# Patient Record
Sex: MALE | Race: WHITE | NOT HISPANIC OR LATINO | Employment: OTHER | URBAN - METROPOLITAN AREA
[De-identification: names, ages, dates, MRNs, and addresses within clinical notes are randomized per-mention and may not be internally consistent; named-entity substitution may affect disease eponyms.]

---

## 2017-01-20 RX ORDER — METOPROLOL TARTRATE 25 MG/1
TABLET, FILM COATED ORAL
Qty: 180 TABLET | Refills: 1 | Status: SHIPPED | OUTPATIENT
Start: 2017-01-20 | End: 2017-07-31 | Stop reason: SDUPTHER

## 2017-01-20 NOTE — LETTER
January 23, 2017    Paul Browning  [address]  [place] [address]             [place] [facility]  [address] [facility]  [place] [address]  Phone: [phone]  Fax: [phone] Dear Mr. Browning:    We recently gave you a refill and noticed your appt is past due, Please call [phone] to schedule an appointment for March per Dr. Forbes    If you have any questions or concerns, please don't hesitate to call.    Sincerely,    Grey Forbes DO/ cch

## 2017-02-02 ENCOUNTER — LAB VISIT (OUTPATIENT)
Dept: LAB | Facility: HOSPITAL | Age: 77
End: 2017-02-02
Attending: INTERNAL MEDICINE
Payer: MEDICARE

## 2017-02-02 ENCOUNTER — OFFICE VISIT (OUTPATIENT)
Dept: INTERNAL MEDICINE | Facility: CLINIC | Age: 77
End: 2017-02-02
Payer: MEDICARE

## 2017-02-02 VITALS
DIASTOLIC BLOOD PRESSURE: 64 MMHG | SYSTOLIC BLOOD PRESSURE: 111 MMHG | HEART RATE: 59 BPM | WEIGHT: 142 LBS | HEIGHT: 67 IN | BODY MASS INDEX: 22.29 KG/M2 | RESPIRATION RATE: 16 BRPM | TEMPERATURE: 98 F

## 2017-02-02 DIAGNOSIS — D69.2 SENILE PURPURA: ICD-10-CM

## 2017-02-02 DIAGNOSIS — I10 ESSENTIAL HYPERTENSION: Chronic | ICD-10-CM

## 2017-02-02 DIAGNOSIS — I25.83 CORONARY ARTERY DISEASE DUE TO LIPID RICH PLAQUE: Primary | Chronic | ICD-10-CM

## 2017-02-02 DIAGNOSIS — K22.710 BARRETT'S ESOPHAGUS WITH LOW GRADE DYSPLASIA: Chronic | ICD-10-CM

## 2017-02-02 DIAGNOSIS — I50.22 CHRONIC SYSTOLIC CHF (CONGESTIVE HEART FAILURE): Chronic | ICD-10-CM

## 2017-02-02 DIAGNOSIS — D86.0 PULMONARY SARCOIDOSIS: ICD-10-CM

## 2017-02-02 DIAGNOSIS — F33.0 MDD (MAJOR DEPRESSIVE DISORDER), RECURRENT EPISODE, MILD: Chronic | ICD-10-CM

## 2017-02-02 DIAGNOSIS — I77.9 CAROTID ARTERY DISEASE WITHOUT CEREBRAL INFARCTION: Chronic | ICD-10-CM

## 2017-02-02 DIAGNOSIS — I25.10 CORONARY ARTERY DISEASE DUE TO LIPID RICH PLAQUE: Primary | Chronic | ICD-10-CM

## 2017-02-02 DIAGNOSIS — J44.9 CHRONIC OBSTRUCTIVE PULMONARY DISEASE, UNSPECIFIED COPD TYPE: ICD-10-CM

## 2017-02-02 DIAGNOSIS — E78.5 HYPERLIPIDEMIA, UNSPECIFIED HYPERLIPIDEMIA TYPE: Chronic | ICD-10-CM

## 2017-02-02 DIAGNOSIS — G47.00 INSOMNIA, UNSPECIFIED TYPE: Chronic | ICD-10-CM

## 2017-02-02 DIAGNOSIS — I70.0 ATHEROSCLEROSIS OF AORTA: ICD-10-CM

## 2017-02-02 LAB
ALBUMIN SERPL BCP-MCNC: 3.4 G/DL
ALP SERPL-CCNC: 56 U/L
ALT SERPL W/O P-5'-P-CCNC: 15 U/L
ANION GAP SERPL CALC-SCNC: 9 MMOL/L
AST SERPL-CCNC: 21 U/L
BASOPHILS # BLD AUTO: 0.04 K/UL
BASOPHILS NFR BLD: 0.7 %
BILIRUB SERPL-MCNC: 1.3 MG/DL
BUN SERPL-MCNC: 14 MG/DL
CALCIUM SERPL-MCNC: 9 MG/DL
CHLORIDE SERPL-SCNC: 103 MMOL/L
CO2 SERPL-SCNC: 27 MMOL/L
CREAT SERPL-MCNC: 1.2 MG/DL
DIFFERENTIAL METHOD: ABNORMAL
EOSINOPHIL # BLD AUTO: 0.1 K/UL
EOSINOPHIL NFR BLD: 1.8 %
ERYTHROCYTE [DISTWIDTH] IN BLOOD BY AUTOMATED COUNT: 13.8 %
EST. GFR  (AFRICAN AMERICAN): >60 ML/MIN/1.73 M^2
EST. GFR  (NON AFRICAN AMERICAN): 57.6 ML/MIN/1.73 M^2
GLUCOSE SERPL-MCNC: 81 MG/DL
HCT VFR BLD AUTO: 40.1 %
HGB BLD-MCNC: 13.5 G/DL
LYMPHOCYTES # BLD AUTO: 1.7 K/UL
LYMPHOCYTES NFR BLD: 30.6 %
MCH RBC QN AUTO: 30.4 PG
MCHC RBC AUTO-ENTMCNC: 33.7 %
MCV RBC AUTO: 90 FL
MONOCYTES # BLD AUTO: 0.6 K/UL
MONOCYTES NFR BLD: 11 %
NEUTROPHILS # BLD AUTO: 3.1 K/UL
NEUTROPHILS NFR BLD: 55.7 %
PLATELET # BLD AUTO: 247 K/UL
PMV BLD AUTO: 10.4 FL
POTASSIUM SERPL-SCNC: 4.6 MMOL/L
PROT SERPL-MCNC: 6.9 G/DL
RBC # BLD AUTO: 4.44 M/UL
SODIUM SERPL-SCNC: 139 MMOL/L
WBC # BLD AUTO: 5.62 K/UL

## 2017-02-02 PROCEDURE — 3078F DIAST BP <80 MM HG: CPT | Mod: S$GLB,,, | Performed by: INTERNAL MEDICINE

## 2017-02-02 PROCEDURE — 80053 COMPREHEN METABOLIC PANEL: CPT

## 2017-02-02 PROCEDURE — 99999 PR PBB SHADOW E&M-EST. PATIENT-LVL III: CPT | Mod: PBBFAC,,, | Performed by: INTERNAL MEDICINE

## 2017-02-02 PROCEDURE — 3074F SYST BP LT 130 MM HG: CPT | Mod: S$GLB,,, | Performed by: INTERNAL MEDICINE

## 2017-02-02 PROCEDURE — 1159F MED LIST DOCD IN RCRD: CPT | Mod: S$GLB,,, | Performed by: INTERNAL MEDICINE

## 2017-02-02 PROCEDURE — 1157F ADVNC CARE PLAN IN RCRD: CPT | Mod: S$GLB,,, | Performed by: INTERNAL MEDICINE

## 2017-02-02 PROCEDURE — 1126F AMNT PAIN NOTED NONE PRSNT: CPT | Mod: S$GLB,,, | Performed by: INTERNAL MEDICINE

## 2017-02-02 PROCEDURE — 99499 UNLISTED E&M SERVICE: CPT | Mod: S$GLB,,, | Performed by: INTERNAL MEDICINE

## 2017-02-02 PROCEDURE — 99214 OFFICE O/P EST MOD 30 MIN: CPT | Mod: S$GLB,,, | Performed by: INTERNAL MEDICINE

## 2017-02-02 PROCEDURE — 1160F RVW MEDS BY RX/DR IN RCRD: CPT | Mod: S$GLB,,, | Performed by: INTERNAL MEDICINE

## 2017-02-02 PROCEDURE — 36415 COLL VENOUS BLD VENIPUNCTURE: CPT | Mod: PO

## 2017-02-02 PROCEDURE — 85025 COMPLETE CBC W/AUTO DIFF WBC: CPT

## 2017-02-02 RX ORDER — SUMATRIPTAN SUCCINATE 100 MG/1
100 TABLET ORAL ONCE
COMMUNITY
Start: 2017-01-19 | End: 2018-05-30

## 2017-02-02 NOTE — PROGRESS NOTES
Subjective:       Patient ID: Paul Browning is a 76 y.o. male.    Chief Complaint: Follow-up    HPI   Pt with CAD, systolic heart failure, CAD, Carotid disease/PAD, HLD, HTN, COPD/sarcoidosis, Insomnia, Aortic atherosclerosis, MDD, senile purpura is here for f/u. Pt has been doing well and denies any cardiac or pulmonary symptoms.   Review of Systems   Constitutional: Negative for activity change, appetite change, chills, diaphoresis, fatigue, fever and unexpected weight change.   HENT: Negative for postnasal drip, rhinorrhea, sinus pressure, sneezing, sore throat, trouble swallowing and voice change.    Respiratory: Negative for cough, shortness of breath and wheezing.    Cardiovascular: Negative for chest pain, palpitations and leg swelling.   Gastrointestinal: Negative for abdominal pain, blood in stool, constipation, diarrhea, nausea and vomiting.   Genitourinary: Negative for dysuria.   Musculoskeletal: Negative for arthralgias and myalgias.   Skin: Negative for rash and wound.   Allergic/Immunologic: Negative for environmental allergies and food allergies.   Hematological: Negative for adenopathy. Does not bruise/bleed easily.       Objective:      Physical Exam   Constitutional: He is oriented to person, place, and time. He appears well-developed and well-nourished. No distress.   HENT:   Head: Normocephalic and atraumatic.   Eyes: Conjunctivae and EOM are normal. Pupils are equal, round, and reactive to light. Right eye exhibits no discharge. Left eye exhibits no discharge. No scleral icterus.   Neck: Normal range of motion. Neck supple. No JVD present.   Cardiovascular: Normal rate, regular rhythm and normal heart sounds.    No murmur heard.  Pulmonary/Chest: Effort normal and breath sounds normal. No respiratory distress. He has no wheezes. He has no rales.   Abdominal: Soft. Bowel sounds are normal. There is no tenderness.   Musculoskeletal: He exhibits no edema.   Lymphadenopathy:     He has no  cervical adenopathy.   Neurological: He is alert and oriented to person, place, and time.   Skin: Skin is warm and dry. No rash noted. He is not diaphoretic. No pallor.       Assessment:       1. Coronary artery disease due to lipid rich plaque    2. Chronic systolic CHF (congestive heart failure)    3. Carotid artery disease without cerebral infarction    4. Essential hypertension    5. Stevens's esophagus with low grade dysplasia    6. Hyperlipidemia, unspecified hyperlipidemia type    7. MDD (major depressive disorder), recurrent episode, mild    8. Chronic obstructive pulmonary disease, unspecified COPD type    9. Pulmonary sarcoidosis    10. Atherosclerosis of aorta    11. Insomnia, unspecified type    12. Senile purpura        Plan:    1. CAD s/p PCI and CABG- stable no CP currently, followed by Cardiology       Continue ASA/Statin/BB   2. Chronic Systolic heart failure- stable without S/S of volume overload    3. Carotid artery disease/PAD- stable, CPT   4. Stevens's esophagus- Last EGD(8/14)- low grade dysplasia and intestinal metaplasia       Continue Protonix 40 mg BID   5. HTN- stable, CPT       CBC/CMP   6. COPD with sarcoidosis- stable, pt not currently on meds   7. Insomnia- stable on Trazodone 50 mg qHS PRN   8. Aortic atherosclerosis- stable, continue to monitor   9. MDD- stable on Zoloft 50 mg daily  10. HLD- controlled on Lipitor 40 mg daily  11. Osteoporosis- continue Fosamax   12. Senile purpura- stable, continue to monitor  13. F/u in 3 months for annual exam

## 2017-03-13 DIAGNOSIS — R25.2 CRAMPS OF LOWER EXTREMITY, UNSPECIFIED LATERALITY: ICD-10-CM

## 2017-03-13 DIAGNOSIS — R25.2 MUSCLE CRAMPS: ICD-10-CM

## 2017-03-14 DIAGNOSIS — R25.2 CRAMPS OF LOWER EXTREMITY, UNSPECIFIED LATERALITY: ICD-10-CM

## 2017-03-14 DIAGNOSIS — R25.2 MUSCLE CRAMPS: ICD-10-CM

## 2017-03-14 RX ORDER — POTASSIUM CHLORIDE 750 MG/1
20 TABLET, EXTENDED RELEASE ORAL DAILY
Qty: 90 TABLET | Refills: 3 | Status: SHIPPED | OUTPATIENT
Start: 2017-03-14 | End: 2017-03-14 | Stop reason: SDUPTHER

## 2017-03-15 RX ORDER — POTASSIUM CHLORIDE 750 MG/1
TABLET, EXTENDED RELEASE ORAL
Qty: 180 TABLET | Refills: 3 | Status: SHIPPED | OUTPATIENT
Start: 2017-03-15 | End: 2018-12-18 | Stop reason: ALTCHOICE

## 2017-04-13 ENCOUNTER — OFFICE VISIT (OUTPATIENT)
Dept: DERMATOLOGY | Facility: CLINIC | Age: 77
End: 2017-04-13
Payer: MEDICARE

## 2017-04-13 DIAGNOSIS — Z85.828 PERSONAL HISTORY OF SKIN CANCER: ICD-10-CM

## 2017-04-13 DIAGNOSIS — D48.9 NEOPLASM OF UNCERTAIN BEHAVIOR: ICD-10-CM

## 2017-04-13 DIAGNOSIS — L90.5 SCAR: Primary | ICD-10-CM

## 2017-04-13 DIAGNOSIS — L30.0 NUMMULAR ECZEMA: ICD-10-CM

## 2017-04-13 DIAGNOSIS — L57.0 AK (ACTINIC KERATOSIS): ICD-10-CM

## 2017-04-13 DIAGNOSIS — L82.1 SK (SEBORRHEIC KERATOSIS): ICD-10-CM

## 2017-04-13 DIAGNOSIS — D22.9 NEVUS: ICD-10-CM

## 2017-04-13 PROCEDURE — 99999 PR PBB SHADOW E&M-EST. PATIENT-LVL III: CPT | Mod: PBBFAC,,, | Performed by: DERMATOLOGY

## 2017-04-13 PROCEDURE — 1157F ADVNC CARE PLAN IN RCRD: CPT | Mod: S$GLB,,, | Performed by: DERMATOLOGY

## 2017-04-13 PROCEDURE — 11100 PR BIOPSY OF SKIN LESION: CPT | Mod: 59,S$GLB,, | Performed by: DERMATOLOGY

## 2017-04-13 PROCEDURE — 1159F MED LIST DOCD IN RCRD: CPT | Mod: S$GLB,,, | Performed by: DERMATOLOGY

## 2017-04-13 PROCEDURE — 17000 DESTRUCT PREMALG LESION: CPT | Mod: S$GLB,,, | Performed by: DERMATOLOGY

## 2017-04-13 PROCEDURE — 1160F RVW MEDS BY RX/DR IN RCRD: CPT | Mod: S$GLB,,, | Performed by: DERMATOLOGY

## 2017-04-13 PROCEDURE — 99214 OFFICE O/P EST MOD 30 MIN: CPT | Mod: 25,S$GLB,, | Performed by: DERMATOLOGY

## 2017-04-13 PROCEDURE — 88305 TISSUE EXAM BY PATHOLOGIST: CPT | Performed by: PATHOLOGY

## 2017-04-13 PROCEDURE — 17003 DESTRUCT PREMALG LES 2-14: CPT | Mod: S$GLB,,, | Performed by: DERMATOLOGY

## 2017-04-13 RX ORDER — TRIAMCINOLONE ACETONIDE 1 MG/G
CREAM TOPICAL
Qty: 60 G | Refills: 1 | Status: SHIPPED | OUTPATIENT
Start: 2017-04-13 | End: 2018-06-27

## 2017-04-13 NOTE — PROGRESS NOTES
Subjective:       Patient ID:  Paul Browning is a 76 y.o. male who presents for   Chief Complaint   Patient presents with    Lesion     HPI Comments: Pt c/o on scalp x a few months. Itchy. Scar from Mohs surgery on crown of scalp.   Pt c/o itchy dry skin on both legs x a few months. Tx with otc lotion. No improvement.  C/o lesion on left wrist for 1 month. Used wart  Medication and did not heal. Getting larger.     Lesion         Review of Systems   Skin: Positive for itching and rash. Negative for tendency to form keloidal scars.   Hematologic/Lymphatic: Does not bruise/bleed easily.        Objective:    Physical Exam   Constitutional: He appears well-developed and well-nourished. No distress.   Neurological: He is alert and oriented to person, place, and time. He is not disoriented.   Psychiatric: He has a normal mood and affect.   Skin:   Areas Examined (abnormalities noted in diagram):   Scalp / Hair Palpated and Inspected  Head / Face Inspection Performed  Neck Inspection Performed  Chest / Axilla Inspection Performed  Abdomen Inspection Performed  Back Inspection Performed  RUE Inspected  LUE Inspection Performed  Nails and Digits Inspection Performed                       Diagram Legend     Erythematous scaling macule/papule c/w actinic keratosis       Vascular papule c/w angioma      Pigmented verrucoid papule/plaque c/w seborrheic keratosis      Yellow umbilicated papule c/w sebaceous hyperplasia      Irregularly shaped tan macule c/w lentigo     1-2 mm smooth white papules consistent with Milia      Movable subcutaneous cyst with punctum c/w epidermal inclusion cyst      Subcutaneous movable cyst c/w pilar cyst      Firm pink to brown papule c/w dermatofibroma      Pedunculated fleshy papule(s) c/w skin tag(s)      Evenly pigmented macule c/w junctional nevus     Mildly variegated pigmented, slightly irregular-bordered macule c/w mildly atypical nevus      Flesh colored to evenly pigmented  papule c/w intradermal nevus       Pink pearly papule/plaque c/w basal cell carcinoma      Erythematous hyperkeratotic cursted plaque c/w SCC      Surgical scar with no sign of skin cancer recurrence      Open and closed comedones      Inflammatory papules and pustules      Verrucoid papule consistent consistent with wart     Erythematous eczematous patches and plaques     Dystrophic onycholytic nail with subungual debris c/w onychomycosis     Umbilicated papule    Erythematous-base heme-crusted tan verrucoid plaque consistent with inflamed seborrheic keratosis     Erythematous Silvery Scaling Plaque c/w Psoriasis     See annotation      Assessment / Plan:      Pathology Orders:      Normal Orders This Visit    Tissue Specimen To Pathology, Dermatology     Questions:    Directional Terms:  Other(comment)    Clinical information:  r/o SCC    Specific Site:  left wrist        Scar  Personal history of skin cancer  Area(s) of previous NMSC evaluated with no signs of recurrence.    Upper body skin examination performed today including at least 6 points as noted in physical examination. Suspicious lesions noted.    Neoplasm of uncertain behavior  Shave biopsy procedure note:    Shave biopsy performed after verbal consent including risk of infection, scar, recurrence, need for additional treatment of site. Area prepped with alcohol, anesthetized with approximately 1.0cc of 1% lidocaine with epinephrine. Lesional tissue shaved with razor blade. Hemostasis achieved with application of aluminum chloride followed by hyfrecation. No complications. Dressing applied. Wound care explained.    If biopsy positive, schedule procedure for definitive excision.   -     Tissue Specimen To Pathology, Dermatology    AK (actinic keratosis)  Cryosurgery Procedure Note    Verbal consent from the patient is obtained and the patient is aware of the precancerous quality and need for treatment of these lesions. Liquid nitrogen cryosurgery is  applied to the 11 actinic keratoses, as detailed in the physical exam, to produce a freeze injury. The patient is aware that blisters may form and is instructed on wound care with gentle cleansing and use of vaseline ointment to keep moist until healed. The patient is supplied a handout on cryosurgery and is instructed to call if lesions do not completely resolve.    -     Photodynamic Therapy; Future  Will schedule PDT for numerous (>15) actinic keratoses on face, temples, upper forehead with an incubation time of 75 minutes. Counseled patient about photodynamic therapy and that (s)he should avoid sunlight and bright artificial light for 48 hours after the procedure. After that, vigilant sun protection and sunscreen should be used on a daily basis. Skin will be red and peeling for about 5-7 days after the procedure, and rarely for 2 weeks. Discussed that some patients require repeat treatment 8 weeks after initial treatment.    Nevus  Discussed ABCDE's of nevi.  Monitor for new mole or moles that are becoming bigger, darker, irritated, or developing irregular borders. Brochure provided.    SK (seborrheic keratosis)  These are benign inherited growths without a malignant potential. Reassurance given to patient. No treatment is necessary.     Nummular eczema  -     triamcinolone acetonide 0.1% (KENALOG) 0.1 % cream; AAA bid to legs prn itching. Not more than 2 weeks straight in the same location  Dispense: 60 g; Refill: 1             Return for prn bx report.

## 2017-05-01 RX ORDER — PANTOPRAZOLE SODIUM 40 MG/1
TABLET, DELAYED RELEASE ORAL
Qty: 90 TABLET | Refills: 3 | Status: ON HOLD | OUTPATIENT
Start: 2017-05-01 | End: 2017-10-04 | Stop reason: SDUPTHER

## 2017-05-02 ENCOUNTER — OFFICE VISIT (OUTPATIENT)
Dept: INTERNAL MEDICINE | Facility: CLINIC | Age: 77
End: 2017-05-02
Payer: MEDICARE

## 2017-05-02 VITALS
RESPIRATION RATE: 16 BRPM | SYSTOLIC BLOOD PRESSURE: 90 MMHG | HEART RATE: 60 BPM | HEIGHT: 67 IN | BODY MASS INDEX: 23.36 KG/M2 | DIASTOLIC BLOOD PRESSURE: 44 MMHG | TEMPERATURE: 98 F | WEIGHT: 148.81 LBS

## 2017-05-02 DIAGNOSIS — K22.710 BARRETT'S ESOPHAGUS WITH LOW GRADE DYSPLASIA: Chronic | ICD-10-CM

## 2017-05-02 DIAGNOSIS — I70.0 ATHEROSCLEROSIS OF AORTA: ICD-10-CM

## 2017-05-02 DIAGNOSIS — F33.0 MDD (MAJOR DEPRESSIVE DISORDER), RECURRENT EPISODE, MILD: Chronic | ICD-10-CM

## 2017-05-02 DIAGNOSIS — J44.9 CHRONIC OBSTRUCTIVE PULMONARY DISEASE, UNSPECIFIED COPD TYPE: ICD-10-CM

## 2017-05-02 DIAGNOSIS — I73.9 PERIPHERAL ARTERIAL DISEASE: ICD-10-CM

## 2017-05-02 DIAGNOSIS — Z85.89 HISTORY OF SQUAMOUS CELL CARCINOMA: ICD-10-CM

## 2017-05-02 DIAGNOSIS — G47.00 INSOMNIA, UNSPECIFIED TYPE: Chronic | ICD-10-CM

## 2017-05-02 DIAGNOSIS — I10 ESSENTIAL HYPERTENSION: Chronic | ICD-10-CM

## 2017-05-02 DIAGNOSIS — I25.5 ISCHEMIC CARDIOMYOPATHY: ICD-10-CM

## 2017-05-02 DIAGNOSIS — G43.909 MIGRAINE WITHOUT STATUS MIGRAINOSUS, NOT INTRACTABLE, UNSPECIFIED MIGRAINE TYPE: ICD-10-CM

## 2017-05-02 DIAGNOSIS — E78.5 HYPERLIPIDEMIA, UNSPECIFIED HYPERLIPIDEMIA TYPE: Chronic | ICD-10-CM

## 2017-05-02 DIAGNOSIS — I50.22 CHRONIC SYSTOLIC CHF (CONGESTIVE HEART FAILURE): Chronic | ICD-10-CM

## 2017-05-02 DIAGNOSIS — D86.0 PULMONARY SARCOIDOSIS: ICD-10-CM

## 2017-05-02 DIAGNOSIS — I77.9 CAROTID ARTERY DISEASE WITHOUT CEREBRAL INFARCTION: Chronic | ICD-10-CM

## 2017-05-02 DIAGNOSIS — D69.2 SENILE PURPURA: ICD-10-CM

## 2017-05-02 DIAGNOSIS — I25.83 CORONARY ARTERY DISEASE DUE TO LIPID RICH PLAQUE: Chronic | ICD-10-CM

## 2017-05-02 DIAGNOSIS — Z00.00 ANNUAL PHYSICAL EXAM: Primary | ICD-10-CM

## 2017-05-02 DIAGNOSIS — I25.10 CORONARY ARTERY DISEASE DUE TO LIPID RICH PLAQUE: Chronic | ICD-10-CM

## 2017-05-02 DIAGNOSIS — M81.0 OSTEOPOROSIS, UNSPECIFIED OSTEOPOROSIS TYPE, UNSPECIFIED PATHOLOGICAL FRACTURE PRESENCE: ICD-10-CM

## 2017-05-02 PROCEDURE — 3078F DIAST BP <80 MM HG: CPT | Mod: S$GLB,,, | Performed by: INTERNAL MEDICINE

## 2017-05-02 PROCEDURE — 99499 UNLISTED E&M SERVICE: CPT | Mod: S$GLB,,, | Performed by: INTERNAL MEDICINE

## 2017-05-02 PROCEDURE — 99999 PR PBB SHADOW E&M-EST. PATIENT-LVL III: CPT | Mod: PBBFAC,,, | Performed by: INTERNAL MEDICINE

## 2017-05-02 PROCEDURE — 3074F SYST BP LT 130 MM HG: CPT | Mod: S$GLB,,, | Performed by: INTERNAL MEDICINE

## 2017-05-02 PROCEDURE — 99397 PER PM REEVAL EST PAT 65+ YR: CPT | Mod: S$GLB,,, | Performed by: INTERNAL MEDICINE

## 2017-05-02 NOTE — PROGRESS NOTES
Subjective:       Patient ID: Paul Browning is a 76 y.o. male.    Chief Complaint: Annual Exam    HPI   76 y.o. Male with CAD, systolic heart failure, CAD, Carotid disease/PAD, HLD, HTN, COPD/sarcoidosis, Insomnia, Aortic atherosclerosis, MDD, senile purpura, Hx of SCC is here for annual exam.      Cholesterol: (normal)  Vaccines: Influenza (2015); Tetanus (2016) ; Pneumovax (2014); Zoster (2016)  Sexual Screening: declined  Eye exam: 2016  Colonoscopy: declined  DEXA: 4/16     Mobility: normal  Falls: no     Exercise: no  Diet: low cholesterol     Past Medical History:  CAD (coronary artery disease)   Sarcoid   GERD (gastroesophageal reflux disease)   Diaphragmatic hernia   Hyperlipidemia   Hypertension   Stevens's esophagus with low grade dysplasia   BPH (benign prostatic hyperplasia)   Heart attack   MDD (major depressive disorder), recurrent epi* 2/17/2016   Past Surgical History:  UMBILICAL HERNIA REPAIR   CORONARY ARTERY BYPASS GRAFT   Comment:x2 10/2014  Social History  Marital Status:  Spouse Name:   Years of Education: Number of children: 4      Occupational History  Occupation Employer Comment   Retired       Social History Main Topics  Smoking Status: Former Smoker Packs/Day: 2.00 Years: 20   Types: Cigarettes  Quit date: 02/26/1988  Smokeless Status: Never Used   Alcohol Use: No   Comment: quit drinking beer 2012  Drug Use: No   Sexual Activity: Not Currently Partners with: Female     No Known Allergies  Mr. Browning does not currently have medications on file  Review of Systems   Constitutional: Negative for activity change, appetite change, chills, diaphoresis, fatigue, fever and unexpected weight change.   HENT: Negative for congestion, mouth sores, postnasal drip, rhinorrhea, sinus pressure, sneezing, sore throat, trouble swallowing and voice change.    Eyes: Negative for discharge, itching and visual disturbance.   Respiratory: Negative for cough, chest tightness,  shortness of breath and wheezing.    Cardiovascular: Negative for chest pain, palpitations and leg swelling.   Gastrointestinal: Negative for abdominal pain, blood in stool, constipation, diarrhea, nausea and vomiting.   Endocrine: Negative for cold intolerance and heat intolerance.   Genitourinary: Negative for difficulty urinating, dysuria, flank pain, hematuria and urgency.   Musculoskeletal: Positive for arthralgias. Negative for back pain, myalgias and neck pain.   Skin: Negative for rash and wound.   Allergic/Immunologic: Negative for environmental allergies and food allergies.   Neurological: Positive for headaches. Negative for dizziness, tremors, seizures, syncope and weakness.   Hematological: Negative for adenopathy. Does not bruise/bleed easily.   Psychiatric/Behavioral: Positive for dysphoric mood. Negative for confusion, sleep disturbance and suicidal ideas. The patient is not nervous/anxious.        Objective:      Physical Exam   Constitutional: He is oriented to person, place, and time. He appears well-developed and well-nourished. No distress.   HENT:   Head: Normocephalic and atraumatic.   Right Ear: External ear normal.   Left Ear: External ear normal.   Nose: Nose normal.   Mouth/Throat: Oropharynx is clear and moist. No oropharyngeal exudate.   Eyes: Conjunctivae and EOM are normal. Pupils are equal, round, and reactive to light. Right eye exhibits no discharge. Left eye exhibits no discharge. No scleral icterus.   Neck: Normal range of motion. Neck supple. No JVD present. No thyromegaly present.   Cardiovascular: Normal rate, regular rhythm, normal heart sounds and intact distal pulses.    No murmur heard.  Pulmonary/Chest: Effort normal and breath sounds normal. No respiratory distress. He has no wheezes. He has no rales.   Abdominal: Soft. Bowel sounds are normal. He exhibits no distension. There is no tenderness. There is no guarding.   Musculoskeletal: He exhibits no edema.    Lymphadenopathy:     He has no cervical adenopathy.   Neurological: He is alert and oriented to person, place, and time.   Skin: Skin is warm and dry. No rash noted. He is not diaphoretic. No pallor.   Psychiatric: He has a normal mood and affect. Judgment normal.       Assessment:       1. Annual physical exam    2. Coronary artery disease due to lipid rich plaque    3. Chronic systolic CHF (congestive heart failure)    4. Ischemic cardiomyopathy    5. Carotid artery disease without cerebral infarction    6. Peripheral arterial disease    7. Stevens's esophagus with low grade dysplasia    8. Essential hypertension    9. Chronic obstructive pulmonary disease, unspecified COPD type    10. Pulmonary sarcoidosis    11. Insomnia, unspecified type    12. Atherosclerosis of aorta    13. MDD (major depressive disorder), recurrent episode, mild    14. Hyperlipidemia, unspecified hyperlipidemia type    15. Osteoporosis, unspecified osteoporosis type, unspecified pathological fracture presence    16. Senile purpura    17. Migraine without status migrainosus, not intractable, unspecified migraine type        Plan:    Complete blood work, UA   Vaccines: Influenza (2015); Tetanus (2016) ; Pneumovax (2014); Zoster (2016)   Sexual Screening: declined   Eye exam: 2016   Colonoscopy: declined   DEXA: 4/16       1. CAD s/p PCI and CABG- stable no CP currently, followed by Cardiology       Continue ASA/Statin/BB   2. Chronic Systolic heart failure- stable without S/S of volume overload    3. Carotid artery disease/PAD- stable, CPT   4. Stevens's esophagus, Hx of gastric ulcer- Last EGD(8/14)- low grade dysplasia and intestinal metaplasia       Continue Protonix 40 mg BID       Referral back to GI   5. HTN- stable, CPT   6. COPD with sarcoidosis- stable off meds   7. Insomnia- stable on Trazodone 50 mg qHS PRN   8. Aortic atherosclerosis- stable, continue to monitor   9. MDD- stable on Zoloft 50 mg daily  10. HLD- controlled on  Lipitor 40 mg daily  11. Osteoporosis- continue Fosamax   12. Senile purpura- stable, continue to monitor  13. Migraines- stable on Imitrex PRN  14. Hx of SCC- followed by Derm   15. F/u in 3 months or PRN

## 2017-05-02 NOTE — MR AVS SNAPSHOT
[place] [facility]   [facility]  [place] [address]  Phone: [phone]  Fax: [phone]                  Paul Browning   2017 11:00 AM   Office Visit    Description:  Male : 1940   Provider:  Grey Forbes DO   Department:  [facility]           Reason for Visit     Annual Exam           Diagnoses this Visit        Comments    Annual physical exam    -  Primary     Coronary artery disease due to lipid rich plaque         Chronic systolic CHF (congestive heart failure)         Ischemic cardiomyopathy         Carotid artery disease without cerebral infarction         Peripheral arterial disease         Stevens's esophagus with low grade dysplasia         Essential hypertension         Chronic obstructive pulmonary disease, unspecified COPD type         Pulmonary sarcoidosis         Insomnia, unspecified type         Atherosclerosis of aorta         MDD (major depressive disorder), recurrent episode, mild         Hyperlipidemia, unspecified hyperlipidemia type         Osteoporosis, unspecified osteoporosis type, unspecified pathological fracture presence         Senile purpura         Migraine without status migrainosus, not intractable, unspecified migraine type         History of squamous cell carcinoma                To Do List           Future Appointments        Provider Department Dept Phone    2017 1:00 PM Magi Berman MD [place] [phone]    2017 10:00 AM PDT, APPT [address] - Dermatology [phone]    2017 9:30 AM LAB, [facility] [place] [facility] [phone]    2017 9:45 AM SPECIMEN, [facility] [place] [facility] Specimen Lab [phone]    2017 10:40 AM Grey Forbes DO [facility] [phone]      Goals (5 Years of Data)     None      Follow-Up and Disposition     Return in about 3 months (around 2017).      [facility]     [facility] Care  [facility] - 24/7 Assistance  Unless otherwise directed by your provider, please contact [facility], our nurse care line that is available for 24/7 assistance.     Registered nurses in the [facility] provide: appointment scheduling, clinical advisement, health education, and other advisory services.  Call: [phone] (toll free)               Medications           Message regarding Medications     Verify the changes and/or additions to your medication regime listed below are the same as discussed with your clinician today.  If any of these changes or additions are incorrect, please notify your healthcare provider.             Verify that the below list of medications is an accurate representation of the medications you are currently taking.  If none reported, the list may be blank. If incorrect, please contact your healthcare provider. Carry this list with you in case of emergency.           Current Medications     alendronate (FOSAMAX) 70 MG tablet 1 tab q weekly in the am with a full glass of water on an empty stomach. Do not consume food or lie down for at least 30 minutes afterwards.    aspirin (ECOTRIN) 81 MG EC tablet TAKE 1 TABLET BY MOUTH ONCE DAILY    atorvastatin (LIPITOR) 40 MG tablet Take 1 tablet (40 mg total) by mouth once daily.    metoprolol tartrate (LOPRESSOR) 25 MG tablet TAKE 1 TABLET BY MOUTH TWICE DAILY    omega-3 fatty acids-vitamin E (FISH OIL) 1,000 mg Cap Take 1 tablet by mouth 2 (two) times daily.    oxycodone-acetaminophen (PERCOCET) 5-325 mg per tablet Take 1 tablet by mouth every 4 to 6 hours as needed for Pain.    pantoprazole (PROTONIX) 40 MG tablet TAKE ONE TABLET BY MOUTH ONCE DAILY    potassium chloride (KLOR-CON) 10 MEQ TbSR TAKE 2 TABLETS BY MOUTH EVERY DAY    sumatriptan (IMITREX) 100 MG tablet     triamcinolone acetonide 0.1% (KENALOG) 0.1 % cream AAA bid to legs prn itching. Not more than 2 weeks straight in the same location    sertraline (ZOLOFT) 50 MG tablet  "1 TABLET BY MOUTH DAILY    trazodone (DESYREL) 50 MG tablet TAKE 1 TABLET BY MOUTH EVERY NIGHT AS NEEDED FOR INSOMNIA           Clinical Reference Information           Your Vitals Were     BP Pulse Temp Resp Height Weight    90/44 (BP Location: Left arm, Patient Position: Sitting, BP Method: Manual) 60 97.8 °F (36.6 °C) (Oral) 16 5' 7" (1.702 m) 67.5 kg (148 lb 12.8 oz)    BMI                23.31 kg/m2          Blood Pressure          Most Recent Value    BP  (!)  90/44      Allergies as of 5/2/2017     No Known Allergies      Immunizations Administered on Date of Encounter - 5/2/2017     None      Orders Placed During Today's Visit      Normal Orders This Visit    Ambulatory Referral to Gastroenterology     Future Labs/Procedures Expected by Expires    CBC auto differential  5/2/2017 5/2/2018    Lipid panel  5/2/2017 5/2/2018    TSH  5/2/2017 7/1/2018    Comprehensive metabolic panel  8/1/2017 8/1/2018    Urinalysis  As directed 5/2/2018      Language Assistance Services     ATTENTION: Language assistance services are available, free of charge. Please call [phone].      ATENCIÓN: Si habla español, tiene a degroot disposición servicios gratuitos de asistencia lingüística. Llame al [phone].     CHÚ Ý: N?u b?n nói Ti?ng Vi?t, có các d?ch v? h? tr? ngôn ng? mi?n phí dành cho b?n. G?i s? [phone].         [facility] complies with applicable Federal civil rights laws and does not discriminate on the basis of race, color, national origin, age, disability, or sex.        "

## 2017-05-10 ENCOUNTER — TELEPHONE (OUTPATIENT)
Dept: DERMATOLOGY | Facility: CLINIC | Age: 77
End: 2017-05-10

## 2017-05-10 NOTE — TELEPHONE ENCOUNTER
----- Message from Razia Mejia sent at 5/10/2017 10:33 AM CDT -----  Contact: pts suzette sena at [phone]  Neil Reddy-Luke needs to change his PDTY apppt that is scheduled on May 18.  Is also changing his procedure appt with Antonella on May 17.  Please call suzette jaida at [phone].

## 2017-05-25 ENCOUNTER — LAB VISIT (OUTPATIENT)
Dept: LAB | Facility: HOSPITAL | Age: 77
End: 2017-05-25
Attending: INTERNAL MEDICINE
Payer: MEDICARE

## 2017-05-25 DIAGNOSIS — M81.0 OSTEOPOROSIS, UNSPECIFIED OSTEOPOROSIS TYPE, UNSPECIFIED PATHOLOGICAL FRACTURE PRESENCE: ICD-10-CM

## 2017-05-25 DIAGNOSIS — D86.0 PULMONARY SARCOIDOSIS: ICD-10-CM

## 2017-05-25 DIAGNOSIS — I77.9 CAROTID ARTERY DISEASE WITHOUT CEREBRAL INFARCTION: Chronic | ICD-10-CM

## 2017-05-25 DIAGNOSIS — I70.0 ATHEROSCLEROSIS OF AORTA: ICD-10-CM

## 2017-05-25 DIAGNOSIS — D69.2 SENILE PURPURA: ICD-10-CM

## 2017-05-25 DIAGNOSIS — J44.9 CHRONIC OBSTRUCTIVE PULMONARY DISEASE, UNSPECIFIED COPD TYPE: ICD-10-CM

## 2017-05-25 DIAGNOSIS — I25.5 ISCHEMIC CARDIOMYOPATHY: ICD-10-CM

## 2017-05-25 DIAGNOSIS — I50.22 CHRONIC SYSTOLIC CHF (CONGESTIVE HEART FAILURE): Chronic | ICD-10-CM

## 2017-05-25 DIAGNOSIS — G43.909 MIGRAINE WITHOUT STATUS MIGRAINOSUS, NOT INTRACTABLE, UNSPECIFIED MIGRAINE TYPE: ICD-10-CM

## 2017-05-25 DIAGNOSIS — K22.710 BARRETT'S ESOPHAGUS WITH LOW GRADE DYSPLASIA: Chronic | ICD-10-CM

## 2017-05-25 DIAGNOSIS — F33.0 MDD (MAJOR DEPRESSIVE DISORDER), RECURRENT EPISODE, MILD: Chronic | ICD-10-CM

## 2017-05-25 DIAGNOSIS — E78.5 HYPERLIPIDEMIA, UNSPECIFIED HYPERLIPIDEMIA TYPE: Chronic | ICD-10-CM

## 2017-05-25 DIAGNOSIS — G47.00 INSOMNIA, UNSPECIFIED TYPE: Chronic | ICD-10-CM

## 2017-05-25 DIAGNOSIS — Z00.00 ANNUAL PHYSICAL EXAM: ICD-10-CM

## 2017-05-25 DIAGNOSIS — I25.83 CORONARY ARTERY DISEASE DUE TO LIPID RICH PLAQUE: Chronic | ICD-10-CM

## 2017-05-25 DIAGNOSIS — I25.10 CORONARY ARTERY DISEASE DUE TO LIPID RICH PLAQUE: Chronic | ICD-10-CM

## 2017-05-25 DIAGNOSIS — I73.9 PERIPHERAL ARTERIAL DISEASE: ICD-10-CM

## 2017-05-25 DIAGNOSIS — I10 ESSENTIAL HYPERTENSION: Chronic | ICD-10-CM

## 2017-05-25 LAB
ALBUMIN SERPL BCP-MCNC: 3.5 G/DL
ALP SERPL-CCNC: 64 U/L
ALT SERPL W/O P-5'-P-CCNC: 19 U/L
ANION GAP SERPL CALC-SCNC: 7 MMOL/L
AST SERPL-CCNC: 22 U/L
BASOPHILS # BLD AUTO: 0.03 K/UL
BASOPHILS NFR BLD: 0.5 %
BILIRUB SERPL-MCNC: 1.5 MG/DL
BUN SERPL-MCNC: 18 MG/DL
CALCIUM SERPL-MCNC: 9.5 MG/DL
CHLORIDE SERPL-SCNC: 103 MMOL/L
CHOLEST/HDLC SERPL: 3.3 {RATIO}
CO2 SERPL-SCNC: 30 MMOL/L
CREAT SERPL-MCNC: 1.2 MG/DL
DIFFERENTIAL METHOD: NORMAL
EOSINOPHIL # BLD AUTO: 0.1 K/UL
EOSINOPHIL NFR BLD: 1.4 %
ERYTHROCYTE [DISTWIDTH] IN BLOOD BY AUTOMATED COUNT: 13.8 %
EST. GFR  (AFRICAN AMERICAN): >60 ML/MIN/1.73 M^2
EST. GFR  (NON AFRICAN AMERICAN): 57.6 ML/MIN/1.73 M^2
GLUCOSE SERPL-MCNC: 85 MG/DL
HCT VFR BLD AUTO: 42.8 %
HDL/CHOLESTEROL RATIO: 30.4 %
HDLC SERPL-MCNC: 115 MG/DL
HDLC SERPL-MCNC: 35 MG/DL
HGB BLD-MCNC: 14.3 G/DL
LDLC SERPL CALC-MCNC: 65.2 MG/DL
LYMPHOCYTES # BLD AUTO: 1.8 K/UL
LYMPHOCYTES NFR BLD: 31.4 %
MCH RBC QN AUTO: 30 PG
MCHC RBC AUTO-ENTMCNC: 33.4 %
MCV RBC AUTO: 90 FL
MONOCYTES # BLD AUTO: 0.5 K/UL
MONOCYTES NFR BLD: 8.3 %
NEUTROPHILS # BLD AUTO: 3.3 K/UL
NEUTROPHILS NFR BLD: 58 %
NONHDLC SERPL-MCNC: 80 MG/DL
PLATELET # BLD AUTO: 262 K/UL
PMV BLD AUTO: 10.2 FL
POTASSIUM SERPL-SCNC: 4.7 MMOL/L
PROT SERPL-MCNC: 6.9 G/DL
RBC # BLD AUTO: 4.76 M/UL
SODIUM SERPL-SCNC: 140 MMOL/L
TRIGL SERPL-MCNC: 74 MG/DL
TSH SERPL DL<=0.005 MIU/L-ACNC: 2.53 UIU/ML
WBC # BLD AUTO: 5.63 K/UL

## 2017-05-25 PROCEDURE — 36415 COLL VENOUS BLD VENIPUNCTURE: CPT | Mod: PO

## 2017-05-25 PROCEDURE — 80053 COMPREHEN METABOLIC PANEL: CPT

## 2017-05-25 PROCEDURE — 84443 ASSAY THYROID STIM HORMONE: CPT

## 2017-05-25 PROCEDURE — 85025 COMPLETE CBC W/AUTO DIFF WBC: CPT

## 2017-05-25 PROCEDURE — 80061 LIPID PANEL: CPT

## 2017-05-29 ENCOUNTER — OFFICE VISIT (OUTPATIENT)
Dept: GASTROENTEROLOGY | Facility: CLINIC | Age: 77
End: 2017-05-29
Payer: MEDICARE

## 2017-05-29 VITALS
BODY MASS INDEX: 23.6 KG/M2 | DIASTOLIC BLOOD PRESSURE: 72 MMHG | WEIGHT: 150.38 LBS | HEART RATE: 64 BPM | SYSTOLIC BLOOD PRESSURE: 137 MMHG | HEIGHT: 67 IN

## 2017-05-29 DIAGNOSIS — K22.710 BARRETT'S ESOPHAGUS WITH LOW GRADE DYSPLASIA: Primary | ICD-10-CM

## 2017-05-29 PROCEDURE — 1126F AMNT PAIN NOTED NONE PRSNT: CPT | Mod: S$GLB,,, | Performed by: PHYSICIAN ASSISTANT

## 2017-05-29 PROCEDURE — 99214 OFFICE O/P EST MOD 30 MIN: CPT | Mod: S$GLB,,, | Performed by: PHYSICIAN ASSISTANT

## 2017-05-29 PROCEDURE — 99999 PR PBB SHADOW E&M-EST. PATIENT-LVL IV: CPT | Mod: PBBFAC,,, | Performed by: PHYSICIAN ASSISTANT

## 2017-05-29 PROCEDURE — 1159F MED LIST DOCD IN RCRD: CPT | Mod: S$GLB,,, | Performed by: PHYSICIAN ASSISTANT

## 2017-05-29 PROCEDURE — 99499 UNLISTED E&M SERVICE: CPT | Mod: S$GLB,,, | Performed by: PHYSICIAN ASSISTANT

## 2017-05-29 NOTE — PROGRESS NOTES
[facility] Consultation Note    Reason for Consult:  The encounter diagnosis was Stevens's esophagus with low grade dysplasia.    PCP:   Grey Forbes   [address] [facility] / [place] [id number]    Referring MD:  Grey Forbes Do  [address] [facility]  [place] [address]    HPI:  This is a 76 y.o. male here for evaluation of Stevens's esophagus.  He has a hx of Stevens's esophagus with low grade dysplasia  His 8/2014 EGD revealed Stevens's esophagus confirmed via Bx with low grade dysplasia. A 6 week f/u EGD was advised, however it was deferred for his to have heart surgery.  Today he is doing well.  Taking protonix 40mg daily  He she has no complaints. He denies abdominal pain, nausea, vomiting, GERD, dysphagia, constipation, diarrhea, BRBPR, or melena.    On fosamax    ROS:  Constitutional: No fevers, chills, No weight loss  ENT: No allergies  CV: No chest pain  Pulm: No cough, No shortness of breath  Ophtho: No vision changes  GI: see HPI  Derm: No rash  Heme: No lymphadenopathy, No bruising  MSK: No arthritis  : No dysuria, No hematuria  Endo: No hot or cold intolerance  Neuro: No syncope, No seizure  Psych: No anxiety, No depression    Medical History:  has a past medical history of Stevens's esophagus with low grade dysplasia; BPH (benign prostatic hyperplasia); CAD (coronary artery disease); Diaphragmatic hernia; GERD (gastroesophageal reflux disease); Heart attack; Hyperlipidemia; Hypertension; MDD (major depressive disorder), recurrent episode, mild (2/17/2016); Osteoporosis (7/20/2016); Peripheral arterial disease (3/18/2016); Sarcoid; and Squamous cell carcinoma (09/2016).    Surgical History:  has a past surgical history that includes Umbilical hernia repair and Coronary artery bypass graft.    Family History: family history includes Arrhythmia in his mother; Heart failure in his brother..     Social History:  reports that he quit smoking about 29  "years ago. His smoking use included Cigarettes. He has a 40.00 pack-year smoking history. He has never used smokeless tobacco. He reports that he does not drink alcohol or use drugs.    Review of patient's allergies indicates:  No Known Allergies    Current Outpatient Prescriptions on File Prior to Visit   Medication Sig Dispense Refill    alendronate (FOSAMAX) 70 MG tablet 1 tab q weekly in the am with a full glass of water on an empty stomach. Do not consume food or lie down for at least 30 minutes afterwards. 12 tablet 3    atorvastatin (LIPITOR) 40 MG tablet Take 1 tablet (40 mg total) by mouth once daily. 90 tablet 3    metoprolol tartrate (LOPRESSOR) 25 MG tablet TAKE 1 TABLET BY MOUTH TWICE DAILY 180 tablet 1    omega-3 fatty acids-vitamin E (FISH OIL) 1,000 mg Cap Take 1 tablet by mouth 2 (two) times daily. 60 each 11    oxycodone-acetaminophen (PERCOCET) 5-325 mg per tablet Take 1 tablet by mouth every 4 to 6 hours as needed for Pain. 20 tablet 0    pantoprazole (PROTONIX) 40 MG tablet TAKE ONE TABLET BY MOUTH ONCE DAILY 90 tablet 3    potassium chloride (KLOR-CON) 10 MEQ TbSR TAKE 2 TABLETS BY MOUTH EVERY  tablet 3    sertraline (ZOLOFT) 50 MG tablet 1 TABLET BY MOUTH DAILY 90 tablet 3    triamcinolone acetonide 0.1% (KENALOG) 0.1 % cream AAA bid to legs prn itching. Not more than 2 weeks straight in the same location 60 g 1    aspirin (ECOTRIN) 81 MG EC tablet TAKE 1 TABLET BY MOUTH ONCE DAILY 90 tablet 3    sumatriptan (IMITREX) 100 MG tablet       trazodone (DESYREL) 50 MG tablet TAKE 1 TABLET BY MOUTH EVERY NIGHT AS NEEDED FOR INSOMNIA 90 tablet 6     No current facility-administered medications on file prior to visit.          Objective Findings:    Vital Signs:  /72   Pulse 64   Ht 5' 7" (1.702 m)   Wt 68.2 kg (150 lb 5.7 oz)   BMI 23.55 kg/m²   Body mass index is 23.55 kg/m².    Physical Exam:  General Appearance: Well appearing in no acute distress  Head:   " Normocephalic, without obvious abnormality  Eyes:    No scleral icterus  ENT: Neck supple, Lips, mucosa, and tongue normal  Lungs: CTA bilaterally in anterior and posterior fields, no wheezes, no crackles.  Heart:  Regular rate and rhythm, S1, S2 normal, no murmurs heard  Abdomen: Soft, non tender, non distended with positive bowel sounds in all four quadrants. Extremities: no edema  Skin: No rash  Neurologic: AAO x 3      Labs:  Lab Results   Component Value Date    WBC 5.63 05/25/2017    HGB 14.3 05/25/2017    HCT 42.8 05/25/2017     05/25/2017    CHOL 115 (L) 05/25/2017    TRIG 74 05/25/2017    HDL 35 (L) 05/25/2017    ALT 19 05/25/2017    AST 22 05/25/2017     05/25/2017    K 4.7 05/25/2017     05/25/2017    CREATININE 1.2 05/25/2017    BUN 18 05/25/2017    CO2 30 (H) 05/25/2017    TSH 2.532 05/25/2017    PSA 3.6 02/17/2016    INR 1.0 11/17/2014    GLUF 102 04/22/2015    HGBA1C 5.2 10/15/2014       Imaging:    Endoscopy:    8/2014 EGD revealed Stevens's esophagus confirmed via Bx with low grade dysplasia. A 6 week f/u EGD was advised  Assessment:  1. Stevens's esophagus with low grade dysplasia      75yo male with long Hx of Stevens's with low grade dysplasia, confirmed via last EGD in 2014    Recommendations:  1. Schedule EGD with surveillance biopsies for dysplasia     2. Continue protonix daily.    Return in about 1 year (around 5/29/2018).      Order summary:  Orders Placed This Encounter    Case request GI: ESOPHAGOGASTRODUODENOSCOPY (EGD)         Thank you so much for allowing me to participate in the care of Paul Guadalupe PA-C

## 2017-05-29 NOTE — LETTER
May 29, 2017      Grey Forbes, DO  [address] [facility] [place] [address]           [facility] - Gastroenterology  [address]  [place] [address]  Phone: [phone]  Fax: [phone]          Patient: Paul Browning   MR Number: [id number]   YOB: 1940   Date of Visit: 5/29/2017       Dear Dr. Grey Forbes:    Thank you for referring Paul Browning to me for evaluation. Attached you will find relevant portions of my assessment and plan of care.    If you have questions, please do not hesitate to call me. I look forward to following Paul Browning along with you.    Sincerely,    JAZMIN Abernathy  CC:  No Recipients    If you would like to receive this communication electronically, please contact [email or web address] or [phone] to request more information on [employer] access.    For providers and/or their staff who would like to refer a patient to [facility], please contact us through our one-stop-shop provider referral line, [facility], at [phone].    If you feel you have received this communication in error or would no longer like to receive these types of communications, please e-mail [email or web address]

## 2017-06-05 ENCOUNTER — PROCEDURE VISIT (OUTPATIENT)
Dept: DERMATOLOGY | Facility: CLINIC | Age: 77
End: 2017-06-05
Payer: MEDICARE

## 2017-06-05 DIAGNOSIS — C44.629 SQUAMOUS CELL CARCINOMA OF LEFT WRIST: Primary | ICD-10-CM

## 2017-06-05 PROCEDURE — 88305 TISSUE EXAM BY PATHOLOGIST: CPT | Mod: 26,,, | Performed by: PATHOLOGY

## 2017-06-05 PROCEDURE — 99499 UNLISTED E&M SERVICE: CPT | Mod: S$GLB,,, | Performed by: DERMATOLOGY

## 2017-06-05 PROCEDURE — 11602 EXC TR-EXT MAL+MARG 1.1-2 CM: CPT | Mod: 51,S$GLB,, | Performed by: DERMATOLOGY

## 2017-06-05 PROCEDURE — 88305 TISSUE EXAM BY PATHOLOGIST: CPT | Performed by: PATHOLOGY

## 2017-06-05 PROCEDURE — 12032 INTMD RPR S/A/T/EXT 2.6-7.5: CPT | Mod: S$GLB,,, | Performed by: DERMATOLOGY

## 2017-06-05 NOTE — PROGRESS NOTES
PROCEDURE: Elliptical excision with intermediate layered repair in order to decrease tension.    ANESTHETIC: 6.0 cc 1% Xylocaine with Epinephrine 1:100,000, buffered    SURGEON: Magi Berman M.D.    ASSISTANTS: Marie Pollock LPN    PREOPERATIVE DIAGNOSIS:  Biopsy-proven Squamous Cell Carcinoma    POSTOPERATIVE DIAGNOSIS:  Same as preoperative diagnosis    PATHOLOGIC DIAGNOSIS: Pending    LOCATION: left wrist    INITIAL LESION SIZE: 0.8 cm    EXCISED DIAMETER: 1.6 cm    PREPARATION: The diagnosis, procedure, alternatives, benefits and risks, including but not limited to: infection, bleeding/bruising, drug reactions, pain, scar or cosmetic defect, local sensation disturbances, wound dehiscence (separation of wound edges after sutures removed) and/or recurrence of present condition were explained to the patient. The patient elected to proceed.  Patient's identity was verified using 2 patient identifiers and the side and site was verified.  Time out period with surgeon, assistant and patient in surgical suite was taken.    PROCEDURE: The location noted above was prepped, draped, and anesthetized in the usual sterile fashion per Marie Pollock LPN. Lesional tissue was carefully marked with at least 4 mm margins of clinically normal skin in all directions. A fusiform elliptical excision was done with #15 blade carried down completely through the dermis into the deep subcutaneous tissues to the level of the non-muscle fascia, and dissection was carried out in that plane.  Electrocoagulation was used to obtain hemostasis. Blood loss was minimal. The wound was then approximated in a layered fashion with subcutaneous and intradermal sutures of 4.0 Monocryl, approximately 2 in number, and the wound was then superficially closed with simple interrupted sutures of 4.0 Prolene.    The patient tolerated the procedure well.    The area was cleaned and dressed appropriately and the patient was given wound care instructions,  as well as an appointment for follow-up evaluation.    LENGTH OF REPAIR: 4.2 cm

## 2017-06-06 ENCOUNTER — CLINICAL SUPPORT (OUTPATIENT)
Dept: DERMATOLOGY | Facility: CLINIC | Age: 77
End: 2017-06-06
Payer: MEDICARE

## 2017-06-06 DIAGNOSIS — L57.0 AK (ACTINIC KERATOSIS): ICD-10-CM

## 2017-06-06 PROCEDURE — 99499 UNLISTED E&M SERVICE: CPT | Mod: S$GLB,,, | Performed by: DERMATOLOGY

## 2017-06-06 PROCEDURE — 96567 PDT DSTR PRMLG LES SKN: CPT | Mod: S$GLB,,, | Performed by: DERMATOLOGY

## 2017-06-06 PROCEDURE — 99999 PR PBB SHADOW E&M-EST. PATIENT-LVL II: CPT | Mod: PBBFAC,,,

## 2017-06-06 NOTE — PROGRESS NOTES
Photodynamic Therapy Note.    PDT ordered per Dr. Berman    Patient here today for treatment of actinic keratoses using photodynamic therapy.  Risks including but not limited to burning, stinging, redness, swelling, crusting or blistering of the skin of the area treated were discussed with patient.  Patient elects to proceed with photodynamic therapy.    Treatment area:  Face & Frontal Scalp  Treatment area cleaned with rubbing alcohol, Levulan Kerastick (2) applied evenly to entire surface and allowed to absorb for 75 minutes.  Patient then placed under Cameron-U light for 16 minutes 40 seconds.    Patient tolerated treatment well with only mild but tolerable symptoms of discomfort.  Area washed gently with mild soap and water; Zinc oxide sunscreen applied.  Patient advised to avoid any significant light exposure (sun and artificial) for next 48 hours.    RTC:  In 1 month or sooner if concern arises.

## 2017-06-07 RX ORDER — MUPIROCIN 20 MG/G
OINTMENT TOPICAL
Qty: 30 G | Refills: 2 | Status: SHIPPED | OUTPATIENT
Start: 2017-06-07 | End: 2018-05-30

## 2017-06-19 ENCOUNTER — CLINICAL SUPPORT (OUTPATIENT)
Dept: DERMATOLOGY | Facility: CLINIC | Age: 77
End: 2017-06-19
Payer: MEDICARE

## 2017-06-19 PROCEDURE — 99024 POSTOP FOLLOW-UP VISIT: CPT | Mod: S$GLB,,, | Performed by: DERMATOLOGY

## 2017-06-19 PROCEDURE — 99999 PR PBB SHADOW E&M-EST. PATIENT-LVL III: CPT | Mod: PBBFAC,,,

## 2017-06-19 NOTE — PROGRESS NOTES
Suture Removal note:  CC: 76 y.o. male patient is here for suture removal.         HPI: Patient is s/p excision of SCC  from the left wrist on 06/05/2017 .  Patient reports no problems.    WOUND PE:  Sutures intact.  Wound healing well.  Good approximation of skin edges.  No signs or symptoms of infection.    IMPRESSION: FINAL PATHOLOGIC DIAGNOSIS  Skin, left wrist, excision:  NEGATIVE FOR RESIDUAL SQUAMOUS CELL CARCINOMA.  BIOPSY SITE CHANGE.  Diagnosed by: Malathi Martin M.D.  (Electronically Signed: 2017-06-09 12:06:36)  - margins clear.    PLAN:  Sutures removed today.  Continue wound care.    RTC: PRN

## 2017-06-20 ENCOUNTER — HOSPITAL ENCOUNTER (OUTPATIENT)
Facility: HOSPITAL | Age: 77
Discharge: HOME | End: 2017-06-20
Attending: INTERNAL MEDICINE | Admitting: INTERNAL MEDICINE
Payer: MEDICARE

## 2017-06-20 ENCOUNTER — ANESTHESIA (OUTPATIENT)
Dept: ENDOSCOPY | Facility: HOSPITAL | Age: 77
End: 2017-06-20
Payer: MEDICARE

## 2017-06-20 ENCOUNTER — ANESTHESIA EVENT (OUTPATIENT)
Dept: ENDOSCOPY | Facility: HOSPITAL | Age: 77
End: 2017-06-20
Payer: MEDICARE

## 2017-06-20 VITALS
RESPIRATION RATE: 10 BRPM | RESPIRATION RATE: 16 BRPM | BODY MASS INDEX: 22.36 KG/M2 | SYSTOLIC BLOOD PRESSURE: 131 MMHG | DIASTOLIC BLOOD PRESSURE: 60 MMHG | OXYGEN SATURATION: 96 % | WEIGHT: 151 LBS | TEMPERATURE: 98 F | HEART RATE: 53 BPM | HEIGHT: 69 IN

## 2017-06-20 DIAGNOSIS — K22.70 BARRETT'S ESOPHAGUS: ICD-10-CM

## 2017-06-20 DIAGNOSIS — K22.710 BARRETT'S ESOPHAGUS WITH LOW GRADE DYSPLASIA: Primary | Chronic | ICD-10-CM

## 2017-06-20 PROCEDURE — 63600175 PHARM REV CODE 636 W HCPCS: Performed by: NURSE ANESTHETIST, CERTIFIED REGISTERED

## 2017-06-20 PROCEDURE — 25000003 PHARM REV CODE 250: Performed by: NURSE ANESTHETIST, CERTIFIED REGISTERED

## 2017-06-20 PROCEDURE — 37000009 HC ANESTHESIA EA ADD 15 MINS: Performed by: INTERNAL MEDICINE

## 2017-06-20 PROCEDURE — 88305 TISSUE EXAM BY PATHOLOGIST: CPT | Mod: 26,,,

## 2017-06-20 PROCEDURE — 43239 EGD BIOPSY SINGLE/MULTIPLE: CPT | Mod: ,,, | Performed by: INTERNAL MEDICINE

## 2017-06-20 PROCEDURE — 25000003 PHARM REV CODE 250: Performed by: INTERNAL MEDICINE

## 2017-06-20 PROCEDURE — D9220A PRA ANESTHESIA: Mod: CRNA,,, | Performed by: NURSE ANESTHETIST, CERTIFIED REGISTERED

## 2017-06-20 PROCEDURE — 37000008 HC ANESTHESIA 1ST 15 MINUTES: Performed by: INTERNAL MEDICINE

## 2017-06-20 PROCEDURE — 27201012 HC FORCEPS, HOT/COLD, DISP: Performed by: INTERNAL MEDICINE

## 2017-06-20 PROCEDURE — 88305 TISSUE EXAM BY PATHOLOGIST: CPT

## 2017-06-20 PROCEDURE — 43239 EGD BIOPSY SINGLE/MULTIPLE: CPT | Performed by: INTERNAL MEDICINE

## 2017-06-20 PROCEDURE — D9220A PRA ANESTHESIA: Mod: ANES,,, | Performed by: ANESTHESIOLOGY

## 2017-06-20 RX ORDER — LIDOCAINE HCL/PF 100 MG/5ML
SYRINGE (ML) INTRAVENOUS
Status: DISCONTINUED | OUTPATIENT
Start: 2017-06-20 | End: 2017-06-20

## 2017-06-20 RX ORDER — GLYCOPYRROLATE 0.2 MG/ML
INJECTION INTRAMUSCULAR; INTRAVENOUS
Status: DISCONTINUED | OUTPATIENT
Start: 2017-06-20 | End: 2017-06-20

## 2017-06-20 RX ORDER — PHENYLEPHRINE HYDROCHLORIDE 10 MG/ML
INJECTION INTRAVENOUS
Status: DISCONTINUED | OUTPATIENT
Start: 2017-06-20 | End: 2017-06-20

## 2017-06-20 RX ORDER — SODIUM CHLORIDE 9 MG/ML
INJECTION, SOLUTION INTRAVENOUS CONTINUOUS
Status: DISCONTINUED | OUTPATIENT
Start: 2017-06-20 | End: 2017-06-20 | Stop reason: HOSPADM

## 2017-06-20 RX ORDER — FENTANYL CITRATE 50 UG/ML
INJECTION, SOLUTION INTRAMUSCULAR; INTRAVENOUS
Status: DISCONTINUED | OUTPATIENT
Start: 2017-06-20 | End: 2017-06-20

## 2017-06-20 RX ORDER — PROPOFOL 10 MG/ML
VIAL (ML) INTRAVENOUS CONTINUOUS PRN
Status: DISCONTINUED | OUTPATIENT
Start: 2017-06-20 | End: 2017-06-20

## 2017-06-20 RX ORDER — PROPOFOL 10 MG/ML
VIAL (ML) INTRAVENOUS
Status: DISCONTINUED | OUTPATIENT
Start: 2017-06-20 | End: 2017-06-20

## 2017-06-20 RX ADMIN — FENTANYL CITRATE 50 MCG: 50 INJECTION, SOLUTION INTRAMUSCULAR; INTRAVENOUS at 08:06

## 2017-06-20 RX ADMIN — PHENYLEPHRINE HYDROCHLORIDE 100 MCG: 10 INJECTION INTRAVENOUS at 08:06

## 2017-06-20 RX ADMIN — PROPOFOL 100 MCG/KG/MIN: 10 INJECTION, EMULSION INTRAVENOUS at 08:06

## 2017-06-20 RX ADMIN — SODIUM CHLORIDE: 0.9 INJECTION, SOLUTION INTRAVENOUS at 07:06

## 2017-06-20 RX ADMIN — LIDOCAINE HYDROCHLORIDE 100 MG: 20 INJECTION, SOLUTION INTRAVENOUS at 08:06

## 2017-06-20 RX ADMIN — PROPOFOL 50 MG: 10 INJECTION, EMULSION INTRAVENOUS at 08:06

## 2017-06-20 RX ADMIN — FENTANYL CITRATE 25 MCG: 50 INJECTION, SOLUTION INTRAMUSCULAR; INTRAVENOUS at 08:06

## 2017-06-20 RX ADMIN — GLYCOPYRROLATE 0.2 MG: 0.2 INJECTION, SOLUTION INTRAMUSCULAR; INTRAVENOUS at 08:06

## 2017-06-20 NOTE — INTERVAL H&P NOTE
The patient has been examined and the H&P has been reviewed:    There is no interval changes since last encounter.  EGD: Stevens's esophagus  Sedation: GA  ASA: Per anesthesia  Mallampati: Per anesthesia    Endoscopy risks, benefits and alternative options discussed and understood by patient/family.          Active Hospital Problems    Diagnosis  POA    Stevens's esophagus [K22.70]  Yes      Resolved Hospital Problems    Diagnosis Date Resolved POA   No resolved problems to display.

## 2017-06-20 NOTE — H&P (VIEW-ONLY)
[facility] Consultation Note    Reason for Consult:  The encounter diagnosis was Stevens's esophagus with low grade dysplasia.    PCP:   Grey Forbes   [address] [facility] / [place] [address]    Referring MD:  Grey Forbes Do  [address] [facility]  [place] [address]    HPI:  This is a 76 y.o. male here for evaluation of Stevens's esophagus.  He has a hx of Stevens's esophagus with low grade dysplasia  His 8/2014 EGD revealed Stevens's esophagus confirmed via Bx with low grade dysplasia. A 6 week f/u EGD was advised, however it was deferred for his to have heart surgery.  Today he is doing well.  Taking protonix 40mg daily  He she has no complaints. He denies abdominal pain, nausea, vomiting, GERD, dysphagia, constipation, diarrhea, BRBPR, or melena.    On fosamax    ROS:  Constitutional: No fevers, chills, No weight loss  ENT: No allergies  CV: No chest pain  Pulm: No cough, No shortness of breath  Ophtho: No vision changes  GI: see HPI  Derm: No rash  Heme: No lymphadenopathy, No bruising  MSK: No arthritis  : No dysuria, No hematuria  Endo: No hot or cold intolerance  Neuro: No syncope, No seizure  Psych: No anxiety, No depression    Medical History:  has a past medical history of Stevens's esophagus with low grade dysplasia; BPH (benign prostatic hyperplasia); CAD (coronary artery disease); Diaphragmatic hernia; GERD (gastroesophageal reflux disease); Heart attack; Hyperlipidemia; Hypertension; MDD (major depressive disorder), recurrent episode, mild (2/17/2016); Osteoporosis (7/20/2016); Peripheral arterial disease (3/18/2016); Sarcoid; and Squamous cell carcinoma (09/2016).    Surgical History:  has a past surgical history that includes Umbilical hernia repair and Coronary artery bypass graft.    Family History: family history includes Arrhythmia in his mother; Heart failure in his brother..     Social History:  reports that he quit smoking about 29  "years ago. His smoking use included Cigarettes. He has a 40.00 pack-year smoking history. He has never used smokeless tobacco. He reports that he does not drink alcohol or use drugs.    Review of patient's allergies indicates:  No Known Allergies    Current Outpatient Prescriptions on File Prior to Visit   Medication Sig Dispense Refill    alendronate (FOSAMAX) 70 MG tablet 1 tab q weekly in the am with a full glass of water on an empty stomach. Do not consume food or lie down for at least 30 minutes afterwards. 12 tablet 3    atorvastatin (LIPITOR) 40 MG tablet Take 1 tablet (40 mg total) by mouth once daily. 90 tablet 3    metoprolol tartrate (LOPRESSOR) 25 MG tablet TAKE 1 TABLET BY MOUTH TWICE DAILY 180 tablet 1    omega-3 fatty acids-vitamin E (FISH OIL) 1,000 mg Cap Take 1 tablet by mouth 2 (two) times daily. 60 each 11    oxycodone-acetaminophen (PERCOCET) 5-325 mg per tablet Take 1 tablet by mouth every 4 to 6 hours as needed for Pain. 20 tablet 0    pantoprazole (PROTONIX) 40 MG tablet TAKE ONE TABLET BY MOUTH ONCE DAILY 90 tablet 3    potassium chloride (KLOR-CON) 10 MEQ TbSR TAKE 2 TABLETS BY MOUTH EVERY  tablet 3    sertraline (ZOLOFT) 50 MG tablet 1 TABLET BY MOUTH DAILY 90 tablet 3    triamcinolone acetonide 0.1% (KENALOG) 0.1 % cream AAA bid to legs prn itching. Not more than 2 weeks straight in the same location 60 g 1    aspirin (ECOTRIN) 81 MG EC tablet TAKE 1 TABLET BY MOUTH ONCE DAILY 90 tablet 3    sumatriptan (IMITREX) 100 MG tablet       trazodone (DESYREL) 50 MG tablet TAKE 1 TABLET BY MOUTH EVERY NIGHT AS NEEDED FOR INSOMNIA 90 tablet 6     No current facility-administered medications on file prior to visit.          Objective Findings:    Vital Signs:  /72   Pulse 64   Ht 5' 7" (1.702 m)   Wt 68.2 kg (150 lb 5.7 oz)   BMI 23.55 kg/m²   Body mass index is 23.55 kg/m².    Physical Exam:  General Appearance: Well appearing in no acute distress  Head:   " Normocephalic, without obvious abnormality  Eyes:    No scleral icterus  ENT: Neck supple, Lips, mucosa, and tongue normal  Lungs: CTA bilaterally in anterior and posterior fields, no wheezes, no crackles.  Heart:  Regular rate and rhythm, S1, S2 normal, no murmurs heard  Abdomen: Soft, non tender, non distended with positive bowel sounds in all four quadrants. Extremities: no edema  Skin: No rash  Neurologic: AAO x 3      Labs:  Lab Results   Component Value Date    WBC 5.63 05/25/2017    HGB 14.3 05/25/2017    HCT 42.8 05/25/2017     05/25/2017    CHOL 115 (L) 05/25/2017    TRIG 74 05/25/2017    HDL 35 (L) 05/25/2017    ALT 19 05/25/2017    AST 22 05/25/2017     05/25/2017    K 4.7 05/25/2017     05/25/2017    CREATININE 1.2 05/25/2017    BUN 18 05/25/2017    CO2 30 (H) 05/25/2017    TSH 2.532 05/25/2017    PSA 3.6 02/17/2016    INR 1.0 11/17/2014    GLUF 102 04/22/2015    HGBA1C 5.2 10/15/2014       Imaging:    Endoscopy:    8/2014 EGD revealed Stevens's esophagus confirmed via Bx with low grade dysplasia. A 6 week f/u EGD was advised  Assessment:  1. Stevens's esophagus with low grade dysplasia      77yo male with long Hx of Stevens's with low grade dysplasia, confirmed via last EGD in 2014    Recommendations:  1. Schedule EGD with surveillance biopsies for dysplasia     2. Continue protonix daily.    Return in about 1 year (around 5/29/2018).      Order summary:  Orders Placed This Encounter    Case request GI: ESOPHAGOGASTRODUODENOSCOPY (EGD)         Thank you so much for allowing me to participate in the care of Paul Guadalupe PA-C

## 2017-06-20 NOTE — PATIENT INSTRUCTIONS
Discharge Summary/Instructions after an Endoscopic Procedure  Patient Name: Paul Browning  Patient MRN: [id number]  Patient YOB: 1940  Tuesday, June 20, 2017  Jose A Pham MD  RESTRICTIONS ON ACTIVITY:  - DO NOT drive a car, operate machinery, make legal/financial decisions, or   drink alcohol until the day after the procedure.    - The following day: return to full activity including work, except no heavy   lifting, straining or running for 3 days if polyps were removed.  - Diet: Eat and drink normally unless instructed otherwise.  TREATMENT FOR COMMON SIDE EFFECTS:  - Mild abdominal pain, bloating or excessive gas: rest, eat lightly and use   a heating pad.  - Sore Throat - treat with throat lozenges. Gargle with warm salt water.  SYMPTOMS TO WATCH FOR AND REPORT TO YOUR PHYSICIAN:  1. Severe abdominal pain or bloating.  2. Pain in chest.  3. Chills or fever occurring within 24 hours after a procedure.  4. A large amount of rectal bleeding, which would show as bright red,   maroon, or black stools. (A small amount of blood from the rectum is not   serious, especially if hemorrhoids are present.)  5. Because air was used during the procedure, expelling large amounts of air   from your rectum or belching is normal.  6. If a bowel prep was taken, you may not have a bowel movement for 1-3   days.  This is normal.  7. Go directly to the emergency room if you notice any of the following:   Chills and/or fever over 101 F   Persistent vomiting   Severe abdominal pain, other than gas cramps   Severe chest pain   Black, tarry stools   Any bleeding - exceeding one tablespoon  Your doctor recommends these additional instructions:  If any biopsies were performed, my office will call you in 5 to 6 business   days with any results.  You have a contact number available for emergencies.  The signs and symptoms   of potential delayed complications were discussed with you.  You may return   to normal  activities tomorrow.  Written discharge instructions were   provided to you.   You are being discharged to home.   Resume your previous diet.   Continue your present medications.   We are waiting for your pathology results.   Your physician has recommended a repeat upper endoscopy in one year for   surveillance.  For questions, problems or results please call your physician - Jose A Pham MD at Work:  [phone].  [facility], EMERGENCY ROOM PHONE NUMBER: [phone]  IF A COMPLICATION OR EMERGENCY SITUATION ARISES AND YOU ARE UNABLE TO REACH   YOUR PHYSICIAN - GO TO THE EMERGENCY ROOM.  Jose A Pham MD  6/20/2017 8:27:05 AM  This report has been verified and signed electronically.

## 2017-06-20 NOTE — DISCHARGE INSTRUCTIONS

## 2017-06-20 NOTE — ANESTHESIA POSTPROCEDURE EVALUATION
"Anesthesia Post Evaluation    Patient: Paul Browning    Procedure(s) Performed: Procedure(s) (LRB):  ESOPHAGOGASTRODUODENOSCOPY (EGD) (N/A)    Final Anesthesia Type: general  Patient location during evaluation: GI PACU  Patient participation: Yes- Able to Participate  Level of consciousness: awake and alert, oriented and awake  Post-procedure vital signs: reviewed and stable  Pain management: adequate  Airway patency: patent  PONV status at discharge: No PONV  Anesthetic complications: no      Cardiovascular status: blood pressure returned to baseline and hemodynamically stable  Respiratory status: unassisted, spontaneous ventilation and room air  Hydration status: euvolemic  Follow-up not needed.        Visit Vitals  /60 (BP Location: Left arm, Patient Position: Sitting, BP Method: Automatic)   Pulse (!) 53   Temp 36.6 °C (97.8 °F) (Axillary)   Resp 16   Ht 5' 9" (1.753 m)   Wt 68.5 kg (151 lb)   SpO2 96%   BMI 22.30 kg/m²       Pain/Roxi Score: Pain Assessment Performed: Yes (6/20/2017  8:55 AM)  Presence of Pain: denies (6/20/2017  8:55 AM)  Roxi Score: 10 (6/20/2017  8:55 AM)      "

## 2017-06-20 NOTE — ANESTHESIA PREPROCEDURE EVALUATION
06/20/2017  Paul Browning is a 76 y.o., male.    Anesthesia Evaluation    I have reviewed the Patient Summary Reports.    I have reviewed the Nursing Notes.      Review of Systems  Anesthesia Hx:  No problems with previous Anesthesia    Cardiovascular:   Hypertension Past MI CAD  CABG/stent  CHF    Pulmonary:   COPD Shortness of breath    Hepatic/GI:   PUD, GERD    Neurological:   Neuromuscular Disease, Headaches    Psych:   Psychiatric History          Physical Exam  General:  Well nourished    Airway/Jaw/Neck:  Airway Findings: Mouth Opening: Normal Tongue: Normal  General Airway Assessment: Adult  Mallampati: I  TM Distance: Normal, at least 6 cm  Jaw/Neck Findings:  Neck ROM: Normal ROM     Eyes/Ears/Nose:  Eyes/Ears/Nose Findings:    Dental:  Dental Findings: Upper Dentures   Chest/Lungs:  Chest/Lungs Findings: Normal Respiratory Rate     Heart/Vascular:  Heart Findings: Rate: Normal  Rhythm: Regular Rhythm        Mental Status:  Mental Status Findings:  Cooperative, Alert and Oriented         Anesthesia Plan  Type of Anesthesia, risks & benefits discussed:  Anesthesia Type:  general  Patient's Preference: general  Intra-op Monitoring Plan: standard ASA monitors  Intra-op Monitoring Plan Comments:   Post Op Pain Control Plan:   Post Op Pain Control Plan Comments:   Induction:   IV  Beta Blocker:         Informed Consent: Patient understands risks and agrees with Anesthesia plan.  Questions answered. Anesthesia consent signed with patient.  ASA Score: 3     Day of Surgery Review of History & Physical:    H&P update referred to the surgeon.         Ready For Surgery From Anesthesia Perspective.

## 2017-06-22 ENCOUNTER — TELEPHONE (OUTPATIENT)
Dept: GASTROENTEROLOGY | Facility: CLINIC | Age: 77
End: 2017-06-22

## 2017-06-22 NOTE — TELEPHONE ENCOUNTER
JAZMIN Abernathy Staff 37 minutes ago (4:34 PM)      Pt needs appt with Dr De La Torre for Halo per Dr hPam. Has Stevens's with low grade dysplasia (Routing comment)      Left message

## 2017-06-22 NOTE — TELEPHONE ENCOUNTER
----- Message from Jose A Pham MD sent at 6/22/2017  3:01 PM CDT -----  Please notify patient, the biopsies reveal Stevens's with Low grade dysplasia. Will refer to  (specialist in Stevens's therapy).

## 2017-06-22 NOTE — PROGRESS NOTES
Please notify patient, the biopsies reveal Stevens's with Low grade dysplasia. Will refer to  (specialist in Stevens's therapy).

## 2017-06-23 ENCOUNTER — TELEPHONE (OUTPATIENT)
Dept: GASTROENTEROLOGY | Facility: CLINIC | Age: 77
End: 2017-06-23

## 2017-06-23 NOTE — TELEPHONE ENCOUNTER
Patient is having some burning in his stomach. Discussed with dr mulligan. Advised that he try Maalox, Mylanta or Gaviscon.  Mesha informed and voiced understanding

## 2017-06-23 NOTE — TELEPHONE ENCOUNTER
----- Message from Haylee Dodge sent at 6/23/2017 12:54 PM CDT -----  Contact: willow lejeune - daughter - [phone]  Ankit - returning your call - please call willow lejeune - daughter - [phone]

## 2017-06-23 NOTE — TELEPHONE ENCOUNTER
----- Message from Neelam Ramírez MA sent at 6/23/2017  1:12 PM CDT -----  Contact: willow lejeune - daughter - [phone]   Noa,  Please call patient to schedule appt with dr rose , test results given on 6/23  Thanks   ----- Message -----  From: Haylee Dodge  Sent: 6/23/2017  12:54 PM  To: Ankit ANDERSON Staff    Ankit - returning your call - please call willowra lejeune - daughter - [phone]

## 2017-06-23 NOTE — TELEPHONE ENCOUNTER
----- Message from Neelam Ramírez MA sent at 6/23/2017  1:12 PM CDT -----  Contact: willow lejeune - daughter - [phone]   Noa,  Please call patient to schedule appt with dr rose , test results given on 6/23  Thanks   ----- Message -----  From: Haylee Dodge  Sent: 6/23/2017  12:54 PM  To: Ankit ANDERSON Staff    Ankit - returning your call - please call willowra lejeune - daughter - [phone]

## 2017-06-23 NOTE — TELEPHONE ENCOUNTER
Ma spoke with pt regarding test result and message sent to [facility] to schedule appt with dr rose

## 2017-06-27 ENCOUNTER — TELEPHONE (OUTPATIENT)
Dept: ENDOSCOPY | Facility: HOSPITAL | Age: 77
End: 2017-06-27

## 2017-07-13 DIAGNOSIS — M81.0 OSTEOPOROSIS: ICD-10-CM

## 2017-07-13 RX ORDER — ALENDRONATE SODIUM 70 MG/1
TABLET ORAL
Qty: 12 TABLET | Refills: 3 | Status: ON HOLD | OUTPATIENT
Start: 2017-07-13 | End: 2018-03-14 | Stop reason: HOSPADM

## 2017-07-13 RX ORDER — ALENDRONATE SODIUM 70 MG/1
TABLET ORAL
Qty: 12 TABLET | Refills: 0 | OUTPATIENT
Start: 2017-07-13

## 2017-07-17 RX ORDER — ATORVASTATIN CALCIUM 40 MG/1
TABLET, FILM COATED ORAL
Qty: 90 TABLET | Refills: 3 | Status: SHIPPED | OUTPATIENT
Start: 2017-07-17 | End: 2018-10-07 | Stop reason: SDUPTHER

## 2017-07-31 RX ORDER — METOPROLOL TARTRATE 25 MG/1
TABLET, FILM COATED ORAL
Qty: 180 TABLET | Refills: 3 | Status: SHIPPED | OUTPATIENT
Start: 2017-07-31 | End: 2018-10-05 | Stop reason: SDUPTHER

## 2017-07-31 RX ORDER — SERTRALINE HYDROCHLORIDE 50 MG/1
TABLET, FILM COATED ORAL
Qty: 90 TABLET | Refills: 3 | Status: SHIPPED | OUTPATIENT
Start: 2017-07-31 | End: 2018-10-05 | Stop reason: SDUPTHER

## 2017-08-01 ENCOUNTER — OFFICE VISIT (OUTPATIENT)
Dept: GASTROENTEROLOGY | Facility: CLINIC | Age: 77
End: 2017-08-01
Payer: MEDICARE

## 2017-08-01 VITALS
SYSTOLIC BLOOD PRESSURE: 133 MMHG | HEIGHT: 67 IN | DIASTOLIC BLOOD PRESSURE: 74 MMHG | BODY MASS INDEX: 22.94 KG/M2 | WEIGHT: 146.19 LBS | HEART RATE: 69 BPM

## 2017-08-01 DIAGNOSIS — K22.710 BARRETT'S ESOPHAGUS WITH LOW GRADE DYSPLASIA: Primary | ICD-10-CM

## 2017-08-01 PROCEDURE — 1159F MED LIST DOCD IN RCRD: CPT | Mod: S$GLB,,, | Performed by: INTERNAL MEDICINE

## 2017-08-01 PROCEDURE — 99999 PR PBB SHADOW E&M-EST. PATIENT-LVL II: CPT | Mod: PBBFAC,,, | Performed by: INTERNAL MEDICINE

## 2017-08-01 PROCEDURE — 99213 OFFICE O/P EST LOW 20 MIN: CPT | Mod: S$GLB,,, | Performed by: INTERNAL MEDICINE

## 2017-08-01 PROCEDURE — 99499 UNLISTED E&M SERVICE: CPT | Mod: S$GLB,,, | Performed by: INTERNAL MEDICINE

## 2017-08-01 NOTE — PROGRESS NOTES
[facility] Note    Reason for Visit:  The encounter diagnosis was Stevens's esophagus with low grade dysplasia.    PCP: Grey Forbes       HPI:  This is a 76 y.o. male here for evaluation of Stevens's esophagus. The patient have history of long segment Stevens's esophagus found to have multifocal LGD in recent surveillance EGD. The patient have history of GERD for what he take Protonix. Denied dysphagia, abdominal pain.    EGD 6/20/2017    Impression:           - Esophageal mucosal changes consistent with                         long-segment Stevens's esophagus, biopsied.                        - Medium sized sliding hiatal hernia.  Recommendation:       - Patient has a contact number available for                         emergencies. The signs and symptoms of potential                         delayed complications were discussed with the                         patient. Return to normal activities tomorrow.                         Written discharge instructions were provided to the                         patient.                        - Discharge patient to home.                        - Resume previous diet.                        - Continue present medications.                        - Await pathology results.                        - Repeat upper endoscopy in 1 year or sooner (based                         on pathology) for surveillance.    FINAL PATHOLOGIC DIAGNOSIS  1 ESOPHAGUS 36 CM:  GASTRIC TYPE MUCOSA WITH NO INTESTINAL METAPLASIA OR DYSPLASIA IDENTIFIED.  2 ESOPHAGUS 34 CM:  GASTRIC TYPE MUCOSA WITH FOCAL INTESTINAL METAPLASIA AND LOW-GRADE DYSPLASIA.  3 ESOPHAGUS 32 CM:  GASTRIC TYPE MUCOSA WITH FOCAL LOW-GRADE DYSPLASIA.  4 ESOPHAGUS 30 CM:  GASTRIC TYPE MUCOSA WITH FOCAL INTESTINAL METAPLASIA AND LOW-GRADE DYSPLASIA.  5 ESOPHAGUS 28 CM:  GASTRIC TYPE MUCOSA WITH FOCAL LOW-GRADE DYSPLASIA.  6 ESOPHAGUS 26 CM:  GASTRIC TYPE MUCOSA WITH FOCAL INTESTINAL METAPLASIA AND  "LOW-GRADE DYSPLASIA.  7 ESOPHAGUS 24 CM:  GASTRIC TYPE MUCOSA WITH FOCAL INTESTINAL METAPLASIA AND LOW-GRADE DYSPLASIA.  8 ESOPHAGUS 22 CM:  GASTRIC TYPE MUCOSA WITH FOCAL INTESTINAL METAPLASIA AND LOW-GRADE DYSPLASIA.  9 ESOPHAGUS 20 CM:  GASTRIC TYPE MUCOSA WITH FOCAL INTESTINAL METAPLASIA AND LOW-GRADE DYSPLASIA.  10 ESOPHAGUS 18 CM:  GASTROESOPHAGEAL JUNCTIONAL MUCOSA WITH INTESTINAL METAPLASIA.  NO DYSPLASIA IDENTIFIED  Comment: This case has been reviewed by intradepartmental GI consultation.  Diagnosed by: Efra Goyal M.D.      ROS:  Constitutional: No fevers, chills, weight stable  ENT: No dysphagia  CV: No chest pain or palpitations  Pulm: No cough, No shortness of breath  GI: see HPI    MSK: No arthritis  : No dysuria, No hematuria  Neuro: No seizure or previous CVA      Medical History:  has a past medical history of Stevens's esophagus with low grade dysplasia; BPH (benign prostatic hyperplasia); CAD (coronary artery disease); Diaphragmatic hernia; GERD (gastroesophageal reflux disease); Heart attack; Hyperlipidemia; Hypertension; MDD (major depressive disorder), recurrent episode, mild (2/17/2016); Osteoporosis (7/20/2016); Peripheral arterial disease (3/18/2016); Sarcoid; and Squamous cell carcinoma (09/2016, 5/2016).    Surgical History:  has a past surgical history that includes Umbilical hernia repair and Coronary artery bypass graft.    Family History: family history includes Arrhythmia in his mother; Heart failure in his brother..     Social History:  reports that he quit smoking about 29 years ago. His smoking use included Cigarettes. He has a 40.00 pack-year smoking history. He has never used smokeless tobacco. He reports that he does not drink alcohol or use drugs.    Review of patient's allergies indicates:  No Known Allergies    Objective Findings:    Vital Signs:  /74   Pulse 69   Ht 5' 7" (1.702 m)   Wt 66.3 kg (146 lb 2.6 oz)   BMI 22.89 kg/m²   Body mass index is 22.89 " kg/m².    Physical Exam:  General Appearance: Well appearing in no acute distress  HEENT:   Normocephalic, without obvious abnormality.  Sclera anicteric, no conjunctival pallor, Lips, mucosa, and tongue pink and dry; teeth and gums normal  NECK:  Supple, no lymphadenopathy noted  Lungs: CTA bilaterally in anterior and posterior fields, no wheezes, no crackles.  Heart:  Regular rate and rhythm, S1, S2 normal, no murmurs heard  Abdomen: Soft, non tender, non distended with positive bowel sounds in all four quadrants. No hepatosplenomegaly, ascites, or mass  Extremities: no edema  Skin: No rash  Neurologic: Grossly normal      Labs:  Lab Results   Component Value Date    WBC 5.63 05/25/2017    HGB 14.3 05/25/2017    HCT 42.8 05/25/2017     05/25/2017    CHOL 115 (L) 05/25/2017    TRIG 74 05/25/2017    HDL 35 (L) 05/25/2017    ALT 19 05/25/2017    AST 22 05/25/2017     05/25/2017    K 4.7 05/25/2017     05/25/2017    CREATININE 1.2 05/25/2017    BUN 18 05/25/2017    CO2 30 (H) 05/25/2017    TSH 2.532 05/25/2017    PSA 3.6 02/17/2016    INR 1.0 11/17/2014    GLUF 102 04/22/2015    HGBA1C 5.2 10/15/2014              Assessment:  1. Stevens's esophagus with low grade dysplasia           Recommendations:  1. Schedule EGD with RFA  2.     The impression and plan was discussed in detail with the patient and family. All questions have been answered and the patient voices understanding of our plan at this point. The risk of the procedure was discussed in detail which includes but not limited to bleeding, infection, perforation in some cases requiring surgery with its spectrum of complications.     The patient understand that the therapy is effective in >90% of the cases. Cancer can occur despite therapy and will required multiple treatment for Stevens's eradication.      Thank you so much for allowing me to participate in the care of Paul De La Torre MD

## 2017-08-02 ENCOUNTER — LAB VISIT (OUTPATIENT)
Dept: LAB | Facility: HOSPITAL | Age: 77
End: 2017-08-02
Attending: INTERNAL MEDICINE
Payer: MEDICARE

## 2017-08-02 ENCOUNTER — OFFICE VISIT (OUTPATIENT)
Dept: INTERNAL MEDICINE | Facility: CLINIC | Age: 77
End: 2017-08-02
Payer: MEDICARE

## 2017-08-02 VITALS
HEIGHT: 67 IN | TEMPERATURE: 98 F | BODY MASS INDEX: 23.23 KG/M2 | SYSTOLIC BLOOD PRESSURE: 122 MMHG | WEIGHT: 148 LBS | RESPIRATION RATE: 16 BRPM | HEART RATE: 63 BPM | DIASTOLIC BLOOD PRESSURE: 70 MMHG

## 2017-08-02 DIAGNOSIS — D69.2 SENILE PURPURA: ICD-10-CM

## 2017-08-02 DIAGNOSIS — D86.0 PULMONARY SARCOIDOSIS: ICD-10-CM

## 2017-08-02 DIAGNOSIS — I25.5 ISCHEMIC CARDIOMYOPATHY: ICD-10-CM

## 2017-08-02 DIAGNOSIS — I50.22 CHRONIC SYSTOLIC CHF (CONGESTIVE HEART FAILURE): Chronic | ICD-10-CM

## 2017-08-02 DIAGNOSIS — Z85.89 HISTORY OF SQUAMOUS CELL CARCINOMA: ICD-10-CM

## 2017-08-02 DIAGNOSIS — M81.0 OSTEOPOROSIS, UNSPECIFIED OSTEOPOROSIS TYPE, UNSPECIFIED PATHOLOGICAL FRACTURE PRESENCE: ICD-10-CM

## 2017-08-02 DIAGNOSIS — J44.9 CHRONIC OBSTRUCTIVE PULMONARY DISEASE, UNSPECIFIED COPD TYPE: ICD-10-CM

## 2017-08-02 DIAGNOSIS — I73.9 PERIPHERAL ARTERIAL DISEASE: ICD-10-CM

## 2017-08-02 DIAGNOSIS — I25.10 CORONARY ARTERY DISEASE DUE TO LIPID RICH PLAQUE: Chronic | ICD-10-CM

## 2017-08-02 DIAGNOSIS — I25.83 CORONARY ARTERY DISEASE DUE TO LIPID RICH PLAQUE: Primary | Chronic | ICD-10-CM

## 2017-08-02 DIAGNOSIS — G47.00 INSOMNIA, UNSPECIFIED TYPE: Chronic | ICD-10-CM

## 2017-08-02 DIAGNOSIS — G44.229 CHRONIC TENSION-TYPE HEADACHE, NOT INTRACTABLE: ICD-10-CM

## 2017-08-02 DIAGNOSIS — I10 ESSENTIAL HYPERTENSION: Chronic | ICD-10-CM

## 2017-08-02 DIAGNOSIS — I77.9 CAROTID ARTERY DISEASE WITHOUT CEREBRAL INFARCTION: Chronic | ICD-10-CM

## 2017-08-02 DIAGNOSIS — I25.83 CORONARY ARTERY DISEASE DUE TO LIPID RICH PLAQUE: Chronic | ICD-10-CM

## 2017-08-02 DIAGNOSIS — F33.0 MDD (MAJOR DEPRESSIVE DISORDER), RECURRENT EPISODE, MILD: Chronic | ICD-10-CM

## 2017-08-02 DIAGNOSIS — I25.10 CORONARY ARTERY DISEASE DUE TO LIPID RICH PLAQUE: Primary | Chronic | ICD-10-CM

## 2017-08-02 DIAGNOSIS — I70.0 ATHEROSCLEROSIS OF AORTA: ICD-10-CM

## 2017-08-02 DIAGNOSIS — K22.710 BARRETT'S ESOPHAGUS WITH LOW GRADE DYSPLASIA: Chronic | ICD-10-CM

## 2017-08-02 DIAGNOSIS — E78.5 HYPERLIPIDEMIA, UNSPECIFIED HYPERLIPIDEMIA TYPE: Chronic | ICD-10-CM

## 2017-08-02 PROBLEM — K22.70 BARRETT'S ESOPHAGUS: Status: RESOLVED | Noted: 2017-06-20 | Resolved: 2017-08-02

## 2017-08-02 LAB
ALBUMIN SERPL BCP-MCNC: 3.7 G/DL
ALP SERPL-CCNC: 67 U/L
ALT SERPL W/O P-5'-P-CCNC: 17 U/L
ANION GAP SERPL CALC-SCNC: 7 MMOL/L
AST SERPL-CCNC: 20 U/L
BASOPHILS # BLD AUTO: 0.04 K/UL
BASOPHILS NFR BLD: 0.7 %
BILIRUB SERPL-MCNC: 1.9 MG/DL
BUN SERPL-MCNC: 17 MG/DL
CALCIUM SERPL-MCNC: 9.9 MG/DL
CHLORIDE SERPL-SCNC: 103 MMOL/L
CO2 SERPL-SCNC: 29 MMOL/L
CREAT SERPL-MCNC: 1.2 MG/DL
DIFFERENTIAL METHOD: NORMAL
EOSINOPHIL # BLD AUTO: 0.1 K/UL
EOSINOPHIL NFR BLD: 0.9 %
ERYTHROCYTE [DISTWIDTH] IN BLOOD BY AUTOMATED COUNT: 13.5 %
EST. GFR  (AFRICAN AMERICAN): >60 ML/MIN/1.73 M^2
EST. GFR  (NON AFRICAN AMERICAN): 57.6 ML/MIN/1.73 M^2
GLUCOSE SERPL-MCNC: 92 MG/DL
HCT VFR BLD AUTO: 42 %
HGB BLD-MCNC: 14.4 G/DL
LYMPHOCYTES # BLD AUTO: 1.6 K/UL
LYMPHOCYTES NFR BLD: 28.1 %
MCH RBC QN AUTO: 29.8 PG
MCHC RBC AUTO-ENTMCNC: 34.3 G/DL
MCV RBC AUTO: 87 FL
MONOCYTES # BLD AUTO: 0.6 K/UL
MONOCYTES NFR BLD: 10.1 %
NEUTROPHILS # BLD AUTO: 3.5 K/UL
NEUTROPHILS NFR BLD: 59.9 %
PLATELET # BLD AUTO: 288 K/UL
PMV BLD AUTO: 10.2 FL
POTASSIUM SERPL-SCNC: 5 MMOL/L
PROT SERPL-MCNC: 7.3 G/DL
RBC # BLD AUTO: 4.83 M/UL
SODIUM SERPL-SCNC: 139 MMOL/L
WBC # BLD AUTO: 5.84 K/UL

## 2017-08-02 PROCEDURE — 99499 UNLISTED E&M SERVICE: CPT | Mod: S$GLB,,, | Performed by: INTERNAL MEDICINE

## 2017-08-02 PROCEDURE — 99214 OFFICE O/P EST MOD 30 MIN: CPT | Mod: S$GLB,,, | Performed by: INTERNAL MEDICINE

## 2017-08-02 PROCEDURE — 99999 PR PBB SHADOW E&M-EST. PATIENT-LVL III: CPT | Mod: PBBFAC,,, | Performed by: INTERNAL MEDICINE

## 2017-08-02 PROCEDURE — 85025 COMPLETE CBC W/AUTO DIFF WBC: CPT

## 2017-08-02 PROCEDURE — 1159F MED LIST DOCD IN RCRD: CPT | Mod: S$GLB,,, | Performed by: INTERNAL MEDICINE

## 2017-08-02 PROCEDURE — 80053 COMPREHEN METABOLIC PANEL: CPT

## 2017-08-02 PROCEDURE — 1126F AMNT PAIN NOTED NONE PRSNT: CPT | Mod: S$GLB,,, | Performed by: INTERNAL MEDICINE

## 2017-08-02 PROCEDURE — 36415 COLL VENOUS BLD VENIPUNCTURE: CPT | Mod: PO

## 2017-08-02 NOTE — PROGRESS NOTES
Subjective:       Patient ID: Paul Browning is a 76 y.o. male.    Chief Complaint: Follow-up    HPI   76 y.o. Male with CAD, systolic heart failure, CAD, Carotid disease/PAD, HLD, HTN, COPD/sarcoidosis, Insomnia, Aortic atherosclerosis, MDD, senile purpura, Hx of SCC is here for 3 month f/u. Pt has been doing well overall.                                                          Review of Systems   Constitutional: Negative for activity change, appetite change, chills, diaphoresis, fatigue, fever and unexpected weight change.   HENT: Negative for postnasal drip, rhinorrhea, sinus pressure, sneezing, sore throat, trouble swallowing and voice change.    Respiratory: Negative for cough, shortness of breath and wheezing.    Cardiovascular: Negative for chest pain, palpitations and leg swelling.   Gastrointestinal: Negative for abdominal pain, blood in stool, constipation, diarrhea, nausea and vomiting.   Genitourinary: Negative for dysuria.   Musculoskeletal: Positive for arthralgias. Negative for myalgias.   Skin: Negative for rash and wound.   Allergic/Immunologic: Negative for environmental allergies and food allergies.   Hematological: Negative for adenopathy. Does not bruise/bleed easily.       Objective:      Physical Exam   Constitutional: He is oriented to person, place, and time. He appears well-developed and well-nourished. No distress.   HENT:   Head: Normocephalic and atraumatic.   Eyes: Conjunctivae and EOM are normal. Pupils are equal, round, and reactive to light. Right eye exhibits no discharge. Left eye exhibits no discharge. No scleral icterus.   Neck: Normal range of motion. Neck supple. No JVD present.   Cardiovascular: Normal rate, regular rhythm and normal heart sounds.    No murmur heard.  Pulmonary/Chest: Effort normal and breath sounds normal. No respiratory distress. He has no wheezes. He has no rales.   Abdominal: Soft. Bowel sounds are normal. There is no tenderness.   Musculoskeletal:  He exhibits no edema.   Lymphadenopathy:     He has no cervical adenopathy.   Neurological: He is alert and oriented to person, place, and time.   Skin: Skin is warm and dry. No rash noted. He is not diaphoretic. No pallor.       Assessment:       1. Coronary artery disease due to lipid rich plaque    2. Chronic systolic CHF (congestive heart failure)    3. Carotid artery disease without cerebral infarction    4. Peripheral arterial disease    5. Stevens's esophagus with low grade dysplasia    6. Essential hypertension    7. Chronic obstructive pulmonary disease, unspecified COPD type    8. Pulmonary sarcoidosis    9. Insomnia, unspecified type    10. MDD (major depressive disorder), recurrent episode, mild    11. Atherosclerosis of aorta    12. Hyperlipidemia, unspecified hyperlipidemia type    13. Osteoporosis, unspecified osteoporosis type, unspecified pathological fracture presence    14. Senile purpura    15. Chronic tension-type headache, not intractable    16. History of squamous cell carcinoma    17. Ischemic cardiomyopathy        Plan:    1. CAD s/p PCI and CABG- stable no CP currently, followed by Cardiology       Continue ASA/Statin/BB   2. Chronic Systolic heart failure- stable without S/S of volume overload    3. Carotid artery disease/PAD- stable, CPT   4. Stevens's esophagus, Hx of gastric ulcer- Last EGD(6/17)- low grade dysplasia and intestinal metaplasia       Continue Protonix 40 mg daily       Followed by GI   5. HTN- stable, CPT   6. COPD with sarcoidosis- stable off meds   7. Insomnia- stable on Trazodone 50 mg qHS PRN   8. Aortic atherosclerosis- stable, continue to monitor   9. MDD- stable on Zoloft 50 mg daily  10. HLD- controlled on Lipitor 40 mg daily  11. Osteoporosis- continue Fosamax   12. Senile purpura- stable, continue to monitor  13. Migraines- stable on Imitrex PRN  14. Hx of SCC- followed by Derm   15. F/u in 3 months or PRN

## 2017-08-21 ENCOUNTER — OFFICE VISIT (OUTPATIENT)
Dept: DERMATOLOGY | Facility: CLINIC | Age: 77
End: 2017-08-21
Payer: MEDICARE

## 2017-08-21 DIAGNOSIS — L57.0 AK (ACTINIC KERATOSIS): Primary | ICD-10-CM

## 2017-08-21 DIAGNOSIS — L81.4 LENTIGO: ICD-10-CM

## 2017-08-21 PROCEDURE — 17000 DESTRUCT PREMALG LESION: CPT | Mod: S$GLB,,, | Performed by: DERMATOLOGY

## 2017-08-21 PROCEDURE — 99213 OFFICE O/P EST LOW 20 MIN: CPT | Mod: 25,S$GLB,, | Performed by: DERMATOLOGY

## 2017-08-21 PROCEDURE — 99999 PR PBB SHADOW E&M-EST. PATIENT-LVL II: CPT | Mod: PBBFAC,,, | Performed by: DERMATOLOGY

## 2017-08-21 PROCEDURE — 1159F MED LIST DOCD IN RCRD: CPT | Mod: S$GLB,,, | Performed by: DERMATOLOGY

## 2017-08-21 PROCEDURE — 3008F BODY MASS INDEX DOCD: CPT | Mod: S$GLB,,, | Performed by: DERMATOLOGY

## 2017-08-21 PROCEDURE — 17003 DESTRUCT PREMALG LES 2-14: CPT | Mod: S$GLB,,, | Performed by: DERMATOLOGY

## 2017-08-21 NOTE — PROGRESS NOTES
Subjective:       Patient ID:  Paul Browning is a 76 y.o. male who presents for   Chief Complaint   Patient presents with    Follow-up     Pt here today for a 2 month pdt follow up. Tolerated tx well.  Pt feels face feels better but still has areas on nose he is concerned about.   Also has lesion on left forearm . Tender. Present for 2 days.  No tx.   Pt c/o scaly lesions on face and scalp x a few months. No bleeding or pain.        Review of Systems   Skin: Positive for activity-related sunscreen use and wears hat. Negative for daily sunscreen use.   Hematologic/Lymphatic: Does not bruise/bleed easily.        Objective:    Physical Exam   Constitutional: He appears well-developed and well-nourished. No distress.   Neurological: He is alert and oriented to person, place, and time. He is not disoriented.   Psychiatric: He has a normal mood and affect.   Skin:   Areas Examined (abnormalities noted in diagram):   Scalp / Hair Palpated and Inspected  Head / Face Inspection Performed  RUE Inspected  LUE Inspection Performed  Nails and Digits Inspection Performed                       Diagram Legend     Erythematous scaling macule/papule c/w actinic keratosis       Vascular papule c/w angioma      Pigmented verrucoid papule/plaque c/w seborrheic keratosis      Yellow umbilicated papule c/w sebaceous hyperplasia      Irregularly shaped tan macule c/w lentigo     1-2 mm smooth white papules consistent with Milia      Movable subcutaneous cyst with punctum c/w epidermal inclusion cyst      Subcutaneous movable cyst c/w pilar cyst      Firm pink to brown papule c/w dermatofibroma      Pedunculated fleshy papule(s) c/w skin tag(s)      Evenly pigmented macule c/w junctional nevus     Mildly variegated pigmented, slightly irregular-bordered macule c/w mildly atypical nevus      Flesh colored to evenly pigmented papule c/w intradermal nevus       Pink pearly papule/plaque c/w basal cell carcinoma      Erythematous  hyperkeratotic cursted plaque c/w SCC      Surgical scar with no sign of skin cancer recurrence      Open and closed comedones      Inflammatory papules and pustules      Verrucoid papule consistent consistent with wart     Erythematous eczematous patches and plaques     Dystrophic onycholytic nail with subungual debris c/w onychomycosis     Umbilicated papule    Erythematous-base heme-crusted tan verrucoid plaque consistent with inflamed seborrheic keratosis     Erythematous Silvery Scaling Plaque c/w Psoriasis     See annotation      Assessment / Plan:        AK (actinic keratosis)  -     fluorouracil (TOLAK) 4 % Crea; Apply thin film to upper forehead and temples qhs x 3 weeks; avoid contact with eyes. D/c if ulcerated or bleeding  Dispense: 40 g; Refill: 1  Cryosurgery Procedure Note    Verbal consent from the patient is obtained and the patient is aware of the precancerous quality and need for treatment of these lesions. Liquid nitrogen cryosurgery is applied to the 14 actinic keratoses, as detailed in the physical exam, to produce a freeze injury. The patient is aware that blisters may form and is instructed on wound care with gentle cleansing and use of vaseline ointment to keep moist until healed. The patient is supplied a handout on cryosurgery and is instructed to call if lesions do not completely resolve.      S/p PDT to face in June 2017 with good response    Lentigo  This is a benign hyperpigmented sun induced lesion. Daily sun protection will reduce the number of new lesions. Treatment of these benign lesions are considered cosmetic.             Return in about 4 months (around 12/21/2017).

## 2017-09-22 ENCOUNTER — TELEPHONE (OUTPATIENT)
Dept: ENDOSCOPY | Facility: HOSPITAL | Age: 77
End: 2017-09-22

## 2017-09-22 NOTE — TELEPHONE ENCOUNTER
Contacted patient to schedule EGD. Patient stated he needs to ask his son. Patient requesting a call back.

## 2017-09-25 ENCOUNTER — TELEPHONE (OUTPATIENT)
Dept: ENDOSCOPY | Facility: HOSPITAL | Age: 77
End: 2017-09-25

## 2017-09-27 ENCOUNTER — TELEPHONE (OUTPATIENT)
Dept: ENDOSCOPY | Facility: HOSPITAL | Age: 77
End: 2017-09-27

## 2017-09-27 NOTE — TELEPHONE ENCOUNTER
Spoke with patient and son, Paul Olivo.  Scheduled EGD 10/4/17 at 0900.  Instructions discussed and emailed.

## 2017-10-04 ENCOUNTER — NURSE TRIAGE (OUTPATIENT)
Dept: ADMINISTRATIVE | Facility: CLINIC | Age: 77
End: 2017-10-04

## 2017-10-04 ENCOUNTER — SURGERY (OUTPATIENT)
Age: 77
End: 2017-10-04

## 2017-10-04 ENCOUNTER — ANESTHESIA EVENT (OUTPATIENT)
Dept: ENDOSCOPY | Facility: HOSPITAL | Age: 77
End: 2017-10-04
Payer: MEDICARE

## 2017-10-04 ENCOUNTER — TELEPHONE (OUTPATIENT)
Dept: GASTROENTEROLOGY | Facility: CLINIC | Age: 77
End: 2017-10-04

## 2017-10-04 ENCOUNTER — ANESTHESIA (OUTPATIENT)
Dept: ENDOSCOPY | Facility: HOSPITAL | Age: 77
End: 2017-10-04
Payer: MEDICARE

## 2017-10-04 ENCOUNTER — HOSPITAL ENCOUNTER (OUTPATIENT)
Facility: HOSPITAL | Age: 77
Discharge: HOME | End: 2017-10-04
Attending: INTERNAL MEDICINE | Admitting: INTERNAL MEDICINE
Payer: MEDICARE

## 2017-10-04 VITALS
HEART RATE: 55 BPM | WEIGHT: 147 LBS | BODY MASS INDEX: 23.07 KG/M2 | SYSTOLIC BLOOD PRESSURE: 108 MMHG | RESPIRATION RATE: 16 BRPM | OXYGEN SATURATION: 100 % | DIASTOLIC BLOOD PRESSURE: 56 MMHG | TEMPERATURE: 98 F | HEIGHT: 67 IN

## 2017-10-04 DIAGNOSIS — K22.710 BARRETT'S ESOPHAGUS WITH LOW GRADE DYSPLASIA: Primary | Chronic | ICD-10-CM

## 2017-10-04 DIAGNOSIS — K22.719 BARRETT'S ESOPHAGUS WITH DYSPLASIA: ICD-10-CM

## 2017-10-04 PROCEDURE — 25000003 PHARM REV CODE 250: Performed by: NURSE ANESTHETIST, CERTIFIED REGISTERED

## 2017-10-04 PROCEDURE — 63600175 PHARM REV CODE 636 W HCPCS: Performed by: ANESTHESIOLOGY

## 2017-10-04 PROCEDURE — 37000008 HC ANESTHESIA 1ST 15 MINUTES: Performed by: INTERNAL MEDICINE

## 2017-10-04 PROCEDURE — D9220A PRA ANESTHESIA: Mod: ANES,,, | Performed by: ANESTHESIOLOGY

## 2017-10-04 PROCEDURE — 25000003 PHARM REV CODE 250: Performed by: INTERNAL MEDICINE

## 2017-10-04 PROCEDURE — 43270 EGD LESION ABLATION: CPT | Mod: ,,, | Performed by: INTERNAL MEDICINE

## 2017-10-04 PROCEDURE — C1886 CATHETER, ABLATION: HCPCS | Performed by: INTERNAL MEDICINE

## 2017-10-04 PROCEDURE — 43270 EGD LESION ABLATION: CPT | Performed by: INTERNAL MEDICINE

## 2017-10-04 PROCEDURE — D9220A PRA ANESTHESIA: Mod: CRNA,,, | Performed by: NURSE ANESTHETIST, CERTIFIED REGISTERED

## 2017-10-04 PROCEDURE — 63600175 PHARM REV CODE 636 W HCPCS: Performed by: NURSE ANESTHETIST, CERTIFIED REGISTERED

## 2017-10-04 PROCEDURE — 37000009 HC ANESTHESIA EA ADD 15 MINS: Performed by: INTERNAL MEDICINE

## 2017-10-04 RX ORDER — SUCRALFATE 1 G/10ML
1 SUSPENSION ORAL
Qty: 560 ML | Refills: 0 | Status: SHIPPED | OUTPATIENT
Start: 2017-10-04 | End: 2017-10-04

## 2017-10-04 RX ORDER — ONDANSETRON 2 MG/ML
4 INJECTION INTRAMUSCULAR; INTRAVENOUS ONCE
Status: COMPLETED | OUTPATIENT
Start: 2017-10-04 | End: 2017-10-04

## 2017-10-04 RX ORDER — SUCRALFATE 1 G/1
TABLET ORAL
Qty: 120 TABLET | Refills: 0 | Status: ON HOLD | OUTPATIENT
Start: 2017-10-04 | End: 2018-03-14 | Stop reason: HOSPADM

## 2017-10-04 RX ORDER — PROMETHAZINE HYDROCHLORIDE 12.5 MG/1
12.5 SUPPOSITORY RECTAL EVERY 6 HOURS PRN
Qty: 4 SUPPOSITORY | Refills: 0 | Status: SHIPPED | OUTPATIENT
Start: 2017-10-04 | End: 2017-10-05

## 2017-10-04 RX ORDER — HYDROMORPHONE HYDROCHLORIDE 1 MG/ML
0.2 INJECTION, SOLUTION INTRAMUSCULAR; INTRAVENOUS; SUBCUTANEOUS EVERY 5 MIN PRN
Status: DISCONTINUED | OUTPATIENT
Start: 2017-10-04 | End: 2017-10-04 | Stop reason: HOSPADM

## 2017-10-04 RX ORDER — ONDANSETRON 4 MG/1
4 TABLET, FILM COATED ORAL EVERY 8 HOURS PRN
Qty: 15 TABLET | Refills: 0 | Status: SHIPPED | OUTPATIENT
Start: 2017-10-04 | End: 2017-10-09

## 2017-10-04 RX ORDER — LIDOCAINE HCL/PF 100 MG/5ML
SYRINGE (ML) INTRAVENOUS
Status: DISCONTINUED | OUTPATIENT
Start: 2017-10-04 | End: 2017-10-04

## 2017-10-04 RX ORDER — GLYCOPYRROLATE 0.2 MG/ML
INJECTION INTRAMUSCULAR; INTRAVENOUS
Status: DISCONTINUED | OUTPATIENT
Start: 2017-10-04 | End: 2017-10-04

## 2017-10-04 RX ORDER — PANTOPRAZOLE SODIUM 40 MG/1
40 TABLET, DELAYED RELEASE ORAL 2 TIMES DAILY
Qty: 60 TABLET | Refills: 3 | Status: SHIPPED | OUTPATIENT
Start: 2017-10-04 | End: 2017-11-03

## 2017-10-04 RX ORDER — PROPOFOL 10 MG/ML
VIAL (ML) INTRAVENOUS CONTINUOUS PRN
Status: DISCONTINUED | OUTPATIENT
Start: 2017-10-04 | End: 2017-10-04

## 2017-10-04 RX ORDER — PROPOFOL 10 MG/ML
VIAL (ML) INTRAVENOUS
Status: DISCONTINUED | OUTPATIENT
Start: 2017-10-04 | End: 2017-10-04

## 2017-10-04 RX ORDER — SODIUM CHLORIDE 0.9 % (FLUSH) 0.9 %
3 SYRINGE (ML) INJECTION
Status: DISCONTINUED | OUTPATIENT
Start: 2017-10-04 | End: 2017-10-04 | Stop reason: HOSPADM

## 2017-10-04 RX ORDER — SODIUM CHLORIDE 9 MG/ML
INJECTION, SOLUTION INTRAVENOUS CONTINUOUS
Status: DISCONTINUED | OUTPATIENT
Start: 2017-10-04 | End: 2017-10-04 | Stop reason: HOSPADM

## 2017-10-04 RX ORDER — ONDANSETRON HYDROCHLORIDE 4 MG/5ML
4 SOLUTION ORAL ONCE
Status: DISCONTINUED | OUTPATIENT
Start: 2017-10-04 | End: 2017-10-04

## 2017-10-04 RX ORDER — PHENYLEPHRINE HYDROCHLORIDE 10 MG/ML
INJECTION INTRAVENOUS
Status: DISCONTINUED | OUTPATIENT
Start: 2017-10-04 | End: 2017-10-04

## 2017-10-04 RX ADMIN — PHENYLEPHRINE HYDROCHLORIDE 100 MCG: 10 INJECTION INTRAVENOUS at 09:10

## 2017-10-04 RX ADMIN — HYDROMORPHONE HYDROCHLORIDE 0.2 MG: 1 INJECTION, SOLUTION INTRAMUSCULAR; INTRAVENOUS; SUBCUTANEOUS at 10:10

## 2017-10-04 RX ADMIN — ONDANSETRON 4 MG: 2 INJECTION INTRAMUSCULAR; INTRAVENOUS at 10:10

## 2017-10-04 RX ADMIN — SODIUM CHLORIDE: 0.9 INJECTION, SOLUTION INTRAVENOUS at 08:10

## 2017-10-04 RX ADMIN — ALUMINUM HYDROXIDE, MAGNESIUM HYDROXIDE, AND SIMETHICONE 50 ML: 200; 200; 20 SUSPENSION ORAL at 10:10

## 2017-10-04 RX ADMIN — GLYCOPYRROLATE 0.2 MG: 0.2 INJECTION, SOLUTION INTRAMUSCULAR; INTRAVENOUS at 09:10

## 2017-10-04 RX ADMIN — PHENYLEPHRINE HYDROCHLORIDE 150 MCG: 10 INJECTION INTRAVENOUS at 09:10

## 2017-10-04 RX ADMIN — PROPOFOL 60 MG: 10 INJECTION, EMULSION INTRAVENOUS at 09:10

## 2017-10-04 RX ADMIN — LIDOCAINE HYDROCHLORIDE 100 MG: 20 INJECTION, SOLUTION INTRAVENOUS at 09:10

## 2017-10-04 RX ADMIN — PROPOFOL 175 MCG/KG/MIN: 10 INJECTION, EMULSION INTRAVENOUS at 09:10

## 2017-10-04 NOTE — PATIENT INSTRUCTIONS
Discharge Summary/Instructions for after Ablation of Stevens's Esophagus   using the HALO System  Patient Name: Paul Browning  Patient MRN: [id number]  Patient YOB: 1940  Wednesday, October 04, 2017  Clifford De La Torre MD  You have undergone an upper endoscopy with ablation of Stevens's esophagus.    You may experience one of the following symptoms after treatment:  chest   discomfort, sore throat, difficult or pain with swallowing, and/or   nausea/vomiting.  These symptoms should improve with each day.  You will be   provided with several medications and specific instructions (below) to make   you as comfortable as possible during this time.  Should any of your   symptoms be more severe in nature or longer in duration than we have   described, please contact us.  It is important to continue a strict and   long-term regimen of anti-secretory medication after this treatment, such   as Nexium, Prevacid or other similar drugs.  Representative discharge instructions specific to the use of this device for   the sub-indication of Stevens's Esophagus.  - Maximize anti-secretory regimen (for example, esomeprazole 40 mg twice per   day for 1-3 months, followed by at      least 40 mg per day thereafter).  - Antacid/lidocaine mixture per oral prn.  - Liquid acetaminophen with or without codeine per oral prn.  - Anti-emetic medication per rectum prn.  - Full liquid diet for 24 hours, then advancing to soft diet for 1 week.  - Avoid asprin or non-steroidal anti-inflammatory medications for 7 days   (per physician's instructions).   - Patient instructed to contact treating physician immediately for   significant chest pain, difficulty swallowing, fever,      bleeding, abdominal pain, difficulty breathing, vomiting or other warning   signs provided by your physician.  - If the patient seeks care for a digestive issue from any healthcare   personnel in the 6 months following the ablation      procedure, other than  the treating physician, the treating physician   should be consulted before any treatment is      initiated.  Your doctor recommends these additional instructions:  You are being discharged to home.   Your physician has recommended a repeat upper endoscopy in 12 weeks for   follow-up of Stevens's ablation.  If you have any questions or problems, please call your physician.  EMERGENCY PHONE NUMBER: [phone]  LAB RESULTS: [phone]  Clifford De La Torre MD  10/4/2017 9:49:38 AM  This report has been verified and signed electronically.

## 2017-10-04 NOTE — DISCHARGE INSTRUCTIONS

## 2017-10-04 NOTE — TELEPHONE ENCOUNTER
----- Message from Abby Reyes sent at 10/4/2017 12:06 PM CDT -----  Contact: Pt's son Paul Salazar   Pt's son is calling in regards to issues with getting the prescription sucralfate (CARAFATE) 100 mg/mL suspension. Per son the liquid is $240 and the pill is not available locally. Also the prescription for hydrocodone-acetaminophen (LORTAB) 2.5-167 mg/5 mL  Can not be stocked.    Pt's son would like a call back in regards to these matters at [phone].    Thank you

## 2017-10-04 NOTE — TRANSFER OF CARE
"Anesthesia Transfer of Care Note    Patient: Paul Browning    Procedure(s) Performed: Procedure(s) (LRB):  ESOPHAGOGASTRODUODENOSCOPY (EGD) (N/A)    Patient location: [facility]    Anesthesia Type: general    Transport from OR: Transported from OR on 2-3 L/min O2 by NC with adequate spontaneous ventilation    Post pain: adequate analgesia    Post assessment: no apparent anesthetic complications    Post vital signs: stable    Level of consciousness: sedated    Nausea/Vomiting: no vomiting    Complications: none    Transfer of care protocol was followed      Last vitals:   Visit Vitals  BP (!) 105/54 (BP Location: Left arm, Patient Position: Lying)   Pulse (!) 49   Temp 36.6 °C (97.9 °F) (Temporal)   Resp 16   Ht 5' 7" (1.702 m)   Wt 66.7 kg (147 lb)   SpO2 99%   BMI 23.02 kg/m²     "

## 2017-10-04 NOTE — ANESTHESIA POSTPROCEDURE EVALUATION
"Anesthesia Post Evaluation    Patient: Paul Browning    Procedure(s) Performed: Procedure(s) (LRB):  ESOPHAGOGASTRODUODENOSCOPY (EGD) (N/A)    Final Anesthesia Type: general  Patient location during evaluation: PACU  Patient participation: Yes- Able to Participate  Level of consciousness: awake and alert, awake and oriented  Post-procedure vital signs: reviewed and stable  Pain management: adequate  Airway patency: patent  PONV status at discharge: No PONV  Anesthetic complications: no      Cardiovascular status: blood pressure returned to baseline, stable and hemodynamically stable  Respiratory status: unassisted, spontaneous ventilation and room air  Hydration status: euvolemic  Follow-up not needed.        Visit Vitals  BP (!) 108/56   Pulse (!) 55   Temp 36.6 °C (97.8 °F) (Temporal)   Resp 16   Ht 5' 7" (1.702 m)   Wt 66.7 kg (147 lb)   SpO2 100%   BMI 23.02 kg/m²       Pain/Roxi Score: Pain Assessment Performed: Yes (10/4/2017 11:12 AM)  Presence of Pain: denies (10/4/2017 11:12 AM)  Pain Rating Prior to Med Admin: 3 (10/4/2017 10:58 AM)  Roxi Score: 10 (10/4/2017 11:12 AM)      "

## 2017-10-04 NOTE — PLAN OF CARE
Discharge instructions given and explained to patient and family with verbalization of understanding all instructions. Prescription given and explained next time and doses of each medication. Patients v/s stable, denies n/v and tolerating po, rates pain level tolerable, IV removed, and family at bedside for patient discharge home.

## 2017-10-04 NOTE — H&P
History & Physical - Short Stay  Gastroenterology      SUBJECTIVE:     Procedure: EGD    Chief Complaint/Indication for Procedure: Stevens's Esophagus    History of Present Illness:  Patient is a 76 y.o. male presents with history of Stevens's esophagus with LGD here for ablation therapy.    EGD on 6/20/2017    Impression:           - Esophageal mucosal changes consistent with                         long-segment Stevens's esophagus, biopsied.                        - Medium sized sliding hiatal hernia.  Recommendation:       - Patient has a contact number available for                         emergencies. The signs and symptoms of potential                         delayed complications were discussed with the                         patient. Return to normal activities tomorrow.                         Written discharge instructions were provided to the                         patient.                        - Discharge patient to home.                        - Resume previous diet.                        - Continue present medications.                        - Await pathology results.                        - Repeat upper endoscopy in 1 year or sooner (based                         on pathology) for surveillance.    PTA Medications   Medication Sig    alendronate (FOSAMAX) 70 MG tablet SEE NOTES    aspirin (ECOTRIN) 81 MG EC tablet TAKE 1 TABLET BY MOUTH ONCE DAILY    atorvastatin (LIPITOR) 40 MG tablet TAKE 1 TABLET BY MOUTH ONCE DAILY    fluorouracil (TOLAK) 4 % Crea Apply thin film to upper forehead and temples qhs x 3 weeks; avoid contact with eyes. D/c if ulcerated or bleeding    metoprolol tartrate (LOPRESSOR) 25 MG tablet TAKE 1 TABLET BY MOUTH TWICE DAILY    mupirocin (BACTROBAN) 2 % ointment AAA bid    omega-3 fatty acids-vitamin E (FISH OIL) 1,000 mg Cap Take 1 tablet by mouth 2 (two) times daily.    oxycodone-acetaminophen (PERCOCET) 5-325 mg per tablet Take 1 tablet by mouth every 4 to 6 hours  as needed for Pain.    pantoprazole (PROTONIX) 40 MG tablet TAKE ONE TABLET BY MOUTH ONCE DAILY    potassium chloride (KLOR-CON) 10 MEQ TbSR TAKE 2 TABLETS BY MOUTH EVERY DAY    sertraline (ZOLOFT) 50 MG tablet TAKE 1 TABLET BY MOUTH EVERY DAY    sumatriptan (IMITREX) 100 MG tablet     trazodone (DESYREL) 50 MG tablet TAKE 1 TABLET BY MOUTH EVERY NIGHT AS NEEDED FOR INSOMNIA    triamcinolone acetonide 0.1% (KENALOG) 0.1 % cream AAA bid to legs prn itching. Not more than 2 weeks straight in the same location       Review of patient's allergies indicates:  No Known Allergies     Past Medical History:   Diagnosis Date    Stevens's esophagus with low grade dysplasia     BPH (benign prostatic hyperplasia)     CAD (coronary artery disease)     Diaphragmatic hernia     GERD (gastroesophageal reflux disease)     Heart attack     Hyperlipidemia     Hypertension     MDD (major depressive disorder), recurrent episode, mild 2/17/2016    Osteoporosis 7/20/2016    Peripheral arterial disease 3/18/2016    Sarcoid     Squamous cell carcinoma 09/2016, 5/2016    crown of scalp, left wrist     Past Surgical History:   Procedure Laterality Date    CARDIAC SURGERY      CORONARY ARTERY BYPASS GRAFT      x2  10/2014    UMBILICAL HERNIA REPAIR       Family History   Problem Relation Age of Onset    Arrhythmia Mother     Heart failure Brother     Colon cancer Neg Hx     Inflammatory bowel disease Neg Hx     Celiac disease Neg Hx     Melanoma Neg Hx      Social History   Substance Use Topics    Smoking status: Former Smoker     Packs/day: 2.00     Years: 20.00     Types: Cigarettes     Quit date: 2/26/1988    Smokeless tobacco: Never Used    Alcohol use No      Comment: quit drinking beer 2012       Review of Systems:  Constitutional: no fever or chills  Respiratory: no cough or shortness of breath  Cardiovascular: no chest pain or palpitations  Gastrointestinal: no nausea or vomiting, no abdominal pain or  change in bowel habits    OBJECTIVE:     Vital Signs (Most Recent)       Physical Exam:  General: well developed, well nourished  Lungs:  clear to auscultation bilaterally and normal respiratory effort  Heart: regular rate, S1, S2 normal  Abdomen: soft, non-tender non-distented; bowel sounds normal; no masses,  no organomegaly    Laboratory  CBC: No results for input(s): WBC, RBC, HGB, HCT, PLT, MCV, MCH, MCHC in the last 168 hours.  CMP: No results for input(s): GLU, CALCIUM, ALBUMIN, PROT, NA, K, CO2, CL, BUN, CREATININE, ALKPHOS, ALT, AST, BILITOT in the last 168 hours.  Coagulation: No results for input(s): LABPROT, INR, APTT in the last 168 hours.      Diagnostic Results:      ASSESSMENT/PLAN:     Stevens's esophagus with LGD    Plan: EGD    Anesthesia Plan: MAC    ASA Grade: ASA 2 - Patient with mild systemic disease with no functional limitations

## 2017-10-04 NOTE — ANESTHESIA PREPROCEDURE EVALUATION
10/04/2017  Paul Browning is a 76 y.o., male with history of Stevens's esophagus with LGD here for ablation therapy.    EKG Conclusions:    1. The EKG portion of this study is negative for ischemia, and peak heart rate of 102 bpm (71% of predicted).   2. Sensitivity is impaired due to failure to reach target heart rate.   3. Blood pressure response to exercise was normal (Presenting BP: 131/71 Peak BP: 173/66).   4. The following arrhythmias were present: occasional PVCs.   5. There were no symptoms of chest discomfort or significant dyspnea throughout the protocol    Anesthesia Evaluation    I have reviewed the Patient Summary Reports.    I have reviewed the Nursing Notes.   I have reviewed the Medications.     Review of Systems  Anesthesia Hx:  No problems with previous Anesthesia  Denies Family Hx of Anesthesia complications.   Denies Personal Hx of Anesthesia complications.   Social:  No Alcohol Use, Non-Smoker    Cardiovascular:   Hypertension Past MI CAD  CABG/stent  CHF    Pulmonary:   COPD Shortness of breath    Hepatic/GI:   PUD, GERD    Neurological:   Neuromuscular Disease, Headaches    Psych:   Psychiatric History          Physical Exam  General:  Well nourished    Airway/Jaw/Neck:  Airway Findings: Mouth Opening: Normal Tongue: Normal  General Airway Assessment: Adult  Mallampati: I  TM Distance: Normal, at least 6 cm  Jaw/Neck Findings:  Neck ROM: Normal ROM     Eyes/Ears/Nose:  Eyes/Ears/Nose Findings:    Dental:  Dental Findings: Upper Dentures   Chest/Lungs:  Chest/Lungs Findings: Normal Respiratory Rate     Heart/Vascular:  Heart Findings: Rate: Normal  Rhythm: Regular Rhythm        Mental Status:  Mental Status Findings:  Cooperative, Alert and Oriented         Anesthesia Plan  Type of Anesthesia, risks & benefits discussed:  Anesthesia Type:  general  Patient's Preference:  general  Intra-op Monitoring Plan: standard ASA monitors  Intra-op Monitoring Plan Comments:   Post Op Pain Control Plan:   Post Op Pain Control Plan Comments:   Induction:   IV  Beta Blocker:  Patient is not currently on a Beta-Blocker (No further documentation required).       Informed Consent: Patient understands risks and agrees with Anesthesia plan.  Questions answered. Anesthesia consent signed with patient.  ASA Score: 3     Day of Surgery Review of History & Physical:    H&P update referred to the surgeon.         Ready For Surgery From Anesthesia Perspective.

## 2017-10-04 NOTE — DISCHARGE SUMMARY
Discharge Summary/Instructions for after Ablation of Stevens's Esophagus using the HALO System    Patient Name: Paul Browning  Patient MRN: [id number]  Patient YOB: 1940    Wednesday, October 04, 2017  Clifford De La Torre MD    You have undergone an upper endoscopy with ablation of Stevens's esophagus.  You may experience one of the following symptoms after treatment:  chest discomfort, sore throat, difficult or pain with swallowing, and/or nausea/vomiting.  These symptoms should improve with each day.  You will be provided with several medications and specific instructions (below) to make you as comfortable as possible during this time.  Should any of your symptoms be more severe in nature or longer in duration than we have described, please contact us.  It is important to continue a strict and long-term regimen of anti-secretory medication after this treatment, such as Nexium, Prevacid or other similar drugs.    Representative discharge instructions specific to the use of this device for the sub-indication of Stevens's Esophagus.    - Maximize anti-secretory regimen (for example, esomeprazole 40 mg twice per day for 1-3 months, followed by at      least 40 mg per day thereafter).  - Antacid/lidocaine mixture per oral prn.  - Liquid acetaminophen with or without codeine per oral prn.  - Anti-emetic medication per rectum prn.  - Full liquid diet for 24 hours, then advancing to soft diet for 1 week.  - Avoid asprin or non-steroidal anti-inflammatory medications for 7 days (per physician's instructions).   - Patient instructed to contact treating physician immediately for significant chest pain, difficulty swallowing, fever,      bleeding, abdominal pain, difficulty breathing, vomiting or other warning signs provided by your physician.  - If the patient seeks care for a digestive issue from any healthcare personnel in the 6 months following the ablation      procedure, other than the treating physician,  the treating physician should be consulted before any treatment is      initiated.    Your doctor recommends these additional instructions:    You are being discharged to home.   Your physician has recommended a repeat upper endoscopy in 12 weeks for follow-up of Stevens's ablation.    If you have any questions or problems, please call your physician.  EMERGENCY PHONE NUMBER: [phone]  LAB RESULTS: [phone]

## 2017-10-05 ENCOUNTER — TELEPHONE (OUTPATIENT)
Dept: GASTROENTEROLOGY | Facility: CLINIC | Age: 77
End: 2017-10-05

## 2017-10-05 NOTE — TELEPHONE ENCOUNTER
Message   Received: Today   Message Contents   MD Mary Clarke MA   Caller: Unspecified (Today,  8:08 AM)             Ok to hold the carafate until the nausea improve.   Clifford De La Torre MD      Patient's son informed.

## 2017-10-05 NOTE — TELEPHONE ENCOUNTER
Daughter called- pt had surgery for his Stevens's esophagus today and has been throwing up since. Advised to go to ER  Reason for Disposition   [1] MODERATE vomiting (e.g., 3 - 5 times/day) AND [2] age > 60    Protocols used: ST VOMITING-A-AH

## 2017-10-09 ENCOUNTER — TELEPHONE (OUTPATIENT)
Dept: GASTROENTEROLOGY | Facility: CLINIC | Age: 77
End: 2017-10-09

## 2017-10-09 NOTE — TELEPHONE ENCOUNTER
----- Message from [place] Rendon sent at 10/9/2017  9:37 AM CDT -----  Contact: Son- Paul- [phone]  De La Torre- pts son called and stated following his procedure last week the pt has been experiencing abdominal burning sensation- would like to discuss- please call Paul back at [phone]

## 2017-10-09 NOTE — TELEPHONE ENCOUNTER
----- Message from [place] Rendon sent at 10/9/2017  9:37 AM CDT -----  Contact: Son- Paul- [phone]  De La Torre- pts son called and stated following his procedure last week the pt has been experiencing abdominal burning sensation- would like to discuss- please call Paul back at [phone]

## 2017-10-09 NOTE — TELEPHONE ENCOUNTER
He is experiencing burning with eating, especially with really cold or really hot items. He is taking Maalox twice. Can he take it more than this. Is there anything else he can take?    He is not vomiting or having nausea.

## 2017-10-09 NOTE — TELEPHONE ENCOUNTER
Message   Received: Today   Message Contents   MD Mary Clarke MA   Caller: Unspecified (Today,  2:31 PM)             He need to avoid very cold or very hot drinks. He can take the Maalox 3-4 times a day as needed.   Clifford De La Torre MD

## 2017-10-11 ENCOUNTER — TELEPHONE (OUTPATIENT)
Dept: GASTROENTEROLOGY | Facility: CLINIC | Age: 77
End: 2017-10-11

## 2017-10-11 DIAGNOSIS — K22.719 BARRETT'S ESOPHAGUS WITH DYSPLASIA: Primary | ICD-10-CM

## 2017-11-01 ENCOUNTER — TELEPHONE (OUTPATIENT)
Dept: GASTROENTEROLOGY | Facility: CLINIC | Age: 77
End: 2017-11-01

## 2017-11-03 ENCOUNTER — LAB VISIT (OUTPATIENT)
Dept: LAB | Facility: HOSPITAL | Age: 77
End: 2017-11-03
Attending: INTERNAL MEDICINE
Payer: MEDICARE

## 2017-11-03 ENCOUNTER — OFFICE VISIT (OUTPATIENT)
Dept: INTERNAL MEDICINE | Facility: CLINIC | Age: 77
End: 2017-11-03
Payer: MEDICARE

## 2017-11-03 VITALS
SYSTOLIC BLOOD PRESSURE: 111 MMHG | HEIGHT: 67 IN | DIASTOLIC BLOOD PRESSURE: 74 MMHG | BODY MASS INDEX: 22.44 KG/M2 | RESPIRATION RATE: 16 BRPM | WEIGHT: 143 LBS | HEART RATE: 67 BPM

## 2017-11-03 DIAGNOSIS — E78.5 HYPERLIPIDEMIA, UNSPECIFIED HYPERLIPIDEMIA TYPE: Chronic | ICD-10-CM

## 2017-11-03 DIAGNOSIS — I25.83 CORONARY ARTERY DISEASE DUE TO LIPID RICH PLAQUE: Chronic | ICD-10-CM

## 2017-11-03 DIAGNOSIS — Z85.89 HISTORY OF SQUAMOUS CELL CARCINOMA: ICD-10-CM

## 2017-11-03 DIAGNOSIS — I10 ESSENTIAL HYPERTENSION: Chronic | ICD-10-CM

## 2017-11-03 DIAGNOSIS — D69.2 SENILE PURPURA: ICD-10-CM

## 2017-11-03 DIAGNOSIS — I77.9 CAROTID ARTERY DISEASE WITHOUT CEREBRAL INFARCTION: Chronic | ICD-10-CM

## 2017-11-03 DIAGNOSIS — I73.9 PERIPHERAL ARTERIAL DISEASE: ICD-10-CM

## 2017-11-03 DIAGNOSIS — I50.22 CHRONIC SYSTOLIC CHF (CONGESTIVE HEART FAILURE): Chronic | ICD-10-CM

## 2017-11-03 DIAGNOSIS — M81.0 OSTEOPOROSIS, UNSPECIFIED OSTEOPOROSIS TYPE, UNSPECIFIED PATHOLOGICAL FRACTURE PRESENCE: ICD-10-CM

## 2017-11-03 DIAGNOSIS — I25.10 CORONARY ARTERY DISEASE DUE TO LIPID RICH PLAQUE: Primary | Chronic | ICD-10-CM

## 2017-11-03 DIAGNOSIS — J44.9 CHRONIC OBSTRUCTIVE PULMONARY DISEASE, UNSPECIFIED COPD TYPE: ICD-10-CM

## 2017-11-03 DIAGNOSIS — D86.0 PULMONARY SARCOIDOSIS: ICD-10-CM

## 2017-11-03 DIAGNOSIS — K22.719 BARRETT'S ESOPHAGUS WITH DYSPLASIA: ICD-10-CM

## 2017-11-03 DIAGNOSIS — F33.0 MDD (MAJOR DEPRESSIVE DISORDER), RECURRENT EPISODE, MILD: Chronic | ICD-10-CM

## 2017-11-03 DIAGNOSIS — G47.00 INSOMNIA, UNSPECIFIED TYPE: Chronic | ICD-10-CM

## 2017-11-03 DIAGNOSIS — I70.0 ATHEROSCLEROSIS OF AORTA: ICD-10-CM

## 2017-11-03 DIAGNOSIS — I25.10 CORONARY ARTERY DISEASE DUE TO LIPID RICH PLAQUE: Chronic | ICD-10-CM

## 2017-11-03 DIAGNOSIS — I25.83 CORONARY ARTERY DISEASE DUE TO LIPID RICH PLAQUE: Primary | Chronic | ICD-10-CM

## 2017-11-03 LAB
ALBUMIN SERPL BCP-MCNC: 3.4 G/DL
ALP SERPL-CCNC: 61 U/L
ALT SERPL W/O P-5'-P-CCNC: 15 U/L
ANION GAP SERPL CALC-SCNC: 9 MMOL/L
AST SERPL-CCNC: 22 U/L
BASOPHILS # BLD AUTO: 0.05 K/UL
BASOPHILS NFR BLD: 1.1 %
BILIRUB SERPL-MCNC: 1.5 MG/DL
BUN SERPL-MCNC: 16 MG/DL
CALCIUM SERPL-MCNC: 9.3 MG/DL
CHLORIDE SERPL-SCNC: 102 MMOL/L
CO2 SERPL-SCNC: 26 MMOL/L
CREAT SERPL-MCNC: 1 MG/DL
DIFFERENTIAL METHOD: ABNORMAL
EOSINOPHIL # BLD AUTO: 0 K/UL
EOSINOPHIL NFR BLD: 0.9 %
ERYTHROCYTE [DISTWIDTH] IN BLOOD BY AUTOMATED COUNT: 13.7 %
EST. GFR  (AFRICAN AMERICAN): >60 ML/MIN/1.73 M^2
EST. GFR  (NON AFRICAN AMERICAN): >60 ML/MIN/1.73 M^2
GLUCOSE SERPL-MCNC: 85 MG/DL
HCT VFR BLD AUTO: 37.3 %
HGB BLD-MCNC: 12.4 G/DL
IMM GRANULOCYTES # BLD AUTO: 0.02 K/UL
IMM GRANULOCYTES NFR BLD AUTO: 0.4 %
LYMPHOCYTES # BLD AUTO: 1.7 K/UL
LYMPHOCYTES NFR BLD: 37.3 %
MCH RBC QN AUTO: 30.1 PG
MCHC RBC AUTO-ENTMCNC: 33.2 G/DL
MCV RBC AUTO: 91 FL
MONOCYTES # BLD AUTO: 0.5 K/UL
MONOCYTES NFR BLD: 10.9 %
NEUTROPHILS # BLD AUTO: 2.3 K/UL
NEUTROPHILS NFR BLD: 49.4 %
NRBC BLD-RTO: 0 /100 WBC
PLATELET # BLD AUTO: 250 K/UL
PMV BLD AUTO: 9.7 FL
POTASSIUM SERPL-SCNC: 4.3 MMOL/L
PROT SERPL-MCNC: 6.8 G/DL
RBC # BLD AUTO: 4.12 M/UL
SODIUM SERPL-SCNC: 137 MMOL/L
WBC # BLD AUTO: 4.66 K/UL

## 2017-11-03 PROCEDURE — 85025 COMPLETE CBC W/AUTO DIFF WBC: CPT

## 2017-11-03 PROCEDURE — 99499 UNLISTED E&M SERVICE: CPT | Mod: S$GLB,,, | Performed by: INTERNAL MEDICINE

## 2017-11-03 PROCEDURE — 90662 IIV NO PRSV INCREASED AG IM: CPT | Mod: S$GLB,,, | Performed by: INTERNAL MEDICINE

## 2017-11-03 PROCEDURE — 80053 COMPREHEN METABOLIC PANEL: CPT

## 2017-11-03 PROCEDURE — 36415 COLL VENOUS BLD VENIPUNCTURE: CPT | Mod: PO

## 2017-11-03 PROCEDURE — 99214 OFFICE O/P EST MOD 30 MIN: CPT | Mod: S$GLB,,, | Performed by: INTERNAL MEDICINE

## 2017-11-03 PROCEDURE — 99999 PR PBB SHADOW E&M-EST. PATIENT-LVL III: CPT | Mod: PBBFAC,,, | Performed by: INTERNAL MEDICINE

## 2017-11-03 PROCEDURE — G0008 ADMIN INFLUENZA VIRUS VAC: HCPCS | Mod: S$GLB,,, | Performed by: INTERNAL MEDICINE

## 2017-11-03 NOTE — PROGRESS NOTES
Subjective:       Patient ID: Paul Browning is a 77 y.o. male.    Chief Complaint: CAD F/U (3 mos folup/ wants flu shot)    HPI   77 y.o. Male with CAD, systolic heart failure, CAD, Carotid disease/PAD, HLD, HTN, COPD/sarcoidosis, Insomnia, Aortic atherosclerosis, MDD, senile purpura, Hx of SCC is here for 3 month f/u. Pt has been doing well overall.            Review of Systems   Constitutional: Negative for activity change, appetite change, chills, diaphoresis, fatigue, fever and unexpected weight change.   HENT: Negative for postnasal drip, rhinorrhea, sinus pressure, sneezing, sore throat, trouble swallowing and voice change.    Respiratory: Negative for cough, shortness of breath and wheezing.    Cardiovascular: Negative for chest pain, palpitations and leg swelling.   Gastrointestinal: Negative for abdominal pain, blood in stool, constipation, diarrhea, nausea and vomiting.   Genitourinary: Negative for dysuria.   Musculoskeletal: Negative for arthralgias and myalgias.   Skin: Negative for rash and wound.   Allergic/Immunologic: Negative for environmental allergies and food allergies.   Hematological: Negative for adenopathy. Does not bruise/bleed easily.   Psychiatric/Behavioral: Positive for dysphoric mood and sleep disturbance. Negative for self-injury and suicidal ideas.       Objective:      Physical Exam   Constitutional: He is oriented to person, place, and time. He appears well-developed and well-nourished. No distress.   HENT:   Head: Normocephalic and atraumatic.   Right Ear: External ear normal.   Left Ear: External ear normal.   Nose: Nose normal.   Mouth/Throat: Oropharynx is clear and moist. No oropharyngeal exudate.   Eyes: Conjunctivae and EOM are normal. Pupils are equal, round, and reactive to light. Right eye exhibits no discharge. Left eye exhibits no discharge. No scleral icterus.   Neck: Normal range of motion. Neck supple. No JVD present.   Cardiovascular: Normal rate, regular  rhythm and normal heart sounds.    No murmur heard.  Pulmonary/Chest: Effort normal and breath sounds normal. No respiratory distress. He has no wheezes. He has no rales.   Abdominal: Soft. Bowel sounds are normal. There is no tenderness.   Musculoskeletal: He exhibits no edema.   Lymphadenopathy:     He has no cervical adenopathy.   Neurological: He is alert and oriented to person, place, and time.   Skin: Skin is warm and dry. No rash noted. He is not diaphoretic. No pallor.       Assessment:       1. Coronary artery disease due to lipid rich plaque    2. Chronic systolic CHF (congestive heart failure)    3. Carotid artery disease without cerebral infarction    4. Peripheral arterial disease    5. Stevens's esophagus with dysplasia    6. Essential hypertension    7. Hyperlipidemia, unspecified hyperlipidemia type    8. Insomnia, unspecified type    9. Atherosclerosis of aorta    10. Osteoporosis, unspecified osteoporosis type, unspecified pathological fracture presence    11. Pulmonary sarcoidosis    12. Chronic obstructive pulmonary disease, unspecified COPD type    13. MDD (major depressive disorder), recurrent episode, mild    14. Senile purpura    15. History of squamous cell carcinoma        Plan:    1. CAD s/p PCI and CABG- stable no CP currently, followed by Cardiology       Continue ASA/Statin/BB   2. Chronic Systolic heart failure- stable without S/S of volume overload    3. Carotid artery disease/PAD- stable, CPT   4. Stevens's esophagus, Hx of gastric ulcer- Last EGD(10/17)- low grade dysplasia and intestinal metaplasia       Continue Protonix 40 mg daily       Followed by GI   5. HTN- stable, CPT   6. COPD with sarcoidosis- stable off meds   7. Insomnia- stable on Trazodone 50 mg qHS PRN   8. Aortic atherosclerosis- stable, continue to monitor   9. MDD- stable on Zoloft 50 mg daily  10. HLD- controlled on Lipitor 40 mg daily  11. Osteoporosis- continue Fosamax   12. Senile purpura- stable, continue to  monitor  13. Migraines- stable on Imitrex PRN  14. Hx of SCC- followed by Derm

## 2017-11-15 ENCOUNTER — TELEPHONE (OUTPATIENT)
Dept: GASTROENTEROLOGY | Facility: CLINIC | Age: 77
End: 2017-11-15

## 2017-11-15 NOTE — TELEPHONE ENCOUNTER
Message   Received: Today   Message Contents   MD Mary Clarke MA   Caller: Unspecified (Today,  9:29 AM)             EGD can wait until 1/10/2018

## 2017-11-16 ENCOUNTER — TELEPHONE (OUTPATIENT)
Dept: GASTROENTEROLOGY | Facility: CLINIC | Age: 77
End: 2017-11-16

## 2017-11-16 NOTE — TELEPHONE ENCOUNTER
Spoke with patient and son. EGD scheduled for 1/10/18 at 8:30a. Reviewed prep instruction with patient and son. They verbalized understanding.

## 2017-12-20 ENCOUNTER — TELEPHONE (OUTPATIENT)
Dept: ENDOSCOPY | Facility: HOSPITAL | Age: 77
End: 2017-12-20

## 2017-12-20 NOTE — TELEPHONE ENCOUNTER
Spoke with patient's son, Paul Olivo, about instructions for EGD scheduled 1/10/18 at 0830.  Instructions emailed.

## 2017-12-28 ENCOUNTER — TELEPHONE (OUTPATIENT)
Dept: INTERNAL MEDICINE | Facility: CLINIC | Age: 77
End: 2017-12-28

## 2017-12-28 NOTE — TELEPHONE ENCOUNTER
I spoke to daughter willow,   Patients Wife in hospital with viral pneumonia for over a week..  he is staying at hospital 24 hours a day and refuses to go home    Hoarse and bad bronchial cough, no fever..   Denies wheez.  Hasn't tried anything for this.  He is very tired.    Can you rx anything ?   Pharmacy verified.    Please advise.  Thanks tameka

## 2017-12-28 NOTE — TELEPHONE ENCOUNTER
----- Message from Flor Gibbs sent at 12/27/2017  1:39 PM CST -----  Contact: Mesha/Daughter/[phone]  Patient would like to get medical advice.  Symptoms (please be specific):  cough  How long has patient had these symptoms:  yesterday  Pharmacy name and phone #:  [facility] - [phone] (Phone)  [phone] (Fax)  Any drug allergies:  No  Comments: pt has with wife while she is in the hospital with pneumonia. Pt is refusing to leave hospital daughter would like something called in.

## 2017-12-29 RX ORDER — AZITHROMYCIN 250 MG/1
TABLET, FILM COATED ORAL
Qty: 6 TABLET | Refills: 0 | Status: SHIPPED | OUTPATIENT
Start: 2017-12-29 | End: 2018-01-03

## 2017-12-29 RX ORDER — CODEINE PHOSPHATE AND GUAIFENESIN 10; 100 MG/5ML; MG/5ML
5 SOLUTION ORAL 3 TIMES DAILY PRN
Qty: 236 ML | Refills: 0 | Status: SHIPPED | OUTPATIENT
Start: 2017-12-29 | End: 2018-01-03 | Stop reason: SDUPTHER

## 2017-12-29 NOTE — TELEPHONE ENCOUNTER
cheratussin ac called to [facility].  Daughter reached and advised zpac and cough syrup called in but if not improving he must see a doctor.

## 2018-01-03 RX ORDER — CODEINE PHOSPHATE AND GUAIFENESIN 10; 100 MG/5ML; MG/5ML
5 SOLUTION ORAL 3 TIMES DAILY PRN
Qty: 236 ML | Refills: 0 | Status: SHIPPED | OUTPATIENT
Start: 2018-01-03 | End: 2018-01-13

## 2018-01-03 NOTE — TELEPHONE ENCOUNTER
Per physician approval; Called [facility] [phone]; spoke with [place]; verbal auth to refill cheritussin; 5 mL TID; ZERO refills;

## 2018-01-10 ENCOUNTER — ANESTHESIA (OUTPATIENT)
Dept: ENDOSCOPY | Facility: HOSPITAL | Age: 78
End: 2018-01-10
Payer: MEDICARE

## 2018-01-10 ENCOUNTER — ANESTHESIA EVENT (OUTPATIENT)
Dept: ENDOSCOPY | Facility: HOSPITAL | Age: 78
End: 2018-01-10
Payer: MEDICARE

## 2018-01-18 ENCOUNTER — TELEPHONE (OUTPATIENT)
Dept: GASTROENTEROLOGY | Facility: CLINIC | Age: 78
End: 2018-01-18

## 2018-01-18 NOTE — TELEPHONE ENCOUNTER
Spoke with patient in regards to reschedule EGD. He stated that he needed to discuss with son and asked that I call back late next week.

## 2018-01-24 ENCOUNTER — TELEPHONE (OUTPATIENT)
Dept: GASTROENTEROLOGY | Facility: CLINIC | Age: 78
End: 2018-01-24

## 2018-01-29 ENCOUNTER — TELEPHONE (OUTPATIENT)
Dept: GASTROENTEROLOGY | Facility: CLINIC | Age: 78
End: 2018-01-29

## 2018-02-05 ENCOUNTER — LAB VISIT (OUTPATIENT)
Dept: LAB | Facility: HOSPITAL | Age: 78
End: 2018-02-05
Attending: INTERNAL MEDICINE
Payer: MEDICARE

## 2018-02-05 ENCOUNTER — OFFICE VISIT (OUTPATIENT)
Dept: INTERNAL MEDICINE | Facility: CLINIC | Age: 78
End: 2018-02-05
Payer: MEDICARE

## 2018-02-05 VITALS
RESPIRATION RATE: 16 BRPM | WEIGHT: 151 LBS | HEART RATE: 66 BPM | HEIGHT: 67 IN | SYSTOLIC BLOOD PRESSURE: 122 MMHG | DIASTOLIC BLOOD PRESSURE: 65 MMHG | BODY MASS INDEX: 23.7 KG/M2 | TEMPERATURE: 98 F

## 2018-02-05 DIAGNOSIS — I25.10 CORONARY ARTERY DISEASE DUE TO LIPID RICH PLAQUE: Primary | Chronic | ICD-10-CM

## 2018-02-05 DIAGNOSIS — J44.9 CHRONIC OBSTRUCTIVE PULMONARY DISEASE, UNSPECIFIED COPD TYPE: ICD-10-CM

## 2018-02-05 DIAGNOSIS — I70.0 ATHEROSCLEROSIS OF AORTA: ICD-10-CM

## 2018-02-05 DIAGNOSIS — I10 ESSENTIAL HYPERTENSION: Chronic | ICD-10-CM

## 2018-02-05 DIAGNOSIS — M81.0 OSTEOPOROSIS, UNSPECIFIED OSTEOPOROSIS TYPE, UNSPECIFIED PATHOLOGICAL FRACTURE PRESENCE: ICD-10-CM

## 2018-02-05 DIAGNOSIS — K22.710 BARRETT'S ESOPHAGUS WITH LOW GRADE DYSPLASIA: Chronic | ICD-10-CM

## 2018-02-05 DIAGNOSIS — E78.5 HYPERLIPIDEMIA, UNSPECIFIED HYPERLIPIDEMIA TYPE: Chronic | ICD-10-CM

## 2018-02-05 DIAGNOSIS — I50.22 CHRONIC SYSTOLIC CHF (CONGESTIVE HEART FAILURE): Chronic | ICD-10-CM

## 2018-02-05 DIAGNOSIS — G43.909 MIGRAINE WITHOUT STATUS MIGRAINOSUS, NOT INTRACTABLE, UNSPECIFIED MIGRAINE TYPE: ICD-10-CM

## 2018-02-05 DIAGNOSIS — I25.83 CORONARY ARTERY DISEASE DUE TO LIPID RICH PLAQUE: Primary | Chronic | ICD-10-CM

## 2018-02-05 DIAGNOSIS — F33.0 MDD (MAJOR DEPRESSIVE DISORDER), RECURRENT EPISODE, MILD: Chronic | ICD-10-CM

## 2018-02-05 DIAGNOSIS — D86.0 PULMONARY SARCOIDOSIS: ICD-10-CM

## 2018-02-05 DIAGNOSIS — G47.00 INSOMNIA, UNSPECIFIED TYPE: Chronic | ICD-10-CM

## 2018-02-05 DIAGNOSIS — D69.2 SENILE PURPURA: ICD-10-CM

## 2018-02-05 DIAGNOSIS — I77.9 CAROTID ARTERY DISEASE WITHOUT CEREBRAL INFARCTION: Chronic | ICD-10-CM

## 2018-02-05 DIAGNOSIS — I73.9 PERIPHERAL ARTERIAL DISEASE: ICD-10-CM

## 2018-02-05 PROBLEM — K22.719 BARRETT'S ESOPHAGUS WITH DYSPLASIA: Status: RESOLVED | Noted: 2017-10-04 | Resolved: 2018-02-05

## 2018-02-05 LAB
ANION GAP SERPL CALC-SCNC: 6 MMOL/L
BASOPHILS # BLD AUTO: 0.04 K/UL
BASOPHILS NFR BLD: 0.8 %
BUN SERPL-MCNC: 20 MG/DL
CALCIUM SERPL-MCNC: 9.3 MG/DL
CHLORIDE SERPL-SCNC: 104 MMOL/L
CO2 SERPL-SCNC: 29 MMOL/L
CREAT SERPL-MCNC: 1 MG/DL
DIFFERENTIAL METHOD: ABNORMAL
EOSINOPHIL # BLD AUTO: 0.1 K/UL
EOSINOPHIL NFR BLD: 1.2 %
ERYTHROCYTE [DISTWIDTH] IN BLOOD BY AUTOMATED COUNT: 13.3 %
EST. GFR  (AFRICAN AMERICAN): >60 ML/MIN/1.73 M^2
EST. GFR  (NON AFRICAN AMERICAN): >60 ML/MIN/1.73 M^2
GLUCOSE SERPL-MCNC: 76 MG/DL
HCT VFR BLD AUTO: 39.3 %
HGB BLD-MCNC: 13.2 G/DL
IMM GRANULOCYTES # BLD AUTO: 0.02 K/UL
IMM GRANULOCYTES NFR BLD AUTO: 0.4 %
LYMPHOCYTES # BLD AUTO: 1.6 K/UL
LYMPHOCYTES NFR BLD: 30.9 %
MCH RBC QN AUTO: 30.4 PG
MCHC RBC AUTO-ENTMCNC: 33.6 G/DL
MCV RBC AUTO: 91 FL
MONOCYTES # BLD AUTO: 0.6 K/UL
MONOCYTES NFR BLD: 11.2 %
NEUTROPHILS # BLD AUTO: 2.8 K/UL
NEUTROPHILS NFR BLD: 55.5 %
NRBC BLD-RTO: 0 /100 WBC
PLATELET # BLD AUTO: 237 K/UL
PMV BLD AUTO: 10.4 FL
POTASSIUM SERPL-SCNC: 3.9 MMOL/L
RBC # BLD AUTO: 4.34 M/UL
SODIUM SERPL-SCNC: 139 MMOL/L
WBC # BLD AUTO: 5.02 K/UL

## 2018-02-05 PROCEDURE — 99499 UNLISTED E&M SERVICE: CPT | Mod: S$GLB,,, | Performed by: INTERNAL MEDICINE

## 2018-02-05 PROCEDURE — 85025 COMPLETE CBC W/AUTO DIFF WBC: CPT

## 2018-02-05 PROCEDURE — 80048 BASIC METABOLIC PNL TOTAL CA: CPT

## 2018-02-05 PROCEDURE — 1159F MED LIST DOCD IN RCRD: CPT | Mod: S$GLB,,, | Performed by: INTERNAL MEDICINE

## 2018-02-05 PROCEDURE — 99214 OFFICE O/P EST MOD 30 MIN: CPT | Mod: S$GLB,,, | Performed by: INTERNAL MEDICINE

## 2018-02-05 PROCEDURE — 1126F AMNT PAIN NOTED NONE PRSNT: CPT | Mod: S$GLB,,, | Performed by: INTERNAL MEDICINE

## 2018-02-05 PROCEDURE — 3008F BODY MASS INDEX DOCD: CPT | Mod: S$GLB,,, | Performed by: INTERNAL MEDICINE

## 2018-02-05 PROCEDURE — 36415 COLL VENOUS BLD VENIPUNCTURE: CPT | Mod: PO

## 2018-02-05 PROCEDURE — 99999 PR PBB SHADOW E&M-EST. PATIENT-LVL III: CPT | Mod: PBBFAC,,, | Performed by: INTERNAL MEDICINE

## 2018-02-05 RX ORDER — BUTALBITAL, ACETAMINOPHEN AND CAFFEINE 50; 325; 40 MG/1; MG/1; MG/1
1 TABLET ORAL EVERY 4 HOURS PRN
Qty: 45 TABLET | Refills: 3 | Status: SHIPPED | OUTPATIENT
Start: 2018-02-05 | End: 2018-02-16

## 2018-02-05 RX ORDER — PANTOPRAZOLE SODIUM 40 MG/1
TABLET, DELAYED RELEASE ORAL
Refills: 3 | Status: ON HOLD | COMMUNITY
Start: 2017-12-04 | End: 2018-03-14 | Stop reason: SDUPTHER

## 2018-02-05 NOTE — PROGRESS NOTES
Subjective:       Patient ID: Paul Browning is a 77 y.o. male.    Chief Complaint: Follow-up    HPI   77 y.o. Male with CAD, systolic heart failure, CAD, Carotid disease/PAD, HLD, HTN, COPD/sarcoidosis, Insomnia, Aortic atherosclerosis, MDD, senile purpura, Hx of SCC is here for 3 month f/u. Pt has been c/o recurrent posterior right sided headaches described as a dull ache. Associated symptoms of dizziness.   Review of Systems   Constitutional: Negative for activity change, appetite change, chills, diaphoresis, fatigue, fever and unexpected weight change.   HENT: Negative for postnasal drip, rhinorrhea, sinus pressure, sneezing, sore throat, trouble swallowing and voice change.    Respiratory: Negative for cough, shortness of breath and wheezing.    Cardiovascular: Negative for chest pain, palpitations and leg swelling.   Gastrointestinal: Negative for abdominal pain, blood in stool, constipation, diarrhea, nausea and vomiting.   Genitourinary: Negative for dysuria.   Musculoskeletal: Negative for arthralgias and myalgias.   Skin: Negative for rash and wound.   Allergic/Immunologic: Negative for environmental allergies and food allergies.   Neurological: Positive for headaches.   Hematological: Negative for adenopathy. Does not bruise/bleed easily.   Psychiatric/Behavioral: Positive for sleep disturbance. Negative for self-injury and suicidal ideas.       Objective:      Physical Exam   Constitutional: He is oriented to person, place, and time. He appears well-developed and well-nourished. No distress.   HENT:   Head: Normocephalic and atraumatic.   Eyes: Conjunctivae and EOM are normal. Pupils are equal, round, and reactive to light. Right eye exhibits no discharge. Left eye exhibits no discharge. No scleral icterus.   Neck: Normal range of motion. Neck supple. No JVD present.   Cardiovascular: Normal rate, regular rhythm and normal heart sounds.    No murmur heard.  Pulmonary/Chest: Effort normal and  breath sounds normal. No respiratory distress. He has no wheezes. He has no rales.   Musculoskeletal: He exhibits no edema.   Lymphadenopathy:     He has no cervical adenopathy.   Neurological: He is alert and oriented to person, place, and time.   Skin: Skin is warm and dry. No rash noted. He is not diaphoretic. No pallor.       Assessment:       1. Coronary artery disease due to lipid rich plaque    2. Chronic systolic CHF (congestive heart failure)    3. Carotid artery disease without cerebral infarction    4. Stevens's esophagus with low grade dysplasia    5. Peripheral arterial disease    6. Essential hypertension    7. Chronic obstructive pulmonary disease, unspecified COPD type    8. Insomnia, unspecified type    9. Atherosclerosis of aorta    10. MDD (major depressive disorder), recurrent episode, mild    11. Hyperlipidemia, unspecified hyperlipidemia type    12. Osteoporosis, unspecified osteoporosis type, unspecified pathological fracture presence    13. Senile purpura    14. Migraine without status migrainosus, not intractable, unspecified migraine type    15. Pulmonary sarcoidosis        Plan:    1. CAD s/p PCI and CABG- stable no CP currently, followed by Cardiology       Continue ASA/Statin/BB   2. Chronic Systolic heart failure- stable without S/S of volume overload    3. Carotid artery disease/PAD- stable, CPT   4. Stevens's esophagus, Hx of gastric ulcer- Last EGD(10/17)- low grade dysplasia and intestinal metaplasia       Continue Protonix 40 mg daily       Followed by GI   5. HTN- stable, CPT   6. COPD with sarcoidosis- stable off meds   7. Insomnia- stable on Trazodone 50 mg qHS PRN   8. Aortic atherosclerosis- stable, continue to monitor   9. MDD- stable on Zoloft 50 mg daily  10. HLD- controlled on Lipitor 40 mg daily  11. Osteoporosis- continue Fosamax   12. Senile purpura- stable, continue to monitor  13. Migraines- not well controlled on Imitrex PRN        Rx Fioricet q4 PRN, If no relief  will refer to neuro  14. Hx of SCC- followed by Derm   15. F/u in 3 months for annual exam

## 2018-02-06 ENCOUNTER — TELEPHONE (OUTPATIENT)
Dept: GASTROENTEROLOGY | Facility: CLINIC | Age: 78
End: 2018-02-06

## 2018-02-07 ENCOUNTER — TELEPHONE (OUTPATIENT)
Dept: INTERNAL MEDICINE | Facility: CLINIC | Age: 78
End: 2018-02-07

## 2018-02-07 NOTE — TELEPHONE ENCOUNTER
----- Message from Gayathri Lozano sent at 2/6/2018  1:39 PM CST -----  Contact: Paul, pts son  Paul is calling to inform Dr Forbes that the headache medication that was prescribed is $50 for 7 days.  Please send another medication.    Paul can be reached at [phone]

## 2018-02-07 NOTE — TELEPHONE ENCOUNTER
Voicemail is full .  I sent [employer] msg to advise name of medication that is expensive.  Fiorcet?  Imitrex? We can't guess on this.

## 2018-02-14 ENCOUNTER — TELEPHONE (OUTPATIENT)
Dept: ENDOSCOPY | Facility: HOSPITAL | Age: 78
End: 2018-02-14

## 2018-02-14 NOTE — TELEPHONE ENCOUNTER
Spoke with patient's son, Paul, about instructions for EGD rescheduled 3/2/18 at 0830.  Instructions emailed.

## 2018-02-16 ENCOUNTER — TELEPHONE (OUTPATIENT)
Dept: INTERNAL MEDICINE | Facility: CLINIC | Age: 78
End: 2018-02-16

## 2018-02-16 RX ORDER — OSELTAMIVIR PHOSPHATE 75 MG/1
75 CAPSULE ORAL DAILY
Qty: 5 CAPSULE | Refills: 0 | Status: SHIPPED | OUTPATIENT
Start: 2018-02-16 | End: 2018-02-21

## 2018-02-16 NOTE — TELEPHONE ENCOUNTER
----- Message from Urmilasera Yip sent at 2/16/2018  9:38 AM CST -----  Contact: patient- [phone]  Patient has family members that has flu and been in contact with and daughter would like to know if parent's should take something to keep them from getting flu. Please call to advise.

## 2018-02-16 NOTE — TELEPHONE ENCOUNTER
He has been exposed with the flu and also his wife. She was given tamiflu in the hospital can she have another one, no symptoms yet for both.     Also Fioricet is not covered by insurance. Can something else be ordered.

## 2018-03-01 ENCOUNTER — TELEPHONE (OUTPATIENT)
Dept: GASTROENTEROLOGY | Facility: CLINIC | Age: 78
End: 2018-03-01

## 2018-03-01 NOTE — TELEPHONE ENCOUNTER
----- Message from [place] Rendon sent at 3/1/2018  8:46 AM CST -----  Contact: Son- Paul- [phone]  Wil- pts son called to speak with Noa about rescheduling the pts procedure that was scheduled for tomorrow- please call Paul back at [phone]

## 2018-03-14 ENCOUNTER — SURGERY (OUTPATIENT)
Age: 78
End: 2018-03-14

## 2018-03-14 ENCOUNTER — HOSPITAL ENCOUNTER (OUTPATIENT)
Facility: HOSPITAL | Age: 78
Discharge: HOME | End: 2018-03-14
Attending: INTERNAL MEDICINE | Admitting: INTERNAL MEDICINE
Payer: MEDICARE

## 2018-03-14 ENCOUNTER — TELEPHONE (OUTPATIENT)
Dept: GASTROENTEROLOGY | Facility: CLINIC | Age: 78
End: 2018-03-14

## 2018-03-14 VITALS
RESPIRATION RATE: 18 BRPM | DIASTOLIC BLOOD PRESSURE: 64 MMHG | WEIGHT: 154 LBS | HEART RATE: 63 BPM | OXYGEN SATURATION: 100 % | SYSTOLIC BLOOD PRESSURE: 129 MMHG | BODY MASS INDEX: 24.17 KG/M2 | TEMPERATURE: 98 F | HEIGHT: 67 IN

## 2018-03-14 DIAGNOSIS — K22.719 BARRETT'S ESOPHAGUS WITH DYSPLASIA: Primary | ICD-10-CM

## 2018-03-14 PROCEDURE — 37000008 HC ANESTHESIA 1ST 15 MINUTES: Performed by: INTERNAL MEDICINE

## 2018-03-14 PROCEDURE — 37000009 HC ANESTHESIA EA ADD 15 MINS: Performed by: INTERNAL MEDICINE

## 2018-03-14 PROCEDURE — 25000003 PHARM REV CODE 250: Performed by: ANESTHESIOLOGY

## 2018-03-14 PROCEDURE — C1886 CATHETER, ABLATION: HCPCS | Performed by: INTERNAL MEDICINE

## 2018-03-14 PROCEDURE — 63600175 PHARM REV CODE 636 W HCPCS: Performed by: NURSE ANESTHETIST, CERTIFIED REGISTERED

## 2018-03-14 PROCEDURE — 88342 IMHCHEM/IMCYTCHM 1ST ANTB: CPT | Mod: 26,,, | Performed by: PATHOLOGY

## 2018-03-14 PROCEDURE — 43239 EGD BIOPSY SINGLE/MULTIPLE: CPT | Mod: 59,,, | Performed by: INTERNAL MEDICINE

## 2018-03-14 PROCEDURE — D9220A PRA ANESTHESIA: Mod: ANES,,, | Performed by: ANESTHESIOLOGY

## 2018-03-14 PROCEDURE — 43239 EGD BIOPSY SINGLE/MULTIPLE: CPT | Performed by: INTERNAL MEDICINE

## 2018-03-14 PROCEDURE — 88341 IMHCHEM/IMCYTCHM EA ADD ANTB: CPT | Mod: 26,,, | Performed by: PATHOLOGY

## 2018-03-14 PROCEDURE — 88305 TISSUE EXAM BY PATHOLOGIST: CPT | Mod: 26,,, | Performed by: PATHOLOGY

## 2018-03-14 PROCEDURE — 25000003 PHARM REV CODE 250: Performed by: NURSE ANESTHETIST, CERTIFIED REGISTERED

## 2018-03-14 PROCEDURE — 25000003 PHARM REV CODE 250: Performed by: INTERNAL MEDICINE

## 2018-03-14 PROCEDURE — 88305 TISSUE EXAM BY PATHOLOGIST: CPT | Performed by: PATHOLOGY

## 2018-03-14 PROCEDURE — 43270 EGD LESION ABLATION: CPT | Performed by: INTERNAL MEDICINE

## 2018-03-14 PROCEDURE — 88312 SPECIAL STAINS GROUP 1: CPT | Mod: 26,,, | Performed by: PATHOLOGY

## 2018-03-14 PROCEDURE — 27201012 HC FORCEPS, HOT/COLD, DISP: Performed by: INTERNAL MEDICINE

## 2018-03-14 PROCEDURE — D9220A PRA ANESTHESIA: Mod: CRNA,,, | Performed by: NURSE ANESTHETIST, CERTIFIED REGISTERED

## 2018-03-14 PROCEDURE — 43270 EGD LESION ABLATION: CPT | Mod: ,,, | Performed by: INTERNAL MEDICINE

## 2018-03-14 RX ORDER — SODIUM CHLORIDE 9 MG/ML
INJECTION, SOLUTION INTRAVENOUS CONTINUOUS
Status: DISCONTINUED | OUTPATIENT
Start: 2018-03-14 | End: 2018-03-14

## 2018-03-14 RX ORDER — SODIUM CHLORIDE 9 MG/ML
INJECTION, SOLUTION INTRAVENOUS CONTINUOUS
Status: DISCONTINUED | OUTPATIENT
Start: 2018-03-14 | End: 2018-03-14 | Stop reason: HOSPADM

## 2018-03-14 RX ORDER — ACETAMINOPHEN 500 MG
1000 TABLET ORAL ONCE
Status: COMPLETED | OUTPATIENT
Start: 2018-03-14 | End: 2018-03-14

## 2018-03-14 RX ORDER — BENZONATATE 100 MG/1
100 CAPSULE ORAL 3 TIMES DAILY PRN
Status: DISCONTINUED | OUTPATIENT
Start: 2018-03-14 | End: 2018-03-14 | Stop reason: HOSPADM

## 2018-03-14 RX ORDER — GLYCOPYRROLATE 0.2 MG/ML
INJECTION INTRAMUSCULAR; INTRAVENOUS
Status: DISCONTINUED | OUTPATIENT
Start: 2018-03-14 | End: 2018-03-14

## 2018-03-14 RX ORDER — LIDOCAINE HCL/PF 100 MG/5ML
SYRINGE (ML) INTRAVENOUS
Status: DISCONTINUED | OUTPATIENT
Start: 2018-03-14 | End: 2018-03-14

## 2018-03-14 RX ORDER — SUCRALFATE 1 G/10ML
1 SUSPENSION ORAL 2 TIMES DAILY
Qty: 600 ML | Refills: 0 | Status: SHIPPED | OUTPATIENT
Start: 2018-03-14 | End: 2018-04-13

## 2018-03-14 RX ORDER — SODIUM CHLORIDE 0.9 % (FLUSH) 0.9 %
3 SYRINGE (ML) INJECTION
Status: DISCONTINUED | OUTPATIENT
Start: 2018-03-14 | End: 2018-03-14 | Stop reason: HOSPADM

## 2018-03-14 RX ORDER — PROPOFOL 10 MG/ML
VIAL (ML) INTRAVENOUS
Status: DISCONTINUED | OUTPATIENT
Start: 2018-03-14 | End: 2018-03-14

## 2018-03-14 RX ORDER — SUCRALFATE 1 G/1
TABLET ORAL
Qty: 60 TABLET | Refills: 0 | Status: SHIPPED | OUTPATIENT
Start: 2018-03-14 | End: 2018-05-30

## 2018-03-14 RX ORDER — PANTOPRAZOLE SODIUM 40 MG/1
40 TABLET, DELAYED RELEASE ORAL 2 TIMES DAILY
Qty: 180 TABLET | Refills: 3 | Status: SHIPPED | OUTPATIENT
Start: 2018-03-14 | End: 2019-03-20 | Stop reason: SDUPTHER

## 2018-03-14 RX ADMIN — GLYCOPYRROLATE 0.2 MG: 0.2 INJECTION, SOLUTION INTRAMUSCULAR; INTRAVENOUS at 08:03

## 2018-03-14 RX ADMIN — SODIUM CHLORIDE: 0.9 INJECTION, SOLUTION INTRAVENOUS at 08:03

## 2018-03-14 RX ADMIN — BENZONATATE 100 MG: 100 CAPSULE, LIQUID FILLED ORAL at 10:03

## 2018-03-14 RX ADMIN — PROPOFOL 25 MG: 10 INJECTION, EMULSION INTRAVENOUS at 08:03

## 2018-03-14 RX ADMIN — PROPOFOL 75 MG: 10 INJECTION, EMULSION INTRAVENOUS at 08:03

## 2018-03-14 RX ADMIN — LIDOCAINE HYDROCHLORIDE 50 MG: 20 INJECTION, SOLUTION INTRAVENOUS at 08:03

## 2018-03-14 RX ADMIN — ACETAMINOPHEN 1000 MG: 500 TABLET ORAL at 09:03

## 2018-03-14 RX ADMIN — PROPOFOL 50 MG: 10 INJECTION, EMULSION INTRAVENOUS at 09:03

## 2018-03-14 NOTE — DISCHARGE SUMMARY
Discharge Summary/Instructions for after Ablation of Stevens's Esophagus using the HALO System    Patient Name: Paul Browning  Patient MRN: [id number]  Patient YOB: 1940    Wednesday, March 14, 2018  Clifford De La Torre MD    You have undergone an upper endoscopy with ablation of Stevens's esophagus.  You may experience one of the following symptoms after treatment:  chest discomfort, sore throat, difficult or pain with swallowing, and/or nausea/vomiting.  These symptoms should improve with each day.  You will be provided with several medications and specific instructions (below) to make you as comfortable as possible during this time.  Should any of your symptoms be more severe in nature or longer in duration than we have described, please contact us.  It is important to continue a strict and long-term regimen of anti-secretory medication after this treatment, such as Nexium, Prevacid or other similar drugs.    Representative discharge instructions specific to the use of this device for the sub-indication of Stevens's Esophagus.    - Maximize anti-secretory regimen (for example, esomeprazole 40 mg twice per day for 1-3 months, followed by at      least 40 mg per day thereafter).  - Antacid/lidocaine mixture per oral prn.  - Liquid acetaminophen with or without codeine per oral prn.  - Anti-emetic medication per rectum prn.  - Resume previosu diet.  - Avoid asprin or non-steroidal anti-inflammatory medications for 7 days (per physician's instructions).   - Patient instructed to contact treating physician immediately for significant chest pain, difficulty swallowing, fever,      bleeding, abdominal pain, difficulty breathing, vomiting or other warning signs provided by your physician.  - If the patient seeks care for a digestive issue from any healthcare personnel in the 6 months following the ablation      procedure, other than the treating physician, the treating physician should be consulted before any  treatment is      initiated.    Your doctor recommends these additional instructions:    You are being discharged to home.   Your physician has recommended a repeat upper endoscopy in 12 weeks for follow-up of Stevens's ablation.   We are waiting for your pathology results.    If you have any questions or problems, please call your physician.  EMERGENCY PHONE NUMBER: [phone]  LAB RESULTS: [phone]

## 2018-03-14 NOTE — DISCHARGE INSTRUCTIONS
Full Liquid Diet  A full liquid diet is a middle step between a clear liquid diet and eating solid foods. A clear liquid diet allows only liquids you can see through. A full liquid diet allows thicker, liquid foods as listed below. It can be anything that is liquid at room temperature.  The full liquid diet may be used before or after surgery. It is also used before some medical tests. It is easy to digest and leaves little food in the stomach and intestines.  A full liquid diet meets calorie and protein needs for your body with liquids only. It should not be used for more than 5 days. Your healthcare provider or dietitian may also order high-protein, high-calorie liquid supplements. These will give you extra vitamins and minerals. You may include the items below on a full liquid diet.    Adults  Adults should drink a total of 2 to 3 quarts of liquid per day. It may be easier to drink small, frequent servings rather than a few large ones. People with severe kidney or heart disease can drink only limited amounts of fluids. Check with your provider.  · Cereals and soups. Creamy, hot breakfast cereals (wheat or rice), pureed soups (including pureed meats, bland vegetables, and white potatoes), tomato puree.  · Desserts. Gelatin, whipped topping, custard-style yogurt, pudding, custard, plain ice cream, sherbet, sorbet, popsicles, fruit juice bars.  · Drinks. Coffee, tea, cream, milk, milkshakes, fruit and vegetable juices, sodas, mineral water (plain or flavored), liquid gelatin, electrolyte replacement sport drinks.  · Other items. Salt, mild-flavored seasonings, chocolate flavoring, gravy, margarine, sugar, syrup, jelly, honey, hard candy (to suck on).  Children  Follow the healthcare providers instructions. Young children who are eating solid foods may be able to have the items listed above without problems. Be sure to follow these safety measures:  · Don't give hard candies to young children. The candies may  cause choking.  · Children under 1 year old. Do not give honey. It may cause illness.  · Children under 2 years old. Ask your childs provider if you should supplement your childs diet with oral rehydration solutions, which have electrolytes. You can buy these drinks at pharmacies and grocery stores. You dont need a prescription.  · Children over 1 year old. Limit milk to 3 or 4 cups per day. Too much milk can make your child less hungry for other foods.  Date Last Reviewed: 8/1/2016 © 2000-2017 [employer]. [address] [place] [address]. All rights reserved. This information is not intended as a substitute for professional medical care. Always follow your healthcare professional's instructions.            Upper GI Endoscopy     During endoscopy, a long, flexible tube is used to view the inside of your upper GI tract.      Upper GI endoscopy allows your healthcare provider to look directly into the beginning of your gastrointestinal (GI) tract. The esophagus, stomach, and duodenum (the first part of the small intestine) make up the upper GI tract.   Before the exam  Follow these and any other instructions you are given before your endoscopy. If you dont follow the healthcare providers instructions carefully, the test may need to be canceled or done over:  · Don't eat or drink anything after midnight the night before your exam. If your exam is in the afternoon, drink only clear liquids in the morning. Don't eat or drink anything for 8 hours before the exam. In some cases, you may be able to take medicines with sips of water until 2 hours before the procedure. Speak with your healthcare provider about this.   · Bring your X-rays and any other test results you have.  · Because you will be sedated, arrange for an adult to drive you home after the exam.  · Tell your healthcare provider before the exam if you are taking any medicines or have any medical problems.  The procedure  Here is what  to expect:  · You will lie on the endoscopy table. Usually patients lie on the left side.  · You will be monitored and given oxygen.  · Your throat may be numbed with a spray or gargle. You are given medicine through an intravenous (IV) line that will help you relax and remain comfortable. You may be awake or asleep during the procedure.  · The healthcare provider will put the endoscope in your mouth and down your esophagus. It is thinner than most pieces of food that you swallow. It will not affect your breathing. The medicine helps keep you from gagging.  · Air is put into your GI tract to expand it. It can make you burp.  · During the procedure, the healthcare provider can take biopsies (tissue samples), remove abnormalities, such as polyps, or treat abnormalities through a variety of devices placed through the endoscope. You will not feel this.   · The endoscope carries images of your upper GI tract to a video screen. If you are awake, you may be able to look at the images.  · After the procedure is done, you will rest for a time. An adult must drive you home.  When to call your healthcare provider  Contact your healthcare provider if you have:  · Black or tarry stools, or blood in your stool  · Fever  · Pain in your belly that does not go away  · Nausea and vomiting, or vomiting blood   Date Last Reviewed: 7/1/2016  © 8129-1273 [employer]. [address] [place] [address], [place] [address]. All rights reserved. This information is not intended as a substitute for professional medical care. Always follow your healthcare professional's instructions.

## 2018-03-14 NOTE — TELEPHONE ENCOUNTER
Message   Received: Today   Message Contents   MD Mary Clarke MA   Caller: Unspecified (Today, 10:08 AM)             I change the Carafate to suspension   Thanks   Clifford De La Torre MD

## 2018-03-14 NOTE — ANESTHESIA PREPROCEDURE EVALUATION
03/14/2018  Paul Browning is a 77 y.o., male.    Anesthesia Evaluation    I have reviewed the Patient Summary Reports.    I have reviewed the Nursing Notes.   I have reviewed the Medications.     Review of Systems  Anesthesia Hx:  No problems with previous Anesthesia  Denies Family Hx of Anesthesia complications.   Denies Personal Hx of Anesthesia complications.   Social:  No Alcohol Use, Non-Smoker    Cardiovascular:   Hypertension Past MI CAD  CABG/stent  CHF    Pulmonary:   COPD Shortness of breath    Hepatic/GI:   PUD, GERD    Neurological:   Neuromuscular Disease, Headaches    Psych:   Psychiatric History          Physical Exam  General:  Well nourished    Airway/Jaw/Neck:  Airway Findings: Mouth Opening: Normal Tongue: Normal  General Airway Assessment: Adult  Mallampati: I  TM Distance: Normal, at least 6 cm  Jaw/Neck Findings:  Neck ROM: Normal ROM     Eyes/Ears/Nose:  Eyes/Ears/Nose Findings:    Dental:  Dental Findings: Upper Dentures   Chest/Lungs:  Chest/Lungs Findings: Normal Respiratory Rate     Heart/Vascular:  Heart Findings: Rate: Normal  Rhythm: Regular Rhythm        Mental Status:  Mental Status Findings:  Cooperative, Alert and Oriented         Anesthesia Plan  Type of Anesthesia, risks & benefits discussed:  Anesthesia Type:  general  Patient's Preference: general  Intra-op Monitoring Plan: standard ASA monitors  Intra-op Monitoring Plan Comments:   Post Op Pain Control Plan: multimodal analgesia  Post Op Pain Control Plan Comments:   Induction:   IV  Beta Blocker:  Patient is not currently on a Beta-Blocker (No further documentation required).       Informed Consent: Patient understands risks and agrees with Anesthesia plan.  Questions answered. Anesthesia consent signed with patient.  ASA Score: 3     Day of Surgery Review of History & Physical:    H&P update referred to  the surgeon.         Ready For Surgery From Anesthesia Perspective.

## 2018-03-14 NOTE — ANESTHESIA POSTPROCEDURE EVALUATION
"Anesthesia Post Evaluation    Patient: Paul Browning    Procedure(s) Performed: Procedure(s) (LRB):  ESOPHAGOGASTRODUODENOSCOPY (EGD)/RFA (N/A)    Final Anesthesia Type: general  Patient location during evaluation: PACU  Patient participation: Yes- Able to Participate  Level of consciousness: awake and alert, awake and oriented  Post-procedure vital signs: reviewed and stable  Pain management: adequate  Airway patency: patent  PONV status at discharge: No PONV  Anesthetic complications: no      Cardiovascular status: blood pressure returned to baseline, stable and hemodynamically stable  Respiratory status: unassisted, spontaneous ventilation and room air  Hydration status: euvolemic  Follow-up not needed.        Visit Vitals  BP (!) 118/58   Pulse 60   Temp 36.5 °C (97.7 °F) (Temporal)   Resp 19   Ht 5' 7" (1.702 m)   Wt 69.9 kg (154 lb)   SpO2 100%   BMI 24.12 kg/m²       Pain/Roxi Score: Pain Assessment Performed: Yes (3/14/2018  9:30 AM)  Presence of Pain: denies (3/14/2018  9:30 AM)  Pain Rating Prior to Med Admin: 10 (3/14/2018  9:51 AM)  Roxi Score: 9 (3/14/2018  9:45 AM)      "

## 2018-03-14 NOTE — H&P
History & Physical - Short Stay  Gastroenterology      SUBJECTIVE:     Procedure: EGD    Chief Complaint/Indication for Procedure: Stevens's Esophagus    History of Present Illness:  Patient is a 77 y.o. male presents with Stevens's esophagus with dysplasia here for follow up RFA.     PTA Medications   Medication Sig    alendronate (FOSAMAX) 70 MG tablet SEE NOTES    aspirin (ECOTRIN) 81 MG EC tablet TAKE 1 TABLET BY MOUTH ONCE DAILY    atorvastatin (LIPITOR) 40 MG tablet TAKE 1 TABLET BY MOUTH ONCE DAILY    fluorouracil (TOLAK) 4 % Crea Apply thin film to upper forehead and temples qhs x 3 weeks; avoid contact with eyes. D/c if ulcerated or bleeding    metoprolol tartrate (LOPRESSOR) 25 MG tablet TAKE 1 TABLET BY MOUTH TWICE DAILY    mupirocin (BACTROBAN) 2 % ointment AAA bid    omega-3 fatty acids-vitamin E (FISH OIL) 1,000 mg Cap Take 1 tablet by mouth 2 (two) times daily.    oxycodone-acetaminophen (PERCOCET) 5-325 mg per tablet Take 1 tablet by mouth every 4 to 6 hours as needed for Pain.    pantoprazole (PROTONIX) 40 MG tablet TK 1 T PO QD    potassium chloride (KLOR-CON) 10 MEQ TbSR TAKE 2 TABLETS BY MOUTH EVERY DAY    sertraline (ZOLOFT) 50 MG tablet TAKE 1 TABLET BY MOUTH EVERY DAY    sucralfate (CARAFATE) 1 gram tablet 1 gram dissolved in 10 ml of water orally four times a  Day.    sumatriptan (IMITREX) 100 MG tablet     trazodone (DESYREL) 50 MG tablet TAKE 1 TABLET BY MOUTH EVERY NIGHT AS NEEDED FOR INSOMNIA    triamcinolone acetonide 0.1% (KENALOG) 0.1 % cream AAA bid to legs prn itching. Not more than 2 weeks straight in the same location       Review of patient's allergies indicates:  No Known Allergies     Past Medical History:   Diagnosis Date    Stevens's esophagus with low grade dysplasia     BPH (benign prostatic hyperplasia)     CAD (coronary artery disease)     Diaphragmatic hernia     GERD (gastroesophageal reflux disease)     Heart attack     Hyperlipidemia      Hypertension     MDD (major depressive disorder), recurrent episode, mild 2/17/2016    Osteoporosis 7/20/2016    Peripheral arterial disease 3/18/2016    Sarcoid     Squamous cell carcinoma 09/2016, 5/2016    crown of scalp, left wrist     Past Surgical History:   Procedure Laterality Date    CARDIAC SURGERY      CORONARY ARTERY BYPASS GRAFT      x2  10/2014    UMBILICAL HERNIA REPAIR       Family History   Problem Relation Age of Onset    Arrhythmia Mother     Heart failure Brother     Colon cancer Neg Hx     Inflammatory bowel disease Neg Hx     Celiac disease Neg Hx     Melanoma Neg Hx      Social History   Substance Use Topics    Smoking status: Former Smoker     Packs/day: 2.00     Years: 20.00     Types: Cigarettes     Quit date: 2/26/1988    Smokeless tobacco: Never Used    Alcohol use No      Comment: quit drinking beer 2012       Review of Systems:  Constitutional: no fever or chills  Respiratory: no cough or shortness of breath  Cardiovascular: no chest pain or palpitations    OBJECTIVE:     Vital Signs (Most Recent)       Physical Exam:  General: well developed, well nourished  Lungs:  clear to auscultation bilaterally and normal respiratory effort  Heart: regular rate, S1, S2 normal  Abdomen: soft, non-tender non-distented; bowel sounds normal; no masses,  no organomegaly    Laboratory  CBC: No results for input(s): WBC, RBC, HGB, HCT, PLT, MCV, MCH, MCHC in the last 168 hours.  CMP: No results for input(s): GLU, CALCIUM, ALBUMIN, PROT, NA, K, CO2, CL, BUN, CREATININE, ALKPHOS, ALT, AST, BILITOT in the last 168 hours.  Coagulation: No results for input(s): LABPROT, INR, APTT in the last 168 hours.      Diagnostic Results:      ASSESSMENT/PLAN:     Stevens's with LGD    Plan: EGD    Anesthesia Plan: MAC    ASA stGstrstastdstest:st st1st The impression and plan was discussed in detail with the patient and family. All questions have been answered and the patient voices understanding of our plan at this  point. The risk of the procedure was discussed in detail which includes but not limited to bleeding, infection, perforation in some cases requiring surgery with its spectrum of complications.

## 2018-03-14 NOTE — TELEPHONE ENCOUNTER
----- Message from Emmie Mirza sent at 3/14/2018  9:52 AM CDT -----  Contact: Shayy [phone] Barrera [phone] [facility] called the medication below needs to be in a liquid. Please advise      He needs the liquid of sucralsate I gram.

## 2018-03-14 NOTE — PLAN OF CARE
Discharge instructions given and explained to patient and family with verbalization of understanding all instructions. Prescription sent to pharmacy and explained next time and doses of each medication. Patients v/s stable, denies n/v and tolerating po, rates pain level tolerable, IV removed, and family at bedside for patient discharge home.     Called Dr. De La Torre to verify diet order - said to continue pre-op diet.

## 2018-03-14 NOTE — PROVATION PATIENT INSTRUCTIONS
Discharge Summary/Instructions for after Ablation of Stevens's Esophagus   using the HALO System  Patient Name: Paul Browning  Patient MRN: [id number]  Patient YOB: 1940  Wednesday, March 14, 2018  Clifford De La Torre MD  You have undergone an upper endoscopy with ablation of Stevens's esophagus.    You may experience one of the following symptoms after treatment:  chest   discomfort, sore throat, difficult or pain with swallowing, and/or   nausea/vomiting.  These symptoms should improve with each day.  You will be   provided with several medications and specific instructions (below) to make   you as comfortable as possible during this time.  Should any of your   symptoms be more severe in nature or longer in duration than we have   described, please contact us.  It is important to continue a strict and   long-term regimen of anti-secretory medication after this treatment, such   as Nexium, Prevacid or other similar drugs.  Representative discharge instructions specific to the use of this device for   the sub-indication of Stevens's Esophagus.  - Maximize anti-secretory regimen (for example, esomeprazole 40 mg twice per   day for 1-3 months, followed by at      least 40 mg per day thereafter).  - Antacid/lidocaine mixture per oral prn.  - Liquid acetaminophen with or without codeine per oral prn.  - Anti-emetic medication per rectum prn.  - Full liquid diet for 24 hours, then advancing to soft diet for 1 week.  - Avoid asprin or non-steroidal anti-inflammatory medications for 7 days   (per physician's instructions).   - Patient instructed to contact treating physician immediately for   significant chest pain, difficulty swallowing, fever,      bleeding, abdominal pain, difficulty breathing, vomiting or other warning   signs provided by your physician.  - If the patient seeks care for a digestive issue from any healthcare   personnel in the 6 months following the ablation      procedure, other than the  treating physician, the treating physician   should be consulted before any treatment is      initiated.  Your doctor recommends these additional instructions:  You are being discharged to home.   Your physician has recommended a repeat upper endoscopy in 12 weeks for   follow-up of Stevens's ablation.   We are waiting for your pathology results.  If you have any questions or problems, please call your physician.  EMERGENCY PHONE NUMBER: [phone]  LAB RESULTS: [phone]  Clifford De La Torre MD  3/14/2018 9:19:39 AM  This report has been verified and signed electronically.

## 2018-03-14 NOTE — TRANSFER OF CARE
"Anesthesia Transfer of Care Note    Patient: Paul Browning    Procedure(s) Performed: Procedure(s) (LRB):  ESOPHAGOGASTRODUODENOSCOPY (EGD)/RFA (N/A)    Patient location: PACU    Anesthesia Type: general    Transport from OR: Transported from OR on room air with adequate spontaneous ventilation    Post pain: adequate analgesia    Post assessment: no apparent anesthetic complications    Post vital signs: stable    Level of consciousness: responds to stimulation and sedated    Nausea/Vomiting: no nausea/vomiting    Complications: none    Transfer of care protocol was followed      Last vitals:   Visit Vitals  BP (!) 154/71 (BP Location: Left arm)   Pulse (!) 54   Temp 36.6 °C (97.9 °F) (Tympanic)   Resp 16   Ht 5' 7" (1.702 m)   Wt 69.9 kg (154 lb)   SpO2 97%   BMI 24.12 kg/m²     "

## 2018-03-20 ENCOUNTER — TELEPHONE (OUTPATIENT)
Dept: GASTROENTEROLOGY | Facility: CLINIC | Age: 78
End: 2018-03-20

## 2018-03-20 NOTE — TELEPHONE ENCOUNTER
----- Message from Clifford De La Torre MD sent at 3/20/2018  4:11 PM CDT -----  Please let the patient know the esophageal ulcer biopsy showed inflammation. EGD in 12 weeks

## 2018-03-21 ENCOUNTER — TELEPHONE (OUTPATIENT)
Dept: GASTROENTEROLOGY | Facility: CLINIC | Age: 78
End: 2018-03-21

## 2018-03-21 DIAGNOSIS — K22.719 BARRETT'S ESOPHAGUS WITH DYSPLASIA: Primary | ICD-10-CM

## 2018-05-09 ENCOUNTER — TELEPHONE (OUTPATIENT)
Dept: GASTROENTEROLOGY | Facility: CLINIC | Age: 78
End: 2018-05-09

## 2018-05-09 NOTE — TELEPHONE ENCOUNTER
Spoke with patient's son. EGD scheduled for 6/15 at 8:30a. Reviewed prep instructions. Mr Browning verbalized understanding.

## 2018-05-16 ENCOUNTER — TELEPHONE (OUTPATIENT)
Dept: INTERNAL MEDICINE | Facility: CLINIC | Age: 78
End: 2018-05-16

## 2018-05-16 NOTE — TELEPHONE ENCOUNTER
Has appt scheduled 5-30-18    Can you order something that will not make him drowsy? Or what can he use OTC?

## 2018-05-16 NOTE — TELEPHONE ENCOUNTER
----- Message from Laura Marquis sent at 5/16/2018  3:08 PM CDT -----  Contact: Son Paul [phone]  He is calling because his father have a cough and he would like for you to recommend a over the counter script for him.

## 2018-05-18 ENCOUNTER — TELEPHONE (OUTPATIENT)
Dept: ENDOSCOPY | Facility: HOSPITAL | Age: 78
End: 2018-05-18

## 2018-05-25 DIAGNOSIS — R25.2 CRAMPS OF LOWER EXTREMITY: ICD-10-CM

## 2018-05-25 DIAGNOSIS — R25.2 MUSCLE CRAMPS: ICD-10-CM

## 2018-05-28 RX ORDER — POTASSIUM CHLORIDE 750 MG/1
TABLET, EXTENDED RELEASE ORAL
Qty: 90 TABLET | Refills: 0 | Status: SHIPPED | OUTPATIENT
Start: 2018-05-28 | End: 2018-05-30 | Stop reason: SDUPTHER

## 2018-05-30 ENCOUNTER — OFFICE VISIT (OUTPATIENT)
Dept: INTERNAL MEDICINE | Facility: CLINIC | Age: 78
End: 2018-05-30
Payer: MEDICARE

## 2018-05-30 VITALS
DIASTOLIC BLOOD PRESSURE: 64 MMHG | HEART RATE: 68 BPM | RESPIRATION RATE: 16 BRPM | HEIGHT: 67 IN | BODY MASS INDEX: 23.18 KG/M2 | WEIGHT: 147.69 LBS | TEMPERATURE: 98 F | SYSTOLIC BLOOD PRESSURE: 130 MMHG

## 2018-05-30 DIAGNOSIS — Z00.00 ANNUAL PHYSICAL EXAM: Primary | ICD-10-CM

## 2018-05-30 DIAGNOSIS — F33.0 MDD (MAJOR DEPRESSIVE DISORDER), RECURRENT EPISODE, MILD: Chronic | ICD-10-CM

## 2018-05-30 DIAGNOSIS — I10 ESSENTIAL HYPERTENSION: Chronic | ICD-10-CM

## 2018-05-30 DIAGNOSIS — M81.0 OSTEOPOROSIS, UNSPECIFIED OSTEOPOROSIS TYPE, UNSPECIFIED PATHOLOGICAL FRACTURE PRESENCE: ICD-10-CM

## 2018-05-30 DIAGNOSIS — G43.909 MIGRAINE WITHOUT STATUS MIGRAINOSUS, NOT INTRACTABLE, UNSPECIFIED MIGRAINE TYPE: ICD-10-CM

## 2018-05-30 DIAGNOSIS — I25.83 CORONARY ARTERY DISEASE DUE TO LIPID RICH PLAQUE: Chronic | ICD-10-CM

## 2018-05-30 DIAGNOSIS — J44.9 CHRONIC OBSTRUCTIVE PULMONARY DISEASE, UNSPECIFIED COPD TYPE: ICD-10-CM

## 2018-05-30 DIAGNOSIS — H53.8 BLURRY VISION: ICD-10-CM

## 2018-05-30 DIAGNOSIS — I77.9 CAROTID ARTERY DISEASE WITHOUT CEREBRAL INFARCTION: Chronic | ICD-10-CM

## 2018-05-30 DIAGNOSIS — G47.00 INSOMNIA, UNSPECIFIED TYPE: Chronic | ICD-10-CM

## 2018-05-30 DIAGNOSIS — I25.10 CORONARY ARTERY DISEASE DUE TO LIPID RICH PLAQUE: Chronic | ICD-10-CM

## 2018-05-30 DIAGNOSIS — I70.0 ATHEROSCLEROSIS OF AORTA: ICD-10-CM

## 2018-05-30 DIAGNOSIS — I50.22 CHRONIC SYSTOLIC CHF (CONGESTIVE HEART FAILURE): Chronic | ICD-10-CM

## 2018-05-30 DIAGNOSIS — E78.5 HYPERLIPIDEMIA, UNSPECIFIED HYPERLIPIDEMIA TYPE: Chronic | ICD-10-CM

## 2018-05-30 DIAGNOSIS — K22.710 BARRETT'S ESOPHAGUS WITH LOW GRADE DYSPLASIA: Chronic | ICD-10-CM

## 2018-05-30 DIAGNOSIS — D86.0 PULMONARY SARCOIDOSIS: ICD-10-CM

## 2018-05-30 DIAGNOSIS — D69.2 SENILE PURPURA: ICD-10-CM

## 2018-05-30 PROBLEM — K22.719 BARRETT'S ESOPHAGUS WITH DYSPLASIA: Status: RESOLVED | Noted: 2018-03-14 | Resolved: 2018-05-30

## 2018-05-30 PROCEDURE — 3078F DIAST BP <80 MM HG: CPT | Mod: CPTII,S$GLB,, | Performed by: INTERNAL MEDICINE

## 2018-05-30 PROCEDURE — 99397 PER PM REEVAL EST PAT 65+ YR: CPT | Mod: S$GLB,,, | Performed by: INTERNAL MEDICINE

## 2018-05-30 PROCEDURE — 99499 UNLISTED E&M SERVICE: CPT | Mod: S$PBB,HCNC,, | Performed by: INTERNAL MEDICINE

## 2018-05-30 PROCEDURE — 3075F SYST BP GE 130 - 139MM HG: CPT | Mod: CPTII,S$GLB,, | Performed by: INTERNAL MEDICINE

## 2018-05-30 PROCEDURE — 99999 PR PBB SHADOW E&M-EST. PATIENT-LVL III: CPT | Mod: PBBFAC,,, | Performed by: INTERNAL MEDICINE

## 2018-05-30 NOTE — PROGRESS NOTES
Subjective:       Patient ID: Paul Browning is a 77 y.o. male.    Chief Complaint: Annual Exam (no labs done yet.  complaining of double vision in left eye)    HPI   77 y.o. Male with CAD, systolic heart failure, CAD, Carotid disease/PAD, HLD, HTN, COPD/sarcoidosis, Insomnia, Aortic atherosclerosis, MDD, senile purpura, Hx of SCC is here for annual exam.     Cholesterol: (needs)  Vaccines: Influenza (2017); Tetanus (2016) ; Pneumovax (2014); Zoster (2018)  Sexual Screening: declined  Eye exam: 2016  Colonoscopy: declined  DEXA: 4/16     Mobility: normal  Falls: no     Exercise: no  Diet: low cholesterol     Past Medical History:  CAD (coronary artery disease)   Sarcoid   GERD (gastroesophageal reflux disease)   Diaphragmatic hernia   Hyperlipidemia   Hypertension   Stevens's esophagus with low grade dysplasia   BPH (benign prostatic hyperplasia)   Heart attack   MDD (major depressive disorder), recurrent epi* 2/17/2016   Past Surgical History:  UMBILICAL HERNIA REPAIR   CORONARY ARTERY BYPASS GRAFT   Comment:x2 10/2014  Social History  Marital Status:  Spouse Name:   Years of Education: Number of children: 4      Occupational History  Occupation Employer Comment   Retired       Social History Main Topics  Smoking Status: Former Smoker Packs/Day: 2.00 Years: 20   Types: Cigarettes  Quit date: 02/26/1988  Smokeless Status: Never Used   Alcohol Use: No   Comment: quit drinking beer 2012  Drug Use: No   Sexual Activity: Not Currently Partners with: Female      No Known Allergies  Mr. Browning does not currently have medications on file  Review of Systems   Constitutional: Negative for activity change, appetite change, chills, diaphoresis, fatigue, fever and unexpected weight change.   HENT: Negative for congestion, mouth sores, postnasal drip, rhinorrhea, sinus pressure, sneezing, sore throat, trouble swallowing and voice change.    Eyes: Positive for visual disturbance (blurry).  Negative for discharge and itching.   Respiratory: Negative for cough, chest tightness, shortness of breath and wheezing.    Cardiovascular: Negative for chest pain, palpitations and leg swelling.   Gastrointestinal: Negative for abdominal pain, blood in stool, constipation, diarrhea, nausea and vomiting.   Endocrine: Negative for cold intolerance and heat intolerance.   Genitourinary: Negative for difficulty urinating, dysuria, flank pain, hematuria and urgency.   Musculoskeletal: Positive for arthralgias. Negative for back pain, myalgias and neck pain.   Skin: Negative for rash and wound.   Allergic/Immunologic: Negative for environmental allergies and food allergies.   Neurological: Negative for dizziness, tremors, seizures, syncope, weakness and headaches.   Hematological: Negative for adenopathy. Does not bruise/bleed easily.   Psychiatric/Behavioral: Positive for sleep disturbance. Negative for confusion, self-injury and suicidal ideas. The patient is not nervous/anxious.        Objective:      Physical Exam   Constitutional: He is oriented to person, place, and time. He appears well-developed and well-nourished. No distress.   HENT:   Head: Normocephalic and atraumatic.   Right Ear: External ear normal.   Left Ear: External ear normal.   Nose: Nose normal.   Mouth/Throat: Oropharynx is clear and moist. No oropharyngeal exudate.   Eyes: Conjunctivae and EOM are normal. Pupils are equal, round, and reactive to light. Right eye exhibits no discharge. Left eye exhibits no discharge. No scleral icterus.   Neck: Normal range of motion. Neck supple. No JVD present. No thyromegaly present.   Cardiovascular: Normal rate, regular rhythm, normal heart sounds and intact distal pulses.    No murmur heard.  Pulmonary/Chest: Effort normal and breath sounds normal. No respiratory distress. He has no wheezes. He has no rales.   Abdominal: Soft. Bowel sounds are normal. He exhibits no distension. There is no tenderness. There  is no guarding.   Musculoskeletal: He exhibits no edema.   Lymphadenopathy:     He has no cervical adenopathy.   Neurological: He is alert and oriented to person, place, and time. No cranial nerve deficit. Coordination normal.   Skin: Skin is warm and dry. No rash noted. He is not diaphoretic. No pallor.   Psychiatric: He has a normal mood and affect. Judgment normal.       Assessment:       1. Annual physical exam    2. Coronary artery disease due to lipid rich plaque    3. Chronic systolic CHF (congestive heart failure)    4. Carotid artery disease without cerebral infarction    5. Stevens's esophagus with low grade dysplasia    6. Essential hypertension    7. Chronic obstructive pulmonary disease, unspecified COPD type    8. Pulmonary sarcoidosis    9. Insomnia, unspecified type    10. Atherosclerosis of aorta    11. MDD (major depressive disorder), recurrent episode, mild    12. Migraine without status migrainosus, not intractable, unspecified migraine type    13. Hyperlipidemia, unspecified hyperlipidemia type    14. Senile purpura    15. Osteoporosis, unspecified osteoporosis type, unspecified pathological fracture presence    16. Blurry vision        Plan:    Complete blood work ordered   Vaccines: Influenza (2017); Tetanus (2016) ; Pneumovax (2014); Zoster (2018)   Sexual Screening: declined   Eye exam: 2016   Colonoscopy: declined   DEXA: 4/16      1. CAD s/p PCI and CABG- stable no CP currently, followed by Cardiology       Continue ASA/Statin/BB   2. Chronic Systolic heart failure- stable without S/S of volume overload    3. Carotid artery disease/PAD- stable, CPT   4. Stevens's esophagus, Hx of gastric ulcer- Last EGD(3/18)- low grade dysplasia and intestinal metaplasia       Continue Protonix 40 mg daily       Followed by GI   5. HTN- stable, CPT   6. COPD with sarcoidosis- stable off meds   7. Insomnia- stable on Trazodone 50 mg qHS PRN   8. Aortic atherosclerosis- stable, continue to monitor   9. MDD-  stable on Zoloft 50 mg daily  10. HLD- controlled on Lipitor 40 mg daily  11. Osteoporosis- continue Fosamax, Calcium/Vitamin D  12. Senile purpura- stable, continue to monitor  13. Migraines- not well controlled on Imitrex or Fioricet q4 PRN  14. Hx of SCC- followed by Derm   15. F/u in 3 months

## 2018-06-10 ENCOUNTER — PATIENT MESSAGE (OUTPATIENT)
Dept: ENDOSCOPY | Facility: HOSPITAL | Age: 78
End: 2018-06-10

## 2018-06-12 ENCOUNTER — TELEPHONE (OUTPATIENT)
Dept: GASTROENTEROLOGY | Facility: CLINIC | Age: 78
End: 2018-06-12

## 2018-06-12 NOTE — TELEPHONE ENCOUNTER
Attempted to contact patient to reschedule procedure. Unable to leave message on home phone. Voicemail not set up on cell.

## 2018-06-13 ENCOUNTER — OFFICE VISIT (OUTPATIENT)
Dept: OPTOMETRY | Facility: CLINIC | Age: 78
End: 2018-06-13
Payer: MEDICARE

## 2018-06-13 ENCOUNTER — TELEPHONE (OUTPATIENT)
Dept: GASTROENTEROLOGY | Facility: CLINIC | Age: 78
End: 2018-06-13

## 2018-06-13 DIAGNOSIS — I10 ESSENTIAL HYPERTENSION: ICD-10-CM

## 2018-06-13 DIAGNOSIS — H04.123 DRY EYE SYNDROME OF BILATERAL LACRIMAL GLANDS: ICD-10-CM

## 2018-06-13 DIAGNOSIS — H25.13 NUCLEAR AGE-RELATED CATARACT, BOTH EYES: Primary | ICD-10-CM

## 2018-06-13 PROCEDURE — 92014 COMPRE OPH EXAM EST PT 1/>: CPT | Mod: S$GLB,,, | Performed by: OPTOMETRIST

## 2018-06-13 PROCEDURE — 99999 PR PBB SHADOW E&M-EST. PATIENT-LVL II: CPT | Mod: PBBFAC,,, | Performed by: OPTOMETRIST

## 2018-06-13 NOTE — PROGRESS NOTES
HPI     Pt says that for about 1 year his left eye has seen double. He says he   only notices it when he covers his OD. He no longer drives but he would   like to see better. Pt would also like a hypertensive eye exam.  (-)pain, irritation  (-)flahes, floaters  No gtts    Last edited by Tari Thao, OD on 6/13/2018  1:50 PM. (History)            Assessment /Plan     For exam results, see Encounter Report.    Nuclear age-related cataract, both eyes    Dry eye syndrome of bilateral lacrimal glands    Essential hypertension      1. Educated pt of today's findings. Cataracts appear visually significant OU. Discussed cat sx options with patient and she would like to have them removed. Refer to ophthal for cat eval.   2. Educated pt on today's findings and recommended the use of AT's OU tid/prn to help with dry eyes.  3. (-)CWS, hemes, papilledema. Continue compliance with PCP and meds. Recheck in 1 yr.

## 2018-06-13 NOTE — LETTER
June 13, 2018      Grey Forbes,   [address]  [place] [address]           [place] [facility]  [address] [address]  Phone: [phone]  Fax: [phone]          Patient: Paul Browning   MR Number: [id number]   YOB: 1940   Date of Visit: 6/13/2018       Dear Dr. Grey Forbes:    Thank you for referring Paul Browning to me for evaluation. Attached you will find relevant portions of my assessment and plan of care.    If you have questions, please do not hesitate to call me. I look forward to following Paul Browning along with you.    Sincerely,    Tari Thao, OD    Enclosure  CC:  No Recipients    If you would like to receive this communication electronically, please contact [email or web address] or [phone] to request more information on [facility] access.    For providers and/or their staff who would like to refer a patient to [facility], please contact us through our one-stop-shop provider referral line, [facility], at [phone].    If you feel you have received this communication in error or would no longer like to receive these types of communications, please e-mail [email or web address]

## 2018-06-22 ENCOUNTER — TELEPHONE (OUTPATIENT)
Dept: OPHTHALMOLOGY | Facility: CLINIC | Age: 78
End: 2018-06-22

## 2018-06-22 ENCOUNTER — TELEPHONE (OUTPATIENT)
Dept: GASTROENTEROLOGY | Facility: CLINIC | Age: 78
End: 2018-06-22

## 2018-06-22 NOTE — TELEPHONE ENCOUNTER
Spoke with patients jaciel Latham, 's assistant is out of the office today and will call him back on Monday.

## 2018-06-22 NOTE — TELEPHONE ENCOUNTER
----- Message from Lacy Boone sent at 6/22/2018 11:08 AM CDT -----  Needs Advice    Reason for call: reschedule procedure       Communication Preference: pt's son Paul Olivo [phone]  Additional Information:

## 2018-06-25 ENCOUNTER — TELEPHONE (OUTPATIENT)
Dept: GASTROENTEROLOGY | Facility: CLINIC | Age: 78
End: 2018-06-25

## 2018-06-25 NOTE — TELEPHONE ENCOUNTER
Patient's son called. He is problems with his vision and is needs to reschedule his EGD. He is likely going to have eye surgery, so Paul stated that he will call back to reschedule once he has a clear idea of when the surgery is going to be scheduled.

## 2018-06-27 ENCOUNTER — OFFICE VISIT (OUTPATIENT)
Dept: OPHTHALMOLOGY | Facility: CLINIC | Age: 78
End: 2018-06-27
Payer: MEDICARE

## 2018-06-27 DIAGNOSIS — H04.123 DRY EYE SYNDROME OF BOTH EYES: ICD-10-CM

## 2018-06-27 DIAGNOSIS — H52.7 REFRACTIVE ERROR: ICD-10-CM

## 2018-06-27 DIAGNOSIS — H25.13 NUCLEAR SCLEROSIS OF BOTH EYES: Primary | ICD-10-CM

## 2018-06-27 DIAGNOSIS — I10 ESSENTIAL HYPERTENSION: ICD-10-CM

## 2018-06-27 PROCEDURE — 99999 PR PBB SHADOW E&M-EST. PATIENT-LVL II: CPT | Mod: PBBFAC,,, | Performed by: OPHTHALMOLOGY

## 2018-06-27 PROCEDURE — 92014 COMPRE OPH EXAM EST PT 1/>: CPT | Mod: S$GLB,,, | Performed by: OPHTHALMOLOGY

## 2018-06-27 PROCEDURE — 99499 UNLISTED E&M SERVICE: CPT | Mod: S$PBB,HCNC,, | Performed by: OPHTHALMOLOGY

## 2018-06-27 PROCEDURE — 92136 OPHTHALMIC BIOMETRY: CPT | Mod: RT,S$GLB,, | Performed by: OPHTHALMOLOGY

## 2018-06-27 RX ORDER — PANTOPRAZOLE SODIUM 40 MG/1
40 TABLET, DELAYED RELEASE ORAL 2 TIMES DAILY
COMMUNITY
Start: 2018-06-22 | End: 2021-04-28

## 2018-06-27 RX ORDER — TRIAMCINOLONE ACETONIDE 1 MG/G
CREAM TOPICAL DAILY PRN
COMMUNITY
End: 2019-08-14

## 2018-06-27 RX ORDER — NEPAFENAC 3 MG/ML
1 SUSPENSION/ DROPS OPHTHALMIC DAILY
Qty: 3 ML | Refills: 1 | Status: SHIPPED | OUTPATIENT
Start: 2018-07-14 | End: 2018-08-13

## 2018-06-27 RX ORDER — DIFLUPREDNATE OPHTHALMIC 0.5 MG/ML
1 EMULSION OPHTHALMIC 4 TIMES DAILY
Qty: 5 ML | Refills: 1 | Status: SHIPPED | OUTPATIENT
Start: 2018-07-17 | End: 2018-08-16

## 2018-06-27 RX ORDER — OFLOXACIN 3 MG/ML
1 SOLUTION/ DROPS OPHTHALMIC 4 TIMES DAILY
Qty: 5 ML | Refills: 1 | Status: SHIPPED | OUTPATIENT
Start: 2018-07-14 | End: 2018-07-24

## 2018-06-27 NOTE — PROGRESS NOTES
Subjective:       Patient ID: Paul Browning is a 77 y.o. male.    Chief Complaint: Cataract (Cataract Eval per Dr. Thao. )    HPI     Cataract    Additional comments: Cataract Eval per Dr. Thao.            Comments   77 y.o. Male is here for Cataract Eval per Dr. Thao. Occasional eye   pain left eye. Blurred vision is worst left eye more than right eye.   Diplopia vision at distance side by side w/o correction. When Paul close   one eye diplopia goes away. Sometimes left eye sparks. No itching or   burning. Denies f/f.     Meds: No gtt        Last edited by EMMANUEL Cook on 6/27/2018  3:29 PM. (History)             Assessment:       1. Nuclear sclerosis of both eyes    2. Dry eye syndrome of both eyes    3. Essential hypertension    4. Refractive error        Plan:       Visually significant cataract OU -Pt. Wants Sx.     JONNA-Stable OU.  HTN-No retinopathy OU.  RE        Cataract Surgery Consent: Patient with a visually significant cataract with difficulties of ADLs, reading, driving, night vision, glare (any and all).  Discussed with Patient/Family/Caregiver: options, risks and benefits, expectations of cataract surgery, utilized an eye model with questions and answers to facilitate discussion.  Discussed lens options and patient understands that glasses may be required for optimal vision for distance and/or near vision after cataract surgery.  The Patient/Family/Caregiver  voice good understanding and patient wishes to proceed with surgery.  The patient will likely benefit from surgery and patient signed consent for Right Eye.  CE OD 7/17/18 SN60WF 19.5,        OS 7/31/18 SN60WF 19.5.  AT's.  Control HTN.

## 2018-07-05 ENCOUNTER — TELEPHONE (OUTPATIENT)
Dept: OPHTHALMOLOGY | Facility: CLINIC | Age: 78
End: 2018-07-05

## 2018-07-05 DIAGNOSIS — H25.11 NS (NUCLEAR SCLEROSIS), RIGHT: Primary | ICD-10-CM

## 2018-07-10 ENCOUNTER — TELEPHONE (OUTPATIENT)
Dept: OPHTHALMOLOGY | Facility: CLINIC | Age: 78
End: 2018-07-10

## 2018-07-10 DIAGNOSIS — H25.12 NUCLEAR SCLEROSIS, LEFT: Primary | ICD-10-CM

## 2018-07-11 ENCOUNTER — TELEPHONE (OUTPATIENT)
Dept: OPHTHALMOLOGY | Facility: CLINIC | Age: 78
End: 2018-07-11

## 2018-07-11 DIAGNOSIS — R25.2 MUSCLE CRAMPS: ICD-10-CM

## 2018-07-11 DIAGNOSIS — R25.2 CRAMPS OF LOWER EXTREMITY: ICD-10-CM

## 2018-07-11 RX ORDER — POTASSIUM CHLORIDE 750 MG/1
TABLET, EXTENDED RELEASE ORAL
Qty: 90 TABLET | Refills: 0 | Status: SHIPPED | OUTPATIENT
Start: 2018-07-11 | End: 2018-07-16

## 2018-07-11 NOTE — TELEPHONE ENCOUNTER
I attempted to call pt to give cataract sx arrival time on 7/17/18 for 8:30 a.m but mailbox has not not been set up for cell and home phone.

## 2018-07-12 ENCOUNTER — PES CALL (OUTPATIENT)
Dept: ADMINISTRATIVE | Facility: CLINIC | Age: 78
End: 2018-07-12

## 2018-07-13 ENCOUNTER — TELEPHONE (OUTPATIENT)
Dept: OPHTHALMOLOGY | Facility: CLINIC | Age: 78
End: 2018-07-13

## 2018-07-13 NOTE — TELEPHONE ENCOUNTER
----- Message from Ruthie Morrow sent at 7/13/2018  1:01 PM CDT -----  Contact: Paul Salazar  Needs Advice    Reason for call:Mr. Browning would like to know the next date that is available for his dad to have surgery because his mom is current in the hospital. He would like to reschedule the surgery that is scheduled for July 17.      Communication Preference:[phone]  Additional Information:

## 2018-07-16 NOTE — PRE-PROCEDURE INSTRUCTIONS
Preop instructions: NPO after midnight, shower instructions, directions, leave all valuables at home, medication instructions for PM prior & am of procedure explained. Patient stated an understanding.     Patient denies any side effects or issues with anesthesia or sedation.     Informed patient that instructions apply to second eye surgery to be  scheduled. Stated an understanding.

## 2018-07-17 ENCOUNTER — ANESTHESIA EVENT (OUTPATIENT)
Dept: SURGERY | Facility: HOSPITAL | Age: 78
End: 2018-07-17
Payer: MEDICARE

## 2018-07-17 ENCOUNTER — SURGERY (OUTPATIENT)
Age: 78
End: 2018-07-17

## 2018-07-17 ENCOUNTER — HOSPITAL ENCOUNTER (OUTPATIENT)
Facility: HOSPITAL | Age: 78
Discharge: HOME | End: 2018-07-17
Attending: OPHTHALMOLOGY | Admitting: OPHTHALMOLOGY
Payer: MEDICARE

## 2018-07-17 ENCOUNTER — ANESTHESIA (OUTPATIENT)
Dept: SURGERY | Facility: HOSPITAL | Age: 78
End: 2018-07-17
Payer: MEDICARE

## 2018-07-17 ENCOUNTER — TELEPHONE (OUTPATIENT)
Dept: GASTROENTEROLOGY | Facility: CLINIC | Age: 78
End: 2018-07-17

## 2018-07-17 VITALS
TEMPERATURE: 98 F | WEIGHT: 153 LBS | OXYGEN SATURATION: 98 % | HEIGHT: 67 IN | DIASTOLIC BLOOD PRESSURE: 72 MMHG | BODY MASS INDEX: 24.01 KG/M2 | HEART RATE: 63 BPM | RESPIRATION RATE: 16 BRPM | SYSTOLIC BLOOD PRESSURE: 145 MMHG

## 2018-07-17 DIAGNOSIS — H25.10 SENILE NUCLEAR SCLEROSIS: ICD-10-CM

## 2018-07-17 PROCEDURE — 63600175 PHARM REV CODE 636 W HCPCS: Performed by: OPHTHALMOLOGY

## 2018-07-17 PROCEDURE — 25000003 PHARM REV CODE 250

## 2018-07-17 PROCEDURE — 37000008 HC ANESTHESIA 1ST 15 MINUTES: Performed by: OPHTHALMOLOGY

## 2018-07-17 PROCEDURE — D9220A PRA ANESTHESIA: Mod: ANES,,, | Performed by: ANESTHESIOLOGY

## 2018-07-17 PROCEDURE — 36000706: Performed by: OPHTHALMOLOGY

## 2018-07-17 PROCEDURE — 37000009 HC ANESTHESIA EA ADD 15 MINS: Performed by: OPHTHALMOLOGY

## 2018-07-17 PROCEDURE — 71000015 HC POSTOP RECOV 1ST HR: Performed by: OPHTHALMOLOGY

## 2018-07-17 PROCEDURE — D9220A PRA ANESTHESIA: Mod: CRNA,,, | Performed by: NURSE ANESTHETIST, CERTIFIED REGISTERED

## 2018-07-17 PROCEDURE — 63600175 PHARM REV CODE 636 W HCPCS: Performed by: NURSE ANESTHETIST, CERTIFIED REGISTERED

## 2018-07-17 PROCEDURE — 36000707: Performed by: OPHTHALMOLOGY

## 2018-07-17 PROCEDURE — V2632 POST CHMBR INTRAOCULAR LENS: HCPCS | Performed by: OPHTHALMOLOGY

## 2018-07-17 PROCEDURE — 66984 XCAPSL CTRC RMVL W/O ECP: CPT | Mod: RT,,, | Performed by: OPHTHALMOLOGY

## 2018-07-17 PROCEDURE — 25000003 PHARM REV CODE 250: Performed by: OPHTHALMOLOGY

## 2018-07-17 PROCEDURE — 71000044 HC DOSC ROUTINE RECOVERY FIRST HOUR: Performed by: OPHTHALMOLOGY

## 2018-07-17 DEVICE — LENS 19.5: Type: IMPLANTABLE DEVICE | Site: EYE | Status: FUNCTIONAL

## 2018-07-17 RX ORDER — TROPICAMIDE 10 MG/ML
SOLUTION/ DROPS OPHTHALMIC
Status: COMPLETED
Start: 2018-07-17 | End: 2018-07-17

## 2018-07-17 RX ORDER — MIDAZOLAM HYDROCHLORIDE 1 MG/ML
INJECTION, SOLUTION INTRAMUSCULAR; INTRAVENOUS
Status: DISCONTINUED | OUTPATIENT
Start: 2018-07-17 | End: 2018-07-17

## 2018-07-17 RX ORDER — PHENYLEPHRINE HYDROCHLORIDE 25 MG/ML
SOLUTION/ DROPS OPHTHALMIC
Status: COMPLETED
Start: 2018-07-17 | End: 2018-07-17

## 2018-07-17 RX ORDER — TROPICAMIDE 10 MG/ML
1 SOLUTION/ DROPS OPHTHALMIC
Status: DISCONTINUED | OUTPATIENT
Start: 2018-07-17 | End: 2018-07-17 | Stop reason: HOSPADM

## 2018-07-17 RX ORDER — SODIUM CHLORIDE 0.9 % (FLUSH) 0.9 %
3 SYRINGE (ML) INJECTION
Status: DISCONTINUED | OUTPATIENT
Start: 2018-07-17 | End: 2018-07-17 | Stop reason: HOSPADM

## 2018-07-17 RX ORDER — LIDOCAINE HYDROCHLORIDE 40 MG/ML
INJECTION, SOLUTION RETROBULBAR
Status: DISCONTINUED | OUTPATIENT
Start: 2018-07-17 | End: 2018-07-17 | Stop reason: HOSPADM

## 2018-07-17 RX ORDER — LIDOCAINE HYDROCHLORIDE 40 MG/ML
INJECTION, SOLUTION RETROBULBAR
Status: DISCONTINUED
Start: 2018-07-17 | End: 2018-07-17 | Stop reason: HOSPADM

## 2018-07-17 RX ORDER — SODIUM CHLORIDE 9 MG/ML
INJECTION, SOLUTION INTRAVENOUS CONTINUOUS
Status: DISCONTINUED | OUTPATIENT
Start: 2018-07-17 | End: 2018-07-17 | Stop reason: HOSPADM

## 2018-07-17 RX ORDER — EPINEPHRINE 1 MG/ML
INJECTION, SOLUTION INTRACARDIAC; INTRAMUSCULAR; INTRAVENOUS; SUBCUTANEOUS
Status: DISCONTINUED
Start: 2018-07-17 | End: 2018-07-17 | Stop reason: HOSPADM

## 2018-07-17 RX ORDER — EPINEPHRINE 1 MG/ML
INJECTION, SOLUTION INTRACARDIAC; INTRAMUSCULAR; INTRAVENOUS; SUBCUTANEOUS
Status: DISCONTINUED | OUTPATIENT
Start: 2018-07-17 | End: 2018-07-17 | Stop reason: HOSPADM

## 2018-07-17 RX ORDER — CYCLOPENTOLATE HYDROCHLORIDE 10 MG/ML
SOLUTION/ DROPS OPHTHALMIC
Status: COMPLETED
Start: 2018-07-17 | End: 2018-07-17

## 2018-07-17 RX ORDER — PREDNISOLONE ACETATE 10 MG/ML
SUSPENSION/ DROPS OPHTHALMIC
Status: DISCONTINUED
Start: 2018-07-17 | End: 2018-07-17 | Stop reason: HOSPADM

## 2018-07-17 RX ORDER — PREDNISOLONE ACETATE 10 MG/ML
SUSPENSION/ DROPS OPHTHALMIC
Status: DISCONTINUED | OUTPATIENT
Start: 2018-07-17 | End: 2018-07-17 | Stop reason: HOSPADM

## 2018-07-17 RX ORDER — PROPARACAINE HYDROCHLORIDE 5 MG/ML
SOLUTION/ DROPS OPHTHALMIC
Status: COMPLETED
Start: 2018-07-17 | End: 2018-07-17

## 2018-07-17 RX ORDER — OFLOXACIN 3 MG/ML
1 SOLUTION/ DROPS OPHTHALMIC
Status: COMPLETED | OUTPATIENT
Start: 2018-07-17 | End: 2018-07-17

## 2018-07-17 RX ORDER — CYCLOPENTOLATE HYDROCHLORIDE 10 MG/ML
1 SOLUTION/ DROPS OPHTHALMIC
Status: COMPLETED | OUTPATIENT
Start: 2018-07-17 | End: 2018-07-17

## 2018-07-17 RX ORDER — FENTANYL CITRATE 50 UG/ML
INJECTION, SOLUTION INTRAMUSCULAR; INTRAVENOUS
Status: DISCONTINUED | OUTPATIENT
Start: 2018-07-17 | End: 2018-07-17

## 2018-07-17 RX ORDER — OFLOXACIN 3 MG/ML
SOLUTION/ DROPS OPHTHALMIC
Status: DISCONTINUED | OUTPATIENT
Start: 2018-07-17 | End: 2018-07-17 | Stop reason: HOSPADM

## 2018-07-17 RX ORDER — ACETAMINOPHEN 325 MG/1
650 TABLET ORAL EVERY 4 HOURS PRN
Status: DISCONTINUED | OUTPATIENT
Start: 2018-07-17 | End: 2018-07-17 | Stop reason: HOSPADM

## 2018-07-17 RX ORDER — OFLOXACIN 3 MG/ML
SOLUTION/ DROPS OPHTHALMIC
Status: COMPLETED
Start: 2018-07-17 | End: 2018-07-17

## 2018-07-17 RX ORDER — PROPARACAINE HYDROCHLORIDE 5 MG/ML
1 SOLUTION/ DROPS OPHTHALMIC
Status: DISCONTINUED | OUTPATIENT
Start: 2018-07-17 | End: 2018-07-17 | Stop reason: HOSPADM

## 2018-07-17 RX ORDER — HYDROCODONE BITARTRATE AND ACETAMINOPHEN 5; 325 MG/1; MG/1
1 TABLET ORAL EVERY 4 HOURS PRN
Status: DISCONTINUED | OUTPATIENT
Start: 2018-07-17 | End: 2018-07-17 | Stop reason: HOSPADM

## 2018-07-17 RX ORDER — PHENYLEPHRINE HYDROCHLORIDE 25 MG/ML
1 SOLUTION/ DROPS OPHTHALMIC
Status: DISCONTINUED | OUTPATIENT
Start: 2018-07-17 | End: 2018-07-17 | Stop reason: HOSPADM

## 2018-07-17 RX ADMIN — EPINEPHRINE 0.3 MG: 1 INJECTION, SOLUTION INTRAMUSCULAR; SUBCUTANEOUS at 10:07

## 2018-07-17 RX ADMIN — OFLOXACIN 1 DROP: 3 SOLUTION/ DROPS OPHTHALMIC at 09:07

## 2018-07-17 RX ADMIN — PROPARACAINE HYDROCHLORIDE 1 DROP: 5 SOLUTION/ DROPS OPHTHALMIC at 09:07

## 2018-07-17 RX ADMIN — SODIUM CHLORIDE 1000 ML: 0.9 INJECTION, SOLUTION INTRAVENOUS at 09:07

## 2018-07-17 RX ADMIN — CYCLOPENTOLATE HYDROCHLORIDE 1 DROP: 10 SOLUTION/ DROPS OPHTHALMIC at 09:07

## 2018-07-17 RX ADMIN — SODIUM CHONDROITIN SULFATE / SODIUM HYALURONATE 1.05 ML: 0.55-0.5 INJECTION INTRAOCULAR at 10:07

## 2018-07-17 RX ADMIN — PREDNISOLONE ACETATE 2 DROP: 10 SUSPENSION/ DROPS OPHTHALMIC at 10:07

## 2018-07-17 RX ADMIN — TROPICAMIDE 1 DROP: 10 SOLUTION/ DROPS OPHTHALMIC at 09:07

## 2018-07-17 RX ADMIN — OFLOXACIN 2 DROP: 3 SOLUTION/ DROPS OPHTHALMIC at 10:07

## 2018-07-17 RX ADMIN — FENTANYL CITRATE 25 MCG: 50 INJECTION, SOLUTION INTRAMUSCULAR; INTRAVENOUS at 11:07

## 2018-07-17 RX ADMIN — ACETAMINOPHEN 650 MG: 325 TABLET, FILM COATED ORAL at 11:07

## 2018-07-17 RX ADMIN — PHENYLEPHRINE HYDROCHLORIDE 1 DROP: 25 SOLUTION/ DROPS OPHTHALMIC at 09:07

## 2018-07-17 RX ADMIN — MIDAZOLAM HYDROCHLORIDE 2 MG: 1 INJECTION, SOLUTION INTRAMUSCULAR; INTRAVENOUS at 11:07

## 2018-07-17 RX ADMIN — LIDOCAINE HYDROCHLORIDE 5 ML: 40 INJECTION, SOLUTION RETROBULBAR; TOPICAL at 10:07

## 2018-07-17 RX ADMIN — OFLOXACIN 1 DROP: 3 SOLUTION OPHTHALMIC at 09:07

## 2018-07-17 NOTE — ANESTHESIA PREPROCEDURE EVALUATION
07/17/2018  Paul Browning is a 77 y.o., male.    Pre-op Assessment    I have reviewed the Patient Summary Reports.     I have reviewed the Nursing Notes.   I have reviewed the Medications.     Review of Systems  Anesthesia Hx:  No problems with previous Anesthesia  Denies Family Hx of Anesthesia complications.   Denies Personal Hx of Anesthesia complications.   Social:  No Alcohol Use, Non-Smoker    Cardiovascular:   Hypertension Past MI CAD  CABG/stent   Denies Angina. CHF        Pulmonary:   COPD Shortness of breath sarcoidosis   Hepatic/GI:   PUD, GERD    Neurological:   Neuromuscular Disease, Headaches    Endocrine:  Endocrine Normal    Psych:   Psychiatric History          Physical Exam  General:  Well nourished    Airway/Jaw/Neck:  Airway Findings: Mouth Opening: Normal Tongue: Normal  General Airway Assessment: Adult  Mallampati: I  TM Distance: Normal, at least 6 cm  Jaw/Neck Findings:  Neck ROM: Normal ROM     Eyes/Ears/Nose:  Eyes/Ears/Nose Findings:    Dental:  Dental Findings: Upper Dentures   Chest/Lungs:  Chest/Lungs Findings: Normal Respiratory Rate     Heart/Vascular:  Heart Findings: Rate: Normal  Rhythm: Regular Rhythm        Mental Status:  Mental Status Findings:  Cooperative, Alert and Oriented         Anesthesia Plan  Type of Anesthesia, risks & benefits discussed:  Anesthesia Type:  MAC  Patient's Preference:   Intra-op Monitoring Plan: standard ASA monitors  Intra-op Monitoring Plan Comments:   Post Op Pain Control Plan: IV/PO Opioids PRN  Post Op Pain Control Plan Comments:   Induction:   IV  Beta Blocker:  Patient is not currently on a Beta-Blocker (No further documentation required).       Informed Consent: Patient understands risks and agrees with Anesthesia plan.  Questions answered. Anesthesia consent signed with patient.  ASA Score: 3     Day of Surgery Review of  History & Physical:    H&P update referred to the surgeon.         Ready For Surgery From Anesthesia Perspective.

## 2018-07-17 NOTE — BRIEF OP NOTE
Operative Note     SUMMARY     Surgery Date: 7/17/2018    Surgeon(s) and Role:      León Talamantes MD - Primary    Pre-op Diagnosis:  Nuclear sclerosis     Post-op/ Diagnosis:  Same    Final Diagnosis: Cataract    Procedure(s) (LRB):  PHACOEMULSIFICATION-ASPIRATION-CATARACT   INSERTION-INTRAOCULAR LENS (IOL)     Anesthesia: Monitored Anesthesia Care    Findings/Key Components:  Cataract    Outcome: Tolerated procedure well    Estimated Blood Loss: None         Specimens     None          Follow-up:  Tomorrow in clinic      Discharge Note      SUMMARY     Admit Date: 7/17/2018    Attending Physician: León Talamantes MD    Discharge Physician: León Talamantes MD    Discharge Date: 7/17/2018    Final Diagnosis: Cataract    Outcome: Tolerated procedure well    Disposition: Discharge to Home.    Medications:       Paul Browning   [facility] Medication Instructions LISSETTE:[id number]    Printed on:07/17/18 3066   Medication Information                      aspirin (ECOTRIN) 81 MG EC tablet  TAKE 1 TABLET BY MOUTH ONCE DAILY             atorvastatin (LIPITOR) 40 MG tablet  TAKE 1 TABLET BY MOUTH ONCE DAILY             difluprednate (DUREZOL) 0.05 % Drop ophthalmic solution  Place 1 drop into the right eye 4 (four) times daily. For 30 days             ILEVRO 0.3 % DrpS  Place 1 drop into both eyes once daily. For 30 days             metoprolol tartrate (LOPRESSOR) 25 MG tablet  TAKE 1 TABLET BY MOUTH TWICE DAILY             ofloxacin (OCUFLOX) 0.3 % ophthalmic solution  Place 1 drop into the right eye 4 (four) times daily. for 10 days             omega-3 fatty acids-vitamin E (FISH OIL) 1,000 mg Cap  Take 1 tablet by mouth 2 (two) times daily.             pantoprazole (PROTONIX) 40 MG tablet  once daily.              potassium chloride (KLOR-CON) 10 MEQ TbSR  TAKE 2 TABLETS BY MOUTH EVERY DAY             sertraline (ZOLOFT) 50 MG tablet  TAKE 1 TABLET BY MOUTH EVERY DAY             triamcinolone  acetonide 0.1% (KENALOG) 0.1 % cream  Apply topically daily as needed.                   Patient Discharge Instructions:     Keep Freitas Shield over operated eye when not using drops.     DIET:  Regular    Activity: No heavy lifting or bending X 1 week.    Follow-up:  Tomorrow in clinic

## 2018-07-17 NOTE — INTERVAL H&P NOTE
The patient has been examined and the H&P has been reviewed:        Anesthesia/Surgery risks, benefits and alternative options discussed and understood by patient/family.          Active Hospital Problems    Diagnosis  POA    Senile nuclear sclerosis [H25.10]  Yes      Resolved Hospital Problems    Diagnosis Date Resolved POA   No resolved problems to display.     
fine crackles/wheezes

## 2018-07-17 NOTE — TRANSFER OF CARE
"Anesthesia Transfer of Care Note    Patient: Paul Browning    Procedure(s) Performed: Procedure(s) (LRB):  PHACOEMULSIFICATION, CATARACT (Right)  INSERTION, INTRAOCULAR LENS PROSTHESIS (Right)    Patient location: [facility]    Anesthesia Type: MAC    Transport from OR: Transported from OR on room air with adequate spontaneous ventilation    Post pain: adequate analgesia    Post assessment: no apparent anesthetic complications and tolerated procedure well    Post vital signs: stable    Level of consciousness: awake    Nausea/Vomiting: no nausea/vomiting    Complications: none    Transfer of care protocol was followed      Last vitals:   Visit Vitals  BP (!) 142/84 (BP Location: Right arm, Patient Position: Lying)   Pulse 61   Temp 36.5 °C (97.7 °F) (Temporal)   Resp 20   Ht 5' 7" (1.702 m)   Wt 69.4 kg (153 lb)   SpO2 100%   BMI 23.96 kg/m²     "

## 2018-07-18 ENCOUNTER — OFFICE VISIT (OUTPATIENT)
Dept: OPHTHALMOLOGY | Facility: CLINIC | Age: 78
End: 2018-07-18
Payer: MEDICARE

## 2018-07-18 VITALS — DIASTOLIC BLOOD PRESSURE: 70 MMHG | HEART RATE: 56 BPM | SYSTOLIC BLOOD PRESSURE: 117 MMHG

## 2018-07-18 DIAGNOSIS — Z98.890 POST-OPERATIVE STATE: Primary | ICD-10-CM

## 2018-07-18 DIAGNOSIS — H25.12 NUCLEAR SCLEROSIS, LEFT: ICD-10-CM

## 2018-07-18 PROCEDURE — 99499 UNLISTED E&M SERVICE: CPT | Mod: HCNC,S$GLB,, | Performed by: OPHTHALMOLOGY

## 2018-07-18 PROCEDURE — 92136 OPHTHALMIC BIOMETRY: CPT | Mod: 26,LT,S$GLB, | Performed by: OPHTHALMOLOGY

## 2018-07-18 PROCEDURE — 99024 POSTOP FOLLOW-UP VISIT: CPT | Mod: S$GLB,,, | Performed by: OPHTHALMOLOGY

## 2018-07-18 PROCEDURE — 99999 PR PBB SHADOW E&M-EST. PATIENT-LVL III: CPT | Mod: PBBFAC,,, | Performed by: OPHTHALMOLOGY

## 2018-07-18 NOTE — OP NOTE
DATE OF PROCEDURE: 07/17/2018    SURGEON:  León Talamantes M.D.    PREOPERATIVE DIAGNOSIS:  Nuclear sclerotic cataract, right eye.     POSTOPERATIVE DIAGNOSIS:  Nuclear sclerotic cataract, right eye.     PROCEDURE:  Clear corneal phacoemulsification with posterior chamber intraocular   lens implant, right eye.     ANESTHESIA:  Monitored anesthesia care with 2% Xylocaine jelly topically, 1%   free lidocaine topically and intrachamberly.     PROCEDURE IN DETAIL:  After the appropriate preoperative consent was obtained,   the patient had the 2% Xylocaine jelly applied to the right cornea.  The patient   was then brought to the operating room and placed into the supine position.    The right periorbit was then prepped and draped in the usual sterile ophthalmic   fashion.  A lid speculum was then inserted into the right eye.  Several drops of   the 1% lidocaine were placed onto the right cornea.  The 1% lidocaine was also   applied to the perilimbal region with the Weck-geoff sponge.  Using the 0.12-mm   forceps and the Super Sharp blade, a paracentesis site was made at the 12   o'clock position.  Approximately 0.5 mL of the 1% lidocaine was injected into   the anterior chamber.  Next, Viscoat was injected into the anterior chamber   through the paracentesis site.  The 2.75-mm keratome and the cyclodialysis   spatula were used to create a 2.75-mm clear corneal temporal groove.  The   cystitomy needle and Utrata forceps were then used to create a continuous tear   360-degree capsulorrhexis.  BSS in a cannula was then used for hydrodissection.    Phacoemulsification then proceeded in a stop-and-chop fashion without any   complications.  Another 0.5 mL of the 1% lidocaine was injected into the   anterior chamber.  The curved tip irrigation aspiration handpiece was then used   to remove the residual cortical material from the capsular bag.  Again, the 1%   lidocaine was applied to the perilimbal region with the Weck-geoff  sponge.  Healon   GV was then injected into the anterior chamber and capsular bag.  An [employer] intraocular lens model SN60WF, 19.5 diopters in power, and serial   number [id number] was injected into the capsular bag with the lens injector.    The Sinskey hook was used to position the lens into its proper place.  The   residual viscoelastic material was removed from the anterior chamber and   capsular bag with the curved tip irrigation aspiration handpiece.  Both wounds   were hydrated with BSS on a cannula.  Both wounds were checked and found to be   watertight.  The lid speculum was then removed from the right eye.  The patient   then had 1 drop of Vigamox ophthalmic drop and 1 drop of Econopred +1%   ophthalmic drop instilled onto the right cornea.  The eye was then shielded.    The patient tolerated the procedure well and was then brought to the recovery   room in good condition.      KAIDEN  dd: 07/17/2018 19:23:49 (CDT)  td: 07/17/2018 19:32:12 (CDT)  Doc ID   #[id number]  Job ID #[id number]    CC:

## 2018-07-18 NOTE — PROGRESS NOTES
Subjective:       Patient ID: Paul Browning is a 77 y.o. male.    Chief Complaint: Post-op Evaluation (1 day PO OD. CE IOL 7/17/2018 OD. )    HPI     Post-op Evaluation    Additional comments: 1 day PO OD. CE IOL 7/17/2018 OD.            Comments   77 y.o. Male is here for 1 day PO OD. CE IOL 7/17/2018 OD. Slight eye pain   right eye. Denies f/f. No discomfort.     Meds: No gtt        Last edited by EMMANUEL Cook on 7/18/2018  3:50 PM. (History)             Assessment:       1. Post-operative state    2. Nuclear sclerosis, left        Plan:       S/p CE OD- Doing well.     Visually significant cataract OS -Pt. Wants Sx.          CPM OD.  Cataract Surgery Consent: Patient with a visually significant cataract with difficulties of ADLs, reading, driving, night vision, glare (any and all).  Discussed with Patient/Family/Caregiver: options, risks and benefits, expectations of cataract surgery, utilized an eye model with questions and answers to facilitate discussion.  Discussed lens options and patient understands that glasses may be required for optimal vision for distance and/or near vision after cataract surgery.  The Patient/Family/Caregiver  voice good understanding and patient wishes to proceed with surgery.  The patient will likely benefit from surgery and patient signed consent for Left Eye.  CE OS 7/31/18.  RTC 1 wk.

## 2018-07-18 NOTE — ANESTHESIA POSTPROCEDURE EVALUATION
"Anesthesia Post Evaluation    Patient: Paul Browning    Procedure(s) Performed: Procedure(s) (LRB):  PHACOEMULSIFICATION, CATARACT (Right)  INSERTION, INTRAOCULAR LENS PROSTHESIS (Right)    Final Anesthesia Type: MAC  Patient location during evaluation: [facility]  Patient participation: Yes- Able to Participate  Level of consciousness: awake and alert and oriented  Post-procedure vital signs: reviewed and stable  Pain management: adequate  Airway patency: patent  PONV status at discharge: No PONV  Anesthetic complications: no      Cardiovascular status: blood pressure returned to baseline  Respiratory status: unassisted, spontaneous ventilation and room air  Hydration status: euvolemic  Follow-up not needed.        Visit Vitals  BP (!) 145/72 (BP Location: Right arm, Patient Position: Lying)   Pulse 63   Temp 36.8 °C (98.2 °F) (Temporal)   Resp 16   Ht 5' 7" (1.702 m)   Wt 69.4 kg (153 lb)   SpO2 98%   BMI 23.96 kg/m²       Pain/Roxi Score: Pain Assessment Performed: Yes (7/17/2018 12:09 PM)  Presence of Pain: denies (7/17/2018 12:09 PM)  Pain Rating Prior to Med Admin: 2 (7/17/2018 11:54 AM)  Pain Rating Post Med Admin: 2 (7/17/2018 12:09 PM)  Roxi Score: 10 (7/17/2018 12:09 PM)      "

## 2018-07-23 ENCOUNTER — TELEPHONE (OUTPATIENT)
Dept: OPHTHALMOLOGY | Facility: CLINIC | Age: 78
End: 2018-07-23

## 2018-07-25 ENCOUNTER — OFFICE VISIT (OUTPATIENT)
Dept: OPTOMETRY | Facility: CLINIC | Age: 78
End: 2018-07-25
Payer: MEDICARE

## 2018-07-25 DIAGNOSIS — Z98.41 CATARACT EXTRACTION STATUS OF EYE, RIGHT: Primary | ICD-10-CM

## 2018-07-25 PROCEDURE — 99999 PR PBB SHADOW E&M-EST. PATIENT-LVL II: CPT | Mod: PBBFAC,,, | Performed by: OPTOMETRIST

## 2018-07-25 PROCEDURE — 99024 POSTOP FOLLOW-UP VISIT: CPT | Mod: S$GLB,,, | Performed by: OPTOMETRIST

## 2018-07-25 NOTE — PROGRESS NOTES
HPI     Post-op Evaluation    Additional comments: 1 week PO OD            Comments   77 y.o. Male is here for 1 week PO OD. Denies eye pain and f/f. FB   sensation right eye only.     Meds: Durezol BID OD             Ilevro qd OD        Last edited by EMMANUEL Cook on 7/25/2018  1:15 PM. (History)            Assessment /Plan     For exam results, see Encounter Report.    Cataract extraction status of eye, right      1 week post op cat surgery OD--doing well    PLAN:    1. Cont meds as per written schedule  2. RTC in 1 week as sched oscar Hill for cat surgery OS/follow up OD, or will call immediately if problems

## 2018-07-29 NOTE — H&P
[facility]  History & Physical    Subjective:      Chief Complaint/Reason for Admission:     Paul Browning is a 77 y.o. male.    Past Medical History:   Diagnosis Date    Stevens's esophagus with low grade dysplasia     BPH (benign prostatic hyperplasia)     CAD (coronary artery disease)     Cataract     Diaphragmatic hernia     GERD (gastroesophageal reflux disease)     Heart attack     Hyperlipidemia     Hypertension     MDD (major depressive disorder), recurrent episode, mild 2/17/2016    Osteoporosis 7/20/2016    Peripheral arterial disease 3/18/2016    Sarcoid     Squamous cell carcinoma 09/2016, 5/2016    crown of scalp, left wrist     Past Surgical History:   Procedure Laterality Date    CARDIAC SURGERY      CATARACT EXTRACTION W/  INTRAOCULAR LENS IMPLANT Right 07/17/2018    Dr. Talamantes    CORONARY ARTERY BYPASS GRAFT      x2  10/2014    INTRAOCULAR PROSTHESES INSERTION Right 7/17/2018    Procedure: INSERTION, INTRAOCULAR LENS PROSTHESIS;  Surgeon: León Talamantes MD;  Location: [facility] OR [address] [place] [address];  Service: Ophthalmology;  Laterality: Right;    PHACOEMULSIFICATION OF CATARACT Right 7/17/2018    Procedure: PHACOEMULSIFICATION, CATARACT;  Surgeon: León Talamantes MD;  Location: [facility] OR [address] [place] [address];  Service: Ophthalmology;  Laterality: Right;    UMBILICAL HERNIA REPAIR       Family History   Problem Relation Age of Onset    Arrhythmia Mother     Heart failure Brother     Colon cancer Neg Hx     Inflammatory bowel disease Neg Hx     Celiac disease Neg Hx     Melanoma Neg Hx     Amblyopia Neg Hx     Blindness Neg Hx     Cataracts Neg Hx     Glaucoma Neg Hx     Macular degeneration Neg Hx     Retinal detachment Neg Hx     Strabismus Neg Hx      Social History   Substance Use Topics    Smoking status: Former Smoker     Packs/day: 2.00     Years: 20.00     Types: Cigarettes     Quit date: 2/26/1988    Smokeless tobacco: Never Used     Alcohol use No      Comment: quit drinking beer 2012       No prescriptions prior to admission.     Review of patient's allergies indicates:   Allergen Reactions    Morphine Shortness Of Breath        Review of Systems   Eyes: Positive for blurred vision.   All other systems reviewed and are negative.      Objective:      Vital Signs (Most Recent)       Vital Signs Range (Last 24H):       Physical Exam   Constitutional: He is oriented to person, place, and time. He appears well-developed and well-nourished.   HENT:   Head: Normocephalic.   Eyes: Conjunctivae and EOM are normal. Pupils are equal, round, and reactive to light.   Neck: Normal range of motion. Neck supple.   Cardiovascular: Normal rate.    Pulmonary/Chest: Effort normal and breath sounds normal.   Abdominal: Soft. Bowel sounds are normal.   Musculoskeletal: Normal range of motion.   Neurological: He is alert and oriented to person, place, and time.   Skin: Skin is warm.   Psychiatric: He has a normal mood and affect.       Data Review:    ECG:     Assessment:      Cataract OS.    Plan:    CE OS.

## 2018-07-31 ENCOUNTER — HOSPITAL ENCOUNTER (OUTPATIENT)
Facility: HOSPITAL | Age: 78
Discharge: HOME | End: 2018-07-31
Attending: OPHTHALMOLOGY | Admitting: OPHTHALMOLOGY
Payer: MEDICARE

## 2018-07-31 ENCOUNTER — TELEPHONE (OUTPATIENT)
Dept: GASTROENTEROLOGY | Facility: CLINIC | Age: 78
End: 2018-07-31

## 2018-07-31 ENCOUNTER — ANESTHESIA EVENT (OUTPATIENT)
Dept: SURGERY | Facility: HOSPITAL | Age: 78
End: 2018-07-31
Payer: MEDICARE

## 2018-07-31 ENCOUNTER — ANESTHESIA (OUTPATIENT)
Dept: SURGERY | Facility: HOSPITAL | Age: 78
End: 2018-07-31
Payer: MEDICARE

## 2018-07-31 VITALS
DIASTOLIC BLOOD PRESSURE: 74 MMHG | WEIGHT: 154 LBS | HEIGHT: 67 IN | HEART RATE: 75 BPM | OXYGEN SATURATION: 97 % | TEMPERATURE: 98 F | RESPIRATION RATE: 20 BRPM | BODY MASS INDEX: 24.17 KG/M2 | SYSTOLIC BLOOD PRESSURE: 151 MMHG

## 2018-07-31 DIAGNOSIS — H25.10 SENILE NUCLEAR SCLEROSIS: ICD-10-CM

## 2018-07-31 PROCEDURE — 71000015 HC POSTOP RECOV 1ST HR: Performed by: OPHTHALMOLOGY

## 2018-07-31 PROCEDURE — 71000044 HC DOSC ROUTINE RECOVERY FIRST HOUR: Performed by: OPHTHALMOLOGY

## 2018-07-31 PROCEDURE — 66984 XCAPSL CTRC RMVL W/O ECP: CPT | Mod: 79,LT,, | Performed by: OPHTHALMOLOGY

## 2018-07-31 PROCEDURE — 36000707: Performed by: OPHTHALMOLOGY

## 2018-07-31 PROCEDURE — 36000706: Performed by: OPHTHALMOLOGY

## 2018-07-31 PROCEDURE — 25000003 PHARM REV CODE 250: Performed by: OPHTHALMOLOGY

## 2018-07-31 PROCEDURE — 37000009 HC ANESTHESIA EA ADD 15 MINS: Performed by: OPHTHALMOLOGY

## 2018-07-31 PROCEDURE — 63600175 PHARM REV CODE 636 W HCPCS: Performed by: NURSE ANESTHETIST, CERTIFIED REGISTERED

## 2018-07-31 PROCEDURE — V2632 POST CHMBR INTRAOCULAR LENS: HCPCS | Performed by: OPHTHALMOLOGY

## 2018-07-31 PROCEDURE — 63600175 PHARM REV CODE 636 W HCPCS: Performed by: OPHTHALMOLOGY

## 2018-07-31 PROCEDURE — D9220A PRA ANESTHESIA: Mod: ANES,,, | Performed by: ANESTHESIOLOGY

## 2018-07-31 PROCEDURE — 37000008 HC ANESTHESIA 1ST 15 MINUTES: Performed by: OPHTHALMOLOGY

## 2018-07-31 PROCEDURE — D9220A PRA ANESTHESIA: Mod: CRNA,,, | Performed by: NURSE ANESTHETIST, CERTIFIED REGISTERED

## 2018-07-31 DEVICE — LENS 19.5: Type: IMPLANTABLE DEVICE | Site: EYE | Status: FUNCTIONAL

## 2018-07-31 RX ORDER — PROPARACAINE HYDROCHLORIDE 5 MG/ML
1 SOLUTION/ DROPS OPHTHALMIC
Status: DISCONTINUED | OUTPATIENT
Start: 2018-07-31 | End: 2018-07-31 | Stop reason: HOSPADM

## 2018-07-31 RX ORDER — OFLOXACIN 3 MG/ML
1 SOLUTION/ DROPS OPHTHALMIC
Status: COMPLETED | OUTPATIENT
Start: 2018-07-31 | End: 2018-07-31

## 2018-07-31 RX ORDER — PREDNISOLONE ACETATE 10 MG/ML
SUSPENSION/ DROPS OPHTHALMIC
Status: DISCONTINUED | OUTPATIENT
Start: 2018-07-31 | End: 2018-07-31 | Stop reason: HOSPADM

## 2018-07-31 RX ORDER — MIDAZOLAM HYDROCHLORIDE 1 MG/ML
INJECTION, SOLUTION INTRAMUSCULAR; INTRAVENOUS
Status: DISCONTINUED | OUTPATIENT
Start: 2018-07-31 | End: 2018-07-31

## 2018-07-31 RX ORDER — LIDOCAINE HYDROCHLORIDE 20 MG/ML
INJECTION, SOLUTION EPIDURAL; INFILTRATION; INTRACAUDAL; PERINEURAL
Status: DISCONTINUED | OUTPATIENT
Start: 2018-07-31 | End: 2018-07-31 | Stop reason: HOSPADM

## 2018-07-31 RX ORDER — LIDOCAINE HYDROCHLORIDE 10 MG/ML
1 INJECTION, SOLUTION EPIDURAL; INFILTRATION; INTRACAUDAL; PERINEURAL ONCE
Status: COMPLETED | OUTPATIENT
Start: 2018-07-31 | End: 2018-07-31

## 2018-07-31 RX ORDER — PREDNISOLONE ACETATE 10 MG/ML
SUSPENSION/ DROPS OPHTHALMIC
Status: DISCONTINUED
Start: 2018-07-31 | End: 2018-07-31 | Stop reason: HOSPADM

## 2018-07-31 RX ORDER — FENTANYL CITRATE 50 UG/ML
25 INJECTION, SOLUTION INTRAMUSCULAR; INTRAVENOUS EVERY 5 MIN PRN
Status: DISCONTINUED | OUTPATIENT
Start: 2018-07-31 | End: 2018-07-31 | Stop reason: HOSPADM

## 2018-07-31 RX ORDER — EPINEPHRINE 1 MG/ML
INJECTION, SOLUTION INTRACARDIAC; INTRAMUSCULAR; INTRAVENOUS; SUBCUTANEOUS
Status: DISCONTINUED | OUTPATIENT
Start: 2018-07-31 | End: 2018-07-31 | Stop reason: HOSPADM

## 2018-07-31 RX ORDER — CYCLOPENTOLATE HYDROCHLORIDE 10 MG/ML
1 SOLUTION/ DROPS OPHTHALMIC
Status: DISCONTINUED | OUTPATIENT
Start: 2018-07-31 | End: 2018-07-31 | Stop reason: HOSPADM

## 2018-07-31 RX ORDER — TROPICAMIDE 10 MG/ML
1 SOLUTION/ DROPS OPHTHALMIC
Status: DISCONTINUED | OUTPATIENT
Start: 2018-07-31 | End: 2018-07-31 | Stop reason: HOSPADM

## 2018-07-31 RX ORDER — HYDROCODONE BITARTRATE AND ACETAMINOPHEN 5; 325 MG/1; MG/1
1 TABLET ORAL EVERY 4 HOURS PRN
Status: DISCONTINUED | OUTPATIENT
Start: 2018-07-31 | End: 2018-07-31 | Stop reason: HOSPADM

## 2018-07-31 RX ORDER — PHENYLEPHRINE HYDROCHLORIDE 25 MG/ML
1 SOLUTION/ DROPS OPHTHALMIC
Status: DISCONTINUED | OUTPATIENT
Start: 2018-07-31 | End: 2018-07-31 | Stop reason: HOSPADM

## 2018-07-31 RX ORDER — FENTANYL CITRATE 50 UG/ML
INJECTION, SOLUTION INTRAMUSCULAR; INTRAVENOUS
Status: DISCONTINUED | OUTPATIENT
Start: 2018-07-31 | End: 2018-07-31

## 2018-07-31 RX ORDER — EPINEPHRINE 1 MG/ML
INJECTION, SOLUTION INTRACARDIAC; INTRAMUSCULAR; INTRAVENOUS; SUBCUTANEOUS
Status: DISCONTINUED
Start: 2018-07-31 | End: 2018-07-31 | Stop reason: HOSPADM

## 2018-07-31 RX ORDER — ACETAMINOPHEN 500 MG
1000 TABLET ORAL EVERY 6 HOURS PRN
COMMUNITY
End: 2020-11-06

## 2018-07-31 RX ORDER — SODIUM CHLORIDE 9 MG/ML
INJECTION, SOLUTION INTRAVENOUS CONTINUOUS
Status: DISCONTINUED | OUTPATIENT
Start: 2018-07-31 | End: 2018-07-31 | Stop reason: HOSPADM

## 2018-07-31 RX ORDER — LIDOCAINE HYDROCHLORIDE 20 MG/ML
INJECTION, SOLUTION EPIDURAL; INFILTRATION; INTRACAUDAL; PERINEURAL
Status: DISCONTINUED
Start: 2018-07-31 | End: 2018-07-31 | Stop reason: HOSPADM

## 2018-07-31 RX ORDER — OXYCODONE HYDROCHLORIDE 5 MG/1
5 TABLET ORAL
Status: DISCONTINUED | OUTPATIENT
Start: 2018-07-31 | End: 2018-07-31 | Stop reason: HOSPADM

## 2018-07-31 RX ORDER — OFLOXACIN 3 MG/ML
SOLUTION/ DROPS OPHTHALMIC
Status: DISCONTINUED | OUTPATIENT
Start: 2018-07-31 | End: 2018-07-31 | Stop reason: HOSPADM

## 2018-07-31 RX ORDER — ACETAMINOPHEN 325 MG/1
650 TABLET ORAL EVERY 4 HOURS PRN
Status: DISCONTINUED | OUTPATIENT
Start: 2018-07-31 | End: 2018-07-31 | Stop reason: HOSPADM

## 2018-07-31 RX ADMIN — HYDROCODONE BITARTRATE AND ACETAMINOPHEN 1 TABLET: 5; 325 TABLET ORAL at 01:07

## 2018-07-31 RX ADMIN — TROPICAMIDE 1 DROP: 10 SOLUTION/ DROPS OPHTHALMIC at 10:07

## 2018-07-31 RX ADMIN — OFLOXACIN 1 DROP: 3 SOLUTION OPHTHALMIC at 10:07

## 2018-07-31 RX ADMIN — CYCLOPENTOLATE HYDROCHLORIDE 1 DROP: 10 SOLUTION/ DROPS OPHTHALMIC at 10:07

## 2018-07-31 RX ADMIN — PHENYLEPHRINE HYDROCHLORIDE 1 DROP: 25 SOLUTION/ DROPS OPHTHALMIC at 10:07

## 2018-07-31 RX ADMIN — MIDAZOLAM HYDROCHLORIDE 2 MG: 1 INJECTION, SOLUTION INTRAMUSCULAR; INTRAVENOUS at 12:07

## 2018-07-31 RX ADMIN — SODIUM CHLORIDE 1000 ML: 0.9 INJECTION, SOLUTION INTRAVENOUS at 10:07

## 2018-07-31 RX ADMIN — FENTANYL CITRATE 50 MCG: 50 INJECTION, SOLUTION INTRAMUSCULAR; INTRAVENOUS at 01:07

## 2018-07-31 RX ADMIN — LIDOCAINE HYDROCHLORIDE 10 MG: 10 INJECTION, SOLUTION EPIDURAL; INFILTRATION; INTRACAUDAL; PERINEURAL at 10:07

## 2018-07-31 RX ADMIN — PROPARACAINE HYDROCHLORIDE 1 DROP: 5 SOLUTION/ DROPS OPHTHALMIC at 10:07

## 2018-07-31 NOTE — TRANSFER OF CARE
"Anesthesia Transfer of Care Note    Patient: Paul Browning    Procedure(s) Performed: Procedure(s) (LRB):  PHACOEMULSIFICATION, CATARACT (Left)  INSERTION, INTRAOCULAR LENS PROSTHESIS (Left)    Patient location: PACU    Anesthesia Type: MAC    Transport from OR: Transported from OR on room air with adequate spontaneous ventilation    Post pain: adequate analgesia    Post assessment: no apparent anesthetic complications    Post vital signs: stable    Level of consciousness: awake    Nausea/Vomiting: no nausea/vomiting    Complications: none    Transfer of care protocol was followed      Last vitals:   Visit Vitals  BP (!) 148/76 (BP Location: Left arm, Patient Position: Lying)   Pulse 67   Temp 37.2 °C (99 °F) (Temporal)   Resp 20   Ht 5' 7" (1.702 m)   Wt 69.9 kg (154 lb)   SpO2 98%   BMI 24.12 kg/m²     "

## 2018-07-31 NOTE — ANESTHESIA POSTPROCEDURE EVALUATION
"Anesthesia Post Evaluation    Patient: Paul Browning    Procedure(s) Performed: Procedure(s) (LRB):  PHACOEMULSIFICATION, CATARACT (Left)  INSERTION, INTRAOCULAR LENS PROSTHESIS (Left)    Final Anesthesia Type: MAC  Patient location during evaluation: PACU  Patient participation: Yes- Able to Participate  Level of consciousness: awake and alert and oriented  Post-procedure vital signs: reviewed and stable  Pain management: adequate  Airway patency: patent  PONV status at discharge: No PONV  Anesthetic complications: no      Cardiovascular status: stable  Respiratory status: unassisted  Hydration status: euvolemic  Follow-up not needed.        Visit Vitals  BP (!) 151/74   Pulse 75   Temp 36.7 °C (98.1 °F) (Temporal)   Resp 20   Ht 5' 7" (1.702 m)   Wt 69.9 kg (154 lb)   SpO2 97%   BMI 24.12 kg/m²       Pain/Roxi Score: Pain Assessment Performed: Yes (7/31/2018  2:14 PM)  Presence of Pain: complains of pain/discomfort (7/31/2018  2:14 PM)  Pain Rating Prior to Med Admin: 6 (7/31/2018  1:44 PM)  Pain Rating Post Med Admin: 3 (7/31/2018  2:14 PM)  Roxi Score: 10 (7/31/2018  2:14 PM)      "

## 2018-07-31 NOTE — ANESTHESIA PREPROCEDURE EVALUATION
07/31/2018  Paul Browning is a 77 y.o., male.    Pre-op Assessment    I have reviewed the Patient Summary Reports.     I have reviewed the Nursing Notes.   I have reviewed the Medications.     Review of Systems  Anesthesia Hx:  No problems with previous Anesthesia  Denies Family Hx of Anesthesia complications.   Denies Personal Hx of Anesthesia complications.   Social:  No Alcohol Use, Non-Smoker    Cardiovascular:   Hypertension Past MI CAD  CABG/stent   Denies Angina. CHF        Pulmonary:   COPD Shortness of breath sarcoidosis   Hepatic/GI:   PUD, GERD    Neurological:   Neuromuscular Disease, Headaches    Endocrine:  Endocrine Normal    Psych:   Psychiatric History          Physical Exam  General:  Well nourished    Airway/Jaw/Neck:  Airway Findings: Mouth Opening: Normal Tongue: Normal  General Airway Assessment: Adult  Mallampati: I  TM Distance: Normal, at least 6 cm  Jaw/Neck Findings:  Neck ROM: Normal ROM     Eyes/Ears/Nose:  Eyes/Ears/Nose Findings:    Dental:  Dental Findings: Upper Dentures   Chest/Lungs:  Chest/Lungs Findings: Normal Respiratory Rate     Heart/Vascular:  Heart Findings: Rate: Normal  Rhythm: Regular Rhythm        Mental Status:  Mental Status Findings:  Cooperative, Alert and Oriented         Anesthesia Plan  Type of Anesthesia, risks & benefits discussed:  Anesthesia Type:  MAC, general  Patient's Preference:   Intra-op Monitoring Plan: standard ASA monitors  Intra-op Monitoring Plan Comments:   Post Op Pain Control Plan: IV/PO Opioids PRN  Post Op Pain Control Plan Comments:   Induction:   IV  Beta Blocker:  Patient is not currently on a Beta-Blocker (No further documentation required).       Informed Consent: Patient understands risks and agrees with Anesthesia plan.  Questions answered. Anesthesia consent signed with patient.  ASA Score: 3     Day of Surgery  Review of History & Physical:    H&P update referred to the surgeon.         Ready For Surgery From Anesthesia Perspective.

## 2018-07-31 NOTE — BRIEF OP NOTE
Operative Note     SUMMARY     Surgery Date: 7/31/2018    Surgeon(s) and Role:      León Talamantes MD - Primary    Pre-op Diagnosis:  Nuclear sclerosis     Post-op/ Diagnosis:  Same    Final Diagnosis: Cataract    Procedure(s) (LRB):  PHACOEMULSIFICATION-ASPIRATION-CATARACT   INSERTION-INTRAOCULAR LENS (IOL)     Anesthesia: Monitored Anesthesia Care    Findings/Key Components:  Cataract    Outcome: Tolerated procedure well    Estimated Blood Loss: None         Specimens     None          Follow-up:  Tomorrow in clinic      Discharge Note      SUMMARY     Admit Date: 7/31/2018    Attending Physician: León Talamantes MD    Discharge Physician: León Talamantes MD    Discharge Date: 7/31/2018    Final Diagnosis: Cataract    Outcome: Tolerated procedure well    Disposition: Discharge to Home.    Medications:       Paul Browning   [facility] Medication Instructions LISSETTE:[id number]    Printed on:07/31/18 9839   Medication Information                      acetaminophen (TYLENOL) 500 MG tablet  Take 1,000 mg by mouth every 6 (six) hours as needed for Pain.             aspirin (ECOTRIN) 81 MG EC tablet  TAKE 1 TABLET BY MOUTH ONCE DAILY             atorvastatin (LIPITOR) 40 MG tablet  TAKE 1 TABLET BY MOUTH ONCE DAILY             difluprednate (DUREZOL) 0.05 % Drop ophthalmic solution  Place 1 drop into the right eye 4 (four) times daily. For 30 days             ILEVRO 0.3 % DrpS  Place 1 drop into both eyes once daily. For 30 days             metoprolol tartrate (LOPRESSOR) 25 MG tablet  TAKE 1 TABLET BY MOUTH TWICE DAILY             omega-3 fatty acids-vitamin E (FISH OIL) 1,000 mg Cap  Take 1 tablet by mouth 2 (two) times daily.             pantoprazole (PROTONIX) 40 MG tablet  once daily.              potassium chloride (KLOR-CON) 10 MEQ TbSR  TAKE 2 TABLETS BY MOUTH EVERY DAY             sertraline (ZOLOFT) 50 MG tablet  TAKE 1 TABLET BY MOUTH EVERY DAY             triamcinolone acetonide  0.1% (KENALOG) 0.1 % cream  Apply topically daily as needed.                   Patient Discharge Instructions:     Keep Freitas Shield over operated eye when not using drops.     DIET:  Regular    Activity: No heavy lifting or bending X 1 week.    Follow-up:  Tomorrow in clinic

## 2018-08-01 ENCOUNTER — OFFICE VISIT (OUTPATIENT)
Dept: OPHTHALMOLOGY | Facility: CLINIC | Age: 78
End: 2018-08-01
Payer: MEDICARE

## 2018-08-01 VITALS — SYSTOLIC BLOOD PRESSURE: 118 MMHG | HEART RATE: 55 BPM | DIASTOLIC BLOOD PRESSURE: 61 MMHG

## 2018-08-01 DIAGNOSIS — Z98.890 POST-OPERATIVE STATE: Primary | ICD-10-CM

## 2018-08-01 PROCEDURE — 99024 POSTOP FOLLOW-UP VISIT: CPT | Mod: S$GLB,,, | Performed by: OPHTHALMOLOGY

## 2018-08-01 PROCEDURE — 99999 PR PBB SHADOW E&M-EST. PATIENT-LVL III: CPT | Mod: PBBFAC,,, | Performed by: OPHTHALMOLOGY

## 2018-08-01 NOTE — OP NOTE
DATE OF PROCEDURE:  07/31/2018    SURGEON:  León Talamantes M.D.    PREOPERATIVE DIAGNOSIS:  Nuclear sclerotic cataract, left eye.     POSTOPERATIVE DIAGNOSIS:  Nuclear sclerotic cataract, left eye.     PROCEDURE:  Clear corneal phacoemulsification with posterior chamber intraocular   lens implant, left eye.     ANESTHESIA:  Monitored anesthesia care with 2% Xylocaine jelly topically, 1%   free lidocaine topically and intrachamberly.     PROCEDURE IN DETAIL:  After the appropriate preoperative consent was obtained,   the patient had the 2% Xylocaine jelly applied to the left cornea.  The patient   was then brought to the operating room and placed into the supine position.  The   left periorbit was then prepped and draped in the usual sterile ophthalmic   fashion.  A lid speculum was then inserted into the left eye.  Several drops of   the 1% lidocaine were placed onto the left cornea.  The 1% lidocaine was also   applied to the perilimbal region with the Weck-geoff sponge.  Using the 0.12-mm   forceps and the Super Sharp blade, a paracentesis site was made at the 6 o'clock   position.  Approximately 0.5 mL of the 1% lidocaine was injected into the   anterior chamber.  Next, Viscoat was injected into the anterior chamber through   the paracentesis site.  The 2.75-mm keratome and the cyclodialysis spatula were   used to create a 2.75-mm clear corneal temporal groove.  The cystitomy needle   and Utrata forceps were then used to create a continuous tear 360-degree   capsulorrhexis.  BSS in a cannula was then used for hydrodissection.    Phacoemulsification then proceeded in a stop-and-chop fashion without any   complications.  Another 0.5 mL of the 1% lidocaine was injected into the   anterior chamber.  The curved tip irrigation aspiration handpiece was then used   to remove the residual cortical material from the capsular bag.  Again, the 1%   lidocaine was applied to the perilimbal region with the Weck-geoff sponge.   Healon   GV was then injected into the anterior chamber and capsular bag.  An [employer] intraocular lens model SN60WF, 19.5 diopters in power, and serial   number [id number] was injected into the capsular bag with the lens injector.    The Sinskey hook was used to position the lens into its proper place.  The   residual viscoelastic material was removed from the anterior chamber and   capsular bag with the curved tip irrigation aspiration handpiece.  Both wounds   were hydrated with BSS on a cannula.  Both wounds were checked and found to be   watertight.  The lid speculum was then removed from the left eye.  The patient   then had 1 drop of Vigamox ophthalmic drop and 1 drop of Econopred +1%   ophthalmic drop instilled onto the left cornea.  The eye was then shielded.  The   patient tolerated the procedure well and was then brought to the recovery room   in good condition.      KAIDEN  dd: 07/31/2018 21:19:30 (CDT)  td: 07/31/2018 21:25:53 (CDT)  Doc ID   #[id number]  Job ID #[id number]    CC:

## 2018-08-01 NOTE — PROGRESS NOTES
Subjective:       Patient ID: Paul Browning is a 77 y.o. male.    Chief Complaint: Post-op Evaluation (1 day PO OS. CE IOL 7/31/2018 OS. )    HPI     Post-op Evaluation    Additional comments: 1 day PO OS. CE IOL 7/31/2018 OS.            Comments   77 y.o. Male is here for 1 day PO OS. CE IOL 7/31/2018 OS. Eye pain left   eye. FB sensation left eye. Denies f/f.     Meds: Ofloxacin QID OS             Durezol QID OS             Ilevro qd OS              Systane Complete BID OD        Last edited by EMMANUEL Cook on 8/1/2018  1:43 PM. (History)             Assessment:       1. Post-operative state        Plan:       S/p CE OU- Doing well.        CPM OS.  Taper gtts OD.  RTC 1 wk.

## 2018-08-03 ENCOUNTER — TELEPHONE (OUTPATIENT)
Dept: OPHTHALMOLOGY | Facility: CLINIC | Age: 78
End: 2018-08-03

## 2018-08-03 ENCOUNTER — OFFICE VISIT (OUTPATIENT)
Dept: OPTOMETRY | Facility: CLINIC | Age: 78
End: 2018-08-03
Payer: MEDICARE

## 2018-08-03 ENCOUNTER — OFFICE VISIT (OUTPATIENT)
Dept: OPHTHALMOLOGY | Facility: CLINIC | Age: 78
End: 2018-08-03
Payer: MEDICARE

## 2018-08-03 DIAGNOSIS — Z98.890 POST-OPERATIVE STATE: Primary | ICD-10-CM

## 2018-08-03 DIAGNOSIS — H18.20 CORNEAL EDEMA OF LEFT EYE: Primary | ICD-10-CM

## 2018-08-03 PROCEDURE — 99499 UNLISTED E&M SERVICE: CPT | Mod: S$GLB,,, | Performed by: OPTOMETRIST

## 2018-08-03 PROCEDURE — 99024 POSTOP FOLLOW-UP VISIT: CPT | Mod: S$GLB,,, | Performed by: OPHTHALMOLOGY

## 2018-08-03 PROCEDURE — 99999 PR PBB SHADOW E&M-EST. PATIENT-LVL II: CPT | Mod: PBBFAC,,, | Performed by: OPTOMETRIST

## 2018-08-03 PROCEDURE — 99999 PR PBB SHADOW E&M-EST. PATIENT-LVL II: CPT | Mod: PBBFAC,,, | Performed by: OPHTHALMOLOGY

## 2018-08-03 NOTE — TELEPHONE ENCOUNTER
----- Message from Ruthie Morrow sent at 8/3/2018  8:32 AM CDT -----  Contact: Paul Browning(son)  Needs Advice    Reason for call: Mr Bañuelos state his father is having problems with left eye( the one that he had surgery on Tuesday). His vision is blurry, watering a lot and discomfort.     Communication Preference:[phone]  Additional Information:

## 2018-08-03 NOTE — PROGRESS NOTES
Subjective:       Patient ID: Paul Browning is a 77 y.o. male.    Chief Complaint: Post-op Evaluation    HPI     DSL- 8/3/18     Pt called to c/o that OS was still blurry, teary, and irritated. Pt son   who is his transportation did not wanted to travel to the [facility]. I   referred pt to see Dr. Hughes for treatment. Pt seen  at 1:30 p.m   who d/x pt with Corneal edema of the LT eye.            Last edited by Alessio Holloway on 8/3/2018  3:13 PM. (History)             Assessment:       1. Post-operative state        Plan:       S/p CE OU- Doing well but with temp. K edema OS.        CPM OS.  Taper gtts OD.  RTC 1 wk as scheduled.

## 2018-08-03 NOTE — PROGRESS NOTES
"HPI     DLS: 8/1/2018 with Dr. Talamantes  Pt states since last visit with Dr. Talamantes foggy spot OS   +tearing  +redness  +photophobia  +pain---5 or 6 on pain scale   +Crusting   States va is cloudy OS     Meds: Ofloxacin QID OS              Durezol QID OS              Ilevro qd OS              Systane Complete PRN OD       Sp PC IOL OU     Last edited by Evelia Amor on 8/3/2018  1:22 PM. (History)        ROS     Positive for: Eyes (cat surgery OU)    Negative for: Constitutional, Gastrointestinal, Neurological, Skin,   Genitourinary, Musculoskeletal, HENT, Endocrine, Cardiovascular,   Respiratory, Psychiatric, Allergic/Imm, Heme/Lymph    Last edited by Grey Hughes, OD on 8/3/2018  1:58 PM. (History)        Assessment /Plan     For exam results, see Encounter Report.    Corneal edema of left eye      Post op cat surgery OS w hx inc IOP/K edema 2 days ago.  Dr NORTH "burped" wound and pressure normal today, but pt feels eye blurry and irritated.  Still has temp K edema/MCE    PLAN:    Sent pt to see Dr Hill at [facility] today.  NOLOS charge for my visit                 "

## 2018-08-08 ENCOUNTER — OFFICE VISIT (OUTPATIENT)
Dept: OPHTHALMOLOGY | Facility: CLINIC | Age: 78
End: 2018-08-08
Payer: MEDICARE

## 2018-08-08 ENCOUNTER — TELEPHONE (OUTPATIENT)
Dept: GASTROENTEROLOGY | Facility: CLINIC | Age: 78
End: 2018-08-08

## 2018-08-08 DIAGNOSIS — Z98.890 POST-OPERATIVE STATE: Primary | ICD-10-CM

## 2018-08-08 PROCEDURE — 99024 POSTOP FOLLOW-UP VISIT: CPT | Mod: S$GLB,,, | Performed by: OPHTHALMOLOGY

## 2018-08-08 PROCEDURE — 99999 PR PBB SHADOW E&M-EST. PATIENT-LVL II: CPT | Mod: PBBFAC,,, | Performed by: OPHTHALMOLOGY

## 2018-08-08 NOTE — PROGRESS NOTES
Subjective:       Patient ID: Paul Browning is a 77 y.o. male.    Chief Complaint: Post-op Evaluation (1 week po os)    HPI     Post-op Evaluation    Additional comments: 1 week po os           Comments   1 week po os - Pt states Va OS has improved. Pt occas has pain.     Eyemeds  Durezol BID OS  Ilevro QD OS        Last edited by Alessio Holloway on 8/8/2018  1:51 PM. (History)             Assessment:       1. Post-operative state        Plan:       S/p CE OU- Doing well.          Taper gtts OU.  AT's prn.  RTC 3 wks.

## 2018-08-08 NOTE — TELEPHONE ENCOUNTER
Spoke with patient in regards to schedule EGD. He stated that he just had eye surgery and will seeing the doctor later this week for post-op. He asked me to call back next week.

## 2018-08-27 ENCOUNTER — TELEPHONE (OUTPATIENT)
Dept: GASTROENTEROLOGY | Facility: CLINIC | Age: 78
End: 2018-08-27

## 2018-08-27 NOTE — TELEPHONE ENCOUNTER
Spoke with patient's son. He is seeing his eye surgeon on Wednesday and will be able to schedule after that. He asked for a call back next week.

## 2018-08-29 ENCOUNTER — OFFICE VISIT (OUTPATIENT)
Dept: OPHTHALMOLOGY | Facility: CLINIC | Age: 78
End: 2018-08-29
Payer: MEDICARE

## 2018-08-29 DIAGNOSIS — H52.7 REFRACTIVE ERROR: ICD-10-CM

## 2018-08-29 DIAGNOSIS — Z98.890 POST-OPERATIVE STATE: Primary | ICD-10-CM

## 2018-08-29 PROCEDURE — 99999 PR PBB SHADOW E&M-EST. PATIENT-LVL II: CPT | Mod: PBBFAC,,, | Performed by: OPHTHALMOLOGY

## 2018-08-29 PROCEDURE — 99024 POSTOP FOLLOW-UP VISIT: CPT | Mod: S$GLB,,, | Performed by: OPHTHALMOLOGY

## 2018-08-29 NOTE — PROGRESS NOTES
Subjective:       Patient ID: Paul Browning is a 77 y.o. male.    Chief Complaint: Post-op Evaluation (3 weeks po os)    HPI     Post-op Evaluation      Additional comments: 3 weeks po os              Comments     DSL- 8/8/18     77 y.o male is here for 3 weeks po os. Pt states he noticed diplopia OS X   7 days. Pt has mild pain OS. Pt denies floaters and flashes.               Last edited by Alessio Holloway on 8/29/2018  1:30 PM. (History)             Assessment:       1. Post-operative state    2. Refractive error        Plan:       S/p CE OU- Doing well but with mild iritis OU.     RE        Start PF qid Ou & taper off over 4 wks.  RTC 3 wks.

## 2018-09-19 ENCOUNTER — OFFICE VISIT (OUTPATIENT)
Dept: OPHTHALMOLOGY | Facility: CLINIC | Age: 78
End: 2018-09-19
Payer: MEDICARE

## 2018-09-19 ENCOUNTER — TELEPHONE (OUTPATIENT)
Dept: ENDOSCOPY | Facility: HOSPITAL | Age: 78
End: 2018-09-19

## 2018-09-19 DIAGNOSIS — H52.7 REFRACTIVE ERROR: ICD-10-CM

## 2018-09-19 DIAGNOSIS — Z98.890 POST-OPERATIVE STATE: Primary | ICD-10-CM

## 2018-09-19 PROCEDURE — 99024 POSTOP FOLLOW-UP VISIT: CPT | Mod: ,,, | Performed by: OPHTHALMOLOGY

## 2018-09-19 PROCEDURE — 99212 OFFICE O/P EST SF 10 MIN: CPT | Mod: PBBFAC,PO | Performed by: OPHTHALMOLOGY

## 2018-09-19 PROCEDURE — 99999 PR PBB SHADOW E&M-EST. PATIENT-LVL II: CPT | Mod: PBBFAC,,, | Performed by: OPHTHALMOLOGY

## 2018-09-19 NOTE — TELEPHONE ENCOUNTER
Spoke with patient's son. EGD scheduled for 10/15 at 9. Reviewed prep instructions. Paul verbalized understanding.

## 2018-09-20 NOTE — PROGRESS NOTES
Subjective:       Patient ID: Paul Browning is a 77 y.o. male.    Chief Complaint: Post-op Evaluation and Iritis    HPI     2 month PO OU.  Patient here for f/u iritis and refraction today.    Patient had been using PF in OS today was last day of taper. Patient   states he had horizontal diplopia since before surgery.     Last edited by Fatou Barraza on 9/19/2018  1:22 PM. (History)             Assessment:       1. Post-operative state    2. Refractive error        Plan:       S/p CE OU- Doing well with resolved iritis OU.     RE-Pt wants MRx.      Give MRx.  RTC Dr Hand in 6 mos.

## 2018-09-26 ENCOUNTER — TELEPHONE (OUTPATIENT)
Dept: ENDOSCOPY | Facility: HOSPITAL | Age: 78
End: 2018-09-26

## 2018-09-26 ENCOUNTER — OFFICE VISIT (OUTPATIENT)
Dept: INTERNAL MEDICINE | Facility: CLINIC | Age: 78
End: 2018-09-26
Payer: MEDICARE

## 2018-09-26 ENCOUNTER — HOSPITAL ENCOUNTER (OUTPATIENT)
Dept: RADIOLOGY | Facility: HOSPITAL | Age: 78
Discharge: HOME | End: 2018-09-26
Attending: INTERNAL MEDICINE
Payer: MEDICARE

## 2018-09-26 VITALS
BODY MASS INDEX: 23.53 KG/M2 | SYSTOLIC BLOOD PRESSURE: 124 MMHG | WEIGHT: 149.94 LBS | RESPIRATION RATE: 16 BRPM | TEMPERATURE: 98 F | HEART RATE: 64 BPM | HEIGHT: 67 IN | DIASTOLIC BLOOD PRESSURE: 72 MMHG

## 2018-09-26 VITALS
RESPIRATION RATE: 15 BRPM | HEIGHT: 67 IN | HEART RATE: 65 BPM | OXYGEN SATURATION: 97 % | BODY MASS INDEX: 24.01 KG/M2 | DIASTOLIC BLOOD PRESSURE: 60 MMHG | WEIGHT: 153 LBS | SYSTOLIC BLOOD PRESSURE: 110 MMHG

## 2018-09-26 DIAGNOSIS — F33.0 MDD (MAJOR DEPRESSIVE DISORDER), RECURRENT EPISODE, MILD: Chronic | ICD-10-CM

## 2018-09-26 DIAGNOSIS — I73.9 PERIPHERAL ARTERIAL DISEASE: ICD-10-CM

## 2018-09-26 DIAGNOSIS — Z86.79 HISTORY OF THIRD DEGREE HEART BLOCK: ICD-10-CM

## 2018-09-26 DIAGNOSIS — M81.0 AGE-RELATED OSTEOPOROSIS WITHOUT CURRENT PATHOLOGICAL FRACTURE: ICD-10-CM

## 2018-09-26 DIAGNOSIS — I25.10 CORONARY ARTERY DISEASE INVOLVING NATIVE CORONARY ARTERY OF NATIVE HEART WITHOUT ANGINA PECTORIS: Chronic | ICD-10-CM

## 2018-09-26 DIAGNOSIS — R42 DIZZINESS: Primary | ICD-10-CM

## 2018-09-26 DIAGNOSIS — I70.0 ATHEROSCLEROSIS OF AORTA: ICD-10-CM

## 2018-09-26 DIAGNOSIS — D86.0 PULMONARY SARCOIDOSIS: ICD-10-CM

## 2018-09-26 DIAGNOSIS — H04.123 DRY EYE SYNDROME OF BOTH EYES: ICD-10-CM

## 2018-09-26 DIAGNOSIS — G44.209 TENSION HEADACHE: ICD-10-CM

## 2018-09-26 DIAGNOSIS — E78.5 HYPERLIPIDEMIA, UNSPECIFIED HYPERLIPIDEMIA TYPE: Chronic | ICD-10-CM

## 2018-09-26 DIAGNOSIS — H91.93 DECREASED HEARING OF BOTH EARS: ICD-10-CM

## 2018-09-26 DIAGNOSIS — Z00.00 ENCOUNTER FOR PREVENTIVE HEALTH EXAMINATION: Primary | ICD-10-CM

## 2018-09-26 DIAGNOSIS — I77.9 CAROTID ARTERY DISEASE WITHOUT CEREBRAL INFARCTION: Chronic | ICD-10-CM

## 2018-09-26 DIAGNOSIS — I10 ESSENTIAL HYPERTENSION: Chronic | ICD-10-CM

## 2018-09-26 DIAGNOSIS — J44.9 CHRONIC OBSTRUCTIVE PULMONARY DISEASE, UNSPECIFIED COPD TYPE: ICD-10-CM

## 2018-09-26 DIAGNOSIS — Z95.1 S/P CABG X 2: ICD-10-CM

## 2018-09-26 DIAGNOSIS — M54.2 CERVICAL PAIN (NECK): ICD-10-CM

## 2018-09-26 DIAGNOSIS — I25.2 HISTORY OF ST ELEVATION MYOCARDIAL INFARCTION (STEMI): ICD-10-CM

## 2018-09-26 DIAGNOSIS — G47.00 INSOMNIA, UNSPECIFIED TYPE: Chronic | ICD-10-CM

## 2018-09-26 DIAGNOSIS — I50.22 CHRONIC SYSTOLIC CHF (CONGESTIVE HEART FAILURE): Chronic | ICD-10-CM

## 2018-09-26 DIAGNOSIS — K25.9 GASTRIC ULCER, UNSPECIFIED CHRONICITY, UNSPECIFIED WHETHER GASTRIC ULCER HEMORRHAGE OR PERFORATION PRESENT: ICD-10-CM

## 2018-09-26 DIAGNOSIS — K22.710 BARRETT'S ESOPHAGUS WITH LOW GRADE DYSPLASIA: Chronic | ICD-10-CM

## 2018-09-26 DIAGNOSIS — D69.2 SENILE PURPURA: ICD-10-CM

## 2018-09-26 DIAGNOSIS — G43.009 MIGRAINE WITHOUT AURA AND WITHOUT STATUS MIGRAINOSUS, NOT INTRACTABLE: ICD-10-CM

## 2018-09-26 PROBLEM — H25.10 SENILE NUCLEAR SCLEROSIS: Status: RESOLVED | Noted: 2018-07-17 | Resolved: 2018-09-26

## 2018-09-26 PROBLEM — Z85.89 HISTORY OF SQUAMOUS CELL CARCINOMA: Status: RESOLVED | Noted: 2017-05-02 | Resolved: 2018-09-26

## 2018-09-26 PROBLEM — Z85.828 PERSONAL HISTORY OF SKIN CANCER: Status: RESOLVED | Noted: 2017-04-13 | Resolved: 2018-09-26

## 2018-09-26 PROBLEM — Z98.890 POST-OPERATIVE STATE: Status: RESOLVED | Noted: 2018-07-18 | Resolved: 2018-09-26

## 2018-09-26 PROBLEM — H52.7 REFRACTIVE ERROR: Status: RESOLVED | Noted: 2018-06-27 | Resolved: 2018-09-26

## 2018-09-26 PROBLEM — H25.12 NUCLEAR SCLEROSIS, LEFT: Status: RESOLVED | Noted: 2018-06-27 | Resolved: 2018-09-26

## 2018-09-26 PROCEDURE — 99999 PR PBB SHADOW E&M-EST. PATIENT-LVL V: CPT | Mod: PBBFAC,,, | Performed by: NURSE PRACTITIONER

## 2018-09-26 PROCEDURE — 72050 X-RAY EXAM NECK SPINE 4/5VWS: CPT | Mod: 26,,, | Performed by: RADIOLOGY

## 2018-09-26 PROCEDURE — 99499 UNLISTED E&M SERVICE: CPT | Mod: HCNC,S$GLB,, | Performed by: NURSE PRACTITIONER

## 2018-09-26 PROCEDURE — 72050 X-RAY EXAM NECK SPINE 4/5VWS: CPT | Mod: TC,PO

## 2018-09-26 PROCEDURE — 99213 OFFICE O/P EST LOW 20 MIN: CPT | Mod: PBBFAC,27,PO,25 | Performed by: INTERNAL MEDICINE

## 2018-09-26 PROCEDURE — G0439 PPPS, SUBSEQ VISIT: HCPCS | Mod: ,,, | Performed by: NURSE PRACTITIONER

## 2018-09-26 PROCEDURE — 1101F PT FALLS ASSESS-DOCD LE1/YR: CPT | Mod: CPTII,,, | Performed by: INTERNAL MEDICINE

## 2018-09-26 PROCEDURE — 3078F DIAST BP <80 MM HG: CPT | Mod: CPTII,,, | Performed by: INTERNAL MEDICINE

## 2018-09-26 PROCEDURE — 90662 IIV NO PRSV INCREASED AG IM: CPT | Mod: PBBFAC,PO

## 2018-09-26 PROCEDURE — 3074F SYST BP LT 130 MM HG: CPT | Mod: CPTII,,, | Performed by: INTERNAL MEDICINE

## 2018-09-26 PROCEDURE — 3074F SYST BP LT 130 MM HG: CPT | Mod: CPTII,,, | Performed by: NURSE PRACTITIONER

## 2018-09-26 PROCEDURE — 99215 OFFICE O/P EST HI 40 MIN: CPT | Mod: PBBFAC,PO | Performed by: NURSE PRACTITIONER

## 2018-09-26 PROCEDURE — 99214 OFFICE O/P EST MOD 30 MIN: CPT | Mod: S$PBB,,, | Performed by: INTERNAL MEDICINE

## 2018-09-26 PROCEDURE — 99999 PR PBB SHADOW E&M-EST. PATIENT-LVL III: CPT | Mod: PBBFAC,,, | Performed by: INTERNAL MEDICINE

## 2018-09-26 PROCEDURE — 3078F DIAST BP <80 MM HG: CPT | Mod: CPTII,,, | Performed by: NURSE PRACTITIONER

## 2018-09-26 NOTE — Clinical Note
Primary Care Providers:Grey Forbes DO, DO (General)Your patient was seen today for a HRA visit. Gap(s) in care (HEDIS gaps) have been identified during this visit that require additional testing and possible follow up.Orders Placed This Encounter    DXA Bone Density Spine And Hip        Standing Status: Future        Standing Expiration Date: 9/26/2021        Order Specific Question: May the Radiologist modify the order per protocol to meet the clinical needs of the patient?        Answer: Yes    Ambulatory Referral to Audiology        Referral Priority:Routine        Referral Type:Audiology Exam        Referral Reason:Specialty Services Required        Requested Specialty:Audiology        Number of Visits Requested:1    Ambulatory Referral to Cardiology        Referral Priority:Routine        Referral Type:Consultation        Referral Reason:Specialty Services Required        Requested Specialty:Cardiology        Number of Visits Requested:1These or

## 2018-09-26 NOTE — PATIENT INSTRUCTIONS
1. Please schedule cardiology and pulmonary appts for follow up visits          Counseling and Referral of Other Preventative  (Italic type indicates deductible and co-insurance are waived)    Patient Name: Paul Browning  Today's Date: 9/26/2018    Health Maintenance       Date Due Completion Date    Influenza Vaccine After PCP visit   11/3/2017 (Done)    Lipid Panel Due   5/25/2017    TETANUS VACCINE 03/03/2026   3/3/2016        Orders Placed This Encounter   Procedures    DXA Bone Density Spine And Hip    Ambulatory Referral to Audiology     The following information is provided to all patients.  This information is to help you find resources for any of the problems found today that may be affecting your health:                Living healthy guide: www.[facility].[place].[facility]      Understanding Diabetes: www.diabetes.org      Eating healthy: www.cdc.gov/healthyweight      CDC home safety checklist: www.cdc.gov/steadi/patient.html      Agency on Aging: www.goea.[place].[facility]      Alcoholics anonymous (AA): www.aa.org      Physical Activity: www.yvette.nih.gov/dv8vcyu      Tobacco use: www.quitwithusla.org

## 2018-09-26 NOTE — PROGRESS NOTES
"Paul Browning presented for a  Medicare AWV and comprehensive Health Risk Assessment today. The following components were reviewed and updated:    · Medical history  · Family History  · Social history  · Allergies and Current Medications  · Health Risk Assessment  · Health Maintenance  · Care Team     ** See Completed Assessments for Annual Wellness Visit within the encounter summary.**       The following assessments were completed:  · Living Situation  · CAGE  · Depression Screening  · Timed Get Up and Go  · Whisper Test  · Cognitive Function Screening  · Nutrition Screening  · ADL Screening  · PAQ Screening    Vitals:    09/26/18 1255   BP: 110/60   Pulse: 65   Resp: 15   SpO2: 97%   Weight: 69.4 kg (153 lb)   Height: 5' 7" (1.702 m)     Body mass index is 23.96 kg/m².  Physical Exam   Constitutional: He is oriented to person, place, and time. He appears well-developed and well-nourished.   HENT:   Head: Normocephalic and atraumatic.   Right Ear: External ear normal.   Left Ear: External ear normal.   Cardiovascular: Normal rate, regular rhythm and normal heart sounds.   No murmur heard.  Pulmonary/Chest: Effort normal and breath sounds normal.   Abdominal: Soft. Bowel sounds are normal. There is no tenderness.   Musculoskeletal: Normal range of motion. He exhibits no edema.   Neurological: He is alert and oriented to person, place, and time.   Skin: Skin is warm and dry.   Psychiatric: He has a normal mood and affect. His behavior is normal. Judgment and thought content normal.   Nursing note and vitals reviewed.        Diagnoses and health risks identified today and associated recommendations/orders:    1. Age-related osteoporosis without current pathological fracture  Stable- followed by PCP  - DXA Bone Density Spine And Hip; Future    2. Chronic obstructive pulmonary disease, unspecified COPD type  Stable- followed by PCP, pulmonary    3. History of ST elevation myocardial infarction (STEMI)  Stable- " followed by PCP  - Ambulatory Referral to Cardiology    4. History of third degree heart block  Stable- followed by PCP  - Ambulatory Referral to Cardiology    5. Pulmonary sarcoidosis  Stable- followed by PCP, pulmonary    6. Atherosclerosis of aorta  Stable- followed by PCP    7. S/P CABG x 2  Stable- followed by PCP    8. Senile purpura  Stable- followed by PCP    9. Migraine without aura and without status migrainosus, not intractable  Stable- followed by PCP    10. Peripheral arterial disease  Stable- followed by PCP    11. Dry eye syndrome of both eyes  Stable- followed by opth    12. Coronary artery disease involving native coronary artery of native heart without angina pectoris  Stable- followed by PCP  - Ambulatory Referral to Cardiology    13. Essential hypertension  Stable- followed by PCP  - Ambulatory Referral to Cardiology    14. Hyperlipidemia, unspecified hyperlipidemia type  Stable- followed by PCP    15. Stevens's esophagus with low grade dysplasia  Stable- followed by gastroenterology    16. Chronic systolic CHF (congestive heart failure)  Stable- followed by PCP  - Ambulatory Referral to Cardiology    17. Carotid artery disease without cerebral infarction  Stable- followed by PCP    18. MDD (major depressive disorder), recurrent episode, mild  Stable- followed by PCP    19. Insomnia, unspecified type  Stable- followed by PCP    20. Gastric ulcer, unspecified chronicity, unspecified whether gastric ulcer hemorrhage or perforation present  Stable- followed by gastroenterolgy    21. Decreased hearing of both ears  Stable- followed by PCP  - Ambulatory Referral to Audiology    22. Encounter for preventive health examination  Assessments completed  Preventative health recommendations reviewed         Provided Paul with a 5-10 year written screening schedule and personal prevention plan. Recommendations were developed using the USPSTF age appropriate recommendations. Education, counseling, and  referrals were provided as needed. After Visit Summary printed and given to patient which includes a list of additional screenings\tests needed. Not driving 2 to vision- received new glasses today- c/o chronic H/A & fell the other day due to diplopia & H/A. 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 Son jaida present today & brought parents for HRA visit. Pt overdue on F/U appts with cardiology & pulmonary MDs. He will schedule annual labs.    Follow-up in about 1 year (around 9/26/2019) for HRA.    Bijal Trotter NP

## 2018-09-26 NOTE — PROGRESS NOTES
Subjective:       Patient ID: Paul Browning is a 77 y.o. male.    Chief Complaint: Dizziness (off and on.  none today.  ); Headache (head pain at 7 today.   ); and seeing double (just got new glasses today and not sure if it will help. )    HPI   77 y.o. Male with CAD, systolic heart failure, CAD, Carotid disease/PAD, HLD, HTN, COPD/sarcoidosis, Insomnia, Aortic atherosclerosis, MDD, senile purpura, Hx of SCC is here for f/u. Pt is still experiencing intermittent dizziness a/w standing only. None at rest or with sitting.     He is also c/o intermittent headaches located on his posterior neck which is radiating to the back of the head described as a dull ache. Stress seems to make it worse.    Review of Systems   Constitutional: Negative for activity change, appetite change, chills, diaphoresis, fatigue, fever and unexpected weight change.   HENT: Negative for postnasal drip, rhinorrhea, sinus pressure, sneezing, sore throat, trouble swallowing and voice change.    Respiratory: Negative for cough, shortness of breath and wheezing.    Cardiovascular: Negative for chest pain, palpitations and leg swelling.   Gastrointestinal: Negative for abdominal pain, blood in stool, constipation, diarrhea, nausea and vomiting.   Genitourinary: Negative for dysuria.   Musculoskeletal: Positive for myalgias and neck pain. Negative for arthralgias.   Skin: Negative for rash and wound.   Allergic/Immunologic: Negative for environmental allergies and food allergies.   Neurological: Positive for dizziness and headaches. Negative for tremors, seizures, syncope, facial asymmetry, speech difficulty, weakness, light-headedness and numbness.   Hematological: Negative for adenopathy. Does not bruise/bleed easily.       Objective:      Physical Exam   Constitutional: He is oriented to person, place, and time. He appears well-developed and well-nourished. No distress.   HENT:   Head: Normocephalic and atraumatic.   Right Ear:  External ear normal.   Left Ear: External ear normal.   Nose: Nose normal.   Mouth/Throat: Oropharynx is clear and moist. No oropharyngeal exudate.   Eyes: Conjunctivae and EOM are normal. Pupils are equal, round, and reactive to light. Right eye exhibits no discharge. Left eye exhibits no discharge. No scleral icterus.   Neck: Normal range of motion. Neck supple. No JVD present.   Cardiovascular: Normal rate, regular rhythm and normal heart sounds.   No murmur heard.  Pulmonary/Chest: Effort normal and breath sounds normal. No respiratory distress. He has no wheezes. He has no rales.   Abdominal: Soft. Bowel sounds are normal. There is no tenderness.   Musculoskeletal: He exhibits no edema.        Cervical back: He exhibits tenderness and pain.   Lymphadenopathy:     He has no cervical adenopathy.   Neurological: He is alert and oriented to person, place, and time.   Skin: Skin is warm and dry. No rash noted. He is not diaphoretic. No pallor.       Assessment:       1. Dizziness    2. Tension headache    3. Cervical pain (neck)        Plan:    1. Only with standing, pt advised to change positions slowly    2/3. May take Tylenol PRN and start Heat PRN          X-ray cervical spine

## 2018-10-04 ENCOUNTER — TELEPHONE (OUTPATIENT)
Dept: INTERNAL MEDICINE | Facility: CLINIC | Age: 78
End: 2018-10-04

## 2018-10-04 NOTE — TELEPHONE ENCOUNTER
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

## 2018-10-04 NOTE — TELEPHONE ENCOUNTER
----- Message from Laura Marquis sent at 10/4/2018  2:49 PM CDT -----  Contact: Wife Mesha Home # [phone]  Patient would like to get test results    Name of test (lab, mammo, etc.):  X-Ray     Date of test:  09/26/2018    Ordering provider: Doctor Naveen Forbes     Where was the test performed:  At the [place] location     Would you prefer a response via [employer]?:  No    Comments:  Patient would like a call back please.

## 2018-10-05 RX ORDER — SERTRALINE HYDROCHLORIDE 50 MG/1
TABLET, FILM COATED ORAL
Qty: 90 TABLET | Refills: 3 | Status: SHIPPED | OUTPATIENT
Start: 2018-10-05 | End: 2019-12-04 | Stop reason: SDUPTHER

## 2018-10-05 RX ORDER — METOPROLOL TARTRATE 25 MG/1
TABLET, FILM COATED ORAL
Qty: 180 TABLET | Refills: 3 | Status: SHIPPED | OUTPATIENT
Start: 2018-10-05 | End: 2019-01-24 | Stop reason: SDUPTHER

## 2018-10-08 RX ORDER — ATORVASTATIN CALCIUM 40 MG/1
TABLET, FILM COATED ORAL
Qty: 90 TABLET | Refills: 3 | Status: SHIPPED | OUTPATIENT
Start: 2018-10-08 | End: 2019-11-25 | Stop reason: SDUPTHER

## 2018-10-10 ENCOUNTER — PATIENT MESSAGE (OUTPATIENT)
Dept: CARDIOLOGY | Facility: CLINIC | Age: 78
End: 2018-10-10

## 2018-10-15 ENCOUNTER — TELEPHONE (OUTPATIENT)
Dept: ENDOSCOPY | Facility: HOSPITAL | Age: 78
End: 2018-10-15

## 2018-10-15 ENCOUNTER — TELEPHONE (OUTPATIENT)
Dept: OPHTHALMOLOGY | Facility: CLINIC | Age: 78
End: 2018-10-15

## 2018-10-15 RX ORDER — SODIUM CHLORIDE 9 MG/ML
INJECTION, SOLUTION INTRAVENOUS CONTINUOUS
Status: CANCELLED | OUTPATIENT
Start: 2018-10-15

## 2018-10-15 RX ORDER — SODIUM CHLORIDE 0.9 % (FLUSH) 0.9 %
3 SYRINGE (ML) INJECTION
Status: CANCELLED | OUTPATIENT
Start: 2018-10-15

## 2018-10-15 NOTE — TELEPHONE ENCOUNTER
----- Message from Haylee Dodge sent at 10/15/2018  8:02 AM CDT -----  Contact: suzette Winn [phone]  rose - is asking to reschedule procedure - please call suzette Winn [phone]

## 2018-10-15 NOTE — TELEPHONE ENCOUNTER
Spoke with patient's son. Patient is having problems with headaches and dizziness. He want to hold off on EGD until he has this addressed.

## 2018-10-15 NOTE — TELEPHONE ENCOUNTER
----- Message from Crystal Pham sent at 10/15/2018  8:09 AM CDT -----  Contact: Paul Olivo  Son called to inform that his dad complains of not being able to see out of the newly prescribed glasses and he is getting headaches.  He would like to bring him in to have them over checked [phone]

## 2018-10-17 ENCOUNTER — LAB VISIT (OUTPATIENT)
Dept: LAB | Facility: HOSPITAL | Age: 78
End: 2018-10-17
Attending: INTERNAL MEDICINE
Payer: MEDICARE

## 2018-10-17 ENCOUNTER — OFFICE VISIT (OUTPATIENT)
Dept: OPHTHALMOLOGY | Facility: CLINIC | Age: 78
End: 2018-10-17
Payer: MEDICARE

## 2018-10-17 DIAGNOSIS — I77.9 CAROTID ARTERY DISEASE WITHOUT CEREBRAL INFARCTION: Chronic | ICD-10-CM

## 2018-10-17 DIAGNOSIS — H50.50 HETEROPHORIA: ICD-10-CM

## 2018-10-17 DIAGNOSIS — Z00.00 ANNUAL PHYSICAL EXAM: ICD-10-CM

## 2018-10-17 DIAGNOSIS — H52.7 REFRACTIVE ERROR: ICD-10-CM

## 2018-10-17 DIAGNOSIS — I25.10 CORONARY ARTERY DISEASE DUE TO LIPID RICH PLAQUE: Chronic | ICD-10-CM

## 2018-10-17 DIAGNOSIS — Z98.890 POST-OPERATIVE STATE: Primary | ICD-10-CM

## 2018-10-17 DIAGNOSIS — K22.710 BARRETT'S ESOPHAGUS WITH LOW GRADE DYSPLASIA: Chronic | ICD-10-CM

## 2018-10-17 DIAGNOSIS — I10 ESSENTIAL HYPERTENSION: Chronic | ICD-10-CM

## 2018-10-17 DIAGNOSIS — I50.22 CHRONIC SYSTOLIC CHF (CONGESTIVE HEART FAILURE): Chronic | ICD-10-CM

## 2018-10-17 DIAGNOSIS — I25.83 CORONARY ARTERY DISEASE DUE TO LIPID RICH PLAQUE: Chronic | ICD-10-CM

## 2018-10-17 LAB
CHOLEST SERPL-MCNC: 122 MG/DL
CHOLEST/HDLC SERPL: 3.3 {RATIO}
HDLC SERPL-MCNC: 37 MG/DL
HDLC SERPL: 30.3 %
LDLC SERPL CALC-MCNC: 68.2 MG/DL
NONHDLC SERPL-MCNC: 85 MG/DL
T4 FREE SERPL-MCNC: 1.12 NG/DL
TRIGL SERPL-MCNC: 84 MG/DL
TSH SERPL DL<=0.005 MIU/L-ACNC: 4.1 UIU/ML

## 2018-10-17 PROCEDURE — 99024 POSTOP FOLLOW-UP VISIT: CPT | Mod: ,,, | Performed by: OPHTHALMOLOGY

## 2018-10-17 PROCEDURE — 84439 ASSAY OF FREE THYROXINE: CPT

## 2018-10-17 PROCEDURE — 84443 ASSAY THYROID STIM HORMONE: CPT

## 2018-10-17 PROCEDURE — 99999 PR PBB SHADOW E&M-EST. PATIENT-LVL II: CPT | Mod: PBBFAC,,, | Performed by: OPHTHALMOLOGY

## 2018-10-17 PROCEDURE — 99212 OFFICE O/P EST SF 10 MIN: CPT | Mod: PBBFAC,PO | Performed by: OPHTHALMOLOGY

## 2018-10-17 PROCEDURE — 80061 LIPID PANEL: CPT

## 2018-10-17 PROCEDURE — 36415 COLL VENOUS BLD VENIPUNCTURE: CPT | Mod: PO

## 2018-10-17 NOTE — PROGRESS NOTES
Subjective:       Patient ID: Paul Browning is a 78 y.o. male.    Chief Complaint: Post-op Evaluation (Glasses Recheck )    HPI     Post-op Evaluation      Additional comments: Glasses Recheck               Comments     78 y.o. Male is here for glasses check. States that he see better w/o   correction at distance and near. Pt states that he had glasses for two   weeks now. Prior to Cataract Sx pt states that he never wore glasses.   Diplopia with glasses. Notice when looking at the road the lines are   double side by side at distance. Eye pain left eye. No f/f. Experience   dizziness since wearing the glasses, seem like everyday.      Eye Meds: AT's BID OU           Last edited by EMMANUEL Cook on 10/17/2018 12:18 PM. (History)             Assessment:       1. Post-operative state    2. Heterophoria    3. Refractive error        Plan:       S/p CE OU- Doing well.  Heterophoria-Pt having transient episodes of horizontal & vertical diplopia with new glasses.  RE        Refer to Dr Mir for eval/tx.

## 2018-11-21 DIAGNOSIS — R25.2 CRAMPS OF LOWER EXTREMITY: ICD-10-CM

## 2018-11-21 DIAGNOSIS — R25.2 MUSCLE CRAMPS: ICD-10-CM

## 2018-11-21 RX ORDER — POTASSIUM CHLORIDE 750 MG/1
TABLET, EXTENDED RELEASE ORAL
Qty: 90 TABLET | Refills: 0 | Status: SHIPPED | OUTPATIENT
Start: 2018-11-21 | End: 2018-12-18 | Stop reason: ALTCHOICE

## 2018-11-27 ENCOUNTER — INITIAL CONSULT (OUTPATIENT)
Dept: OPTOMETRY | Facility: CLINIC | Age: 78
End: 2018-11-27
Payer: MEDICARE

## 2018-11-27 DIAGNOSIS — H53.2 BINOCULAR VISION DISORDER WITH DIPLOPIA: ICD-10-CM

## 2018-11-27 DIAGNOSIS — Z98.42 S/P BILATERAL CATARACT EXTRACTION: ICD-10-CM

## 2018-11-27 DIAGNOSIS — H52.203 ASTIGMATISM OF BOTH EYES, UNSPECIFIED TYPE: ICD-10-CM

## 2018-11-27 DIAGNOSIS — Z98.41 S/P BILATERAL CATARACT EXTRACTION: ICD-10-CM

## 2018-11-27 DIAGNOSIS — Z96.1 PSEUDOPHAKIA OF BOTH EYES: ICD-10-CM

## 2018-11-27 DIAGNOSIS — H51.9 IMBALANCE OF MUSCLE OF EYE: Primary | ICD-10-CM

## 2018-11-27 PROCEDURE — 92015 DETERMINE REFRACTIVE STATE: CPT | Mod: S$GLB,,, | Performed by: OPTOMETRIST

## 2018-11-27 PROCEDURE — 92014 COMPRE OPH EXAM EST PT 1/>: CPT | Mod: S$GLB,,, | Performed by: OPTOMETRIST

## 2018-11-27 PROCEDURE — 99999 PR PBB SHADOW E&M-EST. PATIENT-LVL III: CPT | Mod: PBBFAC,,, | Performed by: OPTOMETRIST

## 2018-11-27 NOTE — PATIENT INSTRUCTIONS
S/p cataract surgery in both eyes, with bilateral posterior chamber intraocular lens.  Mr. Browning notes problem with blurred vision and horizontal diplopia with his new lenses.  Rechecked refraction.  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  OCT of retina at macula done today.  No evidence of abnormality.   Cover test indicates presence of eso deviation, but no vertical imbalance noted.    New  Spectacle lens Rx issued with power change in each lens, and with addition of horizontal prismatic correction in each lens to aid fusion.  Call/return if problems persist with new lenses

## 2018-11-27 NOTE — LETTER
December 2, 2018      León Talamantes MD  [address]  [place] [address]           [facility] - Optometry  [address]  [facility] [address]  Phone: [phone]  Fax: [phone]          Patient: Paul Browning   MR Number: [id number]   YOB: 1940   Date of Visit: 11/27/2018       Dear Dr. León Talamantes:    Thank you for referring Paul Browning to me for evaluation. Attached you will find relevant portions of my assessment and plan of care.    If you have questions, please do not hesitate to call me. I look forward to following Paul Browning along with you.    Sincerely,    Haile Shipman, OD    Enclosure  CC:  No Recipients    If you would like to receive this communication electronically, please contact [email or web address] or [phone] to request more information on [employer] access.    For providers and/or their staff who would like to refer a patient to [facility], please contact us through our one-stop-shop provider referral line, [facility], at [phone].    If you feel you have received this communication in error or would no longer like to receive these types of communications, please e-mail [email or web address]

## 2018-12-18 ENCOUNTER — OFFICE VISIT (OUTPATIENT)
Dept: CARDIOLOGY | Facility: CLINIC | Age: 78
End: 2018-12-18
Payer: MEDICARE

## 2018-12-18 VITALS
HEART RATE: 69 BPM | HEIGHT: 65 IN | WEIGHT: 154.56 LBS | BODY MASS INDEX: 25.75 KG/M2 | DIASTOLIC BLOOD PRESSURE: 60 MMHG | SYSTOLIC BLOOD PRESSURE: 102 MMHG | OXYGEN SATURATION: 97 %

## 2018-12-18 DIAGNOSIS — I25.10 CORONARY ARTERY DISEASE INVOLVING NATIVE CORONARY ARTERY OF NATIVE HEART WITHOUT ANGINA PECTORIS: Chronic | ICD-10-CM

## 2018-12-18 DIAGNOSIS — I10 ESSENTIAL HYPERTENSION: Primary | Chronic | ICD-10-CM

## 2018-12-18 DIAGNOSIS — E78.5 HYPERLIPIDEMIA, UNSPECIFIED HYPERLIPIDEMIA TYPE: Chronic | ICD-10-CM

## 2018-12-18 PROCEDURE — 3078F DIAST BP <80 MM HG: CPT | Mod: CPTII,HCNC,GC,S$GLB | Performed by: INTERNAL MEDICINE

## 2018-12-18 PROCEDURE — 3074F SYST BP LT 130 MM HG: CPT | Mod: CPTII,HCNC,GC,S$GLB | Performed by: INTERNAL MEDICINE

## 2018-12-18 PROCEDURE — 99214 OFFICE O/P EST MOD 30 MIN: CPT | Mod: HCNC,GC,S$GLB, | Performed by: INTERNAL MEDICINE

## 2018-12-18 PROCEDURE — 99999 PR PBB SHADOW E&M-EST. PATIENT-LVL IV: CPT | Mod: PBBFAC,HCNC,GC, | Performed by: INTERNAL MEDICINE

## 2018-12-18 PROCEDURE — 1101F PT FALLS ASSESS-DOCD LE1/YR: CPT | Mod: CPTII,HCNC,GC,S$GLB | Performed by: INTERNAL MEDICINE

## 2018-12-18 NOTE — ASSESSMENT & PLAN NOTE
--some vague description of chest tightness typically with exercise and agitation  --will order cardiac PET  --advised to hold off on exercise for now until we get more information with the PET

## 2018-12-18 NOTE — PROGRESS NOTES
Subjective:       Patient ID: Paul Browning is a 78 y.o. male.    Chief Complaint: Hypertension    HPI   Mr. Browning is an 78 year old gentleman with a past medical history of HTN, HLD, and CAD (STEMI in 2014 s/p PCI to RCA and CABG with LIMA-LAD and SVG-ramus) who presents for follow up. He is accompanied by his wife and his son. He states that he has been doing well, but his main complaints are two fold: his vision is still disturbed for which he follows with ophthalmology for, and he has some SOB and decreased endurance with walking. He stated that he used to be able to walk a mile to 4 miles a day, but since his wife (who I also care for) became sick and wheelchair bound, he has spent most of his days in the house with her and not exercising. After extending questioning, he does admit to some chest tightness that sometimes lasts a few minutes, up to a few hours. His wife states that sometimes it occurs when he gets aggravated, sometimes he states it occurs with exertion, and sometimes while walking, he does not get any discomfort. He would like to be able to exercise more and is eager to get back to walking again. He denies any PND, orthopnea, chest pain or SOB. He sleeps in a recliner at night to spend the nights with his wife. He is able to lie flat comfortably and reports some swelling primarily in the left leg where they took his SVG for the graft. He reports some itching in that leg as well which he used to use a salve.    Cardiac TestinD Echo (2015): EF 60-65%, normal diastolic dysfunction, normal RV, biatrial enlargement.  2D Echo (10/15/2014): EF 45-50%, diastolic dysfunction, normal RV, moderate LA enlargement  Cardiac PET (2014): large moderate-severe intensity fixed defect with rhonda-infarct ischemia in the base to mid lateral and inferolateral wall consistent with LCx and PLB occlusion. 20% of myocardium affected.  2D Echo (2014): EF 30-35%, normal RV, mild-mod  "MR. AVELAR (08/19/2014): LM 30%, oLAD 80%, pRCA occluded, pD1 90%, pOM2 subtotal    Review of Systems   Constitutional: Negative for appetite change, chills, fatigue and fever.   HENT: Negative.    Eyes: Negative.    Respiratory: Positive for shortness of breath. Negative for cough.    Cardiovascular: Positive for chest pain and leg swelling. Negative for palpitations.   Gastrointestinal: Negative for abdominal pain, nausea and vomiting.   Genitourinary: Negative for dysuria and urgency.   Musculoskeletal: Negative for arthralgias and myalgias.   Skin: Negative for color change and rash.   Neurological: Negative for dizziness, light-headedness and headaches.   Psychiatric/Behavioral: Negative.        Objective:     Vitals:    12/18/18 1108   BP: 102/60   Pulse: 69   SpO2: 97%   Weight: 70.1 kg (154 lb 8.7 oz)   Height: 5' 5" (1.651 m)     Physical Exam   Constitutional: Vital signs are normal. He appears well-developed and well-nourished. He is active and cooperative. No distress.   HENT:   Head: Normocephalic and atraumatic.   Right Ear: Hearing and external ear normal.   Left Ear: Hearing and external ear normal.   Nose: Nose normal.   Neck: Trachea normal. No tracheal tenderness present. No thyroid mass and no thyromegaly present.   Cardiovascular: Normal rate, regular rhythm, S1 normal, S2 normal, normal heart sounds and normal pulses. Exam reveals no gallop.   No murmur heard.  Pulmonary/Chest: Effort normal and breath sounds normal. No respiratory distress. He has no decreased breath sounds. He has no wheezes. He has no rhonchi. He has no rales.   Abdominal: Soft. Normal appearance.   Skin: Skin is warm, dry and intact.       Assessment:       1. Essential hypertension    2. Hyperlipidemia, unspecified hyperlipidemia type    3. Coronary artery disease involving native coronary artery of native heart without angina pectoris        Plan:     Hyperlipidemia  --well controlled  --continue to monitor    Essential " hypertension  --well controlled  --continue to monitor    CAD (coronary artery disease)  --some vague description of chest tightness typically with exercise and agitation  --will order cardiac PET  --advised to hold off on exercise for now until we get more information with the PET    Staff addendum to follow    Saniya Lopez MD  Cardiology PGY6

## 2019-01-02 ENCOUNTER — TELEPHONE (OUTPATIENT)
Dept: ENDOSCOPY | Facility: HOSPITAL | Age: 79
End: 2019-01-02

## 2019-01-07 ENCOUNTER — CLINICAL SUPPORT (OUTPATIENT)
Dept: CARDIOLOGY | Facility: CLINIC | Age: 79
End: 2019-01-07
Attending: INTERNAL MEDICINE
Payer: MEDICARE

## 2019-01-07 VITALS
HEART RATE: 69 BPM | DIASTOLIC BLOOD PRESSURE: 87 MMHG | SYSTOLIC BLOOD PRESSURE: 129 MMHG | WEIGHT: 154 LBS | HEIGHT: 65 IN | BODY MASS INDEX: 25.66 KG/M2

## 2019-01-07 DIAGNOSIS — I25.10 CORONARY ARTERY DISEASE INVOLVING NATIVE CORONARY ARTERY OF NATIVE HEART WITHOUT ANGINA PECTORIS: ICD-10-CM

## 2019-01-07 LAB
CV STRESS BASE HR: 61
DIASTOLIC BLOOD PRESSURE: 84
NUC REST DIASTOLIC VOLUME INDEX: 50
NUC REST EJECTION FRACTION: 62
NUC REST SYSTOLIC VOLUME INDEX: 19
NUC STRESS DIASTOLIC VOLUME INDEX: 67
NUC STRESS EJECTION FRACTION: 84 %
NUC STRESS SYSTOLIC VOLUME INDEX: 10
OHS CV CPX 85 PERCENT MAX PREDICTED HEART RATE MALE: 119
OHS CV CPX MAX PREDICTED HEART RATE: 140
OHS CV CPX PATIENT IS FEMALE: 0
OHS CV CPX PATIENT IS MALE: 1
OHS CV CPX PEAK DIASTOLIC BLOOD PRESSURE: 66 MMHG
OHS CV CPX PEAK HEAR RATE: 59
OHS CV CPX PEAK RATE PRESSURE PRODUCT: 8142
OHS CV CPX PEAK SYSTOLIC BLOOD PRESSURE: 138
OHS CV CPX PERCENT MAX PREDICTED HEART RATE ACHIEVED: 42
OHS CV CPX RATE PRESSURE PRODUCT PRESENTING: 9760
SYSTOLIC BLOOD PRESSURE: 160

## 2019-01-07 PROCEDURE — A9555 PR RB82 RUBIDIUM: ICD-10-PCS | Mod: HCNC,S$GLB,, | Performed by: INTERNAL MEDICINE

## 2019-01-07 PROCEDURE — 78492 MYOCRD IMG PET MLT RST&STRS: CPT | Mod: HCNC,S$GLB,, | Performed by: INTERNAL MEDICINE

## 2019-01-07 PROCEDURE — 99999 PR PBB SHADOW E&M-EST. PATIENT-LVL II: ICD-10-PCS | Mod: PBBFAC,HCNC,,

## 2019-01-07 PROCEDURE — A9555 RB82 RUBIDIUM: HCPCS | Mod: HCNC,S$GLB,, | Performed by: INTERNAL MEDICINE

## 2019-01-07 PROCEDURE — 99999 PR PBB SHADOW E&M-EST. PATIENT-LVL II: CPT | Mod: PBBFAC,HCNC,,

## 2019-01-07 PROCEDURE — 78492 PET STRESS (CUPID ONLY): ICD-10-PCS | Mod: HCNC,S$GLB,, | Performed by: INTERNAL MEDICINE

## 2019-01-07 RX ORDER — DIPYRIDAMOLE 5 MG/ML
39.2 INJECTION INTRAVENOUS
Status: COMPLETED | OUTPATIENT
Start: 2019-01-07 | End: 2019-01-07

## 2019-01-07 RX ADMIN — DIPYRIDAMOLE 39.2 MG: 5 INJECTION INTRAVENOUS at 01:01

## 2019-01-08 ENCOUNTER — TELEPHONE (OUTPATIENT)
Dept: CARDIOLOGY | Facility: CLINIC | Age: 79
End: 2019-01-08

## 2019-01-08 NOTE — TELEPHONE ENCOUNTER
Attempted to call patient's mobile and home number with no response. Called patient's son and notified him of results of the stress test. PET stress found his known infarct in the OM2 region with no evidence of ischemia. His chest pain does not appear to be related to the heart and so I encouraged him to start exercising again.    Saniya Lopez MD  Cardiology PGY6

## 2019-01-13 ENCOUNTER — PATIENT MESSAGE (OUTPATIENT)
Dept: ENDOSCOPY | Facility: HOSPITAL | Age: 79
End: 2019-01-13

## 2019-01-15 ENCOUNTER — TELEPHONE (OUTPATIENT)
Dept: ENDOSCOPY | Facility: HOSPITAL | Age: 79
End: 2019-01-15

## 2019-01-23 ENCOUNTER — TELEPHONE (OUTPATIENT)
Dept: CARDIOLOGY | Facility: CLINIC | Age: 79
End: 2019-01-23

## 2019-01-23 NOTE — TELEPHONE ENCOUNTER
----- Message from Stacie Melendrez sent at 1/23/2019 11:10 AM CST -----  Contact: Paul valadez [phone]  Pt son says Dr. Lopez took his father off potassium and he thinks his dad may have thrown his Lopressor 25 mg away thinking it was the other medicine. He would like a call to verify he was taken off the other medicine.  LOV 12/18/18 Dr. Lopez    Thanks

## 2019-01-23 NOTE — TELEPHONE ENCOUNTER
Dr. Lopez, The pt's son is calling. Says that the pt may have thrown his metoprolol away thinking that it was potassium. Son says that the pt doesn't have the metoprolol bottle at home. Son is asking if the pt needs to take metoprolol - says that he doesn't have HTN.  Please advise. Thanks, Citlali

## 2019-01-25 RX ORDER — METOPROLOL TARTRATE 25 MG/1
25 TABLET, FILM COATED ORAL 2 TIMES DAILY
Qty: 180 TABLET | Refills: 3 | Status: SHIPPED | OUTPATIENT
Start: 2019-01-25 | End: 2019-02-12

## 2019-01-28 NOTE — TELEPHONE ENCOUNTER
Called patient's son. He stated that he's probably been off of the metoprolol for a few years and found out that he should be taking it from the AVS during the last visit. Given that he's >3 years from his STEMI and has preserved LVEF, will discontinue metoprolol at this time and monitor how he does.    Saniya Lopez MD  Cardiology PGY6

## 2019-01-29 ENCOUNTER — TELEPHONE (OUTPATIENT)
Dept: ENDOSCOPY | Facility: HOSPITAL | Age: 79
End: 2019-01-29

## 2019-01-29 NOTE — TELEPHONE ENCOUNTER
Spoke with patient EGD scheduled for 3/20 at 8:30. He will verify with his son to make sure this date works. Reviewed prep instructions. Mr Browning verbalized understanding.

## 2019-02-12 ENCOUNTER — OFFICE VISIT (OUTPATIENT)
Dept: CARDIOLOGY | Facility: CLINIC | Age: 79
End: 2019-02-12
Payer: MEDICARE

## 2019-02-12 VITALS
SYSTOLIC BLOOD PRESSURE: 136 MMHG | DIASTOLIC BLOOD PRESSURE: 75 MMHG | BODY MASS INDEX: 26.23 KG/M2 | HEIGHT: 65 IN | HEART RATE: 63 BPM | WEIGHT: 157.44 LBS | OXYGEN SATURATION: 96 %

## 2019-02-12 DIAGNOSIS — I25.10 CORONARY ARTERY DISEASE INVOLVING NATIVE CORONARY ARTERY OF NATIVE HEART WITHOUT ANGINA PECTORIS: Chronic | ICD-10-CM

## 2019-02-12 DIAGNOSIS — E78.5 HYPERLIPIDEMIA, UNSPECIFIED HYPERLIPIDEMIA TYPE: Chronic | ICD-10-CM

## 2019-02-12 DIAGNOSIS — Z86.79 HISTORY OF THIRD DEGREE HEART BLOCK: ICD-10-CM

## 2019-02-12 DIAGNOSIS — I10 ESSENTIAL HYPERTENSION: Chronic | ICD-10-CM

## 2019-02-12 PROCEDURE — 99214 OFFICE O/P EST MOD 30 MIN: CPT | Mod: HCNC,GC,S$GLB, | Performed by: INTERNAL MEDICINE

## 2019-02-12 PROCEDURE — 3078F DIAST BP <80 MM HG: CPT | Mod: HCNC,CPTII,GC,S$GLB | Performed by: INTERNAL MEDICINE

## 2019-02-12 PROCEDURE — 99999 PR PBB SHADOW E&M-EST. PATIENT-LVL IV: ICD-10-PCS | Mod: PBBFAC,HCNC,GC, | Performed by: INTERNAL MEDICINE

## 2019-02-12 PROCEDURE — 3075F PR MOST RECENT SYSTOLIC BLOOD PRESS GE 130-139MM HG: ICD-10-PCS | Mod: HCNC,CPTII,GC,S$GLB | Performed by: INTERNAL MEDICINE

## 2019-02-12 PROCEDURE — 3075F SYST BP GE 130 - 139MM HG: CPT | Mod: HCNC,CPTII,GC,S$GLB | Performed by: INTERNAL MEDICINE

## 2019-02-12 PROCEDURE — 99999 PR PBB SHADOW E&M-EST. PATIENT-LVL IV: CPT | Mod: PBBFAC,HCNC,GC, | Performed by: INTERNAL MEDICINE

## 2019-02-12 PROCEDURE — 99214 PR OFFICE/OUTPT VISIT, EST, LEVL IV, 30-39 MIN: ICD-10-PCS | Mod: HCNC,GC,S$GLB, | Performed by: INTERNAL MEDICINE

## 2019-02-12 PROCEDURE — 1101F PR PT FALLS ASSESS DOC 0-1 FALLS W/OUT INJ PAST YR: ICD-10-PCS | Mod: HCNC,CPTII,GC,S$GLB | Performed by: INTERNAL MEDICINE

## 2019-02-12 PROCEDURE — 1101F PT FALLS ASSESS-DOCD LE1/YR: CPT | Mod: HCNC,CPTII,GC,S$GLB | Performed by: INTERNAL MEDICINE

## 2019-02-12 PROCEDURE — 3078F PR MOST RECENT DIASTOLIC BLOOD PRESSURE < 80 MM HG: ICD-10-PCS | Mod: HCNC,CPTII,GC,S$GLB | Performed by: INTERNAL MEDICINE

## 2019-02-12 NOTE — PROGRESS NOTES
Subjective:       Patient ID: Paul Browning is a 78 y.o. male.    Chief Complaint: Hypertension (2 month f/u ); Chest Pain (tightness); Shortness of Breath; and Headache    HPI   Mr. Browning is an 78 year old gentleman with a past medical history of HTN, HLD, and CAD (STEMI in 2014 s/p PCI to RCA and CABG with LIMA-LAD and SVG-ramus) who presents for follow up. He is accompanied by his wife and his son. He states that he has been doing well, but his main complaints are two fold: his vision is still disturbed for which he follows with ophthalmology for, and he has some SOB and decreased endurance with walking. He stated that he used to be able to walk a mile to 4 miles a day, but since his wife (who I also care for) became sick and wheelchair bound, he has spent most of his days in the house with her and not exercising. After extending questioning, he does admit to some chest tightness that sometimes lasts a few minutes, up to a few hours. His wife states that sometimes it occurs when he gets aggravated, sometimes he states it occurs with exertion, and sometimes while walking, he does not get any discomfort. He would like to be able to exercise more and is eager to get back to walking again. He denies any PND, orthopnea, chest pain or SOB. He sleeps in a recliner at night to spend the nights with his wife. He is able to lie flat comfortably and reports some swelling primarily in the left leg where they took his SVG for the graft. He reports some itching in that leg as well which he used to use a salve.    Interval History:  Patient has been doing well. He is walking up to 8 blocks with some SOB and wants to get back to some light weight lifting. He denies chest discomfort at this point. He does not take his blood pressure at home. He notes some cramping in his legs with ambulation after he walks a specific distance.    Cardiac Testing:  Cardiac PET (01/07/2019): There is a small to moderate sized,  "moderate to severe intensity basal inferolateral fixed perfusion abnormality involving 10% of the LV myocardium in the OM2 territory consistent with non-transmural infarct. On past angiogram, the OM2 was subtotally occluded.  2D Echo (01/26/2015): EF 60-65%, normal diastolic dysfunction, normal RV, biatrial enlargement.  2D Echo (10/15/2014): EF 45-50%, diastolic dysfunction, normal RV, moderate LA enlargement  Cardiac PET (08/25/2014): large moderate-severe intensity fixed defect with rhonda-infarct ischemia in the base to mid lateral and inferolateral wall consistent with LCx and PLB occlusion. 20% of myocardium affected.  2D Echo (08/19/2014): EF 30-35%, normal RV, mild-mod MR.  LHC (08/19/2014): LM 30%, oLAD 80%, pRCA occluded, pD1 90%, pOM2 subtotal    Review of Systems   Constitutional: Negative for appetite change, chills, fatigue and fever.   HENT: Negative.    Eyes: Negative.    Respiratory: Positive for shortness of breath. Negative for cough.    Cardiovascular: Positive for leg swelling. Negative for chest pain and palpitations.   Gastrointestinal: Negative for abdominal pain, nausea and vomiting.   Genitourinary: Negative for dysuria and urgency.   Musculoskeletal: Negative for arthralgias and myalgias.   Skin: Negative for color change and rash.   Neurological: Positive for headaches. Negative for dizziness and light-headedness.   Psychiatric/Behavioral: Negative.        Objective:     Vitals:    02/12/19 0925   BP: 136/75   BP Location: Left arm   Patient Position: Sitting   BP Method: Medium (Automatic)   Pulse: 63   SpO2: 96%   Weight: 71.4 kg (157 lb 6.5 oz)   Height: 5' 5" (1.651 m)     Physical Exam   Constitutional: Vital signs are normal. He appears well-developed and well-nourished. He is active and cooperative. No distress.   HENT:   Head: Normocephalic and atraumatic.   Right Ear: Hearing and external ear normal.   Left Ear: Hearing and external ear normal.   Nose: Nose normal.   Neck: Trachea " normal. No tracheal tenderness present. No thyroid mass and no thyromegaly present.   Cardiovascular: Normal rate, regular rhythm, S1 normal, S2 normal, normal heart sounds and normal pulses. Exam reveals no gallop.   No murmur heard.  Pulmonary/Chest: Effort normal and breath sounds normal. No respiratory distress. He has no decreased breath sounds. He has no wheezes. He has no rhonchi. He has no rales.   Abdominal: Soft. Normal appearance.   Skin: Skin is warm, dry and intact.       Assessment:       1. Coronary artery disease involving native coronary artery of native heart without angina pectoris    2. Essential hypertension    3. Hyperlipidemia, unspecified hyperlipidemia type    4. History of third degree heart block        Plan:     CAD (coronary artery disease)  --PET stress test identified a fixed defect consistent with his subtotal OM2 on his last angiogram  --he does not complain of any angina at this point.  --continue to monitor  --continue ASA, lipitor and fish oil. He has not been taking metoprolol for many years, and he has been doing well without it. I have removed the metoprolol from his med list.    Essential hypertension  --slightly elevated, but during last visit, it was lower in the 100s.  --he does not take his BP at home. Advised that he can take it 2-3 times per week so that we can have a log of his BPs and make any adjustments if appropriate.    Hyperlipidemia  --LDL at goal on last check 10/2018.  --continue lipitor and fish oil.    History of third degree heart block  --related to STEMI incident. No recurrence since then.    Staff addendum to follow    Saniya Lopez MD  Cardiology PGY6

## 2019-02-12 NOTE — ASSESSMENT & PLAN NOTE
--PET stress test identified a fixed defect consistent with his subtotal OM2 on his last angiogram  --he does not complain of any angina at this point.  --continue to monitor  --continue ASA, lipitor and fish oil. He has not been taking metoprolol for many years, and he has been doing well without it. I have removed the metoprolol from his med list.

## 2019-02-12 NOTE — ASSESSMENT & PLAN NOTE
--slightly elevated, but during last visit, it was lower in the 100s.  --he does not take his BP at home. Advised that he can take it 2-3 times per week so that we can have a log of his BPs and make any adjustments if appropriate.

## 2019-02-20 ENCOUNTER — TELEPHONE (OUTPATIENT)
Dept: ENDOSCOPY | Facility: HOSPITAL | Age: 79
End: 2019-02-20

## 2019-03-01 ENCOUNTER — OFFICE VISIT (OUTPATIENT)
Dept: OPTOMETRY | Facility: CLINIC | Age: 79
End: 2019-03-01
Payer: MEDICARE

## 2019-03-01 DIAGNOSIS — H53.8 BLURRED VISION, BILATERAL: Primary | ICD-10-CM

## 2019-03-01 DIAGNOSIS — Z98.41 S/P BILATERAL CATARACT EXTRACTION: ICD-10-CM

## 2019-03-01 DIAGNOSIS — H53.2 BINOCULAR VISION DISORDER WITH DIPLOPIA: ICD-10-CM

## 2019-03-01 DIAGNOSIS — Z98.42 S/P BILATERAL CATARACT EXTRACTION: ICD-10-CM

## 2019-03-01 DIAGNOSIS — Z96.1 PSEUDOPHAKIA OF BOTH EYES: ICD-10-CM

## 2019-03-01 DIAGNOSIS — H52.7 REFRACTIVE ERRORS: ICD-10-CM

## 2019-03-01 PROCEDURE — 99999 PR PBB SHADOW E&M-EST. PATIENT-LVL III: CPT | Mod: PBBFAC,HCNC,, | Performed by: OPTOMETRIST

## 2019-03-01 PROCEDURE — 99999 PR PBB SHADOW E&M-EST. PATIENT-LVL III: ICD-10-PCS | Mod: PBBFAC,HCNC,, | Performed by: OPTOMETRIST

## 2019-03-01 PROCEDURE — 99499 UNLISTED E&M SERVICE: CPT | Mod: HCNC,S$GLB,, | Performed by: OPTOMETRIST

## 2019-03-01 PROCEDURE — 99499 NO LOS: ICD-10-PCS | Mod: HCNC,S$GLB,, | Performed by: OPTOMETRIST

## 2019-03-01 NOTE — PATIENT INSTRUCTIONS
S/p cataract surgery in both eyes, with bilateral posterior chamber intraocular lens.  Mr. Browning notes problem with persistent blurred vision with new lenses, but reports no horizontal diplopia with his new lenses with prismatic correction.  VA with glasses comparable in each eye to that achieved on refraction at last visit.   Rechecked refraction.  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  OCT of retina at macula repeated today.   Possible mild epiretinal membrane in each eye, but no need for treatment.    Cover test with new lenses (with prismatic correction) indicates no eso deviation, and no vertical imbalance noted.     Duplicate Spectacle lens Rx issued but with instruction to optical department to remake lenses into single vision distnace and single vision reading lenses, in lieu of bifocal lenses. Call/return if problems persist with new lenses

## 2019-03-01 NOTE — PROGRESS NOTES
HPI     Patient in for progress check.    Patient returns stating he is unable to see with new glasses. Complains of   HA's and blur va    Being followed for (diagnosis):   11/27/2018    Date last seen:  11/27/2018    Doctor last seen:      Prescribed eye medications(s) using:  No     OTC eye medication(s) using:  No     Signs/symptoms of condition resolved/better/stable/worse?:      Allergies/Medications reviewed today?  Yes           Last edited by Veto Grace MA on 3/1/2019 10:47 AM. (History)            Assessment /Plan     For exam results, see Encounter Report.    1. Blurred vision, bilateral     2. S/P bilateral cataract extraction     3. Binocular vision disorder with diplopia     4. Pseudophakia of both eyes     5. Refractive errors                  S/p cataract surgery in both eyes, with bilateral posterior chamber intraocular lens.  Mr. Browning notes problem with persistent blurred vision with new lenses, but reports no horizontal diplopia with his new lenses with prismatic correction.  VA with glasses comparable in each eye to that achieved on refraction at last visit.   Rechecked refraction.  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  OCT of retina at macula repeated today.   Possible mild epiretinal membrane in each eye, but no need for treatment.    Cover test with new lenses (with prismatic correction) indicates no eso deviation, and no vertical imbalance noted.     Duplicate Spectacle lens Rx issued but with instruction to optical department to remake lenses into single vision distnace and single vision reading lenses, in lieu of bifocal lenses. Call/return if problems persist with new lenses

## 2019-03-14 DIAGNOSIS — I10 BENIGN HYPERTENSION: Primary | ICD-10-CM

## 2019-03-14 DIAGNOSIS — E78.5 HYPERLIPIDEMIA, UNSPECIFIED HYPERLIPIDEMIA TYPE: ICD-10-CM

## 2019-03-14 NOTE — PROGRESS NOTES
Order entered and linked for annual in may.  I called son and we set up dates. Last time they were booked 2 hours apart.

## 2019-03-15 ENCOUNTER — CLINICAL SUPPORT (OUTPATIENT)
Dept: AUDIOLOGY | Facility: CLINIC | Age: 79
End: 2019-03-15
Payer: MEDICARE

## 2019-03-15 DIAGNOSIS — H90.3 SENSORINEURAL HEARING LOSS OF BOTH EARS: Primary | ICD-10-CM

## 2019-03-15 PROCEDURE — 92557 COMPREHENSIVE HEARING TEST: CPT | Mod: HCNC,S$GLB,, | Performed by: AUDIOLOGIST

## 2019-03-15 PROCEDURE — 92567 PR TYMPA2METRY: ICD-10-PCS | Mod: HCNC,S$GLB,, | Performed by: AUDIOLOGIST

## 2019-03-15 PROCEDURE — 92557 PR COMPREHENSIVE HEARING TEST: ICD-10-PCS | Mod: HCNC,S$GLB,, | Performed by: AUDIOLOGIST

## 2019-03-15 PROCEDURE — 92567 TYMPANOMETRY: CPT | Mod: HCNC,S$GLB,, | Performed by: AUDIOLOGIST

## 2019-03-15 NOTE — PROGRESS NOTES
Mr. CHANTALE Brwoning was seen in the clinic today for an audiological evaluation.   Mr. Browning reported bilateral hearing loss.    Audiological testing revealed a moderate to severe sensorineural hearing loss for the right ear and a moderate to profound sensorineural hearing loss for the left ear.  A speech reception threshold was obtained at 60 dBHL for each ear.  Speech discrimination was 8% for the right ear and 0% for the left ear.      Tympanometry testing revealed a Type A tympanogram for each ear.    Recommendations:  1. Otologic evaluation  2. Annual hearing evaluation  3. Hearing protection when in noise   4. Hearing aid consultation  5. Cochlear implant consultation

## 2019-03-19 ENCOUNTER — TELEPHONE (OUTPATIENT)
Dept: ENDOSCOPY | Facility: HOSPITAL | Age: 79
End: 2019-03-19

## 2019-03-19 RX ORDER — SODIUM CHLORIDE 0.9 % (FLUSH) 0.9 %
3 SYRINGE (ML) INJECTION
Status: CANCELLED | OUTPATIENT
Start: 2019-03-19

## 2019-03-19 RX ORDER — SODIUM CHLORIDE 9 MG/ML
INJECTION, SOLUTION INTRAVENOUS CONTINUOUS
Status: CANCELLED | OUTPATIENT
Start: 2019-03-19

## 2019-03-20 RX ORDER — PANTOPRAZOLE SODIUM 40 MG/1
TABLET, DELAYED RELEASE ORAL
Qty: 180 TABLET | Refills: 0 | Status: ON HOLD | OUTPATIENT
Start: 2019-03-20 | End: 2019-04-08 | Stop reason: SDUPTHER

## 2019-03-21 ENCOUNTER — TELEPHONE (OUTPATIENT)
Dept: ENDOSCOPY | Facility: HOSPITAL | Age: 79
End: 2019-03-21

## 2019-04-08 ENCOUNTER — HOSPITAL ENCOUNTER (OUTPATIENT)
Facility: HOSPITAL | Age: 79
Discharge: HOME | End: 2019-04-08
Attending: INTERNAL MEDICINE | Admitting: INTERNAL MEDICINE
Payer: MEDICARE

## 2019-04-08 ENCOUNTER — ANESTHESIA EVENT (OUTPATIENT)
Dept: ENDOSCOPY | Facility: HOSPITAL | Age: 79
End: 2019-04-08
Payer: MEDICARE

## 2019-04-08 ENCOUNTER — ANESTHESIA (OUTPATIENT)
Dept: ENDOSCOPY | Facility: HOSPITAL | Age: 79
End: 2019-04-08
Payer: MEDICARE

## 2019-04-08 VITALS
RESPIRATION RATE: 20 BRPM | WEIGHT: 154 LBS | OXYGEN SATURATION: 99 % | HEART RATE: 61 BPM | SYSTOLIC BLOOD PRESSURE: 164 MMHG | BODY MASS INDEX: 24.17 KG/M2 | TEMPERATURE: 98 F | DIASTOLIC BLOOD PRESSURE: 73 MMHG | HEIGHT: 67 IN

## 2019-04-08 DIAGNOSIS — K22.710 BARRETT'S ESOPHAGUS WITH LOW GRADE DYSPLASIA: Primary | Chronic | ICD-10-CM

## 2019-04-08 PROCEDURE — 88305 TISSUE EXAM BY PATHOLOGIST: CPT | Mod: HCNC | Performed by: PATHOLOGY

## 2019-04-08 PROCEDURE — 43270 PR EGD, FLEX, W/ABLATION, TUMOR/POLYP/LESION(S), W/ DILATION: ICD-10-PCS | Mod: HCNC,,, | Performed by: INTERNAL MEDICINE

## 2019-04-08 PROCEDURE — 88342 TISSUE SPECIMEN TO PATHOLOGY - SURGERY: ICD-10-PCS | Mod: 26,HCNC,, | Performed by: PATHOLOGY

## 2019-04-08 PROCEDURE — 88305 TISSUE EXAM BY PATHOLOGIST: CPT | Mod: 26,HCNC,, | Performed by: PATHOLOGY

## 2019-04-08 PROCEDURE — 43270 EGD LESION ABLATION: CPT | Mod: HCNC | Performed by: INTERNAL MEDICINE

## 2019-04-08 PROCEDURE — 37000009 HC ANESTHESIA EA ADD 15 MINS: Mod: HCNC | Performed by: INTERNAL MEDICINE

## 2019-04-08 PROCEDURE — C1886 CATHETER, ABLATION: HCPCS | Mod: HCNC | Performed by: INTERNAL MEDICINE

## 2019-04-08 PROCEDURE — 27201012 HC FORCEPS, HOT/COLD, DISP: Mod: HCNC | Performed by: INTERNAL MEDICINE

## 2019-04-08 PROCEDURE — 88305 TISSUE SPECIMEN TO PATHOLOGY - SURGERY: ICD-10-PCS | Mod: 26,HCNC,, | Performed by: PATHOLOGY

## 2019-04-08 PROCEDURE — D9220A PRA ANESTHESIA: ICD-10-PCS | Mod: HCNC,ANES,, | Performed by: ANESTHESIOLOGY

## 2019-04-08 PROCEDURE — 88342 IMHCHEM/IMCYTCHM 1ST ANTB: CPT | Mod: HCNC | Performed by: PATHOLOGY

## 2019-04-08 PROCEDURE — 37000008 HC ANESTHESIA 1ST 15 MINUTES: Mod: HCNC | Performed by: INTERNAL MEDICINE

## 2019-04-08 PROCEDURE — 25000003 PHARM REV CODE 250: Mod: HCNC | Performed by: INTERNAL MEDICINE

## 2019-04-08 PROCEDURE — 63600175 PHARM REV CODE 636 W HCPCS: Mod: HCNC | Performed by: NURSE ANESTHETIST, CERTIFIED REGISTERED

## 2019-04-08 PROCEDURE — 43239 EGD BIOPSY SINGLE/MULTIPLE: CPT | Mod: HCNC | Performed by: INTERNAL MEDICINE

## 2019-04-08 PROCEDURE — 43239 PR EGD, FLEX, W/BIOPSY, SGL/MULTI: ICD-10-PCS | Mod: 59,HCNC,, | Performed by: INTERNAL MEDICINE

## 2019-04-08 PROCEDURE — 43270 EGD LESION ABLATION: CPT | Mod: HCNC,,, | Performed by: INTERNAL MEDICINE

## 2019-04-08 PROCEDURE — D9220A PRA ANESTHESIA: Mod: HCNC,ANES,, | Performed by: ANESTHESIOLOGY

## 2019-04-08 PROCEDURE — 63600175 PHARM REV CODE 636 W HCPCS: Mod: HCNC | Performed by: INTERNAL MEDICINE

## 2019-04-08 PROCEDURE — 88312 SPECIAL STAINS GROUP 1: CPT | Mod: 26,HCNC,, | Performed by: PATHOLOGY

## 2019-04-08 PROCEDURE — 88312 TISSUE SPECIMEN TO PATHOLOGY - SURGERY: ICD-10-PCS | Mod: 26,HCNC,, | Performed by: PATHOLOGY

## 2019-04-08 PROCEDURE — 88342 IMHCHEM/IMCYTCHM 1ST ANTB: CPT | Mod: 26,HCNC,, | Performed by: PATHOLOGY

## 2019-04-08 PROCEDURE — 43239 EGD BIOPSY SINGLE/MULTIPLE: CPT | Mod: 59,HCNC,, | Performed by: INTERNAL MEDICINE

## 2019-04-08 RX ORDER — HYDROMORPHONE HYDROCHLORIDE 1 MG/ML
0.2 INJECTION, SOLUTION INTRAMUSCULAR; INTRAVENOUS; SUBCUTANEOUS EVERY 5 MIN PRN
Status: DISCONTINUED | OUTPATIENT
Start: 2019-04-08 | End: 2019-04-08 | Stop reason: HOSPADM

## 2019-04-08 RX ORDER — LIDOCAINE HCL/PF 100 MG/5ML
SYRINGE (ML) INTRAVENOUS
Status: DISCONTINUED | OUTPATIENT
Start: 2019-04-08 | End: 2019-04-08

## 2019-04-08 RX ORDER — HYDROMORPHONE HYDROCHLORIDE 1 MG/ML
0.5 INJECTION, SOLUTION INTRAMUSCULAR; INTRAVENOUS; SUBCUTANEOUS ONCE
Status: COMPLETED | OUTPATIENT
Start: 2019-04-08 | End: 2019-04-08

## 2019-04-08 RX ORDER — PROPOFOL 10 MG/ML
VIAL (ML) INTRAVENOUS CONTINUOUS PRN
Status: DISCONTINUED | OUTPATIENT
Start: 2019-04-08 | End: 2019-04-08

## 2019-04-08 RX ORDER — HYDROCODONE BITARTRATE AND ACETAMINOPHEN 7.5; 325 MG/15ML; MG/15ML
15 SOLUTION ORAL EVERY 6 HOURS PRN
Qty: 300 ML | Refills: 0 | Status: SHIPPED | OUTPATIENT
Start: 2019-04-08 | End: 2019-04-13

## 2019-04-08 RX ORDER — PROPOFOL 10 MG/ML
VIAL (ML) INTRAVENOUS
Status: DISCONTINUED | OUTPATIENT
Start: 2019-04-08 | End: 2019-04-08

## 2019-04-08 RX ORDER — PANTOPRAZOLE SODIUM 40 MG/1
40 TABLET, DELAYED RELEASE ORAL 2 TIMES DAILY
Qty: 180 TABLET | Refills: 0 | Status: SHIPPED | OUTPATIENT
Start: 2019-04-08 | End: 2019-06-07 | Stop reason: SDUPTHER

## 2019-04-08 RX ORDER — SUCRALFATE 1 G/1
1 TABLET ORAL 2 TIMES DAILY
Qty: 60 TABLET | Refills: 2 | Status: SHIPPED | OUTPATIENT
Start: 2019-04-08 | End: 2019-07-08

## 2019-04-08 RX ORDER — METOPROLOL TARTRATE 25 MG/1
25 TABLET, FILM COATED ORAL ONCE
COMMUNITY
End: 2019-07-08

## 2019-04-08 RX ORDER — SODIUM CHLORIDE 9 MG/ML
INJECTION, SOLUTION INTRAVENOUS CONTINUOUS
Status: DISCONTINUED | OUTPATIENT
Start: 2019-04-08 | End: 2019-04-08 | Stop reason: HOSPADM

## 2019-04-08 RX ORDER — SODIUM CHLORIDE 0.9 % (FLUSH) 0.9 %
10 SYRINGE (ML) INJECTION
Status: CANCELLED | OUTPATIENT
Start: 2019-04-08

## 2019-04-08 RX ORDER — MEPERIDINE HYDROCHLORIDE 50 MG/ML
12.5 INJECTION INTRAMUSCULAR; INTRAVENOUS; SUBCUTANEOUS EVERY 10 MIN PRN
Status: DISCONTINUED | OUTPATIENT
Start: 2019-04-08 | End: 2019-04-08 | Stop reason: HOSPADM

## 2019-04-08 RX ORDER — SODIUM CHLORIDE 0.9 % (FLUSH) 0.9 %
3 SYRINGE (ML) INJECTION
Status: DISCONTINUED | OUTPATIENT
Start: 2019-04-08 | End: 2019-04-08 | Stop reason: HOSPADM

## 2019-04-08 RX ORDER — SODIUM CHLORIDE 9 MG/ML
INJECTION, SOLUTION INTRAVENOUS CONTINUOUS
Status: CANCELLED | OUTPATIENT
Start: 2019-04-08

## 2019-04-08 RX ADMIN — SODIUM CHLORIDE: 0.9 INJECTION, SOLUTION INTRAVENOUS at 08:04

## 2019-04-08 RX ADMIN — PROPOFOL 150 MCG/KG/MIN: 10 INJECTION, EMULSION INTRAVENOUS at 08:04

## 2019-04-08 RX ADMIN — HYDROMORPHONE HYDROCHLORIDE 0.5 MG: 1 INJECTION, SOLUTION INTRAMUSCULAR; INTRAVENOUS; SUBCUTANEOUS at 11:04

## 2019-04-08 RX ADMIN — PROPOFOL 50 MG: 10 INJECTION, EMULSION INTRAVENOUS at 08:04

## 2019-04-08 RX ADMIN — LIDOCAINE HYDROCHLORIDE 50 MG: 20 INJECTION, SOLUTION INTRAVENOUS at 08:04

## 2019-04-08 NOTE — DISCHARGE SUMMARY
Discharge Summary/Instructions after an Endoscopic Procedure    Patient Name: Paul Browning  Patient MRN: [id number]  Patient YOB: 1940    Monday, April 08, 2019  Clifford De La Torre MD    RESTRICTIONS:  During your procedure today, you received medications for sedation.  These medications may affect your judgment, balance and coordination.  Therefore, for 24 hours, you have the following restrictions:     - DO NOT drive a car, operate machinery, make legal/financial decisions, sign important papers or drink alcohol.      ACTIVITY:  Today: no heavy lifting, straining or running due to procedural sedation/anesthesia.  The following day: return to full activity including work.    DIET:  Eat and drink normally unless instructed otherwise.     TREATMENT FOR COMMON SIDE EFFECTS:  - Mild abdominal pain, nausea, belching, bloating or excessive gas:  rest, eat lightly and use a heating pad.  - Sore Throat: treat with throat lozenges and/or gargle with warm salt water.  - Because air was used during the procedure, expelling large amounts of air from your rectum or belching is normal.  - If a bowel prep was taken, you may not have a bowel movement for 1-3 days.  This is normal.      SYMPTOMS TO WATCH FOR AND REPORT TO YOUR PHYSICIAN:  1. Abdominal pain or bloating, other than gas cramps.  2. Chest pain.  3. Back pain.  4. Signs of infection such as: chills or fever occurring within 24 hours after the procedure.  5. Rectal bleeding, which would show as bright red, maroon, or black stools. (A tablespoon of blood from the rectum is not serious, especially if hemorrhoids are present.)  6. Vomiting.  7. Weakness or dizziness.      GO DIRECTLY TO THE NEAREST EMERGENCY ROOM IF YOU HAVE ANY OF THE FOLLOWING:     Difficulty breathing              Chills and/or fever over 101 F   Persistent vomiting and/or vomiting blood   Severe abdominal pain   Severe chest pain   Black, tarry stools   Bleeding- more than one  tablespoon   Any other symptom or condition that you feel may need urgent attention    Your doctor recommends these additional instructions:  If any biopsies were taken, your doctors clinic will contact you in 1 to 2 weeks with any results.    - Discharge patient to home (ambulatory).   - Await pathology results.   - Repeat upper endoscopy in 12 weeks for follow-up of Stevens's ablation.   - Use Protonix (pantoprazole) 40 mg PO BID.   - Resume previous diet.   - Use sucralfate suspension 1 gram PO BID.    For questions, problems or results please call your physician - Clifford De La Torre MD at Work:  [phone].    [facility], EMERGENCY ROOM PHONE NUMBER: [phone]    IF A COMPLICATION OR EMERGENCY SITUATION ARISES AND YOU ARE UNABLE TO REACH YOUR PHYSICIAN - GO DIRECTLY TO THE EMERGENCY ROOM.

## 2019-04-08 NOTE — [employer] PATIENT INSTRUCTIONS
Discharge Summary/Instructions after an Endoscopic Procedure  Patient Name: Paul Browning  Patient MRN: [id number]  Patient YOB: 1940  Monday, April 08, 2019  Clifford De La Torre MD  RESTRICTIONS:  During your procedure today, you received medications for sedation.  These   medications may affect your judgment, balance and coordination.  Therefore,   for 24 hours, you have the following restrictions:   - DO NOT drive a car, operate machinery, make legal/financial decisions,   sign important papers or drink alcohol.    ACTIVITY:  Today: no heavy lifting, straining or running due to procedural   sedation/anesthesia.  The following day: return to full activity including work.  DIET:  Eat and drink normally unless instructed otherwise.     TREATMENT FOR COMMON SIDE EFFECTS:  - Mild abdominal pain, nausea, belching, bloating or excessive gas:  rest,   eat lightly and use a heating pad.  - Sore Throat: treat with throat lozenges and/or gargle with warm salt   water.  - Because air was used during the procedure, expelling large amounts of air   from your rectum or belching is normal.  - If a bowel prep was taken, you may not have a bowel movement for 1-3 days.    This is normal.  SYMPTOMS TO WATCH FOR AND REPORT TO YOUR PHYSICIAN:  1. Abdominal pain or bloating, other than gas cramps.  2. Chest pain.  3. Back pain.  4. Signs of infection such as: chills or fever occurring within 24 hours   after the procedure.  5. Rectal bleeding, which would show as bright red, maroon, or black stools.   (A tablespoon of blood from the rectum is not serious, especially if   hemorrhoids are present.)  6. Vomiting.  7. Weakness or dizziness.  GO DIRECTLY TO THE NEAREST EMERGENCY ROOM IF YOU HAVE ANY OF THE FOLLOWING:      Difficulty breathing              Chills and/or fever over 101 F   Persistent vomiting and/or vomiting blood   Severe abdominal pain   Severe chest pain   Black, tarry stools   Bleeding- more than one  tablespoon   Any other symptom or condition that you feel may need urgent attention  Your doctor recommends these additional instructions:  If any biopsies were taken, your doctors clinic will contact you in 1 to 2   weeks with any results.  - Discharge patient to home (ambulatory).   - Await pathology results.   - Repeat upper endoscopy in 12 weeks for follow-up of Stevens's ablation.   - Use Protonix (pantoprazole) 40 mg PO BID.   - Resume previous diet.   - Use sucralfate suspension 1 gram PO BID.  For questions, problems or results please call your physician - Clifford De La Torre MD at Work:  [phone].  [facility], EMERGENCY ROOM PHONE NUMBER: [phone]  IF A COMPLICATION OR EMERGENCY SITUATION ARISES AND YOU ARE UNABLE TO REACH   YOUR PHYSICIAN - GO DIRECTLY TO THE EMERGENCY ROOM.  Clifford De La Torre MD  4/8/2019 9:28:11 AM  This report has been verified and signed electronically.  [employer]

## 2019-04-08 NOTE — TRANSFER OF CARE
"Anesthesia Transfer of Care Note    Patient: Paul Browning    Procedure(s) Performed: Procedure(s) (LRB):  ESOPHAGOGASTRODUODENOSCOPY (EGD)/poss rfa (N/A)    Patient location: PACU    Anesthesia Type: general    Transport from OR: Transported from OR on room air with adequate spontaneous ventilation    Post pain: adequate analgesia    Post assessment: no apparent anesthetic complications and tolerated procedure well    Post vital signs: stable    Level of consciousness: sedated    Nausea/Vomiting: no nausea/vomiting    Complications: none    Transfer of care protocol was followed      Last vitals:   Visit Vitals  BP (!) 177/69 (BP Location: Left arm, Patient Position: Lying)   Pulse (!) 53   Temp 36.7 °C (98.1 °F) (Temporal)   Resp 18   Ht 5' 7" (1.702 m)   Wt 69.9 kg (154 lb)   SpO2 99%   BMI 24.12 kg/m²     "

## 2019-04-08 NOTE — PLAN OF CARE
Patient states they are ready to be discharged. Instructions and prescription given to patient and family. Both verbalize understanding. Patient tolerating po liquids with no difficulty. Patient states pain is at a tolerable level for them. Anesthesia consent and surgical consent in chart upon patient's discharge from [facility].

## 2019-04-08 NOTE — ANESTHESIA RELEASE NOTE
"Anesthesia Release from PACU Note    Patient: Paul Browning    Procedure(s) Performed: Procedure(s) (LRB):  ESOPHAGOGASTRODUODENOSCOPY (EGD)/poss rfa (N/A)    Anesthesia type: GEN    Post pain: Adequate analgesia reported    Post assessment: no apparent anesthetic complications, tolerated procedure well and no evidence of recall    Post vital signs: BP (!) 114/59   Pulse (!) 48   Temp 36.5 °C (97.7 °F) (Temporal)   Resp 20   Ht 5' 7" (1.702 m)   Wt 69.9 kg (154 lb)   SpO2 99%   BMI 24.12 kg/m²     Level of consciousness: awake, alert and oriented    Nausea/Vomiting: no nausea/no vomiting    Complications: none    Airway Patency: patent    Respiratory: unassisted, spontaneous ventilation, room air    Cardiovascular: stable and blood pressure at baseline    Hydration: euvolemic    "

## 2019-04-08 NOTE — H&P
History & Physical - Short Stay  Gastroenterology      SUBJECTIVE:     Procedure: EGD    Chief Complaint/Indication for Procedure: Stevens's Esophagus    History of Present Illness:  Patient is a 78 y.o. male presents with Stevens's with dysplasia S/P RFA.    PTA Medications   Medication Sig    acetaminophen (TYLENOL) 500 MG tablet Take 1,000 mg by mouth every 6 (six) hours as needed for Pain.    aspirin (ECOTRIN) 81 MG EC tablet TAKE 1 TABLET BY MOUTH ONCE DAILY    atorvastatin (LIPITOR) 40 MG tablet TAKE 1 TABLET BY MOUTH EVERY DAY    metoprolol tartrate (LOPRESSOR) 25 MG tablet Take 25 mg by mouth once.    omega-3 fatty acids-vitamin E (FISH OIL) 1,000 mg Cap Take 1 tablet by mouth 2 (two) times daily. (Patient taking differently: Take 1 tablet by mouth once daily. )    pantoprazole (PROTONIX) 40 MG tablet once daily.     sertraline (ZOLOFT) 50 MG tablet TAKE 1 TABLET BY MOUTH EVERY DAY    triamcinolone acetonide 0.1% (KENALOG) 0.1 % cream Apply topically daily as needed.   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    Hyperlipidemia     Hypertension     Ischemic cardiomyopathy 11/5/2014    MDD (major depressive disorder), recurrent episode, mild 2/17/2016    Osteoporosis 7/20/2016    Peripheral arterial disease 3/18/2016    Sarcoid     Squamous cell carcinoma 09/2016, 5/2016    crown of scalp, left wrist     Past Surgical History:   Procedure Laterality Date    AORTOCORONARY BYPASS-CABG N/A 10/21/2014    Performed by Aníbal V[facility]quez [facility] at [facility] OR [address] [place] [address]    CARDIAC  SURGERY      CATARACT EXTRACTION W/  INTRAOCULAR LENS IMPLANT Right 07/17/2018    Dr. Talamantes    CATARACT EXTRACTION W/  INTRAOCULAR LENS IMPLANT Left 07/31/2018    Dr. Talamantes    CORONARY ARTERY BYPASS GRAFT      x2  10/2014    EGD (ESOPHAGOGASTRODUODENOSCOPY) N/A 5/12/2014    Performed by Clifford De La Torre MD at [facility] ENDO (2ND FLR)    EGD (ESOPHAGOGASTRODUODENOSCOPY) N/A 2/4/2014    Performed by Fan Carlos MD at [facility] ENDO (4TH FLR)    ESOPHAGOGASTRODUODENOSCOPY (EGD) N/A 10/4/2017    Performed by Clifford De La Torre MD at [facility] ENDO (2ND FLR)    ESOPHAGOGASTRODUODENOSCOPY (EGD) N/A 6/20/2017    Performed by Jose A Pham MD at [facility] ENDO (4TH FLR)    ESOPHAGOGASTRODUODENOSCOPY (EGD) N/A 8/12/2014    Performed by Clifford De La Torre MD at [facility] ENDO (2ND FLR)    ESOPHAGOGASTRODUODENOSCOPY (EGD) N/A 7/7/2014    Performed by Clifford De La Torre MD at [facility] ENDO (2ND FLR)    ESOPHAGOGASTRODUODENOSCOPY (EGD) WITH RADIOFREQUENCY TREATMENT OF GASTROESOPHAGEAL JUNCTION      ESOPHAGOGASTRODUODENOSCOPY (EGD)/RFA N/A 3/14/2018    Performed by Clifford De La Torre MD at [facility] ENDO (2ND FLR)    INSERTION, INTRAOCULAR LENS PROSTHESIS Left 7/31/2018    Performed by León Talamantes MD at [facility] OR 1ST FLR    INSERTION, INTRAOCULAR LENS PROSTHESIS Right 7/17/2018    Performed by León Talamantes MD at [facility] OR 1ST FLR    PHACOEMULSIFICATION, CATARACT Left 7/31/2018    Performed by León Talamantes MD at [facility] OR 1ST FLR    PHACOEMULSIFICATION, CATARACT Right 7/17/2018    Performed by León Talamantes MD at [facility] OR 1ST FLR    ULTRASOUND, UPPER GI TRACT, ENDOSCOPIC N/A 5/12/2014    Performed by Clifford De La Torre MD at [facility] END[facility] (2ND FLR)    UMBILICAL HERNIA REPAIR       Family History   Problem Relation Age of Onset    Arrhythmia Mother     Heart failure Brother     No Known Problems Daughter     No Known Problems Son     No Known Problems Son     No Known Problems Son     Colon cancer  Neg Hx     Inflammatory bowel disease Neg Hx     Celiac disease Neg Hx     Melanoma Neg Hx     Amblyopia Neg Hx     Blindness Neg Hx     Cataracts Neg Hx     Glaucoma Neg Hx     Macular degeneration Neg Hx     Retinal detachment Neg Hx     Strabismus Neg Hx      Social History     Tobacco Use    Smoking status: Former Smoker     Packs/day: 2.00     Years: 20.00     Pack years: 40.00     Types: Cigarettes     Last attempt to quit: 1988     Years since quittin.1    Smokeless tobacco: Never Used   Substance Use Topics    Alcohol use: No     Comment: quit drinking beer     Drug use: No       Review of Systems:  Constitutional: no fever or chills  Respiratory: no cough or shortness of breath  Cardiovascular: no chest pain or palpitations  Gastrointestinal: negative for abdominal pain, nausea and vomiting    OBJECTIVE:     Vital Signs (Most Recent)  Temp: 98.1 °F (36.7 °C) (19)  Pulse: (!) 53 (19)  Resp: 18 (19)  BP: (!) 177/69 (19)  SpO2: 99 % (19)    Physical Exam:  General: well developed, well nourished  Lungs:  normal respiratory effort  Heart: regular rate, S1, S2 normal  Abdomen: soft, non-tender non-distented; bowel sounds normal; no masses,  no organomegaly    Laboratory  CBC: No results for input(s): WBC, RBC, HGB, HCT, PLT, MCV, MCH, MCHC in the last 168 hours.  CMP: No results for input(s): GLU, CALCIUM, ALBUMIN, PROT, NA, K, CO2, CL, BUN, CREATININE, ALKPHOS, ALT, AST, BILITOT in the last 168 hours.  Coagulation: No results for input(s): LABPROT, INR, APTT in the last 168 hours.      Diagnostic Results:      ASSESSMENT/PLAN:     Stevens's with dysplasia    Plan: EGD    Anesthesia Plan: MAC    ASA Grade: ASA 3 - Patient with moderate systemic disease with functional limitations     The impression and plan was discussed in detail with the patient and family. All questions have been answered and the patient voices understanding of  our plan at this point. The risk of the procedure was discussed in detail which includes but not limited to bleeding, infection, perforation in some cases requiring surgery with its spectrum of complications.

## 2019-04-08 NOTE — ANESTHESIA PREPROCEDURE EVALUATION
2019  Paul Browning is a 78 y.o., male.      No recent n/v    Pre-operative evaluation for Procedure(s) (LRB):  ESOPHAGOGASTRODUODENOSCOPY (EGD)/poss rfa (N/A)    Encounter Diagnosis   Name Primary?    Stevens's esophagus with low grade dysplasia Yes       Review of patient's allergies indicates:   Allergen Reactions    Morphine Shortness Of Breath       No current facility-administered medications on file prior to encounter.      Current Outpatient Medications on File Prior to Encounter   Medication Sig Dispense Refill    aspirin (ECOTRIN) 81 MG EC tablet TAKE 1 TABLET BY MOUTH ONCE DAILY 90 tablet 3    metoprolol tartrate (LOPRESSOR) 25 MG tablet Take 25 mg by mouth once.      omega-3 fatty acids-vitamin E (FISH OIL) 1,000 mg Cap Take 1 tablet by mouth 2 (two) times daily. (Patient taking differently: Take 1 tablet by mouth once daily. ) 60 each 11       Social History     Tobacco Use   Smoking Status Former Smoker    Packs/day: 2.00    Years: 20.00    Pack years: 40.00    Types: Cigarettes    Last attempt to quit: 1988    Years since quittin.1   Smokeless Tobacco Never Used       Social History     Substance and Sexual Activity   Alcohol Use No    Comment: quit drinking beer        Patient Active Problem List   Diagnosis    CAD (coronary artery disease)    Essential hypertension    Hyperlipidemia    Stevens's esophagus with low grade dysplasia    Gastric ulcer    Carotid artery disease without cerebral infarction    Insomnia    Chronic obstructive pulmonary disease    Atherosclerosis of aorta    Pulmonary sarcoidosis    History of third degree heart block    MDD (major depressive disorder), recurrent episode, mild    Peripheral arterial disease    Migraine without status migrainosus, not intractable    Osteoporosis    Senile purpura    Refractive  error    Dry eye syndrome of both eyes    Post-operative state    Heterophoria       Past Surgical History:   Procedure Laterality Date    AORTOCORONARY BYPASS-CABG N/A 10/21/2014    Performed by An[facility]bal Vásquez MD at [facility] OR 2ND FLR    CARDIAC SURGERY      CATARACT EXTRACTION W/  INTRAOCULAR LENS IMPLANT Right 07/17/2018    Dr. Talamantes    CATARACT EXTRACTION W/  INTRAOCULAR LENS IMPLANT Left 07/31/2018    Dr. Talamantes    CORONARY ARTERY BYPASS GRAFT      x2  10/2014    EGD (ESOPHAGOGASTRODUODENOSCOPY) N/A 5/12/2014    Performed by Clifford De La Torre MD at [facility] (2ND FLR)    EGD (ESOPHAGOGASTRODUODENOSCOPY) N/A 2/4/2014    Performed by Fan Carlos MD at [facility] (4TH FLR)    ESOPHAGOGASTRODUODENOSCOPY (EGD) N/A 10/4/2017    Performed by Clifford [facility] Torre MD at [facility] (2ND FLR)    ESOPHAGOGASTRODUODENOSCOPY (EGD) N/A 6/20/2017    Performed by Jose A Pham MD at [facility] (4TH FLR)    ESOPHAGOGASTRODUODENOSCOPY (EGD) N/A 8/12/2014    Performed by Clifford [facility]re MD at [facility] (2ND FLR)    ESOPHAGOGASTRODUODENOSCOPY (EGD) N/A 7/7/2014    Performed by Clifford [facility] Torre MD at [facility] (2ND FLR)    ESOPHAGOGASTRODUODENOSCOPY (EGD) WITH RADIOFREQUENCY TREATMENT OF GASTROESOPHAGEAL JUNCTION      ESOPHAGOGASTRODUODENOSCOPY (EGD)/RFA N/A 3/14/2018    Performed by Clifford [facility] Torre MD at [facility] (2ND FLR)    INSERTION, INTRAOCULAR LENS PROSTHESIS Left 7/31/2018    Performed by León Talamantes MD at [facility] OR 1ST FLR    INSERTION, INTRAOCULAR LENS PROSTHESIS Right 7/17/2018    Performed by León [facility]amantes MD at [facility] OR 1ST [facility]LR    PHACOEMULSIFICATION, CATARACT Left 7/31/2018    Performed by León [facility]amantes MD at [facility] OR 1ST [facility]LR    PHACOEMULSIFICATION, CATARACT Right 7/17/2018    Performed by León Talamantes MD at [facility] OR 1ST [facility]LR    ULTRASOUND, UPPER GI TRACT, ENDOSCOPIC N/A 5/12/2014    Performed by Clifford De [facility] Torre MD at [facility] (2ND FLR)     UMBILICAL HERNIA REPAIR             No results for input(s): HCT in the last 72 hours.  No results for input(s): PLT in the last 72 hours.  No results for input(s): K in the last 72 hours.  No results for input(s): CREATININE in the last 72 hours.  No results for input(s): GLU in the last 72 hours.  No results for input(s): PT in the last 72 hours.                    Anesthesia Evaluation         Review of Systems  Anesthesia Hx:  No problems with previous Anesthesia Took a long time to wake up the time before last.   Hematology/Oncology:  Hematology Normal   Oncology Normal   Oncology Comments: Minor skin cured with excision    Cardiovascular:   Hypertension, well controlled Past MI (2016) CAD asymptomatic CABG/stent (stent immediately with MI, CABG weeks later.)   Denies Angina.    Pulmonary:   Denies COPD. (patient denies.)  Denies Asthma. Shortness of breath Walks 8 blocks on occasion and is short of breath when he finishes.   Renal/:   Denies Chronic Renal Disease.     Hepatic/GI:   Denies Liver Disease.    Neurological:   Denies TIA. Denies CVA. Denies Seizures.    Endocrine:   Denies Diabetes.        Physical Exam  General:  Well nourished    Airway/Jaw/Neck:  Airway Findings: Mouth Opening: Normal Tongue: Normal  General Airway Assessment: Adult, Average  Mallampati: II  TM Distance: Normal, at least 6 cm  Jaw/Neck Findings:  Neck ROM: Normal ROM    full upper.            Mental Status:  Mental Status Findings:  Cooperative, Alert and Oriented         Anesthesia Plan  Type of Anesthesia, risks & benefits discussed:  Anesthesia Type:  general  Patient's Preference:   Intra-op Monitoring Plan:   Intra-op Monitoring Plan Comments:   Post Op Pain Control Plan:   Post Op Pain Control Plan Comments: As per surgeon's plan  Induction:   IV  Beta Blocker:         Informed Consent: Patient understands risks and agrees with Anesthesia plan.  Questions answered. Anesthesia consent signed with patient.  ASA Score: 3      Day of Surgery Review of History & Physical:    H&P update referred to the surgeon.         Ready For Surgery From Anesthesia Perspective.

## 2019-04-08 NOTE — ANESTHESIA POSTPROCEDURE EVALUATION
Anesthesia Post Evaluation    Patient: Paul Browning    Procedure(s) Performed: Procedure(s) (LRB):  ESOPHAGOGASTRODUODENOSCOPY (EGD)/poss rfa (N/A)    Final Anesthesia Type: general  Patient location during evaluation: PACU  Patient participation: Yes- Able to Participate  Level of consciousness: awake and alert and oriented  Post-procedure vital signs: reviewed and stable  Pain management: adequate  Airway patency: patent  PONV status at discharge: No PONV  Anesthetic complications: no      Cardiovascular status: stable  Respiratory status: unassisted, spontaneous ventilation and room air  Hydration status: euvolemic  Follow-up not needed.          Vitals Value Taken Time   /59 4/8/2019  9:45 AM   Temp 36.5 °C (97.7 °F) 4/8/2019  9:20 AM   Pulse 48 4/8/2019  9:45 AM   Resp 20 4/8/2019  9:45 AM   SpO2 99 % 4/8/2019  9:45 AM         No case tracking events are documented in the log.      Pain/Roxi Score: No data recorded

## 2019-04-10 ENCOUNTER — CLINICAL SUPPORT (OUTPATIENT)
Dept: AUDIOLOGY | Facility: CLINIC | Age: 79
End: 2019-04-10

## 2019-04-10 ENCOUNTER — OFFICE VISIT (OUTPATIENT)
Dept: OTOLARYNGOLOGY | Facility: CLINIC | Age: 79
End: 2019-04-10
Payer: MEDICARE

## 2019-04-10 VITALS — BODY MASS INDEX: 24.19 KG/M2 | HEIGHT: 67 IN | WEIGHT: 154.13 LBS

## 2019-04-10 DIAGNOSIS — H91.13 PRESBYCUSIS OF BOTH EARS: Primary | ICD-10-CM

## 2019-04-10 DIAGNOSIS — H83.3X3 NOISE-INDUCED HEARING LOSS OF BOTH EARS: ICD-10-CM

## 2019-04-10 DIAGNOSIS — H90.3 SENSORINEURAL HEARING LOSS, BILATERAL: ICD-10-CM

## 2019-04-10 DIAGNOSIS — H90.3 SENSORINEURAL HEARING LOSS, BILATERAL: Primary | ICD-10-CM

## 2019-04-10 PROCEDURE — 1101F PT FALLS ASSESS-DOCD LE1/YR: CPT | Mod: HCNC,CPTII,S$GLB, | Performed by: OTOLARYNGOLOGY

## 2019-04-10 PROCEDURE — 99999 PR PBB SHADOW E&M-EST. PATIENT-LVL II: CPT | Mod: PBBFAC,HCNC,, | Performed by: OTOLARYNGOLOGY

## 2019-04-10 PROCEDURE — 99499 UNLISTED E&M SERVICE: CPT | Mod: S$GLB,,, | Performed by: AUDIOLOGIST

## 2019-04-10 PROCEDURE — 99499 NO LOS: ICD-10-PCS | Mod: S$GLB,,, | Performed by: AUDIOLOGIST

## 2019-04-10 PROCEDURE — 99203 OFFICE O/P NEW LOW 30 MIN: CPT | Mod: HCNC,S$GLB,, | Performed by: OTOLARYNGOLOGY

## 2019-04-10 PROCEDURE — 99999 PR PBB SHADOW E&M-EST. PATIENT-LVL II: ICD-10-PCS | Mod: PBBFAC,HCNC,, | Performed by: OTOLARYNGOLOGY

## 2019-04-10 PROCEDURE — 99203 PR OFFICE/OUTPT VISIT, NEW, LEVL III, 30-44 MIN: ICD-10-PCS | Mod: HCNC,S$GLB,, | Performed by: OTOLARYNGOLOGY

## 2019-04-10 PROCEDURE — 1101F PR PT FALLS ASSESS DOC 0-1 FALLS W/OUT INJ PAST YR: ICD-10-PCS | Mod: HCNC,CPTII,S$GLB, | Performed by: OTOLARYNGOLOGY

## 2019-04-10 NOTE — PROGRESS NOTES
HEARING AID CONSULT/FITTING AND COCHLEAR IMPLANT COUNSELING    Mr. Paul Browning was seen today to discuss hearing aids as well as the cochlear implant, he was accompanied by his son. Mr. Miller has a severe to profound high frequency sensorineural hearing loss, bilaterally. He has poor speech discrimination bilaterally. Mr. Miller stated he has never worn hearing aids. He also reported he has extreme difficulty understanding others and is dependent on visual cues from talkers.     The following was discussed today:  1. Audiogram in detail  2. How a hearing aid amplifies speech  3. The cochlear implant surgical process (in addition to pre and post op)   4. Cochlear Implant expectations  5. Cochlear Implant demo kit was explained    6. Cochlear Implant Q & A discussed in detail     It was also explained that before we evaluate Mr. Miller for a cochlear implant, he needs to wear both Resound Domenico BTE hearing aids for the next 2-3 weeks, and then he will be evaluated in the sound vance for a cochlear implant. Mr. Miller understood and agreed. Mr. Miller was counseled on care, use and maintenance of the hearing aids. Mr. Miller was counseled on the importance of wearing the hearing aids during all waking hours. He will return on April 29th, 2019 with the hearing aids for a CI evaluation.

## 2019-04-10 NOTE — PROGRESS NOTES
Subjective:       Patient ID: Paul Browning is a 78 y.o. male.    Chief Complaint: Hearing Loss    Hearing Loss:   Chronicity:  Chronic  Onset:  More than 1 year ago  Progression since onset:  Gradually worsening  Frequency:  Constantly  Severity:  DisablingNo dizziness, no fever, no headaches and no tinnitus.  Risk factors for lung disease:  Noise exposureNo dizziness, no ear surgery, no ear tubes and no ear infections.    Review of Systems   Constitutional: Negative for chills, fever and unexpected weight change.   HENT: Positive for hearing loss. Negative for sore throat, tinnitus and trouble swallowing.    Eyes: Negative for pain and visual disturbance.   Respiratory: Negative for apnea and shortness of breath.    Cardiovascular: Negative for chest pain and palpitations.   Gastrointestinal: Negative for abdominal pain and nausea.   Endocrine: Negative for cold intolerance and heat intolerance.   Musculoskeletal: Negative for joint swelling and neck stiffness.   Skin: Negative for color change and rash.   Neurological: Negative for dizziness, facial asymmetry and headaches.   Hematological: Negative for adenopathy. Does not bruise/bleed easily.   Psychiatric/Behavioral: Negative for agitation. The patient is not nervous/anxious.        Objective:      Physical Exam   Constitutional: He is oriented to person, place, and time. He appears well-developed and well-nourished. No distress.   HENT:   Head: Normocephalic and atraumatic.   Right Ear: Tympanic membrane, external ear and ear canal normal.   Left Ear: Tympanic membrane, external ear and ear canal normal.   Nose: Nose normal.   Mouth/Throat: Uvula is midline, oropharynx is clear and moist and mucous membranes are normal.   Bagley: Midline  Rinne: AC > BC @ 512Hz   Eyes: Pupils are equal, round, and reactive to light. Conjunctivae and EOM are normal.   Neck: Normal range of motion. No tracheal deviation present. No thyromegaly present.    Cardiovascular: Normal rate and regular rhythm.   Pulmonary/Chest: Effort normal. No respiratory distress.   Musculoskeletal: Normal range of motion.   Lymphadenopathy:        Head (right side): No submental, no submandibular and no tonsillar adenopathy present.        Head (left side): No submental, no submandibular and no tonsillar adenopathy present.     He has no cervical adenopathy.   Neurological: He is alert and oriented to person, place, and time.   Psychiatric: He has a normal mood and affect. His behavior is normal.   Nursing note and vitals reviewed.      Data:    Audiogram tracings independently reviewed and discussed with patient shows severe hearing loss, with almost no word recognition bilaterally    Assessment:       1. Presbycusis of both ears    2. Noise-induced hearing loss of both ears    3. Sensorineural hearing loss, bilateral        Plan:        Recommend CI evaluation and hearing aid trial given severity of hearing loss and poor understanding, patient is unlikely to benefit with hearing aids alone.

## 2019-04-23 ENCOUNTER — TELEPHONE (OUTPATIENT)
Dept: ENDOSCOPY | Facility: HOSPITAL | Age: 79
End: 2019-04-23

## 2019-04-23 NOTE — TELEPHONE ENCOUNTER
----- Message from Clifford De La Torre MD sent at 4/22/2019  3:23 PM CDT -----  Please let the patient know the biopsies of the ulceration showed inflammation. The nodular area showed LGD. EGD in 12 weeks.

## 2019-04-29 ENCOUNTER — CLINICAL SUPPORT (OUTPATIENT)
Dept: AUDIOLOGY | Facility: CLINIC | Age: 79
End: 2019-04-29

## 2019-04-29 DIAGNOSIS — H90.3 SENSORINEURAL HEARING LOSS, BILATERAL: Primary | ICD-10-CM

## 2019-04-29 PROCEDURE — 92626 EVAL AUD FUNCJ 1ST HOUR: CPT | Mod: S$GLB,,, | Performed by: AUDIOLOGIST

## 2019-04-29 PROCEDURE — 92626 PR EVAL, AUDITORY FUNCTION, SURG IMPLANT DEVICE, 1ST HR: ICD-10-PCS | Mod: S$GLB,,, | Performed by: AUDIOLOGIST

## 2019-04-29 NOTE — PROGRESS NOTES
COCHLEAR IMPLANT EVALUATION   4/29/2019        HISTORY  Paul Browning  was seen on 4/29/2019 for a cochlear implant evaluation. Mr. Browning reported a history of moderately severe to profound sensorineural hearing loss in both ears for several years. Due to the severity of hearing loss in both ears, Mr. Browning receives limited benefit from amplification.  Mr. Browning reported significant difficulty with communication despite consistent hearing aid use in both ears.   Mr. Browning is interested in the cochlear implant to improve communicative functioning.   In addition, patient expectations was reviewed in detail today. It was emphasized to Mr. Browning that getting the cochlear implant is a process that will take time.      AUDIO EVALUATION  Audiometric testing revealed a moderately severe to profound sensorineural hearing loss bilaterally. Unaided speech reception scores were 60dBHL for the right ear and 60dBHL for the left ear. Unaided word reception thresholds were 8% at 95dBHL for the right ear and 0% at 95dBHL for the left ear.      Aided testing was completed in the best aided condition. Aided responses were obtained in the moderately severe to profound range with traditional amplification.   An aided speech reception threshold was obtained at 55dBHL bilaterally. CNC word lists were presented aided for both ears at 60dBHL with scores of 40% for the right ear and 12% for the left ear.   AzBIO sentences were presented in the best aided condition at 60dBSPL.  Mr. Browning scored 23% for the right ear, and 28% for the left ear.      Based on the results of this comprehensive auditory evaluation,  Mr. Browning was considered a good candidate for cochlear implantation from an audiological standpoint pending medical clearance.  Mr. Browning was diagnosed with a moderately severe to profound sensorineural hearing loss bilaterally, with limited benefit from amplification.  Limited benefit from amplification is defined by the test scores of < 40% correct in the best-aided listening condition on tape recorded tests of open-set sentence cognition. The cochlear implant is the only accepted medical procedure for the treatment of  Mr. Browning's degree of sensorineural hearing loss.  Mr. Browning meets all current standards for cochlear implantation from an audiological standpoint. The cochlear implant is not provided primarily for the convenience of the patient, physician or healthcare provider, but rather to improve hearing and communicative functioning. This is the most appropriate device that can safely provide benefit to the patient. The benefits and limitations of cochlear implantation were discussed with  Mr. Browning including the range of possible outcomes. The rehabilitation requirements and the immunization recommendations were reviewed today.  Mr. Browning stated that she would like to proceed with the cochlear implantation for both the left ear pending CT results, appropriate immunizations and medical clearance for the procedure.        RECOMMENDATIONS:  1. Otological Evaluation   2. Cochlear implant with COCHLEAR [employer] for the LEFT ear    - Lemon   - Aqua Kit   - TV Link   - Large Remote

## 2019-05-24 ENCOUNTER — LAB VISIT (OUTPATIENT)
Dept: LAB | Facility: HOSPITAL | Age: 79
End: 2019-05-24
Attending: INTERNAL MEDICINE
Payer: MEDICARE

## 2019-05-24 DIAGNOSIS — I10 BENIGN HYPERTENSION: ICD-10-CM

## 2019-05-24 DIAGNOSIS — E78.5 HYPERLIPIDEMIA, UNSPECIFIED HYPERLIPIDEMIA TYPE: ICD-10-CM

## 2019-05-24 LAB
ALBUMIN SERPL BCP-MCNC: 3.8 G/DL (ref 3.5–5.2)
ALP SERPL-CCNC: 71 U/L (ref 55–135)
ALT SERPL W/O P-5'-P-CCNC: 13 U/L (ref 10–44)
ANION GAP SERPL CALC-SCNC: 6 MMOL/L (ref 8–16)
AST SERPL-CCNC: 18 U/L (ref 10–40)
BASOPHILS # BLD AUTO: 0.05 K/UL (ref 0–0.2)
BASOPHILS NFR BLD: 1.1 % (ref 0–1.9)
BILIRUB SERPL-MCNC: 1.5 MG/DL (ref 0.1–1)
BUN SERPL-MCNC: 16 MG/DL (ref 8–23)
CALCIUM SERPL-MCNC: 9.9 MG/DL (ref 8.7–10.5)
CHLORIDE SERPL-SCNC: 100 MMOL/L (ref 95–110)
CHOLEST SERPL-MCNC: 117 MG/DL (ref 120–199)
CHOLEST/HDLC SERPL: 3.8 {RATIO} (ref 2–5)
CO2 SERPL-SCNC: 31 MMOL/L (ref 23–29)
CREAT SERPL-MCNC: 1.2 MG/DL (ref 0.5–1.4)
DIFFERENTIAL METHOD: ABNORMAL
EOSINOPHIL # BLD AUTO: 0.1 K/UL (ref 0–0.5)
EOSINOPHIL NFR BLD: 1.7 % (ref 0–8)
ERYTHROCYTE [DISTWIDTH] IN BLOOD BY AUTOMATED COUNT: 13.2 % (ref 11.5–14.5)
EST. GFR  (AFRICAN AMERICAN): >60 ML/MIN/1.73 M^2
EST. GFR  (NON AFRICAN AMERICAN): 56.8 ML/MIN/1.73 M^2
GLUCOSE SERPL-MCNC: 94 MG/DL (ref 70–110)
HCT VFR BLD AUTO: 42.6 % (ref 40–54)
HDLC SERPL-MCNC: 31 MG/DL (ref 40–75)
HDLC SERPL: 26.5 % (ref 20–50)
HGB BLD-MCNC: 14.1 G/DL (ref 14–18)
IMM GRANULOCYTES # BLD AUTO: 0.01 K/UL (ref 0–0.04)
IMM GRANULOCYTES NFR BLD AUTO: 0.2 % (ref 0–0.5)
LDLC SERPL CALC-MCNC: 69.4 MG/DL (ref 63–159)
LYMPHOCYTES # BLD AUTO: 1.3 K/UL (ref 1–4.8)
LYMPHOCYTES NFR BLD: 28.6 % (ref 18–48)
MCH RBC QN AUTO: 30.7 PG (ref 27–31)
MCHC RBC AUTO-ENTMCNC: 33.1 G/DL (ref 32–36)
MCV RBC AUTO: 93 FL (ref 82–98)
MONOCYTES # BLD AUTO: 0.5 K/UL (ref 0.3–1)
MONOCYTES NFR BLD: 10.4 % (ref 4–15)
NEUTROPHILS # BLD AUTO: 2.7 K/UL (ref 1.8–7.7)
NEUTROPHILS NFR BLD: 58 % (ref 38–73)
NONHDLC SERPL-MCNC: 86 MG/DL
NRBC BLD-RTO: 0 /100 WBC
PLATELET # BLD AUTO: 298 K/UL (ref 150–350)
PMV BLD AUTO: 10.5 FL (ref 9.2–12.9)
POTASSIUM SERPL-SCNC: 4.6 MMOL/L (ref 3.5–5.1)
PROT SERPL-MCNC: 7.5 G/DL (ref 6–8.4)
RBC # BLD AUTO: 4.59 M/UL (ref 4.6–6.2)
SODIUM SERPL-SCNC: 137 MMOL/L (ref 136–145)
TRIGL SERPL-MCNC: 83 MG/DL (ref 30–150)
TSH SERPL DL<=0.005 MIU/L-ACNC: 2.52 UIU/ML (ref 0.4–4)
WBC # BLD AUTO: 4.62 K/UL (ref 3.9–12.7)

## 2019-05-24 PROCEDURE — 84443 ASSAY THYROID STIM HORMONE: CPT | Mod: HCNC

## 2019-05-24 PROCEDURE — 80053 COMPREHEN METABOLIC PANEL: CPT | Mod: HCNC

## 2019-05-24 PROCEDURE — 85025 COMPLETE CBC W/AUTO DIFF WBC: CPT | Mod: HCNC

## 2019-05-24 PROCEDURE — 80061 LIPID PANEL: CPT | Mod: HCNC

## 2019-05-24 PROCEDURE — 36415 COLL VENOUS BLD VENIPUNCTURE: CPT | Mod: HCNC,PO

## 2019-06-01 ENCOUNTER — TELEPHONE (OUTPATIENT)
Dept: ENDOSCOPY | Facility: HOSPITAL | Age: 79
End: 2019-06-01

## 2019-06-01 DIAGNOSIS — K22.719 BARRETT'S ESOPHAGUS WITH DYSPLASIA: Primary | ICD-10-CM

## 2019-06-07 ENCOUNTER — OFFICE VISIT (OUTPATIENT)
Dept: INTERNAL MEDICINE | Facility: CLINIC | Age: 79
End: 2019-06-07
Payer: MEDICARE

## 2019-06-07 VITALS — WEIGHT: 148.81 LBS | RESPIRATION RATE: 18 BRPM | HEIGHT: 67 IN | BODY MASS INDEX: 23.36 KG/M2

## 2019-06-07 DIAGNOSIS — I73.9 PERIPHERAL ARTERIAL DISEASE: ICD-10-CM

## 2019-06-07 DIAGNOSIS — I10 ESSENTIAL HYPERTENSION: Chronic | ICD-10-CM

## 2019-06-07 DIAGNOSIS — J44.9 CHRONIC OBSTRUCTIVE PULMONARY DISEASE, UNSPECIFIED COPD TYPE: ICD-10-CM

## 2019-06-07 DIAGNOSIS — I25.10 CORONARY ARTERY DISEASE INVOLVING NATIVE CORONARY ARTERY OF NATIVE HEART WITHOUT ANGINA PECTORIS: Chronic | ICD-10-CM

## 2019-06-07 DIAGNOSIS — M81.0 OSTEOPOROSIS, UNSPECIFIED OSTEOPOROSIS TYPE, UNSPECIFIED PATHOLOGICAL FRACTURE PRESENCE: ICD-10-CM

## 2019-06-07 DIAGNOSIS — D69.2 SENILE PURPURA: ICD-10-CM

## 2019-06-07 DIAGNOSIS — E78.5 HYPERLIPIDEMIA, UNSPECIFIED HYPERLIPIDEMIA TYPE: Chronic | ICD-10-CM

## 2019-06-07 DIAGNOSIS — Z00.00 ANNUAL PHYSICAL EXAM: Primary | ICD-10-CM

## 2019-06-07 DIAGNOSIS — K22.710 BARRETT'S ESOPHAGUS WITH LOW GRADE DYSPLASIA: Chronic | ICD-10-CM

## 2019-06-07 DIAGNOSIS — G47.00 INSOMNIA, UNSPECIFIED TYPE: Chronic | ICD-10-CM

## 2019-06-07 DIAGNOSIS — F33.0 MDD (MAJOR DEPRESSIVE DISORDER), RECURRENT EPISODE, MILD: Chronic | ICD-10-CM

## 2019-06-07 DIAGNOSIS — G43.009 MIGRAINE WITHOUT AURA AND WITHOUT STATUS MIGRAINOSUS, NOT INTRACTABLE: ICD-10-CM

## 2019-06-07 DIAGNOSIS — I77.9 CAROTID ARTERY DISEASE WITHOUT CEREBRAL INFARCTION: Chronic | ICD-10-CM

## 2019-06-07 DIAGNOSIS — D86.0 PULMONARY SARCOIDOSIS: ICD-10-CM

## 2019-06-07 PROCEDURE — 99397 PER PM REEVAL EST PAT 65+ YR: CPT | Mod: HCNC,25,S$GLB, | Performed by: INTERNAL MEDICINE

## 2019-06-07 PROCEDURE — 99397 PR PREVENTIVE VISIT,EST,65 & OVER: ICD-10-PCS | Mod: HCNC,25,S$GLB, | Performed by: INTERNAL MEDICINE

## 2019-06-07 PROCEDURE — 99999 PR PBB SHADOW E&M-EST. PATIENT-LVL III: CPT | Mod: PBBFAC,HCNC,, | Performed by: INTERNAL MEDICINE

## 2019-06-07 PROCEDURE — 99499 UNLISTED E&M SERVICE: CPT | Mod: HCNC,S$GLB,, | Performed by: INTERNAL MEDICINE

## 2019-06-07 PROCEDURE — 99499 RISK ADDL DX/OHS AUDIT: ICD-10-PCS | Mod: HCNC,S$GLB,, | Performed by: INTERNAL MEDICINE

## 2019-06-07 PROCEDURE — 99999 PR PBB SHADOW E&M-EST. PATIENT-LVL III: ICD-10-PCS | Mod: PBBFAC,HCNC,, | Performed by: INTERNAL MEDICINE

## 2019-06-07 NOTE — PROGRESS NOTES
Subjective:       Patient ID: Paul Browning is a 78 y.o. male.    Chief Complaint: Annual Exam    HPI   78 y.o. Male with CAD, systolic heart failure, CAD, Carotid disease/PAD, HLD, HTN, COPD/sarcoidosis, Insomnia, Aortic atherosclerosis, MDD, senile purpura, Hx of SCC is here for annual exam.      Vaccines: Influenza (2017); Tetanus (2016); Pneumovax (2014); Shingrix (will get)  Sexual Screening: declined  Eye exam: 2016  Colonoscopy: declined  DEXA: 4/16     Mobility: normal  Falls: no     Exercise: no  Diet: low cholesterol     Past Medical History:  CAD (coronary artery disease)   Sarcoid   GERD (gastroesophageal reflux disease)   Diaphragmatic hernia   Hyperlipidemia   Hypertension   Stevens's esophagus with low grade dysplasia   BPH (benign prostatic hyperplasia)   Heart attack   MDD (major depressive disorder), recurrent epi* 2/17/2016   Past Surgical History:  UMBILICAL HERNIA REPAIR   CORONARY ARTERY BYPASS GRAFT   Comment:x2 10/2014  Social History  Marital Status:  Spouse Name:   Years of Education: Number of children: 4      Occupational History  Occupation Employer Comment   Retired       Social History Main Topics  Smoking Status: Former Smoker Packs/Day: 2.00 Years: 20   Types: Cigarettes  Quit date: 02/26/1988  Smokeless Status: Never Used   Alcohol Use: No   Comment: quit drinking beer 2012  Drug Use: No   Sexual Activity: Not Currently Partners with: Female      No Known Allergies  Review of Systems   Constitutional: Negative for activity change, appetite change, chills, diaphoresis, fatigue, fever and unexpected weight change.   HENT: Negative for congestion, mouth sores, postnasal drip, rhinorrhea, sinus pressure, sneezing, sore throat, trouble swallowing and voice change.    Eyes: Negative for discharge, itching and visual disturbance.   Respiratory: Negative for cough, chest tightness, shortness of breath and wheezing.    Cardiovascular: Negative for chest  pain, palpitations and leg swelling.   Gastrointestinal: Negative for abdominal pain, blood in stool, constipation, diarrhea, nausea and vomiting.   Endocrine: Negative for cold intolerance and heat intolerance.   Genitourinary: Negative for difficulty urinating, dysuria, flank pain, hematuria and urgency.   Musculoskeletal: Negative for arthralgias, back pain, myalgias and neck pain.   Skin: Negative for rash and wound.   Allergic/Immunologic: Negative for environmental allergies and food allergies.   Neurological: Negative for dizziness, tremors, seizures, syncope, weakness and headaches.   Hematological: Negative for adenopathy. Does not bruise/bleed easily.   Psychiatric/Behavioral: Positive for dysphoric mood. Negative for confusion, sleep disturbance and suicidal ideas. The patient is not nervous/anxious.        Objective:      Physical Exam   Constitutional: He is oriented to person, place, and time. He appears well-developed and well-nourished. No distress.   HENT:   Head: Normocephalic and atraumatic.   Right Ear: External ear normal.   Left Ear: External ear normal.   Nose: Nose normal.   Mouth/Throat: Oropharynx is clear and moist. No oropharyngeal exudate.   Eyes: Pupils are equal, round, and reactive to light. Conjunctivae and EOM are normal. Right eye exhibits no discharge. Left eye exhibits no discharge. No scleral icterus.   Neck: Normal range of motion. Neck supple. No JVD present. No thyromegaly present.   Cardiovascular: Normal rate, regular rhythm, normal heart sounds and intact distal pulses.   No murmur heard.  Pulmonary/Chest: Effort normal and breath sounds normal. No respiratory distress. He has no wheezes. He has no rales.   Abdominal: Soft. Bowel sounds are normal. He exhibits no distension. There is no tenderness. There is no guarding.   Musculoskeletal: He exhibits no edema.   Lymphadenopathy:     He has no cervical adenopathy.   Neurological: He is alert and oriented to person, place,  and time. No cranial nerve deficit. Coordination normal.   Skin: Skin is warm and dry. No rash noted. He is not diaphoretic. No pallor.   Psychiatric: He has a normal mood and affect. Judgment normal.       Assessment:       1. Annual physical exam    2. Coronary artery disease involving native coronary artery of native heart without angina pectoris    3. Carotid artery disease without cerebral infarction    4. Peripheral arterial disease    5. Essential hypertension    6. Hyperlipidemia, unspecified hyperlipidemia type    7. MDD (major depressive disorder), recurrent episode, mild    8. Stevens's esophagus with low grade dysplasia    9. Osteoporosis, unspecified osteoporosis type, unspecified pathological fracture presence    10. Insomnia, unspecified type    11. Pulmonary sarcoidosis    12. Senile purpura    13. Chronic obstructive pulmonary disease, unspecified COPD type    14. Migraine without aura and without status migrainosus, not intractable        Plan:    Blood work reviewed with pt   Vaccines: Influenza (2017); Tetanus (2016); Pneumovax (2014); Shingrix (will get)   Sexual Screening: declined   Eye exam: 2016   Colonoscopy: declined   DEXA: 4/16       1. CAD s/p PCI and CABG- stable no CP currently, followed by Cardiology       Continue ASA/Statin/BB   2. Carotid artery disease/PAD- stable, CPT   3. Stevens's esophagus, Hx of gastric ulcer- Last EGD(4/19)- low grade dysplasia and intestinal metaplasia       Continue Protonix 40 mg daily       Followed by GI   4. HTN- stable, CPT   5. COPD with sarcoidosis- stable off meds   6. Insomnia- stable off Trazodone   7. Aortic atherosclerosis- stable, continue to monitor   8. MDD- stable on Zoloft 50 mg daily   9. HLD- controlled on Lipitor 40 mg daily  10. Osteoporosis- continue Fosamax, Calcium/Vitamin D  11. Senile purpura- stable, continue to monitor  12. Migraines- not well controlled on Imitrex or Fioricet q4 PRN  13. Hx of SCC- followed by Derm   14. F/u  in 6 months

## 2019-06-18 ENCOUNTER — TELEPHONE (OUTPATIENT)
Dept: INTERNAL MEDICINE | Facility: CLINIC | Age: 79
End: 2019-06-18

## 2019-07-02 ENCOUNTER — TELEPHONE (OUTPATIENT)
Dept: INTERNAL MEDICINE | Facility: CLINIC | Age: 79
End: 2019-07-02

## 2019-07-02 NOTE — TELEPHONE ENCOUNTER
----- Message from Lisandra Cook sent at 7/2/2019  3:45 PM CDT -----  Contact: Paul/son/ [phone]  Patient would like to get medical advice.  Symptoms (please be specific):  Spot of head, headaches,   How long has patient had these symptoms:  weekl  Pharmacy name and phone # (copy/paste from chart):  [facility] [address] [place] [address] [address]  AT [facility] [address] [phone] (Phone)  [phone] (Fax)  Any drug allergies (copy/paste from chart):      Would the patient rather a call back or a response via [facility]?:    Comments:  No appointment til 7/9/19, son would like a call back for advice.

## 2019-07-05 ENCOUNTER — TELEPHONE (OUTPATIENT)
Dept: ENDOSCOPY | Facility: HOSPITAL | Age: 79
End: 2019-07-05

## 2019-07-08 ENCOUNTER — OFFICE VISIT (OUTPATIENT)
Dept: INTERNAL MEDICINE | Facility: CLINIC | Age: 79
End: 2019-07-08
Payer: MEDICARE

## 2019-07-08 ENCOUNTER — TELEPHONE (OUTPATIENT)
Dept: NEUROLOGY | Facility: CLINIC | Age: 79
End: 2019-07-08

## 2019-07-08 VITALS
SYSTOLIC BLOOD PRESSURE: 130 MMHG | DIASTOLIC BLOOD PRESSURE: 60 MMHG | WEIGHT: 149.5 LBS | HEART RATE: 72 BPM | BODY MASS INDEX: 23.46 KG/M2 | HEIGHT: 67 IN | TEMPERATURE: 98 F

## 2019-07-08 DIAGNOSIS — G43.009 MIGRAINE WITHOUT AURA AND WITHOUT STATUS MIGRAINOSUS, NOT INTRACTABLE: Primary | ICD-10-CM

## 2019-07-08 DIAGNOSIS — L98.9 DERMATOSIS: ICD-10-CM

## 2019-07-08 PROCEDURE — 3075F PR MOST RECENT SYSTOLIC BLOOD PRESS GE 130-139MM HG: ICD-10-PCS | Mod: HCNC,CPTII,S$GLB, | Performed by: FAMILY MEDICINE

## 2019-07-08 PROCEDURE — 99999 PR PBB SHADOW E&M-EST. PATIENT-LVL IV: ICD-10-PCS | Mod: PBBFAC,HCNC,, | Performed by: FAMILY MEDICINE

## 2019-07-08 PROCEDURE — 99214 PR OFFICE/OUTPT VISIT, EST, LEVL IV, 30-39 MIN: ICD-10-PCS | Mod: HCNC,S$GLB,, | Performed by: FAMILY MEDICINE

## 2019-07-08 PROCEDURE — 99214 OFFICE O/P EST MOD 30 MIN: CPT | Mod: HCNC,S$GLB,, | Performed by: FAMILY MEDICINE

## 2019-07-08 PROCEDURE — 3075F SYST BP GE 130 - 139MM HG: CPT | Mod: HCNC,CPTII,S$GLB, | Performed by: FAMILY MEDICINE

## 2019-07-08 PROCEDURE — 1101F PR PT FALLS ASSESS DOC 0-1 FALLS W/OUT INJ PAST YR: ICD-10-PCS | Mod: HCNC,CPTII,S$GLB, | Performed by: FAMILY MEDICINE

## 2019-07-08 PROCEDURE — 3078F PR MOST RECENT DIASTOLIC BLOOD PRESSURE < 80 MM HG: ICD-10-PCS | Mod: HCNC,CPTII,S$GLB, | Performed by: FAMILY MEDICINE

## 2019-07-08 PROCEDURE — 3078F DIAST BP <80 MM HG: CPT | Mod: HCNC,CPTII,S$GLB, | Performed by: FAMILY MEDICINE

## 2019-07-08 PROCEDURE — 1101F PT FALLS ASSESS-DOCD LE1/YR: CPT | Mod: HCNC,CPTII,S$GLB, | Performed by: FAMILY MEDICINE

## 2019-07-08 PROCEDURE — 99999 PR PBB SHADOW E&M-EST. PATIENT-LVL IV: CPT | Mod: PBBFAC,HCNC,, | Performed by: FAMILY MEDICINE

## 2019-07-08 RX ORDER — BUTALBITAL, ACETAMINOPHEN AND CAFFEINE 50; 325; 40 MG/1; MG/1; MG/1
1 TABLET ORAL EVERY 6 HOURS PRN
Qty: 30 TABLET | Refills: 0 | Status: SHIPPED | OUTPATIENT
Start: 2019-07-08 | End: 2019-08-07

## 2019-07-08 NOTE — PROGRESS NOTES
Subjective:       Patient ID: Paul Browning is a 78 y.o. male.    Chief Complaint: Sores (on head  painful to the touch); Headache; and Gait Problem    HPI 78-year-old white male presents to clinic today accompanied by his son secondary to complaints of worsening headaches over the past few weeks with associated dizziness and difficulty hearing.  He has been noted to have migraine headaches for many years with previous head CT performed in 2014 which returned within normal limits.  Per the patient's son he has not taken any medication besides Tylenol to help with his headaches which does not provide relief.  It is questionable of whether the patient has used Fioricet or Imitrex in the past as he is worried of sedation.  The patient recently reports having cataract repair and states that since his cataract repair his headaches have worsened.  He is also concerned secondary to noticing 2 sores on his forehead that he reports are tender to touch.  Review of Systems   Constitutional: Negative for appetite change, chills, fatigue and fever.   HENT: Positive for hearing loss. Negative for congestion, ear pain, postnasal drip, rhinorrhea, sinus pressure, sore throat and tinnitus.    Eyes: Negative for redness, itching and visual disturbance.   Respiratory: Negative for cough, chest tightness and shortness of breath.    Cardiovascular: Negative for chest pain and palpitations.   Gastrointestinal: Negative for abdominal pain, constipation, diarrhea, nausea and vomiting.   Genitourinary: Negative for decreased urine volume, difficulty urinating, dysuria, frequency, hematuria and urgency.   Musculoskeletal: Negative for back pain, myalgias, neck pain and neck stiffness.   Skin: Negative for rash.   Neurological: Positive for dizziness and headaches. Negative for light-headedness.   Psychiatric/Behavioral: Negative.        Objective:      Physical Exam   Constitutional: He is oriented to person, place, and time. He  appears well-developed and well-nourished. No distress.   HENT:   Head: Normocephalic and atraumatic.       Right Ear: External ear normal.   Left Ear: External ear normal.   Nose: Nose normal.   Mouth/Throat: Oropharynx is clear and moist. No oropharyngeal exudate.   Eyes: Pupils are equal, round, and reactive to light. Conjunctivae and EOM are normal. Right eye exhibits no discharge. Left eye exhibits no discharge. No scleral icterus.   Neck: Normal range of motion. Neck supple. No JVD present. No tracheal deviation present. No thyromegaly present.   Cardiovascular: Normal rate, regular rhythm, normal heart sounds and intact distal pulses. Exam reveals no gallop and no friction rub.   No murmur heard.  Pulmonary/Chest: Effort normal and breath sounds normal. No stridor. No respiratory distress. He has no wheezes. He has no rales.   Abdominal: Soft. Bowel sounds are normal. He exhibits no distension and no mass. There is no tenderness. There is no rebound and no guarding.   Musculoskeletal: Normal range of motion. He exhibits no edema or tenderness.   Lymphadenopathy:     He has no cervical adenopathy.   Neurological: He is alert and oriented to person, place, and time.   Skin: Skin is warm and dry. No rash noted. He is not diaphoretic. No erythema. No pallor.   Psychiatric: He has a normal mood and affect. His behavior is normal. Judgment and thought content normal.   Nursing note and vitals reviewed.      Assessment:       1. Migraine without aura and without status migrainosus, not intractable    2. Dermatosis        Plan:       1.  Fioricet 1 tablet every 6 hr as needed for headaches or pain.  2.  Refer to Neurology for further evaluation and treatment of migraines.  3.  Refer to Dermatology for further evaluation and treatment of dermatosis.  4.  Return to clinic as needed if symptoms persist or worsen.

## 2019-07-08 NOTE — TELEPHONE ENCOUNTER
----- Message from Flor Gibbs sent at 7/8/2019  2:07 PM CDT -----  Contact: [phone]/ Paul Camacho has placed a referral for the patient to be seen with Neurology. Patient would like to schedule prior to October. Please call patient to schedule.     Migraine without aura and without status migrainosus, not intractable [G43.009]

## 2019-07-11 ENCOUNTER — PATIENT MESSAGE (OUTPATIENT)
Dept: ENDOSCOPY | Facility: HOSPITAL | Age: 79
End: 2019-07-11

## 2019-07-26 ENCOUNTER — INITIAL CONSULT (OUTPATIENT)
Dept: DERMATOLOGY | Facility: CLINIC | Age: 79
End: 2019-07-26
Payer: MEDICARE

## 2019-07-26 ENCOUNTER — TELEPHONE (OUTPATIENT)
Dept: ENDOSCOPY | Facility: HOSPITAL | Age: 79
End: 2019-07-26

## 2019-07-26 DIAGNOSIS — Z85.828 HISTORY OF SKIN CANCER: ICD-10-CM

## 2019-07-26 DIAGNOSIS — L57.0 MULTIPLE ACTINIC KERATOSES: ICD-10-CM

## 2019-07-26 DIAGNOSIS — D48.5 NEOPLASM OF UNCERTAIN BEHAVIOR OF SKIN: Primary | ICD-10-CM

## 2019-07-26 DIAGNOSIS — L82.1 SEBORRHEIC KERATOSES: ICD-10-CM

## 2019-07-26 PROCEDURE — 17000 PR DESTRUCTION(LASER SURGERY,CRYOSURGERY,CHEMOSURGERY),PREMALIGNANT LESIONS,FIRST LESION: ICD-10-PCS | Mod: HCNC,59,S$GLB, | Performed by: DERMATOLOGY

## 2019-07-26 PROCEDURE — 1100F PR PT FALLS ASSESS DOC 2+ FALLS/FALL W/INJURY/YR: ICD-10-PCS | Mod: HCNC,CPTII,S$GLB, | Performed by: DERMATOLOGY

## 2019-07-26 PROCEDURE — 17003 DESTRUCT PREMALG LES 2-14: CPT | Mod: HCNC,59,S$GLB, | Performed by: DERMATOLOGY

## 2019-07-26 PROCEDURE — 1100F PTFALLS ASSESS-DOCD GE2>/YR: CPT | Mod: HCNC,CPTII,S$GLB, | Performed by: DERMATOLOGY

## 2019-07-26 PROCEDURE — 3288F PR FALLS RISK ASSESSMENT DOCUMENTED: ICD-10-PCS | Mod: HCNC,CPTII,S$GLB, | Performed by: DERMATOLOGY

## 2019-07-26 PROCEDURE — 99999 PR PBB SHADOW E&M-EST. PATIENT-LVL II: ICD-10-PCS | Mod: PBBFAC,HCNC,, | Performed by: DERMATOLOGY

## 2019-07-26 PROCEDURE — 17000 DESTRUCT PREMALG LESION: CPT | Mod: HCNC,59,S$GLB, | Performed by: DERMATOLOGY

## 2019-07-26 PROCEDURE — 88342 TISSUE SPECIMEN TO PATHOLOGY, DERMATOLOGY: ICD-10-PCS | Mod: 26,HCNC,, | Performed by: PATHOLOGY

## 2019-07-26 PROCEDURE — 88342 IMHCHEM/IMCYTCHM 1ST ANTB: CPT | Mod: HCNC | Performed by: PATHOLOGY

## 2019-07-26 PROCEDURE — 88342 IMHCHEM/IMCYTCHM 1ST ANTB: CPT | Mod: 26,HCNC,, | Performed by: PATHOLOGY

## 2019-07-26 PROCEDURE — 99213 OFFICE O/P EST LOW 20 MIN: CPT | Mod: 25,HCNC,S$GLB, | Performed by: DERMATOLOGY

## 2019-07-26 PROCEDURE — 17003 DESTRUCTION, PREMALIGNANT LESIONS; SECOND THROUGH 14 LESIONS: ICD-10-PCS | Mod: HCNC,59,S$GLB, | Performed by: DERMATOLOGY

## 2019-07-26 PROCEDURE — 11102 PR TANGENTIAL BIOPSY, SKIN, SINGLE LESION: ICD-10-PCS | Mod: HCNC,S$GLB,, | Performed by: DERMATOLOGY

## 2019-07-26 PROCEDURE — 99213 PR OFFICE/OUTPT VISIT, EST, LEVL III, 20-29 MIN: ICD-10-PCS | Mod: 25,HCNC,S$GLB, | Performed by: DERMATOLOGY

## 2019-07-26 PROCEDURE — 88305 TISSUE SPECIMEN TO PATHOLOGY, DERMATOLOGY: ICD-10-PCS | Mod: 26,HCNC,, | Performed by: PATHOLOGY

## 2019-07-26 PROCEDURE — 11102 TANGNTL BX SKIN SINGLE LES: CPT | Mod: HCNC,S$GLB,, | Performed by: DERMATOLOGY

## 2019-07-26 PROCEDURE — 99999 PR PBB SHADOW E&M-EST. PATIENT-LVL II: CPT | Mod: PBBFAC,HCNC,, | Performed by: DERMATOLOGY

## 2019-07-26 PROCEDURE — 88305 TISSUE EXAM BY PATHOLOGIST: CPT | Mod: HCNC | Performed by: PATHOLOGY

## 2019-07-26 PROCEDURE — 88305 TISSUE EXAM BY PATHOLOGIST: CPT | Mod: 26,HCNC,, | Performed by: PATHOLOGY

## 2019-07-26 PROCEDURE — 3288F FALL RISK ASSESSMENT DOCD: CPT | Mod: HCNC,CPTII,S$GLB, | Performed by: DERMATOLOGY

## 2019-07-26 NOTE — TELEPHONE ENCOUNTER
Spoke with patient's son. EGD scheduled for 8/28 at 8:30a. Reviewed prep instructions. Mr Bañuelos verbalized understanding.

## 2019-07-26 NOTE — PROGRESS NOTES
Subjective:       Patient ID:  Paul Browning is a 78 y.o. male who presents for   Chief Complaint   Patient presents with    Lesion     History of Present Illness: The patient presents with chief complaint of spot.  Location: scalp  Duration: several weeks  Signs/Symptoms: irritated, worried is causing his headaches    Prior treatments: none  This is a high risk patient here to check for the development of new lesions.          Review of Systems   Constitutional: Negative for fever.   Skin: Negative for itching and rash.   Hematologic/Lymphatic: Does not bruise/bleed easily.        Objective:    Physical Exam   Constitutional: He appears well-developed and well-nourished. No distress.   Neurological: He is alert and oriented to person, place, and time. He is not disoriented.   Psychiatric: He has a normal mood and affect.   Skin:   Areas Examined (abnormalities noted in diagram):   Scalp / Hair Palpated and Inspected  Head / Face Inspection Performed  Neck Inspection Performed  Chest / Axilla Inspection Performed  Back Inspection Performed  RUE Inspected  LUE Inspection Performed                       Diagram Legend     Erythematous scaling macule/papule c/w actinic keratosis       Vascular papule c/w angioma      Pigmented verrucoid papule/plaque c/w seborrheic keratosis      Yellow umbilicated papule c/w sebaceous hyperplasia      Irregularly shaped tan macule c/w lentigo     1-2 mm smooth white papules consistent with Milia      Movable subcutaneous cyst with punctum c/w epidermal inclusion cyst      Subcutaneous movable cyst c/w pilar cyst      Firm pink to brown papule c/w dermatofibroma      Pedunculated fleshy papule(s) c/w skin tag(s)      Evenly pigmented macule c/w junctional nevus     Mildly variegated pigmented, slightly irregular-bordered macule c/w mildly atypical nevus      Flesh colored to evenly pigmented papule c/w intradermal nevus       Pink pearly papule/plaque c/w basal cell  carcinoma      Erythematous hyperkeratotic cursted plaque c/w SCC      Surgical scar with no sign of skin cancer recurrence      Open and closed comedones      Inflammatory papules and pustules      Verrucoid papule consistent consistent with wart     Erythematous eczematous patches and plaques     Dystrophic onycholytic nail with subungual debris c/w onychomycosis     Umbilicated papule    Erythematous-base heme-crusted tan verrucoid plaque consistent with inflamed seborrheic keratosis     Erythematous Silvery Scaling Plaque c/w Psoriasis     See annotation      Assessment / Plan:      Pathology Orders:     Normal Orders This Visit    Tissue Specimen To Pathology, Dermatology     Questions:    Directional Terms:  Other(comment)    Clinical Information:  irritated sk    Specific Site:  right mid scalp        Neoplasm of uncertain behavior of skin  -     Tissue Specimen To Pathology, Dermatology  Shave biopsy performed after verbal consent including risk of infection, scar, recurrence, need for additional treatment of site. Area prepped with alcohol, anesthetized with 1% lidocaine with epinephrine. . Hemostasis achieved with monsels. No complications. Dressing applied. Wound care explained. Will need further rx if + ca          History of skin cancer  Comments:  scc scalp and left arm    Seborrheic keratoses  reassurance      Multiple actinic keratoses   Cryosurgery Procedure Note    Verbal consent from the patient is obtained and the patient is aware of the precancerous quality and need for treatment of these lesions. Liquid nitrogen cryosurgery is applied to the 3 actinic keratoses, as detailed in the physical exam, to produce a freeze injury.               Follow up in about 6 months (around 1/26/2020).

## 2019-07-26 NOTE — LETTER
July 26, 2019      Hesham Camacho MD  [address] [facility]  [place] [address]           [place] [facility]  [address]  [place] [address]  Phone: [phone]  Fax: [phone]          Patient: Paul Browning   MR Number: [id number]   YOB: 1940   Date of Visit: 7/26/2019       Dear Dr. Hesham Camacho:    Thank you for referring Paul Browning to me for evaluation. Attached you will find relevant portions of my assessment and plan of care.    If you have questions, please do not hesitate to call me. I look forward to following Paul Browning along with you.    Sincerely,    Didi Carlos MD    Enclosure  CC:  No Recipients    If you would like to receive this communication electronically, please contact [email or web address] or [phone] to request more information on [employer] access.    For providers and/or their staff who would like to refer a patient to [facility], please contact us through our one-stop-shop provider referral line, [facility], at [phone].    If you feel you have received this communication in error or would no longer like to receive these types of communications, please e-mail [email or web address]

## 2019-07-31 NOTE — PROGRESS NOTES
"     Cardiology Clinic Note  Reason for Visit: CAD    HPI:     Paul Browning is a 78 y.o. M with CAD s/p MI/PCI and 2v CABG, HTN, HLD, who presents for follow up.    He was recently sent to the ED at  by his eye doctor for visual changes. He had several images studies, as below, and found 90% R proximal ICA stenosis. He presents for follow up following this hospitalization.    CTA neck 8/6 showed nonstenotic plaque narrowing L ICA bulb with 1.6mm diameter lumen of the origin compared to 5.3mm distal cervical ICA diameter, corresponding to 70% stenosis. No L ICA stenosis. Carotid US 8/6 showed R ICA peak systolic velocity 162cms, R ICA/CCA PSV ratio 2.2. L ICA peak systolic velocity 65cm/s, L ICA/CCA PSV ratio 0.7. There was mild L proximal ICA soft plaque consistent with <50% stenosis. There is very thick soft plaque in the R prox ICA causing at least 90% stenosis despite peak systolic velocity only 162cm/s. MRI brain 8/5 showed no mass lesion, hemorhage, or signs of acute infarct. Echo 8/6 showed LVEF 55-60%, trivial AI, mild MR, no ASD.    Regarding his vision, he reports a gradual progression of visual loss, which started following cataract surgery one year ago. Eye docs do not believe this is related to the surgery. He is planning to see a neuro-ophthalmologist next week. He denies stroke/TIA symptoms. He presents today to discuss management of asymptomatic carotid stenosis. He denies symptoms of ischemia, heart failure, and arrhythmia.     Medical: HTN, HLD, CAD s/p MI/PCI/CABG (STEMI in 2014 with PCI to RCA; CABG with LIMA-LAD, SVG-ramus; ischemic symptoms were "fogginess and feeling weird, no chest pain or dyspnea"), BPH  Surgical: CABG (10/2014), cataracts, hernia  Family: heart failure, arrhythmia  Social: former smoker of 2 PPD x20y (quit 1988), quit drinking in 2012    ROS:    Constitution: Negative for fever or chills.  HENT: Negative for  headaches.  Eyes: Negative for blurred vision. "   Cardiovascular: See above  Pulmonary: Negative for SOB. Negative for cough.   Gastrointestinal: Negative for nausea/vomiting.   : Negative for dysuria.   Skin: Negative for rashes.  Neurological: Negative for focal weakness.  Psychological: Negative for depression.  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 Talamantes    CATARACT EXTRACTION W/  INTRAOCULAR LENS IMPLANT Left 07/31/2018    Dr. Talamantes    CORONARY ARTERY BYPASS GRAFT      x2  10/2014    EGD (ESOPHAGOGASTRODUODENOSCOPY) N/A 5/12/2014    Performed by Clifford [facility] at [facility] (2ND FLR)    EGD (ESOPHAGOGASTRODUODENOSCOPY) N/A 2/4/2014    Performed by Fan Carlos MD at [facility] (4TH FLR)    ESOPHAGOGASTRODUODENOSCOPY (EGD) N/A 10/4/2017    Performed by Clifford De La Torre MD at [facility] ENDO (2ND FLR)    ESOPHAGOGASTRODUODENOSCOPY (EGD) N/A 6/20/2017    Performed by Jose A Pham MD at [facility] ENDO (4TH FLR)    ESOPHAGOGASTRODUODENOSCOPY (EGD) N/A 8/12/2014    Performed by Clifford De La Torre MD at [facility] ENDO (2ND FLR)    ESOPHAGOGASTRODUODENOSCOPY (EGD) N/A 7/7/2014    Performed by Clifford De La Torre MD at [facility] ENDO (2ND FLR)     ESOPHAGOGASTRODUODENOSCOPY (EGD) WITH RADIOFREQUENCY TREATMENT OF GASTROESOPHAGEAL JUNCTION      ESOPHAGOGASTRODUODENOSCOPY (EGD)/poss rfa N/A 4/8/2019    Performed by Clifford De [facility] Torre MD at [facility] ENDO (2ND FLR)    ESOPHAGOGASTRODUODENOSCOPY (EGD)/RFA N/A 3/14/2018    Performed by Clifford De La Torre MD at [facility] ENDO (2ND FLR)    INSERTION, INTRAOCULAR LENS PROSTHESIS Left 7/31/2018    Performed by León Talamantes MD at [facility] OR 1ST FLR    INSERTION, INTRAOCULAR LENS PROSTHESIS Right 7/17/2018    Performed by León Talamantes MD at [facility] OR 1ST FLR    PHACOEMULSIFICATION, CATARACT Left 7/31/2018    Performed by León Talamantes MD at [facility] OR 1ST FLR    PHACOEMULSIFICATION, CATARACT Right 7/17/2018    Performed by León Talamantes MD at [facility] OR 1ST FLR    ULTRASOUND, UPPER GI TRACT, ENDOSCOPIC N/A 5/12/2014    Performed by Clifford De La Torre MD at [facility] ENDO (2ND FLR)    UMBILICAL HERNIA REPAIR       Allergies:     Review of patient's allergies indicates:   Allergen Reactions    Morphine Shortness Of Breath     Medications:     Current Outpatient Medications on File Prior to Visit   Medication Sig Dispense Refill    acetaminophen (TYLENOL) 500 MG tablet Take 1,000 mg by mouth every 6 (six) hours as needed for Pain.      aspirin (ECOTRIN) 81 MG EC tablet TAKE 1 TABLET BY MOUTH ONCE DAILY 90 tablet 3    atorvastatin (LIPITOR) 40 MG tablet TAKE 1 TABLET BY MOUTH EVERY DAY 90 tablet 3    butalbital-acetaminophen-caffeine -40 mg (FIORICET, ESGIC) -40 mg per tablet Take 1 tablet by mouth every 6 (six) hours as needed for Pain or Headaches. 30 tablet 0    omega-3 fatty acids-vitamin E (FISH OIL) 1,000 mg Cap Take 1 tablet by mouth 2 (two) times daily. (Patient taking differently: Take 1 tablet by mouth once daily. ) 60 each 11    pantoprazole (PROTONIX) 40 MG tablet once daily.       sertraline (ZOLOFT) 50 MG tablet TAKE 1 TABLET BY MOUTH EVERY DAY 90 tablet 3     "triamcinolone acetonide 0.1% (KENALOG) 0.1 % cream Apply topically daily as needed.       No current facility-administered medications on file prior to visit.      Social History:     Social History     Tobacco Use    Smoking status: Former Smoker     Packs/day: 2.00     Years: 20.00     Pack years: 40.00     Types: Cigarettes     Last attempt to quit: 1988     Years since quittin.4    Smokeless tobacco: Never Used   Substance Use Topics    Alcohol use: No     Comment: quit drinking beer      Family History:     Family History   Problem Relation Age of Onset    Arrhythmia Mother     Heart failure Brother     No Known Problems Daughter     No Known Problems Son     No Known Problems Son     No Known Problems Son     Colon cancer Neg Hx     Inflammatory bowel disease Neg Hx     Celiac disease Neg Hx     Melanoma Neg Hx     Amblyopia Neg Hx     Blindness Neg Hx     Cataracts Neg Hx     Glaucoma Neg Hx     Macular degeneration Neg Hx     Retinal detachment Neg Hx     Strabismus Neg Hx      Physical Exam:   /62   Pulse 67   Ht 5' 8" (1.727 m)   Wt 65.1 kg (143 lb 6.6 oz)   BMI 21.81 kg/m²      Constitutional: No apparent distress, conversant  HEENT: Sclera anicteric, extraocular movements intact  Neck: No jugular venous distension, no carotid bruits  CV: Regular rate and rhythm, no murmurs rubs or gallops, normal S1/S2  Pulm: Clear to auscultation bilaterally  GI: Abdomen soft, no palpable masses  Extremities: No lower extremity edema, warm with palpable pulses  Skin: No ecchymosis, erythema, or ulcers  Psych: Alert and oriented to person place location, appropriate affect  Neuro: No focal deficits    Labs:     Blood Tests:  Lab Results   Component Value Date    BNP 86 2015     2019    K 4.6 2019     2019    CO2 31 (H) 2019    BUN 16 2019    CREATININE 1.2 2019    GLU 94 2019    HGBA1C 5.2 10/15/2014    MG 2.2 " 07/14/2015    AST 18 05/24/2019    ALT 13 05/24/2019    ALBUMIN 3.8 05/24/2019    PROT 7.5 05/24/2019    BILITOT 1.5 (H) 05/24/2019    WBC 4.62 05/24/2019    HGB 14.1 05/24/2019    HCT 42.6 05/24/2019    HCT 22 (L) 10/21/2014    MCV 93 05/24/2019     05/24/2019    INR 1.0 11/17/2014    INR 2.7 (H) 10/16/2006    PSA 3.6 02/17/2016    TSH 2.524 05/24/2019       Lab Results   Component Value Date    CHOL 117 (L) 05/24/2019    HDL 31 (L) 05/24/2019    TRIG 83 05/24/2019       Lab Results   Component Value Date    LDLCALC 69.4 05/24/2019       Urine Tests:  Lab Results   Component Value Date    COLORU Elly 05/24/2019    APPEARANCEUA Clear 05/24/2019    PHUR 5.0 05/24/2019    SPECGRAV 1.025 05/24/2019    PROTEINUA Negative 05/24/2019    GLUCUA Negative 05/24/2019    KETONESU Negative 05/24/2019    BILIRUBINUA Negative 05/24/2019    OCCULTUA Negative 05/24/2019    NITRITE Negative 05/24/2019    UROBILINOGEN Negative 10/17/2018    LEUKOCYTESUR Trace (A) 05/24/2019       Imaging:     Echocardiogram  TTE 1/26/15  CONCLUSIONS     1 - Normal left ventricular systolic function (EF 60-65%).     2 - Right ventricular enlargement with normal systolic function.     3 - Normal left ventricular diastolic function.     4 - Biatrial enlargement.     5 - Aortic sclerosis with normal leaflet mobility    Stress testing  PET 1/7/19  · There is a small to moderate sized, moderate to severe intensity basal inferolateral fixed perfusion abnormality involving 10% of the LV myocardium in the OM2 territory consistent with non-transmural infarct. On past angiogram, the OM2 was subtotally occluded.  · Whole heart absolute myocardial perfusion (cc/min/g) averaged 1.06 rest (which is elevated), 1.47 at stress (which is mildly reduced), and 1.37 CFR (which is moderately reduced impart due to elevated resting flow).  · Within the defect absolute myocardial perfusion (cc/min/gm) averaged 0.51 at rest, 0.82 at stress and CFR was 1.78, which  equates to severely reduced coronary flow capacity in 10% of the myocardium (within the OM2 territory).  · Gated perfusion images showed an ejection fraction of 62 % at rest and 84 % during stress.  · There is basal inferolateral wall hypokinesis and hypokinesis at rest and stress.  · LV cavity size is normal at rest and normal at stress.  · The EKG portion of this study is negative for myocardial ischemia.  · Arrhythmias during stress: rare PVCs.  · The patient reported no symptoms during the stress test.  · Compared to the previous study from 8-, there are no significant changes.    PET 8/25/14  CONCLUSIONS: ABNORMAL MYOCARDIAL PERFUSION PET STRESS TEST  1. There is a large sized moderate to severe intensity fixed defect consistent with non-transmural infarct with rhonda-infarct ischemia in the base to mid lateral and inferolateral walls in the usual distribution of the Left Circumflex Artery and Posterior Lateral Branch. This defect comprises 20 % of the left ventricular myocardium.   2. There is abnormal wall motion at rest showing hypokinesis of the inferolateral and inferobasilar walls of the left ventricle. There is abnormal wall motion at stress showing hypokinesis of the inferolateral and inferobasilar walls of the left   ventricle.   3. Visually estimated LV function is normal at rest and normal at stress.   4. The ventricular volumes are normal at rest and stress.   5. The extracardiac distribution of radioactivity is normal.   6. When compared to the previous study from 2011, the previous infarct in the OM territory is still present however there is now a confluent infarct in the PL territory.  There is no evidence of ischemia in the LAD or D1 territory.    Cath lab  [facility] 8/19/14  DIAGNOSTIC:       Patient has a right dominant coronary artery.        - Left Main Coronary Artery:             The distal LM has a 30% stenosis. There is JOVANNA 3 flow.       - Left Anterior Descending Artery:              The ostial LAD has a 80% stenosis. There is JOVANNA 3 flow.       - Left Circumflex Artery:             The LCX has luminal irregularities. There is JOVANNA 3 flow.       - Right Coronary Artery:             The proximal RCA is occluded. There is JOVANNA 0 flow. Proximal stented with 3 mm stent and distal with 2.25 stent.       - Right Ventricle:             The right ventricle. There is JOVANNA 3 flow.       - Common Femoral Artery:             The right CFA has luminal irregularities. There is JOVANNA 3 flow.       - Femoral Artery:             The femoral artery is normal. There is JOVANNA 3 flow.       - D1:             The proximal D1 has a 90% stenosis. There is JOVANNA 3 flow.       - OM2:             The proximal OM2 has subtotal. There is JOVANNA 3 flow.    INTERVENTION:         Proximal RCA:              The lesion was successfully intervened. Post-stenosis of 0%, post-JOVANNA 3 flow and TMP grade 2. The vessel was accessed natively.  The following items were used: Cath Pronto Lp, Stent Xience Prime Rx 3.0x33 (JONNA) and Stent Xience Rx 2.25x12   (JONNA).    Other  Carotid US 10/30/15  CONCLUSIONS   There is 40 - 49% right Internal Carotid stenosis.  There is 0 - 19% left Internal Carotid stenosis.    Carotid US 14  CONCLUSIONS   There is 40 - 49% right Internal Carotid stenosis.  There is 20 - 39% left Internal Carotid stenosis.    EK16  Normal sinus rhythm  Nonspecific T wave abnormality  Abnormal ECG    Assessment:     1. Stenosis of right carotid artery        Plan:     Stenosis of right carotid artery  Refer to interventional cardiology ([facility]ST 2 - asymptomatic high grade stenosis, high surgical risk)  Continue plavix 75mg daily, high intensity statin (LDL 69 in 2019)    CAD (coronary artery disease)  --PET stress test identified a fixed defect consistent with his subtotal OM2 on his last angiogram  --no angina  --continue ASA, atorvastatin and fish oil   --given resting heart rate in 60s and him previously not  taking the metoprolol prescribed, will not give at this time     Essential hypertension  --at goal  --not on anti-hypertensive medications     Hyperlipidemia  --continue atorvastatin and fish oil.     History of third degree heart block  --related to STEMI incident. No recurrence since then.    Signed:  Torin Kelly MD  Cardiology     8/14/2019 2:20 PM    Follow-up:     Future Appointments   Date Time Provider Department Center   8/14/2019  2:00 PM Yash Kelly III, MD [facility] CARDIO [place]   9/16/2019  1:00 PM MET, DEXA1 MET [facility] [place]   9/19/2019  1:00 PM ANNUAL WELLNESS VISIT-NURSE PRACTITIONER, MET 1 [facility] IM [place]

## 2019-08-05 ENCOUNTER — TELEPHONE (OUTPATIENT)
Dept: INTERNAL MEDICINE | Facility: CLINIC | Age: 79
End: 2019-08-05

## 2019-08-05 NOTE — TELEPHONE ENCOUNTER
----- Message from Hailey Salinas sent at 8/5/2019 11:51 AM CDT -----  Contact: Paul(son) [phone]  Patient's son is calling to see when his father's last MRI was done. Patient's son also would like to see if his father can get a sooner appointment with neurology. Please call & advise

## 2019-08-05 NOTE — TELEPHONE ENCOUNTER
----- Message from Jeremy Comer sent at 8/5/2019 12:00 PM CDT -----  Contact: son/Paul  [phone]  Patient son calling to speak with you concerning the eye Dr saying the patient needs to be seen ASAP.   Please call back to assist at [phone]

## 2019-08-05 NOTE — TELEPHONE ENCOUNTER
Last CT 1-5-15; no MRI in the last 5 years noted unless it was done at another facility.      Per Madison Olivo. son, his father (patient) is admitted to [facility] today.

## 2019-08-07 ENCOUNTER — PATIENT MESSAGE (OUTPATIENT)
Dept: INTERNAL MEDICINE | Facility: CLINIC | Age: 79
End: 2019-08-07

## 2019-08-07 ENCOUNTER — TELEPHONE (OUTPATIENT)
Dept: NEUROLOGY | Facility: CLINIC | Age: 79
End: 2019-08-07

## 2019-08-07 NOTE — TELEPHONE ENCOUNTER
----- Message from Myriam Blanco sent at 8/7/2019 10:55 AM CDT -----  Contact: suzette Miller [phone]  New patient would like to be seen sooner than the next available appointment, within 1-2 weeks. Please advise.

## 2019-08-07 NOTE — TELEPHONE ENCOUNTER
I returned Paul, patient's son call in regards to sooner appt. I informed him that the appt he has is the soonest. He stated that his father was seen at the ED at [facility]. I asked him to bring in the records and I will get with Dr. Boone to see if we could work him in sooner. He was satisfied with the call.

## 2019-08-08 ENCOUNTER — PATIENT MESSAGE (OUTPATIENT)
Dept: CARDIOLOGY | Facility: CLINIC | Age: 79
End: 2019-08-08

## 2019-08-08 ENCOUNTER — PATIENT MESSAGE (OUTPATIENT)
Dept: NEUROLOGY | Facility: CLINIC | Age: 79
End: 2019-08-08

## 2019-08-08 NOTE — TELEPHONE ENCOUNTER
Spoke to patient via phone, instructed son we had no control with moving another doctors appointment. He will also fax a copy of medical records from [facility]

## 2019-08-13 ENCOUNTER — TELEPHONE (OUTPATIENT)
Dept: ENDOSCOPY | Facility: HOSPITAL | Age: 79
End: 2019-08-13

## 2019-08-13 ENCOUNTER — TELEPHONE (OUTPATIENT)
Dept: NEUROLOGY | Facility: CLINIC | Age: 79
End: 2019-08-13

## 2019-08-13 NOTE — TELEPHONE ENCOUNTER
I called Paul Salazar Patient's son to inform him that we have the records he dropped off. I will have the records scanned into the chart. I reminded him that his father's appt is 09/12/2019 at 10:20 and placed him on the wait list for anything sooner. Mr. Miller understood

## 2019-08-13 NOTE — TELEPHONE ENCOUNTER
Instructions for EGD scheduled 8/28/19 at 0830 emailed to CATALINO bradford, and mailed to patient.

## 2019-08-14 ENCOUNTER — OFFICE VISIT (OUTPATIENT)
Dept: CARDIOLOGY | Facility: CLINIC | Age: 79
End: 2019-08-14
Payer: MEDICARE

## 2019-08-14 VITALS
SYSTOLIC BLOOD PRESSURE: 125 MMHG | HEART RATE: 67 BPM | DIASTOLIC BLOOD PRESSURE: 62 MMHG | BODY MASS INDEX: 21.74 KG/M2 | WEIGHT: 143.44 LBS | HEIGHT: 68 IN

## 2019-08-14 DIAGNOSIS — I65.21 STENOSIS OF RIGHT CAROTID ARTERY: Primary | ICD-10-CM

## 2019-08-14 PROCEDURE — 99499 RISK ADDL DX/OHS AUDIT: ICD-10-PCS | Mod: HCNC,S$GLB,, | Performed by: INTERNAL MEDICINE

## 2019-08-14 PROCEDURE — 99999 PR PBB SHADOW E&M-EST. PATIENT-LVL IV: CPT | Mod: PBBFAC,HCNC,, | Performed by: INTERNAL MEDICINE

## 2019-08-14 PROCEDURE — 1101F PR PT FALLS ASSESS DOC 0-1 FALLS W/OUT INJ PAST YR: ICD-10-PCS | Mod: HCNC,CPTII,S$GLB, | Performed by: INTERNAL MEDICINE

## 2019-08-14 PROCEDURE — 99214 PR OFFICE/OUTPT VISIT, EST, LEVL IV, 30-39 MIN: ICD-10-PCS | Mod: HCNC,S$GLB,, | Performed by: INTERNAL MEDICINE

## 2019-08-14 PROCEDURE — 3074F SYST BP LT 130 MM HG: CPT | Mod: HCNC,CPTII,S$GLB, | Performed by: INTERNAL MEDICINE

## 2019-08-14 PROCEDURE — 3074F PR MOST RECENT SYSTOLIC BLOOD PRESSURE < 130 MM HG: ICD-10-PCS | Mod: HCNC,CPTII,S$GLB, | Performed by: INTERNAL MEDICINE

## 2019-08-14 PROCEDURE — 1101F PT FALLS ASSESS-DOCD LE1/YR: CPT | Mod: HCNC,CPTII,S$GLB, | Performed by: INTERNAL MEDICINE

## 2019-08-14 PROCEDURE — 3078F PR MOST RECENT DIASTOLIC BLOOD PRESSURE < 80 MM HG: ICD-10-PCS | Mod: HCNC,CPTII,S$GLB, | Performed by: INTERNAL MEDICINE

## 2019-08-14 PROCEDURE — 99214 OFFICE O/P EST MOD 30 MIN: CPT | Mod: HCNC,S$GLB,, | Performed by: INTERNAL MEDICINE

## 2019-08-14 PROCEDURE — 99499 UNLISTED E&M SERVICE: CPT | Mod: HCNC,S$GLB,, | Performed by: INTERNAL MEDICINE

## 2019-08-14 PROCEDURE — 99999 PR PBB SHADOW E&M-EST. PATIENT-LVL IV: ICD-10-PCS | Mod: PBBFAC,HCNC,, | Performed by: INTERNAL MEDICINE

## 2019-08-14 PROCEDURE — 3078F DIAST BP <80 MM HG: CPT | Mod: HCNC,CPTII,S$GLB, | Performed by: INTERNAL MEDICINE

## 2019-08-14 RX ORDER — CLOPIDOGREL BISULFATE 75 MG/1
TABLET ORAL
Refills: 0 | COMMUNITY
Start: 2019-08-06 | End: 2019-08-30 | Stop reason: SDUPTHER

## 2019-08-21 ENCOUNTER — PATIENT MESSAGE (OUTPATIENT)
Dept: DERMATOLOGY | Facility: CLINIC | Age: 79
End: 2019-08-21

## 2019-08-21 RX ORDER — FLUOROURACIL 50 MG/G
CREAM TOPICAL
Qty: 40 G | Refills: 3 | Status: SHIPPED | OUTPATIENT
Start: 2019-08-21 | End: 2019-11-13

## 2019-08-23 ENCOUNTER — OFFICE VISIT (OUTPATIENT)
Dept: OPHTHALMOLOGY | Facility: CLINIC | Age: 79
End: 2019-08-23
Payer: MEDICARE

## 2019-08-23 DIAGNOSIS — H35.373 EPIRETINAL MEMBRANE (ERM) OF BOTH EYES: Primary | ICD-10-CM

## 2019-08-23 DIAGNOSIS — H53.461 RIGHT HOMONYMOUS HEMIANOPSIA: ICD-10-CM

## 2019-08-23 PROCEDURE — 92014 COMPRE OPH EXAM EST PT 1/>: CPT | Mod: HCNC,S$GLB,, | Performed by: OPHTHALMOLOGY

## 2019-08-23 PROCEDURE — 99999 PR PBB SHADOW E&M-EST. PATIENT-LVL III: ICD-10-PCS | Mod: PBBFAC,HCNC,, | Performed by: OPHTHALMOLOGY

## 2019-08-23 PROCEDURE — 92014 PR EYE EXAM, EST PATIENT,COMPREHESV: ICD-10-PCS | Mod: HCNC,S$GLB,, | Performed by: OPHTHALMOLOGY

## 2019-08-23 PROCEDURE — 99999 PR PBB SHADOW E&M-EST. PATIENT-LVL III: CPT | Mod: PBBFAC,HCNC,, | Performed by: OPHTHALMOLOGY

## 2019-08-23 NOTE — LETTER
[facility] - Ophthalmology  [address]  [facility] [address]  Phone: [phone]  Fax: [phone]   August 23, 2019    Beatrice Mccall MD  [address] [address]    Patient: Paul Browning   MR Number: [id number]   YOB: 1940   Date of Visit: 8/23/2019       Dear Dr. Mccall:    Thank you for referring Paul Browning to me for evaluation. Here is my assessment and plan of care:    Assessment:   /Plan     For exam results, see Encounter Report.    Epiretinal membrane (ERM) of both eyes    Right homonymous hemianopsia      The right homonymous hemianopsia is consistent with his previous stroke. His optic nerve function and appearance and nerve fiber layers appear intact. He ekes out 20/40 letters in his right eye with my refraction today. I will forward this information to Dr. Mccall. He will return to me as requested.        Plan:       For exam results, see Encounter Report.    Epiretinal membrane (ERM) of both eyes    Right homonymous hemianopsia      The right homonymous hemianopsia is consistent with his previous stroke. His optic nerve function and appearance and nerve fiber layers appear intact. He ekes out 20/40 letters in his right eye with my refraction today. I will forward this information to Dr. Mccall. He will return to me as requested.          Below you will find my full exam findings. If you have questions, please do not hesitate to call me. I look forward to following Mr. Paul Browning along with you.    Sincerely,          Jude Collazo MD       CC  No Recipients             Base Eye Exam     Visual Acuity (Snellen - Linear)       Right Left    Dist cc 20/100 -1 20/60+1    Dist ph cc 20/100 20/50 -2    Correction:  Glasses          Tonometry (Applanation, 2:20 PM)       Right Left    Pressure 13 15          Pupils       Dark Light Shape React APD    Right 5 3 Round Brisk None    Left 5 3 Round Brisk  None          Visual Fields       Right Left    Restrictions Total superior temporal, inferior temporal deficiencies Total superior nasal, inferior nasal deficiencies          Neuro/Psych     Oriented x3:  Yes    Mood/Affect:  Normal          Dilation     Both eyes:  1% Mydriacyl, 2.5% Phenylephrine @ 2:22 PM            Additional Tests     Color       Right Left    Ishihara Control only Control only            Strabismus Exam       - - - - - -   - - - - - -                      ET 2 - -  - -  ET 6 - -  - -  ET 12                     - - - - - -   - - - - - -                   Slit Lamp and Fundus Exam     External Exam       Right Left    External Normal temporal artery Normal temporal artery          Slit Lamp Exam       Right Left    Lids/Lashes Normal Normal    Conjunctiva/Sclera White and quiet White and quiet    Cornea Rapid tear break up, arcus Rapid tear break up, arcus    Anterior Chamber Deep and quiet Deep and quiet    Iris Round and reactive Round and reactive    Lens Posterior chamber intraocular lens Posterior chamber intraocular lens          Fundus Exam       Right Left    Disc Normal. Good color. Good NFL. Normal. Good color. Good NFL.    C/D Ratio 0.2 0.2    Macula Epiretinal membrane Epiretinal membrane    Vessels Normal Normal    Periphery Normal Normal            Refraction     Manifest Refraction       Sphere Cylinder Axis Dist VA    Right -0.75 +1.25 175 20/70 +1    Left -1.50 +1.75 180 20/40 +1          Final Rx       Sphere Cylinder Axis Dist VA    Right -0.75 +1.25 175 20/70 +1    Left -1.50 +1.75 180 20/40 +1

## 2019-08-23 NOTE — PROGRESS NOTES
HPI     Referred by Dr.Catherine Mccall  [facility] cataract clint x year.  Patients' son states OU vision has rapidly been changing.  Patient states experiencing some double vision(side by side) which using   the prism has helped.  Patient states losing vision peripherally.     I have personally interviewed the patient, reviewed the history and   examined the patient and agree with the technician's exam.    Last edited by Jude Collazo MD on 8/23/2019  1:22 PM. (History)            Assessment /Plan     For exam results, see Encounter Report.    Epiretinal membrane (ERM) of both eyes    Right homonymous hemianopsia      The right homonymous hemianopsia is consistent with his previous stroke. His optic nerve function and appearance and nerve fiber layers appear intact. He ekes out 20/40 letters in his right eye with my refraction today. I will forward this information to Dr. Mccall. He will return to me as requested.

## 2019-08-30 ENCOUNTER — PATIENT MESSAGE (OUTPATIENT)
Dept: DERMATOLOGY | Facility: CLINIC | Age: 79
End: 2019-08-30

## 2019-08-30 ENCOUNTER — PATIENT MESSAGE (OUTPATIENT)
Dept: INTERNAL MEDICINE | Facility: CLINIC | Age: 79
End: 2019-08-30

## 2019-08-30 RX ORDER — CLOPIDOGREL BISULFATE 75 MG/1
TABLET ORAL
Qty: 90 TABLET | Refills: 3 | Status: ON HOLD | OUTPATIENT
Start: 2019-08-30 | End: 2020-01-30 | Stop reason: CLARIF

## 2019-09-03 ENCOUNTER — INITIAL CONSULT (OUTPATIENT)
Dept: CARDIOLOGY | Facility: CLINIC | Age: 79
End: 2019-09-03
Payer: MEDICARE

## 2019-09-03 VITALS
HEIGHT: 67 IN | HEART RATE: 72 BPM | SYSTOLIC BLOOD PRESSURE: 137 MMHG | WEIGHT: 145.06 LBS | OXYGEN SATURATION: 98 % | DIASTOLIC BLOOD PRESSURE: 74 MMHG | BODY MASS INDEX: 22.77 KG/M2

## 2019-09-03 DIAGNOSIS — I65.21 SYMPTOMATIC STENOSIS OF RIGHT CAROTID ARTERY: Primary | ICD-10-CM

## 2019-09-03 PROBLEM — I65.29 CAROTID ARTERY STENOSIS, SYMPTOMATIC: Status: ACTIVE | Noted: 2019-09-03

## 2019-09-03 PROCEDURE — 3078F DIAST BP <80 MM HG: CPT | Mod: HCNC,CPTII,S$GLB, | Performed by: INTERNAL MEDICINE

## 2019-09-03 PROCEDURE — 1101F PR PT FALLS ASSESS DOC 0-1 FALLS W/OUT INJ PAST YR: ICD-10-PCS | Mod: HCNC,CPTII,S$GLB, | Performed by: INTERNAL MEDICINE

## 2019-09-03 PROCEDURE — 3075F SYST BP GE 130 - 139MM HG: CPT | Mod: HCNC,CPTII,S$GLB, | Performed by: INTERNAL MEDICINE

## 2019-09-03 PROCEDURE — 99205 OFFICE O/P NEW HI 60 MIN: CPT | Mod: HCNC,S$GLB,, | Performed by: INTERNAL MEDICINE

## 2019-09-03 PROCEDURE — 99205 PR OFFICE/OUTPT VISIT, NEW, LEVL V, 60-74 MIN: ICD-10-PCS | Mod: HCNC,S$GLB,, | Performed by: INTERNAL MEDICINE

## 2019-09-03 PROCEDURE — 1101F PT FALLS ASSESS-DOCD LE1/YR: CPT | Mod: HCNC,CPTII,S$GLB, | Performed by: INTERNAL MEDICINE

## 2019-09-03 PROCEDURE — 3078F PR MOST RECENT DIASTOLIC BLOOD PRESSURE < 80 MM HG: ICD-10-PCS | Mod: HCNC,CPTII,S$GLB, | Performed by: INTERNAL MEDICINE

## 2019-09-03 PROCEDURE — 99999 PR PBB SHADOW E&M-EST. PATIENT-LVL III: ICD-10-PCS | Mod: PBBFAC,HCNC,, | Performed by: INTERNAL MEDICINE

## 2019-09-03 PROCEDURE — 3075F PR MOST RECENT SYSTOLIC BLOOD PRESS GE 130-139MM HG: ICD-10-PCS | Mod: HCNC,CPTII,S$GLB, | Performed by: INTERNAL MEDICINE

## 2019-09-03 PROCEDURE — 99999 PR PBB SHADOW E&M-EST. PATIENT-LVL III: CPT | Mod: PBBFAC,HCNC,, | Performed by: INTERNAL MEDICINE

## 2019-09-03 RX ORDER — SODIUM CHLORIDE 9 MG/ML
3 INJECTION, SOLUTION INTRAVENOUS CONTINUOUS
Status: CANCELLED | OUTPATIENT
Start: 2019-09-03 | End: 2019-09-03

## 2019-09-03 RX ORDER — DIPHENHYDRAMINE HCL 25 MG
50 CAPSULE ORAL ONCE
Status: CANCELLED | OUTPATIENT
Start: 2019-09-03 | End: 2019-09-03

## 2019-09-03 NOTE — LETTER
September 3, 2019      Yash Kelly III, MD  [address]  [facility] [address]           [facility]  [address]  [facility] [address]  Phone: [phone]          Patient: Paul Browning   MR Number: [id number]   YOB: 1940   Date of Visit: 9/3/2019       Dear Dr. Yash Kelly III:    Thank you for referring Paul Browning to me for evaluation. Attached you will find relevant portions of my assessment and plan of care.    If you have questions, please do not hesitate to call me. I look forward to following Paul Browning along with you.    Sincerely,    Artie Kebede MD    Enclosure  CC:  No Recipients    If you would like to receive this communication electronically, please contact [email or web address] or [phone] to request more information on [employer] Link access.    For providers and/or their staff who would like to refer a patient to [facility], please contact us through our one-stop-shop provider referral line, [facility], at [phone].    If you feel you have received this communication in error or would no longer like to receive these types of communications, please e-mail [email or web address]

## 2019-09-03 NOTE — ASSESSMENT & PLAN NOTE
-pt with history of CAD s/p CABG, hospitalized for neurological symptoms of vision changes and imaging workup revealed significant right ICA stenosis disease

## 2019-09-03 NOTE — PROGRESS NOTES
Interventional Cardiology Clinic Note  Reason for Visit: Carotid Artery Disease  Referring Doctor: Dr. Yash Kelly (Cardiology Clinic)     HPI:   78 year old male with PMHx significant for CAD (STEMI in 2014 with PCI and JONNA to RCA, CABG- 2 vessel bypass (LIMA to LAD and SVG to Ramus), HTN and HLP along with hospitalization for vision changes in early August of this year. He underwent neurological workup with imaging (CTA neck that showed 90% stenosis of the proximal Right ICA and 70% stenosis of the Left ICA; Brain MRI that was unremarkable). TTE showed EF of 55% and no evidence of ASD. Patient is being referred to Interventional Cardiology clinic for evaluation of enrollment in CREST-2 trial for right carotid artery stenting. He is on high intensity statin and recently switched from ASA to Plavix.     Of note, patient reports going to [facility] for renewal of his 's license in July of 2018. He was told that his vision is impaired and referred to ophthalmologist. He was then told he had bilateral cataracts and underwent surgery. Since then his vision deteriorated with his right vision worse than left side. He also has been experiencing dizziness and syncopal events. He denies experiencing arm/leg parasthesias, weakness, slurred speech or dysphagia. Additionally he denies having angina, SOB/LANGLEY or weight changes. He is a former smoker and has 50 pack year history. He is compliant with taking his home medications. He is accompanied by his son and reports that his home BP systolic runs 120-130's. Patient enjoyed fishing and driving his car and hopes to return to those activities however his vision significantly impairs him from performing those activities.     Labs from 2019: , trig 83, HDL 31, LDL 69   ROS:    Constitution: Negative for fever, chills, weight loss or gain.   HENT: Negative for sore throat, rhinorrhea, or headache.  Eyes: Negative for blurred or double vision.   Cardiovascular: See  above  Pulmonary: Negative for SOB   Gastrointestinal: Negative for abdominal pain, nausea, vomiting, or diarrhea.   Neurological: Negative for focal weakness or sensory changes.  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 Talamantes    CATARACT EXTRACTION W/  INTRAOCULAR LENS IMPLANT Left 07/31/2018    Dr. Talamantes    CORONARY ARTERY BYPASS GRAFT      x2  10/2014    EGD (ESOPHAGOGASTRODUODENOSCOPY) N/A 5/12/2014    Performed by Clifford De La Torre MD at [facility] ENDO (2ND FLR)    EGD (ESOPHAGOGASTRODUODENOSCOPY) N/A 2/4/2014    Performed by Fan Carlos MD at [facility] ENDO (4TH FLR)    ESOPHAGOGASTRODUODENOSCOPY (EGD) N/A 10/4/2017    Performed by Clifford De La Torre MD at [facility] ENDO (2ND FLR)    ESOPHAGOGASTRODUODENOSCOPY (EGD) N/A 6/20/2017    Performed by Jose A Pham MD at [facility] ENDO (4TH FLR)    ESOPHAGOGASTRODUODENOSCOPY (EGD) N/A 8/12/2014    Performed by Clifford De La Torre MD at [facility] ENDO (2ND FLR)    ESOPHAGOGASTRODUODENOSCOPY (EGD) N/A 7/7/2014    Performed by Clifford De La Torre MD at [facility] ENDO (2ND FLR)    ESOPHAGOGASTRODUODENOSCOPY (EGD) WITH RADIOFREQUENCY TREATMENT OF GASTROESOPHAGEAL JUNCTION       ESOPHAGOGASTRODUODENOSCOPY (EGD)/poss rfa N/A 4/8/2019    Performed by Clifford [facility] MD at [facility] ENDO (2ND FLR)    ESOPHAGOGASTRODUODENOSCOPY (EGD)/RFA N/A 3/14/2018    Performed by Clifofrd De La Torre MD at [facility] ENDO (2ND FLR)    INSERTION, INTRAOCULAR LENS PROSTHESIS Left 7/31/2018    Performed by León Talamantes MD at [facility] OR 1ST FLR    INSERTION, INTRAOCULAR LENS PROSTHESIS Right 7/17/2018    Performed by León Talamantes MD at [facility] OR 1ST FLR    PHACOEMULSIFICATION, CATARACT Left 7/31/2018    Performed by León Talamantes MD at [facility] OR 1ST FLR    PHACOEMULSIFICATION, CATARACT Right 7/17/2018    Performed by León Talamantes MD at [facility] OR 1ST FLR    ULTRASOUND, UPPER GI TRACT, ENDOSCOPIC N/A 5/12/2014    Performed by Clifford De La Torre MD at [facility] ENDO (2ND FLR)    UMBILICAL HERNIA REPAIR       Allergies:     Review of patient's allergies indicates:   Allergen Reactions    Morphine Shortness Of Breath     Medications:     Current Outpatient Medications on File Prior to Visit   Medication Sig Dispense Refill    acetaminophen (TYLENOL) 500 MG tablet Take 1,000 mg by mouth every 6 (six) hours as needed for Pain.      atorvastatin (LIPITOR) 40 MG tablet TAKE 1 TABLET BY MOUTH EVERY DAY 90 tablet 3    clopidogrel (PLAVIX) 75 mg tablet TK 1 T PO QD 90 tablet 3    fluorouracil (EFUDEX) 5 % cream Use hs for 2 weeks 40 g 3    omega-3 fatty acids-vitamin E (FISH OIL) 1,000 mg Cap Take 1 tablet by mouth 2 (two) times daily. (Patient taking differently: Take 1 tablet by mouth once daily. ) 60 each 11    pantoprazole (PROTONIX) 40 MG tablet once daily.       sertraline (ZOLOFT) 50 MG tablet TAKE 1 TABLET BY MOUTH EVERY DAY 90 tablet 3     No current facility-administered medications on file prior to visit.      Social History:     Social History     Tobacco Use    Smoking status: Former Smoker     Packs/day: 2.00     Years: 20.00     Pack years: 40.00     Types: Cigarettes      "Last attempt to quit: 1988     Years since quittin.5    Smokeless tobacco: Never Used   Substance Use Topics    Alcohol use: No     Comment: quit drinking beer      Family History:     Family History   Problem Relation Age of Onset    Arrhythmia Mother     Heart failure Brother     No Known Problems Daughter     No Known Problems Son     No Known Problems Son     No Known Problems Son     Colon cancer Neg Hx     Inflammatory bowel disease Neg Hx     Celiac disease Neg Hx     Melanoma Neg Hx     Amblyopia Neg Hx     Blindness Neg Hx     Cataracts Neg Hx     Glaucoma Neg Hx     Macular degeneration Neg Hx     Retinal detachment Neg Hx     Strabismus Neg Hx      Physical Exam:   /74 (BP Location: Right arm, Patient Position: Sitting, BP Method: Large (Automatic))   Pulse 72   Ht 5' 7" (1.702 m)   Wt 65.8 kg (145 lb 1 oz)   SpO2 98%   BMI 22.72 kg/m²      Constitutional: NAD, conversant  HEENT: Sclera anicteric, PERRLA, EOMI  Neck: No JVD  CV: RRR, no murmur, normal S1/S2  Pulm: CTAB, no wheezes, rales, or rhonchi  GI: Abdomen soft, NTND, +BS  Extremities: No LE edema, warm and well perfused  Skin: No ecchymosis, erythema, or ulcers  Psych: AOx3, appropriate affect  Neuro: CNIII-XII intact, no focal deficits    Labs:     Lab Results   Component Value Date     2019    K 4.6 2019     2019    CO2 31 (H) 2019    BUN 16 2019    CREATININE 1.2 2019    ANIONGAP 6 (L) 2019     Lab Results   Component Value Date    HGBA1C 5.2 10/15/2014     Lab Results   Component Value Date    BNP 86 2015     (H) 2014     (H) 2014    Lab Results   Component Value Date    WBC 4.62 2019    HGB 14.1 2019    HCT 42.6 2019    HCT 22 (L) 10/21/2014     2019    GRAN 2.7 2019    GRAN 58.0 2019     Lab Results   Component Value Date    CHOL 117 (L) 2019    HDL 31 (L) 2019 "    LDLCALC 69.4 05/24/2019    TRIG 83 05/24/2019          Imaging:     CTA neck 8/6:   -nonstenotic plaque narrowing L ICA bulb with 1.6mm diameter lumen of the origin compared to 5.3mm distal cervical ICA diameter, corresponding to 70% stenosis  -No L ICA stenosis    Carotid US 8/6:  -R ICA peak systolic velocity 162cms, R ICA/CCA PSV ratio 2.2. L ICA peak systolic velocity 65cm/s, L ICA/CCA PSV ratio 0.7.  -there was mild L proximal ICA soft plaque consistent with <50% stenosis  -there is very thick soft plaque in the R prox ICA causing at least 90% stenosis despite peak systolic velocity only 162cm/s.    MRI brain 8/5:   -showed no mass lesion, hemorhage or signs of acute infarct.     Echo 8/6:  -LVEF 55-60%, trivial AI, mild MR, no ASD.    Cardiac PET Stress (2019):   · There is a small to moderate sized, moderate to severe intensity basal inferolateral fixed perfusion abnormality involving 10% of the LV myocardium in the OM2 territory consistent with non-transmural infarct. On past angiogram, the OM2 was subtotally occluded.  · Whole heart absolute myocardial perfusion (cc/min/g) averaged 1.06 rest (which is elevated), 1.47 at stress (which is mildly reduced), and 1.37 CFR (which is moderately reduced impart due to elevated resting flow).  · Within the defect absolute myocardial perfusion (cc/min/gm) averaged 0.51 at rest, 0.82 at stress and CFR was 1.78, which equates to severely reduced coronary flow capacity in 10% of the myocardium (within the OM2 territory).  · Gated perfusion images showed an ejection fraction of 62 % at rest and 84 % during stress.  · There is basal inferolateral wall hypokinesis and hypokinesis at rest and stress.  · LV cavity size is normal at rest and normal at stress.  · The EKG portion of this study is negative for myocardial ischemia.  · Arrhythmias during stress: rare PVCs.  · The patient reported no symptoms during the stress test.  · Compared to the previous study from  8-, there are no significant changes.    Assessment/Plan:     Carotid artery stenosis, symptomatic  -pt with history of CAD s/p CABG, hospitalized for neurological symptoms (August 2019 at [facility]) for vision changes and imaging workup revealed significant right ICA stenosis disease, then seen in General Cardiology Clinic with Dr. Kelly and referred to Interventional Cardiology clinic for evaluation to be enrolled in CREST-2 trial.     Recommendations:   -start ASA 81 mg QD in additional Plavix 75 mg QD  -continue with high intensity statin (Atorvastatin 40 mg QD), target LDL <70 (recent labs LDL is 69)  -patient signed release of medical information to obtain CD's of his CTA neck and carotid U/S to review, if patient's carotid artery stenosis is greater than or equal to 70% then he will be scheduled for carotid stenting of the right ICA, already discussed with patient risks versus benefits of the procedure and signed forms. (The risks of the procedure include but are not limited to: bleeding, infection, heart rhythm abnormalities, allergic reactions, kidney injury, stroke and death.  Should stenting be indicated, the patient has agreed to dual anti-platelet therapy.The risks of moderate sedation include hypotension, respiratory depression, arrhythmias, bronchospasm, & death.)  -of note, patient has symptomatic carotid artery stenosis so therefore does not qualify for CREST-2 trial  -patient BP is well controlled and he quit smoking in 1980's  -final recommendations will be conveyed to patient and his son over the phone     Signed:  Dayanna Cuevas DO  Cardiology Fellow  9/3/2019 8:47 AM    I have personally taken the history and examined this patient. I have discussed and agree with the resident's findings and plan as documented in the resident's note.  Patient referred for symptomatic severe carotid stenosis.  Will await outside carotid ultrasound and CT scan and scheduled procedure if  appropriate.  Artie Kebede

## 2019-09-04 RX ORDER — FLUOROURACIL 50 MG/ML
SOLUTION TOPICAL
Qty: 10 ML | Refills: 1 | Status: SHIPPED | OUTPATIENT
Start: 2019-09-04 | End: 2021-07-21

## 2019-09-09 ENCOUNTER — TELEPHONE (OUTPATIENT)
Dept: ENDOSCOPY | Facility: HOSPITAL | Age: 79
End: 2019-09-09

## 2019-09-09 NOTE — TELEPHONE ENCOUNTER
Spoke with patient's son in regards to rescheduling EGD. He currently is being treated for a blockage in his carotid artery. He is unable to schedule EGD at this time

## 2019-09-12 ENCOUNTER — OFFICE VISIT (OUTPATIENT)
Dept: NEUROLOGY | Facility: CLINIC | Age: 79
End: 2019-09-12
Payer: MEDICARE

## 2019-09-12 VITALS
SYSTOLIC BLOOD PRESSURE: 111 MMHG | DIASTOLIC BLOOD PRESSURE: 72 MMHG | HEIGHT: 67 IN | BODY MASS INDEX: 22.45 KG/M2 | WEIGHT: 143.06 LBS | HEART RATE: 74 BPM

## 2019-09-12 DIAGNOSIS — R51.9 CHRONIC NONINTRACTABLE HEADACHE, UNSPECIFIED HEADACHE TYPE: Primary | ICD-10-CM

## 2019-09-12 DIAGNOSIS — G89.29 CHRONIC NONINTRACTABLE HEADACHE, UNSPECIFIED HEADACHE TYPE: Primary | ICD-10-CM

## 2019-09-12 DIAGNOSIS — Z86.73 HISTORY OF CVA (CEREBROVASCULAR ACCIDENT): ICD-10-CM

## 2019-09-12 DIAGNOSIS — F41.9 ANXIETY: ICD-10-CM

## 2019-09-12 DIAGNOSIS — I65.23 BILATERAL CAROTID ARTERY STENOSIS: ICD-10-CM

## 2019-09-12 PROCEDURE — 3078F DIAST BP <80 MM HG: CPT | Mod: HCNC,CPTII,S$GLB, | Performed by: PSYCHIATRY & NEUROLOGY

## 2019-09-12 PROCEDURE — 99204 PR OFFICE/OUTPT VISIT, NEW, LEVL IV, 45-59 MIN: ICD-10-PCS | Mod: HCNC,S$GLB,, | Performed by: PSYCHIATRY & NEUROLOGY

## 2019-09-12 PROCEDURE — 1101F PT FALLS ASSESS-DOCD LE1/YR: CPT | Mod: HCNC,CPTII,S$GLB, | Performed by: PSYCHIATRY & NEUROLOGY

## 2019-09-12 PROCEDURE — 1101F PR PT FALLS ASSESS DOC 0-1 FALLS W/OUT INJ PAST YR: ICD-10-PCS | Mod: HCNC,CPTII,S$GLB, | Performed by: PSYCHIATRY & NEUROLOGY

## 2019-09-12 PROCEDURE — 3078F PR MOST RECENT DIASTOLIC BLOOD PRESSURE < 80 MM HG: ICD-10-PCS | Mod: HCNC,CPTII,S$GLB, | Performed by: PSYCHIATRY & NEUROLOGY

## 2019-09-12 PROCEDURE — 3074F SYST BP LT 130 MM HG: CPT | Mod: HCNC,CPTII,S$GLB, | Performed by: PSYCHIATRY & NEUROLOGY

## 2019-09-12 PROCEDURE — 3074F PR MOST RECENT SYSTOLIC BLOOD PRESSURE < 130 MM HG: ICD-10-PCS | Mod: HCNC,CPTII,S$GLB, | Performed by: PSYCHIATRY & NEUROLOGY

## 2019-09-12 PROCEDURE — 99999 PR PBB SHADOW E&M-EST. PATIENT-LVL III: CPT | Mod: PBBFAC,HCNC,, | Performed by: PSYCHIATRY & NEUROLOGY

## 2019-09-12 PROCEDURE — 99999 PR PBB SHADOW E&M-EST. PATIENT-LVL III: ICD-10-PCS | Mod: PBBFAC,HCNC,, | Performed by: PSYCHIATRY & NEUROLOGY

## 2019-09-12 PROCEDURE — 99204 OFFICE O/P NEW MOD 45 MIN: CPT | Mod: HCNC,S$GLB,, | Performed by: PSYCHIATRY & NEUROLOGY

## 2019-09-12 RX ORDER — RIBOFLAVIN (VITAMIN B2) 100 MG
200 TABLET ORAL DAILY
Qty: 60 TABLET | Refills: 3 | Status: SHIPPED | OUTPATIENT
Start: 2019-09-12 | End: 2020-05-05 | Stop reason: SDUPTHER

## 2019-09-12 RX ORDER — ASPIRIN 81 MG/1
81 TABLET ORAL DAILY
COMMUNITY

## 2019-09-12 RX ORDER — LANOLIN ALCOHOL/MO/W.PET/CERES
400 CREAM (GRAM) TOPICAL DAILY
Qty: 30 TABLET | Refills: 3 | Status: SHIPPED | OUTPATIENT
Start: 2019-09-12

## 2019-09-12 NOTE — PROGRESS NOTES
Neurology Clinic Visit  Primary Care Provider: Grey Forbes DO   Referring Provider: Self, Aaareferral   Date of Visit: 09/12/2019       chief complaint:   Chief Complaint   Patient presents with    Headache       History of Present Illness  Paul Browning is a 78 y.o.  male I have been asked to consult for evaluation of headaches.  Patient reports he is as headache for the past 1.5-2 years.  Headaches are primarily in the bioccipital region but sometimes he reports his entire head can hurt.  He denies any photophobia with the headaches but reports he has has poor vision at baseline.  He does report sometimes noise can make the headaches worse.  Headaches can last between 1 hr but sometimes thing and lasts all day.  He does note that usually when he is busy and active in usually outside doing activities he does not have headache but he notice headaches usually when he is stationary.  He reports that typically he was active until he had his cataract surgery last year.  Since then he has been more stationary and he does not like that he is not as active.  He reports that sometimes he takes Tylenol 1 to 2 days out of the week that helps with his headaches.  He reports the most at times he can deal with his headaches but sometimes has headaches bother him.  He denies neck pain. He denies decreased range of motion.  His son feels like he is more anxious.  He takes sertraline 50 mg daily for anxiety.  He was recently admitted to [facility] where he had MRI of the brain which revealed chronic left occipital ischemic stroke per radiology report.  He also had right carotid Doppler that showed 90% stenosis at the right proximal ICA and less than 50% stenosis of the left proximal ICA.  CTA shows 70% stenosis of the right ICA artery bulb origin.  He has been seen by Neuro-Ophthalmology and was found to have a right homonymous hemianopsia which is consistent with his chronic left occipital ischemic  stroke.         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    Post-operative state 07/18/2018    Refractive error 06/27/2018    Dry eye syndrome of both eyes 06/27/2018    Senile purpura 02/02/2017    Migraine without status migrainosus, not intractable 07/20/2016    Osteoporosis 07/20/2016    Peripheral arterial disease 03/18/2016    MDD (major depressive disorder), recurrent episode, mild 02/17/2016    Atherosclerosis of aorta 12/09/2015    Pulmonary sarcoidosis 12/09/2015    History of third degree heart block 12/09/2015    Insomnia 10/13/2015    Chronic obstructive pulmonary disease 10/13/2015    Carotid artery disease without cerebral infarction 09/20/2015    Gastric ulcer 05/12/2014    Stevens's esophagus with low grade dysplasia     CAD (coronary artery disease) 01/04/2013    Essential hypertension 01/04/2013    Hyperlipidemia 01/04/2013     Past Medical History:   Diagnosis Date    Stevens's esophagus with low grade dysplasia     BPH (benign prostatic hyperplasia)     CAD (coronary artery disease)     Cataract     Diaphragmatic hernia     GERD (gastroesophageal reflux disease)     Heart attack     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    Hyperlipidemia     Hypertension     Ischemic cardiomyopathy 11/5/2014    MDD (major depressive disorder), recurrent episode, mild 2/17/2016    Osteoporosis 7/20/2016    Peripheral arterial disease 3/18/2016    Sarcoid     Squamous cell carcinoma 09/2016, 5/2016    crown of scalp, left wrist     Past Surgical History:   Procedure Laterality Date    AORTOCORONARY BYPASS-CABG N/A 10/21/2014    Performed by [facility]bal [facility] at [facility] OR [address] [place] [address]    CARDIAC SURGERY      CATARACT EXTRACTION W/  INTRAOCULAR LENS IMPLANT Right 07/17/2018    Dr. Talamantes    CATARACT EXTRACTION W/  INTRAOCULAR LENS IMPLANT Left 07/31/2018     Dr. Talamantes    CORONARY ARTERY BYPASS GRAFT      x2  10/2014    EGD (ESOPHAGOGASTRODUODENOSCOPY) N/A 5/12/2014    Performed by Clifford [facility]re [facility] at [facility] (2ND FLR)    EGD (ESOPHAGOGASTRODUODENOSCOPY) N/A 2/4/2014    Performed by Fan Carlos MD at [facility] (4TH FLR)    ESOPHAGOGASTRODUODENOSCOPY (EGD) N/A 10/4/2017    Performed by Clifford [facility]re MD at [facility] (2ND FLR)    ESOPHAGOGASTRODUODENOSCOPY (EGD) N/A 6/20/2017    Performed by Jose A Pham MD at [facility] (4TH FLR)    ESOPHAGOGASTRODUODENOSCOPY (EGD) N/A 8/12/2014    Performed by Clifford [facility] at [facility] (2ND FLR)    ESOPHAGOGASTRODUODENOSCOPY (EGD) N/A 7/7/2014    Performed by Clifford [facility]re [facility] at [facility] (2ND FLR)    ESOPHAGOGASTRODUODENOSCOPY (EGD) WITH RADIOFREQUENCY TREATMENT OF GASTROESOPHAGEAL JUNCTION      ESOPHAGOGASTRODUODENOSCOPY (EGD)/poss rfa N/A 4/8/2019    Performed by Clifford [facility]re MD at [facility] (2ND FLR)    ESOPHAGOGASTRODUODENOSCOPY (EGD)/RFA N/A 3/14/2018    Performed by Clifford [facility]re MD at [facility] (2ND FLR)    INSERTION, INTRAOCULAR LENS PROSTHESIS Left 7/31/2018    Performed by León Talamantes MD at [facility] OR 1ST FLR    INSERTION, INTRAOCULAR LENS PROSTHESIS Right 7/17/2018    Performed by León [facility]antes MD at [facility] OR 1ST FLR    PHACOEMULSIFICATION, CATARACT Left 7/31/2018    Performed by Le[facility] MD at [facility] OR 1ST FLR    PHACOEMULSIFICATION, CATARACT Right 7/17/2018    Performed by León [facility]antes MD at [facility] OR 1ST FLR    ULTRASOUND, UPPER GI TRACT, ENDOSCOPIC N/A 5/12/2014    Performed by Clifford [facility]re [facility] at [facility] (2ND FLR)    UMBILICAL HERNIA REPAIR       Family History   Problem Relation Age of Onset    Arrhythmia Mother     Heart failure Brother     No Known Problems Daughter     No Known Problems Son     No Known Problems Son     No Known Problems Son     Colon cancer Neg Hx     Inflammatory bowel disease Neg Hx      Celiac disease Neg Hx     Melanoma Neg Hx     Amblyopia Neg Hx     Blindness Neg Hx     Cataracts Neg Hx     Glaucoma Neg Hx     Macular degeneration Neg Hx     Retinal detachment Neg Hx     Strabismus Neg Hx          Current Outpatient Medications   Medication Sig    acetaminophen (TYLENOL) 500 MG tablet Take 1,000 mg by mouth every 6 (six) hours as needed for Pain.    aspirin (ECOTRIN) 81 MG EC tablet Take 81 mg by mouth once daily.    atorvastatin (LIPITOR) 40 MG tablet TAKE 1 TABLET BY MOUTH EVERY DAY    clopidogrel (PLAVIX) 75 mg tablet TK 1 T PO QD    fluorouracil (EFUDEX) 5 % cream Use hs for 2 weeks    fluorouracil 5% 5 % Soln Use hs for 2 weeks    omega-3 fatty acids-vitamin E (FISH OIL) 1,000 mg Cap Take 1 tablet by mouth 2 (two) times daily. (Patient taking differently: Take 1 tablet by mouth once daily. )    pantoprazole (PROTONIX) 40 MG tablet once daily.     sertraline (ZOLOFT) 50 MG tablet TAKE 1 TABLET BY MOUTH EVERY DAY    magnesium oxide (MAG-OX) 400 mg (241.3 mg magnesium) tablet Take 1 tablet (400 mg total) by mouth once daily.    riboflavin, vitamin B2, (VITAMIN B-2) 100 mg Tab tablet Take 2 tablets (200 mg total) by mouth once daily.     No current facility-administered medications for this visit.          Review of patient's allergies indicates:   Allergen Reactions    Morphine Shortness Of Breath     Social History     Socioeconomic History    Marital status:      Spouse name: Not on file    Number of children: 4    Years of education: Not on file    Highest education level: Not on file   Occupational History    Occupation:     Social Needs    Financial resource strain: Not on file    Food insecurity:     Worry: Not on file     Inability: Not on file    Transportation needs:     Medical: Not on file     Non-medical: Not on file   Tobacco Use    Smoking status: Former Smoker     Packs/day: 2.00     Years: 20.00     Pack years: 40.00     Types:  "Cigarettes     Last attempt to quit: 1988     Years since quittin.5    Smokeless tobacco: Never Used   Substance and Sexual Activity    Alcohol use: No     Comment: quit drinking beer     Drug use: No    Sexual activity: Not Currently     Partners: Female   Lifestyle    Physical activity:     Days per week: Not on file     Minutes per session: Not on file    Stress: Not on file   Relationships    Social connections:     Talks on phone: Not on file     Gets together: Not on file     Attends Islam service: Not on file     Active member of club or organization: Not on file     Attends meetings of clubs or organizations: Not on file     Relationship status: Not on file   Other Topics Concern    Not on file   Social History Narrative    Not on file       Review of Systems      Constitutional: negative  Eyes: negative  Ears, nose, mouth, throat, and face: negative  Respiratory: negative  Cardiovascular: negative  Gastrointestinal: negative  Genitourinary:negative  Integument/breast: negative  Hematologic/lymphatic: negative  Musculoskeletal:negative  Neurological: negative  Behavioral/Psych: negative  Endocrine: negative  Allergic/Immunologic: negative    Objective:  Vital signs in last 24 hours:    Vitals:    19 1005   BP: 111/72   Pulse: 74   Weight: 64.9 kg (143 lb 1.3 oz)   Height: 5' 7" (1.702 m)       Body mass index is 22.41 kg/m².     General: no acute distress, well nourished, well-groomed  CVS: RRR, no murmur, gallops or rubs  Respiratory: Clear to ausculation  Extremities: no edema    Neurological Examination:    HIGHER INTEGRATIVE FUNCTIONS:  -Normal attention span and concentration; immediately responds to questions and commands  -Oriented to time, place and person  -Recent and remote memory intact  -Language normal (no aphasia or dysarthria)  -Normal fund of knowledge    CN:  -PERRL, RHH visual field defect, unable to visualize optic discs due to small pupils on fundus exam "   -EOMI with normal saccades and smooth pursuit  -Facial sensation intact bilaterally  -Facial strength/movement intact bilaterally  -Hearing intact to impaired  -Palate elevates symmetrically  -Normal shoulder shrug and head turn  -Tongue protrudes midline    MOTOR: (left/right graded 1-5)  -UE: 5/5 deltoids; 5/5 biceps, triceps; 5/5 wrist flexors, extensors; 5/5 interosseous; 5/5   -LEs: 5/5 hip flexion, extension; 5/5 knee flexion, extension; 5/5 ankle flexion, extension  -Tone: normal  -No pronator drift, no orbiting    SENSORY:  -Light touch, temperature sensation intact bilaterally    REFLEXES:  -3- upper and lower bilaterally  -Flexor plantar reflex bilaterally  -No clonus    COORDINATION:  -FNF, HKS, JUAN PABLO intact bilaterally    GAIT:  -Normal casual gait         Assessment/Plan:    1. Chronic headaches  2. Chronic left occipital infarct (per MRI report); images not available  3. Right ICA stenosis, per interventional cardiology  4. Anxiety, per PCP    Plan:  We discussed several treatment options for headache including Topamax, TCAs, SNRI's but he did not want to take medication that could cause drowsiness.  We decided to try magnesium oxide  400 mg daily and riboflavin 200 mg daily instead.  He is being followed by Interventional Cardiology for possible right ICA stent given his carotid artery disease.  He is currently on aspirin and Plavix for secondary stroke prevention.  He should continue follow-up with his PCP for modification of stroke risk factors.    Side effects of medications discussed with patient and family. He understood.  I discussed assessment and plan with patient and family and answered the questions that they had.     Part of patient note might have been created using Dragon Dictation.  Any errors in syntax or even information may not have been identified and edited on initial review prior to signing this note.

## 2019-09-18 PROBLEM — I65.29 INTERNAL CAROTID ARTERY STENOSIS: Status: ACTIVE | Noted: 2019-09-18

## 2019-09-18 PROBLEM — Z98.890 POST-OPERATIVE STATE: Status: RESOLVED | Noted: 2018-07-18 | Resolved: 2019-09-18

## 2019-09-19 ENCOUNTER — OFFICE VISIT (OUTPATIENT)
Dept: INTERNAL MEDICINE | Facility: CLINIC | Age: 79
End: 2019-09-19
Payer: MEDICARE

## 2019-09-19 VITALS
BODY MASS INDEX: 22.91 KG/M2 | WEIGHT: 145.94 LBS | HEIGHT: 67 IN | DIASTOLIC BLOOD PRESSURE: 60 MMHG | OXYGEN SATURATION: 98 % | RESPIRATION RATE: 15 BRPM | SYSTOLIC BLOOD PRESSURE: 130 MMHG | HEART RATE: 64 BPM

## 2019-09-19 DIAGNOSIS — Z86.79 HISTORY OF THIRD DEGREE HEART BLOCK: ICD-10-CM

## 2019-09-19 DIAGNOSIS — M81.6 LOCALIZED OSTEOPOROSIS, UNSPECIFIED PATHOLOGICAL FRACTURE PRESENCE: ICD-10-CM

## 2019-09-19 DIAGNOSIS — D69.2 SENILE PURPURA: ICD-10-CM

## 2019-09-19 DIAGNOSIS — N18.30 CKD (CHRONIC KIDNEY DISEASE) STAGE 3, GFR 30-59 ML/MIN: ICD-10-CM

## 2019-09-19 DIAGNOSIS — I25.10 CORONARY ARTERY DISEASE INVOLVING NATIVE CORONARY ARTERY OF NATIVE HEART WITHOUT ANGINA PECTORIS: Chronic | ICD-10-CM

## 2019-09-19 DIAGNOSIS — H52.7 REFRACTIVE ERROR: ICD-10-CM

## 2019-09-19 DIAGNOSIS — J44.9 CHRONIC OBSTRUCTIVE PULMONARY DISEASE, UNSPECIFIED COPD TYPE: ICD-10-CM

## 2019-09-19 DIAGNOSIS — G47.00 INSOMNIA, UNSPECIFIED TYPE: Chronic | ICD-10-CM

## 2019-09-19 DIAGNOSIS — H50.50 HETEROPHORIA: ICD-10-CM

## 2019-09-19 DIAGNOSIS — Z00.00 ENCOUNTER FOR PREVENTIVE HEALTH EXAMINATION: Primary | ICD-10-CM

## 2019-09-19 DIAGNOSIS — I73.9 PERIPHERAL ARTERIAL DISEASE: ICD-10-CM

## 2019-09-19 DIAGNOSIS — I77.9 CAROTID ARTERY DISEASE WITHOUT CEREBRAL INFARCTION: Chronic | ICD-10-CM

## 2019-09-19 DIAGNOSIS — K22.710 BARRETT'S ESOPHAGUS WITH LOW GRADE DYSPLASIA: Chronic | ICD-10-CM

## 2019-09-19 DIAGNOSIS — I65.29 INTERNAL CAROTID ARTERY STENOSIS, UNSPECIFIED LATERALITY: ICD-10-CM

## 2019-09-19 DIAGNOSIS — H53.461 RIGHT HOMONYMOUS HEMIANOPSIA: ICD-10-CM

## 2019-09-19 DIAGNOSIS — D86.0 PULMONARY SARCOIDOSIS: ICD-10-CM

## 2019-09-19 DIAGNOSIS — K25.7 CHRONIC GASTRIC ULCER WITHOUT HEMORRHAGE AND WITHOUT PERFORATION: ICD-10-CM

## 2019-09-19 DIAGNOSIS — E78.5 HYPERLIPIDEMIA, UNSPECIFIED HYPERLIPIDEMIA TYPE: Chronic | ICD-10-CM

## 2019-09-19 DIAGNOSIS — F33.0 MDD (MAJOR DEPRESSIVE DISORDER), RECURRENT EPISODE, MILD: Chronic | ICD-10-CM

## 2019-09-19 DIAGNOSIS — I70.0 ATHEROSCLEROSIS OF AORTA: ICD-10-CM

## 2019-09-19 DIAGNOSIS — I10 ESSENTIAL HYPERTENSION: Chronic | ICD-10-CM

## 2019-09-19 DIAGNOSIS — I65.29 SYMPTOMATIC CAROTID ARTERY STENOSIS, UNSPECIFIED LATERALITY: ICD-10-CM

## 2019-09-19 DIAGNOSIS — G43.709 CHRONIC MIGRAINE WITHOUT AURA WITHOUT STATUS MIGRAINOSUS, NOT INTRACTABLE: ICD-10-CM

## 2019-09-19 DIAGNOSIS — H04.123 DRY EYE SYNDROME OF BOTH EYES: ICD-10-CM

## 2019-09-19 PROCEDURE — 90662 FLU VACCINE - HIGH DOSE (65+) PRESERVATIVE FREE IM: ICD-10-PCS | Mod: HCNC,S$GLB,, | Performed by: NURSE PRACTITIONER

## 2019-09-19 PROCEDURE — 99999 PR PBB SHADOW E&M-EST. PATIENT-LVL IV: CPT | Mod: PBBFAC,HCNC,, | Performed by: NURSE PRACTITIONER

## 2019-09-19 PROCEDURE — G0008 FLU VACCINE - HIGH DOSE (65+) PRESERVATIVE FREE IM: ICD-10-PCS | Mod: HCNC,S$GLB,, | Performed by: NURSE PRACTITIONER

## 2019-09-19 PROCEDURE — 90662 IIV NO PRSV INCREASED AG IM: CPT | Mod: HCNC,S$GLB,, | Performed by: NURSE PRACTITIONER

## 2019-09-19 PROCEDURE — 3075F SYST BP GE 130 - 139MM HG: CPT | Mod: HCNC,CPTII,S$GLB, | Performed by: NURSE PRACTITIONER

## 2019-09-19 PROCEDURE — 99999 PR PBB SHADOW E&M-EST. PATIENT-LVL IV: ICD-10-PCS | Mod: PBBFAC,HCNC,, | Performed by: NURSE PRACTITIONER

## 2019-09-19 PROCEDURE — 3078F DIAST BP <80 MM HG: CPT | Mod: HCNC,CPTII,S$GLB, | Performed by: NURSE PRACTITIONER

## 2019-09-19 PROCEDURE — G0439 PPPS, SUBSEQ VISIT: HCPCS | Mod: HCNC,S$GLB,, | Performed by: NURSE PRACTITIONER

## 2019-09-19 PROCEDURE — 99499 UNLISTED E&M SERVICE: CPT | Mod: HCNC,S$GLB,, | Performed by: NURSE PRACTITIONER

## 2019-09-19 PROCEDURE — 3078F PR MOST RECENT DIASTOLIC BLOOD PRESSURE < 80 MM HG: ICD-10-PCS | Mod: HCNC,CPTII,S$GLB, | Performed by: NURSE PRACTITIONER

## 2019-09-19 PROCEDURE — 3075F PR MOST RECENT SYSTOLIC BLOOD PRESS GE 130-139MM HG: ICD-10-PCS | Mod: HCNC,CPTII,S$GLB, | Performed by: NURSE PRACTITIONER

## 2019-09-19 PROCEDURE — G0008 ADMIN INFLUENZA VIRUS VAC: HCPCS | Mod: HCNC,S$GLB,, | Performed by: NURSE PRACTITIONER

## 2019-09-19 PROCEDURE — 99499 RISK ADDL DX/OHS AUDIT: ICD-10-PCS | Mod: HCNC,S$GLB,, | Performed by: NURSE PRACTITIONER

## 2019-09-19 PROCEDURE — G0439 PR MEDICARE ANNUAL WELLNESS SUBSEQUENT VISIT: ICD-10-PCS | Mod: HCNC,S$GLB,, | Performed by: NURSE PRACTITIONER

## 2019-09-19 NOTE — Clinical Note
Primary Care Providers:Grey Forbes, DO, DO (General)Your patient was seen today for a HRA visit. No Gap(s) in care (HEDIS gaps) have been identified during this visit that require additional testing and possible follow up.Orders Placed This Encounter    Influenza - High Dose (65+) (PF) (IM)These orders were placed using [facility] approved protocol and any results will be forwarded to your office for appropriate follow up. I have included a copy of my visit note; please review the note and feel free to contact me with any questions. Thank you for allowing me to participate in the care of your patients.Bijal Trotter NP

## 2019-09-19 NOTE — PATIENT INSTRUCTIONS
Counseling and Referral of Other Preventative  (Italic type indicates deductible and co-insurance are waived)    Patient Name: Paul Browning  Today's Date: 9/19/2019    Health Maintenance       Date Due Completion Date    Influenza Vaccine (1) Today cdc handout given   9/26/2018    Shingles Vaccine (1 of 2) [facility] handout given   ---    Aspirin/Antiplatelet Therapy 09/12/2020 9/12/2019    Lipid Panel 05/24/2020 5/24/2019    TETANUS VACCINE    Bone density   03/03/2026   3/3/2016    4/14/2016          No orders of the defined types were placed in this encounter.    The following information is provided to all patients.  This information is to help you find resources for any of the problems found today that may be affecting your health:                Living healthy guide: www.[facility].[place].gov      Understanding Diabetes: www.diabetes.org      Eating healthy: www.cdc.gov/healthyweight      [facility] home safety checklist: www.cdc.gov/steadi/patient.html      Agency on Aging: www.goea.[place].gov      Alcoholics anonymous (AA): www.aa.org      Physical Activity: www.yvette.nih.gov/rf4sxcx      Tobacco use: www.quitwithusla.org

## 2019-09-19 NOTE — PROGRESS NOTES
"Paul Browning presented for a  Medicare AWV and comprehensive Health Risk Assessment today. The following components were reviewed and updated:    · Medical history  · Family History  · Social history  · Allergies and Current Medications  · Health Risk Assessment  · Health Maintenance  · Care Team     ** See Completed Assessments for Annual Wellness Visit within the encounter summary.**       The following assessments were completed:  · Living Situation  · CAGE  · Depression Screening  · Timed Get Up and Go  · Whisper Test  · Cognitive Function Screening  · Nutrition Screening  · ADL Screening  · PAQ Screening    Vitals:    09/19/19 1315   BP: 130/60   BP Location: Right arm   Patient Position: Sitting   BP Method: Small (Manual)   Pulse: 64   Resp: 15   SpO2: 98%   Weight: 66.2 kg (145 lb 15.1 oz)   Height: 5' 7" (1.702 m)     Body mass index is 22.86 kg/m².  Physical Exam   Constitutional: He is oriented to person, place, and time.   thin   HENT:   Head: Normocephalic and atraumatic.   Hear aide right   Cardiovascular: Normal rate, regular rhythm and normal heart sounds.   No murmur heard.  Pulmonary/Chest: Effort normal and breath sounds normal. No respiratory distress. He has no decreased breath sounds. He has no wheezes. He has no rhonchi. He has no rales.   Abdominal: Soft. Bowel sounds are normal.   Musculoskeletal: Normal range of motion. He exhibits no edema.   Neurological: He is alert and oriented to person, place, and time. He exhibits normal muscle tone.   Skin: He is not diaphoretic. No pallor.   Psychiatric: He has a normal mood and affect. His speech is normal and behavior is normal. Judgment and thought content normal.   Nursing note and vitals reviewed.        Diagnoses and health risks identified today and associated recommendations/orders:    1. Chronic gastric ulcer without hemorrhage and without perforation  Stable- followed by GI,PCP    2. Chronic migraine without aura without status " migrainosus, not intractable  Stable- followed by neurology    3. Peripheral arterial disease  Stable- followed by PCP, CV    4. Dry eye syndrome of both eyes  Stable- followed by opth    5. Refractive error  Stable- followed by opth    6. Senile purpura  Stable- followed by opth    7. Localized osteoporosis, unspecified pathological fracture presence  Stable- followed by PCP    8. Right homonymous hemianopsia  Stable- followed by opth,PCP    9. Heterophoria  Stable- followed by PCP    10. Symptomatic carotid artery stenosis, unspecified laterality  Stable- followed by PCP, neurology, CV     11. Coronary artery disease involving native coronary artery of native heart without angina pectoris  Stable- followed by cardiology    12. History of third degree heart block  Stable- followed by cardiology    13. Pulmonary sarcoidosis  Stable- followed by PCP    14. Atherosclerosis of aorta  Stable- followed by cardiology    15. Chronic obstructive pulmonary disease, unspecified COPD type  Stable- followed by PCP    16. MDD (major depressive disorder), recurrent episode, mild  Stable- followed by PCP- PHQ 2    17. Insomnia, unspecified type  Stable- followed by PCP    18. Carotid artery disease without cerebral infarction  Stable- followed by cardiology    19. Stevens's esophagus with low grade dysplasia  Stable- followed by GI    20. Hyperlipidemia, unspecified hyperlipidemia type  Stable- followed by PCP, cardiology    21. Essential hypertension  Stable- followed by PCP, cardiology    22. Internal carotid artery stenosis, unspecified laterality  Stable- followed by cardiology,CV service    23. Encounter for preventive health examination  Assessments completed. Preventative health recommendations reviewed.     24. CKD (chronic kidney disease) stage 3, GFR 30-59 ml/min  Stable- followed by PCP      Faby Miller with a 5-10 year written screening schedule and personal prevention plan. Recommendations were developed using the  USPSTF age appropriate recommendations. Education, counseling, and referrals were provided as needed. After Visit Summary printed and given to patient which includes a list of additional screenings\tests needed. Requesting flu vaccine-Denies recent illness, fever. Fluvax CDc handout given - son reviewed with patient - fluvax given- see administration documentation. Fall prevention. Caregiver to wife. Encouraged to start walking/exercising as tolerated.       Follow up in about 1 year (around 9/19/2020) for HRA.    ALBERT Landeros offered to discuss end of life issues, including information on how to make advance directives that the patient could use to name someone who would make medical decisions on their behalf if they became too ill to make themselves.    ___Patient declined  _X_Patient is interested, I provided paper work and offered to discuss.

## 2019-09-20 ENCOUNTER — PATIENT MESSAGE (OUTPATIENT)
Dept: CARDIOLOGY | Facility: CLINIC | Age: 79
End: 2019-09-20

## 2019-09-23 ENCOUNTER — DOCUMENTATION ONLY (OUTPATIENT)
Dept: CARDIOLOGY | Facility: CLINIC | Age: 79
End: 2019-09-23

## 2019-09-23 ENCOUNTER — PATIENT MESSAGE (OUTPATIENT)
Dept: CARDIOLOGY | Facility: CLINIC | Age: 79
End: 2019-09-23

## 2019-09-23 NOTE — PROGRESS NOTES
OUTPATIENT CATHETERIZATION INSTRUCTIONS    You have been scheduled for a procedure in the catheterization lab on Monday, October 14, 2019.     Please report to the Cardiology Waiting Area on the Third floor of the hospital and check in at 7 AM.   You will then be taken to the SSCU (Short Stay Cardiac Unit) and prepared for your procedure. Please be aware that this is not the time of your procedure but the time you are to arrive. The procedures are scheduled on an hourly basis; however, emergency cases take precedence over all other cases.       You may not have anything to eat or drink after midnight the night before your test. You may take your regular morning medications with water. If there are any medications that you should not take you will be instructed to hold them that morning. If you are diabetic and on Metformin (Glucophage) do not take it the day before, the day of, and for 2 days after your procedure.      The procedure will take 1-2 hours to perform. After the procedure, you will return to SSCU on the third floor of the hospital. You will need to lie still (or keep your arm still) for the next 4 to 6 hours to minimize bleeding from the puncture site. Your family may remain in the room with you during this time.       You may be able to be discharged home that same afternoon if there is someone to drive you home and there were no complications. If you have one of the balloon, stent, or device procedures you may spend the night in the hospital. Your doctor will determine, based on your progress, the date and time of your discharge. The results of your procedure will be discussed with you before you are discharged. Any further testing or procedures will be scheduled for you either before you leave or you will be called with these appointments.       If you should have any questions, concerns, or need to change the date of your procedure, please call NIA Umaña @ [phone]    Special  Instructions:    None        THE ABOVE INSTRUCTIONS WERE GIVEN TO THE PATIENT VERBALLY AND THEY VERBALIZED UNDERSTANDING.  THEY DO NOT REQUIRE ANY SPECIAL NEEDS AND DO NOT HAVE ANY LEARNING BARRIERS.          Directions for Reporting to Cardiology Waiting Area in the Hospital  If you park in the Parking Garage:  Take elevators to the1st floor of the parking garage.  Continue past the gift shop, coffee shop, and piano.  Take a right and go to the gold elevators. (Elevator B)  Take the elevator to the 3rd floor.  Follow the arrow on the sign on the wall that says Cath Lab Registration/EP Lab Registration.  Follow the long hallway all the way around until you come to a big open area.  This is the registration area.  Check in at Reception Desk.    OR    If family is dropping you off:  Have them drop you off at the front of the Hospital under the green overhang.  Enter through the doors and take a right.  Take the E elevators to the 3rd floor Cardiology Waiting Area.  Check in at the Reception Desk in the waiting room.

## 2019-09-24 DIAGNOSIS — I65.29 STENOSIS OF CAROTID ARTERY, UNSPECIFIED LATERALITY: ICD-10-CM

## 2019-09-24 RX ORDER — DIPHENHYDRAMINE HCL 50 MG
50 CAPSULE ORAL ONCE
Status: CANCELLED | OUTPATIENT
Start: 2019-09-24 | End: 2019-09-24

## 2019-09-24 RX ORDER — SODIUM CHLORIDE 9 MG/ML
3 INJECTION, SOLUTION INTRAVENOUS CONTINUOUS
Status: CANCELLED | OUTPATIENT
Start: 2019-09-24 | End: 2019-09-24

## 2019-10-07 ENCOUNTER — APPOINTMENT (OUTPATIENT)
Dept: RADIOLOGY | Facility: CLINIC | Age: 79
End: 2019-10-07
Attending: NURSE PRACTITIONER
Payer: MEDICARE

## 2019-10-07 DIAGNOSIS — M81.0 AGE-RELATED OSTEOPOROSIS WITHOUT CURRENT PATHOLOGICAL FRACTURE: ICD-10-CM

## 2019-10-07 PROCEDURE — 77080 DXA BONE DENSITY AXIAL: CPT | Mod: TC,HCNC,PO

## 2019-10-07 PROCEDURE — 77080 DEXA BONE DENSITY SPINE HIP: ICD-10-PCS | Mod: 26,HCNC,, | Performed by: INTERNAL MEDICINE

## 2019-10-07 PROCEDURE — 77080 DXA BONE DENSITY AXIAL: CPT | Mod: 26,HCNC,, | Performed by: INTERNAL MEDICINE

## 2019-10-09 ENCOUNTER — TELEPHONE (OUTPATIENT)
Dept: ENDOSCOPY | Facility: HOSPITAL | Age: 79
End: 2019-10-09

## 2019-10-14 ENCOUNTER — HOSPITAL ENCOUNTER (INPATIENT)
Facility: HOSPITAL | Age: 79
LOS: 1 days | Discharge: HOME | DRG: 036 | End: 2019-10-15
Attending: INTERNAL MEDICINE | Admitting: INTERNAL MEDICINE
Payer: MEDICARE

## 2019-10-14 DIAGNOSIS — I65.21 CAROTID STENOSIS, RIGHT: ICD-10-CM

## 2019-10-14 DIAGNOSIS — I65.29 CAROTID ARTERY STENOSIS: ICD-10-CM

## 2019-10-14 DIAGNOSIS — Z98.61 POSTSURGICAL PERCUTANEOUS TRANSLUMINAL CORONARY ANGIOPLASTY STATUS: Primary | ICD-10-CM

## 2019-10-14 DIAGNOSIS — I65.29 CAROTID STENOSIS: ICD-10-CM

## 2019-10-14 DIAGNOSIS — I25.10 CORONARY ARTERY DISEASE INVOLVING NATIVE CORONARY ARTERY OF NATIVE HEART WITHOUT ANGINA PECTORIS: ICD-10-CM

## 2019-10-14 LAB
ABO + RH BLD: NORMAL
ANION GAP SERPL CALC-SCNC: 9 MMOL/L (ref 8–16)
BLD GP AB SCN CELLS X3 SERPL QL: NORMAL
BUN SERPL-MCNC: 18 MG/DL (ref 8–23)
CALCIUM SERPL-MCNC: 9.3 MG/DL (ref 8.7–10.5)
CHLORIDE SERPL-SCNC: 103 MMOL/L (ref 95–110)
CO2 SERPL-SCNC: 28 MMOL/L (ref 23–29)
CREAT SERPL-MCNC: 1.2 MG/DL (ref 0.5–1.4)
ERYTHROCYTE [DISTWIDTH] IN BLOOD BY AUTOMATED COUNT: 13.4 % (ref 11.5–14.5)
EST. GFR  (AFRICAN AMERICAN): >60 ML/MIN/1.73 M^2
EST. GFR  (NON AFRICAN AMERICAN): 56.8 ML/MIN/1.73 M^2
GLUCOSE SERPL-MCNC: 89 MG/DL (ref 70–110)
HCT VFR BLD AUTO: 41.1 % (ref 40–54)
HGB BLD-MCNC: 13.2 G/DL (ref 14–18)
INR PPP: 1 (ref 0.8–1.2)
MCH RBC QN AUTO: 30 PG (ref 27–31)
MCHC RBC AUTO-ENTMCNC: 32.1 G/DL (ref 32–36)
MCV RBC AUTO: 93 FL (ref 82–98)
PLATELET # BLD AUTO: 232 K/UL (ref 150–350)
PMV BLD AUTO: 10.3 FL (ref 9.2–12.9)
POTASSIUM SERPL-SCNC: 4.2 MMOL/L (ref 3.5–5.1)
PROTHROMBIN TIME: 10.5 SEC (ref 9–12.5)
RBC # BLD AUTO: 4.4 M/UL (ref 4.6–6.2)
SODIUM SERPL-SCNC: 140 MMOL/L (ref 136–145)
WBC # BLD AUTO: 4.21 K/UL (ref 3.9–12.7)

## 2019-10-14 PROCEDURE — 93005 ELECTROCARDIOGRAM TRACING: CPT | Mod: HCNC

## 2019-10-14 PROCEDURE — 80048 BASIC METABOLIC PNL TOTAL CA: CPT | Mod: HCNC

## 2019-10-14 PROCEDURE — 93010 ELECTROCARDIOGRAM REPORT: CPT | Mod: HCNC,,, | Performed by: INTERNAL MEDICINE

## 2019-10-14 PROCEDURE — 85610 PROTHROMBIN TIME: CPT | Mod: HCNC

## 2019-10-14 PROCEDURE — 63600175 PHARM REV CODE 636 W HCPCS: Mod: HCNC | Performed by: STUDENT IN AN ORGANIZED HEALTH CARE EDUCATION/TRAINING PROGRAM

## 2019-10-14 PROCEDURE — 25000003 PHARM REV CODE 250: Mod: HCNC | Performed by: STUDENT IN AN ORGANIZED HEALTH CARE EDUCATION/TRAINING PROGRAM

## 2019-10-14 PROCEDURE — C1887 CATHETER, GUIDING: HCPCS | Mod: HCNC | Performed by: INTERNAL MEDICINE

## 2019-10-14 PROCEDURE — 25500020 PHARM REV CODE 255: Mod: HCNC | Performed by: INTERNAL MEDICINE

## 2019-10-14 PROCEDURE — C1725 CATH, TRANSLUMIN NON-LASER: HCPCS | Mod: HCNC | Performed by: INTERNAL MEDICINE

## 2019-10-14 PROCEDURE — 63600175 PHARM REV CODE 636 W HCPCS: Mod: HCNC | Performed by: INTERNAL MEDICINE

## 2019-10-14 PROCEDURE — 37215 TRANSCATH STENT CCA W/EPS: CPT | Mod: HCNC,,, | Performed by: INTERNAL MEDICINE

## 2019-10-14 PROCEDURE — 86901 BLOOD TYPING SEROLOGIC RH(D): CPT | Mod: HCNC

## 2019-10-14 PROCEDURE — 85027 COMPLETE CBC AUTOMATED: CPT | Mod: HCNC

## 2019-10-14 PROCEDURE — C1894 INTRO/SHEATH, NON-LASER: HCPCS | Mod: HCNC | Performed by: INTERNAL MEDICINE

## 2019-10-14 PROCEDURE — C1876 STENT, NON-COA/NON-COV W/DEL: HCPCS | Mod: HCNC | Performed by: INTERNAL MEDICINE

## 2019-10-14 PROCEDURE — 37215 PR CAROTID STENT PLACEMENT, CERVICAL: ICD-10-PCS | Mod: HCNC,,, | Performed by: INTERNAL MEDICINE

## 2019-10-14 PROCEDURE — C1760 CLOSURE DEV, VASC: HCPCS | Mod: HCNC | Performed by: INTERNAL MEDICINE

## 2019-10-14 PROCEDURE — 25000003 PHARM REV CODE 250: Mod: HCNC | Performed by: INTERNAL MEDICINE

## 2019-10-14 PROCEDURE — 85347 COAGULATION TIME ACTIVATED: CPT | Mod: HCNC | Performed by: INTERNAL MEDICINE

## 2019-10-14 PROCEDURE — 37215 TRANSCATH STENT CCA W/EPS: CPT | Mod: HCNC | Performed by: INTERNAL MEDICINE

## 2019-10-14 PROCEDURE — 20600001 HC STEP DOWN PRIVATE ROOM: Mod: HCNC

## 2019-10-14 PROCEDURE — 93010 EKG 12-LEAD: ICD-10-PCS | Mod: HCNC,,, | Performed by: INTERNAL MEDICINE

## 2019-10-14 PROCEDURE — 27201423 OPTIME MED/SURG SUP & DEVICES STERILE SUPPLY: Mod: HCNC | Performed by: INTERNAL MEDICINE

## 2019-10-14 PROCEDURE — C1769 GUIDE WIRE: HCPCS | Mod: HCNC | Performed by: INTERNAL MEDICINE

## 2019-10-14 DEVICE — IMPLANTABLE DEVICE: Type: IMPLANTABLE DEVICE | Site: CAROTID | Status: FUNCTIONAL

## 2019-10-14 RX ORDER — LABETALOL HCL 20 MG/4 ML
10 SYRINGE (ML) INTRAVENOUS EVERY 6 HOURS PRN
Status: DISCONTINUED | OUTPATIENT
Start: 2019-10-14 | End: 2019-10-15 | Stop reason: HOSPADM

## 2019-10-14 RX ORDER — LIDOCAINE HYDROCHLORIDE 20 MG/ML
INJECTION, SOLUTION EPIDURAL; INFILTRATION; INTRACAUDAL; PERINEURAL
Status: DISCONTINUED | OUTPATIENT
Start: 2019-10-14 | End: 2019-10-14 | Stop reason: HOSPADM

## 2019-10-14 RX ORDER — SODIUM CHLORIDE 9 MG/ML
INJECTION, SOLUTION INTRAVENOUS ONCE
Status: COMPLETED | OUTPATIENT
Start: 2019-10-14 | End: 2019-10-14

## 2019-10-14 RX ORDER — SODIUM CHLORIDE 9 MG/ML
INJECTION, SOLUTION INTRAVENOUS CONTINUOUS
Status: DISCONTINUED | OUTPATIENT
Start: 2019-10-14 | End: 2019-10-14

## 2019-10-14 RX ORDER — ATORVASTATIN CALCIUM 20 MG/1
40 TABLET, FILM COATED ORAL DAILY
Status: DISCONTINUED | OUTPATIENT
Start: 2019-10-15 | End: 2019-10-15 | Stop reason: HOSPADM

## 2019-10-14 RX ORDER — ACETAMINOPHEN 325 MG/1
650 TABLET ORAL EVERY 6 HOURS PRN
Status: DISCONTINUED | OUTPATIENT
Start: 2019-10-14 | End: 2019-10-15 | Stop reason: HOSPADM

## 2019-10-14 RX ORDER — SODIUM CHLORIDE 9 MG/ML
3 INJECTION, SOLUTION INTRAVENOUS CONTINUOUS
Status: DISCONTINUED | OUTPATIENT
Start: 2019-10-14 | End: 2019-10-14

## 2019-10-14 RX ORDER — SERTRALINE HYDROCHLORIDE 50 MG/1
50 TABLET, FILM COATED ORAL DAILY
Status: DISCONTINUED | OUTPATIENT
Start: 2019-10-15 | End: 2019-10-15 | Stop reason: HOSPADM

## 2019-10-14 RX ORDER — CLOPIDOGREL BISULFATE 75 MG/1
75 TABLET ORAL DAILY
Status: DISCONTINUED | OUTPATIENT
Start: 2019-10-15 | End: 2019-10-15 | Stop reason: HOSPADM

## 2019-10-14 RX ORDER — HEPARIN SODIUM 200 [USP'U]/100ML
INJECTION, SOLUTION INTRAVENOUS
Status: DISCONTINUED | OUTPATIENT
Start: 2019-10-14 | End: 2019-10-15 | Stop reason: HOSPADM

## 2019-10-14 RX ORDER — ASPIRIN 81 MG/1
81 TABLET ORAL DAILY
Status: DISCONTINUED | OUTPATIENT
Start: 2019-10-15 | End: 2019-10-15 | Stop reason: HOSPADM

## 2019-10-14 RX ORDER — SODIUM CHLORIDE 0.9 % (FLUSH) 0.9 %
10 SYRINGE (ML) INJECTION
Status: DISCONTINUED | OUTPATIENT
Start: 2019-10-14 | End: 2019-10-15 | Stop reason: HOSPADM

## 2019-10-14 RX ORDER — DIPHENHYDRAMINE HCL 50 MG
50 CAPSULE ORAL ONCE
Status: COMPLETED | OUTPATIENT
Start: 2019-10-14 | End: 2019-10-14

## 2019-10-14 RX ORDER — HEPARIN SODIUM 1000 [USP'U]/ML
INJECTION, SOLUTION INTRAVENOUS; SUBCUTANEOUS
Status: DISCONTINUED | OUTPATIENT
Start: 2019-10-14 | End: 2019-10-14 | Stop reason: HOSPADM

## 2019-10-14 RX ORDER — DIPHENHYDRAMINE HYDROCHLORIDE 50 MG/ML
INJECTION INTRAMUSCULAR; INTRAVENOUS
Status: DISCONTINUED | OUTPATIENT
Start: 2019-10-14 | End: 2019-10-14 | Stop reason: HOSPADM

## 2019-10-14 RX ADMIN — SODIUM CHLORIDE: 0.9 INJECTION, SOLUTION INTRAVENOUS at 09:10

## 2019-10-14 RX ADMIN — SODIUM CHLORIDE 3 ML/KG/HR: 0.9 INJECTION, SOLUTION INTRAVENOUS at 07:10

## 2019-10-14 RX ADMIN — ACETAMINOPHEN 650 MG: 325 TABLET ORAL at 11:10

## 2019-10-14 RX ADMIN — ACETAMINOPHEN 650 MG: 325 TABLET ORAL at 09:10

## 2019-10-14 RX ADMIN — DIPHENHYDRAMINE HYDROCHLORIDE 50 MG: 50 CAPSULE ORAL at 07:10

## 2019-10-14 NOTE — HPI
"79 y/o M here for R-ICA angioplasty.  CTA head and neck 9/2019 at OSH: 70% stenosis at the right ICA bulb origin, patent common carotids b/l and non-stenotic left -ICA.  On ASA and Plavix       Medical: HTN, HLD, CAD s/p MI/PCI/CABG (STEMI in 2014 with PCI to RCA; CABG with LIMA-LAD, SVG-ramus; ischemic symptoms were "fogginess and feeling weird, no chest pain or dyspnea"), BPH  Surgical: CABG (10/2014), cataracts, hernia  Family: heart failure, arrhythmia  Social: former smoker of 2 PPD x20y (quit 1988), quit drinking in 2012  "

## 2019-10-14 NOTE — Clinical Note
Angiography performed of the proximal right common carotid. Angiography performed via hand injection with 5 mL of contrast.

## 2019-10-14 NOTE — BRIEF OP NOTE
"Post Cath Note  Referring Physician: Artie Kebede MD  Procedure: INSERTION, STENT, ARTERY, CAROTID (N/A), Stent, Carotid W/ Protect       Access: Right CFA      See full report for further details    Intervention:   Right ICA stent   Closure device: Perclosure    Post Cath Exam:   BP (!) 142/59 (BP Location: Left arm, Patient Position: Lying)   Pulse 64   Temp 96.7 °F (35.9 °C) (Oral)   Resp 18   Ht 5' 7" (1.702 m)   Wt 66.6 kg (146 lb 12.8 oz)   SpO2 97%   BMI 22.99 kg/m²   No unusual pain, hematoma, thrill or bruit at vascular access site.  Distal pulse present without signs of ischemia.    Recommendations:   - Routine post-cath care  - IVF at 200ml/hr x 4 hours   - ASA + Plavix   -Will stay overnight   -Hold all anti-hypertensives   -Please notify if he gets hypotensive     Andrea Nguyễn DO  Cardiology Fellow         "

## 2019-10-14 NOTE — Clinical Note
Angiography performed post intervention of the proximal right common carotid. Angiography performed via hand injection with 5 mL of contrast.

## 2019-10-14 NOTE — H&P
"[facility]  Cardiology  History and Physical     Patient Name: Paul Browning  MRN: [id number]  Admission Date: 10/14/2019  Code Status: Prior   Attending Provider: Artie Kebede MD   Primary Care Physician: Grey Forbes DO  Principal Problem:<principal problem not specified>    Patient information was obtained from patient and ER records.     Subjective:     Chief Complaint:  Right ICA stenosis      HPI:  77 y/o M here for R-ICA angioplasty.  CTA head and neck 9/2019 at OSH: 70% stenosis at the right ICA bulb origin, patent common carotids b/l and non-stenotic left -ICA.  On ASA and Plavix       Medical: HTN, HLD, CAD s/p MI/PCI/CABG (STEMI in 2014 with PCI to RCA; CABG with LIMA-LAD, SVG-ramus; ischemic symptoms were "fogginess and feeling weird, no chest pain or dyspnea"), BPH  Surgical: CABG (10/2014), cataracts, hernia  Family: heart failure, arrhythmia  Social: former smoker of 2 PPD x20y (quit 1988), quit drinking in 2012    Past Medical History:   Diagnosis Date    Stevens's esophagus with low grade dysplasia     BPH (benign prostatic hyperplasia)     CAD (coronary artery disease)     Cataract     Diaphragmatic hernia     GERD (gastroesophageal reflux disease)     Heart attack     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 Talamantes    CATARACT EXTRACTION W/  INTRAOCULAR LENS IMPLANT Left 07/31/2018    Dr. Talamantes    CORONARY ARTERY BYPASS GRAFT      x2  10/2014    ESOPHAGOGASTRODUODENOSCOPY N/A 4/8/2019    Procedure: " ESOPHAGOGASTRODUODENOSCOPY (EGD)/poss rfa;  Surgeon: Clifford De La Torre MD;  Location: [facility] ENDO (2ND FLR);  Service: Endoscopy;  Laterality: N/A;    ESOPHAGOGASTRODUODENOSCOPY (EGD) WITH RADIOFREQUENCY TREATMENT OF GASTROESOPHAGEAL JUNCTION      INTRAOCULAR PROSTHESES INSERTION Right 7/17/2018    Procedure: INSERTION, INTRAOCULAR LENS PROSTHESIS;  Surgeon: León Talamantes MD;  Location: [facility] OR 1ST FLR;  Service: Ophthalmology;  Laterality: Right;    INTRAOCULAR PROSTHESES INSERTION Left 7/31/2018    Procedure: INSERTION, INTRAOCULAR LENS PROSTHESIS;  Surgeon: León Talamantes MD;  Location: [facility] OR 1ST FLR;  Service: Ophthalmology;  Laterality: Left;    PHACOEMULSIFICATION OF CATARACT Right 7/17/2018    Procedure: PHACOEMULSIFICATION, CATARACT;  Surgeon: León Talamantes MD;  Location: [facility] OR 1ST FLR;  Service: Ophthalmology;  Laterality: Right;    PHACOEMULSIFICATION OF CATARACT Left 7/31/2018    Procedure: PHACOEMULSIFICATION, CATARACT;  Surgeon: León Talamantes MD;  Location: [facility] OR 1ST FLR;  Service: Ophthalmology;  Laterality: Left;    UMBILICAL HERNIA REPAIR         Review of patient's allergies indicates:   Allergen Reactions    Morphine Shortness Of Breath       No current facility-administered medications on file prior to encounter.      Current Outpatient Medications on File Prior to Encounter   Medication Sig    aspirin (ECOTRIN) 81 MG EC tablet Take 81 mg by mouth once daily.    atorvastatin (LIPITOR) 40 MG tablet TAKE 1 TABLET BY MOUTH EVERY DAY    clopidogrel (PLAVIX) 75 mg tablet TK 1 T PO QD    magnesium oxide (MAG-OX) 400 mg (241.3 mg magnesium) tablet Take 1 tablet (400 mg total) by mouth once daily.    omega-3 fatty acids-vitamin E (FISH OIL) 1,000 mg Cap Take 1 tablet by mouth 2 (two) times daily. (Patient taking differently: Take 1 tablet by mouth once daily. )    pantoprazole (PROTONIX) 40 MG tablet once daily.     riboflavin, vitamin B2, (VITAMIN  B-2) 100 mg Tab tablet Take 2 tablets (200 mg total) by mouth once daily.    sertraline (ZOLOFT) 50 MG tablet TAKE 1 TABLET BY MOUTH EVERY DAY    acetaminophen (TYLENOL) 500 MG tablet Take 1,000 mg by mouth every 6 (six) hours as needed for Pain.    fluorouracil (EFUDEX) 5 % cream Use hs for 2 weeks    fluorouracil 5% 5 % Soln Use hs for 2 weeks     Family History     Problem Relation (Age of Onset)    Arrhythmia Mother    Heart disease Mother    Heart failure Brother    No Known Problems Daughter, Son        Tobacco Use    Smoking status: Former Smoker     Packs/day: 2.00     Years: 20.00     Pack years: 40.00     Types: Cigarettes     Last attempt to quit: 1988     Years since quittin.6    Smokeless tobacco: Never Used   Substance and Sexual Activity    Alcohol use: No     Comment: quit drinking beer     Drug use: No    Sexual activity: Not Currently     Partners: Female     Review of Systems   All other systems reviewed and are negative.    Objective:     Vital Signs (Most Recent):    Vital Signs (24h Range):           There is no height or weight on file to calculate BMI.            No intake or output data in the 24 hours ending 10/14/19 0726    Lines/Drains/Airways     Peripheral Intravenous Line                 Peripheral IV - Single Lumen 19 0823 Right Forearm 188 days                Physical Exam   Constitutional: He is oriented to person, place, and time. He appears well-developed and well-nourished.   HENT:   Head: Normocephalic and atraumatic.   Eyes: No scleral icterus.   Cardiovascular: Normal rate and regular rhythm.   Pulmonary/Chest: Effort normal and breath sounds normal.   Musculoskeletal: He exhibits no edema.   Neurological: He is alert and oriented to person, place, and time.   Skin: Skin is warm.   Psychiatric: He has a normal mood and affect.       Significant Labs: All pertinent lab results from the last 24 hours have been reviewed.    Significant Imaging: as  above     Assessment and Plan:     Carotid stenosis   Carotid stenting of the right ICA   Labs pending         VTE Risk Mitigation (From admission, onward)    None          Andrea Nguyễn MD  Cardiology   [facility]

## 2019-10-14 NOTE — Clinical Note
Angiography performed of the ostial right common carotid. Angiography performed via hand injection with .

## 2019-10-14 NOTE — Clinical Note
Angiography performed post intervention of the proximal right common carotid. Angiography performed via hand injection with .

## 2019-10-14 NOTE — NURSING
Patient admitted to CSU. Patient arrived to floor from cath lab. no evidence of distress; patient AAO x4 at this time. Patient placed on tele. Vital signs obtained. Patient voices no complaints at this time. Plan of care initiated with patient. Bed in lowest position, locked, SR up x2, call bell in reach. Will continue to monitor patient.

## 2019-10-14 NOTE — PLAN OF CARE
Plan of care discussed with patient. Patient is free of fall/trauma/injury. Denies CP, SOB, or pain/discomfort. VSS. Pt remains on tele. Pt had rt carotid angiogram. All questions addressed. Will continue to monitor

## 2019-10-14 NOTE — SUBJECTIVE & OBJECTIVE
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 Talamantes    CATARACT EXTRACTION W/  INTRAOCULAR LENS IMPLANT Left 07/31/2018    Dr. Talamantes    CORONARY ARTERY BYPASS GRAFT      x2  10/2014    ESOPHAGOGASTRODUODENOSCOPY N/A 4/8/2019    Procedure: ESOPHAGOGASTRODUODENOSCOPY (EGD)/poss rfa;  Surgeon: Clifford De La Torre MD;  Location: [facility]);  Service: Endoscopy;  Laterality: N/A;    ESOPHAGOGASTRODUODENOSCOPY (EGD) WITH RADIOFREQUENCY TREATMENT OF GASTROESOPHAGEAL JUNCTION      INTRAOCULAR PROSTHESES INSERTION Right 7/17/2018    Procedure: INSERTION, INTRAOCULAR LENS PROSTHESIS;  Surgeon: León Talamantes MD;  Location: [facility] OR [address] [place] [address];  Service: Ophthalmology;  Laterality: Right;    INTRAOCULAR PROSTHESES INSERTION Left 7/31/2018    Procedure: INSERTION, INTRAOCULAR LENS PROSTHESIS;  Surgeon: León Talamantes MD;  Location: [facility] OR [address] [place] [address];  Service: Ophthalmology;  Laterality: Left;    PHACOEMULSIFICATION OF CATARACT Right 7/17/2018    Procedure: PHACOEMULSIFICATION, CATARACT;  Surgeon: León Talamantes MD;  Location: [address];  Service: Ophthalmology;  Laterality: Right;    PHACOEMULSIFICATION OF CATARACT Left 7/31/2018    Procedure: PHACOEMULSIFICATION, CATARACT;  Surgeon:  León Talamantes MD;  Location: [facility] OR [address] [place] [address];  Service: Ophthalmology;  Laterality: Left;    UMBILICAL HERNIA REPAIR         Review of patient's allergies indicates:   Allergen Reactions    Morphine Shortness Of Breath       No current facility-administered medications on file prior to encounter.      Current Outpatient Medications on File Prior to Encounter   Medication Sig    aspirin (ECOTRIN) 81 MG EC tablet Take 81 mg by mouth once daily.    atorvastatin (LIPITOR) 40 MG tablet TAKE 1 TABLET BY MOUTH EVERY DAY    clopidogrel (PLAVIX) 75 mg tablet TK 1 T PO QD    magnesium oxide (MAG-OX) 400 mg (241.3 mg magnesium) tablet Take 1 tablet (400 mg total) by mouth once daily.    omega-3 fatty acids-vitamin E (FISH OIL) 1,000 mg Cap Take 1 tablet by mouth 2 (two) times daily. (Patient taking differently: Take 1 tablet by mouth once daily. )    pantoprazole (PROTONIX) 40 MG tablet once daily.     riboflavin, vitamin B2, (VITAMIN B-2) 100 mg Tab tablet Take 2 tablets (200 mg total) by mouth once daily.    sertraline (ZOLOFT) 50 MG tablet TAKE 1 TABLET BY MOUTH EVERY DAY    acetaminophen (TYLENOL) 500 MG tablet Take 1,000 mg by mouth every 6 (six) hours as needed for Pain.    fluorouracil (EFUDEX) 5 % cream Use hs for 2 weeks    fluorouracil 5% 5 % Soln Use hs for 2 weeks     Family History     Problem Relation (Age of Onset)    Arrhythmia Mother    Heart disease Mother    Heart failure Brother    No Known Problems Daughter, Son        Tobacco Use    Smoking status: Former Smoker     Packs/day: 2.00     Years: 20.00     Pack years: 40.00     Types: Cigarettes     Last attempt to quit: 1988     Years since quittin.6    Smokeless tobacco: Never Used   Substance and Sexual Activity    Alcohol use: No     Comment: quit drinking beer     Drug use: No    Sexual activity: Not Currently     Partners: Female     Review of Systems   All other systems reviewed and are negative.    Objective:      Vital Signs (Most Recent):    Vital Signs (24h Range):           There is no height or weight on file to calculate BMI.            No intake or output data in the 24 hours ending 10/14/19 0726    Lines/Drains/Airways     Peripheral Intravenous Line                 Peripheral IV - Single Lumen 04/08/19 0823 Right Forearm 188 days                Physical Exam   Constitutional: He is oriented to person, place, and time. He appears well-developed and well-nourished.   HENT:   Head: Normocephalic and atraumatic.   Eyes: No scleral icterus.   Cardiovascular: Normal rate and regular rhythm.   Pulmonary/Chest: Effort normal and breath sounds normal.   Musculoskeletal: He exhibits no edema.   Neurological: He is alert and oriented to person, place, and time.   Skin: Skin is warm.   Psychiatric: He has a normal mood and affect.       Significant Labs: All pertinent lab results from the last 24 hours have been reviewed.    Significant Imaging: as above

## 2019-10-14 NOTE — Clinical Note
110 ml injected throughout the case. 190 mL total wasted during the case. 300 mL total used in the case.

## 2019-10-15 VITALS
HEIGHT: 67 IN | SYSTOLIC BLOOD PRESSURE: 122 MMHG | HEART RATE: 72 BPM | WEIGHT: 140.75 LBS | RESPIRATION RATE: 16 BRPM | DIASTOLIC BLOOD PRESSURE: 60 MMHG | TEMPERATURE: 98 F | BODY MASS INDEX: 22.09 KG/M2 | OXYGEN SATURATION: 95 %

## 2019-10-15 DIAGNOSIS — I65.21 CAROTID STENOSIS, RIGHT: Primary | ICD-10-CM

## 2019-10-15 LAB
ANION GAP SERPL CALC-SCNC: 7 MMOL/L (ref 8–16)
BASOPHILS # BLD AUTO: 0.03 K/UL (ref 0–0.2)
BASOPHILS # BLD AUTO: 0.03 K/UL (ref 0–0.2)
BASOPHILS NFR BLD: 0.6 % (ref 0–1.9)
BASOPHILS NFR BLD: 0.7 % (ref 0–1.9)
BUN SERPL-MCNC: 17 MG/DL (ref 8–23)
CALCIUM SERPL-MCNC: 8.6 MG/DL (ref 8.7–10.5)
CHLORIDE SERPL-SCNC: 105 MMOL/L (ref 95–110)
CO2 SERPL-SCNC: 26 MMOL/L (ref 23–29)
CREAT SERPL-MCNC: 1.3 MG/DL (ref 0.5–1.4)
DIFFERENTIAL METHOD: ABNORMAL
DIFFERENTIAL METHOD: ABNORMAL
EOSINOPHIL # BLD AUTO: 0.1 K/UL (ref 0–0.5)
EOSINOPHIL # BLD AUTO: 0.1 K/UL (ref 0–0.5)
EOSINOPHIL NFR BLD: 1.5 % (ref 0–8)
EOSINOPHIL NFR BLD: 1.5 % (ref 0–8)
ERYTHROCYTE [DISTWIDTH] IN BLOOD BY AUTOMATED COUNT: 13.6 % (ref 11.5–14.5)
ERYTHROCYTE [DISTWIDTH] IN BLOOD BY AUTOMATED COUNT: 13.7 % (ref 11.5–14.5)
EST. GFR  (AFRICAN AMERICAN): 59.6 ML/MIN/1.73 M^2
EST. GFR  (NON AFRICAN AMERICAN): 51.5 ML/MIN/1.73 M^2
GLUCOSE SERPL-MCNC: 93 MG/DL (ref 70–110)
HCT VFR BLD AUTO: 36.1 % (ref 40–54)
HCT VFR BLD AUTO: 36.3 % (ref 40–54)
HGB BLD-MCNC: 11.4 G/DL (ref 14–18)
HGB BLD-MCNC: 11.5 G/DL (ref 14–18)
IMM GRANULOCYTES # BLD AUTO: 0.01 K/UL (ref 0–0.04)
IMM GRANULOCYTES # BLD AUTO: 0.02 K/UL (ref 0–0.04)
IMM GRANULOCYTES NFR BLD AUTO: 0.2 % (ref 0–0.5)
IMM GRANULOCYTES NFR BLD AUTO: 0.4 % (ref 0–0.5)
LYMPHOCYTES # BLD AUTO: 1.1 K/UL (ref 1–4.8)
LYMPHOCYTES # BLD AUTO: 1.2 K/UL (ref 1–4.8)
LYMPHOCYTES NFR BLD: 23.9 % (ref 18–48)
LYMPHOCYTES NFR BLD: 24.4 % (ref 18–48)
MCH RBC QN AUTO: 29.9 PG (ref 27–31)
MCH RBC QN AUTO: 30.3 PG (ref 27–31)
MCHC RBC AUTO-ENTMCNC: 31.4 G/DL (ref 32–36)
MCHC RBC AUTO-ENTMCNC: 31.9 G/DL (ref 32–36)
MCV RBC AUTO: 95 FL (ref 82–98)
MCV RBC AUTO: 95 FL (ref 82–98)
MONOCYTES # BLD AUTO: 0.5 K/UL (ref 0.3–1)
MONOCYTES # BLD AUTO: 0.5 K/UL (ref 0.3–1)
MONOCYTES NFR BLD: 10 % (ref 4–15)
MONOCYTES NFR BLD: 9.5 % (ref 4–15)
NEUTROPHILS # BLD AUTO: 2.9 K/UL (ref 1.8–7.7)
NEUTROPHILS # BLD AUTO: 3 K/UL (ref 1.8–7.7)
NEUTROPHILS NFR BLD: 63.6 % (ref 38–73)
NEUTROPHILS NFR BLD: 63.7 % (ref 38–73)
NRBC BLD-RTO: 0 /100 WBC
NRBC BLD-RTO: 0 /100 WBC
PLATELET # BLD AUTO: 205 K/UL (ref 150–350)
PLATELET # BLD AUTO: 211 K/UL (ref 150–350)
PMV BLD AUTO: 10.3 FL (ref 9.2–12.9)
PMV BLD AUTO: 10.5 FL (ref 9.2–12.9)
POC ACTIVATED CLOTTING TIME K: 312 SEC (ref 74–137)
POTASSIUM SERPL-SCNC: 4 MMOL/L (ref 3.5–5.1)
RBC # BLD AUTO: 3.8 M/UL (ref 4.6–6.2)
RBC # BLD AUTO: 3.81 M/UL (ref 4.6–6.2)
SAMPLE: ABNORMAL
SODIUM SERPL-SCNC: 138 MMOL/L (ref 136–145)
WBC # BLD AUTO: 4.61 K/UL (ref 3.9–12.7)
WBC # BLD AUTO: 4.76 K/UL (ref 3.9–12.7)

## 2019-10-15 PROCEDURE — 25000003 PHARM REV CODE 250: Mod: HCNC | Performed by: STUDENT IN AN ORGANIZED HEALTH CARE EDUCATION/TRAINING PROGRAM

## 2019-10-15 PROCEDURE — 85025 COMPLETE CBC W/AUTO DIFF WBC: CPT | Mod: HCNC

## 2019-10-15 PROCEDURE — 36415 COLL VENOUS BLD VENIPUNCTURE: CPT | Mod: HCNC

## 2019-10-15 PROCEDURE — 80048 BASIC METABOLIC PNL TOTAL CA: CPT | Mod: HCNC

## 2019-10-15 RX ADMIN — ASPIRIN 81 MG: 81 TABLET, COATED ORAL at 08:10

## 2019-10-15 RX ADMIN — CLOPIDOGREL BISULFATE 75 MG: 75 TABLET ORAL at 08:10

## 2019-10-15 RX ADMIN — SERTRALINE HYDROCHLORIDE 50 MG: 50 TABLET ORAL at 08:10

## 2019-10-15 RX ADMIN — ATORVASTATIN CALCIUM 40 MG: 20 TABLET, FILM COATED ORAL at 08:10

## 2019-10-15 NOTE — PROGRESS NOTES
RN contacted MD Siegel regarding pt BP of 102/56 and new orders regarding pt compression dressing over R groin.  MD stated to remove pt compression dressing over R groin, administer 500cc bolus, and to take pt off of bedrest.  RN to place orders.  Pt complaint of back pain at this time; RN to administer PRN acetaminophen.  Will continue to monitor.

## 2019-10-15 NOTE — NURSING
Patient is ready for discharge. Patient stable alert and oriented. IVs removed. Tele removed. No complaints of pain. Discussed discharge plan. Reviewed medications and side effects, appointments, and answered questions with patient and family.

## 2019-10-15 NOTE — DISCHARGE SUMMARY
"  [facility]  Cardiology  Discharge Summary      Patient Name: Paul Browning  MRN: [id number]  Admission Date: 10/14/2019  Hospital Length of Stay: 1 days  Discharge Date and Time:  10/15/2019 8:06 AM  Attending Physician: Artie Kebede MD    Discharging Provider: Andrea Nguyễn MD  Primary Care Physician: Grey Forbes DO    HPI:   79 y/o M here for R-ICA angioplasty.  CTA head and neck 9/2019 at OSH: 70% stenosis at the right ICA bulb origin, patent common carotids b/l and non-stenotic left -ICA.  On ASA and Plavix       Medical: HTN, HLD, CAD s/p MI/PCI/CABG (STEMI in 2014 with PCI to RCA; CABG with LIMA-LAD, SVG-ramus; ischemic symptoms were "fogginess and feeling weird, no chest pain or dyspnea"), BPH  Surgical: CABG (10/2014), cataracts, hernia  Family: heart failure, arrhythmia  Social: former smoker of 2 PPD x20y (quit 1988), quit drinking in 2012    Procedure(s) (LRB):  INSERTION, STENT, ARTERY, CAROTID (N/A)  Stent, Carotid W/ Protect     Indwelling Lines/Drains at time of discharge:  Lines/Drains/Airways     None                 Hospital Course:  Successful R-ICA stent with proximal emboli protection. Via right CFA access.  Did well overnight, I  walked him this morning in the nunez, denies claudication, his mild hematoma has resolved, he feels well.    Consults: None     Significant Diagnostic Studies: Angiography: as above    Pending Diagnostic Studies:     None          Final Active Diagnoses:    Diagnosis Date Noted POA    PRINCIPAL PROBLEM:  Carotid stenosis [I65.29] 09/24/2019 Yes      Problems Resolved During this Admission:     No new Assessment & Plan notes have been filed under this hospital service since the last note was generated.  Service: Cardiology      Discharged Condition: good    Disposition: Home or Self Care    Follow Up:  Follow-up Information     Artie Kebede MD. Schedule an appointment as soon as possible for a visit in 1 month.  "   Specialties:  Cardiology, INTERVENTIONAL CARDIOLOGY  Contact information:  [address]  [facility] [address]  [phone]                 Patient Instructions:      Diet Cardiac     Activity as tolerated     Medications:  Reconciled Home Medications:      Medication List      CHANGE how you take these medications    omega-3 fatty acids-vitamin E 1,000 mg Cap  Commonly known as:  FISH OIL  Take 1 tablet by mouth 2 (two) times daily.  What changed:  when to take this        CONTINUE taking these medications    acetaminophen 500 MG tablet  Commonly known as:  TYLENOL  Take 1,000 mg by mouth every 6 (six) hours as needed for Pain.     aspirin 81 MG EC tablet  Commonly known as:  ECOTRIN  Take 81 mg by mouth once daily.     atorvastatin 40 MG tablet  Commonly known as:  LIPITOR  TAKE 1 TABLET BY MOUTH EVERY DAY     clopidogrel 75 mg tablet  Commonly known as:  PLAVIX  TK 1 T PO QD     * fluorouracil 5 % cream  Commonly known as:  EFUDEX  Use hs for 2 weeks     * fluorouracil 5% 5 % Soln  Use hs for 2 weeks     magnesium oxide 400 mg (241.3 mg magnesium) tablet  Commonly known as:  MAG-OX  Take 1 tablet (400 mg total) by mouth once daily.     pantoprazole 40 MG tablet  Commonly known as:  PROTONIX  once daily.     riboflavin (vitamin B2) 100 mg Tab tablet  Commonly known as:  VITAMIN B-2  Take 2 tablets (200 mg total) by mouth once daily.     sertraline 50 MG tablet  Commonly known as:  ZOLOFT  TAKE 1 TABLET BY MOUTH EVERY DAY         * This list has 2 medication(s) that are the same as other medications prescribed for you. Read the directions carefully, and ask your doctor or other care provider to review them with you.                Time spent on the discharge of patient: 10 minutes    Andrea Nguyễn MD  Cardiology  [facility]

## 2019-10-15 NOTE — PLAN OF CARE
Patient remained free of falls, trauma, injury, and skin breakdown. VSS; no patient complaints at this time. RN contacted MD regarding SBP <120; 500cc bolus ordered and administered- pt tolerated well.  RN ordered to remove compression dressing on pt R groin; site assessed frequently- no drainage, swelling, tenderness, or firmness noted. Fall precautions maintained. Plan of care reviewed with patient and 2 of patient's sons; patient and family members verbalized understanding. verbalized understanding.  All questions and concerns addressed; will continue to monitor.

## 2019-10-15 NOTE — HOSPITAL COURSE
Successful R-ICA stent with proximal emboli protection. Via right CFA access.  Did well overnight, I  walked him this morning in the [place], denies claudication, his mild hematoma has resolved, he feels well.

## 2019-10-16 ENCOUNTER — TELEPHONE (OUTPATIENT)
Dept: INTERNAL MEDICINE | Facility: CLINIC | Age: 79
End: 2019-10-16

## 2019-10-16 RX ORDER — ALENDRONATE SODIUM 70 MG/1
TABLET ORAL
Qty: 12 TABLET | Refills: 3 | Status: SHIPPED | OUTPATIENT
Start: 2019-10-16 | End: 2020-11-04

## 2019-10-16 NOTE — TELEPHONE ENCOUNTER
----- Message from Grey Forbes DO sent at 10/16/2019 12:39 PM CDT -----  Osteoporosis per DEXA scan which puts you at increased risk for fractures  I will start you on Fosamax for treatment  Make sure you are taking Calcium 1,200 mg/Vitamin D 2,000 IU daily

## 2019-10-17 ENCOUNTER — PATIENT MESSAGE (OUTPATIENT)
Dept: CARDIOLOGY | Facility: CLINIC | Age: 79
End: 2019-10-17

## 2019-10-17 ENCOUNTER — PATIENT OUTREACH (OUTPATIENT)
Dept: ADMINISTRATIVE | Facility: CLINIC | Age: 79
End: 2019-10-17

## 2019-10-17 ENCOUNTER — OFFICE VISIT (OUTPATIENT)
Dept: DERMATOLOGY | Facility: CLINIC | Age: 79
End: 2019-10-17
Payer: MEDICARE

## 2019-10-17 VITALS — WEIGHT: 140 LBS | BODY MASS INDEX: 21.93 KG/M2

## 2019-10-17 DIAGNOSIS — Z85.828 HISTORY OF SKIN CANCER: ICD-10-CM

## 2019-10-17 DIAGNOSIS — L57.0 ACTINIC KERATOSIS: Primary | ICD-10-CM

## 2019-10-17 DIAGNOSIS — L81.4 LENTIGINES: ICD-10-CM

## 2019-10-17 DIAGNOSIS — L82.1 SEBORRHEIC KERATOSES: ICD-10-CM

## 2019-10-17 PROCEDURE — 99999 PR PBB SHADOW E&M-EST. PATIENT-LVL II: CPT | Mod: PBBFAC,HCNC,, | Performed by: DERMATOLOGY

## 2019-10-17 PROCEDURE — 1101F PR PT FALLS ASSESS DOC 0-1 FALLS W/OUT INJ PAST YR: ICD-10-PCS | Mod: HCNC,CPTII,S$GLB, | Performed by: DERMATOLOGY

## 2019-10-17 PROCEDURE — 99999 PR PBB SHADOW E&M-EST. PATIENT-LVL II: ICD-10-PCS | Mod: PBBFAC,HCNC,, | Performed by: DERMATOLOGY

## 2019-10-17 PROCEDURE — 99213 OFFICE O/P EST LOW 20 MIN: CPT | Mod: 25,HCNC,S$GLB, | Performed by: DERMATOLOGY

## 2019-10-17 PROCEDURE — 1101F PT FALLS ASSESS-DOCD LE1/YR: CPT | Mod: HCNC,CPTII,S$GLB, | Performed by: DERMATOLOGY

## 2019-10-17 PROCEDURE — 99213 PR OFFICE/OUTPT VISIT, EST, LEVL III, 20-29 MIN: ICD-10-PCS | Mod: 25,HCNC,S$GLB, | Performed by: DERMATOLOGY

## 2019-10-17 PROCEDURE — 17000 DESTRUCT PREMALG LESION: CPT | Mod: HCNC,S$GLB,, | Performed by: DERMATOLOGY

## 2019-10-17 PROCEDURE — 17000 PR DESTRUCTION(LASER SURGERY,CRYOSURGERY,CHEMOSURGERY),PREMALIGNANT LESIONS,FIRST LESION: ICD-10-PCS | Mod: HCNC,S$GLB,, | Performed by: DERMATOLOGY

## 2019-10-17 NOTE — PROGRESS NOTES
Subjective:       Patient ID:  Paul Browning is a 79 y.o. male who presents for   Chief Complaint   Patient presents with    Follow-up     scalp     .This is a high risk patient here to check for the development of new lesions.  He used the efudex on the actinic keratosis biopsied last visit, now new lesion on anterior scalp tender no tx.    Follow-up  - Follow-up  Affected locations: scalp  Signs / symptoms: asymptomatic        Review of Systems   Constitutional: Negative for fever, chills, weight loss, weight gain, fatigue, night sweats and malaise.   Skin: Positive for wears hat. Negative for daily sunscreen use and activity-related sunscreen use.   Hematologic/Lymphatic: Bruises/bleeds easily.        Objective:    Physical Exam   Constitutional: He appears well-developed and well-nourished. No distress.   Neurological: He is alert and oriented to person, place, and time. He is not disoriented.   Psychiatric: He has a normal mood and affect.   Skin:   Areas Examined (abnormalities noted in diagram):   Scalp / Hair Palpated and Inspected  Head / Face Inspection Performed  Neck Inspection Performed  Chest / Axilla Inspection Performed  RUE Inspected  LUE Inspection Performed                       Diagram Legend     Erythematous scaling macule/papule c/w actinic keratosis       Vascular papule c/w angioma      Pigmented verrucoid papule/plaque c/w seborrheic keratosis      Yellow umbilicated papule c/w sebaceous hyperplasia      Irregularly shaped tan macule c/w lentigo     1-2 mm smooth white papules consistent with Milia      Movable subcutaneous cyst with punctum c/w epidermal inclusion cyst      Subcutaneous movable cyst c/w pilar cyst      Firm pink to brown papule c/w dermatofibroma      Pedunculated fleshy papule(s) c/w skin tag(s)      Evenly pigmented macule c/w junctional nevus     Mildly variegated pigmented, slightly irregular-bordered macule c/w mildly atypical nevus      Flesh colored  "to evenly pigmented papule c/w intradermal nevus       Pink pearly papule/plaque c/w basal cell carcinoma      Erythematous hyperkeratotic cursted plaque c/w SCC      Surgical scar with no sign of skin cancer recurrence      Open and closed comedones      Inflammatory papules and pustules      Verrucoid papule consistent consistent with wart     Erythematous eczematous patches and plaques     Dystrophic onycholytic nail with subungual debris c/w onychomycosis     Umbilicated papule    Erythematous-base heme-crusted tan verrucoid plaque consistent with inflamed seborrheic keratosis     Erythematous Silvery Scaling Plaque c/w Psoriasis     See annotation      Assessment / Plan:        Actinic keratosis   Cryosurgery Procedure Note    Verbal consent from the patient is obtained and the patient is aware of the precancerous quality and need for treatment of these lesions. Liquid nitrogen cryosurgery is applied to the 1 actinic keratoses, as detailed in the physical exam, to produce a freeze injury.      Seborrheic keratoses  reassurance      History of skin cancer  Comments:  scc sclp and left arm    Lentigines  The "ABCD" rules to observe pigmented lesions were reviewed.               Follow up in about 6 months (around 4/17/2020).  "

## 2019-10-17 NOTE — PATIENT INSTRUCTIONS
Discharge Instructions for Carotid Artery Stenting  When you get home after your procedure, do the following:  · Monitor the injection site for bleeding. A small bruise is normal as is an occasional drop of blood at the site.  · Monitor the limb that was used for changes in temperature, color, numbness, tingling, or loss of function.  · Take your prescribed antiplatelet medicines as directed. These medicines will help prevent blood clots from forming on the stent. However, they may cause you to bruise more easily.  · Shower instead of taking tub baths for a few days. But, wait for your doctors OK to get the wound wet first.  · Avoid lifting anything over 10 pounds for a few days.  · Take it easy, but try to get back to your normal routine as much as possible.  · Ask your doctor about driving, returning to work, and other activities.  When to call your doctor  Call your doctor if you have any of the following problems:  · Problems at the incision site, such as swelling, redness, bleeding, warmth, leaking of fluids, or increasing pain  · A cold or painful leg or foot  · Severe headache  · Weakness or numbness in a leg or arm  · Difficulty talking, or difficulty finding the words to say what you want  · Changes in your vision  · Dizziness or imbalance  Go to the emergency room if your doctors office is closed.      Keeping appointments for follow-up care helps make your procedure a success.   Your follow-up  Within a month after the procedure, youll have a follow-up exam and tests. These tests may include an ultrasound and a brain function exam. Then youll be monitored with ultrasound or another imaging test every 6 months for 1 to 2 years. After that, youll be monitored at least every 12 months. You may also continue to take antiplatelet medication. In some cases, the carotid can narrow again. If this happens, it can often be treated again with balloon angioplasty.  Call 911  Call 911 if you have any of these  stroke symptoms:  · Paralysis or weakness on 1 side of the body  · Numbness or tingling on 1 side of the body  · Difficulty speaking  · Loss of vision in 1 eye  · Drooping on 1 side of the face  · Dizziness or imbalance  · Swelling or persistent pain in the groin   Date Last Reviewed: 6/13/2015  © 3982-0472 [employer]. [address] [place] [address]. All rights reserved. This information is not intended as a substitute for professional medical care. Always follow your healthcare professional's instructions.        Sepsis     To treat sepsis, antibiotics and fluids may by given through an intravenous (IV) line.     Sepsis happens when your body responds with widespread inflammation to a bad infection or bacteremia--the presence of bacteria in your bloodstream. Sepsis can be deadly. Blood pressure may drop and the lungs and kidneys may start to fail. Emergency care for sepsis is crucial.  Risk factors  Those most at risk for sepsis are:  · Infants or older adults  · People who have an illness that weakens their immune system, such as cancer, AIDS, or diabetes  · People being treated with chemotherapy medicines or radiation, which weakens the immune system  · People who have had a transplant  · People with a very severe infection such as pneumonia, meningitis, or a urinary tract infection  When to go to the emergency department (ED)  Sepsis is an emergency. Go to the nearest ED if you have a fever with any of these symptoms:  · Chills and shaking  · Rapid heartbeat and breathing  · Trouble breathing  · Severe nausea or uncontrolled vomiting  · Confusion, disorientation, drowsiness, or dizziness  · Decreased urination  · Severe pain, including in the back or joints   What to expect in the ED  · Blood and urine tests are done to look for bacteria. They also check for organ failure.  · Blood, urine, or sputum cultures may be taken. The samples are sent to a lab. They are placed in a special  container. Any bacteria should grow in 24 hours.  · X-rays or other imaging tests may be done.  A person with sepsis will be admitted to the hospital and treated with antibiotics. Treatment may also include oxygen and intravenous fluids.  Date Last Reviewed: 10/1/2016  © 0816-7854 [employer]. [address] [place] [address], [place] [address]. All rights reserved. This information is not intended as a substitute for professional medical care. Always follow your healthcare professional's instructions.

## 2019-10-21 ENCOUNTER — PATIENT MESSAGE (OUTPATIENT)
Dept: CARDIOLOGY | Facility: CLINIC | Age: 79
End: 2019-10-21

## 2019-10-22 ENCOUNTER — TELEPHONE (OUTPATIENT)
Dept: ENDOSCOPY | Facility: HOSPITAL | Age: 79
End: 2019-10-22

## 2019-10-22 NOTE — TELEPHONE ENCOUNTER
Spoke with patient' son in regards to rescheduling EGD. He recently had a Carotid artery stent placed. They will call back. If we have not heard anything will check back at being of the year

## 2019-11-12 NOTE — PROGRESS NOTES
"     Cardiology Clinic Note  Reason for Visit: CAD    HPI:     Paul Browning is a 79 y.o. M, who presents for follow up.    He reports that his biggest issue is his poor vision that prevents him from using the boat he used two years ago. His biggest goal is to return to fishing. He is not as active as he previously was, due to shortness of breath when he walks and "cameron horses" in his calf muscles when he walks. Leg pains are sometimes one leg, and other times both legs. He does get LE edema as the day progresses. No symptoms of heart failure or arrhythmia. He is interested in cardiac rehab, but he has not yet been scheduled to begin.    Medical: carotid artery disease s/p PCI to proximal R ICA, HTN, HLD, CAD s/p MI/PCI/CABG (STEMI in 2014 with PCI to RCA; CABG with LIMA-LAD, SVG-ramus; ischemic symptoms were "fogginess and feeling weird, no chest pain or dyspnea"), BPH  Surgical: CABG (10/2014), cataracts, hernia  Family: heart failure, arrhythmia  Social: former smoker of 2 PPD x20y (quit 1988), quit drinking in 2012    ROS:    Constitution: Negative for fever or chills.  HENT: Negative for  headaches.  Eyes: Negative for blurred vision.   Cardiovascular: See above  Pulmonary: Positive for SOB. Negative for cough.   Gastrointestinal: Negative for nausea/vomiting.   : Negative for dysuria.   Skin: Negative for rashes.  Neurological: Negative for focal weakness.  Psychological: Negative for depression.  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    Hyperlipidemia     Hypertension     Ischemic cardiomyopathy 11/5/2014    MDD (major depressive disorder), recurrent episode, mild 2/17/2016    Osteoporosis 7/20/2016    Peripheral arterial " Group Topic:  Monitoring Safety and Relapse    Date: December 13  Start Time:  9:00 AM  End Time: 12:00 PM    Number in attendance: 9    Safety Concerns: None  Types of Safety Concerns: None reported  Physical Concerns: Recent illness; stated feeling better  Use of Street Drugs: No  Taking Medication as Prescribed?: Yes    Asia Strickland, KRISHNA       disease 3/18/2016    Sarcoid     Squamous cell carcinoma 09/2016, 5/2016    crown of scalp, left wrist     Past Surgical History:   Procedure Laterality Date    CARDIAC SURGERY      CAROTID STENT N/A 10/14/2019    Procedure: INSERTION, STENT, ARTERY, CAROTID;  Surgeon: Artie Kebede MD;  Location: [facility] CATH LAB;  Service: Cardiology;  Laterality: N/A;    CATARACT EXTRACTION W/  INTRAOCULAR LENS IMPLANT Right 07/17/2018    Dr. Talamantes    CATARACT EXTRACTION W/  INTRAOCULAR LENS IMPLANT Left 07/31/2018    Dr. Talamantes    CORONARY ARTERY BYPASS GRAFT      x2  10/2014    ESOPHAGOGASTRODUODENOSCOPY N/A 4/8/2019    Procedure: ESOPHAGOGASTRODUODENOSCOPY (EGD)/poss rfa;  Surgeon: Clifford De La Torre MD;  Location: [facility] ENDO (2ND FLR);  Service: Endoscopy;  Laterality: N/A;    ESOPHAGOGASTRODUODENOSCOPY (EGD) WITH RADIOFREQUENCY TREATMENT OF GASTROESOPHAGEAL JUNCTION      INTRAOCULAR PROSTHESES INSERTION Right 7/17/2018    Procedure: INSERTION, INTRAOCULAR LENS PROSTHESIS;  Surgeon: León Talamantes MD;  Location: [facility] OR [address] [place] [address];  Service: Ophthalmology;  Laterality: Right;    INTRAOCULAR PROSTHESES INSERTION Left 7/31/2018    Procedure: INSERTION, INTRAOCULAR LENS PROSTHESIS;  Surgeon: León Talmaantes MD;  Location: [facility] OR [address] [place] [address];  Service: Ophthalmology;  Laterality: Left;    PHACOEMULSIFICATION OF CATARACT Right 7/17/2018    Procedure: PHACOEMULSIFICATION, CATARACT;  Surgeon: León Talamantes MD;  [facility];  Service: Ophthalmology;  Laterality: Right;    PHACOEMULSIFICATION OF CATARACT Left 7/31/2018    Procedure: PHACOEMULSIFICATION, CATARACT;  Surgeon: León Talamantes MD;  Location: [facility] OR [address] [place] [address];  Service: Ophthalmology;  Laterality: Left;    UMBILICAL HERNIA REPAIR       Allergies:     Review of patient's allergies indicates:   Allergen Reactions    Morphine Shortness Of Breath     Medications:     Current Outpatient Medications on File Prior to  Visit   Medication Sig Dispense Refill    acetaminophen (TYLENOL) 500 MG tablet Take 1,000 mg by mouth every 6 (six) hours as needed for Pain.      alendronate (FOSAMAX) 70 MG tablet 1 tab PO qwk in the am with a full glass of water on an empty stomach. Do not consume food or lie down for at least 30 minutes afterwards. 12 tablet 3    aspirin (ECOTRIN) 81 MG EC tablet Take 81 mg by mouth once daily.      atorvastatin (LIPITOR) 40 MG tablet TAKE 1 TABLET BY MOUTH EVERY DAY 90 tablet 3    clopidogrel (PLAVIX) 75 mg tablet TK 1 T PO QD 90 tablet 3    fluorouracil (EFUDEX) 5 % cream Use hs for 2 weeks (Patient not taking: Reported on 10/17/2019) 40 g 3    fluorouracil 5% 5 % Soln Use hs for 2 weeks (Patient not taking: Reported on 10/17/2019) 10 mL 1    magnesium oxide (MAG-OX) 400 mg (241.3 mg magnesium) tablet Take 1 tablet (400 mg total) by mouth once daily. 30 tablet 3    omega-3 fatty acids-vitamin E (FISH OIL) 1,000 mg Cap Take 1 tablet by mouth 2 (two) times daily. (Patient taking differently: Take 1 tablet by mouth once daily. ) 60 each 11    pantoprazole (PROTONIX) 40 MG tablet once daily.       riboflavin, vitamin B2, (VITAMIN B-2) 100 mg Tab tablet Take 2 tablets (200 mg total) by mouth once daily. (Patient taking differently: Take 100 mg by mouth once daily. ) 60 tablet 3    sertraline (ZOLOFT) 50 MG tablet TAKE 1 TABLET BY MOUTH EVERY DAY 90 tablet 3     No current facility-administered medications on file prior to visit.      Social History:     Social History     Tobacco Use    Smoking status: Former Smoker     Packs/day: 2.00     Years: 20.00     Pack years: 40.00     Types: Cigarettes     Last attempt to quit: 1988     Years since quittin.7    Smokeless tobacco: Never Used   Substance Use Topics    Alcohol use: No     Comment: quit drinking beer      Family History:     Family History   Problem Relation Age of Onset    Arrhythmia Mother     Heart disease Mother     Heart  "failure Brother     No Known Problems Daughter     No Known Problems Son     Colon cancer Neg Hx     Inflammatory bowel disease Neg Hx     Celiac disease Neg Hx     Melanoma Neg Hx     Amblyopia Neg Hx     Blindness Neg Hx     Cataracts Neg Hx     Glaucoma Neg Hx     Macular degeneration Neg Hx     Retinal detachment Neg Hx     Strabismus Neg Hx      Physical Exam:   /65   Pulse 65   Ht 5' 7" (1.702 m)   Wt 65.4 kg (144 lb 2.9 oz)   BMI 22.58 kg/m²      Constitutional: No apparent distress, conversant  HEENT: Sclera anicteric, extraocular movements intact  Neck: No jugular venous distension, no carotid bruits  Chest: Central surgical scar  CV: Regular rate and rhythm, no murmurs rubs or gallops, normal S1/S2  Pulm: Clear to auscultation bilaterally  GI: Abdomen soft, no palpable masses  Extremities: No lower extremity edema, warm with palpable pulses  Skin: No ecchymosis, erythema, or ulcers  Psych: Alert and oriented to person place location, appropriate affect  Neuro: No focal deficits    Labs:     Blood Tests:  Lab Results   Component Value Date    BNP 86 01/16/2015     10/15/2019    K 4.0 10/15/2019     10/15/2019    CO2 26 10/15/2019    BUN 17 10/15/2019    CREATININE 1.3 10/15/2019    GLU 93 10/15/2019    HGBA1C 5.2 10/15/2014    MG 2.2 07/14/2015    AST 18 05/24/2019    ALT 13 05/24/2019    ALBUMIN 3.8 05/24/2019    PROT 7.5 05/24/2019    BILITOT 1.5 (H) 05/24/2019    WBC 4.76 10/15/2019    WBC 4.61 10/15/2019    HGB 11.5 (L) 10/15/2019    HGB 11.4 (L) 10/15/2019    HCT 36.1 (L) 10/15/2019    HCT 36.3 (L) 10/15/2019    HCT 22 (L) 10/21/2014    MCV 95 10/15/2019    MCV 95 10/15/2019     10/15/2019     10/15/2019    INR 1.0 10/14/2019    INR 2.7 (H) 10/16/2006    PSA 3.6 02/17/2016    TSH 2.524 05/24/2019       Lab Results   Component Value Date    CHOL 117 (L) 05/24/2019    HDL 31 (L) 05/24/2019    TRIG 83 05/24/2019       Lab Results   Component Value Date    " LDLCALC 69.4 05/24/2019       Urine Tests:  Lab Results   Component Value Date    COLORU Elly 05/24/2019    APPEARANCEUA Clear 05/24/2019    PHUR 5.0 05/24/2019    SPECGRAV 1.025 05/24/2019    PROTEINUA Negative 05/24/2019    GLUCUA Negative 05/24/2019    KETONESU Negative 05/24/2019    BILIRUBINUA Negative 05/24/2019    OCCULTUA Negative 05/24/2019    NITRITE Negative 05/24/2019    UROBILINOGEN Negative 10/17/2018    LEUKOCYTESUR Trace (A) 05/24/2019       Imaging:     Echocardiogram  TTE 1/26/15  CONCLUSIONS     1 - Normal left ventricular systolic function (EF 60-65%).     2 - Right ventricular enlargement with normal systolic function.     3 - Normal left ventricular diastolic function.     4 - Biatrial enlargement.     5 - Aortic sclerosis with normal leaflet mobility    Stress testing  PET 1/7/19  · There is a small to moderate sized, moderate to severe intensity basal inferolateral fixed perfusion abnormality involving 10% of the LV myocardium in the OM2 territory consistent with non-transmural infarct. On past angiogram, the OM2 was subtotally occluded.  · Whole heart absolute myocardial perfusion (cc/min/g) averaged 1.06 rest (which is elevated), 1.47 at stress (which is mildly reduced), and 1.37 CFR (which is moderately reduced impart due to elevated resting flow).  · Within the defect absolute myocardial perfusion (cc/min/gm) averaged 0.51 at rest, 0.82 at stress and CFR was 1.78, which equates to severely reduced coronary flow capacity in 10% of the myocardium (within the OM2 territory).  · Gated perfusion images showed an ejection fraction of 62 % at rest and 84 % during stress.  · There is basal inferolateral wall hypokinesis and hypokinesis at rest and stress.  · LV cavity size is normal at rest and normal at stress.  · The EKG portion of this study is negative for myocardial ischemia.  · Arrhythmias during stress: rare PVCs.  · The patient reported no symptoms during the stress test.  · Compared to  the previous study from 8-, there are no significant changes.    PET 8/25/14  CONCLUSIONS: ABNORMAL MYOCARDIAL PERFUSION PET STRESS TEST  1. There is a large sized moderate to severe intensity fixed defect consistent with non-transmural infarct with rhonda-infarct ischemia in the base to mid lateral and inferolateral walls in the usual distribution of the Left Circumflex Artery and Posterior Lateral Branch. This defect comprises 20 % of the left ventricular myocardium.   2. There is abnormal wall motion at rest showing hypokinesis of the inferolateral and inferobasilar walls of the left ventricle. There is abnormal wall motion at stress showing hypokinesis of the inferolateral and inferobasilar walls of the left   ventricle.   3. Visually estimated LV function is normal at rest and normal at stress.   4. The ventricular volumes are normal at rest and stress.   5. The extracardiac distribution of radioactivity is normal.   6. When compared to the previous study from 2011, the previous infarct in the OM territory is still present however there is now a confluent infarct in the PL territory.  There is no evidence of ischemia in the LAD or D1 territory.    Cath lab  Carotid stent 10/14/19  · A STENT PRECISE PRO 8.0 X 40 stent was successfully placed.  · Estimated blood loss: between 50 mL and 150 mL  · Successful cartotid stent with proximal emboli protection.  · Follow-up in 1 month with carotid ultrasound.  · DAPT x1 month.    [facility] 8/19/14  DIAGNOSTIC:     Patient has a right dominant coronary artery.      - Left Main Coronary Artery:           The distal LM has a 30% stenosis. There is JOVANNA 3 flow.     - Left Anterior Descending Artery:           The ostial LAD has a 80% stenosis. There is JOVANNA 3 flow.     - Left Circumflex Artery:           The LCX has luminal irregularities. There is JOVANNA 3 flow.     - Right Coronary Artery:           The proximal RCA is occluded. There is JOVANNA 0 flow. Proximal stented with 3  mm stent and distal with 2.25 stent.     - Right Ventricle:           The right ventricle. There is JOVANNA 3 flow.     - Common Femoral Artery:           The right CFA has luminal irregularities. There is JOVANNA 3 flow.     - Femoral Artery:           The femoral artery is normal. There is JOVANNA 3 flow.     - D1:           The proximal D1 has a 90% stenosis. There is JOVANNA 3 flow.     - OM2:           The proximal OM2 has subtotal. There is JOVANNA 3 flow.  INTERVENTION:       Proximal RCA:            The lesion was successfully intervened. Post-stenosis of 0%, post-JOVANNA 3 flow and TMP grade 2. The vessel was accessed natively.  The following items were used: Cath Pronto Lp, Stent Xience Prime Rx 3.0x33 (JONNA) and Stent Xience Rx 2.25x12 (JONNA).    Other  Carotid US 10/30/15  CONCLUSIONS   There is 40 - 49% right Internal Carotid stenosis.  There is 0 - 19% left Internal Carotid stenosis.    Carotid US 14  CONCLUSIONS   There is 40 - 49% right Internal Carotid stenosis.  There is 20 - 39% left Internal Carotid stenosis.    EK16  Normal sinus rhythm  Nonspecific T wave abnormality  Abnormal ECG    Assessment:     1. Peripheral arterial disease    2. Internal carotid artery stenosis, unspecified laterality    3. Hyperlipidemia, unspecified hyperlipidemia type    4. Stenosis of carotid artery, unspecified laterality    5. Coronary artery disease involving native coronary artery of native heart without angina pectoris    6. Atherosclerosis of aorta        Plan:     Stenosis of right carotid artery  --s/p PCI to R ICA on 10/14/19  --remains on DAPT (x1 month after stent); will likely drop to monotherapy tomorrow during visit with interventional cardiology  --high intensity statin (LDL 69 in 2019)  --awaiting repeat carotid US     --plans to start in cardiac rehab    CAD (coronary artery disease)  --PET stress test identified a fixed defect consistent with his subtotal OM2 on his last angiogram  --no  angina  --continue ASA, high intensity atorvastatin and fish oil   --given resting heart rate in 60s, will not give beta blocker at this time     Essential hypertension  --at goal  --not on anti-hypertensive medications     Hyperlipidemia  --continue atorvastatin and fish oil.     History of third degree heart block  --related to STEMI incident. No recurrence since that time.    Signed:  Torin Kelly MD  Cardiology     11/13/2019 2:20 PM    Follow-up:     Future Appointments   Date Time Provider Department Center   11/13/2019  2:00 PM Yash Kelly III, MD [facility] CARDIO [place]   11/14/2019 10:00 AM VASCULAR, CARDIOLOGY [facility] [place] [facility]   11/14/2019 11:00 AM Rukhsana Hoskins PA-C [facility]IN [place] [facility]   12/4/2019  1:20 PM Grey Forbes DO [facility] IM [place]

## 2019-11-13 ENCOUNTER — OFFICE VISIT (OUTPATIENT)
Dept: CARDIOLOGY | Facility: CLINIC | Age: 79
End: 2019-11-13
Payer: MEDICARE

## 2019-11-13 VITALS
DIASTOLIC BLOOD PRESSURE: 65 MMHG | HEART RATE: 65 BPM | HEIGHT: 67 IN | WEIGHT: 144.19 LBS | SYSTOLIC BLOOD PRESSURE: 137 MMHG | BODY MASS INDEX: 22.63 KG/M2

## 2019-11-13 DIAGNOSIS — E78.5 HYPERLIPIDEMIA, UNSPECIFIED HYPERLIPIDEMIA TYPE: Chronic | ICD-10-CM

## 2019-11-13 DIAGNOSIS — I65.29 INTERNAL CAROTID ARTERY STENOSIS, UNSPECIFIED LATERALITY: ICD-10-CM

## 2019-11-13 DIAGNOSIS — I65.29 STENOSIS OF CAROTID ARTERY, UNSPECIFIED LATERALITY: ICD-10-CM

## 2019-11-13 DIAGNOSIS — I25.10 CORONARY ARTERY DISEASE INVOLVING NATIVE CORONARY ARTERY OF NATIVE HEART WITHOUT ANGINA PECTORIS: Chronic | ICD-10-CM

## 2019-11-13 DIAGNOSIS — I73.9 PERIPHERAL ARTERIAL DISEASE: Primary | ICD-10-CM

## 2019-11-13 DIAGNOSIS — I70.0 ATHEROSCLEROSIS OF AORTA: ICD-10-CM

## 2019-11-13 PROCEDURE — 1101F PT FALLS ASSESS-DOCD LE1/YR: CPT | Mod: HCNC,CPTII,S$GLB, | Performed by: INTERNAL MEDICINE

## 2019-11-13 PROCEDURE — 99499 RISK ADDL DX/OHS AUDIT: ICD-10-PCS | Mod: HCNC,S$GLB,, | Performed by: INTERNAL MEDICINE

## 2019-11-13 PROCEDURE — 99999 PR PBB SHADOW E&M-EST. PATIENT-LVL III: CPT | Mod: PBBFAC,HCNC,, | Performed by: INTERNAL MEDICINE

## 2019-11-13 PROCEDURE — 1101F PR PT FALLS ASSESS DOC 0-1 FALLS W/OUT INJ PAST YR: ICD-10-PCS | Mod: HCNC,CPTII,S$GLB, | Performed by: INTERNAL MEDICINE

## 2019-11-13 PROCEDURE — 3078F PR MOST RECENT DIASTOLIC BLOOD PRESSURE < 80 MM HG: ICD-10-PCS | Mod: HCNC,CPTII,S$GLB, | Performed by: INTERNAL MEDICINE

## 2019-11-13 PROCEDURE — 99024 POSTOP FOLLOW-UP VISIT: CPT | Mod: HCNC,S$GLB,, | Performed by: INTERNAL MEDICINE

## 2019-11-13 PROCEDURE — 99024 PR POST-OP FOLLOW-UP VISIT: ICD-10-PCS | Mod: HCNC,S$GLB,, | Performed by: INTERNAL MEDICINE

## 2019-11-13 PROCEDURE — 99499 UNLISTED E&M SERVICE: CPT | Mod: HCNC,S$GLB,, | Performed by: INTERNAL MEDICINE

## 2019-11-13 PROCEDURE — 99999 PR PBB SHADOW E&M-EST. PATIENT-LVL III: ICD-10-PCS | Mod: PBBFAC,HCNC,, | Performed by: INTERNAL MEDICINE

## 2019-11-13 PROCEDURE — 3078F DIAST BP <80 MM HG: CPT | Mod: HCNC,CPTII,S$GLB, | Performed by: INTERNAL MEDICINE

## 2019-11-13 PROCEDURE — 3075F PR MOST RECENT SYSTOLIC BLOOD PRESS GE 130-139MM HG: ICD-10-PCS | Mod: HCNC,CPTII,S$GLB, | Performed by: INTERNAL MEDICINE

## 2019-11-13 PROCEDURE — 3075F SYST BP GE 130 - 139MM HG: CPT | Mod: HCNC,CPTII,S$GLB, | Performed by: INTERNAL MEDICINE

## 2019-11-14 ENCOUNTER — OFFICE VISIT (OUTPATIENT)
Dept: CARDIOLOGY | Facility: CLINIC | Age: 79
End: 2019-11-14
Payer: MEDICARE

## 2019-11-14 ENCOUNTER — CLINICAL SUPPORT (OUTPATIENT)
Dept: CARDIOLOGY | Facility: CLINIC | Age: 79
End: 2019-11-14
Payer: MEDICARE

## 2019-11-14 VITALS
OXYGEN SATURATION: 98 % | SYSTOLIC BLOOD PRESSURE: 142 MMHG | DIASTOLIC BLOOD PRESSURE: 78 MMHG | HEIGHT: 67 IN | BODY MASS INDEX: 22.77 KG/M2 | HEART RATE: 70 BPM | WEIGHT: 145.06 LBS

## 2019-11-14 DIAGNOSIS — J44.9 CHRONIC OBSTRUCTIVE PULMONARY DISEASE, UNSPECIFIED COPD TYPE: ICD-10-CM

## 2019-11-14 DIAGNOSIS — N18.30 CKD (CHRONIC KIDNEY DISEASE) STAGE 3, GFR 30-59 ML/MIN: ICD-10-CM

## 2019-11-14 DIAGNOSIS — I65.21 CAROTID STENOSIS, RIGHT: ICD-10-CM

## 2019-11-14 DIAGNOSIS — E78.5 HYPERLIPIDEMIA, UNSPECIFIED HYPERLIPIDEMIA TYPE: Chronic | ICD-10-CM

## 2019-11-14 DIAGNOSIS — I25.810 CORONARY ARTERY DISEASE INVOLVING CORONARY BYPASS GRAFT OF NATIVE HEART WITHOUT ANGINA PECTORIS: Chronic | ICD-10-CM

## 2019-11-14 DIAGNOSIS — I73.9 PERIPHERAL ARTERIAL DISEASE: ICD-10-CM

## 2019-11-14 DIAGNOSIS — I65.23 BILATERAL CAROTID ARTERY STENOSIS: Primary | ICD-10-CM

## 2019-11-14 DIAGNOSIS — I10 ESSENTIAL HYPERTENSION: Chronic | ICD-10-CM

## 2019-11-14 LAB
LEFT ARM DIASTOLIC BLOOD PRESSURE: 81 MMHG
LEFT ARM SYSTOLIC BLOOD PRESSURE: 121 MMHG
LEFT CBA DIAS: 10 CM/S
LEFT CBA SYS: 46 CM/S
LEFT CCA DIST DIAS: 11 CM/S
LEFT CCA DIST SYS: 57 CM/S
LEFT CCA MID DIAS: 11 CM/S
LEFT CCA MID SYS: 53 CM/S
LEFT CCA PROX DIAS: 11 CM/S
LEFT CCA PROX SYS: 65 CM/S
LEFT ECA DIAS: 11 CM/S
LEFT ECA SYS: 99 CM/S
LEFT ICA DIST DIAS: 19 CM/S
LEFT ICA DIST SYS: 62 CM/S
LEFT ICA MID DIAS: 17 CM/S
LEFT ICA MID SYS: 60 CM/S
LEFT ICA PROX DIAS: 17 CM/S
LEFT ICA PROX SYS: 50 CM/S
LEFT VERTEBRAL DIAS: 7 CM/S
LEFT VERTEBRAL SYS: 27 CM/S
OHS CV CAROTID RIGHT ICA EDV HIGHEST: 20
OHS CV CAROTID ULTRASOUND LEFT ICA/CCA RATIO: 0.95
OHS CV CAROTID ULTRASOUND RIGHT ICA/CCA RATIO: 1.06
OHS CV PV CAROTID LEFT HIGHEST CCA: 65
OHS CV PV CAROTID LEFT HIGHEST ICA: 62
OHS CV PV CAROTID RIGHT HIGHEST CCA: 69
OHS CV PV CAROTID RIGHT HIGHEST ICA: 73
OHS CV US CAROTID LEFT HIGHEST EDV: 19
RIGHT ARM DIASTOLIC BLOOD PRESSURE: 76 MMHG
RIGHT ARM SYSTOLIC BLOOD PRESSURE: 127 MMHG
RIGHT CBA DIAS: 12 CM/S
RIGHT CBA SYS: 45 CM/S
RIGHT CCA DIST DIAS: 13 CM/S
RIGHT CCA DIST SYS: 62 CM/S
RIGHT CCA MID DIAS: 14 CM/S
RIGHT CCA MID SYS: 56 CM/S
RIGHT CCA PROX DIAS: 12 CM/S
RIGHT CCA PROX SYS: 69 CM/S
RIGHT ECA DIAS: 8 CM/S
RIGHT ECA SYS: 79 CM/S
RIGHT ICA DIST DIAS: 11 CM/S
RIGHT ICA DIST SYS: 46 CM/S
RIGHT ICA MID DIAS: 18 CM/S
RIGHT ICA MID SYS: 67 CM/S
RIGHT ICA PROX DIAS: 20 CM/S
RIGHT ICA PROX SYS: 73 CM/S
RIGHT VERTEBRAL DIAS: 9 CM/S
RIGHT VERTEBRAL SYS: 29 CM/S

## 2019-11-14 PROCEDURE — 99499 RISK ADDL DX/OHS AUDIT: ICD-10-PCS | Mod: HCNC,S$GLB,, | Performed by: PHYSICIAN ASSISTANT

## 2019-11-14 PROCEDURE — 99499 UNLISTED E&M SERVICE: CPT | Mod: HCNC,S$GLB,, | Performed by: PHYSICIAN ASSISTANT

## 2019-11-14 PROCEDURE — 99999 PR PBB SHADOW E&M-EST. PATIENT-LVL III: ICD-10-PCS | Mod: PBBFAC,HCNC,, | Performed by: PHYSICIAN ASSISTANT

## 2019-11-14 PROCEDURE — 99999 PR PBB SHADOW E&M-EST. PATIENT-LVL III: CPT | Mod: PBBFAC,HCNC,, | Performed by: PHYSICIAN ASSISTANT

## 2019-11-14 PROCEDURE — 3078F DIAST BP <80 MM HG: CPT | Mod: HCNC,CPTII,S$GLB, | Performed by: PHYSICIAN ASSISTANT

## 2019-11-14 PROCEDURE — 3077F SYST BP >= 140 MM HG: CPT | Mod: HCNC,CPTII,S$GLB, | Performed by: PHYSICIAN ASSISTANT

## 2019-11-14 PROCEDURE — 99024 PR POST-OP FOLLOW-UP VISIT: ICD-10-PCS | Mod: HCNC,S$GLB,, | Performed by: PHYSICIAN ASSISTANT

## 2019-11-14 PROCEDURE — 99024 POSTOP FOLLOW-UP VISIT: CPT | Mod: HCNC,S$GLB,, | Performed by: PHYSICIAN ASSISTANT

## 2019-11-14 PROCEDURE — 3078F PR MOST RECENT DIASTOLIC BLOOD PRESSURE < 80 MM HG: ICD-10-PCS | Mod: HCNC,CPTII,S$GLB, | Performed by: PHYSICIAN ASSISTANT

## 2019-11-14 PROCEDURE — 93880 CV US DOPPLER CAROTID (CUPID ONLY): ICD-10-PCS | Mod: HCNC,S$GLB,, | Performed by: INTERNAL MEDICINE

## 2019-11-14 PROCEDURE — 1101F PT FALLS ASSESS-DOCD LE1/YR: CPT | Mod: HCNC,CPTII,S$GLB, | Performed by: PHYSICIAN ASSISTANT

## 2019-11-14 PROCEDURE — 1101F PR PT FALLS ASSESS DOC 0-1 FALLS W/OUT INJ PAST YR: ICD-10-PCS | Mod: HCNC,CPTII,S$GLB, | Performed by: PHYSICIAN ASSISTANT

## 2019-11-14 PROCEDURE — 3077F PR MOST RECENT SYSTOLIC BLOOD PRESSURE >= 140 MM HG: ICD-10-PCS | Mod: HCNC,CPTII,S$GLB, | Performed by: PHYSICIAN ASSISTANT

## 2019-11-14 PROCEDURE — 93880 EXTRACRANIAL BILAT STUDY: CPT | Mod: HCNC,S$GLB,, | Performed by: INTERNAL MEDICINE

## 2019-11-14 NOTE — PROGRESS NOTES
Interventional Cardiology Clinic Note  Reason for Visit: Carotid artery disease  Referring Physician: Dr. Kelly    HPI:   Paul Browning is a 79 y.o. male who presents for follow up of carotid artery disease.     79 year old male with PMHx significant for CAD (STEMI in 2014 with PCI and JONNA to RCA, CABG- 2 vessel bypass (LIMA to LAD and SVG to Ramus), HTN and HLP along with hospitalization for vision changes in early August of this year. He underwent neurological workup with imaging (CTA neck that showed 90% stenosis of the proximal Right ICA and 70% stenosis of the Left ICA; Brain MRI that was unremarkable). TTE showed EF of 55% and no evidence of ASD. Patient wasreferred to Interventional Cardiology clinic for evaluation for right carotid artery stenting.     On 10/14/19 he underwent stenting of his BRANDY with PRECISE PRO 8.0 X 40 stent with proximal emboli protection. He was discharged on DAPT with ASA and Plavix. He has been doing okay since the procedure. He denies any extremity weakness/numbness, slurred speech, dizziness, or syncope. His vision in both eyes is still poor. He states he had a lot of bruising and a large lump at the incision site following the procedure but this has gradually been resolving. He is going to start cardiac rehab soon.      He is a former smoker and has 50 pack year history. He is compliant with taking his home medications. He is accompanied by his son. Patient enjoyed fishing and driving his car and hopes to return to those activities however his vision significantly impairs him from performing those activities. He states his vision has continued to decline and glasses don't seem to be helping. He has a vision with a retina disorder specialist coming up.     ROS:    Constitution: Negative for diaphoresis and malaise/fatigue.   HENT: Negative for nosebleeds. Positive for vision loss.   Cardiovascular: Negative for chest pain, claudication, dyspnea on exertion, leg  swelling, near-syncope, orthopnea, palpitations, paroxysmal nocturnal dyspnea and syncope.   Respiratory: Positive for shortness of breath.   Hematologic/Lymphatic: Negative for bleeding problem. Does not bruise/bleed easily.   Musculoskeletal: Negative for muscle cramps and myalgias.   Gastrointestinal: Negative for bloating, abdominal pain, nausea and vomiting.   Neurological: Negative for dizziness, headaches and light-headedness.   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    Hyperlipidemia     Hypertension     Ischemic cardiomyopathy 11/5/2014    MDD (major depressive disorder), recurrent episode, mild 2/17/2016    Osteoporosis 7/20/2016    Peripheral arterial disease 3/18/2016    Sarcoid     Squamous cell carcinoma 09/2016, 5/2016    crown of scalp, left wrist     Past Surgical History:   Procedure Laterality Date    CARDIAC SURGERY      CAROTID STENT N/A 10/14/2019    Procedure: INSERTION, STENT, ARTERY, CAROTID;  Surgeon: Artie Kebede MD;  Location: [facility] CATH LAB;  Service: Cardiology;  Laterality: N/A;    CATARACT EXTRACTION W/  INTRAOCULAR LENS IMPLANT Right 07/17/2018    Dr. Talamantes    CATARACT EXTRACTION W/  INTRAOCULAR LENS IMPLANT Left 07/31/2018    Dr. Talamantes    CORONARY ARTERY BYPASS GRAFT      x2  10/2014    ESOPHAGOGASTRODUODENOSCOPY N/A 4/8/2019    Procedure: ESOPHAGOGASTRODUODENOSCOPY (EGD)/poss rfa;  Surgeon: Clifford De La Torre MD;  Location: [facility] ENDO ([address] [place] [address]);  Service: Endoscopy;  Laterality: N/A;    ESOPHAGOGASTRODUODENOSCOPY (EGD) WITH RADIOFREQUENCY TREATMENT OF GASTROESOPHAGEAL JUNCTION      INTRAOCULAR PROSTHESES INSERTION Right 7/17/2018    Procedure: INSERTION, INTRAOCULAR LENS PROSTHESIS;  Surgeon:  León Talamantes MD;  Location: [address];  Service: Ophthalmology;  Laterality: Right;    INTRAOCULAR PROSTHESES INSERTION Left 7/31/2018    Procedure: INSERTION, INTRAOCULAR LENS PROSTHESIS;  Surgeon: León Talamantes MD;  Location: [address];  Service: Ophthalmology;  Laterality: Left;    PHACOEMULSIFICATION OF CATARACT Right 7/17/2018    Procedure: PHACOEMULSIFICATION, CATARACT;  Surgeon: León Talamantes MD;  Location: [address];  Service: Ophthalmology;  Laterality: Right;    PHACOEMULSIFICATION OF CATARACT Left 7/31/2018    Procedure: PHACOEMULSIFICATION, CATARACT;  Surgeon: León Talamantes MD;  Location: [facility] OR [address] [place] [address];  Service: Ophthalmology;  Laterality: Left;    UMBILICAL HERNIA REPAIR       Allergies:     Review of patient's allergies indicates:   Allergen Reactions    Morphine Shortness Of Breath     Medications:     Current Outpatient Medications on File Prior to Visit   Medication Sig Dispense Refill    acetaminophen (TYLENOL) 500 MG tablet Take 1,000 mg by mouth every 6 (six) hours as needed for Pain.      alendronate (FOSAMAX) 70 MG tablet 1 tab PO qwk in the am with a full glass of water on an empty stomach. Do not consume food or lie down for at least 30 minutes afterwards. 12 tablet 3    aspirin (ECOTRIN) 81 MG EC tablet Take 81 mg by mouth once daily.      atorvastatin (LIPITOR) 40 MG tablet TAKE 1 TABLET BY MOUTH EVERY DAY 90 tablet 3    clopidogrel (PLAVIX) 75 mg tablet TK 1 T PO QD 90 tablet 3    magnesium oxide (MAG-OX) 400 mg (241.3 mg magnesium) tablet Take 1 tablet (400 mg total) by mouth once daily. 30 tablet 3    omega-3 fatty acids-vitamin E (FISH OIL) 1,000 mg Cap Take 1 tablet by mouth 2 (two) times daily. (Patient taking differently: Take 1 tablet by mouth once daily. ) 60 each 11    pantoprazole (PROTONIX) 40 MG tablet Take 40 mg by mouth once daily.       riboflavin, vitamin B2, (VITAMIN B-2) 100 mg Tab tablet Take 2  "tablets (200 mg total) by mouth once daily. (Patient taking differently: Take 100 mg by mouth once daily. ) 60 tablet 3    sertraline (ZOLOFT) 50 MG tablet TAKE 1 TABLET BY MOUTH EVERY DAY 90 tablet 3    fluorouracil 5% 5 % Soln Use hs for 2 weeks (Patient not taking: Reported on 10/17/2019) 10 mL 1     No current facility-administered medications on file prior to visit.      Social History:     Social History     Tobacco Use    Smoking status: Former Smoker     Packs/day: 2.00     Years: 20.00     Pack years: 40.00     Types: Cigarettes     Last attempt to quit: 1988     Years since quittin.7    Smokeless tobacco: Never Used   Substance Use Topics    Alcohol use: No     Comment: quit drinking beer      Family History:     Family History   Problem Relation Age of Onset    Arrhythmia Mother     Heart disease Mother     Heart failure Brother     No Known Problems Daughter     No Known Problems Son     Colon cancer Neg Hx     Inflammatory bowel disease Neg Hx     Celiac disease Neg Hx     Melanoma Neg Hx     Amblyopia Neg Hx     Blindness Neg Hx     Cataracts Neg Hx     Glaucoma Neg Hx     Macular degeneration Neg Hx     Retinal detachment Neg Hx     Strabismus Neg Hx      Physical Exam:   BP (!) 142/78 (BP Location: Right arm, Patient Position: Sitting, BP Method: Large (Automatic))   Pulse 70   Ht 5' 7" (1.702 m)   Wt 65.8 kg (145 lb 1 oz)   SpO2 98%   BMI 22.72 kg/m²      Constitutional: NAD, conversant  HEENT: Sclera anicteric, PERRLA, EOMI  Neck: No JVD, no carotid bruits  CV: RRR, no murmur, normal S1/S2  Vascular:  L radial  2+,   R radial  2+,     L posterior tibial  2+,  R posterior tibial  2+    L dorsalis pedis  2+, R dorsalis pedis  2+     Pulm: CTAB, no wheezes, rales, or ronchi  GI: Abdomen soft, NTND, +BS  Extremities: No LE edema, warm and well perfused  Skin: No ecchymosis, erythema, or ulcers  Psych: AOx3, appropriate affect  Neuro: No gross deficits    Labs: "     Lab Results   Component Value Date     10/15/2019    K 4.0 10/15/2019     10/15/2019    CO2 26 10/15/2019    BUN 17 10/15/2019    CREATININE 1.3 10/15/2019    ANIONGAP 7 (L) 10/15/2019     Lab Results   Component Value Date    HGBA1C 5.2 10/15/2014     Lab Results   Component Value Date    BNP 86 01/16/2015     (H) 11/17/2014     (H) 11/04/2014    Lab Results   Component Value Date    WBC 4.76 10/15/2019    WBC 4.61 10/15/2019    HGB 11.5 (L) 10/15/2019    HGB 11.4 (L) 10/15/2019    HCT 36.1 (L) 10/15/2019    HCT 36.3 (L) 10/15/2019    HCT 22 (L) 10/21/2014     10/15/2019     10/15/2019    GRAN 3.0 10/15/2019    GRAN 63.6 10/15/2019    GRAN 2.9 10/15/2019    GRAN 63.7 10/15/2019     Lab Results   Component Value Date    CHOL 117 (L) 05/24/2019    HDL 31 (L) 05/24/2019    LDLCALC 69.4 05/24/2019    TRIG 83 05/24/2019          Imaging:    Echo 8/6:  -LVEF 55-60%, trivial AI, mild MR, no ASD.     Cardiac PET Stress (2019):   · There is a small to moderate sized, moderate to severe intensity basal inferolateral fixed perfusion abnormality involving 10% of the LV myocardium in the OM2 territory consistent with non-transmural infarct. On past angiogram, the OM2 was subtotally occluded.  · Whole heart absolute myocardial perfusion (cc/min/g) averaged 1.06 rest (which is elevated), 1.47 at stress (which is mildly reduced), and 1.37 CFR (which is moderately reduced impart due to elevated resting flow).  · Within the defect absolute myocardial perfusion (cc/min/gm) averaged 0.51 at rest, 0.82 at stress and CFR was 1.78, which equates to severely reduced coronary flow capacity in 10% of the myocardium (within the OM2 territory).  · Gated perfusion images showed an ejection fraction of 62 % at rest and 84 % during stress.  · There is basal inferolateral wall hypokinesis and hypokinesis at rest and stress.  · LV cavity size is normal at rest and normal at stress.  · The EKG portion of  this study is negative for myocardial ischemia.  · Arrhythmias during stress: rare PVCs.  · The patient reported no symptoms during the stress test.  · Compared to the previous study from 8-, there are no significant changes    - Carotid ultrasound (11/14/19):  · There is 0-19% right Internal Carotid Stenosis.  · There is 0-19% left Internal Carotid Stenosis.       Assessment:     1. Bilateral carotid artery stenosis    2. Chronic obstructive pulmonary disease, unspecified COPD type    3. Coronary artery disease involving coronary bypass graft of native heart without angina pectoris    4. Essential hypertension    5. Hyperlipidemia, unspecified hyperlipidemia type    6. Peripheral arterial disease    7. CKD (chronic kidney disease) stage 3, GFR 30-59 ml/min      Plan:     Carotid stenosis  -- Patient status post PRECISE PRO 8.0 X 40 stent in BRANDY on 10/14/19. Follow up US this AM shows stent is patent. He has been compliant with DAPT x 1 month.   -- Stop Plavix 75 mg and continue ASA 81 mg indefinitely  -- He will need f/u ultrasounds at 6 months and 1 year post-procedure and then annually thereafter. Follow up with Dr. Kelly.   -- Follow up here PRN    Hyperlipidemia  -- Continue with high intensity statin (Atorvastatin 40 mg QD), target LDL <70 (recent labs LDL is 69)    Hypertension  -- Well controlled without antihypertensives     Coronary artery disease s/p CABG  -- Denies anginal chest pain. Recent PET stress test shows scar in OM2 territory consistent with subtotal occlusion found on last cath  -- Continue medical management with ASA and statin     Signed:  Rukhsana Hoskins PA-C  Interventional Cardiology   [id number]  11/14/2019 11:08 AM

## 2019-11-21 ENCOUNTER — NURSE TRIAGE (OUTPATIENT)
Dept: ADMINISTRATIVE | Facility: CLINIC | Age: 79
End: 2019-11-21

## 2019-11-22 NOTE — TELEPHONE ENCOUNTER
Reason for Disposition   Symptoms interfere with work or school    Additional Information   Negative: Severe difficulty breathing (e.g., struggling for each breath, speaks in single words)   Negative: Bluish (or gray) lips or face now   Negative: Difficult to awaken or acting confused (e.g., disoriented, slurred speech)   Negative: Hysterical or combative behavior   Negative: Sounds like a life-threatening emergency to the triager   Negative: Chest pain   Negative: Palpitations, skipped heart beat, or rapid heart beat   Negative: Cough is main symptom   Negative: Suicide thoughts, threats, attempts, or questions   Negative: Depression is main problem or symptom (e.g., feelings of sadness or hopelessness)   Negative: [1] Difficulty breathing AND [2] persists > 10 minutes AND [3] not relieved by reassurance provided by triager   Negative: [1] Lightheadedness or dizziness AND [2] persists > 10 minutes AND [3] not relieved by reassurance provided by triager   Negative: [1] Known or suspected alcohol or drug abuse AND [2] feeling very shaky (i.e., visible tremors of hands)   Negative: Patient sounds very sick or weak to the triager    Protocols used: ANXIETY AND PANIC ATTACK-A-AH   Pt's son called to report Pt's wife passed away and he wants to know if he can increase Pt's Zoloft dose for depresion and if Pt;s Doctor can order medication for anxiety tomorrow. High priority message sent to Provider's office to contact Pt.

## 2019-11-23 RX ORDER — ALPRAZOLAM 0.25 MG/1
0.25 TABLET ORAL 3 TIMES DAILY PRN
Qty: 45 TABLET | Refills: 0 | Status: SHIPPED | OUTPATIENT
Start: 2019-11-23 | End: 2020-11-06

## 2019-11-25 RX ORDER — ATORVASTATIN CALCIUM 40 MG/1
TABLET, FILM COATED ORAL
Qty: 90 TABLET | Refills: 3 | Status: SHIPPED | OUTPATIENT
Start: 2019-11-25 | End: 2020-12-30 | Stop reason: SDUPTHER

## 2019-11-29 RX ORDER — PANTOPRAZOLE SODIUM 40 MG/1
TABLET, DELAYED RELEASE ORAL
Qty: 180 TABLET | Refills: 0 | Status: SHIPPED | OUTPATIENT
Start: 2019-11-29 | End: 2020-07-20

## 2019-12-04 RX ORDER — SERTRALINE HYDROCHLORIDE 50 MG/1
TABLET, FILM COATED ORAL
Qty: 90 TABLET | Refills: 3 | Status: SHIPPED | OUTPATIENT
Start: 2019-12-04 | End: 2020-12-30 | Stop reason: SDUPTHER

## 2019-12-09 NOTE — H&P
[facility]  History & Physical    Subjective:      Chief Complaint/Reason for Admission:     Paul Browning is a 77 y.o. male.    Past Medical History:   Diagnosis Date    Stevens's esophagus with low grade dysplasia     BPH (benign prostatic hyperplasia)     CAD (coronary artery disease)     Cataract     Diaphragmatic hernia     GERD (gastroesophageal reflux disease)     Heart attack     Hyperlipidemia     Hypertension     MDD (major depressive disorder), recurrent episode, mild 2/17/2016    Osteoporosis 7/20/2016    Peripheral arterial disease 3/18/2016    Sarcoid     Squamous cell carcinoma 09/2016, 5/2016    crown of scalp, left wrist     Past Surgical History:   Procedure Laterality Date    CARDIAC SURGERY      CORONARY ARTERY BYPASS GRAFT      x2  10/2014    UMBILICAL HERNIA REPAIR       Family History   Problem Relation Age of Onset    Arrhythmia Mother     Heart failure Brother     Colon cancer Neg Hx     Inflammatory bowel disease Neg Hx     Celiac disease Neg Hx     Melanoma Neg Hx     Amblyopia Neg Hx     Blindness Neg Hx     Cataracts Neg Hx     Glaucoma Neg Hx     Macular degeneration Neg Hx     Retinal detachment Neg Hx     Strabismus Neg Hx      Social History   Substance Use Topics    Smoking status: Former Smoker     Packs/day: 2.00     Years: 20.00     Types: Cigarettes     Quit date: 2/26/1988    Smokeless tobacco: Never Used    Alcohol use No      Comment: quit drinking beer 2012       No prescriptions prior to admission.     Review of patient's allergies indicates:   Allergen Reactions    Morphine Shortness Of Breath        Review of Systems   Eyes: Positive for blurred vision.   All other systems reviewed and are negative.      Objective:      Vital Signs (Most Recent)       Vital Signs Range (Last 24H):       Physical Exam   Constitutional: He is oriented to person, place, and time. He appears well-developed and well-nourished.  Repeat C/S scheduled on 1.27.19 at 0900 with Dr Porras.     HENT:   Head: Normocephalic.   Eyes: Conjunctivae and EOM are normal. Pupils are equal, round, and reactive to light.   Neck: Normal range of motion. Neck supple.   Cardiovascular: Normal rate.    Pulmonary/Chest: Effort normal and breath sounds normal.   Abdominal: Soft. Bowel sounds are normal.   Musculoskeletal: Normal range of motion.   Neurological: He is alert and oriented to person, place, and time.   Skin: Skin is warm.   Psychiatric: He has a normal mood and affect.       Data Review:    ECG:     Assessment:      Cataract OD.    Plan:    CE OD.

## 2019-12-13 ENCOUNTER — OFFICE VISIT (OUTPATIENT)
Dept: INTERNAL MEDICINE | Facility: CLINIC | Age: 79
End: 2019-12-13
Payer: MEDICARE

## 2019-12-13 VITALS
HEIGHT: 67 IN | HEART RATE: 80 BPM | SYSTOLIC BLOOD PRESSURE: 137 MMHG | WEIGHT: 144.38 LBS | TEMPERATURE: 98 F | BODY MASS INDEX: 22.66 KG/M2 | DIASTOLIC BLOOD PRESSURE: 66 MMHG | RESPIRATION RATE: 16 BRPM

## 2019-12-13 DIAGNOSIS — E78.5 HYPERLIPIDEMIA, UNSPECIFIED HYPERLIPIDEMIA TYPE: Chronic | ICD-10-CM

## 2019-12-13 DIAGNOSIS — J44.9 CHRONIC OBSTRUCTIVE PULMONARY DISEASE, UNSPECIFIED COPD TYPE: ICD-10-CM

## 2019-12-13 DIAGNOSIS — I25.810 CORONARY ARTERY DISEASE INVOLVING CORONARY BYPASS GRAFT OF NATIVE HEART WITHOUT ANGINA PECTORIS: Primary | Chronic | ICD-10-CM

## 2019-12-13 DIAGNOSIS — M81.6 LOCALIZED OSTEOPOROSIS, UNSPECIFIED PATHOLOGICAL FRACTURE PRESENCE: ICD-10-CM

## 2019-12-13 DIAGNOSIS — K22.710 BARRETT'S ESOPHAGUS WITH LOW GRADE DYSPLASIA: Chronic | ICD-10-CM

## 2019-12-13 DIAGNOSIS — L98.9 SCALP LESION: ICD-10-CM

## 2019-12-13 DIAGNOSIS — G44.86 CERVICOGENIC HEADACHE: ICD-10-CM

## 2019-12-13 DIAGNOSIS — D69.2 SENILE PURPURA: ICD-10-CM

## 2019-12-13 DIAGNOSIS — I65.29 INTERNAL CAROTID ARTERY STENOSIS, UNSPECIFIED LATERALITY: ICD-10-CM

## 2019-12-13 DIAGNOSIS — I70.0 ATHEROSCLEROSIS OF AORTA: ICD-10-CM

## 2019-12-13 DIAGNOSIS — G47.00 INSOMNIA, UNSPECIFIED TYPE: Chronic | ICD-10-CM

## 2019-12-13 DIAGNOSIS — M47.812 CERVICAL SPINE ARTHRITIS: ICD-10-CM

## 2019-12-13 DIAGNOSIS — I10 ESSENTIAL HYPERTENSION: Chronic | ICD-10-CM

## 2019-12-13 PROCEDURE — 1126F PR PAIN SEVERITY QUANTIFIED, NO PAIN PRESENT: ICD-10-PCS | Mod: HCNC,S$GLB,, | Performed by: INTERNAL MEDICINE

## 2019-12-13 PROCEDURE — 99499 RISK ADDL DX/OHS AUDIT: ICD-10-PCS | Mod: HCNC,S$GLB,, | Performed by: INTERNAL MEDICINE

## 2019-12-13 PROCEDURE — 99214 PR OFFICE/OUTPT VISIT, EST, LEVL IV, 30-39 MIN: ICD-10-PCS | Mod: HCNC,S$GLB,, | Performed by: INTERNAL MEDICINE

## 2019-12-13 PROCEDURE — 99499 UNLISTED E&M SERVICE: CPT | Mod: HCNC,S$GLB,, | Performed by: INTERNAL MEDICINE

## 2019-12-13 PROCEDURE — 1100F PR PT FALLS ASSESS DOC 2+ FALLS/FALL W/INJURY/YR: ICD-10-PCS | Mod: HCNC,CPTII,S$GLB, | Performed by: INTERNAL MEDICINE

## 2019-12-13 PROCEDURE — 99999 PR PBB SHADOW E&M-EST. PATIENT-LVL III: ICD-10-PCS | Mod: PBBFAC,HCNC,, | Performed by: INTERNAL MEDICINE

## 2019-12-13 PROCEDURE — 1159F MED LIST DOCD IN RCRD: CPT | Mod: HCNC,S$GLB,, | Performed by: INTERNAL MEDICINE

## 2019-12-13 PROCEDURE — 1100F PTFALLS ASSESS-DOCD GE2>/YR: CPT | Mod: HCNC,CPTII,S$GLB, | Performed by: INTERNAL MEDICINE

## 2019-12-13 PROCEDURE — 3075F SYST BP GE 130 - 139MM HG: CPT | Mod: HCNC,CPTII,S$GLB, | Performed by: INTERNAL MEDICINE

## 2019-12-13 PROCEDURE — 3288F FALL RISK ASSESSMENT DOCD: CPT | Mod: HCNC,CPTII,S$GLB, | Performed by: INTERNAL MEDICINE

## 2019-12-13 PROCEDURE — 3075F PR MOST RECENT SYSTOLIC BLOOD PRESS GE 130-139MM HG: ICD-10-PCS | Mod: HCNC,CPTII,S$GLB, | Performed by: INTERNAL MEDICINE

## 2019-12-13 PROCEDURE — 99999 PR PBB SHADOW E&M-EST. PATIENT-LVL III: CPT | Mod: PBBFAC,HCNC,, | Performed by: INTERNAL MEDICINE

## 2019-12-13 PROCEDURE — 1126F AMNT PAIN NOTED NONE PRSNT: CPT | Mod: HCNC,S$GLB,, | Performed by: INTERNAL MEDICINE

## 2019-12-13 PROCEDURE — 3078F DIAST BP <80 MM HG: CPT | Mod: HCNC,CPTII,S$GLB, | Performed by: INTERNAL MEDICINE

## 2019-12-13 PROCEDURE — 3078F PR MOST RECENT DIASTOLIC BLOOD PRESSURE < 80 MM HG: ICD-10-PCS | Mod: HCNC,CPTII,S$GLB, | Performed by: INTERNAL MEDICINE

## 2019-12-13 PROCEDURE — 99214 OFFICE O/P EST MOD 30 MIN: CPT | Mod: HCNC,S$GLB,, | Performed by: INTERNAL MEDICINE

## 2019-12-13 PROCEDURE — 3288F PR FALLS RISK ASSESSMENT DOCUMENTED: ICD-10-PCS | Mod: HCNC,CPTII,S$GLB, | Performed by: INTERNAL MEDICINE

## 2019-12-13 PROCEDURE — 1159F PR MEDICATION LIST DOCUMENTED IN MEDICAL RECORD: ICD-10-PCS | Mod: HCNC,S$GLB,, | Performed by: INTERNAL MEDICINE

## 2019-12-13 NOTE — PROGRESS NOTES
Subjective:       Patient ID: Paul Browning is a 79 y.o. male.    Chief Complaint: Follow-up    HPI   79 y.o. Male with CAD, systolic heart failure, CAD, Carotid disease/PAD, HLD, HTN, COPD/sarcoidosis, Insomnia, Aortic atherosclerosis, MDD, senile purpura, Hx of SCC is here for 6 month f/u.   Review of Systems   Constitutional: Negative for activity change, appetite change, chills, diaphoresis, fatigue, fever and unexpected weight change.   HENT: Negative for postnasal drip, rhinorrhea, sinus pressure, sneezing, sore throat, trouble swallowing and voice change.    Respiratory: Negative for cough, shortness of breath and wheezing.    Cardiovascular: Negative for chest pain, palpitations and leg swelling.   Gastrointestinal: Negative for abdominal pain, blood in stool, constipation, diarrhea, nausea and vomiting.   Genitourinary: Negative for dysuria.   Musculoskeletal: Positive for myalgias and neck pain. Negative for arthralgias.   Skin: Negative for rash and wound.   Allergic/Immunologic: Negative for environmental allergies and food allergies.   Neurological: Positive for headaches.   Hematological: Negative for adenopathy. Does not bruise/bleed easily.   Psychiatric/Behavioral: Positive for sleep disturbance. Negative for self-injury and suicidal ideas. The patient is nervous/anxious.        Objective:      Physical Exam   Constitutional: He is oriented to person, place, and time. He appears well-developed and well-nourished. No distress.   HENT:   Head: Normocephalic and atraumatic.   Eyes: Pupils are equal, round, and reactive to light. Conjunctivae and EOM are normal. Right eye exhibits no discharge. Left eye exhibits no discharge. No scleral icterus.   Neck: Normal range of motion. Neck supple. No JVD present.   Cardiovascular: Normal rate, regular rhythm and normal heart sounds.   No murmur heard.  Pulmonary/Chest: Effort normal and breath sounds normal. No respiratory distress. He has no  wheezes. He has no rales.   Musculoskeletal: He exhibits no edema.        Cervical back: He exhibits tenderness.   Lymphadenopathy:     He has no cervical adenopathy.   Neurological: He is alert and oriented to person, place, and time.   Skin: Skin is warm and dry. No rash noted. He is not diaphoretic. No pallor.       Assessment:       1. Coronary artery disease involving coronary bypass graft of native heart without angina pectoris    2. Internal carotid artery stenosis, unspecified laterality    3. Stevens's esophagus with low grade dysplasia    4. Essential hypertension    5. Chronic obstructive pulmonary disease, unspecified COPD type    6. Insomnia, unspecified type    7. Atherosclerosis of aorta    8. Hyperlipidemia, unspecified hyperlipidemia type    9. Localized osteoporosis, unspecified pathological fracture presence    10. Senile purpura    11. Cervical spine arthritis    12. Cervicogenic headache    13. Scalp lesion        Plan:    1. CAD s/p PCI and CABG- stable no CP currently, followed by Cardiology       Continue ASA/Statin/BB   2. Carotid artery disease/PAD- stable, CPT   3. Stevens's esophagus, Hx of gastric ulcer- Last EGD(4/19)- low grade dysplasia and intestinal metaplasia       Continue Protonix 40 mg daily       Followed by GI   4. HTN- stable, CPT   5. COPD with sarcoidosis- stable off meds   6. Insomnia- stable off Trazodone   7. Aortic atherosclerosis- stable, continue to monitor   8. MDD- stable on Zoloft 50 mg daily   9. HLD- controlled on Lipitor 40 mg daily  10. Osteoporosis- continue Fosamax, Calcium/Vitamin D  11. Senile purpura- stable, continue to monitor  12. Cervical arthritis with headaches- referral to Pain Management   13. Hx of SCC- followed by Derm   14. F/u in 6 months for annual

## 2020-01-03 ENCOUNTER — OFFICE VISIT (OUTPATIENT)
Dept: PAIN MEDICINE | Facility: CLINIC | Age: 80
End: 2020-01-03
Payer: MEDICARE

## 2020-01-03 ENCOUNTER — TELEPHONE (OUTPATIENT)
Dept: PAIN MEDICINE | Facility: CLINIC | Age: 80
End: 2020-01-03

## 2020-01-03 VITALS — WEIGHT: 147 LBS | BODY MASS INDEX: 23.02 KG/M2

## 2020-01-03 DIAGNOSIS — M50.30 DDD (DEGENERATIVE DISC DISEASE), CERVICAL: ICD-10-CM

## 2020-01-03 DIAGNOSIS — M47.812 CERVICAL SPONDYLOSIS: ICD-10-CM

## 2020-01-03 DIAGNOSIS — M54.2 CHRONIC NECK PAIN: Primary | ICD-10-CM

## 2020-01-03 DIAGNOSIS — M47.812 CERVICAL SPONDYLOSIS: Primary | ICD-10-CM

## 2020-01-03 DIAGNOSIS — G44.86 CERVICOGENIC HEADACHE: ICD-10-CM

## 2020-01-03 DIAGNOSIS — G89.29 CHRONIC NECK PAIN: Primary | ICD-10-CM

## 2020-01-03 PROCEDURE — 99999 PR PBB SHADOW E&M-EST. PATIENT-LVL III: ICD-10-PCS | Mod: PBBFAC,HCNC,, | Performed by: PAIN MEDICINE

## 2020-01-03 PROCEDURE — 1159F MED LIST DOCD IN RCRD: CPT | Mod: HCNC,S$GLB,, | Performed by: PAIN MEDICINE

## 2020-01-03 PROCEDURE — 99204 OFFICE O/P NEW MOD 45 MIN: CPT | Mod: HCNC,S$GLB,, | Performed by: PAIN MEDICINE

## 2020-01-03 PROCEDURE — 99999 PR PBB SHADOW E&M-EST. PATIENT-LVL III: CPT | Mod: PBBFAC,HCNC,, | Performed by: PAIN MEDICINE

## 2020-01-03 PROCEDURE — 1125F AMNT PAIN NOTED PAIN PRSNT: CPT | Mod: HCNC,S$GLB,, | Performed by: PAIN MEDICINE

## 2020-01-03 PROCEDURE — 1159F PR MEDICATION LIST DOCUMENTED IN MEDICAL RECORD: ICD-10-PCS | Mod: HCNC,S$GLB,, | Performed by: PAIN MEDICINE

## 2020-01-03 PROCEDURE — 99204 PR OFFICE/OUTPT VISIT, NEW, LEVL IV, 45-59 MIN: ICD-10-PCS | Mod: HCNC,S$GLB,, | Performed by: PAIN MEDICINE

## 2020-01-03 PROCEDURE — 1100F PTFALLS ASSESS-DOCD GE2>/YR: CPT | Mod: HCNC,CPTII,S$GLB, | Performed by: PAIN MEDICINE

## 2020-01-03 PROCEDURE — 3288F FALL RISK ASSESSMENT DOCD: CPT | Mod: HCNC,CPTII,S$GLB, | Performed by: PAIN MEDICINE

## 2020-01-03 PROCEDURE — 3288F PR FALLS RISK ASSESSMENT DOCUMENTED: ICD-10-PCS | Mod: HCNC,CPTII,S$GLB, | Performed by: PAIN MEDICINE

## 2020-01-03 PROCEDURE — 1100F PR PT FALLS ASSESS DOC 2+ FALLS/FALL W/INJURY/YR: ICD-10-PCS | Mod: HCNC,CPTII,S$GLB, | Performed by: PAIN MEDICINE

## 2020-01-03 PROCEDURE — 1125F PR PAIN SEVERITY QUANTIFIED, PAIN PRESENT: ICD-10-PCS | Mod: HCNC,S$GLB,, | Performed by: PAIN MEDICINE

## 2020-01-03 NOTE — TELEPHONE ENCOUNTER
Pt scheduled for 1/9/20 at 11:30am for Bilateral Cervical MBB. Pt aware to check in at registration desk on the first floor of the hospital for 10:30 am. Pt is taking ASA 81mg and aware to continue taking per Dr. Mera.

## 2020-01-03 NOTE — PROGRESS NOTES
[facility] New Patient Evaluation    Referred by: Grey Forbes DO   Reason for referral:  Cervical spine arthritis      Cervicogenic headache       CC:   Chief Complaint   Patient presents with    Neck Pain    Headache     Last 3 PDI Scores 1/3/2020   Pain Disability Index (PDI) 48       HPI:   Paul Browning is a 79 y.o. male who complains of chronic, bilateral upper neck pain which radiates into the back of his head producing severe headaches.  This all started approximately 1 year ago and has been progressive.  Patient supplies and dull evidence including a reduction in pain when he is looking down while raking his backyard in addition to severely increased pain when he is watching TV at home which requires him to extend his neck and look slightly upward.    Location: neck   Onset: 1+ years  Current Pain Score: 8/10  Daily Pain of Range: 8-8/10  Quality: Aching  Radiation: back of head  Worsened by: nothing in particular  Improved by: medications- Extra Strength Tylenol    Previous Therapies:  PT/OT: Denies  HEP: Denies  Interventions: Denies  Surgery: Denies neck surgery.  Endorses recent eye surgeries.  Medications:   - NSAIDS:   - MSK Relaxants:   - TCAs:   - SNRIs:   - Topicals:   - Anticonvulsants:  - Opioids:     Current Pain Medications:  1. acetaminophen (TYLENOL) 500 MG (Take 1,000 mg by mouth every 6 (six) hours as needed for Pain.)      Review of Systems:  Review of Systems   Constitutional: Negative for chills and fever.   HENT: Negative for nosebleeds.    Eyes: Negative for blurred vision and pain.   Respiratory: Negative for hemoptysis.    Cardiovascular: Negative for chest pain and palpitations.   Gastrointestinal: Negative for heartburn, nausea and vomiting.   Genitourinary: Negative for dysuria and hematuria.   Musculoskeletal: Positive for joint pain and neck pain. Negative for myalgias.   Skin: Negative for rash.   Neurological: Negative for seizures and loss  of consciousness.   Endo/Heme/Allergies: Does not bruise/bleed easily.   Psychiatric/Behavioral: Positive for memory loss (related to recent stroke). Negative for hallucinations. The patient has insomnia.        History:    Current Outpatient Medications:     acetaminophen (TYLENOL) 500 MG tablet, Take 1,000 mg by mouth every 6 (six) hours as needed for Pain., Disp: , Rfl:     alendronate (FOSAMAX) 70 MG tablet, 1 tab PO qwk in the am with a full glass of water on an empty stomach. Do not consume food or lie down for at least 30 minutes afterwards., Disp: 12 tablet, Rfl: 3    aspirin (ECOTRIN) 81 MG EC tablet, Take 81 mg by mouth once daily., Disp: , Rfl:     atorvastatin (LIPITOR) 40 MG tablet, TAKE 1 TABLET BY MOUTH EVERY DAY, Disp: 90 tablet, Rfl: 3    fluorouracil 5% 5 % Soln, Use hs for 2 weeks, Disp: 10 mL, Rfl: 1    magnesium oxide (MAG-OX) 400 mg (241.3 mg magnesium) tablet, Take 1 tablet (400 mg total) by mouth once daily., Disp: 30 tablet, Rfl: 3    omega-3 fatty acids-vitamin E (FISH OIL) 1,000 mg Cap, Take 1 tablet by mouth 2 (two) times daily. (Patient taking differently: Take 1 tablet by mouth once daily. ), Disp: 60 each, Rfl: 11    pantoprazole (PROTONIX) 40 MG tablet, Take 40 mg by mouth once daily. , Disp: , Rfl:     pantoprazole (PROTONIX) 40 MG tablet, TAKE 1 TABLET BY MOUTH TWICE DAILY, Disp: 180 tablet, Rfl: 0    riboflavin, vitamin B2, (VITAMIN B-2) 100 mg Tab tablet, Take 2 tablets (200 mg total) by mouth once daily. (Patient taking differently: Take 100 mg by mouth once daily. ), Disp: 60 tablet, Rfl: 3    sertraline (ZOLOFT) 50 MG tablet, TAKE 1 TABLET BY MOUTH EVERY DAY, Disp: 90 tablet, Rfl: 3    ALPRAZolam (XANAX) 0.25 MG tablet, Take 1 tablet (0.25 mg total) by mouth 3 (three) times daily as needed for Anxiety., Disp: 45 tablet, Rfl: 0    clopidogrel (PLAVIX) 75 mg tablet, TK 1 T PO QD (Patient not taking: Reported on 1/3/2020), Disp: 90 tablet, Rfl: 3    Past Medical  History:   Diagnosis Date    Stevens's esophagus with low grade dysplasia     BPH (benign prostatic hyperplasia)     CAD (coronary artery disease)     Cataract     Cervical spondylosis 1/3/2020    Diaphragmatic hernia     GERD (gastroesophageal reflux disease)     Heart attack     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    Hyperlipidemia     Hypertension     Ischemic cardiomyopathy 11/5/2014    MDD (major depressive disorder), recurrent episode, mild 2/17/2016    Osteoporosis 7/20/2016    Peripheral arterial disease 3/18/2016    Sarcoid     Squamous cell carcinoma 09/2016, 5/2016    crown of scalp, left wrist       Past Surgical History:   Procedure Laterality Date    CARDIAC SURGERY      CAROTID STENT N/A 10/14/2019    Procedure: INSERTION, STENT, ARTERY, CAROTID;  Surgeon: Artie Kebede MD;  Location: [facility] CATH LAB;  Service: Cardiology;  Laterality: N/A;    CATARACT EXTRACTION W/  INTRAOCULAR LENS IMPLANT Right 07/17/2018    Dr. Talamantes    CATARACT EXTRACTION W/  INTRAOCULAR LENS IMPLANT Left 07/31/2018    Dr. Talamantes    CORONARY ARTERY BYPASS GRAFT      x2  10/2014    ESOPHAGOGASTRODUODENOSCOPY N/A 4/8/2019    Procedure: ESOPHAGOGASTRODUODENOSCOPY (EGD)/poss rfa;  Surgeon: Clifford De La Torre MD;  Location: [facility] (2ND FLR);  Service: Endoscopy;  Laterality: N/A;    ESOPHAGOGASTRODUODENOSCOPY (EGD) WITH RADIOFREQUENCY TREATMENT OF GASTROESOPHAGEAL JUNCTION      INTRAOCULAR PROSTHESES INSERTION Right 7/17/2018    Procedure: INSERTION, INTRAOCULAR LENS PROSTHESIS;  Surgeon: León Talamantes MD;  [facility];  Service: Ophthalmology;  Laterality: Right;    INTRAOCULAR PROSTHESES INSERTION Left 7/31/2018    Procedure: INSERTION, INTRAOCULAR LENS PROSTHESIS;  Surgeon: León Talamantes MD;  [facility];  Service: Ophthalmology;  Laterality: Left;    PHACOEMULSIFICATION OF CATARACT Right 7/17/2018    Procedure: PHACOEMULSIFICATION, CATARACT;  Surgeon:  León Talamantes MD;  Location: [address];  Service: Ophthalmology;  Laterality: Right;    PHACOEMULSIFICATION OF CATARACT Left 2018    Procedure: PHACOEMULSIFICATION, CATARACT;  Surgeon: León Talamantes MD;  Location: [facility] OR [address] [place] [address];  Service: Ophthalmology;  Laterality: Left;    UMBILICAL HERNIA REPAIR         Family History   Problem Relation Age of Onset    Arrhythmia Mother     Heart disease Mother     Heart failure Brother     No Known Problems Daughter     No Known Problems Son     Colon cancer Neg Hx     Inflammatory bowel disease Neg Hx     Celiac disease Neg Hx     Melanoma Neg Hx     Amblyopia Neg Hx     Blindness Neg Hx     Cataracts Neg Hx     Glaucoma Neg Hx     Macular degeneration Neg Hx     Retinal detachment Neg Hx     Strabismus Neg Hx        Social History     Socioeconomic History    Marital status:      Spouse name: Not on file    Number of children: 4    Years of education: Not on file    Highest education level: Not on file   Occupational History    Occupation:     Social Needs    Financial resource strain: Not on file    Food insecurity:     Worry: Not on file     Inability: Not on file    Transportation needs:     Medical: Not on file     Non-medical: Not on file   Tobacco Use    Smoking status: Former Smoker     Packs/day: 2.00     Years: 20.00     Pack years: 40.00     Types: Cigarettes     Last attempt to quit: 1988     Years since quittin.8    Smokeless tobacco: Never Used   Substance and Sexual Activity    Alcohol use: No     Comment: quit drinking 2012    Drug use: No    Sexual activity: Not Currently     Partners: Female   Lifestyle    Physical activity:     Days per week: Not on file     Minutes per session: Not on file    Stress: Not on file   Relationships    Social connections:     Talks on phone: Not on file     Gets together: Not on file     Attends [employer] service: Not on file      Active member of club or organization: Not on file     Attends meetings of clubs or organizations: Not on file     Relationship status: Not on file   Other Topics Concern    Not on file   Social History Narrative    Not on file       Review of patient's allergies indicates:   Allergen Reactions    Morphine Shortness Of Breath       Physical Exam:  Vitals:    01/03/20 1121   Weight: 66.7 kg (147 lb)   PainSc:   8     General    Nursing note and vitals reviewed.  Constitutional: He is oriented to person, place, and time. He appears well-developed and well-nourished. No distress.   HENT:   Head: Normocephalic and atraumatic.   Nose: Nose normal.   Eyes: Conjunctivae and EOM are normal. Pupils are equal, round, and reactive to light. Right eye exhibits no discharge. Left eye exhibits no discharge. No scleral icterus.   Neck: No JVD present.   Cardiovascular: Intact distal pulses.    Pulmonary/Chest: Effort normal. No respiratory distress.   Abdominal: He exhibits no distension.   Neurological: He is alert and oriented to person, place, and time. Coordination normal.   Psychiatric: He has a normal mood and affect. His behavior is normal. Judgment and thought content normal.     General Musculoskeletal Exam   Gait: normal     Back (L-Spine & T-Spine) / Neck (C-Spine) Exam     Tenderness Posterior midline palpation reveals tenderness of the Upper C-Spine. Right paramedian tenderness of the Upper C-Spine and Occ. Left paramedian tenderness of the Upper C-Spine and Occ.     Back (L-Spine & T-Spine) Range of Motion   Extension: abnormal   Flexion: abnormal     Neck (C-Spine) Range of Motion   Flexion:     Limited  Extension: Limited  Right Lateral Bend: abnormal  Left Lateral Bend: abnormal  Right Rotation: abnormal  Left Rotation: abnormal    Spinal Sensation   Right Side Sensation  C-Spine Level: normal   Left Side Sensation  C-Spine Level: normal    Neck (C-Spine) Tests   Spurling's Test   Left:  Negative  Right:  negative    Other He has no scoliosis .      Muscle Strength   Right Upper Extremity   Biceps: 5/5/5   Deltoid:  5/5  Triceps:  5/5  Wrist extension: 5/5/5   Wrist flexion: 5/5/5   Finger Flexors:  5/5  Finger Extensors:  5/5  Left Upper Extremity  Biceps: 5/5/5   Deltoid:  5/5  Triceps:  5/5  Wrist extension: 5/5/5   Wrist flexion: 5/5/5   Finger Flexors:  5/5  Finger Extensors:  5/5  Right Lower Extremity   Hip Flexion: 5/5   Hip Extensors: 5/5  Quadriceps:  5/5   Hamstrin/5   Gastrocsoleus:  5/5/5  Left Lower Extremity   Hip Flexion: 5/5   Hip Extensors: 5/5  Quadriceps:  5/5   Hamstrin/5   Gastrocsoleus:  5/5/5    Reflexes     Left Side  Biceps:  2+    Right Side   Biceps:  2+      Imaging:  EXAMINATION:  XR CERVICAL SPINE COMPLETE 5 VIEW--18    CLINICAL HISTORY:  . Cervicalgia    TECHNIQUE:  AP, Lateral, bilateral oblique and open mouth views of the cervical spine were performed.    COMPARISON:  None    FINDINGS:  DJD.  The disc spaces are significantly narrowed between C4 and C6 vertebral segments.  There is also evidence of encroachment of the neural foramina at the same levels bilaterally.  No fracture or dislocation.  No bone destruction identified       Labs:  BMP  Lab Results   Component Value Date     10/15/2019    K 4.0 10/15/2019     10/15/2019    CO2 26 10/15/2019    BUN 17 10/15/2019    CREATININE 1.3 10/15/2019    CALCIUM 8.6 (L) 10/15/2019    ANIONGAP 7 (L) 10/15/2019    ESTGFRAFRICA 59.6 (A) 10/15/2019    EGFRNONAA 51.5 (A) 10/15/2019     Lab Results   Component Value Date    ALT 13 2019    AST 18 2019    ALKPHOS 71 2019    BILITOT 1.5 (H) 2019       Assessment:  Problem List Items Addressed This Visit     Cervicogenic headache    Cervical spondylosis    DDD (degenerative disc disease), cervical    Chronic neck pain - Primary          1/3/20 - Paul Browning is a 79 y.o. male with chronic neck pain and cervicogenic headaches secondary  to cervical facet spondylosis picture physical exam strongly supports this diagnosis as does a recent x-ray of the cervical spine.  Patient's and dull evidence, as stated in the HPI above, also supports this diagnosis.  I am comfortable moving forward with a bilateral medial branch block targeting C2-3 as well as a 3rd occipital nerve block.  Should this intervention provide significant relief for the expected time course of a diagnostic block, it will be followed with a radiofrequency ablation for semi permanent relief.  The patient, and his son who is with him today, were educated on the risks and benefits of this procedure will sequence and would like to proceed.    : Not applicable    Treatment Plan:   Procedures: s/f bilateral C2-3 MBB + TON Block.  Plan for bilateral Cooled RFA to minimize travel burden.  PT/OT/HEP: Patient would be a good candidate for  PT as he cannot drive.  I will setup this up once the interventional sequence is done.  Medications: No changes recommended at this time.  Labs: reviewed and medications are appropriately dosed for current hepatorenal function.  Imaging: No additional recommended at this time.    Follow Up: RTC 2-3 weeks    Tip Mera Jr, MD  Interventional Pain Medicine / Anesthesiology    Disclaimer: This note was partly generated using dictation software which may occasionally result in transcription errors.

## 2020-01-03 NOTE — LETTER
January 3, 2020      Grey Forbes, DO  [address] [facility] [place] [address]           [facility] - Pain Management  [address] [facility]LO[address]  [facility] [address]  Phone: [phone]  Fax: [phone]          Patient: Paul Browning   MR Number: [id number]   YOB: 1940   Date of Visit: 1/3/2020       Dear Dr. Grey Forbes:    Thank you for referring Paul Browning to me for evaluation. Attached you will find relevant portions of my assessment and plan of care.    If you have questions, please do not hesitate to call me. I look forward to following Paul Browning along with you.    Sincerely,    Tip Mera Jr., MD    Enclosure  CC:  No Recipients    If you would like to receive this communication electronically, please contact [email or web address] or [phone] to request more information on [facility] access.    For providers and/or their staff who would like to refer a patient to [facility], please contact us through our one-stop-shop provider referral line, [facility], at [phone].    If you feel you have received this communication in error or would no longer like to receive these types of communications, please e-mail [email or web address]

## 2020-01-03 NOTE — H&P (VIEW-ONLY)
[facility] New Patient Evaluation    Referred by: Grey Forbes DO   Reason for referral:  Cervical spine arthritis      Cervicogenic headache       CC:   Chief Complaint   Patient presents with    Neck Pain    Headache     Last 3 PDI Scores 1/3/2020   Pain Disability Index (PDI) 48       HPI:   Paul Browning is a 79 y.o. male who complains of chronic, bilateral upper neck pain which radiates into the back of his head producing severe headaches.  This all started approximately 1 year ago and has been progressive.  Patient supplies and dull evidence including a reduction in pain when he is looking down while raking his backyard in addition to severely increased pain when he is watching TV at home which requires him to extend his neck and look slightly upward.    Location: neck   Onset: 1+ years  Current Pain Score: 8/10  Daily Pain of Range: 8-8/10  Quality: Aching  Radiation: back of head  Worsened by: nothing in particular  Improved by: medications- Extra Strength Tylenol    Previous Therapies:  PT/OT: Denies  HEP: Denies  Interventions: Denies  Surgery: Denies neck surgery.  Endorses recent eye surgeries.  Medications:   - NSAIDS:   - MSK Relaxants:   - TCAs:   - SNRIs:   - Topicals:   - Anticonvulsants:  - Opioids:     Current Pain Medications:  1. acetaminophen (TYLENOL) 500 MG (Take 1,000 mg by mouth every 6 (six) hours as needed for Pain.)      Review of Systems:  Review of Systems   Constitutional: Negative for chills and fever.   HENT: Negative for nosebleeds.    Eyes: Negative for blurred vision and pain.   Respiratory: Negative for hemoptysis.    Cardiovascular: Negative for chest pain and palpitations.   Gastrointestinal: Negative for heartburn, nausea and vomiting.   Genitourinary: Negative for dysuria and hematuria.   Musculoskeletal: Positive for joint pain and neck pain. Negative for myalgias.   Skin: Negative for rash.   Neurological: Negative for seizures and loss  of consciousness.   Endo/Heme/Allergies: Does not bruise/bleed easily.   Psychiatric/Behavioral: Positive for memory loss (related to recent stroke). Negative for hallucinations. The patient has insomnia.        History:    Current Outpatient Medications:     acetaminophen (TYLENOL) 500 MG tablet, Take 1,000 mg by mouth every 6 (six) hours as needed for Pain., Disp: , Rfl:     alendronate (FOSAMAX) 70 MG tablet, 1 tab PO qwk in the am with a full glass of water on an empty stomach. Do not consume food or lie down for at least 30 minutes afterwards., Disp: 12 tablet, Rfl: 3    aspirin (ECOTRIN) 81 MG EC tablet, Take 81 mg by mouth once daily., Disp: , Rfl:     atorvastatin (LIPITOR) 40 MG tablet, TAKE 1 TABLET BY MOUTH EVERY DAY, Disp: 90 tablet, Rfl: 3    fluorouracil 5% 5 % Soln, Use hs for 2 weeks, Disp: 10 mL, Rfl: 1    magnesium oxide (MAG-OX) 400 mg (241.3 mg magnesium) tablet, Take 1 tablet (400 mg total) by mouth once daily., Disp: 30 tablet, Rfl: 3    omega-3 fatty acids-vitamin E (FISH OIL) 1,000 mg Cap, Take 1 tablet by mouth 2 (two) times daily. (Patient taking differently: Take 1 tablet by mouth once daily. ), Disp: 60 each, Rfl: 11    pantoprazole (PROTONIX) 40 MG tablet, Take 40 mg by mouth once daily. , Disp: , Rfl:     pantoprazole (PROTONIX) 40 MG tablet, TAKE 1 TABLET BY MOUTH TWICE DAILY, Disp: 180 tablet, Rfl: 0    riboflavin, vitamin B2, (VITAMIN B-2) 100 mg Tab tablet, Take 2 tablets (200 mg total) by mouth once daily. (Patient taking differently: Take 100 mg by mouth once daily. ), Disp: 60 tablet, Rfl: 3    sertraline (ZOLOFT) 50 MG tablet, TAKE 1 TABLET BY MOUTH EVERY DAY, Disp: 90 tablet, Rfl: 3    ALPRAZolam (XANAX) 0.25 MG tablet, Take 1 tablet (0.25 mg total) by mouth 3 (three) times daily as needed for Anxiety., Disp: 45 tablet, Rfl: 0    clopidogrel (PLAVIX) 75 mg tablet, TK 1 T PO QD (Patient not taking: Reported on 1/3/2020), Disp: 90 tablet, Rfl: 3    Past Medical  History:   Diagnosis Date    Stevens's esophagus with low grade dysplasia     BPH (benign prostatic hyperplasia)     CAD (coronary artery disease)     Cataract     Cervical spondylosis 1/3/2020    Diaphragmatic hernia     GERD (gastroesophageal reflux disease)     Heart attack     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    Hyperlipidemia     Hypertension     Ischemic cardiomyopathy 11/5/2014    MDD (major depressive disorder), recurrent episode, mild 2/17/2016    Osteoporosis 7/20/2016    Peripheral arterial disease 3/18/2016    Sarcoid     Squamous cell carcinoma 09/2016, 5/2016    crown of scalp, left wrist       Past Surgical History:   Procedure Laterality Date    CARDIAC SURGERY      CAROTID STENT N/A 10/14/2019    Procedure: INSERTION, STENT, ARTERY, CAROTID;  Surgeon: Artie Kebede MD;  Location: [facility] CATH LAB;  Service: Cardiology;  Laterality: N/A;    CATARACT EXTRACTION W/  INTRAOCULAR LENS IMPLANT Right 07/17/2018    Dr. Talamantes    CATARACT EXTRACTION W/  INTRAOCULAR LENS IMPLANT Left 07/31/2018    Dr. Talamantes    CORONARY ARTERY BYPASS GRAFT      x2  10/2014    ESOPHAGOGASTRODUODENOSCOPY N/A 4/8/2019    Procedure: ESOPHAGOGASTRODUODENOSCOPY (EGD)/poss rfa;  Surgeon: Clifford De La Torre MD;  Location: [facility] (2ND FLR);  Service: Endoscopy;  Laterality: N/A;    ESOPHAGOGASTRODUODENOSCOPY (EGD) WITH RADIOFREQUENCY TREATMENT OF GASTROESOPHAGEAL JUNCTION      INTRAOCULAR PROSTHESES INSERTION Right 7/17/2018    Procedure: INSERTION, INTRAOCULAR LENS PROSTHESIS;  Surgeon: León Talamantes MD;  [facility];  Service: Ophthalmology;  Laterality: Right;    INTRAOCULAR PROSTHESES INSERTION Left 7/31/2018    Procedure: INSERTION, INTRAOCULAR LENS PROSTHESIS;  Surgeon: León Talamantes MD;  [facility];  Service: Ophthalmology;  Laterality: Left;    PHACOEMULSIFICATION OF CATARACT Right 7/17/2018    Procedure: PHACOEMULSIFICATION, CATARACT;  Surgeon:  León Talamantes MD;  Location: [address];  Service: Ophthalmology;  Laterality: Right;    PHACOEMULSIFICATION OF CATARACT Left 2018    Procedure: PHACOEMULSIFICATION, CATARACT;  Surgeon: León Talamantes MD;  Location: [facility] OR [address] [place] [address];  Service: Ophthalmology;  Laterality: Left;    UMBILICAL HERNIA REPAIR         Family History   Problem Relation Age of Onset    Arrhythmia Mother     Heart disease Mother     Heart failure Brother     No Known Problems Daughter     No Known Problems Son     Colon cancer Neg Hx     Inflammatory bowel disease Neg Hx     Celiac disease Neg Hx     Melanoma Neg Hx     Amblyopia Neg Hx     Blindness Neg Hx     Cataracts Neg Hx     Glaucoma Neg Hx     Macular degeneration Neg Hx     Retinal detachment Neg Hx     Strabismus Neg Hx        Social History     Socioeconomic History    Marital status:      Spouse name: Not on file    Number of children: 4    Years of education: Not on file    Highest education level: Not on file   Occupational History    Occupation:     Social Needs    Financial resource strain: Not on file    Food insecurity:     Worry: Not on file     Inability: Not on file    Transportation needs:     Medical: Not on file     Non-medical: Not on file   Tobacco Use    Smoking status: Former Smoker     Packs/day: 2.00     Years: 20.00     Pack years: 40.00     Types: Cigarettes     Last attempt to quit: 1988     Years since quittin.8    Smokeless tobacco: Never Used   Substance and Sexual Activity    Alcohol use: No     Comment: quit drinking 2012    Drug use: No    Sexual activity: Not Currently     Partners: Female   Lifestyle    Physical activity:     Days per week: Not on file     Minutes per session: Not on file    Stress: Not on file   Relationships    Social connections:     Talks on phone: Not on file     Gets together: Not on file     Attends Quaker service: Not on file      Active member of club or organization: Not on file     Attends meetings of clubs or organizations: Not on file     Relationship status: Not on file   Other Topics Concern    Not on file   Social History Narrative    Not on file       Review of patient's allergies indicates:   Allergen Reactions    Morphine Shortness Of Breath       Physical Exam:  Vitals:    01/03/20 1121   Weight: 66.7 kg (147 lb)   PainSc:   8     General    Nursing note and vitals reviewed.  Constitutional: He is oriented to person, place, and time. He appears well-developed and well-nourished. No distress.   HENT:   Head: Normocephalic and atraumatic.   Nose: Nose normal.   Eyes: Conjunctivae and EOM are normal. Pupils are equal, round, and reactive to light. Right eye exhibits no discharge. Left eye exhibits no discharge. No scleral icterus.   Neck: No JVD present.   Cardiovascular: Intact distal pulses.    Pulmonary/Chest: Effort normal. No respiratory distress.   Abdominal: He exhibits no distension.   Neurological: He is alert and oriented to person, place, and time. Coordination normal.   Psychiatric: He has a normal mood and affect. His behavior is normal. Judgment and thought content normal.     General Musculoskeletal Exam   Gait: normal     Back (L-Spine & T-Spine) / Neck (C-Spine) Exam     Tenderness Posterior midline palpation reveals tenderness of the Upper C-Spine. Right paramedian tenderness of the Upper C-Spine and Occ. Left paramedian tenderness of the Upper C-Spine and Occ.     Back (L-Spine & T-Spine) Range of Motion   Extension: abnormal   Flexion: abnormal     Neck (C-Spine) Range of Motion   Flexion:     Limited  Extension: Limited  Right Lateral Bend: abnormal  Left Lateral Bend: abnormal  Right Rotation: abnormal  Left Rotation: abnormal    Spinal Sensation   Right Side Sensation  C-Spine Level: normal   Left Side Sensation  C-Spine Level: normal    Neck (C-Spine) Tests   Spurling's Test   Left:  Negative  Right:  negative    Other He has no scoliosis .      Muscle Strength   Right Upper Extremity   Biceps: 5/5/5   Deltoid:  5/5  Triceps:  5/5  Wrist extension: 5/5/5   Wrist flexion: 5/5/5   Finger Flexors:  5/5  Finger Extensors:  5/5  Left Upper Extremity  Biceps: 5/5/5   Deltoid:  5/5  Triceps:  5/5  Wrist extension: 5/5/5   Wrist flexion: 5/5/5   Finger Flexors:  5/5  Finger Extensors:  5/5  Right Lower Extremity   Hip Flexion: 5/5   Hip Extensors: 5/5  Quadriceps:  5/5   Hamstrin/5   Gastrocsoleus:  5/5/5  Left Lower Extremity   Hip Flexion: 5/5   Hip Extensors: 5/5  Quadriceps:  5/5   Hamstrin/5   Gastrocsoleus:  5/5/5    Reflexes     Left Side  Biceps:  2+    Right Side   Biceps:  2+      Imaging:  EXAMINATION:  XR CERVICAL SPINE COMPLETE 5 VIEW--18    CLINICAL HISTORY:  . Cervicalgia    TECHNIQUE:  AP, Lateral, bilateral oblique and open mouth views of the cervical spine were performed.    COMPARISON:  None    FINDINGS:  DJD.  The disc spaces are significantly narrowed between C4 and C6 vertebral segments.  There is also evidence of encroachment of the neural foramina at the same levels bilaterally.  No fracture or dislocation.  No bone destruction identified       Labs:  BMP  Lab Results   Component Value Date     10/15/2019    K 4.0 10/15/2019     10/15/2019    CO2 26 10/15/2019    BUN 17 10/15/2019    CREATININE 1.3 10/15/2019    CALCIUM 8.6 (L) 10/15/2019    ANIONGAP 7 (L) 10/15/2019    ESTGFRAFRICA 59.6 (A) 10/15/2019    EGFRNONAA 51.5 (A) 10/15/2019     Lab Results   Component Value Date    ALT 13 2019    AST 18 2019    ALKPHOS 71 2019    BILITOT 1.5 (H) 2019       Assessment:  Problem List Items Addressed This Visit     Cervicogenic headache    Cervical spondylosis    DDD (degenerative disc disease), cervical    Chronic neck pain - Primary          1/3/20 - Paul Browning is a 79 y.o. male with chronic neck pain and cervicogenic headaches secondary  to cervical facet spondylosis picture physical exam strongly supports this diagnosis as does a recent x-ray of the cervical spine.  Patient's and dull evidence, as stated in the HPI above, also supports this diagnosis.  I am comfortable moving forward with a bilateral medial branch block targeting C2-3 as well as a 3rd occipital nerve block.  Should this intervention provide significant relief for the expected time course of a diagnostic block, it will be followed with a radiofrequency ablation for semi permanent relief.  The patient, and his son who is with him today, were educated on the risks and benefits of this procedure will sequence and would like to proceed.    : Not applicable    Treatment Plan:   Procedures: s/f bilateral C2-3 MBB + TON Block.  Plan for bilateral Cooled RFA to minimize travel burden.  PT/OT/HEP: Patient would be a good candidate for  PT as he cannot drive.  I will setup this up once the interventional sequence is done.  Medications: No changes recommended at this time.  Labs: reviewed and medications are appropriately dosed for current hepatorenal function.  Imaging: No additional recommended at this time.    Follow Up: RTC 2-3 weeks    Tip Mera Jr, MD  Interventional Pain Medicine / Anesthesiology    Disclaimer: This note was partly generated using dictation software which may occasionally result in transcription errors.

## 2020-01-06 ENCOUNTER — TELEPHONE (OUTPATIENT)
Dept: ENDOSCOPY | Facility: HOSPITAL | Age: 80
End: 2020-01-06

## 2020-01-06 NOTE — TELEPHONE ENCOUNTER
Spoke with patient's son in regards to scheduling EGD. He is having issues with bad headaches and is under treatment for that. He is going to hold off on EGD for now.

## 2020-01-06 NOTE — DISCHARGE INSTRUCTIONS
Home Care Instructions Pain Management:    1.  DIET:    You may resume your normal diet today.    2.  BATHING:    You may shower with luke warm water.    3.  DRESSING:    You may remove your bandage today.    4.  ACTIVITY LEVEL:      You may resume your normal activities 24 hours after your procedure.    5.  MEDICATIONS:    You may resume your normal medications today.    6.  SPECIAL INSTRUCTIONS:    No heat to the injection site for 24 hours including bath or shower, heating pad, moist heat or hot tubs.    Use an ice pack to the injection site for any pain or discomfort.  Apply ice packs for 20 minute intervals as needed.    If you have received any sedatives by mouth today, you can not drive for 12 hours.    If you have received sedation through an IV, you can not drive for 24 hours.    PLEASE CALL YOUR DOCTOR FOR THE FOLLOWIN.  Redness or swelling around the injection site.  2.  Fever of 101 degrees.  3.  Drainage (pus) from the injection site.  4.  For any continuous bleeding (some dried blood over the incision is normal.)    FOR EMERGENCIES:    If any unusual problems or difficulties occur during clinic hours, call [phone] or dial [phone].    Follow up with with your physician in 2-3 weeks.

## 2020-01-09 ENCOUNTER — HOSPITAL ENCOUNTER (OUTPATIENT)
Facility: HOSPITAL | Age: 80
Discharge: HOME | End: 2020-01-09
Attending: PAIN MEDICINE | Admitting: PAIN MEDICINE
Payer: MEDICARE

## 2020-01-09 VITALS
OXYGEN SATURATION: 100 % | HEIGHT: 67 IN | RESPIRATION RATE: 16 BRPM | BODY MASS INDEX: 22.6 KG/M2 | DIASTOLIC BLOOD PRESSURE: 76 MMHG | HEART RATE: 68 BPM | TEMPERATURE: 98 F | WEIGHT: 144 LBS | SYSTOLIC BLOOD PRESSURE: 169 MMHG

## 2020-01-09 DIAGNOSIS — G89.29 CHRONIC PAIN: ICD-10-CM

## 2020-01-09 DIAGNOSIS — M47.812 CERVICAL SPONDYLOSIS: Primary | ICD-10-CM

## 2020-01-09 PROCEDURE — 64490 PR INJ DX/THER AGNT PARAVERT FACET JOINT,IMG GUIDE,CERV/THORAC, 1ST LEVEL: ICD-10-PCS | Mod: 50,HCNC,, | Performed by: PAIN MEDICINE

## 2020-01-09 PROCEDURE — 64491 INJ PARAVERT F JNT C/T 2 LEV: CPT | Mod: 50,HCNC,, | Performed by: PAIN MEDICINE

## 2020-01-09 PROCEDURE — 64490 INJ PARAVERT F JNT C/T 1 LEV: CPT | Mod: 50,HCNC | Performed by: PAIN MEDICINE

## 2020-01-09 PROCEDURE — 64491 PR INJ DX/THER AGNT PARAVERT FACET JOINT,IMG GUIDE,CERV/THORAC, 2ND LEVEL: ICD-10-PCS | Mod: 50,HCNC,, | Performed by: PAIN MEDICINE

## 2020-01-09 PROCEDURE — 64490 INJ PARAVERT F JNT C/T 1 LEV: CPT | Mod: 50,HCNC,, | Performed by: PAIN MEDICINE

## 2020-01-09 PROCEDURE — 64450 NJX AA&/STRD OTHER PN/BRANCH: CPT | Mod: 50,HCNC | Performed by: PAIN MEDICINE

## 2020-01-09 PROCEDURE — 25000003 PHARM REV CODE 250: Mod: HCNC | Performed by: PAIN MEDICINE

## 2020-01-09 PROCEDURE — 25500020 PHARM REV CODE 255: Mod: HCNC | Performed by: PAIN MEDICINE

## 2020-01-09 RX ORDER — LIDOCAINE HYDROCHLORIDE 10 MG/ML
INJECTION INFILTRATION; PERINEURAL
Status: DISCONTINUED | OUTPATIENT
Start: 2020-01-09 | End: 2020-01-09 | Stop reason: HOSPADM

## 2020-01-09 RX ORDER — SODIUM BICARBONATE 1 MEQ/ML
SYRINGE (ML) INTRAVENOUS
Status: DISCONTINUED | OUTPATIENT
Start: 2020-01-09 | End: 2020-01-09 | Stop reason: HOSPADM

## 2020-01-09 RX ORDER — BUPIVACAINE HYDROCHLORIDE 2.5 MG/ML
INJECTION, SOLUTION EPIDURAL; INFILTRATION; INTRACAUDAL
Status: DISCONTINUED | OUTPATIENT
Start: 2020-01-09 | End: 2020-01-09 | Stop reason: HOSPADM

## 2020-01-09 NOTE — PROGRESS NOTES
Discharge instructions reviewed. Pt voiced good understanding. To car per staff via w/c to family. Pt stable, no distress

## 2020-01-09 NOTE — DISCHARGE SUMMARY
[facility]  Discharge Note  Short Stay     Admit Date: 1/9/2020    Discharge Date: 1/9/2020     Attending Physician: Tip Mera Jr, MD    Diagnoses:  Active Hospital Problems    Diagnosis  POA    Chronic pain [G89.29]  Yes      Resolved Hospital Problems   No resolved problems to display.     Discharged Condition: Good     Hospital Course: Patient was admitted for an outpatient interventional pain management procedure and tolerated the procedure well with no complications.     Final Diagnoses: Same as principal problem.     Disposition: Home or Self Care     Follow up/Patient Instructions:    Follow-up Information     Tip Mera Jr, MD. Go in 2 weeks.    Specialty:  Pain Medicine  Why:  Post-procedural Follow Up As Scheduled, To Report Your Pain Scores from the Pain Diary  Contact information:  [address]  [address]  [place] [address] [phone]                   Reconciled Medications:     Medication List      CHANGE how you take these medications    omega-3 fatty acids-vitamin E 1,000 mg Cap  Commonly known as:  Fish Oil  Take 1 tablet by mouth 2 (two) times daily.  What changed:  when to take this     riboflavin (vitamin B2) 100 mg Tab tablet  Commonly known as:  VITAMIN B-2  Take 2 tablets (200 mg total) by mouth once daily.  What changed:  how much to take        CONTINUE taking these medications    acetaminophen 500 MG tablet  Commonly known as:  TYLENOL  Take 1,000 mg by mouth every 6 (six) hours as needed for Pain.     alendronate 70 MG tablet  Commonly known as:  FOSAMAX  1 tab PO qwk in the am with a full glass of water on an empty stomach. Do not consume food or lie down for at least 30 minutes afterwards.     ALPRAZolam 0.25 MG tablet  Commonly known as:  XANAX  Take 1 tablet (0.25 mg total) by mouth 3 (three) times daily as needed for Anxiety.     aspirin 81 MG EC tablet  Commonly known as:  ECOTRIN  Take 81 mg by mouth once daily.     atorvastatin 40 MG  tablet  Commonly known as:  LIPITOR  TAKE 1 TABLET BY MOUTH EVERY DAY     clopidogrel 75 mg tablet  Commonly known as:  PLAVIX  TK 1 T PO QD     fluorouracil 5% 5 % Soln  Use hs for 2 weeks     magnesium oxide 400 mg (241.3 mg magnesium) tablet  Commonly known as:  MAG-OX  Take 1 tablet (400 mg total) by mouth once daily.     * pantoprazole 40 MG tablet  Commonly known as:  PROTONIX  Take 40 mg by mouth once daily.     * pantoprazole 40 MG tablet  Commonly known as:  PROTONIX  TAKE 1 TABLET BY MOUTH TWICE DAILY     sertraline 50 MG tablet  Commonly known as:  ZOLOFT  TAKE 1 TABLET BY MOUTH EVERY DAY         * This list has 2 medication(s) that are the same as other medications prescribed for you. Read the directions carefully, and ask your doctor or other care provider to review them with you.               Discharge Procedure Orders (must include Diet, Follow-up, Activity)   Call MD for:  temperature >100.4     Call MD for:  severe uncontrolled pain     Call MD for:  redness, tenderness, or signs of infection (pain, swelling, redness, odor or green/yellow discharge around incision site)     Call MD for:  difficulty breathing or increased cough     Call MD for:  severe persistent headache     Call MD for:  worsening rash     Remove dressing in 24 hours       Tip Mera Jr, MD  Interventional Pain Medicine / Anesthesiology

## 2020-01-13 ENCOUNTER — PATIENT MESSAGE (OUTPATIENT)
Dept: DERMATOLOGY | Facility: CLINIC | Age: 80
End: 2020-01-13

## 2020-01-14 ENCOUNTER — INITIAL CONSULT (OUTPATIENT)
Dept: DERMATOLOGY | Facility: CLINIC | Age: 80
End: 2020-01-14
Payer: MEDICARE

## 2020-01-14 VITALS — BODY MASS INDEX: 22.55 KG/M2 | WEIGHT: 144 LBS

## 2020-01-14 DIAGNOSIS — Z85.828 HISTORY OF SKIN CANCER: ICD-10-CM

## 2020-01-14 DIAGNOSIS — L82.1 SEBORRHEIC KERATOSES: Primary | ICD-10-CM

## 2020-01-14 DIAGNOSIS — L81.4 LENTIGINES: ICD-10-CM

## 2020-01-14 PROCEDURE — 99999 PR PBB SHADOW E&M-EST. PATIENT-LVL II: CPT | Mod: PBBFAC,HCNC,, | Performed by: DERMATOLOGY

## 2020-01-14 PROCEDURE — 1159F MED LIST DOCD IN RCRD: CPT | Mod: HCNC,S$GLB,, | Performed by: DERMATOLOGY

## 2020-01-14 PROCEDURE — 1126F PR PAIN SEVERITY QUANTIFIED, NO PAIN PRESENT: ICD-10-PCS | Mod: HCNC,S$GLB,, | Performed by: DERMATOLOGY

## 2020-01-14 PROCEDURE — 1101F PR PT FALLS ASSESS DOC 0-1 FALLS W/OUT INJ PAST YR: ICD-10-PCS | Mod: HCNC,CPTII,S$GLB, | Performed by: DERMATOLOGY

## 2020-01-14 PROCEDURE — 1101F PT FALLS ASSESS-DOCD LE1/YR: CPT | Mod: HCNC,CPTII,S$GLB, | Performed by: DERMATOLOGY

## 2020-01-14 PROCEDURE — 1126F AMNT PAIN NOTED NONE PRSNT: CPT | Mod: HCNC,S$GLB,, | Performed by: DERMATOLOGY

## 2020-01-14 PROCEDURE — 99213 OFFICE O/P EST LOW 20 MIN: CPT | Mod: HCNC,S$GLB,, | Performed by: DERMATOLOGY

## 2020-01-14 PROCEDURE — 1159F PR MEDICATION LIST DOCUMENTED IN MEDICAL RECORD: ICD-10-PCS | Mod: HCNC,S$GLB,, | Performed by: DERMATOLOGY

## 2020-01-14 PROCEDURE — 99999 PR PBB SHADOW E&M-EST. PATIENT-LVL II: ICD-10-PCS | Mod: PBBFAC,HCNC,, | Performed by: DERMATOLOGY

## 2020-01-14 PROCEDURE — 99213 PR OFFICE/OUTPT VISIT, EST, LEVL III, 20-29 MIN: ICD-10-PCS | Mod: HCNC,S$GLB,, | Performed by: DERMATOLOGY

## 2020-01-14 NOTE — PROGRESS NOTES
Subjective:       Patient ID:  Paul Browning is a 79 y.o. male who presents for   Chief Complaint   Patient presents with    Spot     scalp     Spots on scalp which bother him when he hassan his hair no tx.   This is a high risk patient here to check for the development of new lesions.      Spot  - Initial  Affected locations: scalp  Signs / symptoms: pain  Aggravated by: nothing  Relieving factors/Treatments tried: nothing        Review of Systems   Constitutional: Negative for fever, chills, weight loss, weight gain, fatigue, night sweats and malaise.   Skin: Negative for daily sunscreen use, activity-related sunscreen use and wears hat.   Hematologic/Lymphatic: Bruises/bleeds easily.        Objective:    Physical Exam   Constitutional: He appears well-developed and well-nourished. No distress.   Neurological: He is alert and oriented to person, place, and time. He is not disoriented.   Psychiatric: He has a normal mood and affect.   Skin:   Areas Examined (abnormalities noted in diagram):   Scalp / Hair Palpated and Inspected  Head / Face Inspection Performed  Neck Inspection Performed  Chest / Axilla Inspection Performed  Back Inspection Performed  RUE Inspected  LUE Inspection Performed                   Diagram Legend     Erythematous scaling macule/papule c/w actinic keratosis       Vascular papule c/w angioma      Pigmented verrucoid papule/plaque c/w seborrheic keratosis      Yellow umbilicated papule c/w sebaceous hyperplasia      Irregularly shaped tan macule c/w lentigo     1-2 mm smooth white papules consistent with Milia      Movable subcutaneous cyst with punctum c/w epidermal inclusion cyst      Subcutaneous movable cyst c/w pilar cyst      Firm pink to brown papule c/w dermatofibroma      Pedunculated fleshy papule(s) c/w skin tag(s)      Evenly pigmented macule c/w junctional nevus     Mildly variegated pigmented, slightly irregular-bordered macule c/w mildly atypical nevus       "Flesh colored to evenly pigmented papule c/w intradermal nevus       Pink pearly papule/plaque c/w basal cell carcinoma      Erythematous hyperkeratotic cursted plaque c/w SCC      Surgical scar with no sign of skin cancer recurrence      Open and closed comedones      Inflammatory papules and pustules      Verrucoid papule consistent consistent with wart     Erythematous eczematous patches and plaques     Dystrophic onycholytic nail with subungual debris c/w onychomycosis     Umbilicated papule    Erythematous-base heme-crusted tan verrucoid plaque consistent with inflamed seborrheic keratosis     Erythematous Silvery Scaling Plaque c/w Psoriasis     See annotation      Assessment / Plan:        Seborrheic keratoses  reassurance  Brochure provided      History of skin cancer  Comments:  scc scalp and left wrist no recurrences    Lentigines  The "ABCD" rules to observe pigmented lesions were reviewed.             Follow up in about 6 months (around 7/14/2020).  "

## 2020-01-14 NOTE — LETTER
January 14, 2020      Grey Forbes DO  [address] [facility]  [place] [address]           [place] [facility]  [address] [facility]  [place] [address]  Phone: [phone]  Fax: [phone]          Patient: Paul Browning   MR Number: [id number]   YOB: 1940   Date of Visit: 1/14/2020       Dear Dr. Grey Forbes:    Thank you for referring Paul Browning to me for evaluation. Attached you will find relevant portions of my assessment and plan of care.    If you have questions, please do not hesitate to call me. I look forward to following Paul Browning along with you.    Sincerely,    Didi Carlos MD    Enclosure  CC:  No Recipients    If you would like to receive this communication electronically, please contact [email or web address] or [phone] to request more information on [employer] access.    For providers and/or their staff who would like to refer a patient to [facility], please contact us through our one-stop-shop provider referral line, [facility], at [phone].    If you feel you have received this communication in error or would no longer like to receive these types of communications, please e-mail [email or web address]

## 2020-01-16 NOTE — PROGRESS NOTES
[facility] Established Patient Evaluation    Referred by: Grey Forbes DO   Reason for referral:  Cervical spine arthritis      Cervicogenic headache       CC:   Chief Complaint   Patient presents with    Neck Pain     Last 3 PDI Scores 1/17/2020 1/3/2020   Pain Disability Index (PDI) 24 48     Interval Update:  01/17/20- Mr. Browning returns to clinic for follow up visit reporting improved neck pain and reduced headaches for approximately 24 hrs following the Bilateral Cervical MBB on 1/9/20.  He endorses 80% relief.  Pain intensity is currently 4/10 today.  During the block he was outgoing and slept much better than usual.    Background:   Paul Browning is a 79 y.o. male who complains of chronic, bilateral upper neck pain which radiates into the back of his head producing severe headaches.  This all started approximately 1 year ago and has been progressive.  Patient supplies and dull evidence including a reduction in pain when he is looking down while raking his backyard in addition to severely increased pain when he is watching TV at home which requires him to extend his neck and look slightly upward.    Location: neck   Onset: 1+ years  Current Pain Score: 8/10  Daily Pain of Range: 8-8/10  Quality: Aching  Radiation: back of head  Worsened by: nothing in particular  Improved by: medications- Extra Strength Tylenol    Previous Therapies:  PT/OT: Denies  HEP: Denies  Interventions: Denies  Surgery: Denies neck surgery.  Endorses recent eye surgeries.  Medications:   - NSAIDS:   - MSK Relaxants:   - TCAs:   - SNRIs:   - Topicals:   - Anticonvulsants:  - Opioids:     Current Pain Medications:  1. acetaminophen (TYLENOL) 500 MG (Take 1,000 mg by mouth every 6 (six) hours as needed for Pain.)      Review of Systems:  Review of Systems   Constitutional: Negative for chills and fever.   HENT: Negative for nosebleeds.    Eyes: Negative for blurred vision and pain.   Respiratory:  Negative for hemoptysis.    Cardiovascular: Negative for chest pain and palpitations.   Gastrointestinal: Negative for heartburn, nausea and vomiting.   Genitourinary: Negative for dysuria and hematuria.   Musculoskeletal: Positive for joint pain and neck pain. Negative for myalgias.   Skin: Negative for rash.   Neurological: Negative for seizures and loss of consciousness.   Endo/Heme/Allergies: Does not bruise/bleed easily.   Psychiatric/Behavioral: Positive for memory loss (related to recent stroke). Negative for hallucinations. The patient has insomnia.        History:    Current Outpatient Medications:     acetaminophen (TYLENOL) 500 MG tablet, Take 1,000 mg by mouth every 6 (six) hours as needed for Pain., Disp: , Rfl:     alendronate (FOSAMAX) 70 MG tablet, 1 tab PO qwk in the am with a full glass of water on an empty stomach. Do not consume food or lie down for at least 30 minutes afterwards., Disp: 12 tablet, Rfl: 3    aspirin (ECOTRIN) 81 MG EC tablet, Take 81 mg by mouth once daily., Disp: , Rfl:     atorvastatin (LIPITOR) 40 MG tablet, TAKE 1 TABLET BY MOUTH EVERY DAY, Disp: 90 tablet, Rfl: 3    clopidogrel (PLAVIX) 75 mg tablet, TK 1 T PO QD, Disp: 90 tablet, Rfl: 3    fluorouracil 5% 5 % Soln, Use hs for 2 weeks, Disp: 10 mL, Rfl: 1    magnesium oxide (MAG-OX) 400 mg (241.3 mg magnesium) tablet, Take 1 tablet (400 mg total) by mouth once daily., Disp: 30 tablet, Rfl: 3    omega-3 fatty acids-vitamin E (FISH OIL) 1,000 mg Cap, Take 1 tablet by mouth 2 (two) times daily. (Patient taking differently: Take 1 tablet by mouth once daily. ), Disp: 60 each, Rfl: 11    pantoprazole (PROTONIX) 40 MG tablet, Take 40 mg by mouth once daily. , Disp: , Rfl:     pantoprazole (PROTONIX) 40 MG tablet, TAKE 1 TABLET BY MOUTH TWICE DAILY, Disp: 180 tablet, Rfl: 0    riboflavin, vitamin B2, (VITAMIN B-2) 100 mg Tab tablet, Take 2 tablets (200 mg total) by mouth once daily. (Patient taking differently: Take 100  mg by mouth once daily. 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    Hyperlipidemia     Hypertension     Ischemic cardiomyopathy 11/5/2014    MDD (major depressive disorder), recurrent episode, mild 2/17/2016    Osteoporosis 7/20/2016    Peripheral arterial disease 3/18/2016    Sarcoid     Squamous cell carcinoma 09/2016, 5/2016    crown of scalp, left wrist       Past Surgical History:   Procedure Laterality Date    CARDIAC SURGERY      CAROTID STENT N/A 10/14/2019    Procedure: INSERTION, STENT, ARTERY, CAROTID;  Surgeon: Artie Kebede MD;  Location: [facility] CATH LAB;  Service: Cardiology;  Laterality: N/A;    CATARACT EXTRACTION W/  INTRAOCULAR LENS IMPLANT Right 07/17/2018    Dr. Talamantes    CATARACT EXTRACTION W/  INTRAOCULAR LENS IMPLANT Left 07/31/2018    Dr. Talamantes    CORONARY ARTERY BYPASS GRAFT      x2  10/2014    ESOPHAGOGASTRODUODENOSCOPY N/A 4/8/2019    Procedure: ESOPHAGOGASTRODUODENOSCOPY (EGD)/poss rfa;  Surgeon: Clifford De La Torre MD;  Location: [facility] ENDO ([address] [place] [address]);  Service: Endoscopy;  Laterality: N/A;    ESOPHAGOGASTRODUODENOSCOPY (EGD) WITH RADIOFREQUENCY TREATMENT OF GASTROESOPHAGEAL JUNCTION      INJECTION OF ANESTHETIC AGENT AROUND MEDIAL BRANCH NERVES INNERVATING CERVICAL FACET JOINT Bilateral 1/9/2020    Procedure: INJECTION, MBB--Bilateral Cervical- Third Occipital nerve, C2-3--ASA okay for pt to continue taking per  provider.;  Surgeon: Tip Mera Jr., MD;  Location: [facility] PAIN MGT;  Service: Pain Management;  Laterality: Bilateral;    INTRAOCULAR PROSTHESES INSERTION Right 7/17/2018    Procedure: INSERTION, INTRAOCULAR LENS PROSTHESIS;  Surgeon: León Talamantes MD;  Location: [address];  Service: Ophthalmology;  Laterality: Right;    INTRAOCULAR PROSTHESES INSERTION Left 7/31/2018    Procedure: INSERTION, INTRAOCULAR LENS PROSTHESIS;  Surgeon: León Talamantes MD;  Location: [address];  Service: Ophthalmology;  Laterality: Left;    PHACOEMULSIFICATION OF CATARACT Right 7/17/2018    Procedure: PHACOEMULSIFICATION, CATARACT;  Surgeon: León Talamantes MD;  Location: [facility] OR [address] [place] [address];  Service: Ophthalmology;  Laterality: Right;    PHACOEMULSIFICATION OF CATARACT Left 7/31/2018    Procedure: PHACOEMULSIFICATION, CATARACT;  Surgeon: León Talamantes MD;  Location: [address];  Service: Ophthalmology;  Laterality: Left;    UMBILICAL HERNIA REPAIR         Family History   Problem Relation Age of Onset    Arrhythmia Mother     Heart disease Mother     Heart failure Brother     No Known Problems Daughter     No Known Problems Son     Colon cancer Neg Hx     Inflammatory bowel disease Neg Hx     Celiac disease Neg Hx     Melanoma Neg Hx     Amblyopia Neg Hx     Blindness Neg Hx     Cataracts Neg Hx     Glaucoma Neg Hx     Macular degeneration Neg Hx     Retinal detachment Neg Hx     Strabismus Neg Hx        Social History     Socioeconomic History    Marital status:      Spouse name: Not on file    Number of children: 4    Years of education: Not on file    Highest education level: Not on file   Occupational History    Occupation:     Social Needs    Financial resource strain: Not on file    Food insecurity:     Worry: Not on file     Inability: Not on file    Transportation needs:     Medical: Not on file     Non-medical: Not on file    Tobacco Use    Smoking status: Former Smoker     Packs/day: 2.00     Years: 20.00     Pack years: 40.00     Types: Cigarettes     Last attempt to quit: 1988     Years since quittin.9    Smokeless tobacco: Never Used   Substance and Sexual Activity    Alcohol use: No     Comment: quit drinking beer     Drug use: No    Sexual activity: Not Currently     Partners: Female   Lifestyle    Physical activity:     Days per week: Not on file     Minutes per session: Not on file    Stress: Not on file   Relationships    Social connections:     Talks on phone: Not on file     Gets together: Not on file     Attends Caodaism service: Not on file     Active member of club or organization: Not on file     Attends meetings of clubs or organizations: Not on file     Relationship status: Not on file   Other Topics Concern    Not on file   Social History Narrative    Not on file       Review of patient's allergies indicates:   Allergen Reactions    Morphine Shortness Of Breath       Physical Exam:  Vitals:    20 0940   BP: 118/70   Weight: 65.3 kg (144 lb)   PainSc:   4     General    Nursing note and vitals reviewed.  Constitutional: He is oriented to person, place, and time. He appears well-developed and well-nourished. No distress.   HENT:   Head: Normocephalic and atraumatic.   Nose: Nose normal.   Eyes: Conjunctivae and EOM are normal. Pupils are equal, round, and reactive to light. Right eye exhibits no discharge. Left eye exhibits no discharge. No scleral icterus.   Neck: No JVD present.   Cardiovascular: Intact distal pulses.    Pulmonary/Chest: Effort normal. No respiratory distress.   Abdominal: He exhibits no distension.   Neurological: He is alert and oriented to person, place, and time. Coordination normal.   Psychiatric: He has a normal mood and affect. His behavior is normal. Judgment and thought content normal.     General Musculoskeletal Exam   Gait: normal     Back (L-Spine & T-Spine)  / Neck (C-Spine) Exam     Tenderness Posterior midline palpation reveals tenderness of the Upper C-Spine. Right paramedian tenderness of the Upper C-Spine and Occ. Left paramedian tenderness of the Upper C-Spine and Occ.     Back (L-Spine & T-Spine) Range of Motion   Extension: abnormal   Flexion: abnormal     Neck (C-Spine) Range of Motion   Flexion:     Limited  Extension: Limited  Right Lateral Bend: abnormal  Left Lateral Bend: abnormal  Right Rotation: abnormal  Left Rotation: abnormal    Spinal Sensation   Right Side Sensation  C-Spine Level: normal   Left Side Sensation  C-Spine Level: normal    Neck (C-Spine) Tests   Spurling's Test   Left:  Negative  Right: negative    Other He has no scoliosis .      Muscle Strength   Right Upper Extremity   Biceps: 5/5/5   Deltoid:  5/5  Triceps:  5/5  Wrist extension: 5/5/5   Wrist flexion: 5/5/5   Finger Flexors:  5/5  Finger Extensors:  5/5  Left Upper Extremity  Biceps: 5/5/5   Deltoid:  5/5  Triceps:  5/5  Wrist extension: 5/5/5   Wrist flexion: 5/5/5   Finger Flexors:  5/5  Finger Extensors:  5/5  Right Lower Extremity   Hip Flexion: 5/5   Hip Extensors: 5/5  Quadriceps:  5/5   Hamstrin/5   Gastrocsoleus:  5/5/5  Left Lower Extremity   Hip Flexion: 5/5   Hip Extensors: 5/5  Quadriceps:  5/5   Hamstrin/5   Gastrocsoleus:  5/5/5    Reflexes     Left Side  Biceps:  2+    Right Side   Biceps:  2+      Imaging:  EXAMINATION:  XR CERVICAL SPINE COMPLETE 5 VIEW--18    CLINICAL HISTORY:  . Cervicalgia    TECHNIQUE:  AP, Lateral, bilateral oblique and open mouth views of the cervical spine were performed.    COMPARISON:  None    FINDINGS:  DJD.  The disc spaces are significantly narrowed between C4 and C6 vertebral segments.  There is also evidence of encroachment of the neural foramina at the same levels bilaterally.  No fracture or dislocation.  No bone destruction identified       Labs:  BMP  Lab Results   Component Value Date     10/15/2019    K 4.0  10/15/2019     10/15/2019    CO2 26 10/15/2019    BUN 17 10/15/2019    CREATININE 1.3 10/15/2019    CALCIUM 8.6 (L) 10/15/2019    ANIONGAP 7 (L) 10/15/2019    ESTGFRAFRICA 59.6 (A) 10/15/2019    EGFRNONAA 51.5 (A) 10/15/2019     Lab Results   Component Value Date    ALT 13 05/24/2019    AST 18 05/24/2019    ALKPHOS 71 05/24/2019    BILITOT 1.5 (H) 05/24/2019       Assessment:  Problem List Items Addressed This Visit     Cervicogenic headache    Cervical spondylosis - Primary    DDD (degenerative disc disease), cervical    Chronic neck pain    Chronic pain          1/3/20 - Paul Browning is a 79 y.o. male with chronic neck pain and cervicogenic headaches secondary to cervical facet spondylosis picture physical exam strongly supports this diagnosis as does a recent x-ray of the cervical spine.  Patient's and dull evidence, as stated in the HPI above, also supports this diagnosis.  I am comfortable moving forward with a bilateral medial branch block targeting C2-3 as well as a 3rd occipital nerve block.  Should this intervention provide significant relief for the expected time course of a diagnostic block, it will be followed with a radiofrequency ablation for semi permanent relief.  The patient, and his son who is with him today, were educated on the risks and benefits of this procedure will sequence and would like to proceed.    1/17/2020 - Mr Browning did very well with the block and is an appropriate candidate for RFA.  We will perform the RFA bilaterally in one visit to minimize travel time and burden for him and his family caregivers.      : Not applicable    Treatment Plan:   Procedures: s/f RFA bilateral C2-3 + TON.  Will use traditional RF since prone positioning is required for bilateral RFA.  PT/OT/HEP: Patient would be a good candidate for  PT as he cannot drive.  I will setup this up once the interventional sequence is done.  Medications: No changes recommended at this  time.  Labs: reviewed and medications are appropriately dosed for current hepatorenal function.  Imaging: No additional recommended at this time.    Follow Up: RTC 2-3 weeks    Tip Mera Jr, MD  Interventional Pain Medicine / Anesthesiology    Disclaimer: This note was partly generated using dictation software which may occasionally result in transcription errors.

## 2020-01-17 ENCOUNTER — TELEPHONE (OUTPATIENT)
Dept: PAIN MEDICINE | Facility: CLINIC | Age: 80
End: 2020-01-17

## 2020-01-17 ENCOUNTER — OFFICE VISIT (OUTPATIENT)
Dept: PAIN MEDICINE | Facility: CLINIC | Age: 80
End: 2020-01-17
Payer: MEDICARE

## 2020-01-17 VITALS — WEIGHT: 144 LBS | SYSTOLIC BLOOD PRESSURE: 118 MMHG | DIASTOLIC BLOOD PRESSURE: 70 MMHG | BODY MASS INDEX: 22.55 KG/M2

## 2020-01-17 DIAGNOSIS — G89.4 CHRONIC PAIN SYNDROME: ICD-10-CM

## 2020-01-17 DIAGNOSIS — G44.86 CERVICOGENIC HEADACHE: ICD-10-CM

## 2020-01-17 DIAGNOSIS — M54.2 CHRONIC NECK PAIN: ICD-10-CM

## 2020-01-17 DIAGNOSIS — G89.29 CHRONIC NECK PAIN: ICD-10-CM

## 2020-01-17 DIAGNOSIS — M47.812 CERVICAL SPONDYLOSIS: Primary | ICD-10-CM

## 2020-01-17 DIAGNOSIS — M50.30 DDD (DEGENERATIVE DISC DISEASE), CERVICAL: ICD-10-CM

## 2020-01-17 PROCEDURE — 1159F PR MEDICATION LIST DOCUMENTED IN MEDICAL RECORD: ICD-10-PCS | Mod: HCNC,S$GLB,, | Performed by: PAIN MEDICINE

## 2020-01-17 PROCEDURE — 3078F PR MOST RECENT DIASTOLIC BLOOD PRESSURE < 80 MM HG: ICD-10-PCS | Mod: HCNC,CPTII,S$GLB, | Performed by: PAIN MEDICINE

## 2020-01-17 PROCEDURE — 99214 OFFICE O/P EST MOD 30 MIN: CPT | Mod: HCNC,S$GLB,, | Performed by: PAIN MEDICINE

## 2020-01-17 PROCEDURE — 1125F AMNT PAIN NOTED PAIN PRSNT: CPT | Mod: HCNC,S$GLB,, | Performed by: PAIN MEDICINE

## 2020-01-17 PROCEDURE — 99999 PR PBB SHADOW E&M-EST. PATIENT-LVL II: ICD-10-PCS | Mod: PBBFAC,HCNC,, | Performed by: PAIN MEDICINE

## 2020-01-17 PROCEDURE — 3078F DIAST BP <80 MM HG: CPT | Mod: HCNC,CPTII,S$GLB, | Performed by: PAIN MEDICINE

## 2020-01-17 PROCEDURE — 99214 PR OFFICE/OUTPT VISIT, EST, LEVL IV, 30-39 MIN: ICD-10-PCS | Mod: HCNC,S$GLB,, | Performed by: PAIN MEDICINE

## 2020-01-17 PROCEDURE — 1125F PR PAIN SEVERITY QUANTIFIED, PAIN PRESENT: ICD-10-PCS | Mod: HCNC,S$GLB,, | Performed by: PAIN MEDICINE

## 2020-01-17 PROCEDURE — 1159F MED LIST DOCD IN RCRD: CPT | Mod: HCNC,S$GLB,, | Performed by: PAIN MEDICINE

## 2020-01-17 PROCEDURE — 1101F PT FALLS ASSESS-DOCD LE1/YR: CPT | Mod: HCNC,CPTII,S$GLB, | Performed by: PAIN MEDICINE

## 2020-01-17 PROCEDURE — 3074F PR MOST RECENT SYSTOLIC BLOOD PRESSURE < 130 MM HG: ICD-10-PCS | Mod: HCNC,CPTII,S$GLB, | Performed by: PAIN MEDICINE

## 2020-01-17 PROCEDURE — 3074F SYST BP LT 130 MM HG: CPT | Mod: HCNC,CPTII,S$GLB, | Performed by: PAIN MEDICINE

## 2020-01-17 PROCEDURE — 1101F PR PT FALLS ASSESS DOC 0-1 FALLS W/OUT INJ PAST YR: ICD-10-PCS | Mod: HCNC,CPTII,S$GLB, | Performed by: PAIN MEDICINE

## 2020-01-17 PROCEDURE — 99999 PR PBB SHADOW E&M-EST. PATIENT-LVL II: CPT | Mod: PBBFAC,HCNC,, | Performed by: PAIN MEDICINE

## 2020-01-17 NOTE — TELEPHONE ENCOUNTER
Pt scheduled for Bilateral Cervical RFA with Dr. Mera on 1/30/20. Requesting to hold ASA 5 days prior to procedure per Dr. Mera. Please advise.

## 2020-01-17 NOTE — TELEPHONE ENCOUNTER
Pt scheduled for 1/30/20 at 10:45am for Bilateral Cervical RFA. Pt aware to check in at registration desk on the first floor of the hospital for 0945 am. Pt is taking ASA  Sent med clearance request to  to hold for 5 days . Pt aware we will contact him to advise him when to hold medications.

## 2020-01-30 ENCOUNTER — TELEPHONE (OUTPATIENT)
Dept: PAIN MEDICINE | Facility: CLINIC | Age: 80
End: 2020-01-30

## 2020-01-30 DIAGNOSIS — M47.812 CERVICAL SPONDYLOSIS: Primary | ICD-10-CM

## 2020-01-30 NOTE — TELEPHONE ENCOUNTER
Pt scheduled for 2//13/20 at 0845 for Bilateral Cervical RFA. Pt aware to check in at registration desk on the first floor of the hospital for 0745 am. Pt is taking ASA and aware to hold 5 days prior to procedure.

## 2020-02-18 ENCOUNTER — TELEPHONE (OUTPATIENT)
Dept: ENDOSCOPY | Facility: HOSPITAL | Age: 80
End: 2020-02-18

## 2020-02-18 NOTE — TELEPHONE ENCOUNTER
Spoke with patient's son. He is still having medical issues and does not want to schedule EGD at this time.

## 2020-02-26 ENCOUNTER — PATIENT MESSAGE (OUTPATIENT)
Dept: PAIN MEDICINE | Facility: HOSPITAL | Age: 80
End: 2020-02-26

## 2020-02-26 ENCOUNTER — HOSPITAL ENCOUNTER (EMERGENCY)
Facility: HOSPITAL | Age: 80
Discharge: HOME | End: 2020-02-26
Attending: EMERGENCY MEDICINE
Payer: MEDICARE

## 2020-02-26 VITALS
TEMPERATURE: 99 F | OXYGEN SATURATION: 100 % | WEIGHT: 140 LBS | HEART RATE: 83 BPM | HEIGHT: 67 IN | RESPIRATION RATE: 14 BRPM | DIASTOLIC BLOOD PRESSURE: 68 MMHG | BODY MASS INDEX: 21.97 KG/M2 | SYSTOLIC BLOOD PRESSURE: 143 MMHG

## 2020-02-26 DIAGNOSIS — R06.02 SHORTNESS OF BREATH: ICD-10-CM

## 2020-02-26 DIAGNOSIS — J20.9 ACUTE BRONCHITIS, UNSPECIFIED ORGANISM: Primary | ICD-10-CM

## 2020-02-26 LAB
ANION GAP SERPL CALC-SCNC: 7 MMOL/L (ref 8–16)
BASOPHILS # BLD AUTO: 0.03 K/UL (ref 0–0.2)
BASOPHILS NFR BLD: 0.5 % (ref 0–1.9)
BUN SERPL-MCNC: 12 MG/DL (ref 8–23)
CALCIUM SERPL-MCNC: 8.8 MG/DL (ref 8.7–10.5)
CHLORIDE SERPL-SCNC: 101 MMOL/L (ref 95–110)
CO2 SERPL-SCNC: 29 MMOL/L (ref 23–29)
CREAT SERPL-MCNC: 1.1 MG/DL (ref 0.5–1.4)
DIFFERENTIAL METHOD: ABNORMAL
EOSINOPHIL # BLD AUTO: 0.1 K/UL (ref 0–0.5)
EOSINOPHIL NFR BLD: 1.2 % (ref 0–8)
ERYTHROCYTE [DISTWIDTH] IN BLOOD BY AUTOMATED COUNT: 13.2 % (ref 11.5–14.5)
EST. GFR  (AFRICAN AMERICAN): >60 ML/MIN/1.73 M^2
EST. GFR  (NON AFRICAN AMERICAN): >60 ML/MIN/1.73 M^2
GLUCOSE SERPL-MCNC: 98 MG/DL (ref 70–110)
HCT VFR BLD AUTO: 43.3 % (ref 40–54)
HGB BLD-MCNC: 14.5 G/DL (ref 14–18)
IMM GRANULOCYTES # BLD AUTO: 0.02 K/UL (ref 0–0.04)
IMM GRANULOCYTES NFR BLD AUTO: 0.3 % (ref 0–0.5)
INFLUENZA A, MOLECULAR: NEGATIVE
INFLUENZA B, MOLECULAR: NEGATIVE
LYMPHOCYTES # BLD AUTO: 1.1 K/UL (ref 1–4.8)
LYMPHOCYTES NFR BLD: 16.7 % (ref 18–48)
MCH RBC QN AUTO: 30.6 PG (ref 27–31)
MCHC RBC AUTO-ENTMCNC: 33.5 G/DL (ref 32–36)
MCV RBC AUTO: 91 FL (ref 82–98)
MONOCYTES # BLD AUTO: 0.6 K/UL (ref 0.3–1)
MONOCYTES NFR BLD: 9.1 % (ref 4–15)
NEUTROPHILS # BLD AUTO: 4.7 K/UL (ref 1.8–7.7)
NEUTROPHILS NFR BLD: 72.2 % (ref 38–73)
NRBC BLD-RTO: 0 /100 WBC
PLATELET # BLD AUTO: 293 K/UL (ref 150–350)
PMV BLD AUTO: 10.3 FL (ref 9.2–12.9)
POTASSIUM SERPL-SCNC: 4.1 MMOL/L (ref 3.5–5.1)
RBC # BLD AUTO: 4.74 M/UL (ref 4.6–6.2)
SODIUM SERPL-SCNC: 137 MMOL/L (ref 136–145)
SPECIMEN SOURCE: NORMAL
WBC # BLD AUTO: 6.51 K/UL (ref 3.9–12.7)

## 2020-02-26 PROCEDURE — 93005 ELECTROCARDIOGRAM TRACING: CPT | Mod: HCNC

## 2020-02-26 PROCEDURE — 93010 EKG 12-LEAD: ICD-10-PCS | Mod: HCNC,,, | Performed by: INTERNAL MEDICINE

## 2020-02-26 PROCEDURE — 36000 PLACE NEEDLE IN VEIN: CPT | Mod: HCNC

## 2020-02-26 PROCEDURE — 99285 EMERGENCY DEPT VISIT HI MDM: CPT | Mod: 25,HCNC

## 2020-02-26 PROCEDURE — 25000242 PHARM REV CODE 250 ALT 637 W/ HCPCS: Mod: HCNC | Performed by: EMERGENCY MEDICINE

## 2020-02-26 PROCEDURE — 85025 COMPLETE CBC W/AUTO DIFF WBC: CPT | Mod: HCNC

## 2020-02-26 PROCEDURE — 94640 AIRWAY INHALATION TREATMENT: CPT | Mod: HCNC

## 2020-02-26 PROCEDURE — 63600175 PHARM REV CODE 636 W HCPCS: Mod: HCNC | Performed by: EMERGENCY MEDICINE

## 2020-02-26 PROCEDURE — 99285 PR EMERGENCY DEPT VISIT,LEVEL V: ICD-10-PCS | Mod: HCNC,,, | Performed by: EMERGENCY MEDICINE

## 2020-02-26 PROCEDURE — 80048 BASIC METABOLIC PNL TOTAL CA: CPT | Mod: HCNC

## 2020-02-26 PROCEDURE — 99285 EMERGENCY DEPT VISIT HI MDM: CPT | Mod: HCNC,,, | Performed by: EMERGENCY MEDICINE

## 2020-02-26 PROCEDURE — 87502 INFLUENZA DNA AMP PROBE: CPT | Mod: HCNC

## 2020-02-26 PROCEDURE — 87502 INFLUENZA DNA AMP PROBE: CPT

## 2020-02-26 PROCEDURE — 93010 ELECTROCARDIOGRAM REPORT: CPT | Mod: HCNC,,, | Performed by: INTERNAL MEDICINE

## 2020-02-26 RX ORDER — AZITHROMYCIN 250 MG/1
250 TABLET, FILM COATED ORAL DAILY
Qty: 6 TABLET | Refills: 0 | Status: SHIPPED | OUTPATIENT
Start: 2020-02-26 | End: 2020-06-05

## 2020-02-26 RX ORDER — PREDNISONE 20 MG/1
40 TABLET ORAL
Status: COMPLETED | OUTPATIENT
Start: 2020-02-26 | End: 2020-02-26

## 2020-02-26 RX ORDER — PREDNISONE 20 MG/1
40 TABLET ORAL DAILY
Qty: 10 TABLET | Refills: 0 | Status: SHIPPED | OUTPATIENT
Start: 2020-02-26 | End: 2020-03-02

## 2020-02-26 RX ORDER — ALBUTEROL SULFATE 0.83 MG/ML
2.5 SOLUTION RESPIRATORY (INHALATION) EVERY 6 HOURS PRN
Qty: 1 BOX | Refills: 0 | Status: SHIPPED | OUTPATIENT
Start: 2020-02-26 | End: 2021-02-25

## 2020-02-26 RX ORDER — IPRATROPIUM BROMIDE AND ALBUTEROL SULFATE 2.5; .5 MG/3ML; MG/3ML
3 SOLUTION RESPIRATORY (INHALATION)
Status: COMPLETED | OUTPATIENT
Start: 2020-02-26 | End: 2020-02-26

## 2020-02-26 RX ADMIN — IPRATROPIUM BROMIDE AND ALBUTEROL SULFATE 3 ML: .5; 3 SOLUTION RESPIRATORY (INHALATION) at 05:02

## 2020-02-26 RX ADMIN — PREDNISONE 40 MG: 20 TABLET ORAL at 06:02

## 2020-02-26 NOTE — ED NOTES
Patient identifiers verified and correct for Mr Browning  C/C: Cough, congestion SEE NN  APPEARANCE: awake and alert in NAD.  SKIN: warm, dry and intact. No breakdown or bruising.  MUSCULOSKELETAL: Patient moving all extremities spontaneously, no obvious swelling or deformities noted. Ambulates independently.  RESPIRATORY: Positive  shortness of breath.Respirations unlabored. Positive cough, unknown fevers  CARDIAC: Denies CP, 2+ distal pulses; no peripheral edema  ABDOMEN: S/ND/NT, Nausea  And emesis last night  : voids spontaneously, denies difficulty  Neurologic: AAO x 4; follows commands equal strength in all extremities; denies numbness/tingling. Denies dizziness positive gen weakness

## 2020-02-26 NOTE — ED PROVIDER NOTES
Encounter Date: 2/26/2020    SCRIBE #1 NOTE: I, Te Lindsey, am scribing for, and in the presence of,  Dr. Costello. I have scribed the following portions of the note - Other sections scribed: HPI, ROS, PE.       History     Chief Complaint   Patient presents with    Cough     after hour clinic sent to er for wheezing, cough, daughter has flu     Wheezing     79 year old male with a history of CAD, GERD, BPH, MDD, peripheral arterial disease, hypertension and hyperlipidemia, presenting with cough and wheezing. Onset of these symptoms began last night. Both cough and wheezing are exacerbated by laying down and alleviated by standing up. Dizziness started today. It is described as an off-balance feeling. Denies any abdominal or chest pain. Other associated symptoms include headache which he says is rated as an 8/10. Does mention that his daughter has the flu.     The history is provided by the patient and medical records.     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    Hyperlipidemia     Hypertension     Ischemic cardiomyopathy 11/5/2014    MDD (major depressive disorder), recurrent episode, mild 2/17/2016    Osteoporosis 7/20/2016    Peripheral arterial disease 3/18/2016    Sarcoid     Squamous cell carcinoma 09/2016, 5/2016    crown of scalp, left wrist     Past Surgical History:   Procedure Laterality Date    CARDIAC SURGERY      CAROTID STENT N/A 10/14/2019    Procedure: INSERTION, STENT, ARTERY, CAROTID;  Surgeon: Artie Kebede MD;  Location: [facility] CATH LAB;  Service: Cardiology;  Laterality: N/A;    CATARACT  EXTRACTION W/  INTRAOCULAR LENS IMPLANT Right 07/17/2018    Dr. Talamantes    CATARACT EXTRACTION W/  INTRAOCULAR LENS IMPLANT Left 07/31/2018    Dr. Talamantes    CORONARY ARTERY BYPASS GRAFT      x2  10/2014    ESOPHAGOGASTRODUODENOSCOPY N/A 4/8/2019    Procedure: ESOPHAGOGASTRODUODENOSCOPY (EGD)/poss rfa;  Surgeon: Clifford De La Torre MD;  Location: [facility] (2ND FLR);  Service: Endoscopy;  Laterality: N/A;    ESOPHAGOGASTRODUODENOSCOPY (EGD) WITH RADIOFREQUENCY TREATMENT OF GASTROESOPHAGEAL JUNCTION      INJECTION OF ANESTHETIC AGENT AROUND MEDIAL BRANCH NERVES INNERVATING CERVICAL FACET JOINT Bilateral 1/9/2020    Procedure: INJECTION, MBB--Bilateral Cervical- Third Occipital nerve, C2-3--ASA okay for pt to continue taking per provider.;  Surgeon: Tip Mera Jr., MD;  Location: [facility] PAIN MGT;  Service: Pain Management;  Laterality: Bilateral;    INTRAOCULAR PROSTHESES INSERTION Right 7/17/2018    Procedure: INSERTION, INTRAOCULAR LENS PROSTHESIS;  Surgeon: León Talamantes MD;  Location: [address];  Service: Ophthalmology;  Laterality: Right;    INTRAOCULAR PROSTHESES INSERTION Left 7/31/2018    Procedure: INSERTION, INTRAOCULAR LENS PROSTHESIS;  Surgeon: León Talamantes MD;  Location: [address];  Service: Ophthalmology;  Laterality: Left;    PHACOEMULSIFICATION OF CATARACT Right 7/17/2018    Procedure: PHACOEMULSIFICATION, CATARACT;  Surgeon: León Talamantes MD;  Location: [facility] OR [address] [place] [address];  Service: Ophthalmology;  Laterality: Right;    PHACOEMULSIFICATION OF CATARACT Left 7/31/2018    Procedure: PHACOEMULSIFICATION, CATARACT;  Surgeon: León Talamantes MD;  Location: [address];  Service: Ophthalmology;  Laterality: Left;    RADIOFREQUENCY THERMOCOAGULATION Bilateral 1/30/2020    Procedure: RADIOFREQUENCY THERMAL COAGULATION--Bilateral C2-3 and Third Occipital Nerve;  Surgeon: Tip Mera Jr., MD;  Location: [facility] PAIN MGT;  Service: Pain  Management;  Laterality: Bilateral;    UMBILICAL HERNIA REPAIR       Family History   Problem Relation Age of Onset    Arrhythmia Mother     Heart disease Mother     Heart failure Brother     No Known Problems Daughter     No Known Problems Son     Colon cancer Neg Hx     Inflammatory bowel disease Neg Hx     Celiac disease Neg Hx     Melanoma Neg Hx     Amblyopia Neg Hx     Blindness Neg Hx     Cataracts Neg Hx     Glaucoma Neg Hx     Macular degeneration Neg Hx     Retinal detachment Neg Hx     Strabismus Neg Hx      Social History     Tobacco Use    Smoking status: Former Smoker     Packs/day: 2.00     Years: 20.00     Pack years: 40.00     Types: Cigarettes     Last attempt to quit: 1988     Years since quittin.0    Smokeless tobacco: Never Used   Substance Use Topics    Alcohol use: No     Comment: quit drinking beer     Drug use: No     Review of Systems   Constitutional: Negative for chills and fever.   HENT: Negative for sore throat and trouble swallowing.    Eyes: Negative for pain and redness.   Respiratory: Positive for cough and wheezing.    Cardiovascular: Positive for leg swelling (mild). Negative for chest pain.   Gastrointestinal: Negative for abdominal pain.   Genitourinary: Negative for dysuria and frequency.   Musculoskeletal: Negative for back pain and neck pain.   Skin: Negative for rash and wound.   Neurological: Positive for dizziness. Negative for light-headedness and headaches.   Psychiatric/Behavioral: Negative for behavioral problems and confusion.       Physical Exam     Initial Vitals [20 1619]   BP Pulse Resp Temp SpO2   (!) 143/68 85 20 98.8 °F (37.1 °C) 100 %      MAP       --         Physical Exam    Nursing note and vitals reviewed.  Constitutional: He appears well-developed and well-nourished. No distress.   HENT:   Head: Normocephalic and atraumatic.   Eyes: EOM are normal. Pupils are equal, round, and reactive to light.   Neck: Normal  range of motion. Neck supple.   Cardiovascular: Normal rate, regular rhythm, normal heart sounds and intact distal pulses.   Pulmonary/Chest: No respiratory distress. He has wheezes (expiratory, worse on left). He has no rhonchi. He has no rales.   Abdominal: Soft. Bowel sounds are normal.   Musculoskeletal: Normal range of motion. He exhibits no edema.   Neurological: He is alert and oriented to person, place, and time.   Skin: Skin is warm and dry.         ED Course   Procedures  Labs Reviewed   CBC W/ AUTO DIFFERENTIAL - Abnormal; Notable for the following components:       Result Value    Lymph% 16.7 (*)     All other components within normal limits   BASIC METABOLIC PANEL - Abnormal; Notable for the following components:    Anion Gap 7 (*)     All other components within normal limits   INFLUENZA A & B BY MOLECULAR     EKG Readings: (Independently Interpreted)   Rhythm: Normal Sinus Rhythm. Heart Rate: 81.   No ST elevation or depression.  PVCs       Imaging Results          X-Ray Chest PA And Lateral (Final result)  Result time 02/26/20 18:26:04    Final result by Grey Sibley MD (02/26/20 18:26:04)                 Impression:      No radiographic acute intrathoracic process seen.  Resolution of previous small pleural effusions.    Multiple nodular lung lesions and scattered calcified granulomas consistent with reported history of prior sarcoid.    Hiatal hernia.      Electronically signed by: Grey Sibley MD  Date:    02/26/2020  Time:    18:26             Narrative:    EXAMINATION:  XR CHEST PA AND LATERAL    CLINICAL HISTORY:  chest pain;    TECHNIQUE:  PA and lateral views of the chest were performed.    COMPARISON:  Chest radiograph 12/05/2014 and CT thorax 01/26/2015    FINDINGS:  Remote postoperative changes with sternotomy again noted.  Cardiomediastinal silhouette is midline and stable without evidence of failure.  There is calcific atherosclerosis of the arch.  Scattered granulomas calcifications  again noted throughout the mediastinum and bilateral hilar regions.    The lungs are symmetrically well expanded without acute consolidation, pleural effusion or pneumothorax.  Numerous small nodular opacities are again noted throughout both lungs with an upper lobe predominance grossly similar to prior.  There is continued 2-3 cm partially calcified opacity within the right upper lobe grossly unchanged.  No definite new focal opacity.  Hiatal hernia again noted.  Hilar contours are grossly unchanged.  Osseous structures appear stable without acute process seen.                                 Medical Decision Making:   History:   Old Medical Records: I decided to obtain old medical records.  Initial Assessment:   Emergent evaluation 79-year-old male history of sarcoidosis presenting with cough, wheezing, shortness of breath since yesterday.  Differential Diagnosis:   Acute bronchitis, pneumonia, URI, pleural effusion, low suspicion for CHF  Independently Interpreted Test(s):   I have ordered and independently interpreted X-rays - see prior notes.  I have ordered and independently interpreted EKG Reading(s) - see prior notes  Clinical Tests:   Lab Tests: Ordered and Reviewed  Radiological Study: Ordered and Reviewed  Medical Tests: Ordered and Reviewed  ED Management:  - labs  -influenza swab  - chest x-ray    Labs reviewed with no leukocytosis.  H and H stable. CMP within normal limits.    Chest x-ray reviewed which shows no large consolidation or effusion.  There are small opacifications consistent with patient's sarcoidosis.    Patient re-evaluated after nebulizer, reports significant improvement of symptoms. States headache is also improved.    Discussed findings with patient and family at bedside, will discharge with prednisone, Z-Fredo, albuterol nebulizer.    Patient has pressed understanding.  He is comfortable with plan for discharge. Patient stable for discharge            Scribe Attestation:   Scribe #1: I  performed the above scribed service and the documentation accurately describes the services I performed. I attest to the accuracy of the note.    Attending Attestation:           Physician Attestation for Scribe:      Comments: I, Dr. Jo Costello, personally performed the services described in this documentation. All medical record entries made by the scribe were at my direction and in my presence.  I have reviewed the chart and agree that the record reflects my personal performance and is accurate and complete. Jo Costello MD.                            Clinical Impression:       ICD-10-CM ICD-9-CM   1. Acute bronchitis, unspecified organism J20.9 466.0   2. Shortness of breath R06.02 786.05                                Jo Costello MD  02/26/20 1839       Jo Costello MD  02/26/20 1839

## 2020-05-05 DIAGNOSIS — R51.9 CHRONIC NONINTRACTABLE HEADACHE, UNSPECIFIED HEADACHE TYPE: ICD-10-CM

## 2020-05-05 DIAGNOSIS — G89.29 CHRONIC NONINTRACTABLE HEADACHE, UNSPECIFIED HEADACHE TYPE: ICD-10-CM

## 2020-05-05 RX ORDER — RIBOFLAVIN (VITAMIN B2) 100 MG
100 TABLET ORAL DAILY
Qty: 90 TABLET | Refills: 3 | Status: SHIPPED | OUTPATIENT
Start: 2020-05-05 | End: 2020-06-18 | Stop reason: SDUPTHER

## 2020-05-05 RX ORDER — RIBOFLAVIN (VITAMIN B2) 100 MG
200 TABLET ORAL DAILY
Qty: 60 TABLET | Refills: 3 | Status: CANCELLED | OUTPATIENT
Start: 2020-05-05

## 2020-05-05 NOTE — TELEPHONE ENCOUNTER
----- Message from Matt Melendrez sent at 5/5/2020  9:59 AM CDT -----  Contact: tari [facility]ata[facility] / [phone]   Tari would like your office to send in an authorization for the medication riboflavin,   vitamin B2/ (VITAMIN B-2) 100 mg Tab tablet  Please Advise.

## 2020-06-05 ENCOUNTER — OFFICE VISIT (OUTPATIENT)
Dept: INTERNAL MEDICINE | Facility: CLINIC | Age: 80
End: 2020-06-05
Payer: MEDICARE

## 2020-06-05 VITALS
HEIGHT: 67 IN | BODY MASS INDEX: 21.87 KG/M2 | WEIGHT: 139.31 LBS | TEMPERATURE: 98 F | RESPIRATION RATE: 16 BRPM | DIASTOLIC BLOOD PRESSURE: 60 MMHG | SYSTOLIC BLOOD PRESSURE: 114 MMHG | HEART RATE: 78 BPM

## 2020-06-05 DIAGNOSIS — I25.810 CORONARY ARTERY DISEASE INVOLVING CORONARY BYPASS GRAFT OF NATIVE HEART WITHOUT ANGINA PECTORIS: Primary | Chronic | ICD-10-CM

## 2020-06-05 DIAGNOSIS — M47.812 CERVICAL SPONDYLOSIS: ICD-10-CM

## 2020-06-05 DIAGNOSIS — I65.23 BILATERAL CAROTID ARTERY STENOSIS: ICD-10-CM

## 2020-06-05 DIAGNOSIS — J44.9 CHRONIC OBSTRUCTIVE PULMONARY DISEASE, UNSPECIFIED COPD TYPE: ICD-10-CM

## 2020-06-05 DIAGNOSIS — D69.2 SENILE PURPURA: ICD-10-CM

## 2020-06-05 DIAGNOSIS — I70.0 ATHEROSCLEROSIS OF AORTA: ICD-10-CM

## 2020-06-05 DIAGNOSIS — M81.6 LOCALIZED OSTEOPOROSIS, UNSPECIFIED PATHOLOGICAL FRACTURE PRESENCE: ICD-10-CM

## 2020-06-05 DIAGNOSIS — G47.00 INSOMNIA, UNSPECIFIED TYPE: Chronic | ICD-10-CM

## 2020-06-05 DIAGNOSIS — N18.30 CKD (CHRONIC KIDNEY DISEASE) STAGE 3, GFR 30-59 ML/MIN: ICD-10-CM

## 2020-06-05 DIAGNOSIS — I73.9 PERIPHERAL ARTERIAL DISEASE: ICD-10-CM

## 2020-06-05 DIAGNOSIS — K22.710 BARRETT'S ESOPHAGUS WITH LOW GRADE DYSPLASIA: Chronic | ICD-10-CM

## 2020-06-05 DIAGNOSIS — I10 ESSENTIAL HYPERTENSION: Chronic | ICD-10-CM

## 2020-06-05 DIAGNOSIS — D86.0 PULMONARY SARCOIDOSIS: ICD-10-CM

## 2020-06-05 PROCEDURE — 99499 UNLISTED E&M SERVICE: CPT | Mod: HCNC,S$GLB,, | Performed by: INTERNAL MEDICINE

## 2020-06-05 PROCEDURE — 3074F PR MOST RECENT SYSTOLIC BLOOD PRESSURE < 130 MM HG: ICD-10-PCS | Mod: HCNC,CPTII,S$GLB, | Performed by: INTERNAL MEDICINE

## 2020-06-05 PROCEDURE — 3074F SYST BP LT 130 MM HG: CPT | Mod: HCNC,CPTII,S$GLB, | Performed by: INTERNAL MEDICINE

## 2020-06-05 PROCEDURE — 3078F PR MOST RECENT DIASTOLIC BLOOD PRESSURE < 80 MM HG: ICD-10-PCS | Mod: HCNC,CPTII,S$GLB, | Performed by: INTERNAL MEDICINE

## 2020-06-05 PROCEDURE — 3078F DIAST BP <80 MM HG: CPT | Mod: HCNC,CPTII,S$GLB, | Performed by: INTERNAL MEDICINE

## 2020-06-05 PROCEDURE — 99499 RISK ADDL DX/OHS AUDIT: ICD-10-PCS | Mod: HCNC,S$GLB,, | Performed by: INTERNAL MEDICINE

## 2020-06-05 PROCEDURE — 99999 PR PBB SHADOW E&M-EST. PATIENT-LVL IV: CPT | Mod: PBBFAC,HCNC,, | Performed by: INTERNAL MEDICINE

## 2020-06-05 PROCEDURE — 99397 PR PREVENTIVE VISIT,EST,65 & OVER: ICD-10-PCS | Mod: HCNC,S$GLB,, | Performed by: INTERNAL MEDICINE

## 2020-06-05 PROCEDURE — 99397 PER PM REEVAL EST PAT 65+ YR: CPT | Mod: HCNC,S$GLB,, | Performed by: INTERNAL MEDICINE

## 2020-06-05 PROCEDURE — 99999 PR PBB SHADOW E&M-EST. PATIENT-LVL IV: ICD-10-PCS | Mod: PBBFAC,HCNC,, | Performed by: INTERNAL MEDICINE

## 2020-06-05 RX ORDER — TRIAMCINOLONE ACETONIDE 1 MG/G
CREAM TOPICAL 2 TIMES DAILY
Qty: 45 G | Refills: 1 | Status: ON HOLD | OUTPATIENT
Start: 2020-06-05 | End: 2021-08-04 | Stop reason: HOSPADM

## 2020-06-05 NOTE — PROGRESS NOTES
Subjective:       Patient ID: Paul Browning is a 79 y.o. male.    Chief Complaint: Follow-up    HPI   79 y.o. Male with CAD, systolic heart failure, CAD, Carotid disease/PAD, HLD, HTN, COPD/sarcoidosis, Insomnia, Aortic atherosclerosis, MDD, senile purpura, Hx of SCC is here for annual exam.      Vaccines: Influenza (2019); Tetanus (2016); Pneumovax (2014); Shingrix (will get)  Sexual Screening: declined  Eye exam: 2016  Colonoscopy: declined  DEXA: 4/16     Mobility: normal  Falls: no     Exercise: no  Diet: low cholesterol     Past Medical History:  CAD (coronary artery disease)   Sarcoid   GERD (gastroesophageal reflux disease)   Diaphragmatic hernia   Hyperlipidemia   Hypertension   Stevens's esophagus with low grade dysplasia   BPH (benign prostatic hyperplasia)   Heart attack   MDD (major depressive disorder), recurrent epi* 2/17/2016   Past Surgical History:  UMBILICAL HERNIA REPAIR   CORONARY ARTERY BYPASS GRAFT   Comment:x2 10/2014  Social History  Marital Status:  Spouse Name:   Years of Education: Number of children: 4      Occupational History  Occupation Employer Comment   Retired       Social History Main Topics  Smoking Status: Former Smoker Packs/Day: 2.00 Years: 20   Types: Cigarettes  Quit date: 02/26/1988  Smokeless Status: Never Used   Alcohol Use: No   Comment: quit drinking beer 2012  Drug Use: No   Sexual Activity: Not Currently Partners with: Female      No Known Allergies  Review of Systems   Constitutional: Negative for activity change, appetite change, chills, diaphoresis, fatigue, fever and unexpected weight change.   HENT: Negative for congestion, mouth sores, postnasal drip, rhinorrhea, sinus pressure, sneezing, sore throat, trouble swallowing and voice change.    Eyes: Negative for discharge, itching and visual disturbance.   Respiratory: Negative for cough, chest tightness, shortness of breath and wheezing.    Cardiovascular: Negative for chest pain,  palpitations and leg swelling.   Gastrointestinal: Negative for abdominal pain, blood in stool, constipation, diarrhea, nausea and vomiting.   Endocrine: Negative for cold intolerance and heat intolerance.   Genitourinary: Negative for difficulty urinating, dysuria, flank pain, hematuria and urgency.   Musculoskeletal: Positive for arthralgias and neck pain. Negative for back pain and myalgias.   Skin: Negative for rash and wound.   Allergic/Immunologic: Negative for environmental allergies and food allergies.   Neurological: Negative for dizziness, tremors, seizures, syncope, weakness and headaches.   Hematological: Negative for adenopathy. Does not bruise/bleed easily.   Psychiatric/Behavioral: Positive for dysphoric mood and sleep disturbance. Negative for confusion, self-injury and suicidal ideas. The patient is not nervous/anxious.        Objective:      Physical Exam   Constitutional: He is oriented to person, place, and time. He appears well-developed and well-nourished. No distress.   HENT:   Head: Normocephalic and atraumatic.   Right Ear: External ear normal.   Left Ear: External ear normal.   Nose: Nose normal.   Mouth/Throat: Oropharynx is clear and moist. No oropharyngeal exudate.   Eyes: Pupils are equal, round, and reactive to light. Conjunctivae and EOM are normal. Right eye exhibits no discharge. Left eye exhibits no discharge. No scleral icterus.   Neck: Normal range of motion. Neck supple. No JVD present. No thyromegaly present.   Cardiovascular: Normal rate, regular rhythm, normal heart sounds and intact distal pulses.   No murmur heard.  Pulmonary/Chest: Effort normal and breath sounds normal. No respiratory distress. He has no wheezes. He has no rales.   Abdominal: Soft. He exhibits no distension. There is no tenderness. There is no guarding.   Musculoskeletal: He exhibits no edema.   Lymphadenopathy:     He has no cervical adenopathy.   Neurological: He is alert and oriented to person, place,  and time. No cranial nerve deficit. Coordination normal.   Skin: Skin is warm and dry. No rash noted. He is not diaphoretic. No pallor.   Psychiatric: He has a normal mood and affect. Judgment normal.       Assessment:       1. Coronary artery disease involving coronary bypass graft of native heart without angina pectoris    2. Bilateral carotid artery stenosis    3. Peripheral arterial disease    4. Stevens's esophagus with low grade dysplasia    5. Essential hypertension    6. Chronic obstructive pulmonary disease, unspecified COPD type    7. Insomnia, unspecified type    8. Atherosclerosis of aorta    9. CKD (chronic kidney disease) stage 3, GFR 30-59 ml/min    10. Pulmonary sarcoidosis    11. Senile purpura    12. Localized osteoporosis, unspecified pathological fracture presence    13. Cervical spondylosis        Plan:    Blood work ordered   Vaccines: Influenza (2019); Tetanus (2016); Pneumovax (2014); Shingrix (will get)   Sexual Screening: declined   Eye exam: 2016   Colonoscopy: declined   DEXA: 4/16      1. CAD s/p PCI and CABG- stable no CP currently, followed by Cardiology       Continue ASA/Statin/BB   2. Carotid artery disease/PAD- stable, CPT   3. Stevesn's esophagus, Hx of gastric ulcer- Last EGD(4/19)- low grade dysplasia and intestinal metaplasia       Continue Protonix 40 mg daily       Followed by GI   4. HTN- stable, CPT   5. COPD with sarcoidosis- stable off meds   6. Insomnia- pt will try Melatonin    7. Aortic atherosclerosis- stable, continue to monitor   8. MDD- stable on Zoloft 50 mg daily   9. HLD- controlled on Lipitor 40 mg daily  10. Osteoporosis- continue Fosamax, Calcium/Vitamin D  11. Senile purpura- stable, continue to monitor  12. Cervical arthritis with headaches- followed by Pain Management   13. Hx of SCC- followed by Derm   14. F/u in 6 months

## 2020-06-09 ENCOUNTER — LAB VISIT (OUTPATIENT)
Dept: LAB | Facility: HOSPITAL | Age: 80
End: 2020-06-09
Attending: INTERNAL MEDICINE
Payer: MEDICARE

## 2020-06-09 DIAGNOSIS — G47.00 INSOMNIA, UNSPECIFIED TYPE: Chronic | ICD-10-CM

## 2020-06-09 DIAGNOSIS — I70.0 ATHEROSCLEROSIS OF AORTA: ICD-10-CM

## 2020-06-09 DIAGNOSIS — I10 ESSENTIAL HYPERTENSION: Chronic | ICD-10-CM

## 2020-06-09 DIAGNOSIS — D86.0 PULMONARY SARCOIDOSIS: ICD-10-CM

## 2020-06-09 DIAGNOSIS — I25.810 CORONARY ARTERY DISEASE INVOLVING CORONARY BYPASS GRAFT OF NATIVE HEART WITHOUT ANGINA PECTORIS: Chronic | ICD-10-CM

## 2020-06-09 DIAGNOSIS — K22.710 BARRETT'S ESOPHAGUS WITH LOW GRADE DYSPLASIA: Chronic | ICD-10-CM

## 2020-06-09 DIAGNOSIS — I65.23 BILATERAL CAROTID ARTERY STENOSIS: ICD-10-CM

## 2020-06-09 DIAGNOSIS — N18.30 CKD (CHRONIC KIDNEY DISEASE) STAGE 3, GFR 30-59 ML/MIN: ICD-10-CM

## 2020-06-09 DIAGNOSIS — I73.9 PERIPHERAL ARTERIAL DISEASE: ICD-10-CM

## 2020-06-09 DIAGNOSIS — D69.2 SENILE PURPURA: ICD-10-CM

## 2020-06-09 DIAGNOSIS — M81.6 LOCALIZED OSTEOPOROSIS, UNSPECIFIED PATHOLOGICAL FRACTURE PRESENCE: ICD-10-CM

## 2020-06-09 DIAGNOSIS — M47.812 CERVICAL SPONDYLOSIS: ICD-10-CM

## 2020-06-09 DIAGNOSIS — J44.9 CHRONIC OBSTRUCTIVE PULMONARY DISEASE, UNSPECIFIED COPD TYPE: ICD-10-CM

## 2020-06-09 LAB
ALBUMIN SERPL BCP-MCNC: 3.8 G/DL (ref 3.5–5.2)
ALP SERPL-CCNC: 66 U/L (ref 55–135)
ALT SERPL W/O P-5'-P-CCNC: 9 U/L (ref 10–44)
ANION GAP SERPL CALC-SCNC: 8 MMOL/L (ref 8–16)
AST SERPL-CCNC: 13 U/L (ref 10–40)
BASOPHILS # BLD AUTO: 0.05 K/UL (ref 0–0.2)
BASOPHILS NFR BLD: 0.7 % (ref 0–1.9)
BILIRUB SERPL-MCNC: 1.1 MG/DL (ref 0.1–1)
BUN SERPL-MCNC: 17 MG/DL (ref 8–23)
CALCIUM SERPL-MCNC: 8.9 MG/DL (ref 8.7–10.5)
CHLORIDE SERPL-SCNC: 101 MMOL/L (ref 95–110)
CHOLEST SERPL-MCNC: 115 MG/DL (ref 120–199)
CHOLEST/HDLC SERPL: 3.3 {RATIO} (ref 2–5)
CO2 SERPL-SCNC: 27 MMOL/L (ref 23–29)
CREAT SERPL-MCNC: 1.3 MG/DL (ref 0.5–1.4)
DIFFERENTIAL METHOD: ABNORMAL
EOSINOPHIL # BLD AUTO: 0.1 K/UL (ref 0–0.5)
EOSINOPHIL NFR BLD: 1 % (ref 0–8)
ERYTHROCYTE [DISTWIDTH] IN BLOOD BY AUTOMATED COUNT: 13.7 % (ref 11.5–14.5)
EST. GFR  (AFRICAN AMERICAN): 59.2 ML/MIN/1.73 M^2
EST. GFR  (NON AFRICAN AMERICAN): 51.2 ML/MIN/1.73 M^2
GLUCOSE SERPL-MCNC: 91 MG/DL (ref 70–110)
HCT VFR BLD AUTO: 44.9 % (ref 40–54)
HDLC SERPL-MCNC: 35 MG/DL (ref 40–75)
HDLC SERPL: 30.4 % (ref 20–50)
HGB BLD-MCNC: 14.3 G/DL (ref 14–18)
IMM GRANULOCYTES # BLD AUTO: 0.02 K/UL (ref 0–0.04)
IMM GRANULOCYTES NFR BLD AUTO: 0.3 % (ref 0–0.5)
LDLC SERPL CALC-MCNC: 61.6 MG/DL (ref 63–159)
LYMPHOCYTES # BLD AUTO: 1.9 K/UL (ref 1–4.8)
LYMPHOCYTES NFR BLD: 27.6 % (ref 18–48)
MCH RBC QN AUTO: 29.5 PG (ref 27–31)
MCHC RBC AUTO-ENTMCNC: 31.8 G/DL (ref 32–36)
MCV RBC AUTO: 93 FL (ref 82–98)
MONOCYTES # BLD AUTO: 0.6 K/UL (ref 0.3–1)
MONOCYTES NFR BLD: 8.5 % (ref 4–15)
NEUTROPHILS # BLD AUTO: 4.2 K/UL (ref 1.8–7.7)
NEUTROPHILS NFR BLD: 61.9 % (ref 38–73)
NONHDLC SERPL-MCNC: 80 MG/DL
NRBC BLD-RTO: 0 /100 WBC
PLATELET # BLD AUTO: 286 K/UL (ref 150–350)
PMV BLD AUTO: 10.5 FL (ref 9.2–12.9)
POTASSIUM SERPL-SCNC: 4.2 MMOL/L (ref 3.5–5.1)
PROT SERPL-MCNC: 7.4 G/DL (ref 6–8.4)
RBC # BLD AUTO: 4.84 M/UL (ref 4.6–6.2)
SODIUM SERPL-SCNC: 136 MMOL/L (ref 136–145)
TRIGL SERPL-MCNC: 92 MG/DL (ref 30–150)
TSH SERPL DL<=0.005 MIU/L-ACNC: 2.88 UIU/ML (ref 0.4–4)
WBC # BLD AUTO: 6.8 K/UL (ref 3.9–12.7)

## 2020-06-09 PROCEDURE — 85025 COMPLETE CBC W/AUTO DIFF WBC: CPT | Mod: HCNC

## 2020-06-09 PROCEDURE — 36415 COLL VENOUS BLD VENIPUNCTURE: CPT | Mod: HCNC,PO

## 2020-06-09 PROCEDURE — 80061 LIPID PANEL: CPT | Mod: HCNC

## 2020-06-09 PROCEDURE — 84443 ASSAY THYROID STIM HORMONE: CPT | Mod: HCNC

## 2020-06-09 PROCEDURE — 80053 COMPREHEN METABOLIC PANEL: CPT | Mod: HCNC

## 2020-06-17 ENCOUNTER — PATIENT MESSAGE (OUTPATIENT)
Dept: INTERNAL MEDICINE | Facility: CLINIC | Age: 80
End: 2020-06-17

## 2020-06-17 DIAGNOSIS — G89.29 CHRONIC NONINTRACTABLE HEADACHE, UNSPECIFIED HEADACHE TYPE: ICD-10-CM

## 2020-06-17 DIAGNOSIS — R51.9 CHRONIC NONINTRACTABLE HEADACHE, UNSPECIFIED HEADACHE TYPE: ICD-10-CM

## 2020-06-18 ENCOUNTER — TELEPHONE (OUTPATIENT)
Dept: INTERNAL MEDICINE | Facility: CLINIC | Age: 80
End: 2020-06-18

## 2020-06-18 RX ORDER — RIBOFLAVIN (VITAMIN B2) 100 MG
100 TABLET ORAL DAILY
Qty: 90 TABLET | Refills: 3 | Status: SHIPPED | OUTPATIENT
Start: 2020-06-18 | End: 2021-07-21

## 2020-06-18 NOTE — TELEPHONE ENCOUNTER
Riboflavin, patient can pick it up, it is OTC, they will fill it and call him per [email or web address]

## 2020-06-19 ENCOUNTER — OFFICE VISIT (OUTPATIENT)
Dept: DERMATOLOGY | Facility: CLINIC | Age: 80
End: 2020-06-19
Payer: MEDICARE

## 2020-06-19 VITALS — BODY MASS INDEX: 21.77 KG/M2 | WEIGHT: 139 LBS

## 2020-06-19 DIAGNOSIS — L81.4 LENTIGINES: ICD-10-CM

## 2020-06-19 DIAGNOSIS — L57.0 ACTINIC KERATOSIS: ICD-10-CM

## 2020-06-19 DIAGNOSIS — Z85.828 HISTORY OF SKIN CANCER: ICD-10-CM

## 2020-06-19 DIAGNOSIS — L82.1 SEBORRHEIC KERATOSES: Primary | ICD-10-CM

## 2020-06-19 DIAGNOSIS — Z12.83 SKIN EXAM, SCREENING FOR CANCER: ICD-10-CM

## 2020-06-19 PROCEDURE — 99999 PR PBB SHADOW E&M-EST. PATIENT-LVL III: CPT | Mod: PBBFAC,HCNC,, | Performed by: DERMATOLOGY

## 2020-06-19 PROCEDURE — 1101F PR PT FALLS ASSESS DOC 0-1 FALLS W/OUT INJ PAST YR: ICD-10-PCS | Mod: HCNC,CPTII,S$GLB, | Performed by: DERMATOLOGY

## 2020-06-19 PROCEDURE — 99999 PR PBB SHADOW E&M-EST. PATIENT-LVL III: ICD-10-PCS | Mod: PBBFAC,HCNC,, | Performed by: DERMATOLOGY

## 2020-06-19 PROCEDURE — 1159F MED LIST DOCD IN RCRD: CPT | Mod: HCNC,S$GLB,, | Performed by: DERMATOLOGY

## 2020-06-19 PROCEDURE — 1126F PR PAIN SEVERITY QUANTIFIED, NO PAIN PRESENT: ICD-10-PCS | Mod: HCNC,S$GLB,, | Performed by: DERMATOLOGY

## 2020-06-19 PROCEDURE — 1101F PT FALLS ASSESS-DOCD LE1/YR: CPT | Mod: HCNC,CPTII,S$GLB, | Performed by: DERMATOLOGY

## 2020-06-19 PROCEDURE — 99213 PR OFFICE/OUTPT VISIT, EST, LEVL III, 20-29 MIN: ICD-10-PCS | Mod: 25,HCNC,S$GLB, | Performed by: DERMATOLOGY

## 2020-06-19 PROCEDURE — 17000 DESTRUCT PREMALG LESION: CPT | Mod: HCNC,S$GLB,, | Performed by: DERMATOLOGY

## 2020-06-19 PROCEDURE — 1126F AMNT PAIN NOTED NONE PRSNT: CPT | Mod: HCNC,S$GLB,, | Performed by: DERMATOLOGY

## 2020-06-19 PROCEDURE — 99213 OFFICE O/P EST LOW 20 MIN: CPT | Mod: 25,HCNC,S$GLB, | Performed by: DERMATOLOGY

## 2020-06-19 PROCEDURE — 17000 PR DESTRUCTION(LASER SURGERY,CRYOSURGERY,CHEMOSURGERY),PREMALIGNANT LESIONS,FIRST LESION: ICD-10-PCS | Mod: HCNC,S$GLB,, | Performed by: DERMATOLOGY

## 2020-06-19 PROCEDURE — 1159F PR MEDICATION LIST DOCUMENTED IN MEDICAL RECORD: ICD-10-PCS | Mod: HCNC,S$GLB,, | Performed by: DERMATOLOGY

## 2020-06-19 NOTE — PROGRESS NOTES
Subjective:       Patient ID:  Paul Browning is a 79 y.o. male who presents for   Chief Complaint   Patient presents with    Follow-up     face and scalp, raised     This is a high risk patient here to check for the development of new lesions.   new lesions on hands and forehead no tx    Follow-up - Follow-up  Affected locations: scalp and face        Review of Systems   Constitutional: Negative for fever, chills, weight loss, weight gain, fatigue, night sweats and malaise.   Skin: Positive for wears hat. Negative for daily sunscreen use and activity-related sunscreen use.   Hematologic/Lymphatic: Bruises/bleeds easily.        Objective:    Physical Exam   Constitutional: He appears well-developed and well-nourished. No distress.   Neurological: He is alert and oriented to person, place, and time. He is not disoriented.   Psychiatric: He has a normal mood and affect.   Skin:   Areas Examined (abnormalities noted in diagram):   Scalp / Hair Palpated and Inspected  Head / Face Inspection Performed  Neck Inspection Performed  Chest / Axilla Inspection Performed  Back Inspection Performed  RUE Inspected  LUE Inspection Performed                   Diagram Legend     Erythematous scaling macule/papule c/w actinic keratosis       Vascular papule c/w angioma      Pigmented verrucoid papule/plaque c/w seborrheic keratosis      Yellow umbilicated papule c/w sebaceous hyperplasia      Irregularly shaped tan macule c/w lentigo     1-2 mm smooth white papules consistent with Milia      Movable subcutaneous cyst with punctum c/w epidermal inclusion cyst      Subcutaneous movable cyst c/w pilar cyst      Firm pink to brown papule c/w dermatofibroma      Pedunculated fleshy papule(s) c/w skin tag(s)      Evenly pigmented macule c/w junctional nevus     Mildly variegated pigmented, slightly irregular-bordered macule c/w mildly atypical nevus      Flesh colored to evenly pigmented papule c/w intradermal nevus        "Pink pearly papule/plaque c/w basal cell carcinoma      Erythematous hyperkeratotic cursted plaque c/w SCC      Surgical scar with no sign of skin cancer recurrence      Open and closed comedones      Inflammatory papules and pustules      Verrucoid papule consistent consistent with wart     Erythematous eczematous patches and plaques     Dystrophic onycholytic nail with subungual debris c/w onychomycosis     Umbilicated papule    Erythematous-base heme-crusted tan verrucoid plaque consistent with inflamed seborrheic keratosis     Erythematous Silvery Scaling Plaque c/w Psoriasis     See annotation      Assessment / Plan:        Seborrheic keratoses  reassurance  Brochure provided      Lentigines  The "ABCD" rules to observe pigmented lesions were reviewed.      Actinic keratosis   Cryosurgery Procedure Note    Verbal consent from the patient is obtained and the patient is aware of the precancerous quality and need for treatment of these lesions. Liquid nitrogen cryosurgery is applied to the 1 actinic keratoses, as detailed in the physical exam, to produce a freeze injury.    History of skin cancer  Comments:  scc scalp and left wrist no recurrences    Skin exam, screening for cancer  Area(s) of previous NMSC evaluated with no signs of recurrence.    Upper body skin examination performed today including at least 6 points as noted in physical examination. No lesions suspicious for malignancy noted.               Follow up in about 6 months (around 12/19/2020).  "

## 2020-07-15 ENCOUNTER — PES CALL (OUTPATIENT)
Dept: ADMINISTRATIVE | Facility: CLINIC | Age: 80
End: 2020-07-15

## 2020-07-15 NOTE — PROGRESS NOTES
"     Cardiology Clinic Note  Reason for Visit: CAD    HPI:     Paul Browning is a 79 y.o. M, who presents for follow up.    He has been having dyspnea with exertion. He was walking 13-14 blocks without issue, now having shortness of breath in 3-4 blocks. No chest discomfort. No claudication. He is also having L eye pain and planning to see an eye doc tomorrow.    Medical: carotid artery disease s/p PCI to proximal R ICA, HTN, HLD, CAD s/p MI/PCI/CABG (STEMI in 2014 with PCI to RCA; CABG with LIMA-LAD, SVG-ramus; ischemic symptoms were "fogginess and feeling weird, no chest pain or dyspnea"), BPH  Surgical: CABG (10/2014), cataracts, hernia  Family: heart failure, arrhythmia  Social: former smoker of 2 PPD x20y (quit 1988), quit drinking in 2012    ROS:    Constitution: Negative for fever or chills.  HENT: Negative for  headaches.  Eyes: Negative for blurred vision.   Cardiovascular: See above  Pulmonary: Positive for SOB. Negative for cough.   Gastrointestinal: Negative for nausea/vomiting.   : Negative for dysuria.   Skin: Negative for rashes.  Neurological: Negative for focal weakness.  Psychological: Negative for depression.  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    Hyperlipidemia     Hypertension     Ischemic cardiomyopathy 11/5/2014    MDD (major depressive disorder), recurrent episode, mild 2/17/2016    Osteoporosis 7/20/2016    Peripheral arterial disease 3/18/2016    Sarcoid     Squamous cell carcinoma 09/2016, 5/2016    crown of scalp, left wrist     Past Surgical History:   Procedure Laterality Date    CARDIAC SURGERY      CAROTID STENT N/A 10/14/2019    Procedure: INSERTION, STENT, " ARTERY, CAROTID;  Surgeon: Artie Kebede MD;  Location: [facility] CATH LAB;  Service: Cardiology;  Laterality: N/A;    CATARACT EXTRACTION W/  INTRAOCULAR LENS IMPLANT Right 07/17/2018    Dr. Talamantes    CATARACT EXTRACTION W/  INTRAOCULAR LENS IMPLANT Left 07/31/2018    Dr. Talamantes    CORONARY ARTERY BYPASS GRAFT      x2  10/2014    ESOPHAGOGASTRODUODENOSCOPY N/A 4/8/2019    Procedure: ESOPHAGOGASTRODUODENOSCOPY (EGD)/poss rfa;  Surgeon: Clifford De La Torre MD;  Location: [facility] ENDO (2ND FLR);  Service: Endoscopy;  Laterality: N/A;    ESOPHAGOGASTRODUODENOSCOPY (EGD) WITH RADIOFREQUENCY TREATMENT OF GASTROESOPHAGEAL JUNCTION      INJECTION OF ANESTHETIC AGENT AROUND MEDIAL BRANCH NERVES INNERVATING CERVICAL FACET JOINT Bilateral 1/9/2020    Procedure: INJECTION, MBB--Bilateral Cervical- Third Occipital nerve, C2-3--ASA okay for pt to continue taking per provider.;  Surgeon: Tip Mera Jr., MD;  Location: [facility] PAIN MGT;  Service: Pain Management;  Laterality: Bilateral;    INTRAOCULAR PROSTHESES INSERTION Right 7/17/2018    Procedure: INSERTION, INTRAOCULAR LENS PROSTHESIS;  Surgeon: León Talamantes MD;  Location: [address];  Service: Ophthalmology;  Laterality: Right;    INTRAOCULAR PROSTHESES INSERTION Left 7/31/2018    Procedure: INSERTION, INTRAOCULAR LENS PROSTHESIS;  Surgeon: León Talamantes MD;  Location: [address];  Service: Ophthalmology;  Laterality: Left;    PHACOEMULSIFICATION OF CATARACT Right 7/17/2018    Procedure: PHACOEMULSIFICATION, CATARACT;  Surgeon: León Talamantes MD;  Location: [address];  Service: Ophthalmology;  Laterality: Right;    PHACOEMULSIFICATION OF CATARACT Left 7/31/2018    Procedure: PHACOEMULSIFICATION, CATARACT;  Surgeon: León Talamantes MD;  Location: [address];  Service: Ophthalmology;  Laterality: Left;    RADIOFREQUENCY THERMOCOAGULATION Bilateral 1/30/2020    Procedure: RADIOFREQUENCY THERMAL  COAGULATION--Bilateral C2-3 and Third Occipital Nerve;  Surgeon: Tip Mera Jr., MD;  Location: [facility] PAIN MGT;  Service: Pain Management;  Laterality: Bilateral;    UMBILICAL HERNIA REPAIR       Allergies:     Review of patient's allergies indicates:   Allergen Reactions    Morphine Shortness Of Breath     Medications:     Current Outpatient Medications on File Prior to Visit   Medication Sig Dispense Refill    acetaminophen (TYLENOL) 500 MG tablet Take 1,000 mg by mouth every 6 (six) hours as needed for Pain.      albuterol (PROVENTIL) 2.5 mg /3 mL (0.083 %) nebulizer solution Take 3 mLs (2.5 mg total) by nebulization every 6 (six) hours as needed for Wheezing. Rescue 1 Box 0    alendronate (FOSAMAX) 70 MG tablet 1 tab PO qwk in the am with a full glass of water on an empty stomach. Do not consume food or lie down for at least 30 minutes afterwards. 12 tablet 3    ALPRAZolam (XANAX) 0.25 MG tablet Take 1 tablet (0.25 mg total) by mouth 3 (three) times daily as needed for Anxiety. 45 tablet 0    aspirin (ECOTRIN) 81 MG EC tablet Take 81 mg by mouth once daily.      atorvastatin (LIPITOR) 40 MG tablet TAKE 1 TABLET BY MOUTH EVERY DAY 90 tablet 3    fluorouracil 5% 5 % Soln Use hs for 2 weeks 10 mL 1    magnesium oxide (MAG-OX) 400 mg (241.3 mg magnesium) tablet Take 1 tablet (400 mg total) by mouth once daily. 30 tablet 3    omega-3 fatty acids-vitamin E (FISH OIL) 1,000 mg Cap Take 1 tablet by mouth 2 (two) times daily. (Patient taking differently: Take 1 tablet by mouth once daily. ) 60 each 11    pantoprazole (PROTONIX) 40 MG tablet Take 40 mg by mouth once daily.       pantoprazole (PROTONIX) 40 MG tablet TAKE 1 TABLET BY MOUTH TWICE DAILY 180 tablet 0    riboflavin, vitamin B2, (VITAMIN B-2) 100 mg Tab tablet Take 1 tablet (100 mg total) by mouth once daily. 90 tablet 3    sertraline (ZOLOFT) 50 MG tablet TAKE 1 TABLET BY MOUTH EVERY DAY 90 tablet 3    triamcinolone acetonide 0.1%  "(KENALOG) 0.1 % cream Apply topically 2 (two) times daily. (Patient not taking: Reported on 2020) 45 g 1     Current Facility-Administered Medications on File Prior to Visit   Medication Dose Route Frequency Provider Last Rate Last Dose    0.9%  NaCl infusion  500 mL Intravenous Continuous Tip Mera Jr., MD        lidocaine (PF) 10 mg/ml (1%) injection 10 mg  1 mL Intradermal Once Tip Mera Jr., MD         Social History:     Social History     Tobacco Use    Smoking status: Former Smoker     Packs/day: 2.00     Years: 20.00     Pack years: 40.00     Types: Cigarettes     Quit date: 1988     Years since quittin.4    Smokeless tobacco: Never Used   Substance Use Topics    Alcohol use: No     Comment: quit drinking beer      Family History:     Family History   Problem Relation Age of Onset    Arrhythmia Mother     Heart disease Mother     Heart failure Brother     No Known Problems Daughter     No Known Problems Son     Colon cancer Neg Hx     Inflammatory bowel disease Neg Hx     Celiac disease Neg Hx     Melanoma Neg Hx     Amblyopia Neg Hx     Blindness Neg Hx     Cataracts Neg Hx     Glaucoma Neg Hx     Macular degeneration Neg Hx     Retinal detachment Neg Hx     Strabismus Neg Hx      Physical Exam:   /66 (BP Location: Left arm, Patient Position: Sitting, BP Method: Large (Automatic))   Pulse 71   Ht 5' 7" (1.702 m)   Wt 62.9 kg (138 lb 10.7 oz)   SpO2 98%   BMI 21.72 kg/m²      Constitutional: No apparent distress, conversant  HEENT: Sclera anicteric, extraocular movements intact  Neck: No jugular venous distension, no carotid bruits  Chest: Central surgical scar  CV: Regular rate and rhythm, occasional ectopic beat, no murmurs  Pulm: Clear to auscultation bilaterally  GI: Abdomen soft, no palpable masses  Extremities: No lower extremity edema, warm with palpable pulses  Skin: No ecchymosis, erythema, or ulcers  Psych: Alert and oriented to " person place location, appropriate affect  Neuro: No focal deficits    Labs:     Blood Tests:  Lab Results   Component Value Date    BNP 86 01/16/2015     06/09/2020    K 4.2 06/09/2020     06/09/2020    CO2 27 06/09/2020    BUN 17 06/09/2020    CREATININE 1.3 06/09/2020    GLU 91 06/09/2020    HGBA1C 5.2 10/15/2014    MG 2.2 07/14/2015    AST 13 06/09/2020    ALT 9 (L) 06/09/2020    ALBUMIN 3.8 06/09/2020    PROT 7.4 06/09/2020    BILITOT 1.1 (H) 06/09/2020    WBC 6.80 06/09/2020    HGB 14.3 06/09/2020    HCT 44.9 06/09/2020    HCT 22 (L) 10/21/2014    MCV 93 06/09/2020     06/09/2020    INR 1.0 10/14/2019    INR 2.7 (H) 10/16/2006    PSA 3.6 02/17/2016    TSH 2.879 06/09/2020       Lab Results   Component Value Date    CHOL 115 (L) 06/09/2020    HDL 35 (L) 06/09/2020    TRIG 92 06/09/2020       Lab Results   Component Value Date    LDLCALC 61.6 (L) 06/09/2020       Urine Tests:  Lab Results   Component Value Date    COLORU Elly 06/09/2020    APPEARANCEUA Cloudy (A) 06/09/2020    PHUR 6.0 06/09/2020    SPECGRAV 1.025 06/09/2020    PROTEINUA Negative 06/09/2020    GLUCUA Negative 06/09/2020    KETONESU Negative 06/09/2020    BILIRUBINUA Negative 06/09/2020    OCCULTUA Negative 06/09/2020    NITRITE Negative 06/09/2020    UROBILINOGEN Negative 10/17/2018    LEUKOCYTESUR 1+ (A) 06/09/2020       Imaging:     Echocardiogram  TTE 1/26/15  CONCLUSIONS     1 - Normal left ventricular systolic function (EF 60-65%).     2 - Right ventricular enlargement with normal systolic function.     3 - Normal left ventricular diastolic function.     4 - Biatrial enlargement.     5 - Aortic sclerosis with normal leaflet mobility    Stress testing  PET 1/7/19  · There is a small to moderate sized, moderate to severe intensity basal inferolateral fixed perfusion abnormality involving 10% of the LV myocardium in the OM2 territory consistent with non-transmural infarct. On past angiogram, the OM2 was subtotally  occluded.  · Whole heart absolute myocardial perfusion (cc/min/g) averaged 1.06 rest (which is elevated), 1.47 at stress (which is mildly reduced), and 1.37 CFR (which is moderately reduced impart due to elevated resting flow).  · Within the defect absolute myocardial perfusion (cc/min/gm) averaged 0.51 at rest, 0.82 at stress and CFR was 1.78, which equates to severely reduced coronary flow capacity in 10% of the myocardium (within the OM2 territory).  · Gated perfusion images showed an ejection fraction of 62 % at rest and 84 % during stress.  · There is basal inferolateral wall hypokinesis and hypokinesis at rest and stress.  · LV cavity size is normal at rest and normal at stress.  · The EKG portion of this study is negative for myocardial ischemia.  · Arrhythmias during stress: rare PVCs.  · The patient reported no symptoms during the stress test.  · Compared to the previous study from 8-, there are no significant changes.    PET 8/25/14  CONCLUSIONS: ABNORMAL MYOCARDIAL PERFUSION PET STRESS TEST  1. There is a large sized moderate to severe intensity fixed defect consistent with non-transmural infarct with rhonda-infarct ischemia in the base to mid lateral and inferolateral walls in the usual distribution of the Left Circumflex Artery and Posterior Lateral Branch. This defect comprises 20 % of the left ventricular myocardium.   2. There is abnormal wall motion at rest showing hypokinesis of the inferolateral and inferobasilar walls of the left ventricle. There is abnormal wall motion at stress showing hypokinesis of the inferolateral and inferobasilar walls of the left   ventricle.   3. Visually estimated LV function is normal at rest and normal at stress.   4. The ventricular volumes are normal at rest and stress.   5. The extracardiac distribution of radioactivity is normal.   6. When compared to the previous study from 2011, the previous infarct in the OM territory is still present however there is now  a confluent infarct in the PL territory.  There is no evidence of ischemia in the LAD or D1 territory.    Cath lab  Carotid stent 10/14/19  · A STENT PRECISE PRO 8.0 X 40 stent was successfully placed.  · Estimated blood loss: between 50 mL and 150 mL  · Successful cartotid stent with proximal emboli protection.  · Follow-up in 1 month with carotid ultrasound.  · DAPT x1 month.    [facility] 8/19/14  DIAGNOSTIC:     Patient has a right dominant coronary artery.      - Left Main Coronary Artery:           The distal LM has a 30% stenosis. There is JOVANNA 3 flow.     - Left Anterior Descending Artery:           The ostial LAD has a 80% stenosis. There is JOVANNA 3 flow.     - Left Circumflex Artery:           The LCX has luminal irregularities. There is JOVANNA 3 flow.     - Right Coronary Artery:           The proximal RCA is occluded. There is JOVANNA 0 flow. Proximal stented with 3 mm stent and distal with 2.25 stent.     - Right Ventricle:           The right ventricle. There is JOVANNA 3 flow.     - Common Femoral Artery:           The right CFA has luminal irregularities. There is JOVANNA 3 flow.     - Femoral Artery:           The femoral artery is normal. There is JOVANNA 3 flow.     - D1:           The proximal D1 has a 90% stenosis. There is JOVANNA 3 flow.     - OM2:           The proximal OM2 has subtotal. There is JOVANNA 3 flow.  INTERVENTION:       Proximal RCA:            The lesion was successfully intervened. Post-stenosis of 0%, post-JOVANNA 3 flow and TMP grade 2. The vessel was accessed natively.  The following items were used: Cath Pronto Lp, Stent Xience Prime Rx 3.0x33 (JONNA) and Stent Xience Rx 2.25x12 (JONNA).    Other  Carotid US 11/14/19  · There is 0-19% right Internal Carotid Stenosis.  · There is 0-19% left Internal Carotid Stenosis.    Carotid US 10/30/15  CONCLUSIONS   There is 40 - 49% right Internal Carotid stenosis.  There is 0 - 19% left Internal Carotid stenosis.    Carotid US 8/19/14  CONCLUSIONS   There is 40 - 49%  right Internal Carotid stenosis.  There is 20 - 39% left Internal Carotid stenosis.    EK16  Normal sinus rhythm  Nonspecific T wave abnormality  Abnormal ECG    Assessment:     1. Dyspnea on exertion    2. Atherosclerosis of aorta    3. Coronary artery disease involving coronary bypass graft of native heart without angina pectoris    4. Bilateral carotid artery stenosis    5. Essential hypertension    6. Hyperlipidemia, unspecified hyperlipidemia type    7. Peripheral arterial disease    8. Chronic obstructive pulmonary disease, unspecified COPD type    9. CKD (chronic kidney disease) stage 3, GFR 30-59 ml/min      Plan:     Shortness of breath  New, worsening  Possibly due to CAD vs DD  Obtain echo  Start on metoprolol    Stenosis of right carotid artery  --s/p PCI to R ICA on 10/14/19    --stable  --continue ASA, statin (LDL at goal)    CAD (coronary artery disease)  --PET stress test identified a fixed defect consistent with his subtotal OM2 on his last angiogram    --ASA, statin, fish oil  --toprol, as above     Essential hypertension  --stable (/60 at recent PCP visit)  --remains at goal off meds     Hyperlipidemia  --LDL 61 on 20  --continue atorva 40 qd     History of third degree heart block  --related to STEMI incident. No recurrence since that time.    Signed:  Torin Kelly MD  Cardiology     2020 9:25 AM    Follow-up:     Future Appointments   Date Time Provider Department Center   2020  8:30 AM Yash Kelly III, MD [facility] CARDIO [facility]   2020  1:00 PM Jude Collazo MD [facility] OPHTHAL [facility]   2020 12:30 PM Bijal Trotter NP [facility]

## 2020-07-20 ENCOUNTER — HOSPITAL ENCOUNTER (OUTPATIENT)
Dept: CARDIOLOGY | Facility: HOSPITAL | Age: 80
Discharge: HOME | End: 2020-07-20
Attending: INTERNAL MEDICINE
Payer: MEDICARE

## 2020-07-20 ENCOUNTER — OFFICE VISIT (OUTPATIENT)
Dept: CARDIOLOGY | Facility: CLINIC | Age: 80
End: 2020-07-20
Payer: MEDICARE

## 2020-07-20 ENCOUNTER — HOSPITAL ENCOUNTER (EMERGENCY)
Facility: HOSPITAL | Age: 80
Discharge: HOME | End: 2020-07-20
Attending: EMERGENCY MEDICINE
Payer: MEDICARE

## 2020-07-20 VITALS
WEIGHT: 137 LBS | HEART RATE: 87 BPM | RESPIRATION RATE: 20 BRPM | OXYGEN SATURATION: 97 % | WEIGHT: 138.69 LBS | HEART RATE: 71 BPM | SYSTOLIC BLOOD PRESSURE: 138 MMHG | OXYGEN SATURATION: 98 % | SYSTOLIC BLOOD PRESSURE: 174 MMHG | DIASTOLIC BLOOD PRESSURE: 87 MMHG | HEIGHT: 67 IN | HEIGHT: 67 IN | BODY MASS INDEX: 21.5 KG/M2 | BODY MASS INDEX: 21.77 KG/M2 | DIASTOLIC BLOOD PRESSURE: 66 MMHG | TEMPERATURE: 97 F

## 2020-07-20 VITALS
SYSTOLIC BLOOD PRESSURE: 138 MMHG | DIASTOLIC BLOOD PRESSURE: 66 MMHG | WEIGHT: 138 LBS | HEART RATE: 72 BPM | HEIGHT: 67 IN | BODY MASS INDEX: 21.66 KG/M2

## 2020-07-20 DIAGNOSIS — J44.9 CHRONIC OBSTRUCTIVE PULMONARY DISEASE, UNSPECIFIED COPD TYPE: ICD-10-CM

## 2020-07-20 DIAGNOSIS — R06.09 DYSPNEA ON EXERTION: ICD-10-CM

## 2020-07-20 DIAGNOSIS — I70.0 ATHEROSCLEROSIS OF AORTA: ICD-10-CM

## 2020-07-20 DIAGNOSIS — N18.30 CKD (CHRONIC KIDNEY DISEASE) STAGE 3, GFR 30-59 ML/MIN: ICD-10-CM

## 2020-07-20 DIAGNOSIS — R11.2 NON-INTRACTABLE VOMITING WITH NAUSEA, UNSPECIFIED VOMITING TYPE: ICD-10-CM

## 2020-07-20 DIAGNOSIS — H57.12 ACUTE LEFT EYE PAIN: Primary | ICD-10-CM

## 2020-07-20 DIAGNOSIS — I25.810 CORONARY ARTERY DISEASE INVOLVING CORONARY BYPASS GRAFT OF NATIVE HEART WITHOUT ANGINA PECTORIS: Chronic | ICD-10-CM

## 2020-07-20 DIAGNOSIS — I65.23 BILATERAL CAROTID ARTERY STENOSIS: ICD-10-CM

## 2020-07-20 DIAGNOSIS — E78.5 HYPERLIPIDEMIA, UNSPECIFIED HYPERLIPIDEMIA TYPE: Chronic | ICD-10-CM

## 2020-07-20 DIAGNOSIS — I73.9 PERIPHERAL ARTERIAL DISEASE: ICD-10-CM

## 2020-07-20 DIAGNOSIS — I10 ESSENTIAL HYPERTENSION: Chronic | ICD-10-CM

## 2020-07-20 DIAGNOSIS — R06.02 SHORTNESS OF BREATH: ICD-10-CM

## 2020-07-20 DIAGNOSIS — R06.09 DYSPNEA ON EXERTION: Primary | ICD-10-CM

## 2020-07-20 LAB
ALBUMIN SERPL BCP-MCNC: 3.7 G/DL (ref 3.5–5.2)
ALP SERPL-CCNC: 61 U/L (ref 55–135)
ALT SERPL W/O P-5'-P-CCNC: 13 U/L (ref 10–44)
ANION GAP SERPL CALC-SCNC: 7 MMOL/L (ref 8–16)
ASCENDING AORTA: 3.12 CM
AST SERPL-CCNC: 18 U/L (ref 10–40)
AV INDEX (PROSTH): 1.07
AV MEAN GRADIENT: 2 MMHG
AV PEAK GRADIENT: 4 MMHG
AV VALVE AREA: 3.84 CM2
AV VELOCITY RATIO: 0.95
BASOPHILS # BLD AUTO: 0.02 K/UL (ref 0–0.2)
BASOPHILS NFR BLD: 0.4 % (ref 0–1.9)
BILIRUB SERPL-MCNC: 0.8 MG/DL (ref 0.1–1)
BNP SERPL-MCNC: 76 PG/ML (ref 0–99)
BSA FOR ECHO PROCEDURE: 1.72 M2
BUN SERPL-MCNC: 16 MG/DL (ref 8–23)
CALCIUM SERPL-MCNC: 8.6 MG/DL (ref 8.7–10.5)
CHLORIDE SERPL-SCNC: 103 MMOL/L (ref 95–110)
CO2 SERPL-SCNC: 27 MMOL/L (ref 23–29)
CREAT SERPL-MCNC: 1 MG/DL (ref 0.5–1.4)
CV ECHO LV RWT: 0.33 CM
DIFFERENTIAL METHOD: ABNORMAL
DOP CALC AO PEAK VEL: 0.98 M/S
DOP CALC AO VTI: 20.81 CM
DOP CALC LVOT AREA: 3.6 CM2
DOP CALC LVOT DIAMETER: 2.14 CM
DOP CALC LVOT PEAK VEL: 0.93 M/S
DOP CALC LVOT STROKE VOLUME: 79.99 CM3
DOP CALCLVOT PEAK VEL VTI: 22.25 CM
E WAVE DECELERATION TIME: 213.19 MSEC
E/A RATIO: 0.54
E/E' RATIO: 12.44 M/S
ECHO LV POSTERIOR WALL: 0.73 CM (ref 0.6–1.1)
EOSINOPHIL # BLD AUTO: 0 K/UL (ref 0–0.5)
EOSINOPHIL NFR BLD: 0.4 % (ref 0–8)
ERYTHROCYTE [DISTWIDTH] IN BLOOD BY AUTOMATED COUNT: 14.2 % (ref 11.5–14.5)
EST. GFR  (AFRICAN AMERICAN): >60 ML/MIN/1.73 M^2
EST. GFR  (NON AFRICAN AMERICAN): >60 ML/MIN/1.73 M^2
FRACTIONAL SHORTENING: 17 % (ref 28–44)
GLUCOSE SERPL-MCNC: 111 MG/DL (ref 70–110)
HCT VFR BLD AUTO: 40 % (ref 40–54)
HGB BLD-MCNC: 13.6 G/DL (ref 14–18)
IMM GRANULOCYTES # BLD AUTO: 0.02 K/UL (ref 0–0.04)
IMM GRANULOCYTES NFR BLD AUTO: 0.4 % (ref 0–0.5)
INTERVENTRICULAR SEPTUM: 0.94 CM (ref 0.6–1.1)
LA MAJOR: 4.39 CM
LA MINOR: 4.33 CM
LA WIDTH: 3.95 CM
LEFT ATRIUM SIZE: 3.9 CM
LEFT ATRIUM VOLUME INDEX: 33.1 ML/M2
LEFT ATRIUM VOLUME: 57.09 CM3
LEFT INTERNAL DIMENSION IN SYSTOLE: 3.66 CM (ref 2.1–4)
LEFT VENTRICLE DIASTOLIC VOLUME INDEX: 58.21 ML/M2
LEFT VENTRICLE DIASTOLIC VOLUME: 100.54 ML
LEFT VENTRICLE MASS INDEX: 67 G/M2
LEFT VENTRICLE SYSTOLIC VOLUME INDEX: 32.8 ML/M2
LEFT VENTRICLE SYSTOLIC VOLUME: 56.57 ML
LEFT VENTRICULAR INTERNAL DIMENSION IN DIASTOLE: 4.4 CM (ref 3.5–6)
LEFT VENTRICULAR MASS: 115.8 G
LIPASE SERPL-CCNC: 25 U/L (ref 4–60)
LV LATERAL E/E' RATIO: 9.33 M/S
LV SEPTAL E/E' RATIO: 18.67 M/S
LYMPHOCYTES # BLD AUTO: 1 K/UL (ref 1–4.8)
LYMPHOCYTES NFR BLD: 18.5 % (ref 18–48)
MCH RBC QN AUTO: 30.5 PG (ref 27–31)
MCHC RBC AUTO-ENTMCNC: 34 G/DL (ref 32–36)
MCV RBC AUTO: 90 FL (ref 82–98)
MONOCYTES # BLD AUTO: 0.4 K/UL (ref 0.3–1)
MONOCYTES NFR BLD: 7.3 % (ref 4–15)
MV PEAK A VEL: 1.03 M/S
MV PEAK E VEL: 0.56 M/S
MV STENOSIS PRESSURE HALF TIME: 61.82 MS
MV VALVE AREA P 1/2 METHOD: 3.56 CM2
NEUTROPHILS # BLD AUTO: 3.8 K/UL (ref 1.8–7.7)
NEUTROPHILS NFR BLD: 73 % (ref 38–73)
NRBC BLD-RTO: 0 /100 WBC
PISA TR MAX VEL: 2.61 M/S
PLATELET # BLD AUTO: 240 K/UL (ref 150–350)
PMV BLD AUTO: 10.4 FL (ref 9.2–12.9)
POTASSIUM SERPL-SCNC: 3.8 MMOL/L (ref 3.5–5.1)
PROT SERPL-MCNC: 7.1 G/DL (ref 6–8.4)
RA MAJOR: 4.32 CM
RA WIDTH: 3.87 CM
RBC # BLD AUTO: 4.46 M/UL (ref 4.6–6.2)
RIGHT VENTRICULAR END-DIASTOLIC DIMENSION: 3.75 CM
SINUS: 3.44 CM
SODIUM SERPL-SCNC: 137 MMOL/L (ref 136–145)
STJ: 3.22 CM
TDI LATERAL: 0.06 M/S
TDI SEPTAL: 0.03 M/S
TDI: 0.05 M/S
TR MAX PG: 27 MMHG
TRICUSPID ANNULAR PLANE SYSTOLIC EXCURSION: 1.65 CM
TROPONIN I SERPL DL<=0.01 NG/ML-MCNC: <0.006 NG/ML (ref 0–0.03)
WBC # BLD AUTO: 5.19 K/UL (ref 3.9–12.7)

## 2020-07-20 PROCEDURE — 83690 ASSAY OF LIPASE: CPT | Mod: HCNC

## 2020-07-20 PROCEDURE — 1125F AMNT PAIN NOTED PAIN PRSNT: CPT | Mod: HCNC,S$GLB,, | Performed by: INTERNAL MEDICINE

## 2020-07-20 PROCEDURE — 1101F PT FALLS ASSESS-DOCD LE1/YR: CPT | Mod: HCNC,CPTII,S$GLB, | Performed by: INTERNAL MEDICINE

## 2020-07-20 PROCEDURE — 84484 ASSAY OF TROPONIN QUANT: CPT | Mod: HCNC

## 2020-07-20 PROCEDURE — 3078F DIAST BP <80 MM HG: CPT | Mod: HCNC,CPTII,S$GLB, | Performed by: INTERNAL MEDICINE

## 2020-07-20 PROCEDURE — 93306 ECHO (CUPID ONLY): ICD-10-PCS | Mod: 26,HCNC,, | Performed by: INTERNAL MEDICINE

## 2020-07-20 PROCEDURE — 99214 OFFICE O/P EST MOD 30 MIN: CPT | Mod: HCNC,S$GLB,, | Performed by: INTERNAL MEDICINE

## 2020-07-20 PROCEDURE — 93306 TTE W/DOPPLER COMPLETE: CPT | Mod: 26,HCNC,, | Performed by: INTERNAL MEDICINE

## 2020-07-20 PROCEDURE — 3075F PR MOST RECENT SYSTOLIC BLOOD PRESS GE 130-139MM HG: ICD-10-PCS | Mod: HCNC,CPTII,S$GLB, | Performed by: INTERNAL MEDICINE

## 2020-07-20 PROCEDURE — 99499 UNLISTED E&M SERVICE: CPT | Mod: HCNC,S$GLB,, | Performed by: INTERNAL MEDICINE

## 2020-07-20 PROCEDURE — 93306 TTE W/DOPPLER COMPLETE: CPT | Mod: HCNC

## 2020-07-20 PROCEDURE — 99285 EMERGENCY DEPT VISIT HI MDM: CPT | Mod: HCNC,,, | Performed by: EMERGENCY MEDICINE

## 2020-07-20 PROCEDURE — 63600175 PHARM REV CODE 636 W HCPCS: Mod: HCNC | Performed by: EMERGENCY MEDICINE

## 2020-07-20 PROCEDURE — 1159F PR MEDICATION LIST DOCUMENTED IN MEDICAL RECORD: ICD-10-PCS | Mod: HCNC,S$GLB,, | Performed by: INTERNAL MEDICINE

## 2020-07-20 PROCEDURE — 96374 THER/PROPH/DIAG INJ IV PUSH: CPT | Mod: HCNC

## 2020-07-20 PROCEDURE — 99499 RISK ADDL DX/OHS AUDIT: ICD-10-PCS | Mod: HCNC,S$GLB,, | Performed by: INTERNAL MEDICINE

## 2020-07-20 PROCEDURE — 99284 EMERGENCY DEPT VISIT MOD MDM: CPT | Mod: 25,HCNC

## 2020-07-20 PROCEDURE — 99214 PR OFFICE/OUTPT VISIT, EST, LEVL IV, 30-39 MIN: ICD-10-PCS | Mod: HCNC,S$GLB,, | Performed by: INTERNAL MEDICINE

## 2020-07-20 PROCEDURE — 99999 PR PBB SHADOW E&M-EST. PATIENT-LVL IV: CPT | Mod: PBBFAC,HCNC,, | Performed by: INTERNAL MEDICINE

## 2020-07-20 PROCEDURE — 81003 URINALYSIS AUTO W/O SCOPE: CPT | Mod: HCNC

## 2020-07-20 PROCEDURE — 3078F PR MOST RECENT DIASTOLIC BLOOD PRESSURE < 80 MM HG: ICD-10-PCS | Mod: HCNC,CPTII,S$GLB, | Performed by: INTERNAL MEDICINE

## 2020-07-20 PROCEDURE — 3075F SYST BP GE 130 - 139MM HG: CPT | Mod: HCNC,CPTII,S$GLB, | Performed by: INTERNAL MEDICINE

## 2020-07-20 PROCEDURE — 99999 PR PBB SHADOW E&M-EST. PATIENT-LVL IV: ICD-10-PCS | Mod: PBBFAC,HCNC,, | Performed by: INTERNAL MEDICINE

## 2020-07-20 PROCEDURE — 1159F MED LIST DOCD IN RCRD: CPT | Mod: HCNC,S$GLB,, | Performed by: INTERNAL MEDICINE

## 2020-07-20 PROCEDURE — 83880 ASSAY OF NATRIURETIC PEPTIDE: CPT | Mod: HCNC

## 2020-07-20 PROCEDURE — 1125F PR PAIN SEVERITY QUANTIFIED, PAIN PRESENT: ICD-10-PCS | Mod: HCNC,S$GLB,, | Performed by: INTERNAL MEDICINE

## 2020-07-20 PROCEDURE — 80053 COMPREHEN METABOLIC PANEL: CPT | Mod: HCNC

## 2020-07-20 PROCEDURE — 1101F PR PT FALLS ASSESS DOC 0-1 FALLS W/OUT INJ PAST YR: ICD-10-PCS | Mod: HCNC,CPTII,S$GLB, | Performed by: INTERNAL MEDICINE

## 2020-07-20 PROCEDURE — 85025 COMPLETE CBC W/AUTO DIFF WBC: CPT | Mod: HCNC

## 2020-07-20 PROCEDURE — 99285 PR EMERGENCY DEPT VISIT,LEVEL V: ICD-10-PCS | Mod: HCNC,,, | Performed by: EMERGENCY MEDICINE

## 2020-07-20 PROCEDURE — 25000003 PHARM REV CODE 250: Mod: HCNC | Performed by: EMERGENCY MEDICINE

## 2020-07-20 RX ORDER — TETRACAINE HYDROCHLORIDE 5 MG/ML
2 SOLUTION OPHTHALMIC
Status: COMPLETED | OUTPATIENT
Start: 2020-07-20 | End: 2020-07-20

## 2020-07-20 RX ORDER — METOPROLOL SUCCINATE 25 MG/1
25 TABLET, EXTENDED RELEASE ORAL DAILY
Qty: 30 TABLET | Refills: 11 | Status: SHIPPED | OUTPATIENT
Start: 2020-07-20 | End: 2020-07-29

## 2020-07-20 RX ORDER — ONDANSETRON 2 MG/ML
4 INJECTION INTRAMUSCULAR; INTRAVENOUS
Status: COMPLETED | OUTPATIENT
Start: 2020-07-20 | End: 2020-07-20

## 2020-07-20 RX ADMIN — FLUORESCEIN SODIUM 1 EACH: 1 STRIP OPHTHALMIC at 10:07

## 2020-07-20 RX ADMIN — TETRACAINE HYDROCHLORIDE 2 DROP: 5 SOLUTION OPHTHALMIC at 10:07

## 2020-07-20 RX ADMIN — ONDANSETRON 4 MG: 2 INJECTION INTRAMUSCULAR; INTRAVENOUS at 10:07

## 2020-07-21 ENCOUNTER — OFFICE VISIT (OUTPATIENT)
Dept: OPHTHALMOLOGY | Facility: CLINIC | Age: 80
End: 2020-07-21
Payer: MEDICARE

## 2020-07-21 DIAGNOSIS — H53.453 OTHER LOCALIZED VISUAL FIELD DEFECT, BILATERAL: ICD-10-CM

## 2020-07-21 DIAGNOSIS — H53.461 RIGHT HOMONYMOUS HEMIANOPSIA: Primary | ICD-10-CM

## 2020-07-21 LAB
BILIRUB UR QL STRIP: NEGATIVE
CLARITY UR REFRACT.AUTO: ABNORMAL
COLOR UR AUTO: ABNORMAL
GLUCOSE UR QL STRIP: NEGATIVE
HGB UR QL STRIP: NEGATIVE
KETONES UR QL STRIP: NEGATIVE
LEUKOCYTE ESTERASE UR QL STRIP: NEGATIVE
NITRITE UR QL STRIP: NEGATIVE
PH UR STRIP: 5 [PH] (ref 5–8)
PROT UR QL STRIP: NEGATIVE
SP GR UR STRIP: 1.02 (ref 1–1.03)
URN SPEC COLLECT METH UR: ABNORMAL

## 2020-07-21 PROCEDURE — 92133 CPTRZD OPH DX IMG PST SGM ON: CPT | Mod: HCNC,S$GLB,, | Performed by: OPHTHALMOLOGY

## 2020-07-21 PROCEDURE — 92014 PR EYE EXAM, EST PATIENT,COMPREHESV: ICD-10-PCS | Mod: HCNC,S$GLB,, | Performed by: OPHTHALMOLOGY

## 2020-07-21 PROCEDURE — 99999 PR PBB SHADOW E&M-EST. PATIENT-LVL III: ICD-10-PCS | Mod: PBBFAC,HCNC,, | Performed by: OPHTHALMOLOGY

## 2020-07-21 PROCEDURE — 92014 COMPRE OPH EXAM EST PT 1/>: CPT | Mod: HCNC,S$GLB,, | Performed by: OPHTHALMOLOGY

## 2020-07-21 PROCEDURE — 99999 PR PBB SHADOW E&M-EST. PATIENT-LVL III: CPT | Mod: PBBFAC,HCNC,, | Performed by: OPHTHALMOLOGY

## 2020-07-21 PROCEDURE — 92133 POSTERIOR SEGMENT OCT OPTIC NERVE(OCULAR COHERENCE TOMOGRAPHY) - OU - BOTH EYES: ICD-10-PCS | Mod: HCNC,S$GLB,, | Performed by: OPHTHALMOLOGY

## 2020-07-21 NOTE — LETTER
[facility] Ophthalmology  [address]  [facility] [address]  Phone: [phone]  Fax: [phone]   July 21, 2020    Beatrice Mccall MD  [address] [address]    Patient: Paul Browning   MR Number: [id number]   YOB: 1940   Date of Visit: 7/21/2020       Dear Dr. Mccall:    Thank you for referring Paul Browning to me for evaluation. Here is my assessment and plan of care:    Assessment/Plan     For exam results, see Encounter Report.    Right homonymous hemianopsia  -     Posterior Segment OCT Optic Nerve- Both eyes    Other localized visual field defect, bilateral       I found no evidence to suggest that Mr. Browning's loss of vision in both eyes is related to an optic neuropathy or visual pathway process. The OCT of his optic nerves was completely normal. I did not find any pathology related to his headaches. I will recommend to Dr. Mccall that he might benefit from repeat evaluation by a retina specialist. He will return to me as requested.          Below you will find my full exam findings. If you have questions, please do not hesitate to call me. I look forward to following Mr. Paul Browning along with you.    Sincerely,          Jude Collazo MD       CC  No Recipients             Base Eye Exam     Visual Acuity (Snellen - Linear)       Right Left    Dist cc 20/200 20/200    Dist ph cc 20/200 NI    Correction: Glasses   No improvement with attempted refraction.           Tonometry (Applanation, 1:56 PM)       Right Left    Pressure 18 14          Pupils       Dark Light Shape React APD    Right 4 2 Round Brisk None    Left 4 2 Round Brisk None          Visual Fields       Right Left    Restrictions Total superior nasal, inferior nasal deficiencies Total superior temporal, inferior temporal deficiencies          Extraocular Movement       Right Left     Full, Ortho Full, Ortho          Neuro/Psych      Oriented x3: Yes    Mood/Affect: Normal          Dilation     Both eyes: 1% Mydriacyl, 2.5% Phenylephrine @ 2:08 PM            Slit Lamp and Fundus Exam     External Exam       Right Left    External Temporal artery normal Temporal artery normal          Slit Lamp Exam       Right Left    Lids/Lashes Normal Mild edema    Conjunctiva/Sclera White and quiet White and quiet    Cornea Clear Clear    Anterior Chamber Deep and quiet Deep and quiet    Iris Round and reactive Round and reactive    Lens Posterior chamber intraocular lens Posterior chamber intraocular lens    Vitreous Normal Normal          Fundus Exam       Right Left    Disc Normal Normal    C/D Ratio 0.4 0.4    Macula Normal Normal    Vessels Normal Normal    Periphery Normal Normal

## 2020-07-21 NOTE — ED TRIAGE NOTES
Pt reports L eye pain since Friday, some redness and tearing noted. Family reports swelling about an hour ago that has subsided mostly which brought them into the ED tonight. Son reports that they have an appt with an eye doctor tomorrow but the swelling concerned him, also reports one episode of vomiting prior to arrival and pt reports he felt much better after episode.    Patient Identifiers for Paul Browning checked and correct  LOC: The patient is awake, alert and aware of environment with an appropriate affect, the patient is oriented x 3 and speaking appropriate.  APPEARANCE: Patient resting comfortably and in no acute distress, patient is clean and well groomed, patient's clothing is properly fastened.  SKIN: The skin is warm and dry, patient has normal skin turgor and moist mucus membranes,no rashes or lesions.Skin Intact , No Breakdown Noted  Musculoskeletal :  Normal range of motion noted. Moves all extremeties well, No swelling or tenderness noted  RESPIRATORY: Airway is open and patent, respirations are spontaneous, patient has a normal effort and rate.  CARDIAC: Patient has a normal rate and rhythm, no periphreal edema noted, capillary refill < 3 seconds.   ABDOMEN: Soft and non tender to palpation, no distention noted.   PULSES: 2+  And symmetrical in all extremeties  NEUROLOGIC: PERRL. facial expression is symmetrical, patient moving all extremities, normal sensation in all extremities when touched with a finger.The patient is awake, alert and cooperative with a calm affect, patient is aware of environment.    Will continue to monitor

## 2020-07-21 NOTE — PROGRESS NOTES
HPI     DLS:08/23/2019 [place]  Referred by   Patient here w/daughter in law which states OS eye pain and blood shot.  Eye pain seem to come and go.  Pt states OU vision seem stable, but OS is a little blurry.  8-9 on pain scale.  Experiencing headache and off balance.  OU cat surgery-2018    I have personally interviewed the patient, reviewed the history and   examined the patient and agree with the technician's exam.    Last edited by Jude Collazo MD on 7/21/2020  1:23 PM. (History)            Assessment /Plan     For exam results, see Encounter Report.    Right homonymous hemianopsia  -     Posterior Segment OCT Optic Nerve- Both eyes    Other localized visual field defect, bilateral       I found no evidence to suggest that Mr. Browning's loss of vision in both eyes is related to an optic neuropathy or visual pathway process. The OCT of his optic nerves was completely normal. I did not find any pathology related to his headaches. I will recommend to Dr. Mccall that he might benefit from repeat evaluation by a retina specialist. He will return to me as requested.

## 2020-07-21 NOTE — ED PROVIDER NOTES
Encounter Date: 7/20/2020    SCRIBE #1 NOTE: IShaylee, am scribing for, and in the presence of, Te Grimes MD. Other sections scribed: HPI, ROS, PE.       History     Chief Complaint   Patient presents with    Conjunctivitis     Left eye irritation since Friday; denies any drainaged, or fever.     Time patient was seen by the provider: 9:06 PM      The patient is a 79 y.o. male with co-morbidities including: cataract, GERD, hyperlipidemia, HTN , and CAD, who presents to the ED with a complaint of left eye pain for the past three days. Patient's associated symptoms are redness and puffiness of his left eye, headache, SOB, nausea, and emesis. Patient had doctor appointments today and was fine. However, the patient's son noticed afterwards that the patient's left eye was puffy and red. Patient's son states that he had a stroke approximately two years ago that affected his vision. Patient's son also states that he had cataract surgery on both of his eyes. Patient denies fever, diarrhea, coughing, chest pain, and abdominal pain.     A ten point review of systems was completed and is negative except as documented above.  Patient denies any other acute medical complaint.      The history is provided by the patient, a relative and medical records.     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    Hyperlipidemia     Hypertension     Ischemic cardiomyopathy 11/5/2014    MDD (major depressive disorder), recurrent episode, mild 2/17/2016    Osteoporosis 7/20/2016    Peripheral arterial disease 3/18/2016    Sarcoid      Squamous cell carcinoma 09/2016, 5/2016    crown of scalp, left wrist     Past Surgical History:   Procedure Laterality Date    CARDIAC SURGERY      CAROTID STENT N/A 10/14/2019    Procedure: INSERTION, STENT, ARTERY, CAROTID;  Surgeon: Artie Kebede MD;  Location: [facility] CATH LAB;  Service: Cardiology;  Laterality: N/A;    CATARACT EXTRACTION W/  INTRAOCULAR LENS IMPLANT Right 07/17/2018    Dr. Talamantes    CATARACT EXTRACTION W/  INTRAOCULAR LENS IMPLANT Left 07/31/2018    Dr. Talamantes    CORONARY ARTERY BYPASS GRAFT      x2  10/2014    ESOPHAGOGASTRODUODENOSCOPY N/A 4/8/2019    Procedure: ESOPHAGOGASTRODUODENOSCOPY (EGD)/poss rfa;  Surgeon: Clifford De La Torre MD;  Location: [facility] ENDO (2ND FLR);  Service: Endoscopy;  Laterality: N/A;    ESOPHAGOGASTRODUODENOSCOPY (EGD) WITH RADIOFREQUENCY TREATMENT OF GASTROESOPHAGEAL JUNCTION      INJECTION OF ANESTHETIC AGENT AROUND MEDIAL BRANCH NERVES INNERVATING CERVICAL FACET JOINT Bilateral 1/9/2020    Procedure: INJECTION, MBB--Bilateral Cervical- Third Occipital nerve, C2-3--ASA okay for pt to continue taking per provider.;  Surgeon: Tip Mera Jr., MD;  Location: [facility] PAIN MGT;  Service: Pain Management;  Laterality: Bilateral;    INTRAOCULAR PROSTHESES INSERTION Right 7/17/2018    Procedure: INSERTION, INTRAOCULAR LENS PROSTHESIS;  Surgeon: León Talamantes MD;  [address];  Service: Ophthalmology;  Laterality: Right;    INTRAOCULAR PROSTHESES INSERTION Left 7/31/2018    Procedure: INSERTION, INTRAOCULAR LENS PROSTHESIS;  Surgeon: León Talamantes MD;  [address];  Service: Ophthalmology;  Laterality: Left;    PHACOEMULSIFICATION OF CATARACT Right 7/17/2018    Procedure: PHACOEMULSIFICATION, CATARACT;  Surgeon: León Talamantes MD;  Location: [facility] OR [address] [place] [address];  Service: Ophthalmology;  Laterality: Right;    PHACOEMULSIFICATION OF CATARACT Left 7/31/2018    Procedure: PHACOEMULSIFICATION, CATARACT;   Surgeon: León Talamantes MD;  Location: [facility] OR [address] [place] [address];  Service: Ophthalmology;  Laterality: Left;    RADIOFREQUENCY THERMOCOAGULATION Bilateral 2020    Procedure: RADIOFREQUENCY THERMAL COAGULATION--Bilateral C2-3 and Third Occipital Nerve;  Surgeon: Tip Mera Jr., MD;  Location: [facility] PAIN MGT;  Service: Pain Management;  Laterality: Bilateral;    UMBILICAL HERNIA REPAIR       Family History   Problem Relation Age of Onset    Arrhythmia Mother     Heart disease Mother     Heart failure Brother     No Known Problems Daughter     No Known Problems Son     Colon cancer Neg Hx     Inflammatory bowel disease Neg Hx     Celiac disease Neg Hx     Melanoma Neg Hx     Amblyopia Neg Hx     Blindness Neg Hx     Cataracts Neg Hx     Glaucoma Neg Hx     Macular degeneration Neg Hx     Retinal detachment Neg Hx     Strabismus Neg Hx      Social History     Tobacco Use    Smoking status: Former Smoker     Packs/day: 2.00     Years: 20.00     Pack years: 40.00     Types: Cigarettes     Quit date: 1988     Years since quittin.4    Smokeless tobacco: Never Used   Substance Use Topics    Alcohol use: No     Comment: quit drinking beer     Drug use: No     Review of Systems   Constitutional: Negative for fever.   HENT: Negative for sore throat.    Eyes: Positive for pain (left)  and redness (left) . Negative for visual disturbance.        Positive for left eye puffiness.    Respiratory: Positive for shortness of breath. Negative for cough.    Cardiovascular: Negative for chest pain.   Gastrointestinal: Positive for nausea and vomiting. Negative for abdominal pain and diarrhea.   Endocrine: Negative for cold intolerance.   Genitourinary: Negative for dysuria.   Musculoskeletal: Negative for back pain and neck pain.   Skin: Negative for rash and wound.   Allergic/Immunologic: Negative for immunocompromised state.   Neurological: Positive for headaches. Negative for syncope and  weakness.   Hematological: Does not bruise/bleed easily.   Psychiatric/Behavioral: Negative for confusion.       Physical Exam     Initial Vitals [07/20/20 2034]   BP Pulse Resp Temp SpO2   (!) 174/87 87 20 97.3 °F (36.3 °C) 97 %      MAP       --         Physical Exam  Physical Exam    Nursing note and vitals reviewed.  Constitutional: Patient appears well-developed and well-nourished. Not diaphoretic. No distress.   Head: Normocephalic and atraumatic.   Nose: Nose normal.   Eyes: Left eye has minimal periorbital swelling. Normal lids and lashes. Lids are everted with no foreign body. Fluorescein is negative. Pressure is normal.   Neck: Normal range of motion. Neck supple.  Nontender to palpation.  Cardiovascular: Normal rate, regular rhythm and normal heart sounds. Exam reveals no gallop and no friction rub.    No murmur heard.  Pulmonary/Chest: Breath sounds normal. No respiratory distress. No wheezes. No rhonchi. No rales.   Abdominal: Soft. No distension. There is no tenderness. There is no rebound and no guarding.   Musculoskeletal: No edema or tenderness.   Neurological: Alert and oriented to person, place, and time. GCS score is 15. GCS eye subscore is 4. GCS verbal subscore is 5. GCS motor subscore is 6.   Skin: Skin is warm and dry. No rash noted. No erythema.   Psychiatric: Normal mood and affect. Behavior is normal. Judgment and thought content normal.   ED Course   Procedures  Labs Reviewed   CBC W/ AUTO DIFFERENTIAL - Abnormal; Notable for the following components:       Result Value    RBC 4.46 (*)     Hemoglobin 13.6 (*)     All other components within normal limits   COMPREHENSIVE METABOLIC PANEL - Abnormal; Notable for the following components:    Glucose 111 (*)     Calcium 8.6 (*)     Anion Gap 7 (*)     All other components within normal limits   URINALYSIS, REFLEX TO URINE CULTURE - Abnormal; Notable for the following components:    Appearance, UA Hazy (*)     All other components within normal  limits    Narrative:     Specimen Source->Urine   LIPASE   TROPONIN I   B-TYPE NATRIURETIC PEPTIDE          Imaging Results          X-Ray Chest AP Portable (Final result)  Result time 07/20/20 23:06:21    Final result by Live Pinzon MD (07/20/20 23:06:21)                 Impression:      Multiple nodular lung lesions and scattered calcified granulomas in this patient with reported history of prior sarcoid.    Previous abnormal opacity at the right upper lobe again noted, appearing stable.    Superimposed mild vague rounded opacity at the right base has developed in the interim may relate to an area of infiltrate, follow-up recommended.    Other stable findings as above.    Electronically signed by resident: Kurt Moctezuma  Date:    07/20/2020  Time:    22:47    Electronically signed by: Live Pinzon  Date:    07/20/2020  Time:    23:06             Narrative:    EXAMINATION:  XR CHEST AP PORTABLE    CLINICAL HISTORY:  Shortness of breath    TECHNIQUE:  Single frontal view of the chest was performed.    COMPARISON:  Chest radiograph 02/26/2020.    FINDINGS:  Sternotomy wires are aligned and intact.    Stable scattered calcified granulomas throughout the mediastinum and hilar regions.  The lungs are symmetrically expanded without pleural effusion, or pneumothorax.  Redemonstration of numerous small nodular opacities throughout both lungs with upper lung zone predominance.  There is a 2-3 cm partially calcified opacity in the right upper lobe which is unchanged compared to radiograph 02/26/2020.  There is mild vague rounded opacity at the right lung base seen, this may relate to an area of mild superimposed infiltrate.    The cardiac silhouette is stable in size.    Bones are intact.  Age-appropriate degenerative changes of the visualized spine.    Few prominent loops of bowel visualized, largely similar to radiograph 02/26/2020.                                 Medical Decision Making:   History:   I obtained  history from: someone other than patient.       <> Summary of History: Son assists HPI  Old Medical Records: I decided to obtain old medical records.  Old Records Summarized: records from clinic visits.       <> Summary of Records: Reviewed echo from today  Clinical Tests:   Lab Tests: Ordered and Reviewed  Radiological Study: Ordered and Reviewed  ED Management:  80 yo M presents with left eye redness.  Vitals with HTN.  Extensive PE as above.  No evidence of acute vision threat.  No significant corneal injury or conjunctivitis.  Labs/CXR unremarkable.  Doubt acs, chf, pna, or acute life threat.  He has optho f/up in the morning.  Patient will return to ED for worsening symptoms, inability to eat/drink, fever greater than 100.4, or any other concerns.  Did bedside teaching with return precautions.  All questions answered.  The patient acknowledges understanding.  Gave verbal discharge instructions.            Scribe Attestation:   Scribe #1: I performed the above scribed service and the documentation accurately describes the services I performed. I attest to the accuracy of the note.                          Clinical Impression:       ICD-10-CM ICD-9-CM   1. Acute left eye pain  H57.12 379.91   2. Shortness of breath  R06.02 786.05   3. Non-intractable vomiting with nausea, unspecified vomiting type  R11.2 787.01         Disposition:   Disposition: Discharged  Condition: Stable     ED Disposition Condition    Discharge Stable        ED Prescriptions     None        Follow-up Information     Follow up With Specialties Details Why Contact Info    Follow up with Optometry tomorrow as scheduled        [facility]  Return to ED for worsening symptoms, inability to eat/drink, fever greater than 100.4, or any other concerns. [address] [facility] [id number] [phone]                      Level of Complexity:  High, level 5.               Te Grimes,  MD  07/21/20 1502

## 2020-07-22 ENCOUNTER — TELEPHONE (OUTPATIENT)
Dept: NEUROLOGY | Facility: CLINIC | Age: 80
End: 2020-07-22

## 2020-07-23 ENCOUNTER — PATIENT MESSAGE (OUTPATIENT)
Dept: INTERNAL MEDICINE | Facility: CLINIC | Age: 80
End: 2020-07-23

## 2020-07-23 RX ORDER — ONDANSETRON 4 MG/1
4 TABLET, FILM COATED ORAL EVERY 8 HOURS PRN
Qty: 30 TABLET | Refills: 1 | Status: SHIPPED | OUTPATIENT
Start: 2020-07-23 | End: 2021-04-28

## 2020-07-28 ENCOUNTER — OFFICE VISIT (OUTPATIENT)
Dept: NEUROLOGY | Facility: CLINIC | Age: 80
End: 2020-07-28
Payer: MEDICARE

## 2020-07-28 ENCOUNTER — PATIENT MESSAGE (OUTPATIENT)
Dept: CARDIOLOGY | Facility: CLINIC | Age: 80
End: 2020-07-28

## 2020-07-28 VITALS
HEART RATE: 66 BPM | WEIGHT: 140.88 LBS | SYSTOLIC BLOOD PRESSURE: 160 MMHG | HEIGHT: 67 IN | BODY MASS INDEX: 22.11 KG/M2 | DIASTOLIC BLOOD PRESSURE: 86 MMHG

## 2020-07-28 DIAGNOSIS — M81.6 LOCALIZED OSTEOPOROSIS, UNSPECIFIED PATHOLOGICAL FRACTURE PRESENCE: ICD-10-CM

## 2020-07-28 DIAGNOSIS — R29.898 DECREASED ROM OF NECK: ICD-10-CM

## 2020-07-28 DIAGNOSIS — I25.810 CORONARY ARTERY DISEASE INVOLVING CORONARY BYPASS GRAFT OF NATIVE HEART WITHOUT ANGINA PECTORIS: ICD-10-CM

## 2020-07-28 DIAGNOSIS — M47.812 CERVICAL SPONDYLOSIS: ICD-10-CM

## 2020-07-28 DIAGNOSIS — R29.818 SUSPECTED SLEEP APNEA: ICD-10-CM

## 2020-07-28 DIAGNOSIS — R42 DIZZINESS AND GIDDINESS: ICD-10-CM

## 2020-07-28 DIAGNOSIS — I10 ESSENTIAL HYPERTENSION: ICD-10-CM

## 2020-07-28 DIAGNOSIS — R42 DIZZINESS: ICD-10-CM

## 2020-07-28 DIAGNOSIS — F33.0 MDD (MAJOR DEPRESSIVE DISORDER), RECURRENT EPISODE, MILD: ICD-10-CM

## 2020-07-28 DIAGNOSIS — N18.30 CKD (CHRONIC KIDNEY DISEASE) STAGE 3, GFR 30-59 ML/MIN: ICD-10-CM

## 2020-07-28 DIAGNOSIS — H57.12 EYE PAIN, LEFT: ICD-10-CM

## 2020-07-28 DIAGNOSIS — R51.9 ACUTE INTRACTABLE HEADACHE, UNSPECIFIED HEADACHE TYPE: Primary | ICD-10-CM

## 2020-07-28 DIAGNOSIS — R06.09 DYSPNEA ON EXERTION: Primary | ICD-10-CM

## 2020-07-28 PROCEDURE — 99499 UNLISTED E&M SERVICE: CPT | Mod: HCNC,S$GLB,, | Performed by: PHYSICIAN ASSISTANT

## 2020-07-28 PROCEDURE — 1125F PR PAIN SEVERITY QUANTIFIED, PAIN PRESENT: ICD-10-PCS | Mod: HCNC,S$GLB,, | Performed by: PHYSICIAN ASSISTANT

## 2020-07-28 PROCEDURE — 99499 RISK ADDL DX/OHS AUDIT: ICD-10-PCS | Mod: HCNC,S$GLB,, | Performed by: PHYSICIAN ASSISTANT

## 2020-07-28 PROCEDURE — 99215 OFFICE O/P EST HI 40 MIN: CPT | Mod: HCNC,S$GLB,, | Performed by: PHYSICIAN ASSISTANT

## 2020-07-28 PROCEDURE — 1159F PR MEDICATION LIST DOCUMENTED IN MEDICAL RECORD: ICD-10-PCS | Mod: HCNC,S$GLB,, | Performed by: PHYSICIAN ASSISTANT

## 2020-07-28 PROCEDURE — 99999 PR PBB SHADOW E&M-EST. PATIENT-LVL V: CPT | Mod: PBBFAC,HCNC,, | Performed by: PHYSICIAN ASSISTANT

## 2020-07-28 PROCEDURE — 3077F PR MOST RECENT SYSTOLIC BLOOD PRESSURE >= 140 MM HG: ICD-10-PCS | Mod: HCNC,CPTII,S$GLB, | Performed by: PHYSICIAN ASSISTANT

## 2020-07-28 PROCEDURE — 3077F SYST BP >= 140 MM HG: CPT | Mod: HCNC,CPTII,S$GLB, | Performed by: PHYSICIAN ASSISTANT

## 2020-07-28 PROCEDURE — 1101F PR PT FALLS ASSESS DOC 0-1 FALLS W/OUT INJ PAST YR: ICD-10-PCS | Mod: HCNC,CPTII,S$GLB, | Performed by: PHYSICIAN ASSISTANT

## 2020-07-28 PROCEDURE — 1159F MED LIST DOCD IN RCRD: CPT | Mod: HCNC,S$GLB,, | Performed by: PHYSICIAN ASSISTANT

## 2020-07-28 PROCEDURE — 1101F PT FALLS ASSESS-DOCD LE1/YR: CPT | Mod: HCNC,CPTII,S$GLB, | Performed by: PHYSICIAN ASSISTANT

## 2020-07-28 PROCEDURE — 3079F PR MOST RECENT DIASTOLIC BLOOD PRESSURE 80-89 MM HG: ICD-10-PCS | Mod: HCNC,CPTII,S$GLB, | Performed by: PHYSICIAN ASSISTANT

## 2020-07-28 PROCEDURE — 1125F AMNT PAIN NOTED PAIN PRSNT: CPT | Mod: HCNC,S$GLB,, | Performed by: PHYSICIAN ASSISTANT

## 2020-07-28 PROCEDURE — 99215 PR OFFICE/OUTPT VISIT, EST, LEVL V, 40-54 MIN: ICD-10-PCS | Mod: HCNC,S$GLB,, | Performed by: PHYSICIAN ASSISTANT

## 2020-07-28 PROCEDURE — 3079F DIAST BP 80-89 MM HG: CPT | Mod: HCNC,CPTII,S$GLB, | Performed by: PHYSICIAN ASSISTANT

## 2020-07-28 PROCEDURE — 99999 PR PBB SHADOW E&M-EST. PATIENT-LVL V: ICD-10-PCS | Mod: PBBFAC,HCNC,, | Performed by: PHYSICIAN ASSISTANT

## 2020-07-28 RX ORDER — DICLOFENAC SODIUM 10 MG/G
2 GEL TOPICAL EVERY 6 HOURS PRN
Qty: 1 TUBE | Refills: 6 | Status: SHIPPED | OUTPATIENT
Start: 2020-07-28 | End: 2021-07-21

## 2020-07-28 NOTE — PROGRESS NOTES
"New Patient     SUBJECTIVE:  Patient ID: Paul Browning   MRN: [id number]  Referred By: Aaareferral Self  Chief Complaint: Headache      History of Present Illness:   79 y.o. male with a PMHx of CVA, MI s/p stents, s/p cataract surgery, migraine, cervicogenic HA, cerivcal spondylosis, chronic pain, depression, COPD, CAD, HTN, HLD, CKD, osteoporosis, who presents to clinic with son for evaluation of headaches.   PMHx negative for TBI, Meningitis, Aneurysms, Kidney Stones, asthma, GI bleed, DM, cancer  Family Hx Positive for migraines in sisters and aunts, uncles, cousines, mother, grandmother    Patient is a poor historian and history is obtained with help of son.     Patient presents today with chief complaint of left eye pain. He frequently changes his story to state he has a new onset of left eye pain starting and becoming constant 2 weeks ago vs having left eye pain starting after his cataract surgery 4 years ago worsening until becoming constant 2 weeks ago. To further complicate his story, he also reports a previous hx of Ha's prior to 4 years ago but is unable to recall any features of them. His son states he recalls his dad complaining of HA's for many yeas but specifically remembers them occurring around age 48. Patient also has a hx of cervical spondylosis with neck pain.   He describes the pain as occurring in his left eye that can also be associated with pain in his left frontalis, temporalis, and occipitalis. It is a throbbing sensation, "like a toothache" that can sometimes also be a soreness like pain as well. Severity ranges 5-10/10.  Triggers - none identified  Aggravating Factors - light  Alleviating Factors - cover the eye, turning off the light,   Recent Changes - no  Prodrome/Aura - no  Time of day of most headaches- anytime   Sleep - trouble staying asleep, snores    Of note, patient recently saw Dr. Collazo 1 week ago who did not find any evidence to suggest his loss of vision was " "related to an optic neuropathy or visual pathway process, optic nerves were normal, and no ocular pathology related to his Ha's were found. He also recommended repeat evaluation by a retina specialist.     Associated symptoms with the headache: photophobia, nausea, dizziness, left eye visual blurriness, "feels like my eye is watery" but son hasn't seen this, denies swelling, mild rhinorhea but not correlating w/ pain, neck pain, one instance of swollen eye and eyelid, one instance of conjunctival injection,   Denies all other associated symptoms    Treatments Tried   fioricet  zofran    Social History  Alcohol - denies  Smoke - denies  Recreational Drug Use- denies    Current Medications:    Current Outpatient Medications:     acetaminophen (TYLENOL) 500 MG tablet, Take 1,000 mg by mouth every 6 (six) hours as needed for Pain., Disp: , Rfl:     albuterol (PROVENTIL) 2.5 mg /3 mL (0.083 %) nebulizer solution, Take 3 mLs (2.5 mg total) by nebulization every 6 (six) hours as needed for Wheezing. Rescue, Disp: 1 Box, Rfl: 0    alendronate (FOSAMAX) 70 MG tablet, 1 tab PO qwk in the am with a full glass of water on an empty stomach. Do not consume food or lie down for at least 30 minutes afterwards., Disp: 12 tablet, Rfl: 3    ALPRAZolam (XANAX) 0.25 MG tablet, Take 1 tablet (0.25 mg total) by mouth 3 (three) times daily as needed for Anxiety., Disp: 45 tablet, Rfl: 0    aspirin (ECOTRIN) 81 MG EC tablet, Take 81 mg by mouth once daily., Disp: , Rfl:     atorvastatin (LIPITOR) 40 MG tablet, TAKE 1 TABLET BY MOUTH EVERY DAY, Disp: 90 tablet, Rfl: 3    butalbital-acetaminophen-caffeine -40 mg (FIORICET, ESGIC) -40 mg per tablet, 1-2 tabs PO q6 PRN headaches, Disp: 60 tablet, Rfl: 0    diclofenac sodium (VOLTAREN) 1 % Gel, Apply 2 g topically every 6 (six) hours as needed., Disp: 1 Tube, Rfl: 6    fluorouracil 5% 5 % Soln, Use hs for 2 weeks, Disp: 10 mL, Rfl: 1    magnesium oxide (MAG-OX) 400 mg " "(241.3 mg magnesium) tablet, Take 1 tablet (400 mg total) by mouth once daily., Disp: 30 tablet, Rfl: 3    metoprolol succinate (TOPROL-XL) 25 MG 24 hr tablet, Take 1 tablet (25 mg total) by mouth once daily., Disp: 30 tablet, Rfl: 11    omega-3 fatty acids-vitamin E (FISH OIL) 1,000 mg Cap, Take 1 tablet by mouth 2 (two) times daily. (Patient taking differently: Take 1 tablet by mouth once daily. ), Disp: 60 each, Rfl: 11    ondansetron (ZOFRAN) 4 MG tablet, Take 1 tablet (4 mg total) by mouth every 8 (eight) hours as needed for Nausea., Disp: 30 tablet, Rfl: 1    pantoprazole (PROTONIX) 40 MG tablet, Take 40 mg by mouth once daily. , Disp: , Rfl:     pantoprazole (PROTONIX) 40 MG tablet, TAKE 1 TABLET BY MOUTH TWICE DAILY, Disp: 180 tablet, Rfl: 0    riboflavin, vitamin B2, (VITAMIN B-2) 100 mg Tab tablet, Take 1 tablet (100 mg total) by mouth once daily., Disp: 90 tablet, Rfl: 3    sertraline (ZOLOFT) 50 MG tablet, TAKE 1 TABLET BY MOUTH EVERY DAY, Disp: 90 tablet, Rfl: 3    triamcinolone acetonide 0.1% (KENALOG) 0.1 % cream, Apply topically 2 (two) times daily., Disp: 45 g, Rfl: 1  No current facility-administered medications for this visit.     Facility-Administered Medications Ordered in Other Visits:     0.9%  NaCl infusion, 500 mL, Intravenous, Continuous, Tip Mera Jr., MD    lidocaine (PF) 10 mg/ml (1%) injection 10 mg, 1 mL, Intradermal, Once, Tip Mera Jr., MD    Review of Systems - as per HPI, otherwise a balanced 10 systems review is negative.    OBJECTIVE:  Vitals:  BP (!) 160/86   Pulse 66   Ht 5' 7" (1.702 m)   Wt 63.9 kg (140 lb 14 oz)   BMI 22.06 kg/m²     Physical Exam: was limited due to patient participation  Constitutional: he appears well-developed and well-nourished. he is well groomed. NAD  HENT:    Head: Normocephalic and atraumatic, wearing face mask.  Frontalis was NTTP, temporalis was TTP on left   Eyes: Conjunctivae and EOM are normal. Pupils are equal, " round, and reactive to light.   Neck: Neck supple. Occiput and trapezius TTP, traps soft   Musculoskeletal: Normal range of motion. No joint stiffness. No vertebral point tenderness.  Skin: Skin is warm and dry.  Psychiatric: Normal mood and affect.     Neuro exam:    Mental status:  The patient is alert and oriented to person, place and time.  Language is intact and fluent  Appears to have some trouble with memory while providing history.  Normal attention and concentration  Mood is stable    Cranial Nerves:  Fundoscopic examination does not reveal any occult papilledema.    Pupils are equal and reactive to light.    Extraocular movements - limited due to patient participation  Visual fields - limited due to patient participation  Facial movement is symmetric.  Facial sensation is intact.    Hearing is intact to finger rub, slightly hard of hearing  Severely limited ROM of neck in all (6) directions with pain  Shoulder shrug symmetrical.    Coordination:     Finger to nose - normal and symmetric bilaterally   Heel to shin test - normal and symmetric bilaterally     Motor:  Normal muscle bulk and symmetry. No fasciculations were noted.   Tremor not apparent   Pronator drift not apparent.    strength was strong and symmetric  Finger extension strength was strong and symmetric  RUE:appropriate against gravity and medium force as tested 5/5  LUE: appropriate against gravity and medium force as tested 5/5  RLE:appropriate against gravity and medium force as tested 5/5              LLE: appropriate against gravity and medium force as tested 5/5    Sensory:  RUE  intact light touch  LUE intact light touch  RLE intact light touch  LLE intact light touch    Gait:   Unable to test due to patient participation, stated he was too dizzy and HA too severe to continue    Review of Data:   Notes from optho, pcp reviewed   Labs:  Admission on 07/20/2020, Discharged on 07/20/2020   Component Date Value Ref Range Status    WBC  07/20/2020 5.19  3.90 - 12.70 K/uL Final    RBC 07/20/2020 4.46* 4.60 - 6.20 M/uL Final    Hemoglobin 07/20/2020 13.6* 14.0 - 18.0 g/dL Final    Hematocrit 07/20/2020 40.0  40.0 - 54.0 % Final    Mean Corpuscular Volume 07/20/2020 90  82 - 98 fL Final    Mean Corpuscular Hemoglobin 07/20/2020 30.5  27.0 - 31.0 pg Final    Mean Corpuscular Hemoglobin Conc 07/20/2020 34.0  32.0 - 36.0 g/dL Final    RDW 07/20/2020 14.2  11.5 - 14.5 % Final    Platelets 07/20/2020 240  150 - 350 K/uL Final    MPV 07/20/2020 10.4  9.2 - 12.9 fL Final    Immature Granulocytes 07/20/2020 0.4  0.0 - 0.5 % Final    Gran # (ANC) 07/20/2020 3.8  1.8 - 7.7 K/uL Final    Immature Grans (Abs) 07/20/2020 0.02  0.00 - 0.04 K/uL Final    Lymph # 07/20/2020 1.0  1.0 - 4.8 K/uL Final    Mono # 07/20/2020 0.4  0.3 - 1.0 K/uL Final    Eos # 07/20/2020 0.0  0.0 - 0.5 K/uL Final    Baso # 07/20/2020 0.02  0.00 - 0.20 K/uL Final    nRBC 07/20/2020 0  0 /100 WBC Final    Gran% 07/20/2020 73.0  38.0 - 73.0 % Final    Lymph% 07/20/2020 18.5  18.0 - 48.0 % Final    Mono% 07/20/2020 7.3  4.0 - 15.0 % Final    Eosinophil% 07/20/2020 0.4  0.0 - 8.0 % Final    Basophil% 07/20/2020 0.4  0.0 - 1.9 % Final    Differential Method 07/20/2020 Automated   Final    Sodium 07/20/2020 137  136 - 145 mmol/L Final    Potassium 07/20/2020 3.8  3.5 - 5.1 mmol/L Final    Chloride 07/20/2020 103  95 - 110 mmol/L Final    CO2 07/20/2020 27  23 - 29 mmol/L Final    Glucose 07/20/2020 111* 70 - 110 mg/dL Final    BUN, Bld 07/20/2020 16  8 - 23 mg/dL Final    Creatinine 07/20/2020 1.0  0.5 - 1.4 mg/dL Final    Calcium 07/20/2020 8.6* 8.7 - 10.5 mg/dL Final    Total Protein 07/20/2020 7.1  6.0 - 8.4 g/dL Final    Albumin 07/20/2020 3.7  3.5 - 5.2 g/dL Final    Total Bilirubin 07/20/2020 0.8  0.1 - 1.0 mg/dL Final    Alkaline Phosphatase 07/20/2020 61  55 - 135 U/L Final    AST 07/20/2020 18  10 - 40 U/L Final    ALT 07/20/2020 13  10 - 44 U/L Final     Anion Gap 07/20/2020 7* 8 - 16 mmol/L Final    eGFR if African American 07/20/2020 >60.0  >60 mL/min/1.73 m^2 Final    eGFR if non African American 07/20/2020 >60.0  >60 mL/min/1.73 m^2 Final    Lipase 07/20/2020 25  4 - 60 U/L Final    Specimen UA 07/20/2020 Urine, Clean Catch   Final    Color, UA 07/20/2020 Elly  Yellow, Straw, Elly Final    Appearance, UA 07/20/2020 Hazy* Clear Final    pH, UA 07/20/2020 5.0  5.0 - 8.0 Final    Specific Gravity, UA 07/20/2020 1.025  1.005 - 1.030 Final    Protein, UA 07/20/2020 Negative  Negative Final    Glucose, UA 07/20/2020 Negative  Negative Final    Ketones, UA 07/20/2020 Negative  Negative Final    Bilirubin (UA) 07/20/2020 Negative  Negative Final    Occult Blood UA 07/20/2020 Negative  Negative Final    Nitrite, UA 07/20/2020 Negative  Negative Final    Leukocytes, UA 07/20/2020 Negative  Negative Final    Troponin I 07/20/2020 <0.006  0.000 - 0.026 ng/mL Final    BNP 07/20/2020 76  0 - 99 pg/mL Final   Hospital Outpatient Visit on 07/20/2020   Component Date Value Ref Range Status    Ascending aorta 07/20/2020 3.12  cm Final    STJ 07/20/2020 3.22  cm Final    AV mean gradient 07/20/2020 2  mmHg Final    Ao peak justen 07/20/2020 0.98  m/s Final    Ao VTI 07/20/2020 20.81  cm Final    IVS 07/20/2020 0.94  0.6 - 1.1 cm Final    LA size 07/20/2020 3.90  cm Final    Left Atrium Major Axis 07/20/2020 4.39  cm Final    Left Atrium Minor Axis 07/20/2020 4.33  cm Final    LVIDD 07/20/2020 4.40  3.5 - 6.0 cm Final    LVIDS 07/20/2020 3.66  2.1 - 4.0 cm Final    LVOT diameter 07/20/2020 2.14  cm Final    LVOT peak VTI 07/20/2020 22.25  cm Final    PW 07/20/2020 0.73  0.6 - 1.1 cm Final    MV Peak A Justen 07/20/2020 1.03  m/s Final    E wave decelartion time 07/20/2020 213.19  msec Final    MV Peak E Justen 07/20/2020 0.56  m/s Final    RA Major Axis 07/20/2020 4.32  cm Final    RA Width 07/20/2020 3.87  cm Final    RVDD 07/20/2020 3.75  cm  Final    Sinus 07/20/2020 3.44  cm Final    TAPSE 07/20/2020 1.65  cm Final    TR Max Justen 07/20/2020 2.61  m/s Final    TDI LATERAL 07/20/2020 0.06  m/s Final    TDI SEPTAL 07/20/2020 0.03  m/s Final    LA WIDTH 07/20/2020 3.95  cm Final    MV stenosis pressure 1/2 time 07/20/2020 61.82  ms Final    LV Diastolic Volume 07/20/2020 100.54  mL Final    LV Systolic Volume 07/20/2020 56.57  mL Final    LVOT peak justen 07/20/2020 0.93  m/s Final    LV LATERAL E/E' RATIO 07/20/2020 9.33  m/s Final    LV SEPTAL E/E' RATIO 07/20/2020 18.67  m/s Final    FS 07/20/2020 17  % Final    LA volume 07/20/2020 57.09  cm3 Final    LV mass 07/20/2020 115.80  g Final    Left Ventricle Relative Wall Thick* 07/20/2020 0.33  cm Final    AV valve area 07/20/2020 3.84  cm2 Final    AV Velocity Ratio 07/20/2020 0.95   Final    AV index (prosthetic) 07/20/2020 1.07   Final    MV valve area p 1/2 method 07/20/2020 3.56  cm2 Final    E/A ratio 07/20/2020 0.54   Final    Mean e' 07/20/2020 0.05  m/s Final    LVOT area 07/20/2020 3.6  cm2 Final    LVOT stroke volume 07/20/2020 79.99  cm3 Final    AV peak gradient 07/20/2020 4  mmHg Final    E/E' ratio 07/20/2020 12.44  m/s Final    LV Systolic Volume Index 07/20/2020 32.8  mL/m2 Final    LV Diastolic Volume Index 07/20/2020 58.21  mL/m2 Final    LA Volume Index 07/20/2020 33.1  mL/m2 Final    LV Mass Index 07/20/2020 67  g/m2 Final    Triscuspid Valve Regurgitation Pea* 07/20/2020 27  mmHg Final    BSA 07/20/2020 1.72  m2 Final   Lab Visit on 06/09/2020   Component Date Value Ref Range Status    WBC 06/09/2020 6.80  3.90 - 12.70 K/uL Final    RBC 06/09/2020 4.84  4.60 - 6.20 M/uL Final    Hemoglobin 06/09/2020 14.3  14.0 - 18.0 g/dL Final    Hematocrit 06/09/2020 44.9  40.0 - 54.0 % Final    Mean Corpuscular Volume 06/09/2020 93  82 - 98 fL Final    Mean Corpuscular Hemoglobin 06/09/2020 29.5  27.0 - 31.0 pg Final    Mean Corpuscular Hemoglobin Conc 06/09/2020  31.8* 32.0 - 36.0 g/dL Final    RDW 06/09/2020 13.7  11.5 - 14.5 % Final    Platelets 06/09/2020 286  150 - 350 K/uL Final    MPV 06/09/2020 10.5  9.2 - 12.9 fL Final    Immature Granulocytes 06/09/2020 0.3  0.0 - 0.5 % Final    Gran # (ANC) 06/09/2020 4.2  1.8 - 7.7 K/uL Final    Immature Grans (Abs) 06/09/2020 0.02  0.00 - 0.04 K/uL Final    Lymph # 06/09/2020 1.9  1.0 - 4.8 K/uL Final    Mono # 06/09/2020 0.6  0.3 - 1.0 K/uL Final    Eos # 06/09/2020 0.1  0.0 - 0.5 K/uL Final    Baso # 06/09/2020 0.05  0.00 - 0.20 K/uL Final    nRBC 06/09/2020 0  0 /100 WBC Final    Gran% 06/09/2020 61.9  38.0 - 73.0 % Final    Lymph% 06/09/2020 27.6  18.0 - 48.0 % Final    Mono% 06/09/2020 8.5  4.0 - 15.0 % Final    Eosinophil% 06/09/2020 1.0  0.0 - 8.0 % Final    Basophil% 06/09/2020 0.7  0.0 - 1.9 % Final    Differential Method 06/09/2020 Automated   Final    Sodium 06/09/2020 136  136 - 145 mmol/L Final    Potassium 06/09/2020 4.2  3.5 - 5.1 mmol/L Final    Chloride 06/09/2020 101  95 - 110 mmol/L Final    CO2 06/09/2020 27  23 - 29 mmol/L Final    Glucose 06/09/2020 91  70 - 110 mg/dL Final    BUN, Bld 06/09/2020 17  8 - 23 mg/dL Final    Creatinine 06/09/2020 1.3  0.5 - 1.4 mg/dL Final    Calcium 06/09/2020 8.9  8.7 - 10.5 mg/dL Final    Total Protein 06/09/2020 7.4  6.0 - 8.4 g/dL Final    Albumin 06/09/2020 3.8  3.5 - 5.2 g/dL Final    Total Bilirubin 06/09/2020 1.1* 0.1 - 1.0 mg/dL Final    Alkaline Phosphatase 06/09/2020 66  55 - 135 U/L Final    AST 06/09/2020 13  10 - 40 U/L Final    ALT 06/09/2020 9* 10 - 44 U/L Final    Anion Gap 06/09/2020 8  8 - 16 mmol/L Final    eGFR if  06/09/2020 59.2* >60 mL/min/1.73 m^2 Final    eGFR if non African American 06/09/2020 51.2* >60 mL/min/1.73 m^2 Final    TSH 06/09/2020 2.879  0.400 - 4.000 uIU/mL Final    Cholesterol 06/09/2020 115* 120 - 199 mg/dL Final    Triglycerides 06/09/2020 92  30 - 150 mg/dL Final    HDL 06/09/2020  35* 40 - 75 mg/dL Final    LDL Cholesterol 06/09/2020 61.6* 63.0 - 159.0 mg/dL Final    Hdl/Cholesterol Ratio 06/09/2020 30.4  20.0 - 50.0 % Final    Total Cholesterol/HDL Ratio 06/09/2020 3.3  2.0 - 5.0 Final    Non-HDL Cholesterol 06/09/2020 80  mg/dL Final   Lab Visit on 06/09/2020   Component Date Value Ref Range Status    Specimen UA 06/09/2020 Urine, Clean Catch   Final    Color, UA 06/09/2020 Elly  Yellow, Straw, Elly Final    Appearance, UA 06/09/2020 Cloudy* Clear Final    pH, UA 06/09/2020 6.0  5.0 - 8.0 Final    Specific [place], UA 06/09/2020 1.025  1.005 - 1.030 Final    Protein, UA 06/09/2020 Negative  Negative Final    Glucose, UA 06/09/2020 Negative  Negative Final    Ketones, UA 06/09/2020 Negative  Negative Final    Bilirubin (UA) 06/09/2020 Negative  Negative Final    Occult Blood UA 06/09/2020 Negative  Negative Final    Nitrite, UA 06/09/2020 Negative  Negative Final    Leukocytes, UA 06/09/2020 1+* Negative Final    RBC, UA 06/09/2020 4  0 - 4 /hpf Final    WBC, UA 06/09/2020 10* 0 - 5 /hpf Final    Bacteria 06/09/2020 Few* None-Occ /hpf Final    Squam Epithel, UA 06/09/2020 3  /hpf Final    Microscopic Comment 06/09/2020 SEE COMMENT   Final     Imaging:  Results for orders placed or performed during the hospital encounter of 11/04/14   CT Head Without Contrast    Narrative    Technique: Axial images of the head were acquired without the administration of contrast.  Sagittal and coronal reformatted images were also obtained.    Clinical indication: Transient altered mental status    Comparison: CT 5/3/2010    Findings:    There is age-appropriate generalized cerebral volume loss.  Patchy hypoattenuation is seen scattered throughout the supratentorial white matter suggestive of chronic microvascular ischemic change.  There is no acute intra-axial or extra-axial   intracranial hemorrhage.  No major vascular cerebral territory distribution infarction is identified.   Ventricular system is stable in size and configuration without hydrocephalus.  Osseous structures show no evidence of fracture.  There is degenerative   change of the left temporomandibular joint with spurring.  Visualized paranasal sinuses and mastoid air cells are clear.    Impression         Generalized cerebral volume loss with superimposed chronic microvascular ischemic change.  No acute intracranial abnormality identified.  ______________________________________     Electronically signed by resident: Neto Tong  Date:     11/04/14  Time:    22:23          As the supervising and teaching physician, I personally reviewed the images and resident's interpretation and I agree with the findings.        Electronically signed by: MILTON RODRIGUEZ MD  Date:     11/04/14  Time:    22:42    Results for orders placed or performed in visit on 03/01/11   [facility]    Narrative    DATE OF EXAM: Mar 17 2011      CT    0053  -  THORAX WITHOUT CONTRAST:   827 A.M.   [id number]     CLINICAL HISTORY:   786.50   CHEST PAIN NOS     PROCEDURE COMMENT:   5 MM AXIAL IMAGES OBTAINED FROM THE APICES THROUGH   THE COSTOPHRENIC ANGLES WITHOUT THE USE OF IV CONTRAST MATERIAL.  DATA   WAS RECONSTRUCTED AT 5 MM INCREMENTS FOR CONTIGUOUS 5 MM IMAGES IN THE   AXIAL, SAGITTAL AND CORONAL PLANES.  DATA WAS POSTPROCESSED FOR   EXTRACTION OF 1.25 MM AXIAL IMAGES AT 10 MM INCREMENTS FOR EFFECTIVE HIGH   RESOLUTION CT IMAGING AS WELL AS 8 MM MAXIMUM INTENSITY PROJECTION IMAGES   IN THE AXIAL PLANE AT 2 MM INCREMENTS FOR OVERLAPPING MIP IMAGES.  NO   ADDITIONAL RADIATION WAS USED.       ICD 9 CODE(S):   ()     CPT 4 CODE(S)/MODIFIER(S):   ()     RESULTS: COMPARISON IS MADE TO CTA OF THE CHEST DATED 4/8/06 AS WELL AS   CT OF THE CHEST WITH CONTRAST DATED 6/27/03.     STRUCTURES AT THE BASE OF THE NECK IMAGED IN THE COURSE OF THIS DEDICATED   THORACIC CT DEMONSTRATE NO APPRECIABLE PATHOLOGY.       THERE IS A LEFT SIDED AORTIC  ARCH WITH THREE BRANCH VESSELS.  THE AORTA   IS NORMAL IN CALIBER, CONTOUR, AND COURSE WITH A MODERATE AMOUNT OF   CALCIFIC ATHEROSCLEROSIS THROUGHOUT THE AORTA AND ITS BRANCH VESSELS   INCLUDING THE CORONARY ARTERIES.  THE HEART IS NORMAL IN SIZE AND   MORPHOLOGY WITHOUT PERICARDIAL FLUID.  PULMONARY ARTERIES DISTRIBUTE   NORMALLY.  PULMONARY AND SYSTEMIC VENOATRIAL CONNECTIONS ARE CONCORDANT.    THERE ARE SEVERAL CALCIFIED AND NONCALCIFIED MEDIASTINAL LYMPH NODES, THE   LARGEST NONCALCIFIED NODE INFERIORLY WITHIN THE POSTERIOR MEDIASTINUM,   ALL OF WHICH APPEAR RELATIVELY STABLE IN SIZE AND CONFIGURATION DATING   BACK TO 6/27/03.  THE ESOPHAGUS IS NORMAL IN COURSE AND CALIBER WITH   RE-DEMONSTRATION OF A LARGE HIATAL HERNIA.       TRACHEA AND BRONCHI ARE PATENT.  MULTIPLE PULMONARY MICRONODULES ARE   IDENTIFIED IN A PERILYMPHATIC DISTRIBUTION BILATERALLY, GREATER IN THE   APICES.  A CLASSIC GALAXY SIGN IS SEEN IN THE RIGHT UPPER LOBE, WITH THIS   CONFIGURATION OF MICRONODULES MEASURING 2.9 X 1.5 CM IN ITS LARGEST AXIAL   PLANE, UNCHANGED FROM 6/27/03.  THE CONSTELLATION OF FINDINGS IS   SUGGESTIVE OF A SARCOIDOSIS WITH RELATIVE STABILITY OVER APPROXIMATELY 8   YEARS.       LIMITED IMAGES OF THE ABDOMEN OBTAINED IN THE COURSE OF THIS DEDICATED   THORACIC CT DEMONSTRATE A 2.8 X 2.9 CM ROUNDED HYPODENSITY IN THE RIGHT   LOBE OF THE LIVER POSTEROINFERIORLY WHICH APPEARS UNCHANGED FROM 2003 AND   WAS THOUGHT TO REPRESENT A HEPATIC CYST ON PREVIOUS ULTRASOUND   EVALUATION.  ABDOMINAL STRUCTURES ARE OTHERWISE UNREMARKABLE.     OSSEOUS STRUCTURES DEMONSTRATE DEGENERATIVE DISEASE OF THE THORACIC SPINE   BUT ARE OTHERWISE UNREMARKABLE WITH NO LYTIC OR SCLEROTIC LESIONS.      IMPRESSION:   1.  PERILYMPHATIC DISTRIBUTION OF PULMONARY MICRONODULES WITH CLASSIC   GALAXY SIGN IN THE RIGHT UPPER LOBE AND MULTIPLE CALCIFIED AND   NONCALCIFIED MEDIASTINAL NODES.  ALL THESE FINDINGS ARE UNCHANGED FROM   6/03 AND ARE CONSISTENT WITH  "SARCOIDOSIS.   2.  LARGE HIATAL HERNIA.    3.  ATHEROSCLEROSIS AND CORONARY ARTERY DISEASE.    4.  SIMPLE HEPATIC CYST.             : REBEL  Transcribe Date/Time: Mar 17 2011  2:41P  Dictated by : BRYANNA FINE MD  Read On: Mar 17 2011  1:19P  nl: Rachna Jerez M.D. [id number]  Images were reviewed, findings were verified and document was   electronically  SIGNED BY: RACHNA JEREZ MD On:               Note: I have independently reviewed any/all imaging/labs/tests and agree with the report (s) as documented.  Any discrepancies will be as noted/demarcated by free text.  JAZMIN DONIS 7/28/2020    ASSESSMENT:  1. Acute intractable headache, unspecified headache type    2. Decreased ROM of neck    3. Suspected sleep apnea    4. Dizziness and giddiness    5. Eye pain, left    6. Dizziness    7. CKD (chronic kidney disease) stage 3, GFR 30-59 ml/min    8. Cervical spondylosis    9. Localized osteoporosis, unspecified pathological fracture presence    10. MDD (major depressive disorder), recurrent episode, mild    11. Coronary artery disease involving coronary bypass graft of native heart without angina pectoris    12. Essential hypertension          PLAN:  - Symptoms not consistent with a unifying disorder, DDx includes temporal arteritis, cervicogenic HA, migraines, lesion  - obtain MRI brain to r/o pathologic causes for Ha's and eye pain  - obtain ESR/CRP to r/o temporal arteritis  - d/c fioricet as it was causing pt to feel "loopy"  - abortive - may trial diclofenac gel  - nausea - continue zofran  - previous cataract surgery - recently saw Dr. Collazo who did not see any ocular pathologic cause of symptoms, recommended repeat eval by retina specialist  - refer to PT for decreased neck ROM and possible cervicogenic component to HA's  - referral to sleep med to eval for ?LAUREN  - HTN - elevated today, to start metoprolol soon, asked to monitor an dlet pcp and cards know, mgmt per pcp and cards  - avoid triptans - " hx of CVA, MI s/p stents, CAD, and uncontrolled HTN  - avoid NSAIDs - hx of CKD although pt states he has never been told he has any kidney issues and denies hx of ckd  - avoid steroids - hx of osteoporosis  - caution w/ bb - hx of depression  - risks, benefits, and potential side effects of diclofenac gel discussed   - alternative treatment options offered   - importance of healthy diet, regular exercise and sleep hygiene in the treatment of headaches    - Start tracking headaches via Migraine Marcos edna on phone   - RTC in 1 wk     Orders Placed This Encounter    MRI Brain W WO Contrast    Sedimentation rate    C-Reactive Protein    Ambulatory referral/consult to Sleep Disorders    Ambulatory referral/consult to Physical/Occupational Therapy    diclofenac sodium (VOLTAREN) 1 % Gel       I have discussed realistic goals of care with patient at length as well as medication options, and need for lifestyle adjustment. I have explained that treatment will take time. We have agreed that the goal will be to reduce frequency/intensity/quantity of HA, not to be completely HA free. I have explained my non narcotic policy regarding headache treatment.    Patient agreeable to work on lifestyle adjustments.    Counseling:  I spent 73 minutes face-to-face with the patient with > 50% of the time spent counseling and educating regarding the results of the data, diagnosis, and recommendations stated above, the risks, benefits, and potential side effects of the medications, and the future plan of care.  Questions and concerns were sought and answered to the patient's stated verbal satisfaction.  The patient verbalizes understanding and agreement with the above stated treatment plan.       CC: DO Marquita Quezada PA-C  [facility]  [phone]    Dr. Berry was available during today's encounter.

## 2020-07-30 ENCOUNTER — CLINICAL SUPPORT (OUTPATIENT)
Dept: CARDIOLOGY | Facility: HOSPITAL | Age: 80
End: 2020-07-30
Attending: INTERNAL MEDICINE
Payer: MEDICARE

## 2020-07-30 ENCOUNTER — CLINICAL SUPPORT (OUTPATIENT)
Dept: REHABILITATION | Facility: HOSPITAL | Age: 80
End: 2020-07-30
Payer: MEDICARE

## 2020-07-30 DIAGNOSIS — R51.9 ACUTE INTRACTABLE HEADACHE, UNSPECIFIED HEADACHE TYPE: ICD-10-CM

## 2020-07-30 DIAGNOSIS — M99.01 CERVICAL SEGMENT DYSFUNCTION: ICD-10-CM

## 2020-07-30 DIAGNOSIS — R25.9 DYSFUNCTIONAL MOVEMENTS: ICD-10-CM

## 2020-07-30 DIAGNOSIS — R52 AWAKENS FROM SLEEP DUE TO PAIN: ICD-10-CM

## 2020-07-30 DIAGNOSIS — R29.898 TIGHTNESS OF NECK: ICD-10-CM

## 2020-07-30 DIAGNOSIS — R29.898 DECREASED ROM OF NECK: ICD-10-CM

## 2020-07-30 DIAGNOSIS — M43.6 NECK STIFFNESS: ICD-10-CM

## 2020-07-30 DIAGNOSIS — R06.09 DYSPNEA ON EXERTION: ICD-10-CM

## 2020-07-30 PROCEDURE — 93227 HOLTER MONITOR - 24 HOUR (CUPID ONLY): ICD-10-PCS | Mod: HCNC,,, | Performed by: INTERNAL MEDICINE

## 2020-07-30 PROCEDURE — 97162 PT EVAL MOD COMPLEX 30 MIN: CPT | Mod: HCNC,PO | Performed by: PHYSICAL THERAPIST

## 2020-07-30 PROCEDURE — 93227 XTRNL ECG REC<48 HR R&I: CPT | Mod: HCNC,,, | Performed by: INTERNAL MEDICINE

## 2020-07-30 PROCEDURE — 93226 XTRNL ECG REC<48 HR SCAN A/R: CPT | Mod: HCNC

## 2020-07-30 NOTE — PROGRESS NOTES
[facility]  Physical Therapy Initial Evaluation    Name: Paul Browning  Clinic Number: [id number]    Therapy Diagnosis:   Encounter Diagnoses   Name Primary?    Decreased ROM of neck     Acute intractable headache, unspecified headache type     Neck stiffness     Awakens from sleep due to pain     Cervical segment dysfunction     Tightness of neck     Dysfunctional movements      Physician: Marquita Holloway PA-C    Physician Orders: PT Eval and Treat neck  Medical Diagnosis from Referral:   R29.898 (ICD-10-CM) - Decreased ROM of neck   R51 (ICD-10-CM) - Acute intractable headache, unspecified headache type       Evaluation Date: 7/30/2020  Authorization Period Expiration: 7/28/2021  Plan of Care Expiration: 10/30/2020  Visit # / Visits authorized: 1/ 1    Time In: 1115  Time Out: 1210  Total Billable Time: 55 minutes    Precautions: Standard and Fall    Subjective      Medical History:   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    Hyperlipidemia     Hypertension     Ischemic cardiomyopathy 11/5/2014    MDD (major depressive disorder), recurrent episode, mild 2/17/2016    Osteoporosis 7/20/2016    Peripheral arterial disease 3/18/2016    Sarcoid     Squamous cell carcinoma 09/2016, 5/2016    crown of scalp, left wrist       Surgical History:   Paul Browning  has a past surgical history that includes Umbilical hernia repair; Coronary artery bypass graft; Cardiac surgery; Phacoemulsification of cataract (Right, 7/17/2018); Intraocular prosthesis insertion (Right, 7/17/2018); Phacoemulsification of cataract (Left, 7/31/2018); Intraocular prosthesis insertion (Left,  7/31/2018); Cataract extraction w/  intraocular lens implant (Right, 07/17/2018); Cataract extraction w/  intraocular lens implant (Left, 07/31/2018); Esophagogastroduodenoscopy (EGD) with radiofrequency treatment of gastroesophageal junction; Esophagogastroduodenoscopy (N/A, 4/8/2019); Carotid stent (N/A, 10/14/2019); Injection of anesthetic agent around medial branch nerves innervating cervical facet joint (Bilateral, 1/9/2020); and Radiofrequency thermocoagulation (Bilateral, 1/30/2020).    Medications:   Paul has a current medication list which includes the following prescription(s): acetaminophen, albuterol, alendronate, alprazolam, aspirin, atorvastatin, butalbital-acetaminophen-caffeine -40 mg, diclofenac sodium, fluorouracil 5%, magnesium oxide, omega-3 fatty acids-vitamin e, ondansetron, pantoprazole, pantoprazole, riboflavin (vitamin b2), sertraline, and triamcinolone acetonide 0.1%, and the following Facility-Administered Medications: sodium chloride 0.9% and lidocaine (pf) 10 mg/ml (1%).    Allergies:   Review of patient's allergies indicates:   Allergen Reactions    Morphine Shortness Of Breath           History of current condition - Paul reports:  Left eye pain.    HAs. He does say that he encounters neck pain on occasion.  He also has balance issues. Reports he has fallen x3 in the past two weeks with associated dizziness. Says his biggest problem is pain behind his left eye that is constant. Surgery four years ago for removal of cataracts. He states that the the HAs appear after the eye starts to hurt a lot. Difficult to obtain accurate and consistent responses related to the neck. Patient wants to consistently reference his left eye pain.   Date of onset: two years ago following surgery on the eyes. The eye pain at the onset was intermittent and has progressed to constant. The HAs occur when pain in the eye is present.       Pain:  Current 5/10, worst 10/10, best 0/10   Location: neck,  left side and back of the head    Description: Aching  Aggravating Factors: can not identify any one factor   Easing Factors: lying down on left side   Sleep disturbed  - on occasion due to HAs. Lynn not sleep in his bed but on his sofa.   Headaches - positive   Blurred vision  - positive   Nausea - Positive, intermittent   Tinnitus - denies   Dizziness - positive   Current Level of Function:   Personal - dressing   Domestic - cooking,  but not due to neck pain   Community / social - limited   Occupation - NA   Recreation / fitness / sports - fishing and does not exercise.     Prior Therapy: no   Social History:   Lives in his home, single family.   Occupation: retired   Prior Level of Function: two years ago before surgery he was independent.   Pts goals: to relieve the pain, to diminish the dizziness and be able to drive again.   Imaging, X-Ray: 9/26/2018          Objective     Posture Alignment: forward head, shoulder levels unequal, increased thoracic kyphosis  Palpation: bilateral posterior and lateral cervical musculature tightness, pain elicited over the left mastoid process, SCM at the musculotendinous attachment, left C2-3, C3-4, C4-5 articular pillars, C6, C7, T1 spinous processes.    Sensation: Light Touch: Intact    Range of Motion/Strength:     Cervical/Thoracic AROM: Pain/Dysfunction with Movement:   Flexion                                 65 DN   Extension                             20 DP left side    Right side bending               25 P right side    Left side bending                 20 P left side    Right rotation DP left side    Left rotation DP left side      SEGMENTAL MOBILITY: hypomobility cervical segments     UPPER EXTREMITY STRENGTH:   Left 5/5 Right  5/5         DTR's:   Left Right   Biceps Tendon 2+ 2+   Brachioradialis 2+ 2+   Triceps Tendon 2+ 2+           Selective Functional Movement Assessment:  FN: functional, non-painful  FP: functional, painful  DP: dysfunctional,  painful  DN: dysfunctional, non-painful    Active Cervical Flexion: see above   Active Cervical Extension: see above   Cervical Rotation:  Right:see above   Left: see above   Cervical Break out patterns   Supine   Flex   -Active - DN  -Passive - DN  OA   -active  R - DN  L - DN  Rotation   -Active -   R - DP  L - DP  -Passive -   R - DP  L - DP    C1C2  -Active-     R - DP  L - DP  Extension   Active- DP  Passive-  DP    CMS Impairment/Limitation/Restriction for FOTO cervical spine Survey - not performed         Education provided:   -discussed possibility of cervical restrictions from segmental mobility and tissue extensibility (muscle, fascia,ligamentous) contributing to his pain pattern unless it is an isolated eye problem.       Assessment       Paul is a 79 y.o. male referred to outpatient Physical Therapy with a medical diagnosis of decreased ROM of the neck and intractable HA. . Pt presents with clinical findings of an OA upper and cervical spine flexion mobility dysfunction, cervical spine extension mobility dysfunction, a C1C2 and lower cervical rotation pain dysfunction. Significant tissue extensibility and muscle tightness identified.   Evaluation has determined a decrease in functional status and subjective and objective deficits that can be addressed by physical therapy intervention. Pt demonstrates pain limiting functional activities. Decreased flexibility and strength limiting normal movement patterns. Decreased segmental motion. Decreased postural strength and awareness. Decreased participation in functional and recreational activities. Subjective and objective measures are addressed by goals in the plan of care. Patient/family are involved in the development of these goals.    Pt prognosis is Fair.   Pt will benefit from skilled outpatient Physical Therapy to address the deficits stated above and in the chart below, provide pt/family education, and to maximize pt's level of independence.     Plan  of care discussed with patient: Yes  Pt's spiritual, cultural and educational needs considered and patient is agreeable to the plan of care and goals as stated below:     Anticipated Barriers for therapy: none     Medical Necessity is demonstrated by the following  History  Co-morbidities and personal factors that may impact the plan of care Co-morbidities:    has a past medical history of Stevens's esophagus with low grade dysplasia, BPH (benign prostatic hyperplasia), CAD (coronary artery disease), Cataract, Cervical spondylosis, Diaphragmatic hernia, GERD (gastroesophageal reflux disease), Heart attack, Heterophoria, Hyperlipidemia, Hypertension, Ischemic cardiomyopathy, MDD (major depressive disorder), recurrent episode, mild, Osteoporosis, Peripheral arterial disease, Sarcoid, and Squamous cell carcinoma.    Personal Factors:   age  education level  lifestyle     moderate   Examination  Body Structures and Functions, activity limitations and participation restrictions that may impact the plan of care Body Regions:   neck  back    Body Systems:    musculoskeletal    Participation Restrictions:   none    Activity limitations:   Learning and applying knowledge  no deficits    General Tasks and Commands  no deficits    Communication  no deficits    Mobility  driving (bike, car, motorcycle)    Self care  dressing    Domestic Life  limitations with domestic activity but not due to neck    Interactions/Relationships  no deficits    Life Areas  no deficits    Community and Social Life  community life  recreation and leisure         moderate   Clinical Presentation evolving clinical presentation with changing clinical characteristics moderate   Decision Making/ Complexity Score: moderate        Short Term GOALS: 5 weeks. Pt agrees with goals set.  1. Pt to report reduced tightness by 50% associated with neck movements for improved quality of movement   2. Patient demonstrates minimal pain with palpation to the left  cervical region to improve quality of life and daily activities.   3. Pt to exhibit increased neck mobility in flexion and rotation by 10 degree w/o end range pain for improved movement quality.   4. Patient demonstrates correct movement patterns associated with exercises for self management and independence with HEP  5. Patient demonstrates independence with Postural Awareness and correction for self management  6. Pt to experience 50% or more reduced interruption of sleep due to reduced pain for improved quality of life    Long Term GOALS: 10 weeks. Pt agrees with goals set.  1. Pt to demonstrate increase flexibility in the cervical musculature for increased neck flexion and rotation   2. Patient demonstrates increased cervical muscle strength for improved stability and tolerance to functional activities.   3. Patient demonstrates improved comfort level with reported decreased left eye pain by 50% or greater foer improved quality of life.   4. Pt demonstrates neck movement patterns in rotation without end range pain for improved quality of daily activities.      Plan       Plan of care Certification: 7/30/2020 to 10/30/2020.    Outpatient Physical Therapy 2 times weekly for 10 weeks to include the following interventions: Manual Therapy, Patient Education and Therapeutic Exercise. Modalities PRN    Joseph Mcduffie, PT

## 2020-07-31 PROBLEM — R52 AWAKENS FROM SLEEP DUE TO PAIN: Status: ACTIVE | Noted: 2020-07-31

## 2020-07-31 PROBLEM — M62.89 ABNORMAL INCREASED MUSCLE TIGHTNESS: Status: ACTIVE | Noted: 2020-07-31

## 2020-07-31 PROBLEM — M43.6 NECK STIFFNESS: Status: ACTIVE | Noted: 2020-07-31

## 2020-07-31 PROBLEM — R25.9 DYSFUNCTIONAL MOVEMENTS: Status: ACTIVE | Noted: 2020-07-31

## 2020-07-31 PROBLEM — M99.01 CERVICAL SEGMENT DYSFUNCTION: Status: ACTIVE | Noted: 2020-07-31

## 2020-07-31 PROBLEM — R29.898 TIGHTNESS OF NECK: Status: ACTIVE | Noted: 2020-07-31

## 2020-07-31 NOTE — PLAN OF CARE
[facility]  Physical Therapy Initial Evaluation    Name: Paul Browning  Clinic Number: [id number]    Therapy Diagnosis:   Encounter Diagnoses   Name Primary?    Decreased ROM of neck     Acute intractable headache, unspecified headache type     Neck stiffness     Awakens from sleep due to pain     Cervical segment dysfunction     Tightness of neck     Dysfunctional movements      Physician: Marquita Holloway PA-C    Physician Orders: PT Eval and Treat neck  Medical Diagnosis from Referral:   R29.898 (ICD-10-CM) - Decreased ROM of neck   R51 (ICD-10-CM) - Acute intractable headache, unspecified headache type       Evaluation Date: 7/30/2020  Authorization Period Expiration: 7/28/2021  Plan of Care Expiration: 10/30/2020  Visit # / Visits authorized: 1/ 1    Time In: 1115  Time Out: 1210  Total Billable Time: 55 minutes    Precautions: Standard and Fall    Subjective      Medical History:   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    Hyperlipidemia     Hypertension     Ischemic cardiomyopathy 11/5/2014    MDD (major depressive disorder), recurrent episode, mild 2/17/2016    Osteoporosis 7/20/2016    Peripheral arterial disease 3/18/2016    Sarcoid     Squamous cell carcinoma 09/2016, 5/2016    crown of scalp, left wrist       Surgical History:   Paul Browning  has a past surgical history that includes Umbilical hernia repair; Coronary artery bypass graft; Cardiac surgery; Phacoemulsification of cataract (Right, 7/17/2018); Intraocular prosthesis insertion (Right, 7/17/2018); Phacoemulsification of cataract (Left, 7/31/2018); Intraocular prosthesis insertion (Left,  7/31/2018); Cataract extraction w/  intraocular lens implant (Right, 07/17/2018); Cataract extraction w/  intraocular lens implant (Left, 07/31/2018); Esophagogastroduodenoscopy (EGD) with radiofrequency treatment of gastroesophageal junction; Esophagogastroduodenoscopy (N/A, 4/8/2019); Carotid stent (N/A, 10/14/2019); Injection of anesthetic agent around medial branch nerves innervating cervical facet joint (Bilateral, 1/9/2020); and Radiofrequency thermocoagulation (Bilateral, 1/30/2020).    Medications:   Paul has a current medication list which includes the following prescription(s): acetaminophen, albuterol, alendronate, alprazolam, aspirin, atorvastatin, butalbital-acetaminophen-caffeine -40 mg, diclofenac sodium, fluorouracil 5%, magnesium oxide, omega-3 fatty acids-vitamin e, ondansetron, pantoprazole, pantoprazole, riboflavin (vitamin b2), sertraline, and triamcinolone acetonide 0.1%, and the following Facility-Administered Medications: sodium chloride 0.9% and lidocaine (pf) 10 mg/ml (1%).    Allergies:   Review of patient's allergies indicates:   Allergen Reactions    Morphine Shortness Of Breath           History of current condition - Paul reports:  Left eye pain.    HAs. He does say that he encounters neck pain on occasion.  He also has balance issues. Reports he has fallen x3 in the past two weeks with associated dizziness. Says his biggest problem is pain behind his left eye that is constant. Surgery four years ago for removal of cataracts. He states that the the HAs appear after the eye starts to hurt a lot. Difficult to obtain accurate and consistent responses related to the neck. Patient wants to consistently reference his left eye pain.   Date of onset: two years ago following surgery on the eyes. The eye pain at the onset was intermittent and has progressed to constant. The HAs occur when pain in the eye is present.       Pain:  Current 5/10, worst 10/10, best 0/10   Location: neck,  left side and back of the head    Description: Aching  Aggravating Factors: can not identify any one factor   Easing Factors: lying down on left side   Sleep disturbed  - on occasion due to HAs. Lynn not sleep in his bed but on his sofa.   Headaches - positive   Blurred vision  - positive   Nausea - Positive, intermittent   Tinnitus - denies   Dizziness - positive   Current Level of Function:   Personal - dressing   Domestic - cooking,  but not due to neck pain   Community / social - limited   Occupation - NA   Recreation / fitness / sports - fishing and does not exercise.     Prior Therapy: no   Social History:   Lives in his home, single family.   Occupation: retired   Prior Level of Function: two years ago before surgery he was independent.   Pts goals: to relieve the pain, to diminish the dizziness and be able to drive again.   Imaging, X-Ray: 9/26/2018          Objective     Posture Alignment: forward head, shoulder levels unequal, increased thoracic kyphosis  Palpation: bilateral posterior and lateral cervical musculature tightness, pain elicited over the left mastoid process, SCM at the musculotendinous attachment, left C2-3, C3-4, C4-5 articular pillars, C6, C7, T1 spinous processes.    Sensation: Light Touch: Intact    Range of Motion/Strength:     Cervical/Thoracic AROM: Pain/Dysfunction with Movement:   Flexion                                 65 DN   Extension                             20 DP left side    Right side bending               25 P right side    Left side bending                 20 P left side    Right rotation DP left side    Left rotation DP left side      SEGMENTAL MOBILITY: hypomobility cervical segments     UPPER EXTREMITY STRENGTH:   Left 5/5 Right  5/5         DTR's:   Left Right   Biceps Tendon 2+ 2+   Brachioradialis 2+ 2+   Triceps Tendon 2+ 2+           Selective Functional Movement Assessment:  FN: functional, non-painful  FP: functional, painful  DP: dysfunctional,  painful  DN: dysfunctional, non-painful    Active Cervical Flexion: see above   Active Cervical Extension: see above   Cervical Rotation:  Right:see above   Left: see above   Cervical Break out patterns   Supine   Flex   -Active - DN  -Passive - DN  OA   -active  R - DN  L - DN  Rotation   -Active -   R - DP  L - DP  -Passive -   R - DP  L - DP    C1C2  -Active-     R - DP  L - DP  Extension   Active- DP  Passive-  DP    CMS Impairment/Limitation/Restriction for FOTO cervical spine Survey - not performed         Education provided:   -discussed possibility of cervical restrictions from segmental mobility and tissue extensibility (muscle, fascia,ligamentous) contributing to his pain pattern unless it is an isolated eye problem.       Assessment       Paul is a 79 y.o. male referred to outpatient Physical Therapy with a medical diagnosis of decreased ROM of the neck and intractable HA. . Pt presents with clinical findings of an OA upper and cervical spine flexion mobility dysfunction, cervical spine extension mobility dysfunction, a C1C2 and lower cervical rotation pain dysfunction. Significant tissue extensibility and muscle tightness identified.   Evaluation has determined a decrease in functional status and subjective and objective deficits that can be addressed by physical therapy intervention. Pt demonstrates pain limiting functional activities. Decreased flexibility and strength limiting normal movement patterns. Decreased segmental motion. Decreased postural strength and awareness. Decreased participation in functional and recreational activities. Subjective and objective measures are addressed by goals in the plan of care. Patient/family are involved in the development of these goals.    Pt prognosis is Fair.   Pt will benefit from skilled outpatient Physical Therapy to address the deficits stated above and in the chart below, provide pt/family education, and to maximize pt's level of independence.     Plan  of care discussed with patient: Yes  Pt's spiritual, cultural and educational needs considered and patient is agreeable to the plan of care and goals as stated below:     Anticipated Barriers for therapy: none     Medical Necessity is demonstrated by the following  History  Co-morbidities and personal factors that may impact the plan of care Co-morbidities:    has a past medical history of Stevens's esophagus with low grade dysplasia, BPH (benign prostatic hyperplasia), CAD (coronary artery disease), Cataract, Cervical spondylosis, Diaphragmatic hernia, GERD (gastroesophageal reflux disease), Heart attack, Heterophoria, Hyperlipidemia, Hypertension, Ischemic cardiomyopathy, MDD (major depressive disorder), recurrent episode, mild, Osteoporosis, Peripheral arterial disease, Sarcoid, and Squamous cell carcinoma.    Personal Factors:   age  education level  lifestyle     moderate   Examination  Body Structures and Functions, activity limitations and participation restrictions that may impact the plan of care Body Regions:   neck  back    Body Systems:    musculoskeletal    Participation Restrictions:   none    Activity limitations:   Learning and applying knowledge  no deficits    General Tasks and Commands  no deficits    Communication  no deficits    Mobility  driving (bike, car, motorcycle)    Self care  dressing    Domestic Life  limitations with domestic activity but not due to neck    Interactions/Relationships  no deficits    Life Areas  no deficits    Community and Social Life  community life  recreation and leisure         moderate   Clinical Presentation evolving clinical presentation with changing clinical characteristics moderate   Decision Making/ Complexity Score: moderate        Short Term GOALS: 5 weeks. Pt agrees with goals set.  1. Pt to report reduced tightness by 50% associated with neck movements for improved quality of movement   2. Patient demonstrates minimal pain with palpation to the left  cervical region to improve quality of life and daily activities.   3. Pt to exhibit increased neck mobility in flexion and rotation by 10 degree w/o end range pain for improved movement quality.   4. Patient demonstrates correct movement patterns associated with exercises for self management and independence with HEP  5. Patient demonstrates independence with Postural Awareness and correction for self management  6. Pt to experience 50% or more reduced interruption of sleep due to reduced pain for improved quality of life    Long Term GOALS: 10 weeks. Pt agrees with goals set.  1. Pt to demonstrate increase flexibility in the cervical musculature for increased neck flexion and rotation   2. Patient demonstrates increased cervical muscle strength for improved stability and tolerance to functional activities.   3. Patient demonstrates improved comfort level with reported decreased left eye pain by 50% or greater foer improved quality of life.   4. Pt demonstrates neck movement patterns in rotation without end range pain for improved quality of daily activities.      Plan       Plan of care Certification: 7/30/2020 to 10/30/2020.    Outpatient Physical Therapy 2 times weekly for 10 weeks to include the following interventions: Manual Therapy, Patient Education and Therapeutic Exercise. Modalities PRN    Joseph Mcduffie, PT

## 2020-08-03 ENCOUNTER — PATIENT MESSAGE (OUTPATIENT)
Dept: CARDIOLOGY | Facility: CLINIC | Age: 80
End: 2020-08-03

## 2020-08-03 LAB
OHS CV EVENT MONITOR DAY: 0
OHS CV HOLTER LENGTH DECIMAL HOURS: 24
OHS CV HOLTER LENGTH HOURS: 24
OHS CV HOLTER LENGTH MINUTES: 0

## 2020-08-04 ENCOUNTER — HOSPITAL ENCOUNTER (OUTPATIENT)
Dept: RADIOLOGY | Facility: HOSPITAL | Age: 80
Discharge: HOME | End: 2020-08-04
Attending: PHYSICIAN ASSISTANT
Payer: MEDICARE

## 2020-08-04 DIAGNOSIS — H57.12 EYE PAIN, LEFT: ICD-10-CM

## 2020-08-04 DIAGNOSIS — R42 DIZZINESS: ICD-10-CM

## 2020-08-04 DIAGNOSIS — R51.9 ACUTE INTRACTABLE HEADACHE, UNSPECIFIED HEADACHE TYPE: ICD-10-CM

## 2020-08-04 DIAGNOSIS — R42 DIZZINESS AND GIDDINESS: ICD-10-CM

## 2020-08-04 PROCEDURE — A9585 GADOBUTROL INJECTION: HCPCS | Mod: HCNC | Performed by: PHYSICIAN ASSISTANT

## 2020-08-04 PROCEDURE — 70553 MRI BRAIN W WO CONTRAST: ICD-10-PCS | Mod: 26,HCNC,, | Performed by: RADIOLOGY

## 2020-08-04 PROCEDURE — 70553 MRI BRAIN STEM W/O & W/DYE: CPT | Mod: TC,HCNC

## 2020-08-04 PROCEDURE — 25500020 PHARM REV CODE 255: Mod: HCNC | Performed by: PHYSICIAN ASSISTANT

## 2020-08-04 PROCEDURE — 70553 MRI BRAIN STEM W/O & W/DYE: CPT | Mod: 26,HCNC,, | Performed by: RADIOLOGY

## 2020-08-04 RX ORDER — ISOSORBIDE MONONITRATE 30 MG/1
30 TABLET, EXTENDED RELEASE ORAL DAILY
Qty: 30 TABLET | Refills: 11 | Status: SHIPPED | OUTPATIENT
Start: 2020-08-04 | End: 2021-04-28

## 2020-08-04 RX ORDER — GADOBUTROL 604.72 MG/ML
7 INJECTION INTRAVENOUS
Status: COMPLETED | OUTPATIENT
Start: 2020-08-04 | End: 2020-08-04

## 2020-08-04 RX ADMIN — GADOBUTROL 7 ML: 604.72 INJECTION INTRAVENOUS at 02:08

## 2020-08-06 ENCOUNTER — OFFICE VISIT (OUTPATIENT)
Dept: NEUROLOGY | Facility: CLINIC | Age: 80
End: 2020-08-06
Payer: MEDICARE

## 2020-08-06 VITALS
WEIGHT: 138.69 LBS | SYSTOLIC BLOOD PRESSURE: 143 MMHG | DIASTOLIC BLOOD PRESSURE: 70 MMHG | HEART RATE: 71 BPM | BODY MASS INDEX: 21.72 KG/M2

## 2020-08-06 DIAGNOSIS — R51.9 INTRACTABLE HEADACHE, UNSPECIFIED CHRONICITY PATTERN, UNSPECIFIED HEADACHE TYPE: Primary | ICD-10-CM

## 2020-08-06 PROCEDURE — 1125F AMNT PAIN NOTED PAIN PRSNT: CPT | Mod: HCNC,S$GLB,, | Performed by: PHYSICIAN ASSISTANT

## 2020-08-06 PROCEDURE — 1125F PR PAIN SEVERITY QUANTIFIED, PAIN PRESENT: ICD-10-PCS | Mod: HCNC,S$GLB,, | Performed by: PHYSICIAN ASSISTANT

## 2020-08-06 PROCEDURE — 3077F SYST BP >= 140 MM HG: CPT | Mod: HCNC,CPTII,S$GLB, | Performed by: PHYSICIAN ASSISTANT

## 2020-08-06 PROCEDURE — 3078F PR MOST RECENT DIASTOLIC BLOOD PRESSURE < 80 MM HG: ICD-10-PCS | Mod: HCNC,CPTII,S$GLB, | Performed by: PHYSICIAN ASSISTANT

## 2020-08-06 PROCEDURE — 1101F PT FALLS ASSESS-DOCD LE1/YR: CPT | Mod: HCNC,CPTII,S$GLB, | Performed by: PHYSICIAN ASSISTANT

## 2020-08-06 PROCEDURE — 1159F PR MEDICATION LIST DOCUMENTED IN MEDICAL RECORD: ICD-10-PCS | Mod: HCNC,S$GLB,, | Performed by: PHYSICIAN ASSISTANT

## 2020-08-06 PROCEDURE — 99499 UNLISTED E&M SERVICE: CPT | Mod: HCNC,S$GLB,, | Performed by: PHYSICIAN ASSISTANT

## 2020-08-06 PROCEDURE — 99999 PR PBB SHADOW E&M-EST. PATIENT-LVL IV: ICD-10-PCS | Mod: PBBFAC,HCNC,, | Performed by: PHYSICIAN ASSISTANT

## 2020-08-06 PROCEDURE — 99499 RISK ADDL DX/OHS AUDIT: ICD-10-PCS | Mod: HCNC,S$GLB,, | Performed by: PHYSICIAN ASSISTANT

## 2020-08-06 PROCEDURE — 3077F PR MOST RECENT SYSTOLIC BLOOD PRESSURE >= 140 MM HG: ICD-10-PCS | Mod: HCNC,CPTII,S$GLB, | Performed by: PHYSICIAN ASSISTANT

## 2020-08-06 PROCEDURE — 3078F DIAST BP <80 MM HG: CPT | Mod: HCNC,CPTII,S$GLB, | Performed by: PHYSICIAN ASSISTANT

## 2020-08-06 PROCEDURE — 99215 PR OFFICE/OUTPT VISIT, EST, LEVL V, 40-54 MIN: ICD-10-PCS | Mod: HCNC,S$GLB,, | Performed by: PHYSICIAN ASSISTANT

## 2020-08-06 PROCEDURE — 99215 OFFICE O/P EST HI 40 MIN: CPT | Mod: HCNC,S$GLB,, | Performed by: PHYSICIAN ASSISTANT

## 2020-08-06 PROCEDURE — 1101F PR PT FALLS ASSESS DOC 0-1 FALLS W/OUT INJ PAST YR: ICD-10-PCS | Mod: HCNC,CPTII,S$GLB, | Performed by: PHYSICIAN ASSISTANT

## 2020-08-06 PROCEDURE — 1159F MED LIST DOCD IN RCRD: CPT | Mod: HCNC,S$GLB,, | Performed by: PHYSICIAN ASSISTANT

## 2020-08-06 PROCEDURE — 99999 PR PBB SHADOW E&M-EST. PATIENT-LVL IV: CPT | Mod: PBBFAC,HCNC,, | Performed by: PHYSICIAN ASSISTANT

## 2020-08-06 NOTE — PROGRESS NOTES
"Established Patient   SUBJECTIVE:  Patient ID: Paul Browning   Chief Complaint: Headache    History of Present Illness:  Paul Browning is a 79 y.o. male with a PMHx of CVA, MI s/p stents, s/p cataract surgery, migraine, cervicogenic HA, cerivcal spondylosis, chronic pain, depression, COPD, CAD, HTN, HLD, CKD, osteoporosis who presents to clinic with daughter in law for follow-up of headaches.       Recommendations made at last Office Visit on 7/28/20:  - Symptoms not consistent with a unifying disorder, DDx includes temporal arteritis, cervicogenic HA, migraines, lesion  - obtain MRI brain to r/o pathologic causes for Ha's and eye pain  - obtain ESR/CRP to r/o temporal arteritis  - d/c fioricet as it was causing pt to feel "loopy"  - abortive - may trial diclofenac gel  - nausea - continue zofran  - previous cataract surgery - recently saw Dr. Collazo who did not see any ocular pathologic cause of symptoms, recommended repeat eval by retina specialist  - refer to PT for decreased neck ROM and possible cervicogenic component to HA's  - referral to sleep med to eval for ?LAUREN  - HTN - elevated today, to start metoprolol soon, asked to monitor an dlet pcp and cards know, mgmt per pcp and cards  - avoid triptans - hx of CVA, MI s/p stents, CAD, and uncontrolled HTN  - avoid NSAIDs - hx of CKD although pt states he has never been told he has any kidney issues and denies hx of ckd  - avoid steroids - hx of osteoporosis  - caution w/ bb - hx of depression  - risks, benefits, and potential side effects of diclofenac gel discussed   - alternative treatment options offered   - importance of healthy diet, regular exercise and sleep hygiene in the treatment of headaches    - Start tracking headaches via Migraine Marcos edna on phone   - RTC in 1 wk     08/06/2020 - Interval History:  Last visit pt presented w/ poorly defined HA's. Obtained ESR/CRP which were wnl. Obtained MRI brain which revealed chronic " microvascular ischemic disease and cervical degenerative changes however no findings to warrant pt's complaints.   He has since seen cardiology who noted PVC's and low HR. Was switched to imdur.   D/c fioricet  Trial diclofenac gel - helps.  PT - started last week, but feels his HA's have worsened since starting.   Sleep med - pending  Elevated BP - still elevated, will start imdur today  Patient is very frustrated again at todays appt as he was last visit. States he is upset he keeps visiting different providers who continue to order more tests but he is not getting immediately better.  He is frustrated he is not able to do the things he once did many years ago like drive, move furniture, etc. I attempted to explain to him many times what is entailed in his current treatment plan for his HA's and that this will take time but pt continues to interrupt provider and remain frustrated and upset that there is not a quicker or immediate treatment option to his other conditions as well as his HA's.   Plan: continue diclofenac gel, conservative measures for muscle tension, PT, sleep med appt, and bp mgmt. F/u 3 mo    Treatments Tried:  Diclofenac gel  fioricet  zofran  PT    Current Medications:    Current Outpatient Medications:     acetaminophen (TYLENOL) 500 MG tablet, Take 1,000 mg by mouth every 6 (six) hours as needed for Pain., Disp: , Rfl:     alendronate (FOSAMAX) 70 MG tablet, 1 tab PO qwk in the am with a full glass of water on an empty stomach. Do not consume food or lie down for at least 30 minutes afterwards., Disp: 12 tablet, Rfl: 3    aspirin (ECOTRIN) 81 MG EC tablet, Take 81 mg by mouth once daily., Disp: , Rfl:     atorvastatin (LIPITOR) 40 MG tablet, TAKE 1 TABLET BY MOUTH EVERY DAY, Disp: 90 tablet, Rfl: 3    diclofenac sodium (VOLTAREN) 1 % Gel, Apply 2 g topically every 6 (six) hours as needed., Disp: 1 Tube, Rfl: 6    magnesium oxide (MAG-OX) 400 mg (241.3 mg magnesium) tablet, Take 1 tablet  (400 mg total) by mouth once daily., Disp: 30 tablet, Rfl: 3    omega-3 fatty acids-vitamin E (FISH OIL) 1,000 mg Cap, Take 1 tablet by mouth 2 (two) times daily. (Patient taking differently: Take 1 tablet by mouth once daily. ), Disp: 60 each, Rfl: 11    ondansetron (ZOFRAN) 4 MG tablet, Take 1 tablet (4 mg total) by mouth every 8 (eight) hours as needed for Nausea., Disp: 30 tablet, Rfl: 1    pantoprazole (PROTONIX) 40 MG tablet, Take 40 mg by mouth 2 (two) times a day. , Disp: , Rfl:     riboflavin, vitamin B2, (VITAMIN B-2) 100 mg Tab tablet, Take 1 tablet (100 mg total) by mouth once daily., Disp: 90 tablet, Rfl: 3    sertraline (ZOLOFT) 50 MG tablet, TAKE 1 TABLET BY MOUTH EVERY DAY, Disp: 90 tablet, Rfl: 3    albuterol (PROVENTIL) 2.5 mg /3 mL (0.083 %) nebulizer solution, Take 3 mLs (2.5 mg total) by nebulization every 6 (six) hours as needed for Wheezing. Rescue (Patient not taking: Reported on 8/6/2020), Disp: 1 Box, Rfl: 0    ALPRAZolam (XANAX) 0.25 MG tablet, Take 1 tablet (0.25 mg total) by mouth 3 (three) times daily as needed for Anxiety., Disp: 45 tablet, Rfl: 0    butalbital-acetaminophen-caffeine -40 mg (FIORICET, ESGIC) -40 mg per tablet, 1-2 tabs PO q6 PRN headaches (Patient not taking: Reported on 8/6/2020), Disp: 60 tablet, Rfl: 0    fluorouracil 5% 5 % Soln, Use hs for 2 weeks (Patient not taking: Reported on 8/6/2020), Disp: 10 mL, Rfl: 1    isosorbide mononitrate (IMDUR) 30 MG 24 hr tablet, Take 1 tablet (30 mg total) by mouth once daily. (Patient not taking: Reported on 8/6/2020), Disp: 30 tablet, Rfl: 11    pantoprazole (PROTONIX) 40 MG tablet, TAKE 1 TABLET BY MOUTH TWICE DAILY, Disp: 180 tablet, Rfl: 0    triamcinolone acetonide 0.1% (KENALOG) 0.1 % cream, Apply topically 2 (two) times daily. (Patient not taking: Reported on 8/6/2020), Disp: 45 g, Rfl: 1  No current facility-administered medications for this visit.     Facility-Administered Medications Ordered in  Other Visits:     0.9%  NaCl infusion, 500 mL, Intravenous, Continuous, Tip Mera Jr., MD    lidocaine (PF) 10 mg/ml (1%) injection 10 mg, 1 mL, Intradermal, Once, Tip Mera Jr., MD    Review of Systems - as per HPI, otherwise a balanced 10 systems review is negative.    OBJECTIVE:  Vitals:  BP (!) 143/70   Pulse 71   Wt 62.9 kg (138 lb 10.7 oz)   BMI 21.72 kg/m²      Physical Exam:  Constitutional: he appears well-developed and well-nourished. he is well groomed. NAD   HENT:    Head: Normocephalic and atraumatic  Eyes: Conjunctivae and EOM are normal  Musculoskeletal: Normal range of motion. No joint stiffness.   Skin: Skin is warm and dry.  Psychiatric: Mood and affect are normal    Neuro: Patient is alert and oriented to person, place, and time. Language is intact and fluent. Speech is clear and fluent. Recent and remote memory are intact.  Normal attention and concentration.  Facial movement is symmetric. Moves all 4 extremities against gravity. Gait and station normal.  Cranial Nerves II through XII without focal deficit.     Review of Data:   Notes from neuro, cards reviewed   Labs:  Clinical Support on 07/30/2020   Component Date Value Ref Range Status    Holter length hours 07/30/2020 24   Final    holter length minutes 07/30/2020 0   Final    holter length dec hours 07/30/2020 24.00   Final    Event Monitor Day 07/30/2020 0   Final   Lab Visit on 07/28/2020   Component Date Value Ref Range Status    Sed Rate 07/28/2020 5  0 - 23 mm/Hr Final    CRP 07/28/2020 7.0  0.0 - 8.2 mg/L Final   Admission on 07/20/2020, Discharged on 07/20/2020   Component Date Value Ref Range Status    WBC 07/20/2020 5.19  3.90 - 12.70 K/uL Final    RBC 07/20/2020 4.46* 4.60 - 6.20 M/uL Final    Hemoglobin 07/20/2020 13.6* 14.0 - 18.0 g/dL Final    Hematocrit 07/20/2020 40.0  40.0 - 54.0 % Final    Mean Corpuscular Volume 07/20/2020 90  82 - 98 fL Final    Mean Corpuscular Hemoglobin 07/20/2020 30.5   27.0 - 31.0 pg Final    Mean Corpuscular Hemoglobin Conc 07/20/2020 34.0  32.0 - 36.0 g/dL Final    RDW 07/20/2020 14.2  11.5 - 14.5 % Final    Platelets 07/20/2020 240  150 - 350 K/uL Final    MPV 07/20/2020 10.4  9.2 - 12.9 fL Final    Immature Granulocytes 07/20/2020 0.4  0.0 - 0.5 % Final    Gran # (ANC) 07/20/2020 3.8  1.8 - 7.7 K/uL Final    Immature Grans (Abs) 07/20/2020 0.02  0.00 - 0.04 K/uL Final    Lymph # 07/20/2020 1.0  1.0 - 4.8 K/uL Final    Mono # 07/20/2020 0.4  0.3 - 1.0 K/uL Final    Eos # 07/20/2020 0.0  0.0 - 0.5 K/uL Final    Baso # 07/20/2020 0.02  0.00 - 0.20 K/uL Final    nRBC 07/20/2020 0  0 /100 WBC Final    Gran% 07/20/2020 73.0  38.0 - 73.0 % Final    Lymph% 07/20/2020 18.5  18.0 - 48.0 % Final    Mono% 07/20/2020 7.3  4.0 - 15.0 % Final    Eosinophil% 07/20/2020 0.4  0.0 - 8.0 % Final    Basophil% 07/20/2020 0.4  0.0 - 1.9 % Final    Differential Method 07/20/2020 Automated   Final    Sodium 07/20/2020 137  136 - 145 mmol/L Final    Potassium 07/20/2020 3.8  3.5 - 5.1 mmol/L Final    Chloride 07/20/2020 103  95 - 110 mmol/L Final    CO2 07/20/2020 27  23 - 29 mmol/L Final    Glucose 07/20/2020 111* 70 - 110 mg/dL Final    BUN, Bld 07/20/2020 16  8 - 23 mg/dL Final    Creatinine 07/20/2020 1.0  0.5 - 1.4 mg/dL Final    Calcium 07/20/2020 8.6* 8.7 - 10.5 mg/dL Final    Total Protein 07/20/2020 7.1  6.0 - 8.4 g/dL Final    Albumin 07/20/2020 3.7  3.5 - 5.2 g/dL Final    Total Bilirubin 07/20/2020 0.8  0.1 - 1.0 mg/dL Final    Alkaline Phosphatase 07/20/2020 61  55 - 135 U/L Final    AST 07/20/2020 18  10 - 40 U/L Final    ALT 07/20/2020 13  10 - 44 U/L Final    Anion Gap 07/20/2020 7* 8 - 16 mmol/L Final    eGFR if African American 07/20/2020 >60.0  >60 mL/min/1.73 m^2 Final    eGFR if non African American 07/20/2020 >60.0  >60 mL/min/1.73 m^2 Final    Lipase 07/20/2020 25  4 - 60 U/L Final    Specimen UA 07/20/2020 Urine, Clean Catch   Final     Color, UA 07/20/2020 Elly  Yellow, Straw, Elly Final    Appearance, UA 07/20/2020 Hazy* Clear Final    pH, UA 07/20/2020 5.0  5.0 - 8.0 Final    Specific Gravity, UA 07/20/2020 1.025  1.005 - 1.030 Final    Protein, UA 07/20/2020 Negative  Negative Final    Glucose, UA 07/20/2020 Negative  Negative Final    Ketones, UA 07/20/2020 Negative  Negative Final    Bilirubin (UA) 07/20/2020 Negative  Negative Final    Occult Blood UA 07/20/2020 Negative  Negative Final    Nitrite, UA 07/20/2020 Negative  Negative Final    Leukocytes, UA 07/20/2020 Negative  Negative Final    Troponin I 07/20/2020 <0.006  0.000 - 0.026 ng/mL Final    BNP 07/20/2020 76  0 - 99 pg/mL Final   Hospital Outpatient Visit on 07/20/2020   Component Date Value Ref Range Status    Ascending aorta 07/20/2020 3.12  cm Final    STJ 07/20/2020 3.22  cm Final    AV mean gradient 07/20/2020 2  mmHg Final    Ao peak justen 07/20/2020 0.98  m/s Final    Ao VTI 07/20/2020 20.81  cm Final    IVS 07/20/2020 0.94  0.6 - 1.1 cm Final    LA size 07/20/2020 3.90  cm Final    Left Atrium Major Axis 07/20/2020 4.39  cm Final    Left Atrium Minor Axis 07/20/2020 4.33  cm Final    LVIDD 07/20/2020 4.40  3.5 - 6.0 cm Final    LVIDS 07/20/2020 3.66  2.1 - 4.0 cm Final    LVOT diameter 07/20/2020 2.14  cm Final    LVOT peak VTI 07/20/2020 22.25  cm Final    PW 07/20/2020 0.73  0.6 - 1.1 cm Final    MV Peak A Justen 07/20/2020 1.03  m/s Final    E wave decelartion time 07/20/2020 213.19  msec Final    MV Peak E Justen 07/20/2020 0.56  m/s Final    RA Major Axis 07/20/2020 4.32  cm Final    RA Width 07/20/2020 3.87  cm Final    RVDD 07/20/2020 3.75  cm Final    Sinus 07/20/2020 3.44  cm Final    TAPSE 07/20/2020 1.65  cm Final    TR Max Justen 07/20/2020 2.61  m/s Final    TDI LATERAL 07/20/2020 0.06  m/s Final    TDI SEPTAL 07/20/2020 0.03  m/s Final    LA WIDTH 07/20/2020 3.95  cm Final    MV stenosis pressure 1/2 time 07/20/2020 61.82  ms  Final    LV Diastolic Volume 07/20/2020 100.54  mL Final    LV Systolic Volume 07/20/2020 56.57  mL Final    LVOT peak martha 07/20/2020 0.93  m/s Final    LV LATERAL E/E' RATIO 07/20/2020 9.33  m/s Final    LV SEPTAL E/E' RATIO 07/20/2020 18.67  m/s Final    FS 07/20/2020 17  % Final    LA volume 07/20/2020 57.09  cm3 Final    LV mass 07/20/2020 115.80  g Final    Left Ventricle Relative Wall Thick* 07/20/2020 0.33  cm Final    AV valve area 07/20/2020 3.84  cm2 Final    AV Velocity Ratio 07/20/2020 0.95   Final    AV index (prosthetic) 07/20/2020 1.07   Final    MV valve area p 1/2 method 07/20/2020 3.56  cm2 Final    E/A ratio 07/20/2020 0.54   Final    Mean e' 07/20/2020 0.05  m/s Final    LVOT area 07/20/2020 3.6  cm2 Final    LVOT stroke volume 07/20/2020 79.99  cm3 Final    AV peak gradient 07/20/2020 4  mmHg Final    E/E' ratio 07/20/2020 12.44  m/s Final    LV Systolic Volume Index 07/20/2020 32.8  mL/m2 Final    LV Diastolic Volume Index 07/20/2020 58.21  mL/m2 Final    LA Volume Index 07/20/2020 33.1  mL/m2 Final    LV Mass Index 07/20/2020 67  g/m2 Final    Triscuspid Valve Regurgitation Pea* 07/20/2020 27  mmHg Final    BSA 07/20/2020 1.72  m2 Final   Lab Visit on 06/09/2020   Component Date Value Ref Range Status    WBC 06/09/2020 6.80  3.90 - 12.70 K/uL Final    RBC 06/09/2020 4.84  4.60 - 6.20 M/uL Final    Hemoglobin 06/09/2020 14.3  14.0 - 18.0 g/dL Final    Hematocrit 06/09/2020 44.9  40.0 - 54.0 % Final    Mean Corpuscular Volume 06/09/2020 93  82 - 98 fL Final    Mean Corpuscular Hemoglobin 06/09/2020 29.5  27.0 - 31.0 pg Final    Mean Corpuscular Hemoglobin Conc 06/09/2020 31.8* 32.0 - 36.0 g/dL Final    RDW 06/09/2020 13.7  11.5 - 14.5 % Final    Platelets 06/09/2020 286  150 - 350 K/uL Final    MPV 06/09/2020 10.5  9.2 - 12.9 fL Final    Immature Granulocytes 06/09/2020 0.3  0.0 - 0.5 % Final    Gran # (ANC) 06/09/2020 4.2  1.8 - 7.7 K/uL Final    Immature  Grans (Abs) 06/09/2020 0.02  0.00 - 0.04 K/uL Final    Lymph # 06/09/2020 1.9  1.0 - 4.8 K/uL Final    Mono # 06/09/2020 0.6  0.3 - 1.0 K/uL Final    Eos # 06/09/2020 0.1  0.0 - 0.5 K/uL Final    Baso # 06/09/2020 0.05  0.00 - 0.20 K/uL Final    nRBC 06/09/2020 0  0 /100 WBC Final    Gran% 06/09/2020 61.9  38.0 - 73.0 % Final    Lymph% 06/09/2020 27.6  18.0 - 48.0 % Final    Mono% 06/09/2020 8.5  4.0 - 15.0 % Final    Eosinophil% 06/09/2020 1.0  0.0 - 8.0 % Final    Basophil% 06/09/2020 0.7  0.0 - 1.9 % Final    Differential Method 06/09/2020 Automated   Final    Sodium 06/09/2020 136  136 - 145 mmol/L Final    Potassium 06/09/2020 4.2  3.5 - 5.1 mmol/L Final    Chloride 06/09/2020 101  95 - 110 mmol/L Final    CO2 06/09/2020 27  23 - 29 mmol/L Final    Glucose 06/09/2020 91  70 - 110 mg/dL Final    BUN, Bld 06/09/2020 17  8 - 23 mg/dL Final    Creatinine 06/09/2020 1.3  0.5 - 1.4 mg/dL Final    Calcium 06/09/2020 8.9  8.7 - 10.5 mg/dL Final    Total Protein 06/09/2020 7.4  6.0 - 8.4 g/dL Final    Albumin 06/09/2020 3.8  3.5 - 5.2 g/dL Final    Total Bilirubin 06/09/2020 1.1* 0.1 - 1.0 mg/dL Final    Alkaline Phosphatase 06/09/2020 66  55 - 135 U/L Final    AST 06/09/2020 13  10 - 40 U/L Final    ALT 06/09/2020 9* 10 - 44 U/L Final    Anion Gap 06/09/2020 8  8 - 16 mmol/L Final    eGFR if  06/09/2020 59.2* >60 mL/min/1.73 m^2 Final    eGFR if non African American 06/09/2020 51.2* >60 mL/min/1.73 m^2 Final    TSH 06/09/2020 2.879  0.400 - 4.000 uIU/mL Final    Cholesterol 06/09/2020 115* 120 - 199 mg/dL Final    Triglycerides 06/09/2020 92  30 - 150 mg/dL Final    HDL 06/09/2020 35* 40 - 75 mg/dL Final    LDL Cholesterol 06/09/2020 61.6* 63.0 - 159.0 mg/dL Final    Hdl/Cholesterol Ratio 06/09/2020 30.4  20.0 - 50.0 % Final    Total Cholesterol/HDL Ratio 06/09/2020 3.3  2.0 - 5.0 Final    Non-HDL Cholesterol 06/09/2020 80  mg/dL Final   Lab Visit on 06/09/2020    Component Date Value Ref Range Status    Specimen UA 06/09/2020 Urine, Clean Catch   Final    Color, UA 06/09/2020 Elly  Yellow, Straw, Elly Final    Appearance, UA 06/09/2020 Cloudy* Clear Final    pH, UA 06/09/2020 6.0  5.0 - 8.0 Final    Specific [place], UA 06/09/2020 1.025  1.005 - 1.030 Final    Protein, UA 06/09/2020 Negative  Negative Final    Glucose, UA 06/09/2020 Negative  Negative Final    Ketones, UA 06/09/2020 Negative  Negative Final    Bilirubin (UA) 06/09/2020 Negative  Negative Final    Occult Blood UA 06/09/2020 Negative  Negative Final    Nitrite, UA 06/09/2020 Negative  Negative Final    Leukocytes, UA 06/09/2020 1+* Negative Final    RBC, UA 06/09/2020 4  0 - 4 /hpf Final    WBC, UA 06/09/2020 10* 0 - 5 /hpf Final    Bacteria 06/09/2020 Few* None-Occ /hpf Final    Squam Epithel, UA 06/09/2020 3  /hpf Final    Microscopic Comment 06/09/2020 SEE COMMENT   Final     Imaging:  Results for orders placed or performed during the hospital encounter of 08/04/20   MRI Brain W WO Contrast    Narrative    EXAMINATION:  MRI BRAIN W WO CONTRAST    CLINICAL HISTORY:  acute onset of HA, eye pain, dizziness, nausea, visual blurriness with neuro deficits on exam; Headache    TECHNIQUE:  Multiplanar multisequence MR imaging of the brain was performed before and after the administration of 7 mL Gadavist intravenous contrast.    COMPARISON:  Head CT 11/04/2014    FINDINGS:  Prominence of the ventricles and sulci compatible with generalized cerebral volume loss.  Slight parietal predominance.  Configuration not suggestive of hydrocephalus.    Patchy and confluent T2/FLAIR hyperintensity in the supratentorial white matter, nonspecific but most likely reflecting chronic microvascular ischemic changes . No parenchymal mass, hemorrhage, edema or recent or remote major vascular distribution infarct.  No abnormal postcontrast parenchymal or leptomeningeal enhancement.    No extra-axial blood or fluid  collections.    The T2 skull base flow voids are preserved.    Calvarial marrow signal intensity unremarkable.  Low T1 signal intensity in the odontoid process.      Impression    Generalized cerebral volume loss with the slight parietal predominance bilaterally.    Moderate supratentorial leukoencephalopathy, in keeping with chronic microvascular ischemic disease.    No compelling evidence of an acute intracranial pathology.    Abnormal decreased T1 marrow signal intensity in the odontoid process of the C2 vertebral segment.  This may be degenerative in nature, noting some adjacent pannus formation.  However, overall nonspecific and other marrow replacement process not entirely excluded.  Clinical correlation advised with dedicated cervical spine imaging if warranted clinically.    Degenerative change of the left TMJ.      Electronically signed by: Jude Noyola MD  Date:    08/04/2020  Time:    14:49   Results for orders placed or performed during the hospital encounter of 11/04/14   CT Head Without Contrast    Narrative    Technique: Axial images of the head were acquired without the administration of contrast.  Sagittal and coronal reformatted images were also obtained.    Clinical indication: Transient altered mental status    Comparison: CT 5/3/2010    Findings:    There is age-appropriate generalized cerebral volume loss.  Patchy hypoattenuation is seen scattered throughout the supratentorial white matter suggestive of chronic microvascular ischemic change.  There is no acute intra-axial or extra-axial   intracranial hemorrhage.  No major vascular cerebral territory distribution infarction is identified.  Ventricular system is stable in size and configuration without hydrocephalus.  Osseous structures show no evidence of fracture.  There is degenerative   change of the left temporomandibular joint with spurring.  Visualized paranasal sinuses and mastoid air cells are clear.    Impression         Generalized  cerebral volume loss with superimposed chronic microvascular ischemic change.  No acute intracranial abnormality identified.  ______________________________________     Electronically signed by resident: Neto Tong  Date:     11/04/14  Time:    22:23          As the supervising and teaching physician, I personally reviewed the images and resident's interpretation and I agree with the findings.        Electronically signed by: MILTON RODRIGUEZ MD  Date:     11/04/14  Time:    22:42    Results for orders placed or performed in visit on 03/01/11   [facility]    Narrative    DATE OF EXAM: Mar 17 2011      CT    0053  -  THORAX WITHOUT CONTRAST:   827 A.M.   [id number]     CLINICAL HISTORY:   786.50   CHEST PAIN NOS     PROCEDURE COMMENT:   5 MM AXIAL IMAGES OBTAINED FROM THE APICES THROUGH   THE COSTOPHRENIC ANGLES WITHOUT THE USE OF IV CONTRAST MATERIAL.  DATA   WAS RECONSTRUCTED AT 5 MM INCREMENTS FOR CONTIGUOUS 5 MM IMAGES IN THE   AXIAL, SAGITTAL AND CORONAL PLANES.  DATA WAS POSTPROCESSED FOR   EXTRACTION OF 1.25 MM AXIAL IMAGES AT 10 MM INCREMENTS FOR EFFECTIVE HIGH   RESOLUTION CT IMAGING AS WELL AS 8 MM MAXIMUM INTENSITY PROJECTION IMAGES   IN THE AXIAL PLANE AT 2 MM INCREMENTS FOR OVERLAPPING MIP IMAGES.  NO   ADDITIONAL RADIATION WAS USED.       ICD 9 CODE(S):   ()     CPT 4 CODE(S)/MODIFIER(S):   ()     RESULTS: COMPARISON IS MADE TO CTA OF THE CHEST DATED 4/8/06 AS WELL AS   CT OF THE CHEST WITH CONTRAST DATED 6/27/03.     STRUCTURES AT THE BASE OF THE NECK IMAGED IN THE COURSE OF THIS DEDICATED   THORACIC CT DEMONSTRATE NO APPRECIABLE PATHOLOGY.       THERE IS A LEFT SIDED AORTIC ARCH WITH THREE BRANCH VESSELS.  THE AORTA   IS NORMAL IN CALIBER, CONTOUR, AND COURSE WITH A MODERATE AMOUNT OF   CALCIFIC ATHEROSCLEROSIS THROUGHOUT THE AORTA AND ITS BRANCH VESSELS   INCLUDING THE CORONARY ARTERIES.  THE HEART IS NORMAL IN SIZE AND   MORPHOLOGY WITHOUT PERICARDIAL FLUID.  PULMONARY  ARTERIES DISTRIBUTE   NORMALLY.  PULMONARY AND SYSTEMIC VENOATRIAL CONNECTIONS ARE CONCORDANT.    THERE ARE SEVERAL CALCIFIED AND NONCALCIFIED MEDIASTINAL LYMPH NODES, THE   LARGEST NONCALCIFIED NODE INFERIORLY WITHIN THE POSTERIOR MEDIASTINUM,   ALL OF WHICH APPEAR RELATIVELY STABLE IN SIZE AND CONFIGURATION DATING   BACK TO 6/27/03.  THE ESOPHAGUS IS NORMAL IN COURSE AND CALIBER WITH   RE-DEMONSTRATION OF A LARGE HIATAL HERNIA.       TRACHEA AND BRONCHI ARE PATENT.  MULTIPLE PULMONARY MICRONODULES ARE   IDENTIFIED IN A PERILYMPHATIC DISTRIBUTION BILATERALLY, GREATER IN THE   APICES.  A CLASSIC GALAXY SIGN IS SEEN IN THE RIGHT UPPER LOBE, WITH THIS   CONFIGURATION OF MICRONODULES MEASURING 2.9 X 1.5 CM IN ITS LARGEST AXIAL   PLANE, UNCHANGED FROM 6/27/03.  THE CONSTELLATION OF FINDINGS IS   SUGGESTIVE OF A SARCOIDOSIS WITH RELATIVE STABILITY OVER APPROXIMATELY 8   YEARS.       LIMITED IMAGES OF THE ABDOMEN OBTAINED IN THE COURSE OF THIS DEDICATED   THORACIC CT DEMONSTRATE A 2.8 X 2.9 CM ROUNDED HYPODENSITY IN THE RIGHT   LOBE OF THE LIVER POSTEROINFERIORLY WHICH APPEARS UNCHANGED FROM 2003 AND   WAS THOUGHT TO REPRESENT A HEPATIC CYST ON PREVIOUS ULTRASOUND   EVALUATION.  ABDOMINAL STRUCTURES ARE OTHERWISE UNREMARKABLE.     OSSEOUS STRUCTURES DEMONSTRATE DEGENERATIVE DISEASE OF THE THORACIC SPINE   BUT ARE OTHERWISE UNREMARKABLE WITH NO LYTIC OR SCLEROTIC LESIONS.      IMPRESSION:   1.  PERILYMPHATIC DISTRIBUTION OF PULMONARY MICRONODULES WITH CLASSIC   GALAXY SIGN IN THE RIGHT UPPER LOBE AND MULTIPLE CALCIFIED AND   NONCALCIFIED MEDIASTINAL NODES.  ALL THESE FINDINGS ARE UNCHANGED FROM   6/03 AND ARE CONSISTENT WITH SARCOIDOSIS.   2.  LARGE HIATAL HERNIA.    3.  ATHEROSCLEROSIS AND CORONARY ARTERY DISEASE.    4.  SIMPLE HEPATIC CYST.             : REBEL  Transcribe Date/Time: Mar 17 2011  2:41P  Dictated by : BRYANNA FINE MD  Read On: Mar 17 2011  1:19P  nl: Rachna Sams M.D. [id number]  Images were  "reviewed, findings were verified and document was   electronically  SIGNED BY: ANGELA JEREZ MD On:               Note: I have independently reviewed any/all imaging/labs/tests and agree with the report (s) as documented.  Any discrepancies will be as noted/demarcated by free text.  JAZMIN DONIS 8/6/2020    ASSESSMENT:  1. Intractable headache, unspecified chronicity pattern, unspecified headache type        PLAN:  - MRI brain results listed above  - ESR/CRP wnl  - d/c fioricet as it was causing pt to feel "loopy"  - abortive - continue diclofenac gel  - nausea - continue zofran  - previous cataract surgery - recently saw Dr. Collazo who did not see any ocular pathologic cause of symptoms, recommended repeat eval by retina specialist  - continue PT for decreased neck ROM and possible cervicogenic component to HA's  - referral to sleep med to eval for ?LAUREN  - HTN - elevated today, will start imdur soon, asked to monitor and let pcp and cards know, mgmt per pcp and cards  - avoid triptans - hx of CVA, MI s/p stents, CAD, and uncontrolled HTN  - avoid NSAIDs - hx of CKD although pt states he has never been told he has any kidney issues and denies hx of ckd  - avoid steroids - hx of osteoporosis  - caution w/ bb - hx of depression  - Continue tracking headaches   - Discussed goals of therapy are to decrease the frequency, intensity, and duration of headaches  - RTC in 3 mo          Questions and concerns were sought and answered to the patient's stated verbal satisfaction.  The patient verbalizes understanding and agreement with the above stated treatment plan.     CC: DO Marquita Quezada PA-C  [facility] [place]   [phone]    Dr. Berry was available during today's encounter.       "

## 2020-08-06 NOTE — PROGRESS NOTES
Physical Therapy Daily Treatment Note     Name: Paul Berrios [facility]  Clinic Number: [id number]    Therapy Diagnosis:   Encounter Diagnoses   Name Primary?    Neck stiffness     Abnormal increased muscle tightness     Awakens from sleep due to pain     Cervical segment dysfunction     Tightness of neck     Dysfunctional movements      Physician: Marquita Holloway PA-C    Visit Date: 8/7/2020    Physician Orders: PT Eval and Treat neck  Medical Diagnosis from Referral:   R29.898 (ICD-10-CM) - Decreased ROM of neck   R51 (ICD-10-CM) - Acute intractable headache, unspecified headache type         Evaluation Date: 7/30/2020  Authorization Period Expiration: 08/07/2021  Plan of Care Expiration: 10/30/2020  Visit # / Visits authorized: 1/ 20     Time In: 12:00 PM  Time Out: 12:45 PM  Total Billable Time: 45 min     Precautions: Standard and Fall      Subjective     Pt reports: he gets dizzy frequently and has been having falls lately.   He did not receive HEP at initial eval.  Response to previous treatment: soreness in neck  Functional change: none    Pain: 8/10  Location:  neck      Objective     Paul received therapeutic exercises to develop strength, ROM, flexibility and posture for 30 minutes including:    Cervical rotation: x10 reps each direction  Chin tucks in sitting and supine: x10 reps  Scap retractions in sitting: x10 reps  Scap depression: x10 reps  Upper trap stretch: [id number]  Levator Scapulae stretch: 2x30 sec  Rows with yellow band: x15 reps  Shoulder extension with yellow band x15 reps          Paul received the following manual therapy techniques: Soft tissue Mobilization were applied to the: cervical region for 15 minutes, including:    Occipital release  STM to cervical paraspinals, upper trap  Gentle stretching to upper trap          Home Exercises Provided and Patient Education Provided     Education provided:   - HEP, posture, exercise technique    Written Home Exercises Provided:  yes.  Exercises were reviewed and Paul was able to demonstrate them prior to the end of the session.  Paul demonstrated fair  understanding of the education provided.     See EMR under Patient Instructions for exercises provided 8/7/2020.    Assessment     Patient tolerated treatment with no adverse effects. Pt requires frequent verbal and tactile cueing as well as demonstration during exercises for improving technique and understanding instructions provided. Patient benefited from exercises, stretching and STM for improving mobility and decreasing pain. Patient is unsteady in standing and when walking, requiring assistance for safety during standing exercises.     Paul is progressing well towards his goals.   Pt prognosis is Fair.     Pt will continue to benefit from skilled outpatient physical therapy to address the deficits listed in the problem list box on initial evaluation, provide pt/family education and to maximize pt's level of independence in the home and community environment.     Pt's spiritual, cultural and educational needs considered and pt agreeable to plan of care and goals.    Anticipated barriers to physical therapy: none    Goals:  Short Term GOALS: 5 weeks. Pt agrees with goals set.  1. Pt to report reduced tightness by 50% associated with neck movements for improved quality of movement   2. Patient demonstrates minimal pain with palpation to the left cervical region to improve quality of life and daily activities.   3. Pt to exhibit increased neck mobility in flexion and rotation by 10 degree w/o end range pain for improved movement quality.   4. Patient demonstrates correct movement patterns associated with exercises for self management and independence with HEP  5. Patient demonstrates independence with Postural Awareness and correction for self management  6. Pt to experience 50% or more reduced interruption of sleep due to reduced pain for improved quality of life     Long Term GOALS:  10 weeks. Pt agrees with goals set.  1. Pt to demonstrate increase flexibility in the cervical musculature for increased neck flexion and rotation   2. Patient demonstrates increased cervical muscle strength for improved stability and tolerance to functional activities.   3. Patient demonstrates improved comfort level with reported decreased left eye pain by 50% or greater foer improved quality of life.   4. Pt demonstrates neck movement patterns in rotation without end range pain for improved quality of daily activities.        Plan     Continue PT POC, progressing patient as tolerated    Tari Sebastian, PTA

## 2020-08-07 ENCOUNTER — CLINICAL SUPPORT (OUTPATIENT)
Dept: REHABILITATION | Facility: HOSPITAL | Age: 80
End: 2020-08-07
Payer: MEDICARE

## 2020-08-07 DIAGNOSIS — R25.9 DYSFUNCTIONAL MOVEMENTS: ICD-10-CM

## 2020-08-07 DIAGNOSIS — R29.898 TIGHTNESS OF NECK: ICD-10-CM

## 2020-08-07 DIAGNOSIS — M43.6 NECK STIFFNESS: ICD-10-CM

## 2020-08-07 DIAGNOSIS — M99.01 CERVICAL SEGMENT DYSFUNCTION: ICD-10-CM

## 2020-08-07 DIAGNOSIS — M62.89 ABNORMAL INCREASED MUSCLE TIGHTNESS: ICD-10-CM

## 2020-08-07 DIAGNOSIS — R52 AWAKENS FROM SLEEP DUE TO PAIN: ICD-10-CM

## 2020-08-07 PROCEDURE — 97110 THERAPEUTIC EXERCISES: CPT | Mod: HCNC,PO,CQ

## 2020-08-07 PROCEDURE — 97140 MANUAL THERAPY 1/> REGIONS: CPT | Mod: HCNC,PO,CQ

## 2020-08-12 ENCOUNTER — CLINICAL SUPPORT (OUTPATIENT)
Dept: REHABILITATION | Facility: HOSPITAL | Age: 80
End: 2020-08-12
Payer: MEDICARE

## 2020-08-12 DIAGNOSIS — M62.89 ABNORMAL INCREASED MUSCLE TIGHTNESS: ICD-10-CM

## 2020-08-12 DIAGNOSIS — R25.9 DYSFUNCTIONAL MOVEMENTS: ICD-10-CM

## 2020-08-12 DIAGNOSIS — R29.898 TIGHTNESS OF NECK: ICD-10-CM

## 2020-08-12 DIAGNOSIS — M43.6 NECK STIFFNESS: ICD-10-CM

## 2020-08-12 DIAGNOSIS — R52 AWAKENS FROM SLEEP DUE TO PAIN: ICD-10-CM

## 2020-08-12 DIAGNOSIS — M99.01 CERVICAL SEGMENT DYSFUNCTION: ICD-10-CM

## 2020-08-12 PROCEDURE — 97140 MANUAL THERAPY 1/> REGIONS: CPT | Mod: HCNC,PO | Performed by: PHYSICAL THERAPIST

## 2020-08-12 PROCEDURE — 97110 THERAPEUTIC EXERCISES: CPT | Mod: HCNC,PO | Performed by: PHYSICAL THERAPIST

## 2020-08-12 NOTE — PROGRESS NOTES
Physical Therapy Daily Treatment Note     Name: Paul Berrios [facility]  Clinic Number: [id number]    Therapy Diagnosis:   Encounter Diagnoses   Name Primary?    Neck stiffness     Abnormal increased muscle tightness     Awakens from sleep due to pain     Cervical segment dysfunction     Tightness of neck     Dysfunctional movements      Physician: Marquita Holloway PA-C    Visit Date: 8/12/2020    Physician Orders: PT Eval and Treat neck  Medical Diagnosis from Referral:   R29.898 (ICD-10-CM) - Decreased ROM of neck   R51 (ICD-10-CM) - Acute intractable headache, unspecified headache type         Evaluation Date: 7/30/2020  Authorization Period Expiration: 08/07/2021  Plan of Care Expiration: 10/30/2020  Visit # / Visits authorized: 1/ 20     Time In: 12:10 PM  Time Out: 12:55 PM  Total Billable Time:  min     Precautions: Standard and Fall      Subjective     Pt reports:the neck feels all-right and he does not have any pain. Says it hurts mainly when I turn to the left. Says the neck is stiff. It pulls but can stand the pain. Its not that bad.   He did not receive HEP at initial eval.  Response to previous treatment: soreness in neck  Functional change: none    Pain: 5 /10  Location:  Neck, left side    Objective     Paul received therapeutic exercises to develop strength, ROM, flexibility and posture for 15 minutes including:    Cervical rotation: x10 reps each direction  Chin tucks in sitting and supine: x10 reps  Scap retractions in sitting: x10 reps  Scap depression: x10 reps  Upper trap stretch: [id number] manual  Levator Scapulae stretch: 2x30 sec manual   Rows with yellow band: x20 reps  Shoulder extension with yellow band x20 reps    Paul received the following manual therapy techniques: Soft tissue Mobilization were applied to the: cervical region for 30 minutes, including:  Cervical distraction  Sub-Occipital release  STM to cervical paraspinals, upper trap  Positional release of the upper  traps  Passive stretching to the       Home Exercises Provided and Patient Education Provided     Education provided:   - HEP, posture, exercise technique    Written Home Exercises Provided: yes.  Exercises were reviewed and Paul was able to demonstrate them prior to the end of the session.  Paul demonstrated fair  understanding of the education provided.     See EMR under Patient Instructions for exercises provided 8/7/2020.    Assessment     The patient responded well to manual interventions. He did report tightness in the neck after the exercises which is typical. He maintains the neck muscles in a tight sustained state. Focus is to improve mobility and reduce the tightness as much as possible.   Paul is progressing well towards his goals.   Pt prognosis is Fair.     Pt will continue to benefit from skilled outpatient physical therapy to address the deficits listed in the problem list box on initial evaluation, provide pt/family education and to maximize pt's level of independence in the home and community environment.     Pt's spiritual, cultural and educational needs considered and pt agreeable to plan of care and goals.    Anticipated barriers to physical therapy: none    Goals:  Short Term GOALS: 5 weeks. Pt agrees with goals set.  1. Pt to report reduced tightness by 50% associated with neck movements for improved quality of movement   2. Patient demonstrates minimal pain with palpation to the left cervical region to improve quality of life and daily activities.   3. Pt to exhibit increased neck mobility in flexion and rotation by 10 degree w/o end range pain for improved movement quality.   4. Patient demonstrates correct movement patterns associated with exercises for self management and independence with HEP  5. Patient demonstrates independence with Postural Awareness and correction for self management  6. Pt to experience 50% or more reduced interruption of sleep due to reduced pain for improved  quality of life     Long Term GOALS: 10 weeks. Pt agrees with goals set.  1. Pt to demonstrate increase flexibility in the cervical musculature for increased neck flexion and rotation   2. Patient demonstrates increased cervical muscle strength for improved stability and tolerance to functional activities.   3. Patient demonstrates improved comfort level with reported decreased left eye pain by 50% or greater foer improved quality of life.   4. Pt demonstrates neck movement patterns in rotation without end range pain for improved quality of daily activities.        Plan     Continue PT POC, progressing patient as tolerated    Joseph Mcduffie, PT

## 2020-08-13 ENCOUNTER — PES CALL (OUTPATIENT)
Dept: ADMINISTRATIVE | Facility: CLINIC | Age: 80
End: 2020-08-13

## 2020-08-14 ENCOUNTER — CLINICAL SUPPORT (OUTPATIENT)
Dept: REHABILITATION | Facility: HOSPITAL | Age: 80
End: 2020-08-14
Payer: MEDICARE

## 2020-08-14 DIAGNOSIS — M99.01 CERVICAL SEGMENT DYSFUNCTION: ICD-10-CM

## 2020-08-14 DIAGNOSIS — R52 AWAKENS FROM SLEEP DUE TO PAIN: ICD-10-CM

## 2020-08-14 DIAGNOSIS — M62.89 ABNORMAL INCREASED MUSCLE TIGHTNESS: ICD-10-CM

## 2020-08-14 DIAGNOSIS — R25.9 DYSFUNCTIONAL MOVEMENTS: ICD-10-CM

## 2020-08-14 DIAGNOSIS — R29.898 TIGHTNESS OF NECK: ICD-10-CM

## 2020-08-14 DIAGNOSIS — M43.6 NECK STIFFNESS: ICD-10-CM

## 2020-08-14 PROCEDURE — 97140 MANUAL THERAPY 1/> REGIONS: CPT | Mod: HCNC,PO,CQ

## 2020-08-14 PROCEDURE — 97110 THERAPEUTIC EXERCISES: CPT | Mod: HCNC,PO,CQ

## 2020-08-14 NOTE — PROGRESS NOTES
Physical Therapy Daily Treatment Note     Name: Paul Berrios [facility]  Clinic Number: [id number]    Therapy Diagnosis:   Encounter Diagnoses   Name Primary?    Neck stiffness     Abnormal increased muscle tightness     Awakens from sleep due to pain     Cervical segment dysfunction     Tightness of neck     Dysfunctional movements      Physician: Marquita Holloway PA-C    Visit Date: 8/14/2020    Physician Orders: PT Eval and Treat neck  Medical Diagnosis from Referral:   R29.898 (ICD-10-CM) - Decreased ROM of neck   R51 (ICD-10-CM) - Acute intractable headache, unspecified headache type         Evaluation Date: 7/30/2020  Authorization Period Expiration: 08/07/2021  Plan of Care Expiration: 10/30/2020  Visit # / Visits authorized: 3/ 20     Time In: 12:10 PM  Time Out: 1:00 PM  Total Billable Time:  40 min (plus 10min heat)     Precautions: Standard and Fall      Subjective     Pt reports:he feels like he can move his neck more, but it still hurts  He was compliant with home exercise plan.  Response to previous treatment: improved cervical ROM, mild soreness  Functional change: none    Pain: 8 /10  Location:  Neck, left side    Objective     Paul received therapeutic exercises to develop strength, ROM, flexibility and posture for 15 minutes including:    Cervical rotation: x10 reps each direction  Chin tucks in sitting and supine: x10 reps  Scap retractions in sitting: x10 reps  Scap depression: x10 reps  Upper trap stretch: [id number] manual  Levator Scapulae stretch: 2x30 sec manual   Rows with yellow band: x20 reps  Shoulder extension with yellow band x20 reps    Paul received the following manual therapy techniques: Soft tissue Mobilization were applied to the: cervical region for 25 minutes, including:    Sub-Occipital release  STM to cervical paraspinals, upper trap  Positional release of the upper traps  Passive stretching to the upper trap B and levator scapulae    Heat to cervical region x10  min      Home Exercises Provided and Patient Education Provided     Education provided:   - HEP, posture, exercise technique    Written Home Exercises Provided: yes.  Exercises were reviewed and Paul was able to demonstrate them prior to the end of the session.  Paul demonstrated fair  understanding of the education provided.     See EMR under Patient Instructions for exercises provided 8/7/2020.    Assessment     Patient tolerated treatment well and reported less pain following manual therapy interventions. Patient is benefiting from physical therapy to improve cervical ROM and decrease pain for improved function. Patient requires constant verbal and tactile cueing during exercises for proper technique. Facilitation of scapular movement was also provided during scap retractions and rows for proper technique.      Paul is progressing well towards his goals.   Pt prognosis is Fair.     Pt will continue to benefit from skilled outpatient physical therapy to address the deficits listed in the problem list box on initial evaluation, provide pt/family education and to maximize pt's level of independence in the home and community environment.     Pt's spiritual, cultural and educational needs considered and pt agreeable to plan of care and goals.    Anticipated barriers to physical therapy: none    Goals:  Short Term GOALS: 5 weeks. Pt agrees with goals set.  1. Pt to report reduced tightness by 50% associated with neck movements for improved quality of movement   2. Patient demonstrates minimal pain with palpation to the left cervical region to improve quality of life and daily activities.   3. Pt to exhibit increased neck mobility in flexion and rotation by 10 degree w/o end range pain for improved movement quality.   4. Patient demonstrates correct movement patterns associated with exercises for self management and independence with HEP  5. Patient demonstrates independence with Postural Awareness and correction  for self management  6. Pt to experience 50% or more reduced interruption of sleep due to reduced pain for improved quality of life     Long Term GOALS: 10 weeks. Pt agrees with goals set.  1. Pt to demonstrate increase flexibility in the cervical musculature for increased neck flexion and rotation   2. Patient demonstrates increased cervical muscle strength for improved stability and tolerance to functional activities.   3. Patient demonstrates improved comfort level with reported decreased left eye pain by 50% or greater foer improved quality of life.   4. Pt demonstrates neck movement patterns in rotation without end range pain for improved quality of daily activities.        Plan     Continue PT POC, progressing patient as tolerated    Tari Sebastian, XOCHILT

## 2020-08-18 NOTE — PROGRESS NOTES
Physical Therapy Daily Treatment Note     Name: Paul Berrios [facility]  Clinic Number: [id number]    Therapy Diagnosis:   Encounter Diagnoses   Name Primary?    Neck stiffness     Abnormal increased muscle tightness     Awakens from sleep due to pain     Cervical segment dysfunction     Tightness of neck     Dysfunctional movements      Physician: Marquita Holloway PA-C    Visit Date: 8/19/2020    Physician Orders: PT Eval and Treat neck  Medical Diagnosis from Referral:   R29.898 (ICD-10-CM) - Decreased ROM of neck   R51 (ICD-10-CM) - Acute intractable headache, unspecified headache type         Evaluation Date: 7/30/2020  Authorization Period Expiration: 08/07/2021  Plan of Care Expiration: 10/30/2020  Visit # / Visits authorized: 4/ 20     Time In: 12:45 PM  Time Out: 1:40 PM  Total Billable Time:  45 minutes (plus 10 minutes for heat)     Precautions: Standard and Fall      Subjective     Pt reports:his neck is a little stiff first thing in the morning, but once he starts moving it's better  He was compliant with home exercise plan.  Response to previous treatment: improved cervical ROM, mild soreness  Functional change: improving cervical ROM    Pain: 0 /10  Location:  Neck, left side    Objective     Paul received therapeutic exercises to develop strength, ROM, flexibility and posture for 30 minutes including:    Cervical rotation: x15 reps each direction  Chin tucks in sitting and supine: x10 reps  Scap retractions in sitting: x15 reps  Scap depression: x15 reps  Upper trap stretch: [id number] manual  Levator Scapulae stretch: 2x30 sec manual   Rows with yellow band: x20 reps  Shoulder extension with yellow band x20 reps    Paul received the following manual therapy techniques: Soft tissue Mobilization were applied to the: cervical region for 15 minutes, including:    Sub-Occipital release  STM to cervical paraspinals, upper trap  Passive stretching to the upper trap B and levator scapulae    Heat to  cervical region x10 min      Home Exercises Provided and Patient Education Provided     Education provided:   - HEP, posture, exercise technique    Written Home Exercises Provided: yes.  Exercises were reviewed and Paul was able to demonstrate them prior to the end of the session.  Paul demonstrated fair  understanding of the education provided.     See EMR under Patient Instructions for exercises provided 8/7/2020.    Assessment     Patient with improving tolerance to activity this session and no pain noted throughout. Patient has difficulty with carryover on exercises, requiring constant cueing and scapular facilitation for correct technique and posture.        Paul is progressing well towards his goals.   Pt prognosis is Fair.     Pt will continue to benefit from skilled outpatient physical therapy to address the deficits listed in the problem list box on initial evaluation, provide pt/family education and to maximize pt's level of independence in the home and community environment.     Pt's spiritual, cultural and educational needs considered and pt agreeable to plan of care and goals.    Anticipated barriers to physical therapy: none    Goals:  Short Term GOALS: 5 weeks. Pt agrees with goals set.  1. Pt to report reduced tightness by 50% associated with neck movements for improved quality of movement   2. Patient demonstrates minimal pain with palpation to the left cervical region to improve quality of life and daily activities.   3. Pt to exhibit increased neck mobility in flexion and rotation by 10 degree w/o end range pain for improved movement quality.   4. Patient demonstrates correct movement patterns associated with exercises for self management and independence with HEP  5. Patient demonstrates independence with Postural Awareness and correction for self management  6. Pt to experience 50% or more reduced interruption of sleep due to reduced pain for improved quality of life     Long Term GOALS:  10 weeks. Pt agrees with goals set.  1. Pt to demonstrate increase flexibility in the cervical musculature for increased neck flexion and rotation   2. Patient demonstrates increased cervical muscle strength for improved stability and tolerance to functional activities.   3. Patient demonstrates improved comfort level with reported decreased left eye pain by 50% or greater foer improved quality of life.   4. Pt demonstrates neck movement patterns in rotation without end range pain for improved quality of daily activities.        Plan     Continue PT POC, progressing patient as tolerated    Tari Sebastian, PTA

## 2020-08-19 ENCOUNTER — CLINICAL SUPPORT (OUTPATIENT)
Dept: REHABILITATION | Facility: HOSPITAL | Age: 80
End: 2020-08-19
Payer: MEDICARE

## 2020-08-19 DIAGNOSIS — R29.898 TIGHTNESS OF NECK: ICD-10-CM

## 2020-08-19 DIAGNOSIS — M99.01 CERVICAL SEGMENT DYSFUNCTION: ICD-10-CM

## 2020-08-19 DIAGNOSIS — R25.9 DYSFUNCTIONAL MOVEMENTS: ICD-10-CM

## 2020-08-19 DIAGNOSIS — M43.6 NECK STIFFNESS: ICD-10-CM

## 2020-08-19 DIAGNOSIS — R52 AWAKENS FROM SLEEP DUE TO PAIN: ICD-10-CM

## 2020-08-19 DIAGNOSIS — M62.89 ABNORMAL INCREASED MUSCLE TIGHTNESS: ICD-10-CM

## 2020-08-19 PROCEDURE — 97110 THERAPEUTIC EXERCISES: CPT | Mod: HCNC,PO,CQ

## 2020-08-19 PROCEDURE — 97140 MANUAL THERAPY 1/> REGIONS: CPT | Mod: HCNC,PO,CQ

## 2020-08-21 ENCOUNTER — CLINICAL SUPPORT (OUTPATIENT)
Dept: REHABILITATION | Facility: HOSPITAL | Age: 80
End: 2020-08-21
Payer: MEDICARE

## 2020-08-21 DIAGNOSIS — R52 AWAKENS FROM SLEEP DUE TO PAIN: ICD-10-CM

## 2020-08-21 DIAGNOSIS — M43.6 NECK STIFFNESS: ICD-10-CM

## 2020-08-21 DIAGNOSIS — R25.9 DYSFUNCTIONAL MOVEMENTS: ICD-10-CM

## 2020-08-21 DIAGNOSIS — R29.898 TIGHTNESS OF NECK: ICD-10-CM

## 2020-08-21 DIAGNOSIS — M62.89 ABNORMAL INCREASED MUSCLE TIGHTNESS: ICD-10-CM

## 2020-08-21 DIAGNOSIS — M99.01 CERVICAL SEGMENT DYSFUNCTION: ICD-10-CM

## 2020-08-21 PROCEDURE — 97140 MANUAL THERAPY 1/> REGIONS: CPT | Mod: HCNC,PO | Performed by: PHYSICAL THERAPIST

## 2020-08-21 PROCEDURE — 97110 THERAPEUTIC EXERCISES: CPT | Mod: HCNC,PO | Performed by: PHYSICAL THERAPIST

## 2020-08-21 NOTE — PROGRESS NOTES
Physical Therapy Daily Treatment Note     Name: Paul Berrios [facility]  Clinic Number: [id number]    Therapy Diagnosis:   Encounter Diagnoses   Name Primary?    Neck stiffness     Abnormal increased muscle tightness     Awakens from sleep due to pain     Cervical segment dysfunction     Tightness of neck     Dysfunctional movements      Physician: Marquita Holloway PA-C    Visit Date: 8/21/2020    Physician Orders: PT Eval and Treat neck  Medical Diagnosis from Referral:   R29.898 (ICD-10-CM) - Decreased ROM of neck   R51 (ICD-10-CM) - Acute intractable headache, unspecified headache type         Evaluation Date: 7/30/2020  Authorization Period Expiration: 08/07/2021  Plan of Care Expiration: 10/30/2020  Visit # / Visits authorized: 4/ 20     Time In: 1215 PM  Time Out: 1205     PM  Total Billable Time: 50  minutes      Precautions: Standard and Fall      Subjective     Pt reports: his neck is feeling better and there is no pain. HA are not as bad.     He was compliant with home exercise plan.  Response to previous treatment: improved cervical ROM, mild soreness  Functional change: improving cervical ROM    Pain: 0 /10  Location:  Neck, left side    Objective     Paul received therapeutic exercises to develop strength, ROM, flexibility and posture for 35 minutes including:    Cervical rotation: x15 reps each direction  Chin tucks in sitting and supine: x10 reps  Scap retractions in sitting: x15 reps  Scap depression: x15 reps  Upper trap stretch: [id number] manual  Levator Scapulae stretch: 2x30 sec manual   Rows with yellow band: x20 reps  Shoulder extension with yellow band x20 reps    Paul received the following manual therapy techniques: Cervical distraction along with Soft tissue Mobilization applied to the: cervical region for 10 minutes, including:  Cervical distraction   Sub-Occipital release  STM to cervical paraspinals, upper trap  Passive stretching to the upper trap B and levator scapulae    Heat  to cervical region x10 min      Home Exercises Provided and Patient Education Provided     Education provided:   - HEP, posture, exercise technique    Written Home Exercises Provided: yes.  Exercises were reviewed and Paul was able to demonstrate them prior to the end of the session.  Paul demonstrated fair  understanding of the education provided.     See EMR under Patient Instructions for exercises provided 8/7/2020.    Assessment     Activities performed and completed as noted. He did not perform chin tucks properly and was c/o HA after the exercise. His response to manual interventions was good and there was some reduction in the HAs as he reported.  Patient reported feeling better at the end of the session. Challenge is his persistent maintenance of neck muscle tightness and inability to perform the exercises with acceptable technique unless he has cueing. He does appear to respond to the treatments favorably.      Paul is progressing well towards his goals.   Pt prognosis is Fair.     Pt will continue to benefit from skilled outpatient physical therapy to address the deficits listed in the problem list box on initial evaluation, provide pt/family education and to maximize pt's level of independence in the home and community environment.     Pt's spiritual, cultural and educational needs considered and pt agreeable to plan of care and goals.    Anticipated barriers to physical therapy: none    Goals:  Short Term GOALS: 5 weeks. Pt agrees with goals set.  1. Pt to report reduced tightness by 50% associated with neck movements for improved quality of movement   2. Patient demonstrates minimal pain with palpation to the left cervical region to improve quality of life and daily activities.   3. Pt to exhibit increased neck mobility in flexion and rotation by 10 degree w/o end range pain for improved movement quality.   4. Patient demonstrates correct movement patterns associated with exercises for self  management and independence with HEP  5. Patient demonstrates independence with Postural Awareness and correction for self management  6. Pt to experience 50% or more reduced interruption of sleep due to reduced pain for improved quality of life     Long Term GOALS: 10 weeks. Pt agrees with goals set.  1. Pt to demonstrate increase flexibility in the cervical musculature for increased neck flexion and rotation   2. Patient demonstrates increased cervical muscle strength for improved stability and tolerance to functional activities.   3. Patient demonstrates improved comfort level with reported decreased left eye pain by 50% or greater foer improved quality of life.   4. Pt demonstrates neck movement patterns in rotation without end range pain for improved quality of daily activities.        Plan     Continue PT POC, progressing patient as tolerated    Joseph Mcduffie, PT

## 2020-08-25 NOTE — PROGRESS NOTES
Physical Therapy Daily Treatment Note     Name: Paul Berrios [facility]  Clinic Number: [id number]    Therapy Diagnosis:   Encounter Diagnoses   Name Primary?    Neck stiffness     Abnormal increased muscle tightness     Awakens from sleep due to pain     Cervical segment dysfunction     Tightness of neck     Dysfunctional movements      Physician: Marquita Holloway PA-C    Visit Date: 8/26/2020    Physician Orders: PT Eval and Treat neck  Medical Diagnosis from Referral:   R29.898 (ICD-10-CM) - Decreased ROM of neck   R51 (ICD-10-CM) - Acute intractable headache, unspecified headache type         Evaluation Date: 7/30/2020  Authorization Period Expiration: 08/07/2021  Plan of Care Expiration: 10/30/2020  Visit # / Visits authorized: 4/ 20     Time In: 12:40 PM  Time Out: 1:35 PM  Total Billable Time: 45 minutes (plus 10 minutes of heat)     Precautions: Standard and Fall      Subjective     Pt reports: he can move his neck a lot better than when he started    He was compliant with home exercise plan.  Response to previous treatment: improved cervical ROM, mild soreness  Functional change: improving cervical ROM    Pain: 0 /10  Location:  Neck, left side    Objective     Paul received therapeutic exercises to develop strength, ROM, flexibility and posture for 30 minutes including:    Cervical rotation: x15 reps each direction  Chin tucks in sitting and supine: x10 reps  Scap retractions in sitting: x15 reps  Scap depression: x15 reps  Upper trap stretch: [id number]   Levator Scapulae stretch: 2x30 sec  Rows with Orange band: x20 reps  Shoulder extension with Orange band x20 reps    Paul received the following manual therapy techniques: Cervical distraction along with Soft tissue Mobilization applied to the: cervical region for 15 minutes, including:  Cervical distraction - not performed today  Sub-Occipital release  STM to cervical paraspinals, upper trap  Passive stretching to the upper trap B and levator  scapulae    Heat to cervical region x10 min      Home Exercises Provided and Patient Education Provided     Education provided:   - HEP, posture, exercise technique    Written Home Exercises Provided: yes.  Exercises were reviewed and Paul was able to demonstrate them prior to the end of the session.  Paul demonstrated fair  understanding of the education provided.     See EMR under Patient Instructions for exercises provided 8/7/2020.    Assessment     Patient tolerated treatment well with decreased pain and tightness following interventions. Patient continues with difficulty with correct performance of exercises and stretching, requiring cueing and facilitation of movement for proper technique to avoid adverse effects. Patient reports improving cervical ROM.     Paul is progressing well towards his goals.   Pt prognosis is Fair.     Pt will continue to benefit from skilled outpatient physical therapy to address the deficits listed in the problem list box on initial evaluation, provide pt/family education and to maximize pt's level of independence in the home and community environment.     Pt's spiritual, cultural and educational needs considered and pt agreeable to plan of care and goals.    Anticipated barriers to physical therapy: none    Goals:  Short Term GOALS: 5 weeks. Pt agrees with goals set.  1. Pt to report reduced tightness by 50% associated with neck movements for improved quality of movement   2. Patient demonstrates minimal pain with palpation to the left cervical region to improve quality of life and daily activities.   3. Pt to exhibit increased neck mobility in flexion and rotation by 10 degree w/o end range pain for improved movement quality.   4. Patient demonstrates correct movement patterns associated with exercises for self management and independence with HEP  5. Patient demonstrates independence with Postural Awareness and correction for self management  6. Pt to experience 50% or  more reduced interruption of sleep due to reduced pain for improved quality of life     Long Term GOALS: 10 weeks. Pt agrees with goals set.  1. Pt to demonstrate increase flexibility in the cervical musculature for increased neck flexion and rotation   2. Patient demonstrates increased cervical muscle strength for improved stability and tolerance to functional activities.   3. Patient demonstrates improved comfort level with reported decreased left eye pain by 50% or greater foer improved quality of life.   4. Pt demonstrates neck movement patterns in rotation without end range pain for improved quality of daily activities.        Plan     Continue PT POC, progressing patient as tolerated    Tari Sebastian, PTA

## 2020-08-26 ENCOUNTER — CLINICAL SUPPORT (OUTPATIENT)
Dept: REHABILITATION | Facility: HOSPITAL | Age: 80
End: 2020-08-26
Payer: MEDICARE

## 2020-08-26 DIAGNOSIS — M62.89 ABNORMAL INCREASED MUSCLE TIGHTNESS: ICD-10-CM

## 2020-08-26 DIAGNOSIS — M43.6 NECK STIFFNESS: ICD-10-CM

## 2020-08-26 DIAGNOSIS — M99.01 CERVICAL SEGMENT DYSFUNCTION: ICD-10-CM

## 2020-08-26 DIAGNOSIS — R25.9 DYSFUNCTIONAL MOVEMENTS: ICD-10-CM

## 2020-08-26 DIAGNOSIS — R29.898 TIGHTNESS OF NECK: ICD-10-CM

## 2020-08-26 DIAGNOSIS — R52 AWAKENS FROM SLEEP DUE TO PAIN: ICD-10-CM

## 2020-08-26 PROCEDURE — 97140 MANUAL THERAPY 1/> REGIONS: CPT | Mod: HCNC,PO,CQ

## 2020-08-26 PROCEDURE — 97110 THERAPEUTIC EXERCISES: CPT | Mod: HCNC,PO,CQ

## 2020-08-28 ENCOUNTER — CLINICAL SUPPORT (OUTPATIENT)
Dept: REHABILITATION | Facility: HOSPITAL | Age: 80
End: 2020-08-28
Payer: MEDICARE

## 2020-08-28 DIAGNOSIS — R29.898 TIGHTNESS OF NECK: ICD-10-CM

## 2020-08-28 DIAGNOSIS — R25.9 DYSFUNCTIONAL MOVEMENTS: ICD-10-CM

## 2020-08-28 DIAGNOSIS — M62.89 ABNORMAL INCREASED MUSCLE TIGHTNESS: ICD-10-CM

## 2020-08-28 DIAGNOSIS — M99.01 CERVICAL SEGMENT DYSFUNCTION: ICD-10-CM

## 2020-08-28 DIAGNOSIS — M43.6 NECK STIFFNESS: ICD-10-CM

## 2020-08-28 DIAGNOSIS — R52 AWAKENS FROM SLEEP DUE TO PAIN: ICD-10-CM

## 2020-08-28 PROCEDURE — 97140 MANUAL THERAPY 1/> REGIONS: CPT | Mod: HCNC,PO,CQ

## 2020-08-28 PROCEDURE — 97110 THERAPEUTIC EXERCISES: CPT | Mod: HCNC,PO,CQ

## 2020-08-28 NOTE — PROGRESS NOTES
Physical Therapy Daily Treatment Note     Name: Paul Berrios [facility]  Clinic Number: [id number]    Therapy Diagnosis:        Encounter Diagnoses   Name Primary?    Neck stiffness      Abnormal increased muscle tightness      Awakens from sleep due to pain      Cervical segment dysfunction      Tightness of neck      Dysfunctional movements          Physician: Marquita Holloway PA-C    Visit Date: 8/28/2020    Physician Orders: PT Eval and Treat neck  Medical Diagnosis from Referral:   R29.898 (ICD-10-CM) - Decreased ROM of neck   R51 (ICD-10-CM) - Acute intractable headache, unspecified headache type         Evaluation Date: 7/30/2020  Authorization Period Expiration: 08/07/2021  Plan of Care Expiration: 10/30/2020  Visit # / Visits authorized: 4/ 20     Time In: 1430  Time Out: 1525  Total Billable Time: 40 minutes (plus 10 minutes of heat)     Precautions: Standard and Fall      Subjective     Pt reports: no new c/o. No falls. Arrived escorted by daughter-in-law. Feels he is progressing. Currently no pain    He was compliant with home exercise plan.  Response to previous treatment: improved cervical ROM, mild soreness  Functional change: improving cervical ROM    Pain: 0 /10  Location:  Neck, left side    Objective     Paul received therapeutic exercises to develop strength, ROM, flexibility and posture for 25 minutes including:    Shoulder flexion 2 x 10 w/dowel  Chest press 2 x 10 w/dowel  Cervical rotation: x15 reps each direction  Chin tucks in sitting and supine: x10 reps  Scap retractions in sitting: x15 reps  Scap depression: x15 reps  Upper trap stretch: [id number]   Levator Scapulae stretch: 2x30 sec  Rows with green TBband: x30 reps  Shoulder extension with Orange band x20 reps  SL shoulder abd B 2 x 10 w/2# dumbbell  SL shoulder flexion B 2 x 10 w/2# dumbbell  SL shoulder horizontal abd  B w/2# dumbbell    Paul received the following manual therapy techniques: Cervical distraction along with  Soft tissue Mobilization applied to the: cervical region for 15 minutes, including:  Cervical distraction -   Sub-Occipital release  STM to cervical paraspinals, upper trap  Passive stretching to the upper trap B and levator scapulae    Heat to cervical region x10 min no charge      Home Exercises Provided and Patient Education Provided     Education provided:   - HEP, posture, exercise technique    Written Home Exercises Provided: yes.  Exercises were reviewed and Paul was able to demonstrate them prior to the end of the session.  Paul demonstrated fair  understanding of the education provided.     See EMR under Patient Instructions for exercises provided 8/7/2020.    Assessment     Patient tolerated treatment well with decreased pain and tightness following interventions. Patient continues struggling with effective performance of exercises and stretching, requiring almost  constant cueing and facilitation. Added new side lying exercises with resistance per chart on bold. Patient reports improving cervical ROM.     Paul is progressing well towards his goals.   Pt prognosis is Fair.     Pt will continue to benefit from skilled outpatient physical therapy to address the deficits listed in the problem list box on initial evaluation, provide pt/family education and to maximize pt's level of independence in the home and community environment.     Pt's spiritual, cultural and educational needs considered and pt agreeable to plan of care and goals.    Anticipated barriers to physical therapy: none    Goals:  Short Term GOALS: 5 weeks. Pt agrees with goals set.  1. Pt to report reduced tightness by 50% associated with neck movements for improved quality of movement   2. Patient demonstrates minimal pain with palpation to the left cervical region to improve quality of life and daily activities.   3. Pt to exhibit increased neck mobility in flexion and rotation by 10 degree w/o end range pain for improved movement  quality.   4. Patient demonstrates correct movement patterns associated with exercises for self management and independence with HEP  5. Patient demonstrates independence with Postural Awareness and correction for self management  6. Pt to experience 50% or more reduced interruption of sleep due to reduced pain for improved quality of life     Long Term GOALS: 10 weeks. Pt agrees with goals set.  1. Pt to demonstrate increase flexibility in the cervical musculature for increased neck flexion and rotation   2. Patient demonstrates increased cervical muscle strength for improved stability and tolerance to functional activities.   3. Patient demonstrates improved comfort level with reported decreased left eye pain by 50% or greater foer improved quality of life.   4. Pt demonstrates neck movement patterns in rotation without end range pain for improved quality of daily activities.        Plan     Continue PT POC, progressing patient as tolerated    Robert Medina, PTA

## 2020-08-30 NOTE — OP NOTE
Procedure Note    Pre-operative diagnosis: Cervical Spondylosis  Post-operative diagnosis: Cervical Spondylosis  Procedure Date: 01/09/2020  Procedure:  (1) BILATERAL C2-3 Cervical Medial Branch Block     (2) BILATERAL Third Occipital Nerve Block    (3) Procedural Fluoroscopy    Indications: Diagnostic and therapeutic block to treat cervical spondylosis.      Procedure in detail:  Informed consent obtained after explaining the procedure and potential complications including local discomfort, infection, headache, temporary or permanent weakness and/or numbness of one or both legs, temporary or permanent paraplegia, heart attack and stroke. All questions were answered.      The patient was taken to the procedure room and placed in a lateral decubitus position with the operative side facing away from the table.  Time out was performed.  Patient's neck/cervical spine area was prepped with Chloraprep and draped in a sterile manner.    Lateral fluoroscopy was used to identify and maycol the centroid of the facets at the C2 and 3 vertebra on the LEFT side as well as the C2-3 junction for the third occipital nerve.  The skin and subcutaneous tissues overlying the target areas was infiltrated with 1 mL of Lidocaine 1% using a 25g 1.5 inch needle.  Then, under lateral fluoroscopic guidance, a 22 G 3.5 inch styletted spinal needle, was advanced to contact os at the targeted points corresponding with the locations of the C2 and C3 medial branch nerves as well as the TON.  Stylettes were removed.    Following heme-negative aspiration, 0.2 mL of iodinated contrast was administered demonstrating local accumulation.  Bupivacaine 0.25% 0.5 mL was administered at each site.  The needles were then removed with Lidocaine flush.    Needle was removed and bandaged placed over site of needle insertion.      Estimated Blood Loss: nil    Specimens: None    Disposition: The patient tolerated the procedure without complaint and was transported  to the recovery room in stable condition.    Follow-up: RTC as scheduled      Tip Mera Jr, MD  Interventional Pain Medicine / Anesthesiology         no

## 2020-09-02 ENCOUNTER — CLINICAL SUPPORT (OUTPATIENT)
Dept: REHABILITATION | Facility: HOSPITAL | Age: 80
End: 2020-09-02
Payer: MEDICARE

## 2020-09-02 DIAGNOSIS — R52 AWAKENS FROM SLEEP DUE TO PAIN: ICD-10-CM

## 2020-09-02 DIAGNOSIS — M62.89 ABNORMAL INCREASED MUSCLE TIGHTNESS: ICD-10-CM

## 2020-09-02 DIAGNOSIS — M99.01 CERVICAL SEGMENT DYSFUNCTION: ICD-10-CM

## 2020-09-02 DIAGNOSIS — M43.6 NECK STIFFNESS: ICD-10-CM

## 2020-09-02 DIAGNOSIS — R29.898 TIGHTNESS OF NECK: ICD-10-CM

## 2020-09-02 DIAGNOSIS — R25.9 DYSFUNCTIONAL MOVEMENTS: ICD-10-CM

## 2020-09-02 PROCEDURE — 97140 MANUAL THERAPY 1/> REGIONS: CPT | Mod: HCNC,PO | Performed by: PHYSICAL THERAPIST

## 2020-09-02 PROCEDURE — 97110 THERAPEUTIC EXERCISES: CPT | Mod: HCNC,PO | Performed by: PHYSICAL THERAPIST

## 2020-09-02 NOTE — PROGRESS NOTES
Physical Therapy Daily Treatment Note     Name: Paul Berrios [facility]  Clinic Number: [id number]    Therapy Diagnosis:        Encounter Diagnoses   Name Primary?    Neck stiffness      Abnormal increased muscle tightness      Awakens from sleep due to pain      Cervical segment dysfunction      Tightness of neck      Dysfunctional movements          Physician: Marquita Holloway PA-C    Visit Date: 9/2/2020    Physician Orders: PT Eval and Treat neck  Medical Diagnosis from Referral:   R29.898 (ICD-10-CM) - Decreased ROM of neck   R51 (ICD-10-CM) - Acute intractable headache, unspecified headache type         Evaluation Date: 7/30/2020  Authorization Period Expiration: 08/07/2021  Plan of Care Expiration: 10/30/2020  Visit # / Visits authorized: 9 / 20     Time In: 1205  Time Out: 1250  Total Billable Time: 45 minutes (plus 10 minutes of heat)     Precautions: Standard and Fall      Subjective     Pt reports: the neck has been feeling pretty good. No HAs.  Cant walk a long distance. Get out of breath real fast.  Cant sleep. Wake up 2 to 3 times a night not from pain. Tried to work in the yard.   He was compliant with home exercise plan.  Response to previous treatment:  Did fine. Slight dizziness but did pretty good.   Functional change: improving cervical ROM    Pain: 0 /10  Location:  Neck, left side    Objective     Paul received therapeutic exercises to develop strength, ROM, flexibility and posture for  25 minutes including:    Shoulder flexion 2 x 10 w/dowel  Chest press 2 x 10 w/dowel  Cervical rotation: x15 reps each direction  Chin tucks in sitting and supine: x10 reps  Scap retractions in sitting: x15 reps  Scap depression: x15 reps  Upper trap stretch: [id number] -assisted   Levator Scapulae stretch: [id number] - assisted   Rows with green TBband: x30 reps  Shoulder extension with Orange band x20 reps  Open book x12   SL shoulder abd B  x 12  SL shoulder flexion B 2 x 10 w/2# dumbbell  SL shoulder  horizontal abd  B x12    Paul received the following manual therapy techniques: Cervical distraction along with Soft tissue Mobilization applied to the: cervical region for 20 minutes, including:  Cervical distraction -   Sub-Occipital release  STM to cervical paraspinals, upper trap  Passive stretching to the upper trap B and levator scapulae - assisted  Assisted UT / LS stretching     MODALITY: NA      Home Exercises Provided and Patient Education Provided     Education provided:   - HEP, posture, exercise technique    Written Home Exercises Provided: yes.  Exercises were reviewed and Paul was able to demonstrate them prior to the end of the session.  Paul demonstrated fair  understanding of the education provided.     See EMR under Patient Instructions for exercises provided 8/7/2020.    Assessment     The patient responded very well to exercises and manual interventions. He appears to be much improved from initial sessions. The cervical musculature remains tight however the small gains in mobility and flexibility are very evident to the patient. He is reporting more comfort and feels that he can move his neck better.     Paul is progressing well towards his goals.   Pt prognosis is Fair.     Pt will continue to benefit from skilled outpatient physical therapy to address the deficits listed in the problem list box on initial evaluation, provide pt/family education and to maximize pt's level of independence in the home and community environment.     Pt's spiritual, cultural and educational needs considered and pt agreeable to plan of care and goals.    Anticipated barriers to physical therapy: none    Goals:  Short Term GOALS: 5 weeks. Pt agrees with goals set.  1. Pt to report reduced tightness by 50% associated with neck movements for improved quality of movement   2. Patient demonstrates minimal pain with palpation to the left cervical region to improve quality of life and daily activities.   3. Pt to  exhibit increased neck mobility in flexion and rotation by 10 degree w/o end range pain for improved movement quality.   4. Patient demonstrates correct movement patterns associated with exercises for self management and independence with HEP  5. Patient demonstrates independence with Postural Awareness and correction for self management  6. Pt to experience 50% or more reduced interruption of sleep due to reduced pain for improved quality of life     Long Term GOALS: 10 weeks. Pt agrees with goals set.  1. Pt to demonstrate increase flexibility in the cervical musculature for increased neck flexion and rotation   2. Patient demonstrates increased cervical muscle strength for improved stability and tolerance to functional activities.   3. Patient demonstrates improved comfort level with reported decreased left eye pain by 50% or greater foer improved quality of life.   4. Pt demonstrates neck movement patterns in rotation without end range pain for improved quality of daily activities.        Plan     Continue PT POC, progressing patient as tolerated    Joseph Mcduffie, PT

## 2020-09-04 ENCOUNTER — CLINICAL SUPPORT (OUTPATIENT)
Dept: REHABILITATION | Facility: HOSPITAL | Age: 80
End: 2020-09-04
Payer: MEDICARE

## 2020-09-04 DIAGNOSIS — M62.89 ABNORMAL INCREASED MUSCLE TIGHTNESS: ICD-10-CM

## 2020-09-04 DIAGNOSIS — R29.898 TIGHTNESS OF NECK: ICD-10-CM

## 2020-09-04 DIAGNOSIS — M99.01 CERVICAL SEGMENT DYSFUNCTION: ICD-10-CM

## 2020-09-04 DIAGNOSIS — M43.6 NECK STIFFNESS: ICD-10-CM

## 2020-09-04 DIAGNOSIS — R25.9 DYSFUNCTIONAL MOVEMENTS: ICD-10-CM

## 2020-09-04 DIAGNOSIS — R52 AWAKENS FROM SLEEP DUE TO PAIN: ICD-10-CM

## 2020-09-04 PROCEDURE — 97110 THERAPEUTIC EXERCISES: CPT | Mod: HCNC,PO | Performed by: PHYSICAL THERAPIST

## 2020-09-04 PROCEDURE — 97140 MANUAL THERAPY 1/> REGIONS: CPT | Mod: HCNC,PO | Performed by: PHYSICAL THERAPIST

## 2020-09-04 NOTE — PROGRESS NOTES
Physical Therapy Daily Treatment Note     Name: Paul Berrios [facility]  Clinic Number: [id number]    Therapy Diagnosis:        Encounter Diagnoses   Name Primary?    Neck stiffness      Abnormal increased muscle tightness      Awakens from sleep due to pain      Cervical segment dysfunction      Tightness of neck      Dysfunctional movements          Physician: Marquita Holloway PA-C    Visit Date: 9/4/2020    Physician Orders: PT Eval and Treat neck  Medical Diagnosis from Referral:   R29.898 (ICD-10-CM) - Decreased ROM of neck   R51 (ICD-10-CM) - Acute intractable headache, unspecified headache type         Evaluation Date: 7/30/2020  Authorization Period Expiration: 08/07/2021  Plan of Care Expiration: 10/30/2020  Visit # / Visits authorized: 9 / 20     Time In: 1205  Time Out: 1250  Total Billable Time: 45 minutes (plus 10 minutes of heat)     Precautions: Standard and Fall      Subjective     Pt reports: No neck pain only a HA that started after waking up. Sat outside and that is when it started.        He was compliant with home exercise plan.  Response to previous treatment:  Felt good after the session. Think I was moving a little better.    Functional change: improving cervical ROM    Pain: 0 /10  Location:  Neck, left side    Objective     Paul received therapeutic exercises to develop strength, ROM, flexibility and posture for 15 minutes including:    Shoulder flexion 2 x 10 w/dowel  Chest press 2 x 10 w/dowel  Cervical rotation: x15 reps each direction  Chin tucks in sitting and supine: x10 reps  Scap retractions in sitting: x15 reps  Scap depression: x15 reps  Upper trap stretch: [id number] -assisted   Levator Scapulae stretch: [id number] - assisted   Rows with green TBband: x30 reps  Shoulder extension with Orange band x20 reps  Open book x12   SL shoulder abd B  x 12  SL shoulder flexion B 2 x 10 w/2# dumbbell  SL shoulder horizontal abd  B x12    Paul received the following manual therapy  techniques: Cervical distraction along with Soft tissue Mobilization applied to the: cervical region for 30 minutes, including:  Cervical distraction -   Sub-Occipital release  STM to cervical paraspinals, upper trap  Passive stretching to the upper trap B and levator scapulae - assisted  Assisted end range stretch in rotation     MODALITY: MH to the neck at the end of the session 10'       Home Exercises Provided and Patient Education Provided     Education provided:   - HEP, posture, exercise technique    Written Home Exercises Provided: yes.  Exercises were reviewed and Paul was able to demonstrate them prior to the end of the session.  Paul demonstrated fair  understanding of the education provided.     See EMR under Patient Instructions for exercises provided 8/7/2020.    Assessment     The patient tolerated the session satisfactorily. Focus on more manual interventions due to significant mobility dysfunction the patient exhibits during exercises. Reported no HA at the end of the session.     Paul is progressing well towards his goals.   Pt prognosis is Fair.     Pt will continue to benefit from skilled outpatient physical therapy to address the deficits listed in the problem list box on initial evaluation, provide pt/family education and to maximize pt's level of independence in the home and community environment.     Pt's spiritual, cultural and educational needs considered and pt agreeable to plan of care and goals.    Anticipated barriers to physical therapy: none    Goals:  Short Term GOALS: 5 weeks. Pt agrees with goals set.  1. Pt to report reduced tightness by 50% associated with neck movements for improved quality of movement   2. Patient demonstrates minimal pain with palpation to the left cervical region to improve quality of life and daily activities.   3. Pt to exhibit increased neck mobility in flexion and rotation by 10 degree w/o end range pain for improved movement quality.   4. Patient  demonstrates correct movement patterns associated with exercises for self management and independence with HEP  5. Patient demonstrates independence with Postural Awareness and correction for self management  6. Pt to experience 50% or more reduced interruption of sleep due to reduced pain for improved quality of life     Long Term GOALS: 10 weeks. Pt agrees with goals set.  1. Pt to demonstrate increase flexibility in the cervical musculature for increased neck flexion and rotation   2. Patient demonstrates increased cervical muscle strength for improved stability and tolerance to functional activities.   3. Patient demonstrates improved comfort level with reported decreased left eye pain by 50% or greater foer improved quality of life.   4. Pt demonstrates neck movement patterns in rotation without end range pain for improved quality of daily activities.        Plan     Continue PT POC, progressing patient as tolerated    Joseph Mcduffie, PT

## 2020-09-09 ENCOUNTER — CLINICAL SUPPORT (OUTPATIENT)
Dept: REHABILITATION | Facility: HOSPITAL | Age: 80
End: 2020-09-09
Payer: MEDICARE

## 2020-09-09 DIAGNOSIS — R29.898 TIGHTNESS OF NECK: ICD-10-CM

## 2020-09-09 DIAGNOSIS — M62.89 ABNORMAL INCREASED MUSCLE TIGHTNESS: ICD-10-CM

## 2020-09-09 DIAGNOSIS — R25.9 DYSFUNCTIONAL MOVEMENTS: ICD-10-CM

## 2020-09-09 DIAGNOSIS — M43.6 NECK STIFFNESS: ICD-10-CM

## 2020-09-09 DIAGNOSIS — M99.01 CERVICAL SEGMENT DYSFUNCTION: ICD-10-CM

## 2020-09-09 DIAGNOSIS — R52 AWAKENS FROM SLEEP DUE TO PAIN: ICD-10-CM

## 2020-09-09 PROCEDURE — 97140 MANUAL THERAPY 1/> REGIONS: CPT | Mod: HCNC,PO | Performed by: PHYSICAL THERAPIST

## 2020-09-09 PROCEDURE — 97110 THERAPEUTIC EXERCISES: CPT | Mod: HCNC,PO | Performed by: PHYSICAL THERAPIST

## 2020-09-09 NOTE — PROGRESS NOTES
Physical Therapy Daily Treatment Note     Name: Paul Berrios [facility]  Clinic Number: [id number]    Therapy Diagnosis:        Encounter Diagnoses   Name Primary?    Neck stiffness      Abnormal increased muscle tightness      Awakens from sleep due to pain      Cervical segment dysfunction      Tightness of neck      Dysfunctional movements          Physician: Marquita Holloway PA-C    Visit Date: 9/9/2020    Physician Orders: PT Eval and Treat neck  Medical Diagnosis from Referral:   R29.898 (ICD-10-CM) - Decreased ROM of neck   R51 (ICD-10-CM) - Acute intractable headache, unspecified headache type         Evaluation Date: 7/30/2020  Authorization Period Expiration: 08/07/2021  Plan of Care Expiration: 10/30/2020  Visit # / Visits authorized: 9 / 20     Time In: 1205  Time Out: 1245  Total Billable Time: 40 minutes (plus 10 minutes of heat)     Precautions: Standard and Fall      Subjective     Pt reports: the neck is feeling good and he has no HA.      He was compliant with home exercise plan.  Response to previous treatment:  Felt good after the session. Think I was moving a little better.    Functional change: improving cervical ROM    Pain: 0 /10  Location:  Neck, left side    Objective     Paul received therapeutic exercises to develop strength, ROM, flexibility and posture for 15 minutes including:    Shoulder flexion 2 x 10 w/dowel  Chest press 2 x 10 w/dowel  Cervical rotation: x15 reps each direction  Chin tucks in sitting and supine: x10 reps  Scap retractions in sitting: x15 reps  Scap depression: x15 reps  Upper trap stretch: [id number] -assisted   Levator Scapulae stretch: [id number] - assisted   Rows with green TBband: x30 reps  Shoulder extension with Orange band x20 reps  Open book x12   SL shoulder abd B  x 12  SL shoulder flexion B 2 x 10 w/2# dumbbell  SL shoulder horizontal abd  B x12    Paul received the following manual therapy techniques: Cervical distraction along with Soft tissue  Mobilization applied to the: cervical region for 25 minutes, including:  Cervical distraction -   Sub-Occipital release  STM to cervical paraspinals, upper trap  Passive stretching to the upper trap B and levator scapulae - assisted  Assisted end range stretch in rotation     MODALITY: MH to the neck at the end of the session 10'       Home Exercises Provided and Patient Education Provided     Education provided:   - HEP, posture, exercise technique    Written Home Exercises Provided: yes.  Exercises were reviewed and Paul was able to demonstrate them prior to the end of the session.  Paul demonstrated fair  understanding of the education provided.     See EMR under Patient Instructions for exercises provided 8/7/2020.    Assessment     Good response to manual intervention. Overall he is responding to rotation and flexion ROM however chin tucks remain a challenge due to challenge with coordinating the movement and also muscle tightness.   Paul is progressing well towards his goals.   Pt prognosis is Fair.     Pt will continue to benefit from skilled outpatient physical therapy to address the deficits listed in the problem list box on initial evaluation, provide pt/family education and to maximize pt's level of independence in the home and community environment.     Pt's spiritual, cultural and educational needs considered and pt agreeable to plan of care and goals.    Anticipated barriers to physical therapy: none    Goals:  Short Term GOALS: 5 weeks. Pt agrees with goals set.  1. Pt to report reduced tightness by 50% associated with neck movements for improved quality of movement   2. Patient demonstrates minimal pain with palpation to the left cervical region to improve quality of life and daily activities.   3. Pt to exhibit increased neck mobility in flexion and rotation by 10 degree w/o end range pain for improved movement quality.   4. Patient demonstrates correct movement patterns associated with  exercises for self management and independence with HEP  5. Patient demonstrates independence with Postural Awareness and correction for self management  6. Pt to experience 50% or more reduced interruption of sleep due to reduced pain for improved quality of life     Long Term GOALS: 10 weeks. Pt agrees with goals set.  1. Pt to demonstrate increase flexibility in the cervical musculature for increased neck flexion and rotation   2. Patient demonstrates increased cervical muscle strength for improved stability and tolerance to functional activities.   3. Patient demonstrates improved comfort level with reported decreased left eye pain by 50% or greater foer improved quality of life.   4. Pt demonstrates neck movement patterns in rotation without end range pain for improved quality of daily activities.        Plan     Continue PT POC, progressing patient as tolerated    Joseph Mcduffie, PT

## 2020-09-11 ENCOUNTER — CLINICAL SUPPORT (OUTPATIENT)
Dept: REHABILITATION | Facility: HOSPITAL | Age: 80
End: 2020-09-11
Payer: MEDICARE

## 2020-09-11 DIAGNOSIS — M62.89 ABNORMAL INCREASED MUSCLE TIGHTNESS: ICD-10-CM

## 2020-09-11 DIAGNOSIS — R25.9 DYSFUNCTIONAL MOVEMENTS: ICD-10-CM

## 2020-09-11 DIAGNOSIS — M43.6 NECK STIFFNESS: ICD-10-CM

## 2020-09-11 DIAGNOSIS — M99.01 CERVICAL SEGMENT DYSFUNCTION: ICD-10-CM

## 2020-09-11 DIAGNOSIS — R29.898 TIGHTNESS OF NECK: ICD-10-CM

## 2020-09-11 DIAGNOSIS — R52 AWAKENS FROM SLEEP DUE TO PAIN: ICD-10-CM

## 2020-09-11 PROCEDURE — 97140 MANUAL THERAPY 1/> REGIONS: CPT | Mod: HCNC,PO | Performed by: PHYSICAL THERAPIST

## 2020-09-11 PROCEDURE — 97110 THERAPEUTIC EXERCISES: CPT | Mod: HCNC,PO | Performed by: PHYSICAL THERAPIST

## 2020-09-11 NOTE — PROGRESS NOTES
Physical Therapy Daily Treatment Note     Name: Paul [facility]  Clinic Number: [id number]    Therapy Diagnosis:        Encounter Diagnoses   Name Primary?    Neck stiffness      Abnormal increased muscle tightness      Awakens from sleep due to pain      Cervical segment dysfunction      Tightness of neck      Dysfunctional movements          Physician: Marquita Holloway PA-C    Visit Date: 9/11/2020    Physician Orders: PT Eval and Treat neck  Medical Diagnosis from Referral:   R29.898 (ICD-10-CM) - Decreased ROM of neck   R51 (ICD-10-CM) - Acute intractable headache, unspecified headache type         Evaluation Date: 7/30/2020  Authorization Period Expiration: 08/07/2021  Plan of Care Expiration: 10/30/2020  Visit # / Visits authorized: 11 / 20     Time In: 1205  Time Out: 1245  Total Billable Time: 40 minutes      Precautions: Standard and Fall      Subjective     Pt reports: the neck is feeling good and he has no HA.      He was compliant with home exercise plan.  Response to previous treatment:  Felt a little sore after the session.   Functional change: improving cervical ROM    Pain: 0 /10  Location:  Neck, left side    Objective     Paul received therapeutic exercises to develop strength, ROM, flexibility and posture for 20 minutes including:    Shoulder flexion 2 x 10 w/dowel  Shoulder horizontal abd / add 2x10  Chest press 2 x 10 w/dowel  Neck flexion in semi-reclined ( no pillow) 2x10  Cervical rotation: x15 reps each direction  Chin tucks in sitting and supine: x10 reps  Scap retractions in sitting: x15 reps  Scap depression: x15 reps  Upper trap stretch: [id number] -assisted   Levator Scapulae stretch: [id number] - assisted   Rows with green TBband: x30 reps  Shoulder extension with Orange band x20 reps  Open book x12   SL shoulder abd B  x 12  SL shoulder flexion B 2 x 10 w/2# dumbbell  SL shoulder horizontal abd  B x12    Paul received the following manual therapy techniques: Cervical  distraction along with Soft tissue Mobilization applied to the: cervical region for 20 minutes, including:  Cervical distraction -   Sub-Occipital release  STM to cervical paraspinals, upper trap  Passive stretching to the upper trap B and levator scapulae - assisted  Assisted end range stretch in rotation     MODALITY: MH to the neck at the end of the session 10'       Home Exercises Provided and Patient Education Provided     Education provided:   - HEP, posture, exercise technique    Written Home Exercises Provided: yes.  Exercises were reviewed and Paul was able to demonstrate them prior to the end of the session.  Paul demonstrated fair  understanding of the education provided.     See EMR under Patient Instructions for exercises provided 8/7/2020.    Assessment     Good response to manual interventions. Incorporated more UE activity and focus on cervical flexion. He responded to the exercises performed very well. Proceed with noted routine.     Paul is progressing well towards his goals.   Pt prognosis is Fair.     Pt will continue to benefit from skilled outpatient physical therapy to address the deficits listed in the problem list box on initial evaluation, provide pt/family education and to maximize pt's level of independence in the home and community environment.     Pt's spiritual, cultural and educational needs considered and pt agreeable to plan of care and goals.    Anticipated barriers to physical therapy: none    Goals:  Short Term GOALS: 5 weeks. Pt agrees with goals set.  1. Pt to report reduced tightness by 50% associated with neck movements for improved quality of movement   2. Patient demonstrates minimal pain with palpation to the left cervical region to improve quality of life and daily activities.   3. Pt to exhibit increased neck mobility in flexion and rotation by 10 degree w/o end range pain for improved movement quality.   4. Patient demonstrates correct movement patterns associated  with exercises for self management and independence with HEP  5. Patient demonstrates independence with Postural Awareness and correction for self management  6. Pt to experience 50% or more reduced interruption of sleep due to reduced pain for improved quality of life     Long Term GOALS: 10 weeks. Pt agrees with goals set.  1. Pt to demonstrate increase flexibility in the cervical musculature for increased neck flexion and rotation   2. Patient demonstrates increased cervical muscle strength for improved stability and tolerance to functional activities.   3. Patient demonstrates improved comfort level with reported decreased left eye pain by 50% or greater foer improved quality of life.   4. Pt demonstrates neck movement patterns in rotation without end range pain for improved quality of daily activities.        Plan     Continue PT POC, progressing patient as tolerated    Joseph Mcduffie, PT

## 2020-09-22 ENCOUNTER — PATIENT MESSAGE (OUTPATIENT)
Dept: NEUROLOGY | Facility: CLINIC | Age: 80
End: 2020-09-22

## 2020-09-25 ENCOUNTER — HOSPITAL ENCOUNTER (EMERGENCY)
Facility: HOSPITAL | Age: 80
Discharge: HOME | End: 2020-09-25
Attending: EMERGENCY MEDICINE
Payer: MEDICARE

## 2020-09-25 VITALS
OXYGEN SATURATION: 98 % | HEART RATE: 82 BPM | BODY MASS INDEX: 20.44 KG/M2 | DIASTOLIC BLOOD PRESSURE: 81 MMHG | SYSTOLIC BLOOD PRESSURE: 177 MMHG | HEIGHT: 69 IN | RESPIRATION RATE: 18 BRPM | TEMPERATURE: 99 F | WEIGHT: 138 LBS
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
## 2020-09-25 DIAGNOSIS — S30.0XXA CONTUSION OF BUTTOCK, INITIAL ENCOUNTER: Primary | ICD-10-CM

## 2020-09-25 PROCEDURE — 99284 PR EMERGENCY DEPT VISIT,LEVEL IV: ICD-10-PCS | Mod: HCNC,,, | Performed by: PHYSICIAN ASSISTANT

## 2020-09-25 PROCEDURE — 99284 EMERGENCY DEPT VISIT MOD MDM: CPT | Mod: 25,HCNC

## 2020-09-25 PROCEDURE — 25000003 PHARM REV CODE 250: Mod: HCNC | Performed by: PHYSICIAN ASSISTANT

## 2020-09-25 PROCEDURE — 99284 EMERGENCY DEPT VISIT MOD MDM: CPT | Mod: HCNC,,, | Performed by: PHYSICIAN ASSISTANT

## 2020-09-25 RX ORDER — ACETAMINOPHEN 325 MG/1
650 TABLET ORAL
Status: COMPLETED | OUTPATIENT
Start: 2020-09-25 | End: 2020-09-25

## 2020-09-25 RX ADMIN — ACETAMINOPHEN 650 MG: 325 TABLET ORAL at 12:09

## 2020-09-25 NOTE — ED NOTES
Patient identifiers verified and correct for Mr Browning  C/C: fall with neck and hip pain SEE NN  APPEARANCE: awake and alert in NAD.  SKIN: warm, dry and intact. No breakdown or bruising.  MUSCULOSKELETAL: Patient moving all extremities spontaneously, no obvious swelling or deformities noted. Ambulates independently.  RESPIRATORY: Denies shortness of breath.Respirations unlabored.   CARDIAC: Denies CP, 2+ distal pulses; no peripheral edema  ABDOMEN: S/ND/NT, Denies nausea  : voids spontaneously, denies difficulty  Neurologic: AAO x 4; follows commands equal strength in all extremities; denies numbness/tingling. Denies dizziness Positive weakness, satets when he is lying down no pain, pain to left leg, neck

## 2020-09-25 NOTE — ED TRIAGE NOTES
Patient states he bent to  garbage and became dizzy Tuesday fell and possibly hit back of his head. Denies LOC. Currently  With neck and back pain that previosly has been in PT for. Also states pain to hip and left leg.

## 2020-09-25 NOTE — DISCHARGE INSTRUCTIONS
Continue acetaminophen/Tylenol 650 mg every 6 hr for pain relief.    Apply ice to the area of pain for 10 min every 4 hr for the 1st or heat as needed.  Call and follow up closely with her primary care physician.  Consider therapy for your left hip  Return promptly to the emergency department for new or worsening symptoms.  Imaging Results              CT Head Without Contrast (Final result)  Result time 09/25/20 13:29:59      Final result by Barber Castillo MD (09/25/20 13:29:59)                   Impression:      No acute intracranial abnormalities specifically no evidence of hemorrhage in this patient with history of trauma.    Generalized cerebral volume loss with slight parietal predominance similar to prior with moderate chronic microvascular ischemic changes.      Electronically signed by: Barber Castillo MD  Date:    09/25/2020  Time:    13:29               Narrative:    EXAMINATION:  CT HEAD WITHOUT CONTRAST    CLINICAL HISTORY:  Head trauma, minor (Age => 63y);    TECHNIQUE:  Low dose axial images were obtained through the head.  Coronal and sagittal reformations were also performed. Contrast was not administered.    COMPARISON:  08/04/2020    FINDINGS:  Generalized cerebral volume loss with prominence of the sulci, cisterns, and ventricles.  This has a slightly parietal predominance.  Distribution is similar to prior.  There are moderate degree of patchy areas of hypoattenuation in the periventricular white matter suggesting chronic microvascular ischemic changes.  No evidence of acute intracranial hemorrhage or acute major vascular territory infarct.  No abnormal extra-axial fluid collections.  Calvarium is intact without evidence of fracture.  Mild mucosal membrane thickening in the ethmoid sinuses.  Remaining paranasal sinuses and mastoid air cells are clear.                                       X-Ray Hip 2 View Left (Final result)  Result time 09/25/20 12:23:41      Final result by Doug Michel MD  (09/25/20 12:23:41)                   Impression:      See above      Electronically signed by: Doug Michel MD  Date:    09/25/2020  Time:    12:23               Narrative:    EXAMINATION:  XR HIP 2 VIEW LEFT    CLINICAL HISTORY:  Unspecified fall, initial encounter    TECHNIQUE:  AP view of the pelvis and frog leg lateral view of the left hip were performed.    COMPARISON:  None    FINDINGS:  Mild DJD.  No fracture or dislocation.  No bone destruction identified.  Postsurgical changes noted overlying the pelvis.                                    Future Appointments   Date Time Provider Department Center   10/7/2020  9:30 AM Bijal Trotter NP [facility] IM [place]   10/20/2020  9:30 AM Yash Kelly III, MD [facility] CARDIO [place] [facility]   11/5/2020  9:30 AM Marquita Holloway PA-C [facility] NEURO [place]     Our goal in the emergency department is to always give you outstanding care and exceptional service. You may receive a survey by mail or e-mail in the next week regarding your experience in our ED. We would greatly appreciate your completing and returning the survey. Your feedback provides us with a way to recognize our staff who give very good care and it helps us learn how to improve when your experience was below our aspiration of excellence.

## 2020-09-25 NOTE — ED PROVIDER NOTES
Encounter Date: 9/25/2020       History     Chief Complaint   Patient presents with    Leg Injury     fell on tuesday. was outside and lost balance. accompanied by daughter today. left leg/left hip injury. was not using a cane or walker prior to fall. now using a cane. states he hit his head but no loc. got up and walked inside.      11:53 AM  Patient is 79-year-old male with a history of CAD, BPH, GERD, cataracts, HTN, osteoporosis presents the ED with his daughter status post fall.  Patient fell on Tuesday approximately 3 days ago.  States that he was picking up trash and lost his balance and fell on his left hip.  Since he has been complaining of left hip and buttock pain.  He started ambulating with the assistance of a cane since the fall.  He has chronic neck and back pain and states that his neck pain is at baseline.  He did hit the left side of his head without loss of consciousness and endorses having a pain to the area since.      At baseline, he is hard of hearing and has visual impairment per daughter.  He does not have memory problems.  Was ambulating without assistance prior to the fall 3 days ago.    Future Appointments  10/7/2020  9:30 AM    Bijal Trotter NP     [facility]        [place]  10/20/2020 9:30 AM    Yash Kelly III, MD  [facility] CARDIO    Eliu Ortega  11/5/2020  9:30 AM    Marquita Holloway PA-C      [facility] NEURO     Eliu Ortega          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    Hyperlipidemia     Hypertension     Ischemic cardiomyopathy 11/5/2014    MDD (major depressive disorder), recurrent episode, mild  2/17/2016    Osteoporosis 7/20/2016    Peripheral arterial disease 3/18/2016    Sarcoid     Squamous cell carcinoma 09/2016, 5/2016    crown of scalp, left wrist     Past Surgical History:   Procedure Laterality Date    CARDIAC SURGERY      CAROTID STENT N/A 10/14/2019    Procedure: INSERTION, STENT, ARTERY, CAROTID;  Surgeon: Artie Kebede MD;  Location: [facility] CATH LAB;  Service: Cardiology;  Laterality: N/A;    CATARACT EXTRACTION W/  INTRAOCULAR LENS IMPLANT Right 07/17/2018    Dr. Talamantes    CATARACT EXTRACTION W/  INTRAOCULAR LENS IMPLANT Left 07/31/2018    Dr. Talamantes    CORONARY ARTERY BYPASS GRAFT      x2  10/2014    ESOPHAGOGASTRODUODENOSCOPY N/A 4/8/2019    Procedure: ESOPHAGOGASTRODUODENOSCOPY (EGD)/poss rfa;  Surgeon: Clifford De La Torre MD;  Location: [facility] (2ND FLR);  Service: Endoscopy;  Laterality: N/A;    ESOPHAGOGASTRODUODENOSCOPY (EGD) WITH RADIOFREQUENCY TREATMENT OF GASTROESOPHAGEAL JUNCTION      INJECTION OF ANESTHETIC AGENT AROUND MEDIAL BRANCH NERVES INNERVATING CERVICAL FACET JOINT Bilateral 1/9/2020    Procedure: INJECTION, MBB--Bilateral Cervical- Third Occipital nerve, C2-3--ASA okay for pt to continue taking per provider.;  Surgeon: Tip Mera Jr., MD;  Location: [facility] PAIN MGT;  Service: Pain Management;  Laterality: Bilateral;    INTRAOCULAR PROSTHESES INSERTION Right 7/17/2018    Procedure: INSERTION, INTRAOCULAR LENS PROSTHESIS;  Surgeon: León Talamantes MD;  Location: [address];  Service: Ophthalmology;  Laterality: Right;    INTRAOCULAR PROSTHESES INSERTION Left 7/31/2018    Procedure: INSERTION, INTRAOCULAR LENS PROSTHESIS;  Surgeon: León Talamantes MD;  Location: [address];  Service: Ophthalmology;  Laterality: Left;    PHACOEMULSIFICATION OF CATARACT Right 7/17/2018    Procedure: PHACOEMULSIFICATION, CATARACT;  Surgeon: León Talamantes MD;  Location: [address];  Service: Ophthalmology;  Laterality: Right;     PHACOEMULSIFICATION OF CATARACT Left 2018    Procedure: PHACOEMULSIFICATION, CATARACT;  Surgeon: León Talamantes MD;  Location: [facility] OR [address] [place] [address];  Service: Ophthalmology;  Laterality: Left;    RADIOFREQUENCY THERMOCOAGULATION Bilateral 2020    Procedure: RADIOFREQUENCY THERMAL COAGULATION--Bilateral C2-3 and Third Occipital Nerve;  Surgeon: Tip Mera Jr., MD;  Location: [facility] PAIN [facility];  Service: Pain Management;  Laterality: Bilateral;    UMBILICAL HERNIA REPAIR       Family History   Problem Relation Age of Onset    Arrhythmia Mother     Heart disease Mother     Heart failure Brother     No Known Problems Daughter     No Known Problems Son     Colon cancer Neg Hx     Inflammatory bowel disease Neg Hx     Celiac disease Neg Hx     Melanoma Neg Hx     Amblyopia Neg Hx     Blindness Neg Hx     Cataracts Neg Hx     Glaucoma Neg Hx     Macular degeneration Neg Hx     Retinal detachment Neg Hx     Strabismus Neg Hx      Social History     Tobacco Use    Smoking status: Former Smoker     Packs/day: 2.00     Years: 20.00     Pack years: 40.00     Types: Cigarettes     Quit date: 1988     Years since quittin.6    Smokeless tobacco: Never Used   Substance Use Topics    Alcohol use: No     Comment: quit drinking beer     Drug use: No     Review of Systems   Constitutional: Positive for activity change. Negative for fever.   HENT: Positive for hearing loss (baseline). Negative for sore throat.    Eyes: Positive for visual disturbance (baseline).   Respiratory: Negative for cough and shortness of breath.    Cardiovascular: Negative for chest pain.   Gastrointestinal: Negative for abdominal pain and nausea.   Genitourinary: Negative for dysuria.   Musculoskeletal: Positive for arthralgias, back pain, gait problem and neck pain.   Skin: Negative for rash.   Neurological: Negative for weakness.   Hematological: Does not bruise/bleed easily.       Physical Exam      Initial Vitals [09/25/20 1117]   BP Pulse Resp Temp SpO2   (!) 177/81 82 18 98.5 °F (36.9 °C) 98 %      MAP       --         Physical Exam    Vitals reviewed.  Constitutional: He appears well-developed and well-nourished. He is not diaphoretic. He is cooperative.  Non-toxic appearance. He does not have a sickly appearance. He does not appear ill. No distress. Face mask in place.   HENT:   Head: Head is without raccoon's eyes and without abrasion.   Right Ear: Tympanic membrane normal. No hemotympanum.   Left Ear: Tympanic membrane normal. No hemotympanum.   Nose: Nose normal.   Eyes: Conjunctivae and EOM are normal.   Neck: Normal range of motion. Normal range of motion present.   Pulmonary/Chest: No respiratory distress.   Abdominal: Soft. He exhibits no distension. There is no abdominal tenderness.   Musculoskeletal: Normal range of motion.      Right shoulder: Normal.      Left shoulder: Normal.      Right elbow: Normal.     Left elbow: Normal.      Right wrist: Normal.      Left wrist: Normal.      Right hip: Normal.      Left hip: He exhibits normal range of motion, normal strength and no bony tenderness.      Right knee: Normal.      Left knee: Normal.        Right ankle: Normal.        Left ankle: Normal.      Cervical back: He exhibits no bony tenderness.      Thoracic back: He exhibits no bony tenderness.      Lumbar back: He exhibits no bony tenderness.        Right hand: Normal.        Left hand: Normal.        Legs:       Comments: No difficulty ranging L hip while sitting down.    Neurological: He is alert and oriented to person, place, and time.   Skin: Skin is warm and dry. No abrasion and no bruising noted. No erythema.         ED Course   Procedures  Labs Reviewed - No data to display       Imaging Results          CT Head Without Contrast (Final result)  Result time 09/25/20 13:29:59    Final result by Barber Castillo MD (09/25/20 13:29:59)                 Impression:      No acute intracranial  abnormalities specifically no evidence of hemorrhage in this patient with history of trauma.    Generalized cerebral volume loss with slight parietal predominance similar to prior with moderate chronic microvascular ischemic changes.      Electronically signed by: Barber Castillo MD  Date:    09/25/2020  Time:    13:29             Narrative:    EXAMINATION:  CT HEAD WITHOUT CONTRAST    CLINICAL HISTORY:  Head trauma, minor (Age => 63y);    TECHNIQUE:  Low dose axial images were obtained through the head.  Coronal and sagittal reformations were also performed. Contrast was not administered.    COMPARISON:  08/04/2020    FINDINGS:  Generalized cerebral volume loss with prominence of the sulci, cisterns, and ventricles.  This has a slightly parietal predominance.  Distribution is similar to prior.  There are moderate degree of patchy areas of hypoattenuation in the periventricular white matter suggesting chronic microvascular ischemic changes.  No evidence of acute intracranial hemorrhage or acute major vascular territory infarct.  No abnormal extra-axial fluid collections.  Calvarium is intact without evidence of fracture.  Mild mucosal membrane thickening in the ethmoid sinuses.  Remaining paranasal sinuses and mastoid air cells are clear.                               X-Ray Hip 2 View Left (Final result)  Result time 09/25/20 12:23:41    Final result by Doug Michel MD (09/25/20 12:23:41)                 Impression:      See above      Electronically signed by: Doug Michel MD  Date:    09/25/2020  Time:    12:23             Narrative:    EXAMINATION:  XR HIP 2 VIEW LEFT    CLINICAL HISTORY:  Unspecified fall, initial encounter    TECHNIQUE:  AP view of the pelvis and frog leg lateral view of the left hip were performed.    COMPARISON:  None    FINDINGS:  Mild DJD.  No fracture or dislocation.  No bone destruction identified.  Postsurgical changes noted overlying the pelvis.                                 Medical  Decision Making:   History:   Old Medical Records: I decided to obtain old medical records.  Old Records Summarized: records from clinic visits and records from previous admission(s).  Initial Assessment:   Patient is 79-year-old male with a history of CAD, BPH, GERD, cataracts, HTN, osteoporosis presents the ED with his daughter status post fall.  He has been using a cane since.  Is endorsing new left-sided hip pain.  Differential Diagnosis:   Includes but is not limited to bony contusion, soft tissue contusion, strain, sprain, arthritis, less likely fracture given ability to bear weight and still ambulate, less likely dislocation, tension headache, intracerebral bleed given history of head trauma and aspirin use.  Clinical Tests:   Radiological Study: Reviewed and Ordered  ED Management:  Will obtain x-rays and CT head given head trauma, give acetaminophen for pain relief, and continue to monitor.    Left hip x-ray shows no fractures or dislocations.  No bone destruction.  Mild DJD.  CT head without acute intracranial abnormalities.    Patient and daughter updated with imaging results.  Likely that patient is experiencing new left hip pain secondary to soft tissue/bony contusion.  I discussed etiology.  I encourage patient to still ambulate with his cane, but with more caution.  He is already in therapy and should seek therapy for his left hip if his pain continues.  Stay active.  Continue to take OTC acetaminophen for pain relief.  All of their questions were answered.  Patient and his daughter are comfortable with plan, and patient is stable for discharge.                             Clinical Impression:       ICD-10-CM ICD-9-CM   1. Contusion of buttock, initial encounter  S30.0XXA 922.32   2. Fall  W19.XXXA E888.9                          ED Disposition Condition    Discharge Stable        ED Prescriptions     None        Follow-up Information     Follow up With Specialties Details Why Contact Info    Grey GELLER  Leidy,  Internal Medicine Schedule an appointment as soon as possible for a visit   [address] [place] [address]  [phone]      [facility] Emergency Medicine  If symptoms worsen [facility] [facility] [id number] [phone]                                       Beatrice Huynh PA-C  09/26/20 0825

## 2020-09-28 ENCOUNTER — PATIENT OUTREACH (OUTPATIENT)
Dept: ADMINISTRATIVE | Facility: OTHER | Age: 80
End: 2020-09-28

## 2020-09-29 ENCOUNTER — PATIENT MESSAGE (OUTPATIENT)
Dept: CARDIOLOGY | Facility: CLINIC | Age: 80
End: 2020-09-29

## 2020-09-29 ENCOUNTER — HOSPITAL ENCOUNTER (OUTPATIENT)
Dept: RADIOLOGY | Facility: HOSPITAL | Age: 80
Discharge: HOME | End: 2020-09-29
Attending: ORTHOPAEDIC SURGERY
Payer: MEDICARE

## 2020-09-29 ENCOUNTER — OFFICE VISIT (OUTPATIENT)
Dept: ORTHOPEDICS | Facility: CLINIC | Age: 80
End: 2020-09-29
Payer: MEDICARE

## 2020-09-29 ENCOUNTER — PATIENT MESSAGE (OUTPATIENT)
Dept: OTHER | Facility: OTHER | Age: 80
End: 2020-09-29

## 2020-09-29 DIAGNOSIS — M25.552 PAIN OF LEFT HIP JOINT: ICD-10-CM

## 2020-09-29 DIAGNOSIS — M25.552 PAIN OF LEFT HIP JOINT: Primary | ICD-10-CM

## 2020-09-29 PROCEDURE — 99203 OFFICE O/P NEW LOW 30 MIN: CPT | Mod: HCNC,S$GLB,, | Performed by: ORTHOPAEDIC SURGERY

## 2020-09-29 PROCEDURE — 99999 PR PBB SHADOW E&M-EST. PATIENT-LVL III: ICD-10-PCS | Mod: PBBFAC,HCNC,, | Performed by: ORTHOPAEDIC SURGERY

## 2020-09-29 PROCEDURE — 1159F PR MEDICATION LIST DOCUMENTED IN MEDICAL RECORD: ICD-10-PCS | Mod: HCNC,S$GLB,, | Performed by: ORTHOPAEDIC SURGERY

## 2020-09-29 PROCEDURE — 1125F AMNT PAIN NOTED PAIN PRSNT: CPT | Mod: HCNC,S$GLB,, | Performed by: ORTHOPAEDIC SURGERY

## 2020-09-29 PROCEDURE — 1125F PR PAIN SEVERITY QUANTIFIED, PAIN PRESENT: ICD-10-PCS | Mod: HCNC,S$GLB,, | Performed by: ORTHOPAEDIC SURGERY

## 2020-09-29 PROCEDURE — 1101F PT FALLS ASSESS-DOCD LE1/YR: CPT | Mod: HCNC,CPTII,S$GLB, | Performed by: ORTHOPAEDIC SURGERY

## 2020-09-29 PROCEDURE — 99999 PR PBB SHADOW E&M-EST. PATIENT-LVL III: CPT | Mod: PBBFAC,HCNC,, | Performed by: ORTHOPAEDIC SURGERY

## 2020-09-29 PROCEDURE — 99203 PR OFFICE/OUTPT VISIT, NEW, LEVL III, 30-44 MIN: ICD-10-PCS | Mod: HCNC,S$GLB,, | Performed by: ORTHOPAEDIC SURGERY

## 2020-09-29 PROCEDURE — 73502 XR HIP 2 VIEW LEFT: ICD-10-PCS | Mod: 26,HCNC,LT, | Performed by: RADIOLOGY

## 2020-09-29 PROCEDURE — 73502 X-RAY EXAM HIP UNI 2-3 VIEWS: CPT | Mod: 26,HCNC,LT, | Performed by: RADIOLOGY

## 2020-09-29 PROCEDURE — 1101F PR PT FALLS ASSESS DOC 0-1 FALLS W/OUT INJ PAST YR: ICD-10-PCS | Mod: HCNC,CPTII,S$GLB, | Performed by: ORTHOPAEDIC SURGERY

## 2020-09-29 PROCEDURE — 1159F MED LIST DOCD IN RCRD: CPT | Mod: HCNC,S$GLB,, | Performed by: ORTHOPAEDIC SURGERY

## 2020-09-29 PROCEDURE — 73502 X-RAY EXAM HIP UNI 2-3 VIEWS: CPT | Mod: TC,HCNC,LT

## 2020-09-29 NOTE — PROGRESS NOTES
CC:  Left hip pain      HISTORY       HPI:  79-year-old male, can call fall from standing 09/21/2020.  Seen emergency department.  X-rays were negative for fracture left hip.  He was given a walker and discharged home.  Since that time he has had some pain in his left hip, however has been able to bear weight with a walker to get around okay.  He has been taking Tylenol for pain.  Pain is currently 4/10, dull and achy in left buttock/hip.  No numbness no tingling.  No other areas of pain.    Lives at home with his daughter  Retired claudine, history of prior metal in eye  Previously an independent community ambulator, no gait aids, now is using a walker  History of skin cancer, no known Mets  Former smoker  Denies diabetes, stroke, heart attack, blood clots    ROS:  Constitutional: Denies fever/chills  Neurological: Denies numbness/tingling (any exceptions noted in orthopaedic exam)   Psychiatric/Behavioral: Denies change in normal mood  Eyes: Denies change in vision  Cardiovascular: Denies chest pain  Respiratory: Denies shortness of breath  Hematologic/Lymphatic: Denies easy bleeding/bruising   Skin: Denies new rash or skin lesions   Gastrointestinal: Denies nausea/vomitting/diarrhea, change in bowel habits, abdominal pain   Allergic/Immunologic: Denies adverse reactions to current medications  Musculoskeletal: see HPI    PAST MEDICAL HISTORY:   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    Hyperlipidemia     Hypertension     Ischemic cardiomyopathy 11/5/2014    MDD (major depressive disorder), recurrent episode, mild 2/17/2016    Osteoporosis 7/20/2016    Peripheral arterial disease 3/18/2016    Sarcoid     Squamous cell  carcinoma 09/2016, 5/2016    crown of scalp, left wrist     PAST SURGICAL HISTORY:   Past Surgical History:   Procedure Laterality Date    CARDIAC SURGERY      CAROTID STENT N/A 10/14/2019    Procedure: INSERTION, STENT, ARTERY, CAROTID;  Surgeon: Artie Kebede MD;  Location: [facility] CATH LAB;  Service: Cardiology;  Laterality: N/A;    CATARACT EXTRACTION W/  INTRAOCULAR LENS IMPLANT Right 07/17/2018    Dr. Talamantes    CATARACT EXTRACTION W/  INTRAOCULAR LENS IMPLANT Left 07/31/2018    Dr. Talamantes    CORONARY ARTERY BYPASS GRAFT      x2  10/2014    ESOPHAGOGASTRODUODENOSCOPY N/A 4/8/2019    Procedure: ESOPHAGOGASTRODUODENOSCOPY (EGD)/poss rfa;  Surgeon: Clifford De La Torre MD;  Location: [facility] ENDO (2ND FLR);  Service: Endoscopy;  Laterality: N/A;    ESOPHAGOGASTRODUODENOSCOPY (EGD) WITH RADIOFREQUENCY TREATMENT OF GASTROESOPHAGEAL JUNCTION      INJECTION OF ANESTHETIC AGENT AROUND MEDIAL BRANCH NERVES INNERVATING CERVICAL FACET JOINT Bilateral 1/9/2020    Procedure: INJECTION, MBB--Bilateral Cervical- Third Occipital nerve, C2-3--ASA okay for pt to continue taking per provider.;  Surgeon: Tip Mera Jr., MD;  Location: [facility] PAIN MGT;  Service: Pain Management;  Laterality: Bilateral;    INTRAOCULAR PROSTHESES INSERTION Right 7/17/2018    Procedure: INSERTION, INTRAOCULAR LENS PROSTHESIS;  Surgeon: León Talamantes MD;  Location: [address];  Service: Ophthalmology;  Laterality: Right;    INTRAOCULAR PROSTHESES INSERTION Left 7/31/2018    Procedure: INSERTION, INTRAOCULAR LENS PROSTHESIS;  Surgeon: León Talamantes MD;  Location: [address];  Service: Ophthalmology;  Laterality: Left;    PHACOEMULSIFICATION OF CATARACT Right 7/17/2018    Procedure: PHACOEMULSIFICATION, CATARACT;  Surgeon: León Talamantes MD;  Location: [facility] OR [address] [place] [address];  Service: Ophthalmology;  Laterality: Right;    PHACOEMULSIFICATION OF CATARACT Left 7/31/2018    Procedure: PHACOEMULSIFICATION,  CATARACT;  Surgeon: León Talamantes MD;  Location: [address];  Service: Ophthalmology;  Laterality: Left;    RADIOFREQUENCY THERMOCOAGULATION Bilateral 2020    Procedure: RADIOFREQUENCY THERMAL COAGULATION--Bilateral C2-3 and Third Occipital Nerve;  Surgeon: Tip Mera Jr., MD;  Location: [facility] PAIN MGT;  Service: Pain Management;  Laterality: Bilateral;    UMBILICAL HERNIA REPAIR       FAMILY HISTORY:   Family History   Problem Relation Age of Onset    Arrhythmia Mother     Heart disease Mother     Heart failure Brother     No Known Problems Daughter     No Known Problems Son     Colon cancer Neg Hx     Inflammatory bowel disease Neg Hx     Celiac disease Neg Hx     Melanoma Neg Hx     Amblyopia Neg Hx     Blindness Neg Hx     Cataracts Neg Hx     Glaucoma Neg Hx     Macular degeneration Neg Hx     Retinal detachment Neg Hx     Strabismus Neg Hx      SOCIAL HISTORY:   Social History     Socioeconomic History    Marital status:      Spouse name: Not on file    Number of children: 4    Years of education: Not on file    Highest education level: Not on file   Occupational History    Occupation:     Social Needs    Financial resource strain: Not on file    Food insecurity     Worry: Not on file     Inability: Not on file    Transportation needs     Medical: Not on file     Non-medical: Not on file   Tobacco Use    Smoking status: Former Smoker     Packs/day: 2.00     Years: 20.00     Pack years: 40.00     Types: Cigarettes     Quit date: 1988     Years since quittin.6    Smokeless tobacco: Never Used   Substance and Sexual Activity    Alcohol use: No     Comment: quit drinking beer     Drug use: No    Sexual activity: Not Currently     Partners: Female   Lifestyle    Physical activity     Days per week: Not on file     Minutes per session: Not on file    Stress: Not on file   Relationships    Social connections     Talks on  phone: Not on file     Gets together: Not on file     Attends Holiness service: Not on file     Active member of club or organization: Not on file     Attends meetings of clubs or organizations: Not on file     Relationship status: Not on file   Other Topics Concern    Not on file   Social History Narrative    Not on file     MEDICATIONS:   Current Outpatient Medications:     acetaminophen (TYLENOL) 500 MG tablet, Take 1,000 mg by mouth every 6 (six) hours as needed for Pain., Disp: , Rfl:     albuterol (PROVENTIL) 2.5 mg /3 mL (0.083 %) nebulizer solution, Take 3 mLs (2.5 mg total) by nebulization every 6 (six) hours as needed for Wheezing. Rescue, Disp: 1 Box, Rfl: 0    alendronate (FOSAMAX) 70 MG tablet, 1 tab PO qwk in the am with a full glass of water on an empty stomach. Do not consume food or lie down for at least 30 minutes afterwards., Disp: 12 tablet, Rfl: 3    aspirin (ECOTRIN) 81 MG EC tablet, Take 81 mg by mouth once daily., Disp: , Rfl:     atorvastatin (LIPITOR) 40 MG tablet, TAKE 1 TABLET BY MOUTH EVERY DAY, Disp: 90 tablet, Rfl: 3    butalbital-acetaminophen-caffeine -40 mg (FIORICET, ESGIC) -40 mg per tablet, 1-2 tabs PO q6 PRN headaches, Disp: 60 tablet, Rfl: 0    diclofenac sodium (VOLTAREN) 1 % Gel, Apply 2 g topically every 6 (six) hours as needed., Disp: 1 Tube, Rfl: 6    fluorouracil 5% 5 % Soln, Use hs for 2 weeks, Disp: 10 mL, Rfl: 1    isosorbide mononitrate (IMDUR) 30 MG 24 hr tablet, Take 1 tablet (30 mg total) by mouth once daily., Disp: 30 tablet, Rfl: 11    magnesium oxide (MAG-OX) 400 mg (241.3 mg magnesium) tablet, Take 1 tablet (400 mg total) by mouth once daily., Disp: 30 tablet, Rfl: 3    omega-3 fatty acids-vitamin E (FISH OIL) 1,000 mg Cap, Take 1 tablet by mouth 2 (two) times daily. (Patient taking differently: Take 1 tablet by mouth once daily. ), Disp: 60 each, Rfl: 11    ondansetron (ZOFRAN) 4 MG tablet, Take 1 tablet (4 mg total) by mouth every 8  (eight) hours as needed for Nausea., Disp: 30 tablet, Rfl: 1    pantoprazole (PROTONIX) 40 MG tablet, Take 40 mg by mouth 2 (two) times a day. , Disp: , Rfl:     pantoprazole (PROTONIX) 40 MG tablet, TAKE 1 TABLET BY MOUTH TWICE DAILY, Disp: 180 tablet, Rfl: 0    riboflavin, vitamin B2, (VITAMIN B-2) 100 mg Tab tablet, Take 1 tablet (100 mg total) by mouth once daily., Disp: 90 tablet, Rfl: 3    sertraline (ZOLOFT) 50 MG tablet, TAKE 1 TABLET BY MOUTH EVERY DAY, Disp: 90 tablet, Rfl: 3    triamcinolone acetonide 0.1% (KENALOG) 0.1 % cream, Apply topically 2 (two) times daily., Disp: 45 g, Rfl: 1    ALPRAZolam (XANAX) 0.25 MG tablet, Take 1 tablet (0.25 mg total) by mouth 3 (three) times daily as needed for Anxiety., Disp: 45 tablet, Rfl: 0  No current facility-administered medications for this visit.     Facility-Administered Medications Ordered in Other Visits:     0.9%  NaCl infusion, 500 mL, Intravenous, Continuous, Tip Mera Jr., MD    lidocaine (PF) 10 mg/ml (1%) injection 10 mg, 1 mL, Intradermal, Once, Tip Mera Jr., MD  ALLERGIES:   Review of patient's allergies indicates:   Allergen Reactions    Morphine Shortness Of Breath         EXAM      VITAL SIGNS:   There were no vitals taken for this visit.      PE:  General:  no acute distress, appears stated age    Neuro: alert and oriented x3  Psych: normal mood  Head: normocephalic, atraumatic.   Eyes: no scleral icterus  Mouth: moist mucous membranes  Cardiovascular: extremities warm and well perfused  Lungs: breathing comfortably, equal chest rise bilat  Skin: clean, dry, intact (any exceptions noted in below musculoskeletal exam)    Musculoskeletal:  RLE:  No gross deformities, wounds  No crepitus, No TTP  Motor intact hip flex, quad, Tib Ant, gastroc, EHL, FHL.  Sensation intact saphenous, sural, deep/superficial peroneal, tibial nerves  Palp DP/PT pulse, BCR    LLE:  No gross deformities, wounds  Mild tenderness palpation about the  left greater trochanter and buttock  Mild pain with logroll and axial loading of left hip  Motor intact hip flex, quad, Tib Ant, gastroc, EHL, FHL.  Sensation intact saphenous, sural, deep/superficial peroneal, tibial nerves  Palp DP/PT pulse, BCR            XRAYS:  X-ray left hip demonstrate no acute fracture dislocation or significant degenerative changes.  (I independently reviewed and interpreted the above imaging)    MEDICAL DECISION MAKING       Encounter Diagnosis   Name Primary?    Pain of left hip joint Yes       79-year-old male, mechanical fall 09/21/2020, with left hip pain, x-rays negative.    Likely left hip hematoma.    Is possible that he has a nondisplaced occult left femoral neck fracture.  Though he has been able to weightbear with a walker, and his pain seems to be improving.  I feel that this is less likely, however I did discuss with him and his son this possibility.    I discussed that we could evaluate further with an MRI or CT scan of his left hip, however patient does not want to lay in the scanner for the exam, and is concerned about prior metal in his eye in the setting of an MRI.  As he feels like he has been getting better over the past few days, he will continue to use a walker and weightbear as tolerated, and follow up in 1 week if his symptoms do not improve, at which time we would consider further diagnostic imaging.    --weight bearing:  WBAT  --followup:  1 week  --XR at next visit:  Left hip      =====================  Wilfrido Robbins MD  Orthopaedic Surgery

## 2020-10-04 PROBLEM — N18.30 CKD (CHRONIC KIDNEY DISEASE) STAGE 3, GFR 30-59 ML/MIN: Status: RESOLVED | Noted: 2019-09-19 | Resolved: 2020-10-04

## 2020-10-05 ENCOUNTER — OFFICE VISIT (OUTPATIENT)
Dept: CARDIOLOGY | Facility: CLINIC | Age: 80
End: 2020-10-05
Payer: MEDICARE

## 2020-10-05 VITALS
WEIGHT: 138 LBS | BODY MASS INDEX: 21.66 KG/M2 | DIASTOLIC BLOOD PRESSURE: 56 MMHG | SYSTOLIC BLOOD PRESSURE: 121 MMHG | OXYGEN SATURATION: 99 % | HEART RATE: 75 BPM | HEIGHT: 67 IN

## 2020-10-05 DIAGNOSIS — I10 ESSENTIAL HYPERTENSION: Chronic | ICD-10-CM

## 2020-10-05 DIAGNOSIS — R06.09 DOE (DYSPNEA ON EXERTION): ICD-10-CM

## 2020-10-05 DIAGNOSIS — I70.0 ATHEROSCLEROSIS OF AORTA: ICD-10-CM

## 2020-10-05 DIAGNOSIS — E78.5 HYPERLIPIDEMIA, UNSPECIFIED HYPERLIPIDEMIA TYPE: Chronic | ICD-10-CM

## 2020-10-05 DIAGNOSIS — R07.9 ACUTE CHEST PAIN: ICD-10-CM

## 2020-10-05 DIAGNOSIS — I25.810 CORONARY ARTERY DISEASE INVOLVING CORONARY BYPASS GRAFT OF NATIVE HEART WITHOUT ANGINA PECTORIS: Primary | Chronic | ICD-10-CM

## 2020-10-05 DIAGNOSIS — I65.23 BILATERAL CAROTID ARTERY STENOSIS: ICD-10-CM

## 2020-10-05 DIAGNOSIS — I73.9 PERIPHERAL ARTERIAL DISEASE: ICD-10-CM

## 2020-10-05 PROCEDURE — 99999 PR PBB SHADOW E&M-EST. PATIENT-LVL V: ICD-10-PCS | Mod: PBBFAC,HCNC,, | Performed by: NURSE PRACTITIONER

## 2020-10-05 PROCEDURE — 1159F MED LIST DOCD IN RCRD: CPT | Mod: HCNC,S$GLB,, | Performed by: NURSE PRACTITIONER

## 2020-10-05 PROCEDURE — 1101F PR PT FALLS ASSESS DOC 0-1 FALLS W/OUT INJ PAST YR: ICD-10-PCS | Mod: HCNC,CPTII,S$GLB, | Performed by: NURSE PRACTITIONER

## 2020-10-05 PROCEDURE — 1125F PR PAIN SEVERITY QUANTIFIED, PAIN PRESENT: ICD-10-PCS | Mod: HCNC,S$GLB,, | Performed by: NURSE PRACTITIONER

## 2020-10-05 PROCEDURE — 1159F PR MEDICATION LIST DOCUMENTED IN MEDICAL RECORD: ICD-10-PCS | Mod: HCNC,S$GLB,, | Performed by: NURSE PRACTITIONER

## 2020-10-05 PROCEDURE — 1125F AMNT PAIN NOTED PAIN PRSNT: CPT | Mod: HCNC,S$GLB,, | Performed by: NURSE PRACTITIONER

## 2020-10-05 PROCEDURE — 99999 PR PBB SHADOW E&M-EST. PATIENT-LVL V: CPT | Mod: PBBFAC,HCNC,, | Performed by: NURSE PRACTITIONER

## 2020-10-05 PROCEDURE — 3074F SYST BP LT 130 MM HG: CPT | Mod: HCNC,CPTII,S$GLB, | Performed by: NURSE PRACTITIONER

## 2020-10-05 PROCEDURE — 3078F DIAST BP <80 MM HG: CPT | Mod: HCNC,CPTII,S$GLB, | Performed by: NURSE PRACTITIONER

## 2020-10-05 PROCEDURE — 99215 PR OFFICE/OUTPT VISIT, EST, LEVL V, 40-54 MIN: ICD-10-PCS | Mod: HCNC,S$GLB,, | Performed by: NURSE PRACTITIONER

## 2020-10-05 PROCEDURE — 99215 OFFICE O/P EST HI 40 MIN: CPT | Mod: HCNC,S$GLB,, | Performed by: NURSE PRACTITIONER

## 2020-10-05 PROCEDURE — 3074F PR MOST RECENT SYSTOLIC BLOOD PRESSURE < 130 MM HG: ICD-10-PCS | Mod: HCNC,CPTII,S$GLB, | Performed by: NURSE PRACTITIONER

## 2020-10-05 PROCEDURE — 1101F PT FALLS ASSESS-DOCD LE1/YR: CPT | Mod: HCNC,CPTII,S$GLB, | Performed by: NURSE PRACTITIONER

## 2020-10-05 PROCEDURE — 3078F PR MOST RECENT DIASTOLIC BLOOD PRESSURE < 80 MM HG: ICD-10-PCS | Mod: HCNC,CPTII,S$GLB, | Performed by: NURSE PRACTITIONER

## 2020-10-05 NOTE — PROGRESS NOTES
"Mr. Browning is a patient of Dr. Kelly and was last seen in [facility] 7/20/2020.      Subjective:   Patient ID:  Paul Browning is a 79 y.o. male who presents for follow-up of Shortness of Breath and Fall (2 falls x 1 week ago)    Problems:  carotid artery disease s/p PCI to proximal R ICA 10/14/19  HTN  HLD  CAD s/p MI/PCI/CABG (STEMI in 8/19/2014 with PCI to RCA; CABG with LIMA-LAD, SVG-ramus 10/21/14; ischemic symptoms were "fogginess and feeling weird, no chest pain or dyspnea")  40 pack year h/o smoking, quit in 1988    HPI  Mr. Browning is in clinic today to follow up LANGLEY.  He was to start metoprolol succinate on 7/20 but did not start it d/t some bradycardia and lightheadedness/dizziness.  Dr. Kelly did a holter monitor on him on 7/30 that revealed good chronotropic competence.  Until about 3 months ago, he had been walking about 6 blocks out and 6 blocks back.  He would stop once or twice but could make the walk.  Over the last month, his breathing has worsened and he now is SOB walking in the house to the bathroom.  His weight is stable, 138-140 pounds consistently.  His last ichemic w/u was a PET stress 1/2019 with ischemia in the OM2 territory where he had known disease from his angiogram in 2014.      Review of Systems   Constitution: Positive for malaise/fatigue. Negative for decreased appetite, diaphoresis, weight gain and weight loss.   Eyes: Negative for visual disturbance.   Cardiovascular: Positive for dyspnea on exertion. Negative for chest pain, claudication, irregular heartbeat, leg swelling, near-syncope, orthopnea, palpitations, paroxysmal nocturnal dyspnea and syncope.        Denies chest pressure   Respiratory: Positive for shortness of breath. Negative for cough, hemoptysis, sleep disturbances due to breathing and snoring.    Endocrine: Negative for cold intolerance and heat intolerance.   Hematologic/Lymphatic: Negative for bleeding problem. Does not " bruise/bleed easily.   Musculoskeletal: Negative for myalgias.   Gastrointestinal: Negative for bloating, abdominal pain, anorexia, change in bowel habit, constipation, diarrhea, nausea and vomiting.   Neurological: Negative for difficulty with concentration, disturbances in coordination, excessive daytime sleepiness, dizziness, headaches, light-headedness, loss of balance, numbness and weakness.   Psychiatric/Behavioral: The patient does not have insomnia.        Allergies and current medications updated and reviewed:  Review of patient's allergies indicates:   Allergen Reactions    Morphine Shortness Of Breath     Current Outpatient Medications   Medication Sig    acetaminophen (TYLENOL) 500 MG tablet Take 1,000 mg by mouth every 6 (six) hours as needed for Pain.    albuterol (PROVENTIL) 2.5 mg /3 mL (0.083 %) nebulizer solution Take 3 mLs (2.5 mg total) by nebulization every 6 (six) hours as needed for Wheezing. Rescue    alendronate (FOSAMAX) 70 MG tablet 1 tab PO qwk in the am with a full glass of water on an empty stomach. Do not consume food or lie down for at least 30 minutes afterwards.    ALPRAZolam (XANAX) 0.25 MG tablet Take 1 tablet (0.25 mg total) by mouth 3 (three) times daily as needed for Anxiety.    aspirin (ECOTRIN) 81 MG EC tablet Take 81 mg by mouth once daily.    atorvastatin (LIPITOR) 40 MG tablet TAKE 1 TABLET BY MOUTH EVERY DAY    butalbital-acetaminophen-caffeine -40 mg (FIORICET, ESGIC) -40 mg per tablet 1-2 tabs PO q6 PRN headaches    diclofenac sodium (VOLTAREN) 1 % Gel Apply 2 g topically every 6 (six) hours as needed.    fluorouracil 5% 5 % Soln Use hs for 2 weeks    isosorbide mononitrate (IMDUR) 30 MG 24 hr tablet Take 1 tablet (30 mg total) by mouth once daily.    magnesium oxide (MAG-OX) 400 mg (241.3 mg magnesium) tablet Take 1 tablet (400 mg total) by mouth once daily.    omega-3 fatty acids-vitamin E (FISH OIL) 1,000 mg Cap Take 1 tablet by mouth 2 (two)  "times daily. (Patient taking differently: Take 1 tablet by mouth once daily. )    ondansetron (ZOFRAN) 4 MG tablet Take 1 tablet (4 mg total) by mouth every 8 (eight) hours as needed for Nausea.    pantoprazole (PROTONIX) 40 MG tablet Take 40 mg by mouth 2 (two) times a day.     pantoprazole (PROTONIX) 40 MG tablet TAKE 1 TABLET BY MOUTH TWICE DAILY    riboflavin, vitamin B2, (VITAMIN B-2) 100 mg Tab tablet Take 1 tablet (100 mg total) by mouth once daily.    sertraline (ZOLOFT) 50 MG tablet TAKE 1 TABLET BY MOUTH EVERY DAY    triamcinolone acetonide 0.1% (KENALOG) 0.1 % cream Apply topically 2 (two) times daily.     No current facility-administered medications for this visit.      Facility-Administered Medications Ordered in Other Visits   Medication    0.9%  NaCl infusion    lidocaine (PF) 10 mg/ml (1%) injection 10 mg       Objective:     Right Arm BP - Sittin/61 (10/05/20 0959)  Left Arm BP - Sittin/56 (10/05/20 0959)    BP (!) 121/56 (BP Location: Left arm, Patient Position: Sitting, BP Method: Large (Automatic))   Pulse 75   Ht 5' 7" (1.702 m)   Wt 62.6 kg (138 lb 0.1 oz)   SpO2 99%   BMI 21.62 kg/m²       Physical Exam   Constitutional: Vital signs are normal. He appears well-developed and well-nourished. He is active. No distress.   HENT:   Head: Normocephalic and atraumatic.   Eyes: Conjunctivae and lids are normal. No scleral icterus.   Neck: Neck supple. Normal carotid pulses, no hepatojugular reflux and no JVD present. Carotid bruit is not present.   Cardiovascular: Normal rate, regular rhythm, S1 normal, S2 normal and intact distal pulses. PMI is not displaced. Exam reveals no gallop and no friction rub.   No murmur heard.  Pulses:       Carotid pulses are 2+ on the right side and 2+ on the left side.       Radial pulses are 2+ on the right side and 2+ on the left side.        Dorsalis pedis pulses are 2+ on the right side and 2+ on the left side.        Posterior tibial pulses " are 1+ on the right side and 1+ on the left side.   Pulmonary/Chest: Effort normal and breath sounds normal. No respiratory distress. He has no decreased breath sounds. He has no wheezes. He has no rhonchi. He has no rales. He exhibits no tenderness.   Abdominal: Soft. Normal appearance and bowel sounds are normal. He exhibits no distension, no fluid wave, no abdominal bruit, no ascites and no pulsatile midline mass. There is no hepatosplenomegaly. There is no abdominal tenderness.   Musculoskeletal:         General: No edema.   Neurological: He is alert. Gait normal.   Repeats self frequently   Skin: Skin is warm, dry and intact. No rash noted. He is not diaphoretic. Nails show no clubbing.        Psychiatric: He has a normal mood and affect. His speech is normal and behavior is normal. Judgment and thought content normal. Cognition and memory are normal.   Nursing note and vitals reviewed.      Chemistry        Component Value Date/Time     (L) 10/05/2020 1150    K 4.3 10/05/2020 1150    CL 98 10/05/2020 1150    CO2 29 10/05/2020 1150    BUN 15 10/05/2020 1150    CREATININE 1.0 10/05/2020 1150    GLU 79 10/05/2020 1150        Component Value Date/Time    CALCIUM 9.2 10/05/2020 1150    ALKPHOS 61 07/20/2020 2221    AST 18 07/20/2020 2221    ALT 13 07/20/2020 2221    BILITOT 0.8 07/20/2020 2221    ESTGFRAFRICA >60.0 10/05/2020 1150    EGFRNONAA >60.0 10/05/2020 1150        Lab Results   Component Value Date    HGBA1C 5.2 10/15/2014     Recent Labs   Lab 06/09/20  0910 07/20/20  2221   WBC 6.80 5.19   Hemoglobin 14.3 13.6 L   Hematocrit 44.9 40.0   Mean Corpuscular Volume 93 90   Platelets 286 240   BNP  --  76   TSH 2.879  --    Cholesterol 115 L  --    HDL 35 L  --    LDL Cholesterol 61.6 L  --    Triglycerides 92  --    Hdl/Cholesterol Ratio 30.4  --        Recent Labs   Lab 10/14/19  0705   INR 1.0        Test(s) Reviewed  I have reviewed the following in detail:  [x] Stress test   [x] Angiography   [x]  Echocardiogram   [x] Labs   [] Other:         Assessment/Plan:   1. Coronary artery disease involving coronary bypass graft of native heart without angina pectoris  No chest discomfort.  Degree of LANGLEY not c/w CAD.  Discussed with his daughter and him whether he would be willing to have angiogram and stenting if he was determined to need it.  He and his daughter would like to d/w his son.  He is not sure that he'd like to have invasive procedures.     2. Bilateral carotid artery stenosis      3. Hyperlipidemia, unspecified hyperlipidemia type  LDL at goal <70. No changes      4. Essential hypertension  BP at goal <130/80. Continue current regimen.      5. Atherosclerosis of aorta      6. Peripheral arterial disease  Encouraged walking prorgam.     7. LANGLEY (dyspnea on exertion)  No HF exam findings.  Will get bmp to check Cr in case we need to get CTA chest.  Will get d dimer to look for possible PE.      - Basic metabolic panel; Future  - D-DIMER, QUANTITATIVE; Future    Addendum: D dimer 0.59, mild elevation.  Will get CTA.     - CTA Chest Non Coronary; Future      40 minutes face to face time with Counseling More Than 50% of the Appointment Time was spent discussing comorbid conditions, medications, and plan.      The patient was discussed and examined by Dr. Zeng    Follow up in about 6 weeks (around 11/16/2020).

## 2020-10-05 NOTE — Clinical Note
Please let him know that he had a mild elevation in the d dimer.  I'd like to get a CT of the chest.  Can you please schedule it? Thanks!

## 2020-10-06 ENCOUNTER — HOSPITAL ENCOUNTER (OUTPATIENT)
Dept: RADIOLOGY | Facility: HOSPITAL | Age: 80
Discharge: HOME | End: 2020-10-06
Attending: NURSE PRACTITIONER
Payer: MEDICARE

## 2020-10-06 ENCOUNTER — CLINICAL SUPPORT (OUTPATIENT)
Dept: REHABILITATION | Facility: HOSPITAL | Age: 80
End: 2020-10-06
Payer: MEDICARE

## 2020-10-06 ENCOUNTER — OFFICE VISIT (OUTPATIENT)
Dept: ORTHOPEDICS | Facility: CLINIC | Age: 80
End: 2020-10-06
Payer: MEDICARE

## 2020-10-06 VITALS
HEIGHT: 67 IN | SYSTOLIC BLOOD PRESSURE: 99 MMHG | HEART RATE: 72 BPM | DIASTOLIC BLOOD PRESSURE: 56 MMHG | BODY MASS INDEX: 21.66 KG/M2 | WEIGHT: 138 LBS

## 2020-10-06 DIAGNOSIS — M62.89 ABNORMAL INCREASED MUSCLE TIGHTNESS: ICD-10-CM

## 2020-10-06 DIAGNOSIS — R25.9 DYSFUNCTIONAL MOVEMENTS: ICD-10-CM

## 2020-10-06 DIAGNOSIS — M25.552 LEFT HIP PAIN: Primary | ICD-10-CM

## 2020-10-06 DIAGNOSIS — R29.898 TIGHTNESS OF NECK: ICD-10-CM

## 2020-10-06 DIAGNOSIS — M43.6 NECK STIFFNESS: Primary | ICD-10-CM

## 2020-10-06 DIAGNOSIS — M25.552 PAIN OF LEFT HIP JOINT: ICD-10-CM

## 2020-10-06 DIAGNOSIS — M99.01 CERVICAL SEGMENT DYSFUNCTION: ICD-10-CM

## 2020-10-06 DIAGNOSIS — R52 AWAKENS FROM SLEEP DUE TO PAIN: ICD-10-CM

## 2020-10-06 DIAGNOSIS — M25.552 LEFT HIP PAIN: ICD-10-CM

## 2020-10-06 DIAGNOSIS — R29.898 DECREASED ROM OF NECK: ICD-10-CM

## 2020-10-06 DIAGNOSIS — S72.102D CLOSED FRACTURE OF TROCHANTER OF LEFT FEMUR WITH ROUTINE HEALING, SUBSEQUENT ENCOUNTER: Primary | ICD-10-CM

## 2020-10-06 PROBLEM — R51.9 INTRACTABLE HEADACHE: Status: RESOLVED | Noted: 2020-08-06 | Resolved: 2020-10-06

## 2020-10-06 PROBLEM — G89.29 CHRONIC PAIN: Status: RESOLVED | Noted: 2020-01-09 | Resolved: 2020-10-06

## 2020-10-06 PROCEDURE — 73502 XR HIP 2 VIEW LEFT: ICD-10-PCS | Mod: 26,HCNC,LT, | Performed by: RADIOLOGY

## 2020-10-06 PROCEDURE — 1159F MED LIST DOCD IN RCRD: CPT | Mod: HCNC,S$GLB,, | Performed by: NURSE PRACTITIONER

## 2020-10-06 PROCEDURE — 73700 CT HIP WITHOUT CONTRAST LEFT: ICD-10-PCS | Mod: 26,HCNC,LT, | Performed by: RADIOLOGY

## 2020-10-06 PROCEDURE — 73502 X-RAY EXAM HIP UNI 2-3 VIEWS: CPT | Mod: 26,HCNC,LT, | Performed by: RADIOLOGY

## 2020-10-06 PROCEDURE — 3078F DIAST BP <80 MM HG: CPT | Mod: HCNC,CPTII,S$GLB, | Performed by: NURSE PRACTITIONER

## 2020-10-06 PROCEDURE — 97110 THERAPEUTIC EXERCISES: CPT | Mod: HCNC,PO | Performed by: PHYSICAL THERAPIST

## 2020-10-06 PROCEDURE — 99214 PR OFFICE/OUTPT VISIT, EST, LEVL IV, 30-39 MIN: ICD-10-PCS | Mod: HCNC,S$GLB,, | Performed by: NURSE PRACTITIONER

## 2020-10-06 PROCEDURE — 99214 OFFICE O/P EST MOD 30 MIN: CPT | Mod: HCNC,S$GLB,, | Performed by: NURSE PRACTITIONER

## 2020-10-06 PROCEDURE — 1101F PT FALLS ASSESS-DOCD LE1/YR: CPT | Mod: HCNC,CPTII,S$GLB, | Performed by: NURSE PRACTITIONER

## 2020-10-06 PROCEDURE — 73700 CT LOWER EXTREMITY W/O DYE: CPT | Mod: TC,HCNC,LT

## 2020-10-06 PROCEDURE — 3074F SYST BP LT 130 MM HG: CPT | Mod: HCNC,CPTII,S$GLB, | Performed by: NURSE PRACTITIONER

## 2020-10-06 PROCEDURE — 3074F PR MOST RECENT SYSTOLIC BLOOD PRESSURE < 130 MM HG: ICD-10-PCS | Mod: HCNC,CPTII,S$GLB, | Performed by: NURSE PRACTITIONER

## 2020-10-06 PROCEDURE — 3078F PR MOST RECENT DIASTOLIC BLOOD PRESSURE < 80 MM HG: ICD-10-PCS | Mod: HCNC,CPTII,S$GLB, | Performed by: NURSE PRACTITIONER

## 2020-10-06 PROCEDURE — 73700 CT LOWER EXTREMITY W/O DYE: CPT | Mod: 26,HCNC,LT, | Performed by: RADIOLOGY

## 2020-10-06 PROCEDURE — 1101F PR PT FALLS ASSESS DOC 0-1 FALLS W/OUT INJ PAST YR: ICD-10-PCS | Mod: HCNC,CPTII,S$GLB, | Performed by: NURSE PRACTITIONER

## 2020-10-06 PROCEDURE — 73502 X-RAY EXAM HIP UNI 2-3 VIEWS: CPT | Mod: TC,HCNC,LT

## 2020-10-06 PROCEDURE — 1125F PR PAIN SEVERITY QUANTIFIED, PAIN PRESENT: ICD-10-PCS | Mod: HCNC,S$GLB,, | Performed by: NURSE PRACTITIONER

## 2020-10-06 PROCEDURE — 1159F PR MEDICATION LIST DOCUMENTED IN MEDICAL RECORD: ICD-10-PCS | Mod: HCNC,S$GLB,, | Performed by: NURSE PRACTITIONER

## 2020-10-06 PROCEDURE — 99999 PR PBB SHADOW E&M-EST. PATIENT-LVL IV: ICD-10-PCS | Mod: PBBFAC,HCNC,, | Performed by: NURSE PRACTITIONER

## 2020-10-06 PROCEDURE — 99999 PR PBB SHADOW E&M-EST. PATIENT-LVL IV: CPT | Mod: PBBFAC,HCNC,, | Performed by: NURSE PRACTITIONER

## 2020-10-06 PROCEDURE — 1125F AMNT PAIN NOTED PAIN PRSNT: CPT | Mod: HCNC,S$GLB,, | Performed by: NURSE PRACTITIONER

## 2020-10-06 NOTE — PROGRESS NOTES
Physical Therapy Daily Treatment Note     Name: Paul Berrios [facility]  Clinic Number: [id number]    Therapy Diagnosis:        Encounter Diagnoses   Name Primary?    Neck stiffness      Abnormal increased muscle tightness      Awakens from sleep due to pain      Cervical segment dysfunction      Tightness of neck      Dysfunctional movements          Physician: Marquita Holloway PA-C    Visit Date: 10/6/2020    Physician Orders: PT Eval and Treat neck  Medical Diagnosis from Referral:   R29.898 (ICD-10-CM) - Decreased ROM of neck   R51 (ICD-10-CM) - Acute intractable headache, unspecified headache type         Evaluation Date: 7/30/2020  Authorization Period Expiration: 08/07/2021  Plan of Care Expiration: 10/30/2020  Visit # / Visits authorized: 12 / 20     Time In: 1205  Time Out: 1245  Total Billable Time: 40 minutes      Precautions: Standard and Fall      Subjective     Pt reports: the neck is hurting and stiff. Reports a HA. Says he fell twice, once outside when he tried to  garbage left by the garbage men and another time inside. Hurt the hip but did not break anything.      He was not compliant with home exercise plan. Not using heat at home.   Response to previous treatment:  Felt a little sore after the session.   Functional change: improving cervical ROM    Pain: 6 /10  Location:  Neck, left side    Objective     Paul received therapeutic exercises to develop strength, ROM, flexibility and posture for 0 minutes including:      .BOLD INDICATES ACTIVITIES PERFORMED    Shoulder flexion 2 x 10 w/dowel  Shoulder horizontal abd / add 2x10  Chest press 2 x 10 w/dowel  Neck flexion in semi-reclined ( no pillow) 2x10  Cervical rotation: x15 reps each direction  Chin tucks in sitting and supine: x10 reps  Scap retractions in sitting: x15 reps  Scap depression: x15 reps  Upper trap stretch: [id number] -assisted   Levator Scapulae stretch: [id number] - assisted   Rows with green TBband: x30 reps  Shoulder  extension with Orange band x20 reps  Open book x12   SL shoulder abd B  x 12  SL shoulder flexion B 2 x 10 w/2# dumbbell  SL shoulder horizontal abd  B x12    Paul received the following manual therapy techniques: Cervical distraction along with Soft tissue Mobilization applied to the: cervical region for 30 minutes, including:  Cervical distraction -   Sub-Occipital release  STM to cervical paraspinals, upper trap  Passive stretching to the upper trap B and levator scapulae - assisted  Assisted end range stretch in rotation     MODALITY: MH to the neck at the beginning of the session 15'       Home Exercises Provided and Patient Education Provided     Education provided:   - HEP, posture, exercise technique    Written Home Exercises Provided: yes.  Exercises were reviewed and Paul was able to demonstrate them prior to the end of the session.  Paul demonstrated fair  understanding of the education provided.     See EMR under Patient Instructions for exercises provided 8/7/2020.    Assessment     Patient reported feeling some better. He has not and likely will be unlikely to follow-up with activities at home. The neck musculature is tight with significant stiffness in the neck. Will focus on manual intervention and modalities with progression to ROM exercises as previously.       Paul is progressing well towards his goals.   Pt prognosis is Fair.     Pt will continue to benefit from skilled outpatient physical therapy to address the deficits listed in the problem list box on initial evaluation, provide pt/family education and to maximize pt's level of independence in the home and community environment.     Pt's spiritual, cultural and educational needs considered and pt agreeable to plan of care and goals.    Anticipated barriers to physical therapy: none    Goals:  Short Term GOALS: 5 weeks. Pt agrees with goals set.  1. Pt to report reduced tightness by 50% associated with neck movements for improved  quality of movement   2. Patient demonstrates minimal pain with palpation to the left cervical region to improve quality of life and daily activities.   3. Pt to exhibit increased neck mobility in flexion and rotation by 10 degree w/o end range pain for improved movement quality.   4. Patient demonstrates correct movement patterns associated with exercises for self management and independence with HEP  5. Patient demonstrates independence with Postural Awareness and correction for self management  6. Pt to experience 50% or more reduced interruption of sleep due to reduced pain for improved quality of life     Long Term GOALS: 10 weeks. Pt agrees with goals set.  1. Pt to demonstrate increase flexibility in the cervical musculature for increased neck flexion and rotation   2. Patient demonstrates increased cervical muscle strength for improved stability and tolerance to functional activities.   3. Patient demonstrates improved comfort level with reported decreased left eye pain by 50% or greater foer improved quality of life.   4. Pt demonstrates neck movement patterns in rotation without end range pain for improved quality of daily activities.        Plan     Continue PT POC, progressing patient as tolerated    Joseph Mcduffie, PT

## 2020-10-06 NOTE — PROGRESS NOTES
SUBJECTIVE:     Chief Complaint & History of Present Illness:  Paul Browning is a Established 79 y.o. year old male patient presenting today for constant left hip pain which started 2 weeks ago.  There is a history of trauma.  The pain is located in the lateral aspect of the hip.  The pain is described as achy, 10/10.  It is is worse with weight bearing and is aggravated by walking.  There is radiation into the leg.  Previous treatments include nothing.  There is not a history of previous injury or surgery to the hip.  The patient does not use an assistive device.    In summary, patient states he had a mechanical fall 2 weeks ago while sitting outside and landed on left side.  He was brought to the ED and then referred to Orthopedics.  He was seen by Dr. Robbins.  Per his note, x-ray of his hip was negative for fracture.  He recommended to weight bear as tolerated.  Advised if pain failed to improve to follow up in 1 week and consider CT versus MRI of the hip.  Patient then visited his cardiologist.  She had contacted our office indicating the patient had fallen again and still had complaints of pain to his left hip.  Advised her to have patient come to clinic.      Currently, patient states pain to lateral aspect of hip that is aggravated with ambulation and standing.  He denies groin pain.  States pain does radiate to his lower leg.  He states pain is not well controlled with Tylenol, but per son, he is only taking Tylenol PRN.   In addition, son is also requesting a Rolator for him.      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    Hyperlipidemia     Hypertension     Ischemic cardiomyopathy 11/5/2014    MDD  (major depressive disorder), recurrent episode, mild 2/17/2016    Osteoporosis 7/20/2016    Peripheral arterial disease 3/18/2016    Sarcoid     Squamous cell carcinoma 09/2016, 5/2016    crown of scalp, left wrist       Past Surgical History:   Procedure Laterality Date    CARDIAC SURGERY      CAROTID STENT N/A 10/14/2019    Procedure: INSERTION, STENT, ARTERY, CAROTID;  Surgeon: Artie Kebede MD;  Location: [facility] CATH LAB;  Service: Cardiology;  Laterality: N/A;    CATARACT EXTRACTION W/  INTRAOCULAR LENS IMPLANT Right 07/17/2018    Dr. Talamantes    CATARACT EXTRACTION W/  INTRAOCULAR LENS IMPLANT Left 07/31/2018    Dr. Talamantes    CORONARY ARTERY BYPASS GRAFT      x2  10/2014    ESOPHAGOGASTRODUODENOSCOPY N/A 4/8/2019    Procedure: ESOPHAGOGASTRODUODENOSCOPY (EGD)/poss rfa;  Surgeon: Clifford De La Torre MD;  Location: [facility] ENDO (2ND FLR);  Service: Endoscopy;  Laterality: N/A;    ESOPHAGOGASTRODUODENOSCOPY (EGD) WITH RADIOFREQUENCY TREATMENT OF GASTROESOPHAGEAL JUNCTION      INJECTION OF ANESTHETIC AGENT AROUND MEDIAL BRANCH NERVES INNERVATING CERVICAL FACET JOINT Bilateral 1/9/2020    Procedure: INJECTION, MBB--Bilateral Cervical- Third Occipital nerve, C2-3--ASA okay for pt to continue taking per provider.;  Surgeon: Tip Mera Jr., MD;  Location: [facility] PAIN MGT;  Service: Pain Management;  Laterality: Bilateral;    INTRAOCULAR PROSTHESES INSERTION Right 7/17/2018    Procedure: INSERTION, INTRAOCULAR LENS PROSTHESIS;  Surgeon: León Talamantes MD;  Location: [address];  Service: Ophthalmology;  Laterality: Right;    INTRAOCULAR PROSTHESES INSERTION Left 7/31/2018    Procedure: INSERTION, INTRAOCULAR LENS PROSTHESIS;  Surgeon: León Talamantes MD;  Location: [address];  Service: Ophthalmology;  Laterality: Left;    PHACOEMULSIFICATION OF CATARACT Right 7/17/2018    Procedure: PHACOEMULSIFICATION, CATARACT;  Surgeon: León Talamantes MD;  Location: [facility]  1ST FLR;  Service: Ophthalmology;  Laterality: Right;    PHACOEMULSIFICATION OF CATARACT Left 7/31/2018    Procedure: PHACOEMULSIFICATION, CATARACT;  Surgeon: León Talamantes MD;  Location: [address] FLR;  Service: Ophthalmology;  Laterality: Left;    RADIOFREQUENCY THERMOCOAGULATION Bilateral 1/30/2020    Procedure: RADIOFREQUENCY THERMAL COAGULATION--Bilateral C2-3 and Third Occipital Nerve;  Surgeon: Tip Mera Jr., MD;  Location: [facility];  Service: Pain Management;  Laterality: Bilateral;    UMBILICAL HERNIA REPAIR         Family History   Problem Relation Age of Onset    Arrhythmia Mother     Heart disease Mother     Heart failure Brother     No Known Problems Daughter     No Known Problems Son     Colon cancer Neg Hx     Inflammatory bowel disease Neg Hx     Celiac disease Neg Hx     Melanoma Neg Hx     Amblyopia Neg Hx     Blindness Neg Hx     Cataracts Neg Hx     Glaucoma Neg Hx     Macular degeneration Neg Hx     Retinal detachment Neg Hx     Strabismus Neg Hx        Review of patient's allergies indicates:   Allergen Reactions    Morphine Shortness Of Breath         Current Outpatient Medications:     acetaminophen (TYLENOL) 500 MG tablet, Take 1,000 mg by mouth every 6 (six) hours as needed for Pain., Disp: , Rfl:     albuterol (PROVENTIL) 2.5 mg /3 mL (0.083 %) nebulizer solution, Take 3 mLs (2.5 mg total) by nebulization every 6 (six) hours as needed for Wheezing. Rescue, Disp: 1 Box, Rfl: 0    alendronate (FOSAMAX) 70 MG tablet, 1 tab PO qwk in the am with a full glass of water on an empty stomach. Do not consume food or lie down for at least 30 minutes afterwards., Disp: 12 tablet, Rfl: 3    aspirin (ECOTRIN) 81 MG EC tablet, Take 81 mg by mouth once daily., Disp: , Rfl:     atorvastatin (LIPITOR) 40 MG tablet, TAKE 1 TABLET BY MOUTH EVERY DAY, Disp: 90 tablet, Rfl: 3    isosorbide mononitrate (IMDUR) 30 MG 24 hr tablet, Take 1 tablet (30 mg total) by  mouth once daily., Disp: 30 tablet, Rfl: 11    magnesium oxide (MAG-OX) 400 mg (241.3 mg magnesium) tablet, Take 1 tablet (400 mg total) by mouth once daily., Disp: 30 tablet, Rfl: 3    omega-3 fatty acids-vitamin E (FISH OIL) 1,000 mg Cap, Take 1 tablet by mouth 2 (two) times daily. (Patient taking differently: Take 1 tablet by mouth once daily. ), Disp: 60 each, Rfl: 11    pantoprazole (PROTONIX) 40 MG tablet, Take 40 mg by mouth 2 (two) times a day. , Disp: , Rfl:     pantoprazole (PROTONIX) 40 MG tablet, TAKE 1 TABLET BY MOUTH TWICE DAILY, Disp: 180 tablet, Rfl: 0    riboflavin, vitamin B2, (VITAMIN B-2) 100 mg Tab tablet, Take 1 tablet (100 mg total) by mouth once daily., Disp: 90 tablet, Rfl: 3    sertraline (ZOLOFT) 50 MG tablet, TAKE 1 TABLET BY MOUTH EVERY DAY, Disp: 90 tablet, Rfl: 3    ALPRAZolam (XANAX) 0.25 MG tablet, Take 1 tablet (0.25 mg total) by mouth 3 (three) times daily as needed for Anxiety. (Patient not taking: Reported on 10/5/2020), Disp: 45 tablet, Rfl: 0    butalbital-acetaminophen-caffeine -40 mg (FIORICET, ESGIC) -40 mg per tablet, 1-2 tabs PO q6 PRN headaches (Patient not taking: Reported on 10/5/2020), Disp: 60 tablet, Rfl: 0    diclofenac sodium (VOLTAREN) 1 % Gel, Apply 2 g topically every 6 (six) hours as needed. (Patient not taking: Reported on 10/5/2020), Disp: 1 Tube, Rfl: 6    fluorouracil 5% 5 % Soln, Use hs for 2 weeks (Patient not taking: Reported on 10/5/2020), Disp: 10 mL, Rfl: 1    ondansetron (ZOFRAN) 4 MG tablet, Take 1 tablet (4 mg total) by mouth every 8 (eight) hours as needed for Nausea. (Patient not taking: Reported on 10/5/2020), Disp: 30 tablet, Rfl: 1    triamcinolone acetonide 0.1% (KENALOG) 0.1 % cream, Apply topically 2 (two) times daily. (Patient not taking: Reported on 10/5/2020), Disp: 45 g, Rfl: 1  No current facility-administered medications for this visit.     Facility-Administered Medications Ordered in Other Visits:     0.9%   "NaCl infusion, 500 mL, Intravenous, Continuous, Tip Mera Jr., MD    lidocaine (PF) 10 mg/ml (1%) injection 10 mg, 1 mL, Intradermal, Once, Tip Mera Jr., MD    Review of Systems:  ROS:  Constitutional: no fever or chills  Eyes: no visual changes  ENT: no nasal congestion or sore throat  Respiratory: no cough or shortness of breath  Cardiovascular: no chest pain or palpitations  Gastrointestinal: no nausea or vomiting, tolerating diet  Genitourinary: no hematuria or dysuria  Integument/Breast: no rash or pruritis  Hematologic/Lymphatic: no easy bruising or lymphadenopathy  Musculoskeletal: positive for arthralgias, muscle weakness and falls  Neurological: no seizures or tremors  Behavioral/Psych: no auditory or visual hallucinations  Endocrine: no heat or cold intolerance      PE:  BP (!) 99/56   Pulse 72   Ht 5' 7" (1.702 m)   Wt 62.6 kg (138 lb 0.1 oz)   BMI 21.62 kg/m²   General: Pleasant, cooperative, NAD   HEENT: NCAT, sclera nonicteric   Lungs: Respirations are equal and unlabored.   Abdomen: Soft and non-tender.  CV: 2+ bilateral upper and lower extremity pulses.   Skin: Intact throughout LE with no rashes, erythema, or lesions  Extremities: No LE edema, NVI lower extremities      Hip Exam:   leftpositives: decreased flexion, pain with flexion, pain with movement of hip and tenderness over greater trochanter and negatives: pulses full    40 degrees flexion  0 degrees extension   20 degrees internal rotation  20 degrees external rotation  15 degrees abduction  15 degrees adduction     RADIOGRAPHS:  X-ray of left hip obtained and personally reviewed by me.  No acute fractures seen.  Radiology reports possible impingement syndrome.    ASSESSMENT/PLAN:       ICD-10-CM ICD-9-CM   1. Closed fracture of trochanter of left femur with routine healing, subsequent encounter  S72.102D V54.13   2. Pain of left hip joint  M25.552 719.45       Plan: We discussed with the patient at length all the " different treatment options available for arthrosis of the hip including anti-inflammatories, acetaminophen, rest, ice, lower extremity strengthening exercise, occasional cortisone injections for temporary relief, and finally total hip arthroplasty.     -Paul Browning presents to clinic today with c/c left hip pain for the past 2 weeks due to mechanical fall.  -X-ray as above.  -Recommend RICE therapy.  -Will order Rolator for him today.  -Will do CT of hip without contrast, cannot do MRI has he has metal in his eye.   Will call with further recommendations when CT complete.  -In meantime, recommend to continue Tylenol PRN and may alternate ice/heat packs.     Addendum 10/6/2020 4925  Patient's CT of his left hip was done this afternoon.  It appears he has a minimally displaced greater trochanteric fracture.  Per radiology note, there is no discrete extension into the intertrochanteric region.  Notified Dr. Robbins, he is recommending limiting weight bearing activities to LLE only TTWB if he has to put his weight down.  He also recommends a standard walker over Rolator.      I will see patient back in 4 weeks with repeat left hip x-ray.    Son called, spoke to Paul Brwoning Jr.  Advised him of recommendations.  Informed if patient's pain worsens or he falls again, he will need to go to ED.  Verb understanding.

## 2020-10-07 ENCOUNTER — PATIENT MESSAGE (OUTPATIENT)
Dept: INTERNAL MEDICINE | Facility: CLINIC | Age: 80
End: 2020-10-07

## 2020-10-07 ENCOUNTER — PATIENT MESSAGE (OUTPATIENT)
Dept: ORTHOPEDICS | Facility: CLINIC | Age: 80
End: 2020-10-07

## 2020-10-07 ENCOUNTER — TELEPHONE (OUTPATIENT)
Dept: NEUROLOGY | Facility: CLINIC | Age: 80
End: 2020-10-07

## 2020-10-07 ENCOUNTER — PATIENT MESSAGE (OUTPATIENT)
Dept: CARDIOLOGY | Facility: CLINIC | Age: 80
End: 2020-10-07

## 2020-10-07 NOTE — TELEPHONE ENCOUNTER
Spoke to son Paul Salazar  Said he will see what equipment they have before proceeding getting anything else.

## 2020-10-07 NOTE — TELEPHONE ENCOUNTER
----- Message from Urmila Haq RN sent at 10/5/2020  3:00 PM CDT -----  Regarding: FW: balance  General neuro referral  ----- Message -----  From: Tari Akins MA  Sent: 10/5/2020   2:56 PM CDT  To: [facility] Staff  Subject: FW: balance                                        ----- Message -----  From: Marquita Holloway PA-C  Sent: 10/5/2020   2:46 PM CDT  To: Joanne Wharton NP, Tari Akins MA  Subject: RE: balance                                      Hi Joanne,   No problem! We would be happy to help Mr. Miller in getting in to see the right provider! I recommend general neurology in this case. We can assist him in this referral/scheduling.   Have a great day,  Marquita  ----- Message -----  From: Joanne Wharton NP  Sent: 10/5/2020  10:49 AM CDT  To: Marquita Holloway PA-C  Subject: balance                                          Hi Marquita,    I'm not sure if you would be in the right part of neuro.  It seems that neuro has a number of subspecialties so I'm sorry if you're not the right person.  Mr. Browning is having balance issues and has fallen twice.  Not sure if you could evaluate him for a neurological source for his balance issues or if he'll need another referral.  I've asked the ortho NP and his PCP to address walking aids and PT/OT in the interrim.  Please let me know.    Thanks!  Joanne

## 2020-10-07 NOTE — TELEPHONE ENCOUNTER
Called and spoke with pt's son. He was not aware of the neurology consult. He stated his father has a fractured hip. Not sure he needed to be seen by a neurologist. He accepted the appt but not sure he will be able to get the pt there due to transportation issues. He asked me to follow up Joanne Wharton NP to see is she thinks he still needs to appt. Message routed to her.

## 2020-10-08 ENCOUNTER — PES CALL (OUTPATIENT)
Dept: ADMINISTRATIVE | Facility: CLINIC | Age: 80
End: 2020-10-08

## 2020-10-09 ENCOUNTER — CLINICAL SUPPORT (OUTPATIENT)
Dept: REHABILITATION | Facility: HOSPITAL | Age: 80
End: 2020-10-09
Payer: MEDICARE

## 2020-10-09 PROCEDURE — 97110 THERAPEUTIC EXERCISES: CPT | Mod: HCNC,PO,CQ

## 2020-10-09 PROCEDURE — 97140 MANUAL THERAPY 1/> REGIONS: CPT | Mod: HCNC,PO,CQ

## 2020-10-09 NOTE — PROGRESS NOTES
"  Physical Therapy Daily Treatment Note     Name: Paul Berrios [facility]  Clinic Number: [id number]    Therapy Diagnosis:        Encounter Diagnoses   Name Primary?    Neck stiffness      Abnormal increased muscle tightness      Awakens from sleep due to pain      Cervical segment dysfunction      Tightness of neck      Dysfunctional movements          Physician: Marquita Holloway PA-C    Visit Date: 10/9/2020    Physician Orders: PT Eval and Treat neck  Medical Diagnosis from Referral:   R29.898 (ICD-10-CM) - Decreased ROM of neck   R51 (ICD-10-CM) - Acute intractable headache, unspecified headache type         Evaluation Date: 7/30/2020  Authorization Period Expiration: 08/07/2021  Plan of Care Expiration: 10/30/2020  Visit # / Visits authorized: 13 / 20     Time In: 1415  Time Out: 1500  Total Billable Time: 45 minutes      Precautions: Standard and Fall      Subjective     Pt reports:  " The doctor won't order me a walker, he said the one with wheels is to dangerous for me, so I went and bought one with out wheels, my hip is hurting, I have a chipped bone in it."      He was not compliant with home exercise plan. Not using heat at home.   Response to previous treatment:  Felt a little sore after the session.   Functional change: improving cervical ROM    Pain: 4 /10, 6 or 7/10  Location:  Neck, left side, left hip    Objective   Pt arrived with standard walker, Therapist adjusted walker to a higher height.  Paul received therapeutic exercises to develop strength, ROM, flexibility and posture for 15 minutes including:      .BOLD INDICATES ACTIVITIES PERFORMED    Shoulder flexion 2 x 10 w/dowel  Shoulder horizontal abd / add 2x10  Chest press 2 x 10 w/dowel  Neck flexion in semi-reclined ( no pillow) 2x10  Cervical rotation: x15 reps each direction  Chin tucks in sitting and supine: x10 reps  Scap retractions in sitting: x15 reps  Scap depression: x15 reps  Upper trap stretch: [id number] -assisted   Levator " Scapulae stretch: [id number] - assisted   Rows with green TBband: x30 reps  Shoulder extension with Orange band x20 reps  Open book x12   SL shoulder abd B  x 12  SL shoulder flexion B 2 x 10 w/2# dumbbell  SL shoulder horizontal abd  B x12    Paul received the following manual therapy techniques: Cervical distraction along with Soft tissue Mobilization applied to the: cervical region for 30 minutes, including:    MODALITY: MH to the neck at the beginning of the session 15'     Cervical distraction -   Sub-Occipital release  STM to cervical paraspinals, upper trap  Passive stretching to the upper trap B and levator scapulae - assisted  Assisted end range stretch in rotation       Pt ambulated out of the gym with SW and Supervision.  Pt reports walker feeling better once therapist adjusted the height of the walker.      Home Exercises Provided and Patient Education Provided     Education provided:   - HEP, posture, exercise technique    Written Home Exercises Provided: yes.  Exercises were reviewed and Paul was able to demonstrate them prior to the end of the session.  Paul demonstrated fair  understanding of the education provided.     See EMR under Patient Instructions for exercises provided 8/7/2020.    Assessment     Paul tolerated tx session well and felt some relief in her neck and shoulders post tx session.  Paul was very distracted about his doctor not wanting to give him a rolling walker and was more focused on his hip pain from a fall and a chipped bone,  then his neck pain. Pt did arrive with his own standard walker that he purchased and it was adjusted to a better height for more upright posture.        Paul is progressing well towards his goals.   Pt prognosis is Fair.     Pt will continue to benefit from skilled outpatient physical therapy to address the deficits listed in the problem list box on initial evaluation, provide pt/family education and to maximize pt's level of independence in the  home and community environment.     Pt's spiritual, cultural and educational needs considered and pt agreeable to plan of care and goals.    Anticipated barriers to physical therapy: none    Goals:  Short Term GOALS: 5 weeks. Pt agrees with goals set.  1. Pt to report reduced tightness by 50% associated with neck movements for improved quality of movement   2. Patient demonstrates minimal pain with palpation to the left cervical region to improve quality of life and daily activities.   3. Pt to exhibit increased neck mobility in flexion and rotation by 10 degree w/o end range pain for improved movement quality.   4. Patient demonstrates correct movement patterns associated with exercises for self management and independence with HEP  5. Patient demonstrates independence with Postural Awareness and correction for self management  6. Pt to experience 50% or more reduced interruption of sleep due to reduced pain for improved quality of life     Long Term GOALS: 10 weeks. Pt agrees with goals set.  1. Pt to demonstrate increase flexibility in the cervical musculature for increased neck flexion and rotation   2. Patient demonstrates increased cervical muscle strength for improved stability and tolerance to functional activities.   3. Patient demonstrates improved comfort level with reported decreased left eye pain by 50% or greater foer improved quality of life.   4. Pt demonstrates neck movement patterns in rotation without end range pain for improved quality of daily activities.        Plan     Continue PT POC, progressing patient as tolerated    Urmila Turner, PTA

## 2020-10-12 NOTE — TELEPHONE ENCOUNTER
Spoke to patient son Paul Olivo, the walker problem has been resolved. They found a walker that has no wheels and has a seat when he gets tired. resolved

## 2020-10-14 ENCOUNTER — HOSPITAL ENCOUNTER (OUTPATIENT)
Dept: RADIOLOGY | Facility: HOSPITAL | Age: 80
Discharge: HOME | End: 2020-10-14
Attending: NURSE PRACTITIONER
Payer: MEDICARE

## 2020-10-14 ENCOUNTER — CLINICAL SUPPORT (OUTPATIENT)
Dept: REHABILITATION | Facility: HOSPITAL | Age: 80
End: 2020-10-14
Payer: MEDICARE

## 2020-10-14 DIAGNOSIS — R07.9 ACUTE CHEST PAIN: ICD-10-CM

## 2020-10-14 PROCEDURE — 97140 MANUAL THERAPY 1/> REGIONS: CPT | Mod: HCNC,PO,CQ

## 2020-10-14 PROCEDURE — 97110 THERAPEUTIC EXERCISES: CPT | Mod: HCNC,PO,CQ

## 2020-10-14 PROCEDURE — 71275 CT ANGIOGRAPHY CHEST: CPT | Mod: 26,HCNC,, | Performed by: RADIOLOGY

## 2020-10-14 PROCEDURE — 71275 CTA CHEST NON CORONARY: ICD-10-PCS | Mod: 26,HCNC,, | Performed by: RADIOLOGY

## 2020-10-14 PROCEDURE — 25500020 PHARM REV CODE 255: Mod: HCNC | Performed by: NURSE PRACTITIONER

## 2020-10-14 PROCEDURE — 71275 CT ANGIOGRAPHY CHEST: CPT | Mod: TC,HCNC

## 2020-10-14 RX ADMIN — IOHEXOL 100 ML: 350 INJECTION, SOLUTION INTRAVENOUS at 09:10

## 2020-10-14 NOTE — PROGRESS NOTES
"  Physical Therapy Daily Treatment Note     Name: Paul Berrios [facility]  Clinic Number: [id number]    Therapy Diagnosis:        Encounter Diagnoses   Name Primary?    Neck stiffness      Abnormal increased muscle tightness      Awakens from sleep due to pain      Cervical segment dysfunction      Tightness of neck      Dysfunctional movements          Physician: Marquita Holloway PA-C    Visit Date: 10/14/2020    Physician Orders: PT Eval and Treat neck  Medical Diagnosis from Referral:   R29.898 (ICD-10-CM) - Decreased ROM of neck   R51 (ICD-10-CM) - Acute intractable headache, unspecified headache type         Evaluation Date: 7/30/2020  Authorization Period Expiration: 08/07/2021  Plan of Care Expiration: 10/30/2020  Visit # / Visits authorized: 14 / 20     Time In: 1000  Time Out: 1045  Total Billable Time: 45 minutes      Precautions: Standard and Fall      Subjective     Pt reports:  " I had a procedure this morning where they put dye in me to see what is going on."   Son reports: " He just told me he has a headache.  See what he can do."      He was not compliant with home exercise plan. Not using heat at home.   Response to previous treatment:  Felt a little sore after the session.   Functional change: improving cervical ROM    Pain: 8/10  Location:  headache    Objective   Pt arrived sitting in W/C  Paul received therapeutic exercises to develop strength, ROM, flexibility and posture for 15 minutes including:      Blood pressure in supine: 161/83, HR 69  .BOLD INDICATES ACTIVITIES PERFORMED    Shoulder flexion 2 x 10 w/dowel  Shoulder horizontal abd / add 2x10  Chest press 2 x 10 w/dowel  Neck flexion in semi-reclined ( no pillow) 2x10  Cervical rotation: x15 reps each direction  Chin tucks in sitting and supine: x10 reps  Scap retractions in sitting: x15 reps  Scap depression: x15 reps    Rows with green TBband: x30 reps  Shoulder extension with Orange band x20 reps  Open book x12   SL shoulder " abd B  x 12  SL shoulder flexion B 2 x 10 w/2# dumbbell  SL shoulder horizontal abd  B x12    Paul received the following manual therapy techniques: Cervical distraction along with Soft tissue Mobilization applied to the: cervical region for 30 minutes, including:    MODALITY: MH to the neck at the beginning of the session 15'     Cervical distraction -   Sub-Occipital release  STM to cervical paraspinals, upper trap  Passive stretching to the upper trap B and levator scapulae - assisted  Assisted end range stretch in rotation       Pt ambulated out of the gym with SW and Supervision.  Pt reports walker feeling better once therapist adjusted the height of the walker.      Home Exercises Provided and Patient Education Provided     Education provided:   - HEP, posture, exercise technique    Written Home Exercises Provided: yes.  Exercises were reviewed and Paul was able to demonstrate them prior to the end of the session.  Paul demonstrated fair  understanding of the education provided.     See EMR under Patient Instructions for exercises provided 8/7/2020.    Assessment     Paul tolerated tx session fairly well despite increased stiffness and tightness in his neck as well as a headache.  Pt reported decreased pain and less of a headache post tx session.        Paul is progressing well towards his goals.   Pt prognosis is Fair.     Pt will continue to benefit from skilled outpatient physical therapy to address the deficits listed in the problem list box on initial evaluation, provide pt/family education and to maximize pt's level of independence in the home and community environment.     Pt's spiritual, cultural and educational needs considered and pt agreeable to plan of care and goals.    Anticipated barriers to physical therapy: none    Goals:  Short Term GOALS: 5 weeks. Pt agrees with goals set.  1. Pt to report reduced tightness by 50% associated with neck movements for improved quality of movement   2.  Patient demonstrates minimal pain with palpation to the left cervical region to improve quality of life and daily activities.   3. Pt to exhibit increased neck mobility in flexion and rotation by 10 degree w/o end range pain for improved movement quality.   4. Patient demonstrates correct movement patterns associated with exercises for self management and independence with HEP  5. Patient demonstrates independence with Postural Awareness and correction for self management  6. Pt to experience 50% or more reduced interruption of sleep due to reduced pain for improved quality of life     Long Term GOALS: 10 weeks. Pt agrees with goals set.  1. Pt to demonstrate increase flexibility in the cervical musculature for increased neck flexion and rotation   2. Patient demonstrates increased cervical muscle strength for improved stability and tolerance to functional activities.   3. Patient demonstrates improved comfort level with reported decreased left eye pain by 50% or greater foer improved quality of life.   4. Pt demonstrates neck movement patterns in rotation without end range pain for improved quality of daily activities.        Plan     Continue PT POC, progressing patient as tolerated    Urmila Turner, PTA

## 2020-10-15 NOTE — PROGRESS NOTES
Physical Therapy Daily Treatment Note     Name: Paul Berrios [facility]  Clinic Number: [id number]    Therapy Diagnosis:        Encounter Diagnoses   Name Primary?    Neck stiffness      Abnormal increased muscle tightness      Awakens from sleep due to pain      Cervical segment dysfunction      Tightness of neck      Dysfunctional movements          Physician: Marquita Holloway PA-C    Visit Date: 10/16/2020    Physician Orders: PT Eval and Treat neck  Medical Diagnosis from Referral:   R29.898 (ICD-10-CM) - Decreased ROM of neck   R51 (ICD-10-CM) - Acute intractable headache, unspecified headache type         Evaluation Date: 7/30/2020  Authorization Period Expiration: 08/07/2021  Plan of Care Expiration: 10/30/2020  Visit # / Visits authorized: 15 / 20     Time In: 12:45 (patient late)  Time Out: 1:15  Total Billable Time: 35 minutes      Precautions: Standard and Fall      Subjective     Pt reports:  Fell a week ago, his hip is hurting today.    He was not compliant with home exercise plan. Not using heat at home.   Response to previous treatment:  Felt a little sore after the session.   Functional change: improving cervical ROM    Pain: 0/10  Location:  headache    Objective     Pt ambulated in/out of the gym with SW and Supervision.       Paul received therapeutic exercises to develop strength, ROM, flexibility and posture for 15 minutes including:      .BOLD INDICATES ACTIVITIES PERFORMED    Shoulder flexion 2 x 10 w/dowel  Shoulder horizontal abd / add 2x10  Chest press 2 x 10 w/dowel  Neck flexion in semi-reclined ( no pillow) 2x10  Cervical rotation: x15 reps each direction  Chin tucks in sitting and supine: x10 reps  Chest fly 2 x 10   Scap retractions in sitting: x15 reps  Scap depression: x15 reps    Rows with green TBband: x30 reps  Shoulder extension with Orange band x20 reps  Open book x12   SL shoulder abd B  x 12  SL shoulder flexion B 2 x 10 w/2# dumbbell  SL shoulder horizontal abd  B  x12    Paul received the following manual therapy techniques: Cervical distraction along with Soft tissue Mobilization applied to the: cervical region for 15 minutes, including:    MODALITY: MH to the neck at the beginning of the session 15'     Cervical distraction -   Sub-Occipital release  STM to cervical paraspinals, upper trap  Passive stretching to the upper trap B and levator scapulae - assisted  Assisted end range stretch in rotation         Home Exercises Provided and Patient Education Provided     Education provided:   - HEP, posture, exercise technique    Written Home Exercises Provided: yes.  Exercises were reviewed and Paul was able to demonstrate them prior to the end of the session.  Paul demonstrated fair  understanding of the education provided.     See EMR under Patient Instructions for exercises provided 8/7/2020.    Assessment     Paul arrived to session with no cervical pain. Patient tolerated exercises well, still limited with cervical range of motion in rotation and extension. Patient encouraged to stand erect with shoulders retracted. Continue to progress posterior shoulder complex strengthening.     Paul is progressing well towards his goals.   Pt prognosis is Fair.     Pt will continue to benefit from skilled outpatient physical therapy to address the deficits listed in the problem list box on initial evaluation, provide pt/family education and to maximize pt's level of independence in the home and community environment.     Pt's spiritual, cultural and educational needs considered and pt agreeable to plan of care and goals.    Anticipated barriers to physical therapy: none    Goals:  Short Term GOALS: 5 weeks. Pt agrees with goals set.  1. Pt to report reduced tightness by 50% associated with neck movements for improved quality of movement   2. Patient demonstrates minimal pain with palpation to the left cervical region to improve quality of life and daily activities.   3. Pt to  exhibit increased neck mobility in flexion and rotation by 10 degree w/o end range pain for improved movement quality.   4. Patient demonstrates correct movement patterns associated with exercises for self management and independence with HEP  5. Patient demonstrates independence with Postural Awareness and correction for self management  6. Pt to experience 50% or more reduced interruption of sleep due to reduced pain for improved quality of life     Long Term GOALS: 10 weeks. Pt agrees with goals set.  1. Pt to demonstrate increase flexibility in the cervical musculature for increased neck flexion and rotation   2. Patient demonstrates increased cervical muscle strength for improved stability and tolerance to functional activities.   3. Patient demonstrates improved comfort level with reported decreased left eye pain by 50% or greater foer improved quality of life.   4. Pt demonstrates neck movement patterns in rotation without end range pain for improved quality of daily activities.        Plan     Continue PT POC, progressing patient as tolerated    Charlene Boston, PTA

## 2020-10-16 ENCOUNTER — CLINICAL SUPPORT (OUTPATIENT)
Dept: REHABILITATION | Facility: HOSPITAL | Age: 80
End: 2020-10-16
Payer: MEDICARE

## 2020-10-16 DIAGNOSIS — M99.01 CERVICAL SEGMENT DYSFUNCTION: ICD-10-CM

## 2020-10-16 DIAGNOSIS — M43.6 NECK STIFFNESS: Primary | ICD-10-CM

## 2020-10-16 DIAGNOSIS — M62.89 ABNORMAL INCREASED MUSCLE TIGHTNESS: ICD-10-CM

## 2020-10-16 PROCEDURE — 97110 THERAPEUTIC EXERCISES: CPT | Mod: HCNC,PO,CQ

## 2020-10-16 PROCEDURE — 97140 MANUAL THERAPY 1/> REGIONS: CPT | Mod: HCNC,PO,CQ

## 2020-10-18 ENCOUNTER — TELEPHONE (OUTPATIENT)
Dept: CARDIOLOGY | Facility: CLINIC | Age: 80
End: 2020-10-18

## 2020-10-18 DIAGNOSIS — D86.0 PULMONARY SARCOIDOSIS: Primary | ICD-10-CM

## 2020-10-19 ENCOUNTER — TELEPHONE (OUTPATIENT)
Dept: CARDIOLOGY | Facility: CLINIC | Age: 80
End: 2020-10-19

## 2020-10-19 ENCOUNTER — HOSPITAL ENCOUNTER (INPATIENT)
Facility: HOSPITAL | Age: 80
LOS: 4 days | Discharge: HOME HEALTH | DRG: 520 | End: 2020-10-23
Attending: EMERGENCY MEDICINE | Admitting: NEUROLOGICAL SURGERY
Payer: MEDICARE

## 2020-10-19 DIAGNOSIS — I10 ESSENTIAL HYPERTENSION: ICD-10-CM

## 2020-10-19 DIAGNOSIS — J44.9 CHRONIC OBSTRUCTIVE PULMONARY DISEASE, UNSPECIFIED COPD TYPE: ICD-10-CM

## 2020-10-19 DIAGNOSIS — S12.100D CLOSED ODONTOID FRACTURE WITH ROUTINE HEALING, SUBSEQUENT ENCOUNTER: ICD-10-CM

## 2020-10-19 DIAGNOSIS — M50.30 DDD (DEGENERATIVE DISC DISEASE), CERVICAL: ICD-10-CM

## 2020-10-19 DIAGNOSIS — I25.10 CORONARY ARTERY DISEASE, ANGINA PRESENCE UNSPECIFIED, UNSPECIFIED VESSEL OR LESION TYPE, UNSPECIFIED WHETHER NATIVE OR TRANSPLANTED HEART: Chronic | ICD-10-CM

## 2020-10-19 DIAGNOSIS — D86.0 PULMONARY SARCOIDOSIS: ICD-10-CM

## 2020-10-19 DIAGNOSIS — S12.100A CLOSED ODONTOID FRACTURE, INITIAL ENCOUNTER: ICD-10-CM

## 2020-10-19 DIAGNOSIS — S12.110A ODONTOID FRACTURE: ICD-10-CM

## 2020-10-19 DIAGNOSIS — Z01.818 PRE-OP EVALUATION: ICD-10-CM

## 2020-10-19 DIAGNOSIS — I10 HYPERTENSION, UNSPECIFIED TYPE: ICD-10-CM

## 2020-10-19 DIAGNOSIS — M99.01 CERVICAL SEGMENT DYSFUNCTION: Primary | ICD-10-CM

## 2020-10-19 DIAGNOSIS — F41.9 ANXIETY: ICD-10-CM

## 2020-10-19 DIAGNOSIS — K21.9 GASTROESOPHAGEAL REFLUX DISEASE, UNSPECIFIED WHETHER ESOPHAGITIS PRESENT: ICD-10-CM

## 2020-10-19 LAB
ABO + RH BLD: NORMAL
APTT BLDCRRT: 27.9 SEC (ref 21–32)
BLD GP AB SCN CELLS X3 SERPL QL: NORMAL
CTP QC/QA: YES
INR PPP: 1 (ref 0.8–1.2)
PROTHROMBIN TIME: 10.7 SEC (ref 9–12.5)
SARS-COV-2 RDRP RESP QL NAA+PROBE: NEGATIVE

## 2020-10-19 PROCEDURE — 99284 PR EMERGENCY DEPT VISIT,LEVEL IV: ICD-10-PCS | Mod: HCNC,,, | Performed by: PHYSICIAN ASSISTANT

## 2020-10-19 PROCEDURE — 99285 EMERGENCY DEPT VISIT HI MDM: CPT | Mod: HCNC,CS,, | Performed by: EMERGENCY MEDICINE

## 2020-10-19 PROCEDURE — 94640 AIRWAY INHALATION TREATMENT: CPT | Mod: HCNC

## 2020-10-19 PROCEDURE — 63600175 PHARM REV CODE 636 W HCPCS: Mod: HCNC | Performed by: STUDENT IN AN ORGANIZED HEALTH CARE EDUCATION/TRAINING PROGRAM

## 2020-10-19 PROCEDURE — U0002 COVID-19 LAB TEST NON-CDC: HCPCS | Mod: HCNC | Performed by: EMERGENCY MEDICINE

## 2020-10-19 PROCEDURE — 85610 PROTHROMBIN TIME: CPT | Mod: HCNC

## 2020-10-19 PROCEDURE — 94761 N-INVAS EAR/PLS OXIMETRY MLT: CPT | Mod: HCNC

## 2020-10-19 PROCEDURE — 86920 COMPATIBILITY TEST SPIN: CPT | Mod: HCNC

## 2020-10-19 PROCEDURE — 85730 THROMBOPLASTIN TIME PARTIAL: CPT | Mod: HCNC

## 2020-10-19 PROCEDURE — 99285 PR EMERGENCY DEPT VISIT,LEVEL V: ICD-10-PCS | Mod: HCNC,CS,, | Performed by: EMERGENCY MEDICINE

## 2020-10-19 PROCEDURE — 86901 BLOOD TYPING SEROLOGIC RH(D): CPT | Mod: HCNC

## 2020-10-19 PROCEDURE — 99285 EMERGENCY DEPT VISIT HI MDM: CPT | Mod: 25

## 2020-10-19 PROCEDURE — 99284 EMERGENCY DEPT VISIT MOD MDM: CPT | Mod: HCNC,,, | Performed by: PHYSICIAN ASSISTANT

## 2020-10-19 PROCEDURE — 20600001 HC STEP DOWN PRIVATE ROOM

## 2020-10-19 PROCEDURE — 25000242 PHARM REV CODE 250 ALT 637 W/ HCPCS: Mod: HCNC | Performed by: PHYSICIAN ASSISTANT

## 2020-10-19 RX ORDER — HYDROMORPHONE HYDROCHLORIDE 1 MG/ML
1 INJECTION, SOLUTION INTRAMUSCULAR; INTRAVENOUS; SUBCUTANEOUS
Status: DISCONTINUED | OUTPATIENT
Start: 2020-10-19 | End: 2020-10-21

## 2020-10-19 RX ORDER — ONDANSETRON 2 MG/ML
4 INJECTION INTRAMUSCULAR; INTRAVENOUS EVERY 4 HOURS PRN
Status: DISCONTINUED | OUTPATIENT
Start: 2020-10-19 | End: 2020-10-23 | Stop reason: HOSPADM

## 2020-10-19 RX ORDER — ACETAMINOPHEN 500 MG
1000 TABLET ORAL EVERY 6 HOURS PRN
Status: DISCONTINUED | OUTPATIENT
Start: 2020-10-19 | End: 2020-10-19

## 2020-10-19 RX ORDER — ACETAMINOPHEN 500 MG
1000 TABLET ORAL EVERY 8 HOURS
Status: DISCONTINUED | OUTPATIENT
Start: 2020-10-19 | End: 2020-10-20

## 2020-10-19 RX ORDER — ONDANSETRON 4 MG/1
4 TABLET, FILM COATED ORAL EVERY 8 HOURS PRN
Status: DISCONTINUED | OUTPATIENT
Start: 2020-10-19 | End: 2020-10-19

## 2020-10-19 RX ORDER — IBUPROFEN 200 MG
24 TABLET ORAL
Status: DISCONTINUED | OUTPATIENT
Start: 2020-10-19 | End: 2020-10-23 | Stop reason: HOSPADM

## 2020-10-19 RX ORDER — METHOCARBAMOL 500 MG/1
500 TABLET, FILM COATED ORAL EVERY 6 HOURS PRN
Status: DISCONTINUED | OUTPATIENT
Start: 2020-10-19 | End: 2020-10-21

## 2020-10-19 RX ORDER — HYDRALAZINE HYDROCHLORIDE 20 MG/ML
10 INJECTION INTRAMUSCULAR; INTRAVENOUS EVERY 10 MIN PRN
Status: DISCONTINUED | OUTPATIENT
Start: 2020-10-20 | End: 2020-10-23 | Stop reason: HOSPADM

## 2020-10-19 RX ORDER — ALBUTEROL SULFATE 2.5 MG/.5ML
2.5 SOLUTION RESPIRATORY (INHALATION) EVERY 4 HOURS
Status: DISCONTINUED | OUTPATIENT
Start: 2020-10-19 | End: 2020-10-23

## 2020-10-19 RX ORDER — TRAMADOL HYDROCHLORIDE 50 MG/1
50 TABLET ORAL EVERY 4 HOURS PRN
Status: DISCONTINUED | OUTPATIENT
Start: 2020-10-19 | End: 2020-10-20

## 2020-10-19 RX ORDER — ATORVASTATIN CALCIUM 20 MG/1
40 TABLET, FILM COATED ORAL DAILY
Status: DISCONTINUED | OUTPATIENT
Start: 2020-10-20 | End: 2020-10-23 | Stop reason: HOSPADM

## 2020-10-19 RX ORDER — ISOSORBIDE MONONITRATE 30 MG/1
30 TABLET, EXTENDED RELEASE ORAL DAILY
Status: DISCONTINUED | OUTPATIENT
Start: 2020-10-20 | End: 2020-10-23 | Stop reason: HOSPADM

## 2020-10-19 RX ORDER — LABETALOL HCL 20 MG/4 ML
10 SYRINGE (ML) INTRAVENOUS EVERY 10 MIN PRN
Status: COMPLETED | OUTPATIENT
Start: 2020-10-19 | End: 2020-10-20

## 2020-10-19 RX ORDER — IBUPROFEN 200 MG
16 TABLET ORAL
Status: DISCONTINUED | OUTPATIENT
Start: 2020-10-19 | End: 2020-10-23 | Stop reason: HOSPADM

## 2020-10-19 RX ORDER — HYDROMORPHONE HYDROCHLORIDE 1 MG/ML
1 INJECTION, SOLUTION INTRAMUSCULAR; INTRAVENOUS; SUBCUTANEOUS EVERY 4 HOURS PRN
Status: DISCONTINUED | OUTPATIENT
Start: 2020-10-19 | End: 2020-10-19

## 2020-10-19 RX ORDER — PANTOPRAZOLE SODIUM 40 MG/1
40 TABLET, DELAYED RELEASE ORAL 2 TIMES DAILY
Status: DISCONTINUED | OUTPATIENT
Start: 2020-10-19 | End: 2020-10-23 | Stop reason: HOSPADM

## 2020-10-19 RX ORDER — GLUCAGON 1 MG
1 KIT INJECTION
Status: DISCONTINUED | OUTPATIENT
Start: 2020-10-19 | End: 2020-10-23 | Stop reason: HOSPADM

## 2020-10-19 RX ORDER — SERTRALINE HYDROCHLORIDE 50 MG/1
50 TABLET, FILM COATED ORAL DAILY
Status: DISCONTINUED | OUTPATIENT
Start: 2020-10-20 | End: 2020-10-23 | Stop reason: HOSPADM

## 2020-10-19 RX ADMIN — HYDROMORPHONE HYDROCHLORIDE 1 MG: 1 INJECTION, SOLUTION INTRAMUSCULAR; INTRAVENOUS; SUBCUTANEOUS at 07:10

## 2020-10-19 RX ADMIN — ONDANSETRON 4 MG: 2 INJECTION INTRAMUSCULAR; INTRAVENOUS at 11:10

## 2020-10-19 RX ADMIN — HYDROMORPHONE HYDROCHLORIDE 1 MG: 1 INJECTION, SOLUTION INTRAMUSCULAR; INTRAVENOUS; SUBCUTANEOUS at 11:10

## 2020-10-19 RX ADMIN — ALBUTEROL SULFATE 2.5 MG: 2.5 SOLUTION RESPIRATORY (INHALATION) at 07:10

## 2020-10-19 NOTE — SUBJECTIVE & OBJECTIVE
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    Hyperlipidemia     Hypertension     Ischemic cardiomyopathy 11/5/2014    MDD (major depressive disorder), recurrent episode, mild 2/17/2016    Osteoporosis 7/20/2016    Peripheral arterial disease 3/18/2016    Sarcoid     Squamous cell carcinoma 09/2016, 5/2016    crown of scalp, left wrist     Past Surgical History:   Procedure Laterality Date    CARDIAC SURGERY      CAROTID STENT N/A 10/14/2019    Procedure: INSERTION, STENT, ARTERY, CAROTID;  Surgeon: Artie Kebede MD;  Location: [facility] CATH LAB;  Service: Cardiology;  Laterality: N/A;    CATARACT EXTRACTION W/  INTRAOCULAR LENS IMPLANT Right 07/17/2018    Dr. Talamantes    CATARACT EXTRACTION W/  INTRAOCULAR LENS IMPLANT Left 07/31/2018    Dr. Talamantes    CORONARY ARTERY BYPASS GRAFT      x2  10/2014    ESOPHAGOGASTRODUODENOSCOPY N/A 4/8/2019    Procedure: ESOPHAGOGASTRODUODENOSCOPY (EGD)/poss rfa;  Surgeon: Clifford De La Torre MD;  Location: [facility] ENDO ([address] [place] [address]);  Service: Endoscopy;  Laterality: N/A;    ESOPHAGOGASTRODUODENOSCOPY (EGD) WITH RADIOFREQUENCY TREATMENT OF GASTROESOPHAGEAL JUNCTION      INJECTION OF ANESTHETIC AGENT AROUND MEDIAL BRANCH NERVES INNERVATING CERVICAL FACET JOINT Bilateral 1/9/2020    Procedure: INJECTION, MBB--Bilateral Cervical- Third Occipital nerve, C2-3--ASA okay for pt to continue taking per provider.;  Surgeon: Tip Mera Jr., MD;  Location: [facility] PAIN MGT;  Service: Pain Management;  Laterality: Bilateral;    INTRAOCULAR PROSTHESES INSERTION  Right 2018    Procedure: INSERTION, INTRAOCULAR LENS PROSTHESIS;  Surgeon: León Talamantes MD;  Location: [address];  Service: Ophthalmology;  Laterality: Right;    INTRAOCULAR PROSTHESES INSERTION Left 2018    Procedure: INSERTION, INTRAOCULAR LENS PROSTHESIS;  Surgeon: León Talamantes MD;  Location: [address];  Service: Ophthalmology;  Laterality: Left;    PHACOEMULSIFICATION OF CATARACT Right 2018    Procedure: PHACOEMULSIFICATION, CATARACT;  Surgeon: León Talamantes MD;  Location: [facility] OR [address] [place] [address];  Service: Ophthalmology;  Laterality: Right;    PHACOEMULSIFICATION OF CATARACT Left 2018    Procedure: PHACOEMULSIFICATION, CATARACT;  Surgeon: León Talamantes MD;  Location: [facility] OR [address] [place] [address];  Service: Ophthalmology;  Laterality: Left;    RADIOFREQUENCY THERMOCOAGULATION Bilateral 2020    Procedure: RADIOFREQUENCY THERMAL COAGULATION--Bilateral C2-3 and Third Occipital Nerve;  Surgeon: Tip Mera Jr., MD;  Location: [facility] PAIN T;  Service: Pain Management;  Laterality: Bilateral;    UMBILICAL HERNIA REPAIR       Family History     Problem Relation (Age of Onset)    Arrhythmia Mother    Heart disease Mother    Heart failure Brother    No Known Problems Daughter, Son        Tobacco Use    Smoking status: Former Smoker     Packs/day: 2.00     Years: 20.00     Pack years: 40.00     Types: Cigarettes     Quit date: 1988     Years since quittin.6    Smokeless tobacco: Never Used   Substance and Sexual Activity    Alcohol use: No     Comment: quit drinking beer     Drug use: No    Sexual activity: Not Currently     Partners: Female     Review of Systems   Constitutional: Negative for activity change and fever.   HENT: Negative for ear pain and trouble swallowing.    Eyes: Negative for photophobia and visual disturbance.   Respiratory: Negative for shortness of breath and wheezing.    Cardiovascular: Negative for chest pain.    Gastrointestinal: Negative for abdominal pain, nausea and vomiting.   Genitourinary: Negative for dysuria and hematuria.   Musculoskeletal: Positive for neck pain. Negative for back pain.   Skin: Negative for wound.   Neurological: Positive for headaches. Negative for dizziness, seizures, weakness and numbness.   Psychiatric/Behavioral: Negative for confusion.     Objective:     Weight: 63.5 kg (139 lb 15.9 oz)  Body mass index is 21.93 kg/m².  Vital Signs (Most Recent):  Temp: 97.4 °F (36.3 °C) (10/19/20 1434)  Pulse: 70 (10/19/20 1434)  Resp: 18 (10/19/20 1434)  BP: (!) 169/72 (10/19/20 1434)  SpO2: 96 % (10/19/20 1434) Vital Signs (24h Range):  Temp:  [97.4 °F (36.3 °C)] 97.4 °F (36.3 °C)  Pulse:  [70] 70  Resp:  [18] 18  SpO2:  [96 %] 96 %  BP: (169)/(72) 169/72                          Neurosurgery Physical Exam  General: well developed, well nourished, no distress.   Head: normocephalic, Skin tear noted to forehead, with bruising/abrasions to face.   Neurologic: Alert and oriented. Thought content appropriate.  GCS: Motor: 6/Verbal: 5/Eyes: 4 GCS Total: 15  Mental Status: Awake, Alert, Oriented x 4  Language: No aphasia  Speech: No dysarthria  Cranial nerves: face symmetric, tongue midline, CN II-XII grossly intact. Patient hard of hearing   Eyes: pupils equal, round, reactive to light with accommodation, EOMI.   Pulmonary: normal respirations, no signs of respiratory distress  Abdomen: soft, non-distended, not tender to palpation  Skin: Skin is warm, dry and intact.  Sensory: intact to light touch throughout    Motor Strength:Moves all extremities spontaneously with good tone.  Full strength upper and lower extremities. No abnormal movements seen.     Strength  Deltoids Triceps Biceps Wrist Extension Wrist Flexion Hand    Upper: R 5/5 5/5 5/5 5/5 5/5 5/5    L 5/5 5/5 5/5 5/5 5/5 5/5     Iliopsoas Quadriceps Knee  Flexion Tibialis  anterior Gastro- cnemius EHL   Lower: R 5/5 5/5 5/5 5/5 5/5 5/5    L 5/5  5/5 5/5 5/5 5/5 5/5     Reflexes:   Tellez's: Negative.  Babinski's: Negative.  Clonus: Negative.     Cerebellar:   Finger-to-nose: intact bilaterally   Pronator drift: absent bilaterally       Cervical:   ROM: ED collar in place not restricting ROM appropriately.   Midline TTP: positive near skull base and to back of head     Thoracic:  Midline TTP: Negative.     Lumbar:  Midline TTP: Negative.          Significant Labs:  No results for input(s): GLU, NA, K, CL, CO2, BUN, CREATININE, CALCIUM, MG in the last 48 hours.  No results for input(s): WBC, HGB, HCT, PLT in the last 48 hours.  No results for input(s): LABPT, INR, APTT in the last 48 hours.  Microbiology Results (last 7 days)     ** No results found for the last 168 hours. **        All pertinent labs from the last 24 hours have been reviewed.    Significant Diagnostics:  Ct Head Without Contrast    Result Date: 10/19/2020  No acute intracranial findings. Type 2 odontoid fracture with posterior subluxation of the odontoid in relation to C2 body. Electronically signed by: Artie Ro MD Date:    10/19/2020 Time:    17:28    Ct Cervical Spine Without Contrast    Result Date: 10/19/2020  Type 2 odontoid fracture with 4 mm of subluxation.  This may be acute or subacute. Degenerative disc disease and subluxation at C4-5 and C5-6, as above.  C5-6 has severe bilateral neural foramen narrowing. Findings were reported to Dr. Rodríguez in the emergency room at 17:30 by Dr. Ro Electronically signed by: Artie Ro MD Date:    10/19/2020 Time:    17:37

## 2020-10-19 NOTE — ED PROVIDER NOTES
Source of History:  Patient    Chief complaint:  Fall (multiple falls, few weeks ago hx hip, fell again today, hit head, denies loc, neck pain hard c collar applied in triage)      HPI:  Paul Browning is a 80 y.o. male presenting with head and neck pain after a fall.  He also notes a little bit of right shin pain.  He was trying to walk down steps and lost control of his walker and then slipped and fell.  He has a history of chronic neck and head pain issues; his daughter states that he does not turn his head alone, but has to turn his entire body because his neck basically does not move.  This is occurred since before the fall, to be clear.  No loss of consciousness.  He states his leg does not hurt.    ROS: As per HPI and below:  General: No fever.  No chills.  Head: Notes headache.  No loss of consciousness or amnesia.  Neck: Notes neck pain.  Back: No back pain.  Extremities: No extremity pain.  Chest: No shortness of breath.  No chest pain.  Cardiovascular: No palpitations.  Abdomen: No abdominal pain.  No nausea or vomiting.  Integument: Notes right shin abrasion.  Eyes: No visual changes.  Urinary: No hematuria.  Neurologic: No numbness.  No focal weakness.      Review of patient's allergies indicates:   Allergen Reactions    Morphine Shortness Of Breath       Current Facility-Administered Medications on File Prior to Encounter   Medication Dose Route Frequency Provider Last Rate Last Dose    0.9%  NaCl infusion  500 mL Intravenous Continuous Tip Mera Jr., MD        lidocaine (PF) 10 mg/ml (1%) injection 10 mg  1 mL Intradermal Once Tip Mera Jr., MD         Current Outpatient Medications on File Prior to Encounter   Medication Sig Dispense Refill    acetaminophen (TYLENOL) 500 MG tablet Take 1,000 mg by mouth every 6 (six) hours as needed for Pain.      albuterol (PROVENTIL) 2.5 mg /3 mL (0.083 %) nebulizer solution Take 3 mLs (2.5 mg total) by nebulization every 6 (six)  hours as needed for Wheezing. Rescue 1 Box 0    alendronate (FOSAMAX) 70 MG tablet 1 tab PO qwk in the am with a full glass of water on an empty stomach. Do not consume food or lie down for at least 30 minutes afterwards. 12 tablet 3    ALPRAZolam (XANAX) 0.25 MG tablet Take 1 tablet (0.25 mg total) by mouth 3 (three) times daily as needed for Anxiety. (Patient not taking: Reported on 10/5/2020) 45 tablet 0    aspirin (ECOTRIN) 81 MG EC tablet Take 81 mg by mouth once daily.      atorvastatin (LIPITOR) 40 MG tablet TAKE 1 TABLET BY MOUTH EVERY DAY 90 tablet 3    butalbital-acetaminophen-caffeine -40 mg (FIORICET, ESGIC) -40 mg per tablet 1-2 tabs PO q6 PRN headaches (Patient not taking: Reported on 10/5/2020) 60 tablet 0    diclofenac sodium (VOLTAREN) 1 % Gel Apply 2 g topically every 6 (six) hours as needed. (Patient not taking: Reported on 10/5/2020) 1 Tube 6    fluorouracil 5% 5 % Soln Use hs for 2 weeks (Patient not taking: Reported on 10/5/2020) 10 mL 1    isosorbide mononitrate (IMDUR) 30 MG 24 hr tablet Take 1 tablet (30 mg total) by mouth once daily. 30 tablet 11    magnesium oxide (MAG-OX) 400 mg (241.3 mg magnesium) tablet Take 1 tablet (400 mg total) by mouth once daily. 30 tablet 3    omega-3 fatty acids-vitamin E (FISH OIL) 1,000 mg Cap Take 1 tablet by mouth 2 (two) times daily. (Patient taking differently: Take 1 tablet by mouth once daily. ) 60 each 11    ondansetron (ZOFRAN) 4 MG tablet Take 1 tablet (4 mg total) by mouth every 8 (eight) hours as needed for Nausea. (Patient not taking: Reported on 10/5/2020) 30 tablet 1    pantoprazole (PROTONIX) 40 MG tablet Take 40 mg by mouth 2 (two) times a day.       pantoprazole (PROTONIX) 40 MG tablet TAKE 1 TABLET BY MOUTH TWICE DAILY 180 tablet 0    riboflavin, vitamin B2, (VITAMIN B-2) 100 mg Tab tablet Take 1 tablet (100 mg total) by mouth once daily. 90 tablet 3    sertraline (ZOLOFT) 50 MG tablet TAKE 1 TABLET BY MOUTH EVERY  DAY 90 tablet 3    triamcinolone acetonide 0.1% (KENALOG) 0.1 % cream Apply topically 2 (two) times daily. 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 CATH LAB;  Service: Cardiology;  Laterality: N/A;    CATARACT EXTRACTION W/  INTRAOCULAR LENS IMPLANT Right 07/17/2018    Dr. Talamantes    CATARACT EXTRACTION W/  INTRAOCULAR LENS IMPLANT Left 07/31/2018    Dr. Talamantes    CORONARY ARTERY BYPASS GRAFT      x2  10/2014    ESOPHAGOGASTRODUODENOSCOPY N/A 4/8/2019    Procedure: ESOPHAGOGASTRODUODENOSCOPY (EGD)/poss rfa;  Surgeon: Clifford De La Torre MD;  Location: [facility] ENDO ([address] [place] [address]);  Service: Endoscopy;  Laterality: N/A;    ESOPHAGOGASTRODUODENOSCOPY (EGD) WITH RADIOFREQUENCY TREATMENT OF GASTROESOPHAGEAL JUNCTION      INJECTION OF ANESTHETIC AGENT AROUND MEDIAL BRANCH NERVES INNERVATING CERVICAL FACET JOINT Bilateral 1/9/2020    Procedure: INJECTION, MBB--Bilateral Cervical- Third Occipital nerve, C2-3--ASA okay for pt to continue taking per provider.;  Surgeon: Tip Mera Jr., MD;  Location: [facility] PAIN MGT;  Service: Pain Management;   Laterality: Bilateral;    INTRAOCULAR PROSTHESES INSERTION Right 2018    Procedure: INSERTION, INTRAOCULAR LENS PROSTHESIS;  Surgeon: León Talamantes MD;  Location: [address];  Service: Ophthalmology;  Laterality: Right;    INTRAOCULAR PROSTHESES INSERTION Left 2018    Procedure: INSERTION, INTRAOCULAR LENS PROSTHESIS;  Surgeon: León Talamantes MD;  Location: [address];  Service: Ophthalmology;  Laterality: Left;    PHACOEMULSIFICATION OF CATARACT Right 2018    Procedure: PHACOEMULSIFICATION, CATARACT;  Surgeon: León Talamantes MD;  Location: [facility] OR [address] [place] [address];  Service: Ophthalmology;  Laterality: Right;    PHACOEMULSIFICATION OF CATARACT Left 2018    Procedure: PHACOEMULSIFICATION, CATARACT;  Surgeon: León Talamantes MD;  Location: [facility] OR [address] [place] [address];  Service: Ophthalmology;  Laterality: Left;    RADIOFREQUENCY THERMOCOAGULATION Bilateral 2020    Procedure: RADIOFREQUENCY THERMAL COAGULATION--Bilateral C2-3 and Third Occipital Nerve;  Surgeon: Tip Mera Jr., MD;  Location: [facility];  Service: Pain Management;  Laterality: Bilateral;    UMBILICAL HERNIA REPAIR         Social History     Socioeconomic History    Marital status:      Spouse name: Not on file    Number of children: 4    Years of education: Not on file    Highest education level: Not on file   Occupational History    Occupation:     Social Needs    Financial resource strain: Not on file    Food insecurity     Worry: Not on file     Inability: Not on file    Transportation needs     Medical: Not on file     Non-medical: Not on file   Tobacco Use    Smoking status: Former Smoker     Packs/day: 2.00     Years: 20.00     Pack years: 40.00     Types: Cigarettes     Quit date: 1988     Years since quittin.6    Smokeless tobacco: Never Used   Substance and Sexual Activity    Alcohol use: No     Comment: quit drinking beer     Drug  "use: No    Sexual activity: Not Currently     Partners: Female   Lifestyle    Physical activity     Days per week: Not on file     Minutes per session: Not on file    Stress: Not on file   Relationships    Social connections     Talks on phone: Not on file     Gets together: Not on file     Attends Sikh service: Not on file     Active member of club or organization: Not on file     Attends meetings of clubs or organizations: Not on file     Relationship status: Not on file   Other Topics Concern    Not on file   Social History Narrative    Not on file       Family History   Problem Relation Age of Onset    Arrhythmia Mother     Heart disease Mother     Heart failure Brother     No Known Problems Daughter     No Known Problems Son     Colon cancer Neg Hx     Inflammatory bowel disease Neg Hx     Celiac disease Neg Hx     Melanoma Neg Hx     Amblyopia Neg Hx     Blindness Neg Hx     Cataracts Neg Hx     Glaucoma Neg Hx     Macular degeneration Neg Hx     Retinal detachment Neg Hx     Strabismus Neg Hx        Physical Exam:    Vitals:    10/19/20 1434 10/19/20 1836   BP: (!) 169/72    Pulse: 70    Resp: 18    Temp: 97.4 °F (36.3 °C)    TempSrc: Oral    SpO2: 96%    Weight: 63.5 kg (140 lb) 63.5 kg (139 lb 15.9 oz)   Height: 5' 7" (1.702 m) 5' 7" (1.702 m)     Appearance: No acute distress.  Head: 3 cm round abrasion to forehead.  Neck: Positive for cervical spine tenderness.  Extremely limited range of motion.   Back: No thoracic, lumbar or sacral spine tenderness.  No soft tissue tenderness.  Chest: No chest wall tenderness.  Breath sounds are equal bilaterally.  No wheezes.  No rhonchi.  No rales.  Cardiovascular: Regular rate and rhythm.  No murmurs.  No gallops.  No rubs.  Abdomen: Soft. Nontender.   No distention.  No guarding. No rebound.  No ecchymoses.  Integument: Abrasion to right shin.  Musculoskeletal: Good range of motion of all joints.  No bony tenderness in the extremities.  No " deformities.  No soft tissue tenderness.  Neurologic: Motor intact.  Sensation intact.  Cranial nerves II through XII intact.  Cerebellar exam intact.  Mental status: Alert, baseline per family.      Initial Impression:  Head and neck injuries after a fall.  Also with slight shin abrasion, but do not think x-ray is needed.  Will do CT of head and C-spine.  Chart review shows last tetanus was 2016.  His neck is flexed and a I am unable to apply the [place] J collar properly.  It is over his chin and limiting rotation.  I cannot passively extend the neck without exerting significant force, which I do not think it is advisable in this situation.    Laboratory Studies:  Labs Reviewed   SARS-COV-2 RNA AMPLIFICATION, QUAL       I decided to obtain the patient's medical records.    Imaging Results          CT Head Without Contrast (Final result)  Result time 10/19/20 17:28:52    Final result by Artie Ro MD (10/19/20 17:28:52)                 Impression:      No acute intracranial findings.    Type 2 odontoid fracture with posterior subluxation of the odontoid in relation to C2 body.      Electronically signed by: Artie Ro MD  Date:    10/19/2020  Time:    17:28             Narrative:    EXAMINATION:  CT HEAD WITHOUT CONTRAST    CLINICAL HISTORY:  Head trauma, minor (Age => 63y);    TECHNIQUE:  Low dose axial images were obtained through the head.  Coronal and sagittal reformations were also performed. Contrast was not administered.    COMPARISON:  09/25/2020    FINDINGS:  Noncontrast head CT demonstrates involutional changes which are similar to the prior study and not unusual for the patient's age.  Moderate degree of white matter hypoattenuation is present probably from chronic microvascular disease.  No cortical edema or large vessel territory infarcts are seen.  There is no intracranial hemorrhage, mass effect or midline shift.    Bone windows demonstrate the presence of a type 2 odontoid fracture with  posterior subluxation of the odontoid in relation to the body of C2 about 4 mm.    Opacification of some of the right ethmoid air cells, but no fracture is seen.                               CT Cervical Spine Without Contrast (Final result)  Result time 10/19/20 17:37:05    Final result by Artie Ro MD (10/19/20 17:37:05)                 Impression:      Type 2 odontoid fracture with 4 mm of subluxation.  This may be acute or subacute.    Degenerative disc disease and subluxation at C4-5 and C5-6, as above.  C5-6 has severe bilateral neural foramen narrowing.    Findings were reported to Dr. Rodríguez in the emergency room at 17:30 by Dr. Ro      Electronically signed by: Artie Ro MD  Date:    10/19/2020  Time:    17:37             Narrative:    EXAMINATION:  CT CERVICAL SPINE WITHOUT CONTRAST    CLINICAL HISTORY:  Neck trauma (Age => 63y);    TECHNIQUE:  Low dose axial images, sagittal and coronal reformations were performed though the cervical spine.  Contrast was not administered.    COMPARISON:  CT of the head 10/19/2020    FINDINGS:  CT of the cervical spine demonstrates the presence of the a type 2 odontoid fracture with 4 mm of posterior subluxation of the odontoid in relation to the body of C2.  There is some hyperattenuation in the ventral epidural space adjacent to the fracture which could be hemorrhage or early calcification if this is not acute.    C4-5, and C5-6 both have severe disc space narrowing.  There is 3 mm of anterolisthesis at C4-5 and 3 mm of retrolisthesis at C5-6, and posterior osteophytes are seen at both of these disc margins.  C5-6 has severe did foramen narrowing bilaterally.  Mild narrowing also present at C6-7 bilaterally.    Patchy parenchymal opacity is seen in the upper lobes of the lungs bilaterally which may represent a pneumonia or edema.  Further investigation warranted.                                Medications Given:  Medications   glucose chewable tablet 16  g (has no administration in time range)   glucose chewable tablet 16 g (has no administration in time range)   glucose chewable tablet 24 g (has no administration in time range)   dextrose 50% injection 12.5 g (has no administration in time range)   dextrose 50% injection 25 g (has no administration in time range)   glucagon (human recombinant) injection 1 mg (has no administration in time range)   albuterol sulfate nebulizer solution 2.5 mg (has no administration in time range)   atorvastatin tablet 40 mg (has no administration in time range)   isosorbide mononitrate 24 hr tablet 30 mg (has no administration in time range)   pantoprazole EC tablet 40 mg (has no administration in time range)   ondansetron tablet 4 mg (has no administration in time range)   sertraline tablet 50 mg (has no administration in time range)   acetaminophen tablet 1,000 mg (has no administration in time range)       ED Course:  ED Course as of Oct 19 1836   Mon Oct 19, 2020   1800 Discussed with neurosurgery    [DC]      ED Course User Index  [DC] Kayden Rodríguez MD              MDM:    80 y.o. male with odontoid fracture after a fall.  Neurosurgery to admit.    Diagnostic Impression:    1. Odontoid fracture         ED Disposition Condition    Admit               Kayden Rodríguez MD  10/19/20 1836

## 2020-10-19 NOTE — HPI
Paul Browning is a 80 y.o. male with past medical history significant for HTN, HLD, Stevens's esophagus, BPH, CAD s/p stent placement in 2019, and anxiety who presents to [facility] ED following a fall down several flights of stairs. NSGY consulted for Type II displaced odontoid fracture. Neck pain present with slight headache. Denies weakness, paresthesias, bowel/bladder issues, or gait imbalance. NSGY consulted for evaluation.     Of additional note- patient takes daily ASA, last dose of which was this morning.     Addendum: Patient with several falls within the past several months, latest resulting in left hip fracture being treated conservatively. Per his daughter at bedside, he has had increased unsteady gait over the past several months. Patient otherwise has no fine dexterity issues. No object dropping, fumbling with buttons, etc. No bowel/bladder issues.

## 2020-10-19 NOTE — TELEPHONE ENCOUNTER
DANNY German 19 hours ago (4:35 PM)   I called Mr. Browning son he said his father has a appointment with Dr. Kelly alone with his PCP on tomorrow and he will discuss his care with them tomorrow.    Sheridan Swartz,   I think that the source of his shortness of breath is his lungs and I would like him to see pulmonary to further discuss the details of the CT scan findings. I'm happy to speak with his son should he need me to.  His son is the power of  for his medical care.   Thanks,   Joanne    Routing comment       Joanne Wharton NP 19 hours ago (4:35 PM)        CTA shows pulmonary sarcoidosis.  D/w Dr. Forbes (PCP).  Will refer

## 2020-10-19 NOTE — ASSESSMENT & PLAN NOTE
Paul Browning is a 80 y.o. male with past medical history significant for HTN, HLD, Stevens's esophagus, BPH, CAD s/p stent placement in 2019, and anxiety who presents to [facility] ED following a fall down several flights of stairs. NSGY consulted for Type II displaced odontoid fracture.    - Neurologically intact on exam  - Admit to Neurosurgery overnight  - NPO, Pre-op labs pending  - Pain control, Aspen collar ordered  - Collar to be worn at all times  - CTH negative for acute hemorrhage or skull fracture  - CT cervical spine with acute appearing type II odontoid fracture with 4 mm of displacement. There is spondylosis throughout. anterolisthesis of C4 on C5 and retrolisthesis at C5 on C6.   - MRI cervical spine pending    - GERD: continue daily protonix  - CAD: hold daily ASA. Continue home dose of isosorbide mononitrate  - HTN: SBP <160   - Anxiety: Continue home dose of zoloft  - DVT prophylaxis: TEDs, SCDs, hold SQH in case of need for OR    Further recs pending review of MRI     Discussed with Dr. Weaver

## 2020-10-20 ENCOUNTER — PATIENT MESSAGE (OUTPATIENT)
Dept: REHABILITATION | Facility: HOSPITAL | Age: 80
End: 2020-10-20

## 2020-10-20 ENCOUNTER — PATIENT MESSAGE (OUTPATIENT)
Dept: INTERNAL MEDICINE | Facility: CLINIC | Age: 80
End: 2020-10-20

## 2020-10-20 ENCOUNTER — DOCUMENTATION ONLY (OUTPATIENT)
Dept: REHABILITATION | Facility: HOSPITAL | Age: 80
End: 2020-10-20

## 2020-10-20 ENCOUNTER — PATIENT MESSAGE (OUTPATIENT)
Dept: CARDIOLOGY | Facility: CLINIC | Age: 80
End: 2020-10-20

## 2020-10-20 ENCOUNTER — ANESTHESIA EVENT (OUTPATIENT)
Dept: SURGERY | Facility: HOSPITAL | Age: 80
DRG: 520 | End: 2020-10-20
Payer: MEDICARE

## 2020-10-20 PROBLEM — Z01.818 PRE-OP EVALUATION: Status: ACTIVE | Noted: 2020-10-20

## 2020-10-20 LAB
ANION GAP SERPL CALC-SCNC: 12 MMOL/L (ref 8–16)
BASOPHILS # BLD AUTO: 0.02 K/UL (ref 0–0.2)
BASOPHILS NFR BLD: 0.3 % (ref 0–1.9)
BUN SERPL-MCNC: 14 MG/DL (ref 8–23)
CALCIUM SERPL-MCNC: 9.2 MG/DL (ref 8.7–10.5)
CHLORIDE SERPL-SCNC: 98 MMOL/L (ref 95–110)
CO2 SERPL-SCNC: 23 MMOL/L (ref 23–29)
CREAT SERPL-MCNC: 0.8 MG/DL (ref 0.5–1.4)
DIFFERENTIAL METHOD: ABNORMAL
EOSINOPHIL # BLD AUTO: 0 K/UL (ref 0–0.5)
EOSINOPHIL NFR BLD: 0 % (ref 0–8)
ERYTHROCYTE [DISTWIDTH] IN BLOOD BY AUTOMATED COUNT: 13.7 % (ref 11.5–14.5)
EST. GFR  (AFRICAN AMERICAN): >60 ML/MIN/1.73 M^2
EST. GFR  (NON AFRICAN AMERICAN): >60 ML/MIN/1.73 M^2
GLUCOSE SERPL-MCNC: 110 MG/DL (ref 70–110)
HCT VFR BLD AUTO: 41.1 % (ref 40–54)
HGB BLD-MCNC: 13.6 G/DL (ref 14–18)
IMM GRANULOCYTES # BLD AUTO: 0.01 K/UL (ref 0–0.04)
IMM GRANULOCYTES NFR BLD AUTO: 0.2 % (ref 0–0.5)
LYMPHOCYTES # BLD AUTO: 0.9 K/UL (ref 1–4.8)
LYMPHOCYTES NFR BLD: 13.1 % (ref 18–48)
MCH RBC QN AUTO: 30.4 PG (ref 27–31)
MCHC RBC AUTO-ENTMCNC: 33.1 G/DL (ref 32–36)
MCV RBC AUTO: 92 FL (ref 82–98)
MONOCYTES # BLD AUTO: 0.4 K/UL (ref 0.3–1)
MONOCYTES NFR BLD: 6.4 % (ref 4–15)
NEUTROPHILS # BLD AUTO: 5.3 K/UL (ref 1.8–7.7)
NEUTROPHILS NFR BLD: 80 % (ref 38–73)
NRBC BLD-RTO: 0 /100 WBC
PLATELET # BLD AUTO: 235 K/UL (ref 150–350)
PMV BLD AUTO: 10.4 FL (ref 9.2–12.9)
POTASSIUM SERPL-SCNC: 4.3 MMOL/L (ref 3.5–5.1)
RBC # BLD AUTO: 4.47 M/UL (ref 4.6–6.2)
SODIUM SERPL-SCNC: 133 MMOL/L (ref 136–145)
WBC # BLD AUTO: 6.58 K/UL (ref 3.9–12.7)

## 2020-10-20 PROCEDURE — 25000242 PHARM REV CODE 250 ALT 637 W/ HCPCS: Performed by: PHYSICIAN ASSISTANT

## 2020-10-20 PROCEDURE — 36415 COLL VENOUS BLD VENIPUNCTURE: CPT | Mod: HCNC

## 2020-10-20 PROCEDURE — 25000003 PHARM REV CODE 250: Mod: HCNC | Performed by: PHYSICIAN ASSISTANT

## 2020-10-20 PROCEDURE — 63600175 PHARM REV CODE 636 W HCPCS: Performed by: STUDENT IN AN ORGANIZED HEALTH CARE EDUCATION/TRAINING PROGRAM

## 2020-10-20 PROCEDURE — 93010 EKG 12-LEAD: ICD-10-PCS | Mod: HCNC,,, | Performed by: INTERNAL MEDICINE

## 2020-10-20 PROCEDURE — 94640 AIRWAY INHALATION TREATMENT: CPT

## 2020-10-20 PROCEDURE — 99222 PR INITIAL HOSPITAL CARE,LEVL II: ICD-10-PCS | Mod: HCNC,25,GC, | Performed by: INTERNAL MEDICINE

## 2020-10-20 PROCEDURE — 94761 N-INVAS EAR/PLS OXIMETRY MLT: CPT

## 2020-10-20 PROCEDURE — 99222 1ST HOSP IP/OBS MODERATE 55: CPT | Mod: HCNC,25,GC, | Performed by: INTERNAL MEDICINE

## 2020-10-20 PROCEDURE — 99223 PR INITIAL HOSPITAL CARE,LEVL III: ICD-10-PCS | Mod: HCNC,AI,, | Performed by: PHYSICIAN ASSISTANT

## 2020-10-20 PROCEDURE — 99223 PR INITIAL HOSPITAL CARE,LEVL III: ICD-10-PCS | Mod: HCNC,GC,, | Performed by: HOSPITALIST

## 2020-10-20 PROCEDURE — 85025 COMPLETE CBC W/AUTO DIFF WBC: CPT | Mod: HCNC

## 2020-10-20 PROCEDURE — 25000003 PHARM REV CODE 250: Performed by: STUDENT IN AN ORGANIZED HEALTH CARE EDUCATION/TRAINING PROGRAM

## 2020-10-20 PROCEDURE — 93010 ELECTROCARDIOGRAM REPORT: CPT | Mod: HCNC,,, | Performed by: INTERNAL MEDICINE

## 2020-10-20 PROCEDURE — 20600001 HC STEP DOWN PRIVATE ROOM: Mod: HCNC

## 2020-10-20 PROCEDURE — 93005 ELECTROCARDIOGRAM TRACING: CPT | Mod: HCNC

## 2020-10-20 PROCEDURE — 99223 1ST HOSP IP/OBS HIGH 75: CPT | Mod: HCNC,AI,, | Performed by: PHYSICIAN ASSISTANT

## 2020-10-20 PROCEDURE — 80048 BASIC METABOLIC PNL TOTAL CA: CPT | Mod: HCNC

## 2020-10-20 PROCEDURE — 99223 1ST HOSP IP/OBS HIGH 75: CPT | Mod: HCNC,GC,, | Performed by: HOSPITALIST

## 2020-10-20 RX ORDER — PROMETHAZINE HYDROCHLORIDE 12.5 MG/1
25 TABLET ORAL EVERY 6 HOURS PRN
Status: DISCONTINUED | OUTPATIENT
Start: 2020-10-20 | End: 2020-10-23 | Stop reason: HOSPADM

## 2020-10-20 RX ORDER — HYDROCODONE BITARTRATE AND ACETAMINOPHEN 5; 325 MG/1; MG/1
1 TABLET ORAL EVERY 6 HOURS PRN
Status: DISCONTINUED | OUTPATIENT
Start: 2020-10-20 | End: 2020-10-21

## 2020-10-20 RX ORDER — HYDROCODONE BITARTRATE AND ACETAMINOPHEN 7.5; 325 MG/1; MG/1
1 TABLET ORAL EVERY 6 HOURS PRN
Status: DISCONTINUED | OUTPATIENT
Start: 2020-10-20 | End: 2020-10-20

## 2020-10-20 RX ADMIN — ALBUTEROL SULFATE 2.5 MG: 2.5 SOLUTION RESPIRATORY (INHALATION) at 07:10

## 2020-10-20 RX ADMIN — Medication 10 MG: at 12:10

## 2020-10-20 RX ADMIN — ACETAMINOPHEN 1000 MG: 500 TABLET ORAL at 05:10

## 2020-10-20 RX ADMIN — HYDROMORPHONE HYDROCHLORIDE 1 MG: 1 INJECTION, SOLUTION INTRAMUSCULAR; INTRAVENOUS; SUBCUTANEOUS at 04:10

## 2020-10-20 RX ADMIN — ALBUTEROL SULFATE 2.5 MG: 2.5 SOLUTION RESPIRATORY (INHALATION) at 12:10

## 2020-10-20 RX ADMIN — PROMETHAZINE HYDROCHLORIDE 25 MG: 12.5 TABLET ORAL at 05:10

## 2020-10-20 RX ADMIN — PANTOPRAZOLE SODIUM 40 MG: 40 TABLET, DELAYED RELEASE ORAL at 08:10

## 2020-10-20 RX ADMIN — ALBUTEROL SULFATE 2.5 MG: 2.5 SOLUTION RESPIRATORY (INHALATION) at 03:10

## 2020-10-20 RX ADMIN — Medication 10 MG: at 06:10

## 2020-10-20 RX ADMIN — HYDROCODONE BITARTRATE AND ACETAMINOPHEN 1 TABLET: 5; 325 TABLET ORAL at 11:10

## 2020-10-20 RX ADMIN — ISOSORBIDE MONONITRATE 30 MG: 30 TABLET, EXTENDED RELEASE ORAL at 09:10

## 2020-10-20 RX ADMIN — SERTRALINE HYDROCHLORIDE 50 MG: 50 TABLET ORAL at 09:10

## 2020-10-20 RX ADMIN — ALBUTEROL SULFATE 2.5 MG: 2.5 SOLUTION RESPIRATORY (INHALATION) at 11:10

## 2020-10-20 RX ADMIN — PANTOPRAZOLE SODIUM 40 MG: 40 TABLET, DELAYED RELEASE ORAL at 09:10

## 2020-10-20 RX ADMIN — METHOCARBAMOL TABLETS 500 MG: 500 TABLET, COATED ORAL at 08:10

## 2020-10-20 RX ADMIN — HYDROCODONE BITARTRATE AND ACETAMINOPHEN 1 TABLET: 5; 325 TABLET ORAL at 05:10

## 2020-10-20 RX ADMIN — ONDANSETRON 4 MG: 2 INJECTION INTRAMUSCULAR; INTRAVENOUS at 04:10

## 2020-10-20 RX ADMIN — ALBUTEROL SULFATE 2.5 MG: 2.5 SOLUTION RESPIRATORY (INHALATION) at 04:10

## 2020-10-20 RX ADMIN — Medication 10 MG: at 05:10

## 2020-10-20 RX ADMIN — TRAMADOL HYDROCHLORIDE 50 MG: 50 TABLET, FILM COATED ORAL at 01:10

## 2020-10-20 RX ADMIN — ALBUTEROL SULFATE 2.5 MG: 2.5 SOLUTION RESPIRATORY (INHALATION) at 01:10

## 2020-10-20 RX ADMIN — ACETAMINOPHEN 1000 MG: 500 TABLET ORAL at 01:10

## 2020-10-20 RX ADMIN — ATORVASTATIN CALCIUM 40 MG: 20 TABLET, FILM COATED ORAL at 09:10

## 2020-10-20 RX ADMIN — TRAMADOL HYDROCHLORIDE 50 MG: 50 TABLET, FILM COATED ORAL at 12:10

## 2020-10-20 RX ADMIN — TRAMADOL HYDROCHLORIDE 50 MG: 50 TABLET, FILM COATED ORAL at 09:10

## 2020-10-20 NOTE — PLAN OF CARE
"Pt arrived to [facility] @ 3521. AAOx4. BP elevated throughout shift. Dr. Knox on-call notified. PRNs ordered for SBP >160 x3 [id number]. Paged MD to verify orders, instructed to give as ordered. All other VSS on RA. NSR on tele. NPO. C-collar remains in place. Abrasions noted to forehead and RLE. Q4h neuro checks done. Moves all extremities. Complaints of a constant headache despite PRN and scheduled pain meds. Nausea noted with administration of  IV dilaudid. 50 mL brown emesis x2. IV zofran given with no relief. Pt reporting "not feeling right." Phenergan ordered and given. Voiding per urinal with adequate UO overnight. 0 BM. Family at bedside. Safety maintained. WCTM.   "

## 2020-10-20 NOTE — NURSING TRANSFER
Nursing Transfer Note      10/20/2020     Transfer to 1047 from ED    Transfer via stretcher    Transfer with c-collar, pt belongings.     Transported by transport & family member    Medicines sent: none    Chart send with patient: yes    Patient reassessed at: 10/19/20 3213    Upon arrival to floor: pt oriented to room, vs taken. Call light in reach. Bed in lowest position.

## 2020-10-20 NOTE — ED NOTES
Per MD, pt to have one family member upstairs with him. Charge nurse and floor nurse notified and made aware.

## 2020-10-20 NOTE — HPI
Mr Browning is 80 y.o male with medical issues of HTN, HLD, and CAD (STEMI in 2014 s/p PCI to RCA and CABG with LIMA-LAD and SVG-ramus), COPD, Sarcoidosis and MDD. Presented to ED with fall found to have Type II displaced odontoid fracture. Neck pain present with headache. Denies weakness, paresthesias, bowel/bladder issues, or gait imbalance. Patient admitted to NSGY team for possible surgery. Medicine consulted for pre-op evaluation. Patient is able to perform all ADL's at home. He was able to walk about 8 blocks without issues month ago but now he is having more difficult time due to SOB in exertion. Patient was not able to follow up with Dr. Kelly (cards) due to hip fracture and now with neck fracture.     Patient's July 2020 Echo 60% EF with diastolic dysfunction.

## 2020-10-20 NOTE — ASSESSMENT & PLAN NOTE
Surgical Risk Assessment:    Active Cardiac Issues:  Active decompensated heart failure? No   Unstable angina?  No   Significant uncontrolled arrhythmias? No   Severe valvular heart disease-Aortic or Mitral Stenosis? No   Recent MI or coronary revascularization < 30 days? No     Cardiac Risk Factors:  High risk surgery? No   History of CAD/ischemic heart disease? Yes   History of cerebrovascular disease? Yes   History of compensated heart failure? Yes   Type 2 diabetes requiring insulin? No   Serum Creatinine > 2? No   Total cardiac risk factors 3     Functional mets 2    < 4 METs -unable to walk > 2 blocks on level ground without stopping due to symptoms  - eating, dressing, toileting, walking indoors, light housework. POOR   > 4 METs -climbing > 1 flight of stairs without stopping  -walking up hill > 1-2 blocks  -scrubbing floors  -moving furniture  - golf, bowling, dancing or tennis  -running short distance MODERATE to EXCELLENT     Perioperative Risk Assessment:  RCRI Score 3, Class IV risk with 15% risk of cardiopulmonary complications    Patient is at intermediate risk for perioperative cardiopulmonary complications. Patient needs this surgery and there is no further management or test that needs to be performed before the surgery from a cardiology standpoint. Recommend proceed to surgery as planned.      Perioperative Management Recommendations: None     Recommendations  - Patient has an extensive cardiac history and recent LANGLEY that has progressively worsened. The LANGLEY is likely due to the patients pulmonary sarcoidosis that was recently found on CT. He has not had any chest pain and his troponin was normal. Patients risk factors for surgery are chronic issues so there is nothing we need to acutely manage, or test we need to order before the surgery.   - Cardiology is signing off. Please call if you have any questions.

## 2020-10-20 NOTE — ASSESSMENT & PLAN NOTE
Paul Browning is a 80 y.o. male with past medical history significant for HTN, HLD, Stevens's esophagus, BPH, CAD s/p stent placement in 2019, and anxiety who presents to [facility] ED following a fall down several flights of stairs. NSGY consulted for Type II displaced odontoid fracture.    - Neurologically intact on exam, baseline dementia per family.   - Okay for diet today   - Pain control: scheduled tylenol 1 g q 8, robaxin q 6 prn, tramadol 50 mg q 4 hours.   - Collar to be worn at all times  - CTH negative for acute hemorrhage or skull fracture  - CT cervical spine with acute appearing type II odontoid fracture with 4 mm of displacement. There is spondylosis throughout. anterolisthesis of C4 on C5 and retrolisthesis at C5 on C6.   - MRI cervical spine shows subacute type II odontoid fracture with disruption of ALL and mild buckling of PLL, all other ligaments intact. There is spondylosis throughout with large disc protrusion at C5-6 with severe central canal narrowing.   - Tentative plan for OR tomorrow AM for odontoid screw placement.  - NPO midnight. PTT/INR within normal limits.   - HM consulted for pre-op clearance and risk stratification - appreciate their assistance.  - GERD: continue daily protonix  - CAD: hold daily ASA. Continue home dose of isosorbide mononitrate  - HTN: SBP <160   - Anxiety: Continue home dose of zoloft  - DVT prophylaxis: TEDs, SCDs, hold SQH in case of need for OR      Discussed with Dr. Weaver

## 2020-10-20 NOTE — ASSESSMENT & PLAN NOTE
Surgical Risk Assessment:    Active Cardiac Issues:  Active decompensated heart failure? No   Unstable angina?  No   Significant uncontrolled arrhythmias? No   Severe valvular heart disease-Aortic or Mitral Stenosis? No   Recent MI or coronary revascularization < 30 days? No     Cardiac Risk Factors:  High risk surgery? No   History of CAD/ischemic heart disease? Yes   History of cerebrovascular disease? No   History of compensated heart failure? Yes   Type 2 diabetes requiring insulin? No   Serum Creatinine > 2? No   Total cardiac risk factors 2     Functional mets Moderate. Patient able to walks 2 blocks without getting short of breath. Able to perform all his ADLs. He was able to fish independently three weeks ago.  Does not think that he can climb 1 flight of stairs due to hip trouble and worsening SOB. Able to ambulate around his house.     < 4 METs -unable to walk > 2 blocks on level ground without stopping due to symptoms  - eating, dressing, toileting, walking indoors, light housework. POOR   > 4 METs -climbing > 1 flight of stairs without stopping  -walking up hill > 1-2 blocks  -scrubbing floors  -moving furniture  - golf, bowling, dancing or tennis  -running short distance MODERATE to EXCELLENT     Perioperative Risk Assessment:  RCRI Score 2, Class 3 risk with 10.1% risk of cardiopulmonary complications    Patient is at intermediate risk for perioperative cardiopulmonary complications. Recommend cardiology work up before preceding with surgery.

## 2020-10-20 NOTE — SUBJECTIVE & OBJECTIVE
Interval History: NAEON.  consulted for pre-op clearance. Tentative plan for OR tomorrow. Pt voiding spontaneously, no myelopathic signs on exam. Complains of headache. Denies any further n/v this AM. Denies left hip pain, even with examination.     Medications:  Continuous Infusions:  Scheduled Meds:   acetaminophen  1,000 mg Oral Q8H    albuterol sulfate  2.5 mg Nebulization Q4H    atorvastatin  40 mg Oral Daily    isosorbide mononitrate  30 mg Oral Daily    pantoprazole  40 mg Oral BID    sertraline  50 mg Oral Daily     PRN Meds:dextrose 50%, dextrose 50%, glucagon (human recombinant), glucose, glucose, glucose, hydrALAZINE, HYDROmorphone, methocarbamoL, ondansetron, promethazine, traMADoL     Review of Systems  Objective:     Weight: 63.5 kg (139 lb 15.9 oz)  Body mass index is 21.93 kg/m².  Vital Signs (Most Recent):  Temp: 97.7 °F (36.5 °C) (10/20/20 0819)  Pulse: (!) 58 (10/20/20 0847)  Resp: 15 (10/20/20 0907)  BP: 138/61 (10/20/20 0819)  SpO2: 95 % (10/20/20 0819) Vital Signs (24h Range):  Temp:  [96.9 °F (36.1 °C)-97.7 °F (36.5 °C)] 97.7 °F (36.5 °C)  Pulse:  [58-83] 58  Resp:  [15-29] 15  SpO2:  [91 %-99 %] 95 %  BP: (138-206)/(61-93) 138/61                          Neurosurgery Physical Exam  General: well developed, well nourished, no distress.   Head: normocephalic, Skin tear noted to forehead, with bruising/abrasions to face.   Neurologic: Alert and oriented to person, place, month, and president. Unable to state the year. Thought content appropriate.  GCS: Motor: 6/Verbal: 5/Eyes: 4 GCS Total: 15  Mental Status: Awake, Alert, Oriented x 4  Language: No aphasia  Speech: No dysarthria  Cranial nerves: face symmetric, tongue midline, CN II-XII grossly intact. Patient hard of hearing   Eyes: pupils equal, round, reactive to light with accommodation, EOMI.   Pulmonary: normal respirations, no signs of respiratory distress  Abdomen: soft, non-distended, not tender to palpation  Skin: Skin is warm,  dry and intact.  Sensory: intact to light touch throughout     Motor Strength:Moves all extremities spontaneously with good tone.  Full strength upper and lower extremities. No abnormal movements seen.      Strength   Deltoids Triceps Biceps Wrist Extension Wrist Flexion Hand    Upper: R 5/5 5/5 5/5 5/5 5/5 5/5     L 5/5 5/5 5/5 5/5 5/5 5/5       Iliopsoas Quadriceps Knee  Flexion Tibialis  anterior Gastro- cnemius EHL   Lower: R 5/5 5/5 5/5 5/5 5/5 5/5     L 5/5 5/5 5/5 5/5 5/5 5/5      Reflexes:   Tellez's: Negative.  Clonus: Negative.     Cerebellar:   Finger-to-nose: intact bilaterally   Pronator drift: absent bilaterally        Cervical:   ROM: Aspen collar in place restricting ROM appropriately.   Midline TTP: positive near skull base and to back of head    Significant Labs:  Recent Labs   Lab 10/20/20  0351      *   K 4.3   CL 98   CO2 23   BUN 14   CREATININE 0.8   CALCIUM 9.2     Recent Labs   Lab 10/20/20  0351   WBC 6.58   HGB 13.6*   HCT 41.1        Recent Labs   Lab 10/19/20  1915   INR 1.0   APTT 27.9     Microbiology Results (last 7 days)     ** No results found for the last 168 hours. **        All pertinent labs from the last 24 hours have been reviewed.    Significant Diagnostics:  Ct Head Without Contrast    Result Date: 10/19/2020  No acute intracranial findings. Type 2 odontoid fracture with posterior subluxation of the odontoid in relation to C2 body. Electronically signed by: Artie Ro MD Date:    10/19/2020 Time:    17:28    Ct Cervical Spine Without Contrast    Result Date: 10/19/2020  Type 2 odontoid fracture with 4 mm of subluxation.  This may be acute or subacute. Degenerative disc disease and subluxation at C4-5 and C5-6, as above.  C5-6 has severe bilateral neural foramen narrowing. Findings were reported to Dr. Rodríguez in the emergency room at 17:30 by Dr. Ro Electronically signed by: Artie Ro MD Date:    10/19/2020 Time:    17:37    Mri Cervical  Spine Without Contrast    Result Date: 10/19/2020  Dorsally subluxed fracture of the base of the odontoid with associated STIR signal without significant prevertebral soft tissue swelling, in keeping with subacute Type II odontoid fracture. Disruption of the anterior longitudinal ligament and minimal buckling of the posterior longitudinal ligament.  Remainder of the ligaments in the cervical spine remain intact. Significant disc desiccation at the C5-C6 level with central disc protrusion resulting in severe narrowing of the spinal canal and severe bilateral neural narrowing at this level.  Spine surgery consultation is recommended. Question of increased cord signal at the C5-C6 level, suggestive of compressive myelopathy.  No other cord signal abnormalities. This report was flagged in Epic as abnormal. Electronically signed by: Ildefonso Burroughs MD Date:    10/19/2020 Time:    23:09

## 2020-10-20 NOTE — CARE UPDATE
Pt deemed intermediate risk for surgery per   Cardiology consulted for recommendations for optimization prior to surgery    Plan for OR 10/22 for odontoid screw  Risks, benefits, alternatives to surgery discussed with patient and his daughter by Dr. Weaver. They voiced understanding.     SHELLIE Garcia-C   [phone]  Neurosurgery  [facility]

## 2020-10-20 NOTE — PROGRESS NOTES
[facility]  Neurosurgery  Progress Note    Subjective:     History of Present Illness: Paul Browning is a 80 y.o. male with past medical history significant for HTN, HLD, Stevens's esophagus, BPH, CAD s/p stent placement in 2019, and anxiety who [place] [facility] [place] following a fall down several flights of stairs. NSGY consulted for Type II displaced odontoid fracture. Neck pain present with slight headache. Denies weakness, paresthesias, bowel/bladder issues, or gait imbalance. NSGY consulted for evaluation.     Of additional note- patient takes daily ASA, last dose of which was this morning.     Addendum: Patient with several falls within the past several months, latest resulting in left hip fracture being treated conservatively. Per his daughter at bedside, he has had increased unsteady gait over the past several months. Patient otherwise has no fine dexterity issues. No object dropping, fumbling with buttons, etc. No bowel/bladder issues.     Post-Op Info:  Procedure(s) (LRB):  INSERTION, SCREW, ODONTOID. Anterior approach. 2 C-Arms. reg bed. Globus instrumentation. Neuromonitoring (N/A)         Interval History: NAEON. HM consulted for pre-op clearance. Tentative plan for OR tomorrow. Pt voiding spontaneously, no myelopathic signs on exam. Complains of headache. Denies any further n/v this AM. Denies left hip pain, even with examination.     Medications:  Continuous Infusions:  Scheduled Meds:   acetaminophen  1,000 mg Oral Q8H    albuterol sulfate  2.5 mg Nebulization Q4H    atorvastatin  40 mg Oral Daily    isosorbide mononitrate  30 mg Oral Daily    pantoprazole  40 mg Oral BID    sertraline  50 mg Oral Daily     PRN Meds:dextrose 50%, dextrose 50%, glucagon (human recombinant), glucose, glucose, glucose, hydrALAZINE, HYDROmorphone, methocarbamoL, ondansetron, promethazine, traMADoL     Review of Systems  Objective:     Weight: 63.5 kg (139 lb 15.9 oz)  Body mass index is  21.93 kg/m².  Vital Signs (Most Recent):  Temp: 97.7 °F (36.5 °C) (10/20/20 0819)  Pulse: (!) 58 (10/20/20 0847)  Resp: 15 (10/20/20 0907)  BP: 138/61 (10/20/20 0819)  SpO2: 95 % (10/20/20 0819) Vital Signs (24h Range):  Temp:  [96.9 °F (36.1 °C)-97.7 °F (36.5 °C)] 97.7 °F (36.5 °C)  Pulse:  [58-83] 58  Resp:  [15-29] 15  SpO2:  [91 %-99 %] 95 %  BP: (138-206)/(61-93) 138/61                          Neurosurgery Physical Exam  General: well developed, well nourished, no distress.   Head: normocephalic, Skin tear noted to forehead, with bruising/abrasions to face.   Neurologic: Alert and oriented to person, place, month, and president. Unable to state the year. Thought content appropriate.  GCS: Motor: 6/Verbal: 5/Eyes: 4 GCS Total: 15  Mental Status: Awake, Alert, Oriented x 4  Language: No aphasia  Speech: No dysarthria  Cranial nerves: face symmetric, tongue midline, CN II-XII grossly intact. Patient hard of hearing   Eyes: pupils equal, round, reactive to light with accommodation, EOMI.   Pulmonary: normal respirations, no signs of respiratory distress  Abdomen: soft, non-distended, not tender to palpation  Skin: Skin is warm, dry and intact.  Sensory: intact to light touch throughout     Motor Strength:Moves all extremities spontaneously with good tone.  Full strength upper and lower extremities. No abnormal movements seen.      Strength   Deltoids Triceps Biceps Wrist Extension Wrist Flexion Hand    Upper: R 5/5 5/5 5/5 5/5 5/5 5/5     L 5/5 5/5 5/5 5/5 5/5 5/5       Iliopsoas Quadriceps Knee  Flexion Tibialis  anterior Gastro- cnemius EHL   Lower: R 5/5 5/5 5/5 5/5 5/5 5/5     L 5/5 5/5 5/5 5/5 5/5 5/5      Reflexes:   Tellez's: Negative.  Clonus: Negative.     Cerebellar:   Finger-to-nose: intact bilaterally   Pronator drift: absent bilaterally        Cervical:   ROM: Aspen collar in place restricting ROM appropriately.   Midline TTP: positive near skull base and to back of head    Significant  Labs:  Recent Labs   Lab 10/20/20  0351      *   K 4.3   CL 98   CO2 23   BUN 14   CREATININE 0.8   CALCIUM 9.2     Recent Labs   Lab 10/20/20  0351   WBC 6.58   HGB 13.6*   HCT 41.1        Recent Labs   Lab 10/19/20  1915   INR 1.0   APTT 27.9     Microbiology Results (last 7 days)     ** No results found for the last 168 hours. **        All pertinent labs from the last 24 hours have been reviewed.    Significant Diagnostics:  Ct Head Without Contrast    Result Date: 10/19/2020  No acute intracranial findings. Type 2 odontoid fracture with posterior subluxation of the odontoid in relation to C2 body. Electronically signed by: Artie Ro MD Date:    10/19/2020 Time:    17:28    Ct Cervical Spine Without Contrast    Result Date: 10/19/2020  Type 2 odontoid fracture with 4 mm of subluxation.  This may be acute or subacute. Degenerative disc disease and subluxation at C4-5 and C5-6, as above.  C5-6 has severe bilateral neural foramen narrowing. Findings were reported to Dr. Rodríguez in the emergency room at 17:30 by Dr. Ro Electronically signed by: Artie Ro MD Date:    10/19/2020 Time:    17:37    Mri Cervical Spine Without Contrast    Result Date: 10/19/2020  Dorsally subluxed fracture of the base of the odontoid with associated STIR signal without significant prevertebral soft tissue swelling, in keeping with subacute Type II odontoid fracture. Disruption of the anterior longitudinal ligament and minimal buckling of the posterior longitudinal ligament.  Remainder of the ligaments in the cervical spine remain intact. Significant disc desiccation at the C5-C6 level with central disc protrusion resulting in severe narrowing of the spinal canal and severe bilateral neural narrowing at this level.  Spine surgery consultation is recommended. Question of increased cord signal at the C5-C6 level, suggestive of compressive myelopathy.  No other cord signal abnormalities. This report was  flagged in Epic as abnormal. Electronically signed by: Ildefonso Burroughs MD Date:    10/19/2020 Time:    23:09      Assessment/Plan:     * Odontoid fracture  Paul Browning is a 80 y.o. male with past medical history significant for HTN, HLD, Stevens's esophagus, BPH, CAD s/p stent placement in 2019, and anxiety who [place] [facility] [place] several flights of stairs. NSGY consulted for Type II displaced odontoid fracture.    - Neurologically intact on exam, baseline dementia per family.   - Okay for diet today   - Pain control: scheduled tylenol 1 g q 8, robaxin q 6 prn, tramadol 50 mg q 4 hours.   - Collar to be worn at all times  - CTH negative for acute hemorrhage or skull fracture  - CT cervical spine with acute appearing type II odontoid fracture with 4 mm of displacement. There is spondylosis throughout. anterolisthesis of C4 on C5 and retrolisthesis at C5 on C6.   - MRI cervical spine shows subacute type II odontoid fracture with disruption of ALL and mild buckling of PLL, all other ligaments intact. There is spondylosis throughout with large disc protrusion at C5-6 with severe central canal narrowing.   - Tentative plan for OR tomorrow AM for odontoid screw placement.  - NPO midnight. PTT/INR within normal limits.   - HM consulted for pre-op clearance and risk stratification - appreciate their assistance.  - GERD: continue daily protonix  - CAD: hold daily ASA. Continue home dose of isosorbide mononitrate  - HTN: SBP <160   - Anxiety: Continue home dose of zoloft  - DVT prophylaxis: TEDs, SCDs, hold SQH in case of need for OR      Discussed with Dr. Owen Schafer PA-C  Neurosurgery  [facility]

## 2020-10-20 NOTE — CONSULTS
[facility]  Cardiology  Consult Note    Patient Name: Paul Browning  MRN: [id number]  Admission Date: 10/19/2020  Hospital Length of Stay: 1 days  Code Status: Full Code   Attending Provider: Kirsten Weaver MD   Consulting Provider: Parker Nelson MD  Primary Care Physician: Grey Forbes DO  Principal Problem:Odontoid fracture    Patient information was obtained from patient, relative(s), past medical records and ER records.     Inpatient consult to Cardiology  Consult performed by: Parker Nelson MD  Consult ordered by: Kavitha He MD        Subjective:     Chief Complaint:  Fall    HPI:   Mr. Paul Browning is a 80 y.o. 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 Cardiology is consulted for further preoperative risk stratification due to patients cardiac history and worsening LANGLEY. Patient reports he has been having SOB for the past two months that has progressively gotten worse in the last two weeks. Two months ago he was able to walk a mile without any SOB. The SOB has progressively gotten worse to the point that he now reports SOB while at rest. He also reports a dry cough during this time. Denies any chest pain at any point in the past couple of months and has not had any lower limb swelling. Patient reports he is able to lay flat without any issues. CXR today revealed chronic granulomatous nodular infiltrates in his upper lobes bilaterally. ECG today revealed sinus bradycardia with new TWI in the lateral leads.     Patient was recently seen in cardiology clinic for this LANGLEY. His usual cardiologist is Dr. Kelly but this past visit he was seen in Dr. Zeng's clinic where a CTA was ordered which revealed the pulmonary sarcoid.  Patient wore a holter monitor in July 2020 which revealed good chronotropic competence. PET stress test in 2019 revealed ischemia in the OM2 area, which is consistent with his findings on angio in 2014. Echo in July 2020 revealed an EF of 60%, local segmental wall abnormalities, and Grade I diastolic dysfunction.     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 CATH LAB;  Service: Cardiology;  Laterality: N/A;    CATARACT EXTRACTION W/  INTRAOCULAR LENS IMPLANT Right 07/17/2018    Dr. Talamantes    CATARACT EXTRACTION W/  INTRAOCULAR LENS IMPLANT Left 07/31/2018    Dr. Talamantes    CORONARY ARTERY BYPASS GRAFT      x2  10/2014    ESOPHAGOGASTRODUODENOSCOPY N/A 4/8/2019    Procedure: ESOPHAGOGASTRODUODENOSCOPY (EGD)/poss rfa;  Surgeon: Clifford De La Torre MD;  Location: [facility] ENDO ([address] [place] [address]);  Service: Endoscopy;  Laterality: N/A;    ESOPHAGOGASTRODUODENOSCOPY (EGD) WITH RADIOFREQUENCY TREATMENT OF GASTROESOPHAGEAL JUNCTION      INJECTION OF ANESTHETIC AGENT AROUND MEDIAL BRANCH NERVES INNERVATING CERVICAL FACET JOINT Bilateral 1/9/2020    Procedure: INJECTION, MBB--Bilateral Cervical- Third Occipital nerve, C2-3--ASA  okay for pt to continue taking per provider.;  Surgeon: Tip Mera Jr., MD;  Location: [facility] PAIN MGT;  Service: Pain Management;  Laterality: Bilateral;    INTRAOCULAR PROSTHESES INSERTION Right 7/17/2018    Procedure: INSERTION, INTRAOCULAR LENS PROSTHESIS;  Surgeon: León Talamantes MD;  Location: [facility] OR [address] [place] [address];  Service: Ophthalmology;  Laterality: Right;    INTRAOCULAR PROSTHESES INSERTION Left 7/31/2018    Procedure: INSERTION, INTRAOCULAR LENS PROSTHESIS;  Surgeon: León Talamantes MD;  Location: [facility] OR [address] [place] [address];  Service: Ophthalmology;  Laterality: Left;    PHACOEMULSIFICATION OF CATARACT Right 7/17/2018    Procedure: PHACOEMULSIFICATION, CATARACT;  Surgeon: León Talamantes MD;  Location: [facility] OR [address] [place] [address];  Service: Ophthalmology;  Laterality: Right;    PHACOEMULSIFICATION OF CATARACT Left 7/31/2018    Procedure: PHACOEMULSIFICATION, CATARACT;  Surgeon: León Talamantes MD;  Location: [facility] OR [address] [place] [address];  Service: Ophthalmology;  Laterality: Left;    RADIOFREQUENCY THERMOCOAGULATION Bilateral 1/30/2020    Procedure: RADIOFREQUENCY THERMAL COAGULATION--Bilateral C2-3 and Third Occipital Nerve;  Surgeon: Tip Mera Jr., MD;  Location: [facility] PAIN T;  Service: Pain Management;  Laterality: Bilateral;    UMBILICAL HERNIA REPAIR         Review of patient's allergies indicates:   Allergen Reactions    Morphine Shortness Of Breath       Current Facility-Administered Medications on File Prior to Encounter   Medication    0.9%  NaCl infusion    lidocaine (PF) 10 mg/ml (1%) injection 10 mg     Current Outpatient Medications on File Prior to Encounter   Medication Sig    acetaminophen (TYLENOL) 500 MG tablet Take 1,000 mg by mouth every 6 (six) hours as needed for Pain.    albuterol (PROVENTIL) 2.5 mg /3 mL (0.083 %) nebulizer solution Take 3 mLs (2.5 mg total) by nebulization every 6 (six) hours as needed for Wheezing. Rescue    alendronate (FOSAMAX) 70 MG tablet 1 tab  PO qwk in the am with a full glass of water on an empty stomach. Do not consume food or lie down for at least 30 minutes afterwards.    ALPRAZolam (XANAX) 0.25 MG tablet Take 1 tablet (0.25 mg total) by mouth 3 (three) times daily as needed for Anxiety. (Patient not taking: Reported on 10/5/2020)    aspirin (ECOTRIN) 81 MG EC tablet Take 81 mg by mouth once daily.    atorvastatin (LIPITOR) 40 MG tablet TAKE 1 TABLET BY MOUTH EVERY DAY    butalbital-acetaminophen-caffeine -40 mg (FIORICET, ESGIC) -40 mg per tablet 1-2 tabs PO q6 PRN headaches (Patient not taking: Reported on 10/5/2020)    diclofenac sodium (VOLTAREN) 1 % Gel Apply 2 g topically every 6 (six) hours as needed. (Patient not taking: Reported on 10/5/2020)    fluorouracil 5% 5 % Soln Use hs for 2 weeks (Patient not taking: Reported on 10/5/2020)    isosorbide mononitrate (IMDUR) 30 MG 24 hr tablet Take 1 tablet (30 mg total) by mouth once daily.    magnesium oxide (MAG-OX) 400 mg (241.3 mg magnesium) tablet Take 1 tablet (400 mg total) by mouth once daily.    omega-3 fatty acids-vitamin E (FISH OIL) 1,000 mg Cap Take 1 tablet by mouth 2 (two) times daily. (Patient taking differently: Take 1 tablet by mouth once daily. )    ondansetron (ZOFRAN) 4 MG tablet Take 1 tablet (4 mg total) by mouth every 8 (eight) hours as needed for Nausea. (Patient not taking: Reported on 10/5/2020)    pantoprazole (PROTONIX) 40 MG tablet Take 40 mg by mouth 2 (two) times a day.     pantoprazole (PROTONIX) 40 MG tablet TAKE 1 TABLET BY MOUTH TWICE DAILY    riboflavin, vitamin B2, (VITAMIN B-2) 100 mg Tab tablet Take 1 tablet (100 mg total) by mouth once daily.    sertraline (ZOLOFT) 50 MG tablet TAKE 1 TABLET BY MOUTH EVERY DAY    triamcinolone acetonide 0.1% (KENALOG) 0.1 % cream Apply topically 2 (two) times daily. (Patient not taking: Reported on 10/5/2020)     Family History     Problem Relation (Age of Onset)    Arrhythmia Mother    Heart disease  Mother    Heart failure Brother    No Known Problems Daughter, Son        Tobacco Use    Smoking status: Former Smoker     Packs/day: 2.00     Years: 20.00     Pack years: 40.00     Types: Cigarettes     Quit date: 1988     Years since quittin.6    Smokeless tobacco: Never Used   Substance and Sexual Activity    Alcohol use: No     Comment: quit drinking beer     Drug use: No    Sexual activity: Not Currently     Partners: Female     Review of Systems   Constitution: Negative for chills and fever.   HENT: Negative for congestion and sore throat.    Eyes: Negative for pain.   Cardiovascular: Positive for dyspnea on exertion (progressively worsening over the last 2 months). Negative for chest pain, leg swelling and syncope.   Respiratory: Positive for cough and shortness of breath.    Hematologic/Lymphatic: Does not bruise/bleed easily.   Musculoskeletal: Positive for neck pain.   Gastrointestinal: Negative for abdominal pain, nausea and vomiting.   Genitourinary: Negative for dysuria.   Neurological: Positive for headaches.     Objective:     Vital Signs (Most Recent):  Temp: 98.1 °F (36.7 °C) (10/20/20 1219)  Pulse: 62 (10/20/20 1317)  Resp: 15 (10/20/20 1330)  BP: (!) 119/58 (10/20/20 1219)  SpO2: 96 % (10/20/20 1317) Vital Signs (24h Range):  Temp:  [96.9 °F (36.1 °C)-98.1 °F (36.7 °C)] 98.1 °F (36.7 °C)  Pulse:  [58-83] 62  Resp:  [14-29] 15  SpO2:  [91 %-99 %] 96 %  BP: (119-206)/(58-93) 119/58     Weight: 63.5 kg (139 lb 15.9 oz)  Body mass index is 21.93 kg/m².    SpO2: 96 %  O2 Device (Oxygen Therapy): room air      Intake/Output Summary (Last 24 hours) at 10/20/2020 1522  Last data filed at 10/20/2020 0400  Gross per 24 hour   Intake 20 ml   Output 625 ml   Net -605 ml       Lines/Drains/Airways     Peripheral Intravenous Line                 Peripheral IV - Single Lumen 10/19/20 1916 20 G Left Forearm less than 1 day                Physical Exam   Constitutional: He is oriented to person,  place, and time. He appears well-developed and well-nourished.   HENT:   Wound on top of head   Eyes: Pupils are equal, round, and reactive to light.   Neck:   Patient in cervical collar     Cardiovascular: Normal rate, regular rhythm and normal heart sounds.   Pulmonary/Chest: Effort normal. No respiratory distress. He has wheezes.   Abdominal: Soft. There is no abdominal tenderness.   Musculoskeletal: Normal range of motion.   Neurological: He is alert and oriented to person, place, and time.   Skin: Skin is warm.   Psychiatric: He has a normal mood and affect.       Significant Labs: All pertinent lab results from the last 24 hours have been reviewed.      Assessment and Plan:     Pre-op evaluation  Surgical Risk Assessment:    Active Cardiac Issues:  Active decompensated heart failure? No   Unstable angina?  No   Significant uncontrolled arrhythmias? No   Severe valvular heart disease-Aortic or Mitral Stenosis? No   Recent MI or coronary revascularization < 30 days? No     Cardiac Risk Factors:  High risk surgery? No   History of CAD/ischemic heart disease? Yes   History of cerebrovascular disease? Yes   History of compensated heart failure? Yes   Type 2 diabetes requiring insulin? No   Serum Creatinine > 2? No   Total cardiac risk factors 3     Functional mets 2    < 4 METs -unable to walk > 2 blocks on level ground without stopping due to symptoms  - eating, dressing, toileting, walking indoors, light housework. POOR   > 4 METs -climbing > 1 flight of stairs without stopping  -walking up hill > 1-2 blocks  -scrubbing floors  -moving furniture  - golf, bowling, dancing or tennis  -running short distance MODERATE to EXCELLENT     Perioperative Risk Assessment:  RCRI Score 3, Class IV risk with 15% risk of cardiopulmonary complications    Patient is at intermediate risk for perioperative cardiopulmonary complications. Patient needs this surgery and there is no further management or test that needs to be performed  before the surgery from a cardiology standpoint. Recommend proceed to surgery as planned.      Perioperative Management Recommendations: None     Recommendations  - Patient has an extensive cardiac history and recent LANGLEY that has progressively worsened. The LANGLEY is likely due to the patients pulmonary sarcoidosis that was recently found on CT. He has not had any chest pain and his troponin was normal. Patients risk factors for surgery are chronic issues so there is nothing we need to acutely manage, or test we need to order before the surgery.   - Cardiology is signing off. Please call if you have any questions.       VTE Risk Mitigation (From admission, onward)         Ordered     IP VTE HIGH RISK PATIENT  Once      10/19/20 1829     Place sequential compression device  Until discontinued      10/19/20 1829     Place EMMETT hose  Until discontinued      10/19/20 1829                Thank you for your consult. I will sign off. Please contact us if you have any additional questions.    Parker Nelson MD  Cardiology   [facility]

## 2020-10-20 NOTE — SUBJECTIVE & OBJECTIVE
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    Hyperlipidemia     Hypertension     Ischemic cardiomyopathy 11/5/2014    MDD (major depressive disorder), recurrent episode, mild 2/17/2016    Osteoporosis 7/20/2016    Peripheral arterial disease 3/18/2016    Sarcoid     Squamous cell carcinoma 09/2016, 5/2016    crown of scalp, left wrist       Past Surgical History:   Procedure Laterality Date    CARDIAC SURGERY      CAROTID STENT N/A 10/14/2019    Procedure: INSERTION, STENT, ARTERY, CAROTID;  Surgeon: Artie Kebede MD;  Location: [facility] CATH LAB;  Service: Cardiology;  Laterality: N/A;    CATARACT EXTRACTION W/  INTRAOCULAR LENS IMPLANT Right 07/17/2018    Dr. Talamantes    CATARACT EXTRACTION W/  INTRAOCULAR LENS IMPLANT Left 07/31/2018    Dr. Talamantes    CORONARY ARTERY BYPASS GRAFT      x2  10/2014    ESOPHAGOGASTRODUODENOSCOPY N/A 4/8/2019    Procedure: ESOPHAGOGASTRODUODENOSCOPY (EGD)/poss rfa;  Surgeon: Clifford De La Torre MD;  Location: [facility] ENDO ([facility] FLR);  Service: Endoscopy;  Laterality: N/A;    ESOPHAGOGASTRODUODENOSCOPY (EGD) WITH RADIOFREQUENCY TREATMENT OF GASTROESOPHAGEAL JUNCTION      INJECTION OF ANESTHETIC AGENT AROUND MEDIAL BRANCH NERVES INNERVATING CERVICAL FACET JOINT Bilateral 1/9/2020    Procedure: INJECTION, MBB--Bilateral Cervical- Third Occipital nerve, C2-3--ASA okay for pt to continue taking per provider.;  Surgeon: Tip Mera Jr., MD;  Location: [facility] PAIN MGT;  Service: Pain Management;  Laterality: Bilateral;    INTRAOCULAR PROSTHESES INSERTION Right 7/17/2018    Procedure: INSERTION, INTRAOCULAR LENS PROSTHESIS;  Surgeon: León Talamantes MD;  Location: [facility]  OR 1ST FLR;  Service: Ophthalmology;  Laterality: Right;    INTRAOCULAR PROSTHESES INSERTION Left 7/31/2018    Procedure: INSERTION, INTRAOCULAR LENS PROSTHESIS;  Surgeon: León Talamantes MD;  [facility];  Service: Ophthalmology;  Laterality: Left;    PHACOEMULSIFICATION OF CATARACT Right 7/17/2018    Procedure: PHACOEMULSIFICATION, CATARACT;  Surgeon: León Talamantes MD;  [facility];  Service: Ophthalmology;  Laterality: Right;    PHACOEMULSIFICATION OF CATARACT Left 7/31/2018    Procedure: PHACOEMULSIFICATION, CATARACT;  Surgeon: León Talamantes MD;  [facility];  Service: Ophthalmology;  Laterality: Left;    RADIOFREQUENCY THERMOCOAGULATION Bilateral 1/30/2020    Procedure: RADIOFREQUENCY THERMAL COAGULATION--Bilateral C2-3 and Third Occipital Nerve;  Surgeon: Tip Mera Jr., MD;  Location: [facility];  Service: Pain Management;  Laterality: Bilateral;    UMBILICAL HERNIA REPAIR         Review of patient's allergies indicates:   Allergen Reactions    Morphine Shortness Of Breath       Current Facility-Administered Medications on File Prior to Encounter   Medication    0.9%  NaCl infusion    lidocaine (PF) 10 mg/ml (1%) injection 10 mg     Current Outpatient Medications on File Prior to Encounter   Medication Sig    acetaminophen (TYLENOL) 500 MG tablet Take 1,000 mg by mouth every 6 (six) hours as needed for Pain.    albuterol (PROVENTIL) 2.5 mg /3 mL (0.083 %) nebulizer solution Take 3 mLs (2.5 mg total) by nebulization every 6 (six) hours as needed for Wheezing. Rescue    alendronate (FOSAMAX) 70 MG tablet 1 tab PO qwk in the am with a full glass of water on an empty stomach. Do not consume food or lie down for at least 30 minutes afterwards.    ALPRAZolam (XANAX) 0.25 MG tablet Take 1 tablet (0.25 mg total) by mouth 3 (three) times daily as needed for Anxiety. (Patient not taking: Reported on 10/5/2020)    aspirin (ECOTRIN)  81 MG EC tablet Take 81 mg by mouth once daily.    atorvastatin (LIPITOR) 40 MG tablet TAKE 1 TABLET BY MOUTH EVERY DAY    butalbital-acetaminophen-caffeine -40 mg (FIORICET, ESGIC) -40 mg per tablet 1-2 tabs PO q6 PRN headaches (Patient not taking: Reported on 10/5/2020)    diclofenac sodium (VOLTAREN) 1 % Gel Apply 2 g topically every 6 (six) hours as needed. (Patient not taking: Reported on 10/5/2020)    fluorouracil 5% 5 % Soln Use hs for 2 weeks (Patient not taking: Reported on 10/5/2020)    isosorbide mononitrate (IMDUR) 30 MG 24 hr tablet Take 1 tablet (30 mg total) by mouth once daily.    magnesium oxide (MAG-OX) 400 mg (241.3 mg magnesium) tablet Take 1 tablet (400 mg total) by mouth once daily.    omega-3 fatty acids-vitamin E (FISH OIL) 1,000 mg Cap Take 1 tablet by mouth 2 (two) times daily. (Patient taking differently: Take 1 tablet by mouth once daily. )    ondansetron (ZOFRAN) 4 MG tablet Take 1 tablet (4 mg total) by mouth every 8 (eight) hours as needed for Nausea. (Patient not taking: Reported on 10/5/2020)    pantoprazole (PROTONIX) 40 MG tablet Take 40 mg by mouth 2 (two) times a day.     pantoprazole (PROTONIX) 40 MG tablet TAKE 1 TABLET BY MOUTH TWICE DAILY    riboflavin, vitamin B2, (VITAMIN B-2) 100 mg Tab tablet Take 1 tablet (100 mg total) by mouth once daily.    sertraline (ZOLOFT) 50 MG tablet TAKE 1 TABLET BY MOUTH EVERY DAY    triamcinolone acetonide 0.1% (KENALOG) 0.1 % cream Apply topically 2 (two) times daily. (Patient not taking: Reported on 10/5/2020)     Family History     Problem Relation (Age of Onset)    Arrhythmia Mother    Heart disease Mother    Heart failure Brother    No Known Problems Daughter, Son        Tobacco Use    Smoking status: Former Smoker     Packs/day: 2.00     Years: 20.00     Pack years: 40.00     Types: Cigarettes     Quit date: 1988     Years since quittin.6    Smokeless tobacco: Never Used   Substance and Sexual  Activity    Alcohol use: No     Comment: quit drinking beer 2012    Drug use: No    Sexual activity: Not Currently     Partners: Female     Review of Systems   Constitutional: Positive for activity change. Negative for appetite change, chills, fatigue and fever.   HENT: Negative for congestion, sore throat and trouble swallowing.    Respiratory: Positive for shortness of breath. Negative for cough.    Cardiovascular: Negative for chest pain, palpitations and leg swelling.   Gastrointestinal: Negative for abdominal distention, abdominal pain, constipation, diarrhea, nausea and vomiting.   Genitourinary: Negative for decreased urine volume, difficulty urinating and dysuria.   Musculoskeletal: Positive for arthralgias. Negative for back pain and joint swelling.   Skin: Positive for wound (head trauma from fall). Negative for color change.   Neurological: Positive for weakness. Negative for dizziness, numbness and headaches.   Psychiatric/Behavioral: Negative for agitation, behavioral problems and confusion.     Objective:     Vital Signs (Most Recent):  Temp: 97.7 °F (36.5 °C) (10/20/20 0819)  Pulse: (!) 58 (10/20/20 0847)  Resp: 15 (10/20/20 0819)  BP: 138/61 (10/20/20 0819)  SpO2: 95 % (10/20/20 0819) Vital Signs (24h Range):  Temp:  [96.9 °F (36.1 °C)-97.7 °F (36.5 °C)] 97.7 °F (36.5 °C)  Pulse:  [58-83] 58  Resp:  [15-29] 15  SpO2:  [91 %-99 %] 95 %  BP: (138-206)/(61-93) 138/61     Weight: 63.5 kg (139 lb 15.9 oz)  Body mass index is 21.93 kg/m².    Physical Exam  Constitutional:       Appearance: Normal appearance.   HENT:      Head:      Comments: Healing wound on top of the head     Mouth/Throat:      Mouth: Mucous membranes are moist.   Eyes:      Pupils: Pupils are equal, round, and reactive to light.   Neck:      Comments: In C-collar   Cardiovascular:      Rate and Rhythm: Normal rate and regular rhythm.      Pulses: Normal pulses.      Heart sounds: No murmur.   Pulmonary:      Effort: Pulmonary effort  is normal.      Breath sounds: Normal breath sounds.   Abdominal:      General: Abdomen is flat. Bowel sounds are normal.      Palpations: Abdomen is soft.   Musculoskeletal:         General: No swelling.   Skin:     General: Skin is warm.   Neurological:      General: No focal deficit present.      Mental Status: He is alert and oriented to person, place, and time.   Psychiatric:         Mood and Affect: Mood normal.         Behavior: Behavior normal.         Thought Content: Thought content normal.         Significant Labs:   BMP:   Recent Labs   Lab 10/20/20  0351      *   K 4.3   CL 98   CO2 23   BUN 14   CREATININE 0.8   CALCIUM 9.2     CBC:   Recent Labs   Lab 10/20/20  0351   WBC 6.58   HGB 13.6*   HCT 41.1        All pertinent labs within the past 24 hours have been reviewed.    Significant Imaging: I have reviewed all pertinent imaging results/findings within the past 24 hours.

## 2020-10-20 NOTE — PLAN OF CARE
Admit Date:  10/19/2020  3:41 PM      Admit Diagnosis:  Anxiety [F41.9]  Odontoid fracture [S12.110A]  Coronary artery disease, angina presence unspecified, unspecified vessel or lesion type, unspecified whether native or transplanted heart [I25.10]  Closed odontoid fracture, initial encounter [S12.100A]  Hypertension, unspecified type [I10]  Gastroesophageal reflux disease, unspecified whether esophagitis present [K21.9]    CM met with patient and patient's daughter in room for Discharge Planning Assessment.  Patient daughter to answer questions.  Per daughter, she lives with the patient in a house with 1 step(s) to enter.   Per daughter,the patient was independent with ADLS and used a walker/rollator for ambulation.  Patient will have assistance from daughter upon discharge.   Discharge Planning Booklet given to patient/family and discussed.  All questions addressed.  CM will follow for needs.     10/20/20 1500   Discharge Assessment   Assessment Type Discharge Planning Assessment   Confirmed/corrected address and phone number on facesheet? Yes   Assessment information obtained from? Patient   Expected Length of Stay (days) 4   Communicated expected length of stay with patient/caregiver yes   Prior to hospitilization cognitive status: Alert/Oriented   Prior to hospitalization functional status: Assistive Equipment   Current cognitive status: Alert/Oriented   Current Functional Status: Assistive Equipment   Lives With child(josephine), adult   Able to Return to Prior Arrangements yes   Is patient able to care for self after discharge? Unable to determine at this time (comments)  (TBD)   Patient's perception of discharge disposition home or selfcare;home health   Readmission Within the Last 30 Days no previous admission in last 30 days   Patient currently being followed by outpatient case management? No   Patient currently receives any other outside agency services? No   Equipment Currently Used at Home walker,  standard;rollator;other (see comments)  (neck brace)   Do you have any problems affording any of your prescribed medications? No   Is the patient taking medications as prescribed? yes   Does the patient have transportation home? Yes   Transportation Anticipated family or friend will provide   Does the patient receive services at the [facility]? No   Discharge Plan A Home with family   Discharge Plan B Home Health   DME Needed Upon Discharge  none   Patient/Family in Agreement with Plan yes   Does the patient have transportation to healthcare appointments? Yes            PCP:  Grey Forbes DO  [phone]        Pharmacy:    [facility] #[id number] - [place] - [address]  AT [facility] [address]  [address] [place] [address]  Phone: [phone] Fax: [phone]        Emergency Contacts:  Extended Emergency Contact Information  Primary Emergency Contact: Paul Browning Jr   [place]  Mobile Phone: [phone]  Relation: Son  Secondary Emergency Contact: Neto Browning   [facility]  Home Phone: [phone]  Relation: Son      Insurance:    Payor: [employer] MANAGED MEDICARE / Plan: [employer] MEDICARE HMO / Product Type: Capitation /       10/20/2020  3:04 PM    Kat Coffey RN, CM   Ext: [phone]

## 2020-10-20 NOTE — H&P
[facility]  Neuorsurgery  History and Physical     Patient Name: Paul Browning  MRN: [id number]  Admission Date: 10/19/2020  Attending Physician: Kirsten Weaver MD  Primary Care Physician: Grey Forbes DO    Patient information was obtained from patient and relative(s).     Subjective:     Chief Complaint/Reason for Admission: Type II odontoid fx     HPI:  Paul Browning is a 80 y.o. male with past medical history significant for HTN, HLD, Stevens's esophagus, BPH, CAD s/p stent placement in 2019, and anxiety who [place] [facility] [place] following a fall down several flights of stairs. NSGY consulted for Type II displaced odontoid fracture. Neck pain present with slight headache. Denies weakness, paresthesias, bowel/bladder issues, or gait imbalance. NSGY consulted for evaluation.     Of additional note- patient takes daily ASA, last dose of which was this morning.       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    Hyperlipidemia     Hypertension     Ischemic cardiomyopathy 11/5/2014    MDD (major depressive disorder), recurrent episode, mild 2/17/2016    Osteoporosis 7/20/2016    Peripheral arterial disease 3/18/2016    Sarcoid     Squamous cell carcinoma 09/2016, 5/2016    crown of scalp, left wrist     Past Surgical History:   Procedure Laterality Date    CARDIAC SURGERY      CAROTID STENT N/A 10/14/2019    Procedure: INSERTION, STENT, ARTERY, CAROTID;  Surgeon: Artie Kebede MD;  Location: [facility] CATH LAB;  Service: Cardiology;  Laterality: N/A;     CATARACT EXTRACTION W/  INTRAOCULAR LENS IMPLANT Right 07/17/2018    Dr. Talamantes    CATARACT EXTRACTION W/  INTRAOCULAR LENS IMPLANT Left 07/31/2018    Dr. Talamantes    CORONARY ARTERY BYPASS GRAFT      x2  10/2014    ESOPHAGOGASTRODUODENOSCOPY N/A 4/8/2019    Procedure: ESOPHAGOGASTRODUODENOSCOPY (EGD)/poss rfa;  Surgeon: Clifford De La Torre MD;  Location: [facility] (2ND FLR);  Service: Endoscopy;  Laterality: N/A;    ESOPHAGOGASTRODUODENOSCOPY (EGD) WITH RADIOFREQUENCY TREATMENT OF GASTROESOPHAGEAL JUNCTION      INJECTION OF ANESTHETIC AGENT AROUND MEDIAL BRANCH NERVES INNERVATING CERVICAL FACET JOINT Bilateral 1/9/2020    Procedure: INJECTION, MBB--Bilateral Cervical- Third Occipital nerve, C2-3--ASA okay for pt to continue taking per provider.;  Surgeon: Tip Mera Jr., MD;  Location: [facility] PAIN MGT;  Service: Pain Management;  Laterality: Bilateral;    INTRAOCULAR PROSTHESES INSERTION Right 7/17/2018    Procedure: INSERTION, INTRAOCULAR LENS PROSTHESIS;  Surgeon: León Talamantes MD;  Location: [address];  Service: Ophthalmology;  Laterality: Right;    INTRAOCULAR PROSTHESES INSERTION Left 7/31/2018    Procedure: INSERTION, INTRAOCULAR LENS PROSTHESIS;  Surgeon: León Talamantes MD;  Location: [address];  Service: Ophthalmology;  Laterality: Left;    PHACOEMULSIFICATION OF CATARACT Right 7/17/2018    Procedure: PHACOEMULSIFICATION, CATARACT;  Surgeon: León Talamantes MD;  Location: [facility] OR [address] [place] [address];  Service: Ophthalmology;  Laterality: Right;    PHACOEMULSIFICATION OF CATARACT Left 7/31/2018    Procedure: PHACOEMULSIFICATION, CATARACT;  Surgeon: León Talamantes MD;  Location: [address];  Service: Ophthalmology;  Laterality: Left;    RADIOFREQUENCY THERMOCOAGULATION Bilateral 1/30/2020    Procedure: RADIOFREQUENCY THERMAL COAGULATION--Bilateral C2-3 and Third Occipital Nerve;  Surgeon: Tip Mera Jr., MD;  Location: [facility] PAIN MGT;   Service: Pain Management;  Laterality: Bilateral;    UMBILICAL HERNIA REPAIR       Family History     Problem Relation (Age of Onset)    Arrhythmia Mother    Heart disease Mother    Heart failure Brother    No Known Problems Daughter, Son        Tobacco Use    Smoking status: Former Smoker     Packs/day: 2.00     Years: 20.00     Pack years: 40.00     Types: Cigarettes     Quit date: 1988     Years since quittin.6    Smokeless tobacco: Never Used   Substance and Sexual Activity    Alcohol use: No     Comment: quit drinking beer     Drug use: No    Sexual activity: Not Currently     Partners: Female     Review of Systems   Constitutional: Negative for activity change and fever.   HENT: Negative for ear pain and trouble swallowing.    Eyes: Negative for photophobia and visual disturbance.   Respiratory: Negative for shortness of breath and wheezing.    Cardiovascular: Negative for chest pain.   Gastrointestinal: Negative for abdominal pain, nausea and vomiting.   Genitourinary: Negative for dysuria and hematuria.   Musculoskeletal: Positive for neck pain. Negative for back pain.   Skin: Negative for wound.   Neurological: Positive for headaches. Negative for dizziness, seizures, weakness and numbness.   Psychiatric/Behavioral: Negative for confusion.     Objective:     Weight: 63.5 kg (139 lb 15.9 oz)  Body mass index is 21.93 kg/m².  Vital Signs (Most Recent):  Temp: 97.4 °F (36.3 °C) (10/19/20 1434)  Pulse: 70 (10/19/20 1434)  Resp: 18 (10/19/20 1434)  BP: (!) 169/72 (10/19/20 1434)  SpO2: 96 % (10/19/20 1434) Vital Signs (24h Range):  Temp:  [97.4 °F (36.3 °C)] 97.4 °F (36.3 °C)  Pulse:  [70] 70  Resp:  [18] 18  SpO2:  [96 %] 96 %  BP: (169)/(72) 169/72                          Neurosurgery Physical Exam  General: well developed, well nourished, no distress.   Head: normocephalic, Skin tear noted to forehead, with bruising/abrasions to face.   Neurologic: Alert and oriented. Thought content  appropriate.  GCS: Motor: 6/Verbal: 5/Eyes: 4 GCS Total: 15  Mental Status: Awake, Alert, Oriented x 4  Language: No aphasia  Speech: No dysarthria  Cranial nerves: face symmetric, tongue midline, CN II-XII grossly intact. Patient hard of hearing   Eyes: pupils equal, round, reactive to light with accommodation, EOMI.   Pulmonary: normal respirations, no signs of respiratory distress  Abdomen: soft, non-distended, not tender to palpation  Skin: Skin is warm, dry and intact.  Sensory: intact to light touch throughout    Motor Strength:Moves all extremities spontaneously with good tone.  Full strength upper and lower extremities. No abnormal movements seen.     Strength  Deltoids Triceps Biceps Wrist Extension Wrist Flexion Hand    Upper: R 5/5 5/5 5/5 5/5 5/5 5/5    L 5/5 5/5 5/5 5/5 5/5 5/5     Iliopsoas Quadriceps Knee  Flexion Tibialis  anterior Gastro- cnemius EHL   Lower: R 5/5 5/5 5/5 5/5 5/5 5/5    L 5/5 5/5 5/5 5/5 5/5 5/5     Reflexes:   Tellez's: Negative.  Babinski's: Negative.  Clonus: Negative.     Cerebellar:   Finger-to-nose: intact bilaterally   Pronator drift: absent bilaterally       Cervical:   ROM: ED collar in place not restricting ROM appropriately.   Midline TTP: positive near skull base and to back of head     Thoracic:  Midline TTP: Negative.     Lumbar:  Midline TTP: Negative.          Significant Labs:  No results for input(s): GLU, NA, K, CL, CO2, BUN, CREATININE, CALCIUM, MG in the last 48 hours.  No results for input(s): WBC, HGB, HCT, PLT in the last 48 hours.  No results for input(s): LABPT, INR, APTT in the last 48 hours.  Microbiology Results (last 7 days)     ** No results found for the last 168 hours. **        All pertinent labs from the last 24 hours have been reviewed.    Significant Diagnostics:  Ct Head Without Contrast    Result Date: 10/19/2020  No acute intracranial findings. Type 2 odontoid fracture with posterior subluxation of the odontoid in relation to C2 body.  Electronically signed by: Artie Ro MD Date:    10/19/2020 Time:    17:28    Ct Cervical Spine Without Contrast    Result Date: 10/19/2020  Type 2 odontoid fracture with 4 mm of subluxation.  This may be acute or subacute. Degenerative disc disease and subluxation at C4-5 and C5-6, as above.  C5-6 has severe bilateral neural foramen narrowing. Findings were reported to Dr. Rodríguez in the emergency room at 17:30 by Dr. Ro Electronically signed by: Artie Ro MD Date:    10/19/2020 Time:    17:37      Assessment and Plan:     Odontoid fracture  Paul Browning is a 80 y.o. male with past medical history significant for HTN, HLD, Stevens's esophagus, BPH, CAD s/p stent placement in 2019, and anxiety who presents to [facility] ED following a fall down several flights of stairs. NSGY consulted for Type II displaced odontoid fracture.    - Neurologically intact on exam  - Admit to Neurosurgery overnight  - NPO, Pre-op labs pending  - Pain control, Aspen collar ordered  - Collar to be worn at all times  - CTH negative for acute hemorrhage or skull fracture  - CT cervical spine with acute appearing type II odontoid fracture with 4 mm of displacement. There is spondylosis throughout. anterolisthesis of C4 on C5 and retrolisthesis at C5 on C6.   - MRI cervical spine pending    - GERD: continue daily protonix  - CAD: hold daily ASA. Continue home dose of isosorbide mononitrate  - HTN: SBP <160   - Anxiety: Continue home dose of zoloft  - DVT prophylaxis: TEDs, SCDs, hold SQH in case of need for OR    Further recs pending review of MRI     Discussed with Dr. Owen Schafer PAPaC  Neurosurgery  [facility]

## 2020-10-20 NOTE — CONSULTS
[facility] Hospital Medicine  Consult Note    Patient Name: Paul Browning  MRN: [id number]  Admission Date: 10/19/2020  Hospital Length of Stay: 1 days  Attending Physician: Kirsten Weaver MD   Primary Care Provider: Grey Forbes DO     [facility] Medicine Team: Networked reference to record PCT  Kavitha He MD      Patient information was obtained from patient, relative(s) and ER records.     Inpatient consult to Hospital Medicine-General  Consult performed by: Kavitha He MD  Consult ordered by: Katerina Schafer PA-C        Subjective:     Principal Problem: Odontoid fracture    Chief Complaint:   Chief Complaint   Patient presents with    Fall     multiple falls, few weeks ago hx hip, fell again today, hit head, denies loc, neck pain hard c collar applied in triage        HPI: Mr Browning is 80 y.o male with medical issues of HTN, HLD, and CAD (STEMI in 2014 s/p PCI to RCA and CABG with LIMA-LAD and SVG-ramus), COPD, Sarcoidosis and MDD. Presented to ED with fall found to have Type II displaced odontoid fracture. Neck pain present with headache. Denies weakness, paresthesias, bowel/bladder issues, or gait imbalance. Patient admitted to NSGY team for possible surgery. Medicine consulted for pre-op evaluation. Patient is able to perform all ADL's at home. He was able to walk about 8 blocks without issues month ago but now he is having more difficult time due to SOB in exertion. Patient was not able to follow up with Dr. Kelly (cards) due to hip fracture and now with neck fracture.     Patient's July 2020 Echo 60% EF with diastolic dysfunction.     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    Hyperlipidemia     Hypertension     Ischemic cardiomyopathy 11/5/2014    MDD (major depressive disorder), recurrent episode, mild 2/17/2016    Osteoporosis 7/20/2016    Peripheral arterial disease 3/18/2016    Sarcoid     Squamous cell carcinoma 09/2016, 5/2016    crown of scalp, left wrist       Past Surgical History:   Procedure Laterality Date    CARDIAC SURGERY      CAROTID STENT N/A 10/14/2019    Procedure: INSERTION, STENT, ARTERY, CAROTID;  Surgeon: Artie Kebede MD;  Location: [facility] CATH LAB;  Service: Cardiology;  Laterality: N/A;    CATARACT EXTRACTION W/  INTRAOCULAR LENS IMPLANT Right 07/17/2018    Dr. Talamantes    CATARACT EXTRACTION W/  INTRAOCULAR LENS IMPLANT Left 07/31/2018    Dr. Talamantes    CORONARY ARTERY BYPASS GRAFT      x2  10/2014    ESOPHAGOGASTRODUODENOSCOPY N/A 4/8/2019    Procedure: ESOPHAGOGASTRODUODENOSCOPY (EGD)/poss rfa;  Surgeon: Clifford De La Torre MD;  Location: [facility] ENDO (2ND FLR);  Service: Endoscopy;  Laterality: N/A;    ESOPHAGOGASTRODUODENOSCOPY (EGD) WITH RADIOFREQUENCY TREATMENT OF GASTROESOPHAGEAL JUNCTION      INJECTION OF ANESTHETIC AGENT AROUND MEDIAL BRANCH NERVES INNERVATING CERVICAL FACET JOINT Bilateral 1/9/2020    Procedure: INJECTION, MBB--Bilateral Cervical- Third Occipital nerve, C2-3--ASA okay for pt to continue taking per provider.;  Surgeon: Tip Mera Jr., MD;  Location: [facility] PAIN MGT;  Service: Pain Management;  Laterality: Bilateral;    INTRAOCULAR PROSTHESES INSERTION Right 7/17/2018    Procedure: INSERTION, INTRAOCULAR LENS PROSTHESIS;  Surgeon: León Talamantes MD;  [facility];  Service: Ophthalmology;  Laterality: Right;    INTRAOCULAR PROSTHESES INSERTION Left 7/31/2018    Procedure: INSERTION, INTRAOCULAR LENS PROSTHESIS;  Surgeon: León Talamantes MD;  [facility];  Service: Ophthalmology;  Laterality: Left;    PHACOEMULSIFICATION OF CATARACT Right  7/17/2018    Procedure: PHACOEMULSIFICATION, CATARACT;  Surgeon: León Talamantes MD;  Location: [facility] OR [address] [place] [address];  Service: Ophthalmology;  Laterality: Right;    PHACOEMULSIFICATION OF CATARACT Left 7/31/2018    Procedure: PHACOEMULSIFICATION, CATARACT;  Surgeon: León Talamantes MD;  Location: [facility] OR [address] [place] [address];  Service: Ophthalmology;  Laterality: Left;    RADIOFREQUENCY THERMOCOAGULATION Bilateral 1/30/2020    Procedure: RADIOFREQUENCY THERMAL COAGULATION--Bilateral C2-3 and Third Occipital Nerve;  Surgeon: Tip Mera Jr., MD;  Location: [facility] PAIN MGT;  Service: Pain Management;  Laterality: Bilateral;    UMBILICAL HERNIA REPAIR         Review of patient's allergies indicates:   Allergen Reactions    Morphine Shortness Of Breath       Current Facility-Administered Medications on File Prior to Encounter   Medication    0.9%  NaCl infusion    lidocaine (PF) 10 mg/ml (1%) injection 10 mg     Current Outpatient Medications on File Prior to Encounter   Medication Sig    acetaminophen (TYLENOL) 500 MG tablet Take 1,000 mg by mouth every 6 (six) hours as needed for Pain.    albuterol (PROVENTIL) 2.5 mg /3 mL (0.083 %) nebulizer solution Take 3 mLs (2.5 mg total) by nebulization every 6 (six) hours as needed for Wheezing. Rescue    alendronate (FOSAMAX) 70 MG tablet 1 tab PO qwk in the am with a full glass of water on an empty stomach. Do not consume food or lie down for at least 30 minutes afterwards.    ALPRAZolam (XANAX) 0.25 MG tablet Take 1 tablet (0.25 mg total) by mouth 3 (three) times daily as needed for Anxiety. (Patient not taking: Reported on 10/5/2020)    aspirin (ECOTRIN) 81 MG EC tablet Take 81 mg by mouth once daily.    atorvastatin (LIPITOR) 40 MG tablet TAKE 1 TABLET BY MOUTH EVERY DAY    butalbital-acetaminophen-caffeine -40 mg (FIORICET, ESGIC) -40 mg per tablet 1-2 tabs PO q6 PRN headaches (Patient not taking: Reported on 10/5/2020)    diclofenac sodium  (VOLTAREN) 1 % Gel Apply 2 g topically every 6 (six) hours as needed. (Patient not taking: Reported on 10/5/2020)    fluorouracil 5% 5 % Soln Use hs for 2 weeks (Patient not taking: Reported on 10/5/2020)    isosorbide mononitrate (IMDUR) 30 MG 24 hr tablet Take 1 tablet (30 mg total) by mouth once daily.    magnesium oxide (MAG-OX) 400 mg (241.3 mg magnesium) tablet Take 1 tablet (400 mg total) by mouth once daily.    omega-3 fatty acids-vitamin E (FISH OIL) 1,000 mg Cap Take 1 tablet by mouth 2 (two) times daily. (Patient taking differently: Take 1 tablet by mouth once daily. )    ondansetron (ZOFRAN) 4 MG tablet Take 1 tablet (4 mg total) by mouth every 8 (eight) hours as needed for Nausea. (Patient not taking: Reported on 10/5/2020)    pantoprazole (PROTONIX) 40 MG tablet Take 40 mg by mouth 2 (two) times a day.     pantoprazole (PROTONIX) 40 MG tablet TAKE 1 TABLET BY MOUTH TWICE DAILY    riboflavin, vitamin B2, (VITAMIN B-2) 100 mg Tab tablet Take 1 tablet (100 mg total) by mouth once daily.    sertraline (ZOLOFT) 50 MG tablet TAKE 1 TABLET BY MOUTH EVERY DAY    triamcinolone acetonide 0.1% (KENALOG) 0.1 % cream Apply topically 2 (two) times daily. (Patient not taking: Reported on 10/5/2020)     Family History     Problem Relation (Age of Onset)    Arrhythmia Mother    Heart disease Mother    Heart failure Brother    No Known Problems Daughter, Son        Tobacco Use    Smoking status: Former Smoker     Packs/day: 2.00     Years: 20.00     Pack years: 40.00     Types: Cigarettes     Quit date: 1988     Years since quittin.6    Smokeless tobacco: Never Used   Substance and Sexual Activity    Alcohol use: No     Comment: quit drinking 2012    Drug use: No    Sexual activity: Not Currently     Partners: Female     Review of Systems   Constitutional: Positive for activity change. Negative for appetite change, chills, fatigue and fever.   HENT: Negative for congestion, sore throat and  trouble swallowing.    Respiratory: Positive for shortness of breath. Negative for cough.    Cardiovascular: Negative for chest pain, palpitations and leg swelling.   Gastrointestinal: Negative for abdominal distention, abdominal pain, constipation, diarrhea, nausea and vomiting.   Genitourinary: Negative for decreased urine volume, difficulty urinating and dysuria.   Musculoskeletal: Positive for arthralgias. Negative for back pain and joint swelling.   Skin: Positive for wound (head trauma from fall). Negative for color change.   Neurological: Positive for weakness. Negative for dizziness, numbness and headaches.   Psychiatric/Behavioral: Negative for agitation, behavioral problems and confusion.     Objective:     Vital Signs (Most Recent):  Temp: 97.7 °F (36.5 °C) (10/20/20 0819)  Pulse: (!) 58 (10/20/20 0847)  Resp: 15 (10/20/20 0819)  BP: 138/61 (10/20/20 0819)  SpO2: 95 % (10/20/20 0819) Vital Signs (24h Range):  Temp:  [96.9 °F (36.1 °C)-97.7 °F (36.5 °C)] 97.7 °F (36.5 °C)  Pulse:  [58-83] 58  Resp:  [15-29] 15  SpO2:  [91 %-99 %] 95 %  BP: (138-206)/(61-93) 138/61     Weight: 63.5 kg (139 lb 15.9 oz)  Body mass index is 21.93 kg/m².    Physical Exam  Constitutional:       Appearance: Normal appearance.   HENT:      Head:      Comments: Healing wound on top of the head     Mouth/Throat:      Mouth: Mucous membranes are moist.   Eyes:      Pupils: Pupils are equal, round, and reactive to light.   Neck:      Comments: In C-collar   Cardiovascular:      Rate and Rhythm: Normal rate and regular rhythm.      Pulses: Normal pulses.      Heart sounds: No murmur.   Pulmonary:      Effort: Pulmonary effort is normal.      Breath sounds: Normal breath sounds.   Abdominal:      General: Abdomen is flat. Bowel sounds are normal.      Palpations: Abdomen is soft.   Musculoskeletal:         General: No swelling.   Skin:     General: Skin is warm.   Neurological:      General: No focal deficit present.      Mental Status:  He is alert and oriented to person, place, and time.   Psychiatric:         Mood and Affect: Mood normal.         Behavior: Behavior normal.         Thought Content: Thought content normal.         Significant Labs:   BMP:   Recent Labs   Lab 10/20/20  0351      *   K 4.3   CL 98   CO2 23   BUN 14   CREATININE 0.8   CALCIUM 9.2     CBC:   Recent Labs   Lab 10/20/20  0351   WBC 6.58   HGB 13.6*   HCT 41.1        All pertinent labs within the past 24 hours have been reviewed.    Significant Imaging: I have reviewed all pertinent imaging results/findings within the past 24 hours.    Assessment/Plan:     * Odontoid fracture  - Per NSGY      Pre-op evaluation  Surgical Risk Assessment:    Active Cardiac Issues:  Active decompensated heart failure? No   Unstable angina?  No   Significant uncontrolled arrhythmias? No   Severe valvular heart disease-Aortic or Mitral Stenosis? No   Recent MI or coronary revascularization < 30 days? No     Cardiac Risk Factors:  High risk surgery? No   History of CAD/ischemic heart disease? Yes   History of cerebrovascular disease? No   History of compensated heart failure? Yes   Type 2 diabetes requiring insulin? No   Serum Creatinine > 2? No   Total cardiac risk factors 2     Functional mets Moderate. Patient able to walks 2 blocks without getting short of breath. Able to perform all his ADLs. He was able to fish independently three weeks ago.  Does not think that he can climb 1 flight of stairs due to hip trouble and worsening SOB. Able to ambulate around his house.     < 4 METs -unable to walk > 2 blocks on level ground without stopping due to symptoms  - eating, dressing, toileting, walking indoors, light housework. POOR   > 4 METs -climbing > 1 flight of stairs without stopping  -walking up hill > 1-2 blocks  -scrubbing floors  -moving furniture  - golf, bowling, dancing or tennis  -running short distance MODERATE to EXCELLENT     Perioperative Risk Assessment:  RCRI  Score 2, Class 3 risk with 10.1% risk of cardiopulmonary complications    Patient is at intermediate risk for perioperative cardiopulmonary complications. Recommend cardiology work up before preceding with surgery.         VTE Risk Mitigation (From admission, onward)         Ordered     IP VTE HIGH RISK PATIENT  Once      10/19/20 1829     Place sequential compression device  Until discontinued      10/19/20 1829     Place EMMETT hose  Until discontinued      10/19/20 1829                Thank you for your consult. I will sign off. Please contact us if you have any additional questions.    DEMETRIUS He MD  Department of Hospital Medicine   [facility]

## 2020-10-20 NOTE — PROGRESS NOTES
Patient hospitalized due to a fall with subsequent neck injury. He is to undergo surgery for a fractured Odontoid. Patients son messaged to notify of present situation. Visits canceled until further notice.     Joseph Mcduffie PT

## 2020-10-20 NOTE — CONSULTS
See H&P from 10/19    Katerina Schafer PA-C   [phone]  Neurosurgery  [facility]

## 2020-10-20 NOTE — SUBJECTIVE & OBJECTIVE
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    Hyperlipidemia     Hypertension     Ischemic cardiomyopathy 11/5/2014    MDD (major depressive disorder), recurrent episode, mild 2/17/2016    Osteoporosis 7/20/2016    Peripheral arterial disease 3/18/2016    Sarcoid     Squamous cell carcinoma 09/2016, 5/2016    crown of scalp, left wrist       Past Surgical History:   Procedure Laterality Date    CARDIAC SURGERY      CAROTID STENT N/A 10/14/2019    Procedure: INSERTION, STENT, ARTERY, CAROTID;  Surgeon: Artie Kebede MD;  Location: [facility] CATH LAB;  Service: Cardiology;  Laterality: N/A;    CATARACT EXTRACTION W/  INTRAOCULAR LENS IMPLANT Right 07/17/2018    Dr. Talamantes    CATARACT EXTRACTION W/  INTRAOCULAR LENS IMPLANT Left 07/31/2018    Dr. Talamantes    CORONARY ARTERY BYPASS GRAFT      x2  10/2014    ESOPHAGOGASTRODUODENOSCOPY N/A 4/8/2019    Procedure: ESOPHAGOGASTRODUODENOSCOPY (EGD)/poss rfa;  Surgeon: Clifford De La Torre MD;  Location: [facility] ENDO ([facility] FLR);  Service: Endoscopy;  Laterality: N/A;    ESOPHAGOGASTRODUODENOSCOPY (EGD) WITH RADIOFREQUENCY TREATMENT OF GASTROESOPHAGEAL JUNCTION      INJECTION OF ANESTHETIC AGENT AROUND MEDIAL BRANCH NERVES INNERVATING CERVICAL FACET JOINT Bilateral 1/9/2020    Procedure: INJECTION, MBB--Bilateral Cervical- Third Occipital nerve, C2-3--ASA okay for pt to continue taking per provider.;  Surgeon: Tip Mera Jr., MD;  Location: [facility] PAIN MGT;  Service: Pain Management;  Laterality: Bilateral;    INTRAOCULAR PROSTHESES INSERTION Right 7/17/2018    Procedure: INSERTION, INTRAOCULAR LENS PROSTHESIS;  Surgeon: León Talamantes MD;  Location: [facility]  OR 1ST FLR;  Service: Ophthalmology;  Laterality: Right;    INTRAOCULAR PROSTHESES INSERTION Left 7/31/2018    Procedure: INSERTION, INTRAOCULAR LENS PROSTHESIS;  Surgeon: León Talamantes MD;  [facility];  Service: Ophthalmology;  Laterality: Left;    PHACOEMULSIFICATION OF CATARACT Right 7/17/2018    Procedure: PHACOEMULSIFICATION, CATARACT;  Surgeon: León Talamantes MD;  [facility];  Service: Ophthalmology;  Laterality: Right;    PHACOEMULSIFICATION OF CATARACT Left 7/31/2018    Procedure: PHACOEMULSIFICATION, CATARACT;  Surgeon: León Talamantes MD;  [facility];  Service: Ophthalmology;  Laterality: Left;    RADIOFREQUENCY THERMOCOAGULATION Bilateral 1/30/2020    Procedure: RADIOFREQUENCY THERMAL COAGULATION--Bilateral C2-3 and Third Occipital Nerve;  Surgeon: Tip Mera Jr., MD;  Location: [facility];  Service: Pain Management;  Laterality: Bilateral;    UMBILICAL HERNIA REPAIR         Review of patient's allergies indicates:   Allergen Reactions    Morphine Shortness Of Breath       Current Facility-Administered Medications on File Prior to Encounter   Medication    0.9%  NaCl infusion    lidocaine (PF) 10 mg/ml (1%) injection 10 mg     Current Outpatient Medications on File Prior to Encounter   Medication Sig    acetaminophen (TYLENOL) 500 MG tablet Take 1,000 mg by mouth every 6 (six) hours as needed for Pain.    albuterol (PROVENTIL) 2.5 mg /3 mL (0.083 %) nebulizer solution Take 3 mLs (2.5 mg total) by nebulization every 6 (six) hours as needed for Wheezing. Rescue    alendronate (FOSAMAX) 70 MG tablet 1 tab PO qwk in the am with a full glass of water on an empty stomach. Do not consume food or lie down for at least 30 minutes afterwards.    ALPRAZolam (XANAX) 0.25 MG tablet Take 1 tablet (0.25 mg total) by mouth 3 (three) times daily as needed for Anxiety. (Patient not taking: Reported on 10/5/2020)    aspirin (ECOTRIN)  81 MG EC tablet Take 81 mg by mouth once daily.    atorvastatin (LIPITOR) 40 MG tablet TAKE 1 TABLET BY MOUTH EVERY DAY    butalbital-acetaminophen-caffeine -40 mg (FIORICET, ESGIC) -40 mg per tablet 1-2 tabs PO q6 PRN headaches (Patient not taking: Reported on 10/5/2020)    diclofenac sodium (VOLTAREN) 1 % Gel Apply 2 g topically every 6 (six) hours as needed. (Patient not taking: Reported on 10/5/2020)    fluorouracil 5% 5 % Soln Use hs for 2 weeks (Patient not taking: Reported on 10/5/2020)    isosorbide mononitrate (IMDUR) 30 MG 24 hr tablet Take 1 tablet (30 mg total) by mouth once daily.    magnesium oxide (MAG-OX) 400 mg (241.3 mg magnesium) tablet Take 1 tablet (400 mg total) by mouth once daily.    omega-3 fatty acids-vitamin E (FISH OIL) 1,000 mg Cap Take 1 tablet by mouth 2 (two) times daily. (Patient taking differently: Take 1 tablet by mouth once daily. )    ondansetron (ZOFRAN) 4 MG tablet Take 1 tablet (4 mg total) by mouth every 8 (eight) hours as needed for Nausea. (Patient not taking: Reported on 10/5/2020)    pantoprazole (PROTONIX) 40 MG tablet Take 40 mg by mouth 2 (two) times a day.     pantoprazole (PROTONIX) 40 MG tablet TAKE 1 TABLET BY MOUTH TWICE DAILY    riboflavin, vitamin B2, (VITAMIN B-2) 100 mg Tab tablet Take 1 tablet (100 mg total) by mouth once daily.    sertraline (ZOLOFT) 50 MG tablet TAKE 1 TABLET BY MOUTH EVERY DAY    triamcinolone acetonide 0.1% (KENALOG) 0.1 % cream Apply topically 2 (two) times daily. (Patient not taking: Reported on 10/5/2020)     Family History     Problem Relation (Age of Onset)    Arrhythmia Mother    Heart disease Mother    Heart failure Brother    No Known Problems Daughter, Son        Tobacco Use    Smoking status: Former Smoker     Packs/day: 2.00     Years: 20.00     Pack years: 40.00     Types: Cigarettes     Quit date: 1988     Years since quittin.6    Smokeless tobacco: Never Used   Substance and Sexual  Activity    Alcohol use: No     Comment: quit drinking beer 2012    Drug use: No    Sexual activity: Not Currently     Partners: Female     Review of Systems   Constitution: Negative for chills and fever.   HENT: Negative for congestion and sore throat.    Eyes: Negative for pain.   Cardiovascular: Positive for dyspnea on exertion (progressively worsening over the last 2 months). Negative for chest pain, leg swelling and syncope.   Respiratory: Positive for cough and shortness of breath.    Hematologic/Lymphatic: Does not bruise/bleed easily.   Musculoskeletal: Positive for neck pain.   Gastrointestinal: Negative for abdominal pain, nausea and vomiting.   Genitourinary: Negative for dysuria.   Neurological: Positive for headaches.     Objective:     Vital Signs (Most Recent):  Temp: 98.1 °F (36.7 °C) (10/20/20 1219)  Pulse: 62 (10/20/20 1317)  Resp: 15 (10/20/20 1330)  BP: (!) 119/58 (10/20/20 1219)  SpO2: 96 % (10/20/20 1317) Vital Signs (24h Range):  Temp:  [96.9 °F (36.1 °C)-98.1 °F (36.7 °C)] 98.1 °F (36.7 °C)  Pulse:  [58-83] 62  Resp:  [14-29] 15  SpO2:  [91 %-99 %] 96 %  BP: (119-206)/(58-93) 119/58     Weight: 63.5 kg (139 lb 15.9 oz)  Body mass index is 21.93 kg/m².    SpO2: 96 %  O2 Device (Oxygen Therapy): room air      Intake/Output Summary (Last 24 hours) at 10/20/2020 1522  Last data filed at 10/20/2020 0400  Gross per 24 hour   Intake 20 ml   Output 625 ml   Net -605 ml       Lines/Drains/Airways     Peripheral Intravenous Line                 Peripheral IV - Single Lumen 10/19/20 1916 20 G Left Forearm less than 1 day                Physical Exam   Constitutional: He is oriented to person, place, and time. He appears well-developed and well-nourished.   HENT:   Wound on top of head   Eyes: Pupils are equal, round, and reactive to light.   Neck:   Patient in cervical collar     Cardiovascular: Normal rate, regular rhythm and normal heart sounds.   Pulmonary/Chest: Effort normal. No respiratory  distress. He has wheezes.   Abdominal: Soft. There is no abdominal tenderness.   Musculoskeletal: Normal range of motion.   Neurological: He is alert and oriented to person, place, and time.   Skin: Skin is warm.   Psychiatric: He has a normal mood and affect.       Significant Labs: All pertinent lab results from the last 24 hours have been reviewed.

## 2020-10-20 NOTE — ANESTHESIA PREPROCEDURE EVALUATION
[facility]  Anesthesia Pre-Operative Evaluation     Patient Name: Paul Browning  YOB: 1940  MRN: [id number]  [id number]: [id number]       Admit Date: 10/19/2020   Admit Team: Networked reference to record PCT   Hospital Day: 4  Date of Procedure: 10/22/2020  Anesthesia: General Procedure: Procedure(s) (LRB):  INSERTION, SCREW, ODONTOID. Anterior approach. 2 C-Arms. reg bed. Globus instrumentation. Neuromonitoring (N/A)  Pre-Operative Diagnosis: Odontoid fracture [S12.110A]  Proceduralist:Surgeon(s) and Role:     * Kirsten Weaver MD - Primary  Code Status: Full Code   Advanced Directive: <no information>  Isolation Precautions: No active isolations  Capacity: Full capacity     SUBJECTIVE:   Paul Browning is a 80 y.o. 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  Paul Browning is a 80 y.o. 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 MRI cervical spine shows subacute type II odontoid fracture with disruption of ALL and mild buckling of PLL, all other ligaments intact. There is spondylosis throughout with large disc  protrusion at C5-6 with severe central canal narrowing. NSGY consulted for Type II displaced odontoid fracture. Neck pain present with slight headache. Denies weakness, paresthesias, bowel/bladder issues, or gait imbalance.  CXR today revealed chronic granulomatous nodular infiltrates in his upper lobes bilaterally. ECG today revealed sinus bradycardia with new TWI in the lateral leads.     Patient was recently seen in cardiology clinic for this LANGLEY. His usual cardiologist is Dr. Kelly but this past visit he was seen in Dr. Zeng's clinic where a CTA was ordered which revealed the pulmonary sarcoid. Patient wore a holter monitor in July 2020 which revealed good chronotropic competence. PET stress test in 2019 revealed ischemia in the OM2 area, which is consistent with his findings on angio in 2014. Echo in July 2020 revealed an EF of 60%, local segmental wall abnormalities, and Grade I diastolic dysfunction.     Per cardiology:  Recommendations  - Patient has an extensive cardiac history and recent LANGLEY that has progressively worsened. The LANGLEY is likely due to the patients pulmonary sarcoidosis that was recently found on CT. He has not had any chest pain and his troponin was normal. Patients risk factors for surgery are chronic issues so there is nothing we need to acutely manage, or test we need to order before the surgery.     Patient now presents for the above procedure(s).    Carotid stent 10/14/19  · A STENT PRECISE PRO 8.0 X 40 stent was successfully placed.  · Estimated blood loss: between 50 mL and 150 mL  · Successful cartotid stent with proximal emboli protection.  · Follow-up in 1 month with carotid ultrasound.  · DAPT x1 month.     [facility] 8/19/14  DIAGNOSTIC: Patient has a right dominant coronary artery.      - Left Main Coronary Artery: The distal LM has a 30% stenosis. There is JOVANNA 3 flow.     - Left Anterior Descending Artery: The ostial LAD has a 80% stenosis. There is JOVANNA 3 flow.     - Left  Circumflex Artery: The LCX has luminal irregularities. There is JOVANNA 3 flow.     - Right Coronary Artery: The proximal RCA is occluded. There is JOVANNA 0 flow. Proximal stented with 3 mm stent and distal with 2.25 stent.     - Right Ventricle: The right ventricle. There is JOVANNA 3 flow.     - Common Femoral Artery: The right CFA has luminal irregularities. There is JOVANNA 3 flow.     - Femoral Artery: The femoral artery is normal. There is JOVANNA 3 flow.     - D1: The proximal D1 has a 90% stenosis. There is JOVANNA 3 flow.     - OM2: The proximal OM2 has subtotal. There is JOVANNA 3 flow.  INTERVENTION:       Proximal RCA: The lesion was successfully intervened. Post-stenosis of 0%, post-JOVANNA 3 flow and TMP grade 2. The vessel was accessed natively.  The following items were used: Cath Pronto Lp, Stent Xience Prime Rx 3.0x33 (JONNA) and Stent Xience Rx 2.25x12 (JONNA).    TTE 7/20/20:  · Normal left ventricular systolic function. The estimated ejection fraction is 60%.  · Local segmental wall motion abnormalities.  · Grade I (mild) left ventricular diastolic dysfunction consistent with impaired relaxation.  · Mild tricuspid regurgitation. TR gradient of 27 mm of mercury.  · Normal right ventricular systolic function.  · Mild mitral regurgitation.  · Aortic valve sclerosis without significant stenosis    Prev airway:   10/21/14;: 0717; Direct laryngoscopy; Inserted by: Anesthesia MD; Airway Device: Endotracheal Tube; Mask Ventilation: Other (RSI); Intubated: Postinduction; Blade: Cunningham #2; Airway Device Size: 9.0;: Grade I; Depth of Insertion: 23; Attempts: 1;     Hospital LOS: 3 days  ICU LOS: Patient does not have an ICU stay during this admission.    Revised cardiac risk index (RCRI) score is 3.    he has a current medication list which includes the following long-term medication(s): albuterol, alendronate, alprazolam, atorvastatin, diclofenac sodium, isosorbide mononitrate, omega-3 fatty acids-vitamin e, pantoprazole,  pantoprazole, sertraline, and triamcinolone acetonide 0.1%.     ALLERGIES:     Review of patient's allergies indicates:   Allergen Reactions    Morphine Shortness Of Breath     LDA:   AIRWAY:     * No LDAs found *      Lines/Drains/Airways     Peripheral Intravenous Line                 Peripheral IV - Single Lumen 10/22/20 1016 20 G Left Antecubital less than 1 day               Anesthesia Evaluation      No history of anesthetic complications   Airway   Mallampati: II  TM distance: Normal, at least 6 cm  Neck ROM: Normal ROM  Dental    (+) Upper Dentures        Pulmonary    (+) shortness of breath,   (-) COPD (patient denies.), asthma, sleep apnea  Cardiovascular   (+) hypertension well controlled, past MI (2016), CAD (s/p stent and CABG), CABG/stent (stent immediately with MI, CABG weeks later.),   (-) angina    Rate: Normal    Neuro/Psych    (+) neuromuscular disease, headaches,   (-) seizures, TIA, CVA    GI/Hepatic/Renal    (+) GERD, PUD,   (-) liver disease, renal disease    Endo/Other    (+) arthritis  Abdominal                    MEDICATIONS:     Current Outpatient Medications on File Prior to Encounter   Medication Sig Dispense Refill Last Dose    acetaminophen (TYLENOL) 500 MG tablet Take 1,000 mg by mouth every 6 (six) hours as needed for Pain.       albuterol (PROVENTIL) 2.5 mg /3 mL (0.083 %) nebulizer solution Take 3 mLs (2.5 mg total) by nebulization every 6 (six) hours as needed for Wheezing. Rescue 1 Box 0     alendronate (FOSAMAX) 70 MG tablet 1 tab PO qwk in the am with a full glass of water on an empty stomach. Do not consume food or lie down for at least 30 minutes afterwards. 12 tablet 3     ALPRAZolam (XANAX) 0.25 MG tablet Take 1 tablet (0.25 mg total) by mouth 3 (three) times daily as needed for Anxiety. (Patient not taking: Reported on 10/5/2020) 45 tablet 0     aspirin (ECOTRIN) 81 MG EC tablet Take 81 mg by mouth once daily.       atorvastatin (LIPITOR) 40 MG tablet TAKE 1 TABLET  BY MOUTH EVERY DAY 90 tablet 3     butalbital-acetaminophen-caffeine -40 mg (FIORICET, ESGIC) -40 mg per tablet 1-2 tabs PO q6 PRN headaches (Patient not taking: Reported on 10/5/2020) 60 tablet 0     diclofenac sodium (VOLTAREN) 1 % Gel Apply 2 g topically every 6 (six) hours as needed. (Patient not taking: Reported on 10/5/2020) 1 Tube 6     fluorouracil 5% 5 % Soln Use hs for 2 weeks (Patient not taking: Reported on 10/5/2020) 10 mL 1     isosorbide mononitrate (IMDUR) 30 MG 24 hr tablet Take 1 tablet (30 mg total) by mouth once daily. 30 tablet 11     magnesium oxide (MAG-OX) 400 mg (241.3 mg magnesium) tablet Take 1 tablet (400 mg total) by mouth once daily. 30 tablet 3     omega-3 fatty acids-vitamin E (FISH OIL) 1,000 mg Cap Take 1 tablet by mouth 2 (two) times daily. (Patient taking differently: Take 1 tablet by mouth once daily. ) 60 each 11     ondansetron (ZOFRAN) 4 MG tablet Take 1 tablet (4 mg total) by mouth every 8 (eight) hours as needed for Nausea. (Patient not taking: Reported on 10/5/2020) 30 tablet 1     pantoprazole (PROTONIX) 40 MG tablet Take 40 mg by mouth 2 (two) times a day.        pantoprazole (PROTONIX) 40 MG tablet TAKE 1 TABLET BY MOUTH TWICE DAILY 180 tablet 0     riboflavin, vitamin B2, (VITAMIN B-2) 100 mg Tab tablet Take 1 tablet (100 mg total) by mouth once daily. 90 tablet 3     sertraline (ZOLOFT) 50 MG tablet TAKE 1 TABLET BY MOUTH EVERY DAY 90 tablet 3     triamcinolone acetonide 0.1% (KENALOG) 0.1 % cream Apply topically 2 (two) times daily. (Patient not taking: Reported on 10/5/2020) 45 g 1       Inpatient Medications:  Antibiotics (From admission, onward)    Start     Stop Route Frequency Ordered    10/22/20 0934  ceFAZolin injection 2 g      -- IV 30 min pre-op 10/22/20 0934        VTE Risk Mitigation (From admission, onward)         Ordered     IP VTE HIGH RISK PATIENT  Once      10/19/20 1829     Place sequential compression device  Until  discontinued      10/19/20 1829     Place EMMETT hose  Until discontinued      10/19/20 1829               albuterol sulfate  2.5 mg Nebulization Q4H    atorvastatin  40 mg Oral Daily    ceFAZolin (ANCEF) IVPB  2 g Intravenous 30 Min Pre-Op    isosorbide mononitrate  30 mg Oral Daily    methocarbamoL  500 mg Oral QID    pantoprazole  40 mg Oral BID    sertraline  50 mg Oral Daily    tiotropium  1 capsule Inhalation Daily       Current Facility-Administered Medications   Medication Dose Route Frequency Provider Last Rate Last Dose    albuterol sulfate nebulizer solution 2.5 mg  2.5 mg Nebulization Q4H Katerina Schafer PA-C   2.5 mg at 10/22/20 0445    atorvastatin tablet 40 mg  40 mg Oral Daily SHELLIE Peters-C   Stopped at 10/22/20 0900    butalbital-acetaminophen-caffeine -40 mg per tablet 1 tablet  1 tablet Oral Q4H PRN Jude Vital MD   1 tablet at 10/22/20 0158    ceFAZolin injection 2 g  2 g Intravenous 30 Min Pre-Op Mae SHELLIE Baker-C        dextrose 50% injection 12.5 g  12.5 g Intravenous PRN SHELLIE Peters-C        dextrose 50% injection 25 g  25 g Intravenous PRN Katerina LSHELLIE Dai-C        glucagon (human recombinant) injection 1 mg  1 mg Intramuscular PRN SHELLIE Peters-C        glucose chewable tablet 16 g  16 g Oral PRN Katreina LSHELLIE Dai-C        glucose chewable tablet 16 g  16 g Oral PRN Katerina LSHELLIE Dai-C        glucose chewable tablet 24 g  24 g Oral PRN Katerina LSHELLIE Dai-C        hydrALAZINE injection 10 mg  10 mg Intravenous Q10 Min PRN Dusty Knox MD        HYDROcodone-acetaminophen 5-325 mg per tablet 1 tablet  1 tablet Oral Q4H PRN EMIL PriceC   1 tablet at 10/22/20 0421    isosorbide mononitrate 24 hr tablet 30 mg  30 mg Oral Daily SHELLIE Peters-C   Stopped at 10/22/20 0900    methocarbamoL tablet 500 mg  500 mg Oral QID EMIL PriceC   500 mg at 10/21/20 2000     ondansetron injection 4 mg  4 mg Intravenous Q4H PRN Dusty Knox MD   4 mg at 10/20/20 0417    pantoprazole EC tablet 40 mg  40 mg Oral BID Katerina Schafer PA-C   Stopped at 10/22/20 0900    promethazine tablet 25 mg  25 mg Oral Q6H PRN Dusty Knox MD   25 mg at 10/20/20 0548    sertraline tablet 50 mg  50 mg Oral Daily Katerina Schafer PA-C   Stopped at 10/22/20 0900    tiotropium inhalation capsule 18 mcg  1 capsule Inhalation Daily Liseth Carter MD         Facility-Administered Medications Ordered in Other Encounters   Medication Dose Route Frequency Provider Last Rate Last Dose    0.9%  NaCl infusion  500 mL Intravenous Continuous Tip Mera Jr., MD        lidocaine (PF) 10 mg/ml (1%) injection 10 mg  1 mL Intradermal Once Tip Mera Jr., MD              History:     Active Hospital Problems    Diagnosis  POA    *Odontoid fracture [S12.110A]  Yes    Pre-op evaluation [Z01.818]  Not Applicable    Hypertension [I10]  Unknown      Resolved Hospital Problems   No resolved problems to display.     Surgical History:    has a past surgical history that includes Umbilical hernia repair; Coronary artery bypass graft; Cardiac surgery; Phacoemulsification of cataract (Right, 7/17/2018); Intraocular prosthesis insertion (Right, 7/17/2018); Phacoemulsification of cataract (Left, 7/31/2018); Intraocular prosthesis insertion (Left, 7/31/2018); Cataract extraction w/  intraocular lens implant (Right, 07/17/2018); Cataract extraction w/  intraocular lens implant (Left, 07/31/2018); Esophagogastroduodenoscopy (EGD) with radiofrequency treatment of gastroesophageal junction; Esophagogastroduodenoscopy (N/A, 4/8/2019); Carotid stent (N/A, 10/14/2019); Injection of anesthetic agent around medial branch nerves innervating cervical facet joint (Bilateral, 1/9/2020); and Radiofrequency thermocoagulation (Bilateral, 1/30/2020).   Social History:    reports previously being sexually active and has  had partner(s) who are Female.  reports that he quit smoking about 32 years ago. His smoking use included cigarettes. He has a 40.00 pack-year smoking history. He has never used smokeless tobacco. He reports that he does not drink alcohol or use drugs.    Vitals:    10/22/20 0445 10/22/20 0600 10/22/20 0804 10/22/20 0946   BP:   (!) 165/76 (!) 173/81   BP Location:   Right arm Left arm   Patient Position:   Lying Lying   Pulse: 70 72 69 68   Resp: 18  18 18   Temp:   36.6 °C (97.9 °F) 36.4 °C (97.5 °F)   TempSrc:   Oral Oral   SpO2: (!) 93%  (!) 94% 95%   Weight:       Height:         Vital Signs Range (Last 24H):  Temp:  [36.3 °C (97.3 °F)-36.9 °C (98.4 °F)]   Pulse:  [63-87]   Resp:  [12-19]   BP: (107-173)/(55-81)   SpO2:  [93 %-100 %]     Body mass index is 21.93 kg/m².  Wt Readings from Last 4 Encounters:   10/19/20 63.5 kg (139 lb 15.9 oz)   10/06/20 62.6 kg (138 lb 0.1 oz)   10/05/20 62.6 kg (138 lb 0.1 oz)   09/25/20 62.6 kg (138 lb)        Intake/Output - Last 3 Shifts       10/20 0700 - 10/21 0659 10/21 0700 - 10/22 0659 10/22 0700 - 10/23 0659    P.O.  60     I.V. (mL/kg)       Other       Total Intake(mL/kg)  60 (0.9)     Urine (mL/kg/hr) 250 (0.2) 875 (0.6) 250 (1)    Emesis/NG output       Other       Stool  0     Blood       Total Output 250 875 250    Net -250 -815 -250           Stool Occurrence 0 x 0 x         Lab Results   Component Value Date    WBC 4.89 10/22/2020    HGB 11.6 (L) 10/22/2020    HCT 36.5 (L) 10/22/2020     10/22/2020     10/22/2020    K 3.7 10/22/2020    CL 98 10/22/2020    CREATININE 0.9 10/22/2020    BUN 13 10/22/2020    CO2 29 10/22/2020    GLU 92 10/22/2020    CALCIUM 8.8 10/22/2020    MG 2.2 07/14/2015    PHOS 3.9 11/05/2014    ALKPHOS 61 07/20/2020    ALT 13 07/20/2020    AST 18 07/20/2020    ALBUMIN 3.7 07/20/2020    INR 1.0 10/19/2020    APTT 27.9 10/19/2020    GLUF 102 04/22/2015    HGBA1C 5.2 10/15/2014     12/24/2010    CPKMB 0.8 12/24/2010     TROPONINI <0.006 07/20/2020    MB 0.8 12/24/2010    BNP 76 07/20/2020     Recent Results (from the past 12 hour(s))   CBC auto differential    Collection Time: 10/22/20  3:32 AM   Result Value Ref Range    WBC 4.89 3.90 - 12.70 K/uL    RBC 3.87 (L) 4.60 - 6.20 M/uL    Hemoglobin 11.6 (L) 14.0 - 18.0 g/dL    Hematocrit 36.5 (L) 40.0 - 54.0 %    Mean Corpuscular Volume 94 82 - 98 fL    Mean Corpuscular Hemoglobin 30.0 27.0 - 31.0 pg    Mean Corpuscular Hemoglobin Conc 31.8 (L) 32.0 - 36.0 g/dL    RDW 14.2 11.5 - 14.5 %    Platelets 219 150 - 350 K/uL    MPV 10.1 9.2 - 12.9 fL    Immature Granulocytes 0.6 (H) 0.0 - 0.5 %    Gran # (ANC) 3.2 1.8 - 7.7 K/uL    Immature Grans (Abs) 0.03 0.00 - 0.04 K/uL    Lymph # 1.1 1.0 - 4.8 K/uL    Mono # 0.5 0.3 - 1.0 K/uL    Eos # 0.1 0.0 - 0.5 K/uL    Baso # 0.03 0.00 - 0.20 K/uL    nRBC 0 0 /100 WBC    Gran% 64.5 38.0 - 73.0 %    Lymph% 22.9 18.0 - 48.0 %    Mono% 9.6 4.0 - 15.0 %    Eosinophil% 1.8 0.0 - 8.0 %    Basophil% 0.6 0.0 - 1.9 %    Differential Method Automated    Basic metabolic panel    Collection Time: 10/22/20  3:32 AM   Result Value Ref Range    Sodium 136 136 - 145 mmol/L    Potassium 3.7 3.5 - 5.1 mmol/L    Chloride 98 95 - 110 mmol/L    CO2 29 23 - 29 mmol/L    Glucose 92 70 - 110 mg/dL    BUN, Bld 13 8 - 23 mg/dL    Creatinine 0.9 0.5 - 1.4 mg/dL    Calcium 8.8 8.7 - 10.5 mg/dL    Anion Gap 9 8 - 16 mmol/L    eGFR if African American >60.0 >60 mL/min/1.73 m^2    eGFR if non African American >60.0 >60 mL/min/1.73 m^2     Recent Labs   Lab 10/19/20  1915 10/20/20  0351 10/21/20  0359 10/22/20  0332   WBC  --  6.58 5.87 4.89   HGB  --  13.6* 12.0* 11.6*   HCT  --  41.1 37.8* 36.5*   PLT  --  235 218 219   NA  --  133* 135* 136   K  --  4.3 4.0 3.7   CREATININE  --  0.8 0.9 0.9   GLU  --  110 97 92   INR 1.0  --   --   --      No LMP for male patient.    EKG:   Results for orders placed or performed during the hospital encounter of 10/19/20   EKG 12-lead     Collection Time: 10/20/20  8:42 AM    Narrative    Test Reason : Z01.818,    Vent. Rate : 059 BPM     Atrial Rate : 059 BPM     P-R Int : 174 ms          QRS Dur : 096 ms      QT Int : 472 ms       P-R-T Axes : 072 -19 152 degrees     QTc Int : 467 ms    Sinus bradycardia  Nonspecific T wave abnormality  Abnormal ECG  When compared with ECG of 26-FEB-2020 17:12,  Premature ventricular complexes are no longer Present  ST now depressed in Lateral leads  T wave inversion more evident in Lateral leads  Confirmed by ELIANA CHERRY MD ([id number]) on 10/21/2020 4:02:31 PM    Referred By: [email or web address]   SELF           Confirmed By:ELIANA CHERRY MD     TTE:  Results for orders placed or performed during the hospital encounter of 07/20/20   Echo Color Flow Doppler? Yes   Result Value Ref Range    Ascending aorta 3.12 cm    STJ 3.22 cm    AV mean gradient 2 mmHg    Ao peak justen 0.98 m/s    Ao VTI 20.81 cm    IVS 0.94 0.6 - 1.1 cm    LA size 3.90 cm    Left Atrium Major Axis 4.39 cm    Left Atrium Minor Axis 4.33 cm    LVIDd 4.40 3.5 - 6.0 cm    LVIDs 3.66 2.1 - 4.0 cm    LVOT diameter 2.14 cm    LVOT peak VTI 22.25 cm    Posterior Wall 0.73 0.6 - 1.1 cm    MV Peak A Justen 1.03 m/s    E wave decelartion time 213.19 msec    MV Peak E Justen 0.56 m/s    RA Major Axis 4.32 cm    RA Width 3.87 cm    RVDD 3.75 cm    Sinus 3.44 cm    TAPSE 1.65 cm    TR Max Justen 2.61 m/s    TDI LATERAL 0.06 m/s    TDI SEPTAL 0.03 m/s    LA WIDTH 3.95 cm    MV stenosis pressure 1/2 time 61.82 ms    LV Diastolic Volume 100.54 mL    LV Systolic Volume 56.57 mL    LVOT peak justen 0.93 m/s    LV LATERAL E/E' RATIO 9.33 m/s    LV SEPTAL E/E' RATIO 18.67 m/s    FS 17 %    LA volume 57.09 cm3    LV mass 115.80 g    Left Ventricle Relative Wall Thickness 0.33 cm    AV valve area 3.84 cm2    AV Velocity Ratio 0.95     AV index (prosthetic) 1.07     MV valve area p 1/2 method 3.56 cm2    E/A ratio 0.54     Mean e' 0.05 m/s    LVOT area 3.6 cm2    LVOT stroke volume 79.99 cm3     AV peak gradient 4 mmHg    E/E' ratio 12.44 m/s    LV Systolic Volume Index 32.8 mL/m2    LV Diastolic Volume Index 58.21 mL/m2    LA Volume Index 33.1 mL/m2    LV Mass Index 67 g/m2    Triscuspid Valve Regurgitation Peak Gradient 27 mmHg    BSA 1.72 m2    Narrative    · Normal left ventricular systolic function. The estimated ejection   fraction is 60%.  · Local segmental wall motion abnormalities.  · Grade I (mild) left ventricular diastolic dysfunction consistent with   impaired relaxation.  · Mild tricuspid regurgitation. TR gradient of 27 mm of mercury.  · Normal right ventricular systolic function.  · Mild mitral regurgitation.  · Aortic valve sclerosis without significant stenosis        Results for orders placed or performed during the hospital encounter of 10/15/14   2D echo with color flow doppler   Result Value Ref Range    QEF 48 55 - 65    Mitral Valve Regurgitation MILD     Diastolic Dysfunction Yes (A)     Aortic Valve Regurgitation TRIVIAL     Est. PA Systolic Pressure 28     Tricuspid Valve Regurgitation MILD      MYRA:  No results found for this or any previous visit.  Stress Test:  Results for orders placed in visit on 01/07/19   PET Stress Test (Cupid Only)    Narrative · There is a small to moderate sized, moderate to severe intensity basal   inferolateral fixed perfusion abnormality involving 10% of the LV   myocardium in the OM2 territory consistent with non-transmural infarct. On   past angiogram, the OM2 was subtotally occluded.  · Whole heart absolute myocardial perfusion (cc/min/g) averaged 1.06 rest   (which is elevated), 1.47 at stress (which is mildly reduced), and 1.37   CFR (which is moderately reduced impart due to elevated resting flow).  · Within the defect absolute myocardial perfusion (cc/min/gm) averaged   0.51 at rest, 0.82 at stress and CFR was 1.78, which equates to severely   reduced coronary flow capacity in 10% of the myocardium (within the OM2   territory).  · Gated  perfusion images showed an ejection fraction of 62 % at rest and   84 % during stress.  · There is basal inferolateral wall hypokinesis and hypokinesis at rest   and stress.  · LV cavity size is normal at rest and normal at stress.  · The EKG portion of this study is negative for myocardial ischemia.  · Arrhythmias during stress: rare PVCs.  · The patient reported no symptoms during the stress test.  · Compared to the previous study from 8-, there are no significant   changes.         [facility]:  Results for orders placed during the hospital encounter of 10/14/19   Cardiac catheterization    Narrative · A STENT PRECISE PRO 8.0 X 40 stent was successfully placed.  · Estimated blood loss: between 50 mL and 150 mL  · Successful cartotid stent with proximal emboli protection.  · Follow-up in 1 month with carotid ultrasound.  · DAPT x1 month.     I certify that I was present for catheter insertion, catheter   manipulation, angiography, and angiographic interpretation of this   patient.    Procedure Log documented by Documenter: RT Ronald and verified   by Artie Kebede MD.    Date: 10/14/2019  Time: 2:05 PM        PFT:  No results found for: FEV1, FVC, [id number], TLC, DLCO     Anesthesia Evaluation    I have reviewed the Patient Summary Reports.    I have reviewed the Nursing Notes.    I have reviewed the Medications.     Review of Systems  Anesthesia Hx:  No problems with previous Anesthesia Denies Hx of Anesthetic complications Took a long time to wake up the time before last. History of prior surgery of interest to airway management or planning: Denies Family Hx of Anesthesia complications.   Denies Personal Hx of Anesthesia complications.   Hematology/Oncology:  Hematology Normal   Oncology Normal   Oncology Comments: Minor skin cured with excision (SCC)     EENT/Dental:EENT/Dental Normal   Cardiovascular:   Hypertension, well controlled Past MI (2016) CAD (s/p stent and CABG) asymptomatic CABG/stent  (stent immediately with MI, CABG weeks later.)   Denies Angina. hyperlipidemia    Pulmonary:   Denies COPD. (patient denies.)  Denies Asthma. Shortness of breath  Denies Sleep Apnea. Walks 8 blocks on occ   Renal/:  Renal/ Normal  Denies Chronic Renal Disease.     Hepatic/GI:   PUD, GERD Denies Liver Disease.    Musculoskeletal:   Arthritis     Neurological:  Neurology Normal Denies TIA.  Denies CVA. Neuromuscular Disease,  Headaches Denies Seizures. Odontoid fx   Endocrine:  Endocrine Normal    Psych:  Psychiatric Normal           Physical Exam  General:  Well nourished    Airway/Jaw/Neck:  Airway Findings: Mouth Opening: Normal Tongue: Normal  General Airway Assessment: Adult, Average  Mallampati: II  TM Distance: Normal, at least 6 cm  Jaw/Neck Findings: (C-collar)  Neck ROM: Normal ROM    full upper.      Dental:  Dental Findings: Upper Dentures    Chest/Lungs:  Chest/Lungs Findings: Normal Respiratory Rate     Heart/Vascular:  Heart Findings: Rate: Normal  Rhythm: Regular Rhythm        Mental Status:  Mental Status Findings:  Cooperative, Alert and Oriented         Anesthesia Plan  Type of Anesthesia, risks & benefits discussed:  Anesthesia Type:  general  Patient's Preference:   Intra-op Monitoring Plan: standard ASA monitors  Intra-op Monitoring Plan Comments:   Post Op Pain Control Plan: multimodal analgesia, per primary service following discharge from PACU and IV/PO Opioids PRN  Post Op Pain Control Plan Comments: As per surgeon's plan  Induction:   IV  Beta Blocker:  Patient is not currently on a Beta-Blocker (No further documentation required).       Informed Consent: Patient understands risks and agrees with Anesthesia plan.  Questions answered. Anesthesia consent signed with patient.  ASA Score: 3     Day of Surgery Review of History & Physical: I have interviewed and examined the patient. I have reviewed the patient's H&P dated:    H&P update referred to the surgeon.         Ready For Surgery From  Anesthesia Perspective.

## 2020-10-20 NOTE — HPI
. Paul Browning is a 80 y.o. 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 Cardiology is consulted for further preoperative risk stratification due to patients cardiac history and worsening LANGLEY. Patient reports he has been having SOB for the past two months that has progressively gotten worse in the last two weeks. Two months ago he was able to walk a mile without any SOB. The SOB has progressively gotten worse to the point that he now reports SOB while at rest. He also reports a dry cough during this time. Denies any chest pain at any point in the past couple of months and has not had any lower limb swelling. Patient reports he is able to lay flat without any issues. CXR today revealed chronic granulomatous nodular infiltrates in his upper lobes bilaterally. ECG today revealed sinus bradycardia with new TWI in the lateral leads.     Patient was recently seen in cardiology clinic for this LANGLEY. His usual cardiologist is Dr. Kelly but this past visit he was seen in Dr. Zeng's clinic where a CTA was ordered which revealed the pulmonary sarcoid. Patient wore a holter monitor in July 2020 which revealed good chronotropic competence. PET stress test in 2019 revealed ischemia in the OM2 area, which is consistent with his findings on angio in 2014. Echo in July 2020 revealed an EF of 60%, local segmental wall abnormalities, and Grade I diastolic dysfunction.

## 2020-10-21 ENCOUNTER — ANESTHESIA (OUTPATIENT)
Dept: SURGERY | Facility: HOSPITAL | Age: 80
DRG: 520 | End: 2020-10-21
Payer: MEDICARE

## 2020-10-21 LAB
ANION GAP SERPL CALC-SCNC: 9 MMOL/L (ref 8–16)
BASOPHILS # BLD AUTO: 0.02 K/UL (ref 0–0.2)
BASOPHILS NFR BLD: 0.3 % (ref 0–1.9)
BUN SERPL-MCNC: 15 MG/DL (ref 8–23)
CALCIUM SERPL-MCNC: 8.9 MG/DL (ref 8.7–10.5)
CHLORIDE SERPL-SCNC: 97 MMOL/L (ref 95–110)
CO2 SERPL-SCNC: 29 MMOL/L (ref 23–29)
CREAT SERPL-MCNC: 0.9 MG/DL (ref 0.5–1.4)
DIFFERENTIAL METHOD: ABNORMAL
EOSINOPHIL # BLD AUTO: 0.1 K/UL (ref 0–0.5)
EOSINOPHIL NFR BLD: 1.2 % (ref 0–8)
ERYTHROCYTE [DISTWIDTH] IN BLOOD BY AUTOMATED COUNT: 14.1 % (ref 11.5–14.5)
EST. GFR  (AFRICAN AMERICAN): >60 ML/MIN/1.73 M^2
EST. GFR  (NON AFRICAN AMERICAN): >60 ML/MIN/1.73 M^2
GLUCOSE SERPL-MCNC: 97 MG/DL (ref 70–110)
HCT VFR BLD AUTO: 37.8 % (ref 40–54)
HGB BLD-MCNC: 12 G/DL (ref 14–18)
IMM GRANULOCYTES # BLD AUTO: 0.02 K/UL (ref 0–0.04)
IMM GRANULOCYTES NFR BLD AUTO: 0.3 % (ref 0–0.5)
LYMPHOCYTES # BLD AUTO: 1.2 K/UL (ref 1–4.8)
LYMPHOCYTES NFR BLD: 19.8 % (ref 18–48)
MCH RBC QN AUTO: 29.8 PG (ref 27–31)
MCHC RBC AUTO-ENTMCNC: 31.7 G/DL (ref 32–36)
MCV RBC AUTO: 94 FL (ref 82–98)
MONOCYTES # BLD AUTO: 0.5 K/UL (ref 0.3–1)
MONOCYTES NFR BLD: 9 % (ref 4–15)
NEUTROPHILS # BLD AUTO: 4.1 K/UL (ref 1.8–7.7)
NEUTROPHILS NFR BLD: 69.4 % (ref 38–73)
NRBC BLD-RTO: 0 /100 WBC
PLATELET # BLD AUTO: 218 K/UL (ref 150–350)
PMV BLD AUTO: 10.1 FL (ref 9.2–12.9)
POTASSIUM SERPL-SCNC: 4 MMOL/L (ref 3.5–5.1)
RBC # BLD AUTO: 4.03 M/UL (ref 4.6–6.2)
SODIUM SERPL-SCNC: 135 MMOL/L (ref 136–145)
WBC # BLD AUTO: 5.87 K/UL (ref 3.9–12.7)

## 2020-10-21 PROCEDURE — 99233 SBSQ HOSP IP/OBS HIGH 50: CPT | Mod: HCNC,,, | Performed by: PHYSICIAN ASSISTANT

## 2020-10-21 PROCEDURE — 85025 COMPLETE CBC W/AUTO DIFF WBC: CPT | Mod: HCNC

## 2020-10-21 PROCEDURE — 25000003 PHARM REV CODE 250: Mod: HCNC | Performed by: PHYSICIAN ASSISTANT

## 2020-10-21 PROCEDURE — 99223 PR INITIAL HOSPITAL CARE,LEVL III: ICD-10-PCS | Mod: HCNC,GC,, | Performed by: EMERGENCY MEDICINE

## 2020-10-21 PROCEDURE — 94640 AIRWAY INHALATION TREATMENT: CPT | Mod: HCNC

## 2020-10-21 PROCEDURE — 36415 COLL VENOUS BLD VENIPUNCTURE: CPT | Mod: HCNC

## 2020-10-21 PROCEDURE — 80048 BASIC METABOLIC PNL TOTAL CA: CPT | Mod: HCNC

## 2020-10-21 PROCEDURE — 51798 US URINE CAPACITY MEASURE: CPT | Mod: HCNC

## 2020-10-21 PROCEDURE — 25000003 PHARM REV CODE 250: Mod: HCNC | Performed by: STUDENT IN AN ORGANIZED HEALTH CARE EDUCATION/TRAINING PROGRAM

## 2020-10-21 PROCEDURE — 99223 1ST HOSP IP/OBS HIGH 75: CPT | Mod: HCNC,GC,, | Performed by: EMERGENCY MEDICINE

## 2020-10-21 PROCEDURE — 94761 N-INVAS EAR/PLS OXIMETRY MLT: CPT | Mod: HCNC

## 2020-10-21 PROCEDURE — 99233 PR SUBSEQUENT HOSPITAL CARE,LEVL III: ICD-10-PCS | Mod: HCNC,,, | Performed by: PHYSICIAN ASSISTANT

## 2020-10-21 PROCEDURE — 20600001 HC STEP DOWN PRIVATE ROOM: Mod: HCNC

## 2020-10-21 PROCEDURE — 25000242 PHARM REV CODE 250 ALT 637 W/ HCPCS: Mod: HCNC | Performed by: PHYSICIAN ASSISTANT

## 2020-10-21 RX ORDER — HYDROCODONE BITARTRATE AND ACETAMINOPHEN 5; 325 MG/1; MG/1
1 TABLET ORAL EVERY 4 HOURS PRN
Status: DISCONTINUED | OUTPATIENT
Start: 2020-10-21 | End: 2020-10-23 | Stop reason: HOSPADM

## 2020-10-21 RX ORDER — BUTALBITAL, ACETAMINOPHEN AND CAFFEINE 50; 325; 40 MG/1; MG/1; MG/1
1 TABLET ORAL EVERY 4 HOURS PRN
Status: DISCONTINUED | OUTPATIENT
Start: 2020-10-21 | End: 2020-10-22

## 2020-10-21 RX ORDER — TIOTROPIUM BROMIDE 18 UG/1
1 CAPSULE ORAL; RESPIRATORY (INHALATION) DAILY
Status: DISCONTINUED | OUTPATIENT
Start: 2020-10-22 | End: 2020-10-23 | Stop reason: HOSPADM

## 2020-10-21 RX ORDER — BUTALBITAL, ACETAMINOPHEN AND CAFFEINE 50; 325; 40 MG/1; MG/1; MG/1
1 TABLET ORAL EVERY 4 HOURS PRN
Status: DISCONTINUED | OUTPATIENT
Start: 2020-10-21 | End: 2020-10-21

## 2020-10-21 RX ORDER — METHOCARBAMOL 500 MG/1
500 TABLET, FILM COATED ORAL 4 TIMES DAILY
Status: DISCONTINUED | OUTPATIENT
Start: 2020-10-21 | End: 2020-10-23 | Stop reason: HOSPADM

## 2020-10-21 RX ADMIN — HYDROCODONE BITARTRATE AND ACETAMINOPHEN 1 TABLET: 5; 325 TABLET ORAL at 10:10

## 2020-10-21 RX ADMIN — HYDROCODONE BITARTRATE AND ACETAMINOPHEN 1 TABLET: 5; 325 TABLET ORAL at 02:10

## 2020-10-21 RX ADMIN — ALBUTEROL SULFATE 2.5 MG: 2.5 SOLUTION RESPIRATORY (INHALATION) at 03:10

## 2020-10-21 RX ADMIN — HYDROCODONE BITARTRATE AND ACETAMINOPHEN 1 TABLET: 5; 325 TABLET ORAL at 06:10

## 2020-10-21 RX ADMIN — PANTOPRAZOLE SODIUM 40 MG: 40 TABLET, DELAYED RELEASE ORAL at 08:10

## 2020-10-21 RX ADMIN — ALBUTEROL SULFATE 2.5 MG: 2.5 SOLUTION RESPIRATORY (INHALATION) at 08:10

## 2020-10-21 RX ADMIN — BUTALBITAL, ACETAMINOPHEN, AND CAFFEINE 1 TABLET: 50; 325; 40 TABLET ORAL at 07:10

## 2020-10-21 RX ADMIN — ATORVASTATIN CALCIUM 40 MG: 20 TABLET, FILM COATED ORAL at 08:10

## 2020-10-21 RX ADMIN — METHOCARBAMOL TABLETS 500 MG: 500 TABLET, COATED ORAL at 12:10

## 2020-10-21 RX ADMIN — HYDROCODONE BITARTRATE AND ACETAMINOPHEN 1 TABLET: 5; 325 TABLET ORAL at 11:10

## 2020-10-21 RX ADMIN — METHOCARBAMOL TABLETS 500 MG: 500 TABLET, COATED ORAL at 08:10

## 2020-10-21 RX ADMIN — BUTALBITAL, ACETAMINOPHEN, AND CAFFEINE 1 TABLET: 50; 325; 40 TABLET ORAL at 09:10

## 2020-10-21 RX ADMIN — ALBUTEROL SULFATE 2.5 MG: 2.5 SOLUTION RESPIRATORY (INHALATION) at 12:10

## 2020-10-21 RX ADMIN — ALBUTEROL SULFATE 2.5 MG: 2.5 SOLUTION RESPIRATORY (INHALATION) at 07:10

## 2020-10-21 RX ADMIN — ISOSORBIDE MONONITRATE 30 MG: 30 TABLET, EXTENDED RELEASE ORAL at 08:10

## 2020-10-21 RX ADMIN — SERTRALINE HYDROCHLORIDE 50 MG: 50 TABLET ORAL at 08:10

## 2020-10-21 RX ADMIN — ALBUTEROL SULFATE 2.5 MG: 2.5 SOLUTION RESPIRATORY (INHALATION) at 04:10

## 2020-10-21 RX ADMIN — METHOCARBAMOL TABLETS 500 MG: 500 TABLET, COATED ORAL at 04:10

## 2020-10-21 RX ADMIN — METHOCARBAMOL TABLETS 500 MG: 500 TABLET, COATED ORAL at 02:10

## 2020-10-21 NOTE — SUBJECTIVE & OBJECTIVE
Interval History: NAEON. Increased frequency of Norco and scheduled Robaxin for neck pain. Exam stable. Son at bedside. All questions answered. Compliant with cervical collar. Denies weakness, numbness, paresthesias, b/b dysfunction. Patient's family is concerned about his worsening SOB the past 2 months and history of previous clots (unsure if present in legs or lungs). Patient's son and granddaughter also note that they are both positive for Factor V Leiden.      Medications:  Continuous Infusions:  Scheduled Meds:   albuterol sulfate  2.5 mg Nebulization Q4H    atorvastatin  40 mg Oral Daily    isosorbide mononitrate  30 mg Oral Daily    methocarbamoL  500 mg Oral QID    pantoprazole  40 mg Oral BID    sertraline  50 mg Oral Daily     PRN Meds:dextrose 50%, dextrose 50%, glucagon (human recombinant), glucose, glucose, glucose, hydrALAZINE, HYDROcodone-acetaminophen, ondansetron, promethazine       Objective:     Weight: 63.5 kg (139 lb 15.9 oz)  Body mass index is 21.93 kg/m².  Vital Signs (Most Recent):  Temp: 98.4 °F (36.9 °C) (10/21/20 0418)  Pulse: 77 (10/21/20 1112)  Resp: 18 (10/21/20 1029)  BP: 130/62 (10/21/20 0745)  SpO2: 100 % (10/21/20 0817) Vital Signs (24h Range):  Temp:  [97.6 °F (36.4 °C)-98.4 °F (36.9 °C)] 98.4 °F (36.9 °C)  Pulse:  [] 77  Resp:  [14-22] 18  SpO2:  [92 %-100 %] 100 %  BP: (119-139)/(58-71) 130/62                   Neurosurgery Physical Exam    General: well developed, well nourished, no distress.   Head: normocephalic, atraumatic  Neck: No tracheal deviation. Cervical collar in place.   Neurologic: Alert and oriented. Thought content appropriate.  GCS: Motor: 6/Verbal: 5/Eyes: 4 GCS Total: 15  Mental Status: Awake, Alert, Oriented x 4  Language: No aphasia  Speech: No dysarthria  Cranial nerves: face symmetric, tongue midline, CN II-XII grossly intact.   Eyes: pupils equal, round, reactive to light with accomodation, EOMI.  Ears: No drainage.   Pulmonary: normal  respirations, no signs of respiratory distress  Abdomen: soft, non-distended, not tender to palpation  Sensory: intact to light touch throughout  Motor Strength: Moves all extremities spontaneously with good tone.  Full strength upper and lower extremities. No abnormal movements seen.     Strength  Deltoids Triceps Biceps Wrist Extension Wrist Flexion Hand    Upper: R 5/5 5/5 5/5 5/5 5/5 5/5    L 5/5 5/5 5/5 5/5 5/5 5/5     Iliopsoas Quadriceps Knee  Flexion Tibialis  anterior Gastro- cnemius EHL   Lower: R 5/5 5/5 5/5 5/5 5/5 5/5    L 5/5 5/5 5/5 5/5 5/5 5/5     Tellez: absent  Vascular: No LE edema. No calf tenderness.  Skin: Skin is warm, dry and intact.      Significant Labs:  Recent Labs   Lab 10/20/20  0351 10/21/20  0359    97   * 135*   K 4.3 4.0   CL 98 97   CO2 23 29   BUN 14 15   CREATININE 0.8 0.9   CALCIUM 9.2 8.9     Recent Labs   Lab 10/20/20  0351 10/21/20  0359   WBC 6.58 5.87   HGB 13.6* 12.0*   HCT 41.1 37.8*    218     Recent Labs   Lab 10/19/20  1915   INR 1.0   APTT 27.9     Microbiology Results (last 7 days)     ** No results found for the last 168 hours. **        Recent Lab Results       10/21/20  0359        Anion Gap 9     Baso # 0.02     Basophil% 0.3     BUN, Bld 15     Calcium 8.9     Chloride 97     CO2 29     Creatinine 0.9     Differential Method Automated     eGFR if African American >60.0     eGFR if non  >60.0  Comment:  Calculation used to obtain the estimated glomerular filtration  rate (eGFR) is the CKD-EPI equation.        Eos # 0.1     Eosinophil% 1.2     Glucose 97     Gran # (ANC) 4.1     Gran% 69.4     Hematocrit 37.8     Hemoglobin 12.0     Immature Grans (Abs) 0.02  Comment:  Mild elevation in immature granulocytes is non specific and   can be seen in a variety of conditions including stress response,   acute inflammation, trauma and pregnancy. Correlation with other   laboratory and clinical findings is essential.       Immature  Granulocytes 0.3     Lymph # 1.2     Lymph% 19.8     MCH 29.8     MCHC 31.7     MCV 94     Mono # 0.5     Mono% 9.0     MPV 10.1     nRBC 0     Platelets 218     Potassium 4.0     RBC 4.03     RDW 14.1     Sodium 135     WBC 5.87         All pertinent labs from the last 24 hours have been reviewed.    Significant Diagnostics:  I have reviewed all pertinent imaging results/findings within the past 24 hours.

## 2020-10-21 NOTE — CONSULTS
Please see consult note from Cardiology Dr. Nelson for pre-op.     DEMETRIUS He M.D.  Internal Medicine PGY-3  [phone]

## 2020-10-21 NOTE — MEDICAL/APP STUDENT
"[facility]  Consult Note    Patient Name: Paul Browning  MRN: [id number]  Admission Date: 10/19/2020  Hospital Length of Stay: 2 days  Attending Physician: Kirsten Weaver MD   Primary Care Provider: Grey Forbes DO     Patient information was obtained from past medical records and ER records.     Inpatient consult to Pulmonology  Consult performed by: Alejandra Romero  Consult ordered by: Mae Baker PA-C          SUBJECTIVE:     History of Present Illness:  Mr. Paul Browning is a 80 y.o. male with past medical history significant for HTN, HLD, Stevens's esophagus, BPH, CAD s/p stent placement in 2019, and anxiety who presents to [facility] [place] following a fall down several flights of stairs. Patient with several falls within the past several months, latest resulting in left hip fracture being treated conservatively. NSGY consulted for Type II displaced odontoid fracture w/ tentative plan for OR tomorrow in the AM for odontoid screw placement.    Pulmonology was consulted for pre-operative evaluation since he has a documented history of pulmonary sarcoidosis, though no evidence of histological confirmation is available. Mr. Browning denies ever receiving treatment for sarcoidosis and denies ever getting any oral steroids. He does not have a pulmonologist that he follows with regularly. He does report a 50 pack-year smoking history although he states he quit smoking in 1978. He adds that he had some occupational exposures as he worked with asphalt, sandblasting, and welding. Other than a chronic dry cough which he has had for "years" per family, he denies any other symptoms such as fever, weight loss, or any other constitutional complaints. He has only used an inhaler a handful of times in the past after bouts of cough/bronchitis.     In terms of his baseline respiratory function, Mr. Browning states that prior to these recent falls this year and decrease in his " vision last year (possibly due to CVA), he was fully active with his ADLs as well as recreational activities such as fishing and  work. Currently, he states that he is not short of breath while in bed but would get SOB if he was to stand up and try to walk across the [place]. He has received Albuterol while admitted q4h and is currently on room air with O2 sats >93%. CTA from 10/7/20 showing stable chronic scarring & calcification in upper lobes; new 2.4 cm lesion in lingula not seen on past scans.        Review of Systems:  Review of Systems   Constitutional: Negative for chills, fever and weight loss.   Respiratory: Positive for cough and shortness of breath (only with activity). Negative for hemoptysis and sputum production.    Cardiovascular: Negative for chest pain and leg swelling.   Gastrointestinal: Negative.         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    Hyperlipidemia     Hypertension     Ischemic cardiomyopathy 11/5/2014    MDD (major depressive disorder), recurrent episode, mild 2/17/2016    Osteoporosis 7/20/2016    Peripheral arterial disease 3/18/2016    Sarcoid     Squamous cell carcinoma 09/2016, 5/2016    crown of scalp, left wrist       Past Surgical History:   Procedure Laterality Date    CARDIAC SURGERY      CAROTID STENT N/A 10/14/2019    Procedure: INSERTION, STENT, ARTERY, CAROTID;  Surgeon: Artie Kebede MD;  Location: [facility] CATH LAB;  Service: Cardiology;  Laterality: N/A;    CATARACT EXTRACTION W/  INTRAOCULAR LENS IMPLANT Right 07/17/2018    Dr. Talamantes    CATARACT EXTRACTION W/  INTRAOCULAR LENS IMPLANT Left 07/31/2018    Dr. Talamantes    CORONARY ARTERY BYPASS  GRAFT      x2  10/2014    ESOPHAGOGASTRODUODENOSCOPY N/A 4/8/2019    Procedure: ESOPHAGOGASTRODUODENOSCOPY (EGD)/poss rfa;  Surgeon: Clifford De La Torre MD;  Location: [facility] (2ND FLR);  Service: Endoscopy;  Laterality: N/A;    ESOPHAGOGASTRODUODENOSCOPY (EGD) WITH RADIOFREQUENCY TREATMENT OF GASTROESOPHAGEAL JUNCTION      INJECTION OF ANESTHETIC AGENT AROUND MEDIAL BRANCH NERVES INNERVATING CERVICAL FACET JOINT Bilateral 1/9/2020    Procedure: INJECTION, MBB--Bilateral Cervical- Third Occipital nerve, C2-3--ASA okay for pt to continue taking per provider.;  Surgeon: Tip Mera Jr., MD;  Location: [facility] PAIN MGT;  Service: Pain Management;  Laterality: Bilateral;    INTRAOCULAR PROSTHESES INSERTION Right 7/17/2018    Procedure: INSERTION, INTRAOCULAR LENS PROSTHESIS;  Surgeon: León Talamantes MD;  Location: [address];  Service: Ophthalmology;  Laterality: Right;    INTRAOCULAR PROSTHESES INSERTION Left 7/31/2018    Procedure: INSERTION, INTRAOCULAR LENS PROSTHESIS;  Surgeon: León Talamantes MD;  Location: [facility] OR [address] [place] [address];  Service: Ophthalmology;  Laterality: Left;    PHACOEMULSIFICATION OF CATARACT Right 7/17/2018    Procedure: PHACOEMULSIFICATION, CATARACT;  Surgeon: eLón Talamantes MD;  Location: [facility] OR [address] [place] [address];  Service: Ophthalmology;  Laterality: Right;    PHACOEMULSIFICATION OF CATARACT Left 7/31/2018    Procedure: PHACOEMULSIFICATION, CATARACT;  Surgeon: León Talamantes MD;  Location: [address];  Service: Ophthalmology;  Laterality: Left;    RADIOFREQUENCY THERMOCOAGULATION Bilateral 1/30/2020    Procedure: RADIOFREQUENCY THERMAL COAGULATION--Bilateral C2-3 and Third Occipital Nerve;  Surgeon: Tip Mera Jr., MD;  Location: [facility] PAIN MGT;  Service: Pain Management;  Laterality: Bilateral;    UMBILICAL HERNIA REPAIR         Family History   Problem Relation Age of Onset    Arrhythmia Mother     Heart disease Mother     Heart failure Brother      No Known Problems Daughter     No Known Problems Son     Colon cancer Neg Hx     Inflammatory bowel disease Neg Hx     Celiac disease Neg Hx     Melanoma Neg Hx     Amblyopia Neg Hx     Blindness Neg Hx     Cataracts Neg Hx     Glaucoma Neg Hx     Macular degeneration Neg Hx     Retinal detachment Neg Hx     Strabismus Neg Hx        Social History     Socioeconomic History    Marital status:      Spouse name: Not on file    Number of children: 4    Years of education: Not on file    Highest education level: Not on file   Occupational History    Occupation:     Social Needs    Financial resource strain: Not on file    Food insecurity     Worry: Not on file     Inability: Not on file    Transportation needs     Medical: Not on file     Non-medical: Not on file   Tobacco Use    Smoking status: Former Smoker     Packs/day: 2.00     Years: 20.00     Pack years: 40.00     Types: Cigarettes     Quit date: 1988     Years since quittin.6    Smokeless tobacco: Never Used   Substance and Sexual Activity    Alcohol use: No     Comment: quit drinking 2012    Drug use: No    Sexual activity: Not Currently     Partners: Female   Lifestyle    Physical activity     Days per week: Not on file     Minutes per session: Not on file    Stress: Not on file   Relationships    Social connections     Talks on phone: Not on file     Gets together: Not on file     Attends [employer] service: Not on file     Active member of club or organization: Not on file     Attends meetings of clubs or organizations: Not on file     Relationship status: Not on file   Other Topics Concern    Not on file   Social History Narrative    Not on file       MEDICATIONS & ALLERGIES:     Current Facility-Administered Medications on File Prior to Encounter   Medication Dose Route Frequency Provider Last Rate Last Dose    0.9%  NaCl infusion  500 mL Intravenous Continuous Tip Mera Jr., MD         lidocaine (PF) 10 mg/ml (1%) injection 10 mg  1 mL Intradermal Once Tip Mera Jr., MD         Current Outpatient Medications on File Prior to Encounter   Medication Sig Dispense Refill    acetaminophen (TYLENOL) 500 MG tablet Take 1,000 mg by mouth every 6 (six) hours as needed for Pain.      albuterol (PROVENTIL) 2.5 mg /3 mL (0.083 %) nebulizer solution Take 3 mLs (2.5 mg total) by nebulization every 6 (six) hours as needed for Wheezing. Rescue 1 Box 0    alendronate (FOSAMAX) 70 MG tablet 1 tab PO qwk in the am with a full glass of water on an empty stomach. Do not consume food or lie down for at least 30 minutes afterwards. 12 tablet 3    ALPRAZolam (XANAX) 0.25 MG tablet Take 1 tablet (0.25 mg total) by mouth 3 (three) times daily as needed for Anxiety. (Patient not taking: Reported on 10/5/2020) 45 tablet 0    aspirin (ECOTRIN) 81 MG EC tablet Take 81 mg by mouth once daily.      atorvastatin (LIPITOR) 40 MG tablet TAKE 1 TABLET BY MOUTH EVERY DAY 90 tablet 3    butalbital-acetaminophen-caffeine -40 mg (FIORICET, ESGIC) -40 mg per tablet 1-2 tabs PO q6 PRN headaches (Patient not taking: Reported on 10/5/2020) 60 tablet 0    diclofenac sodium (VOLTAREN) 1 % Gel Apply 2 g topically every 6 (six) hours as needed. (Patient not taking: Reported on 10/5/2020) 1 Tube 6    fluorouracil 5% 5 % Soln Use hs for 2 weeks (Patient not taking: Reported on 10/5/2020) 10 mL 1    isosorbide mononitrate (IMDUR) 30 MG 24 hr tablet Take 1 tablet (30 mg total) by mouth once daily. 30 tablet 11    magnesium oxide (MAG-OX) 400 mg (241.3 mg magnesium) tablet Take 1 tablet (400 mg total) by mouth once daily. 30 tablet 3    omega-3 fatty acids-vitamin E (FISH OIL) 1,000 mg Cap Take 1 tablet by mouth 2 (two) times daily. (Patient taking differently: Take 1 tablet by mouth once daily. ) 60 each 11    ondansetron (ZOFRAN) 4 MG tablet Take 1 tablet (4 mg total) by mouth every 8 (eight) hours as needed  for Nausea. (Patient not taking: Reported on 10/5/2020) 30 tablet 1    pantoprazole (PROTONIX) 40 MG tablet Take 40 mg by mouth 2 (two) times a day.       pantoprazole (PROTONIX) 40 MG tablet TAKE 1 TABLET BY MOUTH TWICE DAILY 180 tablet 0    riboflavin, vitamin B2, (VITAMIN B-2) 100 mg Tab tablet Take 1 tablet (100 mg total) by mouth once daily. 90 tablet 3    sertraline (ZOLOFT) 50 MG tablet TAKE 1 TABLET BY MOUTH EVERY DAY 90 tablet 3    triamcinolone acetonide 0.1% (KENALOG) 0.1 % cream Apply topically 2 (two) times daily. (Patient not taking: Reported on 10/5/2020) 45 g 1        Review of patient's allergies indicates:   Allergen Reactions    Morphine Shortness Of Breath       OBJECTIVE:     Vital Signs:  Temp:  [97.6 °F (36.4 °C)-98.4 °F (36.9 °C)] 98.2 °F (36.8 °C)  Pulse:  [] 87  Resp:  [15-22] 18  SpO2:  [93 %-100 %] 97 %  BP: (120-139)/(55-71) 131/55    Physical Exam:  Physical Exam   Constitutional: He is oriented to person, place, and time. No distress.   HENT:   Some bruising noted to forehead; hard of hearing   Neck:   In neck collar   Cardiovascular: Normal rate and regular rhythm.   Pulmonary/Chest: No respiratory distress. He has wheezes (inspiratory wheezing noted to the left lung).   On room air at time of exam without conversational dyspnea   Neurological: He is alert and oriented to person, place, and time.        Laboratory  Sodium (mmol/L)   Date Value   10/21/2020 135 (L)   10/20/2020 133 (L)   10/05/2020 135 (L)     Potassium (mmol/L)   Date Value   10/21/2020 4.0   10/20/2020 4.3   10/05/2020 4.3     Chloride (mmol/L)   Date Value   10/21/2020 97   10/20/2020 98   10/05/2020 98     CO2 (mmol/L)   Date Value   10/21/2020 29   10/20/2020 23   10/05/2020 29     BUN, Bld (mg/dL)   Date Value   10/21/2020 15   10/20/2020 14   10/05/2020 15     Creatinine (mg/dL)   Date Value   10/21/2020 0.9   10/20/2020 0.8   10/05/2020 1.0     Glucose (mg/dL)   Date Value   10/21/2020 97    10/20/2020 110   10/05/2020 79     Calcium (mg/dL)   Date Value   10/21/2020 8.9   10/20/2020 9.2   10/05/2020 9.2     Magnesium (mg/dL)   Date Value   07/14/2015 2.2   01/16/2015 1.9   11/17/2014 2.0     Phosphorus (mg/dL)   Date Value   11/05/2014 3.9   11/04/2014 3.9   10/27/2014 4.5     Alkaline Phosphatase (U/L)   Date Value   07/20/2020 61   06/09/2020 66   05/24/2019 71     ALT (U/L)   Date Value   07/20/2020 13   06/09/2020 9 (L)   05/24/2019 13     AST (U/L)   Date Value   07/20/2020 18   06/09/2020 13   05/24/2019 18     Albumin (g/dL)   Date Value   07/20/2020 3.7   06/09/2020 3.8   05/24/2019 3.8     Total Protein (g/dL)   Date Value   07/20/2020 7.1   06/09/2020 7.4   05/24/2019 7.5     Total Bilirubin (mg/dL)   Date Value   07/20/2020 0.8   06/09/2020 1.1 (H)   05/24/2019 1.5 (H)     Coumadin Monitoring INR (no units)   Date Value   10/16/2006 2.7 (H)   10/02/2006 2.5 (H)   09/25/2006 2.9 (H)     INR (no units)   Date Value   10/19/2020 1.0   10/14/2019 1.0   11/17/2014 1.0       WBC (K/uL)   Date Value   10/21/2020 5.87   10/20/2020 6.58   07/20/2020 5.19     Hemoglobin (g/dL)   Date Value   10/21/2020 12.0 (L)   10/20/2020 13.6 (L)   07/20/2020 13.6 (L)     Platelets (K/uL)   Date Value   10/21/2020 218   10/20/2020 235   07/20/2020 240       Diagnostic Results:  Imaging Results           MRI Cervical Spine Without Contrast (Final result)  Result time 10/19/20 23:09:31    Final result by lIdefonso Burroughs MD (10/19/20 23:09:31)                 Impression:      Dorsally subluxed fracture of the base of the odontoid with associated STIR signal without significant prevertebral soft tissue swelling, in keeping with subacute Type II odontoid fracture.    Disruption of the anterior longitudinal ligament and minimal buckling of the posterior longitudinal ligament.  Remainder of the ligaments in the cervical spine remain intact.    Significant disc desiccation at the C5-C6 level with central disc protrusion  resulting in severe narrowing of the spinal canal and severe bilateral neural narrowing at this level.  Spine surgery consultation is recommended.    Question of increased cord signal at the C5-C6 level, suggestive of compressive myelopathy.  No other cord signal abnormalities.    This report was flagged in Epic as abnormal.      Electronically signed by: Ildefonso Burroughs MD  Date:    10/19/2020  Time:    23:09             Narrative:    EXAMINATION:  MRI CERVICAL SPINE WITHOUT CONTRAST    CLINICAL HISTORY:  evaluate for ligamentous instability;.    TECHNIQUE:  Multiplanar, multisequence MR images of the cervical spine were acquired without the administration of contrast.    COMPARISON:  CT scan of the cervical spine dated 10/19/2020.    FINDINGS:  There is volume loss in the visualized portions of the posterior fossa.  Assessment of the predental space is somewhat suboptimal due to fracture at the base of C2.  There is 4 mm dorsal subluxation at the level of the fracture site.  There is STIR signal at the level of the fracture site without significant associated prevertebral soft tissue swelling.    The apical component of the anterior longitudinal ligament appears stone.  There is minimal stripping of the posterior longitudinal ligament without evidence of ligamentous disruption.  The transverse ligament appears intact.  The ligamentum from in the interspinous and supraspinous ligaments are within normal limits.    There is straightening of the normal cervical lordosis.  The remainder of the vertebral body heights demonstrates normal marrow signal.  There is intervertebral disc space narrowing at multiple levels.    Significant disc desiccation identified at the C5-C6 level.  There is mass effect on the cervical cord at the C5-C6 level.  There is question of minimal increased signal involving the cord at this level.  The remainder of the cord is normal.    Evaluation of the individual disc levels reveals the  following:    C2-C3 normal.    C3-C4, there is a disc osteophyte complex along with facet hypertrophy and uncovertebral hypertrophy.  The spinal canal is within normal limits.  There is mild bilateral neural foraminal narrowing.    C4-C5, there is a disc osteophyte complex along with facet hypertrophy and uncovertebral hypertrophy.  There is mild spinal canal narrowing.  There is mild bilateral neural foraminal narrowing.    C5-C6, there is a disc osteophyte complex along with facet hypertrophy and uncovertebral hypertrophy.  There is superimposed central disc protrusion.  There is severe narrowing the spinal canal.  There is severe bilateral neural foraminal narrowing.    C6-C7, there is a disc osteophyte complex along with facet hypertrophy and uncovertebral hypertrophy.  There is superimposed central disc protrusion.  There is mild spinal canal narrowing.  There is mild right and moderate left neural foraminal narrowing    C7-T1, there is a disc osteophyte complex along with facet hypertrophy and uncovertebral hypertrophy.  The spinal canal and neural foramina are unremarkable.    Flow voids within the neck vessels are unremarkable.  There is no evidence of lymphadenopathy in the neck.  The paraspinal soft tissues are within normal limits.                               CT Head Without Contrast (Final result)  Result time 10/19/20 17:28:52    Final result by Artie Ro MD (10/19/20 17:28:52)                 Impression:      No acute intracranial findings.    Type 2 odontoid fracture with posterior subluxation of the odontoid in relation to C2 body.      Electronically signed by: Artie Ro MD  Date:    10/19/2020  Time:    17:28             Narrative:    EXAMINATION:  CT HEAD WITHOUT CONTRAST    CLINICAL HISTORY:  Head trauma, minor (Age => 63y);    TECHNIQUE:  Low dose axial images were obtained through the head.  Coronal and sagittal reformations were also performed. Contrast was not  administered.    COMPARISON:  09/25/2020    FINDINGS:  Noncontrast head CT demonstrates involutional changes which are similar to the prior study and not unusual for the patient's age.  Moderate degree of white matter hypoattenuation is present probably from chronic microvascular disease.  No cortical edema or large vessel territory infarcts are seen.  There is no intracranial hemorrhage, mass effect or midline shift.    Bone windows demonstrate the presence of a type 2 odontoid fracture with posterior subluxation of the odontoid in relation to the body of C2 about 4 mm.    Opacification of some of the right ethmoid air cells, but no fracture is seen.                               CT Cervical Spine Without Contrast (Final result)  Result time 10/19/20 17:37:05    Final result by Artie Ro MD (10/19/20 17:37:05)                 Impression:      Type 2 odontoid fracture with 4 mm of subluxation.  This may be acute or subacute.    Degenerative disc disease and subluxation at C4-5 and C5-6, as above.  C5-6 has severe bilateral neural foramen narrowing.    Findings were reported to Dr. Rodríguez in the emergency room at 17:30 by Dr. Ro      Electronically signed by: Artie Ro MD  Date:    10/19/2020  Time:    17:37             Narrative:    EXAMINATION:  CT CERVICAL SPINE WITHOUT CONTRAST    CLINICAL HISTORY:  Neck trauma (Age => 63y);    TECHNIQUE:  Low dose axial images, sagittal and coronal reformations were performed though the cervical spine.  Contrast was not administered.    COMPARISON:  CT of the head 10/19/2020    FINDINGS:  CT of the cervical spine demonstrates the presence of the a type 2 odontoid fracture with 4 mm of posterior subluxation of the odontoid in relation to the body of C2.  There is some hyperattenuation in the ventral epidural space adjacent to the fracture which could be hemorrhage or early calcification if this is not acute.    C4-5, and C5-6 both have severe disc space  narrowing.  There is 3 mm of anterolisthesis at C4-5 and 3 mm of retrolisthesis at C5-6, and posterior osteophytes are seen at both of these disc margins.  C5-6 has severe did foramen narrowing bilaterally.  Mild narrowing also present at C6-7 bilaterally.    Patchy parenchymal opacity is seen in the upper lobes of the lungs bilaterally which may represent a pneumonia or edema.  Further investigation warranted.                                 ASSESSMENT & PLAN:   Mr. Paul Browning is a 80 y.o. male with past medical history significant for HTN, HLD, Stevens's esophagus, BPH, CAD s/p stent placement in 2019, and anxiety who [place] [facility] [place] a [place] w/ NSGY scheduled for odontoid screw placement. Pulmonology consulted for pre-operative clearance due to past Hx of sarcoidosis.    Pre-operative Pulmonology consult  - 50 pack-year smoking history stopped in 1978; Hx of occupational exposures to asphalt, sandblasting, and welding  - Baseline respiratory function as of last year was great without any issues as he was able to perform all ADLs; In the past year he has had several falls after an acute decline in vision (?due to CVA) which have contributed to a decrease in mobility as he now uses a walker  - Currently he is not SOB at baseline but does note that he feels more SOB after short distances than he did last year; Denies any dyspnea currently in the hospital  - CTA done on 10/7/20 showing stable chronic scarring & calcification in upper lobes; new 2.4 cm lesion in lingula not seen on past scans.   - Has been on room air during current admission with O2 saturations ranging from %.  - From a Pulmonology perspective, there are no absolute contraindications to proceeding with surgery; Will add Spiriva to current bronchodilator regimen; Plan for follow-up with Pulmonology outpatient 6-8 weeks after discharge      Thank you for your consult. I will follow-up with patient. Please contact us if  you have any additional questions.    Nryara Romero  [facility]

## 2020-10-21 NOTE — HOSPITAL COURSE
10/21: JORGE. Increased frequency of Norco and scheduled Robaxin for neck pain. Exam stable. Son at bedside. All questions answered. Compliant with cervical collar. Denies weakness, numbness, paresthesias, b/b dysfunction. Patient's family is concerned about his worsening SOB the past 2 months and history of previous clots (unsure if present in legs or lungs). Patient's son and granddaughter also note that they are both positive for Factor V Leiden.    10/22: OR for odontoid screw placement.  10/23: JORGE. AFVSS. Patient states he is having no pain since yesterday prior to surgery. Denies weakness, numbness, paresthesias. Tolerating PO, advance to solids. Daughter at bedside. HV drain in place, will remove today. Ambulated halls with PT/OT. Compliant with cervical collar. No evidence of hematoma.

## 2020-10-21 NOTE — PROGRESS NOTES
[facility]  Neurosurgery  Progress Note    Subjective:     History of Present Illness: Paul Browning is a 80 y.o. male with past medical history significant for HTN, HLD, Stevens's esophagus, BPH, CAD s/p stent placement in 2019, and anxiety who presents to [facility] ED following a fall down several flights of stairs. NSGY consulted for Type II displaced odontoid fracture. Neck pain present with slight headache. Denies weakness, paresthesias, bowel/bladder issues, or gait imbalance. NSGY consulted for evaluation.     Of additional note- patient takes daily ASA, last dose of which was this morning.     Addendum: Patient with several falls within the past several months, latest resulting in left hip fracture being treated conservatively. Per his daughter at bedside, he has had increased unsteady gait over the past several months. Patient otherwise has no fine dexterity issues. No object dropping, fumbling with buttons, etc. No bowel/bladder issues.     Post-Op Info:  Procedure(s) (LRB):  INSERTION, SCREW, ODONTOID. Anterior approach. 2 C-Arms. reg bed. Globus instrumentation. Neuromonitoring (N/A)         Interval History: NAEON. Increased frequency of Norco and scheduled Robaxin for neck pain. Exam stable. Son at bedside. All questions answered. Compliant with cervical collar. Denies weakness, numbness, paresthesias, b/b dysfunction. Patient's family is concerned about his worsening SOB the past 2 months and history of previous clots (unsure if present in legs or lungs). Patient's son and granddaughter also note that they are both positive for Factor V Leiden.      Medications:  Continuous Infusions:  Scheduled Meds:   albuterol sulfate  2.5 mg Nebulization Q4H    atorvastatin  40 mg Oral Daily    isosorbide mononitrate  30 mg Oral Daily    methocarbamoL  500 mg Oral QID    pantoprazole  40 mg Oral BID    sertraline  50 mg Oral Daily     PRN Meds:dextrose 50%, dextrose 50%, glucagon  (human recombinant), glucose, glucose, glucose, hydrALAZINE, HYDROcodone-acetaminophen, ondansetron, promethazine       Objective:     Weight: 63.5 kg (139 lb 15.9 oz)  Body mass index is 21.93 kg/m².  Vital Signs (Most Recent):  Temp: 98.4 °F (36.9 °C) (10/21/20 0418)  Pulse: 77 (10/21/20 1112)  Resp: 18 (10/21/20 1029)  BP: 130/62 (10/21/20 0745)  SpO2: 100 % (10/21/20 0817) Vital Signs (24h Range):  Temp:  [97.6 °F (36.4 °C)-98.4 °F (36.9 °C)] 98.4 °F (36.9 °C)  Pulse:  [] 77  Resp:  [14-22] 18  SpO2:  [92 %-100 %] 100 %  BP: (119-139)/(58-71) 130/62                   Neurosurgery Physical Exam    General: well developed, well nourished, no distress.   Head: normocephalic, atraumatic  Neck: No tracheal deviation. Cervical collar in place.   Neurologic: Alert and oriented. Thought content appropriate.  GCS: Motor: 6/Verbal: 5/Eyes: 4 GCS Total: 15  Mental Status: Awake, Alert, Oriented x 4  Language: No aphasia  Speech: No dysarthria  Cranial nerves: face symmetric, tongue midline, CN II-XII grossly intact.   Eyes: pupils equal, round, reactive to light with accomodation, EOMI.  Ears: No drainage.   Pulmonary: normal respirations, no signs of respiratory distress  Abdomen: soft, non-distended, not tender to palpation  Sensory: intact to light touch throughout  Motor Strength: Moves all extremities spontaneously with good tone.  Full strength upper and lower extremities. No abnormal movements seen.     Strength  Deltoids Triceps Biceps Wrist Extension Wrist Flexion Hand    Upper: R 5/5 5/5 5/5 5/5 5/5 5/5    L 5/5 5/5 5/5 5/5 5/5 5/5     Iliopsoas Quadriceps Knee  Flexion Tibialis  anterior Gastro- cnemius EHL   Lower: R 5/5 5/5 5/5 5/5 5/5 5/5    L 5/5 5/5 5/5 5/5 5/5 5/5     Tellez: absent  Vascular: No LE edema. No calf tenderness.  Skin: Skin is warm, dry and intact.      Significant Labs:  Recent Labs   Lab 10/20/20  0351 10/21/20  0359    97   * 135*   K 4.3 4.0   CL 98 97   CO2 23 29    BUN 14 15   CREATININE 0.8 0.9   CALCIUM 9.2 8.9     Recent Labs   Lab 10/20/20  0351 10/21/20  0359   WBC 6.58 5.87   HGB 13.6* 12.0*   HCT 41.1 37.8*    218     Recent Labs   Lab 10/19/20  1915   INR 1.0   APTT 27.9     Microbiology Results (last 7 days)     ** No results found for the last 168 hours. **        Recent Lab Results       10/21/20  0359        Anion Gap 9     Baso # 0.02     Basophil% 0.3     BUN, Bld 15     Calcium 8.9     Chloride 97     CO2 29     Creatinine 0.9     Differential Method Automated     eGFR if African American >60.0     eGFR if non  >60.0  Comment:  Calculation used to obtain the estimated glomerular filtration  rate (eGFR) is the CKD-EPI equation.        Eos # 0.1     Eosinophil% 1.2     Glucose 97     Gran # (ANC) 4.1     Gran% 69.4     Hematocrit 37.8     Hemoglobin 12.0     Immature Grans (Abs) 0.02  Comment:  Mild elevation in immature granulocytes is non specific and   can be seen in a variety of conditions including stress response,   acute inflammation, trauma and pregnancy. Correlation with other   laboratory and clinical findings is essential.       Immature Granulocytes 0.3     Lymph # 1.2     Lymph% 19.8     MCH 29.8     MCHC 31.7     MCV 94     Mono # 0.5     Mono% 9.0     MPV 10.1     nRBC 0     Platelets 218     Potassium 4.0     RBC 4.03     RDW 14.1     Sodium 135     WBC 5.87         All pertinent labs from the last 24 hours have been reviewed.    Significant Diagnostics:  I have reviewed all pertinent imaging results/findings within the past 24 hours.    Assessment/Plan:     * Odontoid fracture  Paul Browning is a 80 y.o. male with past medical history significant for HTN, HLD, Stevens's esophagus, BPH, CAD s/p stent placement in 2019, and anxiety who presents to [facility] ED following a fall down several flights of stairs. NSGY consulted for Type II displaced odontoid fracture.    - Neurologically intact on exam, baseline dementia  per family.   - Okay for diet today.  - Pain control: scheduled tylenol 1 g q 8, robaxin qid, norco 5 mg q 4 hours prn.   - Collar to be worn at all times  - CTH negative for acute hemorrhage or skull fracture  - CT cervical spine with acute appearing type II odontoid fracture with 4 mm of displacement. There is spondylosis throughout. anterolisthesis of C4 on C5 and retrolisthesis at C5 on C6.   - MRI cervical spine shows subacute type II odontoid fracture with disruption of ALL and mild buckling of PLL, all other ligaments intact. There is spondylosis throughout with large disc protrusion at C5-6 with severe central canal narrowing.   - Plan for OR tomorrow AM for odontoid screw placement.  - NPO midnight. PTT/INR within normal limits.   - HM consulted for pre-op clearance and risk stratification - appreciate their assistance. Cardiology consulted given extensive cardiac history. Perioperative Risk Assessment:  RCRI Score 3, Class IV risk with 15% risk of cardiopulmonary complications. Patient is at intermediate risk for perioperative cardiopulmonary complications. Patient needs this surgery and there is no further management or test that needs to be performed before the surgery from a cardiology standpoint. Recommend proceed to surgery as planned. Pulmonology also consulted given worsening SOB and hx of sarcoid, will f/u recs.   - GERD: continue daily protonix  - CAD: hold daily ASA. Continue home dose of isosorbide mononitrate  - HTN: SBP <160   - Anxiety: Continue home dose of zoloft  - DVT prophylaxis: TEDs, SCDs, hold SQH for OR. US BLE 10/21 negative for DVT.      Discussed with Dr. Owen Baker, PA-C  Neurosurgery  [facility]

## 2020-10-21 NOTE — ASSESSMENT & PLAN NOTE
Paul Browning is a 80 y.o. male with past medical history significant for HTN, HLD, Stevens's esophagus, BPH, CAD s/p stent placement in 2019, and anxiety who [place] [facility] [place] a [place] down several flights of stairs. NSGY consulted for Type II displaced odontoid fracture.    - Neurologically intact on exam, baseline dementia per family.   - Okay for diet today.  - Pain control: scheduled tylenol 1 g q 8, robaxin qid, norco 5 mg q 4 hours prn.   - Collar to be worn at all times  - CTH negative for acute hemorrhage or skull fracture  - CT cervical spine with acute appearing type II odontoid fracture with 4 mm of displacement. There is spondylosis throughout. anterolisthesis of C4 on C5 and retrolisthesis at C5 on C6.   - MRI cervical spine shows subacute type II odontoid fracture with disruption of ALL and mild buckling of PLL, all other ligaments intact. There is spondylosis throughout with large disc protrusion at C5-6 with severe central canal narrowing.   - Plan for OR tomorrow AM for odontoid screw placement.  - NPO midnight. PTT/INR within normal limits.   - HM consulted for pre-op clearance and risk stratification - appreciate their assistance. Cardiology consulted given extensive cardiac history. Perioperative Risk Assessment:  RCRI Score 3, Class IV risk with 15% risk of cardiopulmonary complications. Patient is at intermediate risk for perioperative cardiopulmonary complications. Patient needs this surgery and there is no further management or test that needs to be performed before the surgery from a cardiology standpoint. Recommend proceed to surgery as planned. Pulmonology also consulted given worsening SOB and hx of sarcoid, will f/u recs.   - GERD: continue daily protonix  - CAD: hold daily ASA. Continue home dose of isosorbide mononitrate  - HTN: SBP <160   - Anxiety: Continue home dose of zoloft  - DVT prophylaxis: TEDs, SCDs, hold SQH for OR. US BLE 10/21 negative for  DVT.      Discussed with Dr. Weaver

## 2020-10-21 NOTE — PLAN OF CARE
Pt AAOx4. VS stable on RA. NSR on tele. Regular diet. No c/o nausea on this shift. Complaints of a headache and L leg pain managed with PRN meds around-the-clock. Neuro checks q4h done. C-collar in place. Voiding per urinal with low urine output overnight. Bladder scan showing 436 mL urine. MD on call notified. Orders to straight cath. RN unable to perform straight cath. Will have day RN attempt. 0 BM. Sleeping between care. Call light within reach, bed in lowest position. WCTM.

## 2020-10-22 LAB
ANION GAP SERPL CALC-SCNC: 9 MMOL/L (ref 8–16)
BASOPHILS # BLD AUTO: 0.03 K/UL (ref 0–0.2)
BASOPHILS NFR BLD: 0.6 % (ref 0–1.9)
BUN SERPL-MCNC: 13 MG/DL (ref 8–23)
CALCIUM SERPL-MCNC: 8.8 MG/DL (ref 8.7–10.5)
CHLORIDE SERPL-SCNC: 98 MMOL/L (ref 95–110)
CO2 SERPL-SCNC: 29 MMOL/L (ref 23–29)
CREAT SERPL-MCNC: 0.9 MG/DL (ref 0.5–1.4)
DIFFERENTIAL METHOD: ABNORMAL
EOSINOPHIL # BLD AUTO: 0.1 K/UL (ref 0–0.5)
EOSINOPHIL NFR BLD: 1.8 % (ref 0–8)
ERYTHROCYTE [DISTWIDTH] IN BLOOD BY AUTOMATED COUNT: 14.2 % (ref 11.5–14.5)
EST. GFR  (AFRICAN AMERICAN): >60 ML/MIN/1.73 M^2
EST. GFR  (NON AFRICAN AMERICAN): >60 ML/MIN/1.73 M^2
GLUCOSE SERPL-MCNC: 92 MG/DL (ref 70–110)
HCT VFR BLD AUTO: 36.5 % (ref 40–54)
HGB BLD-MCNC: 11.6 G/DL (ref 14–18)
IMM GRANULOCYTES # BLD AUTO: 0.03 K/UL (ref 0–0.04)
IMM GRANULOCYTES NFR BLD AUTO: 0.6 % (ref 0–0.5)
LYMPHOCYTES # BLD AUTO: 1.1 K/UL (ref 1–4.8)
LYMPHOCYTES NFR BLD: 22.9 % (ref 18–48)
MCH RBC QN AUTO: 30 PG (ref 27–31)
MCHC RBC AUTO-ENTMCNC: 31.8 G/DL (ref 32–36)
MCV RBC AUTO: 94 FL (ref 82–98)
MONOCYTES # BLD AUTO: 0.5 K/UL (ref 0.3–1)
MONOCYTES NFR BLD: 9.6 % (ref 4–15)
NEUTROPHILS # BLD AUTO: 3.2 K/UL (ref 1.8–7.7)
NEUTROPHILS NFR BLD: 64.5 % (ref 38–73)
NRBC BLD-RTO: 0 /100 WBC
PLATELET # BLD AUTO: 219 K/UL (ref 150–350)
PMV BLD AUTO: 10.1 FL (ref 9.2–12.9)
POTASSIUM SERPL-SCNC: 3.7 MMOL/L (ref 3.5–5.1)
RBC # BLD AUTO: 3.87 M/UL (ref 4.6–6.2)
SODIUM SERPL-SCNC: 136 MMOL/L (ref 136–145)
WBC # BLD AUTO: 4.89 K/UL (ref 3.9–12.7)

## 2020-10-22 PROCEDURE — D9220A PRA ANESTHESIA: Mod: HCNC,CRNA,, | Performed by: NURSE ANESTHETIST, CERTIFIED REGISTERED

## 2020-10-22 PROCEDURE — 25000003 PHARM REV CODE 250: Mod: HCNC | Performed by: PHYSICIAN ASSISTANT

## 2020-10-22 PROCEDURE — 94761 N-INVAS EAR/PLS OXIMETRY MLT: CPT | Mod: HCNC

## 2020-10-22 PROCEDURE — 63600175 PHARM REV CODE 636 W HCPCS: Mod: HCNC | Performed by: NEUROLOGICAL SURGERY

## 2020-10-22 PROCEDURE — 71000039 HC RECOVERY, EACH ADD'L HOUR: Mod: HCNC | Performed by: NEUROLOGICAL SURGERY

## 2020-10-22 PROCEDURE — D9220A PRA ANESTHESIA: ICD-10-PCS | Mod: HCNC,CRNA,, | Performed by: NURSE ANESTHETIST, CERTIFIED REGISTERED

## 2020-10-22 PROCEDURE — 25000003 PHARM REV CODE 250: Mod: HCNC | Performed by: NURSE ANESTHETIST, CERTIFIED REGISTERED

## 2020-10-22 PROCEDURE — 25000003 PHARM REV CODE 250: Mod: HCNC | Performed by: STUDENT IN AN ORGANIZED HEALTH CARE EDUCATION/TRAINING PROGRAM

## 2020-10-22 PROCEDURE — 85025 COMPLETE CBC W/AUTO DIFF WBC: CPT | Mod: HCNC

## 2020-10-22 PROCEDURE — 63600175 PHARM REV CODE 636 W HCPCS: Mod: HCNC | Performed by: PHYSICIAN ASSISTANT

## 2020-10-22 PROCEDURE — 27201423 OPTIME MED/SURG SUP & DEVICES STERILE SUPPLY: Mod: HCNC | Performed by: NEUROLOGICAL SURGERY

## 2020-10-22 PROCEDURE — 36000711: Mod: HCNC | Performed by: NEUROLOGICAL SURGERY

## 2020-10-22 PROCEDURE — 25000003 PHARM REV CODE 250: Mod: HCNC | Performed by: PAIN MEDICINE

## 2020-10-22 PROCEDURE — 80048 BASIC METABOLIC PNL TOTAL CA: CPT | Mod: HCNC

## 2020-10-22 PROCEDURE — 63600175 PHARM REV CODE 636 W HCPCS: Mod: HCNC | Performed by: STUDENT IN AN ORGANIZED HEALTH CARE EDUCATION/TRAINING PROGRAM

## 2020-10-22 PROCEDURE — 37000009 HC ANESTHESIA EA ADD 15 MINS: Mod: HCNC | Performed by: NEUROLOGICAL SURGERY

## 2020-10-22 PROCEDURE — 25000242 PHARM REV CODE 250 ALT 637 W/ HCPCS: Mod: HCNC | Performed by: PHYSICIAN ASSISTANT

## 2020-10-22 PROCEDURE — 22318 PR ODONTOID FX,W/O GRAFT,OPEN: ICD-10-PCS | Mod: HCNC,,, | Performed by: NEUROLOGICAL SURGERY

## 2020-10-22 PROCEDURE — 94640 AIRWAY INHALATION TREATMENT: CPT | Mod: HCNC

## 2020-10-22 PROCEDURE — C1713 ANCHOR/SCREW BN/BN,TIS/BN: HCPCS | Mod: HCNC | Performed by: NEUROLOGICAL SURGERY

## 2020-10-22 PROCEDURE — 71000033 HC RECOVERY, INTIAL HOUR: Mod: HCNC | Performed by: NEUROLOGICAL SURGERY

## 2020-10-22 PROCEDURE — 22318 TREAT ODONTOID FX W/O GRAFT: CPT | Mod: 82,HCNC,, | Performed by: NEUROLOGICAL SURGERY

## 2020-10-22 PROCEDURE — 22318 PR ODONTOID FX,W/O GRAFT,OPEN: ICD-10-PCS | Mod: 82,HCNC,, | Performed by: NEUROLOGICAL SURGERY

## 2020-10-22 PROCEDURE — 63600175 PHARM REV CODE 636 W HCPCS: Mod: HCNC | Performed by: NURSE ANESTHETIST, CERTIFIED REGISTERED

## 2020-10-22 PROCEDURE — D9220A PRA ANESTHESIA: Mod: HCNC,ANES,, | Performed by: ANESTHESIOLOGY

## 2020-10-22 PROCEDURE — 20600001 HC STEP DOWN PRIVATE ROOM: Mod: HCNC

## 2020-10-22 PROCEDURE — 22318 TREAT ODONTOID FX W/O GRAFT: CPT | Mod: HCNC,,, | Performed by: NEUROLOGICAL SURGERY

## 2020-10-22 PROCEDURE — 27000221 HC OXYGEN, UP TO 24 HOURS: Mod: HCNC

## 2020-10-22 PROCEDURE — 36000710: Mod: HCNC | Performed by: NEUROLOGICAL SURGERY

## 2020-10-22 PROCEDURE — C1769 GUIDE WIRE: HCPCS | Mod: HCNC | Performed by: NEUROLOGICAL SURGERY

## 2020-10-22 PROCEDURE — C1729 CATH, DRAINAGE: HCPCS | Mod: HCNC | Performed by: NEUROLOGICAL SURGERY

## 2020-10-22 PROCEDURE — 71000015 HC POSTOP RECOV 1ST HR: Mod: HCNC | Performed by: NEUROLOGICAL SURGERY

## 2020-10-22 PROCEDURE — D9220A PRA ANESTHESIA: ICD-10-PCS | Mod: HCNC,ANES,, | Performed by: ANESTHESIOLOGY

## 2020-10-22 PROCEDURE — 63600175 PHARM REV CODE 636 W HCPCS: Mod: HCNC | Performed by: ANESTHESIOLOGY

## 2020-10-22 PROCEDURE — 36415 COLL VENOUS BLD VENIPUNCTURE: CPT | Mod: HCNC

## 2020-10-22 PROCEDURE — 25000003 PHARM REV CODE 250: Mod: HCNC | Performed by: NEUROLOGICAL SURGERY

## 2020-10-22 PROCEDURE — 37000008 HC ANESTHESIA 1ST 15 MINUTES: Mod: HCNC | Performed by: NEUROLOGICAL SURGERY

## 2020-10-22 DEVICE — IMPLANTABLE DEVICE: Type: IMPLANTABLE DEVICE | Site: SPINE CERVICAL | Status: FUNCTIONAL

## 2020-10-22 RX ORDER — CEFAZOLIN SODIUM 1 G/3ML
2 INJECTION, POWDER, FOR SOLUTION INTRAMUSCULAR; INTRAVENOUS
Status: COMPLETED | OUTPATIENT
Start: 2020-10-22 | End: 2020-10-23

## 2020-10-22 RX ORDER — PHENYLEPHRINE HYDROCHLORIDE 10 MG/ML
INJECTION INTRAVENOUS
Status: DISCONTINUED | OUTPATIENT
Start: 2020-10-22 | End: 2020-10-22

## 2020-10-22 RX ORDER — MAG HYDROX/ALUMINUM HYD/SIMETH 200-200-20
30 SUSPENSION, ORAL (FINAL DOSE FORM) ORAL EVERY 4 HOURS PRN
Status: DISCONTINUED | OUTPATIENT
Start: 2020-10-22 | End: 2020-10-23 | Stop reason: HOSPADM

## 2020-10-22 RX ORDER — LABETALOL HYDROCHLORIDE 5 MG/ML
INJECTION, SOLUTION INTRAVENOUS
Status: DISCONTINUED | OUTPATIENT
Start: 2020-10-22 | End: 2020-10-22

## 2020-10-22 RX ORDER — ONDANSETRON 2 MG/ML
INJECTION INTRAMUSCULAR; INTRAVENOUS
Status: DISCONTINUED | OUTPATIENT
Start: 2020-10-22 | End: 2020-10-22

## 2020-10-22 RX ORDER — SUCCINYLCHOLINE CHLORIDE 20 MG/ML
INJECTION INTRAMUSCULAR; INTRAVENOUS
Status: DISCONTINUED | OUTPATIENT
Start: 2020-10-22 | End: 2020-10-22

## 2020-10-22 RX ORDER — FENTANYL CITRATE 50 UG/ML
25 INJECTION, SOLUTION INTRAMUSCULAR; INTRAVENOUS EVERY 5 MIN PRN
Status: DISCONTINUED | OUTPATIENT
Start: 2020-10-22 | End: 2020-10-22 | Stop reason: HOSPADM

## 2020-10-22 RX ORDER — FENTANYL CITRATE 50 UG/ML
INJECTION, SOLUTION INTRAMUSCULAR; INTRAVENOUS
Status: DISCONTINUED | OUTPATIENT
Start: 2020-10-22 | End: 2020-10-22

## 2020-10-22 RX ORDER — DEXAMETHASONE SODIUM PHOSPHATE 4 MG/ML
INJECTION, SOLUTION INTRA-ARTICULAR; INTRALESIONAL; INTRAMUSCULAR; INTRAVENOUS; SOFT TISSUE
Status: DISCONTINUED | OUTPATIENT
Start: 2020-10-22 | End: 2020-10-22

## 2020-10-22 RX ORDER — METHYLPREDNISOLONE ACETATE 40 MG/ML
INJECTION, SUSPENSION INTRA-ARTICULAR; INTRALESIONAL; INTRAMUSCULAR; SOFT TISSUE
Status: DISCONTINUED | OUTPATIENT
Start: 2020-10-22 | End: 2020-10-22 | Stop reason: HOSPADM

## 2020-10-22 RX ORDER — FENTANYL CITRATE 50 UG/ML
25 INJECTION, SOLUTION INTRAMUSCULAR; INTRAVENOUS EVERY 10 MIN PRN
Status: COMPLETED | OUTPATIENT
Start: 2020-10-22 | End: 2020-10-22

## 2020-10-22 RX ORDER — LIDOCAINE HYDROCHLORIDE 20 MG/ML
INJECTION INTRAVENOUS
Status: DISCONTINUED | OUTPATIENT
Start: 2020-10-22 | End: 2020-10-22

## 2020-10-22 RX ORDER — HALOPERIDOL 5 MG/ML
0.5 INJECTION INTRAMUSCULAR
Status: DISCONTINUED | OUTPATIENT
Start: 2020-10-22 | End: 2020-10-23 | Stop reason: HOSPADM

## 2020-10-22 RX ORDER — BISACODYL 10 MG
10 SUPPOSITORY, RECTAL RECTAL DAILY
Status: DISCONTINUED | OUTPATIENT
Start: 2020-10-23 | End: 2020-10-23 | Stop reason: HOSPADM

## 2020-10-22 RX ORDER — NICARDIPINE HYDROCHLORIDE 2.5 MG/ML
INJECTION INTRAVENOUS
Status: DISCONTINUED | OUTPATIENT
Start: 2020-10-22 | End: 2020-10-22

## 2020-10-22 RX ORDER — LIDOCAINE HYDROCHLORIDE AND EPINEPHRINE 10; 10 MG/ML; UG/ML
INJECTION, SOLUTION INFILTRATION; PERINEURAL
Status: DISCONTINUED | OUTPATIENT
Start: 2020-10-22 | End: 2020-10-22 | Stop reason: HOSPADM

## 2020-10-22 RX ORDER — ROCURONIUM BROMIDE 10 MG/ML
INJECTION, SOLUTION INTRAVENOUS
Status: DISCONTINUED | OUTPATIENT
Start: 2020-10-22 | End: 2020-10-22

## 2020-10-22 RX ORDER — BUTALBITAL, ACETAMINOPHEN AND CAFFEINE 50; 325; 40 MG/1; MG/1; MG/1
1 TABLET ORAL EVERY 4 HOURS PRN
Status: DISCONTINUED | OUTPATIENT
Start: 2020-10-22 | End: 2020-10-23 | Stop reason: HOSPADM

## 2020-10-22 RX ORDER — FAMOTIDINE 10 MG/ML
INJECTION INTRAVENOUS
Status: DISCONTINUED | OUTPATIENT
Start: 2020-10-22 | End: 2020-10-22

## 2020-10-22 RX ORDER — CEFAZOLIN SODIUM 1 G/3ML
2 INJECTION, POWDER, FOR SOLUTION INTRAMUSCULAR; INTRAVENOUS
Status: COMPLETED | OUTPATIENT
Start: 2020-10-22 | End: 2020-10-22

## 2020-10-22 RX ORDER — HYDROMORPHONE HYDROCHLORIDE 1 MG/ML
0.2 INJECTION, SOLUTION INTRAMUSCULAR; INTRAVENOUS; SUBCUTANEOUS EVERY 10 MIN PRN
Status: DISCONTINUED | OUTPATIENT
Start: 2020-10-22 | End: 2020-10-23 | Stop reason: HOSPADM

## 2020-10-22 RX ORDER — HALOPERIDOL 5 MG/ML
0.5 INJECTION INTRAMUSCULAR
Status: DISCONTINUED | OUTPATIENT
Start: 2020-10-22 | End: 2020-10-22 | Stop reason: HOSPADM

## 2020-10-22 RX ORDER — EPHEDRINE SULFATE 50 MG/ML
INJECTION, SOLUTION INTRAVENOUS
Status: DISCONTINUED | OUTPATIENT
Start: 2020-10-22 | End: 2020-10-22

## 2020-10-22 RX ORDER — KETAMINE HCL IN 0.9 % NACL 50 MG/5 ML
SYRINGE (ML) INTRAVENOUS
Status: DISCONTINUED | OUTPATIENT
Start: 2020-10-22 | End: 2020-10-22

## 2020-10-22 RX ORDER — MUPIROCIN 20 MG/G
OINTMENT TOPICAL 2 TIMES DAILY
Status: DISCONTINUED | OUTPATIENT
Start: 2020-10-22 | End: 2020-10-23 | Stop reason: HOSPADM

## 2020-10-22 RX ORDER — PROPOFOL 10 MG/ML
VIAL (ML) INTRAVENOUS CONTINUOUS PRN
Status: DISCONTINUED | OUTPATIENT
Start: 2020-10-22 | End: 2020-10-22

## 2020-10-22 RX ORDER — AMOXICILLIN 250 MG
2 CAPSULE ORAL NIGHTLY PRN
Status: DISCONTINUED | OUTPATIENT
Start: 2020-10-22 | End: 2020-10-23

## 2020-10-22 RX ORDER — VANCOMYCIN HYDROCHLORIDE 1 G/20ML
INJECTION, POWDER, LYOPHILIZED, FOR SOLUTION INTRAVENOUS
Status: DISCONTINUED | OUTPATIENT
Start: 2020-10-22 | End: 2020-10-22 | Stop reason: HOSPADM

## 2020-10-22 RX ORDER — PROPOFOL 10 MG/ML
VIAL (ML) INTRAVENOUS
Status: DISCONTINUED | OUTPATIENT
Start: 2020-10-22 | End: 2020-10-22

## 2020-10-22 RX ORDER — SODIUM CHLORIDE 0.9 % (FLUSH) 0.9 %
3 SYRINGE (ML) INJECTION
Status: DISCONTINUED | OUTPATIENT
Start: 2020-10-22 | End: 2020-10-22 | Stop reason: HOSPADM

## 2020-10-22 RX ORDER — HYDROMORPHONE HYDROCHLORIDE 1 MG/ML
0.2 INJECTION, SOLUTION INTRAMUSCULAR; INTRAVENOUS; SUBCUTANEOUS EVERY 5 MIN PRN
Status: DISCONTINUED | OUTPATIENT
Start: 2020-10-22 | End: 2020-10-22

## 2020-10-22 RX ADMIN — FENTANYL CITRATE 50 MCG: 50 INJECTION, SOLUTION INTRAMUSCULAR; INTRAVENOUS at 03:10

## 2020-10-22 RX ADMIN — FENTANYL CITRATE 25 MCG: 50 INJECTION INTRAMUSCULAR; INTRAVENOUS at 01:10

## 2020-10-22 RX ADMIN — PROPOFOL 100 MG: 10 INJECTION, EMULSION INTRAVENOUS at 02:10

## 2020-10-22 RX ADMIN — EPHEDRINE SULFATE 2.5 MG: 50 INJECTION INTRAVENOUS at 04:10

## 2020-10-22 RX ADMIN — PANTOPRAZOLE SODIUM 40 MG: 40 TABLET, DELAYED RELEASE ORAL at 11:10

## 2020-10-22 RX ADMIN — Medication 10 MG: at 03:10

## 2020-10-22 RX ADMIN — ONDANSETRON 4 MG: 2 INJECTION INTRAMUSCULAR; INTRAVENOUS at 01:10

## 2020-10-22 RX ADMIN — ALBUTEROL SULFATE 2.5 MG: 2.5 SOLUTION RESPIRATORY (INHALATION) at 12:10

## 2020-10-22 RX ADMIN — EPHEDRINE SULFATE 5 MG: 50 INJECTION INTRAVENOUS at 02:10

## 2020-10-22 RX ADMIN — NICARDIPINE HYDROCHLORIDE 0.2 MG: 2.5 INJECTION INTRAVENOUS at 03:10

## 2020-10-22 RX ADMIN — HYDRALAZINE HYDROCHLORIDE 10 MG: 20 INJECTION INTRAMUSCULAR; INTRAVENOUS at 08:10

## 2020-10-22 RX ADMIN — ALBUTEROL SULFATE 2.5 MG: 2.5 SOLUTION RESPIRATORY (INHALATION) at 04:10

## 2020-10-22 RX ADMIN — EPHEDRINE SULFATE 5 MG: 50 INJECTION INTRAVENOUS at 03:10

## 2020-10-22 RX ADMIN — LIDOCAINE HYDROCHLORIDE 50 MG: 20 INJECTION, SOLUTION INTRAVENOUS at 02:10

## 2020-10-22 RX ADMIN — SODIUM CHLORIDE, SODIUM GLUCONATE, SODIUM ACETATE, POTASSIUM CHLORIDE, MAGNESIUM CHLORIDE, SODIUM PHOSPHATE, DIBASIC, AND POTASSIUM PHOSPHATE: .53; .5; .37; .037; .03; .012; .00082 INJECTION, SOLUTION INTRAVENOUS at 02:10

## 2020-10-22 RX ADMIN — SUCCINYLCHOLINE CHLORIDE 120 MG: 20 INJECTION, SOLUTION INTRAMUSCULAR; INTRAVENOUS at 02:10

## 2020-10-22 RX ADMIN — CEFAZOLIN 2 G: 1 INJECTION, POWDER, FOR SOLUTION INTRAMUSCULAR; INTRAVENOUS at 11:10

## 2020-10-22 RX ADMIN — FAMOTIDINE 20 MG: 10 INJECTION, SOLUTION INTRAVENOUS at 03:10

## 2020-10-22 RX ADMIN — HYDROCODONE BITARTRATE AND ACETAMINOPHEN 1 TABLET: 5; 325 TABLET ORAL at 11:10

## 2020-10-22 RX ADMIN — PROPOFOL 100 MCG/KG/MIN: 10 INJECTION, EMULSION INTRAVENOUS at 02:10

## 2020-10-22 RX ADMIN — METHOCARBAMOL TABLETS 500 MG: 500 TABLET, COATED ORAL at 11:10

## 2020-10-22 RX ADMIN — HYDROCODONE BITARTRATE AND ACETAMINOPHEN 1 TABLET: 5; 325 TABLET ORAL at 04:10

## 2020-10-22 RX ADMIN — SODIUM CHLORIDE: 0.9 INJECTION, SOLUTION INTRAVENOUS at 12:10

## 2020-10-22 RX ADMIN — ROCURONIUM BROMIDE 5 MG: 10 INJECTION, SOLUTION INTRAVENOUS at 02:10

## 2020-10-22 RX ADMIN — LABETALOL HYDROCHLORIDE 5 MG: 5 INJECTION, SOLUTION INTRAVENOUS at 04:10

## 2020-10-22 RX ADMIN — ONDANSETRON 4 MG: 2 INJECTION, SOLUTION INTRAMUSCULAR; INTRAVENOUS at 03:10

## 2020-10-22 RX ADMIN — NICARDIPINE HYDROCHLORIDE 0.2 MG: 2.5 INJECTION INTRAVENOUS at 05:10

## 2020-10-22 RX ADMIN — PHENYLEPHRINE HYDROCHLORIDE 100 MCG: 10 INJECTION INTRAVENOUS at 02:10

## 2020-10-22 RX ADMIN — DEXAMETHASONE SODIUM PHOSPHATE 8 MG: 4 INJECTION, SOLUTION INTRAMUSCULAR; INTRAVENOUS at 02:10

## 2020-10-22 RX ADMIN — ALBUTEROL SULFATE 2.5 MG: 2.5 SOLUTION RESPIRATORY (INHALATION) at 07:10

## 2020-10-22 RX ADMIN — BUTALBITAL, ACETAMINOPHEN, AND CAFFEINE 1 TABLET: 50; 325; 40 TABLET ORAL at 01:10

## 2020-10-22 RX ADMIN — PHENYLEPHRINE HYDROCHLORIDE 100 MCG: 10 INJECTION INTRAVENOUS at 03:10

## 2020-10-22 RX ADMIN — REMIFENTANIL HYDROCHLORIDE 0.1 MCG/KG/MIN: 1 INJECTION, POWDER, LYOPHILIZED, FOR SOLUTION INTRAVENOUS at 02:10

## 2020-10-22 RX ADMIN — CEFAZOLIN 2 G: 330 INJECTION, POWDER, FOR SOLUTION INTRAMUSCULAR; INTRAVENOUS at 03:10

## 2020-10-22 RX ADMIN — SODIUM CHLORIDE 0.25 MCG/KG/MIN: 9 INJECTION, SOLUTION INTRAVENOUS at 02:10

## 2020-10-22 NOTE — PLAN OF CARE
Patient AAOX3, transferred to [facility] via stretcher, accompanied by son. Right forehead wound with dry blood noted. Aspen collar on, intact. Patient states headache 3/10, worse when moving. PIV leaking, removed. 20 g left AC PIV placed now, patient tolerated it well. Consents on chart, patient marked. Safety checklist complete.

## 2020-10-22 NOTE — PROGRESS NOTES
Patient resting comfortably in bed, denies any nausea at present. Patient still states pain to head 8/10, but appears more comfortable. OR staff and CRNA at bedside to transport patient at bedside.

## 2020-10-22 NOTE — BRIEF OP NOTE
[facility]  Brief Operative Note    SUMMARY     Surgery Date: 10/22/2020     Surgeon(s) and Role:     * Kirsten Weaver MD - Primary     * Luis Antonio Shafer MD - Assisting     * Flex Torres MD - Resident - Assisting        Pre-op Diagnosis:  Odontoid fracture [S12.110A]    Post-op Diagnosis:  Post-Op Diagnosis Codes:     * Odontoid fracture [S12.110A]    Procedure(s) (LRB):  INSERTION, SCREW, ODONTOID. Anterior approach. (N/A)    Anesthesia: General    Description of Procedure: odontoid screw    Description of the findings of the procedure: good reduction of the fracture fragment with head positioning.  Able to access good entry point, and was able to place screw under fluoroscopic guidance.  Good hardware positioning per intra-operative x-rays.    Estimated Blood Loss: * No values recorded between 10/22/2020  3:31 PM and 10/22/2020  5:18 PM *    Estimated Blood Loss has not been documented. EBL = 50.         Specimens:   Specimen (12h ago, onward)    None          [id number]

## 2020-10-22 NOTE — NURSING
While getting report from dayshift RN at the bedside, PT complained of an extremely painful headache, rating the pain 10/10. I immediately paged the MD on call and received orders from fioricet. One tablet was given, one hour later the PT reported the headache being unrelieved. Took vitals, all stable. Did neuro assessment, all findings WDL. Paged MD. Received orders to go ahead and give PT an additional dose of fioricet. PT hasnt reported any more pain since second dose of fioricet was given and is now resting quietly.

## 2020-10-22 NOTE — PROGRESS NOTES
"Dr. Villela at bedside starting A-line to left wrist. Order for 0.2 mg of Dilaudid IV every 5 minutes times 3 for pain 7-10 received and placed. Patient states Dilaudid " makes him nauseated", prefers PO pain medication. Will administer Norco as ordered. Patient resting in bed, tolerated A-line placement well. Son at bedside. Call bell in reach. Monitoring ongoing.  "

## 2020-10-22 NOTE — PLAN OF CARE
POC reviewed w PT, verbalized understanding. AAOX4. VSS. On RA. NSR on tele. Neuro checks done q4. PT consistently complaints of a persistent headache unrelieved by PRN pain and headache meds, both given around the clock. On call MD aware, states he will make the primary team aware this morning. Adequate UO. No BM. PT denies nausea. PT NPO as of midnight in preparation for OR this AM. PT currently resting quietly. Bed low. Call light within reach. Will continue to manage POC.

## 2020-10-22 NOTE — PROGRESS NOTES
Patient states nausea, and pain to head 10/10.  notified. Will administer Zofran and Fentanyl as ordered. OR 3 called for update. Another 25 min delay expected. PA of Dr. Weaver on her way to talk to patient and family. Call bell in reach. Close monitoring ongoing.

## 2020-10-22 NOTE — PLAN OF CARE
Patient planned for OR this AM for odontoid screw insertion.     NPO since midnight. Pre-op labs WNL. COVID negative. BLE US 10/21 negative for DVTs. Cleared from medicine, cardiac, and pulmonary standpoint to proceed to surgery.     Adamaris Maynard PA-C  Neurosurgery  [facility]

## 2020-10-22 NOTE — PROGRESS NOTES
Patient states headache 8/10 now.Dr. Villela called to check if it's ok to administer any pain medication at present. Awaiting response.

## 2020-10-22 NOTE — ANESTHESIA PROCEDURE NOTES
Intubation  Performed by: Ly Candelario CRNA  Authorized by: Teto Leon MD     Intubation:     Induction:  Intravenous    Intubated:  Postinduction    Mask Ventilation:  N/a (RSI- patient reports nausea in preop. spitting up small amounts ofg clear spit)    Attempted By:  CRNA    Method of Intubation:  Video laryngoscopy    Blade:  Bob 4    Laryngeal View Grade: Grade I - full view of chords      Difficult Airway Encountered?: No      Complications:  None    Airway Device:  Oral endotracheal tube    Airway Device Size:  7.5    Style/Cuff Inflation:  Cuffed (inflated to minimal occlusive pressure)    Tube secured:  21    Secured at:  The lips    Placement Verified By:  Capnometry    Complicating Factors:  Poor neck/head extension (C Collar in place)  Notes:      C collar in place for induction and intubation. Neck remained midline and neutral

## 2020-10-23 VITALS
HEIGHT: 67 IN | DIASTOLIC BLOOD PRESSURE: 56 MMHG | SYSTOLIC BLOOD PRESSURE: 117 MMHG | WEIGHT: 140 LBS | HEART RATE: 77 BPM | BODY MASS INDEX: 21.97 KG/M2 | OXYGEN SATURATION: 95 % | RESPIRATION RATE: 18 BRPM | TEMPERATURE: 99 F

## 2020-10-23 DIAGNOSIS — S12.100D CLOSED ODONTOID FRACTURE WITH ROUTINE HEALING, SUBSEQUENT ENCOUNTER: Primary | ICD-10-CM

## 2020-10-23 LAB
ANION GAP SERPL CALC-SCNC: 13 MMOL/L (ref 8–16)
BASOPHILS # BLD AUTO: 0.01 K/UL (ref 0–0.2)
BASOPHILS NFR BLD: 0.2 % (ref 0–1.9)
BLD PROD TYP BPU: NORMAL
BLD PROD TYP BPU: NORMAL
BLOOD UNIT EXPIRATION DATE: NORMAL
BLOOD UNIT EXPIRATION DATE: NORMAL
BLOOD UNIT TYPE CODE: 6200
BLOOD UNIT TYPE CODE: 6200
BLOOD UNIT TYPE: NORMAL
BLOOD UNIT TYPE: NORMAL
BUN SERPL-MCNC: 13 MG/DL (ref 8–23)
CALCIUM SERPL-MCNC: 9.1 MG/DL (ref 8.7–10.5)
CHLORIDE SERPL-SCNC: 98 MMOL/L (ref 95–110)
CO2 SERPL-SCNC: 25 MMOL/L (ref 23–29)
CODING SYSTEM: NORMAL
CODING SYSTEM: NORMAL
CREAT SERPL-MCNC: 1 MG/DL (ref 0.5–1.4)
DIFFERENTIAL METHOD: ABNORMAL
DISPENSE STATUS: NORMAL
DISPENSE STATUS: NORMAL
EOSINOPHIL # BLD AUTO: 0 K/UL (ref 0–0.5)
EOSINOPHIL NFR BLD: 0 % (ref 0–8)
ERYTHROCYTE [DISTWIDTH] IN BLOOD BY AUTOMATED COUNT: 13.9 % (ref 11.5–14.5)
EST. GFR  (AFRICAN AMERICAN): >60 ML/MIN/1.73 M^2
EST. GFR  (NON AFRICAN AMERICAN): >60 ML/MIN/1.73 M^2
GLUCOSE SERPL-MCNC: 109 MG/DL (ref 70–110)
HCT VFR BLD AUTO: 42.5 % (ref 40–54)
HGB BLD-MCNC: 14 G/DL (ref 14–18)
IMM GRANULOCYTES # BLD AUTO: 0.02 K/UL (ref 0–0.04)
IMM GRANULOCYTES NFR BLD AUTO: 0.4 % (ref 0–0.5)
LYMPHOCYTES # BLD AUTO: 0.4 K/UL (ref 1–4.8)
LYMPHOCYTES NFR BLD: 7.7 % (ref 18–48)
MCH RBC QN AUTO: 30.3 PG (ref 27–31)
MCHC RBC AUTO-ENTMCNC: 32.9 G/DL (ref 32–36)
MCV RBC AUTO: 92 FL (ref 82–98)
MONOCYTES # BLD AUTO: 0.3 K/UL (ref 0.3–1)
MONOCYTES NFR BLD: 5.2 % (ref 4–15)
NEUTROPHILS # BLD AUTO: 4.6 K/UL (ref 1.8–7.7)
NEUTROPHILS NFR BLD: 86.5 % (ref 38–73)
NRBC BLD-RTO: 0 /100 WBC
PLATELET # BLD AUTO: 257 K/UL (ref 150–350)
PMV BLD AUTO: 10.5 FL (ref 9.2–12.9)
POCT GLUCOSE: 143 MG/DL (ref 70–110)
POTASSIUM SERPL-SCNC: 4.3 MMOL/L (ref 3.5–5.1)
RBC # BLD AUTO: 4.62 M/UL (ref 4.6–6.2)
SODIUM SERPL-SCNC: 136 MMOL/L (ref 136–145)
TRANS ERYTHROCYTES VOL PATIENT: NORMAL ML
TRANS ERYTHROCYTES VOL PATIENT: NORMAL ML
WBC # BLD AUTO: 5.34 K/UL (ref 3.9–12.7)

## 2020-10-23 PROCEDURE — 25000003 PHARM REV CODE 250: Mod: HCNC | Performed by: PHYSICIAN ASSISTANT

## 2020-10-23 PROCEDURE — 36620 INSERTION CATHETER ARTERY: CPT | Mod: 59,HCNC,, | Performed by: ANESTHESIOLOGY

## 2020-10-23 PROCEDURE — 97530 THERAPEUTIC ACTIVITIES: CPT | Mod: HCNC

## 2020-10-23 PROCEDURE — 63600175 PHARM REV CODE 636 W HCPCS: Mod: HCNC | Performed by: PHYSICIAN ASSISTANT

## 2020-10-23 PROCEDURE — 97116 GAIT TRAINING THERAPY: CPT | Mod: HCNC

## 2020-10-23 PROCEDURE — 76937 ARTERIAL: ICD-10-PCS | Mod: 26,HCNC,, | Performed by: ANESTHESIOLOGY

## 2020-10-23 PROCEDURE — 76937 US GUIDE VASCULAR ACCESS: CPT | Mod: HCNC | Performed by: ANESTHESIOLOGY

## 2020-10-23 PROCEDURE — 97161 PT EVAL LOW COMPLEX 20 MIN: CPT | Mod: HCNC

## 2020-10-23 PROCEDURE — 27000221 HC OXYGEN, UP TO 24 HOURS: Mod: HCNC

## 2020-10-23 PROCEDURE — 25000242 PHARM REV CODE 250 ALT 637 W/ HCPCS: Mod: HCNC | Performed by: PHYSICIAN ASSISTANT

## 2020-10-23 PROCEDURE — 63600175 PHARM REV CODE 636 W HCPCS: Mod: HCNC | Performed by: STUDENT IN AN ORGANIZED HEALTH CARE EDUCATION/TRAINING PROGRAM

## 2020-10-23 PROCEDURE — 99024 POSTOP FOLLOW-UP VISIT: CPT | Mod: HCNC,,, | Performed by: PHYSICIAN ASSISTANT

## 2020-10-23 PROCEDURE — 25000003 PHARM REV CODE 250: Mod: HCNC | Performed by: STUDENT IN AN ORGANIZED HEALTH CARE EDUCATION/TRAINING PROGRAM

## 2020-10-23 PROCEDURE — 36620 ARTERIAL: ICD-10-PCS | Mod: 59,HCNC,, | Performed by: ANESTHESIOLOGY

## 2020-10-23 PROCEDURE — 80048 BASIC METABOLIC PNL TOTAL CA: CPT | Mod: HCNC

## 2020-10-23 PROCEDURE — 99900035 HC TECH TIME PER 15 MIN (STAT): Mod: HCNC

## 2020-10-23 PROCEDURE — 97165 OT EVAL LOW COMPLEX 30 MIN: CPT | Mod: HCNC

## 2020-10-23 PROCEDURE — 94640 AIRWAY INHALATION TREATMENT: CPT | Mod: HCNC

## 2020-10-23 PROCEDURE — 97535 SELF CARE MNGMENT TRAINING: CPT | Mod: HCNC

## 2020-10-23 PROCEDURE — 99024 PR POST-OP FOLLOW-UP VISIT: ICD-10-PCS | Mod: HCNC,,, | Performed by: PHYSICIAN ASSISTANT

## 2020-10-23 PROCEDURE — 85025 COMPLETE CBC W/AUTO DIFF WBC: CPT | Mod: HCNC

## 2020-10-23 PROCEDURE — 94761 N-INVAS EAR/PLS OXIMETRY MLT: CPT | Mod: HCNC

## 2020-10-23 PROCEDURE — 36415 COLL VENOUS BLD VENIPUNCTURE: CPT | Mod: HCNC

## 2020-10-23 RX ORDER — METHOCARBAMOL 500 MG/1
500 TABLET, FILM COATED ORAL 4 TIMES DAILY PRN
Qty: 40 TABLET | Refills: 0 | Status: SHIPPED | OUTPATIENT
Start: 2020-10-23 | End: 2020-11-02

## 2020-10-23 RX ORDER — HYDROCODONE BITARTRATE AND ACETAMINOPHEN 5; 325 MG/1; MG/1
1 TABLET ORAL EVERY 6 HOURS PRN
Qty: 50 TABLET | Refills: 0 | Status: SHIPPED | OUTPATIENT
Start: 2020-10-23 | End: 2020-11-25 | Stop reason: SDUPTHER

## 2020-10-23 RX ORDER — HEPARIN SODIUM 5000 [USP'U]/ML
5000 INJECTION, SOLUTION INTRAVENOUS; SUBCUTANEOUS EVERY 8 HOURS
Status: DISCONTINUED | OUTPATIENT
Start: 2020-10-23 | End: 2020-10-23 | Stop reason: HOSPADM

## 2020-10-23 RX ORDER — ALBUTEROL SULFATE 2.5 MG/.5ML
2.5 SOLUTION RESPIRATORY (INHALATION)
Status: DISCONTINUED | OUTPATIENT
Start: 2020-10-23 | End: 2020-10-23 | Stop reason: HOSPADM

## 2020-10-23 RX ORDER — CEFAZOLIN SODIUM 1 G/3ML
2 INJECTION, POWDER, FOR SOLUTION INTRAMUSCULAR; INTRAVENOUS
Status: DISCONTINUED | OUTPATIENT
Start: 2020-10-23 | End: 2020-10-23 | Stop reason: HOSPADM

## 2020-10-23 RX ORDER — CEFAZOLIN SODIUM 1 G/3ML
2 INJECTION, POWDER, FOR SOLUTION INTRAMUSCULAR; INTRAVENOUS
Status: DISCONTINUED | OUTPATIENT
Start: 2020-10-23 | End: 2020-10-23

## 2020-10-23 RX ORDER — AMOXICILLIN 250 MG
1 CAPSULE ORAL 2 TIMES DAILY
Status: DISCONTINUED | OUTPATIENT
Start: 2020-10-23 | End: 2020-10-23 | Stop reason: HOSPADM

## 2020-10-23 RX ADMIN — HYDROCODONE BITARTRATE AND ACETAMINOPHEN 1 TABLET: 5; 325 TABLET ORAL at 08:10

## 2020-10-23 RX ADMIN — MUPIROCIN: 20 OINTMENT TOPICAL at 09:10

## 2020-10-23 RX ADMIN — PANTOPRAZOLE SODIUM 40 MG: 40 TABLET, DELAYED RELEASE ORAL at 08:10

## 2020-10-23 RX ADMIN — HEPARIN SODIUM 5000 UNITS: 5000 INJECTION INTRAVENOUS; SUBCUTANEOUS at 02:10

## 2020-10-23 RX ADMIN — ATORVASTATIN CALCIUM 40 MG: 20 TABLET, FILM COATED ORAL at 08:10

## 2020-10-23 RX ADMIN — SERTRALINE HYDROCHLORIDE 50 MG: 50 TABLET ORAL at 08:10

## 2020-10-23 RX ADMIN — METHOCARBAMOL TABLETS 500 MG: 500 TABLET, COATED ORAL at 12:10

## 2020-10-23 RX ADMIN — HYDROCODONE BITARTRATE AND ACETAMINOPHEN 1 TABLET: 5; 325 TABLET ORAL at 12:10

## 2020-10-23 RX ADMIN — HYDROCODONE BITARTRATE AND ACETAMINOPHEN 1 TABLET: 5; 325 TABLET ORAL at 03:10

## 2020-10-23 RX ADMIN — METHOCARBAMOL TABLETS 500 MG: 500 TABLET, COATED ORAL at 08:10

## 2020-10-23 RX ADMIN — DOCUSATE SODIUM AND SENNOSIDES 1 TABLET: 8.6; 5 TABLET, FILM COATED ORAL at 09:10

## 2020-10-23 RX ADMIN — ALBUTEROL SULFATE 2.5 MG: 2.5 SOLUTION RESPIRATORY (INHALATION) at 01:10

## 2020-10-23 RX ADMIN — CEFAZOLIN 2 G: 1 INJECTION, POWDER, FOR SOLUTION INTRAMUSCULAR; INTRAVENOUS at 03:10

## 2020-10-23 RX ADMIN — BISACODYL 10 MG: 10 SUPPOSITORY RECTAL at 08:10

## 2020-10-23 RX ADMIN — ALBUTEROL SULFATE 2.5 MG: 2.5 SOLUTION RESPIRATORY (INHALATION) at 11:10

## 2020-10-23 RX ADMIN — ALBUTEROL SULFATE 2.5 MG: 2.5 SOLUTION RESPIRATORY (INHALATION) at 04:10

## 2020-10-23 RX ADMIN — ISOSORBIDE MONONITRATE 30 MG: 30 TABLET, EXTENDED RELEASE ORAL at 08:10

## 2020-10-23 NOTE — PLAN OF CARE
Home Health referrals sent     10/23/20 1427   Post-Acute Status   Post-Acute Authorization Home Health   Home Health Status Referrals Sent   Kat Coffey RN, CM   Ext: [phone]

## 2020-10-23 NOTE — PROGRESS NOTES
[facility]  Neurosurgery  Progress Note    Subjective:     History of Present Illness: Paul Browning is a 80 y.o. male with past medical history significant for HTN, HLD, Stevens's esophagus, BPH, CAD s/p stent placement in 2019, and anxiety who [place] [facility] [place] following a fall down several flights of stairs. NSGY consulted for Type II displaced odontoid fracture. Neck pain present with slight headache. Denies weakness, paresthesias, bowel/bladder issues, or gait imbalance. NSGY consulted for evaluation.     Of additional note- patient takes daily ASA, last dose of which was this morning.     Addendum: Patient with several falls within the past several months, latest resulting in left hip fracture being treated conservatively. Per his daughter at bedside, he has had increased unsteady gait over the past several months. Patient otherwise has no fine dexterity issues. No object dropping, fumbling with buttons, etc. No bowel/bladder issues.     Post-Op Info:  Procedure(s) (LRB):  INSERTION, SCREW, ODONTOID. Anterior approach. (N/A)   1 Day Post-Op     Interval History: NAEON. AFVSS. Patient states he is having no pain since yesterday prior to surgery. Denies weakness, numbness, paresthesias. Tolerating PO, advance to solids. Daughter at bedside. HV drain in place, will remove today. Ambulated halls with PT/OT. Compliant with cervical collar. No evidence of hematoma.    Medications:  Continuous Infusions:  Scheduled Meds:   albuterol sulfate  2.5 mg Nebulization Q4H    atorvastatin  40 mg Oral Daily    bisacodyL  10 mg Rectal Daily    ceFAZolin (ANCEF) IVPB  2 g Intravenous Q8H    heparin (porcine)  5,000 Units Subcutaneous Q8H    isosorbide mononitrate  30 mg Oral Daily    methocarbamoL  500 mg Oral QID    mupirocin   Nasal BID    pantoprazole  40 mg Oral BID    senna-docusate 8.6-50 mg  1 tablet Oral BID    sertraline  50 mg Oral Daily    tiotropium  1 capsule Inhalation  Daily     PRN Meds:aluminum-magnesium hydroxide-simethicone, butalbital-acetaminophen-caffeine -40 mg, dextrose 50%, dextrose 50%, glucagon (human recombinant), glucose, glucose, glucose, haloperidol lactate, hydrALAZINE, HYDROcodone-acetaminophen, HYDROmorphone, ondansetron, promethazine (PHENERGAN) IVPB, promethazine       Objective:     Weight: 63.5 kg (139 lb 15.9 oz)  Body mass index is 21.93 kg/m².  Vital Signs (Most Recent):  Temp: 98.4 °F (36.9 °C) (10/23/20 1135)  Pulse: 76 (10/23/20 1145)  Resp: 18 (10/23/20 1202)  BP: 113/60 (10/23/20 1135)  SpO2: 96 % (10/23/20 1145) Vital Signs (24h Range):  Temp:  [96.8 °F (36 °C)-98.4 °F (36.9 °C)] 98.4 °F (36.9 °C)  Pulse:  [70-89] 76  Resp:  [15-21] 18  SpO2:  [94 %-100 %] 96 %  BP: (113-180)/(60-86) 113/60                          Closed/Suction Drain 10/22/20 1650 Right Neck Accordion 10 Fr. (Active)   Site Description Unable to view 10/22/20 2130   Dressing Type Biopatch in place;Transparent (Tegaderm) 10/22/20 2030   Dressing Status Clean;Dry;Intact 10/22/20 2030   Dressing Intervention First dressing 10/22/20 2030   Output (mL) 10 mL 10/23/20 0600       Neurosurgery Physical Exam    General: well developed, well nourished, no distress.   Head: normocephalic, atraumatic  Neck: No tracheal deviation. Collar in place.  Neurologic: Alert and oriented. Thought content appropriate.  GCS: Motor: 6/Verbal: 5/Eyes: 4 GCS Total: 15  Mental Status: Awake, Alert, Oriented x 4  Language: No aphasia  Speech: No dysarthria  Cranial nerves: face symmetric, tongue midline, CN II-XII grossly intact.   Eyes: pupils equal, round, reactive to light with accomodation, EOMI.   Ears: No drainage.   Pulmonary: normal respirations, no signs of respiratory distress  Abdomen: soft, non-distended, not tender to palpation  Sensory: intact to light touch throughout  Motor Strength: Moves all extremities spontaneously with good tone.  Full strength upper and lower extremities. No  abnormal movements seen.     Strength  Deltoids Triceps Biceps Wrist Extension Wrist Flexion Hand    Upper: R 5/5 5/5 5/5 5/5 5/5 5/5    L 5/5 5/5 5/5 5/5 5/5 5/5     Iliopsoas Quadriceps Knee  Flexion Tibialis  anterior Gastro- cnemius EHL   Lower: R 5/5 5/5 5/5 5/5 5/5 5/5    L 5/5 5/5 5/5 5/5 5/5 5/5     Vascular: No LE edema.   Skin: Skin is warm, dry and intact.    Incision c/d/I with skin edges well approximated with dermabond. No surrounding erythema or edema. No drainage from incision. No palpable hematoma or underlying fluid collection.  HV drain intact.    Significant Labs:  Recent Labs   Lab 10/22/20  0332 10/23/20  0418   GLU 92 109    136   K 3.7 4.3   CL 98 98   CO2 29 25   BUN 13 13   CREATININE 0.9 1.0   CALCIUM 8.8 9.1     Recent Labs   Lab 10/22/20  0332 10/23/20  0418   WBC 4.89 5.34   HGB 11.6* 14.0   HCT 36.5* 42.5    257     No results for input(s): LABPT, INR, APTT in the last 48 hours.  Microbiology Results (last 7 days)     ** No results found for the last 168 hours. **        Recent Lab Results       10/23/20  0418   10/23/20  0017        Anion Gap 13       Baso # 0.01       Basophil% 0.2       BUN, Bld 13       Calcium 9.1       Chloride 98       CO2 25       Creatinine 1.0       Differential Method Automated       eGFR if  >60.0       eGFR if non  >60.0  Comment:  Calculation used to obtain the estimated glomerular filtration  rate (eGFR) is the CKD-EPI equation.          Eos # 0.0       Eosinophil% 0.0       Glucose 109       Gran # (ANC) 4.6       Gran% 86.5       Hematocrit 42.5       Hemoglobin 14.0       Immature Grans (Abs) 0.02  Comment:  Mild elevation in immature granulocytes is non specific and   can be seen in a variety of conditions including stress response,   acute inflammation, trauma and pregnancy. Correlation with other   laboratory and clinical findings is essential.         Immature Granulocytes 0.4       Lymph # 0.4        Lymph% 7.7       MCH 30.3       MCHC 32.9       MCV 92       Mono # 0.3       Mono% 5.2       MPV 10.5       nRBC 0       Platelets 257       POCT Glucose   143     Potassium 4.3       RBC 4.62       RDW 13.9       Sodium 136       WBC 5.34             Significant Diagnostics:  I have reviewed all pertinent imaging results/findings within the past 24 hours.    Assessment/Plan:     * Odontoid fracture  Paul Browning is a 80 y.o. male with past medical history significant for HTN, HLD, Stevens's esophagus, BPH, CAD s/p stent placement in 2019, and anxiety who presents to [facility] ED following a fall down several flights of stairs. NSGY consulted for Type II displaced odontoid fracture. Now s/p odontoid screw placement on 10/22.    - Neurologically intact on exam, baseline dementia per family. Doing well postop, reports pain has resolved.  - Pain control: scheduled tylenol 1 g q 8, robaxin qid, norco 5 mg q 4 hours prn.   - Collar to be worn at all times  - Postop XR with satisfactory placement of screw and alignment.  - HV with minimal output, will remove today. Continue antibiotics for ppx while drain in place.   - PT/OT recommending home with home health at discharge, continue PT/OT/OOB while inpatient. OOB > 6 hours per day.  - GI: Continue current bowel regimen. Tolerating PO, will advance to solids.  - F/u spontaneous voiding.   - CTH negative for acute hemorrhage or skull fracture  - CT cervical spine with acute appearing type II odontoid fracture with 4 mm of displacement. There is spondylosis throughout. anterolisthesis of C4 on C5 and retrolisthesis at C5 on C6.   - MRI cervical spine shows subacute type II odontoid fracture with disruption of ALL and mild buckling of PLL, all other ligaments intact. There is spondylosis throughout with large disc protrusion at C5-6 with severe central canal narrowing.   - HM consulted for pre-op clearance and risk stratification - appreciate their assistance.  Cardiology consulted given extensive cardiac history. Perioperative Risk Assessment:  RCRI Score 3, Class IV risk with 15% risk of cardiopulmonary complications. Patient is at intermediate risk for perioperative cardiopulmonary complications. Patient needs this surgery and there is no further management or test that needs to be performed before the surgery from a cardiology standpoint. Recommend proceed to surgery as planned. Pulmonology also consulted given worsening SOB and hx of sarcoid, will f/u recs.   - GERD: continue daily protonix  - CAD: hold daily ASA. Continue home dose of isosorbide mononitrate  - HTN: SBP <160   - Anxiety: Continue home dose of zoloft  - DVT prophylaxis: TEDs, SCDs, SQH. US BLE 10/21 negative for DVT.  - Atelectasis prophylaxis: IS every hour while awake.     Discussed with Dr. Weaver    - Dispo: home with home health likely today pending pain control, voiding and PO status.        Mae Baker PA-C  Neurosurgery  [facility]

## 2020-10-23 NOTE — TELEPHONE ENCOUNTER
Dr Loli Yeung informed. Spoke with pt son to give Mr. Paul myers sx arrival time on  7/13/18 for 8:30 a.m.

## 2020-10-23 NOTE — PLAN OF CARE
Surgery [facility]     HOME HEALTH ORDERS   FACE TO FACE ENCOUNTER     Patient Name: Paul Browning   Date of Birth:  1940     PCP: Grey Forbes DO   PCP Address: [address] [facility] / [place] [address]   PCP Phone Number: [phone]   PCP Fax: [phone]     Encounter Date: 10/23/2020     Admit to Home Health     Diagnoses:   Active Hospital Problems     *Odontoid fracture [S12.110A]      POA: Yes       Pre-op evaluation [Z01.818]      POA: Not Applicable       Hypertension [I10]      POA: Unknown       Resolved Hospital Problems   No resolved problems to display.       [unfilled]   [unfilled]       I have seen and examined this patient face to face today. My clinical findings that support the need for the home health skilled services and home bound status are the following:   Weakness/numbness causing balance and gait disturbance due to cervical fracture and recent Surgery making it taxing to leave home.   Requiring assistive device to leave home due to unsteady gait caused by Surgery and cervical fracture.     Allergies:Review of patient's allergies indicates:    -- Morphine -- Shortness Of Breath     Diet: regular diet     Activities: activity as tolerated     Nursing:   SN to complete comprehensive assessment including routine vital signs. Instruct on disease process and s/s of complications to report to MD. Review/verify medication list sent home with the patient at time of discharge  and instruct patient/caregiver as needed. Frequency may be adjusted depending on start of care date. If patient has enteral feeding tube (NG, PEG, J-tube, G-tube), flush tube before and after feeding and/or medication administration with 20-30 mL of water.     Notify MD if SBP > 160 or < 90; DBP > 90 or < 50; HR > 120 or < 50; Temp > 101       CONSULTS:     Physical Therapy to evaluate and treat. Evaluate for home safety and equipment needs; Establish/upgrade  home exercise program. Perform / instruct on therapeutic exercises, gait training, transfer training, and Range of Motion.   Occupational Therapy to evaluate and treat. Evaluate home environment for safety and equipment needs. Perform/Instruct on transfers, ADL training, ROM, and therapeutic exercises.     MISCELLANEOUS CARE:   N/A     WOUND CARE ORDERS   no       Medications: Review discharge medications with patient and family and provide education.       Current Discharge Medication List     START taking these medications     HYDROcodone-acetaminophen (NORCO) 5-325 mg per tablet   Take 1 tablet by mouth every 6 (six) hours as needed.   Qty: 50 tablet Refills: 0   Comments: Quantity prescribed more than 7 day supply? Yes, quantity medically necessary     methocarbamoL (ROBAXIN) 500 MG Tab   Take 1 tablet (500 mg total) by mouth 4 (four) times daily as needed (muscle spasms).   Qty: 40 tablet Refills: 0       CONTINUE these medications which have NOT CHANGED     acetaminophen (TYLENOL) 500 MG tablet   Take 1,000 mg by mouth every 6 (six) hours as needed for Pain.     albuterol (PROVENTIL) 2.5 mg /3 mL (0.083 %) nebulizer solution   Take 3 mLs (2.5 mg total) by nebulization every 6 (six) hours as needed for Wheezing. Rescue   Qty: 1 Box Refills: 0     alendronate (FOSAMAX) 70 MG tablet   1 tab PO qwk in the am with a full glass of water on an empty stomach. Do not consume food or lie down for at least 30 minutes afterwards.   Qty: 12 tablet Refills: 3     ALPRAZolam (XANAX) 0.25 MG tablet   Take 1 tablet (0.25 mg total) by mouth 3 (three) times daily as needed for Anxiety.   Qty: 45 tablet Refills: 0     aspirin (ECOTRIN) 81 MG EC tablet   Take 81 mg by mouth once daily.     atorvastatin (LIPITOR) 40 MG tablet   TAKE 1 TABLET BY MOUTH EVERY DAY   Qty: 90 tablet Refills: 3     butalbital-acetaminophen-caffeine -40 mg (FIORICET, ESGIC) -40 mg per tablet   1-2 tabs PO q6 PRN headaches   Qty: 60 tablet  Refills: 0     diclofenac sodium (VOLTAREN) 1 % Gel   Apply 2 g topically every 6 (six) hours as needed.   Qty: 1 Tube Refills: 6   Associated Diagnoses:Acute intractable headache, unspecified headache type     fluorouracil 5% 5 % Soln   Use hs for 2 weeks   Qty: 10 mL Refills: 1     isosorbide mononitrate (IMDUR) 30 MG 24 hr tablet   Take 1 tablet (30 mg total) by mouth once daily.   Qty: 30 tablet Refills: 11     magnesium oxide (MAG-OX) 400 mg (241.3 mg magnesium) tablet   Take 1 tablet (400 mg total) by mouth once daily.   Qty: 30 tablet Refills: 3   Associated Diagnoses:Chronic nonintractable headache, unspecified headache type     omega-3 fatty acids-vitamin E (FISH OIL) 1,000 mg Cap   Take 1 tablet by mouth 2 (two) times daily.   Qty: 60 each Refills: 11   Associated Diagnoses:Essential hypertension; Carotid artery disease without cerebral infarction; Coronary artery disease due to lipid rich plaque; Atherosclerosis of aorta; Pulmonary sarcoidosis; Chronic obstructive pulmonary disease, unspecified COPD type; Hyperlipidemia; Cramps of lower extremity, unspecified laterality; Peripheral arterial disease     ondansetron (ZOFRAN) 4 MG tablet   Take 1 tablet (4 mg total) by mouth every 8 (eight) hours as needed for Nausea.   Qty: 30 tablet Refills: 1     !! pantoprazole (PROTONIX) 40 MG tablet   Take 40 mg by mouth 2 (two) times a day.     !! pantoprazole (PROTONIX) 40 MG tablet   TAKE 1 TABLET BY MOUTH TWICE DAILY   Qty: 180 tablet Refills: 0     riboflavin, vitamin B2, (VITAMIN B-2) 100 mg Tab tablet   Take 1 tablet (100 mg total) by mouth once daily.   Qty: 90 tablet Refills: 3   Associated Diagnoses:Chronic nonintractable headache, unspecified headache type     sertraline (ZOLOFT) 50 MG tablet   TAKE 1 TABLET BY MOUTH EVERY DAY   Qty: 90 tablet Refills: 3     triamcinolone acetonide 0.1% (KENALOG) 0.1 % cream   Apply topically 2 (two) times daily.   Qty: 45 g Refills: 1     !! - Potential duplicate  medications found. Please discuss with provider.           I certify that this patient is confined to his home and needs physical therapy and occupational therapy.              Mae Baker PA-C  Neurosurgery  [facility]

## 2020-10-23 NOTE — PT/OT/SLP EVAL
"Physical Therapy Evaluation  & Treatment     Patient Name:  Paul Browning  MRN: [id number]    Recommendations:     Discharge Recommendations: Home Health PT  Equipment recommendations: rolling walker, bedside commode and shower chair  Barriers to discharge: Fall risk     Assessment:     Paul Browning is a 80 y.o. male admitted to [facility] on 10/19/2020 with medical diagnosis of Odontoid fracture. Pt presents with weakness, impaired balance, decreased endurance, decreased safety awareness, impaired functional mobility  and gait instability. Paul Browning would benefit from acute PT intervention to address listed functional deficits, provide patient/caregiver education, reduce fall risk, and maximize (I) and safety with functional mobility. Once medically stable, recommending pt discharge home with HHPT.     Rehab Prognosis: Good; patient would benefit from acute skilled PT services to address these deficits and reach maximum level of function.      Plan:     During this hospitalization, patient to be seen 4 x/week to address the identified rehab impairments via gait training, therapeutic activities, therapeutic exercises, neuromuscular re-education and progress towards stated goals.     Plan of Care Expires:  20  Plan of Care reviewed with: patient, daughter    This plan of care has been discussed with the patient/caregiver, who was included in its development and is in agreement with the identified goals and treatment plan.     Subjective     Communicated with RN prior to session.  Patient agreeable to participate. Pt's son and daughter present at bedside upon PT entrance into room.    Patient comments/goals: "I want to get outside"  "I want to get back to fishing"     Pain/Comfort:  · Location: no pain   · Pain Ratin/10   · Pain Rating Post-Intervention: 0/10     Patients cultural, spiritual, Taoist conflicts given the current situation: No cultural, spiritual, or " educational needs identified.    Patient History: information obtained from pt and family     Living Environment: Pt lives with daughter and grandson in [facility] with 2 [place]. Bathroom set-up: walk-in shower  Prior Level of Function: modified (I) for mobility and ADLs using SW recently as AD   DME owned: standard walker  Caregiver Assistance: 24/7 assist from daughter and extended family as needed     Objective:     Patient found HOB elevated with: hemovac, telemetry    Recent Surgery: Procedure(s) (LRB):  INSERTION, SCREW, ODONTOID. Anterior approach. (N/A) 1 Day Post-Op  General Precautions: Standard, fall   Orthopedic Precautions:spinal precautions   Braces: Cervical collar   Body mass index is 21.93 kg/m².  Oxygen Device: room air      Exams:     Cognition:  o Pt is alert and oriented x 4  o Pt's ability to follow single step commands: intact     Sensation:   o Light touch sensation: Intact BLEs     Gross Motor Coordination: No deficits noted during functional mobility tasks      Edema: none noted     Postural examination/scapula alignment: Rounded shoulder and Head forward     Lower Extremity Range of Motion:  o Right Lower Extremity: WFL  o Left Lower Extremity: WFL     Lower Extremity Strength:  o Right Lower Extremity: WFL  o Left Lower Extremity: WFL    Functional Mobility:    Bed Mobility:  · Supine > Sit: Minimal Assistance  · Sit > Supine: N/T    Transfers:   · Sit <> Stand Transfer: Contact Guard Assistance from EOB with no AD              Gait:  · Distance: ~200 ft.   · Assistance level: Contact Guard Assistance  · Assistive Device: rolling walker  · Gait Assessment: decreased step length  and decreased gait speed    Balance:  · Static Sit: Supervision at EOB   · Static Stand: Stand-By Assist with Rolling walker    Outcome Measure: AM-PAC 6 CLICK MOBILITY  Total Score:18     Patient/Caregiver Education:     Therapist facilitated progression of gait training to improve gait stability, endurance, and  independence with functional ambulation. Verbal cueing required for RW management, and safe navigation of obstacles.      Therapist educated pt/caregiver regarding:    PT POC and goals for therapy    Safety with mobility and fall risk    Instruction on use of call button and importance of calling nursing staff for assistance with mobility     Patient/caregiver able to verbalize understanding; will follow-up with pt/caregiver during current admit for additional questions/concerns within scope of practice.     White board updated.     Patient left up in chair with all lines intact, call button in reach and RN notified.    GOALS:   Multidisciplinary Problems     Physical Therapy Goals        Problem: Physical Therapy Goal    Goal Priority Disciplines Outcome Goal Variances Interventions   Physical Therapy Goal     PT, PT/OT Ongoing, Progressing     Description: Goals to be met by: 10/30/20    Patient will increase functional independence with mobility by performin. Supine to sit with Modified [place]  2. Sit to supine with Modified [place]  3. Sit to stand transfer with Modified [place]  4. Gait  x 200 feet with Supervision using least restrictive AD.   5. Ascend/descend 2 stairs with right Handrail with Stand-by Assistance  6. 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    Hyperlipidemia     Hypertension     Ischemic cardiomyopathy 2014    MDD (major depressive disorder), recurrent episode, mild 2016    Osteoporosis 2016    Peripheral arterial disease 3/18/2016    Sarcoid      Squamous cell carcinoma 09/2016, 5/2016    crown of scalp, left wrist       Past Surgical History:   Procedure Laterality Date    CARDIAC SURGERY      CAROTID STENT N/A 10/14/2019    Procedure: INSERTION, STENT, ARTERY, CAROTID;  Surgeon: Artie Kebede MD;  Location: [facility] CATH LAB;  Service: Cardiology;  Laterality: N/A;    CATARACT EXTRACTION W/  INTRAOCULAR LENS IMPLANT Right 07/17/2018    Dr. Talamantes    CATARACT EXTRACTION W/  INTRAOCULAR LENS IMPLANT Left 07/31/2018    Dr. Talamantes    CORONARY ARTERY BYPASS GRAFT      x2  10/2014    ESOPHAGOGASTRODUODENOSCOPY N/A 4/8/2019    Procedure: ESOPHAGOGASTRODUODENOSCOPY (EGD)/poss rfa;  Surgeon: Clifford De La Torre MD;  Location: [facility] ENDO (2ND FLR);  Service: Endoscopy;  Laterality: N/A;    ESOPHAGOGASTRODUODENOSCOPY (EGD) WITH RADIOFREQUENCY TREATMENT OF GASTROESOPHAGEAL JUNCTION      INJECTION OF ANESTHETIC AGENT AROUND MEDIAL BRANCH NERVES INNERVATING CERVICAL FACET JOINT Bilateral 1/9/2020    Procedure: INJECTION, MBB--Bilateral Cervical- Third Occipital nerve, C2-3--ASA okay for pt to continue taking per provider.;  Surgeon: Tip Mera Jr., MD;  Location: [facility] PAIN MGT;  Service: Pain Management;  Laterality: Bilateral;    INTRAOCULAR PROSTHESES INSERTION Right 7/17/2018    Procedure: INSERTION, INTRAOCULAR LENS PROSTHESIS;  Surgeon: León Talamantes MD;  [address];  Service: Ophthalmology;  Laterality: Right;    INTRAOCULAR PROSTHESES INSERTION Left 7/31/2018    Procedure: INSERTION, INTRAOCULAR LENS PROSTHESIS;  Surgeon: León Talamantes MD;  [address];  Service: Ophthalmology;  Laterality: Left;    PHACOEMULSIFICATION OF CATARACT Right 7/17/2018    Procedure: PHACOEMULSIFICATION, CATARACT;  Surgeon: León Talamantes MD;  Location: [facility] OR [address] [place] [address];  Service: Ophthalmology;  Laterality: Right;    PHACOEMULSIFICATION OF CATARACT Left 7/31/2018    Procedure: PHACOEMULSIFICATION,  CATARACT;  Surgeon: León Talamantes MD;  Location: [address];  Service: Ophthalmology;  Laterality: Left;    RADIOFREQUENCY THERMOCOAGULATION Bilateral 1/30/2020    Procedure: RADIOFREQUENCY THERMAL COAGULATION--Bilateral C2-3 and Third Occipital Nerve;  Surgeon: Tip Mera Jr., MD;  Location: [facility];  Service: Pain Management;  Laterality: Bilateral;    UMBILICAL HERNIA REPAIR         Time Tracking:     PT Received On: 10/23/20  PT Start Time: 0802     PT Stop Time: 0835  PT Total Time (min): 33 min     Billable Minutes: Evaluation 10 and Gait Training 10 min, Therapeutic Activity 13 min    María Elena Orta, PT  10/23/2020

## 2020-10-23 NOTE — DISCHARGE INSTRUCTIONS
Post op Spine Patient Instructions    Activity Restrictions:  [x]  Return to work will be determined on an individual basis.  [x]  No lifting greater than 5-10 pounds.  [x]  Avoid bending and twisting the area of your surgery more than 45 degrees from neutral position in any direction.  [x]  No driving or operating machinery:  [x]  until cleared by your surgeon.  [x]  while taking narcotic pain medications or muscle relaxants.  [x]  Wear your brace at all times. You will be given an additional brace/padding to wear when showering. The pads are machine washable.   [x]  Increase ambulation over the next 2 weeks so that you are walking 2 miles per day at 2 weeks post-operatively.  [x]  Walk on paved surfaces only. It is okay to walk up and down stairs while holding onto a side rail.  [x]  No sexual activity for 2 weeks.    Discharge Medication/Follow-up:  [x]  Please refer to discharge medication reconciliation form.  [x]  Do not take ANY Aspirin or Aspirin containing products for 2 weeks after surgery.   [x]  Do not take ANY herbal supplements for 2 weeks after surgery.    [x]  Do not take ANY non-steroidal anti-inflammatory drugs (NSAIDS), including the following: ibuprofen, naprosyn, Aleve, Advil, Indocin, Mobic, or Celebrex for 12 weeks after surgery.   [x]  Prescriptions for appropriate medication will be given upon discharge.  [x]  Take docusate (Colace 100 mg): take one capsule a day as needed for constipation. You can get this over the counter.  [x]  Follow-up appointment:  [x]  10-14 days post-op for wound check by physician assistant/nurse  [x]  4-6 weeks with MD:  [x]  with x-rays  [x]  An appointment will be mailed to you.    Wound Care:  [x]  No bandage required. Keep your incision open to the air. Do not apply any ointments or creams to your incision. You have dermabond (skin glue) covering your incision, which will begin to flake off over the next 2 weeks. Do not pick this off, allow it to fall off on  its own.  [x]  You may shower on the 2nd day after your surgery. Keep the incision clean and dry at all times. Please cover the incision while showering and REMOVE once you have completed taking your shower. Do not allow the force of water to hit the incision. If the incision gets damp, pat it dry. Do not rub or scrub the incision.  [x]  You cannot take a bath until 8 weeks after surgery.    Call your doctor or go to the Emergency Room for any signs of infection, including: increased redness, drainage, pain, or fever (temperature ?101.5 for 24 hours). Call your doctor or go to the Emergency Room if there are any localized neurological changes; problems with speech, vision, numbness, tingling, weakness, or severe headache; or for other concerns.      Special Instructions:  [x]  No use of tobacco products.  [x]  Diet: Please eat a regular diet as tolerated.  [x]  Follow-up with pulmonology after surgery for worsening shortness of breath.       Physicians need 3 days' notice for pain medicine to be refilled. Pain medicine will only be refilled between 8 AM and 5 PM, Monday through Friday, due to Food and Drug Administration regulation of documentation.    If you have any questions about this form, please call [phone].    Form No. 46279 (Revised 10/31/2013)

## 2020-10-23 NOTE — ASSESSMENT & PLAN NOTE
Paul Browning is a 80 y.o. male with past medical history significant for HTN, HLD, Stevens's esophagus, BPH, CAD s/p stent placement in 2019, and anxiety who presents to [facility] ED following a fall down several flights of stairs. NSGY consulted for Type II displaced odontoid fracture. Now s/p odontoid screw placement on 10/22.    - Neurologically intact on exam, baseline dementia per family. Doing well postop, reports pain has resolved.  - Pain control: scheduled tylenol 1 g q 8, robaxin qid, norco 5 mg q 4 hours prn.   - Collar to be worn at all times  - Postop XR with satisfactory placement of screw and alignment.  - HV with minimal output, will remove today. Continue antibiotics for ppx while drain in place.   - PT/OT recommending home with home health at discharge, continue PT/OT/OOB while inpatient. OOB > 6 hours per day.  - GI: Continue current bowel regimen. Tolerating PO, will advance to solids.  - F/u spontaneous voiding.   - CTH negative for acute hemorrhage or skull fracture  - CT cervical spine with acute appearing type II odontoid fracture with 4 mm of displacement. There is spondylosis throughout. anterolisthesis of C4 on C5 and retrolisthesis at C5 on C6.   - MRI cervical spine shows subacute type II odontoid fracture with disruption of ALL and mild buckling of PLL, all other ligaments intact. There is spondylosis throughout with large disc protrusion at C5-6 with severe central canal narrowing.   - HM consulted for pre-op clearance and risk stratification - appreciate their assistance. Cardiology consulted given extensive cardiac history. Perioperative Risk Assessment:  RCRI Score 3, Class IV risk with 15% risk of cardiopulmonary complications. Patient is at intermediate risk for perioperative cardiopulmonary complications. Patient needs this surgery and there is no further management or test that needs to be performed before the surgery from a cardiology standpoint. Recommend proceed to  surgery as planned. Pulmonology also consulted given worsening SOB and hx of sarcoid, will f/u recs.   - GERD: continue daily protonix  - CAD: hold daily ASA. Continue home dose of isosorbide mononitrate  - HTN: SBP <160   - Anxiety: Continue home dose of zoloft  - DVT prophylaxis: TEDs, SCDs, SQH. US BLE 10/21 negative for DVT.  - Atelectasis prophylaxis: IS every hour while awake.     Discussed with Dr. Weaver    - Dispo: home with home health likely today pending pain control, voiding and PO status.

## 2020-10-23 NOTE — PLAN OF CARE
Problem: Fall Injury Risk  Goal: Absence of Fall and Fall-Related Injury  Outcome: Ongoing, Progressing     Problem: Adult Inpatient Plan of Care  Goal: Plan of Care Review  Outcome: Ongoing, Progressing  Goal: Patient-Specific Goal (Individualization)  Outcome: Ongoing, Progressing  Goal: Absence of Hospital-Acquired Illness or Injury  Outcome: Ongoing, Progressing  Goal: Optimal Comfort and Wellbeing  Outcome: Ongoing, Progressing  Goal: Readiness for Transition of Care  Outcome: Ongoing, Progressing  Goal: Rounds/Family Conference  Outcome: Ongoing, Progressing     Problem: Skin Injury Risk Increased  Goal: Skin Health and Integrity  Outcome: Ongoing, Progressing     Problem: Infection  Goal: Infection Symptom Resolution  Outcome: Ongoing, Progressing     ASSUMED CARE OF PT. PT BACK FROM SURGERY VSS NAD. ASSESSMENT DONE/CHARTED. PT TO XRAY. UNEVENTFUL SHIFT. REPORT GIVEN TO ONCOMING NIA

## 2020-10-23 NOTE — ANESTHESIA PROCEDURE NOTES
Arterial    Diagnosis: CAD    Patient location during procedure: pre-op  Procedure start time: 10/22/2020 1:33 PM  Timeout: 10/22/2020 1:33 PM  Procedure end time: 10/22/2020 1:43 PM    Staffing  Authorizing Provider: Lani Villela MD  Performing Provider: Lani Villela MD    Anesthesiologist was present at the time of the procedure.    Preanesthetic Checklist  Completed: patient identified, site marked, surgical consent, pre-op evaluation, timeout performed, IV checked, risks and benefits discussed, monitors and equipment checked and anesthesia consent givenArterial  Skin Prep: chlorhexidine gluconate  Local Infiltration: lidocaine  Orientation: left  Location: radial  Catheter Size: 18 G  Catheter placement by Ultrasound guidance. Heme positive aspiration all ports.  Vessel Caliber: small, patent, compressibility normal  Needle advanced into vessel with real time Ultrasound guidance.  Guidewire confirmed in vessel.  Sterile sheath used.  Image recorded and saved.Insertion Attempts: 1  Assessment  Dressing: secured with tape and tegaderm  Patient: Tolerated well

## 2020-10-23 NOTE — PT/OT/SLP EVAL
"Occupational Therapy   Evaluation and Treatment    Name: Paul Browning  MRN: [id number]  Admitting Diagnosis:  Odontoid fracture 1 Day Post-Op  *co-evaluation/treatment with PT  Recommendations:     Discharge Recommendations: home health OT, home health PT  Discharge Equipment Recommendations:  walker, rolling, bedside commode, shower chair  Barriers to discharge:  None    Assessment:     Paul Browning is a 80 y.o. male with a medical diagnosis of Odontoid fracture.  He presents with HOB elevated, eager to participate in OT/PT session. Pt disoriented to time and demonstrated mild confusion regarding situation. Pt required min-CGA for mobility.  Performance deficits affecting function: weakness, impaired endurance, impaired self care skills, impaired functional mobilty, gait instability, impaired balance, visual deficits, impaired cognition, decreased lower extremity function, decreased safety awareness, decreased coordination.  Pt would benefit from skilled OT services in order to maximize independence with ADLs and facilitate safe discharge. Pt would benefit from home health OT once medically stable for discharge.     Rehab Prognosis: Good; patient would benefit from acute skilled OT services to address these deficits and reach maximum level of function.       Plan:     Patient to be seen 3 x/week to address the above listed problems via self-care/home management, therapeutic activities, therapeutic exercises  · Plan of Care Expires: 11/22/20  · Plan of Care Reviewed with: patient, son, daughter    Subjective     Chief Complaint: "when can I go home and sit on my porch?"  Patient/Family Comments/goals: to return to fishing    Occupational Profile:  Living Environment: Pt lives with his daughter and grandson in a [facility] with [facility]. Pt has a walk-in shower  Previous level of function: independent with ADLs; uses standard walker PRN; reports recent fall down steps   Roles and Routines:enjoys fishing " and sitting on his porch  Equipment Used at Home:  walker, standard  Assistance upon Discharge: Upon discharge, pt will have assistance from family    Pain/Comfort:  · Pain Rating 1: 0/10    Patients cultural, spiritual, Synagogue conflicts given the current situation: no    Objective:     Communicated with: RN and PT prior to session.  Patient found HOB elevated with hemovac, telemetry upon OT entry to room.    General Precautions: Standard, fall   Orthopedic Precautions:spinal precautions   Braces: Cervical collar     Occupational Performance:    Bed Mobility:    · Patient completed Rolling/Turning to Right with minimum assistance  · Patient completed Scooting/Bridging with minimum assistance  · Patient completed Supine to Sit with minimum assistance   · VCs for sequencing and log roll technique    Functional Mobility/Transfers:  · Patient completed Sit <> Stand Transfer with contact guard assistance  with  rolling walker   · Patient completed Bed <> Chair Transfer using Step Transfer technique with contact guard assistance with rolling walker  · Functional Mobility: Pt ambulated <housheold distance with CGA and RW in order to maximize functional activity tolerance and standing balance required for engagement in occupations of choice.  P with difficulty navigating objects 2* poor vision. Required VCs for RW management. No major LOB, mild unsteadiness.    Activities of Daily Living:  · Feeding:  maximal assistance to eat jello with assistance from son upon OT arrival  · Grooming: set-up A to perform facial hygiene seated in bedside chair  · Upper Body Dressing: minimum assistance to don gown    Cognitive/Visual Perceptual:  Cognitive/Psychosocial Skills:     -       Oriented to: Person and Place   -       Follows Commands/attention:Follows one-step commands  -       Communication: clear/fluent  -       Memory: Poor immediate recall  -       Safety awareness/insight to disability: impaired   -        Mood/Affect/Coping skills/emotional control: Appropriate to situation  Visual/Perceptual:      -Impaired  acuity ; wears glasses at baseline    Physical Exam:  Balance:    -       EOB: SBA, Standing: CGA with RW  Edema:  None noted  Sensation:    -       Intact  Upper Extremity Range of Motion:     -       Right Upper Extremity: WFL  -       Left Upper Extremity: WFL  Upper Extremity Strength:    -       Right Upper Extremity: WFL  -       Left Upper Extremity: WFL    AMPAC 6 Click ADL:  AMPAC Total Score: 15    Treatment & Education:  -Therapist provided facilitation and instruction of proper body mechanics, energy conservation, and fall prevention strategies during tasks listed above.  -Pt educated on role of OT, POC and goals for therapy  -Pt educated on 3/3 spinal precautions  -C&D RW placed in pt's room for use with nursing staff  -Pt educated on importance of OOB activities with staff member assistance and sitting OOB majority of the day.   -Pt verbalized understanding. Pt expressed no further concerns/questions  -Whiteboard updated  Education:    Patient left up in chair with all lines intact, call button in reach, RN notified and daughter present    GOALS:   Multidisciplinary Problems     Occupational Therapy Goals        Problem: Occupational Therapy Goal    Goal Priority Disciplines Outcome Interventions   Occupational Therapy Goal     OT, PT/OT Ongoing, Progressing    Description: Goals to be met by: 11/6/20     Patient will increase functional independence with ADLs by performing:    Feeding with [place].  UE Dressing with [place].  LE Dressing with Stand-by Assistance.  Grooming while standing at sink with Supervision.  Toileting from toilet with Stand-by Assistance for hygiene and clothing management.   Toilet transfer to toilet with Stand-by Assistance.                     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    Hyperlipidemia     Hypertension     Ischemic cardiomyopathy 11/5/2014    MDD (major depressive disorder), recurrent episode, mild 2/17/2016    Osteoporosis 7/20/2016    Peripheral arterial disease 3/18/2016    Sarcoid     Squamous cell carcinoma 09/2016, 5/2016    crown of scalp, left wrist       Past Surgical History:   Procedure Laterality Date    CARDIAC SURGERY      CAROTID STENT N/A 10/14/2019    Procedure: INSERTION, STENT, ARTERY, CAROTID;  Surgeon: Artie Kebede MD;  Location: [facility] CATH LAB;  Service: Cardiology;  Laterality: N/A;    CATARACT EXTRACTION W/  INTRAOCULAR LENS IMPLANT Right 07/17/2018    Dr. Talamantes    CATARACT EXTRACTION W/  INTRAOCULAR LENS IMPLANT Left 07/31/2018    Dr. Talamantes    CORONARY ARTERY BYPASS GRAFT      x2  10/2014    ESOPHAGOGASTRODUODENOSCOPY N/A 4/8/2019    Procedure: ESOPHAGOGASTRODUODENOSCOPY (EGD)/poss rfa;  Surgeon: Clifford De La Torre MD;  Location: [facility] ENDO (2ND FLR);  Service: Endoscopy;  Laterality: N/A;    ESOPHAGOGASTRODUODENOSCOPY (EGD) WITH RADIOFREQUENCY TREATMENT OF GASTROESOPHAGEAL JUNCTION      INJECTION OF ANESTHETIC AGENT AROUND MEDIAL BRANCH NERVES INNERVATING CERVICAL FACET JOINT Bilateral 1/9/2020    Procedure: INJECTION, MBB--Bilateral Cervical- Third Occipital nerve, C2-3--ASA okay for pt to continue taking per provider.;  Surgeon: Tip Mera Jr., MD;  Location: [facility] PAIN MGT;  Service: Pain Management;  Laterality: Bilateral;    INTRAOCULAR PROSTHESES INSERTION Right 7/17/2018    Procedure: INSERTION, INTRAOCULAR LENS PROSTHESIS;  Surgeon: León Talamantes MD;  Location: [facility] OR [facility] FLR;  Service: Ophthalmology;  Laterality: Right;    INTRAOCULAR PROSTHESES INSERTION Left  7/31/2018    Procedure: INSERTION, INTRAOCULAR LENS PROSTHESIS;  Surgeon: León Talamantes MD;  Location: [address];  Service: Ophthalmology;  Laterality: Left;    PHACOEMULSIFICATION OF CATARACT Right 7/17/2018    Procedure: PHACOEMULSIFICATION, CATARACT;  Surgeon: Lenó Talamantes MD;  Location: [address];  Service: Ophthalmology;  Laterality: Right;    PHACOEMULSIFICATION OF CATARACT Left 7/31/2018    Procedure: PHACOEMULSIFICATION, CATARACT;  Surgeon: León Talamantes MD;  Location: [address];  Service: Ophthalmology;  Laterality: Left;    RADIOFREQUENCY THERMOCOAGULATION Bilateral 1/30/2020    Procedure: RADIOFREQUENCY THERMAL COAGULATION--Bilateral C2-3 and Third Occipital Nerve;  Surgeon: Tip Mera Jr., MD;  Location: [facility];  Service: Pain Management;  Laterality: Bilateral;    UMBILICAL HERNIA REPAIR         Time Tracking:     OT Date of Treatment: 10/23/20  OT Start Time: 0801  OT Stop Time: 0835  OT Total Time (min): 34 min    Billable Minutes:Evaluation 12  Self Care/Home Management 12  Therapeutic Activity 10    Dara Alvarado OT  10/23/2020

## 2020-10-23 NOTE — PROGRESS NOTES
Informed by X-Ray staff that they are unable to accommodate pt for xray for at least 2 hours.  Notified floor RN.

## 2020-10-23 NOTE — ANESTHESIA RELEASE NOTE
"Anesthesia Release from PACU Note    Patient: Paul Browning    Procedure(s) Performed: Procedure(s) (LRB):  INSERTION, SCREW, ODONTOID. Anterior approach. (N/A)    Anesthesia type: general    Post pain: Adequate analgesia    Post assessment: no apparent anesthetic complications    Last Vitals:   Visit Vitals  BP (!) 150/70   Pulse 73   Temp 36.3 °C (97.3 °F) (Temporal)   Resp 19   Ht 5' 7" (1.702 m)   Wt 63.5 kg (139 lb 15.9 oz)   SpO2 96%   BMI 21.93 kg/m²       Post vital signs: stable    Level of consciousness: awake and confused    Nausea/Vomiting: no nausea/no vomiting    Complications: none    Airway Patency: patent    Respiratory: unassisted, spontaneous ventilation, room air    Cardiovascular: stable    Hydration: euvolemic  "

## 2020-10-23 NOTE — DISCHARGE SUMMARY
[facility]  Neurosurgery  Discharge Summary      Patient Name: Paul Browning  MRN: [id number]  Admission Date: 10/19/2020  Hospital Length of Stay: 4 days  Discharge Date and Time:  10/23/2020 2:19 PM  Attending Physician: Kirsten Weaver MD   Discharging Provider: Mae Baker PA-C  Primary Care Provider: Grey Forbes DO    HPI:   Paul Browning is a 80 y.o. male with past medical history significant for HTN, HLD, Stevens's esophagus, BPH, CAD s/p stent placement in 2019, and anxiety who presents to [facility] following a fall down several flights of stairs. NSGY consulted for Type II displaced odontoid fracture. Neck pain present with slight headache. Denies weakness, paresthesias, bowel/bladder issues, or gait imbalance. NSGY consulted for evaluation.     Of additional note- patient takes daily ASA, last dose of which was this morning.     Addendum: Patient with several falls within the past several months, latest resulting in left hip fracture being treated conservatively. Per his daughter at bedside, he has had increased unsteady gait over the past several months. Patient otherwise has no fine dexterity issues. No object dropping, fumbling with buttons, etc. No bowel/bladder issues.     Procedure(s) (LRB):  INSERTION, SCREW, ODONTOID. Anterior approach. (N/A)     Hospital Course: 10/21: NAEON. Increased frequency of Norco and scheduled Robaxin for neck pain. Exam stable. Son at bedside. All questions answered. Compliant with cervical collar. Denies weakness, numbness, paresthesias, b/b dysfunction. Patient's family is concerned about his worsening SOB the past 2 months and history of previous clots (unsure if present in legs or lungs). Patient's son and granddaughter also note that they are both positive for Factor V Leiden.    10/22: OR for odontoid screw placement.  10/23: NAEON. AFVSS. Patient states he is having no pain since yesterday prior to surgery. Denies  weakness, numbness, paresthesias. Tolerating PO, advance to solids. Daughter at bedside. HV drain in place, will remove today. Ambulated halls with PT/OT. Compliant with cervical collar. No evidence of hematoma.     Bladder scan revealed 234 mL, patient voided 150 mL after bladder scan. Per daughter, patient confused this afternoon after waking up from a nap in the chair. He states he thought he was at home after our conversation this morning regarding discharge. Reports no sleep overnight and little the night before. HV drain removed without complication. AFVSS. Will clear with patient's cardiologist regarding aspirin discontinuation postop. Tolerated regular diet for lunch. Discharge instructions reviewed. All questions answered. Patient disoriented to time on afternoon assessment otherwise neuro exam intact. RTC 2 weeks for wound check. Additional c-collar ordered for showering.     Consults:   Consults (From admission, onward)        Status Ordering Provider     Inpatient consult to Cardiology  Once     Provider:  (Not yet assigned)    Completed SONG DUMONT     Inpatient consult to [facility]  Once     Provider:  (Not yet assigned)    Completed JOANA POOLE     Inpatient consult to [facility]  Once     Provider:  (Not yet assigned)    Completed DANNY MATHUR     Inpatient consult to Neurosurgery  Once     Provider:  (Not yet assigned)    Completed ELIANA THACKER     Inpatient consult to Pulmonology  Once     Provider:  (Not yet assigned)    Completed ISMAEL REDD          Significant Diagnostic Studies: Labs:   BMP:   Recent Labs   Lab 10/22/20  0332 10/23/20  0418   GLU 92 109    136   K 3.7 4.3   CL 98 98   CO2 29 25   BUN 13 13   CREATININE 0.9 1.0   CALCIUM 8.8 9.1    and CBC   Recent Labs   Lab 10/22/20  0332 10/23/20  0418   WBC 4.89 5.34   HGB 11.6* 14.0   HCT 36.5* 42.5    257     Radiology:  Imaging Results           MRI Cervical Spine  Without Contrast (Final result)  Result time 10/19/20 23:09:31    Final result by Ildefonso Burroughs MD (10/19/20 23:09:31)                 Impression:      Dorsally subluxed fracture of the base of the odontoid with associated STIR signal without significant prevertebral soft tissue swelling, in keeping with subacute Type II odontoid fracture.    Disruption of the anterior longitudinal ligament and minimal buckling of the posterior longitudinal ligament.  Remainder of the ligaments in the cervical spine remain intact.    Significant disc desiccation at the C5-C6 level with central disc protrusion resulting in severe narrowing of the spinal canal and severe bilateral neural narrowing at this level.  Spine surgery consultation is recommended.    Question of increased cord signal at the C5-C6 level, suggestive of compressive myelopathy.  No other cord signal abnormalities.    This report was flagged in Epic as abnormal.      Electronically signed by: Ildefonso Burroughs MD  Date:    10/19/2020  Time:    23:09             Narrative:    EXAMINATION:  MRI CERVICAL SPINE WITHOUT CONTRAST    CLINICAL HISTORY:  evaluate for ligamentous instability;.    TECHNIQUE:  Multiplanar, multisequence MR images of the cervical spine were acquired without the administration of contrast.    COMPARISON:  CT scan of the cervical spine dated 10/19/2020.    FINDINGS:  There is volume loss in the visualized portions of the posterior fossa.  Assessment of the predental space is somewhat suboptimal due to fracture at the base of C2.  There is 4 mm dorsal subluxation at the level of the fracture site.  There is STIR signal at the level of the fracture site without significant associated prevertebral soft tissue swelling.    The apical component of the anterior longitudinal ligament appears stone.  There is minimal stripping of the posterior longitudinal ligament without evidence of ligamentous disruption.  The transverse ligament appears intact.  The  ligamentum from in the interspinous and supraspinous ligaments are within normal limits.    There is straightening of the normal cervical lordosis.  The remainder of the vertebral body heights demonstrates normal marrow signal.  There is intervertebral disc space narrowing at multiple levels.    Significant disc desiccation identified at the C5-C6 level.  There is mass effect on the cervical cord at the C5-C6 level.  There is question of minimal increased signal involving the cord at this level.  The remainder of the cord is normal.    Evaluation of the individual disc levels reveals the following:    C2-C3 normal.    C3-C4, there is a disc osteophyte complex along with facet hypertrophy and uncovertebral hypertrophy.  The spinal canal is within normal limits.  There is mild bilateral neural foraminal narrowing.    C4-C5, there is a disc osteophyte complex along with facet hypertrophy and uncovertebral hypertrophy.  There is mild spinal canal narrowing.  There is mild bilateral neural foraminal narrowing.    C5-C6, there is a disc osteophyte complex along with facet hypertrophy and uncovertebral hypertrophy.  There is superimposed central disc protrusion.  There is severe narrowing the spinal canal.  There is severe bilateral neural foraminal narrowing.    C6-C7, there is a disc osteophyte complex along with facet hypertrophy and uncovertebral hypertrophy.  There is superimposed central disc protrusion.  There is mild spinal canal narrowing.  There is mild right and moderate left neural foraminal narrowing    C7-T1, there is a disc osteophyte complex along with facet hypertrophy and uncovertebral hypertrophy.  The spinal canal and neural foramina are unremarkable.    Flow voids within the neck vessels are unremarkable.  There is no evidence of lymphadenopathy in the neck.  The paraspinal soft tissues are within normal limits.                               CT Head Without Contrast (Final result)  Result time 10/19/20  17:28:52    Final result by Artie Ro MD (10/19/20 17:28:52)                 Impression:      No acute intracranial findings.    Type 2 odontoid fracture with posterior subluxation of the odontoid in relation to C2 body.      Electronically signed by: Artie Ro MD  Date:    10/19/2020  Time:    17:28             Narrative:    EXAMINATION:  CT HEAD WITHOUT CONTRAST    CLINICAL HISTORY:  Head trauma, minor (Age => 63y);    TECHNIQUE:  Low dose axial images were obtained through the head.  Coronal and sagittal reformations were also performed. Contrast was not administered.    COMPARISON:  09/25/2020    FINDINGS:  Noncontrast head CT demonstrates involutional changes which are similar to the prior study and not unusual for the patient's age.  Moderate degree of white matter hypoattenuation is present probably from chronic microvascular disease.  No cortical edema or large vessel territory infarcts are seen.  There is no intracranial hemorrhage, mass effect or midline shift.    Bone windows demonstrate the presence of a type 2 odontoid fracture with posterior subluxation of the odontoid in relation to the body of C2 about 4 mm.    Opacification of some of the right ethmoid air cells, but no fracture is seen.                               CT Cervical Spine Without Contrast (Final result)  Result time 10/19/20 17:37:05    Final result by Artie Ro MD (10/19/20 17:37:05)                 Impression:      Type 2 odontoid fracture with 4 mm of subluxation.  This may be acute or subacute.    Degenerative disc disease and subluxation at C4-5 and C5-6, as above.  C5-6 has severe bilateral neural foramen narrowing.    Findings were reported to Dr. Rodríguez in the emergency room at 17:30 by Dr. Ro      Electronically signed by: Artie Ro MD  Date:    10/19/2020  Time:    17:37             Narrative:    EXAMINATION:  CT CERVICAL SPINE WITHOUT CONTRAST    CLINICAL HISTORY:  Neck trauma (Age =>  63y);    TECHNIQUE:  Low dose axial images, sagittal and coronal reformations were performed though the cervical spine.  Contrast was not administered.    COMPARISON:  CT of the head 10/19/2020    FINDINGS:  CT of the cervical spine demonstrates the presence of the a type 2 odontoid fracture with 4 mm of posterior subluxation of the odontoid in relation to the body of C2.  There is some hyperattenuation in the ventral epidural space adjacent to the fracture which could be hemorrhage or early calcification if this is not acute.    C4-5, and C5-6 both have severe disc space narrowing.  There is 3 mm of anterolisthesis at C4-5 and 3 mm of retrolisthesis at C5-6, and posterior osteophytes are seen at both of these disc margins.  C5-6 has severe did foramen narrowing bilaterally.  Mild narrowing also present at C6-7 bilaterally.    Patchy parenchymal opacity is seen in the upper lobes of the lungs bilaterally which may represent a pneumonia or edema.  Further investigation warranted.                                  Pending Diagnostic Studies:     None        Final Active Diagnoses:    Diagnosis Date Noted POA    PRINCIPAL PROBLEM:  Odontoid fracture [S12.110A] 10/19/2020 Yes    Pre-op evaluation [Z01.818] 10/20/2020 Not Applicable    Hypertension [I10] 01/04/2013 Unknown      Problems Resolved During this Admission:      Discharged Condition: good    Disposition: Home or Self Care    Follow Up:  Follow-up Information     [facility] - Neurosurgery 7th Fl In 2 weeks.    Specialty: Neurosurgery  Why: For wound re-check  Contact information:  [address] [facility] [facility] [id number] [phone]  Additional information:  7th Floor           Franky Ellsworth MD In 4 weeks.    Specialty: Pulmonary Disease  Why: If symptoms worsen  Contact information:  [address] [facility] [address]  [phone]                 Patient Instructions:      WALKER FOR HOME USE     Order Specific Question Answer  "Comments   Type of Walker: Adult (5'4"-6'6")    With wheels? Yes    Height: 5' 7" (1.702 m)    Weight: 63.5 kg (139 lb 15.9 oz)    Length of need (1-99 months): 99    Does patient have medical equipment at home? walker, standard    Please check all that apply: Patient's condition impairs ambulation.      COMMODE FOR HOME USE     Order Specific Question Answer Comments   Type: Standard    Height: 5' 7" (1.702 m)    Weight: 63.5 kg (139 lb 15.9 oz)    Does patient have medical equipment at home? walker, standard    Length of need (1-99 months): 99      Ambulatory referral/consult to Pulmonology   Standing Status: Future   Referral Priority: Routine Referral Type: Consultation   Referral Reason: Specialty Services Required   Referred to Provider: YOLY MARKS Requested Specialty: Pulmonary Disease   Number of Visits Requested: 1     Ambulatory referral/consult to Home Health   Standing Status: Future   Referral Priority: Routine Referral Type: Home Health   Referral Reason: Specialty Services Required   Requested Specialty: Home Health Services   Number of Visits Requested: 1     Notify your health care provider if you experience any of the following:  temperature >100.4     Notify your health care provider if you experience any of the following:  persistent nausea and vomiting or diarrhea     Notify your health care provider if you experience any of the following:  severe uncontrolled pain     Notify your health care provider if you experience any of the following:  redness, tenderness, or signs of infection (pain, swelling, redness, odor or green/yellow discharge around incision site)     Notify your health care provider if you experience any of the following:  difficulty breathing or increased cough     Notify your health care provider if you experience any of the following:  severe persistent headache     Notify your health care provider if you experience any of the following:  worsening rash     Notify your " health care provider if you experience any of the following:  persistent dizziness, light-headedness, or visual disturbances     Notify your health care provider if you experience any of the following:  increased confusion or weakness     Activity as tolerated     Medications:  Reconciled Home Medications:      Medication List      START taking these medications    HYDROcodone-acetaminophen 5-325 mg per tablet  Commonly known as: NORCO  Take 1 tablet by mouth every 6 (six) hours as needed.     methocarbamoL 500 MG Tab  Commonly known as: ROBAXIN  Take 1 tablet (500 mg total) by mouth 4 (four) times daily as needed (muscle spasms).        CHANGE how you take these medications    omega-3 fatty acids-vitamin E 1,000 mg Cap  Commonly known as: FISH OIL  Take 1 tablet by mouth 2 (two) times daily.  What changed: when to take this        CONTINUE taking these medications    acetaminophen 500 MG tablet  Commonly known as: TYLENOL  Take 1,000 mg by mouth every 6 (six) hours as needed for Pain.     albuterol 2.5 mg /3 mL (0.083 %) nebulizer solution  Commonly known as: PROVENTIL  Take 3 mLs (2.5 mg total) by nebulization every 6 (six) hours as needed for Wheezing. Rescue     alendronate 70 MG tablet  Commonly known as: FOSAMAX  1 tab PO qwk in the am with a full glass of water on an empty stomach. Do not consume food or lie down for at least 30 minutes afterwards.     ALPRAZolam 0.25 MG tablet  Commonly known as: XANAX  Take 1 tablet (0.25 mg total) by mouth 3 (three) times daily as needed for Anxiety.     atorvastatin 40 MG tablet  Commonly known as: LIPITOR  TAKE 1 TABLET BY MOUTH EVERY DAY     butalbital-acetaminophen-caffeine -40 mg -40 mg per tablet  Commonly known as: FIORICET, ESGIC  1-2 tabs PO q6 PRN headaches     diclofenac sodium 1 % Gel  Commonly known as: VOLTAREN  Apply 2 g topically every 6 (six) hours as needed.     fluorouracil 5% 5 % Soln  Use hs for 2 weeks     isosorbide mononitrate 30 MG 24  hr tablet  Commonly known as: IMDUR  Take 1 tablet (30 mg total) by mouth once daily.     magnesium oxide 400 mg (241.3 mg magnesium) tablet  Commonly known as: MAG-OX  Take 1 tablet (400 mg total) by mouth once daily.     ondansetron 4 MG tablet  Commonly known as: ZOFRAN  Take 1 tablet (4 mg total) by mouth every 8 (eight) hours as needed for Nausea.     * pantoprazole 40 MG tablet  Commonly known as: PROTONIX  Take 40 mg by mouth 2 (two) times a day.     * pantoprazole 40 MG tablet  Commonly known as: PROTONIX  TAKE 1 TABLET BY MOUTH TWICE DAILY     riboflavin (vitamin B2) 100 mg Tab tablet  Commonly known as: VITAMIN B-2  Take 1 tablet (100 mg total) by mouth once daily.     sertraline 50 MG tablet  Commonly known as: ZOLOFT  TAKE 1 TABLET BY MOUTH EVERY DAY     triamcinolone acetonide 0.1% 0.1 % cream  Commonly known as: KENALOG  Apply topically 2 (two) times daily.         * This list has 2 medication(s) that are the same as other medications prescribed for you. Read the directions carefully, and ask your doctor or other care provider to review them with you.            ASK your doctor about these medications    aspirin 81 MG EC tablet  Commonly known as: ECOTRIN  Take 81 mg by mouth once daily.  Hold for 2 weeks after surgery.          Mae Baker PA-C  Neurosurgery  [facility]

## 2020-10-23 NOTE — PLAN OF CARE
Patient was discharged home with [facility]. Rolling walker, Bedside commode and hospital bed will be delivered.      10/23/20 1507   Final Note   Assessment Type Final Discharge Note   Anticipated Discharge Disposition Home-Health   Hospital Follow Up  Appt(s) scheduled? Yes   Discharge plans and expectations educations in teach back method with documentation complete? Yes   Right Care Referral Info   Post Acute Recommendation Home-care   Facility Name [facility]   Post-Acute Status   Post-Acute Authorization Home [facility]   Home Health Status Set-up Complete     Future Appointments   Date Time Provider Department Center   10/27/2020 11:15 AM Eric Mcduffie, PT [facility]   11/3/2020  2:15 PM Kris Multani NP [facility] ORTHO [facility]   11/5/2020  9:30 AM Marquita Holloway PA-C [facility] NEURO [facility]   11/6/2020 11:00 AM Huong Browning, NIA [facility] NEUROS7 [facility]     Kat Coffey RN, CM   Ext: [phone]

## 2020-10-23 NOTE — PROGRESS NOTES
Certification of Assistant at Surgery       Surgery Date: 10/22/2020     Participating Surgeons:  Surgeon(s) and Role:     * Kirsten Weaver MD - Primary     * Luis Antonio Shafer MD - Assisting     * Flex Torres MD - Resident - Assisting    Procedures:  Procedure(s) (LRB):  INSERTION, SCREW, ODONTOID. Anterior approach. (N/A)    Assistant Surgeon's Certification of Necessity:  I understand that section 1842 (b) (6) (d) of the Social Security Act generally prohibits Medicare Part B reasonable charge payment for the services of assistants at surgery in teaching hospitals when qualified residents are available to furnish such services. I certify that the services for which payment is claimed were medically necessary, and that no qualified resident was available to perform the services. I further understand that these services are subject to post-payment review by the Medicare carrier.      Luis Antonio Shafer MD    10/23/2020  1:26 PM

## 2020-10-23 NOTE — PLAN OF CARE
Problem: Physical Therapy Goal  Goal: Physical Therapy Goal  Description: Goals to be met by: 10/30/20    Patient will increase functional independence with mobility by performin. Supine to sit with Modified [place]  2. Sit to supine with Modified [place]  3. Sit to stand transfer with Modified [place]  4. Gait  x 200 feet with Supervision using least restrictive AD.   5. Ascend/descend 2 stairs with right Handrail with Stand-by Assistance  6. Lower extremity exercise program x20 reps per handout, with supervision    Outcome: Ongoing, Progressing    PT evaluation completed- see note for details. Goals established and POC initiated.     María Elena Orta, PT, DPT   10/23/2020  Pager: [phone]

## 2020-10-23 NOTE — TRANSFER OF CARE
"Anesthesia Transfer of Care Note    Patient: Paul Browning    Procedure(s) Performed: Procedure(s) (LRB):  INSERTION, SCREW, ODONTOID. Anterior approach. (N/A)    Patient location: PACU    Anesthesia Type: general    Transport from OR: Transported from OR on 6-10 L/min O2 by face mask with adequate spontaneous ventilation    Post pain: adequate analgesia    Post assessment: no apparent anesthetic complications and tolerated procedure well    Post vital signs: stable    Level of consciousness: awake    Nausea/Vomiting: no nausea/vomiting    Complications: none    Transfer of care protocol was followed      Last vitals:   Visit Vitals  /82   Pulse 73   Temp 36.3 °C (97.3 °F) (Temporal)   Resp 14   Ht 5' 7" (1.702 m)   Wt 63.5 kg (139 lb 15.9 oz)   SpO2 100%   BMI 21.93 kg/m²     "

## 2020-10-23 NOTE — ANESTHESIA POSTPROCEDURE EVALUATION
Anesthesia Post Evaluation    Patient: Paul Browning    Procedure(s) Performed: Procedure(s) (LRB):  INSERTION, SCREW, ODONTOID. Anterior approach. (N/A)    Final Anesthesia Type: general    Patient location during evaluation: floor  Patient participation: Yes- Able to Participate  Level of consciousness: awake and alert and oriented  Post-procedure vital signs: reviewed and stable  Pain management: adequate  Airway patency: patent    PONV status at discharge: No PONV  Anesthetic complications: no      Cardiovascular status: blood pressure returned to baseline  Respiratory status: unassisted, room air and spontaneous ventilation  Hydration status: euvolemic  Follow-up not needed.          Vitals Value Taken Time   /71 10/23/20 0751   Temp 36.5 °C (97.7 °F) 10/23/20 0751   Pulse 89 10/23/20 0751   Resp 18 10/23/20 0802   SpO2 95 % 10/23/20 0751         Event Time   Out of Recovery 10/22/2020 20:42:00         Pain/Roxi Score: Pain Rating Prior to Med Admin: 8 (10/23/2020  8:02 AM)  Roxi Score: 9 (10/22/2020  7:00 PM)

## 2020-10-23 NOTE — SUBJECTIVE & OBJECTIVE
Interval History: NAEON. AFVSS. Patient states he is having no pain since yesterday prior to surgery. Denies weakness, numbness, paresthesias. Tolerating PO, advance to solids. Daughter at bedside. HV drain in place, will remove today. Ambulated halls with PT/OT. Compliant with cervical collar. No evidence of hematoma.    Medications:  Continuous Infusions:  Scheduled Meds:   albuterol sulfate  2.5 mg Nebulization Q4H    atorvastatin  40 mg Oral Daily    bisacodyL  10 mg Rectal Daily    ceFAZolin (ANCEF) IVPB  2 g Intravenous Q8H    heparin (porcine)  5,000 Units Subcutaneous Q8H    isosorbide mononitrate  30 mg Oral Daily    methocarbamoL  500 mg Oral QID    mupirocin   Nasal BID    pantoprazole  40 mg Oral BID    senna-docusate 8.6-50 mg  1 tablet Oral BID    sertraline  50 mg Oral Daily    tiotropium  1 capsule Inhalation Daily     PRN Meds:aluminum-magnesium hydroxide-simethicone, butalbital-acetaminophen-caffeine -40 mg, dextrose 50%, dextrose 50%, glucagon (human recombinant), glucose, glucose, glucose, haloperidol lactate, hydrALAZINE, HYDROcodone-acetaminophen, HYDROmorphone, ondansetron, promethazine (PHENERGAN) IVPB, promethazine       Objective:     Weight: 63.5 kg (139 lb 15.9 oz)  Body mass index is 21.93 kg/m².  Vital Signs (Most Recent):  Temp: 98.4 °F (36.9 °C) (10/23/20 1135)  Pulse: 76 (10/23/20 1145)  Resp: 18 (10/23/20 1202)  BP: 113/60 (10/23/20 1135)  SpO2: 96 % (10/23/20 1145) Vital Signs (24h Range):  Temp:  [96.8 °F (36 °C)-98.4 °F (36.9 °C)] 98.4 °F (36.9 °C)  Pulse:  [70-89] 76  Resp:  [15-21] 18  SpO2:  [94 %-100 %] 96 %  BP: (113-180)/(60-86) 113/60                          Closed/Suction Drain 10/22/20 1650 Right Neck Accordion 10 Fr. (Active)   Site Description Unable to view 10/22/20 2130   Dressing Type Biopatch in place;Transparent (Tegaderm) 10/22/20 2030   Dressing Status Clean;Dry;Intact 10/22/20 2030   Dressing Intervention First dressing 10/22/20 2030   Output  (mL) 10 mL 10/23/20 0600       Neurosurgery Physical Exam    General: well developed, well nourished, no distress.   Head: normocephalic, atraumatic  Neck: No tracheal deviation. Collar in place.  Neurologic: Alert and oriented. Thought content appropriate.  GCS: Motor: 6/Verbal: 5/Eyes: 4 GCS Total: 15  Mental Status: Awake, Alert, Oriented x 4  Language: No aphasia  Speech: No dysarthria  Cranial nerves: face symmetric, tongue midline, CN II-XII grossly intact.   Eyes: pupils equal, round, reactive to light with accomodation, EOMI.   Ears: No drainage.   Pulmonary: normal respirations, no signs of respiratory distress  Abdomen: soft, non-distended, not tender to palpation  Sensory: intact to light touch throughout  Motor Strength: Moves all extremities spontaneously with good tone.  Full strength upper and lower extremities. No abnormal movements seen.     Strength  Deltoids Triceps Biceps Wrist Extension Wrist Flexion Hand    Upper: R 5/5 5/5 5/5 5/5 5/5 5/5    L 5/5 5/5 5/5 5/5 5/5 5/5     Iliopsoas Quadriceps Knee  Flexion Tibialis  anterior Gastro- cnemius EHL   Lower: R 5/5 5/5 5/5 5/5 5/5 5/5    L 5/5 5/5 5/5 5/5 5/5 5/5     Vascular: No LE edema.   Skin: Skin is warm, dry and intact.    Incision c/d/I with skin edges well approximated with dermabond. No surrounding erythema or edema. No drainage from incision. No palpable hematoma or underlying fluid collection.  HV drain intact.    Significant Labs:  Recent Labs   Lab 10/22/20  0332 10/23/20  0418   GLU 92 109    136   K 3.7 4.3   CL 98 98   CO2 29 25   BUN 13 13   CREATININE 0.9 1.0   CALCIUM 8.8 9.1     Recent Labs   Lab 10/22/20  0332 10/23/20  0418   WBC 4.89 5.34   HGB 11.6* 14.0   HCT 36.5* 42.5    257     No results for input(s): LABPT, INR, APTT in the last 48 hours.  Microbiology Results (last 7 days)     ** No results found for the last 168 hours. **        Recent Lab Results       10/23/20  0418   10/23/20  0017        Anion Gap  13       Baso # 0.01       Basophil% 0.2       BUN, Bld 13       Calcium 9.1       Chloride 98       CO2 25       Creatinine 1.0       Differential Method Automated       eGFR if  >60.0       eGFR if non  >60.0  Comment:  Calculation used to obtain the estimated glomerular filtration  rate (eGFR) is the CKD-EPI equation.          Eos # 0.0       Eosinophil% 0.0       Glucose 109       Gran # (ANC) 4.6       Gran% 86.5       Hematocrit 42.5       Hemoglobin 14.0       Immature Grans (Abs) 0.02  Comment:  Mild elevation in immature granulocytes is non specific and   can be seen in a variety of conditions including stress response,   acute inflammation, trauma and pregnancy. Correlation with other   laboratory and clinical findings is essential.         Immature Granulocytes 0.4       Lymph # 0.4       Lymph% 7.7       MCH 30.3       MCHC 32.9       MCV 92       Mono # 0.3       Mono% 5.2       MPV 10.5       nRBC 0       Platelets 257       POCT Glucose   143     Potassium 4.3       RBC 4.62       RDW 13.9       Sodium 136       WBC 5.34             Significant Diagnostics:  I have reviewed all pertinent imaging results/findings within the past 24 hours.

## 2020-10-23 NOTE — PLAN OF CARE
Problem: Occupational Therapy Goal  Goal: Occupational Therapy Goal  Description: Goals to be met by: 11/6/20     Patient will increase functional independence with ADLs by performing:    Feeding with [place].  UE Dressing with [place].  LE Dressing with Stand-by Assistance.  Grooming while standing at sink with Supervision.  Toileting from toilet with Stand-by Assistance for hygiene and clothing management.   Toilet transfer to toilet with Stand-by Assistance.    Outcome: Ongoing, Progressing     Evaluation complete-see note for details. Goals and POC established.    TORY Henderson  10/23/2020   Pager #: [phone]

## 2020-10-24 PROCEDURE — G0180 MD CERTIFICATION HHA PATIENT: HCPCS | Mod: ,,, | Performed by: PHYSICIAN ASSISTANT

## 2020-10-24 PROCEDURE — G0180 PR HOME HEALTH MD CERTIFICATION: ICD-10-PCS | Mod: ,,, | Performed by: PHYSICIAN ASSISTANT

## 2020-10-26 ENCOUNTER — TELEPHONE (OUTPATIENT)
Dept: NEUROSURGERY | Facility: CLINIC | Age: 80
End: 2020-10-26

## 2020-10-26 ENCOUNTER — TELEPHONE (OUTPATIENT)
Dept: INTERNAL MEDICINE | Facility: CLINIC | Age: 80
End: 2020-10-26

## 2020-10-26 ENCOUNTER — DOCUMENTATION ONLY (OUTPATIENT)
Dept: NEUROSURGERY | Facility: HOSPITAL | Age: 80
End: 2020-10-26

## 2020-10-26 DIAGNOSIS — S72.102D CLOSED FRACTURE OF TROCHANTER OF LEFT FEMUR WITH ROUTINE HEALING, SUBSEQUENT ENCOUNTER: Primary | ICD-10-CM

## 2020-10-26 NOTE — PROGRESS NOTES
Discussed perioperative aspirin use with patient's cardiologist, Dr. Kelly. Recommended to restart as soon as possible. Discussed with Dr. Weaver, will resume aspirin tomorrow, 10/27. Called patient's daughter to discuss the above. All questions answered. F/u scheduled for 11/6 for wound check. Encouraged to call the clinic with questions/concerns prior to next appointment.     Mae Baker PA-C  Neurosurgery  [facility]

## 2020-10-26 NOTE — PROGRESS NOTES
Got call from Radha Lovett PT with home health looking to clarify weight bearing restrictions to his LLE.  He was seen on on 10/6/2020 for left hip pain secondary to fall.  He was sent for a CT of his hip which showed a minimally displaced greater trochanteric fracture.  Per Dr. Robbins, he recommended limiting weight bearing activities to LLE to TTWB and follow up in 4 weeks.  Since his visit, patient had sustained another fall and fractured his Odontoid.  He required surgical fixation by Dr. Weaver with Neurosurgery.  Per therapist, patient has not been compliant with weight restrictions.  She was advised of his weight restrictions.  Patient has a follow up with me on 11/3/2020.  Therapist is requesting a wheelchair for him, will place order today.  She will call for any further questions.

## 2020-10-26 NOTE — TELEPHONE ENCOUNTER
Called Radha back. Discussed bandage (remove), showering (protect from water for 2 weeks) meds (may resume ASA81 5 days after surgery) stop acetominophen (hold fioricet and diclofenac gel for now).    All questions answered.    ----- Message from Ashlee Gómez MA sent at 10/26/2020  1:55 PM CDT -----  Contact: Radha w [facility] @ [phone]  Would you please call to advise.    Thanks  ----- Message -----  From: Nilo Bagley  Sent: 10/26/2020   1:18 PM CDT  To: [facility] Staff    Caller requesting a return phone call regarding post hygiene instruction,neck bandage, medication and vitamin concerns and clarity, pls call

## 2020-10-26 NOTE — TELEPHONE ENCOUNTER
----- Message from Hailey Salinas sent at 10/26/2020  2:00 PM CDT -----  Contact: Radha PT with [facility]   Radha is calling to clarify the pt medication list and discharge paperwork. Please call and advise.

## 2020-10-26 NOTE — TELEPHONE ENCOUNTER
Returned call. Ortho states pt should be toe touch wt bearing with walker. She wanted to make sure this was not contraindicated for his neck surgery. Confirmed it was OK.    ----- Message from Nilo Bagley sent at 10/26/2020  4:20 PM CDT -----  Contact: Radha moore [facility] @ [phone]  Caller requesting a return phone call, caller states she did received the call from Kris regarding the weight bearer for the leg , she needs to verify if this is allowable due to surgical precautions, pls call

## 2020-10-26 NOTE — TELEPHONE ENCOUNTER
Asa was d/c but advised to resume 5 days post-op.   D/c tylenol, taking norco instead  D/c vitamin E tab.

## 2020-11-02 ENCOUNTER — PATIENT MESSAGE (OUTPATIENT)
Dept: NEUROLOGY | Facility: CLINIC | Age: 80
End: 2020-11-02

## 2020-11-02 ENCOUNTER — PATIENT MESSAGE (OUTPATIENT)
Dept: ORTHOPEDICS | Facility: CLINIC | Age: 80
End: 2020-11-02

## 2020-11-03 ENCOUNTER — DOCUMENT SCAN (OUTPATIENT)
Dept: HOME HEALTH SERVICES | Facility: HOSPITAL | Age: 80
End: 2020-11-03
Payer: MEDICARE

## 2020-11-03 ENCOUNTER — PATIENT MESSAGE (OUTPATIENT)
Dept: ORTHOPEDICS | Facility: CLINIC | Age: 80
End: 2020-11-03

## 2020-11-03 ENCOUNTER — TELEPHONE (OUTPATIENT)
Dept: ORTHOPEDICS | Facility: CLINIC | Age: 80
End: 2020-11-03

## 2020-11-03 DIAGNOSIS — M25.552 HIP PAIN, LEFT: Primary | ICD-10-CM

## 2020-11-03 NOTE — TELEPHONE ENCOUNTER
He needs an x-ray, this was explained to him just 20 minutes ago.  He had told me he wanted to do next week.  So please clarify if he wants appointment today or next week.  Needs x-rays.

## 2020-11-03 NOTE — TELEPHONE ENCOUNTER
Called and spoke with pt's son in regards to rescheduling pt's appt due to pt's having a fall. Rescheduled pt's x-rays on 11/11/2020 @ 11am and scheduled a virtual visit with Kris on 11/13/2020 @1:30pm. Pt's son was satisfied with both appts date/time.

## 2020-11-03 NOTE — TELEPHONE ENCOUNTER
Please cancel his appointment with me this afternoon and move to next week.  Please get x-ray of his left hip prior to appointment and set appointment as a virtual visit.  Once set, call his son Paul to let them know.    Thanks.

## 2020-11-03 NOTE — OP NOTE
DATE OF PROCEDURE: 10/22/2020     PREOPERATIVE DIAGNOSIS: Type 2 odontoid fracture.     POSTOPERATIVE DIAGNOSIS: Type 2 odontoid fracture.     OPERATIVE PROCEDURES:  1. Closed reduction of C2 fracture by manipulation of the spine requiring   anesthesia.  2. Anterior approach for open treatment and reduction of odontoid fracture   including internal fixation.     SURGEON: Kirsten Weaver M.D.     ASSISTANT: Flex Torres M.D. (RES), Luis Antonio Shafer M.D.    Two staff neurosurgeons were necessary for this case due to the complexity and degree of displacement of the fracture making it necessary for 1 staff to manipulate the head while the other staff place the odontoid screw.  Furthermore, the resident was participating in the operation for observation only.     ANESTHESIA: General.     INDICATION FOR PROCEDURE:  Mr. Browning is an 80-year-old male who fell down a flight of stairs.  Upon presentation, he complained of neck pain.  Imaging studies showed a displaced type 2 odontoid fracture.  Given the nature of the fracture and the fact that it is an unstable fracture, the decision was made to take the patient to the operating room for stabilization with an odontoid screw.     OPERATIVE NOTE: After obtaining informed consent, the patient was brought into the Operating Room. He was intubated and anesthetized by Anesthesia. He was placed supine on the operating room table. Preoperative antibiotics as well as dexamethasone were administered. Neuromonitoring needles were placed and baselines were obtained. Once neuromonitoring baselines were   obtained, we obtained both AP and lateral fluoroscopy in order to view the C2   vertebral body as well as the odontoid process. Using our AP and lateral   Fluoroscopy, we noticed that the tip of the dens was not appropriately aligned   with the C2 body. Therefore, under live fluoroscopy and using neuromonitoring,   we manipulated the patient's neck and spine under anesthesia in order to  reduce the C2 fracture and get better alignment of the tip of the odontoid with the C2 body. Ultimately, when we had the patient's neck as well as tip of the odontoid in good alignment. Once the fracture was reduced, fluoroscopy was used to confirm our appropriate entry point. The patient was then prepped and draped in the standard sterile fashion. Lidocaine 1% with epinephrine was injected into the skin. Skin incision was made using the #15 blade. This was carried down through the subcutaneous tissue and through the platysma using Bovie electrocautery. The platysma was undermined both superiorly and inferiorly. The medial border of the sternocleidomastoid was identified, and we   dissected down the medial border of the sternocleidomastoid. The trachea and   esophagus were mobilized medially, and the carotid sheath was mobilized laterally. The prevertebral fascia was identified and this was elevated and opened. A peanut was then used to free the prevertebral fascia up to what we believe to be the [id number]. Lateral fluoroscopy was used to confirm our level, and indeed, we were at the inferior aspect of C2 at the C2-C3 disk space. At this point, we switched to the radiolucent retractor. We first used Bovie electrocautery to remove the prevertebral fascia from the inferior aspect of the C2 body at the C2-C3 disk space. The high-speed drill was then used to drill a  hole through the superior aspect of the C2-C3 disk space into the inferior endplate of C2 using the appropriate guide. A handheld power drill was then used to drill through the C2 body and into the odontoid fracture fragment.   A cannulated lag screw was placed through the C2 body into the odontoid   fragment using both AP and lateral fluoroscopy. Once our screw was placed, a   final AP and lateral x-ray was taken which showed good placement of our   hardware as well as good reduction of the odontoid fracture and capture of the   odontoid fragment. The  wound was then copiously irrigated and hemostasis was obtained using bipolar electrocautery as well as FloSeal. A drain was left in place and the wound was closed in layers. The patient was extubated by Anesthesia and brought to the Recovery Room in stable condition. All counts were correct at the end of the case and neuromonitoring remained stable throughout the case.

## 2020-11-05 ENCOUNTER — DOCUMENT SCAN (OUTPATIENT)
Dept: HOME HEALTH SERVICES | Facility: HOSPITAL | Age: 80
End: 2020-11-05
Payer: MEDICARE

## 2020-11-05 ENCOUNTER — EXTERNAL HOME HEALTH (OUTPATIENT)
Dept: HOME HEALTH SERVICES | Facility: HOSPITAL | Age: 80
End: 2020-11-05
Payer: MEDICARE

## 2020-11-05 ENCOUNTER — PATIENT MESSAGE (OUTPATIENT)
Dept: INTERNAL MEDICINE | Facility: CLINIC | Age: 80
End: 2020-11-05

## 2020-11-05 ENCOUNTER — PATIENT OUTREACH (OUTPATIENT)
Dept: ADMINISTRATIVE | Facility: OTHER | Age: 80
End: 2020-11-05

## 2020-11-05 ENCOUNTER — PATIENT MESSAGE (OUTPATIENT)
Dept: CARDIOLOGY | Facility: CLINIC | Age: 80
End: 2020-11-05

## 2020-11-06 ENCOUNTER — DOCUMENT SCAN (OUTPATIENT)
Dept: HOME HEALTH SERVICES | Facility: HOSPITAL | Age: 80
End: 2020-11-06
Payer: MEDICARE

## 2020-11-06 ENCOUNTER — OFFICE VISIT (OUTPATIENT)
Dept: CARDIOLOGY | Facility: CLINIC | Age: 80
End: 2020-11-06
Payer: MEDICARE

## 2020-11-06 ENCOUNTER — OFFICE VISIT (OUTPATIENT)
Dept: NEUROLOGY | Facility: CLINIC | Age: 80
End: 2020-11-06
Payer: MEDICARE

## 2020-11-06 ENCOUNTER — CLINICAL SUPPORT (OUTPATIENT)
Dept: NEUROSURGERY | Facility: CLINIC | Age: 80
End: 2020-11-06
Payer: MEDICARE

## 2020-11-06 DIAGNOSIS — I25.10 CORONARY ARTERY DISEASE INVOLVING NATIVE CORONARY ARTERY OF NATIVE HEART WITHOUT ANGINA PECTORIS: Primary | Chronic | ICD-10-CM

## 2020-11-06 DIAGNOSIS — I65.21 CAROTID STENOSIS, RIGHT: ICD-10-CM

## 2020-11-06 DIAGNOSIS — I10 ESSENTIAL HYPERTENSION: ICD-10-CM

## 2020-11-06 DIAGNOSIS — R51.9 INTRACTABLE HEADACHE, UNSPECIFIED CHRONICITY PATTERN, UNSPECIFIED HEADACHE TYPE: Primary | ICD-10-CM

## 2020-11-06 DIAGNOSIS — E78.5 HYPERLIPIDEMIA, UNSPECIFIED HYPERLIPIDEMIA TYPE: Chronic | ICD-10-CM

## 2020-11-06 DIAGNOSIS — R60.0 PEDAL EDEMA: ICD-10-CM

## 2020-11-06 DIAGNOSIS — I73.9 PERIPHERAL ARTERIAL DISEASE: ICD-10-CM

## 2020-11-06 DIAGNOSIS — J44.9 CHRONIC OBSTRUCTIVE PULMONARY DISEASE, UNSPECIFIED COPD TYPE: ICD-10-CM

## 2020-11-06 DIAGNOSIS — I65.29 STENOSIS OF CAROTID ARTERY, UNSPECIFIED LATERALITY: ICD-10-CM

## 2020-11-06 DIAGNOSIS — D86.0 PULMONARY SARCOIDOSIS: ICD-10-CM

## 2020-11-06 PROCEDURE — 1159F MED LIST DOCD IN RCRD: CPT | Mod: HCNC,95,, | Performed by: PHYSICIAN ASSISTANT

## 2020-11-06 PROCEDURE — 1101F PR PT FALLS ASSESS DOC 0-1 FALLS W/OUT INJ PAST YR: ICD-10-PCS | Mod: HCNC,CPTII,95, | Performed by: PHYSICIAN ASSISTANT

## 2020-11-06 PROCEDURE — 99499 UNLISTED E&M SERVICE: CPT | Mod: 95,,, | Performed by: PHYSICIAN ASSISTANT

## 2020-11-06 PROCEDURE — 99499 RISK ADDL DX/OHS AUDIT: ICD-10-PCS | Mod: 95,,, | Performed by: PHYSICIAN ASSISTANT

## 2020-11-06 PROCEDURE — 99214 PR OFFICE/OUTPT VISIT, EST, LEVL IV, 30-39 MIN: ICD-10-PCS | Mod: HCNC,95,, | Performed by: PHYSICIAN ASSISTANT

## 2020-11-06 PROCEDURE — 1159F PR MEDICATION LIST DOCUMENTED IN MEDICAL RECORD: ICD-10-PCS | Mod: HCNC,95,, | Performed by: PHYSICIAN ASSISTANT

## 2020-11-06 PROCEDURE — 1101F PT FALLS ASSESS-DOCD LE1/YR: CPT | Mod: HCNC,CPTII,95, | Performed by: PHYSICIAN ASSISTANT

## 2020-11-06 PROCEDURE — 99214 OFFICE O/P EST MOD 30 MIN: CPT | Mod: HCNC,95,, | Performed by: PHYSICIAN ASSISTANT

## 2020-11-06 RX ORDER — FUROSEMIDE 20 MG/1
20 TABLET ORAL DAILY
Qty: 30 TABLET | Refills: 11 | Status: SHIPPED | OUTPATIENT
Start: 2020-11-06 | End: 2021-11-05 | Stop reason: SDUPTHER

## 2020-11-06 NOTE — Clinical Note
Your patient was seen in clinic for leg swelling. I just want to make sure you stay updated on your patient. Please see my note for details of this encounter.

## 2020-11-06 NOTE — PROGRESS NOTES
"    General Cardiology Clinic Note (virtual)  Reason for Visit: Edema  Last Clinic Visit: 10/5/2020 with Joanne Wharton    The patient location is: home  The chief complaint leading to consultation is: pedal edema    Visit type: audiovisual    Face to Face time with patient: 15 min  25 minutes of total time spent on the encounter, which includes face to face time and non-face to face time preparing to see the patient (eg, review of tests), Obtaining and/or reviewing separately obtained history, Documenting clinical information in the electronic or other health record, Independently interpreting results (not separately reported) and communicating results to the patient/family/caregiver, or Care coordination (not separately reported).       Each patient to whom he or she provides medical services by telemedicine is:  (1) informed of the relationship between the physician and patient and the respective role of any other health care provider with respect to management of the patient; and (2) notified that he or she may decline to receive medical services by telemedicine and may withdraw from such care at any time.    Notes:     HPI:   Paul Browning is a 80 y.o. male who presents for edema.     Problems:  carotid artery disease s/p stent to BRANDY 10/14/19  HTN  HLD  CAD s/p MI/PCI/CABG (STEMI in 8/19/2014 with PCI to RCA; CABG with LIMA-LAD, SVG-ramus 10/21/14; ischemic symptoms were "fogginess and feeling weird, no chest pain or dyspnea")  40 pack year h/o smoking, quit in 1988  Pulmonary sarcoidosis     HPI  Mr. Browning is in clinic today for pedal edema. Pt has had two falls within the last month, the first resulting in a left hip fracture on 10/6/20 and then more recently, he sustained a displaced odontoid fracture following a fall, requiring surgical fixation on 10/22. Since being home, he has been sedentary because he was told to limit weight bearing activities and is in a neck brace. One of his " family members noticed his feet were swollen yesterday. The swelling decreased overnight and with elevation, but they are still slightly swollen. The last time he was seen here he had a chest CTA for chronic LANGLEY which showed pulmonary sarcoidosis. He was referred to pulmonology but has not seen them yet. Otherwise no new sxs.    ROS:    Constitution: Negative for decreased appetite, diaphoresis, fever, malaise/fatigue, weight gain and weight loss.   HENT: Negative for congestion, nosebleeds and sore throat.    Eyes: Negative for blurred vision, vision loss in left eye, vision loss in right eye and visual disturbance.  Cardiovascular: Negative for chest pain, claudication, near-syncope, orthopnea, palpitations, paroxysmal nocturnal dyspnea and syncope. Positive for dyspnea on exertion and leg swelling  Respiratory: Negative for cough, hemoptysis, snoring, and wheezing..    Hematologic/Lymphatic: Negative for bleeding problem. Does not bruise/bleed easily.   Endocrine: Negative for polyuria  Musculoskeletal: Negative for muscle cramps and myalgias.   Gastrointestinal: Negative for abdominal pain, change in bowel habit, diarrhea, heartburn, hematemesis, hematochezia, melena, nausea and vomiting.   Neurological: Negative for extremity weakness or numbness, dizziness, headaches and light-headedness.   Psychiatric/Behavioral: Negative for depression.   Allergic/Immunologic: Negative for hives.   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 CATH LAB;  Service: Cardiology;  Laterality: N/A;    CATARACT EXTRACTION W/  INTRAOCULAR LENS IMPLANT Right 07/17/2018    Dr. Talamantes    CATARACT EXTRACTION W/  INTRAOCULAR LENS IMPLANT Left 07/31/2018    Dr. Talamantes    CORONARY ARTERY BYPASS GRAFT      x2  10/2014    ESOPHAGOGASTRODUODENOSCOPY N/A 4/8/2019    Procedure: ESOPHAGOGASTRODUODENOSCOPY (EGD)/poss rfa;  Surgeon: Clifford De La Torre MD;  Location: [facility] ENDO (2ND FLR);  Service: Endoscopy;  Laterality: N/A;    ESOPHAGOGASTRODUODENOSCOPY (EGD) WITH RADIOFREQUENCY TREATMENT OF GASTROESOPHAGEAL JUNCTION      INJECTION OF ANESTHETIC AGENT AROUND MEDIAL BRANCH NERVES INNERVATING CERVICAL FACET JOINT Bilateral 1/9/2020    Procedure: INJECTION, MBB--Bilateral Cervical- Third Occipital nerve, C2-3--ASA okay for pt to continue taking per provider.;  Surgeon: Tip Mera Jr., MD;  Location: [facility] PAIN MGT;  Service: Pain Management;  Laterality: Bilateral;    INTRAOCULAR PROSTHESES INSERTION Right 7/17/2018    Procedure: INSERTION, INTRAOCULAR LENS PROSTHESIS;  Surgeon: León Talamantes MD;  Location: [address];  Service: Ophthalmology;  Laterality: Right;    INTRAOCULAR PROSTHESES INSERTION Left 7/31/2018    Procedure: INSERTION, INTRAOCULAR LENS PROSTHESIS;  Surgeon: León Talamantes MD;  Location: [address];  Service: Ophthalmology;  Laterality: Left;    PHACOEMULSIFICATION OF CATARACT Right 7/17/2018    Procedure: PHACOEMULSIFICATION, CATARACT;  Surgeon: León Talamantes MD;  Location: [address];  Service: Ophthalmology;  Laterality: Right;    PHACOEMULSIFICATION OF CATARACT Left  7/31/2018    Procedure: PHACOEMULSIFICATION, CATARACT;  Surgeon: León Talamantes MD;  Location: [facility] OR 1ST FLR;  Service: Ophthalmology;  Laterality: Left;    PLACEMENT OF SCREW IN ODONTOID N/A 10/22/2020    Procedure: INSERTION, SCREW, ODONTOID. Anterior approach.;  Surgeon: Kirsten Weaver MD;  Location: [facility] OR 2ND FLR;  Service: Neurosurgery;  Laterality: N/A;    RADIOFREQUENCY THERMOCOAGULATION Bilateral 1/30/2020    Procedure: RADIOFREQUENCY THERMAL COAGULATION--Bilateral C2-3 and Third Occipital Nerve;  Surgeon: Tip Mera Jr., MD;  Location: [facility];  Service: Pain Management;  Laterality: Bilateral;    UMBILICAL HERNIA REPAIR       Allergies:     Review of patient's allergies indicates:   Allergen Reactions    Morphine Shortness Of Breath     Medications:     Current Outpatient Medications on File Prior to Visit   Medication Sig Dispense Refill    acetaminophen (TYLENOL) 500 MG tablet Take 1,000 mg by mouth every 6 (six) hours as needed for Pain.      albuterol (PROVENTIL) 2.5 mg /3 mL (0.083 %) nebulizer solution Take 3 mLs (2.5 mg total) by nebulization every 6 (six) hours as needed for Wheezing. Rescue 1 Box 0    alendronate (FOSAMAX) 70 MG tablet TAKE 1 TABLET BY MOUTH EVERY WEEK IN THE MORNING WITH FULL GLASS OF WATER ON EMPTY STOMACH-DONT LIE DOWN FOR AT LEAST 30 MINUTES AFTERWARDS 12 tablet 3    ALPRAZolam (XANAX) 0.25 MG tablet Take 1 tablet (0.25 mg total) by mouth 3 (three) times daily as needed for Anxiety. (Patient not taking: Reported on 10/5/2020) 45 tablet 0    aspirin (ECOTRIN) 81 MG EC tablet Take 81 mg by mouth once daily.      atorvastatin (LIPITOR) 40 MG tablet TAKE 1 TABLET BY MOUTH EVERY DAY 90 tablet 3    butalbital-acetaminophen-caffeine -40 mg (FIORICET, ESGIC) -40 mg per tablet 1-2 tabs PO q6 PRN headaches (Patient not taking: Reported on 10/5/2020) 60 tablet 0    diclofenac sodium (VOLTAREN) 1 % Gel Apply 2 g topically every 6 (six) hours  as needed. (Patient not taking: Reported on 10/5/2020) 1 Tube 6    fluorouracil 5% 5 % Soln Use hs for 2 weeks (Patient not taking: Reported on 10/5/2020) 10 mL 1    HYDROcodone-acetaminophen (NORCO) 5-325 mg per tablet Take 1 tablet by mouth every 6 (six) hours as needed. 50 tablet 0    isosorbide mononitrate (IMDUR) 30 MG 24 hr tablet Take 1 tablet (30 mg total) by mouth once daily. 30 tablet 11    magnesium oxide (MAG-OX) 400 mg (241.3 mg magnesium) tablet Take 1 tablet (400 mg total) by mouth once daily. 30 tablet 3    omega-3 fatty acids-vitamin E (FISH OIL) 1,000 mg Cap Take 1 tablet by mouth 2 (two) times daily. (Patient taking differently: Take 1 tablet by mouth once daily. ) 60 each 11    ondansetron (ZOFRAN) 4 MG tablet Take 1 tablet (4 mg total) by mouth every 8 (eight) hours as needed for Nausea. (Patient not taking: Reported on 10/5/2020) 30 tablet 1    pantoprazole (PROTONIX) 40 MG tablet Take 40 mg by mouth 2 (two) times a day.       pantoprazole (PROTONIX) 40 MG tablet TAKE 1 TABLET BY MOUTH TWICE DAILY 180 tablet 0    riboflavin, vitamin B2, (VITAMIN B-2) 100 mg Tab tablet Take 1 tablet (100 mg total) by mouth once daily. 90 tablet 3    sertraline (ZOLOFT) 50 MG tablet TAKE 1 TABLET BY MOUTH EVERY DAY 90 tablet 3    triamcinolone acetonide 0.1% (KENALOG) 0.1 % cream Apply topically 2 (two) times daily. (Patient not taking: Reported on 10/5/2020) 45 g 1     Current Facility-Administered Medications on File Prior to Visit   Medication Dose Route Frequency Provider Last Rate Last Dose    lidocaine (PF) 10 mg/ml (1%) injection 10 mg  1 mL Intradermal Once Tip Mera Jr., MD         Social History:     Social History     Tobacco Use    Smoking status: Former Smoker     Packs/day: 2.00     Years: 20.00     Pack years: 40.00     Types: Cigarettes     Quit date: 1988     Years since quittin.7    Smokeless tobacco: Never Used   Substance Use Topics    Alcohol use: No      Comment: quit drinking beer 2012     Family History:     Family History   Problem Relation Age of Onset    Arrhythmia Mother     Heart disease Mother     Heart failure Brother     No Known Problems Daughter     No Known Problems Son     Colon cancer Neg Hx     Inflammatory bowel disease Neg Hx     Celiac disease Neg Hx     Melanoma Neg Hx     Amblyopia Neg Hx     Blindness Neg Hx     Cataracts Neg Hx     Glaucoma Neg Hx     Macular degeneration Neg Hx     Retinal detachment Neg Hx     Strabismus Neg Hx      Physical Exam:   There were no vitals taken for this visit.     Constitutional: NAD, conversant  HEENT: Sclera anicteric  Neck: Wearing neck brace   Chest: Respiratory effort grossly normal at rest  Extremities: 1+ pedal edema up to ankles bilaterally  Psych: AOx3, appropriate affect    Labs:     Lab Results   Component Value Date     10/23/2020    K 4.3 10/23/2020    CL 98 10/23/2020    CO2 25 10/23/2020    BUN 13 10/23/2020    CREATININE 1.0 10/23/2020    ANIONGAP 13 10/23/2020     Lab Results   Component Value Date    HGBA1C 5.2 10/15/2014     Lab Results   Component Value Date    BNP 76 07/20/2020    BNP 86 01/16/2015     (H) 11/17/2014    Lab Results   Component Value Date    WBC 5.34 10/23/2020    HGB 14.0 10/23/2020    HCT 42.5 10/23/2020    HCT 22 (L) 10/21/2014     10/23/2020    GRAN 4.6 10/23/2020    GRAN 86.5 (H) 10/23/2020     Lab Results   Component Value Date    CHOL 115 (L) 06/09/2020    HDL 35 (L) 06/09/2020    LDLCALC 61.6 (L) 06/09/2020    TRIG 92 06/09/2020          Imaging:   TTE 7/20/2020  · Normal left ventricular systolic function. The estimated ejection fraction is 60%.  · Local segmental wall motion abnormalities.  · Grade I (mild) left ventricular diastolic dysfunction consistent with impaired relaxation.  · Mild tricuspid regurgitation. TR gradient of 27 mm of mercury.  · Normal right ventricular systolic function.  · Mild mitral  regurgitation.  · Aortic valve sclerosis without significant stenosis    Carotid doppler 11/14/2019  · There is 0-19% right Internal Carotid Stenosis.  · There is 0-19% left Internal Carotid Stenosis.      Assessment:     1. Coronary artery disease involving native coronary artery of native heart without angina pectoris    2. Hyperlipidemia, unspecified hyperlipidemia type    3. Essential hypertension    4. Peripheral arterial disease    5. Stenosis of carotid artery, unspecified laterality    6. Pulmonary sarcoidosis    7. Chronic obstructive pulmonary disease, unspecified COPD type      Plan:     Shortness of breath  Pedal edema  -Recent CTA consistent with mediastinal and pulmonary sarcoidosis. Recent TTE with normal LVEF and G1DD.   -Start Furosemide 20 mg daily. Elevated feet at rest. Continue low sodium diet.  -Set up appt with Pulmonology    Stenosis of right carotid artery  --s/p PCI to R ICA on 10/14/19. Stable  --continue ASA, statin (LDL at goal)  --He is due for a carotid surveillance ultrasound. Can hold off on this until he has recovered from his recent injuries and neck surgery.     CAD (coronary artery disease)  --PET stress test identified a fixed defect consistent with his subtotal OM2 on his last angiogram  --ASA, statin, fish oil  --Imdur 30 mg      Essential hypertension  --remains at goal off meds     Hyperlipidemia  --LDL 61 on 6/9/20  --continue atorva 40 qd     History of third degree heart block  --related to STEMI incident. No recurrence since that time.    Follow up in 3 months with carotid ultrasound.     Signed:  Rukhsana Hoskins PA-C  Cardiology   [phone] - Interventional  [phone] - General  11/6/2020 7:58 AM

## 2020-11-06 NOTE — PROGRESS NOTES
"Established Patient     The patient location is: home in [place]  The chief complaint leading to consultation is: headache follow up visit    Visit type: audiovisual    Face to Face time with patient: 30 min  45 minutes of total time spent on the encounter, which includes face to face time and non-face to face time preparing to see the patient (eg, review of tests), Obtaining and/or reviewing separately obtained history, Documenting clinical information in the electronic or other health record, Independently interpreting results (not separately reported) and communicating results to the patient/family/caregiver, or Care coordination (not separately reported).     Each patient to whom he or she provides medical services by telemedicine is:  (1) informed of the relationship between the physician and patient and the respective role of any other health care provider with respect to management of the patient; and (2) notified that he or she may decline to receive medical services by telemedicine and may withdraw from such care at any time.    Notes:        SUBJECTIVE:  Patient ID: Paul Browning   Chief Complaint: Headache    History of Present Illness:  Paul Browning is a 80 y.o. male with a PMHx of CVA, MI s/p stents, s/p cataract surgery, migraine, cervicogenic HA, cerivcal spondylosis, chronic pain, depression, COPD, CAD, HTN, HLD, CKD, osteoporosis  who presents via virtual visit alone for follow-up of headaches.       Recommendations made at last Office Visit on 8/6/2020:  - MRI brain results listed above  - ESR/CRP wnl  - d/c fioricet as it was causing pt to feel "loopy"  - abortive - continue diclofenac gel  - nausea - continue zofran  - previous cataract surgery - recently saw Dr. Clolazo who did not see any ocular pathologic cause of symptoms, recommended repeat eval by retina specialist  - continue PT for decreased neck ROM and possible cervicogenic component to HA's  - referral to sleep med " to eval for ?LAUREN  - HTN - elevated today, will start imdur soon, asked to monitor and let pcp and cards know, mgmt per pcp and cards  - avoid triptans - hx of CVA, MI s/p stents, CAD, and uncontrolled HTN  - avoid NSAIDs - hx of CKD although pt states he has never been told he has any kidney issues and denies hx of ckd  - avoid steroids - hx of osteoporosis  - caution w/ bb - hx of depression  - Continue tracking headaches   - Discussed goals of therapy are to decrease the frequency, intensity, and duration of headaches  - RTC in 3 mo     11/09/2020 - Interval History:  Since last visit, pt has messaged me that PT has completed and overall helped his Ha's along with his neck ROM & pain.   On 10/21/2020 pt had a fall down several flights of stairs. He was seen in ED and subsequently admitted. He was found to have a type II displaced odontoid fracture, neck pain, and HA's. On 10/22 pt underwent odontoid screw placement and subsequent discharge on 10/23. CTH and cervical imaging did not reveal any other abnormalities besides the cervical fracture.   Spoke with patient, daughter, and daughter in law during today's visit.  It is reported to me that patient had many falls recently and is undergoing thorough workup for cause.  Due to current cervical fracture, patient must wear the cervical brace at all time, and is experiencing a worsening in his HA's. Family and patient have not been tracking and due to patient's poor memory and insight, he is unable to share characteristics such as frequency, duration, and severity.  After much discussion, it is agreed that family will keep a headache diary and present at next visit to assess in eating a better idea of these characteristics. Family also mentions patient is on Robaxin and Norco for his neck.  Is tolerating well at this time. Family also prefers to hold off on adding or changing any medications at this time while undergoing w/u for his many recent falls as they are  "concerned with what could be causing this and do not wish to worsen it as it remains unknown. Will monitor closely in meantime along with nsgy.  Patient has an upcoming appointment with Neurosurgery on December 7th.     08/06/2020 - Interval History:  Last visit pt presented w/ poorly defined HA's. Obtained ESR/CRP which were wnl. Obtained MRI brain which revealed chronic microvascular ischemic disease and cervical degenerative changes however no findings to warrant pt's complaints.   He has since seen cardiology who noted PVC's and low HR. Was switched to imdur.   D/c fioricet  Trial diclofenac gel - helps.  PT - started last week, but feels his HA's have worsened since starting.   Sleep med - pending  Elevated BP - still elevated, will start imdur today  Patient is very frustrated again at todays appt as he was last visit. States he is upset he keeps visiting different providers who continue to order more tests but he is not getting immediately better.  He is frustrated he is not able to do the things he once did many years ago like drive, move furniture, etc. I attempted to explain to him many times what is entailed in his current treatment plan for his HA's and that this will take time but pt continues to interrupt provider and remain frustrated and upset that there is not a quicker or immediate treatment option to his other conditions as well as his HA's.   Plan: continue diclofenac gel, conservative measures for muscle tension, PT, sleep med appt, and bp mgmt. F/u 3 mo    Recommendations made at last Office Visit on 7/28/20:  - Symptoms not consistent with a unifying disorder, DDx includes temporal arteritis, cervicogenic HA, migraines, lesion  - obtain MRI brain to r/o pathologic causes for Ha's and eye pain  - obtain ESR/CRP to r/o temporal arteritis  - d/c fioricet as it was causing pt to feel "loopy"  - abortive - may trial diclofenac gel  - nausea - continue zofran  - previous cataract surgery - recently " saw Dr. Collazo who did not see any ocular pathologic cause of symptoms, recommended repeat eval by retina specialist  - refer to PT for decreased neck ROM and possible cervicogenic component to HA's  - referral to sleep med to eval for ?LAUREN  - HTN - elevated today, to start metoprolol soon, asked to monitor an dlet pcp and cards know, mgmt per pcp and cards  - avoid triptans - hx of CVA, MI s/p stents, CAD, and uncontrolled HTN  - avoid NSAIDs - hx of CKD although pt states he has never been told he has any kidney issues and denies hx of ckd  - avoid steroids - hx of osteoporosis  - caution w/ bb - hx of depression  - risks, benefits, and potential side effects of diclofenac gel discussed   - alternative treatment options offered   - importance of healthy diet, regular exercise and sleep hygiene in the treatment of headaches    - Start tracking headaches via Migraine [facility] edna on phone   - RTC in 1 wk      Treatments Tried:  Diclofenac gel  fioricet  zofran  PT    Current Medications:    Current Outpatient Medications:     albuterol (PROVENTIL) 2.5 mg /3 mL (0.083 %) nebulizer solution, Take 3 mLs (2.5 mg total) by nebulization every 6 (six) hours as needed for Wheezing. Rescue, Disp: 1 Box, Rfl: 0    alendronate (FOSAMAX) 70 MG tablet, TAKE 1 TABLET BY MOUTH EVERY WEEK IN THE MORNING WITH FULL GLASS OF WATER ON EMPTY STOMACH-DONT LIE DOWN FOR AT LEAST 30 MINUTES AFTERWARDS, Disp: 12 tablet, Rfl: 3    aspirin (ECOTRIN) 81 MG EC tablet, Take 81 mg by mouth once daily., Disp: , Rfl:     atorvastatin (LIPITOR) 40 MG tablet, TAKE 1 TABLET BY MOUTH EVERY DAY, Disp: 90 tablet, Rfl: 3    diclofenac sodium (VOLTAREN) 1 % Gel, Apply 2 g topically every 6 (six) hours as needed. (Patient not taking: Reported on 10/5/2020), Disp: 1 Tube, Rfl: 6    fluorouracil 5% 5 % Soln, Use hs for 2 weeks (Patient not taking: Reported on 10/5/2020), Disp: 10 mL, Rfl: 1    furosemide (LASIX) 20 MG tablet, Take 1 tablet (20 mg total) by  mouth once daily., Disp: 30 tablet, Rfl: 11    HYDROcodone-acetaminophen (NORCO) 5-325 mg per tablet, Take 1 tablet by mouth every 6 (six) hours as needed., Disp: 50 tablet, Rfl: 0    isosorbide mononitrate (IMDUR) 30 MG 24 hr tablet, Take 1 tablet (30 mg total) by mouth once daily., Disp: 30 tablet, Rfl: 11    magnesium oxide (MAG-OX) 400 mg (241.3 mg magnesium) tablet, Take 1 tablet (400 mg total) by mouth once daily., Disp: 30 tablet, Rfl: 3    omega-3 fatty acids-vitamin E (FISH OIL) 1,000 mg Cap, Take 1 tablet by mouth 2 (two) times daily. (Patient taking differently: Take 1 tablet by mouth once daily. ), Disp: 60 each, Rfl: 11    ondansetron (ZOFRAN) 4 MG tablet, Take 1 tablet (4 mg total) by mouth every 8 (eight) hours as needed for Nausea. (Patient not taking: Reported on 10/5/2020), Disp: 30 tablet, Rfl: 1    pantoprazole (PROTONIX) 40 MG tablet, Take 40 mg by mouth 2 (two) times a day. , Disp: , Rfl:     pantoprazole (PROTONIX) 40 MG tablet, TAKE 1 TABLET BY MOUTH TWICE DAILY, Disp: 180 tablet, Rfl: 0    riboflavin, vitamin B2, (VITAMIN B-2) 100 mg Tab tablet, Take 1 tablet (100 mg total) by mouth once daily., Disp: 90 tablet, Rfl: 3    sertraline (ZOLOFT) 50 MG tablet, TAKE 1 TABLET BY MOUTH EVERY DAY, Disp: 90 tablet, Rfl: 3    triamcinolone acetonide 0.1% (KENALOG) 0.1 % cream, Apply topically 2 (two) times daily. (Patient not taking: Reported on 10/5/2020), Disp: 45 g, Rfl: 1  No current facility-administered medications for this visit.     Facility-Administered Medications Ordered in Other Visits:     lidocaine (PF) 10 mg/ml (1%) injection 10 mg, 1 mL, Intradermal, Once, Tip Mera Jr., MD    Review of Systems - as per HPI, otherwise a balanced 10 systems review is negative.    OBJECTIVE:  Vitals:  There were no vitals taken for this visit.     Physical Exam:  Constitutional: he appears well-developed and well-nourished. he is well groomed. NAD   HENT:    Head: Normocephalic and  atraumatic  Eyes: Conjunctivae and EOM are normal  Musculoskeletal: Normal range of motion. No joint stiffness.   Psychiatric: Mood and affect are normal    Neuro: Patient is alert and oriented to person, place, and time. Language is intact and fluent. Speech is clear and fluent. Recent and remote memory are intact.  Normal attention and concentration.  Facial movement is symmetric. Moves all 4 extremities against gravity.      Review of Data:   Notes from neuro, nsgy, ortho reviewed   Labs:  Admission on 10/19/2020, Discharged on 10/23/2020   Component Date Value Ref Range Status    aPTT 10/19/2020 27.9  21.0 - 32.0 sec Final    Prothrombin Time 10/19/2020 10.7  9.0 - 12.5 sec Final    INR 10/19/2020 1.0  0.8 - 1.2 Final    Group & Rh 10/19/2020 A POS   Final    Indirect Jessica 10/19/2020 NEG   Final    POC Rapid COVID 10/19/2020 Negative  Negative Final     Acceptable 10/19/2020 Yes   Final    WBC 10/20/2020 6.58  3.90 - 12.70 K/uL Final    RBC 10/20/2020 4.47* 4.60 - 6.20 M/uL Final    Hemoglobin 10/20/2020 13.6* 14.0 - 18.0 g/dL Final    Hematocrit 10/20/2020 41.1  40.0 - 54.0 % Final    MCV 10/20/2020 92  82 - 98 fL Final    MCH 10/20/2020 30.4  27.0 - 31.0 pg Final    MCHC 10/20/2020 33.1  32.0 - 36.0 g/dL Final    RDW 10/20/2020 13.7  11.5 - 14.5 % Final    Platelets 10/20/2020 235  150 - 350 K/uL Final    MPV 10/20/2020 10.4  9.2 - 12.9 fL Final    Immature Granulocytes 10/20/2020 0.2  0.0 - 0.5 % Final    Gran # (ANC) 10/20/2020 5.3  1.8 - 7.7 K/uL Final    Immature Grans (Abs) 10/20/2020 0.01  0.00 - 0.04 K/uL Final    Lymph # 10/20/2020 0.9* 1.0 - 4.8 K/uL Final    Mono # 10/20/2020 0.4  0.3 - 1.0 K/uL Final    Eos # 10/20/2020 0.0  0.0 - 0.5 K/uL Final    Baso # 10/20/2020 0.02  0.00 - 0.20 K/uL Final    nRBC 10/20/2020 0  0 /100 WBC Final    Gran % 10/20/2020 80.0* 38.0 - 73.0 % Final    Lymph % 10/20/2020 13.1* 18.0 - 48.0 % Final    Mono % 10/20/2020 6.4  4.0  - 15.0 % Final    Eosinophil % 10/20/2020 0.0  0.0 - 8.0 % Final    Basophil % 10/20/2020 0.3  0.0 - 1.9 % Final    Differential Method 10/20/2020 Automated   Final    Sodium 10/20/2020 133* 136 - 145 mmol/L Final    Potassium 10/20/2020 4.3  3.5 - 5.1 mmol/L Final    Chloride 10/20/2020 98  95 - 110 mmol/L Final    CO2 10/20/2020 23  23 - 29 mmol/L Final    Glucose 10/20/2020 110  70 - 110 mg/dL Final    BUN 10/20/2020 14  8 - 23 mg/dL Final    Creatinine 10/20/2020 0.8  0.5 - 1.4 mg/dL Final    Calcium 10/20/2020 9.2  8.7 - 10.5 mg/dL Final    Anion Gap 10/20/2020 12  8 - 16 mmol/L Final    eGFR if African American 10/20/2020 >60.0  >60 mL/min/1.73 m^2 Final    eGFR if non African American 10/20/2020 >60.0  >60 mL/min/1.73 m^2 Final    WBC 10/21/2020 5.87  3.90 - 12.70 K/uL Final    RBC 10/21/2020 4.03* 4.60 - 6.20 M/uL Final    Hemoglobin 10/21/2020 12.0* 14.0 - 18.0 g/dL Final    Hematocrit 10/21/2020 37.8* 40.0 - 54.0 % Final    MCV 10/21/2020 94  82 - 98 fL Final    MCH 10/21/2020 29.8  27.0 - 31.0 pg Final    MCHC 10/21/2020 31.7* 32.0 - 36.0 g/dL Final    RDW 10/21/2020 14.1  11.5 - 14.5 % Final    Platelets 10/21/2020 218  150 - 350 K/uL Final    MPV 10/21/2020 10.1  9.2 - 12.9 fL Final    Immature Granulocytes 10/21/2020 0.3  0.0 - 0.5 % Final    Gran # (ANC) 10/21/2020 4.1  1.8 - 7.7 K/uL Final    Immature Grans (Abs) 10/21/2020 0.02  0.00 - 0.04 K/uL Final    Lymph # 10/21/2020 1.2  1.0 - 4.8 K/uL Final    Mono # 10/21/2020 0.5  0.3 - 1.0 K/uL Final    Eos # 10/21/2020 0.1  0.0 - 0.5 K/uL Final    Baso # 10/21/2020 0.02  0.00 - 0.20 K/uL Final    nRBC 10/21/2020 0  0 /100 WBC Final    Gran % 10/21/2020 69.4  38.0 - 73.0 % Final    Lymph % 10/21/2020 19.8  18.0 - 48.0 % Final    Mono % 10/21/2020 9.0  4.0 - 15.0 % Final    Eosinophil % 10/21/2020 1.2  0.0 - 8.0 % Final    Basophil % 10/21/2020 0.3  0.0 - 1.9 % Final    Differential Method 10/21/2020 Automated   Final     Sodium 10/21/2020 135* 136 - 145 mmol/L Final    Potassium 10/21/2020 4.0  3.5 - 5.1 mmol/L Final    Chloride 10/21/2020 97  95 - 110 mmol/L Final    CO2 10/21/2020 29  23 - 29 mmol/L Final    Glucose 10/21/2020 97  70 - 110 mg/dL Final    BUN 10/21/2020 15  8 - 23 mg/dL Final    Creatinine 10/21/2020 0.9  0.5 - 1.4 mg/dL Final    Calcium 10/21/2020 8.9  8.7 - 10.5 mg/dL Final    Anion Gap 10/21/2020 9  8 - 16 mmol/L Final    eGFR if African American 10/21/2020 >60.0  >60 mL/min/1.73 m^2 Final    eGFR if non African American 10/21/2020 >60.0  >60 mL/min/1.73 m^2 Final    WBC 10/22/2020 4.89  3.90 - 12.70 K/uL Final    RBC 10/22/2020 3.87* 4.60 - 6.20 M/uL Final    Hemoglobin 10/22/2020 11.6* 14.0 - 18.0 g/dL Final    Hematocrit 10/22/2020 36.5* 40.0 - 54.0 % Final    MCV 10/22/2020 94  82 - 98 fL Final    MCH 10/22/2020 30.0  27.0 - 31.0 pg Final    MCHC 10/22/2020 31.8* 32.0 - 36.0 g/dL Final    RDW 10/22/2020 14.2  11.5 - 14.5 % Final    Platelets 10/22/2020 219  150 - 350 K/uL Final    MPV 10/22/2020 10.1  9.2 - 12.9 fL Final    Immature Granulocytes 10/22/2020 0.6* 0.0 - 0.5 % Final    Gran # (ANC) 10/22/2020 3.2  1.8 - 7.7 K/uL Final    Immature Grans (Abs) 10/22/2020 0.03  0.00 - 0.04 K/uL Final    Lymph # 10/22/2020 1.1  1.0 - 4.8 K/uL Final    Mono # 10/22/2020 0.5  0.3 - 1.0 K/uL Final    Eos # 10/22/2020 0.1  0.0 - 0.5 K/uL Final    Baso # 10/22/2020 0.03  0.00 - 0.20 K/uL Final    nRBC 10/22/2020 0  0 /100 WBC Final    Gran % 10/22/2020 64.5  38.0 - 73.0 % Final    Lymph % 10/22/2020 22.9  18.0 - 48.0 % Final    Mono % 10/22/2020 9.6  4.0 - 15.0 % Final    Eosinophil % 10/22/2020 1.8  0.0 - 8.0 % Final    Basophil % 10/22/2020 0.6  0.0 - 1.9 % Final    Differential Method 10/22/2020 Automated   Final    Sodium 10/22/2020 136  136 - 145 mmol/L Final    Potassium 10/22/2020 3.7  3.5 - 5.1 mmol/L Final    Chloride 10/22/2020 98  95 - 110 mmol/L Final    CO2  10/22/2020 29  23 - 29 mmol/L Final    Glucose 10/22/2020 92  70 - 110 mg/dL Final    BUN 10/22/2020 13  8 - 23 mg/dL Final    Creatinine 10/22/2020 0.9  0.5 - 1.4 mg/dL Final    Calcium 10/22/2020 8.8  8.7 - 10.5 mg/dL Final    Anion Gap 10/22/2020 9  8 - 16 mmol/L Final    eGFR if African American 10/22/2020 >60.0  >60 mL/min/1.73 m^2 Final    eGFR if non African American 10/22/2020 >60.0  >60 mL/min/1.73 m^2 Final    UNIT NUMBER 10/19/2020 [id number]   Final    Product Code 10/19/2020 [id number]   Final    DISPENSE STATUS 10/19/2020 RETURNED   Final    CODING SYSTEM 10/19/2020 [id number]   Final    Unit Blood Type Code 10/19/2020 6200   Final    Unit Blood Type 10/19/2020 A POS   Final    Unit Expiration 10/19/2020 [id number]   Final    UNIT NUMBER 10/19/2020 [id number]   Final    Product Code 10/19/2020 [id number]   Final    DISPENSE STATUS 10/19/2020 RETURNED   Final    CODING SYSTEM 10/19/2020 [id number]   Final    Unit Blood Type Code 10/19/2020 6200   Final    Unit Blood Type 10/19/2020 A POS   Final    Unit Expiration 10/19/2020 [id number]   Final    POCT Glucose 10/23/2020 143* 70 - 110 mg/dL Final    WBC 10/23/2020 5.34  3.90 - 12.70 K/uL Final    RBC 10/23/2020 4.62  4.60 - 6.20 M/uL Final    Hemoglobin 10/23/2020 14.0  14.0 - 18.0 g/dL Final    Hematocrit 10/23/2020 42.5  40.0 - 54.0 % Final    MCV 10/23/2020 92  82 - 98 fL Final    MCH 10/23/2020 30.3  27.0 - 31.0 pg Final    MCHC 10/23/2020 32.9  32.0 - 36.0 g/dL Final    RDW 10/23/2020 13.9  11.5 - 14.5 % Final    Platelets 10/23/2020 257  150 - 350 K/uL Final    MPV 10/23/2020 10.5  9.2 - 12.9 fL Final    Immature Granulocytes 10/23/2020 0.4  0.0 - 0.5 % Final    Gran # (ANC) 10/23/2020 4.6  1.8 - 7.7 K/uL Final    Immature Grans (Abs) 10/23/2020 0.02  0.00 - 0.04 K/uL Final    Lymph # 10/23/2020 0.4* 1.0 - 4.8 K/uL Final    Mono # 10/23/2020 0.3  0.3 - 1.0 K/uL Final    Eos # 10/23/2020 0.0  0.0 - 0.5 K/uL  Final    Baso # 10/23/2020 0.01  0.00 - 0.20 K/uL Final    nRBC 10/23/2020 0  0 /100 WBC Final    Gran % 10/23/2020 86.5* 38.0 - 73.0 % Final    Lymph % 10/23/2020 7.7* 18.0 - 48.0 % Final    Mono % 10/23/2020 5.2  4.0 - 15.0 % Final    Eosinophil % 10/23/2020 0.0  0.0 - 8.0 % Final    Basophil % 10/23/2020 0.2  0.0 - 1.9 % Final    Differential Method 10/23/2020 Automated   Final    Sodium 10/23/2020 136  136 - 145 mmol/L Final    Potassium 10/23/2020 4.3  3.5 - 5.1 mmol/L Final    Chloride 10/23/2020 98  95 - 110 mmol/L Final    CO2 10/23/2020 25  23 - 29 mmol/L Final    Glucose 10/23/2020 109  70 - 110 mg/dL Final    BUN 10/23/2020 13  8 - 23 mg/dL Final    Creatinine 10/23/2020 1.0  0.5 - 1.4 mg/dL Final    Calcium 10/23/2020 9.1  8.7 - 10.5 mg/dL Final    Anion Gap 10/23/2020 13  8 - 16 mmol/L Final    eGFR if African American 10/23/2020 >60.0  >60 mL/min/1.73 m^2 Final    eGFR if non African American 10/23/2020 >60.0  >60 mL/min/1.73 m^2 Final   Lab Visit on 10/05/2020   Component Date Value Ref Range Status    Sodium 10/05/2020 135* 136 - 145 mmol/L Final    Potassium 10/05/2020 4.3  3.5 - 5.1 mmol/L Final    Chloride 10/05/2020 98  95 - 110 mmol/L Final    CO2 10/05/2020 29  23 - 29 mmol/L Final    Glucose 10/05/2020 79  70 - 110 mg/dL Final    BUN 10/05/2020 15  8 - 23 mg/dL Final    Creatinine 10/05/2020 1.0  0.5 - 1.4 mg/dL Final    Calcium 10/05/2020 9.2  8.7 - 10.5 mg/dL Final    Anion Gap 10/05/2020 8  8 - 16 mmol/L Final    eGFR if African American 10/05/2020 >60.0  >60 mL/min/1.73 m^2 Final    eGFR if non African American 10/05/2020 >60.0  >60 mL/min/1.73 m^2 Final    D-Dimer 10/05/2020 0.59* <0.50 mg/L FEU Final     Imaging:  Results for orders placed or performed during the hospital encounter of 10/19/20   CT Head Without Contrast    Narrative    EXAMINATION:  CT HEAD WITHOUT CONTRAST    CLINICAL HISTORY:  Head trauma, minor (Age => 63y);    TECHNIQUE:  Low dose axial  images were obtained through the head.  Coronal and sagittal reformations were also performed. Contrast was not administered.    COMPARISON:  09/25/2020    FINDINGS:  Noncontrast head CT demonstrates involutional changes which are similar to the prior study and not unusual for the patient's age.  Moderate degree of white matter hypoattenuation is present probably from chronic microvascular disease.  No cortical edema or large vessel territory infarcts are seen.  There is no intracranial hemorrhage, mass effect or midline shift.    Bone windows demonstrate the presence of a type 2 odontoid fracture with posterior subluxation of the odontoid in relation to the body of C2 about 4 mm.    Opacification of some of the right ethmoid air cells, but no fracture is seen.      Impression    No acute intracranial findings.    Type 2 odontoid fracture with posterior subluxation of the odontoid in relation to C2 body.      Electronically signed by: Artie Ro MD  Date:    10/19/2020  Time:    17:28   Results for orders placed or performed during the hospital encounter of 08/04/20   MRI Brain W WO Contrast    Narrative    EXAMINATION:  MRI BRAIN W WO CONTRAST    CLINICAL HISTORY:  acute onset of HA, eye pain, dizziness, nausea, visual blurriness with neuro deficits on exam; Headache    TECHNIQUE:  Multiplanar multisequence MR imaging of the brain was performed before and after the administration of 7 mL Gadavist intravenous contrast.    COMPARISON:  Head CT 11/04/2014    FINDINGS:  Prominence of the ventricles and sulci compatible with generalized cerebral volume loss.  Slight parietal predominance.  Configuration not suggestive of hydrocephalus.    Patchy and confluent T2/FLAIR hyperintensity in the supratentorial white matter, nonspecific but most likely reflecting chronic microvascular ischemic changes . No parenchymal mass, hemorrhage, edema or recent or remote major vascular distribution infarct.  No abnormal postcontrast  parenchymal or leptomeningeal enhancement.    No extra-axial blood or fluid collections.    The T2 skull base flow voids are preserved.    Calvarial marrow signal intensity unremarkable.  Low T1 signal intensity in the odontoid process.      Impression    Generalized cerebral volume loss with the slight parietal predominance bilaterally.    Moderate supratentorial leukoencephalopathy, in keeping with chronic microvascular ischemic disease.    No compelling evidence of an acute intracranial pathology.    Abnormal decreased T1 marrow signal intensity in the odontoid process of the C2 vertebral segment.  This may be degenerative in nature, noting some adjacent pannus formation.  However, overall nonspecific and other marrow replacement process not entirely excluded.  Clinical correlation advised with dedicated cervical spine imaging if warranted clinically.    Degenerative change of the left TMJ.      Electronically signed by: Jude Noyola MD  Date:    08/04/2020  Time:    14:49   Results for orders placed or performed in visit on 03/01/11   CT [facility]    Narrative    DATE OF EXAM: Mar 17 2011      CT    0053  -  THORAX WITHOUT CONTRAST:   827 A.M.   [id number]     CLINICAL HISTORY:   786.50   CHEST PAIN NOS     PROCEDURE COMMENT:   5 MM AXIAL IMAGES OBTAINED FROM THE APICES THROUGH   THE COSTOPHRENIC ANGLES WITHOUT THE USE OF IV CONTRAST MATERIAL.  DATA   WAS RECONSTRUCTED AT 5 MM INCREMENTS FOR CONTIGUOUS 5 MM IMAGES IN THE   AXIAL, SAGITTAL AND CORONAL PLANES.  DATA WAS POSTPROCESSED FOR   EXTRACTION OF 1.25 MM AXIAL IMAGES AT 10 MM INCREMENTS FOR EFFECTIVE HIGH   RESOLUTION CT IMAGING AS WELL AS 8 MM MAXIMUM INTENSITY PROJECTION IMAGES   IN THE AXIAL PLANE AT 2 MM INCREMENTS FOR OVERLAPPING MIP IMAGES.  NO   ADDITIONAL RADIATION WAS USED.       ICD 9 CODE(S):   ()     CPT 4 CODE(S)/MODIFIER(S):   ()     RESULTS: COMPARISON IS MADE TO CTA OF THE CHEST DATED 4/8/06 AS WELL AS   CT OF THE  CHEST WITH CONTRAST DATED 6/27/03.     STRUCTURES AT THE BASE OF THE NECK IMAGED IN THE COURSE OF THIS DEDICATED   THORACIC CT DEMONSTRATE NO APPRECIABLE PATHOLOGY.       THERE IS A LEFT SIDED AORTIC ARCH WITH THREE BRANCH VESSELS.  THE AORTA   IS NORMAL IN CALIBER, CONTOUR, AND COURSE WITH A MODERATE AMOUNT OF   CALCIFIC ATHEROSCLEROSIS THROUGHOUT THE AORTA AND ITS BRANCH VESSELS   INCLUDING THE CORONARY ARTERIES.  THE HEART IS NORMAL IN SIZE AND   MORPHOLOGY WITHOUT PERICARDIAL FLUID.  PULMONARY ARTERIES DISTRIBUTE   NORMALLY.  PULMONARY AND SYSTEMIC VENOATRIAL CONNECTIONS ARE CONCORDANT.    THERE ARE SEVERAL CALCIFIED AND NONCALCIFIED MEDIASTINAL LYMPH NODES, THE   LARGEST NONCALCIFIED NODE INFERIORLY WITHIN THE POSTERIOR MEDIASTINUM,   ALL OF WHICH APPEAR RELATIVELY STABLE IN SIZE AND CONFIGURATION DATING   BACK TO 6/27/03.  THE ESOPHAGUS IS NORMAL IN COURSE AND CALIBER WITH   RE-DEMONSTRATION OF A LARGE HIATAL HERNIA.       TRACHEA AND BRONCHI ARE PATENT.  MULTIPLE PULMONARY MICRONODULES ARE   IDENTIFIED IN A PERILYMPHATIC DISTRIBUTION BILATERALLY, GREATER IN THE   APICES.  A CLASSIC GALAXY SIGN IS SEEN IN THE RIGHT UPPER LOBE, WITH THIS   CONFIGURATION OF MICRONODULES MEASURING 2.9 X 1.5 CM IN ITS LARGEST AXIAL   PLANE, UNCHANGED FROM 6/27/03.  THE CONSTELLATION OF FINDINGS IS   SUGGESTIVE OF A SARCOIDOSIS WITH RELATIVE STABILITY OVER APPROXIMATELY 8   YEARS.       LIMITED IMAGES OF THE ABDOMEN OBTAINED IN THE COURSE OF THIS DEDICATED   THORACIC CT DEMONSTRATE A 2.8 X 2.9 CM ROUNDED HYPODENSITY IN THE RIGHT   LOBE OF THE LIVER POSTEROINFERIORLY WHICH APPEARS UNCHANGED FROM 2003 AND   WAS THOUGHT TO REPRESENT A HEPATIC CYST ON PREVIOUS ULTRASOUND   EVALUATION.  ABDOMINAL STRUCTURES ARE OTHERWISE UNREMARKABLE.     OSSEOUS STRUCTURES DEMONSTRATE DEGENERATIVE DISEASE OF THE THORACIC SPINE   BUT ARE OTHERWISE UNREMARKABLE WITH NO LYTIC OR SCLEROTIC LESIONS.      IMPRESSION:   1.  PERILYMPHATIC DISTRIBUTION OF  "PULMONARY MICRONODULES WITH CLASSIC   GALAXY SIGN IN THE RIGHT UPPER LOBE AND MULTIPLE CALCIFIED AND   NONCALCIFIED MEDIASTINAL NODES.  ALL THESE FINDINGS ARE UNCHANGED FROM   6/03 AND ARE CONSISTENT WITH SARCOIDOSIS.   2.  LARGE HIATAL HERNIA.    3.  ATHEROSCLEROSIS AND CORONARY ARTERY DISEASE.    4.  SIMPLE HEPATIC CYST.             : REBEL  Transcribe Date/Time: Mar 17 2011  2:41P  Dictated by : BRYANNA FINE MD  Read On: Mar 17 2011  1:19P  nl: Rachna Jerez M.D. [id number]  Images were reviewed, findings were verified and document was   electronically  SIGNED BY: RACHNA JEREZ MD On:               Note: I have independently reviewed any/all imaging/labs/tests and agree with the report (s) as documented.  Any discrepancies will be as noted/demarcated by free text.  JAZMIN DONIS 11/9/2020    ASSESSMENT:  1. Intractable headache, unspecified chronicity pattern, unspecified headache type        PLAN:  - MRI brain results listed above  - ESR/CRP wnl  - d/c fioricet as it was causing pt to feel "loopy"  - abortive - continue norco as recommended by nsgy, f/u w/ nsgy in 1 mo to discuss other options, may continue diclofenac gel  - nausea - continue zofran  - previous cataract surgery - recently saw Dr. Collazo who did not see any ocular pathologic cause of symptoms, recommended repeat eval by retina specialist  - consider restarting PT when safe to do so (for cervicogenic components, possible cervicogenic HA as he had improvement with previous rounds of PT and HA's returned upon cervical fracture?)  - referral to sleep med to eval for ?LAUREN  - HTN - continue to monitor, mgmt per pcp and cards  - avoid triptans - hx of CVA, MI s/p stents, CAD, and uncontrolled HTN  - avoid NSAIDs - hx of CKD although pt states he has never been told he has any kidney issues and denies hx of ckd  - avoid steroids - hx of osteoporosis  - caution w/ bb - hx of depression  - Continue tracking headaches   - Discussed goals of " therapy are to decrease the frequency, intensity, and duration of headaches  - RTC in 3 mo          Questions and concerns were sought and answered to the patient's stated verbal satisfaction.  The patient verbalizes understanding and agreement with the above stated treatment plan.     CC: DO Marquita Quezada PA-C  [facility]   [phone]    Dr. Berry was available during today's encounter.

## 2020-11-08 ENCOUNTER — PATIENT MESSAGE (OUTPATIENT)
Dept: INTERNAL MEDICINE | Facility: CLINIC | Age: 80
End: 2020-11-08

## 2020-11-09 ENCOUNTER — PATIENT MESSAGE (OUTPATIENT)
Dept: INTERNAL MEDICINE | Facility: CLINIC | Age: 80
End: 2020-11-09

## 2020-11-09 ENCOUNTER — PATIENT MESSAGE (OUTPATIENT)
Dept: NEUROLOGY | Facility: CLINIC | Age: 80
End: 2020-11-09

## 2020-11-09 NOTE — PROGRESS NOTES
Wound Check   Neurosurgery      Mr Paul Browning is a 80 year old male S/P Closed Odontoid Fx who underwent screw fixation on 10/22/2020 by Dr. Weaver. (For complete diagnosis and procedure, see OP note.) He presents via Virtual Visit for his 2 week post-op wound check.   Pt appears frail, wearing collar but appears poorly fitted with chin slipping below support. Directed family member to tighten collar and adjust chin support. Helped somewhat but pt resistant to keeping chin above support.     Reviewed Brace Rationale and the importance of wearing it consistently and correctly to avoid pseudoarthrosis and additional surgery. Family v/u. Pt is hard of hearing.    Patient denies fevers, chills, N/V. Recovery is compromised by trochanteric fracture requiring minimum weight bearing and BLE edema 2/2 to cardiac problems.    Neck incision inspected via camera. Appears clean, dry and intact with no signs of erythema, swelling or purulent drainage. No steri-strips noted.    All questions answered.     [facility] was contacted to fit the pt with an alternative collar.   Pt will f/u with Dr Weaver in 4 weeks.   Encouraged pt to contact clinic with any questions or concerns.    Tomasa Bagley, RN  Neurosurgery

## 2020-11-11 ENCOUNTER — HOSPITAL ENCOUNTER (OUTPATIENT)
Dept: RADIOLOGY | Facility: HOSPITAL | Age: 80
Discharge: HOME | End: 2020-11-11
Attending: NURSE PRACTITIONER
Payer: MEDICARE

## 2020-11-11 DIAGNOSIS — M25.552 HIP PAIN, LEFT: ICD-10-CM

## 2020-11-11 PROCEDURE — 73502 XR HIP 2 VIEW LEFT: ICD-10-PCS | Mod: 26,HCNC,LT, | Performed by: RADIOLOGY

## 2020-11-11 PROCEDURE — 73502 X-RAY EXAM HIP UNI 2-3 VIEWS: CPT | Mod: 26,HCNC,LT, | Performed by: RADIOLOGY

## 2020-11-11 PROCEDURE — 73502 X-RAY EXAM HIP UNI 2-3 VIEWS: CPT | Mod: TC,HCNC,LT

## 2020-11-12 ENCOUNTER — PATIENT MESSAGE (OUTPATIENT)
Dept: CARDIOLOGY | Facility: CLINIC | Age: 80
End: 2020-11-12

## 2020-11-12 ENCOUNTER — PES CALL (OUTPATIENT)
Dept: ADMINISTRATIVE | Facility: CLINIC | Age: 80
End: 2020-11-12

## 2020-11-13 ENCOUNTER — OFFICE VISIT (OUTPATIENT)
Dept: ORTHOPEDICS | Facility: CLINIC | Age: 80
End: 2020-11-13
Payer: MEDICARE

## 2020-11-13 DIAGNOSIS — S72.102D CLOSED FRACTURE OF TROCHANTER OF LEFT FEMUR WITH ROUTINE HEALING, SUBSEQUENT ENCOUNTER: Primary | ICD-10-CM

## 2020-11-13 PROCEDURE — 1159F PR MEDICATION LIST DOCUMENTED IN MEDICAL RECORD: ICD-10-PCS | Mod: HCNC,95,, | Performed by: NURSE PRACTITIONER

## 2020-11-13 PROCEDURE — 99499 UNLISTED E&M SERVICE: CPT | Mod: 95,,, | Performed by: NURSE PRACTITIONER

## 2020-11-13 PROCEDURE — 99213 OFFICE O/P EST LOW 20 MIN: CPT | Mod: HCNC,95,, | Performed by: NURSE PRACTITIONER

## 2020-11-13 PROCEDURE — 1159F MED LIST DOCD IN RCRD: CPT | Mod: HCNC,95,, | Performed by: NURSE PRACTITIONER

## 2020-11-13 PROCEDURE — 99213 PR OFFICE/OUTPT VISIT, EST, LEVL III, 20-29 MIN: ICD-10-PCS | Mod: HCNC,95,, | Performed by: NURSE PRACTITIONER

## 2020-11-13 PROCEDURE — 99499 RISK ADDL DX/OHS AUDIT: ICD-10-PCS | Mod: 95,,, | Performed by: NURSE PRACTITIONER

## 2020-11-13 NOTE — PROGRESS NOTES
SUBJECTIVE:     Chief Complaint & History of Present Illness:  Paul Browning is a Established 80 y.o. year old male patient visit is being done via telemedicine today.  He is following up for his left trochanter femur fracture that is non-displaced found on a CT scan.  Since his last visit, he sustained another fall going down steps and fractured his odontoid which required surgical intervention by Dr. Weaver on 10-.    Currently he is reporting no pain to his hips.  He is with his son.  Son states patient has not been compliant with his LLE TTWB as previously recommended.  Patient denies groin pain and no numbness or tingling sensation to his lower leg.    The patient location is: home  The chief complaint leading to consultation is: follow up for his left non-displaced trochanter femur fracture    Visit type: audiovisual    Face to Face time with patient: 30  minutes of total time spent on the encounter, which includes face to face time and non-face to face time preparing to see the patient (eg, review of tests), Obtaining and/or reviewing separately obtained history, Documenting clinical information in the electronic or other health record, Independently interpreting results (not separately reported) and communicating results to the patient/family/caregiver, or Care coordination (not separately reported).         Each patient to whom he or she provides medical services by telemedicine is:  (1) informed of the relationship between the physician and patient and the respective role of any other health care provider with respect to management of the patient; and (2) notified that he or she may decline to receive medical services by telemedicine and may withdraw from such care at any time.      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 Talamantes    CATARACT EXTRACTION W/  INTRAOCULAR LENS IMPLANT Left 07/31/2018    Dr. Talamantes    CORONARY ARTERY BYPASS GRAFT      x2  10/2014    ESOPHAGOGASTRODUODENOSCOPY N/A 4/8/2019    Procedure: ESOPHAGOGASTRODUODENOSCOPY (EGD)/poss rfa;  Surgeon: Clifford De La Torre MD;  Location: [facility] ENDO (2ND FLR);  Service: Endoscopy;  Laterality: N/A;    ESOPHAGOGASTRODUODENOSCOPY (EGD) WITH RADIOFREQUENCY TREATMENT OF GASTROESOPHAGEAL JUNCTION      INJECTION OF ANESTHETIC AGENT AROUND MEDIAL BRANCH NERVES INNERVATING CERVICAL FACET JOINT Bilateral 1/9/2020    Procedure: INJECTION, MBB--Bilateral Cervical- Third Occipital nerve, C2-3--ASA okay for pt to continue taking per provider.;  Surgeon: Tip Mera Jr., MD;  Location: [facility] PAIN MGT;  Service: Pain Management;  Laterality: Bilateral;    INTRAOCULAR PROSTHESES INSERTION Right 7/17/2018    Procedure: INSERTION, INTRAOCULAR LENS PROSTHESIS;  Surgeon: León Talamantes MD;  Location: [facility] OR [facility] FLR;  Service: Ophthalmology;  Laterality: Right;    INTRAOCULAR PROSTHESES INSERTION Left 7/31/2018    Procedure: INSERTION, INTRAOCULAR LENS PROSTHESIS;  Surgeon: León Talamantes MD;   Location: [facility] OR 1ST FLR;  Service: Ophthalmology;  Laterality: Left;    PHACOEMULSIFICATION OF CATARACT Right 7/17/2018    Procedure: PHACOEMULSIFICATION, CATARACT;  Surgeon: León Talamantes MD;  Location: [facility] OR 1ST FLR;  Service: Ophthalmology;  Laterality: Right;    PHACOEMULSIFICATION OF CATARACT Left 7/31/2018    Procedure: PHACOEMULSIFICATION, CATARACT;  Surgeon: León Talamantes MD;  Location: [facility] OR 1ST FLR;  Service: Ophthalmology;  Laterality: Left;    PLACEMENT OF SCREW IN ODONTOID N/A 10/22/2020    Procedure: INSERTION, SCREW, ODONTOID. Anterior approach.;  Surgeon: Kirsten Weaver MD;  Location: [facility] OR 2ND FLR;  Service: Neurosurgery;  Laterality: N/A;    RADIOFREQUENCY THERMOCOAGULATION Bilateral 1/30/2020    Procedure: RADIOFREQUENCY THERMAL COAGULATION--Bilateral C2-3 and Third Occipital Nerve;  Surgeon: Tip Mera Jr., MD;  Location: [facility];  Service: Pain Management;  Laterality: Bilateral;    UMBILICAL HERNIA REPAIR         Family History   Problem Relation Age of Onset    Arrhythmia Mother     Heart disease Mother     Heart failure Brother     No Known Problems Daughter     No Known Problems Son     Colon cancer Neg Hx     Inflammatory bowel disease Neg Hx     Celiac disease Neg Hx     Melanoma Neg Hx     Amblyopia Neg Hx     Blindness Neg Hx     Cataracts Neg Hx     Glaucoma Neg Hx     Macular degeneration Neg Hx     Retinal detachment Neg Hx     Strabismus Neg Hx        Review of patient's allergies indicates:   Allergen Reactions    Morphine Shortness Of Breath         Current Outpatient Medications:     albuterol (PROVENTIL) 2.5 mg /3 mL (0.083 %) nebulizer solution, Take 3 mLs (2.5 mg total) by nebulization every 6 (six) hours as needed for Wheezing. Rescue, Disp: 1 Box, Rfl: 0    alendronate (FOSAMAX) 70 MG tablet, TAKE 1 TABLET BY MOUTH EVERY WEEK IN THE MORNING WITH FULL GLASS OF WATER ON EMPTY STOMACH-DONT LIE DOWN FOR AT LEAST 30  MINUTES AFTERWARDS, Disp: 12 tablet, Rfl: 3    aspirin (ECOTRIN) 81 MG EC tablet, Take 81 mg by mouth once daily., Disp: , Rfl:     atorvastatin (LIPITOR) 40 MG tablet, TAKE 1 TABLET BY MOUTH EVERY DAY, Disp: 90 tablet, Rfl: 3    diclofenac sodium (VOLTAREN) 1 % Gel, Apply 2 g topically every 6 (six) hours as needed. (Patient not taking: Reported on 10/5/2020), Disp: 1 Tube, Rfl: 6    fluorouracil 5% 5 % Soln, Use hs for 2 weeks (Patient not taking: Reported on 10/5/2020), Disp: 10 mL, Rfl: 1    furosemide (LASIX) 20 MG tablet, Take 1 tablet (20 mg total) by mouth once daily., Disp: 30 tablet, Rfl: 11    HYDROcodone-acetaminophen (NORCO) 5-325 mg per tablet, Take 1 tablet by mouth every 6 (six) hours as needed., Disp: 50 tablet, Rfl: 0    isosorbide mononitrate (IMDUR) 30 MG 24 hr tablet, Take 1 tablet (30 mg total) by mouth once daily., Disp: 30 tablet, Rfl: 11    magnesium oxide (MAG-OX) 400 mg (241.3 mg magnesium) tablet, Take 1 tablet (400 mg total) by mouth once daily., Disp: 30 tablet, Rfl: 3    omega-3 fatty acids-vitamin E (FISH OIL) 1,000 mg Cap, Take 1 tablet by mouth 2 (two) times daily. (Patient taking differently: Take 1 tablet by mouth once daily. ), Disp: 60 each, Rfl: 11    ondansetron (ZOFRAN) 4 MG tablet, Take 1 tablet (4 mg total) by mouth every 8 (eight) hours as needed for Nausea. (Patient not taking: Reported on 10/5/2020), Disp: 30 tablet, Rfl: 1    pantoprazole (PROTONIX) 40 MG tablet, Take 40 mg by mouth 2 (two) times a day. , Disp: , Rfl:     pantoprazole (PROTONIX) 40 MG tablet, TAKE 1 TABLET BY MOUTH TWICE DAILY, Disp: 180 tablet, Rfl: 0    riboflavin, vitamin B2, (VITAMIN B-2) 100 mg Tab tablet, Take 1 tablet (100 mg total) by mouth once daily., Disp: 90 tablet, Rfl: 3    sertraline (ZOLOFT) 50 MG tablet, TAKE 1 TABLET BY MOUTH EVERY DAY, Disp: 90 tablet, Rfl: 3    triamcinolone acetonide 0.1% (KENALOG) 0.1 % cream, Apply topically 2 (two) times daily. (Patient not taking:  Reported on 10/5/2020), Disp: 45 g, Rfl: 1  No current facility-administered medications for this visit.     Facility-Administered Medications Ordered in Other Visits:     lidocaine (PF) 10 mg/ml (1%) injection 10 mg, 1 mL, Intradermal, Once, Tip Mera Jr., MD    Review of Systems:  ROS:  Constitutional: no fever or chills  Eyes: no visual changes  ENT: no nasal congestion or sore throat  Respiratory: no cough or shortness of breath  Cardiovascular: no chest pain or palpitations  Gastrointestinal: no nausea or vomiting, tolerating diet  Genitourinary: no hematuria or dysuria  Integument/Breast: no rash or pruritis  Hematologic/Lymphatic: no easy bruising or lymphadenopathy  Musculoskeletal: positive for arthralgias, muscle weakness and falls  Neurological: no seizures or tremors  Behavioral/Psych: no auditory or visual hallucinations  Endocrine: no heat or cold intolerance      RADIOGRAPHS:  X-ray of left hip obtained and personally reviewed by me.  No acute fractures seen.      ASSESSMENT/PLAN:       ICD-10-CM ICD-9-CM   1. Closed fracture of trochanter of left femur with routine healing, subsequent encounter  S72.102D V54.13       Plan: We discussed with the patient at length all the different treatment options available for arthrosis of the hip including anti-inflammatories, acetaminophen, rest, ice, lower extremity strengthening exercise, occasional cortisone injections for temporary relief, and finally total hip arthroplasty.     -Paul Browning presents to clinic today with c/c left trochanter femur fracture non-displaced due to mechanical fall.  Fall occurred mid September.  -X-ray as above.  -Can progress to weight bear as tolerated.  Needs to use walker for ambulation and wheelchair for long distances.  -May continue to use Tylenol PRN and may alternate ice/heat packs.   -Patient to follow up in 3 months, at time repeat x-ray of his left hip.    Future Appointments   Date Time Provider  Department Center   11/30/2020 11:00 AM VASCULAR, CARDIOLOGY [facility] VASCCRD [facility]   12/7/2020  2:30 PM [facility] OIC-XRAY [facility] XRAY [facility]   12/7/2020  3:45 PM Kirsten Weaver MD [facility] NEUROS7 [facility]   12/29/2020 10:30 AM Keyonna Ross NP [facility] Eliu amos PCW   2/15/2021  1:30 PM Kris Multani NP [facility] ORTHO [facility]

## 2020-11-16 ENCOUNTER — TELEPHONE (OUTPATIENT)
Dept: ORTHOPEDICS | Facility: HOSPITAL | Age: 80
End: 2020-11-16

## 2020-11-16 NOTE — TELEPHONE ENCOUNTER
Spoke with therapist, Cooper, advised weight bear as tolerated with walker and need for wheelchair for long distances.    ----- Message from Stefanie Wolff MA sent at 11/16/2020  2:31 PM CST -----  Contact: tru KOROMA    ----- Message -----  From: Sidney Knowles  Sent: 11/16/2020   2:26 PM CST  To: Nerissa MOODY Staff    Cooper is asking for a call in regards to if there is any change for weight baring status for left leg     Contact info- [phone]

## 2020-11-19 ENCOUNTER — DOCUMENT SCAN (OUTPATIENT)
Dept: HOME HEALTH SERVICES | Facility: HOSPITAL | Age: 80
End: 2020-11-19
Payer: MEDICARE

## 2020-11-23 ENCOUNTER — TELEPHONE (OUTPATIENT)
Dept: ORTHOPEDICS | Facility: CLINIC | Age: 80
End: 2020-11-23

## 2020-11-23 ENCOUNTER — DOCUMENT SCAN (OUTPATIENT)
Dept: HOME HEALTH SERVICES | Facility: HOSPITAL | Age: 80
End: 2020-11-23
Payer: MEDICARE

## 2020-11-23 NOTE — TELEPHONE ENCOUNTER
----- Message from Robby Candelaria sent at 11/23/2020 12:34 PM CST -----  Contact: Cooper KOROMA  [facility]  Home Health  PT requesting  continue  home Physical Therapy  verbal call     Please Call     Contact  [phone]

## 2020-11-23 NOTE — TELEPHONE ENCOUNTER
Spoke with Cooper.   Advised per Kris Multani NP, ok to continue home health PT     Cooper verbalized understanding

## 2020-11-24 ENCOUNTER — PATIENT MESSAGE (OUTPATIENT)
Dept: NEUROSURGERY | Facility: CLINIC | Age: 80
End: 2020-11-24

## 2020-11-25 ENCOUNTER — PATIENT MESSAGE (OUTPATIENT)
Dept: NEUROSURGERY | Facility: CLINIC | Age: 80
End: 2020-11-25

## 2020-11-25 DIAGNOSIS — G44.86 CERVICOGENIC HEADACHE: ICD-10-CM

## 2020-11-25 DIAGNOSIS — S12.100D CLOSED ODONTOID FRACTURE WITH ROUTINE HEALING, SUBSEQUENT ENCOUNTER: Primary | ICD-10-CM

## 2020-11-25 RX ORDER — HYDROCODONE BITARTRATE AND ACETAMINOPHEN 5; 325 MG/1; MG/1
1 TABLET ORAL EVERY 6 HOURS PRN
Qty: 50 TABLET | Refills: 0 | Status: SHIPPED | OUTPATIENT
Start: 2020-11-25 | End: 2021-07-21

## 2020-11-27 ENCOUNTER — PATIENT MESSAGE (OUTPATIENT)
Dept: CARDIOLOGY | Facility: CLINIC | Age: 80
End: 2020-11-27

## 2020-11-27 ENCOUNTER — PATIENT MESSAGE (OUTPATIENT)
Dept: NEUROSURGERY | Facility: CLINIC | Age: 80
End: 2020-11-27

## 2020-11-30 ENCOUNTER — DOCUMENT SCAN (OUTPATIENT)
Dept: HOME HEALTH SERVICES | Facility: HOSPITAL | Age: 80
End: 2020-11-30
Payer: MEDICARE

## 2020-12-07 ENCOUNTER — OFFICE VISIT (OUTPATIENT)
Dept: NEUROSURGERY | Facility: CLINIC | Age: 80
End: 2020-12-07
Payer: MEDICARE

## 2020-12-07 ENCOUNTER — HOSPITAL ENCOUNTER (OUTPATIENT)
Dept: RADIOLOGY | Facility: HOSPITAL | Age: 80
Discharge: HOME | End: 2020-12-07
Attending: PHYSICIAN ASSISTANT
Payer: MEDICARE

## 2020-12-07 VITALS — SYSTOLIC BLOOD PRESSURE: 127 MMHG | HEART RATE: 70 BPM | TEMPERATURE: 98 F | DIASTOLIC BLOOD PRESSURE: 60 MMHG

## 2020-12-07 DIAGNOSIS — M47.812 CERVICAL SPONDYLOSIS: Primary | ICD-10-CM

## 2020-12-07 DIAGNOSIS — S12.100D CLOSED ODONTOID FRACTURE WITH ROUTINE HEALING, SUBSEQUENT ENCOUNTER: ICD-10-CM

## 2020-12-07 PROCEDURE — 99024 PR POST-OP FOLLOW-UP VISIT: ICD-10-PCS | Mod: HCNC,S$GLB,, | Performed by: NEUROLOGICAL SURGERY

## 2020-12-07 PROCEDURE — 1100F PTFALLS ASSESS-DOCD GE2>/YR: CPT | Mod: HCNC,CPTII,S$GLB, | Performed by: NEUROLOGICAL SURGERY

## 2020-12-07 PROCEDURE — 99024 POSTOP FOLLOW-UP VISIT: CPT | Mod: HCNC,S$GLB,, | Performed by: NEUROLOGICAL SURGERY

## 2020-12-07 PROCEDURE — 72040 X-RAY EXAM NECK SPINE 2-3 VW: CPT | Mod: TC,HCNC

## 2020-12-07 PROCEDURE — 72040 X-RAY EXAM NECK SPINE 2-3 VW: CPT | Mod: 26,HCNC,, | Performed by: RADIOLOGY

## 2020-12-07 PROCEDURE — 72040 XR CERVICAL SPINE AP LATERAL: ICD-10-PCS | Mod: 26,HCNC,, | Performed by: RADIOLOGY

## 2020-12-07 PROCEDURE — 1126F PR PAIN SEVERITY QUANTIFIED, NO PAIN PRESENT: ICD-10-PCS | Mod: HCNC,S$GLB,, | Performed by: NEUROLOGICAL SURGERY

## 2020-12-07 PROCEDURE — 1100F PR PT FALLS ASSESS DOC 2+ FALLS/FALL W/INJURY/YR: ICD-10-PCS | Mod: HCNC,CPTII,S$GLB, | Performed by: NEUROLOGICAL SURGERY

## 2020-12-07 PROCEDURE — 3288F FALL RISK ASSESSMENT DOCD: CPT | Mod: HCNC,CPTII,S$GLB, | Performed by: NEUROLOGICAL SURGERY

## 2020-12-07 PROCEDURE — 99999 PR PBB SHADOW E&M-EST. PATIENT-LVL IV: CPT | Mod: PBBFAC,HCNC,, | Performed by: NEUROLOGICAL SURGERY

## 2020-12-07 PROCEDURE — 3288F PR FALLS RISK ASSESSMENT DOCUMENTED: ICD-10-PCS | Mod: HCNC,CPTII,S$GLB, | Performed by: NEUROLOGICAL SURGERY

## 2020-12-07 PROCEDURE — 99999 PR PBB SHADOW E&M-EST. PATIENT-LVL IV: ICD-10-PCS | Mod: PBBFAC,HCNC,, | Performed by: NEUROLOGICAL SURGERY

## 2020-12-07 PROCEDURE — 1126F AMNT PAIN NOTED NONE PRSNT: CPT | Mod: HCNC,S$GLB,, | Performed by: NEUROLOGICAL SURGERY

## 2020-12-07 RX ORDER — METOPROLOL SUCCINATE 25 MG/1
TABLET, EXTENDED RELEASE ORAL
COMMUNITY
Start: 2020-10-16 | End: 2021-04-29

## 2020-12-07 NOTE — PROGRESS NOTES
Neurosurgery  Established Patient    SUBJECTIVE:     History of Present Illness:  Mr. Browning is an 80-year-old male who is seeing me today in follow-up.  He was taken to the operating room on October 22, 2020 for an identified screw placement.  Preoperatively, he fell down a flight of stairs.  Upon presentation, he complained of neck pain.  Imaging studies showed a displaced type 2 odontoid fracture.  Given the nature of the fracture the decision was made to take the patient to the operating room for stabilization with an odontoid screw.  He is here today to see me in follow-up.  He denies neck pain.  He denies radiating pain.  He denies weakness.  He does complain of some left jaw pain.  He recently saw his dentist who said that this was not a dental issue.    Review of patient's allergies indicates:   Allergen Reactions    Morphine Shortness Of Breath       Current Outpatient Medications   Medication Sig Dispense Refill    alendronate (FOSAMAX) 70 MG tablet TAKE 1 TABLET BY MOUTH EVERY WEEK IN THE MORNING WITH FULL GLASS OF WATER ON EMPTY STOMACH-DONT LIE DOWN FOR AT LEAST 30 MINUTES AFTERWARDS 12 tablet 3    aspirin (ECOTRIN) 81 MG EC tablet Take 81 mg by mouth once daily.      atorvastatin (LIPITOR) 40 MG tablet TAKE 1 TABLET BY MOUTH EVERY DAY 90 tablet 3    diclofenac sodium (VOLTAREN) 1 % Gel Apply 2 g topically every 6 (six) hours as needed. 1 Tube 6    fluorouracil 5% 5 % Soln Use hs for 2 weeks 10 mL 1    furosemide (LASIX) 20 MG tablet Take 1 tablet (20 mg total) by mouth once daily. 30 tablet 11    HYDROcodone-acetaminophen (NORCO) 5-325 mg per tablet Take 1 tablet by mouth every 6 (six) hours as needed. 50 tablet 0    isosorbide mononitrate (IMDUR) 30 MG 24 hr tablet Take 1 tablet (30 mg total) by mouth once daily. 30 tablet 11    magnesium oxide (MAG-OX) 400 mg (241.3 mg magnesium) tablet Take 1 tablet (400 mg total) by mouth once daily. 30 tablet 3    metoprolol succinate (TOPROL-XL)  25 MG 24 hr tablet       omega-3 fatty acids-vitamin E (FISH OIL) 1,000 mg Cap Take 1 tablet by mouth 2 (two) times daily. (Patient taking differently: Take 1 tablet by mouth once daily. ) 60 each 11    ondansetron (ZOFRAN) 4 MG tablet Take 1 tablet (4 mg total) by mouth every 8 (eight) hours as needed for Nausea. 30 tablet 1    pantoprazole (PROTONIX) 40 MG tablet Take 40 mg by mouth 2 (two) times a day.       pantoprazole (PROTONIX) 40 MG tablet TAKE 1 TABLET BY MOUTH TWICE DAILY 180 tablet 0    riboflavin, vitamin B2, (VITAMIN B-2) 100 mg Tab tablet Take 1 tablet (100 mg total) by mouth once daily. 90 tablet 3    sertraline (ZOLOFT) 50 MG tablet TAKE 1 TABLET BY MOUTH EVERY DAY 90 tablet 3    triamcinolone acetonide 0.1% (KENALOG) 0.1 % cream Apply topically 2 (two) times daily. 45 g 1    albuterol (PROVENTIL) 2.5 mg /3 mL (0.083 %) nebulizer solution Take 3 mLs (2.5 mg total) by nebulization every 6 (six) hours as needed for Wheezing. Rescue (Patient not taking: Reported on 12/7/2020) 1 Box 0     No current facility-administered medications for this visit.      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    Hyperlipidemia     Hypertension     Ischemic cardiomyopathy 11/5/2014    MDD (major depressive disorder), recurrent episode, mild 2/17/2016    Osteoporosis 7/20/2016    Peripheral arterial disease 3/18/2016    Sarcoid     Squamous cell carcinoma 09/2016, 5/2016    crown of  scalp, left wrist     Past Surgical History:   Procedure Laterality Date    CARDIAC SURGERY      CAROTID STENT N/A 10/14/2019    Procedure: INSERTION, STENT, ARTERY, CAROTID;  Surgeon: Artie Kebede MD;  Location: [facility] CATH LAB;  Service: Cardiology;  Laterality: N/A;    CATARACT EXTRACTION W/  INTRAOCULAR LENS IMPLANT Right 07/17/2018    Dr. Talamantes    CATARACT EXTRACTION W/  INTRAOCULAR LENS IMPLANT Left 07/31/2018    Dr. Talamantes    CORONARY ARTERY BYPASS GRAFT      x2  10/2014    ESOPHAGOGASTRODUODENOSCOPY N/A 4/8/2019    Procedure: ESOPHAGOGASTRODUODENOSCOPY (EGD)/poss rfa;  Surgeon: Clifford De La Torre MD;  Location: [facility] ENDO (2ND FLR);  Service: Endoscopy;  Laterality: N/A;    ESOPHAGOGASTRODUODENOSCOPY (EGD) WITH RADIOFREQUENCY TREATMENT OF GASTROESOPHAGEAL JUNCTION      INJECTION OF ANESTHETIC AGENT AROUND MEDIAL BRANCH NERVES INNERVATING CERVICAL FACET JOINT Bilateral 1/9/2020    Procedure: INJECTION, MBB--Bilateral Cervical- Third Occipital nerve, C2-3--ASA okay for pt to continue taking per provider.;  Surgeon: Tip Mera Jr., MD;  Location: [facility] PAIN MGT;  Service: Pain Management;  Laterality: Bilateral;    INTRAOCULAR PROSTHESES INSERTION Right 7/17/2018    Procedure: INSERTION, INTRAOCULAR LENS PROSTHESIS;  Surgeon: León Talamantes MD;  Location: [address];  Service: Ophthalmology;  Laterality: Right;    INTRAOCULAR PROSTHESES INSERTION Left 7/31/2018    Procedure: INSERTION, INTRAOCULAR LENS PROSTHESIS;  Surgeon: León Talamantes MD;  Location: [address];  Service: Ophthalmology;  Laterality: Left;    PHACOEMULSIFICATION OF CATARACT Right 7/17/2018    Procedure: PHACOEMULSIFICATION, CATARACT;  Surgeon: León Talamantes MD;  [facility];  Service: Ophthalmology;  Laterality: Right;    PHACOEMULSIFICATION OF CATARACT Left 7/31/2018    Procedure: PHACOEMULSIFICATION, CATARACT;  Surgeon: León Talamantes MD;  Location: [facility]  OR 1ST FLR;  Service: Ophthalmology;  Laterality: Left;    PLACEMENT OF SCREW IN ODONTOID N/A 10/22/2020    Procedure: INSERTION, SCREW, ODONTOID. Anterior approach.;  Surgeon: Kirsten Weaver MD;  Location: [facility] OR 2ND FLR;  Service: Neurosurgery;  Laterality: N/A;    RADIOFREQUENCY THERMOCOAGULATION Bilateral 2020    Procedure: RADIOFREQUENCY THERMAL COAGULATION--Bilateral C2-3 and Third Occipital Nerve;  Surgeon: Tip Mera Jr., MD;  Location: [facility] PAIN MGT;  Service: Pain Management;  Laterality: Bilateral;    UMBILICAL HERNIA REPAIR       Family History     Problem Relation (Age of Onset)    Arrhythmia Mother    Heart disease Mother    Heart failure Brother    No Known Problems Daughter, Son        Social History     Socioeconomic History    Marital status:      Spouse name: Not on file    Number of children: 4    Years of education: Not on file    Highest education level: Not on file   Occupational History    Occupation:     Social Needs    Financial resource strain: Not on file    Food insecurity     Worry: Not on file     Inability: Not on file    Transportation needs     Medical: Not on file     Non-medical: Not on file   Tobacco Use    Smoking status: Former Smoker     Packs/day: 2.00     Years: 20.00     Pack years: 40.00     Types: Cigarettes     Quit date: 1988     Years since quittin.8    Smokeless tobacco: Never Used   Substance and Sexual Activity    Alcohol use: No     Comment: quit drinking 2012    Drug use: No    Sexual activity: Not Currently     Partners: Female   Lifestyle    Physical activity     Days per week: Not on file     Minutes per session: Not on file    Stress: Not on file   Relationships    Social connections     Talks on phone: Not on file     Gets together: Not on file     Attends Mandaeism service: Not on file     Active member of club or organization: Not on file     Attends meetings of clubs or organizations: Not  on file     Relationship status: Not on file   Other Topics Concern    Not on file   Social History Narrative    Not on file       Review of Systems    OBJECTIVE:     Vital Signs  Temp: 97.7 °F (36.5 °C)  Pulse: 70  BP: 127/60  Pain Score: 0-No pain  Weight: (pt in wheelchair)  There is no height or weight on file to calculate BMI.    Physical Exam:  Vitals reviewed.    Constitutional: He appears well-developed and well-nourished. No distress.     Eyes: Pupils are equal, round, and reactive to light. Conjunctivae and EOM are normal.     Cardiovascular: Normal rate, regular rhythm, normal pulses and no edema.     Abdominal: Soft. Bowel sounds are normal.     Skin: Skin displays no rash on trunk and no rash on extremities. Skin displays no lesions on trunk and no lesions on extremities.     Psych/Behavior: He is alert. He is oriented to person, place, and time. He has a normal mood and affect.     Musculoskeletal: Gait is normal.        Neck: Range of motion is full. There is no tenderness. Muscle strength is 5/5. Tone is normal.        Back: Range of motion is full. There is no tenderness. Muscle strength is 5/5. Tone is normal.        Right Upper Extremities: Range of motion is full. There is no tenderness. Muscle strength is 5/5. Tone is normal.        Left Upper Extremities: Range of motion is full. There is no tenderness. Muscle strength is 5/5. Tone is normal.       Right Lower Extremities: Range of motion is full. There is no tenderness. Muscle strength is 5/5. Tone is normal.        Left Lower Extremities: Range of motion is full. There is no tenderness. Muscle strength is 5/5. Tone is normal.     Neurological:        Coordination: He has a normal Romberg Test, normal finger to nose coordination and normal tandem walking coordination.        Sensory: There is no sensory deficit in the trunk. There is no sensory deficit in the extremities.        DTRs: DTRs are DTRS NORMAL AND SYMMETRICnormal and symmetric.  He displays no Babinski's sign on the right side. He displays no Babinski's sign on the left side.        Cranial nerves: Cranial nerve(s) II, III, IV, V, VI, VII, VIII, IX, X, XI and XII are intact.         Diagnostic Results:  He has an x-ray of the cervical spine available for review which I personally reviewed.  This shows stable placement of the odontoid screw.  The odontoid process has lagged down onto the body of the odontoid.    ASSESSMENT/PLAN:     Mr. Browning is an 80-year-old male with a type 2 odontoid fracture status post odontoid screw placement for stabilization.  He is doing well.  He denies neck pain.  He does complain of left jaw pain.  He saw his dentist and this is not a tooth issue.  Perhaps this is due to the cervical collar placement.  Given the stable appearance of the x-rays, I will take the patient out of the cervical collar at this time.  I have asked the patient's son to let me know in a week if this helps with his jaw pain.  He will continue physical therapy.  I will plan on seeing him back in 3 months with a repeat x-ray of the cervical spine at that time.  He knows he can call with any further questions or concerns in the meantime.        Note dictated with voice recognition software, please excuse any grammatical errors.

## 2020-12-08 ENCOUNTER — IMMUNIZATION (OUTPATIENT)
Dept: PHARMACY | Facility: CLINIC | Age: 80
End: 2020-12-08
Payer: MEDICARE

## 2020-12-09 ENCOUNTER — TELEPHONE (OUTPATIENT)
Dept: NEUROSURGERY | Facility: CLINIC | Age: 80
End: 2020-12-09

## 2020-12-09 NOTE — TELEPHONE ENCOUNTER
----- Message from Nilo Bagley sent at 12/9/2020 11:43 AM CST -----  Contact: Annabelle w [facility] @ [phone]  Caller requesting a return call about any restriction with PT after the cervical collar discharge, pls advise

## 2020-12-11 ENCOUNTER — PATIENT MESSAGE (OUTPATIENT)
Dept: OTHER | Facility: OTHER | Age: 80
End: 2020-12-11

## 2020-12-15 ENCOUNTER — PATIENT MESSAGE (OUTPATIENT)
Dept: OTHER | Facility: OTHER | Age: 80
End: 2020-12-15

## 2020-12-18 ENCOUNTER — TELEPHONE (OUTPATIENT)
Dept: NEUROSURGERY | Facility: CLINIC | Age: 80
End: 2020-12-18

## 2020-12-18 DIAGNOSIS — G89.29 CHRONIC NONINTRACTABLE HEADACHE, UNSPECIFIED HEADACHE TYPE: Primary | ICD-10-CM

## 2020-12-18 DIAGNOSIS — R51.9 CHRONIC NONINTRACTABLE HEADACHE, UNSPECIFIED HEADACHE TYPE: Primary | ICD-10-CM

## 2020-12-18 NOTE — TELEPHONE ENCOUNTER
Spoke with Cooper about patient headaches. Cooper was informed that a referral will be put in for patient to see someone in neurology for his headaches.

## 2020-12-18 NOTE — TELEPHONE ENCOUNTER
----- Message from Camryn Corbett sent at 12/18/2020  3:28 PM CST -----  Contact: Cooper with [facility]  Called to notify the staff that the pt has a complaint of a bad headache today         Please contact oCoper with [facility]: [phone]

## 2020-12-21 ENCOUNTER — TELEPHONE (OUTPATIENT)
Dept: INTERNAL MEDICINE | Facility: CLINIC | Age: 80
End: 2020-12-21

## 2020-12-21 NOTE — TELEPHONE ENCOUNTER
----- Message from Vito Hurst sent at 12/19/2020 10:49 AM CST -----  Regarding: [facility]/ Cooper [phone]  He is requesting to recertify the patient to continue Home Health PT.    Thank you

## 2020-12-21 NOTE — TELEPHONE ENCOUNTER
Spoke to [email or web address] to re-certify for HH PT, will send order via [facility] to sign   Dr Moon

## 2020-12-23 PROCEDURE — G0179 MD RECERTIFICATION HHA PT: HCPCS | Mod: ,,, | Performed by: PHYSICIAN ASSISTANT

## 2020-12-23 PROCEDURE — G0179 PR HOME HEALTH MD RECERTIFICATION: ICD-10-PCS | Mod: ,,, | Performed by: PHYSICIAN ASSISTANT

## 2020-12-24 ENCOUNTER — EXTERNAL HOME HEALTH (OUTPATIENT)
Dept: HOME HEALTH SERVICES | Facility: HOSPITAL | Age: 80
End: 2020-12-24
Payer: MEDICARE

## 2020-12-29 ENCOUNTER — PATIENT MESSAGE (OUTPATIENT)
Dept: INTERNAL MEDICINE | Facility: CLINIC | Age: 80
End: 2020-12-29

## 2020-12-30 RX ORDER — ATORVASTATIN CALCIUM 40 MG/1
40 TABLET, FILM COATED ORAL DAILY
Qty: 90 TABLET | Refills: 3 | Status: SHIPPED | OUTPATIENT
Start: 2020-12-30 | End: 2021-12-28

## 2020-12-30 RX ORDER — SERTRALINE HYDROCHLORIDE 50 MG/1
50 TABLET, FILM COATED ORAL DAILY
Qty: 90 TABLET | Refills: 3 | Status: SHIPPED | OUTPATIENT
Start: 2020-12-30 | End: 2021-07-21

## 2020-12-30 NOTE — TELEPHONE ENCOUNTER
No new care gaps identified.  Powered by [employer]. Reference number: [id number]. 12/30/2020 3:13:48 PM   CST

## 2021-01-04 ENCOUNTER — HOSPITAL ENCOUNTER (OUTPATIENT)
Dept: CARDIOLOGY | Facility: HOSPITAL | Age: 81
Discharge: HOME | End: 2021-01-04
Attending: PHYSICIAN ASSISTANT
Payer: MEDICARE

## 2021-01-04 DIAGNOSIS — I65.21 CAROTID STENOSIS, RIGHT: ICD-10-CM

## 2021-01-04 DIAGNOSIS — I65.29 STENOSIS OF CAROTID ARTERY, UNSPECIFIED LATERALITY: ICD-10-CM

## 2021-01-04 LAB
LEFT ARM DIASTOLIC BLOOD PRESSURE: 60 MMHG
LEFT ARM SYSTOLIC BLOOD PRESSURE: 127 MMHG
LEFT CBA DIAS: 9 CM/S
LEFT CBA SYS: 55 CM/S
LEFT CCA DIST DIAS: 9 CM/S
LEFT CCA DIST SYS: 78 CM/S
LEFT CCA MID DIAS: 9 CM/S
LEFT CCA MID SYS: 73 CM/S
LEFT CCA PROX DIAS: 8 CM/S
LEFT CCA PROX SYS: 72 CM/S
LEFT ECA DIAS: 8 CM/S
LEFT ECA SYS: 119 CM/S
LEFT ICA DIST DIAS: 13 CM/S
LEFT ICA DIST SYS: 58 CM/S
LEFT ICA MID DIAS: 14 CM/S
LEFT ICA MID SYS: 54 CM/S
LEFT ICA PROX DIAS: 10 CM/S
LEFT ICA PROX SYS: 54 CM/S
LEFT VERTEBRAL DIAS: 9 CM/S
LEFT VERTEBRAL SYS: 41 CM/S
OHS CV CAROTID RIGHT ICA EDV HIGHEST: 15
OHS CV CAROTID ULTRASOUND LEFT ICA/CCA RATIO: 0.74
OHS CV CAROTID ULTRASOUND RIGHT ICA/CCA RATIO: 1.64
OHS CV PV CAROTID LEFT HIGHEST CCA: 78
OHS CV PV CAROTID LEFT HIGHEST ICA: 58
OHS CV PV CAROTID RIGHT HIGHEST CCA: 73
OHS CV PV CAROTID RIGHT HIGHEST ICA: 105
OHS CV US CAROTID LEFT HIGHEST EDV: 14
RIGHT ARM DIASTOLIC BLOOD PRESSURE: 56 MMHG
RIGHT ARM SYSTOLIC BLOOD PRESSURE: 117 MMHG
RIGHT CBA DIAS: 21 CM/S
RIGHT CBA SYS: 135 CM/S
RIGHT CCA DIST DIAS: 11 CM/S
RIGHT CCA DIST SYS: 64 CM/S
RIGHT CCA MID DIAS: 12 CM/S
RIGHT CCA MID SYS: 73 CM/S
RIGHT CCA PROX DIAS: 9 CM/S
RIGHT CCA PROX SYS: 54 CM/S
RIGHT ECA DIAS: 6 CM/S
RIGHT ECA SYS: 136 CM/S
RIGHT ICA DIST DIAS: 14 CM/S
RIGHT ICA DIST SYS: 65 CM/S
RIGHT ICA MID DIAS: 11 CM/S
RIGHT ICA MID SYS: 48 CM/S
RIGHT ICA PROX DIAS: 15 CM/S
RIGHT ICA PROX SYS: 105 CM/S
RIGHT VERTEBRAL DIAS: 7 CM/S
RIGHT VERTEBRAL SYS: 26 CM/S

## 2021-01-04 PROCEDURE — 93880 EXTRACRANIAL BILAT STUDY: CPT | Mod: HCNC

## 2021-01-04 PROCEDURE — 93880 EXTRACRANIAL BILAT STUDY: CPT | Mod: 26,HCNC,, | Performed by: INTERNAL MEDICINE

## 2021-01-04 PROCEDURE — 93880 CV US DOPPLER CAROTID (CUPID ONLY): ICD-10-PCS | Mod: 26,HCNC,, | Performed by: INTERNAL MEDICINE

## 2021-01-05 ENCOUNTER — PATIENT MESSAGE (OUTPATIENT)
Dept: NEUROLOGY | Facility: CLINIC | Age: 81
End: 2021-01-05

## 2021-01-05 ENCOUNTER — PATIENT MESSAGE (OUTPATIENT)
Dept: INTERNAL MEDICINE | Facility: CLINIC | Age: 81
End: 2021-01-05

## 2021-01-05 ENCOUNTER — PATIENT MESSAGE (OUTPATIENT)
Dept: NEUROSURGERY | Facility: CLINIC | Age: 81
End: 2021-01-05

## 2021-01-05 DIAGNOSIS — R51.9 INTRACTABLE HEADACHE, UNSPECIFIED CHRONICITY PATTERN, UNSPECIFIED HEADACHE TYPE: Primary | ICD-10-CM

## 2021-01-06 ENCOUNTER — HOSPITAL ENCOUNTER (EMERGENCY)
Facility: HOSPITAL | Age: 81
Discharge: HOME | End: 2021-01-06
Attending: EMERGENCY MEDICINE
Payer: MEDICARE

## 2021-01-06 VITALS
HEIGHT: 67 IN | TEMPERATURE: 98 F | DIASTOLIC BLOOD PRESSURE: 77 MMHG | RESPIRATION RATE: 18 BRPM | OXYGEN SATURATION: 100 % | HEART RATE: 76 BPM | SYSTOLIC BLOOD PRESSURE: 164 MMHG | BODY MASS INDEX: 21.19 KG/M2 | WEIGHT: 135 LBS

## 2021-01-06 DIAGNOSIS — S09.90XA CLOSED HEAD INJURY, INITIAL ENCOUNTER: Primary | ICD-10-CM

## 2021-01-06 DIAGNOSIS — S01.81XA FACIAL LACERATION, INITIAL ENCOUNTER: ICD-10-CM
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
## 2021-01-06 LAB
ALBUMIN SERPL BCP-MCNC: 3.7 G/DL (ref 3.5–5.2)
ALP SERPL-CCNC: 70 U/L (ref 55–135)
ALT SERPL W/O P-5'-P-CCNC: 23 U/L (ref 10–44)
ANION GAP SERPL CALC-SCNC: 10 MMOL/L (ref 8–16)
APTT BLDCRRT: 24.2 SEC (ref 21–32)
AST SERPL-CCNC: 22 U/L (ref 10–40)
BACTERIA #/AREA URNS AUTO: ABNORMAL /HPF
BASOPHILS # BLD AUTO: 0.02 K/UL (ref 0–0.2)
BASOPHILS NFR BLD: 0.4 % (ref 0–1.9)
BILIRUB SERPL-MCNC: 1.5 MG/DL (ref 0.1–1)
BILIRUB UR QL STRIP: NEGATIVE
BUN SERPL-MCNC: 17 MG/DL (ref 8–23)
CALCIUM SERPL-MCNC: 9.2 MG/DL (ref 8.7–10.5)
CHLORIDE SERPL-SCNC: 102 MMOL/L (ref 95–110)
CLARITY UR REFRACT.AUTO: CLEAR
CO2 SERPL-SCNC: 28 MMOL/L (ref 23–29)
COLOR UR AUTO: YELLOW
CREAT SERPL-MCNC: 0.9 MG/DL (ref 0.5–1.4)
DIFFERENTIAL METHOD: ABNORMAL
EOSINOPHIL # BLD AUTO: 0 K/UL (ref 0–0.5)
EOSINOPHIL NFR BLD: 0.6 % (ref 0–8)
ERYTHROCYTE [DISTWIDTH] IN BLOOD BY AUTOMATED COUNT: 13.8 % (ref 11.5–14.5)
EST. GFR  (AFRICAN AMERICAN): >60 ML/MIN/1.73 M^2
EST. GFR  (NON AFRICAN AMERICAN): >60 ML/MIN/1.73 M^2
GLUCOSE SERPL-MCNC: 100 MG/DL (ref 70–110)
GLUCOSE UR QL STRIP: NEGATIVE
HCT VFR BLD AUTO: 38.4 % (ref 40–54)
HGB BLD-MCNC: 12.3 G/DL (ref 14–18)
HGB UR QL STRIP: ABNORMAL
IMM GRANULOCYTES # BLD AUTO: 0.01 K/UL (ref 0–0.04)
IMM GRANULOCYTES NFR BLD AUTO: 0.2 % (ref 0–0.5)
INR PPP: 1 (ref 0.8–1.2)
KETONES UR QL STRIP: NEGATIVE
LEUKOCYTE ESTERASE UR QL STRIP: NEGATIVE
LYMPHOCYTES # BLD AUTO: 1 K/UL (ref 1–4.8)
LYMPHOCYTES NFR BLD: 18.8 % (ref 18–48)
MAGNESIUM SERPL-MCNC: 2.2 MG/DL (ref 1.6–2.6)
MCH RBC QN AUTO: 30.4 PG (ref 27–31)
MCHC RBC AUTO-ENTMCNC: 32 G/DL (ref 32–36)
MCV RBC AUTO: 95 FL (ref 82–98)
MICROSCOPIC COMMENT: ABNORMAL
MONOCYTES # BLD AUTO: 0.5 K/UL (ref 0.3–1)
MONOCYTES NFR BLD: 9.7 % (ref 4–15)
NEUTROPHILS # BLD AUTO: 3.8 K/UL (ref 1.8–7.7)
NEUTROPHILS NFR BLD: 70.3 % (ref 38–73)
NITRITE UR QL STRIP: NEGATIVE
NRBC BLD-RTO: 0 /100 WBC
PH UR STRIP: 8 [PH] (ref 5–8)
PLATELET # BLD AUTO: 230 K/UL (ref 150–350)
PMV BLD AUTO: 10.4 FL (ref 9.2–12.9)
POTASSIUM SERPL-SCNC: 4.4 MMOL/L (ref 3.5–5.1)
PROT SERPL-MCNC: 7 G/DL (ref 6–8.4)
PROT UR QL STRIP: NEGATIVE
PROTHROMBIN TIME: 10.7 SEC (ref 9–12.5)
RBC # BLD AUTO: 4.04 M/UL (ref 4.6–6.2)
RBC #/AREA URNS AUTO: 20 /HPF (ref 0–4)
SODIUM SERPL-SCNC: 140 MMOL/L (ref 136–145)
SP GR UR STRIP: 1.01 (ref 1–1.03)
SQUAMOUS #/AREA URNS AUTO: 0 /HPF
TROPONIN I SERPL DL<=0.01 NG/ML-MCNC: <0.006 NG/ML (ref 0–0.03)
TSH SERPL DL<=0.005 MIU/L-ACNC: 1.32 UIU/ML (ref 0.4–4)
URN SPEC COLLECT METH UR: ABNORMAL
WBC # BLD AUTO: 5.36 K/UL (ref 3.9–12.7)

## 2021-01-06 PROCEDURE — 84484 ASSAY OF TROPONIN QUANT: CPT | Mod: HCNC

## 2021-01-06 PROCEDURE — 99284 PR EMERGENCY DEPT VISIT,LEVEL IV: ICD-10-PCS | Mod: 25,HCNC,, | Performed by: PHYSICIAN ASSISTANT

## 2021-01-06 PROCEDURE — 93010 EKG 12-LEAD: ICD-10-PCS | Mod: HCNC,,, | Performed by: INTERNAL MEDICINE

## 2021-01-06 PROCEDURE — 12011 RPR F/E/E/N/L/M 2.5 CM/<: CPT | Mod: ,,, | Performed by: PHYSICIAN ASSISTANT

## 2021-01-06 PROCEDURE — 99285 EMERGENCY DEPT VISIT HI MDM: CPT | Mod: 25,HCNC

## 2021-01-06 PROCEDURE — 99284 EMERGENCY DEPT VISIT MOD MDM: CPT | Mod: 25,HCNC,, | Performed by: PHYSICIAN ASSISTANT

## 2021-01-06 PROCEDURE — 84443 ASSAY THYROID STIM HORMONE: CPT | Mod: HCNC

## 2021-01-06 PROCEDURE — 93010 ELECTROCARDIOGRAM REPORT: CPT | Mod: HCNC,,, | Performed by: INTERNAL MEDICINE

## 2021-01-06 PROCEDURE — 12011 PR RESUPERF WND FACE <2.5 CM: ICD-10-PCS | Mod: ,,, | Performed by: PHYSICIAN ASSISTANT

## 2021-01-06 PROCEDURE — 85610 PROTHROMBIN TIME: CPT | Mod: HCNC

## 2021-01-06 PROCEDURE — 85025 COMPLETE CBC W/AUTO DIFF WBC: CPT | Mod: HCNC

## 2021-01-06 PROCEDURE — 93005 ELECTROCARDIOGRAM TRACING: CPT | Mod: HCNC

## 2021-01-06 PROCEDURE — 83735 ASSAY OF MAGNESIUM: CPT | Mod: HCNC

## 2021-01-06 PROCEDURE — 85730 THROMBOPLASTIN TIME PARTIAL: CPT | Mod: HCNC

## 2021-01-06 PROCEDURE — 80053 COMPREHEN METABOLIC PANEL: CPT | Mod: HCNC

## 2021-01-06 PROCEDURE — 81001 URINALYSIS AUTO W/SCOPE: CPT | Mod: HCNC

## 2021-01-07 ENCOUNTER — TELEPHONE (OUTPATIENT)
Dept: ORTHOPEDICS | Facility: CLINIC | Age: 81
End: 2021-01-07

## 2021-01-08 ENCOUNTER — PATIENT MESSAGE (OUTPATIENT)
Dept: INTERNAL MEDICINE | Facility: CLINIC | Age: 81
End: 2021-01-08

## 2021-01-08 ENCOUNTER — CLINICAL SUPPORT (OUTPATIENT)
Dept: REHABILITATION | Facility: HOSPITAL | Age: 81
End: 2021-01-08
Payer: MEDICARE

## 2021-01-08 ENCOUNTER — DOCUMENT SCAN (OUTPATIENT)
Dept: HOME HEALTH SERVICES | Facility: HOSPITAL | Age: 81
End: 2021-01-08
Payer: MEDICARE

## 2021-01-08 ENCOUNTER — TELEPHONE (OUTPATIENT)
Dept: NEUROSURGERY | Facility: CLINIC | Age: 81
End: 2021-01-08

## 2021-01-08 ENCOUNTER — PATIENT MESSAGE (OUTPATIENT)
Dept: NEUROLOGY | Facility: CLINIC | Age: 81
End: 2021-01-08

## 2021-01-08 DIAGNOSIS — R51.9 INTRACTABLE HEADACHE, UNSPECIFIED CHRONICITY PATTERN, UNSPECIFIED HEADACHE TYPE: ICD-10-CM

## 2021-01-08 PROCEDURE — 97162 PT EVAL MOD COMPLEX 30 MIN: CPT | Mod: HCNC,PO

## 2021-01-10 ENCOUNTER — IMMUNIZATION (OUTPATIENT)
Dept: INTERNAL MEDICINE | Facility: CLINIC | Age: 81
End: 2021-01-10
Payer: MEDICARE

## 2021-01-10 DIAGNOSIS — Z23 NEED FOR VACCINATION: ICD-10-CM

## 2021-01-10 PROCEDURE — 91300 COVID-19, MRNA, LNP-S, PF, 30 MCG/0.3 ML DOSE VACCINE: CPT | Mod: PBBFAC | Performed by: FAMILY MEDICINE

## 2021-01-13 ENCOUNTER — CLINICAL SUPPORT (OUTPATIENT)
Dept: REHABILITATION | Facility: HOSPITAL | Age: 81
End: 2021-01-13
Payer: MEDICARE

## 2021-01-13 DIAGNOSIS — M43.6 NECK STIFFNESS: ICD-10-CM

## 2021-01-13 DIAGNOSIS — R29.898 DECREASED ROM OF NECK: ICD-10-CM

## 2021-01-13 DIAGNOSIS — M99.01 CERVICAL SEGMENT DYSFUNCTION: Primary | ICD-10-CM

## 2021-01-13 DIAGNOSIS — R52 AWAKENS FROM SLEEP DUE TO PAIN: ICD-10-CM

## 2021-01-13 DIAGNOSIS — R25.9 DYSFUNCTIONAL MOVEMENTS: ICD-10-CM

## 2021-01-13 DIAGNOSIS — R29.898 TIGHTNESS OF NECK: ICD-10-CM

## 2021-01-13 DIAGNOSIS — M62.89 ABNORMAL INCREASED MUSCLE TIGHTNESS: ICD-10-CM

## 2021-01-13 PROCEDURE — 97164 PT RE-EVAL EST PLAN CARE: CPT | Mod: HCNC,PO | Performed by: PHYSICAL THERAPIST

## 2021-01-13 PROCEDURE — 97140 MANUAL THERAPY 1/> REGIONS: CPT | Mod: HCNC,PO | Performed by: PHYSICAL THERAPIST

## 2021-01-19 ENCOUNTER — CLINICAL SUPPORT (OUTPATIENT)
Dept: REHABILITATION | Facility: HOSPITAL | Age: 81
End: 2021-01-19
Payer: MEDICARE

## 2021-01-19 DIAGNOSIS — M99.01 CERVICAL SEGMENT DYSFUNCTION: Primary | ICD-10-CM

## 2021-01-19 DIAGNOSIS — M43.6 NECK STIFFNESS: ICD-10-CM

## 2021-01-19 DIAGNOSIS — R29.898 DECREASED ROM OF NECK: ICD-10-CM

## 2021-01-19 DIAGNOSIS — R25.9 DYSFUNCTIONAL MOVEMENTS: ICD-10-CM

## 2021-01-19 DIAGNOSIS — R29.898 TIGHTNESS OF NECK: ICD-10-CM

## 2021-01-19 PROCEDURE — 97110 THERAPEUTIC EXERCISES: CPT | Mod: HCNC,PO | Performed by: PHYSICAL THERAPIST

## 2021-01-22 ENCOUNTER — CLINICAL SUPPORT (OUTPATIENT)
Dept: REHABILITATION | Facility: HOSPITAL | Age: 81
End: 2021-01-22
Payer: MEDICARE

## 2021-01-22 DIAGNOSIS — S12.100D CLOSED ODONTOID FRACTURE WITH ROUTINE HEALING, SUBSEQUENT ENCOUNTER: Primary | ICD-10-CM

## 2021-01-22 DIAGNOSIS — G44.86 CERVICOGENIC HEADACHE: ICD-10-CM

## 2021-01-22 DIAGNOSIS — R25.9 DYSFUNCTIONAL MOVEMENTS: ICD-10-CM

## 2021-01-22 PROCEDURE — 97140 MANUAL THERAPY 1/> REGIONS: CPT | Mod: HCNC,PO

## 2021-01-26 ENCOUNTER — OFFICE VISIT (OUTPATIENT)
Dept: NEUROLOGY | Facility: CLINIC | Age: 81
End: 2021-01-26
Payer: MEDICARE

## 2021-01-26 VITALS — WEIGHT: 134.94 LBS | HEIGHT: 67 IN | BODY MASS INDEX: 21.18 KG/M2

## 2021-01-26 DIAGNOSIS — G89.29 CHRONIC INTRACTABLE HEADACHE, UNSPECIFIED HEADACHE TYPE: Primary | ICD-10-CM

## 2021-01-26 DIAGNOSIS — G44.40 MEDICATION OVERUSE HEADACHE: ICD-10-CM

## 2021-01-26 DIAGNOSIS — R51.9 CHRONIC INTRACTABLE HEADACHE, UNSPECIFIED HEADACHE TYPE: Primary | ICD-10-CM

## 2021-01-26 PROCEDURE — 99215 PR OFFICE/OUTPT VISIT, EST, LEVL V, 40-54 MIN: ICD-10-PCS | Mod: HCNC,S$GLB,, | Performed by: PHYSICIAN ASSISTANT

## 2021-01-26 PROCEDURE — 3288F FALL RISK ASSESSMENT DOCD: CPT | Mod: HCNC,CPTII,S$GLB, | Performed by: PHYSICIAN ASSISTANT

## 2021-01-26 PROCEDURE — 1100F PTFALLS ASSESS-DOCD GE2>/YR: CPT | Mod: HCNC,CPTII,S$GLB, | Performed by: PHYSICIAN ASSISTANT

## 2021-01-26 PROCEDURE — 3288F PR FALLS RISK ASSESSMENT DOCUMENTED: ICD-10-PCS | Mod: HCNC,CPTII,S$GLB, | Performed by: PHYSICIAN ASSISTANT

## 2021-01-26 PROCEDURE — 1100F PR PT FALLS ASSESS DOC 2+ FALLS/FALL W/INJURY/YR: ICD-10-PCS | Mod: HCNC,CPTII,S$GLB, | Performed by: PHYSICIAN ASSISTANT

## 2021-01-26 PROCEDURE — 1159F PR MEDICATION LIST DOCUMENTED IN MEDICAL RECORD: ICD-10-PCS | Mod: HCNC,S$GLB,, | Performed by: PHYSICIAN ASSISTANT

## 2021-01-26 PROCEDURE — 99999 PR PBB SHADOW E&M-EST. PATIENT-LVL II: CPT | Mod: PBBFAC,HCNC,, | Performed by: PHYSICIAN ASSISTANT

## 2021-01-26 PROCEDURE — 99215 OFFICE O/P EST HI 40 MIN: CPT | Mod: HCNC,S$GLB,, | Performed by: PHYSICIAN ASSISTANT

## 2021-01-26 PROCEDURE — 1159F MED LIST DOCD IN RCRD: CPT | Mod: HCNC,S$GLB,, | Performed by: PHYSICIAN ASSISTANT

## 2021-01-26 PROCEDURE — 99999 PR PBB SHADOW E&M-EST. PATIENT-LVL II: ICD-10-PCS | Mod: PBBFAC,HCNC,, | Performed by: PHYSICIAN ASSISTANT

## 2021-01-27 ENCOUNTER — PATIENT MESSAGE (OUTPATIENT)
Dept: NEUROLOGY | Facility: CLINIC | Age: 81
End: 2021-01-27

## 2021-01-27 ENCOUNTER — CLINICAL SUPPORT (OUTPATIENT)
Dept: REHABILITATION | Facility: HOSPITAL | Age: 81
End: 2021-01-27
Payer: MEDICARE

## 2021-01-27 DIAGNOSIS — M54.2 CHRONIC NECK PAIN: Primary | ICD-10-CM

## 2021-01-27 DIAGNOSIS — M62.89 ABNORMAL INCREASED MUSCLE TIGHTNESS: ICD-10-CM

## 2021-01-27 DIAGNOSIS — R26.81 GAIT INSTABILITY: ICD-10-CM

## 2021-01-27 DIAGNOSIS — G89.29 CHRONIC NECK PAIN: Primary | ICD-10-CM

## 2021-01-27 DIAGNOSIS — R29.898 DECREASED ROM OF NECK: ICD-10-CM

## 2021-01-27 DIAGNOSIS — M43.6 NECK STIFFNESS: ICD-10-CM

## 2021-01-27 DIAGNOSIS — R29.898 TIGHTNESS OF NECK: ICD-10-CM

## 2021-01-27 DIAGNOSIS — M99.01 CERVICAL SEGMENT DYSFUNCTION: ICD-10-CM

## 2021-01-27 PROCEDURE — 97140 MANUAL THERAPY 1/> REGIONS: CPT | Mod: HCNC,PO

## 2021-01-27 PROCEDURE — 97110 THERAPEUTIC EXERCISES: CPT | Mod: HCNC,PO

## 2021-01-27 PROCEDURE — 97112 NEUROMUSCULAR REEDUCATION: CPT | Mod: HCNC,PO

## 2021-01-29 ENCOUNTER — CLINICAL SUPPORT (OUTPATIENT)
Dept: REHABILITATION | Facility: HOSPITAL | Age: 81
End: 2021-01-29
Attending: INTERNAL MEDICINE
Payer: MEDICARE

## 2021-01-29 DIAGNOSIS — R26.81 GAIT INSTABILITY: ICD-10-CM

## 2021-01-29 PROCEDURE — 97112 NEUROMUSCULAR REEDUCATION: CPT | Mod: HCNC,PO,CQ

## 2021-01-31 ENCOUNTER — IMMUNIZATION (OUTPATIENT)
Dept: INTERNAL MEDICINE | Facility: CLINIC | Age: 81
End: 2021-01-31
Payer: MEDICARE

## 2021-01-31 DIAGNOSIS — Z23 NEED FOR VACCINATION: Primary | ICD-10-CM

## 2021-01-31 PROCEDURE — 91300 COVID-19, MRNA, LNP-S, PF, 30 MCG/0.3 ML DOSE VACCINE: CPT | Mod: PBBFAC | Performed by: FAMILY MEDICINE

## 2021-01-31 PROCEDURE — 0002A COVID-19, MRNA, LNP-S, PF, 30 MCG/0.3 ML DOSE VACCINE: CPT | Mod: PBBFAC | Performed by: FAMILY MEDICINE

## 2021-02-03 ENCOUNTER — CLINICAL SUPPORT (OUTPATIENT)
Dept: REHABILITATION | Facility: HOSPITAL | Age: 81
End: 2021-02-03
Payer: MEDICARE

## 2021-02-03 DIAGNOSIS — R25.9 DYSFUNCTIONAL MOVEMENTS: ICD-10-CM

## 2021-02-03 DIAGNOSIS — R29.898 DECREASED ROM OF NECK: ICD-10-CM

## 2021-02-03 DIAGNOSIS — M99.01 CERVICAL SEGMENT DYSFUNCTION: ICD-10-CM

## 2021-02-03 DIAGNOSIS — M43.6 NECK STIFFNESS: ICD-10-CM

## 2021-02-03 DIAGNOSIS — R29.898 TIGHTNESS OF NECK: Primary | ICD-10-CM

## 2021-02-03 DIAGNOSIS — R26.81 GAIT INSTABILITY: ICD-10-CM

## 2021-02-03 PROCEDURE — 97110 THERAPEUTIC EXERCISES: CPT | Mod: HCNC,PO | Performed by: PHYSICAL THERAPIST

## 2021-02-03 PROCEDURE — 97112 NEUROMUSCULAR REEDUCATION: CPT | Mod: HCNC,PO | Performed by: PHYSICAL THERAPIST

## 2021-02-03 PROCEDURE — 97140 MANUAL THERAPY 1/> REGIONS: CPT | Mod: HCNC,PO | Performed by: PHYSICAL THERAPIST

## 2021-02-05 ENCOUNTER — CLINICAL SUPPORT (OUTPATIENT)
Dept: REHABILITATION | Facility: HOSPITAL | Age: 81
End: 2021-02-05
Payer: MEDICARE

## 2021-02-05 DIAGNOSIS — R25.9 DYSFUNCTIONAL MOVEMENTS: ICD-10-CM

## 2021-02-05 DIAGNOSIS — R26.81 GAIT INSTABILITY: ICD-10-CM

## 2021-02-05 DIAGNOSIS — R29.898 DECREASED ROM OF NECK: ICD-10-CM

## 2021-02-05 DIAGNOSIS — M43.6 NECK STIFFNESS: Primary | ICD-10-CM

## 2021-02-05 DIAGNOSIS — R29.898 TIGHTNESS OF NECK: ICD-10-CM

## 2021-02-05 DIAGNOSIS — M99.01 CERVICAL SEGMENT DYSFUNCTION: ICD-10-CM

## 2021-02-05 PROCEDURE — 97110 THERAPEUTIC EXERCISES: CPT | Mod: HCNC,PO | Performed by: PHYSICAL THERAPIST

## 2021-02-05 PROCEDURE — 97112 NEUROMUSCULAR REEDUCATION: CPT | Mod: HCNC,PO | Performed by: PHYSICAL THERAPIST

## 2021-02-05 PROCEDURE — 97140 MANUAL THERAPY 1/> REGIONS: CPT | Mod: HCNC,PO | Performed by: PHYSICAL THERAPIST

## 2021-02-08 ENCOUNTER — CLINICAL SUPPORT (OUTPATIENT)
Dept: REHABILITATION | Facility: HOSPITAL | Age: 81
End: 2021-02-08
Payer: MEDICARE

## 2021-02-08 ENCOUNTER — PATIENT MESSAGE (OUTPATIENT)
Dept: NEUROLOGY | Facility: CLINIC | Age: 81
End: 2021-02-08

## 2021-02-08 DIAGNOSIS — M54.2 CHRONIC NECK PAIN: ICD-10-CM

## 2021-02-08 DIAGNOSIS — M62.89 ABNORMAL INCREASED MUSCLE TIGHTNESS: ICD-10-CM

## 2021-02-08 DIAGNOSIS — R26.81 GAIT INSTABILITY: ICD-10-CM

## 2021-02-08 DIAGNOSIS — M99.01 CERVICAL SEGMENT DYSFUNCTION: ICD-10-CM

## 2021-02-08 DIAGNOSIS — R29.898 DECREASED ROM OF NECK: ICD-10-CM

## 2021-02-08 DIAGNOSIS — G44.86 CERVICOGENIC HEADACHE: Primary | ICD-10-CM

## 2021-02-08 DIAGNOSIS — G89.29 CHRONIC NECK PAIN: ICD-10-CM

## 2021-02-08 DIAGNOSIS — M50.30 DDD (DEGENERATIVE DISC DISEASE), CERVICAL: ICD-10-CM

## 2021-02-08 DIAGNOSIS — S12.100D CLOSED ODONTOID FRACTURE WITH ROUTINE HEALING, SUBSEQUENT ENCOUNTER: ICD-10-CM

## 2021-02-08 DIAGNOSIS — M47.812 CERVICAL SPONDYLOSIS: ICD-10-CM

## 2021-02-08 DIAGNOSIS — M43.6 NECK STIFFNESS: ICD-10-CM

## 2021-02-08 DIAGNOSIS — R29.898 TIGHTNESS OF NECK: ICD-10-CM

## 2021-02-08 PROCEDURE — 97110 THERAPEUTIC EXERCISES: CPT | Mod: PO

## 2021-02-08 PROCEDURE — 97140 MANUAL THERAPY 1/> REGIONS: CPT | Mod: PO

## 2021-02-12 ENCOUNTER — CLINICAL SUPPORT (OUTPATIENT)
Dept: REHABILITATION | Facility: HOSPITAL | Age: 81
End: 2021-02-12
Payer: MEDICARE

## 2021-02-12 DIAGNOSIS — R29.898 DECREASED ROM OF NECK: ICD-10-CM

## 2021-02-12 DIAGNOSIS — R29.898 TIGHTNESS OF NECK: ICD-10-CM

## 2021-02-12 DIAGNOSIS — R25.9 DYSFUNCTIONAL MOVEMENTS: ICD-10-CM

## 2021-02-12 DIAGNOSIS — M43.6 NECK STIFFNESS: Primary | ICD-10-CM

## 2021-02-12 DIAGNOSIS — M99.01 CERVICAL SEGMENT DYSFUNCTION: ICD-10-CM

## 2021-02-12 PROCEDURE — 97140 MANUAL THERAPY 1/> REGIONS: CPT | Mod: PO | Performed by: PHYSICAL THERAPIST

## 2021-02-12 PROCEDURE — 97110 THERAPEUTIC EXERCISES: CPT | Mod: PO | Performed by: PHYSICAL THERAPIST

## 2021-02-17 ENCOUNTER — PATIENT MESSAGE (OUTPATIENT)
Dept: ORTHOPEDICS | Facility: CLINIC | Age: 81
End: 2021-02-17

## 2021-02-19 ENCOUNTER — CLINICAL SUPPORT (OUTPATIENT)
Dept: REHABILITATION | Facility: HOSPITAL | Age: 81
End: 2021-02-19
Attending: INTERNAL MEDICINE
Payer: MEDICARE

## 2021-02-19 DIAGNOSIS — R29.898 DECREASED ROM OF NECK: ICD-10-CM

## 2021-02-19 DIAGNOSIS — M43.6 NECK STIFFNESS: ICD-10-CM

## 2021-02-19 DIAGNOSIS — M99.01 CERVICAL SEGMENT DYSFUNCTION: ICD-10-CM

## 2021-02-19 DIAGNOSIS — R29.898 TIGHTNESS OF NECK: ICD-10-CM

## 2021-02-19 DIAGNOSIS — R25.9 DYSFUNCTIONAL MOVEMENTS: Primary | ICD-10-CM

## 2021-02-19 PROCEDURE — 97110 THERAPEUTIC EXERCISES: CPT | Mod: PO | Performed by: PHYSICAL THERAPIST

## 2021-02-23 ENCOUNTER — OFFICE VISIT (OUTPATIENT)
Dept: NEUROLOGY | Facility: CLINIC | Age: 81
End: 2021-02-23
Payer: MEDICARE

## 2021-02-23 VITALS
BODY MASS INDEX: 21.03 KG/M2 | WEIGHT: 134 LBS | HEART RATE: 81 BPM | SYSTOLIC BLOOD PRESSURE: 133 MMHG | DIASTOLIC BLOOD PRESSURE: 80 MMHG | HEIGHT: 67 IN

## 2021-02-23 DIAGNOSIS — R51.9 CHRONIC INTRACTABLE HEADACHE, UNSPECIFIED HEADACHE TYPE: ICD-10-CM

## 2021-02-23 DIAGNOSIS — G89.29 CHRONIC INTRACTABLE HEADACHE, UNSPECIFIED HEADACHE TYPE: ICD-10-CM

## 2021-02-23 PROCEDURE — 1159F PR MEDICATION LIST DOCUMENTED IN MEDICAL RECORD: ICD-10-PCS | Mod: S$GLB,,, | Performed by: PHYSICIAN ASSISTANT

## 2021-02-23 PROCEDURE — 99499 NO LOS: ICD-10-PCS | Mod: S$GLB,,, | Performed by: PHYSICIAN ASSISTANT

## 2021-02-23 PROCEDURE — 1126F AMNT PAIN NOTED NONE PRSNT: CPT | Mod: S$GLB,,, | Performed by: PHYSICIAN ASSISTANT

## 2021-02-23 PROCEDURE — 1126F PR PAIN SEVERITY QUANTIFIED, NO PAIN PRESENT: ICD-10-PCS | Mod: S$GLB,,, | Performed by: PHYSICIAN ASSISTANT

## 2021-02-23 PROCEDURE — 99999 PR PBB SHADOW E&M-EST. PATIENT-LVL III: CPT | Mod: PBBFAC,,, | Performed by: PHYSICIAN ASSISTANT

## 2021-02-23 PROCEDURE — 64405 PR NERVE BLOCK INJ, ANES/STEROID, OCCIPITAL: ICD-10-PCS | Mod: 50,51,S$GLB, | Performed by: PHYSICIAN ASSISTANT

## 2021-02-23 PROCEDURE — 3079F PR MOST RECENT DIASTOLIC BLOOD PRESSURE 80-89 MM HG: ICD-10-PCS | Mod: CPTII,S$GLB,, | Performed by: PHYSICIAN ASSISTANT

## 2021-02-23 PROCEDURE — 64450 NJX AA&/STRD OTHER PN/BRANCH: CPT | Mod: 50,S$GLB,, | Performed by: PHYSICIAN ASSISTANT

## 2021-02-23 PROCEDURE — 99499 UNLISTED E&M SERVICE: CPT | Mod: S$GLB,,, | Performed by: PHYSICIAN ASSISTANT

## 2021-02-23 PROCEDURE — 99999 PR PBB SHADOW E&M-EST. PATIENT-LVL III: ICD-10-PCS | Mod: PBBFAC,,, | Performed by: PHYSICIAN ASSISTANT

## 2021-02-23 PROCEDURE — 3079F DIAST BP 80-89 MM HG: CPT | Mod: CPTII,S$GLB,, | Performed by: PHYSICIAN ASSISTANT

## 2021-02-23 PROCEDURE — 1101F PR PT FALLS ASSESS DOC 0-1 FALLS W/OUT INJ PAST YR: ICD-10-PCS | Mod: CPTII,S$GLB,, | Performed by: PHYSICIAN ASSISTANT

## 2021-02-23 PROCEDURE — 3288F FALL RISK ASSESSMENT DOCD: CPT | Mod: CPTII,S$GLB,, | Performed by: PHYSICIAN ASSISTANT

## 2021-02-23 PROCEDURE — 3075F PR MOST RECENT SYSTOLIC BLOOD PRESS GE 130-139MM HG: ICD-10-PCS | Mod: CPTII,S$GLB,, | Performed by: PHYSICIAN ASSISTANT

## 2021-02-23 PROCEDURE — 64450 PR NERVE BLOCK INJ, ANES/STEROID, OTHER PERIPHERAL: ICD-10-PCS | Mod: 50,S$GLB,, | Performed by: PHYSICIAN ASSISTANT

## 2021-02-23 PROCEDURE — 64405 NJX AA&/STRD GR OCPL NRV: CPT | Mod: 50,51,S$GLB, | Performed by: PHYSICIAN ASSISTANT

## 2021-02-23 PROCEDURE — 1101F PT FALLS ASSESS-DOCD LE1/YR: CPT | Mod: CPTII,S$GLB,, | Performed by: PHYSICIAN ASSISTANT

## 2021-02-23 PROCEDURE — 3288F PR FALLS RISK ASSESSMENT DOCUMENTED: ICD-10-PCS | Mod: CPTII,S$GLB,, | Performed by: PHYSICIAN ASSISTANT

## 2021-02-23 PROCEDURE — 3075F SYST BP GE 130 - 139MM HG: CPT | Mod: CPTII,S$GLB,, | Performed by: PHYSICIAN ASSISTANT

## 2021-02-23 PROCEDURE — 1159F MED LIST DOCD IN RCRD: CPT | Mod: S$GLB,,, | Performed by: PHYSICIAN ASSISTANT

## 2021-02-23 RX ORDER — METHYLPREDNISOLONE ACETATE 80 MG/ML
80 INJECTION, SUSPENSION INTRA-ARTICULAR; INTRALESIONAL; INTRAMUSCULAR; SOFT TISSUE
Status: COMPLETED | OUTPATIENT
Start: 2021-02-23 | End: 2021-02-23

## 2021-02-23 RX ORDER — LIDOCAINE HYDROCHLORIDE 20 MG/ML
6 INJECTION, SOLUTION EPIDURAL; INFILTRATION; INTRACAUDAL; PERINEURAL
Status: COMPLETED | OUTPATIENT
Start: 2021-02-23 | End: 2021-02-23

## 2021-02-23 RX ADMIN — METHYLPREDNISOLONE ACETATE 80 MG: 80 INJECTION, SUSPENSION INTRA-ARTICULAR; INTRALESIONAL; INTRAMUSCULAR; SOFT TISSUE at 04:02

## 2021-02-23 RX ADMIN — LIDOCAINE HYDROCHLORIDE 120 MG: 20 INJECTION, SOLUTION EPIDURAL; INFILTRATION; INTRACAUDAL; PERINEURAL at 04:02

## 2021-02-26 ENCOUNTER — CLINICAL SUPPORT (OUTPATIENT)
Dept: REHABILITATION | Facility: HOSPITAL | Age: 81
End: 2021-02-26
Attending: INTERNAL MEDICINE
Payer: MEDICARE

## 2021-02-26 ENCOUNTER — OFFICE VISIT (OUTPATIENT)
Dept: DERMATOLOGY | Facility: CLINIC | Age: 81
End: 2021-02-26
Payer: MEDICARE

## 2021-02-26 VITALS — BODY MASS INDEX: 20.99 KG/M2 | WEIGHT: 134 LBS

## 2021-02-26 DIAGNOSIS — R29.898 TIGHTNESS OF NECK: ICD-10-CM

## 2021-02-26 DIAGNOSIS — L81.4 LENTIGINES: ICD-10-CM

## 2021-02-26 DIAGNOSIS — L82.1 SEBORRHEIC KERATOSES: ICD-10-CM

## 2021-02-26 DIAGNOSIS — Z85.828 HISTORY OF SKIN CANCER: ICD-10-CM

## 2021-02-26 DIAGNOSIS — L57.0 MULTIPLE ACTINIC KERATOSES: ICD-10-CM

## 2021-02-26 DIAGNOSIS — R29.898 DECREASED ROM OF NECK: ICD-10-CM

## 2021-02-26 DIAGNOSIS — M43.6 NECK STIFFNESS: Primary | ICD-10-CM

## 2021-02-26 DIAGNOSIS — D48.5 NEOPLASM OF UNCERTAIN BEHAVIOR OF SKIN: Primary | ICD-10-CM

## 2021-02-26 DIAGNOSIS — Z12.83 SKIN EXAM, SCREENING FOR CANCER: ICD-10-CM

## 2021-02-26 DIAGNOSIS — R25.9 DYSFUNCTIONAL MOVEMENTS: ICD-10-CM

## 2021-02-26 DIAGNOSIS — M99.01 CERVICAL SEGMENT DYSFUNCTION: ICD-10-CM

## 2021-02-26 PROCEDURE — 88305 TISSUE EXAM BY PATHOLOGIST: CPT | Mod: 26,,, | Performed by: PATHOLOGY

## 2021-02-26 PROCEDURE — 88342 CHG IMMUNOCYTOCHEMISTRY: ICD-10-PCS | Mod: 26,,, | Performed by: PATHOLOGY

## 2021-02-26 PROCEDURE — 97110 THERAPEUTIC EXERCISES: CPT | Mod: PO | Performed by: PHYSICAL THERAPIST

## 2021-02-26 PROCEDURE — 88342 IMHCHEM/IMCYTCHM 1ST ANTB: CPT | Mod: 26,,, | Performed by: PATHOLOGY

## 2021-02-26 PROCEDURE — 17003 DESTRUCTION, PREMALIGNANT LESIONS; SECOND THROUGH 14 LESIONS: ICD-10-PCS | Mod: S$GLB,,, | Performed by: DERMATOLOGY

## 2021-02-26 PROCEDURE — 17000 PR DESTRUCTION(LASER SURGERY,CRYOSURGERY,CHEMOSURGERY),PREMALIGNANT LESIONS,FIRST LESION: ICD-10-PCS | Mod: 59,S$GLB,, | Performed by: DERMATOLOGY

## 2021-02-26 PROCEDURE — 97140 MANUAL THERAPY 1/> REGIONS: CPT | Mod: PO | Performed by: PHYSICAL THERAPIST

## 2021-02-26 PROCEDURE — 1126F PR PAIN SEVERITY QUANTIFIED, NO PAIN PRESENT: ICD-10-PCS | Mod: S$GLB,,, | Performed by: DERMATOLOGY

## 2021-02-26 PROCEDURE — 99999 PR PBB SHADOW E&M-EST. PATIENT-LVL III: ICD-10-PCS | Mod: PBBFAC,,, | Performed by: DERMATOLOGY

## 2021-02-26 PROCEDURE — 11102 PR TANGENTIAL BIOPSY, SKIN, SINGLE LESION: ICD-10-PCS | Mod: S$GLB,,, | Performed by: DERMATOLOGY

## 2021-02-26 PROCEDURE — 99213 OFFICE O/P EST LOW 20 MIN: CPT | Mod: 25,S$GLB,, | Performed by: DERMATOLOGY

## 2021-02-26 PROCEDURE — 88342 IMHCHEM/IMCYTCHM 1ST ANTB: CPT | Performed by: PATHOLOGY

## 2021-02-26 PROCEDURE — 1126F AMNT PAIN NOTED NONE PRSNT: CPT | Mod: S$GLB,,, | Performed by: DERMATOLOGY

## 2021-02-26 PROCEDURE — 17000 DESTRUCT PREMALG LESION: CPT | Mod: 59,S$GLB,, | Performed by: DERMATOLOGY

## 2021-02-26 PROCEDURE — 99999 PR PBB SHADOW E&M-EST. PATIENT-LVL III: CPT | Mod: PBBFAC,,, | Performed by: DERMATOLOGY

## 2021-02-26 PROCEDURE — 1159F PR MEDICATION LIST DOCUMENTED IN MEDICAL RECORD: ICD-10-PCS | Mod: S$GLB,,, | Performed by: DERMATOLOGY

## 2021-02-26 PROCEDURE — 88305 TISSUE EXAM BY PATHOLOGIST: CPT | Performed by: PATHOLOGY

## 2021-02-26 PROCEDURE — 17003 DESTRUCT PREMALG LES 2-14: CPT | Mod: S$GLB,,, | Performed by: DERMATOLOGY

## 2021-02-26 PROCEDURE — 88305 TISSUE EXAM BY PATHOLOGIST: ICD-10-PCS | Mod: 26,,, | Performed by: PATHOLOGY

## 2021-02-26 PROCEDURE — 1159F MED LIST DOCD IN RCRD: CPT | Mod: S$GLB,,, | Performed by: DERMATOLOGY

## 2021-02-26 PROCEDURE — 11102 TANGNTL BX SKIN SINGLE LES: CPT | Mod: S$GLB,,, | Performed by: DERMATOLOGY

## 2021-02-26 PROCEDURE — 99213 PR OFFICE/OUTPT VISIT, EST, LEVL III, 20-29 MIN: ICD-10-PCS | Mod: 25,S$GLB,, | Performed by: DERMATOLOGY

## 2021-02-27 ENCOUNTER — PATIENT MESSAGE (OUTPATIENT)
Dept: REHABILITATION | Facility: HOSPITAL | Age: 81
End: 2021-02-27

## 2021-03-04 LAB
FINAL PATHOLOGIC DIAGNOSIS: NORMAL
GROSS: NORMAL
MICROSCOPIC EXAM: NORMAL

## 2021-03-05 ENCOUNTER — TELEPHONE (OUTPATIENT)
Dept: ADMINISTRATIVE | Facility: CLINIC | Age: 81
End: 2021-03-05

## 2021-03-05 ENCOUNTER — PATIENT MESSAGE (OUTPATIENT)
Dept: REHABILITATION | Facility: HOSPITAL | Age: 81
End: 2021-03-05

## 2021-03-08 ENCOUNTER — HOSPITAL ENCOUNTER (OUTPATIENT)
Dept: RADIOLOGY | Facility: HOSPITAL | Age: 81
Discharge: HOME | End: 2021-03-08
Attending: NURSE PRACTITIONER
Payer: MEDICARE

## 2021-03-08 ENCOUNTER — OFFICE VISIT (OUTPATIENT)
Dept: NEUROSURGERY | Facility: CLINIC | Age: 81
End: 2021-03-08
Payer: MEDICARE

## 2021-03-08 ENCOUNTER — HOSPITAL ENCOUNTER (OUTPATIENT)
Dept: RADIOLOGY | Facility: HOSPITAL | Age: 81
Discharge: HOME | End: 2021-03-08
Attending: NEUROLOGICAL SURGERY
Payer: MEDICARE

## 2021-03-08 ENCOUNTER — OFFICE VISIT (OUTPATIENT)
Dept: ORTHOPEDICS | Facility: CLINIC | Age: 81
End: 2021-03-08
Payer: MEDICARE

## 2021-03-08 VITALS
WEIGHT: 147 LBS | TEMPERATURE: 98 F | HEART RATE: 88 BPM | SYSTOLIC BLOOD PRESSURE: 115 MMHG | HEIGHT: 67 IN | BODY MASS INDEX: 23.07 KG/M2 | DIASTOLIC BLOOD PRESSURE: 74 MMHG

## 2021-03-08 DIAGNOSIS — M47.812 CERVICAL SPONDYLOSIS: ICD-10-CM

## 2021-03-08 DIAGNOSIS — M47.812 CERVICAL SPONDYLOSIS: Primary | ICD-10-CM

## 2021-03-08 DIAGNOSIS — S12.100D CLOSED ODONTOID FRACTURE WITH ROUTINE HEALING, SUBSEQUENT ENCOUNTER: ICD-10-CM

## 2021-03-08 DIAGNOSIS — S72.102D CLOSED FRACTURE OF TROCHANTER OF LEFT FEMUR WITH ROUTINE HEALING, SUBSEQUENT ENCOUNTER: Primary | ICD-10-CM

## 2021-03-08 DIAGNOSIS — S72.102D CLOSED FRACTURE OF TROCHANTER OF LEFT FEMUR WITH ROUTINE HEALING, SUBSEQUENT ENCOUNTER: ICD-10-CM

## 2021-03-08 DIAGNOSIS — G44.86 CERVICOGENIC HEADACHE: ICD-10-CM

## 2021-03-08 DIAGNOSIS — M50.30 DDD (DEGENERATIVE DISC DISEASE), CERVICAL: ICD-10-CM

## 2021-03-08 PROCEDURE — 1126F PR PAIN SEVERITY QUANTIFIED, NO PAIN PRESENT: ICD-10-PCS | Mod: S$GLB,,, | Performed by: NEUROLOGICAL SURGERY

## 2021-03-08 PROCEDURE — 99213 PR OFFICE/OUTPT VISIT, EST, LEVL III, 20-29 MIN: ICD-10-PCS | Mod: S$GLB,,, | Performed by: NURSE PRACTITIONER

## 2021-03-08 PROCEDURE — 99213 OFFICE O/P EST LOW 20 MIN: CPT | Mod: S$GLB,,, | Performed by: NEUROLOGICAL SURGERY

## 2021-03-08 PROCEDURE — 3074F SYST BP LT 130 MM HG: CPT | Mod: CPTII,S$GLB,, | Performed by: NEUROLOGICAL SURGERY

## 2021-03-08 PROCEDURE — 73502 X-RAY EXAM HIP UNI 2-3 VIEWS: CPT | Mod: 26,LT,, | Performed by: RADIOLOGY

## 2021-03-08 PROCEDURE — 72050 XR CERVICAL SPINE COMPLETE 5 VIEW: ICD-10-PCS | Mod: 26,,, | Performed by: RADIOLOGY

## 2021-03-08 PROCEDURE — 3078F PR MOST RECENT DIASTOLIC BLOOD PRESSURE < 80 MM HG: ICD-10-PCS | Mod: CPTII,S$GLB,, | Performed by: NEUROLOGICAL SURGERY

## 2021-03-08 PROCEDURE — 1159F PR MEDICATION LIST DOCUMENTED IN MEDICAL RECORD: ICD-10-PCS | Mod: S$GLB,,, | Performed by: NURSE PRACTITIONER

## 2021-03-08 PROCEDURE — 99999 PR PBB SHADOW E&M-EST. PATIENT-LVL III: CPT | Mod: PBBFAC,,, | Performed by: NURSE PRACTITIONER

## 2021-03-08 PROCEDURE — 73502 X-RAY EXAM HIP UNI 2-3 VIEWS: CPT | Mod: TC,LT

## 2021-03-08 PROCEDURE — 99999 PR PBB SHADOW E&M-EST. PATIENT-LVL IV: CPT | Mod: PBBFAC,,, | Performed by: NEUROLOGICAL SURGERY

## 2021-03-08 PROCEDURE — 3074F SYST BP LT 130 MM HG: CPT | Mod: CPTII,S$GLB,, | Performed by: NURSE PRACTITIONER

## 2021-03-08 PROCEDURE — 99499 RISK ADDL DX/OHS AUDIT: ICD-10-PCS | Mod: S$GLB,,, | Performed by: NURSE PRACTITIONER

## 2021-03-08 PROCEDURE — 3078F PR MOST RECENT DIASTOLIC BLOOD PRESSURE < 80 MM HG: ICD-10-PCS | Mod: CPTII,S$GLB,, | Performed by: NURSE PRACTITIONER

## 2021-03-08 PROCEDURE — 1100F PR PT FALLS ASSESS DOC 2+ FALLS/FALL W/INJURY/YR: ICD-10-PCS | Mod: CPTII,S$GLB,, | Performed by: NEUROLOGICAL SURGERY

## 2021-03-08 PROCEDURE — 1159F MED LIST DOCD IN RCRD: CPT | Mod: S$GLB,,, | Performed by: NURSE PRACTITIONER

## 2021-03-08 PROCEDURE — 3078F DIAST BP <80 MM HG: CPT | Mod: CPTII,S$GLB,, | Performed by: NEUROLOGICAL SURGERY

## 2021-03-08 PROCEDURE — 73502 XR HIP 2 VIEW LEFT: ICD-10-PCS | Mod: 26,LT,, | Performed by: RADIOLOGY

## 2021-03-08 PROCEDURE — 3074F PR MOST RECENT SYSTOLIC BLOOD PRESSURE < 130 MM HG: ICD-10-PCS | Mod: CPTII,S$GLB,, | Performed by: NEUROLOGICAL SURGERY

## 2021-03-08 PROCEDURE — 3288F PR FALLS RISK ASSESSMENT DOCUMENTED: ICD-10-PCS | Mod: CPTII,S$GLB,, | Performed by: NEUROLOGICAL SURGERY

## 2021-03-08 PROCEDURE — 3288F FALL RISK ASSESSMENT DOCD: CPT | Mod: CPTII,S$GLB,, | Performed by: NEUROLOGICAL SURGERY

## 2021-03-08 PROCEDURE — 1159F MED LIST DOCD IN RCRD: CPT | Mod: S$GLB,,, | Performed by: NEUROLOGICAL SURGERY

## 2021-03-08 PROCEDURE — 99499 UNLISTED E&M SERVICE: CPT | Mod: S$GLB,,, | Performed by: NURSE PRACTITIONER

## 2021-03-08 PROCEDURE — 1159F PR MEDICATION LIST DOCUMENTED IN MEDICAL RECORD: ICD-10-PCS | Mod: S$GLB,,, | Performed by: NEUROLOGICAL SURGERY

## 2021-03-08 PROCEDURE — 3074F PR MOST RECENT SYSTOLIC BLOOD PRESSURE < 130 MM HG: ICD-10-PCS | Mod: CPTII,S$GLB,, | Performed by: NURSE PRACTITIONER

## 2021-03-08 PROCEDURE — 99999 PR PBB SHADOW E&M-EST. PATIENT-LVL IV: ICD-10-PCS | Mod: PBBFAC,,, | Performed by: NEUROLOGICAL SURGERY

## 2021-03-08 PROCEDURE — 99999 PR PBB SHADOW E&M-EST. PATIENT-LVL III: ICD-10-PCS | Mod: PBBFAC,,, | Performed by: NURSE PRACTITIONER

## 2021-03-08 PROCEDURE — 1100F PTFALLS ASSESS-DOCD GE2>/YR: CPT | Mod: CPTII,S$GLB,, | Performed by: NEUROLOGICAL SURGERY

## 2021-03-08 PROCEDURE — 99213 OFFICE O/P EST LOW 20 MIN: CPT | Mod: S$GLB,,, | Performed by: NURSE PRACTITIONER

## 2021-03-08 PROCEDURE — 72050 X-RAY EXAM NECK SPINE 4/5VWS: CPT | Mod: TC

## 2021-03-08 PROCEDURE — 3078F DIAST BP <80 MM HG: CPT | Mod: CPTII,S$GLB,, | Performed by: NURSE PRACTITIONER

## 2021-03-08 PROCEDURE — 99213 PR OFFICE/OUTPT VISIT, EST, LEVL III, 20-29 MIN: ICD-10-PCS | Mod: S$GLB,,, | Performed by: NEUROLOGICAL SURGERY

## 2021-03-08 PROCEDURE — 72050 X-RAY EXAM NECK SPINE 4/5VWS: CPT | Mod: 26,,, | Performed by: RADIOLOGY

## 2021-03-08 PROCEDURE — 1126F AMNT PAIN NOTED NONE PRSNT: CPT | Mod: S$GLB,,, | Performed by: NEUROLOGICAL SURGERY

## 2021-03-11 ENCOUNTER — HOSPITAL ENCOUNTER (OUTPATIENT)
Dept: RADIOLOGY | Facility: HOSPITAL | Age: 81
Discharge: HOME | End: 2021-03-11
Attending: INTERNAL MEDICINE
Payer: MEDICARE

## 2021-03-11 ENCOUNTER — TELEPHONE (OUTPATIENT)
Dept: INTERNAL MEDICINE | Facility: CLINIC | Age: 81
End: 2021-03-11

## 2021-03-11 ENCOUNTER — OFFICE VISIT (OUTPATIENT)
Dept: INTERNAL MEDICINE | Facility: CLINIC | Age: 81
End: 2021-03-11
Payer: MEDICARE

## 2021-03-11 VITALS
HEIGHT: 69 IN | OXYGEN SATURATION: 97 % | HEART RATE: 75 BPM | BODY MASS INDEX: 22.04 KG/M2 | SYSTOLIC BLOOD PRESSURE: 128 MMHG | DIASTOLIC BLOOD PRESSURE: 78 MMHG | TEMPERATURE: 98 F | WEIGHT: 148.81 LBS

## 2021-03-11 DIAGNOSIS — K21.9 GASTROESOPHAGEAL REFLUX DISEASE, UNSPECIFIED WHETHER ESOPHAGITIS PRESENT: ICD-10-CM

## 2021-03-11 DIAGNOSIS — J44.9 CHRONIC OBSTRUCTIVE PULMONARY DISEASE, UNSPECIFIED COPD TYPE: ICD-10-CM

## 2021-03-11 DIAGNOSIS — I70.0 ATHEROSCLEROSIS OF AORTA: ICD-10-CM

## 2021-03-11 DIAGNOSIS — D69.2 SENILE PURPURA: ICD-10-CM

## 2021-03-11 DIAGNOSIS — I65.29 STENOSIS OF CAROTID ARTERY, UNSPECIFIED LATERALITY: ICD-10-CM

## 2021-03-11 DIAGNOSIS — G31.9 DEGENERATIVE DISEASE OF NERVOUS SYSTEM, UNSPECIFIED: ICD-10-CM

## 2021-03-11 DIAGNOSIS — E78.5 HYPERLIPIDEMIA, UNSPECIFIED HYPERLIPIDEMIA TYPE: Chronic | ICD-10-CM

## 2021-03-11 DIAGNOSIS — I10 ESSENTIAL HYPERTENSION: ICD-10-CM

## 2021-03-11 DIAGNOSIS — F33.0 MDD (MAJOR DEPRESSIVE DISORDER), RECURRENT EPISODE, MILD: ICD-10-CM

## 2021-03-11 DIAGNOSIS — I73.9 PERIPHERAL ARTERIAL DISEASE: ICD-10-CM

## 2021-03-11 DIAGNOSIS — G47.00 INSOMNIA, UNSPECIFIED TYPE: Chronic | ICD-10-CM

## 2021-03-11 DIAGNOSIS — K22.710 BARRETT'S ESOPHAGUS WITH LOW GRADE DYSPLASIA: Chronic | ICD-10-CM

## 2021-03-11 DIAGNOSIS — I25.10 CORONARY ARTERY DISEASE INVOLVING NATIVE CORONARY ARTERY OF NATIVE HEART WITHOUT ANGINA PECTORIS: Primary | Chronic | ICD-10-CM

## 2021-03-11 DIAGNOSIS — D86.0 PULMONARY SARCOIDOSIS: ICD-10-CM

## 2021-03-11 PROCEDURE — 99499 UNLISTED E&M SERVICE: CPT | Mod: S$GLB,,, | Performed by: INTERNAL MEDICINE

## 2021-03-11 PROCEDURE — 1125F PR PAIN SEVERITY QUANTIFIED, PAIN PRESENT: ICD-10-PCS | Mod: S$GLB,,, | Performed by: INTERNAL MEDICINE

## 2021-03-11 PROCEDURE — 99214 PR OFFICE/OUTPT VISIT, EST, LEVL IV, 30-39 MIN: ICD-10-PCS | Mod: S$GLB,,, | Performed by: INTERNAL MEDICINE

## 2021-03-11 PROCEDURE — 1101F PR PT FALLS ASSESS DOC 0-1 FALLS W/OUT INJ PAST YR: ICD-10-PCS | Mod: CPTII,S$GLB,, | Performed by: INTERNAL MEDICINE

## 2021-03-11 PROCEDURE — 3074F SYST BP LT 130 MM HG: CPT | Mod: CPTII,S$GLB,, | Performed by: INTERNAL MEDICINE

## 2021-03-11 PROCEDURE — 3078F PR MOST RECENT DIASTOLIC BLOOD PRESSURE < 80 MM HG: ICD-10-PCS | Mod: CPTII,S$GLB,, | Performed by: INTERNAL MEDICINE

## 2021-03-11 PROCEDURE — 71046 XR CHEST PA AND LATERAL: ICD-10-PCS | Mod: 26,,, | Performed by: RADIOLOGY

## 2021-03-11 PROCEDURE — 71046 X-RAY EXAM CHEST 2 VIEWS: CPT | Mod: TC,PO

## 2021-03-11 PROCEDURE — 99499 RISK ADDL DX/OHS AUDIT: ICD-10-PCS | Mod: S$GLB,,, | Performed by: INTERNAL MEDICINE

## 2021-03-11 PROCEDURE — 99999 PR PBB SHADOW E&M-EST. PATIENT-LVL IV: CPT | Mod: PBBFAC,,, | Performed by: INTERNAL MEDICINE

## 2021-03-11 PROCEDURE — 3288F PR FALLS RISK ASSESSMENT DOCUMENTED: ICD-10-PCS | Mod: CPTII,S$GLB,, | Performed by: INTERNAL MEDICINE

## 2021-03-11 PROCEDURE — 99999 PR PBB SHADOW E&M-EST. PATIENT-LVL IV: ICD-10-PCS | Mod: PBBFAC,,, | Performed by: INTERNAL MEDICINE

## 2021-03-11 PROCEDURE — 3078F DIAST BP <80 MM HG: CPT | Mod: CPTII,S$GLB,, | Performed by: INTERNAL MEDICINE

## 2021-03-11 PROCEDURE — 3074F PR MOST RECENT SYSTOLIC BLOOD PRESSURE < 130 MM HG: ICD-10-PCS | Mod: CPTII,S$GLB,, | Performed by: INTERNAL MEDICINE

## 2021-03-11 PROCEDURE — 1159F MED LIST DOCD IN RCRD: CPT | Mod: S$GLB,,, | Performed by: INTERNAL MEDICINE

## 2021-03-11 PROCEDURE — 3288F FALL RISK ASSESSMENT DOCD: CPT | Mod: CPTII,S$GLB,, | Performed by: INTERNAL MEDICINE

## 2021-03-11 PROCEDURE — 1125F AMNT PAIN NOTED PAIN PRSNT: CPT | Mod: S$GLB,,, | Performed by: INTERNAL MEDICINE

## 2021-03-11 PROCEDURE — 99214 OFFICE O/P EST MOD 30 MIN: CPT | Mod: S$GLB,,, | Performed by: INTERNAL MEDICINE

## 2021-03-11 PROCEDURE — 1101F PT FALLS ASSESS-DOCD LE1/YR: CPT | Mod: CPTII,S$GLB,, | Performed by: INTERNAL MEDICINE

## 2021-03-11 PROCEDURE — 1159F PR MEDICATION LIST DOCUMENTED IN MEDICAL RECORD: ICD-10-PCS | Mod: S$GLB,,, | Performed by: INTERNAL MEDICINE

## 2021-03-11 PROCEDURE — 71046 X-RAY EXAM CHEST 2 VIEWS: CPT | Mod: 26,,, | Performed by: RADIOLOGY

## 2021-03-11 RX ORDER — ALBUTEROL SULFATE 90 UG/1
2 AEROSOL, METERED RESPIRATORY (INHALATION) EVERY 6 HOURS PRN
Qty: 18 G | Refills: 5 | Status: SHIPPED | OUTPATIENT
Start: 2021-03-11 | End: 2022-01-01

## 2021-03-11 RX ORDER — METHYLPREDNISOLONE 4 MG/1
TABLET ORAL
Qty: 1 PACKAGE | Refills: 0 | Status: SHIPPED | OUTPATIENT
Start: 2021-03-11 | End: 2021-04-28

## 2021-03-11 RX ORDER — FLUTICASONE FUROATE AND VILANTEROL 100; 25 UG/1; UG/1
1 POWDER RESPIRATORY (INHALATION) DAILY
Qty: 60 EACH | Refills: 5 | Status: SHIPPED | OUTPATIENT
Start: 2021-03-11 | End: 2021-05-03 | Stop reason: ALTCHOICE

## 2021-03-17 ENCOUNTER — CLINICAL SUPPORT (OUTPATIENT)
Dept: REHABILITATION | Facility: HOSPITAL | Age: 81
End: 2021-03-17
Payer: MEDICARE

## 2021-03-17 DIAGNOSIS — M47.812 CERVICAL SPONDYLOSIS: ICD-10-CM

## 2021-03-17 DIAGNOSIS — M50.30 DDD (DEGENERATIVE DISC DISEASE), CERVICAL: ICD-10-CM

## 2021-03-17 DIAGNOSIS — G44.86 CERVICOGENIC HEADACHE: ICD-10-CM

## 2021-03-17 PROCEDURE — 97110 THERAPEUTIC EXERCISES: CPT | Mod: PO | Performed by: PHYSICAL THERAPIST

## 2021-03-17 PROCEDURE — 97140 MANUAL THERAPY 1/> REGIONS: CPT | Mod: PO | Performed by: PHYSICAL THERAPIST

## 2021-03-19 ENCOUNTER — CLINICAL SUPPORT (OUTPATIENT)
Dept: REHABILITATION | Facility: HOSPITAL | Age: 81
End: 2021-03-19
Payer: MEDICARE

## 2021-03-19 DIAGNOSIS — R26.81 GAIT INSTABILITY: ICD-10-CM

## 2021-03-19 PROCEDURE — 97110 THERAPEUTIC EXERCISES: CPT | Mod: PO | Performed by: PHYSICAL THERAPIST

## 2021-03-19 PROCEDURE — 97140 MANUAL THERAPY 1/> REGIONS: CPT | Mod: PO | Performed by: PHYSICAL THERAPIST

## 2021-03-22 PROBLEM — G31.9 DEGENERATIVE DISEASE OF NERVOUS SYSTEM, UNSPECIFIED: Status: ACTIVE | Noted: 2021-03-22

## 2021-03-24 ENCOUNTER — CLINICAL SUPPORT (OUTPATIENT)
Dept: REHABILITATION | Facility: HOSPITAL | Age: 81
End: 2021-03-24
Payer: MEDICARE

## 2021-03-24 DIAGNOSIS — M62.89 ABNORMAL INCREASED MUSCLE TIGHTNESS: ICD-10-CM

## 2021-03-24 DIAGNOSIS — R26.81 GAIT INSTABILITY: ICD-10-CM

## 2021-03-24 DIAGNOSIS — R29.898 DECREASED ROM OF NECK: ICD-10-CM

## 2021-03-24 DIAGNOSIS — M99.01 CERVICAL SEGMENT DYSFUNCTION: ICD-10-CM

## 2021-03-24 DIAGNOSIS — R29.898 TIGHTNESS OF NECK: ICD-10-CM

## 2021-03-24 DIAGNOSIS — G89.29 CHRONIC NECK PAIN: ICD-10-CM

## 2021-03-24 DIAGNOSIS — M43.6 NECK STIFFNESS: Primary | ICD-10-CM

## 2021-03-24 DIAGNOSIS — M54.2 CHRONIC NECK PAIN: ICD-10-CM

## 2021-03-24 PROCEDURE — 97140 MANUAL THERAPY 1/> REGIONS: CPT | Mod: PO

## 2021-03-24 PROCEDURE — 97110 THERAPEUTIC EXERCISES: CPT | Mod: PO

## 2021-03-28 ENCOUNTER — PATIENT MESSAGE (OUTPATIENT)
Dept: INTERNAL MEDICINE | Facility: CLINIC | Age: 81
End: 2021-03-28

## 2021-03-28 DIAGNOSIS — J44.9 CHRONIC OBSTRUCTIVE PULMONARY DISEASE, UNSPECIFIED COPD TYPE: Primary | ICD-10-CM

## 2021-03-29 ENCOUNTER — PATIENT MESSAGE (OUTPATIENT)
Dept: INTERNAL MEDICINE | Facility: CLINIC | Age: 81
End: 2021-03-29

## 2021-03-31 ENCOUNTER — PATIENT MESSAGE (OUTPATIENT)
Dept: REHABILITATION | Facility: HOSPITAL | Age: 81
End: 2021-03-31

## 2021-03-31 ENCOUNTER — PATIENT MESSAGE (OUTPATIENT)
Dept: NEUROLOGY | Facility: CLINIC | Age: 81
End: 2021-03-31

## 2021-03-31 ENCOUNTER — CLINICAL SUPPORT (OUTPATIENT)
Dept: REHABILITATION | Facility: HOSPITAL | Age: 81
End: 2021-03-31
Payer: MEDICARE

## 2021-03-31 DIAGNOSIS — R26.89 BALANCE PROBLEM: Primary | ICD-10-CM

## 2021-03-31 DIAGNOSIS — R26.81 GAIT INSTABILITY: ICD-10-CM

## 2021-03-31 PROCEDURE — 97110 THERAPEUTIC EXERCISES: CPT | Mod: PO

## 2021-04-16 ENCOUNTER — TELEPHONE (OUTPATIENT)
Dept: ADMINISTRATIVE | Facility: CLINIC | Age: 81
End: 2021-04-16

## 2021-04-19 ENCOUNTER — CLINICAL SUPPORT (OUTPATIENT)
Dept: REHABILITATION | Facility: HOSPITAL | Age: 81
End: 2021-04-19
Payer: MEDICARE

## 2021-04-19 DIAGNOSIS — Z74.09 IMPAIRED FUNCTIONAL MOBILITY, BALANCE, GAIT, AND ENDURANCE: ICD-10-CM

## 2021-04-19 DIAGNOSIS — R26.89 BALANCE PROBLEM: ICD-10-CM

## 2021-04-19 PROCEDURE — 97162 PT EVAL MOD COMPLEX 30 MIN: CPT | Mod: PO

## 2021-04-28 ENCOUNTER — OFFICE VISIT (OUTPATIENT)
Dept: CARDIOLOGY | Facility: CLINIC | Age: 81
End: 2021-04-28
Payer: MEDICARE

## 2021-04-28 ENCOUNTER — PATIENT MESSAGE (OUTPATIENT)
Dept: CARDIOLOGY | Facility: CLINIC | Age: 81
End: 2021-04-28

## 2021-04-28 VITALS
WEIGHT: 152.56 LBS | BODY MASS INDEX: 22.6 KG/M2 | HEIGHT: 69 IN | SYSTOLIC BLOOD PRESSURE: 117 MMHG | HEART RATE: 79 BPM | OXYGEN SATURATION: 97 % | DIASTOLIC BLOOD PRESSURE: 64 MMHG

## 2021-04-28 DIAGNOSIS — I65.29 STENOSIS OF CAROTID ARTERY, UNSPECIFIED LATERALITY: ICD-10-CM

## 2021-04-28 DIAGNOSIS — I70.0 ATHEROSCLEROSIS OF AORTA: ICD-10-CM

## 2021-04-28 DIAGNOSIS — E78.00 PURE HYPERCHOLESTEROLEMIA: Chronic | ICD-10-CM

## 2021-04-28 DIAGNOSIS — J43.9 PULMONARY EMPHYSEMA, UNSPECIFIED EMPHYSEMA TYPE: ICD-10-CM

## 2021-04-28 DIAGNOSIS — I73.9 PERIPHERAL ARTERIAL DISEASE: ICD-10-CM

## 2021-04-28 DIAGNOSIS — D86.0 PULMONARY SARCOIDOSIS: ICD-10-CM

## 2021-04-28 DIAGNOSIS — R06.02 SOB (SHORTNESS OF BREATH): ICD-10-CM

## 2021-04-28 DIAGNOSIS — Z86.79 HISTORY OF THIRD DEGREE HEART BLOCK: ICD-10-CM

## 2021-04-28 DIAGNOSIS — I10 ESSENTIAL HYPERTENSION: ICD-10-CM

## 2021-04-28 DIAGNOSIS — I25.10 CORONARY ARTERY DISEASE INVOLVING NATIVE CORONARY ARTERY OF NATIVE HEART WITHOUT ANGINA PECTORIS: Primary | Chronic | ICD-10-CM

## 2021-04-28 PROCEDURE — 99499 RISK ADDL DX/OHS AUDIT: ICD-10-PCS | Mod: S$GLB,,, | Performed by: INTERNAL MEDICINE

## 2021-04-28 PROCEDURE — 99214 PR OFFICE/OUTPT VISIT, EST, LEVL IV, 30-39 MIN: ICD-10-PCS | Mod: S$GLB,,, | Performed by: INTERNAL MEDICINE

## 2021-04-28 PROCEDURE — 99999 PR PBB SHADOW E&M-EST. PATIENT-LVL III: CPT | Mod: PBBFAC,,, | Performed by: INTERNAL MEDICINE

## 2021-04-28 PROCEDURE — 99214 OFFICE O/P EST MOD 30 MIN: CPT | Mod: S$GLB,,, | Performed by: INTERNAL MEDICINE

## 2021-04-28 PROCEDURE — 1159F MED LIST DOCD IN RCRD: CPT | Mod: S$GLB,,, | Performed by: INTERNAL MEDICINE

## 2021-04-28 PROCEDURE — 99999 PR PBB SHADOW E&M-EST. PATIENT-LVL III: ICD-10-PCS | Mod: PBBFAC,,, | Performed by: INTERNAL MEDICINE

## 2021-04-28 PROCEDURE — 1126F PR PAIN SEVERITY QUANTIFIED, NO PAIN PRESENT: ICD-10-PCS | Mod: S$GLB,,, | Performed by: INTERNAL MEDICINE

## 2021-04-28 PROCEDURE — 1159F PR MEDICATION LIST DOCUMENTED IN MEDICAL RECORD: ICD-10-PCS | Mod: S$GLB,,, | Performed by: INTERNAL MEDICINE

## 2021-04-28 PROCEDURE — 99499 UNLISTED E&M SERVICE: CPT | Mod: S$GLB,,, | Performed by: INTERNAL MEDICINE

## 2021-04-28 PROCEDURE — 1126F AMNT PAIN NOTED NONE PRSNT: CPT | Mod: S$GLB,,, | Performed by: INTERNAL MEDICINE

## 2021-04-29 ENCOUNTER — PATIENT MESSAGE (OUTPATIENT)
Dept: CARDIOLOGY | Facility: CLINIC | Age: 81
End: 2021-04-29

## 2021-04-30 ENCOUNTER — CLINICAL SUPPORT (OUTPATIENT)
Dept: REHABILITATION | Facility: HOSPITAL | Age: 81
End: 2021-04-30
Payer: MEDICARE

## 2021-04-30 DIAGNOSIS — Z74.09 IMPAIRED FUNCTIONAL MOBILITY, BALANCE, GAIT, AND ENDURANCE: ICD-10-CM

## 2021-04-30 PROCEDURE — 97110 THERAPEUTIC EXERCISES: CPT | Mod: PO

## 2021-04-30 PROCEDURE — 97112 NEUROMUSCULAR REEDUCATION: CPT | Mod: PO

## 2021-05-03 ENCOUNTER — OFFICE VISIT (OUTPATIENT)
Dept: PULMONOLOGY | Facility: CLINIC | Age: 81
End: 2021-05-03
Payer: MEDICARE

## 2021-05-03 ENCOUNTER — PATIENT MESSAGE (OUTPATIENT)
Dept: CARDIOLOGY | Facility: CLINIC | Age: 81
End: 2021-05-03

## 2021-05-03 VITALS
BODY MASS INDEX: 22.66 KG/M2 | HEIGHT: 69 IN | WEIGHT: 153 LBS | DIASTOLIC BLOOD PRESSURE: 70 MMHG | OXYGEN SATURATION: 98 % | SYSTOLIC BLOOD PRESSURE: 120 MMHG | HEART RATE: 67 BPM

## 2021-05-03 DIAGNOSIS — J44.9 CHRONIC OBSTRUCTIVE PULMONARY DISEASE, UNSPECIFIED COPD TYPE: ICD-10-CM

## 2021-05-03 PROCEDURE — 99999 PR PBB SHADOW E&M-EST. PATIENT-LVL IV: CPT | Mod: PBBFAC,,, | Performed by: NURSE PRACTITIONER

## 2021-05-03 PROCEDURE — 1126F AMNT PAIN NOTED NONE PRSNT: CPT | Mod: S$GLB,,, | Performed by: NURSE PRACTITIONER

## 2021-05-03 PROCEDURE — 3288F PR FALLS RISK ASSESSMENT DOCUMENTED: ICD-10-PCS | Mod: CPTII,S$GLB,, | Performed by: NURSE PRACTITIONER

## 2021-05-03 PROCEDURE — 99213 OFFICE O/P EST LOW 20 MIN: CPT | Mod: S$GLB,,, | Performed by: NURSE PRACTITIONER

## 2021-05-03 PROCEDURE — 1101F PT FALLS ASSESS-DOCD LE1/YR: CPT | Mod: CPTII,S$GLB,, | Performed by: NURSE PRACTITIONER

## 2021-05-03 PROCEDURE — 3288F FALL RISK ASSESSMENT DOCD: CPT | Mod: CPTII,S$GLB,, | Performed by: NURSE PRACTITIONER

## 2021-05-03 PROCEDURE — 1101F PR PT FALLS ASSESS DOC 0-1 FALLS W/OUT INJ PAST YR: ICD-10-PCS | Mod: CPTII,S$GLB,, | Performed by: NURSE PRACTITIONER

## 2021-05-03 PROCEDURE — 99999 PR PBB SHADOW E&M-EST. PATIENT-LVL IV: ICD-10-PCS | Mod: PBBFAC,,, | Performed by: NURSE PRACTITIONER

## 2021-05-03 PROCEDURE — 99213 PR OFFICE/OUTPT VISIT, EST, LEVL III, 20-29 MIN: ICD-10-PCS | Mod: S$GLB,,, | Performed by: NURSE PRACTITIONER

## 2021-05-03 PROCEDURE — 1126F PR PAIN SEVERITY QUANTIFIED, NO PAIN PRESENT: ICD-10-PCS | Mod: S$GLB,,, | Performed by: NURSE PRACTITIONER

## 2021-05-03 RX ORDER — TIOTROPIUM BROMIDE AND OLODATEROL 3.124; 2.736 UG/1; UG/1
2 SPRAY, METERED RESPIRATORY (INHALATION) DAILY
Qty: 4 G | Refills: 5 | Status: SHIPPED | OUTPATIENT
Start: 2021-05-03 | End: 2022-01-01 | Stop reason: SDUPTHER

## 2021-05-04 ENCOUNTER — DOCUMENTATION ONLY (OUTPATIENT)
Dept: REHABILITATION | Facility: HOSPITAL | Age: 81
End: 2021-05-04

## 2021-05-05 ENCOUNTER — OFFICE VISIT (OUTPATIENT)
Dept: NEUROLOGY | Facility: CLINIC | Age: 81
End: 2021-05-05
Payer: MEDICARE

## 2021-05-05 ENCOUNTER — PATIENT MESSAGE (OUTPATIENT)
Dept: NEUROLOGY | Facility: CLINIC | Age: 81
End: 2021-05-05

## 2021-05-05 VITALS
HEART RATE: 77 BPM | HEIGHT: 69 IN | WEIGHT: 153 LBS | SYSTOLIC BLOOD PRESSURE: 114 MMHG | DIASTOLIC BLOOD PRESSURE: 66 MMHG | BODY MASS INDEX: 22.66 KG/M2

## 2021-05-05 DIAGNOSIS — R51.9 INTRACTABLE HEADACHE, UNSPECIFIED CHRONICITY PATTERN, UNSPECIFIED HEADACHE TYPE: Primary | ICD-10-CM

## 2021-05-05 PROCEDURE — 1126F PR PAIN SEVERITY QUANTIFIED, NO PAIN PRESENT: ICD-10-PCS | Mod: S$GLB,,, | Performed by: PHYSICIAN ASSISTANT

## 2021-05-05 PROCEDURE — 99999 PR PBB SHADOW E&M-EST. PATIENT-LVL III: CPT | Mod: PBBFAC,,, | Performed by: PHYSICIAN ASSISTANT

## 2021-05-05 PROCEDURE — 99214 OFFICE O/P EST MOD 30 MIN: CPT | Mod: S$GLB,,, | Performed by: PHYSICIAN ASSISTANT

## 2021-05-05 PROCEDURE — 99214 PR OFFICE/OUTPT VISIT, EST, LEVL IV, 30-39 MIN: ICD-10-PCS | Mod: S$GLB,,, | Performed by: PHYSICIAN ASSISTANT

## 2021-05-05 PROCEDURE — 99499 UNLISTED E&M SERVICE: CPT | Mod: S$GLB,,, | Performed by: PHYSICIAN ASSISTANT

## 2021-05-05 PROCEDURE — 1101F PT FALLS ASSESS-DOCD LE1/YR: CPT | Mod: CPTII,S$GLB,, | Performed by: PHYSICIAN ASSISTANT

## 2021-05-05 PROCEDURE — 1126F AMNT PAIN NOTED NONE PRSNT: CPT | Mod: S$GLB,,, | Performed by: PHYSICIAN ASSISTANT

## 2021-05-05 PROCEDURE — 3288F PR FALLS RISK ASSESSMENT DOCUMENTED: ICD-10-PCS | Mod: CPTII,S$GLB,, | Performed by: PHYSICIAN ASSISTANT

## 2021-05-05 PROCEDURE — 3288F FALL RISK ASSESSMENT DOCD: CPT | Mod: CPTII,S$GLB,, | Performed by: PHYSICIAN ASSISTANT

## 2021-05-05 PROCEDURE — 99999 PR PBB SHADOW E&M-EST. PATIENT-LVL III: ICD-10-PCS | Mod: PBBFAC,,, | Performed by: PHYSICIAN ASSISTANT

## 2021-05-05 PROCEDURE — 1159F MED LIST DOCD IN RCRD: CPT | Mod: S$GLB,,, | Performed by: PHYSICIAN ASSISTANT

## 2021-05-05 PROCEDURE — 99499 RISK ADDL DX/OHS AUDIT: ICD-10-PCS | Mod: S$GLB,,, | Performed by: PHYSICIAN ASSISTANT

## 2021-05-05 PROCEDURE — 1159F PR MEDICATION LIST DOCUMENTED IN MEDICAL RECORD: ICD-10-PCS | Mod: S$GLB,,, | Performed by: PHYSICIAN ASSISTANT

## 2021-05-05 PROCEDURE — 1101F PR PT FALLS ASSESS DOC 0-1 FALLS W/OUT INJ PAST YR: ICD-10-PCS | Mod: CPTII,S$GLB,, | Performed by: PHYSICIAN ASSISTANT

## 2021-05-10 ENCOUNTER — CLINICAL SUPPORT (OUTPATIENT)
Dept: REHABILITATION | Facility: HOSPITAL | Age: 81
End: 2021-05-10
Payer: MEDICARE

## 2021-05-10 DIAGNOSIS — Z74.09 IMPAIRED FUNCTIONAL MOBILITY, BALANCE, GAIT, AND ENDURANCE: ICD-10-CM

## 2021-05-10 PROCEDURE — 97110 THERAPEUTIC EXERCISES: CPT | Mod: PO

## 2021-05-10 PROCEDURE — 97112 NEUROMUSCULAR REEDUCATION: CPT | Mod: PO

## 2021-05-12 ENCOUNTER — CLINICAL SUPPORT (OUTPATIENT)
Dept: REHABILITATION | Facility: HOSPITAL | Age: 81
End: 2021-05-12
Payer: MEDICARE

## 2021-05-12 DIAGNOSIS — Z74.09 IMPAIRED FUNCTIONAL MOBILITY, BALANCE, GAIT, AND ENDURANCE: ICD-10-CM

## 2021-05-12 PROCEDURE — 97110 THERAPEUTIC EXERCISES: CPT | Mod: PO,CQ

## 2021-05-12 PROCEDURE — 97116 GAIT TRAINING THERAPY: CPT | Mod: PO,CQ

## 2021-05-12 PROCEDURE — 97112 NEUROMUSCULAR REEDUCATION: CPT | Mod: PO,CQ

## 2021-05-17 ENCOUNTER — PATIENT MESSAGE (OUTPATIENT)
Dept: NEUROLOGY | Facility: CLINIC | Age: 81
End: 2021-05-17

## 2021-05-17 DIAGNOSIS — R51.9 INTRACTABLE HEADACHE, UNSPECIFIED CHRONICITY PATTERN, UNSPECIFIED HEADACHE TYPE: Primary | ICD-10-CM

## 2021-05-19 ENCOUNTER — PATIENT MESSAGE (OUTPATIENT)
Dept: NEUROLOGY | Facility: CLINIC | Age: 81
End: 2021-05-19

## 2021-05-19 ENCOUNTER — CLINICAL SUPPORT (OUTPATIENT)
Dept: REHABILITATION | Facility: HOSPITAL | Age: 81
End: 2021-05-19
Payer: MEDICARE

## 2021-05-19 DIAGNOSIS — Z74.09 IMPAIRED FUNCTIONAL MOBILITY, BALANCE, GAIT, AND ENDURANCE: ICD-10-CM

## 2021-05-19 PROCEDURE — 97110 THERAPEUTIC EXERCISES: CPT | Mod: PO,CQ

## 2021-05-19 PROCEDURE — 97112 NEUROMUSCULAR REEDUCATION: CPT | Mod: PO,CQ

## 2021-05-19 PROCEDURE — 97116 GAIT TRAINING THERAPY: CPT | Mod: PO,CQ

## 2021-05-24 ENCOUNTER — CLINICAL SUPPORT (OUTPATIENT)
Dept: REHABILITATION | Facility: HOSPITAL | Age: 81
End: 2021-05-24
Payer: MEDICARE

## 2021-05-24 DIAGNOSIS — Z74.09 IMPAIRED FUNCTIONAL MOBILITY, BALANCE, GAIT, AND ENDURANCE: ICD-10-CM

## 2021-05-24 PROCEDURE — 97110 THERAPEUTIC EXERCISES: CPT | Mod: PO,CQ

## 2021-05-24 PROCEDURE — 97116 GAIT TRAINING THERAPY: CPT | Mod: PO,CQ

## 2021-05-24 PROCEDURE — 97112 NEUROMUSCULAR REEDUCATION: CPT | Mod: PO,CQ

## 2021-05-26 ENCOUNTER — CLINICAL SUPPORT (OUTPATIENT)
Dept: REHABILITATION | Facility: HOSPITAL | Age: 81
End: 2021-05-26
Payer: MEDICARE

## 2021-05-26 DIAGNOSIS — Z74.09 IMPAIRED FUNCTIONAL MOBILITY, BALANCE, GAIT, AND ENDURANCE: ICD-10-CM

## 2021-05-26 PROCEDURE — 97112 NEUROMUSCULAR REEDUCATION: CPT | Mod: PO

## 2021-05-26 PROCEDURE — 97110 THERAPEUTIC EXERCISES: CPT | Mod: PO

## 2021-05-27 ENCOUNTER — OFFICE VISIT (OUTPATIENT)
Dept: PHYSICAL MEDICINE AND REHAB | Facility: CLINIC | Age: 81
End: 2021-05-27
Payer: MEDICARE

## 2021-05-27 VITALS
WEIGHT: 153 LBS | DIASTOLIC BLOOD PRESSURE: 69 MMHG | BODY MASS INDEX: 22.66 KG/M2 | HEART RATE: 75 BPM | SYSTOLIC BLOOD PRESSURE: 125 MMHG | HEIGHT: 69 IN

## 2021-05-27 DIAGNOSIS — R51.9 INTRACTABLE HEADACHE, UNSPECIFIED CHRONICITY PATTERN, UNSPECIFIED HEADACHE TYPE: ICD-10-CM

## 2021-05-27 PROCEDURE — 64450 PR NERVE BLOCK INJ, ANES/STEROID, OTHER PERIPHERAL: ICD-10-PCS | Mod: 50,S$GLB,, | Performed by: PHYSICIAN ASSISTANT

## 2021-05-27 PROCEDURE — 3288F FALL RISK ASSESSMENT DOCD: CPT | Mod: CPTII,S$GLB,, | Performed by: PHYSICIAN ASSISTANT

## 2021-05-27 PROCEDURE — 1100F PR PT FALLS ASSESS DOC 2+ FALLS/FALL W/INJURY/YR: ICD-10-PCS | Mod: CPTII,S$GLB,, | Performed by: PHYSICIAN ASSISTANT

## 2021-05-27 PROCEDURE — 99999 PR PBB SHADOW E&M-EST. PATIENT-LVL III: CPT | Mod: PBBFAC,,, | Performed by: PHYSICIAN ASSISTANT

## 2021-05-27 PROCEDURE — 99499 NO LOS: ICD-10-PCS | Mod: S$GLB,,, | Performed by: PHYSICIAN ASSISTANT

## 2021-05-27 PROCEDURE — 3288F PR FALLS RISK ASSESSMENT DOCUMENTED: ICD-10-PCS | Mod: CPTII,S$GLB,, | Performed by: PHYSICIAN ASSISTANT

## 2021-05-27 PROCEDURE — 99999 PR PBB SHADOW E&M-EST. PATIENT-LVL III: ICD-10-PCS | Mod: PBBFAC,,, | Performed by: PHYSICIAN ASSISTANT

## 2021-05-27 PROCEDURE — 64405 NJX AA&/STRD GR OCPL NRV: CPT | Mod: 50,51,S$GLB, | Performed by: PHYSICIAN ASSISTANT

## 2021-05-27 PROCEDURE — 99499 UNLISTED E&M SERVICE: CPT | Mod: S$GLB,,, | Performed by: PHYSICIAN ASSISTANT

## 2021-05-27 PROCEDURE — 64450 NJX AA&/STRD OTHER PN/BRANCH: CPT | Mod: 50,S$GLB,, | Performed by: PHYSICIAN ASSISTANT

## 2021-05-27 PROCEDURE — 64405 PR NERVE BLOCK INJ, ANES/STEROID, OCCIPITAL: ICD-10-PCS | Mod: 50,51,S$GLB, | Performed by: PHYSICIAN ASSISTANT

## 2021-05-27 PROCEDURE — 1126F PR PAIN SEVERITY QUANTIFIED, NO PAIN PRESENT: ICD-10-PCS | Mod: S$GLB,,, | Performed by: PHYSICIAN ASSISTANT

## 2021-05-27 PROCEDURE — 1100F PTFALLS ASSESS-DOCD GE2>/YR: CPT | Mod: CPTII,S$GLB,, | Performed by: PHYSICIAN ASSISTANT

## 2021-05-27 PROCEDURE — 1126F AMNT PAIN NOTED NONE PRSNT: CPT | Mod: S$GLB,,, | Performed by: PHYSICIAN ASSISTANT

## 2021-05-27 PROCEDURE — 1159F PR MEDICATION LIST DOCUMENTED IN MEDICAL RECORD: ICD-10-PCS | Mod: S$GLB,,, | Performed by: PHYSICIAN ASSISTANT

## 2021-05-27 PROCEDURE — 1159F MED LIST DOCD IN RCRD: CPT | Mod: S$GLB,,, | Performed by: PHYSICIAN ASSISTANT

## 2021-05-27 RX ORDER — METHYLPREDNISOLONE ACETATE 40 MG/ML
40 INJECTION, SUSPENSION INTRA-ARTICULAR; INTRALESIONAL; INTRAMUSCULAR; SOFT TISSUE
Status: COMPLETED | OUTPATIENT
Start: 2021-05-27 | End: 2021-05-27

## 2021-05-27 RX ORDER — LIDOCAINE HYDROCHLORIDE 20 MG/ML
6 INJECTION, SOLUTION INFILTRATION; PERINEURAL
Status: COMPLETED | OUTPATIENT
Start: 2021-05-27 | End: 2021-05-27

## 2021-05-27 RX ADMIN — METHYLPREDNISOLONE ACETATE 40 MG: 40 INJECTION, SUSPENSION INTRA-ARTICULAR; INTRALESIONAL; INTRAMUSCULAR; SOFT TISSUE at 03:05

## 2021-05-27 RX ADMIN — LIDOCAINE HYDROCHLORIDE 6 ML: 20 INJECTION, SOLUTION INFILTRATION; PERINEURAL at 03:05

## 2021-06-02 ENCOUNTER — CLINICAL SUPPORT (OUTPATIENT)
Dept: REHABILITATION | Facility: HOSPITAL | Age: 81
End: 2021-06-02
Payer: MEDICARE

## 2021-06-02 DIAGNOSIS — Z74.09 IMPAIRED FUNCTIONAL MOBILITY, BALANCE, GAIT, AND ENDURANCE: ICD-10-CM

## 2021-06-02 PROCEDURE — 97116 GAIT TRAINING THERAPY: CPT | Mod: PO,CQ

## 2021-06-02 PROCEDURE — 97112 NEUROMUSCULAR REEDUCATION: CPT | Mod: PO,CQ

## 2021-06-02 PROCEDURE — 97110 THERAPEUTIC EXERCISES: CPT | Mod: PO,CQ

## 2021-06-04 ENCOUNTER — CLINICAL SUPPORT (OUTPATIENT)
Dept: REHABILITATION | Facility: HOSPITAL | Age: 81
End: 2021-06-04
Payer: MEDICARE

## 2021-06-04 DIAGNOSIS — Z74.09 IMPAIRED FUNCTIONAL MOBILITY, BALANCE, GAIT, AND ENDURANCE: ICD-10-CM

## 2021-06-04 PROCEDURE — 97112 NEUROMUSCULAR REEDUCATION: CPT | Mod: PO

## 2021-06-04 PROCEDURE — 97110 THERAPEUTIC EXERCISES: CPT | Mod: PO

## 2021-06-07 ENCOUNTER — CLINICAL SUPPORT (OUTPATIENT)
Dept: REHABILITATION | Facility: HOSPITAL | Age: 81
End: 2021-06-07
Payer: MEDICARE

## 2021-06-07 ENCOUNTER — DOCUMENTATION ONLY (OUTPATIENT)
Dept: REHABILITATION | Facility: HOSPITAL | Age: 81
End: 2021-06-07

## 2021-06-07 DIAGNOSIS — Z74.09 IMPAIRED FUNCTIONAL MOBILITY, BALANCE, GAIT, AND ENDURANCE: ICD-10-CM

## 2021-06-07 PROCEDURE — 97110 THERAPEUTIC EXERCISES: CPT | Mod: PO

## 2021-06-07 PROCEDURE — 97112 NEUROMUSCULAR REEDUCATION: CPT | Mod: PO

## 2021-06-08 ENCOUNTER — DOCUMENTATION ONLY (OUTPATIENT)
Dept: REHABILITATION | Facility: HOSPITAL | Age: 81
End: 2021-06-08

## 2021-06-09 ENCOUNTER — CLINICAL SUPPORT (OUTPATIENT)
Dept: REHABILITATION | Facility: HOSPITAL | Age: 81
End: 2021-06-09
Attending: INTERNAL MEDICINE
Payer: MEDICARE

## 2021-06-09 DIAGNOSIS — Z74.09 IMPAIRED FUNCTIONAL MOBILITY, BALANCE, GAIT, AND ENDURANCE: ICD-10-CM

## 2021-06-09 PROCEDURE — 97110 THERAPEUTIC EXERCISES: CPT | Mod: PO,CQ

## 2021-06-09 PROCEDURE — 97112 NEUROMUSCULAR REEDUCATION: CPT | Mod: PO,CQ

## 2021-06-09 PROCEDURE — 97116 GAIT TRAINING THERAPY: CPT | Mod: PO,CQ

## 2021-06-14 ENCOUNTER — CLINICAL SUPPORT (OUTPATIENT)
Dept: REHABILITATION | Facility: HOSPITAL | Age: 81
End: 2021-06-14
Payer: MEDICARE

## 2021-06-14 DIAGNOSIS — Z74.09 IMPAIRED FUNCTIONAL MOBILITY, BALANCE, GAIT, AND ENDURANCE: ICD-10-CM

## 2021-06-14 PROCEDURE — 97112 NEUROMUSCULAR REEDUCATION: CPT | Mod: PO

## 2021-06-14 PROCEDURE — 97110 THERAPEUTIC EXERCISES: CPT | Mod: PO

## 2021-06-16 ENCOUNTER — CLINICAL SUPPORT (OUTPATIENT)
Dept: REHABILITATION | Facility: HOSPITAL | Age: 81
End: 2021-06-16
Payer: MEDICARE

## 2021-06-16 DIAGNOSIS — Z74.09 IMPAIRED FUNCTIONAL MOBILITY, BALANCE, GAIT, AND ENDURANCE: ICD-10-CM

## 2021-06-16 PROCEDURE — 97112 NEUROMUSCULAR REEDUCATION: CPT | Mod: PO,CQ

## 2021-06-16 PROCEDURE — 97110 THERAPEUTIC EXERCISES: CPT | Mod: PO,CQ

## 2021-06-16 NOTE — TELEPHONE ENCOUNTER
Instructions emailed to son and mailed to patient for EGD rescheduled 3/20/19 at 0830.   Isotretinoin Counseling: Patient should get monthly blood tests, not donate blood, not drive at night if vision affected, not share medication, and not undergo elective surgery for 6 months after tx completed. Side effects reviewed, pt to contact office should one occur.

## 2021-06-21 ENCOUNTER — CLINICAL SUPPORT (OUTPATIENT)
Dept: REHABILITATION | Facility: HOSPITAL | Age: 81
End: 2021-06-21
Payer: MEDICARE

## 2021-06-21 DIAGNOSIS — Z74.09 IMPAIRED FUNCTIONAL MOBILITY, BALANCE, GAIT, AND ENDURANCE: ICD-10-CM

## 2021-06-21 PROCEDURE — 97110 THERAPEUTIC EXERCISES: CPT | Mod: PO

## 2021-06-21 PROCEDURE — 97112 NEUROMUSCULAR REEDUCATION: CPT | Mod: PO

## 2021-06-23 ENCOUNTER — CLINICAL SUPPORT (OUTPATIENT)
Dept: REHABILITATION | Facility: HOSPITAL | Age: 81
End: 2021-06-23
Payer: MEDICARE

## 2021-06-23 DIAGNOSIS — Z74.09 IMPAIRED FUNCTIONAL MOBILITY, BALANCE, GAIT, AND ENDURANCE: ICD-10-CM

## 2021-06-23 PROCEDURE — 97110 THERAPEUTIC EXERCISES: CPT | Mod: PO,CQ

## 2021-06-23 PROCEDURE — 97116 GAIT TRAINING THERAPY: CPT | Mod: PO,CQ

## 2021-06-28 ENCOUNTER — CLINICAL SUPPORT (OUTPATIENT)
Dept: REHABILITATION | Facility: HOSPITAL | Age: 81
End: 2021-06-28
Payer: MEDICARE

## 2021-06-28 DIAGNOSIS — Z74.09 IMPAIRED FUNCTIONAL MOBILITY, BALANCE, GAIT, AND ENDURANCE: ICD-10-CM

## 2021-06-28 PROCEDURE — 97112 NEUROMUSCULAR REEDUCATION: CPT | Mod: PO

## 2021-06-28 PROCEDURE — 97110 THERAPEUTIC EXERCISES: CPT | Mod: PO

## 2021-07-02 ENCOUNTER — DOCUMENTATION ONLY (OUTPATIENT)
Dept: REHABILITATION | Facility: HOSPITAL | Age: 81
End: 2021-07-02

## 2021-07-02 ENCOUNTER — CLINICAL SUPPORT (OUTPATIENT)
Dept: REHABILITATION | Facility: HOSPITAL | Age: 81
End: 2021-07-02
Payer: MEDICARE

## 2021-07-02 DIAGNOSIS — Z74.09 IMPAIRED FUNCTIONAL MOBILITY, BALANCE, GAIT, AND ENDURANCE: ICD-10-CM

## 2021-07-02 PROCEDURE — 97110 THERAPEUTIC EXERCISES: CPT | Mod: PO,CQ

## 2021-07-02 PROCEDURE — 97112 NEUROMUSCULAR REEDUCATION: CPT | Mod: PO,CQ

## 2021-07-07 ENCOUNTER — CLINICAL SUPPORT (OUTPATIENT)
Dept: REHABILITATION | Facility: HOSPITAL | Age: 81
End: 2021-07-07
Payer: MEDICARE

## 2021-07-07 DIAGNOSIS — Z74.09 IMPAIRED FUNCTIONAL MOBILITY, BALANCE, GAIT, AND ENDURANCE: ICD-10-CM

## 2021-07-07 PROCEDURE — 97110 THERAPEUTIC EXERCISES: CPT | Mod: PO,CQ

## 2021-07-07 PROCEDURE — 97112 NEUROMUSCULAR REEDUCATION: CPT | Mod: PO,CQ

## 2021-07-09 ENCOUNTER — DOCUMENTATION ONLY (OUTPATIENT)
Dept: REHABILITATION | Facility: HOSPITAL | Age: 81
End: 2021-07-09

## 2021-07-09 ENCOUNTER — CLINICAL SUPPORT (OUTPATIENT)
Dept: REHABILITATION | Facility: HOSPITAL | Age: 81
End: 2021-07-09
Payer: MEDICARE

## 2021-07-09 DIAGNOSIS — Z74.09 IMPAIRED FUNCTIONAL MOBILITY, BALANCE, GAIT, AND ENDURANCE: ICD-10-CM

## 2021-07-09 PROCEDURE — 97110 THERAPEUTIC EXERCISES: CPT | Mod: KX,PO

## 2021-07-09 PROCEDURE — 97112 NEUROMUSCULAR REEDUCATION: CPT | Mod: KX,PO

## 2021-07-12 ENCOUNTER — CLINICAL SUPPORT (OUTPATIENT)
Dept: REHABILITATION | Facility: HOSPITAL | Age: 81
End: 2021-07-12
Payer: MEDICARE

## 2021-07-12 ENCOUNTER — PES CALL (OUTPATIENT)
Dept: ADMINISTRATIVE | Facility: CLINIC | Age: 81
End: 2021-07-12

## 2021-07-12 DIAGNOSIS — Z74.09 IMPAIRED FUNCTIONAL MOBILITY, BALANCE, GAIT, AND ENDURANCE: ICD-10-CM

## 2021-07-12 PROCEDURE — 97112 NEUROMUSCULAR REEDUCATION: CPT | Mod: PO,CQ

## 2021-07-12 PROCEDURE — 97110 THERAPEUTIC EXERCISES: CPT | Mod: PO,CQ

## 2021-07-15 ENCOUNTER — PATIENT MESSAGE (OUTPATIENT)
Dept: CARDIOLOGY | Facility: CLINIC | Age: 81
End: 2021-07-15

## 2021-07-15 ENCOUNTER — PATIENT MESSAGE (OUTPATIENT)
Dept: GASTROENTEROLOGY | Facility: CLINIC | Age: 81
End: 2021-07-15

## 2021-07-16 ENCOUNTER — CLINICAL SUPPORT (OUTPATIENT)
Dept: REHABILITATION | Facility: HOSPITAL | Age: 81
End: 2021-07-16
Payer: MEDICARE

## 2021-07-16 DIAGNOSIS — Z74.09 IMPAIRED FUNCTIONAL MOBILITY, BALANCE, GAIT, AND ENDURANCE: ICD-10-CM

## 2021-07-16 PROCEDURE — 97110 THERAPEUTIC EXERCISES: CPT | Mod: KX,PO

## 2021-07-16 PROCEDURE — 97112 NEUROMUSCULAR REEDUCATION: CPT | Mod: KX,PO

## 2021-07-19 ENCOUNTER — TELEPHONE (OUTPATIENT)
Dept: ENDOSCOPY | Facility: HOSPITAL | Age: 81
End: 2021-07-19

## 2021-07-19 ENCOUNTER — PATIENT OUTREACH (OUTPATIENT)
Dept: ADMINISTRATIVE | Facility: OTHER | Age: 81
End: 2021-07-19

## 2021-07-19 DIAGNOSIS — R10.9 ABDOMINAL PAIN, UNSPECIFIED ABDOMINAL LOCATION: Primary | ICD-10-CM

## 2021-07-20 ENCOUNTER — TELEPHONE (OUTPATIENT)
Dept: ENDOSCOPY | Facility: HOSPITAL | Age: 81
End: 2021-07-20

## 2021-07-21 ENCOUNTER — OFFICE VISIT (OUTPATIENT)
Dept: CARDIOLOGY | Facility: CLINIC | Age: 81
End: 2021-07-21
Payer: MEDICARE

## 2021-07-21 ENCOUNTER — LAB VISIT (OUTPATIENT)
Dept: LAB | Facility: HOSPITAL | Age: 81
End: 2021-07-21
Payer: MEDICARE

## 2021-07-21 VITALS
HEART RATE: 64 BPM | SYSTOLIC BLOOD PRESSURE: 140 MMHG | BODY MASS INDEX: 22.34 KG/M2 | HEIGHT: 69 IN | WEIGHT: 150.81 LBS | DIASTOLIC BLOOD PRESSURE: 78 MMHG

## 2021-07-21 DIAGNOSIS — I25.10 CORONARY ARTERY DISEASE INVOLVING NATIVE CORONARY ARTERY OF NATIVE HEART WITHOUT ANGINA PECTORIS: Chronic | ICD-10-CM

## 2021-07-21 DIAGNOSIS — R94.31 T WAVE INVERSION ON ELECTROCARDIOGRAM: ICD-10-CM

## 2021-07-21 DIAGNOSIS — I70.0 ATHEROSCLEROSIS OF AORTA: ICD-10-CM

## 2021-07-21 DIAGNOSIS — I65.21 STENOSIS OF RIGHT CAROTID ARTERY: ICD-10-CM

## 2021-07-21 DIAGNOSIS — R06.09 DOE (DYSPNEA ON EXERTION): Primary | ICD-10-CM

## 2021-07-21 DIAGNOSIS — E78.00 PURE HYPERCHOLESTEROLEMIA: Chronic | ICD-10-CM

## 2021-07-21 DIAGNOSIS — I73.9 PERIPHERAL ARTERIAL DISEASE: ICD-10-CM

## 2021-07-21 DIAGNOSIS — I10 ESSENTIAL HYPERTENSION: ICD-10-CM

## 2021-07-21 LAB
ALBUMIN SERPL BCP-MCNC: 3.7 G/DL (ref 3.5–5.2)
ALP SERPL-CCNC: 60 U/L (ref 55–135)
ALT SERPL W/O P-5'-P-CCNC: 20 U/L (ref 10–44)
ANION GAP SERPL CALC-SCNC: 9 MMOL/L (ref 8–16)
AST SERPL-CCNC: 21 U/L (ref 10–40)
BILIRUB SERPL-MCNC: 1.8 MG/DL (ref 0.1–1)
BUN SERPL-MCNC: 14 MG/DL (ref 8–23)
CALCIUM SERPL-MCNC: 9.6 MG/DL (ref 8.7–10.5)
CHLORIDE SERPL-SCNC: 105 MMOL/L (ref 95–110)
CHOLEST SERPL-MCNC: 115 MG/DL (ref 120–199)
CHOLEST/HDLC SERPL: 3.6 {RATIO} (ref 2–5)
CO2 SERPL-SCNC: 28 MMOL/L (ref 23–29)
CREAT SERPL-MCNC: 1.1 MG/DL (ref 0.5–1.4)
ERYTHROCYTE [DISTWIDTH] IN BLOOD BY AUTOMATED COUNT: 14.4 % (ref 11.5–14.5)
EST. GFR  (AFRICAN AMERICAN): >60 ML/MIN/1.73 M^2
EST. GFR  (NON AFRICAN AMERICAN): >60 ML/MIN/1.73 M^2
GLUCOSE SERPL-MCNC: 65 MG/DL (ref 70–110)
HCT VFR BLD AUTO: 42.3 % (ref 40–54)
HDLC SERPL-MCNC: 32 MG/DL (ref 40–75)
HDLC SERPL: 27.8 % (ref 20–50)
HGB BLD-MCNC: 14.5 G/DL (ref 14–18)
LDLC SERPL CALC-MCNC: 60.6 MG/DL (ref 63–159)
MAGNESIUM SERPL-MCNC: 2.2 MG/DL (ref 1.6–2.6)
MCH RBC QN AUTO: 31.7 PG (ref 27–31)
MCHC RBC AUTO-ENTMCNC: 34.3 G/DL (ref 32–36)
MCV RBC AUTO: 93 FL (ref 82–98)
NONHDLC SERPL-MCNC: 83 MG/DL
PLATELET # BLD AUTO: 291 K/UL (ref 150–450)
PMV BLD AUTO: 10.8 FL (ref 9.2–12.9)
POTASSIUM SERPL-SCNC: 4.2 MMOL/L (ref 3.5–5.1)
PROT SERPL-MCNC: 7.1 G/DL (ref 6–8.4)
RBC # BLD AUTO: 4.57 M/UL (ref 4.6–6.2)
SODIUM SERPL-SCNC: 142 MMOL/L (ref 136–145)
TRIGL SERPL-MCNC: 112 MG/DL (ref 30–150)
WBC # BLD AUTO: 5.9 K/UL (ref 3.9–12.7)

## 2021-07-21 PROCEDURE — 99214 OFFICE O/P EST MOD 30 MIN: CPT | Mod: S$GLB,,, | Performed by: NURSE PRACTITIONER

## 2021-07-21 PROCEDURE — 36415 COLL VENOUS BLD VENIPUNCTURE: CPT | Mod: PO | Performed by: NURSE PRACTITIONER

## 2021-07-21 PROCEDURE — 99999 PR PBB SHADOW E&M-EST. PATIENT-LVL III: ICD-10-PCS | Mod: PBBFAC,,, | Performed by: NURSE PRACTITIONER

## 2021-07-21 PROCEDURE — 1159F PR MEDICATION LIST DOCUMENTED IN MEDICAL RECORD: ICD-10-PCS | Mod: CPTII,S$GLB,, | Performed by: NURSE PRACTITIONER

## 2021-07-21 PROCEDURE — 85027 COMPLETE CBC AUTOMATED: CPT | Performed by: NURSE PRACTITIONER

## 2021-07-21 PROCEDURE — 80061 LIPID PANEL: CPT | Performed by: NURSE PRACTITIONER

## 2021-07-21 PROCEDURE — 3077F PR MOST RECENT SYSTOLIC BLOOD PRESSURE >= 140 MM HG: ICD-10-PCS | Mod: CPTII,S$GLB,, | Performed by: NURSE PRACTITIONER

## 2021-07-21 PROCEDURE — 1100F PTFALLS ASSESS-DOCD GE2>/YR: CPT | Mod: CPTII,S$GLB,, | Performed by: NURSE PRACTITIONER

## 2021-07-21 PROCEDURE — 93000 EKG 12-LEAD: ICD-10-PCS | Mod: S$GLB,,, | Performed by: INTERNAL MEDICINE

## 2021-07-21 PROCEDURE — 3078F PR MOST RECENT DIASTOLIC BLOOD PRESSURE < 80 MM HG: ICD-10-PCS | Mod: CPTII,S$GLB,, | Performed by: NURSE PRACTITIONER

## 2021-07-21 PROCEDURE — 3288F FALL RISK ASSESSMENT DOCD: CPT | Mod: CPTII,S$GLB,, | Performed by: NURSE PRACTITIONER

## 2021-07-21 PROCEDURE — 80053 COMPREHEN METABOLIC PANEL: CPT | Performed by: NURSE PRACTITIONER

## 2021-07-21 PROCEDURE — 83735 ASSAY OF MAGNESIUM: CPT | Performed by: NURSE PRACTITIONER

## 2021-07-21 PROCEDURE — 1126F AMNT PAIN NOTED NONE PRSNT: CPT | Mod: CPTII,S$GLB,, | Performed by: NURSE PRACTITIONER

## 2021-07-21 PROCEDURE — 1100F PR PT FALLS ASSESS DOC 2+ FALLS/FALL W/INJURY/YR: ICD-10-PCS | Mod: CPTII,S$GLB,, | Performed by: NURSE PRACTITIONER

## 2021-07-21 PROCEDURE — 99999 PR PBB SHADOW E&M-EST. PATIENT-LVL III: CPT | Mod: PBBFAC,,, | Performed by: NURSE PRACTITIONER

## 2021-07-21 PROCEDURE — 3078F DIAST BP <80 MM HG: CPT | Mod: CPTII,S$GLB,, | Performed by: NURSE PRACTITIONER

## 2021-07-21 PROCEDURE — 3077F SYST BP >= 140 MM HG: CPT | Mod: CPTII,S$GLB,, | Performed by: NURSE PRACTITIONER

## 2021-07-21 PROCEDURE — 99214 PR OFFICE/OUTPT VISIT, EST, LEVL IV, 30-39 MIN: ICD-10-PCS | Mod: S$GLB,,, | Performed by: NURSE PRACTITIONER

## 2021-07-21 PROCEDURE — 93000 ELECTROCARDIOGRAM COMPLETE: CPT | Mod: S$GLB,,, | Performed by: INTERNAL MEDICINE

## 2021-07-21 PROCEDURE — 1126F PR PAIN SEVERITY QUANTIFIED, NO PAIN PRESENT: ICD-10-PCS | Mod: CPTII,S$GLB,, | Performed by: NURSE PRACTITIONER

## 2021-07-21 PROCEDURE — 3288F PR FALLS RISK ASSESSMENT DOCUMENTED: ICD-10-PCS | Mod: CPTII,S$GLB,, | Performed by: NURSE PRACTITIONER

## 2021-07-21 PROCEDURE — 1159F MED LIST DOCD IN RCRD: CPT | Mod: CPTII,S$GLB,, | Performed by: NURSE PRACTITIONER

## 2021-07-21 RX ORDER — ISOSORBIDE MONONITRATE 30 MG/1
30 TABLET, EXTENDED RELEASE ORAL DAILY
COMMUNITY
Start: 2021-04-28 | End: 2021-07-30

## 2021-07-26 ENCOUNTER — HOSPITAL ENCOUNTER (OUTPATIENT)
Dept: CARDIOLOGY | Facility: HOSPITAL | Age: 81
Discharge: HOME | End: 2021-07-26
Attending: NURSE PRACTITIONER
Payer: MEDICARE

## 2021-07-26 VITALS
SYSTOLIC BLOOD PRESSURE: 149 MMHG | DIASTOLIC BLOOD PRESSURE: 68 MMHG | RESPIRATION RATE: 20 BRPM | WEIGHT: 138 LBS | HEIGHT: 69 IN | HEART RATE: 67 BPM | BODY MASS INDEX: 20.44 KG/M2

## 2021-07-26 DIAGNOSIS — R94.31 T WAVE INVERSION ON ELECTROCARDIOGRAM: ICD-10-CM

## 2021-07-26 DIAGNOSIS — I25.10 CORONARY ARTERY DISEASE INVOLVING NATIVE CORONARY ARTERY OF NATIVE HEART WITHOUT ANGINA PECTORIS: ICD-10-CM

## 2021-07-26 DIAGNOSIS — R06.09 DOE (DYSPNEA ON EXERTION): ICD-10-CM

## 2021-07-26 LAB
ASCENDING AORTA: 3.26 CM
AV INDEX (PROSTH): 0.74
AV MEAN GRADIENT: 3 MMHG
AV PEAK GRADIENT: 5 MMHG
AV VALVE AREA: 2.28 CM2
AV VELOCITY RATIO: 0.77
BSA FOR ECHO PROCEDURE: 1.75 M2
CV ECHO LV RWT: 0.42 CM
CV STRESS BASE HR: 69 BPM
DIASTOLIC BLOOD PRESSURE: 68 MMHG
DOP CALC AO PEAK VEL: 1.12 M/S
DOP CALC AO VTI: 25.57 CM
DOP CALC LVOT AREA: 3.1 CM2
DOP CALC LVOT DIAMETER: 1.98 CM
DOP CALC LVOT PEAK VEL: 0.86 M/S
DOP CALC LVOT STROKE VOLUME: 58.29 CM3
DOP CALCLVOT PEAK VEL VTI: 18.94 CM
E WAVE DECELERATION TIME: 306.68 MSEC
E/A RATIO: 0.7
E/E' RATIO: 10.62 M/S
ECHO LV POSTERIOR WALL: 0.9 CM (ref 0.6–1.1)
EJECTION FRACTION: 50 %
FRACTIONAL SHORTENING: 28 % (ref 28–44)
INTERVENTRICULAR SEPTUM: 0.89 CM (ref 0.6–1.1)
IVRT: 102.76 MSEC
LA MAJOR: 5.34 CM
LA MINOR: 5.42 CM
LA WIDTH: 3.72 CM
LEFT ATRIUM SIZE: 3.65 CM
LEFT ATRIUM VOLUME INDEX: 35.3 ML/M2
LEFT ATRIUM VOLUME: 62.09 CM3
LEFT INTERNAL DIMENSION IN SYSTOLE: 3.1 CM (ref 2.1–4)
LEFT VENTRICLE DIASTOLIC VOLUME INDEX: 45.34 ML/M2
LEFT VENTRICLE DIASTOLIC VOLUME: 79.79 ML
LEFT VENTRICLE MASS INDEX: 70 G/M2
LEFT VENTRICLE SYSTOLIC VOLUME INDEX: 21.6 ML/M2
LEFT VENTRICLE SYSTOLIC VOLUME: 38.01 ML
LEFT VENTRICULAR INTERNAL DIMENSION IN DIASTOLE: 4.3 CM (ref 3.5–6)
LEFT VENTRICULAR MASS: 122.37 G
LV LATERAL E/E' RATIO: 7.67 M/S
LV SEPTAL E/E' RATIO: 17.25 M/S
MV A" WAVE DURATION": 13.7 MSEC
MV PEAK A VEL: 0.99 M/S
MV PEAK E VEL: 0.69 M/S
MV STENOSIS PRESSURE HALF TIME: 88.94 MS
MV VALVE AREA P 1/2 METHOD: 2.47 CM2
OHS CV CPX 1 MINUTE RECOVERY HEART RATE: 125 BPM
OHS CV CPX 85 PERCENT MAX PREDICTED HEART RATE MALE: 117
OHS CV CPX MAX PREDICTED HEART RATE: 138
OHS CV CPX PATIENT IS FEMALE: 0
OHS CV CPX PATIENT IS MALE: 1
OHS CV CPX PEAK DIASTOLIC BLOOD PRESSURE: 61 MMHG
OHS CV CPX PEAK HEAR RATE: 125 BPM
OHS CV CPX PEAK RATE PRESSURE PRODUCT: NORMAL
OHS CV CPX PEAK SYSTOLIC BLOOD PRESSURE: 162 MMHG
OHS CV CPX PERCENT MAX PREDICTED HEART RATE ACHIEVED: 91
OHS CV CPX RATE PRESSURE PRODUCT PRESENTING: NORMAL
PISA TR MAX VEL: 2.56 M/S
PULM VEIN S/D RATIO: 1.55
PV PEAK D VEL: 0.4 M/S
PV PEAK S VEL: 0.62 M/S
RA MAJOR: 5.07 CM
RA WIDTH: 3.3 CM
RIGHT VENTRICULAR END-DIASTOLIC DIMENSION: 3.27 CM
RV TISSUE DOPPLER FREE WALL SYSTOLIC VELOCITY 1 (APICAL 4 CHAMBER VIEW): 6.92 CM/S
SINUS: 3.81 CM
STJ: 2.79 CM
SYSTOLIC BLOOD PRESSURE: 149 MMHG
TDI LATERAL: 0.09 M/S
TDI SEPTAL: 0.04 M/S
TDI: 0.07 M/S
TR MAX PG: 26 MMHG
TRICUSPID ANNULAR PLANE SYSTOLIC EXCURSION: 2.07 CM

## 2021-07-26 PROCEDURE — 93351 STRESS TTE COMPLETE: CPT

## 2021-07-26 PROCEDURE — 93351 STRESS TTE COMPLETE: CPT | Mod: 26,,, | Performed by: INTERNAL MEDICINE

## 2021-07-26 PROCEDURE — 93351 STRESS ECHO (CUPID ONLY): ICD-10-PCS | Mod: 26,,, | Performed by: INTERNAL MEDICINE

## 2021-07-26 PROCEDURE — 63600175 PHARM REV CODE 636 W HCPCS: Performed by: NURSE PRACTITIONER

## 2021-07-26 RX ORDER — DOBUTAMINE HYDROCHLORIDE 400 MG/100ML
10 INJECTION INTRAVENOUS
Status: COMPLETED | OUTPATIENT
Start: 2021-07-26 | End: 2021-07-26

## 2021-07-26 RX ADMIN — DOBUTAMINE HYDROCHLORIDE 10 MCG/KG/MIN: 400 INJECTION INTRAVENOUS at 04:07

## 2021-07-28 ENCOUNTER — PATIENT MESSAGE (OUTPATIENT)
Dept: CARDIOLOGY | Facility: CLINIC | Age: 81
End: 2021-07-28

## 2021-07-28 ENCOUNTER — PATIENT MESSAGE (OUTPATIENT)
Dept: GASTROENTEROLOGY | Facility: CLINIC | Age: 81
End: 2021-07-28

## 2021-07-30 ENCOUNTER — TELEPHONE (OUTPATIENT)
Dept: ENDOSCOPY | Facility: HOSPITAL | Age: 81
End: 2021-07-30

## 2021-07-30 ENCOUNTER — PATIENT MESSAGE (OUTPATIENT)
Dept: ENDOSCOPY | Facility: HOSPITAL | Age: 81
End: 2021-07-30

## 2021-07-30 DIAGNOSIS — Z01.818 PRE-OP TESTING: ICD-10-CM

## 2021-08-02 ENCOUNTER — LAB VISIT (OUTPATIENT)
Dept: SPORTS MEDICINE | Facility: CLINIC | Age: 81
End: 2021-08-02
Payer: MEDICARE

## 2021-08-02 ENCOUNTER — TELEPHONE (OUTPATIENT)
Dept: ENDOSCOPY | Facility: HOSPITAL | Age: 81
End: 2021-08-02

## 2021-08-02 DIAGNOSIS — Z01.818 PRE-OP TESTING: ICD-10-CM

## 2021-08-02 LAB
SARS-COV-2 RNA RESP QL NAA+PROBE: NOT DETECTED
SARS-COV-2- CYCLE NUMBER: -1

## 2021-08-02 PROCEDURE — U0003 INFECTIOUS AGENT DETECTION BY NUCLEIC ACID (DNA OR RNA); SEVERE ACUTE RESPIRATORY SYNDROME CORONAVIRUS 2 (SARS-COV-2) (CORONAVIRUS DISEASE [COVID-19]), AMPLIFIED PROBE TECHNIQUE, MAKING USE OF HIGH THROUGHPUT TECHNOLOGIES AS DESCRIBED BY CMS-2020-01-R: HCPCS | Performed by: FAMILY MEDICINE

## 2021-08-02 PROCEDURE — U0005 INFEC AGEN DETEC AMPLI PROBE: HCPCS | Performed by: FAMILY MEDICINE

## 2021-08-03 ENCOUNTER — ANESTHESIA EVENT (OUTPATIENT)
Dept: ENDOSCOPY | Facility: HOSPITAL | Age: 81
End: 2021-08-03
Payer: MEDICARE

## 2021-08-04 ENCOUNTER — PATIENT MESSAGE (OUTPATIENT)
Dept: ENDOSCOPY | Facility: HOSPITAL | Age: 81
End: 2021-08-04

## 2021-08-04 ENCOUNTER — HOSPITAL ENCOUNTER (OUTPATIENT)
Facility: HOSPITAL | Age: 81
Discharge: HOME | End: 2021-08-04
Attending: INTERNAL MEDICINE | Admitting: INTERNAL MEDICINE
Payer: MEDICARE

## 2021-08-04 ENCOUNTER — ANESTHESIA (OUTPATIENT)
Dept: ENDOSCOPY | Facility: HOSPITAL | Age: 81
End: 2021-08-04
Payer: MEDICARE

## 2021-08-04 VITALS
HEART RATE: 58 BPM | RESPIRATION RATE: 20 BRPM | SYSTOLIC BLOOD PRESSURE: 131 MMHG | OXYGEN SATURATION: 96 % | DIASTOLIC BLOOD PRESSURE: 66 MMHG | BODY MASS INDEX: 20.59 KG/M2 | WEIGHT: 139 LBS | HEIGHT: 69 IN | TEMPERATURE: 98 F

## 2021-08-04 DIAGNOSIS — K22.710 BARRETT'S ESOPHAGUS WITH LOW GRADE DYSPLASIA: Primary | Chronic | ICD-10-CM

## 2021-08-04 DIAGNOSIS — K22.719 BARRETT'S ESOPHAGUS WITH DYSPLASIA: ICD-10-CM

## 2021-08-04 PROCEDURE — C1886 CATHETER, ABLATION: HCPCS | Performed by: INTERNAL MEDICINE

## 2021-08-04 PROCEDURE — 43270 EGD LESION ABLATION: CPT | Performed by: INTERNAL MEDICINE

## 2021-08-04 PROCEDURE — 63600175 PHARM REV CODE 636 W HCPCS: Performed by: NURSE ANESTHETIST, CERTIFIED REGISTERED

## 2021-08-04 PROCEDURE — 88305 TISSUE EXAM BY PATHOLOGIST: CPT | Mod: 26,,, | Performed by: PATHOLOGY

## 2021-08-04 PROCEDURE — D9220A PRA ANESTHESIA: ICD-10-PCS | Mod: CRNA,,, | Performed by: NURSE ANESTHETIST, CERTIFIED REGISTERED

## 2021-08-04 PROCEDURE — 43270 PR EGD, FLEX, W/ABLATION, TUMOR/POLYP/LESION(S), W/ DILATION: ICD-10-PCS | Mod: ,,, | Performed by: INTERNAL MEDICINE

## 2021-08-04 PROCEDURE — 37000009 HC ANESTHESIA EA ADD 15 MINS: Performed by: INTERNAL MEDICINE

## 2021-08-04 PROCEDURE — 25000003 PHARM REV CODE 250: Performed by: ANESTHESIOLOGY

## 2021-08-04 PROCEDURE — 37000008 HC ANESTHESIA 1ST 15 MINUTES: Performed by: INTERNAL MEDICINE

## 2021-08-04 PROCEDURE — 43239 EGD BIOPSY SINGLE/MULTIPLE: CPT | Mod: 59,,, | Performed by: INTERNAL MEDICINE

## 2021-08-04 PROCEDURE — D9220A PRA ANESTHESIA: ICD-10-PCS | Mod: ANES,,, | Performed by: ANESTHESIOLOGY

## 2021-08-04 PROCEDURE — 25000003 PHARM REV CODE 250: Performed by: NURSE ANESTHETIST, CERTIFIED REGISTERED

## 2021-08-04 PROCEDURE — 27201012 HC FORCEPS, HOT/COLD, DISP: Performed by: INTERNAL MEDICINE

## 2021-08-04 PROCEDURE — 43270 EGD LESION ABLATION: CPT | Mod: ,,, | Performed by: INTERNAL MEDICINE

## 2021-08-04 PROCEDURE — 43239 PR EGD, FLEX, W/BIOPSY, SGL/MULTI: ICD-10-PCS | Mod: 59,,, | Performed by: INTERNAL MEDICINE

## 2021-08-04 PROCEDURE — 88305 TISSUE EXAM BY PATHOLOGIST: CPT | Mod: 59 | Performed by: PATHOLOGY

## 2021-08-04 PROCEDURE — 43239 EGD BIOPSY SINGLE/MULTIPLE: CPT | Performed by: INTERNAL MEDICINE

## 2021-08-04 PROCEDURE — D9220A PRA ANESTHESIA: Mod: ANES,,, | Performed by: ANESTHESIOLOGY

## 2021-08-04 PROCEDURE — 88305 TISSUE EXAM BY PATHOLOGIST: ICD-10-PCS | Mod: 26,,, | Performed by: PATHOLOGY

## 2021-08-04 PROCEDURE — 25000003 PHARM REV CODE 250: Performed by: INTERNAL MEDICINE

## 2021-08-04 PROCEDURE — D9220A PRA ANESTHESIA: Mod: CRNA,,, | Performed by: NURSE ANESTHETIST, CERTIFIED REGISTERED

## 2021-08-04 RX ORDER — PHENYLEPHRINE HYDROCHLORIDE 10 MG/ML
INJECTION INTRAVENOUS
Status: DISCONTINUED | OUTPATIENT
Start: 2021-08-04 | End: 2021-08-04

## 2021-08-04 RX ORDER — SODIUM CHLORIDE 9 MG/ML
INJECTION, SOLUTION INTRAVENOUS CONTINUOUS
Status: DISCONTINUED | OUTPATIENT
Start: 2021-08-04 | End: 2021-08-04 | Stop reason: HOSPADM

## 2021-08-04 RX ORDER — ONDANSETRON 4 MG/1
4 TABLET, ORALLY DISINTEGRATING ORAL ONCE
Status: COMPLETED | OUTPATIENT
Start: 2021-08-04 | End: 2021-08-04

## 2021-08-04 RX ORDER — LIDOCAINE HYDROCHLORIDE 20 MG/ML
INJECTION INTRAVENOUS
Status: DISCONTINUED | OUTPATIENT
Start: 2021-08-04 | End: 2021-08-04

## 2021-08-04 RX ORDER — PROPOFOL 10 MG/ML
VIAL (ML) INTRAVENOUS
Status: DISCONTINUED | OUTPATIENT
Start: 2021-08-04 | End: 2021-08-04

## 2021-08-04 RX ORDER — SUCRALFATE 1 G/1
TABLET ORAL
Qty: 60 TABLET | Refills: 0 | Status: SHIPPED | OUTPATIENT
Start: 2021-08-04 | End: 2021-09-02

## 2021-08-04 RX ORDER — PANTOPRAZOLE SODIUM 40 MG/1
40 TABLET, DELAYED RELEASE ORAL 2 TIMES DAILY
Qty: 180 TABLET | Refills: 0 | Status: ON HOLD | OUTPATIENT
Start: 2021-08-04 | End: 2021-10-22

## 2021-08-04 RX ORDER — SODIUM CHLORIDE 0.9 % (FLUSH) 0.9 %
10 SYRINGE (ML) INJECTION
Status: DISCONTINUED | OUTPATIENT
Start: 2021-08-04 | End: 2021-08-04 | Stop reason: HOSPADM

## 2021-08-04 RX ADMIN — PROPOFOL 20 MG: 10 INJECTION, EMULSION INTRAVENOUS at 08:08

## 2021-08-04 RX ADMIN — SODIUM CHLORIDE: 0.9 INJECTION, SOLUTION INTRAVENOUS at 07:08

## 2021-08-04 RX ADMIN — LIDOCAINE HYDROCHLORIDE 60 MG: 20 INJECTION, SOLUTION INTRAVENOUS at 08:08

## 2021-08-04 RX ADMIN — PROPOFOL 30 MG: 10 INJECTION, EMULSION INTRAVENOUS at 08:08

## 2021-08-04 RX ADMIN — PROPOFOL 50 MG: 10 INJECTION, EMULSION INTRAVENOUS at 08:08

## 2021-08-04 RX ADMIN — PHENYLEPHRINE HYDROCHLORIDE 100 MCG: 10 INJECTION INTRAVENOUS at 08:08

## 2021-08-04 RX ADMIN — GLYCOPYRROLATE 0.2 MG: 0.2 INJECTION, SOLUTION INTRAMUSCULAR; INTRAVITREAL at 08:08

## 2021-08-04 RX ADMIN — ONDANSETRON 4 MG: 4 TABLET, ORALLY DISINTEGRATING ORAL at 09:08

## 2021-08-12 ENCOUNTER — TELEPHONE (OUTPATIENT)
Dept: ENDOSCOPY | Facility: HOSPITAL | Age: 81
End: 2021-08-12

## 2021-08-12 DIAGNOSIS — K22.719 BARRETT'S ESOPHAGUS WITH DYSPLASIA: Primary | ICD-10-CM

## 2021-08-19 ENCOUNTER — TELEPHONE (OUTPATIENT)
Dept: ENDOSCOPY | Facility: HOSPITAL | Age: 81
End: 2021-08-19

## 2021-08-19 DIAGNOSIS — K22.719 BARRETT'S ESOPHAGUS WITH DYSPLASIA: Primary | ICD-10-CM

## 2021-08-19 LAB
FINAL PATHOLOGIC DIAGNOSIS: NORMAL
GROSS: NORMAL
Lab: NORMAL

## 2021-09-03 ENCOUNTER — PATIENT MESSAGE (OUTPATIENT)
Dept: NEUROSURGERY | Facility: CLINIC | Age: 81
End: 2021-09-03

## 2021-09-03 ENCOUNTER — PATIENT MESSAGE (OUTPATIENT)
Dept: NEUROLOGY | Facility: CLINIC | Age: 81
End: 2021-09-03

## 2021-09-13 ENCOUNTER — PATIENT MESSAGE (OUTPATIENT)
Dept: ORTHOPEDICS | Facility: CLINIC | Age: 81
End: 2021-09-13

## 2021-09-13 ENCOUNTER — PATIENT MESSAGE (OUTPATIENT)
Dept: INTERNAL MEDICINE | Facility: CLINIC | Age: 81
End: 2021-09-13

## 2021-09-22 ENCOUNTER — TELEPHONE (OUTPATIENT)
Dept: ORTHOPEDICS | Facility: CLINIC | Age: 81
End: 2021-09-22

## 2021-09-23 ENCOUNTER — OFFICE VISIT (OUTPATIENT)
Dept: INTERNAL MEDICINE | Facility: CLINIC | Age: 81
End: 2021-09-23
Payer: MEDICARE

## 2021-09-23 VITALS
SYSTOLIC BLOOD PRESSURE: 100 MMHG | WEIGHT: 144.19 LBS | HEART RATE: 80 BPM | BODY MASS INDEX: 21.36 KG/M2 | HEIGHT: 69 IN | OXYGEN SATURATION: 98 % | DIASTOLIC BLOOD PRESSURE: 60 MMHG | TEMPERATURE: 97 F

## 2021-09-23 DIAGNOSIS — M47.812 CERVICAL SPONDYLOSIS: ICD-10-CM

## 2021-09-23 DIAGNOSIS — M81.0 OSTEOPOROSIS, UNSPECIFIED OSTEOPOROSIS TYPE, UNSPECIFIED PATHOLOGICAL FRACTURE PRESENCE: ICD-10-CM

## 2021-09-23 DIAGNOSIS — E78.00 PURE HYPERCHOLESTEROLEMIA: Chronic | ICD-10-CM

## 2021-09-23 DIAGNOSIS — F33.0 MDD (MAJOR DEPRESSIVE DISORDER), RECURRENT EPISODE, MILD: Chronic | ICD-10-CM

## 2021-09-23 DIAGNOSIS — I25.10 CORONARY ARTERY DISEASE INVOLVING NATIVE CORONARY ARTERY OF NATIVE HEART WITHOUT ANGINA PECTORIS: Chronic | ICD-10-CM

## 2021-09-23 DIAGNOSIS — K22.719 BARRETT'S ESOPHAGUS WITH DYSPLASIA: ICD-10-CM

## 2021-09-23 DIAGNOSIS — I70.0 ATHEROSCLEROSIS OF AORTA: ICD-10-CM

## 2021-09-23 DIAGNOSIS — G47.00 INSOMNIA, UNSPECIFIED TYPE: Chronic | ICD-10-CM

## 2021-09-23 DIAGNOSIS — I10 ESSENTIAL HYPERTENSION: Primary | ICD-10-CM

## 2021-09-23 DIAGNOSIS — D86.0 PULMONARY SARCOIDOSIS: ICD-10-CM

## 2021-09-23 DIAGNOSIS — R35.1 NOCTURIA: ICD-10-CM

## 2021-09-23 DIAGNOSIS — D69.2 SENILE PURPURA: ICD-10-CM

## 2021-09-23 PROCEDURE — 99499 RISK ADDL DX/OHS AUDIT: ICD-10-PCS | Mod: HCNC,S$GLB,, | Performed by: INTERNAL MEDICINE

## 2021-09-23 PROCEDURE — 1160F PR REVIEW ALL MEDS BY PRESCRIBER/CLIN PHARMACIST DOCUMENTED: ICD-10-PCS | Mod: CPTII,S$GLB,, | Performed by: INTERNAL MEDICINE

## 2021-09-23 PROCEDURE — 99999 PR PBB SHADOW E&M-EST. PATIENT-LVL IV: ICD-10-PCS | Mod: PBBFAC,,, | Performed by: INTERNAL MEDICINE

## 2021-09-23 PROCEDURE — 1100F PR PT FALLS ASSESS DOC 2+ FALLS/FALL W/INJURY/YR: ICD-10-PCS | Mod: CPTII,S$GLB,, | Performed by: INTERNAL MEDICINE

## 2021-09-23 PROCEDURE — 3078F PR MOST RECENT DIASTOLIC BLOOD PRESSURE < 80 MM HG: ICD-10-PCS | Mod: CPTII,S$GLB,, | Performed by: INTERNAL MEDICINE

## 2021-09-23 PROCEDURE — 99999 PR PBB SHADOW E&M-EST. PATIENT-LVL IV: CPT | Mod: PBBFAC,,, | Performed by: INTERNAL MEDICINE

## 2021-09-23 PROCEDURE — 1126F AMNT PAIN NOTED NONE PRSNT: CPT | Mod: CPTII,S$GLB,, | Performed by: INTERNAL MEDICINE

## 2021-09-23 PROCEDURE — 3078F DIAST BP <80 MM HG: CPT | Mod: CPTII,S$GLB,, | Performed by: INTERNAL MEDICINE

## 2021-09-23 PROCEDURE — 1159F MED LIST DOCD IN RCRD: CPT | Mod: CPTII,S$GLB,, | Performed by: INTERNAL MEDICINE

## 2021-09-23 PROCEDURE — 3074F SYST BP LT 130 MM HG: CPT | Mod: CPTII,S$GLB,, | Performed by: INTERNAL MEDICINE

## 2021-09-23 PROCEDURE — 99499 UNLISTED E&M SERVICE: CPT | Mod: HCNC,S$GLB,, | Performed by: INTERNAL MEDICINE

## 2021-09-23 PROCEDURE — 1160F RVW MEDS BY RX/DR IN RCRD: CPT | Mod: CPTII,S$GLB,, | Performed by: INTERNAL MEDICINE

## 2021-09-23 PROCEDURE — 1100F PTFALLS ASSESS-DOCD GE2>/YR: CPT | Mod: CPTII,S$GLB,, | Performed by: INTERNAL MEDICINE

## 2021-09-23 PROCEDURE — 3288F FALL RISK ASSESSMENT DOCD: CPT | Mod: CPTII,S$GLB,, | Performed by: INTERNAL MEDICINE

## 2021-09-23 PROCEDURE — 3288F PR FALLS RISK ASSESSMENT DOCUMENTED: ICD-10-PCS | Mod: CPTII,S$GLB,, | Performed by: INTERNAL MEDICINE

## 2021-09-23 PROCEDURE — 99214 PR OFFICE/OUTPT VISIT, EST, LEVL IV, 30-39 MIN: ICD-10-PCS | Mod: S$GLB,,, | Performed by: INTERNAL MEDICINE

## 2021-09-23 PROCEDURE — 1126F PR PAIN SEVERITY QUANTIFIED, NO PAIN PRESENT: ICD-10-PCS | Mod: CPTII,S$GLB,, | Performed by: INTERNAL MEDICINE

## 2021-09-23 PROCEDURE — 3074F PR MOST RECENT SYSTOLIC BLOOD PRESSURE < 130 MM HG: ICD-10-PCS | Mod: CPTII,S$GLB,, | Performed by: INTERNAL MEDICINE

## 2021-09-23 PROCEDURE — 1159F PR MEDICATION LIST DOCUMENTED IN MEDICAL RECORD: ICD-10-PCS | Mod: CPTII,S$GLB,, | Performed by: INTERNAL MEDICINE

## 2021-09-23 PROCEDURE — 99214 OFFICE O/P EST MOD 30 MIN: CPT | Mod: S$GLB,,, | Performed by: INTERNAL MEDICINE

## 2021-09-23 RX ORDER — SERTRALINE HYDROCHLORIDE 50 MG/1
50 TABLET, FILM COATED ORAL DAILY
COMMUNITY
End: 2021-09-23

## 2021-09-23 RX ORDER — METHYLPREDNISOLONE ACETATE 40 MG/ML
INJECTION, SUSPENSION INTRA-ARTICULAR; INTRALESIONAL; INTRAMUSCULAR; SOFT TISSUE
COMMUNITY
Start: 2021-05-27 | End: 2021-09-23

## 2021-09-23 RX ORDER — SERTRALINE HYDROCHLORIDE 100 MG/1
100 TABLET, FILM COATED ORAL DAILY
Qty: 90 TABLET | Refills: 3 | Status: SHIPPED | OUTPATIENT
Start: 2021-09-23 | End: 2022-01-01

## 2021-10-15 ENCOUNTER — PATIENT MESSAGE (OUTPATIENT)
Dept: ENDOSCOPY | Facility: HOSPITAL | Age: 81
End: 2021-10-15
Payer: MEDICARE

## 2021-10-18 RX ORDER — ALENDRONATE SODIUM 70 MG/1
TABLET ORAL
Qty: 12 TABLET | Refills: 3 | Status: SHIPPED | OUTPATIENT
Start: 2021-10-18 | End: 2022-01-01

## 2021-10-19 ENCOUNTER — PATIENT MESSAGE (OUTPATIENT)
Dept: PHYSICAL MEDICINE AND REHAB | Facility: CLINIC | Age: 81
End: 2021-10-19

## 2021-10-19 ENCOUNTER — PATIENT MESSAGE (OUTPATIENT)
Dept: INTERNAL MEDICINE | Facility: CLINIC | Age: 81
End: 2021-10-19
Payer: MEDICARE

## 2021-10-19 ENCOUNTER — PATIENT MESSAGE (OUTPATIENT)
Dept: OPHTHALMOLOGY | Facility: CLINIC | Age: 81
End: 2021-10-19
Payer: MEDICARE

## 2021-10-19 DIAGNOSIS — H53.9 VISUAL CHANGES: Primary | ICD-10-CM

## 2021-10-21 ENCOUNTER — HOSPITAL ENCOUNTER (OUTPATIENT)
Facility: HOSPITAL | Age: 81
Discharge: HOME HEALTH | End: 2021-10-22
Attending: EMERGENCY MEDICINE | Admitting: INTERNAL MEDICINE
Payer: MEDICARE

## 2021-10-21 ENCOUNTER — OFFICE VISIT (OUTPATIENT)
Dept: NEUROLOGY | Facility: CLINIC | Age: 81
End: 2021-10-21
Payer: MEDICARE

## 2021-10-21 DIAGNOSIS — H53.9 VISUAL CHANGES: Primary | ICD-10-CM

## 2021-10-21 DIAGNOSIS — H54.7 VISION LOSS: Primary | ICD-10-CM

## 2021-10-21 DIAGNOSIS — H53.461 RIGHT HOMONYMOUS HEMIANOPSIA: ICD-10-CM

## 2021-10-21 LAB
ALBUMIN SERPL BCP-MCNC: 3.6 G/DL (ref 3.5–5.2)
ALP SERPL-CCNC: 61 U/L (ref 55–135)
ALT SERPL W/O P-5'-P-CCNC: 13 U/L (ref 10–44)
ANION GAP SERPL CALC-SCNC: 11 MMOL/L (ref 8–16)
AST SERPL-CCNC: 15 U/L (ref 10–40)
BASOPHILS # BLD AUTO: 0.03 K/UL (ref 0–0.2)
BASOPHILS NFR BLD: 0.6 % (ref 0–1.9)
BILIRUB SERPL-MCNC: 1.1 MG/DL (ref 0.1–1)
BUN SERPL-MCNC: 13 MG/DL (ref 8–23)
CALCIUM SERPL-MCNC: 9.3 MG/DL (ref 8.7–10.5)
CHLORIDE SERPL-SCNC: 104 MMOL/L (ref 95–110)
CHOLEST SERPL-MCNC: 119 MG/DL (ref 120–199)
CHOLEST/HDLC SERPL: 4 {RATIO} (ref 2–5)
CO2 SERPL-SCNC: 25 MMOL/L (ref 23–29)
CREAT SERPL-MCNC: 1.1 MG/DL (ref 0.5–1.4)
DIFFERENTIAL METHOD: ABNORMAL
EOSINOPHIL # BLD AUTO: 0 K/UL (ref 0–0.5)
EOSINOPHIL NFR BLD: 0.8 % (ref 0–8)
ERYTHROCYTE [DISTWIDTH] IN BLOOD BY AUTOMATED COUNT: 13.2 % (ref 11.5–14.5)
EST. GFR  (AFRICAN AMERICAN): >60 ML/MIN/1.73 M^2
EST. GFR  (NON AFRICAN AMERICAN): >60 ML/MIN/1.73 M^2
GLUCOSE SERPL-MCNC: 103 MG/DL (ref 70–110)
HCT VFR BLD AUTO: 41.9 % (ref 40–54)
HDLC SERPL-MCNC: 30 MG/DL (ref 40–75)
HDLC SERPL: 25.2 % (ref 20–50)
HGB BLD-MCNC: 14.5 G/DL (ref 14–18)
IMM GRANULOCYTES # BLD AUTO: 0.01 K/UL (ref 0–0.04)
IMM GRANULOCYTES NFR BLD AUTO: 0.2 % (ref 0–0.5)
LDLC SERPL CALC-MCNC: 51.8 MG/DL (ref 63–159)
LYMPHOCYTES # BLD AUTO: 1.2 K/UL (ref 1–4.8)
LYMPHOCYTES NFR BLD: 25.1 % (ref 18–48)
MCH RBC QN AUTO: 32.2 PG (ref 27–31)
MCHC RBC AUTO-ENTMCNC: 34.6 G/DL (ref 32–36)
MCV RBC AUTO: 93 FL (ref 82–98)
MONOCYTES # BLD AUTO: 0.4 K/UL (ref 0.3–1)
MONOCYTES NFR BLD: 8.8 % (ref 4–15)
NEUTROPHILS # BLD AUTO: 3.2 K/UL (ref 1.8–7.7)
NEUTROPHILS NFR BLD: 64.5 % (ref 38–73)
NONHDLC SERPL-MCNC: 89 MG/DL
NRBC BLD-RTO: 0 /100 WBC
PLATELET # BLD AUTO: 269 K/UL (ref 150–450)
PMV BLD AUTO: 10.3 FL (ref 9.2–12.9)
POTASSIUM SERPL-SCNC: 4 MMOL/L (ref 3.5–5.1)
PROT SERPL-MCNC: 6.7 G/DL (ref 6–8.4)
RBC # BLD AUTO: 4.5 M/UL (ref 4.6–6.2)
SODIUM SERPL-SCNC: 140 MMOL/L (ref 136–145)
TRIGL SERPL-MCNC: 186 MG/DL (ref 30–150)
VIT B12 SERPL-MCNC: 416 PG/ML (ref 210–950)
WBC # BLD AUTO: 4.91 K/UL (ref 3.9–12.7)

## 2021-10-21 PROCEDURE — 80061 LIPID PANEL: CPT | Mod: HCNC | Performed by: EMERGENCY MEDICINE

## 2021-10-21 PROCEDURE — 80053 COMPREHEN METABOLIC PANEL: CPT | Mod: HCNC | Performed by: EMERGENCY MEDICINE

## 2021-10-21 PROCEDURE — 82607 VITAMIN B-12: CPT | Mod: HCNC | Performed by: EMERGENCY MEDICINE

## 2021-10-21 PROCEDURE — G0378 HOSPITAL OBSERVATION PER HR: HCPCS | Mod: HCNC

## 2021-10-21 PROCEDURE — 25000003 PHARM REV CODE 250: Mod: HCNC | Performed by: EMERGENCY MEDICINE

## 2021-10-21 PROCEDURE — 99213 PR OFFICE/OUTPT VISIT, EST, LEVL III, 20-29 MIN: ICD-10-PCS | Mod: HCNC,S$GLB,, | Performed by: PSYCHIATRY & NEUROLOGY

## 2021-10-21 PROCEDURE — 99291 PR CRITICAL CARE, E/M 30-74 MINUTES: ICD-10-PCS | Mod: HCNC,,, | Performed by: EMERGENCY MEDICINE

## 2021-10-21 PROCEDURE — 99999 PR PBB SHADOW E&M-EST. PATIENT-LVL III: CPT | Mod: PBBFAC,HCNC,, | Performed by: PSYCHIATRY & NEUROLOGY

## 2021-10-21 PROCEDURE — 63600175 PHARM REV CODE 636 W HCPCS: Mod: HCNC | Performed by: HOSPITALIST

## 2021-10-21 PROCEDURE — 99213 OFFICE O/P EST LOW 20 MIN: CPT | Mod: HCNC,S$GLB,, | Performed by: PSYCHIATRY & NEUROLOGY

## 2021-10-21 PROCEDURE — 99999 PR PBB SHADOW E&M-EST. PATIENT-LVL III: ICD-10-PCS | Mod: PBBFAC,HCNC,, | Performed by: PSYCHIATRY & NEUROLOGY

## 2021-10-21 PROCEDURE — 99220 PR INITIAL OBSERVATION CARE,LEVL III: ICD-10-PCS | Mod: HCNC,,, | Performed by: HOSPITALIST

## 2021-10-21 PROCEDURE — 99291 CRITICAL CARE FIRST HOUR: CPT | Mod: HCNC,,, | Performed by: EMERGENCY MEDICINE

## 2021-10-21 PROCEDURE — 85025 COMPLETE CBC W/AUTO DIFF WBC: CPT | Mod: HCNC | Performed by: EMERGENCY MEDICINE

## 2021-10-21 PROCEDURE — 96372 THER/PROPH/DIAG INJ SC/IM: CPT | Mod: HCNC

## 2021-10-21 PROCEDURE — 99220 PR INITIAL OBSERVATION CARE,LEVL III: CPT | Mod: HCNC,,, | Performed by: HOSPITALIST

## 2021-10-21 PROCEDURE — 99291 CRITICAL CARE FIRST HOUR: CPT | Mod: 25,HCNC

## 2021-10-21 RX ORDER — ASPIRIN 81 MG/1
81 TABLET ORAL DAILY
Status: DISCONTINUED | OUTPATIENT
Start: 2021-10-22 | End: 2021-10-22 | Stop reason: HOSPADM

## 2021-10-21 RX ORDER — PROCHLORPERAZINE EDISYLATE 5 MG/ML
5 INJECTION INTRAMUSCULAR; INTRAVENOUS EVERY 6 HOURS PRN
Status: DISCONTINUED | OUTPATIENT
Start: 2021-10-21 | End: 2021-10-22 | Stop reason: HOSPADM

## 2021-10-21 RX ORDER — FUROSEMIDE 20 MG/1
20 TABLET ORAL DAILY
Status: DISCONTINUED | OUTPATIENT
Start: 2021-10-22 | End: 2021-10-22 | Stop reason: HOSPADM

## 2021-10-21 RX ORDER — ATORVASTATIN CALCIUM 40 MG/1
40 TABLET, FILM COATED ORAL DAILY
Status: DISCONTINUED | OUTPATIENT
Start: 2021-10-22 | End: 2021-10-22 | Stop reason: HOSPADM

## 2021-10-21 RX ORDER — SODIUM CHLORIDE 0.9 % (FLUSH) 0.9 %
10 SYRINGE (ML) INJECTION
Status: DISCONTINUED | OUTPATIENT
Start: 2021-10-21 | End: 2021-10-22 | Stop reason: HOSPADM

## 2021-10-21 RX ORDER — IPRATROPIUM BROMIDE AND ALBUTEROL SULFATE 2.5; .5 MG/3ML; MG/3ML
3 SOLUTION RESPIRATORY (INHALATION) EVERY 6 HOURS PRN
Status: DISCONTINUED | OUTPATIENT
Start: 2021-10-21 | End: 2021-10-22 | Stop reason: HOSPADM

## 2021-10-21 RX ORDER — ISOSORBIDE MONONITRATE 30 MG/1
30 TABLET, EXTENDED RELEASE ORAL DAILY
Status: DISCONTINUED | OUTPATIENT
Start: 2021-10-22 | End: 2021-10-22 | Stop reason: HOSPADM

## 2021-10-21 RX ORDER — ENOXAPARIN SODIUM 100 MG/ML
40 INJECTION SUBCUTANEOUS EVERY 24 HOURS
Status: DISCONTINUED | OUTPATIENT
Start: 2021-10-21 | End: 2021-10-22 | Stop reason: HOSPADM

## 2021-10-21 RX ORDER — NALOXONE HCL 0.4 MG/ML
0.02 VIAL (ML) INJECTION
Status: DISCONTINUED | OUTPATIENT
Start: 2021-10-21 | End: 2021-10-22 | Stop reason: HOSPADM

## 2021-10-21 RX ORDER — IBUPROFEN 200 MG
24 TABLET ORAL
Status: DISCONTINUED | OUTPATIENT
Start: 2021-10-21 | End: 2021-10-22 | Stop reason: HOSPADM

## 2021-10-21 RX ORDER — TALC
6 POWDER (GRAM) TOPICAL NIGHTLY PRN
Status: DISCONTINUED | OUTPATIENT
Start: 2021-10-21 | End: 2021-10-22 | Stop reason: HOSPADM

## 2021-10-21 RX ORDER — PANTOPRAZOLE SODIUM 40 MG/1
40 TABLET, DELAYED RELEASE ORAL 2 TIMES DAILY
Status: DISCONTINUED | OUTPATIENT
Start: 2021-10-22 | End: 2021-10-22 | Stop reason: HOSPADM

## 2021-10-21 RX ORDER — ONDANSETRON 2 MG/ML
4 INJECTION INTRAMUSCULAR; INTRAVENOUS EVERY 8 HOURS PRN
Status: DISCONTINUED | OUTPATIENT
Start: 2021-10-21 | End: 2021-10-22 | Stop reason: HOSPADM

## 2021-10-21 RX ORDER — ACETAMINOPHEN 325 MG/1
650 TABLET ORAL EVERY 4 HOURS PRN
Status: DISCONTINUED | OUTPATIENT
Start: 2021-10-21 | End: 2021-10-22 | Stop reason: HOSPADM

## 2021-10-21 RX ORDER — ACETAMINOPHEN 325 MG/1
650 TABLET ORAL
Status: COMPLETED | OUTPATIENT
Start: 2021-10-21 | End: 2021-10-21

## 2021-10-21 RX ORDER — IBUPROFEN 200 MG
16 TABLET ORAL
Status: DISCONTINUED | OUTPATIENT
Start: 2021-10-21 | End: 2021-10-22 | Stop reason: HOSPADM

## 2021-10-21 RX ORDER — SERTRALINE HYDROCHLORIDE 100 MG/1
100 TABLET, FILM COATED ORAL DAILY
Status: DISCONTINUED | OUTPATIENT
Start: 2021-10-22 | End: 2021-10-22 | Stop reason: HOSPADM

## 2021-10-21 RX ADMIN — ACETAMINOPHEN 650 MG: 325 TABLET ORAL at 08:10

## 2021-10-21 RX ADMIN — ENOXAPARIN SODIUM 40 MG: 40 INJECTION, SOLUTION INTRAVENOUS; SUBCUTANEOUS at 10:10

## 2021-10-22 ENCOUNTER — PATIENT MESSAGE (OUTPATIENT)
Dept: CARDIOLOGY | Facility: CLINIC | Age: 81
End: 2021-10-22
Payer: MEDICARE

## 2021-10-22 VITALS
HEIGHT: 69 IN | BODY MASS INDEX: 21.55 KG/M2 | DIASTOLIC BLOOD PRESSURE: 61 MMHG | SYSTOLIC BLOOD PRESSURE: 122 MMHG | WEIGHT: 145.5 LBS | OXYGEN SATURATION: 98 % | TEMPERATURE: 98 F | RESPIRATION RATE: 18 BRPM | HEART RATE: 68 BPM

## 2021-10-22 LAB
ANION GAP SERPL CALC-SCNC: 6 MMOL/L (ref 8–16)
BASOPHILS # BLD AUTO: 0.04 K/UL (ref 0–0.2)
BASOPHILS NFR BLD: 1.1 % (ref 0–1.9)
BUN SERPL-MCNC: 14 MG/DL (ref 8–23)
CALCIUM SERPL-MCNC: 9.2 MG/DL (ref 8.7–10.5)
CHLORIDE SERPL-SCNC: 104 MMOL/L (ref 95–110)
CO2 SERPL-SCNC: 29 MMOL/L (ref 23–29)
CREAT SERPL-MCNC: 1 MG/DL (ref 0.5–1.4)
CRP SERPL-MCNC: 1.7 MG/L (ref 0–8.2)
DIFFERENTIAL METHOD: ABNORMAL
EOSINOPHIL # BLD AUTO: 0.1 K/UL (ref 0–0.5)
EOSINOPHIL NFR BLD: 1.6 % (ref 0–8)
ERYTHROCYTE [DISTWIDTH] IN BLOOD BY AUTOMATED COUNT: 13.2 % (ref 11.5–14.5)
ERYTHROCYTE [SEDIMENTATION RATE] IN BLOOD BY WESTERGREN METHOD: 7 MM/HR (ref 0–23)
EST. GFR  (AFRICAN AMERICAN): >60 ML/MIN/1.73 M^2
EST. GFR  (NON AFRICAN AMERICAN): >60 ML/MIN/1.73 M^2
GLUCOSE SERPL-MCNC: 94 MG/DL (ref 70–110)
HCT VFR BLD AUTO: 37.4 % (ref 40–54)
HGB BLD-MCNC: 13.2 G/DL (ref 14–18)
IMM GRANULOCYTES # BLD AUTO: 0.02 K/UL (ref 0–0.04)
IMM GRANULOCYTES NFR BLD AUTO: 0.5 % (ref 0–0.5)
LYMPHOCYTES # BLD AUTO: 1.5 K/UL (ref 1–4.8)
LYMPHOCYTES NFR BLD: 38.8 % (ref 18–48)
MCH RBC QN AUTO: 31.8 PG (ref 27–31)
MCHC RBC AUTO-ENTMCNC: 35.3 G/DL (ref 32–36)
MCV RBC AUTO: 90 FL (ref 82–98)
MONOCYTES # BLD AUTO: 0.4 K/UL (ref 0.3–1)
MONOCYTES NFR BLD: 10.4 % (ref 4–15)
NEUTROPHILS # BLD AUTO: 1.8 K/UL (ref 1.8–7.7)
NEUTROPHILS NFR BLD: 47.6 % (ref 38–73)
NRBC BLD-RTO: 0 /100 WBC
PLATELET # BLD AUTO: 234 K/UL (ref 150–450)
PMV BLD AUTO: 10.5 FL (ref 9.2–12.9)
POTASSIUM SERPL-SCNC: 3.2 MMOL/L (ref 3.5–5.1)
RBC # BLD AUTO: 4.15 M/UL (ref 4.6–6.2)
SODIUM SERPL-SCNC: 139 MMOL/L (ref 136–145)
WBC # BLD AUTO: 3.74 K/UL (ref 3.9–12.7)

## 2021-10-22 PROCEDURE — 86140 C-REACTIVE PROTEIN: CPT | Mod: HCNC | Performed by: PHYSICIAN ASSISTANT

## 2021-10-22 PROCEDURE — G0378 HOSPITAL OBSERVATION PER HR: HCPCS | Mod: HCNC

## 2021-10-22 PROCEDURE — 80048 BASIC METABOLIC PNL TOTAL CA: CPT | Mod: HCNC | Performed by: HOSPITALIST

## 2021-10-22 PROCEDURE — 25000003 PHARM REV CODE 250: Mod: HCNC | Performed by: HOSPITALIST

## 2021-10-22 PROCEDURE — 36415 COLL VENOUS BLD VENIPUNCTURE: CPT | Mod: HCNC | Performed by: HOSPITALIST

## 2021-10-22 PROCEDURE — 36415 COLL VENOUS BLD VENIPUNCTURE: CPT | Mod: HCNC | Performed by: PHYSICIAN ASSISTANT

## 2021-10-22 PROCEDURE — 99217 PR OBSERVATION CARE DISCHARGE: CPT | Mod: HCNC,,, | Performed by: PHYSICIAN ASSISTANT

## 2021-10-22 PROCEDURE — 25000003 PHARM REV CODE 250: Mod: HCNC | Performed by: PHYSICIAN ASSISTANT

## 2021-10-22 PROCEDURE — 85652 RBC SED RATE AUTOMATED: CPT | Mod: HCNC | Performed by: PHYSICIAN ASSISTANT

## 2021-10-22 PROCEDURE — 99217 PR OBSERVATION CARE DISCHARGE: ICD-10-PCS | Mod: HCNC,,, | Performed by: PHYSICIAN ASSISTANT

## 2021-10-22 PROCEDURE — 85025 COMPLETE CBC W/AUTO DIFF WBC: CPT | Mod: HCNC | Performed by: HOSPITALIST

## 2021-10-22 RX ORDER — POTASSIUM CHLORIDE 20 MEQ/1
40 TABLET, EXTENDED RELEASE ORAL
Status: DISCONTINUED | OUTPATIENT
Start: 2021-10-22 | End: 2021-10-22 | Stop reason: HOSPADM

## 2021-10-22 RX ORDER — LANOLIN ALCOHOL/MO/W.PET/CERES
400 CREAM (GRAM) TOPICAL
Status: DISCONTINUED | OUTPATIENT
Start: 2021-10-22 | End: 2021-10-22 | Stop reason: HOSPADM

## 2021-10-22 RX ADMIN — FUROSEMIDE 20 MG: 20 TABLET ORAL at 08:10

## 2021-10-22 RX ADMIN — SERTRALINE HYDROCHLORIDE 100 MG: 100 TABLET ORAL at 08:10

## 2021-10-22 RX ADMIN — PANTOPRAZOLE SODIUM 40 MG: 40 TABLET, DELAYED RELEASE ORAL at 08:10

## 2021-10-22 RX ADMIN — POTASSIUM CHLORIDE 40 MEQ: 1500 TABLET, EXTENDED RELEASE ORAL at 09:10

## 2021-10-22 RX ADMIN — ATORVASTATIN CALCIUM 40 MG: 40 TABLET, FILM COATED ORAL at 08:10

## 2021-10-22 RX ADMIN — ASPIRIN 81 MG: 81 TABLET, COATED ORAL at 08:10

## 2021-10-22 RX ADMIN — Medication 400 MG: at 09:10

## 2021-10-22 RX ADMIN — ACETAMINOPHEN 650 MG: 325 TABLET ORAL at 08:10

## 2021-10-25 LAB
LEFT EYE DM RETINOPATHY: NORMAL
RIGHT EYE DM RETINOPATHY: NORMAL

## 2021-10-25 PROCEDURE — G0180 MD CERTIFICATION HHA PATIENT: HCPCS | Mod: ,,, | Performed by: INTERNAL MEDICINE

## 2021-10-25 PROCEDURE — G0180 PR HOME HEALTH MD CERTIFICATION: ICD-10-PCS | Mod: ,,, | Performed by: INTERNAL MEDICINE

## 2021-10-26 ENCOUNTER — PES CALL (OUTPATIENT)
Dept: HOME HEALTH SERVICES | Facility: CLINIC | Age: 81
End: 2021-10-26
Payer: MEDICARE

## 2021-10-28 ENCOUNTER — TELEPHONE (OUTPATIENT)
Dept: NEUROLOGY | Facility: CLINIC | Age: 81
End: 2021-10-28
Payer: MEDICARE

## 2021-10-29 ENCOUNTER — PATIENT MESSAGE (OUTPATIENT)
Dept: OPHTHALMOLOGY | Facility: CLINIC | Age: 81
End: 2021-10-29
Payer: MEDICARE

## 2021-10-29 ENCOUNTER — PATIENT MESSAGE (OUTPATIENT)
Dept: NEUROLOGY | Facility: CLINIC | Age: 81
End: 2021-10-29
Payer: MEDICARE

## 2021-10-29 DIAGNOSIS — R29.818 OTHER SYMPTOMS AND SIGNS INVOLVING THE NERVOUS SYSTEM: ICD-10-CM

## 2021-10-29 DIAGNOSIS — I63.89 OTHER CEREBRAL INFARCTION: ICD-10-CM

## 2021-10-29 RX ORDER — CLOPIDOGREL BISULFATE 75 MG/1
75 TABLET ORAL DAILY
Qty: 14 TABLET | Refills: 0 | Status: SHIPPED | OUTPATIENT
Start: 2021-10-29 | End: 2021-11-04

## 2021-11-01 ENCOUNTER — OUTPATIENT CASE MANAGEMENT (OUTPATIENT)
Dept: ADMINISTRATIVE | Facility: OTHER | Age: 81
End: 2021-11-01
Payer: MEDICARE

## 2021-11-01 ENCOUNTER — TELEPHONE (OUTPATIENT)
Dept: ADMINISTRATIVE | Facility: OTHER | Age: 81
End: 2021-11-01
Payer: MEDICARE

## 2021-11-04 ENCOUNTER — TELEPHONE (OUTPATIENT)
Dept: NEUROLOGY | Facility: CLINIC | Age: 81
End: 2021-11-04
Payer: MEDICARE

## 2021-11-04 ENCOUNTER — CARE AT HOME (OUTPATIENT)
Dept: HOME HEALTH SERVICES | Facility: CLINIC | Age: 81
End: 2021-11-04
Payer: MEDICARE

## 2021-11-04 ENCOUNTER — PATIENT MESSAGE (OUTPATIENT)
Dept: CARDIOLOGY | Facility: CLINIC | Age: 81
End: 2021-11-04
Payer: MEDICARE

## 2021-11-04 VITALS
BODY MASS INDEX: 21.48 KG/M2 | HEART RATE: 70 BPM | OXYGEN SATURATION: 98 % | WEIGHT: 145 LBS | RESPIRATION RATE: 18 BRPM | DIASTOLIC BLOOD PRESSURE: 72 MMHG | HEIGHT: 69 IN | SYSTOLIC BLOOD PRESSURE: 128 MMHG | TEMPERATURE: 98 F

## 2021-11-04 DIAGNOSIS — H54.7 VISION LOSS: ICD-10-CM

## 2021-11-04 PROCEDURE — 99350 HOME/RES VST EST HIGH MDM 60: CPT | Mod: S$GLB,,, | Performed by: NURSE PRACTITIONER

## 2021-11-04 PROCEDURE — 99350 PR HOME VISIT,ESTAB PATIENT,LEVEL IV: ICD-10-PCS | Mod: S$GLB,,, | Performed by: NURSE PRACTITIONER

## 2021-11-04 PROCEDURE — 99497 ADVNCD CARE PLAN 30 MIN: CPT | Mod: S$GLB,,, | Performed by: NURSE PRACTITIONER

## 2021-11-04 PROCEDURE — 99497 PR ADVNCD CARE PLAN 30 MIN: ICD-10-PCS | Mod: S$GLB,,, | Performed by: NURSE PRACTITIONER

## 2021-11-05 RX ORDER — FUROSEMIDE 20 MG/1
20 TABLET ORAL DAILY
Qty: 90 TABLET | Refills: 0 | Status: ON HOLD | OUTPATIENT
Start: 2021-11-05 | End: 2022-01-01 | Stop reason: SDUPTHER

## 2021-11-09 ENCOUNTER — PATIENT OUTREACH (OUTPATIENT)
Dept: ADMINISTRATIVE | Facility: HOSPITAL | Age: 81
End: 2021-11-09
Payer: MEDICARE

## 2021-11-10 ENCOUNTER — EXTERNAL HOME HEALTH (OUTPATIENT)
Dept: HOME HEALTH SERVICES | Facility: HOSPITAL | Age: 81
End: 2021-11-10
Payer: MEDICARE

## 2021-11-11 ENCOUNTER — TELEPHONE (OUTPATIENT)
Dept: ENDOSCOPY | Facility: HOSPITAL | Age: 81
End: 2021-11-11
Payer: MEDICARE

## 2021-11-17 ENCOUNTER — HOSPITAL ENCOUNTER (OUTPATIENT)
Dept: RADIOLOGY | Facility: HOSPITAL | Age: 81
Discharge: HOME | End: 2021-11-17
Attending: PSYCHIATRY & NEUROLOGY
Payer: MEDICARE

## 2021-11-17 DIAGNOSIS — I63.89 OTHER CEREBRAL INFARCTION: ICD-10-CM

## 2021-11-17 DIAGNOSIS — I63.031 CEREBROVASCULAR ACCIDENT (CVA) DUE TO THROMBOSIS OF RIGHT CAROTID ARTERY: ICD-10-CM

## 2021-11-17 DIAGNOSIS — I63.89 OTHER CEREBRAL INFARCTION: Primary | ICD-10-CM

## 2021-11-17 DIAGNOSIS — R29.818 OTHER SYMPTOMS AND SIGNS INVOLVING THE NERVOUS SYSTEM: ICD-10-CM

## 2021-11-17 PROCEDURE — 25500020 PHARM REV CODE 255: Mod: HCNC | Performed by: PSYCHIATRY & NEUROLOGY

## 2021-11-17 PROCEDURE — 70544 MRA BRAIN WITHOUT CONTRAST: ICD-10-PCS | Mod: 26,HCNC,, | Performed by: RADIOLOGY

## 2021-11-17 PROCEDURE — 70549 MRA NECK WITH AND WITHOUT: ICD-10-PCS | Mod: 26,HCNC,, | Performed by: RADIOLOGY

## 2021-11-17 PROCEDURE — 70549 MR ANGIOGRAPH NECK W/O&W/DYE: CPT | Mod: TC,HCNC

## 2021-11-17 PROCEDURE — 70549 MR ANGIOGRAPH NECK W/O&W/DYE: CPT | Mod: 26,HCNC,, | Performed by: RADIOLOGY

## 2021-11-17 PROCEDURE — A9585 GADOBUTROL INJECTION: HCPCS | Mod: HCNC | Performed by: PSYCHIATRY & NEUROLOGY

## 2021-11-17 PROCEDURE — 70544 MR ANGIOGRAPHY HEAD W/O DYE: CPT | Mod: 26,HCNC,, | Performed by: RADIOLOGY

## 2021-11-17 PROCEDURE — 70544 MR ANGIOGRAPHY HEAD W/O DYE: CPT | Mod: TC,HCNC

## 2021-11-17 RX ORDER — GADOBUTROL 604.72 MG/ML
10 INJECTION INTRAVENOUS
Status: COMPLETED | OUTPATIENT
Start: 2021-11-17 | End: 2021-11-17

## 2021-11-17 RX ADMIN — GADOBUTROL 10 ML: 604.72 INJECTION INTRAVENOUS at 10:11

## 2021-11-18 ENCOUNTER — OFFICE VISIT (OUTPATIENT)
Dept: NEUROLOGY | Facility: CLINIC | Age: 81
End: 2021-11-18
Payer: MEDICARE

## 2021-11-18 ENCOUNTER — TELEPHONE (OUTPATIENT)
Dept: ADMINISTRATIVE | Facility: OTHER | Age: 81
End: 2021-11-18
Payer: MEDICARE

## 2021-11-18 ENCOUNTER — HOSPITAL ENCOUNTER (OUTPATIENT)
Dept: RADIOLOGY | Facility: HOSPITAL | Age: 81
Discharge: HOME | End: 2021-11-18
Attending: PSYCHIATRY & NEUROLOGY
Payer: MEDICARE

## 2021-11-18 VITALS
DIASTOLIC BLOOD PRESSURE: 82 MMHG | HEIGHT: 65 IN | BODY MASS INDEX: 24.36 KG/M2 | HEART RATE: 86 BPM | SYSTOLIC BLOOD PRESSURE: 136 MMHG | WEIGHT: 146.19 LBS

## 2021-11-18 DIAGNOSIS — I63.231 ACUTE ISCHEMIC RIGHT ICA STROKE: ICD-10-CM

## 2021-11-18 DIAGNOSIS — I63.031 CEREBROVASCULAR ACCIDENT (CVA) DUE TO THROMBOSIS OF RIGHT CAROTID ARTERY: ICD-10-CM

## 2021-11-18 DIAGNOSIS — H49.9: Primary | ICD-10-CM

## 2021-11-18 PROCEDURE — 99999 PR PBB SHADOW E&M-EST. PATIENT-LVL III: CPT | Mod: PBBFAC,HCNC,, | Performed by: PSYCHIATRY & NEUROLOGY

## 2021-11-18 PROCEDURE — 93880 US CAROTID BILATERAL: ICD-10-PCS | Mod: 26,HCNC,, | Performed by: RADIOLOGY

## 2021-11-18 PROCEDURE — 99214 OFFICE O/P EST MOD 30 MIN: CPT | Mod: HCNC,S$GLB,, | Performed by: PSYCHIATRY & NEUROLOGY

## 2021-11-18 PROCEDURE — 3288F PR FALLS RISK ASSESSMENT DOCUMENTED: ICD-10-PCS | Mod: HCNC,CPTII,S$GLB, | Performed by: PSYCHIATRY & NEUROLOGY

## 2021-11-18 PROCEDURE — 1126F AMNT PAIN NOTED NONE PRSNT: CPT | Mod: HCNC,CPTII,S$GLB, | Performed by: PSYCHIATRY & NEUROLOGY

## 2021-11-18 PROCEDURE — 3075F SYST BP GE 130 - 139MM HG: CPT | Mod: HCNC,CPTII,S$GLB, | Performed by: PSYCHIATRY & NEUROLOGY

## 2021-11-18 PROCEDURE — 99999 PR PBB SHADOW E&M-EST. PATIENT-LVL III: ICD-10-PCS | Mod: PBBFAC,HCNC,, | Performed by: PSYCHIATRY & NEUROLOGY

## 2021-11-18 PROCEDURE — 1159F MED LIST DOCD IN RCRD: CPT | Mod: HCNC,CPTII,S$GLB, | Performed by: PSYCHIATRY & NEUROLOGY

## 2021-11-18 PROCEDURE — 3079F PR MOST RECENT DIASTOLIC BLOOD PRESSURE 80-89 MM HG: ICD-10-PCS | Mod: HCNC,CPTII,S$GLB, | Performed by: PSYCHIATRY & NEUROLOGY

## 2021-11-18 PROCEDURE — 1126F PR PAIN SEVERITY QUANTIFIED, NO PAIN PRESENT: ICD-10-PCS | Mod: HCNC,CPTII,S$GLB, | Performed by: PSYCHIATRY & NEUROLOGY

## 2021-11-18 PROCEDURE — 1159F PR MEDICATION LIST DOCUMENTED IN MEDICAL RECORD: ICD-10-PCS | Mod: HCNC,CPTII,S$GLB, | Performed by: PSYCHIATRY & NEUROLOGY

## 2021-11-18 PROCEDURE — 99214 PR OFFICE/OUTPT VISIT, EST, LEVL IV, 30-39 MIN: ICD-10-PCS | Mod: HCNC,S$GLB,, | Performed by: PSYCHIATRY & NEUROLOGY

## 2021-11-18 PROCEDURE — 3075F PR MOST RECENT SYSTOLIC BLOOD PRESS GE 130-139MM HG: ICD-10-PCS | Mod: HCNC,CPTII,S$GLB, | Performed by: PSYCHIATRY & NEUROLOGY

## 2021-11-18 PROCEDURE — 1101F PR PT FALLS ASSESS DOC 0-1 FALLS W/OUT INJ PAST YR: ICD-10-PCS | Mod: HCNC,CPTII,S$GLB, | Performed by: PSYCHIATRY & NEUROLOGY

## 2021-11-18 PROCEDURE — 93880 EXTRACRANIAL BILAT STUDY: CPT | Mod: 26,HCNC,, | Performed by: RADIOLOGY

## 2021-11-18 PROCEDURE — 3288F FALL RISK ASSESSMENT DOCD: CPT | Mod: HCNC,CPTII,S$GLB, | Performed by: PSYCHIATRY & NEUROLOGY

## 2021-11-18 PROCEDURE — 1101F PT FALLS ASSESS-DOCD LE1/YR: CPT | Mod: HCNC,CPTII,S$GLB, | Performed by: PSYCHIATRY & NEUROLOGY

## 2021-11-18 PROCEDURE — 93880 EXTRACRANIAL BILAT STUDY: CPT | Mod: TC,HCNC

## 2021-11-18 PROCEDURE — 3079F DIAST BP 80-89 MM HG: CPT | Mod: HCNC,CPTII,S$GLB, | Performed by: PSYCHIATRY & NEUROLOGY

## 2021-11-18 RX ORDER — ISOSORBIDE MONONITRATE 30 MG/1
TABLET, EXTENDED RELEASE ORAL
COMMUNITY
Start: 2021-10-30 | End: 2021-11-24

## 2021-11-19 ENCOUNTER — OFFICE VISIT (OUTPATIENT)
Dept: OPHTHALMOLOGY | Facility: CLINIC | Age: 81
End: 2021-11-19
Payer: MEDICARE

## 2021-11-19 DIAGNOSIS — H53.413 CENTRAL SCOTOMA, BOTH EYES: Primary | ICD-10-CM

## 2021-11-19 PROCEDURE — 1160F RVW MEDS BY RX/DR IN RCRD: CPT | Mod: HCNC,CPTII,S$GLB, | Performed by: OPHTHALMOLOGY

## 2021-11-19 PROCEDURE — 1160F PR REVIEW ALL MEDS BY PRESCRIBER/CLIN PHARMACIST DOCUMENTED: ICD-10-PCS | Mod: HCNC,CPTII,S$GLB, | Performed by: OPHTHALMOLOGY

## 2021-11-19 PROCEDURE — 99215 PR OFFICE/OUTPT VISIT, EST, LEVL V, 40-54 MIN: ICD-10-PCS | Mod: HCNC,S$GLB,, | Performed by: OPHTHALMOLOGY

## 2021-11-19 PROCEDURE — 92134 CPTRZ OPH DX IMG PST SGM RTA: CPT | Mod: HCNC,S$GLB,, | Performed by: OPHTHALMOLOGY

## 2021-11-19 PROCEDURE — 1126F AMNT PAIN NOTED NONE PRSNT: CPT | Mod: HCNC,CPTII,S$GLB, | Performed by: OPHTHALMOLOGY

## 2021-11-19 PROCEDURE — 99999 PR PBB SHADOW E&M-EST. PATIENT-LVL III: ICD-10-PCS | Mod: PBBFAC,HCNC,, | Performed by: OPHTHALMOLOGY

## 2021-11-19 PROCEDURE — 3288F PR FALLS RISK ASSESSMENT DOCUMENTED: ICD-10-PCS | Mod: HCNC,CPTII,S$GLB, | Performed by: OPHTHALMOLOGY

## 2021-11-19 PROCEDURE — 1159F MED LIST DOCD IN RCRD: CPT | Mod: HCNC,CPTII,S$GLB, | Performed by: OPHTHALMOLOGY

## 2021-11-19 PROCEDURE — 1101F PT FALLS ASSESS-DOCD LE1/YR: CPT | Mod: HCNC,CPTII,S$GLB, | Performed by: OPHTHALMOLOGY

## 2021-11-19 PROCEDURE — 1126F PR PAIN SEVERITY QUANTIFIED, NO PAIN PRESENT: ICD-10-PCS | Mod: HCNC,CPTII,S$GLB, | Performed by: OPHTHALMOLOGY

## 2021-11-19 PROCEDURE — 3288F FALL RISK ASSESSMENT DOCD: CPT | Mod: HCNC,CPTII,S$GLB, | Performed by: OPHTHALMOLOGY

## 2021-11-19 PROCEDURE — 1159F PR MEDICATION LIST DOCUMENTED IN MEDICAL RECORD: ICD-10-PCS | Mod: HCNC,CPTII,S$GLB, | Performed by: OPHTHALMOLOGY

## 2021-11-19 PROCEDURE — 1101F PR PT FALLS ASSESS DOC 0-1 FALLS W/OUT INJ PAST YR: ICD-10-PCS | Mod: HCNC,CPTII,S$GLB, | Performed by: OPHTHALMOLOGY

## 2021-11-19 PROCEDURE — 99215 OFFICE O/P EST HI 40 MIN: CPT | Mod: HCNC,S$GLB,, | Performed by: OPHTHALMOLOGY

## 2021-11-19 PROCEDURE — 99999 PR PBB SHADOW E&M-EST. PATIENT-LVL III: CPT | Mod: PBBFAC,HCNC,, | Performed by: OPHTHALMOLOGY

## 2021-11-19 PROCEDURE — 92134 POSTERIOR SEGMENT OCT RETINA (OCULAR COHERENCE TOMOGRAPHY)-BOTH EYES: ICD-10-PCS | Mod: HCNC,S$GLB,, | Performed by: OPHTHALMOLOGY

## 2021-11-22 ENCOUNTER — PATIENT OUTREACH (OUTPATIENT)
Dept: ADMINISTRATIVE | Facility: OTHER | Age: 81
End: 2021-11-22
Payer: MEDICARE

## 2021-11-24 ENCOUNTER — LAB VISIT (OUTPATIENT)
Dept: LAB | Facility: HOSPITAL | Age: 81
End: 2021-11-24
Attending: NURSE PRACTITIONER
Payer: MEDICARE

## 2021-11-24 ENCOUNTER — OFFICE VISIT (OUTPATIENT)
Dept: CARDIOLOGY | Facility: CLINIC | Age: 81
End: 2021-11-24
Payer: MEDICARE

## 2021-11-24 VITALS
WEIGHT: 146.63 LBS | HEART RATE: 70 BPM | DIASTOLIC BLOOD PRESSURE: 68 MMHG | HEIGHT: 65 IN | BODY MASS INDEX: 24.43 KG/M2 | SYSTOLIC BLOOD PRESSURE: 147 MMHG

## 2021-11-24 DIAGNOSIS — I70.0 ATHEROSCLEROSIS OF AORTA: ICD-10-CM

## 2021-11-24 DIAGNOSIS — I45.9 INTRAVENTRICULAR CONDUCTION DELAY: ICD-10-CM

## 2021-11-24 DIAGNOSIS — E87.6 HYPOKALEMIA: ICD-10-CM

## 2021-11-24 DIAGNOSIS — D86.0 PULMONARY SARCOIDOSIS: ICD-10-CM

## 2021-11-24 DIAGNOSIS — I25.10 CORONARY ARTERY DISEASE INVOLVING NATIVE CORONARY ARTERY OF NATIVE HEART WITHOUT ANGINA PECTORIS: Primary | Chronic | ICD-10-CM

## 2021-11-24 DIAGNOSIS — I65.21 STENOSIS OF RIGHT CAROTID ARTERY: ICD-10-CM

## 2021-11-24 DIAGNOSIS — I73.9 PERIPHERAL ARTERIAL DISEASE: ICD-10-CM

## 2021-11-24 DIAGNOSIS — E78.00 PURE HYPERCHOLESTEROLEMIA: Chronic | ICD-10-CM

## 2021-11-24 DIAGNOSIS — I10 PRIMARY HYPERTENSION: ICD-10-CM

## 2021-11-24 PROBLEM — Z01.818 PRE-OP EVALUATION: Status: RESOLVED | Noted: 2020-10-20 | Resolved: 2021-11-24

## 2021-11-24 LAB
ANION GAP SERPL CALC-SCNC: 8 MMOL/L (ref 8–16)
BUN SERPL-MCNC: 12 MG/DL (ref 8–23)
CALCIUM SERPL-MCNC: 9.9 MG/DL (ref 8.7–10.5)
CHLORIDE SERPL-SCNC: 102 MMOL/L (ref 95–110)
CO2 SERPL-SCNC: 29 MMOL/L (ref 23–29)
CREAT SERPL-MCNC: 1.1 MG/DL (ref 0.5–1.4)
EST. GFR  (AFRICAN AMERICAN): >60 ML/MIN/1.73 M^2
EST. GFR  (NON AFRICAN AMERICAN): >60 ML/MIN/1.73 M^2
GLUCOSE SERPL-MCNC: 115 MG/DL (ref 70–110)
POTASSIUM SERPL-SCNC: 5 MMOL/L (ref 3.5–5.1)
SODIUM SERPL-SCNC: 139 MMOL/L (ref 136–145)

## 2021-11-24 PROCEDURE — 99214 OFFICE O/P EST MOD 30 MIN: CPT | Mod: HCNC,S$GLB,, | Performed by: NURSE PRACTITIONER

## 2021-11-24 PROCEDURE — 36415 COLL VENOUS BLD VENIPUNCTURE: CPT | Mod: HCNC,PO | Performed by: NURSE PRACTITIONER

## 2021-11-24 PROCEDURE — 80048 BASIC METABOLIC PNL TOTAL CA: CPT | Mod: HCNC | Performed by: NURSE PRACTITIONER

## 2021-11-24 PROCEDURE — 99999 PR PBB SHADOW E&M-EST. PATIENT-LVL III: ICD-10-PCS | Mod: PBBFAC,HCNC,, | Performed by: NURSE PRACTITIONER

## 2021-11-24 PROCEDURE — 99999 PR PBB SHADOW E&M-EST. PATIENT-LVL III: CPT | Mod: PBBFAC,HCNC,, | Performed by: NURSE PRACTITIONER

## 2021-11-24 PROCEDURE — 99214 PR OFFICE/OUTPT VISIT, EST, LEVL IV, 30-39 MIN: ICD-10-PCS | Mod: HCNC,S$GLB,, | Performed by: NURSE PRACTITIONER

## 2021-12-20 ENCOUNTER — CARE AT HOME (OUTPATIENT)
Dept: HOME HEALTH SERVICES | Facility: CLINIC | Age: 81
End: 2021-12-20
Payer: MEDICARE

## 2021-12-20 VITALS
OXYGEN SATURATION: 98 % | SYSTOLIC BLOOD PRESSURE: 139 MMHG | TEMPERATURE: 97 F | DIASTOLIC BLOOD PRESSURE: 84 MMHG | HEIGHT: 65 IN | WEIGHT: 146 LBS | BODY MASS INDEX: 24.32 KG/M2 | HEART RATE: 85 BPM | RESPIRATION RATE: 18 BRPM

## 2021-12-20 DIAGNOSIS — Z51.5 PALLIATIVE CARE ENCOUNTER: Primary | ICD-10-CM

## 2021-12-20 PROCEDURE — 99497 ADVNCD CARE PLAN 30 MIN: CPT | Mod: ,,, | Performed by: NURSE PRACTITIONER

## 2021-12-20 PROCEDURE — 99350 PR HOME VISIT,ESTAB PATIENT,LEVEL IV: ICD-10-PCS | Mod: ,,, | Performed by: NURSE PRACTITIONER

## 2021-12-20 PROCEDURE — 99350 HOME/RES VST EST HIGH MDM 60: CPT | Mod: ,,, | Performed by: NURSE PRACTITIONER

## 2021-12-20 PROCEDURE — 99497 PR ADVNCD CARE PLAN 30 MIN: ICD-10-PCS | Mod: ,,, | Performed by: NURSE PRACTITIONER

## 2021-12-21 NOTE — PROGRESS NOTES
"  [facility]  Transition of Care Home Visit    Visit Date: 12/20/2021  Encounter Provider: Samaria Newman, DANNY  PCP:  Grey Forbes DO    PRESENTING HISTORY      Patient ID: Paul Browning is a 81 y.o. male.    Consult Requested By:  No ref. provider found  Reason for Consult: Hospital follow up    Paul is being seen at home due to  physical debility that presents a taxing effort to leave the home, to mitigate high risk of hospital readmission or due to the limited availability of reliable or safe options for transportation to the point of access to the provider. Prior to treatment on this visit the chart was reviewed and patient consent was obtained.        Chief Complaint: Follow-up      History of Present Illness: Mr. Paul Browning is a 81 y.o. male who was recently admitted to the hospital.    Admission Date: 10/21/2021  Hospital Length of Stay: 0 days  Discharge Date and Time:  10/22/2021    HPI:   82 yo M with PMHx CAD s/p CABG, PAD, HTN, and HLD who presents to the ED complaining of B/L vision loss x 3 days. Pt. Reports that he woke up 3 days ago (Monday) with B/L blurry vision. Pt. States that he can no longer distinguish the features of his surroundings, even close up options due to "blurry" vision. He reports that at baseline he has a history of some visual deficits and prior cataract surgery, but he has never had vision loss this severe or so sudden. Pt. Denies any eye pain or headaches and is able to distinguish light/dark without a problem. He was seen by an [facility] ophthalmologist Tuesday and was then referred to a neurologist today. Pt. Was referred to the ED by the neurologist today for acute stroke rule out. Pt. Denies any other neurological symptoms such as focal weakness/numbness. No other reported symptoms. In ED, MRI brain performed which was negative for acute ischemic stroke.       Hospital Course:   Pt was placed in observation for b/l vision loss. Neurology " evaluated Pt in ED for acute stroke and did not find evidence of an acute stroke. MRI brain was also negative for acute ischemic stroke. Ophthalmology evaluated recommending ESR and CRP, both wnl, for GCA/TA r/o but no further treatment or imaging at the time. Pt has an appointment w/ a retina specialist 10/25/21. Pt to discharge w/ care at home, and a requested ambulatory referral to ophthalmology. Pt and family educated on plan, verbally agrees and understands. All questions answered.  _________________________________________________________  Today:Mr. Clayton is being evaluated at home for a transition of care visit following hospitalization, see above.       With this visit today patient is found sitting up in his living room on the sofa watching TV. Patient is AAOx3, has some confusion but is able to verify his name and . Most of patients other history was received from his daughter and his medical record. He currently sees HH with [facility]; PT/OT twice per week, RN/aide once per week. He sees Dr. Forbes as his PCP. I will continue seeing the patient in the home as it is difficult for him to physically make multiple visits. He endorses eating x 3 small meals per day. He endorses regular BMs.      [facility] is working with patient over the last few weeks as well as  to help with patient needs. Education completed ~ 15 minutes. Plan to follow up.     VSS. Denies fever, chest pain, shortness of breath, nausea, vomiting, diarrhea. Risks of environmental exposure to coronavirus discussed including: social distancing, hand hygiene, and limiting departures from the home for necessities only.  Reports understanding and willingness to comply.  All hospital discharge orders reviewed and being followed, all medications reconciled and reviewed, patient and family verbalized understanding. No other needs identified at this time.     I reiterated the process of advance care  planning today and explained the importance of this process to the patient and family.  I introduced the concept of advance directives to the patient, as well. Then the patient received detailed information about the importance of designating a Health Care Power of  (HCPOA). She was also instructed to communicate with this person about his wishes for future healthcare, should he become sick and lose decision-making capacity. FULL CODE STATUS    I Introduced [place] form with patient/family, explaining this is the patient's wishes, and this form will be uploaded into the patient's [facility] and the [place] Registry.     We spoke about ACP for 20 minutes.    Attestation: Screening criteria to assess the level of the patient's risk for infection with COVID-19 as recommended by the CDC at the time of the above documented home visit concluded appropriateness to proceed. Universal precautions were maintained at all times, including provider use of 60% alcohol gel hand  immediately prior to entry and upon departing the patient's home.    Review of Systems   Constitutional: Negative for diaphoresis and fever.        Frail   HENT: Positive for dental problem. Negative for congestion, trouble swallowing and voice change.    Eyes: Positive for visual disturbance. Negative for redness.   Respiratory: Negative for cough and shortness of breath.    Cardiovascular: Negative for chest pain and palpitations.   Gastrointestinal: Negative for abdominal distention, abdominal pain, nausea and vomiting.   Endocrine: Negative for polydipsia and polyuria.   Genitourinary: Negative for difficulty urinating and dysuria.   Musculoskeletal: Positive for gait problem. Negative for arthralgias.   Skin: Negative for color change and rash.   Allergic/Immunologic: Negative for food allergies.   Neurological: Positive for weakness. Negative for seizures and headaches.   Psychiatric/Behavioral: Positive for agitation.        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    Hyperlipidemia     Hypertension     Ischemic cardiomyopathy 11/5/2014    MDD (major depressive disorder), recurrent episode, mild 2/17/2016    Osteoporosis 7/20/2016    Peripheral arterial disease 3/18/2016    Sarcoid     Squamous cell carcinoma 09/2016, 5/2016    crown of scalp, left wrist       Past Surgical History:   Procedure Laterality Date    CARDIAC SURGERY      CAROTID STENT N/A 10/14/2019    Procedure: INSERTION, STENT, ARTERY, CAROTID;  Surgeon: Artie Kebede MD;  [facility] CATH LAB;  Service: Cardiology;  Laterality: N/A;    CATARACT EXTRACTION W/  INTRAOCULAR LENS IMPLANT Right 07/17/2018    Dr. Talamantes    CATARACT EXTRACTION W/  INTRAOCULAR LENS IMPLANT Left 07/31/2018    Dr. Talamatnes    CORONARY ARTERY BYPASS GRAFT      x2  10/2014    ESOPHAGOGASTRODUODENOSCOPY N/A 4/8/2019    Procedure: ESOPHAGOGASTRODUODENOSCOPY (EGD)/poss rfa;  Surgeon: Clifford De La Torre MD;  Location: [address]);  Service: Endoscopy;  Laterality: N/A;    ESOPHAGOGASTRODUODENOSCOPY N/A 8/4/2021    Procedure: EGD (ESOPHAGOGASTRODUODENOSCOPY);  Surgeon: Clifford De La Torre MD;  Location: [address]);  Service: Endoscopy;  Laterality: N/A;  8/2-covid [facility] thpetv-ne-ex appts on 8/3    ESOPHAGOGASTRODUODENOSCOPY (EGD) WITH RADIOFREQUENCY TREATMENT OF GASTROESOPHAGEAL JUNCTION      INJECTION OF ANESTHETIC  AGENT AROUND MEDIAL BRANCH NERVES INNERVATING CERVICAL FACET JOINT Bilateral 1/9/2020    Procedure: INJECTION, MBB--Bilateral Cervical- Third Occipital nerve, C2-3--ASA okay for pt to continue taking per provider.;  Surgeon: Tip Mera Jr., MD;  Location: [facility] PAIN MGT;  Service: Pain Management;  Laterality: Bilateral;    INTRAOCULAR PROSTHESES INSERTION Right 7/17/2018    Procedure: INSERTION, INTRAOCULAR LENS PROSTHESIS;  Surgeon: León Talamanets MD;  Location: [facility] OR 1ST FLR;  Service: Ophthalmology;  Laterality: Right;    INTRAOCULAR PROSTHESES INSERTION Left 7/31/2018    Procedure: INSERTION, INTRAOCULAR LENS PROSTHESIS;  Surgeon: León Talamantes MD;  Location: [facility] OR 1ST FLR;  Service: Ophthalmology;  Laterality: Left;    PHACOEMULSIFICATION OF CATARACT Right 7/17/2018    Procedure: PHACOEMULSIFICATION, CATARACT;  Surgeon: León Talamantes MD;  Location: [facility] OR 1ST FLR;  Service: Ophthalmology;  Laterality: Right;    PHACOEMULSIFICATION OF CATARACT Left 7/31/2018    Procedure: PHACOEMULSIFICATION, CATARACT;  Surgeon: León Talamantes MD;  Location: [facility] OR 1ST FLR;  Service: Ophthalmology;  Laterality: Left;    PLACEMENT OF SCREW IN ODONTOID N/A 10/22/2020    Procedure: INSERTION, SCREW, ODONTOID. Anterior approach.;  Surgeon: Kirsten Weaver MD;  Location: [facility] OR 2ND FLR;  Service: Neurosurgery;  Laterality: N/A;    RADIOFREQUENCY THERMOCOAGULATION Bilateral 1/30/2020    Procedure: RADIOFREQUENCY THERMAL COAGULATION--Bilateral C2-3 and Third Occipital Nerve;  Surgeon: Tip Mera Jr., MD;  Location: [facility] PAIN MGT;  Service: Pain Management;  Laterality: Bilateral;    UMBILICAL HERNIA REPAIR         Family History   Problem Relation Age of Onset    Arrhythmia Mother     Heart disease Mother     Heart failure Brother     No Known Problems Daughter     No Known Problems Son     Colon cancer Neg Hx     Inflammatory bowel disease Neg Hx     Celiac disease  Neg Hx     Melanoma Neg Hx     Amblyopia Neg Hx     Blindness Neg Hx     Cataracts Neg Hx     Glaucoma Neg Hx     Macular degeneration Neg Hx     Retinal detachment Neg Hx     Strabismus Neg Hx        Social History     Socioeconomic History    Marital status:     Number of children: 4   Occupational History    Occupation:     Tobacco Use    Smoking status: Former Smoker     Packs/day: 2.00     Years: 20.00     Pack years: 40.00     Types: Cigarettes     Quit date: 1988     Years since quittin.8    Smokeless tobacco: Never Used   Substance and Sexual Activity    Alcohol use: No     Comment: quit drinking 2012    Drug use: No    Sexual activity: Not Currently     Partners: Female       MEDICATIONS & ALLERGIES:     Current Outpatient Medications on File Prior to Visit   Medication Sig Dispense Refill    albuterol (PROAIR HFA) 90 mcg/actuation inhaler Inhale 2 puffs into the lungs every 6 (six) hours as needed for Wheezing or Shortness of Breath. Rescue 18 g 5    alendronate (FOSAMAX) 70 MG tablet TAKE 1 TABLET BY MOUTH EVERY WEEK IN THE MORNING WITH FULL GLASS OF WATER ON EMPTY STOMACH-DONT LIE DOWN FOR AT LEAST 30 MINUTES AFTERWARDS 12 tablet 3    aspirin (ECOTRIN) 81 MG EC tablet Take 81 mg by mouth once daily.      atorvastatin (LIPITOR) 40 MG tablet Take 1 tablet (40 mg total) by mouth once daily. 90 tablet 3    furosemide (LASIX) 20 MG tablet Take 1 tablet (20 mg total) by mouth once daily. 90 tablet 0    magnesium oxide (MAG-OX) 400 mg (241.3 mg magnesium) tablet Take 1 tablet (400 mg total) by mouth once daily. 30 tablet 3    omega-3 fatty acids-vitamin E (FISH OIL) 1,000 mg Cap Take 1 tablet by mouth 2 (two) times daily. (Patient taking differently: Take 1 tablet by mouth once daily.) 60 each 11    pantoprazole (PROTONIX) 40 MG tablet TAKE 1 TABLET BY MOUTH TWICE DAILY 180 tablet 0    sertraline (ZOLOFT) 100 MG tablet Take 1 tablet (100 mg total) by  mouth once daily. 90 tablet 3    sucralfate (CARAFATE) 1 gram tablet DISSOLVE 1 TABLET IN 10 ML OF WATER AND TAKE BY MOUTH TWICE DAILY 180 tablet 2    tiotropium-olodateroL (STIOLTO RESPIMAT) 2.5-2.5 mcg/actuation Mist Inhale 2 puffs into the lungs once daily. Controller 4 g 5     No current facility-administered medications on file prior to visit.        Review of patient's allergies indicates:   Allergen Reactions    Morphine Shortness Of Breath       OBJECTIVE:     Vital Signs:  Vitals:    12/20/21 1400   BP: 139/84   Pulse: 85   Resp: 18   Temp: 97.3 °F (36.3 °C)     Body mass index is 24.3 kg/m².     Physical Exam:  Physical Exam  Constitutional:       Appearance: Normal appearance.   HENT:      Head: Normocephalic and atraumatic.      Right Ear: Tympanic membrane normal.      Left Ear: Tympanic membrane normal.      Nose: Nose normal.      Mouth/Throat:      Mouth: Mucous membranes are dry.   Eyes:      General:         Right eye: Discharge present.         Left eye: Discharge present.  Cardiovascular:      Rate and Rhythm: Normal rate and regular rhythm.      Pulses: Normal pulses.      Heart sounds: Normal heart sounds.   Pulmonary:      Effort: Pulmonary effort is normal.      Breath sounds: Normal breath sounds.   Abdominal:      General: Abdomen is flat.   Musculoskeletal:         General: Normal range of motion.      Cervical back: Normal range of motion.   Skin:     General: Skin is warm and dry.      Capillary Refill: Capillary refill takes less than 2 seconds.   Neurological:      Mental Status: He is alert. Mental status is at baseline.   Psychiatric:         Mood and Affect: Mood normal.         Laboratory  Lab Results   Component Value Date    WBC 3.74 (L) 10/22/2021    HGB 13.2 (L) 10/22/2021    HCT 37.4 (L) 10/22/2021    MCV 90 10/22/2021     10/22/2021     Lab Results   Component Value Date    INR 1.0 01/06/2021    INR 1.0 10/19/2020    INR 1.0 10/14/2019     Lab Results   Component  Value Date    HGBA1C 5.2 10/15/2014     No results for input(s): POCTGLUCOSE in the last 72 hours.    Diagnostic Results:  n/a    TRANSITION OF CARE:     [facility] Contact Note: 11/01/2021    Family and/or Caretaker present at visit?  Yes.  Diagnostic tests reviewed/disposition: No diagnosic tests pending after this hospitalization.  Disease/illness education: Fall precautions  Home health/community services discussion/referrals: Patient has home health established at [facility].   Establishment or re-establishment of referral orders for community resources: No other necessary community resources.   Discussion with other health care providers: No discussion with other health care providers necessary.     Transition of Care Visit:     I have reviewed and updated the history and problem list.  I have reconciled the medication list.  I have discussed the hospitalization and current medical issues, prognosis and plans with the patient/family.  I  spent more than 50% of time discussing the care with the patient/family.  Total Face-to-Face Encounter: 60 minutes.    Medications Reconciliation:   I have reconciled the patient's home medications and discharge medications with the patient/family. I have updated all changes.  Refer to After-Visit Medication List.    ASSESSMENT & PLAN:     HIGH RISK CONDITION(S):  Patient has a condition that poses threat to life and bodily function    Paul was seen today for transitional care.    Diagnoses and all orders for this visit:    Vision loss  -     [facility]  No acute ocular pathology found to explain worsening vision. Was 20/200 at last follow up 2020    Follow up with Retina Specialist as previously scheduled    Debility  Continue home PT  Fall precautions      Were controlled substances prescribed?  No    Instructions for the patient:    Scheduled Follow-up :  Future Appointments   Date Time Provider Department  [place]   3/14/2022  9:00 AM LAB, [facility] [place]   3/14/2022  9:15 AM SPECIMEN, [facility] [place]   3/21/2022  2:00 PM Grey Forbes DO [facility] IM [place]   5/19/2022 10:40 AM Penelope Stauffer MD [facility]       After Visit Medication List :     Medication List          Accurate as of December 20, 2021 10:09 PM. If you have any questions, ask your nurse or doctor.            CHANGE how you take these medications    omega-3 fatty acids-vitamin E 1,000 mg Cap  Commonly known as: Fish OiL  Take 1 tablet by mouth 2 (two) times daily.  What changed: when to take this        CONTINUE taking these medications    albuterol 90 mcg/actuation inhaler  Commonly known as: PROAIR HFA  Inhale 2 puffs into the lungs every 6 (six) hours as needed for Wheezing or Shortness of Breath. Rescue     alendronate 70 MG tablet  Commonly known as: FOSAMAX  TAKE 1 TABLET BY MOUTH EVERY WEEK IN THE MORNING WITH FULL GLASS OF WATER ON EMPTY STOMACH-DONT LIE DOWN FOR AT LEAST 30 MINUTES AFTERWARDS     aspirin 81 MG EC tablet  Commonly known as: ECOTRIN     atorvastatin 40 MG tablet  Commonly known as: LIPITOR  Take 1 tablet (40 mg total) by mouth once daily.     furosemide 20 MG tablet  Commonly known as: LASIX  Take 1 tablet (20 mg total) by mouth once daily.     magnesium oxide 400 mg (241.3 mg magnesium) tablet  Commonly known as: MAG-OX  Take 1 tablet (400 mg total) by mouth once daily.     pantoprazole 40 MG tablet  Commonly known as: PROTONIX  TAKE 1 TABLET BY MOUTH TWICE DAILY     sertraline 100 MG tablet  Commonly known as: ZOLOFT  Take 1 tablet (100 mg total) by mouth once daily.     STIOLTO RESPIMAT 2.5-2.5 mcg/actuation Mist  Generic drug: tiotropium-olodateroL  Inhale 2 puffs into the lungs once daily. Controller     sucralfate 1 gram tablet  Commonly known as: CARAFATE  DISSOLVE 1 TABLET IN 10 ML OF WATER AND TAKE BY MOUTH TWICE DAILY            Signature:  Samaria Newman NP    Patient  consent obtained prior to treatment

## 2021-12-22 ENCOUNTER — HOSPITAL ENCOUNTER (EMERGENCY)
Facility: HOSPITAL | Age: 81
Discharge: HOME | End: 2021-12-23
Attending: EMERGENCY MEDICINE
Payer: MEDICARE

## 2021-12-22 DIAGNOSIS — S09.90XA CLOSED HEAD INJURY, INITIAL ENCOUNTER: ICD-10-CM
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
## 2021-12-22 PROCEDURE — 99284 EMERGENCY DEPT VISIT MOD MDM: CPT | Mod: HCNC,,, | Performed by: EMERGENCY MEDICINE

## 2021-12-22 PROCEDURE — 99284 EMERGENCY DEPT VISIT MOD MDM: CPT | Mod: HCNC

## 2021-12-22 PROCEDURE — 99284 PR EMERGENCY DEPT VISIT,LEVEL IV: ICD-10-PCS | Mod: HCNC,,, | Performed by: EMERGENCY MEDICINE

## 2021-12-23 ENCOUNTER — PATIENT MESSAGE (OUTPATIENT)
Dept: INTERNAL MEDICINE | Facility: CLINIC | Age: 81
End: 2021-12-23
Payer: MEDICARE

## 2021-12-23 ENCOUNTER — PATIENT MESSAGE (OUTPATIENT)
Dept: PHYSICAL MEDICINE AND REHAB | Facility: CLINIC | Age: 81
End: 2021-12-23
Payer: MEDICARE

## 2021-12-23 ENCOUNTER — NURSE TRIAGE (OUTPATIENT)
Dept: ADMINISTRATIVE | Facility: CLINIC | Age: 81
End: 2021-12-23
Payer: MEDICARE

## 2021-12-23 ENCOUNTER — PATIENT MESSAGE (OUTPATIENT)
Dept: NEUROSURGERY | Facility: CLINIC | Age: 81
End: 2021-12-23
Payer: MEDICARE

## 2021-12-23 VITALS
BODY MASS INDEX: 24.32 KG/M2 | DIASTOLIC BLOOD PRESSURE: 72 MMHG | SYSTOLIC BLOOD PRESSURE: 140 MMHG | RESPIRATION RATE: 18 BRPM | HEART RATE: 60 BPM | HEIGHT: 65 IN | WEIGHT: 146 LBS | TEMPERATURE: 98 F | OXYGEN SATURATION: 100 %

## 2021-12-23 PROCEDURE — 25000003 PHARM REV CODE 250: Mod: HCNC | Performed by: STUDENT IN AN ORGANIZED HEALTH CARE EDUCATION/TRAINING PROGRAM

## 2021-12-23 RX ORDER — IBUPROFEN 600 MG/1
600 TABLET ORAL
Status: COMPLETED | OUTPATIENT
Start: 2021-12-23 | End: 2021-12-23

## 2021-12-23 RX ADMIN — IBUPROFEN 600 MG: 600 TABLET, FILM COATED ORAL at 01:12

## 2021-12-23 NOTE — ED PROVIDER NOTES
Encounter Date: 12/22/2021       History     Chief Complaint   Patient presents with    Fall     Fall at home, denies LOC/blood thinner use. No head trauma noted by family     HPI Paul Browning 81 y.o. male with history of hypertension, hyperlipidemia who presented to the emergency department for evaluation of fall.  Son at bedside states the patient had a witnessed fall when trying to get out of chair.  Per son, patient attempting to go to get out of chair when he fell and hit his head.  Patient unable to fully describe pain, however it locates pain to the right temporoparietal region. Rates pain as moderate.  Patient denies preceding lightheadedness, dizziness, chest pain, shortness of breath.  He states that he has had a headache since falling and has no other complaints other than his headache.  Patient attempted  2 x 325mg Tylenol with no relief.  Given complaint of headache and history of cervical pain from previous cervical fracture son brought the patient in for evaluation. Denies aggravating or alleviating factors. Denies changes in vision.  Denies blood thinner use.  Denies loss of consciousness. Denies numbness or tingling.  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    Hyperlipidemia     Hypertension     Ischemic cardiomyopathy 11/5/2014    MDD (major depressive disorder), recurrent episode, mild 2/17/2016    Osteoporosis 7/20/2016    Peripheral arterial disease 3/18/2016    Sarcoid     Squamous cell carcinoma 09/2016, 5/2016    crown of scalp, left wrist     Past Surgical History:   Procedure Laterality Date     CARDIAC SURGERY      CAROTID STENT N/A 10/14/2019    Procedure: INSERTION, STENT, ARTERY, CAROTID;  Surgeon: Artie Kebede MD;  Location: [facility] CATH LAB;  Service: Cardiology;  Laterality: N/A;    CATARACT EXTRACTION W/  INTRAOCULAR LENS IMPLANT Right 07/17/2018    Dr. Talamantes    CATARACT EXTRACTION W/  INTRAOCULAR LENS IMPLANT Left 07/31/2018    Dr. Talamantes    CORONARY ARTERY BYPASS GRAFT      x2  10/2014    ESOPHAGOGASTRODUODENOSCOPY N/A 4/8/2019    Procedure: ESOPHAGOGASTRODUODENOSCOPY (EGD)/poss rfa;  Surgeon: Clifford De La Torre MD;  Location: [facility] ENDO (2ND FLR);  Service: Endoscopy;  Laterality: N/A;    ESOPHAGOGASTRODUODENOSCOPY N/A 8/4/2021    Procedure: EGD (ESOPHAGOGASTRODUODENOSCOPY);  Surgeon: Clifford De La Torre MD;  Location: [facility] ENDO (2ND FLR);  Service: Endoscopy;  Laterality: N/A;  8/2-covid [facility] [id number] appts on 8/3    ESOPHAGOGASTRODUODENOSCOPY (EGD) WITH RADIOFREQUENCY TREATMENT OF GASTROESOPHAGEAL JUNCTION      INJECTION OF ANESTHETIC AGENT AROUND MEDIAL BRANCH NERVES INNERVATING CERVICAL FACET JOINT Bilateral 1/9/2020    Procedure: INJECTION, MBB--Bilateral Cervical- Third Occipital nerve, C2-3--ASA okay for pt to continue taking per provider.;  Surgeon: Tip Mera Jr., MD;  Location: [facility] PAIN MGT;  Service: Pain Management;  Laterality: Bilateral;    INTRAOCULAR PROSTHESES INSERTION Right 7/17/2018    Procedure: INSERTION, INTRAOCULAR LENS PROSTHESIS;  Surgeon: León Talamantes MD;  Location: [address];  Service: Ophthalmology;  Laterality: Right;    INTRAOCULAR PROSTHESES INSERTION Left 7/31/2018    Procedure: INSERTION, INTRAOCULAR LENS PROSTHESIS;  Surgeon: León Talamantes MD;  Location: [facility] OR [address] [place] [address];  Service: Ophthalmology;  Laterality: Left;    PHACOEMULSIFICATION OF CATARACT Right 7/17/2018    Procedure: PHACOEMULSIFICATION, CATARACT;  Surgeon: León Talamantes MD;  Location: [facility] OR [address] [place] [address];  Service:  Ophthalmology;  Laterality: Right;    PHACOEMULSIFICATION OF CATARACT Left 2018    Procedure: PHACOEMULSIFICATION, CATARACT;  Surgeon: León Talamantes MD;  Location: [facility] OR 1ST FLR;  Service: Ophthalmology;  Laterality: Left;    PLACEMENT OF SCREW IN ODONTOID N/A 10/22/2020    Procedure: INSERTION, SCREW, ODONTOID. Anterior approach.;  Surgeon: Kirsten Weaver MD;  Location: [facility] OR 2ND FLR;  Service: Neurosurgery;  Laterality: N/A;    RADIOFREQUENCY THERMOCOAGULATION Bilateral 2020    Procedure: RADIOFREQUENCY THERMAL COAGULATION--Bilateral C2-3 and Third Occipital Nerve;  Surgeon: Tip Mera Jr., MD;  [place] [facility] [place] [facility]  Service: Pain Management;  Laterality: Bilateral;    UMBILICAL HERNIA REPAIR       Family History   Problem Relation Age of Onset    Arrhythmia Mother     Heart disease Mother     Heart failure Brother     No Known Problems Daughter     No Known Problems Son     Colon cancer Neg Hx     Inflammatory bowel disease Neg Hx     Celiac disease Neg Hx     Melanoma Neg Hx     Amblyopia Neg Hx     Blindness Neg Hx     Cataracts Neg Hx     Glaucoma Neg Hx     Macular degeneration Neg Hx     Retinal detachment Neg Hx     Strabismus Neg Hx      Social History     Tobacco Use    Smoking status: Former Smoker     Packs/day: 2.00     Years: 20.00     Pack years: 40.00     Types: Cigarettes     Quit date: 1988     Years since quittin.8    Smokeless tobacco: Never Used   Substance Use Topics    Alcohol use: No     Comment: quit drinking 2012    Drug use: No     Review of Systems   Constitutional: Negative for chills and fever.   HENT: Negative for rhinorrhea and sore throat.    Eyes: Negative for photophobia and visual disturbance.   Respiratory: Negative for cough and shortness of breath.    Cardiovascular: Negative for chest pain and leg swelling.   Gastrointestinal: Negative for abdominal pain and nausea.   Genitourinary: Negative for frequency  and urgency.   Musculoskeletal: Negative for back pain and gait problem.   Skin: Negative for rash and wound.   Neurological: Positive for headaches. Negative for syncope and weakness.       Physical Exam     Initial Vitals [12/22/21 2307]   BP Pulse Resp Temp SpO2   (!) 180/80 66 18 97.9 °F (36.6 °C) 99 %      MAP       --         Physical Exam    Nursing note and vitals reviewed.  Constitutional: He appears well-developed and well-nourished.   HENT:   Head: Normocephalic and atraumatic.   No palpable skull deformities.   Eyes: Conjunctivae and EOM are normal.   Neck: Neck supple.   Normal range of motion.  Cardiovascular: Normal rate, regular rhythm, normal heart sounds and intact distal pulses. Exam reveals no gallop and no friction rub.    No murmur heard.  Pulmonary/Chest: Breath sounds normal. No respiratory distress. He has no wheezes. He has no rhonchi. He has no rales.   Abdominal: Abdomen is soft. Bowel sounds are normal. He exhibits no distension. There is no abdominal tenderness. There is no guarding.   Musculoskeletal:         General: No tenderness or edema. Normal range of motion.      Cervical back: Normal range of motion and neck supple.      Comments: No cervical, thoracic, lumbar, or sacral midline tenderness to palpation.  No step-offs or deformities     Neurological: He is alert and oriented to person, place, and time.   Ambulates with steady gait  Cranial nerves 2-12 grossly intact  5/5 strength in bilateral upper and lower extremities.   Skin: Skin is warm and dry.         ED Course   Procedures  Labs Reviewed - No data to display       Imaging Results    None          Medications - No data to display  Medical Decision Making:   History:   I obtained history from: someone other than patient.       <> Summary of History: Patient's son  Initial Assessment:   Paul Browning 81 y.o. male with history of hypertension, hyperlipidemia who presented to the emergency department for  evaluation of fall.      Differential Diagnosis:   Mechanical fall, intracranial hemorrhage, cervical spinal fracture.  Low suspicion for syncope at this time is patient denies loss of consciousness, has had no preceding dizziness, lightheadedness, chest pain, abdominal pain, difficulty breathing, or changes in vision prior to fall.  Additionally, fall was a witnessed mechanical.  Clinical Tests:   Radiological Study: Ordered and Reviewed  ED Management:  Patient given 600 mg ibuprofen with complete resolution of headache.  CT negative for acute intracranial process or acute cervical fracture.  Again, patient denies numbness or tingling or weakness.  discussed results with patient and family at bedside.  Patient to follow up with his primary care provider.  Return precautions provided.  Patient is stable for discharge and outpatient follow-up.    Mirtha Marks MD PGY2  [employer] [facility]                          Clinical Impression:   Final diagnoses:  [W19.XXXA] Fall, initial encounter (Primary)  [S09.90XA] Closed head injury, initial encounter                 Mirtha Marks MD  Resident  12/23/21 6112

## 2021-12-23 NOTE — DISCHARGE INSTRUCTIONS
Take tylenol or motrin if needed for headache.  Follow up with your doctor  Return to ED for any concerns

## 2021-12-23 NOTE — ED TRIAGE NOTES
"Pt presents to the ED c/o fall. Pt reports attempting to get out of a chair when he then fell and hit his head. Per family member pt has a history of frequent falls and was told to bring the pt in for evaluation after any fall due to a "pin in his neck" in order to make sure it is still in place. Pt oriented to situation and person only. Per family, this is the pts baseline. Denies LOC or use of blood thinners. Pt reports headache. Denies n/v, weakness, or dizziness. No abrasions or bruises noted  "

## 2021-12-26 ENCOUNTER — PATIENT MESSAGE (OUTPATIENT)
Dept: NEUROLOGY | Facility: CLINIC | Age: 81
End: 2021-12-26
Payer: MEDICARE

## 2021-12-27 RX ORDER — BUTALBITAL AND ACETAMINOPHEN 300; 50 MG/1; MG/1
TABLET ORAL
Qty: 12 TABLET | Refills: 0 | Status: ON HOLD | OUTPATIENT
Start: 2021-12-27 | End: 2022-01-01 | Stop reason: HOSPADM

## 2021-12-29 DIAGNOSIS — G44.311 INTRACTABLE ACUTE POST-TRAUMATIC HEADACHE: Primary | ICD-10-CM

## 2021-12-29 DIAGNOSIS — R51.9 INTRACTABLE HEADACHE, UNSPECIFIED CHRONICITY PATTERN, UNSPECIFIED HEADACHE TYPE: ICD-10-CM

## 2022-01-01 ENCOUNTER — CLINICAL SUPPORT (OUTPATIENT)
Dept: REHABILITATION | Facility: HOSPITAL | Age: 82
End: 2022-01-01
Payer: MEDICARE

## 2022-01-01 ENCOUNTER — PATIENT MESSAGE (OUTPATIENT)
Dept: PULMONOLOGY | Facility: CLINIC | Age: 82
End: 2022-01-01

## 2022-01-01 ENCOUNTER — PATIENT MESSAGE (OUTPATIENT)
Dept: CARDIOLOGY | Facility: CLINIC | Age: 82
End: 2022-01-01

## 2022-01-01 ENCOUNTER — HOSPITAL ENCOUNTER (EMERGENCY)
Facility: HOSPITAL | Age: 82
Discharge: HOME | End: 2022-10-19
Attending: STUDENT IN AN ORGANIZED HEALTH CARE EDUCATION/TRAINING PROGRAM
Payer: MEDICARE

## 2022-01-01 ENCOUNTER — PATIENT MESSAGE (OUTPATIENT)
Dept: INTERNAL MEDICINE | Facility: CLINIC | Age: 82
End: 2022-01-01
Payer: MEDICARE

## 2022-01-01 ENCOUNTER — TELEPHONE (OUTPATIENT)
Dept: CARDIOLOGY | Facility: CLINIC | Age: 82
End: 2022-01-01
Payer: MEDICARE

## 2022-01-01 ENCOUNTER — TELEPHONE (OUTPATIENT)
Dept: INTERNAL MEDICINE | Facility: CLINIC | Age: 82
End: 2022-01-01
Payer: MEDICARE

## 2022-01-01 ENCOUNTER — TELEPHONE (OUTPATIENT)
Dept: PHARMACY | Facility: CLINIC | Age: 82
End: 2022-01-01
Payer: MEDICARE

## 2022-01-01 ENCOUNTER — OFFICE VISIT (OUTPATIENT)
Dept: PULMONOLOGY | Facility: CLINIC | Age: 82
End: 2022-01-01
Payer: MEDICARE

## 2022-01-01 ENCOUNTER — PATIENT MESSAGE (OUTPATIENT)
Dept: MEDSURG UNIT | Facility: HOSPITAL | Age: 82
End: 2022-01-01
Payer: MEDICARE

## 2022-01-01 ENCOUNTER — OFFICE VISIT (OUTPATIENT)
Dept: NEUROLOGY | Facility: CLINIC | Age: 82
End: 2022-01-01
Payer: MEDICARE

## 2022-01-01 ENCOUNTER — LAB VISIT (OUTPATIENT)
Dept: LAB | Facility: HOSPITAL | Age: 82
End: 2022-01-01
Payer: MEDICARE

## 2022-01-01 ENCOUNTER — TELEPHONE (OUTPATIENT)
Dept: WOUND CARE | Facility: HOSPITAL | Age: 82
End: 2022-01-01
Payer: MEDICARE

## 2022-01-01 ENCOUNTER — PATIENT OUTREACH (OUTPATIENT)
Dept: ADMINISTRATIVE | Facility: OTHER | Age: 82
End: 2022-01-01
Payer: MEDICARE

## 2022-01-01 ENCOUNTER — DOCUMENT SCAN (OUTPATIENT)
Dept: HOME HEALTH SERVICES | Facility: HOSPITAL | Age: 82
End: 2022-01-01
Payer: MEDICARE

## 2022-01-01 ENCOUNTER — OFFICE VISIT (OUTPATIENT)
Dept: INTERNAL MEDICINE | Facility: CLINIC | Age: 82
End: 2022-01-01
Payer: MEDICARE

## 2022-01-01 ENCOUNTER — DOCUMENTATION ONLY (OUTPATIENT)
Dept: REHABILITATION | Facility: HOSPITAL | Age: 82
End: 2022-01-01
Payer: MEDICARE

## 2022-01-01 ENCOUNTER — OFFICE VISIT (OUTPATIENT)
Dept: ORTHOPEDICS | Facility: CLINIC | Age: 82
End: 2022-01-01
Payer: MEDICARE

## 2022-01-01 ENCOUNTER — PATIENT MESSAGE (OUTPATIENT)
Dept: PULMONOLOGY | Facility: CLINIC | Age: 82
End: 2022-01-01
Payer: MEDICARE

## 2022-01-01 ENCOUNTER — LAB VISIT (OUTPATIENT)
Dept: LAB | Facility: HOSPITAL | Age: 82
End: 2022-01-01
Attending: INTERNAL MEDICINE
Payer: MEDICARE

## 2022-01-01 ENCOUNTER — HOSPITAL ENCOUNTER (OUTPATIENT)
Dept: RADIOLOGY | Facility: HOSPITAL | Age: 82
Discharge: HOME | End: 2022-04-06
Attending: NURSE PRACTITIONER
Payer: MEDICARE

## 2022-01-01 ENCOUNTER — PATIENT MESSAGE (OUTPATIENT)
Dept: NEUROLOGY | Facility: CLINIC | Age: 82
End: 2022-01-01
Payer: MEDICARE

## 2022-01-01 ENCOUNTER — PES CALL (OUTPATIENT)
Dept: ADMINISTRATIVE | Facility: CLINIC | Age: 82
End: 2022-01-01
Payer: MEDICARE

## 2022-01-01 ENCOUNTER — PATIENT MESSAGE (OUTPATIENT)
Dept: CARDIOLOGY | Facility: CLINIC | Age: 82
End: 2022-01-01
Payer: MEDICARE

## 2022-01-01 ENCOUNTER — OFFICE VISIT (OUTPATIENT)
Dept: CARDIOLOGY | Facility: CLINIC | Age: 82
End: 2022-01-01
Payer: MEDICARE

## 2022-01-01 ENCOUNTER — HOSPITAL ENCOUNTER (EMERGENCY)
Facility: HOSPITAL | Age: 82
Discharge: HOME | End: 2022-09-28
Attending: EMERGENCY MEDICINE
Payer: MEDICARE

## 2022-01-01 ENCOUNTER — HOSPITAL ENCOUNTER (OUTPATIENT)
Dept: RADIOLOGY | Facility: HOSPITAL | Age: 82
Discharge: HOME | End: 2022-10-19
Attending: INTERNAL MEDICINE
Payer: MEDICARE

## 2022-01-01 ENCOUNTER — TELEPHONE (OUTPATIENT)
Dept: REHABILITATION | Facility: HOSPITAL | Age: 82
End: 2022-01-01
Payer: MEDICARE

## 2022-01-01 ENCOUNTER — HOSPITAL ENCOUNTER (OUTPATIENT)
Dept: RADIOLOGY | Facility: HOSPITAL | Age: 82
Discharge: HOME | End: 2022-11-02
Attending: INTERNAL MEDICINE
Payer: MEDICARE

## 2022-01-01 ENCOUNTER — PATIENT MESSAGE (OUTPATIENT)
Dept: ORTHOPEDICS | Facility: CLINIC | Age: 82
End: 2022-01-01
Payer: MEDICARE

## 2022-01-01 ENCOUNTER — PATIENT MESSAGE (OUTPATIENT)
Dept: NEUROLOGY | Facility: CLINIC | Age: 82
End: 2022-01-01

## 2022-01-01 ENCOUNTER — OUTPATIENT CASE MANAGEMENT (OUTPATIENT)
Dept: ADMINISTRATIVE | Facility: OTHER | Age: 82
End: 2022-01-01
Payer: MEDICARE

## 2022-01-01 ENCOUNTER — HOSPITAL ENCOUNTER (OUTPATIENT)
Dept: CARDIOLOGY | Facility: HOSPITAL | Age: 82
Discharge: HOME | End: 2022-04-26
Attending: NURSE PRACTITIONER
Payer: MEDICARE

## 2022-01-01 ENCOUNTER — HOSPITAL ENCOUNTER (EMERGENCY)
Facility: HOSPITAL | Age: 82
Discharge: HOME | End: 2022-06-06
Attending: EMERGENCY MEDICINE
Payer: MEDICARE

## 2022-01-01 ENCOUNTER — TELEPHONE (OUTPATIENT)
Dept: ORTHOPEDICS | Facility: CLINIC | Age: 82
End: 2022-01-01
Payer: MEDICARE

## 2022-01-01 ENCOUNTER — OFFICE VISIT (OUTPATIENT)
Dept: UROLOGY | Facility: CLINIC | Age: 82
End: 2022-01-01
Payer: MEDICARE

## 2022-01-01 ENCOUNTER — LAB VISIT (OUTPATIENT)
Dept: LAB | Facility: HOSPITAL | Age: 82
End: 2022-01-01
Attending: NURSE PRACTITIONER
Payer: MEDICARE

## 2022-01-01 ENCOUNTER — TELEPHONE (OUTPATIENT)
Dept: ENDOSCOPY | Facility: HOSPITAL | Age: 82
End: 2022-01-01
Payer: MEDICARE

## 2022-01-01 ENCOUNTER — TELEPHONE (OUTPATIENT)
Dept: CARDIOLOGY | Facility: HOSPITAL | Age: 82
End: 2022-01-01
Payer: MEDICARE

## 2022-01-01 ENCOUNTER — HOSPITAL ENCOUNTER (OUTPATIENT)
Dept: RADIOLOGY | Facility: HOSPITAL | Age: 82
Discharge: HOME | End: 2022-02-10
Attending: NURSE PRACTITIONER
Payer: MEDICARE

## 2022-01-01 ENCOUNTER — EXTERNAL HOME HEALTH (OUTPATIENT)
Dept: HOME HEALTH SERVICES | Facility: HOSPITAL | Age: 82
End: 2022-01-01
Payer: MEDICARE

## 2022-01-01 ENCOUNTER — DOCUMENTATION ONLY (OUTPATIENT)
Dept: CARDIOLOGY | Facility: CLINIC | Age: 82
End: 2022-01-01
Payer: MEDICARE

## 2022-01-01 ENCOUNTER — HOSPITAL ENCOUNTER (OUTPATIENT)
Dept: RADIOLOGY | Facility: HOSPITAL | Age: 82
Discharge: HOME | End: 2022-10-12
Attending: INTERNAL MEDICINE
Payer: MEDICARE

## 2022-01-01 ENCOUNTER — NURSE TRIAGE (OUTPATIENT)
Dept: ADMINISTRATIVE | Facility: CLINIC | Age: 82
End: 2022-01-01
Payer: MEDICARE

## 2022-01-01 ENCOUNTER — PATIENT MESSAGE (OUTPATIENT)
Dept: RESEARCH | Facility: CLINIC | Age: 82
End: 2022-01-01
Payer: MEDICARE

## 2022-01-01 ENCOUNTER — HOSPITAL ENCOUNTER (OUTPATIENT)
Dept: RADIOLOGY | Facility: HOSPITAL | Age: 82
Discharge: HOME | End: 2022-02-24
Attending: NURSE PRACTITIONER
Payer: MEDICARE

## 2022-01-01 VITALS
DIASTOLIC BLOOD PRESSURE: 76 MMHG | OXYGEN SATURATION: 99 % | SYSTOLIC BLOOD PRESSURE: 136 MMHG | HEART RATE: 70 BPM | WEIGHT: 139.56 LBS | BODY MASS INDEX: 20.67 KG/M2 | HEIGHT: 69 IN

## 2022-01-01 VITALS
OXYGEN SATURATION: 99 % | TEMPERATURE: 98 F | BODY MASS INDEX: 21.91 KG/M2 | HEIGHT: 69 IN | SYSTOLIC BLOOD PRESSURE: 118 MMHG | HEART RATE: 65 BPM | WEIGHT: 147.94 LBS | DIASTOLIC BLOOD PRESSURE: 60 MMHG

## 2022-01-01 VITALS
SYSTOLIC BLOOD PRESSURE: 100 MMHG | BODY MASS INDEX: 21.85 KG/M2 | HEART RATE: 77 BPM | OXYGEN SATURATION: 98 % | TEMPERATURE: 97 F | DIASTOLIC BLOOD PRESSURE: 60 MMHG | HEIGHT: 69 IN

## 2022-01-01 VITALS
RESPIRATION RATE: 20 BRPM | OXYGEN SATURATION: 96 % | TEMPERATURE: 98 F | DIASTOLIC BLOOD PRESSURE: 55 MMHG | HEART RATE: 99 BPM | SYSTOLIC BLOOD PRESSURE: 111 MMHG

## 2022-01-01 VITALS
WEIGHT: 147.81 LBS | WEIGHT: 139.56 LBS | DIASTOLIC BLOOD PRESSURE: 75 MMHG | BODY MASS INDEX: 20.67 KG/M2 | HEIGHT: 69 IN | TEMPERATURE: 98 F | SYSTOLIC BLOOD PRESSURE: 128 MMHG | BODY MASS INDEX: 21.89 KG/M2 | HEIGHT: 69 IN | HEART RATE: 74 BPM | RESPIRATION RATE: 18 BRPM

## 2022-01-01 VITALS
RESPIRATION RATE: 20 BRPM | TEMPERATURE: 98 F | DIASTOLIC BLOOD PRESSURE: 65 MMHG | HEART RATE: 67 BPM | OXYGEN SATURATION: 100 % | WEIGHT: 147 LBS | SYSTOLIC BLOOD PRESSURE: 137 MMHG | BODY MASS INDEX: 21.71 KG/M2

## 2022-01-01 VITALS
HEART RATE: 78 BPM | DIASTOLIC BLOOD PRESSURE: 55 MMHG | WEIGHT: 145.31 LBS | HEIGHT: 69 IN | OXYGEN SATURATION: 98 % | SYSTOLIC BLOOD PRESSURE: 108 MMHG | OXYGEN SATURATION: 96 % | HEIGHT: 69 IN | HEART RATE: 78 BPM | TEMPERATURE: 98 F | BODY MASS INDEX: 21.52 KG/M2 | WEIGHT: 142 LBS | BODY MASS INDEX: 21.03 KG/M2 | DIASTOLIC BLOOD PRESSURE: 60 MMHG | SYSTOLIC BLOOD PRESSURE: 110 MMHG

## 2022-01-01 VITALS
HEIGHT: 69 IN | BODY MASS INDEX: 21.91 KG/M2 | HEART RATE: 83 BPM | SYSTOLIC BLOOD PRESSURE: 110 MMHG | DIASTOLIC BLOOD PRESSURE: 60 MMHG | WEIGHT: 147.94 LBS | OXYGEN SATURATION: 98 %

## 2022-01-01 VITALS
HEART RATE: 59 BPM | WEIGHT: 148.56 LBS | HEIGHT: 69 IN | HEART RATE: 64 BPM | SYSTOLIC BLOOD PRESSURE: 100 MMHG | WEIGHT: 147.06 LBS | DIASTOLIC BLOOD PRESSURE: 73 MMHG | DIASTOLIC BLOOD PRESSURE: 54 MMHG | BODY MASS INDEX: 21.78 KG/M2 | SYSTOLIC BLOOD PRESSURE: 131 MMHG | HEIGHT: 69 IN | BODY MASS INDEX: 22 KG/M2

## 2022-01-01 VITALS
RESPIRATION RATE: 18 BRPM | HEART RATE: 81 BPM | WEIGHT: 147 LBS | HEIGHT: 69 IN | DIASTOLIC BLOOD PRESSURE: 59 MMHG | BODY MASS INDEX: 21.77 KG/M2 | SYSTOLIC BLOOD PRESSURE: 136 MMHG

## 2022-01-01 VITALS
WEIGHT: 148 LBS | RESPIRATION RATE: 20 BRPM | SYSTOLIC BLOOD PRESSURE: 135 MMHG | TEMPERATURE: 97 F | HEIGHT: 69 IN | DIASTOLIC BLOOD PRESSURE: 61 MMHG | OXYGEN SATURATION: 94 % | HEART RATE: 8 BPM | BODY MASS INDEX: 21.92 KG/M2

## 2022-01-01 VITALS
BODY MASS INDEX: 21.77 KG/M2 | HEART RATE: 78 BPM | SYSTOLIC BLOOD PRESSURE: 130 MMHG | WEIGHT: 147 LBS | OXYGEN SATURATION: 98 % | DIASTOLIC BLOOD PRESSURE: 60 MMHG | HEIGHT: 69 IN

## 2022-01-01 VITALS
SYSTOLIC BLOOD PRESSURE: 118 MMHG | WEIGHT: 147.06 LBS | DIASTOLIC BLOOD PRESSURE: 60 MMHG | BODY MASS INDEX: 21.78 KG/M2 | OXYGEN SATURATION: 98 % | HEART RATE: 66 BPM | HEIGHT: 69 IN

## 2022-01-01 VITALS
SYSTOLIC BLOOD PRESSURE: 144 MMHG | BODY MASS INDEX: 21.72 KG/M2 | DIASTOLIC BLOOD PRESSURE: 69 MMHG | HEART RATE: 65 BPM | HEIGHT: 69 IN | WEIGHT: 146.63 LBS | OXYGEN SATURATION: 94 %

## 2022-01-01 VITALS
TEMPERATURE: 97 F | SYSTOLIC BLOOD PRESSURE: 110 MMHG | BODY MASS INDEX: 21.89 KG/M2 | HEIGHT: 69 IN | DIASTOLIC BLOOD PRESSURE: 64 MMHG | WEIGHT: 147.81 LBS | HEART RATE: 69 BPM | OXYGEN SATURATION: 98 %

## 2022-01-01 VITALS
HEART RATE: 86 BPM | SYSTOLIC BLOOD PRESSURE: 99 MMHG | BODY MASS INDEX: 21.87 KG/M2 | DIASTOLIC BLOOD PRESSURE: 62 MMHG | HEIGHT: 69 IN | WEIGHT: 147.69 LBS

## 2022-01-01 VITALS
WEIGHT: 143 LBS | BODY MASS INDEX: 21.18 KG/M2 | DIASTOLIC BLOOD PRESSURE: 57 MMHG | HEART RATE: 83 BPM | SYSTOLIC BLOOD PRESSURE: 97 MMHG | HEIGHT: 69 IN | RESPIRATION RATE: 18 BRPM

## 2022-01-01 VITALS
OXYGEN SATURATION: 99 % | SYSTOLIC BLOOD PRESSURE: 124 MMHG | BODY MASS INDEX: 21.18 KG/M2 | WEIGHT: 143 LBS | DIASTOLIC BLOOD PRESSURE: 61 MMHG | HEIGHT: 69 IN | TEMPERATURE: 98 F | RESPIRATION RATE: 15 BRPM | HEART RATE: 73 BPM

## 2022-01-01 VITALS — WEIGHT: 147.94 LBS | BODY MASS INDEX: 21.91 KG/M2 | HEIGHT: 69 IN

## 2022-01-01 VITALS
HEART RATE: 64 BPM | HEIGHT: 69 IN | TEMPERATURE: 98 F | WEIGHT: 138 LBS | DIASTOLIC BLOOD PRESSURE: 62 MMHG | OXYGEN SATURATION: 98 % | SYSTOLIC BLOOD PRESSURE: 128 MMHG | BODY MASS INDEX: 20.44 KG/M2

## 2022-01-01 VITALS
DIASTOLIC BLOOD PRESSURE: 53 MMHG | HEART RATE: 62 BPM | SYSTOLIC BLOOD PRESSURE: 109 MMHG | BODY MASS INDEX: 21.38 KG/M2 | WEIGHT: 144.38 LBS | HEIGHT: 69 IN

## 2022-01-01 DIAGNOSIS — I42.9 CARDIOMYOPATHY, UNSPECIFIED TYPE: ICD-10-CM

## 2022-01-01 DIAGNOSIS — Z74.09 IMPAIRED FUNCTIONAL MOBILITY, BALANCE, GAIT, AND ENDURANCE: ICD-10-CM

## 2022-01-01 DIAGNOSIS — S72.102D CLOSED FRACTURE OF TROCHANTER OF LEFT FEMUR WITH ROUTINE HEALING, SUBSEQUENT ENCOUNTER: ICD-10-CM

## 2022-01-01 DIAGNOSIS — I10 PRIMARY HYPERTENSION: ICD-10-CM

## 2022-01-01 DIAGNOSIS — N39.0 ACUTE UTI: Primary | ICD-10-CM

## 2022-01-01 DIAGNOSIS — R31.9 HEMATURIA, UNSPECIFIED TYPE: Primary | ICD-10-CM

## 2022-01-01 DIAGNOSIS — I25.5 ISCHEMIC CARDIOMYOPATHY: ICD-10-CM

## 2022-01-01 DIAGNOSIS — F03.90 DEMENTIA WITHOUT BEHAVIORAL DISTURBANCE, UNSPECIFIED DEMENTIA TYPE: ICD-10-CM

## 2022-01-01 DIAGNOSIS — I65.23 BILATERAL CAROTID ARTERY STENOSIS: ICD-10-CM

## 2022-01-01 DIAGNOSIS — I50.22 CHRONIC SYSTOLIC HEART FAILURE: ICD-10-CM

## 2022-01-01 DIAGNOSIS — J44.9 CHRONIC OBSTRUCTIVE PULMONARY DISEASE, UNSPECIFIED COPD TYPE: Primary | ICD-10-CM

## 2022-01-01 DIAGNOSIS — I50.32 CHRONIC DIASTOLIC HEART FAILURE: ICD-10-CM

## 2022-01-01 DIAGNOSIS — S32.592D CLOSED BILATERAL FRACTURE OF PUBIC RAMI WITH ROUTINE HEALING: Primary | ICD-10-CM

## 2022-01-01 DIAGNOSIS — I70.0 ATHEROSCLEROSIS OF AORTA: ICD-10-CM

## 2022-01-01 DIAGNOSIS — S32.82XA MULTIPLE CLOSED STABLE LATERAL COMPRESSION FRACTURES OF PELVIS, INITIAL ENCOUNTER: Primary | ICD-10-CM

## 2022-01-01 DIAGNOSIS — G43.019 INTRACTABLE MIGRAINE WITHOUT AURA AND WITHOUT STATUS MIGRAINOSUS: Primary | ICD-10-CM

## 2022-01-01 DIAGNOSIS — I73.9 PERIPHERAL ARTERIAL DISEASE: ICD-10-CM

## 2022-01-01 DIAGNOSIS — G30.1 LATE ONSET ALZHEIMER'S DEMENTIA WITH BEHAVIORAL DISTURBANCE: ICD-10-CM

## 2022-01-01 DIAGNOSIS — F41.9 ANXIETY: ICD-10-CM
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
## 2022-01-01 DIAGNOSIS — Z95.1 S/P CABG X 2: ICD-10-CM

## 2022-01-01 DIAGNOSIS — I45.9 INTRAVENTRICULAR CONDUCTION DELAY: ICD-10-CM

## 2022-01-01 DIAGNOSIS — E78.00 PURE HYPERCHOLESTEROLEMIA: Chronic | ICD-10-CM

## 2022-01-01 DIAGNOSIS — M47.812 CERVICAL SPONDYLOSIS: ICD-10-CM

## 2022-01-01 DIAGNOSIS — S72.102D CLOSED FRACTURE OF TROCHANTER OF LEFT FEMUR WITH ROUTINE HEALING: ICD-10-CM

## 2022-01-01 DIAGNOSIS — Z85.828 PERSONAL HISTORY OF SKIN CANCER: ICD-10-CM

## 2022-01-01 DIAGNOSIS — R06.02 SHORTNESS OF BREATH: ICD-10-CM

## 2022-01-01 DIAGNOSIS — R10.2 PAIN IN PELVIS: ICD-10-CM

## 2022-01-01 DIAGNOSIS — G44.86 CERVICOGENIC HEADACHE: Primary | ICD-10-CM

## 2022-01-01 DIAGNOSIS — I25.10 CORONARY ARTERY DISEASE INVOLVING NATIVE CORONARY ARTERY OF NATIVE HEART WITHOUT ANGINA PECTORIS: Chronic | ICD-10-CM

## 2022-01-01 DIAGNOSIS — J44.9 CHRONIC OBSTRUCTIVE PULMONARY DISEASE, UNSPECIFIED COPD TYPE: ICD-10-CM

## 2022-01-01 DIAGNOSIS — J90 PLEURAL EFFUSION ON LEFT: Primary | ICD-10-CM

## 2022-01-01 DIAGNOSIS — S72.102D CLOSED FRACTURE OF TROCHANTER OF LEFT FEMUR WITH ROUTINE HEALING: Primary | ICD-10-CM

## 2022-01-01 DIAGNOSIS — R91.1 PULMONARY NODULE: Primary | ICD-10-CM

## 2022-01-01 DIAGNOSIS — Z00.00 ANNUAL PHYSICAL EXAM: ICD-10-CM

## 2022-01-01 DIAGNOSIS — D86.0 PULMONARY SARCOIDOSIS: ICD-10-CM

## 2022-01-01 DIAGNOSIS — I77.9 CAROTID ARTERY DISEASE, UNSPECIFIED LATERALITY, UNSPECIFIED TYPE: ICD-10-CM

## 2022-01-01 DIAGNOSIS — I50.22 CHRONIC SYSTOLIC HEART FAILURE: Primary | ICD-10-CM

## 2022-01-01 DIAGNOSIS — J90 PLEURAL EFFUSION: Primary | ICD-10-CM

## 2022-01-01 DIAGNOSIS — J96.01 ACUTE HYPOXEMIC RESPIRATORY FAILURE: ICD-10-CM

## 2022-01-01 DIAGNOSIS — I65.21 STENOSIS OF RIGHT CAROTID ARTERY: ICD-10-CM

## 2022-01-01 DIAGNOSIS — K22.719 BARRETT'S ESOPHAGUS WITH DYSPLASIA: ICD-10-CM

## 2022-01-01 DIAGNOSIS — D69.2 SENILE PURPURA: ICD-10-CM

## 2022-01-01 DIAGNOSIS — K22.710 BARRETT'S ESOPHAGUS WITH LOW GRADE DYSPLASIA: Chronic | ICD-10-CM

## 2022-01-01 DIAGNOSIS — J90 PLEURAL EFFUSION, NOT ELSEWHERE CLASSIFIED: Primary | ICD-10-CM

## 2022-01-01 DIAGNOSIS — F02.818 LATE ONSET ALZHEIMER'S DEMENTIA WITH BEHAVIORAL DISTURBANCE: ICD-10-CM

## 2022-01-01 DIAGNOSIS — M81.0 OSTEOPOROSIS, UNSPECIFIED OSTEOPOROSIS TYPE, UNSPECIFIED PATHOLOGICAL FRACTURE PRESENCE: ICD-10-CM

## 2022-01-01 DIAGNOSIS — F05 SUNDOWNING: ICD-10-CM

## 2022-01-01 DIAGNOSIS — S72.102D CLOSED FRACTURE OF TROCHANTER OF LEFT FEMUR WITH ROUTINE HEALING, SUBSEQUENT ENCOUNTER: Primary | ICD-10-CM

## 2022-01-01 DIAGNOSIS — G44.86 CERVICOGENIC HEADACHE: ICD-10-CM

## 2022-01-01 DIAGNOSIS — D64.9 ANEMIA, UNSPECIFIED TYPE: ICD-10-CM

## 2022-01-01 DIAGNOSIS — L89.90 PRESSURE INJURY OF SKIN, UNSPECIFIED INJURY STAGE, UNSPECIFIED LOCATION: ICD-10-CM

## 2022-01-01 DIAGNOSIS — R94.39 POSITIVE CARDIAC STRESS TEST: Primary | ICD-10-CM

## 2022-01-01 DIAGNOSIS — R26.81 GAIT INSTABILITY: ICD-10-CM

## 2022-01-01 DIAGNOSIS — J90 PLEURAL EFFUSION, NOT ELSEWHERE CLASSIFIED: ICD-10-CM

## 2022-01-01 DIAGNOSIS — I10 ESSENTIAL HYPERTENSION: ICD-10-CM

## 2022-01-01 DIAGNOSIS — I10 PRIMARY HYPERTENSION: Primary | ICD-10-CM

## 2022-01-01 DIAGNOSIS — G20.C PARKINSONISM, UNSPECIFIED PARKINSONISM TYPE: ICD-10-CM

## 2022-01-01 DIAGNOSIS — R91.8 LUNG MASS: ICD-10-CM

## 2022-01-01 DIAGNOSIS — R68.89 MULTIPLE COMPLAINTS: ICD-10-CM

## 2022-01-01 DIAGNOSIS — R10.2 PAIN IN PELVIS: Primary | ICD-10-CM

## 2022-01-01 DIAGNOSIS — F05 SUNDOWNING: Primary | ICD-10-CM

## 2022-01-01 DIAGNOSIS — F03.90 DEMENTIA, UNSPECIFIED DEMENTIA SEVERITY, UNSPECIFIED DEMENTIA TYPE, UNSPECIFIED WHETHER BEHAVIORAL, PSYCHOTIC, OR MOOD DISTURBANCE OR ANXIETY: ICD-10-CM

## 2022-01-01 DIAGNOSIS — K14.6 BURNING MOUTH SYNDROME: Primary | ICD-10-CM

## 2022-01-01 DIAGNOSIS — I25.10 CORONARY ARTERY DISEASE INVOLVING NATIVE CORONARY ARTERY OF NATIVE HEART WITHOUT ANGINA PECTORIS: Primary | Chronic | ICD-10-CM

## 2022-01-01 DIAGNOSIS — S32.592A CLOSED FRACTURE OF MULTIPLE PUBIC RAMI, LEFT, INITIAL ENCOUNTER: Primary | ICD-10-CM

## 2022-01-01 DIAGNOSIS — S32.591D CLOSED BILATERAL FRACTURE OF PUBIC RAMI WITH ROUTINE HEALING: Primary | ICD-10-CM

## 2022-01-01 DIAGNOSIS — G30.1 LATE ONSET ALZHEIMER'S DEMENTIA WITH BEHAVIORAL DISTURBANCE: Primary | ICD-10-CM

## 2022-01-01 DIAGNOSIS — R97.20 ELEVATED PSA MEASUREMENT: Primary | ICD-10-CM

## 2022-01-01 DIAGNOSIS — Z87.11 HX OF GASTRIC ULCER: ICD-10-CM

## 2022-01-01 DIAGNOSIS — F33.0 MDD (MAJOR DEPRESSIVE DISORDER), RECURRENT EPISODE, MILD: Chronic | ICD-10-CM

## 2022-01-01 DIAGNOSIS — K21.9 GASTROESOPHAGEAL REFLUX DISEASE, UNSPECIFIED WHETHER ESOPHAGITIS PRESENT: ICD-10-CM

## 2022-01-01 DIAGNOSIS — R26.81 GAIT INSTABILITY: Primary | ICD-10-CM

## 2022-01-01 DIAGNOSIS — J44.1 COPD EXACERBATION: Primary | ICD-10-CM

## 2022-01-01 DIAGNOSIS — R29.6 FREQUENT FALLS: ICD-10-CM

## 2022-01-01 DIAGNOSIS — I25.810 CORONARY ARTERY DISEASE INVOLVING CORONARY BYPASS GRAFT OF NATIVE HEART WITHOUT ANGINA PECTORIS: Primary | ICD-10-CM

## 2022-01-01 DIAGNOSIS — J44.1 ACUTE EXACERBATION OF CHRONIC OBSTRUCTIVE PULMONARY DISEASE (COPD): Primary | ICD-10-CM

## 2022-01-01 DIAGNOSIS — R19.7 DIARRHEA, UNSPECIFIED TYPE: Primary | ICD-10-CM

## 2022-01-01 DIAGNOSIS — F02.818 LATE ONSET ALZHEIMER'S DEMENTIA WITH BEHAVIORAL DISTURBANCE: Primary | ICD-10-CM

## 2022-01-01 DIAGNOSIS — R97.20 ELEVATED PSA MEASUREMENT: ICD-10-CM

## 2022-01-01 DIAGNOSIS — N39.0 URINARY TRACT INFECTION, SITE NOT SPECIFIED: Primary | ICD-10-CM

## 2022-01-01 DIAGNOSIS — J90 PLEURAL EFFUSION ON LEFT: ICD-10-CM

## 2022-01-01 DIAGNOSIS — M50.30 DDD (DEGENERATIVE DISC DISEASE), CERVICAL: ICD-10-CM

## 2022-01-01 DIAGNOSIS — K14.6 BURNING MOUTH SYNDROME: ICD-10-CM

## 2022-01-01 DIAGNOSIS — R91.1 LUNG NODULE: ICD-10-CM

## 2022-01-01 DIAGNOSIS — R06.02 SOB (SHORTNESS OF BREATH): ICD-10-CM

## 2022-01-01 DIAGNOSIS — N30.00 ACUTE CYSTITIS WITHOUT HEMATURIA: Primary | ICD-10-CM

## 2022-01-01 DIAGNOSIS — I73.9 PERIPHERAL ARTERIAL DISEASE: Primary | ICD-10-CM

## 2022-01-01 DIAGNOSIS — G47.00 INSOMNIA, UNSPECIFIED TYPE: ICD-10-CM

## 2022-01-01 DIAGNOSIS — Z23 NEED FOR STREPTOCOCCUS PNEUMONIAE VACCINATION: ICD-10-CM

## 2022-01-01 DIAGNOSIS — J44.1 ACUTE EXACERBATION OF CHRONIC OBSTRUCTIVE PULMONARY DISEASE (COPD): ICD-10-CM

## 2022-01-01 DIAGNOSIS — R94.39 POSITIVE CARDIAC STRESS TEST: ICD-10-CM

## 2022-01-01 DIAGNOSIS — J90 PLEURAL EFFUSION: ICD-10-CM

## 2022-01-01 DIAGNOSIS — R35.1 NOCTURIA: ICD-10-CM

## 2022-01-01 DIAGNOSIS — R25.9 DYSFUNCTIONAL MOVEMENTS: ICD-10-CM

## 2022-01-01 DIAGNOSIS — R51.9 INTRACTABLE HEADACHE, UNSPECIFIED CHRONICITY PATTERN, UNSPECIFIED HEADACHE TYPE: ICD-10-CM

## 2022-01-01 DIAGNOSIS — J44.1 CHRONIC OBSTRUCTIVE PULMONARY DISEASE WITH ACUTE EXACERBATION: ICD-10-CM

## 2022-01-01 LAB
ACID FAST MOD KINY STN SPEC: NORMAL
ALBUMIN SERPL BCP-MCNC: 3 G/DL (ref 3.5–5.2)
ALBUMIN SERPL BCP-MCNC: 3.2 G/DL (ref 3.5–5.2)
ALBUMIN SERPL BCP-MCNC: 3.3 G/DL (ref 3.5–5.2)
ALBUMIN SERPL BCP-MCNC: 3.4 G/DL (ref 3.5–5.2)
ALLENS TEST: ABNORMAL
ALLENS TEST: ABNORMAL
ALP SERPL-CCNC: 57 U/L (ref 55–135)
ALP SERPL-CCNC: 61 U/L (ref 55–135)
ALP SERPL-CCNC: 67 U/L (ref 55–135)
ALP SERPL-CCNC: 95 U/L (ref 55–135)
ALT SERPL W/O P-5'-P-CCNC: 10 U/L (ref 10–44)
ALT SERPL W/O P-5'-P-CCNC: 10 U/L (ref 10–44)
ALT SERPL W/O P-5'-P-CCNC: 25 U/L (ref 10–44)
ALT SERPL W/O P-5'-P-CCNC: 8 U/L (ref 10–44)
ANION GAP SERPL CALC-SCNC: 11 MMOL/L (ref 8–16)
ANION GAP SERPL CALC-SCNC: 11 MMOL/L (ref 8–16)
ANION GAP SERPL CALC-SCNC: 12 MMOL/L (ref 8–16)
ANION GAP SERPL CALC-SCNC: 5 MMOL/L (ref 8–16)
ANION GAP SERPL CALC-SCNC: 6 MMOL/L (ref 8–16)
ANION GAP SERPL CALC-SCNC: 7 MMOL/L (ref 8–16)
ANION GAP SERPL CALC-SCNC: 8 MMOL/L (ref 8–16)
ANION GAP SERPL CALC-SCNC: 9 MMOL/L (ref 8–16)
APPEARANCE FLD: NORMAL
ASCENDING AORTA: 3.21 CM
AST SERPL-CCNC: 10 U/L (ref 10–40)
AST SERPL-CCNC: 14 U/L (ref 10–40)
AST SERPL-CCNC: 17 U/L (ref 10–40)
AST SERPL-CCNC: 26 U/L (ref 10–40)
AV INDEX (PROSTH): 0.83
AV MEAN GRADIENT: 3 MMHG
AV PEAK GRADIENT: 5 MMHG
AV VALVE AREA: 2.62 CM2
AV VELOCITY RATIO: 0.79
BACTERIA SPEC AEROBE CULT: NO GROWTH
BACTERIA UR CULT: ABNORMAL
BACTERIA UR CULT: ABNORMAL
BASOPHILS # BLD AUTO: 0.01 K/UL (ref 0–0.2)
BASOPHILS # BLD AUTO: 0.02 K/UL (ref 0–0.2)
BASOPHILS # BLD AUTO: 0.03 K/UL (ref 0–0.2)
BASOPHILS NFR BLD: 0.1 % (ref 0–1.9)
BASOPHILS NFR BLD: 0.1 % (ref 0–1.9)
BASOPHILS NFR BLD: 0.2 % (ref 0–1.9)
BASOPHILS NFR BLD: 0.2 % (ref 0–1.9)
BASOPHILS NFR BLD: 0.4 % (ref 0–1.9)
BASOPHILS NFR BLD: 0.5 % (ref 0–1.9)
BASOPHILS NFR BLD: 0.5 % (ref 0–1.9)
BASOPHILS NFR BLD: 0.6 % (ref 0–1.9)
BILIRUB SERPL-MCNC: 0.9 MG/DL (ref 0.1–1)
BILIRUB SERPL-MCNC: 1.1 MG/DL (ref 0.1–1)
BILIRUB SERPL-MCNC: 1.2 MG/DL (ref 0.1–1)
BILIRUB SERPL-MCNC: 1.2 MG/DL (ref 0.1–1)
BILIRUB UR QL STRIP: NEGATIVE
BNP SERPL-MCNC: 129 PG/ML (ref 0–99)
BNP SERPL-MCNC: 58 PG/ML (ref 0–99)
BNP SERPL-MCNC: 86 PG/ML (ref 0–99)
BODY FLD TYPE: NORMAL
BODY FLUID SOURCE, LDH: NORMAL
BSA FOR ECHO PROCEDURE: 1.81 M2
BUN SERPL-MCNC: 11 MG/DL (ref 8–23)
BUN SERPL-MCNC: 12 MG/DL (ref 8–23)
BUN SERPL-MCNC: 13 MG/DL (ref 8–23)
BUN SERPL-MCNC: 15 MG/DL (ref 8–23)
BUN SERPL-MCNC: 18 MG/DL (ref 6–30)
BUN SERPL-MCNC: 20 MG/DL (ref 8–23)
BUN SERPL-MCNC: 28 MG/DL (ref 8–23)
BUN SERPL-MCNC: 29 MG/DL (ref 8–23)
BUN SERPL-MCNC: 29 MG/DL (ref 8–23)
BUN SERPL-MCNC: 31 MG/DL (ref 8–23)
BUN SERPL-MCNC: 32 MG/DL (ref 8–23)
CALCIUM SERPL-MCNC: 8.9 MG/DL (ref 8.7–10.5)
CALCIUM SERPL-MCNC: 9.1 MG/DL (ref 8.7–10.5)
CALCIUM SERPL-MCNC: 9.1 MG/DL (ref 8.7–10.5)
CALCIUM SERPL-MCNC: 9.2 MG/DL (ref 8.7–10.5)
CALCIUM SERPL-MCNC: 9.2 MG/DL (ref 8.7–10.5)
CALCIUM SERPL-MCNC: 9.3 MG/DL (ref 8.7–10.5)
CALCIUM SERPL-MCNC: 9.3 MG/DL (ref 8.7–10.5)
CALCIUM SERPL-MCNC: 9.5 MG/DL (ref 8.7–10.5)
CFR FLOW - ANTERIOR: 2.1
CFR FLOW - INFERIOR: 2.67
CFR FLOW - LATERAL: 1.93
CFR FLOW - MAX: 3.14
CFR FLOW - MIN: 0.74
CFR FLOW - SEPTAL: 1.6
CFR FLOW - WHOLE HEART: 2.08
CHLORIDE SERPL-SCNC: 100 MMOL/L (ref 95–110)
CHLORIDE SERPL-SCNC: 100 MMOL/L (ref 95–110)
CHLORIDE SERPL-SCNC: 101 MMOL/L (ref 95–110)
CHLORIDE SERPL-SCNC: 101 MMOL/L (ref 95–110)
CHLORIDE SERPL-SCNC: 102 MMOL/L (ref 95–110)
CHLORIDE SERPL-SCNC: 103 MMOL/L (ref 95–110)
CHLORIDE SERPL-SCNC: 104 MMOL/L (ref 95–110)
CHLORIDE SERPL-SCNC: 105 MMOL/L (ref 95–110)
CHLORIDE SERPL-SCNC: 105 MMOL/L (ref 95–110)
CHLORIDE SERPL-SCNC: 98 MMOL/L (ref 95–110)
CHOLEST SERPL-MCNC: 111 MG/DL (ref 120–199)
CHOLEST/HDLC SERPL: 4.3 {RATIO} (ref 2–5)
CLARITY UR REFRACT.AUTO: CLEAR
CO2 SERPL-SCNC: 21 MMOL/L (ref 23–29)
CO2 SERPL-SCNC: 23 MMOL/L (ref 23–29)
CO2 SERPL-SCNC: 24 MMOL/L (ref 23–29)
CO2 SERPL-SCNC: 27 MMOL/L (ref 23–29)
CO2 SERPL-SCNC: 27 MMOL/L (ref 23–29)
CO2 SERPL-SCNC: 28 MMOL/L (ref 23–29)
CO2 SERPL-SCNC: 28 MMOL/L (ref 23–29)
CO2 SERPL-SCNC: 29 MMOL/L (ref 23–29)
CO2 SERPL-SCNC: 29 MMOL/L (ref 23–29)
CO2 SERPL-SCNC: 30 MMOL/L (ref 23–29)
CO2 SERPL-SCNC: 30 MMOL/L (ref 23–29)
CO2 SERPL-SCNC: 32 MMOL/L (ref 23–29)
COLOR FLD: YELLOW
COLOR UR AUTO: YELLOW
COMPLEXED PSA SERPL-MCNC: 6.5 NG/ML (ref 0–4)
CREAT SERPL-MCNC: 0.9 MG/DL (ref 0.5–1.4)
CREAT SERPL-MCNC: 1 MG/DL (ref 0.5–1.4)
CREAT SERPL-MCNC: 1.1 MG/DL (ref 0.5–1.4)
CREAT SERPL-MCNC: 1.2 MG/DL (ref 0.5–1.4)
CTP QC/QA: YES
CV ECHO LV RWT: 0.3 CM
CV STRESS BASE HR: 65 BPM
DELSYS: ABNORMAL
DIASTOLIC BLOOD PRESSURE: 72 MMHG
DIFFERENTIAL METHOD: ABNORMAL
DOP CALC AO PEAK VEL: 1.1 M/S
DOP CALC AO VTI: 24.12 CM
DOP CALC LVOT AREA: 3.1 CM2
DOP CALC LVOT DIAMETER: 2 CM
DOP CALC LVOT PEAK VEL: 0.87 M/S
DOP CALC LVOT STROKE VOLUME: 63.24 CM3
DOP CALCLVOT PEAK VEL VTI: 20.14 CM
E WAVE DECELERATION TIME: 265.61 MSEC
E/A RATIO: 0.62
E/E' RATIO: 10.55 M/S
ECHO LV POSTERIOR WALL: 0.68 CM (ref 0.6–1.1)
EJECTION FRACTION- HIGH: 65 %
EJECTION FRACTION: 40 %
END DIASTOLIC INDEX-HIGH: 153 ML/M2
END DIASTOLIC INDEX-LOW: 93 ML/M2
END SYSTOLIC INDEX-HIGH: 71 ML/M2
END SYSTOLIC INDEX-LOW: 31 ML/M2
EOSINOPHIL # BLD AUTO: 0 K/UL (ref 0–0.5)
EOSINOPHIL # BLD AUTO: 0.1 K/UL (ref 0–0.5)
EOSINOPHIL # BLD AUTO: 0.1 K/UL (ref 0–0.5)
EOSINOPHIL NFR BLD: 0 % (ref 0–8)
EOSINOPHIL NFR BLD: 0.2 % (ref 0–8)
EOSINOPHIL NFR BLD: 0.2 % (ref 0–8)
EOSINOPHIL NFR BLD: 0.7 % (ref 0–8)
EOSINOPHIL NFR BLD: 1.3 % (ref 0–8)
EOSINOPHIL NFR BLD: 1.4 % (ref 0–8)
ERYTHROCYTE [DISTWIDTH] IN BLOOD BY AUTOMATED COUNT: 13.8 % (ref 11.5–14.5)
ERYTHROCYTE [DISTWIDTH] IN BLOOD BY AUTOMATED COUNT: 13.9 % (ref 11.5–14.5)
ERYTHROCYTE [DISTWIDTH] IN BLOOD BY AUTOMATED COUNT: 14 % (ref 11.5–14.5)
ERYTHROCYTE [DISTWIDTH] IN BLOOD BY AUTOMATED COUNT: 14.1 % (ref 11.5–14.5)
ERYTHROCYTE [DISTWIDTH] IN BLOOD BY AUTOMATED COUNT: 14.1 % (ref 11.5–14.5)
ERYTHROCYTE [DISTWIDTH] IN BLOOD BY AUTOMATED COUNT: 14.2 % (ref 11.5–14.5)
ERYTHROCYTE [DISTWIDTH] IN BLOOD BY AUTOMATED COUNT: 14.3 % (ref 11.5–14.5)
ERYTHROCYTE [DISTWIDTH] IN BLOOD BY AUTOMATED COUNT: 14.4 % (ref 11.5–14.5)
EST. GFR  (AFRICAN AMERICAN): >60 ML/MIN/1.73 M^2
EST. GFR  (NO RACE VARIABLE): >60 ML/MIN/1.73 M^2
EST. GFR  (NO RACE VARIABLE): >60 ML/MIN/1.73 M^2
EST. GFR  (NON AFRICAN AMERICAN): 55.6 ML/MIN/1.73 M^2
EST. GFR  (NON AFRICAN AMERICAN): >60 ML/MIN/1.73 M^2
FINAL PATHOLOGIC DIAGNOSIS: NORMAL
FLOW: 2
FOLATE SERPL-MCNC: 4.7 NG/ML (ref 4–24)
FRACTIONAL SHORTENING: 16 % (ref 28–44)
FUNGUS SPEC CULT: NORMAL
GLUCOSE FLD-MCNC: 108 MG/DL
GLUCOSE SERPL-MCNC: 100 MG/DL (ref 70–110)
GLUCOSE SERPL-MCNC: 103 MG/DL (ref 70–110)
GLUCOSE SERPL-MCNC: 114 MG/DL (ref 70–110)
GLUCOSE SERPL-MCNC: 114 MG/DL (ref 70–110)
GLUCOSE SERPL-MCNC: 122 MG/DL (ref 70–110)
GLUCOSE SERPL-MCNC: 158 MG/DL (ref 70–110)
GLUCOSE SERPL-MCNC: 80 MG/DL (ref 70–110)
GLUCOSE SERPL-MCNC: 83 MG/DL (ref 70–110)
GLUCOSE SERPL-MCNC: 85 MG/DL (ref 70–110)
GLUCOSE SERPL-MCNC: 92 MG/DL (ref 70–110)
GLUCOSE SERPL-MCNC: 93 MG/DL (ref 70–110)
GLUCOSE SERPL-MCNC: 97 MG/DL (ref 70–110)
GLUCOSE SERPL-MCNC: 99 MG/DL (ref 70–110)
GLUCOSE UR QL STRIP: NEGATIVE
GRAM STN SPEC: NORMAL
GRAM STN SPEC: NORMAL
HCO3 UR-SCNC: 25.8 MMOL/L (ref 24–28)
HCO3 UR-SCNC: 28.7 MMOL/L (ref 24–28)
HCT VFR BLD AUTO: 35.8 % (ref 40–54)
HCT VFR BLD AUTO: 36 % (ref 40–54)
HCT VFR BLD AUTO: 36.9 % (ref 40–54)
HCT VFR BLD AUTO: 37.9 % (ref 40–54)
HCT VFR BLD AUTO: 38.2 % (ref 40–54)
HCT VFR BLD AUTO: 40.4 % (ref 40–54)
HCT VFR BLD AUTO: 41.3 % (ref 40–54)
HCT VFR BLD AUTO: 43.1 % (ref 40–54)
HCT VFR BLD CALC: 40 %PCV (ref 36–54)
HDLC SERPL-MCNC: 26 MG/DL (ref 40–75)
HDLC SERPL: 23.4 % (ref 20–50)
HGB BLD-MCNC: 11.9 G/DL (ref 14–18)
HGB BLD-MCNC: 11.9 G/DL (ref 14–18)
HGB BLD-MCNC: 12.4 G/DL (ref 14–18)
HGB BLD-MCNC: 12.7 G/DL (ref 14–18)
HGB BLD-MCNC: 13 G/DL (ref 14–18)
HGB BLD-MCNC: 13.4 G/DL (ref 14–18)
HGB BLD-MCNC: 14 G/DL (ref 14–18)
HGB BLD-MCNC: 14 G/DL (ref 14–18)
HGB UR QL STRIP: NEGATIVE
HSV1 IGG SERPL QL IA: POSITIVE
HSV2 IGG SERPL QL IA: NEGATIVE
IMM GRANULOCYTES # BLD AUTO: 0.01 K/UL (ref 0–0.04)
IMM GRANULOCYTES # BLD AUTO: 0.02 K/UL (ref 0–0.04)
IMM GRANULOCYTES # BLD AUTO: 0.02 K/UL (ref 0–0.04)
IMM GRANULOCYTES # BLD AUTO: 0.05 K/UL (ref 0–0.04)
IMM GRANULOCYTES # BLD AUTO: 0.05 K/UL (ref 0–0.04)
IMM GRANULOCYTES # BLD AUTO: 0.08 K/UL (ref 0–0.04)
IMM GRANULOCYTES # BLD AUTO: 0.08 K/UL (ref 0–0.04)
IMM GRANULOCYTES # BLD AUTO: 0.13 K/UL (ref 0–0.04)
IMM GRANULOCYTES NFR BLD AUTO: 0.2 % (ref 0–0.5)
IMM GRANULOCYTES NFR BLD AUTO: 0.3 % (ref 0–0.5)
IMM GRANULOCYTES NFR BLD AUTO: 0.5 % (ref 0–0.5)
IMM GRANULOCYTES NFR BLD AUTO: 0.7 % (ref 0–0.5)
IMM GRANULOCYTES NFR BLD AUTO: 1 % (ref 0–0.5)
IMM GRANULOCYTES NFR BLD AUTO: 1 % (ref 0–0.5)
IMM GRANULOCYTES NFR BLD AUTO: 1.2 % (ref 0–0.5)
IMM GRANULOCYTES NFR BLD AUTO: 1.3 % (ref 0–0.5)
INFLUENZA A, MOLECULAR: NEGATIVE
INFLUENZA B, MOLECULAR: NEGATIVE
INTERVENTRICULAR SEPTUM: 0.9 CM (ref 0.6–1.1)
IRON SERPL-MCNC: 73 UG/DL (ref 45–160)
KETONES UR QL STRIP: NEGATIVE
LA MAJOR: 5.22 CM
LA MINOR: 5.22 CM
LA WIDTH: 3.85 CM
LACTATE SERPL-SCNC: 1 MMOL/L (ref 0.5–2.2)
LDH FLD L TO P-CCNC: 220 U/L
LDLC SERPL CALC-MCNC: 60.4 MG/DL (ref 63–159)
LEFT ATRIUM SIZE: 3.25 CM
LEFT ATRIUM VOLUME INDEX MOD: 30.1 ML/M2
LEFT ATRIUM VOLUME INDEX: 30.5 ML/M2
LEFT ATRIUM VOLUME MOD: 54.82 CM3
LEFT ATRIUM VOLUME: 55.52 CM3
LEFT INTERNAL DIMENSION IN SYSTOLE: 3.86 CM (ref 2.1–4)
LEFT VENTRICLE DIASTOLIC VOLUME INDEX: 53.81 ML/M2
LEFT VENTRICLE DIASTOLIC VOLUME: 97.93 ML
LEFT VENTRICLE MASS INDEX: 64 G/M2
LEFT VENTRICLE SYSTOLIC VOLUME INDEX: 35.3 ML/M2
LEFT VENTRICLE SYSTOLIC VOLUME: 64.28 ML
LEFT VENTRICULAR INTERNAL DIMENSION IN DIASTOLE: 4.61 CM (ref 3.5–6)
LEFT VENTRICULAR MASS: 116.42 G
LEUKOCYTE ESTERASE UR QL STRIP: NEGATIVE
LIPASE SERPL-CCNC: 16 U/L (ref 4–60)
LV LATERAL E/E' RATIO: 8.29 M/S
LV SEPTAL E/E' RATIO: 14.5 M/S
LYMPHOCYTES # BLD AUTO: 0.3 K/UL (ref 1–4.8)
LYMPHOCYTES # BLD AUTO: 0.4 K/UL (ref 1–4.8)
LYMPHOCYTES # BLD AUTO: 0.4 K/UL (ref 1–4.8)
LYMPHOCYTES # BLD AUTO: 0.5 K/UL (ref 1–4.8)
LYMPHOCYTES # BLD AUTO: 0.7 K/UL (ref 1–4.8)
LYMPHOCYTES # BLD AUTO: 0.9 K/UL (ref 1–4.8)
LYMPHOCYTES # BLD AUTO: 1 K/UL (ref 1–4.8)
LYMPHOCYTES # BLD AUTO: 1 K/UL (ref 1–4.8)
LYMPHOCYTES NFR BLD: 10 % (ref 18–48)
LYMPHOCYTES NFR BLD: 11 % (ref 18–48)
LYMPHOCYTES NFR BLD: 20.4 % (ref 18–48)
LYMPHOCYTES NFR BLD: 20.9 % (ref 18–48)
LYMPHOCYTES NFR BLD: 27.3 % (ref 18–48)
LYMPHOCYTES NFR BLD: 3.1 % (ref 18–48)
LYMPHOCYTES NFR BLD: 3.8 % (ref 18–48)
LYMPHOCYTES NFR BLD: 5.2 % (ref 18–48)
LYMPHOCYTES NFR FLD MANUAL: 83 %
Lab: NORMAL
MAGNESIUM SERPL-MCNC: 2.1 MG/DL (ref 1.6–2.6)
MAGNESIUM SERPL-MCNC: 2.1 MG/DL (ref 1.6–2.6)
MAGNESIUM SERPL-MCNC: 2.2 MG/DL (ref 1.6–2.6)
MAGNESIUM SERPL-MCNC: 2.4 MG/DL (ref 1.6–2.6)
MCH RBC QN AUTO: 30 PG (ref 27–31)
MCH RBC QN AUTO: 30.8 PG (ref 27–31)
MCH RBC QN AUTO: 31 PG (ref 27–31)
MCH RBC QN AUTO: 31.2 PG (ref 27–31)
MCH RBC QN AUTO: 31.2 PG (ref 27–31)
MCH RBC QN AUTO: 31.3 PG (ref 27–31)
MCH RBC QN AUTO: 32.4 PG (ref 27–31)
MCH RBC QN AUTO: 32.5 PG (ref 27–31)
MCHC RBC AUTO-ENTMCNC: 32.2 G/DL (ref 32–36)
MCHC RBC AUTO-ENTMCNC: 32.5 G/DL (ref 32–36)
MCHC RBC AUTO-ENTMCNC: 32.7 G/DL (ref 32–36)
MCHC RBC AUTO-ENTMCNC: 33.1 G/DL (ref 32–36)
MCHC RBC AUTO-ENTMCNC: 33.2 G/DL (ref 32–36)
MCHC RBC AUTO-ENTMCNC: 33.9 G/DL (ref 32–36)
MCHC RBC AUTO-ENTMCNC: 34.4 G/DL (ref 32–36)
MCHC RBC AUTO-ENTMCNC: 35.1 G/DL (ref 32–36)
MCV RBC AUTO: 100 FL (ref 82–98)
MCV RBC AUTO: 90 FL (ref 82–98)
MCV RBC AUTO: 92 FL (ref 82–98)
MCV RBC AUTO: 92 FL (ref 82–98)
MCV RBC AUTO: 93 FL (ref 82–98)
MCV RBC AUTO: 94 FL (ref 82–98)
MCV RBC AUTO: 94 FL (ref 82–98)
MCV RBC AUTO: 96 FL (ref 82–98)
MODE: ABNORMAL
MONOCYTES # BLD AUTO: 0.2 K/UL (ref 0.3–1)
MONOCYTES # BLD AUTO: 0.3 K/UL (ref 0.3–1)
MONOCYTES # BLD AUTO: 0.4 K/UL (ref 0.3–1)
MONOCYTES # BLD AUTO: 0.5 K/UL (ref 0.3–1)
MONOCYTES # BLD AUTO: 0.7 K/UL (ref 0.3–1)
MONOCYTES NFR BLD: 3.3 % (ref 4–15)
MONOCYTES NFR BLD: 4 % (ref 4–15)
MONOCYTES NFR BLD: 5.6 % (ref 4–15)
MONOCYTES NFR BLD: 7 % (ref 4–15)
MONOCYTES NFR BLD: 7.4 % (ref 4–15)
MONOCYTES NFR BLD: 7.9 % (ref 4–15)
MONOCYTES NFR BLD: 8.4 % (ref 4–15)
MONOCYTES NFR BLD: 9.4 % (ref 4–15)
MONOS+MACROS NFR FLD MANUAL: 16 %
MV PEAK A VEL: 0.94 M/S
MV PEAK E VEL: 0.58 M/S
MV STENOSIS PRESSURE HALF TIME: 77.03 MS
MV VALVE AREA P 1/2 METHOD: 2.86 CM2
MYCOBACTERIUM SPEC QL CULT: NORMAL
NEUTROPHILS # BLD AUTO: 10.5 K/UL (ref 1.8–7.7)
NEUTROPHILS # BLD AUTO: 11.5 K/UL (ref 1.8–7.7)
NEUTROPHILS # BLD AUTO: 2.3 K/UL (ref 1.8–7.7)
NEUTROPHILS # BLD AUTO: 3 K/UL (ref 1.8–7.7)
NEUTROPHILS # BLD AUTO: 3.5 K/UL (ref 1.8–7.7)
NEUTROPHILS # BLD AUTO: 3.7 K/UL (ref 1.8–7.7)
NEUTROPHILS # BLD AUTO: 5.3 K/UL (ref 1.8–7.7)
NEUTROPHILS # BLD AUTO: 5.3 K/UL (ref 1.8–7.7)
NEUTROPHILS NFR BLD: 61 % (ref 38–73)
NEUTROPHILS NFR BLD: 68.2 % (ref 38–73)
NEUTROPHILS NFR BLD: 69.7 % (ref 38–73)
NEUTROPHILS NFR BLD: 80.3 % (ref 38–73)
NEUTROPHILS NFR BLD: 81.9 % (ref 38–73)
NEUTROPHILS NFR BLD: 89.3 % (ref 38–73)
NEUTROPHILS NFR BLD: 89.8 % (ref 38–73)
NEUTROPHILS NFR BLD: 92.5 % (ref 38–73)
NEUTROPHILS NFR FLD MANUAL: 1 %
NITRITE UR QL STRIP: NEGATIVE
NONHDLC SERPL-MCNC: 85 MG/DL
NRBC BLD-RTO: 0 /100 WBC
NT-PROBNP SERPL-MCNC: 212 PG/ML
NUC REST DIASTOLIC VOLUME INDEX: 75
NUC REST EJECTION FRACTION: 45
NUC REST SYSTOLIC VOLUME INDEX: 41
NUC STRESS DIASTOLIC VOLUME INDEX: 101
NUC STRESS EJECTION FRACTION: 60 %
NUC STRESS SYSTOLIC VOLUME INDEX: 40
OHS CV CPX 1 MINUTE RECOVERY HEART RATE: 84 BPM
OHS CV CPX 85 PERCENT MAX PREDICTED HEART RATE MALE: 116
OHS CV CPX MAX PREDICTED HEART RATE: 137
OHS CV CPX PATIENT IS FEMALE: 0
OHS CV CPX PATIENT IS MALE: 1
OHS CV CPX PEAK DIASTOLIC BLOOD PRESSURE: 58 MMHG
OHS CV CPX PEAK HEAR RATE: 64 BPM
OHS CV CPX PEAK RATE PRESSURE PRODUCT: 8832
OHS CV CPX PEAK SYSTOLIC BLOOD PRESSURE: 138 MMHG
OHS CV CPX PERCENT MAX PREDICTED HEART RATE ACHIEVED: 47
OHS CV CPX RATE PRESSURE PRODUCT PRESENTING: NORMAL
PCO2 BLDA: 41 MMHG (ref 35–45)
PCO2 BLDA: 44.1 MMHG (ref 35–45)
PERFUSION DEFECT 1 SIZE IN %: 10 %
PERFUSION DEFECT 2 SIZE IN %: 10 %
PH FLD: 7.8 [PH]
PH SMN: 7.41 [PH] (ref 7.35–7.45)
PH SMN: 7.42 [PH] (ref 7.35–7.45)
PH UR STRIP: 6 [PH] (ref 5–8)
PHOSPHATE SERPL-MCNC: 2.4 MG/DL (ref 2.7–4.5)
PHOSPHATE SERPL-MCNC: 2.6 MG/DL (ref 2.7–4.5)
PHOSPHATE SERPL-MCNC: 2.8 MG/DL (ref 2.7–4.5)
PHOSPHATE SERPL-MCNC: 2.9 MG/DL (ref 2.7–4.5)
PHOSPHATE SERPL-MCNC: 3.4 MG/DL (ref 2.7–4.5)
PHOSPHATE SERPL-MCNC: 4.2 MG/DL (ref 2.7–4.5)
PISA TR MAX VEL: 2.73 M/S
PLATELET # BLD AUTO: 199 K/UL (ref 150–450)
PLATELET # BLD AUTO: 205 K/UL (ref 150–450)
PLATELET # BLD AUTO: 209 K/UL (ref 150–450)
PLATELET # BLD AUTO: 229 K/UL (ref 150–450)
PLATELET # BLD AUTO: 232 K/UL (ref 150–450)
PLATELET # BLD AUTO: 243 K/UL (ref 150–450)
PLATELET # BLD AUTO: 246 K/UL (ref 150–450)
PLATELET # BLD AUTO: 334 K/UL (ref 150–450)
PMV BLD AUTO: 10.1 FL (ref 9.2–12.9)
PMV BLD AUTO: 10.2 FL (ref 9.2–12.9)
PMV BLD AUTO: 10.5 FL (ref 9.2–12.9)
PMV BLD AUTO: 10.5 FL (ref 9.2–12.9)
PMV BLD AUTO: 10.6 FL (ref 9.2–12.9)
PMV BLD AUTO: 10.7 FL (ref 9.2–12.9)
PMV BLD AUTO: 10.8 FL (ref 9.2–12.9)
PMV BLD AUTO: 9.9 FL (ref 9.2–12.9)
PO2 BLDA: 49 MMHG (ref 40–60)
PO2 BLDA: 67 MMHG (ref 80–100)
POC BE: 1 MMOL/L
POC BE: 4 MMOL/L
POC IONIZED CALCIUM: 1.16 MMOL/L (ref 1.06–1.42)
POC SATURATED O2: 85 % (ref 95–100)
POC SATURATED O2: 93 % (ref 95–100)
POC TCO2 (MEASURED): 30 MMOL/L (ref 23–29)
POC TCO2: 27 MMOL/L (ref 23–27)
POC TCO2: 30 MMOL/L (ref 24–29)
POTASSIUM BLD-SCNC: 3.7 MMOL/L (ref 3.5–5.1)
POTASSIUM SERPL-SCNC: 3.8 MMOL/L (ref 3.5–5.1)
POTASSIUM SERPL-SCNC: 4 MMOL/L (ref 3.5–5.1)
POTASSIUM SERPL-SCNC: 4.1 MMOL/L (ref 3.5–5.1)
POTASSIUM SERPL-SCNC: 4.1 MMOL/L (ref 3.5–5.1)
POTASSIUM SERPL-SCNC: 4.2 MMOL/L (ref 3.5–5.1)
POTASSIUM SERPL-SCNC: 4.3 MMOL/L (ref 3.5–5.1)
POTASSIUM SERPL-SCNC: 4.3 MMOL/L (ref 3.5–5.1)
POTASSIUM SERPL-SCNC: 4.4 MMOL/L (ref 3.5–5.1)
POTASSIUM SERPL-SCNC: 4.6 MMOL/L (ref 3.5–5.1)
POTASSIUM SERPL-SCNC: 4.6 MMOL/L (ref 3.5–5.1)
POTASSIUM SERPL-SCNC: 4.8 MMOL/L (ref 3.5–5.1)
POTASSIUM SERPL-SCNC: 5.1 MMOL/L (ref 3.5–5.1)
PROT FLD-MCNC: 4.1 G/DL
PROT SERPL-MCNC: 6.3 G/DL (ref 6–8.4)
PROT SERPL-MCNC: 6.3 G/DL (ref 6–8.4)
PROT SERPL-MCNC: 6.5 G/DL (ref 6–8.4)
PROT SERPL-MCNC: 6.6 G/DL (ref 6–8.4)
PROT UR QL STRIP: NEGATIVE
PYRIDOXAL SERPL-MCNC: 4 UG/L (ref 5–50)
RA MAJOR: 4.68 CM
RA WIDTH: 3.76 CM
RBC # BLD AUTO: 3.82 M/UL (ref 4.6–6.2)
RBC # BLD AUTO: 3.82 M/UL (ref 4.6–6.2)
RBC # BLD AUTO: 4.1 M/UL (ref 4.6–6.2)
RBC # BLD AUTO: 4.13 M/UL (ref 4.6–6.2)
RBC # BLD AUTO: 4.14 M/UL (ref 4.6–6.2)
RBC # BLD AUTO: 4.22 M/UL (ref 4.6–6.2)
RBC # BLD AUTO: 4.31 M/UL (ref 4.6–6.2)
RBC # BLD AUTO: 4.47 M/UL (ref 4.6–6.2)
REST FLOW - ANTERIOR: 0.78 CC/MIN/G
REST FLOW - INFERIOR: 0.57 CC/MIN/G
REST FLOW - LATERAL: 0.77 CC/MIN/G
REST FLOW - MAX: 1.22 CC/MIN/G
REST FLOW - MIN: 0.16 CC/MIN/G
REST FLOW - SEPTAL: 0.54 CC/MIN/G
REST FLOW - WHOLE HEART: 0.67 CC/MIN/G
RETIRED EF AND QEF - SEE NOTES: 53 %
RIGHT VENTRICULAR END-DIASTOLIC DIMENSION: 3.85 CM
RV TISSUE DOPPLER FREE WALL SYSTOLIC VELOCITY 1 (APICAL 4 CHAMBER VIEW): 6.09 CM/S
SAMPLE: ABNORMAL
SARS-COV-2 RDRP RESP QL NAA+PROBE: NEGATIVE
SARS-COV-2 RDRP RESP QL NAA+PROBE: NEGATIVE
SATURATED IRON: 25 % (ref 20–50)
SINUS: 3.49 CM
SITE: ABNORMAL
SITE: ABNORMAL
SODIUM BLD-SCNC: 143 MMOL/L (ref 136–145)
SODIUM SERPL-SCNC: 135 MMOL/L (ref 136–145)
SODIUM SERPL-SCNC: 135 MMOL/L (ref 136–145)
SODIUM SERPL-SCNC: 136 MMOL/L (ref 136–145)
SODIUM SERPL-SCNC: 136 MMOL/L (ref 136–145)
SODIUM SERPL-SCNC: 137 MMOL/L (ref 136–145)
SODIUM SERPL-SCNC: 138 MMOL/L (ref 136–145)
SODIUM SERPL-SCNC: 139 MMOL/L (ref 136–145)
SODIUM SERPL-SCNC: 139 MMOL/L (ref 136–145)
SODIUM SERPL-SCNC: 141 MMOL/L (ref 136–145)
SODIUM SERPL-SCNC: 141 MMOL/L (ref 136–145)
SP GR UR STRIP: 1.02 (ref 1–1.03)
SP02: 94
SPECIMEN SOURCE: NORMAL
STJ: 2.89 CM
STRESS FLOW - ANTERIOR: 1.63 CC/MIN/G
STRESS FLOW - INFERIOR: 1.55 CC/MIN/G
STRESS FLOW - LATERAL: 1.48 CC/MIN/G
STRESS FLOW - MAX: 2.26 CC/MIN/G
STRESS FLOW - MIN: 0.24 CC/MIN/G
STRESS FLOW - SEPTAL: 0.9 CC/MIN/G
STRESS FLOW - WHOLE HEART: 1.39 CC/MIN/G
SYSTOLIC BLOOD PRESSURE: 161 MMHG
TDI LATERAL: 0.07 M/S
TDI SEPTAL: 0.04 M/S
TDI: 0.06 M/S
TOTAL IRON BINDING CAPACITY: 292 UG/DL (ref 250–450)
TR MAX PG: 30 MMHG
TRANSFERRIN SERPL-MCNC: 197 MG/DL (ref 200–375)
TRICUSPID ANNULAR PLANE SYSTOLIC EXCURSION: 1.68 CM
TRIGL SERPL-MCNC: 123 MG/DL (ref 30–150)
TROPONIN I SERPL DL<=0.01 NG/ML-MCNC: 0.01 NG/ML (ref 0–0.03)
TROPONIN I SERPL DL<=0.01 NG/ML-MCNC: 0.02 NG/ML (ref 0–0.03)
TROPONIN I SERPL DL<=0.01 NG/ML-MCNC: <0.006 NG/ML (ref 0–0.03)
TSH SERPL DL<=0.005 MIU/L-ACNC: 2.54 UIU/ML (ref 0.4–4)
URN SPEC COLLECT METH UR: NORMAL
VIT B12 SERPL-MCNC: 328 PG/ML (ref 210–950)
VIT C SERPL-MCNC: <1 MG/L (ref 2–19)
WBC # BLD AUTO: 11.7 K/UL (ref 3.9–12.7)
WBC # BLD AUTO: 12.45 K/UL (ref 3.9–12.7)
WBC # BLD AUTO: 3.81 K/UL (ref 3.9–12.7)
WBC # BLD AUTO: 4.3 K/UL (ref 3.9–12.7)
WBC # BLD AUTO: 4.55 K/UL (ref 3.9–12.7)
WBC # BLD AUTO: 5.1 K/UL (ref 3.9–12.7)
WBC # BLD AUTO: 5.97 K/UL (ref 3.9–12.7)
WBC # BLD AUTO: 6.49 K/UL (ref 3.9–12.7)
WBC # FLD: 2376 /CU MM

## 2022-01-01 PROCEDURE — G0180 MD CERTIFICATION HHA PATIENT: HCPCS | Mod: ,,, | Performed by: INTERNAL MEDICINE

## 2022-01-01 PROCEDURE — 25000003 PHARM REV CODE 250: Mod: HCNC | Performed by: STUDENT IN AN ORGANIZED HEALTH CARE EDUCATION/TRAINING PROGRAM

## 2022-01-01 PROCEDURE — 3074F PR MOST RECENT SYSTOLIC BLOOD PRESSURE < 130 MM HG: ICD-10-PCS | Mod: CPTII,S$GLB,, | Performed by: INTERNAL MEDICINE

## 2022-01-01 PROCEDURE — 3078F PR MOST RECENT DIASTOLIC BLOOD PRESSURE < 80 MM HG: ICD-10-PCS | Mod: CPTII,S$GLB,, | Performed by: NURSE PRACTITIONER

## 2022-01-01 PROCEDURE — 83735 ASSAY OF MAGNESIUM: CPT | Mod: HCNC

## 2022-01-01 PROCEDURE — 71250 CT CHEST WITHOUT CONTRAST: ICD-10-PCS | Mod: 26,,, | Performed by: RADIOLOGY

## 2022-01-01 PROCEDURE — 99999 PR PBB SHADOW E&M-EST. PATIENT-LVL V: CPT | Mod: PBBFAC,,, | Performed by: INTERNAL MEDICINE

## 2022-01-01 PROCEDURE — 80061 LIPID PANEL: CPT | Performed by: INTERNAL MEDICINE

## 2022-01-01 PROCEDURE — 12002 RPR S/N/AX/GEN/TRNK2.6-7.5CM: CPT | Mod: HCNC

## 2022-01-01 PROCEDURE — 99214 PR OFFICE/OUTPT VISIT, EST, LEVL IV, 30-39 MIN: ICD-10-PCS | Mod: S$GLB,,, | Performed by: NURSE PRACTITIONER

## 2022-01-01 PROCEDURE — 94640 AIRWAY INHALATION TREATMENT: CPT | Mod: HCNC

## 2022-01-01 PROCEDURE — 3074F PR MOST RECENT SYSTOLIC BLOOD PRESSURE < 130 MM HG: ICD-10-PCS | Mod: CPTII,S$GLB,, | Performed by: NURSE PRACTITIONER

## 2022-01-01 PROCEDURE — 87086 URINE CULTURE/COLONY COUNT: CPT | Mod: HCNC | Performed by: INTERNAL MEDICINE

## 2022-01-01 PROCEDURE — 1100F PR PT FALLS ASSESS DOC 2+ FALLS/FALL W/INJURY/YR: ICD-10-PCS | Mod: CPTII,S$GLB,, | Performed by: PSYCHIATRY & NEUROLOGY

## 2022-01-01 PROCEDURE — 3078F PR MOST RECENT DIASTOLIC BLOOD PRESSURE < 80 MM HG: ICD-10-PCS | Mod: HCNC,CPTII,S$GLB, | Performed by: INTERNAL MEDICINE

## 2022-01-01 PROCEDURE — 1160F PR REVIEW ALL MEDS BY PRESCRIBER/CLIN PHARMACIST DOCUMENTED: ICD-10-PCS | Mod: CPTII,S$GLB,, | Performed by: INTERNAL MEDICINE

## 2022-01-01 PROCEDURE — 1160F RVW MEDS BY RX/DR IN RCRD: CPT | Mod: CPTII,S$GLB,, | Performed by: PHYSICIAN ASSISTANT

## 2022-01-01 PROCEDURE — G0009 PNEUMOCOCCAL CONJUGATE VACCINE 20-VALENT: ICD-10-PCS | Mod: S$GLB,,, | Performed by: INTERNAL MEDICINE

## 2022-01-01 PROCEDURE — 1159F PR MEDICATION LIST DOCUMENTED IN MEDICAL RECORD: ICD-10-PCS | Mod: CPTII,S$GLB,, | Performed by: NURSE PRACTITIONER

## 2022-01-01 PROCEDURE — 78431 MYOCRD IMG PET RST&STRS CT: CPT

## 2022-01-01 PROCEDURE — 1126F PR PAIN SEVERITY QUANTIFIED, NO PAIN PRESENT: ICD-10-PCS | Mod: CPTII,S$GLB,, | Performed by: NURSE PRACTITIONER

## 2022-01-01 PROCEDURE — 87116 MYCOBACTERIA CULTURE: CPT | Performed by: STUDENT IN AN ORGANIZED HEALTH CARE EDUCATION/TRAINING PROGRAM

## 2022-01-01 PROCEDURE — G0378 HOSPITAL OBSERVATION PER HR: HCPCS | Mod: HCNC

## 2022-01-01 PROCEDURE — 3078F PR MOST RECENT DIASTOLIC BLOOD PRESSURE < 80 MM HG: ICD-10-PCS | Mod: CPTII,S$GLB,, | Performed by: INTERNAL MEDICINE

## 2022-01-01 PROCEDURE — 99499 UNLISTED E&M SERVICE: CPT | Mod: S$GLB,,, | Performed by: INTERNAL MEDICINE

## 2022-01-01 PROCEDURE — 25000003 PHARM REV CODE 250: Mod: HCNC | Performed by: HOSPITALIST

## 2022-01-01 PROCEDURE — 27000221 HC OXYGEN, UP TO 24 HOURS: Mod: HCNC

## 2022-01-01 PROCEDURE — 1126F PR PAIN SEVERITY QUANTIFIED, NO PAIN PRESENT: ICD-10-PCS | Mod: CPTII,S$GLB,, | Performed by: INTERNAL MEDICINE

## 2022-01-01 PROCEDURE — 1126F AMNT PAIN NOTED NONE PRSNT: CPT | Mod: CPTII,S$GLB,, | Performed by: NURSE PRACTITIONER

## 2022-01-01 PROCEDURE — 11000001 HC ACUTE MED/SURG PRIVATE ROOM: Mod: HCNC

## 2022-01-01 PROCEDURE — 88305 TISSUE EXAM BY PATHOLOGIST: ICD-10-PCS | Mod: 26,,, | Performed by: PATHOLOGY

## 2022-01-01 PROCEDURE — 1101F PR PT FALLS ASSESS DOC 0-1 FALLS W/OUT INJ PAST YR: ICD-10-PCS | Mod: CPTII,S$GLB,, | Performed by: NURSE PRACTITIONER

## 2022-01-01 PROCEDURE — 3078F DIAST BP <80 MM HG: CPT | Mod: HCNC,CPTII,S$GLB, | Performed by: INTERNAL MEDICINE

## 2022-01-01 PROCEDURE — 94761 N-INVAS EAR/PLS OXIMETRY MLT: CPT | Mod: HCNC

## 2022-01-01 PROCEDURE — G0009 PNEUMOCOCCAL POLYSACCHARIDE VACCINE 23-VALENT =>2YO SQ IM: ICD-10-PCS | Mod: S$GLB,,, | Performed by: INTERNAL MEDICINE

## 2022-01-01 PROCEDURE — 1159F MED LIST DOCD IN RCRD: CPT | Mod: CPTII,S$GLB,, | Performed by: INTERNAL MEDICINE

## 2022-01-01 PROCEDURE — 99284 EMERGENCY DEPT VISIT MOD MDM: CPT | Mod: 25

## 2022-01-01 PROCEDURE — 3288F PR FALLS RISK ASSESSMENT DOCUMENTED: ICD-10-PCS | Mod: HCNC,CPTII,S$GLB, | Performed by: INTERNAL MEDICINE

## 2022-01-01 PROCEDURE — G0009 ADMIN PNEUMOCOCCAL VACCINE: HCPCS | Mod: S$GLB,,, | Performed by: INTERNAL MEDICINE

## 2022-01-01 PROCEDURE — 99999 PR PBB SHADOW E&M-EST. PATIENT-LVL V: ICD-10-PCS | Mod: PBBFAC,,, | Performed by: INTERNAL MEDICINE

## 2022-01-01 PROCEDURE — 3074F SYST BP LT 130 MM HG: CPT | Mod: CPTII,S$GLB,, | Performed by: INTERNAL MEDICINE

## 2022-01-01 PROCEDURE — 3288F FALL RISK ASSESSMENT DOCD: CPT | Mod: HCNC,CPTII,S$GLB, | Performed by: INTERNAL MEDICINE

## 2022-01-01 PROCEDURE — 99215 OFFICE O/P EST HI 40 MIN: CPT | Mod: HCNC,25,S$GLB, | Performed by: INTERNAL MEDICINE

## 2022-01-01 PROCEDURE — 94640 AIRWAY INHALATION TREATMENT: CPT

## 2022-01-01 PROCEDURE — 80053 COMPREHEN METABOLIC PANEL: CPT | Performed by: INTERNAL MEDICINE

## 2022-01-01 PROCEDURE — 99239 HOSP IP/OBS DSCHRG MGMT >30: CPT | Mod: HCNC,,, | Performed by: HOSPITALIST

## 2022-01-01 PROCEDURE — 1126F PR PAIN SEVERITY QUANTIFIED, NO PAIN PRESENT: ICD-10-PCS | Mod: CPTII,S$GLB,, | Performed by: PSYCHIATRY & NEUROLOGY

## 2022-01-01 PROCEDURE — 99999 PR PBB SHADOW E&M-EST. PATIENT-LVL III: CPT | Mod: PBBFAC,,, | Performed by: PSYCHIATRY & NEUROLOGY

## 2022-01-01 PROCEDURE — 97112 NEUROMUSCULAR REEDUCATION: CPT | Mod: CQ

## 2022-01-01 PROCEDURE — 71046 X-RAY EXAM CHEST 2 VIEWS: CPT | Mod: TC

## 2022-01-01 PROCEDURE — 1101F PT FALLS ASSESS-DOCD LE1/YR: CPT | Mod: CPTII,S$GLB,, | Performed by: NURSE PRACTITIONER

## 2022-01-01 PROCEDURE — 1160F RVW MEDS BY RX/DR IN RCRD: CPT | Mod: CPTII,S$GLB,, | Performed by: PSYCHIATRY & NEUROLOGY

## 2022-01-01 PROCEDURE — 88312 SPECIAL STAINS GROUP 1: CPT | Performed by: PATHOLOGY

## 2022-01-01 PROCEDURE — 63600175 PHARM REV CODE 636 W HCPCS: Performed by: NURSE PRACTITIONER

## 2022-01-01 PROCEDURE — 87502 INFLUENZA DNA AMP PROBE: CPT | Mod: 59

## 2022-01-01 PROCEDURE — 84157 ASSAY OF PROTEIN OTHER: CPT | Performed by: STUDENT IN AN ORGANIZED HEALTH CARE EDUCATION/TRAINING PROGRAM

## 2022-01-01 PROCEDURE — 96372 THER/PROPH/DIAG INJ SC/IM: CPT | Mod: 59 | Performed by: EMERGENCY MEDICINE

## 2022-01-01 PROCEDURE — 99215 PR OFFICE/OUTPT VISIT, EST, LEVL V, 40-54 MIN: ICD-10-PCS | Mod: S$GLB,,, | Performed by: INTERNAL MEDICINE

## 2022-01-01 PROCEDURE — 99233 PR SUBSEQUENT HOSPITAL CARE,LEVL III: ICD-10-PCS | Mod: HCNC,,, | Performed by: STUDENT IN AN ORGANIZED HEALTH CARE EDUCATION/TRAINING PROGRAM

## 2022-01-01 PROCEDURE — 99999 PR PBB SHADOW E&M-EST. PATIENT-LVL V: ICD-10-PCS | Mod: PBBFAC,,, | Performed by: NURSE PRACTITIONER

## 2022-01-01 PROCEDURE — 3288F FALL RISK ASSESSMENT DOCD: CPT | Mod: CPTII,S$GLB,, | Performed by: INTERNAL MEDICINE

## 2022-01-01 PROCEDURE — 3288F PR FALLS RISK ASSESSMENT DOCUMENTED: ICD-10-PCS | Mod: CPTII,S$GLB,, | Performed by: INTERNAL MEDICINE

## 2022-01-01 PROCEDURE — 99999 PR PBB SHADOW E&M-EST. PATIENT-LVL IV: CPT | Mod: PBBFAC,HCNC,, | Performed by: NURSE PRACTITIONER

## 2022-01-01 PROCEDURE — 99999 PR PBB SHADOW E&M-EST. PATIENT-LVL IV: ICD-10-PCS | Mod: PBBFAC,HCNC,, | Performed by: NURSE PRACTITIONER

## 2022-01-01 PROCEDURE — 1159F MED LIST DOCD IN RCRD: CPT | Mod: CPTII,S$GLB,, | Performed by: UROLOGY

## 2022-01-01 PROCEDURE — 99999 PR PBB SHADOW E&M-EST. PATIENT-LVL III: CPT | Mod: PBBFAC,,, | Performed by: INTERNAL MEDICINE

## 2022-01-01 PROCEDURE — 82746 ASSAY OF FOLIC ACID SERUM: CPT | Performed by: INTERNAL MEDICINE

## 2022-01-01 PROCEDURE — 25000242 PHARM REV CODE 250 ALT 637 W/ HCPCS: Mod: HCNC

## 2022-01-01 PROCEDURE — 87206 SMEAR FLUORESCENT/ACID STAI: CPT | Performed by: STUDENT IN AN ORGANIZED HEALTH CARE EDUCATION/TRAINING PROGRAM

## 2022-01-01 PROCEDURE — 99214 OFFICE O/P EST MOD 30 MIN: CPT | Mod: S$GLB,,, | Performed by: INTERNAL MEDICINE

## 2022-01-01 PROCEDURE — 88112 CYTOPATH CELL ENHANCE TECH: CPT | Mod: 26,,, | Performed by: PATHOLOGY

## 2022-01-01 PROCEDURE — 99999 PR PBB SHADOW E&M-EST. PATIENT-LVL IV: ICD-10-PCS | Mod: PBBFAC,,, | Performed by: NURSE PRACTITIONER

## 2022-01-01 PROCEDURE — 3288F PR FALLS RISK ASSESSMENT DOCUMENTED: ICD-10-PCS | Mod: CPTII,S$GLB,, | Performed by: PSYCHIATRY & NEUROLOGY

## 2022-01-01 PROCEDURE — 1159F MED LIST DOCD IN RCRD: CPT | Mod: CPTII,S$GLB,, | Performed by: PSYCHIATRY & NEUROLOGY

## 2022-01-01 PROCEDURE — 3288F FALL RISK ASSESSMENT DOCD: CPT | Mod: CPTII,S$GLB,, | Performed by: PSYCHIATRY & NEUROLOGY

## 2022-01-01 PROCEDURE — 99900035 HC TECH TIME PER 15 MIN (STAT): Mod: HCNC

## 2022-01-01 PROCEDURE — 1160F PR REVIEW ALL MEDS BY PRESCRIBER/CLIN PHARMACIST DOCUMENTED: ICD-10-PCS | Mod: CPTII,S$GLB,, | Performed by: NURSE PRACTITIONER

## 2022-01-01 PROCEDURE — 97161 PT EVAL LOW COMPLEX 20 MIN: CPT | Mod: HCNC

## 2022-01-01 PROCEDURE — 93005 ELECTROCARDIOGRAM TRACING: CPT

## 2022-01-01 PROCEDURE — 97110 THERAPEUTIC EXERCISES: CPT | Mod: CQ

## 2022-01-01 PROCEDURE — 97110 THERAPEUTIC EXERCISES: CPT | Mod: HCNC

## 2022-01-01 PROCEDURE — 3288F FALL RISK ASSESSMENT DOCD: CPT | Mod: CPTII,S$GLB,, | Performed by: NURSE PRACTITIONER

## 2022-01-01 PROCEDURE — 97530 THERAPEUTIC ACTIVITIES: CPT | Mod: HCNC

## 2022-01-01 PROCEDURE — 3288F PR FALLS RISK ASSESSMENT DOCUMENTED: ICD-10-PCS | Mod: CPTII,S$GLB,, | Performed by: NURSE PRACTITIONER

## 2022-01-01 PROCEDURE — 1159F MED LIST DOCD IN RCRD: CPT | Mod: CPTII,S$GLB,, | Performed by: NURSE PRACTITIONER

## 2022-01-01 PROCEDURE — 1160F RVW MEDS BY RX/DR IN RCRD: CPT | Mod: CPTII,S$GLB,, | Performed by: NURSE PRACTITIONER

## 2022-01-01 PROCEDURE — 96374 THER/PROPH/DIAG INJ IV PUSH: CPT

## 2022-01-01 PROCEDURE — 1101F PR PT FALLS ASSESS DOC 0-1 FALLS W/OUT INJ PAST YR: ICD-10-PCS | Mod: HCNC,CPTII,S$GLB, | Performed by: NURSE PRACTITIONER

## 2022-01-01 PROCEDURE — 99220 PR INITIAL OBSERVATION CARE,LEVL III: CPT | Mod: HCNC,,,

## 2022-01-01 PROCEDURE — 80053 COMPREHEN METABOLIC PANEL: CPT | Performed by: EMERGENCY MEDICINE

## 2022-01-01 PROCEDURE — 97112 NEUROMUSCULAR REEDUCATION: CPT

## 2022-01-01 PROCEDURE — 1159F PR MEDICATION LIST DOCUMENTED IN MEDICAL RECORD: ICD-10-PCS | Mod: CPTII,S$GLB,, | Performed by: INTERNAL MEDICINE

## 2022-01-01 PROCEDURE — 80048 BASIC METABOLIC PNL TOTAL CA: CPT | Mod: HCNC

## 2022-01-01 PROCEDURE — 80048 BASIC METABOLIC PNL TOTAL CA: CPT | Performed by: NURSE PRACTITIONER

## 2022-01-01 PROCEDURE — 1160F RVW MEDS BY RX/DR IN RCRD: CPT | Mod: CPTII,S$GLB,, | Performed by: INTERNAL MEDICINE

## 2022-01-01 PROCEDURE — 3077F PR MOST RECENT SYSTOLIC BLOOD PRESSURE >= 140 MM HG: ICD-10-PCS | Mod: CPTII,S$GLB,, | Performed by: NURSE PRACTITIONER

## 2022-01-01 PROCEDURE — 88112 CYTOPATH CELL ENHANCE TECH: CPT | Performed by: PATHOLOGY

## 2022-01-01 PROCEDURE — 1100F PTFALLS ASSESS-DOCD GE2>/YR: CPT | Mod: CPTII,S$GLB,, | Performed by: NURSE PRACTITIONER

## 2022-01-01 PROCEDURE — 99220 PR INITIAL OBSERVATION CARE,LEVL III: ICD-10-PCS | Mod: HCNC,,,

## 2022-01-01 PROCEDURE — 1159F MED LIST DOCD IN RCRD: CPT | Mod: HCNC,CPTII,S$GLB, | Performed by: NURSE PRACTITIONER

## 2022-01-01 PROCEDURE — 87088 URINE BACTERIA CULTURE: CPT | Mod: HCNC | Performed by: INTERNAL MEDICINE

## 2022-01-01 PROCEDURE — 82803 BLOOD GASES ANY COMBINATION: CPT | Mod: HCNC

## 2022-01-01 PROCEDURE — 71046 X-RAY EXAM CHEST 2 VIEWS: CPT | Mod: 26,,, | Performed by: RADIOLOGY

## 2022-01-01 PROCEDURE — 93010 ELECTROCARDIOGRAM REPORT: CPT | Mod: ,,, | Performed by: INTERNAL MEDICINE

## 2022-01-01 PROCEDURE — 97140 MANUAL THERAPY 1/> REGIONS: CPT

## 2022-01-01 PROCEDURE — 83880 ASSAY OF NATRIURETIC PEPTIDE: CPT | Mod: HCNC | Performed by: EMERGENCY MEDICINE

## 2022-01-01 PROCEDURE — 99214 PR OFFICE/OUTPT VISIT, EST, LEVL IV, 30-39 MIN: ICD-10-PCS | Mod: S$GLB,,, | Performed by: INTERNAL MEDICINE

## 2022-01-01 PROCEDURE — 25500020 PHARM REV CODE 255: Mod: HCNC | Performed by: STUDENT IN AN ORGANIZED HEALTH CARE EDUCATION/TRAINING PROGRAM

## 2022-01-01 PROCEDURE — 25000003 PHARM REV CODE 250: Performed by: EMERGENCY MEDICINE

## 2022-01-01 PROCEDURE — 93016 CV STRESS TEST SUPVJ ONLY: CPT | Mod: ,,, | Performed by: INTERNAL MEDICINE

## 2022-01-01 PROCEDURE — 97110 THERAPEUTIC EXERCISES: CPT

## 2022-01-01 PROCEDURE — 72190 XR PELVIS AP INLET AND OUTLET: ICD-10-PCS | Mod: 26,HCNC,, | Performed by: RADIOLOGY

## 2022-01-01 PROCEDURE — 1111F PR DISCHARGE MEDS RECONCILED W/ CURRENT OUTPATIENT MED LIST: ICD-10-PCS | Mod: HCNC,CPTII,S$GLB, | Performed by: INTERNAL MEDICINE

## 2022-01-01 PROCEDURE — 63600175 PHARM REV CODE 636 W HCPCS: Mod: HCNC

## 2022-01-01 PROCEDURE — 99999 PR PBB SHADOW E&M-EST. PATIENT-LVL III: ICD-10-PCS | Mod: PBBFAC,,, | Performed by: NURSE PRACTITIONER

## 2022-01-01 PROCEDURE — 1100F PR PT FALLS ASSESS DOC 2+ FALLS/FALL W/INJURY/YR: ICD-10-PCS | Mod: HCNC,CPTII,S$GLB, | Performed by: INTERNAL MEDICINE

## 2022-01-01 PROCEDURE — 3078F PR MOST RECENT DIASTOLIC BLOOD PRESSURE < 80 MM HG: ICD-10-PCS | Mod: CPTII,S$GLB,, | Performed by: PSYCHIATRY & NEUROLOGY

## 2022-01-01 PROCEDURE — 72190 X-RAY EXAM OF PELVIS: CPT | Mod: TC,HCNC

## 2022-01-01 PROCEDURE — 83690 ASSAY OF LIPASE: CPT | Performed by: EMERGENCY MEDICINE

## 2022-01-01 PROCEDURE — 99214 PR OFFICE/OUTPT VISIT, EST, LEVL IV, 30-39 MIN: ICD-10-PCS | Mod: S$GLB,,, | Performed by: PHYSICIAN ASSISTANT

## 2022-01-01 PROCEDURE — 99214 PR OFFICE/OUTPT VISIT, EST, LEVL IV, 30-39 MIN: ICD-10-PCS | Mod: S$GLB,,, | Performed by: PSYCHIATRY & NEUROLOGY

## 2022-01-01 PROCEDURE — 89051 BODY FLUID CELL COUNT: CPT | Performed by: STUDENT IN AN ORGANIZED HEALTH CARE EDUCATION/TRAINING PROGRAM

## 2022-01-01 PROCEDURE — 82607 VITAMIN B-12: CPT | Performed by: INTERNAL MEDICINE

## 2022-01-01 PROCEDURE — 99285 PR EMERGENCY DEPT VISIT,LEVEL V: ICD-10-PCS | Mod: CS,,, | Performed by: EMERGENCY MEDICINE

## 2022-01-01 PROCEDURE — 25000242 PHARM REV CODE 250 ALT 637 W/ HCPCS: Mod: HCNC | Performed by: STUDENT IN AN ORGANIZED HEALTH CARE EDUCATION/TRAINING PROGRAM

## 2022-01-01 PROCEDURE — 85025 COMPLETE CBC W/AUTO DIFF WBC: CPT | Mod: HCNC | Performed by: EMERGENCY MEDICINE

## 2022-01-01 PROCEDURE — 1101F PT FALLS ASSESS-DOCD LE1/YR: CPT | Mod: CPTII,S$GLB,, | Performed by: INTERNAL MEDICINE

## 2022-01-01 PROCEDURE — G0008 ADMIN INFLUENZA VIRUS VAC: HCPCS | Mod: S$GLB,,, | Performed by: INTERNAL MEDICINE

## 2022-01-01 PROCEDURE — 1126F AMNT PAIN NOTED NONE PRSNT: CPT | Mod: CPTII,S$GLB,, | Performed by: INTERNAL MEDICINE

## 2022-01-01 PROCEDURE — 3078F DIAST BP <80 MM HG: CPT | Mod: CPTII,S$GLB,, | Performed by: INTERNAL MEDICINE

## 2022-01-01 PROCEDURE — 99999 PR PBB SHADOW E&M-EST. PATIENT-LVL IV: ICD-10-PCS | Mod: PBBFAC,,, | Performed by: PSYCHIATRY & NEUROLOGY

## 2022-01-01 PROCEDURE — 3078F DIAST BP <80 MM HG: CPT | Mod: CPTII,S$GLB,, | Performed by: NURSE PRACTITIONER

## 2022-01-01 PROCEDURE — 36415 COLL VENOUS BLD VENIPUNCTURE: CPT | Mod: PO | Performed by: NURSE PRACTITIONER

## 2022-01-01 PROCEDURE — 99213 OFFICE O/P EST LOW 20 MIN: CPT | Mod: S$GLB,,, | Performed by: NURSE PRACTITIONER

## 2022-01-01 PROCEDURE — 36415 COLL VENOUS BLD VENIPUNCTURE: CPT | Mod: PO | Performed by: INTERNAL MEDICINE

## 2022-01-01 PROCEDURE — 80053 COMPREHEN METABOLIC PANEL: CPT

## 2022-01-01 PROCEDURE — 87015 SPECIMEN INFECT AGNT CONCNTJ: CPT | Performed by: STUDENT IN AN ORGANIZED HEALTH CARE EDUCATION/TRAINING PROGRAM

## 2022-01-01 PROCEDURE — 99999 PR PBB SHADOW E&M-EST. PATIENT-LVL IV: CPT | Mod: PBBFAC,,, | Performed by: PSYCHIATRY & NEUROLOGY

## 2022-01-01 PROCEDURE — 72190 X-RAY EXAM OF PELVIS: CPT | Mod: 26,HCNC,, | Performed by: RADIOLOGY

## 2022-01-01 PROCEDURE — 3074F PR MOST RECENT SYSTOLIC BLOOD PRESSURE < 130 MM HG: ICD-10-PCS | Mod: CPTII,S$GLB,, | Performed by: PSYCHIATRY & NEUROLOGY

## 2022-01-01 PROCEDURE — 3075F SYST BP GE 130 - 139MM HG: CPT | Mod: CPTII,S$GLB,, | Performed by: UROLOGY

## 2022-01-01 PROCEDURE — 99999 PR PBB SHADOW E&M-EST. PATIENT-LVL IV: CPT | Mod: PBBFAC,,, | Performed by: INTERNAL MEDICINE

## 2022-01-01 PROCEDURE — 99214 OFFICE O/P EST MOD 30 MIN: CPT | Mod: S$GLB,,, | Performed by: PSYCHIATRY & NEUROLOGY

## 2022-01-01 PROCEDURE — 99999 PR PBB SHADOW E&M-EST. PATIENT-LVL III: ICD-10-PCS | Mod: PBBFAC,HCNC,, | Performed by: NURSE PRACTITIONER

## 2022-01-01 PROCEDURE — 1126F AMNT PAIN NOTED NONE PRSNT: CPT | Mod: CPTII,S$GLB,, | Performed by: UROLOGY

## 2022-01-01 PROCEDURE — 80047 BASIC METABLC PNL IONIZED CA: CPT | Mod: 91

## 2022-01-01 PROCEDURE — 1111F DSCHRG MED/CURRENT MED MERGE: CPT | Mod: HCNC,CPTII,S$GLB, | Performed by: INTERNAL MEDICINE

## 2022-01-01 PROCEDURE — 71046 XR CHEST PA AND LATERAL: ICD-10-PCS | Mod: 26,,, | Performed by: RADIOLOGY

## 2022-01-01 PROCEDURE — 1101F PR PT FALLS ASSESS DOC 0-1 FALLS W/OUT INJ PAST YR: ICD-10-PCS | Mod: CPTII,S$GLB,, | Performed by: INTERNAL MEDICINE

## 2022-01-01 PROCEDURE — 99223 PR INITIAL HOSPITAL CARE,LEVL III: ICD-10-PCS | Mod: HCNC,GC,, | Performed by: ORTHOPAEDIC SURGERY

## 2022-01-01 PROCEDURE — 99214 OFFICE O/P EST MOD 30 MIN: CPT | Mod: S$GLB,,, | Performed by: NURSE PRACTITIONER

## 2022-01-01 PROCEDURE — 93010 EKG 12-LEAD: ICD-10-PCS | Mod: ,,, | Performed by: STUDENT IN AN ORGANIZED HEALTH CARE EDUCATION/TRAINING PROGRAM

## 2022-01-01 PROCEDURE — 3074F SYST BP LT 130 MM HG: CPT | Mod: CPTII,S$GLB,, | Performed by: NURSE PRACTITIONER

## 2022-01-01 PROCEDURE — 90677 PCV20 VACCINE IM: CPT | Mod: S$GLB,,, | Performed by: INTERNAL MEDICINE

## 2022-01-01 PROCEDURE — 3074F SYST BP LT 130 MM HG: CPT | Mod: CPTII,S$GLB,, | Performed by: PSYCHIATRY & NEUROLOGY

## 2022-01-01 PROCEDURE — 3074F SYST BP LT 130 MM HG: CPT | Mod: HCNC,CPTII,S$GLB, | Performed by: INTERNAL MEDICINE

## 2022-01-01 PROCEDURE — 99239 PR HOSPITAL DISCHARGE DAY,>30 MIN: ICD-10-PCS | Mod: HCNC,,, | Performed by: HOSPITALIST

## 2022-01-01 PROCEDURE — 1160F PR REVIEW ALL MEDS BY PRESCRIBER/CLIN PHARMACIST DOCUMENTED: ICD-10-PCS | Mod: CPTII,S$GLB,, | Performed by: PSYCHIATRY & NEUROLOGY

## 2022-01-01 PROCEDURE — 99999 PR PBB SHADOW E&M-EST. PATIENT-LVL III: CPT | Mod: PBBFAC,,, | Performed by: NURSE PRACTITIONER

## 2022-01-01 PROCEDURE — 3078F DIAST BP <80 MM HG: CPT | Mod: CPTII,S$GLB,, | Performed by: PSYCHIATRY & NEUROLOGY

## 2022-01-01 PROCEDURE — 85025 COMPLETE CBC W/AUTO DIFF WBC: CPT | Mod: HCNC

## 2022-01-01 PROCEDURE — 90694 VACC AIIV4 NO PRSRV 0.5ML IM: CPT | Mod: S$GLB,,, | Performed by: INTERNAL MEDICINE

## 2022-01-01 PROCEDURE — 83880 ASSAY OF NATRIURETIC PEPTIDE: CPT | Performed by: STUDENT IN AN ORGANIZED HEALTH CARE EDUCATION/TRAINING PROGRAM

## 2022-01-01 PROCEDURE — G0008 FLU VACCINE - QUADRIVALENT - ADJUVANTED: ICD-10-PCS | Mod: S$GLB,,, | Performed by: INTERNAL MEDICINE

## 2022-01-01 PROCEDURE — 78431 CARDIAC PET SCAN STRESS (CUPID ONLY): ICD-10-PCS | Mod: 26,,, | Performed by: INTERNAL MEDICINE

## 2022-01-01 PROCEDURE — U0002 COVID-19 LAB TEST NON-CDC: HCPCS

## 2022-01-01 PROCEDURE — 1100F PR PT FALLS ASSESS DOC 2+ FALLS/FALL W/INJURY/YR: ICD-10-PCS | Mod: CPTII,S$GLB,, | Performed by: INTERNAL MEDICINE

## 2022-01-01 PROCEDURE — 3288F PR FALLS RISK ASSESSMENT DOCUMENTED: ICD-10-PCS | Mod: HCNC,CPTII,S$GLB, | Performed by: NURSE PRACTITIONER

## 2022-01-01 PROCEDURE — 99226 PR SUBSEQUENT OBSERVATION CARE,LEVEL III: ICD-10-PCS | Mod: HCNC,,, | Performed by: STUDENT IN AN ORGANIZED HEALTH CARE EDUCATION/TRAINING PROGRAM

## 2022-01-01 PROCEDURE — 1126F PR PAIN SEVERITY QUANTIFIED, NO PAIN PRESENT: ICD-10-PCS | Mod: HCNC,CPTII,S$GLB, | Performed by: NURSE PRACTITIONER

## 2022-01-01 PROCEDURE — 78434 CARDIAC PET SCAN STRESS (CUPID ONLY): ICD-10-PCS | Mod: 26,,, | Performed by: INTERNAL MEDICINE

## 2022-01-01 PROCEDURE — 1101F PT FALLS ASSESS-DOCD LE1/YR: CPT | Mod: CPTII,S$GLB,, | Performed by: PSYCHIATRY & NEUROLOGY

## 2022-01-01 PROCEDURE — 83986 ASSAY PH BODY FLUID NOS: CPT | Performed by: STUDENT IN AN ORGANIZED HEALTH CARE EDUCATION/TRAINING PROGRAM

## 2022-01-01 PROCEDURE — 36600 WITHDRAWAL OF ARTERIAL BLOOD: CPT | Mod: HCNC

## 2022-01-01 PROCEDURE — 87070 CULTURE OTHR SPECIMN AEROBIC: CPT | Performed by: STUDENT IN AN ORGANIZED HEALTH CARE EDUCATION/TRAINING PROGRAM

## 2022-01-01 PROCEDURE — 99204 PR OFFICE/OUTPT VISIT, NEW, LEVL IV, 45-59 MIN: ICD-10-PCS | Mod: S$GLB,,, | Performed by: UROLOGY

## 2022-01-01 PROCEDURE — 3078F PR MOST RECENT DIASTOLIC BLOOD PRESSURE < 80 MM HG: ICD-10-PCS | Mod: CPTII,S$GLB,, | Performed by: PHYSICIAN ASSISTANT

## 2022-01-01 PROCEDURE — 99999 PR PBB SHADOW E&M-EST. PATIENT-LVL III: ICD-10-PCS | Mod: PBBFAC,,, | Performed by: INTERNAL MEDICINE

## 2022-01-01 PROCEDURE — 25000003 PHARM REV CODE 250: Mod: HCNC

## 2022-01-01 PROCEDURE — 93018 CARDIAC PET SCAN STRESS (CUPID ONLY): ICD-10-PCS | Mod: ,,, | Performed by: INTERNAL MEDICINE

## 2022-01-01 PROCEDURE — 1111F DSCHRG MED/CURRENT MED MERGE: CPT | Mod: HCNC,CPTII,S$GLB, | Performed by: NURSE PRACTITIONER

## 2022-01-01 PROCEDURE — 87186 SC STD MICRODIL/AGAR DIL: CPT | Mod: HCNC | Performed by: INTERNAL MEDICINE

## 2022-01-01 PROCEDURE — 71250 CT THORAX DX C-: CPT | Mod: TC

## 2022-01-01 PROCEDURE — 83735 ASSAY OF MAGNESIUM: CPT | Performed by: EMERGENCY MEDICINE

## 2022-01-01 PROCEDURE — 1126F PR PAIN SEVERITY QUANTIFIED, NO PAIN PRESENT: ICD-10-PCS | Mod: HCNC,CPTII,S$GLB, | Performed by: INTERNAL MEDICINE

## 2022-01-01 PROCEDURE — G0180 MD CERTIFICATION HHA PATIENT: HCPCS | Mod: ,,, | Performed by: HOSPITALIST

## 2022-01-01 PROCEDURE — 3075F PR MOST RECENT SYSTOLIC BLOOD PRESS GE 130-139MM HG: ICD-10-PCS | Mod: CPTII,S$GLB,, | Performed by: INTERNAL MEDICINE

## 2022-01-01 PROCEDURE — 25000242 PHARM REV CODE 250 ALT 637 W/ HCPCS: Mod: HCNC | Performed by: EMERGENCY MEDICINE

## 2022-01-01 PROCEDURE — 99999 PR PBB SHADOW E&M-EST. PATIENT-LVL IV: ICD-10-PCS | Mod: PBBFAC,HCNC,, | Performed by: INTERNAL MEDICINE

## 2022-01-01 PROCEDURE — 96360 HYDRATION IV INFUSION INIT: CPT

## 2022-01-01 PROCEDURE — 1100F PTFALLS ASSESS-DOCD GE2>/YR: CPT | Mod: CPTII,S$GLB,, | Performed by: PHYSICIAN ASSISTANT

## 2022-01-01 PROCEDURE — 99285 EMERGENCY DEPT VISIT HI MDM: CPT | Mod: 25

## 2022-01-01 PROCEDURE — 93010 ELECTROCARDIOGRAM REPORT: CPT | Mod: 76,,, | Performed by: INTERNAL MEDICINE

## 2022-01-01 PROCEDURE — 99397 PER PM REEVAL EST PAT 65+ YR: CPT | Mod: 25,S$GLB,, | Performed by: INTERNAL MEDICINE

## 2022-01-01 PROCEDURE — 63600175 PHARM REV CODE 636 W HCPCS

## 2022-01-01 PROCEDURE — 94799 UNLISTED PULMONARY SVC/PX: CPT | Mod: HCNC

## 2022-01-01 PROCEDURE — 83880 ASSAY OF NATRIURETIC PEPTIDE: CPT | Performed by: NURSE PRACTITIONER

## 2022-01-01 PROCEDURE — 90732 PPSV23 VACC 2 YRS+ SUBQ/IM: CPT | Mod: S$GLB,,, | Performed by: INTERNAL MEDICINE

## 2022-01-01 PROCEDURE — 83605 ASSAY OF LACTIC ACID: CPT | Performed by: EMERGENCY MEDICINE

## 2022-01-01 PROCEDURE — 78434 AQMBF PET REST & RX STRESS: CPT

## 2022-01-01 PROCEDURE — 84100 ASSAY OF PHOSPHORUS: CPT | Performed by: EMERGENCY MEDICINE

## 2022-01-01 PROCEDURE — 99999 PR PBB SHADOW E&M-EST. PATIENT-LVL III: ICD-10-PCS | Mod: PBBFAC,,, | Performed by: PSYCHIATRY & NEUROLOGY

## 2022-01-01 PROCEDURE — 1159F PR MEDICATION LIST DOCUMENTED IN MEDICAL RECORD: ICD-10-PCS | Mod: HCNC,CPTII,S$GLB, | Performed by: NURSE PRACTITIONER

## 2022-01-01 PROCEDURE — 1160F PR REVIEW ALL MEDS BY PRESCRIBER/CLIN PHARMACIST DOCUMENTED: ICD-10-PCS | Mod: HCNC,CPTII,S$GLB, | Performed by: NURSE PRACTITIONER

## 2022-01-01 PROCEDURE — 1101F PR PT FALLS ASSESS DOC 0-1 FALLS W/OUT INJ PAST YR: ICD-10-PCS | Mod: CPTII,S$GLB,, | Performed by: PSYCHIATRY & NEUROLOGY

## 2022-01-01 PROCEDURE — G0008 FLU VACCINE - QUADRIVALENT - ADJUVANTED: ICD-10-PCS | Mod: HCNC,S$GLB,, | Performed by: INTERNAL MEDICINE

## 2022-01-01 PROCEDURE — 3288F FALL RISK ASSESSMENT DOCD: CPT | Mod: HCNC,CPTII,S$GLB, | Performed by: NURSE PRACTITIONER

## 2022-01-01 PROCEDURE — 99214 PR OFFICE/OUTPT VISIT, EST, LEVL IV, 30-39 MIN: ICD-10-PCS | Mod: HCNC,S$GLB,, | Performed by: NURSE PRACTITIONER

## 2022-01-01 PROCEDURE — 83615 LACTATE (LD) (LDH) ENZYME: CPT | Performed by: STUDENT IN AN ORGANIZED HEALTH CARE EDUCATION/TRAINING PROGRAM

## 2022-01-01 PROCEDURE — 83880 ASSAY OF NATRIURETIC PEPTIDE: CPT

## 2022-01-01 PROCEDURE — 99204 OFFICE O/P NEW MOD 45 MIN: CPT | Mod: S$GLB,,, | Performed by: UROLOGY

## 2022-01-01 PROCEDURE — 99999 PR PBB SHADOW E&M-EST. PATIENT-LVL V: ICD-10-PCS | Mod: PBBFAC,,, | Performed by: PHYSICIAN ASSISTANT

## 2022-01-01 PROCEDURE — 1101F PT FALLS ASSESS-DOCD LE1/YR: CPT | Mod: HCNC,CPTII,S$GLB, | Performed by: NURSE PRACTITIONER

## 2022-01-01 PROCEDURE — 36415 COLL VENOUS BLD VENIPUNCTURE: CPT | Mod: HCNC

## 2022-01-01 PROCEDURE — 71046 X-RAY EXAM CHEST 2 VIEWS: CPT | Mod: TC,PO

## 2022-01-01 PROCEDURE — 99999 PR PBB SHADOW E&M-EST. PATIENT-LVL IV: ICD-10-PCS | Mod: PBBFAC,,, | Performed by: INTERNAL MEDICINE

## 2022-01-01 PROCEDURE — 3074F PR MOST RECENT SYSTOLIC BLOOD PRESSURE < 130 MM HG: ICD-10-PCS | Mod: HCNC,CPTII,S$GLB, | Performed by: INTERNAL MEDICINE

## 2022-01-01 PROCEDURE — 99214 PR OFFICE/OUTPT VISIT, EST, LEVL IV, 30-39 MIN: ICD-10-PCS | Mod: 25,S$GLB,, | Performed by: INTERNAL MEDICINE

## 2022-01-01 PROCEDURE — 99284 PR EMERGENCY DEPT VISIT,LEVEL IV: ICD-10-PCS | Mod: ,,, | Performed by: STUDENT IN AN ORGANIZED HEALTH CARE EDUCATION/TRAINING PROGRAM

## 2022-01-01 PROCEDURE — 3078F DIAST BP <80 MM HG: CPT | Mod: CPTII,S$GLB,, | Performed by: UROLOGY

## 2022-01-01 PROCEDURE — 90732 PNEUMOCOCCAL POLYSACCHARIDE VACCINE 23-VALENT =>2YO SQ IM: ICD-10-PCS | Mod: S$GLB,,, | Performed by: INTERNAL MEDICINE

## 2022-01-01 PROCEDURE — 99214 OFFICE O/P EST MOD 30 MIN: CPT | Mod: S$GLB,,, | Performed by: PHYSICIAN ASSISTANT

## 2022-01-01 PROCEDURE — 88305 TISSUE EXAM BY PATHOLOGIST: CPT | Performed by: PATHOLOGY

## 2022-01-01 PROCEDURE — 99213 PR OFFICE/OUTPT VISIT, EST, LEVL III, 20-29 MIN: ICD-10-PCS | Mod: S$GLB,,, | Performed by: NURSE PRACTITIONER

## 2022-01-01 PROCEDURE — 99215 OFFICE O/P EST HI 40 MIN: CPT | Mod: S$GLB,,, | Performed by: INTERNAL MEDICINE

## 2022-01-01 PROCEDURE — 99999 PR PBB SHADOW E&M-EST. PATIENT-LVL V: CPT | Mod: PBBFAC,,, | Performed by: NURSE PRACTITIONER

## 2022-01-01 PROCEDURE — 87102 FUNGUS ISOLATION CULTURE: CPT | Performed by: STUDENT IN AN ORGANIZED HEALTH CARE EDUCATION/TRAINING PROGRAM

## 2022-01-01 PROCEDURE — 1111F PR DISCHARGE MEDS RECONCILED W/ CURRENT OUTPATIENT MED LIST: ICD-10-PCS | Mod: HCNC,CPTII,, | Performed by: HOSPITALIST

## 2022-01-01 PROCEDURE — 84100 ASSAY OF PHOSPHORUS: CPT | Mod: HCNC

## 2022-01-01 PROCEDURE — 3075F SYST BP GE 130 - 139MM HG: CPT | Mod: CPTII,S$GLB,, | Performed by: NURSE PRACTITIONER

## 2022-01-01 PROCEDURE — 99233 SBSQ HOSP IP/OBS HIGH 50: CPT | Mod: HCNC,,, | Performed by: STUDENT IN AN ORGANIZED HEALTH CARE EDUCATION/TRAINING PROGRAM

## 2022-01-01 PROCEDURE — 99284 EMERGENCY DEPT VISIT MOD MDM: CPT | Mod: ,,, | Performed by: STUDENT IN AN ORGANIZED HEALTH CARE EDUCATION/TRAINING PROGRAM

## 2022-01-01 PROCEDURE — 99223 1ST HOSP IP/OBS HIGH 75: CPT | Mod: HCNC,GC,, | Performed by: ORTHOPAEDIC SURGERY

## 2022-01-01 PROCEDURE — 97116 GAIT TRAINING THERAPY: CPT | Mod: HCNC

## 2022-01-01 PROCEDURE — 88112 PR  CYTOPATH, CELL ENHANCE TECH: ICD-10-PCS | Mod: 26,,, | Performed by: PATHOLOGY

## 2022-01-01 PROCEDURE — U0002 COVID-19 LAB TEST NON-CDC: HCPCS | Mod: HCNC | Performed by: EMERGENCY MEDICINE

## 2022-01-01 PROCEDURE — 99999 PR PBB SHADOW E&M-EST. PATIENT-LVL III: CPT | Mod: PBBFAC,HCNC,, | Performed by: NURSE PRACTITIONER

## 2022-01-01 PROCEDURE — 99499 UNLISTED E&M SERVICE: CPT | Mod: S$GLB,,, | Performed by: NURSE PRACTITIONER

## 2022-01-01 PROCEDURE — 1126F AMNT PAIN NOTED NONE PRSNT: CPT | Mod: CPTII,S$GLB,, | Performed by: PSYCHIATRY & NEUROLOGY

## 2022-01-01 PROCEDURE — 82180 ASSAY OF ASCORBIC ACID: CPT | Performed by: INTERNAL MEDICINE

## 2022-01-01 PROCEDURE — 82945 GLUCOSE OTHER FLUID: CPT | Performed by: STUDENT IN AN ORGANIZED HEALTH CARE EDUCATION/TRAINING PROGRAM

## 2022-01-01 PROCEDURE — 3078F DIAST BP <80 MM HG: CPT | Mod: CPTII,S$GLB,, | Performed by: PHYSICIAN ASSISTANT

## 2022-01-01 PROCEDURE — 97116 GAIT TRAINING THERAPY: CPT

## 2022-01-01 PROCEDURE — 1126F AMNT PAIN NOTED NONE PRSNT: CPT | Mod: HCNC,CPTII,S$GLB, | Performed by: NURSE PRACTITIONER

## 2022-01-01 PROCEDURE — 78434 AQMBF PET REST & RX STRESS: CPT | Mod: 26,,, | Performed by: INTERNAL MEDICINE

## 2022-01-01 PROCEDURE — 99999 PR PBB SHADOW E&M-EST. PATIENT-LVL IV: ICD-10-PCS | Mod: PBBFAC,,, | Performed by: UROLOGY

## 2022-01-01 PROCEDURE — 1126F PR PAIN SEVERITY QUANTIFIED, NO PAIN PRESENT: ICD-10-PCS | Mod: CPTII,S$GLB,, | Performed by: PHYSICIAN ASSISTANT

## 2022-01-01 PROCEDURE — 27000221 HC OXYGEN, UP TO 24 HOURS

## 2022-01-01 PROCEDURE — 93010 ELECTROCARDIOGRAM REPORT: CPT | Mod: ,,, | Performed by: STUDENT IN AN ORGANIZED HEALTH CARE EDUCATION/TRAINING PROGRAM

## 2022-01-01 PROCEDURE — 3078F PR MOST RECENT DIASTOLIC BLOOD PRESSURE < 80 MM HG: ICD-10-PCS | Mod: CPTII,S$GLB,, | Performed by: UROLOGY

## 2022-01-01 PROCEDURE — 97535 SELF CARE MNGMENT TRAINING: CPT | Mod: HCNC

## 2022-01-01 PROCEDURE — 99499 UNLISTED E&M SERVICE: CPT | Mod: S$GLB,,, | Performed by: PSYCHIATRY & NEUROLOGY

## 2022-01-01 PROCEDURE — 3074F PR MOST RECENT SYSTOLIC BLOOD PRESSURE < 130 MM HG: ICD-10-PCS | Mod: CPTII,S$GLB,, | Performed by: PHYSICIAN ASSISTANT

## 2022-01-01 PROCEDURE — 99285 EMERGENCY DEPT VISIT HI MDM: CPT | Mod: CS,,, | Performed by: EMERGENCY MEDICINE

## 2022-01-01 PROCEDURE — 90694 FLU VACCINE - QUADRIVALENT - ADJUVANTED: ICD-10-PCS | Mod: S$GLB,,, | Performed by: INTERNAL MEDICINE

## 2022-01-01 PROCEDURE — 72190 XR PELVIS AP INLET AND OUTLET: ICD-10-PCS | Mod: 26,,, | Performed by: RADIOLOGY

## 2022-01-01 PROCEDURE — 1126F AMNT PAIN NOTED NONE PRSNT: CPT | Mod: CPTII,S$GLB,, | Performed by: PHYSICIAN ASSISTANT

## 2022-01-01 PROCEDURE — 82803 BLOOD GASES ANY COMBINATION: CPT

## 2022-01-01 PROCEDURE — 1159F PR MEDICATION LIST DOCUMENTED IN MEDICAL RECORD: ICD-10-PCS | Mod: CPTII,S$GLB,, | Performed by: PSYCHIATRY & NEUROLOGY

## 2022-01-01 PROCEDURE — 93018 CV STRESS TEST I&R ONLY: CPT | Mod: ,,, | Performed by: INTERNAL MEDICINE

## 2022-01-01 PROCEDURE — 84484 ASSAY OF TROPONIN QUANT: CPT | Performed by: STUDENT IN AN ORGANIZED HEALTH CARE EDUCATION/TRAINING PROGRAM

## 2022-01-01 PROCEDURE — 99999 PR PBB SHADOW E&M-EST. PATIENT-LVL IV: CPT | Mod: PBBFAC,,, | Performed by: UROLOGY

## 2022-01-01 PROCEDURE — 1111F PR DISCHARGE MEDS RECONCILED W/ CURRENT OUTPATIENT MED LIST: ICD-10-PCS | Mod: HCNC,CPTII,S$GLB, | Performed by: NURSE PRACTITIONER

## 2022-01-01 PROCEDURE — G0179 PR HOME HEALTH MD RECERTIFICATION: ICD-10-PCS | Mod: ,,, | Performed by: INTERNAL MEDICINE

## 2022-01-01 PROCEDURE — 88312 PR  SPECIAL STAINS,GROUP I: ICD-10-PCS | Mod: 26,,, | Performed by: PATHOLOGY

## 2022-01-01 PROCEDURE — 93010 EKG 12-LEAD: ICD-10-PCS | Mod: ,,, | Performed by: INTERNAL MEDICINE

## 2022-01-01 PROCEDURE — 90694 VACC AIIV4 NO PRSRV 0.5ML IM: CPT | Mod: HCNC,S$GLB,, | Performed by: INTERNAL MEDICINE

## 2022-01-01 PROCEDURE — 93010 EKG 12-LEAD: ICD-10-PCS | Mod: 76,,, | Performed by: INTERNAL MEDICINE

## 2022-01-01 PROCEDURE — 84443 ASSAY THYROID STIM HORMONE: CPT | Performed by: INTERNAL MEDICINE

## 2022-01-01 PROCEDURE — 80053 COMPREHEN METABOLIC PANEL: CPT | Mod: HCNC | Performed by: EMERGENCY MEDICINE

## 2022-01-01 PROCEDURE — 99284 PR EMERGENCY DEPT VISIT,LEVEL IV: ICD-10-PCS | Mod: ,,, | Performed by: EMERGENCY MEDICINE

## 2022-01-01 PROCEDURE — 86695 HERPES SIMPLEX TYPE 1 TEST: CPT | Performed by: INTERNAL MEDICINE

## 2022-01-01 PROCEDURE — 99397 PR PREVENTIVE VISIT,EST,65 & OVER: ICD-10-PCS | Mod: 25,S$GLB,, | Performed by: INTERNAL MEDICINE

## 2022-01-01 PROCEDURE — 99226 PR SUBSEQUENT OBSERVATION CARE,LEVEL III: CPT | Mod: HCNC,,, | Performed by: STUDENT IN AN ORGANIZED HEALTH CARE EDUCATION/TRAINING PROGRAM

## 2022-01-01 PROCEDURE — 87502 INFLUENZA DNA AMP PROBE: CPT

## 2022-01-01 PROCEDURE — 25000242 PHARM REV CODE 250 ALT 637 W/ HCPCS

## 2022-01-01 PROCEDURE — 87205 SMEAR GRAM STAIN: CPT | Performed by: STUDENT IN AN ORGANIZED HEALTH CARE EDUCATION/TRAINING PROGRAM

## 2022-01-01 PROCEDURE — 72190 X-RAY EXAM OF PELVIS: CPT | Mod: 26,,, | Performed by: RADIOLOGY

## 2022-01-01 PROCEDURE — 1126F AMNT PAIN NOTED NONE PRSNT: CPT | Mod: HCNC,CPTII,S$GLB, | Performed by: INTERNAL MEDICINE

## 2022-01-01 PROCEDURE — 3077F SYST BP >= 140 MM HG: CPT | Mod: CPTII,S$GLB,, | Performed by: NURSE PRACTITIONER

## 2022-01-01 PROCEDURE — 80048 BASIC METABOLIC PNL TOTAL CA: CPT | Performed by: PHYSICIAN ASSISTANT

## 2022-01-01 PROCEDURE — 1111F DSCHRG MED/CURRENT MED MERGE: CPT | Mod: HCNC,CPTII,, | Performed by: HOSPITALIST

## 2022-01-01 PROCEDURE — 99999 PR PBB SHADOW E&M-EST. PATIENT-LVL V: CPT | Mod: PBBFAC,,, | Performed by: PHYSICIAN ASSISTANT

## 2022-01-01 PROCEDURE — 86696 HERPES SIMPLEX TYPE 2 TEST: CPT | Performed by: INTERNAL MEDICINE

## 2022-01-01 PROCEDURE — 80047 BASIC METABLC PNL IONIZED CA: CPT

## 2022-01-01 PROCEDURE — 99999 PR PBB SHADOW E&M-EST. PATIENT-LVL IV: CPT | Mod: PBBFAC,HCNC,, | Performed by: INTERNAL MEDICINE

## 2022-01-01 PROCEDURE — 1100F PR PT FALLS ASSESS DOC 2+ FALLS/FALL W/INJURY/YR: ICD-10-PCS | Mod: CPTII,S$GLB,, | Performed by: NURSE PRACTITIONER

## 2022-01-01 PROCEDURE — 88312 SPECIAL STAINS GROUP 1: CPT | Mod: 26,,, | Performed by: PATHOLOGY

## 2022-01-01 PROCEDURE — 99214 OFFICE O/P EST MOD 30 MIN: CPT | Mod: HCNC,S$GLB,, | Performed by: NURSE PRACTITIONER

## 2022-01-01 PROCEDURE — 94761 N-INVAS EAR/PLS OXIMETRY MLT: CPT

## 2022-01-01 PROCEDURE — 3075F SYST BP GE 130 - 139MM HG: CPT | Mod: CPTII,S$GLB,, | Performed by: INTERNAL MEDICINE

## 2022-01-01 PROCEDURE — 85025 COMPLETE CBC W/AUTO DIFF WBC: CPT | Performed by: EMERGENCY MEDICINE

## 2022-01-01 PROCEDURE — 3075F PR MOST RECENT SYSTOLIC BLOOD PRESS GE 130-139MM HG: ICD-10-PCS | Mod: CPTII,S$GLB,, | Performed by: NURSE PRACTITIONER

## 2022-01-01 PROCEDURE — 3288F PR FALLS RISK ASSESSMENT DOCUMENTED: ICD-10-PCS | Mod: CPTII,S$GLB,, | Performed by: PHYSICIAN ASSISTANT

## 2022-01-01 PROCEDURE — 1160F PR REVIEW ALL MEDS BY PRESCRIBER/CLIN PHARMACIST DOCUMENTED: ICD-10-PCS | Mod: CPTII,S$GLB,, | Performed by: PHYSICIAN ASSISTANT

## 2022-01-01 PROCEDURE — 72190 X-RAY EXAM OF PELVIS: CPT | Mod: TC

## 2022-01-01 PROCEDURE — 85025 COMPLETE CBC W/AUTO DIFF WBC: CPT | Mod: 91,HCNC

## 2022-01-01 PROCEDURE — 84153 ASSAY OF PSA TOTAL: CPT | Performed by: INTERNAL MEDICINE

## 2022-01-01 PROCEDURE — 1100F PTFALLS ASSESS-DOCD GE2>/YR: CPT | Mod: HCNC,CPTII,S$GLB, | Performed by: INTERNAL MEDICINE

## 2022-01-01 PROCEDURE — 3075F PR MOST RECENT SYSTOLIC BLOOD PRESS GE 130-139MM HG: ICD-10-PCS | Mod: CPTII,S$GLB,, | Performed by: UROLOGY

## 2022-01-01 PROCEDURE — G0180 PR HOME HEALTH MD CERTIFICATION: ICD-10-PCS | Mod: ,,, | Performed by: INTERNAL MEDICINE

## 2022-01-01 PROCEDURE — 99284 EMERGENCY DEPT VISIT MOD MDM: CPT | Mod: ,,, | Performed by: EMERGENCY MEDICINE

## 2022-01-01 PROCEDURE — 84466 ASSAY OF TRANSFERRIN: CPT | Performed by: INTERNAL MEDICINE

## 2022-01-01 PROCEDURE — 99999 PR PBB SHADOW E&M-EST. PATIENT-LVL IV: CPT | Mod: PBBFAC,,, | Performed by: NURSE PRACTITIONER

## 2022-01-01 PROCEDURE — 97165 OT EVAL LOW COMPLEX 30 MIN: CPT | Mod: HCNC

## 2022-01-01 PROCEDURE — 85025 COMPLETE CBC W/AUTO DIFF WBC: CPT

## 2022-01-01 PROCEDURE — 1160F RVW MEDS BY RX/DR IN RCRD: CPT | Mod: HCNC,CPTII,S$GLB, | Performed by: NURSE PRACTITIONER

## 2022-01-01 PROCEDURE — 97162 PT EVAL MOD COMPLEX 30 MIN: CPT | Mod: HCNC

## 2022-01-01 PROCEDURE — 3288F FALL RISK ASSESSMENT DOCD: CPT | Mod: CPTII,S$GLB,, | Performed by: PHYSICIAN ASSISTANT

## 2022-01-01 PROCEDURE — 1159F PR MEDICATION LIST DOCUMENTED IN MEDICAL RECORD: ICD-10-PCS | Mod: CPTII,S$GLB,, | Performed by: UROLOGY

## 2022-01-01 PROCEDURE — 99499 RISK ADDL DX/OHS AUDIT: ICD-10-PCS | Mod: S$GLB,,, | Performed by: INTERNAL MEDICINE

## 2022-01-01 PROCEDURE — 87077 CULTURE AEROBIC IDENTIFY: CPT | Mod: 59,HCNC | Performed by: INTERNAL MEDICINE

## 2022-01-01 PROCEDURE — 1100F PTFALLS ASSESS-DOCD GE2>/YR: CPT | Mod: CPTII,S$GLB,, | Performed by: INTERNAL MEDICINE

## 2022-01-01 PROCEDURE — 85025 COMPLETE CBC W/AUTO DIFF WBC: CPT | Performed by: STUDENT IN AN ORGANIZED HEALTH CARE EDUCATION/TRAINING PROGRAM

## 2022-01-01 PROCEDURE — 84484 ASSAY OF TROPONIN QUANT: CPT

## 2022-01-01 PROCEDURE — 88305 TISSUE EXAM BY PATHOLOGIST: CPT | Mod: 26,,, | Performed by: PATHOLOGY

## 2022-01-01 PROCEDURE — 90677 PNEUMOCOCCAL CONJUGATE VACCINE 20-VALENT: ICD-10-PCS | Mod: S$GLB,,, | Performed by: INTERNAL MEDICINE

## 2022-01-01 PROCEDURE — 1126F PR PAIN SEVERITY QUANTIFIED, NO PAIN PRESENT: ICD-10-PCS | Mod: CPTII,S$GLB,, | Performed by: UROLOGY

## 2022-01-01 PROCEDURE — 99214 OFFICE O/P EST MOD 30 MIN: CPT | Mod: 25,S$GLB,, | Performed by: INTERNAL MEDICINE

## 2022-01-01 PROCEDURE — 99499 RISK ADDL DX/OHS AUDIT: ICD-10-PCS | Mod: S$GLB,,, | Performed by: NURSE PRACTITIONER

## 2022-01-01 PROCEDURE — G0180 PR HOME HEALTH MD CERTIFICATION: ICD-10-PCS | Mod: ,,, | Performed by: HOSPITALIST

## 2022-01-01 PROCEDURE — 1100F PR PT FALLS ASSESS DOC 2+ FALLS/FALL W/INJURY/YR: ICD-10-PCS | Mod: CPTII,S$GLB,, | Performed by: PHYSICIAN ASSISTANT

## 2022-01-01 PROCEDURE — 90694 FLU VACCINE - QUADRIVALENT - ADJUVANTED: ICD-10-PCS | Mod: HCNC,S$GLB,, | Performed by: INTERNAL MEDICINE

## 2022-01-01 PROCEDURE — 36415 COLL VENOUS BLD VENIPUNCTURE: CPT | Performed by: PHYSICIAN ASSISTANT

## 2022-01-01 PROCEDURE — 84484 ASSAY OF TROPONIN QUANT: CPT | Mod: HCNC | Performed by: EMERGENCY MEDICINE

## 2022-01-01 PROCEDURE — 85025 COMPLETE CBC W/AUTO DIFF WBC: CPT | Performed by: INTERNAL MEDICINE

## 2022-01-01 PROCEDURE — 1159F PR MEDICATION LIST DOCUMENTED IN MEDICAL RECORD: ICD-10-PCS | Mod: CPTII,S$GLB,, | Performed by: PHYSICIAN ASSISTANT

## 2022-01-01 PROCEDURE — 63600175 PHARM REV CODE 636 W HCPCS: Mod: HCNC | Performed by: EMERGENCY MEDICINE

## 2022-01-01 PROCEDURE — 1100F PTFALLS ASSESS-DOCD GE2>/YR: CPT | Mod: CPTII,S$GLB,, | Performed by: PSYCHIATRY & NEUROLOGY

## 2022-01-01 PROCEDURE — 78431 MYOCRD IMG PET RST&STRS CT: CPT | Mod: 26,,, | Performed by: INTERNAL MEDICINE

## 2022-01-01 PROCEDURE — G0179 MD RECERTIFICATION HHA PT: HCPCS | Mod: ,,, | Performed by: INTERNAL MEDICINE

## 2022-01-01 PROCEDURE — 1159F MED LIST DOCD IN RCRD: CPT | Mod: CPTII,S$GLB,, | Performed by: PHYSICIAN ASSISTANT

## 2022-01-01 PROCEDURE — 71250 CT THORAX DX C-: CPT | Mod: 26,,, | Performed by: RADIOLOGY

## 2022-01-01 PROCEDURE — 93016 CARDIAC PET SCAN STRESS (CUPID ONLY): ICD-10-PCS | Mod: ,,, | Performed by: INTERNAL MEDICINE

## 2022-01-01 PROCEDURE — G0008 ADMIN INFLUENZA VIRUS VAC: HCPCS | Mod: HCNC,S$GLB,, | Performed by: INTERNAL MEDICINE

## 2022-01-01 PROCEDURE — 99215 PR OFFICE/OUTPT VISIT, EST, LEVL V, 40-54 MIN: ICD-10-PCS | Mod: HCNC,25,S$GLB, | Performed by: INTERNAL MEDICINE

## 2022-01-01 PROCEDURE — 3074F SYST BP LT 130 MM HG: CPT | Mod: CPTII,S$GLB,, | Performed by: PHYSICIAN ASSISTANT

## 2022-01-01 PROCEDURE — 99900035 HC TECH TIME PER 15 MIN (STAT)

## 2022-01-01 PROCEDURE — 81003 URINALYSIS AUTO W/O SCOPE: CPT | Mod: HCNC | Performed by: INTERNAL MEDICINE

## 2022-01-01 PROCEDURE — 84207 ASSAY OF VITAMIN B-6: CPT | Performed by: INTERNAL MEDICINE

## 2022-01-01 RX ORDER — BISACODYL 10 MG
10 SUPPOSITORY, RECTAL RECTAL DAILY PRN
Status: DISCONTINUED | OUTPATIENT
Start: 2022-01-01 | End: 2022-01-01 | Stop reason: HOSPADM

## 2022-01-01 RX ORDER — MEMANTINE HYDROCHLORIDE 5 MG/1
5 TABLET ORAL 2 TIMES DAILY
Qty: 60 TABLET | Refills: 5 | Status: SHIPPED | OUTPATIENT
Start: 2022-01-01 | End: 2022-01-01 | Stop reason: DRUGHIGH

## 2022-01-01 RX ORDER — ENOXAPARIN SODIUM 100 MG/ML
130 INJECTION SUBCUTANEOUS EVERY 24 HOURS
Status: DISCONTINUED | OUTPATIENT
Start: 2022-01-01 | End: 2022-01-01

## 2022-01-01 RX ORDER — MEMANTINE HYDROCHLORIDE 10 MG/1
10 TABLET ORAL 2 TIMES DAILY
Qty: 180 TABLET | Refills: 1 | Status: SHIPPED | OUTPATIENT
Start: 2022-01-01 | End: 2023-06-04

## 2022-01-01 RX ORDER — METHYLPREDNISOLONE SOD SUCC 125 MG
125 VIAL (EA) INJECTION
Status: COMPLETED | OUTPATIENT
Start: 2022-01-01 | End: 2022-01-01

## 2022-01-01 RX ORDER — TRAMADOL HYDROCHLORIDE 50 MG/1
50 TABLET ORAL EVERY 4 HOURS PRN
Status: DISCONTINUED | OUTPATIENT
Start: 2022-01-01 | End: 2022-01-01 | Stop reason: HOSPADM

## 2022-01-01 RX ORDER — HYDROXYZINE HYDROCHLORIDE 25 MG/1
25 TABLET, FILM COATED ORAL 3 TIMES DAILY PRN
Status: DISCONTINUED | OUTPATIENT
Start: 2022-01-01 | End: 2022-01-01 | Stop reason: HOSPADM

## 2022-01-01 RX ORDER — IPRATROPIUM BROMIDE AND ALBUTEROL SULFATE 2.5; .5 MG/3ML; MG/3ML
3 SOLUTION RESPIRATORY (INHALATION)
Status: COMPLETED | OUTPATIENT
Start: 2022-01-01 | End: 2022-01-01

## 2022-01-01 RX ORDER — AMOXICILLIN AND CLAVULANATE POTASSIUM 875; 125 MG/1; MG/1
1 TABLET, FILM COATED ORAL EVERY 12 HOURS
Status: COMPLETED | OUTPATIENT
Start: 2022-01-01 | End: 2022-01-01

## 2022-01-01 RX ORDER — OXYCODONE HYDROCHLORIDE 5 MG/1
5 TABLET ORAL EVERY 6 HOURS PRN
Status: DISCONTINUED | OUTPATIENT
Start: 2022-01-01 | End: 2022-01-01 | Stop reason: HOSPADM

## 2022-01-01 RX ORDER — SERTRALINE HYDROCHLORIDE 100 MG/1
100 TABLET, FILM COATED ORAL DAILY
Qty: 30 TABLET | Refills: 112 | Status: SHIPPED | OUTPATIENT
Start: 2022-01-01 | End: 2022-01-01 | Stop reason: SDUPTHER

## 2022-01-01 RX ORDER — ACETAMINOPHEN 500 MG
1000 TABLET ORAL EVERY 8 HOURS
Status: DISCONTINUED | OUTPATIENT
Start: 2022-01-01 | End: 2022-01-01 | Stop reason: HOSPADM

## 2022-01-01 RX ORDER — GABAPENTIN 100 MG/1
100 CAPSULE ORAL 3 TIMES DAILY
Qty: 90 CAPSULE | Refills: 11 | Status: SHIPPED | OUTPATIENT
Start: 2022-01-01 | End: 2022-01-01 | Stop reason: SINTOL

## 2022-01-01 RX ORDER — SENNOSIDES 8.6 MG/1
8.6 TABLET ORAL DAILY PRN
Status: DISCONTINUED | OUTPATIENT
Start: 2022-01-01 | End: 2022-01-01 | Stop reason: HOSPADM

## 2022-01-01 RX ORDER — ALPRAZOLAM 0.25 MG/1
0.25 TABLET ORAL NIGHTLY PRN
Qty: 30 TABLET | Refills: 2 | Status: SHIPPED | OUTPATIENT
Start: 2022-01-01 | End: 2022-01-01

## 2022-01-01 RX ORDER — AMOXICILLIN AND CLAVULANATE POTASSIUM 875; 125 MG/1; MG/1
1 TABLET, FILM COATED ORAL EVERY 12 HOURS
Qty: 3 TABLET | Refills: 0 | Status: SHIPPED | OUTPATIENT
Start: 2022-01-01 | End: 2022-01-01

## 2022-01-01 RX ORDER — INFLUENZA VACCINE, ADJUVANTED 15; 15; 15; 15 UG/.5ML; UG/.5ML; UG/.5ML; UG/.5ML
INJECTION, SUSPENSION INTRAMUSCULAR
COMMUNITY
Start: 2022-01-01 | End: 2022-01-01

## 2022-01-01 RX ORDER — SODIUM,POTASSIUM PHOSPHATES 280-250MG
1 POWDER IN PACKET (EA) ORAL ONCE
Status: COMPLETED | OUTPATIENT
Start: 2022-01-01 | End: 2022-01-01

## 2022-01-01 RX ORDER — FUROSEMIDE 20 MG/1
20 TABLET ORAL DAILY PRN
Qty: 90 TABLET | Refills: 3 | Status: SHIPPED | OUTPATIENT
Start: 2022-01-01 | End: 2023-02-18

## 2022-01-01 RX ORDER — FENTANYL CITRATE 50 UG/ML
50 INJECTION, SOLUTION INTRAMUSCULAR; INTRAVENOUS
Status: COMPLETED | OUTPATIENT
Start: 2022-01-01 | End: 2022-01-01

## 2022-01-01 RX ORDER — ONDANSETRON HYDROCHLORIDE 8 MG/1
8 TABLET, FILM COATED ORAL EVERY 8 HOURS PRN
Qty: 30 TABLET | Refills: 1 | Status: SHIPPED | OUTPATIENT
Start: 2022-01-01

## 2022-01-01 RX ORDER — LANOLIN ALCOHOL/MO/W.PET/CERES
400 CREAM (GRAM) TOPICAL DAILY
Status: DISCONTINUED | OUTPATIENT
Start: 2022-01-01 | End: 2022-01-01 | Stop reason: HOSPADM

## 2022-01-01 RX ORDER — DOXEPIN 3 MG/1
TABLET, FILM COATED ORAL
Qty: 30 TABLET | Refills: 6 | Status: SHIPPED | OUTPATIENT
Start: 2022-01-01

## 2022-01-01 RX ORDER — AMINOPHYLLINE 25 MG/ML
75 INJECTION, SOLUTION INTRAVENOUS ONCE
Status: COMPLETED | OUTPATIENT
Start: 2022-01-01 | End: 2022-01-01

## 2022-01-01 RX ORDER — HYDROXYZINE HYDROCHLORIDE 10 MG/1
TABLET, FILM COATED ORAL
Qty: 60 TABLET | Refills: 1 | Status: SHIPPED | OUTPATIENT
Start: 2022-01-01 | End: 2022-01-01

## 2022-01-01 RX ORDER — ONDANSETRON 8 MG/1
8 TABLET, ORALLY DISINTEGRATING ORAL EVERY 8 HOURS PRN
Status: DISCONTINUED | OUTPATIENT
Start: 2022-01-01 | End: 2022-01-01 | Stop reason: HOSPADM

## 2022-01-01 RX ORDER — BUTALBITAL, ACETAMINOPHEN AND CAFFEINE 50; 325; 40 MG/1; MG/1; MG/1
1 TABLET ORAL EVERY 6 HOURS PRN
Status: DISCONTINUED | OUTPATIENT
Start: 2022-01-01 | End: 2022-01-01

## 2022-01-01 RX ORDER — ADHESIVE BANDAGE
30 BANDAGE TOPICAL DAILY PRN
Status: DISCONTINUED | OUTPATIENT
Start: 2022-01-01 | End: 2022-01-01 | Stop reason: HOSPADM

## 2022-01-01 RX ORDER — FUROSEMIDE 20 MG/1
20 TABLET ORAL DAILY
Qty: 90 TABLET | Refills: 0 | Status: SHIPPED | OUTPATIENT
Start: 2022-01-01 | End: 2022-01-01

## 2022-01-01 RX ORDER — SACUBITRIL AND VALSARTAN 24; 26 MG/1; MG/1
1 TABLET, FILM COATED ORAL 2 TIMES DAILY
Qty: 60 TABLET | Refills: 11 | Status: SHIPPED | OUTPATIENT
Start: 2022-01-01

## 2022-01-01 RX ORDER — ASPIRIN 81 MG/1
81 TABLET ORAL DAILY
Status: DISCONTINUED | OUTPATIENT
Start: 2022-01-01 | End: 2022-01-01 | Stop reason: HOSPADM

## 2022-01-01 RX ORDER — TIOTROPIUM BROMIDE AND OLODATEROL 3.124; 2.736 UG/1; UG/1
2 SPRAY, METERED RESPIRATORY (INHALATION) DAILY
Qty: 4 G | Refills: 5 | Status: SHIPPED | OUTPATIENT
Start: 2022-01-01 | End: 2022-01-01 | Stop reason: SDUPTHER

## 2022-01-01 RX ORDER — ACETAMINOPHEN 500 MG
1000 TABLET ORAL EVERY 8 HOURS PRN
Status: DISCONTINUED | OUTPATIENT
Start: 2022-01-01 | End: 2022-01-01

## 2022-01-01 RX ORDER — TIOTROPIUM BROMIDE AND OLODATEROL 3.124; 2.736 UG/1; UG/1
2 SPRAY, METERED RESPIRATORY (INHALATION) DAILY
Qty: 4 G | Refills: 5 | Status: SHIPPED | OUTPATIENT
Start: 2022-01-01

## 2022-01-01 RX ORDER — ACETAMINOPHEN 325 MG/1
650 TABLET ORAL EVERY 8 HOURS PRN
Status: DISCONTINUED | OUTPATIENT
Start: 2022-01-01 | End: 2022-01-01 | Stop reason: HOSPADM

## 2022-01-01 RX ORDER — METHOCARBAMOL 500 MG/1
TABLET, FILM COATED ORAL
COMMUNITY
Start: 2022-01-01 | End: 2022-01-01

## 2022-01-01 RX ORDER — METHYLPREDNISOLONE 4 MG/1
TABLET ORAL
Qty: 1 EACH | Refills: 0 | Status: SHIPPED | OUTPATIENT
Start: 2022-01-01

## 2022-01-01 RX ORDER — ACETAMINOPHEN 500 MG
1000 TABLET ORAL EVERY 8 HOURS
Qty: 60 TABLET | Refills: 0 | Status: SHIPPED | OUTPATIENT
Start: 2022-01-01 | End: 2022-01-01

## 2022-01-01 RX ORDER — IPRATROPIUM BROMIDE AND ALBUTEROL SULFATE 2.5; .5 MG/3ML; MG/3ML
3 SOLUTION RESPIRATORY (INHALATION)
Status: DISCONTINUED | OUTPATIENT
Start: 2022-01-01 | End: 2022-01-01

## 2022-01-01 RX ORDER — METHOCARBAMOL 500 MG/1
500 TABLET, FILM COATED ORAL 3 TIMES DAILY
Qty: 30 TABLET | Refills: 0 | Status: SHIPPED | OUTPATIENT
Start: 2022-01-01 | End: 2022-01-01 | Stop reason: SDUPTHER

## 2022-01-01 RX ORDER — IPRATROPIUM BROMIDE AND ALBUTEROL SULFATE 2.5; .5 MG/3ML; MG/3ML
3 SOLUTION RESPIRATORY (INHALATION) EVERY 12 HOURS
Qty: 540 ML | Refills: 3 | Status: SHIPPED | OUTPATIENT
Start: 2022-01-01 | End: 2023-10-06

## 2022-01-01 RX ORDER — ENOXAPARIN SODIUM 100 MG/ML
40 INJECTION SUBCUTANEOUS EVERY 24 HOURS
Status: DISCONTINUED | OUTPATIENT
Start: 2022-01-01 | End: 2022-01-01

## 2022-01-01 RX ORDER — MEMANTINE HYDROCHLORIDE 10 MG/1
10 TABLET ORAL 2 TIMES DAILY
Qty: 180 TABLET | Refills: 1 | Status: SHIPPED | OUTPATIENT
Start: 2022-01-01 | End: 2022-01-01 | Stop reason: SDUPTHER

## 2022-01-01 RX ORDER — SULFAMETHOXAZOLE AND TRIMETHOPRIM 800; 160 MG/1; MG/1
1 TABLET ORAL 2 TIMES DAILY
Qty: 14 TABLET | Refills: 0 | Status: SHIPPED | OUTPATIENT
Start: 2022-01-01 | End: 2022-01-01

## 2022-01-01 RX ORDER — PREDNISONE 20 MG/1
40 TABLET ORAL DAILY
Qty: 10 TABLET | Refills: 0 | Status: SHIPPED | OUTPATIENT
Start: 2022-01-01 | End: 2022-01-01

## 2022-01-01 RX ORDER — SYRING-NEEDL,DISP,INSUL,0.3 ML 29 G X1/2"
296 SYRINGE, EMPTY DISPOSABLE MISCELLANEOUS ONCE
Status: DISCONTINUED | OUTPATIENT
Start: 2022-01-01 | End: 2022-01-01

## 2022-01-01 RX ORDER — HYDROXYZINE HYDROCHLORIDE 25 MG/1
25 TABLET, FILM COATED ORAL 3 TIMES DAILY PRN
Qty: 30 TABLET | Refills: 0 | Status: SHIPPED | OUTPATIENT
Start: 2022-01-01 | End: 2022-01-01

## 2022-01-01 RX ORDER — SERTRALINE HYDROCHLORIDE 100 MG/1
100 TABLET, FILM COATED ORAL DAILY
Status: DISCONTINUED | OUTPATIENT
Start: 2022-01-01 | End: 2022-01-01 | Stop reason: HOSPADM

## 2022-01-01 RX ORDER — GUAIFENESIN 600 MG/1
600 TABLET, EXTENDED RELEASE ORAL 2 TIMES DAILY PRN
Status: DISCONTINUED | OUTPATIENT
Start: 2022-01-01 | End: 2022-01-01 | Stop reason: HOSPADM

## 2022-01-01 RX ORDER — PREGABALIN 50 MG/1
50 CAPSULE ORAL 2 TIMES DAILY
Status: DISCONTINUED | OUTPATIENT
Start: 2022-01-01 | End: 2022-01-01

## 2022-01-01 RX ORDER — BENZONATATE 100 MG/1
100 CAPSULE ORAL 3 TIMES DAILY PRN
Status: DISCONTINUED | OUTPATIENT
Start: 2022-01-01 | End: 2022-01-01 | Stop reason: HOSPADM

## 2022-01-01 RX ORDER — LEVOFLOXACIN 750 MG/1
750 TABLET ORAL DAILY
Status: DISCONTINUED | OUTPATIENT
Start: 2022-01-01 | End: 2022-01-01

## 2022-01-01 RX ORDER — MOXIFLOXACIN HYDROCHLORIDE 400 MG/1
400 TABLET ORAL DAILY
Qty: 10 TABLET | Refills: 0 | Status: SHIPPED | OUTPATIENT
Start: 2022-01-01 | End: 2022-01-01

## 2022-01-01 RX ORDER — GADOBUTROL 604.72 MG/ML
INJECTION INTRAVENOUS
COMMUNITY
Start: 2021-11-17 | End: 2022-01-01

## 2022-01-01 RX ORDER — POLYETHYLENE GLYCOL 3350 17 G/17G
17 POWDER, FOR SOLUTION ORAL DAILY
Status: DISCONTINUED | OUTPATIENT
Start: 2022-01-01 | End: 2022-01-01 | Stop reason: HOSPADM

## 2022-01-01 RX ORDER — IPRATROPIUM BROMIDE AND ALBUTEROL SULFATE 2.5; .5 MG/3ML; MG/3ML
3 SOLUTION RESPIRATORY (INHALATION)
Status: DISCONTINUED | OUTPATIENT
Start: 2022-01-01 | End: 2022-01-01 | Stop reason: HOSPADM

## 2022-01-01 RX ORDER — MEMANTINE HYDROCHLORIDE 5 MG/1
5 TABLET ORAL 2 TIMES DAILY
Status: DISCONTINUED | OUTPATIENT
Start: 2022-01-01 | End: 2022-01-01 | Stop reason: HOSPADM

## 2022-01-01 RX ORDER — FUROSEMIDE 20 MG/1
20 TABLET ORAL DAILY
Status: DISCONTINUED | OUTPATIENT
Start: 2022-01-01 | End: 2022-01-01

## 2022-01-01 RX ORDER — SODIUM CHLORIDE 0.9 % (FLUSH) 0.9 %
3 SYRINGE (ML) INJECTION
Status: DISCONTINUED | OUTPATIENT
Start: 2022-01-01 | End: 2022-01-01 | Stop reason: HOSPADM

## 2022-01-01 RX ORDER — FUROSEMIDE 20 MG/1
20 TABLET ORAL DAILY PRN
Qty: 90 TABLET | Refills: 0 | Status: SHIPPED | OUTPATIENT
Start: 2022-01-01 | End: 2022-01-01 | Stop reason: SDUPTHER

## 2022-01-01 RX ORDER — SERTRALINE HYDROCHLORIDE 100 MG/1
100 TABLET, FILM COATED ORAL DAILY
Qty: 30 TABLET | Refills: 112 | Status: SHIPPED | OUTPATIENT
Start: 2022-01-01 | End: 2022-01-01

## 2022-01-01 RX ORDER — EMPAGLIFLOZIN 10 MG/1
10 TABLET, FILM COATED ORAL DAILY
Qty: 30 TABLET | Refills: 11 | Status: SHIPPED | OUTPATIENT
Start: 2022-01-01

## 2022-01-01 RX ORDER — METHOCARBAMOL 500 MG/1
500 TABLET, FILM COATED ORAL 3 TIMES DAILY
Status: DISCONTINUED | OUTPATIENT
Start: 2022-01-01 | End: 2022-01-01 | Stop reason: HOSPADM

## 2022-01-01 RX ORDER — REGADENOSON 0.08 MG/ML
0.4 INJECTION, SOLUTION INTRAVENOUS ONCE
Status: COMPLETED | OUTPATIENT
Start: 2022-01-01 | End: 2022-01-01

## 2022-01-01 RX ORDER — SACUBITRIL AND VALSARTAN 24; 26 MG/1; MG/1
1 TABLET, FILM COATED ORAL 2 TIMES DAILY
Qty: 60 TABLET | Refills: 11 | Status: SHIPPED | OUTPATIENT
Start: 2022-01-01 | End: 2022-01-01

## 2022-01-01 RX ORDER — CLOPIDOGREL BISULFATE 75 MG/1
TABLET ORAL
Qty: 30 TABLET | Refills: 11 | Status: SHIPPED | OUTPATIENT
Start: 2022-01-01 | End: 2022-01-01

## 2022-01-01 RX ORDER — PREDNISONE 20 MG/1
60 TABLET ORAL
Status: DISCONTINUED | OUTPATIENT
Start: 2022-01-01 | End: 2022-01-01

## 2022-01-01 RX ORDER — UBROGEPANT 100 MG/1
TABLET ORAL
Qty: 10 TABLET | Refills: 1 | Status: SHIPPED | OUTPATIENT
Start: 2022-01-01

## 2022-01-01 RX ORDER — SACUBITRIL AND VALSARTAN 24; 26 MG/1; MG/1
1 TABLET, FILM COATED ORAL 2 TIMES DAILY
Qty: 60 TABLET | Refills: 11 | Status: SHIPPED | OUTPATIENT
Start: 2022-01-01 | End: 2022-01-01 | Stop reason: SDUPTHER

## 2022-01-01 RX ORDER — TALC
6 POWDER (GRAM) TOPICAL NIGHTLY PRN
Status: DISCONTINUED | OUTPATIENT
Start: 2022-01-01 | End: 2022-01-01 | Stop reason: HOSPADM

## 2022-01-01 RX ORDER — SERTRALINE HYDROCHLORIDE 100 MG/1
100 TABLET, FILM COATED ORAL DAILY
Qty: 90 TABLET | Refills: 3 | Status: SHIPPED | OUTPATIENT
Start: 2022-01-01 | End: 2023-09-30

## 2022-01-01 RX ORDER — ATORVASTATIN CALCIUM 40 MG/1
40 TABLET, FILM COATED ORAL DAILY
Status: DISCONTINUED | OUTPATIENT
Start: 2022-01-01 | End: 2022-01-01 | Stop reason: HOSPADM

## 2022-01-01 RX ORDER — METHOCARBAMOL 500 MG/1
500 TABLET, FILM COATED ORAL 3 TIMES DAILY PRN
Qty: 45 TABLET | Refills: 1 | Status: SHIPPED | OUTPATIENT
Start: 2022-01-01 | End: 2022-01-01

## 2022-01-01 RX ORDER — AZITHROMYCIN 500 MG/1
500 TABLET, FILM COATED ORAL DAILY
Qty: 3 TABLET | Refills: 0 | Status: SHIPPED | OUTPATIENT
Start: 2022-01-01 | End: 2022-01-01

## 2022-01-01 RX ORDER — BUDESONIDE 0.5 MG/2ML
0.5 INHALANT ORAL 2 TIMES DAILY
Qty: 120 ML | Refills: 5 | Status: SHIPPED | OUTPATIENT
Start: 2022-01-01 | End: 2023-10-06

## 2022-01-01 RX ORDER — SUCRALFATE 1 G/1
1 TABLET ORAL 2 TIMES DAILY
Status: DISCONTINUED | OUTPATIENT
Start: 2022-01-01 | End: 2022-01-01 | Stop reason: HOSPADM

## 2022-01-01 RX ORDER — MEMANTINE HYDROCHLORIDE 5 MG/1
5 TABLET ORAL 2 TIMES DAILY
Qty: 60 TABLET | Refills: 2 | Status: SHIPPED | OUTPATIENT
Start: 2022-01-01 | End: 2022-01-01 | Stop reason: SDUPTHER

## 2022-01-01 RX ORDER — SIMETHICONE 80 MG
1 TABLET,CHEWABLE ORAL
Status: DISCONTINUED | OUTPATIENT
Start: 2022-01-01 | End: 2022-01-01 | Stop reason: HOSPADM

## 2022-01-01 RX ORDER — PANTOPRAZOLE SODIUM 40 MG/1
40 TABLET, DELAYED RELEASE ORAL 2 TIMES DAILY
Status: DISCONTINUED | OUTPATIENT
Start: 2022-01-01 | End: 2022-01-01 | Stop reason: HOSPADM

## 2022-01-01 RX ORDER — PREDNISONE 20 MG/1
40 TABLET ORAL DAILY
Status: COMPLETED | OUTPATIENT
Start: 2022-01-01 | End: 2022-01-01

## 2022-01-01 RX ORDER — PROCHLORPERAZINE EDISYLATE 5 MG/ML
5 INJECTION INTRAMUSCULAR; INTRAVENOUS EVERY 6 HOURS PRN
Status: DISCONTINUED | OUTPATIENT
Start: 2022-01-01 | End: 2022-01-01

## 2022-01-01 RX ORDER — IPRATROPIUM BROMIDE AND ALBUTEROL SULFATE 2.5; .5 MG/3ML; MG/3ML
3 SOLUTION RESPIRATORY (INHALATION) EVERY 4 HOURS PRN
Status: DISCONTINUED | OUTPATIENT
Start: 2022-01-01 | End: 2022-01-01 | Stop reason: HOSPADM

## 2022-01-01 RX ADMIN — PANTOPRAZOLE SODIUM 40 MG: 40 TABLET, DELAYED RELEASE ORAL at 08:01

## 2022-01-01 RX ADMIN — QUETIAPINE FUMARATE 12.5 MG: 25 TABLET, FILM COATED ORAL at 08:01

## 2022-01-01 RX ADMIN — IPRATROPIUM BROMIDE AND ALBUTEROL SULFATE 3 ML: .5; 2.5 SOLUTION RESPIRATORY (INHALATION) at 03:01

## 2022-01-01 RX ADMIN — Medication 400 MG: at 08:01

## 2022-01-01 RX ADMIN — Medication 80 MG: at 01:01

## 2022-01-01 RX ADMIN — Medication 400 MG: at 09:01

## 2022-01-01 RX ADMIN — MEMANTINE HYDROCHLORIDE 5 MG: 5 TABLET ORAL at 09:01

## 2022-01-01 RX ADMIN — ACETAMINOPHEN 1000 MG: 500 TABLET ORAL at 01:01

## 2022-01-01 RX ADMIN — SODIUM CHLORIDE 1000 ML: 0.9 INJECTION, SOLUTION INTRAVENOUS at 01:06

## 2022-01-01 RX ADMIN — REGADENOSON 0.4 MG: 0.08 INJECTION, SOLUTION INTRAVENOUS at 09:04

## 2022-01-01 RX ADMIN — SUCRALFATE 1 G: 1 TABLET ORAL at 08:01

## 2022-01-01 RX ADMIN — SUCRALFATE 1 G: 1 TABLET ORAL at 09:01

## 2022-01-01 RX ADMIN — POLYETHYLENE GLYCOL 3350 17 G: 17 POWDER, FOR SOLUTION ORAL at 08:01

## 2022-01-01 RX ADMIN — ENOXAPARIN SODIUM 40 MG: 40 INJECTION SUBCUTANEOUS at 05:01

## 2022-01-01 RX ADMIN — MEMANTINE HYDROCHLORIDE 5 MG: 5 TABLET ORAL at 08:01

## 2022-01-01 RX ADMIN — Medication 80 MG: at 09:01

## 2022-01-01 RX ADMIN — IPRATROPIUM BROMIDE AND ALBUTEROL SULFATE 3 ML: 2.5; .5 SOLUTION RESPIRATORY (INHALATION) at 03:01

## 2022-01-01 RX ADMIN — TRAMADOL HYDROCHLORIDE 50 MG: 50 TABLET, COATED ORAL at 01:01

## 2022-01-01 RX ADMIN — TRAMADOL HYDROCHLORIDE 50 MG: 50 TABLET, COATED ORAL at 02:01

## 2022-01-01 RX ADMIN — OXYCODONE 5 MG: 5 TABLET ORAL at 04:01

## 2022-01-01 RX ADMIN — ACETAMINOPHEN 1000 MG: 500 TABLET ORAL at 02:01

## 2022-01-01 RX ADMIN — ATORVASTATIN CALCIUM 40 MG: 40 TABLET, FILM COATED ORAL at 08:01

## 2022-01-01 RX ADMIN — AMOXICILLIN AND CLAVULANATE POTASSIUM 1 TABLET: 875; 125 TABLET, FILM COATED ORAL at 08:01

## 2022-01-01 RX ADMIN — IPRATROPIUM BROMIDE AND ALBUTEROL SULFATE 3 ML: 2.5; .5 SOLUTION RESPIRATORY (INHALATION) at 12:09

## 2022-01-01 RX ADMIN — Medication 80 MG: at 08:01

## 2022-01-01 RX ADMIN — Medication 1 CAPSULE: at 08:01

## 2022-01-01 RX ADMIN — ACETAMINOPHEN 1000 MG: 500 TABLET ORAL at 06:01

## 2022-01-01 RX ADMIN — ACETAMINOPHEN 1000 MG: 500 TABLET ORAL at 09:01

## 2022-01-01 RX ADMIN — OXYCODONE 5 MG: 5 TABLET ORAL at 09:01

## 2022-01-01 RX ADMIN — IPRATROPIUM BROMIDE AND ALBUTEROL SULFATE 3 ML: 2.5; .5 SOLUTION RESPIRATORY (INHALATION) at 12:01

## 2022-01-01 RX ADMIN — ASPIRIN 81 MG: 81 TABLET, COATED ORAL at 08:01

## 2022-01-01 RX ADMIN — POLYETHYLENE GLYCOL 3350 17 G: 17 POWDER, FOR SOLUTION ORAL at 09:01

## 2022-01-01 RX ADMIN — ENOXAPARIN SODIUM 40 MG: 40 INJECTION SUBCUTANEOUS at 04:01

## 2022-01-01 RX ADMIN — IPRATROPIUM BROMIDE AND ALBUTEROL SULFATE 3 ML: 2.5; .5 SOLUTION RESPIRATORY (INHALATION) at 01:01

## 2022-01-01 RX ADMIN — PANTOPRAZOLE SODIUM 40 MG: 40 TABLET, DELAYED RELEASE ORAL at 09:01

## 2022-01-01 RX ADMIN — Medication 80 MG: at 10:01

## 2022-01-01 RX ADMIN — OXYCODONE 5 MG: 5 TABLET ORAL at 07:01

## 2022-01-01 RX ADMIN — AMOXICILLIN AND CLAVULANATE POTASSIUM 1 TABLET: 875; 125 TABLET, FILM COATED ORAL at 09:01

## 2022-01-01 RX ADMIN — SERTRALINE HYDROCHLORIDE 100 MG: 50 TABLET ORAL at 09:01

## 2022-01-01 RX ADMIN — AMOXICILLIN AND CLAVULANATE POTASSIUM 1 TABLET: 875; 125 TABLET, FILM COATED ORAL at 04:01

## 2022-01-01 RX ADMIN — SERTRALINE HYDROCHLORIDE 100 MG: 50 TABLET ORAL at 08:01

## 2022-01-01 RX ADMIN — METHYLPREDNISOLONE SODIUM SUCCINATE 125 MG: 125 INJECTION, POWDER, FOR SOLUTION INTRAMUSCULAR; INTRAVENOUS at 04:01

## 2022-01-01 RX ADMIN — Medication 6 MG: at 12:01

## 2022-01-01 RX ADMIN — QUETIAPINE FUMARATE 12.5 MG: 25 TABLET, FILM COATED ORAL at 09:01

## 2022-01-01 RX ADMIN — BISACODYL 10 MG: 10 SUPPOSITORY RECTAL at 05:01

## 2022-01-01 RX ADMIN — IPRATROPIUM BROMIDE AND ALBUTEROL SULFATE 3 ML: 2.5; .5 SOLUTION RESPIRATORY (INHALATION) at 02:01

## 2022-01-01 RX ADMIN — OXYCODONE 5 MG: 5 TABLET ORAL at 05:01

## 2022-01-01 RX ADMIN — PREDNISONE 40 MG: 20 TABLET ORAL at 09:01

## 2022-01-01 RX ADMIN — IPRATROPIUM BROMIDE AND ALBUTEROL SULFATE 3 ML: 2.5; .5 SOLUTION RESPIRATORY (INHALATION) at 07:01

## 2022-01-01 RX ADMIN — IPRATROPIUM BROMIDE AND ALBUTEROL SULFATE 3 ML: 2.5; .5 SOLUTION RESPIRATORY (INHALATION) at 08:01

## 2022-01-01 RX ADMIN — POTASSIUM & SODIUM PHOSPHATES POWDER PACK 280-160-250 MG 1 PACKET: 280-160-250 PACK at 01:01

## 2022-01-01 RX ADMIN — ATORVASTATIN CALCIUM 40 MG: 40 TABLET, FILM COATED ORAL at 09:01

## 2022-01-01 RX ADMIN — PREGABALIN 50 MG: 50 CAPSULE ORAL at 08:01

## 2022-01-01 RX ADMIN — ASPIRIN 81 MG: 81 TABLET, COATED ORAL at 09:01

## 2022-01-01 RX ADMIN — TRAMADOL HYDROCHLORIDE 50 MG: 50 TABLET, COATED ORAL at 05:01

## 2022-01-01 RX ADMIN — TRAMADOL HYDROCHLORIDE 50 MG: 50 TABLET, COATED ORAL at 08:01

## 2022-01-01 RX ADMIN — APIXABAN 2.5 MG: 2.5 TABLET, FILM COATED ORAL at 11:01

## 2022-01-01 RX ADMIN — HUMAN ALBUMIN MICROSPHERES AND PERFLUTREN 0.11 MG: 10; .22 INJECTION, SOLUTION INTRAVENOUS at 08:01

## 2022-01-01 RX ADMIN — IPRATROPIUM BROMIDE AND ALBUTEROL SULFATE 3 ML: .5; 2.5 SOLUTION RESPIRATORY (INHALATION) at 04:01

## 2022-01-01 RX ADMIN — PREDNISONE 40 MG: 20 TABLET ORAL at 11:01

## 2022-01-01 RX ADMIN — IPRATROPIUM BROMIDE AND ALBUTEROL SULFATE 3 ML: 2.5; .5 SOLUTION RESPIRATORY (INHALATION) at 06:01

## 2022-01-01 RX ADMIN — IPRATROPIUM BROMIDE AND ALBUTEROL SULFATE 3 ML: 2.5; .5 SOLUTION RESPIRATORY (INHALATION) at 09:01

## 2022-01-01 RX ADMIN — APIXABAN 2.5 MG: 2.5 TABLET, FILM COATED ORAL at 08:01

## 2022-01-01 RX ADMIN — ONDANSETRON 8 MG: 8 TABLET, ORALLY DISINTEGRATING ORAL at 11:01

## 2022-01-01 RX ADMIN — METHOCARBAMOL 500 MG: 500 TABLET ORAL at 08:01

## 2022-01-01 RX ADMIN — GUAIFENESIN 600 MG: 600 TABLET, EXTENDED RELEASE ORAL at 05:01

## 2022-01-01 RX ADMIN — ACETAMINOPHEN 500 MG: 500 TABLET ORAL at 12:01

## 2022-01-01 RX ADMIN — Medication 80 MG: at 12:01

## 2022-01-01 RX ADMIN — OXYCODONE 5 MG: 5 TABLET ORAL at 06:01

## 2022-01-01 RX ADMIN — OXYCODONE 5 MG: 5 TABLET ORAL at 11:01

## 2022-01-01 RX ADMIN — METHOCARBAMOL 500 MG: 500 TABLET ORAL at 02:01

## 2022-01-01 RX ADMIN — AMINOPHYLLINE 75 MG: 25 INJECTION, SOLUTION INTRAVENOUS at 10:04

## 2022-01-01 RX ADMIN — METHYLPREDNISOLONE SODIUM SUCCINATE 125 MG: 125 INJECTION, POWDER, FOR SOLUTION INTRAMUSCULAR; INTRAVENOUS at 01:09

## 2022-01-01 RX ADMIN — IPRATROPIUM BROMIDE AND ALBUTEROL SULFATE 3 ML: 2.5; .5 SOLUTION RESPIRATORY (INHALATION) at 05:01

## 2022-01-01 RX ADMIN — TRAMADOL HYDROCHLORIDE 50 MG: 50 TABLET, COATED ORAL at 09:01

## 2022-01-01 RX ADMIN — FENTANYL CITRATE 50 MCG: 50 INJECTION INTRAMUSCULAR; INTRAVENOUS at 02:01

## 2022-01-01 RX ADMIN — Medication 80 MG: at 02:01

## 2022-01-01 RX ADMIN — Medication 80 MG: at 06:01

## 2022-01-01 RX ADMIN — ACETAMINOPHEN 1000 MG: 500 TABLET ORAL at 08:01

## 2022-01-01 RX ADMIN — ONDANSETRON 8 MG: 8 TABLET, ORALLY DISINTEGRATING ORAL at 07:01

## 2022-01-01 RX ADMIN — Medication 6 MG: at 09:01

## 2022-01-01 RX ADMIN — GUAIFENESIN 600 MG: 600 TABLET, EXTENDED RELEASE ORAL at 08:01

## 2022-01-01 RX ADMIN — METHOCARBAMOL 500 MG: 500 TABLET ORAL at 09:01

## 2022-01-01 RX ADMIN — METHOCARBAMOL 500 MG: 500 TABLET ORAL at 04:01

## 2022-01-01 RX ADMIN — Medication 1 CAPSULE: at 04:01

## 2022-01-01 RX ADMIN — QUETIAPINE FUMARATE 12.5 MG: 25 TABLET, FILM COATED ORAL at 06:01

## 2022-01-01 RX ADMIN — PREDNISONE 40 MG: 20 TABLET ORAL at 08:01

## 2022-01-01 RX ADMIN — APIXABAN 2.5 MG: 2.5 TABLET, FILM COATED ORAL at 09:01

## 2022-01-01 RX ADMIN — POTASSIUM & SODIUM PHOSPHATES POWDER PACK 280-160-250 MG 1 PACKET: 280-160-250 PACK at 04:01

## 2022-01-01 RX ADMIN — ACETAMINOPHEN 1000 MG: 500 TABLET ORAL at 11:01

## 2022-01-01 NOTE — PATIENT INSTRUCTIONS
Instructions:  - [facility] Practitioner to schedule home follow-up visit with patient in 4-6 weeks or as needed.  - Continue all medications, treatments and therapies as ordered.   - Follow all instructions, recommendations as discussed.  - Maintain Safety Precautions at all times.  - Attend all medical appointments as scheduled.  - For worsening symptoms: call Primary Care Physician or Nurse Practitioner.  - For emergencies, call 911 or immediately report to the nearest emergency room.  - Limit Risks of environmental exposure to coronavirus/COVID-19 as discussed including: social distancing, hand hygiene, and limiting departures from the home for necessities only.

## 2022-01-04 ENCOUNTER — LAB VISIT (OUTPATIENT)
Dept: LAB | Facility: HOSPITAL | Age: 82
End: 2022-01-04
Attending: PSYCHIATRY & NEUROLOGY
Payer: MEDICARE

## 2022-01-04 ENCOUNTER — OFFICE VISIT (OUTPATIENT)
Dept: NEUROLOGY | Facility: CLINIC | Age: 82
End: 2022-01-04
Payer: MEDICARE

## 2022-01-04 VITALS
SYSTOLIC BLOOD PRESSURE: 145 MMHG | DIASTOLIC BLOOD PRESSURE: 73 MMHG | WEIGHT: 146 LBS | HEIGHT: 65 IN | HEART RATE: 79 BPM | BODY MASS INDEX: 24.32 KG/M2

## 2022-01-04 DIAGNOSIS — F41.9 ANXIETY: ICD-10-CM

## 2022-01-04 DIAGNOSIS — G30.1 LATE ONSET ALZHEIMER'S DEMENTIA WITH BEHAVIORAL DISTURBANCE: Primary | ICD-10-CM

## 2022-01-04 DIAGNOSIS — F02.818 LATE ONSET ALZHEIMER'S DEMENTIA WITH BEHAVIORAL DISTURBANCE: Primary | ICD-10-CM

## 2022-01-04 DIAGNOSIS — R26.89 BALANCE PROBLEM: ICD-10-CM

## 2022-01-04 DIAGNOSIS — M81.0 AGE-RELATED OSTEOPOROSIS WITHOUT CURRENT PATHOLOGICAL FRACTURE: ICD-10-CM

## 2022-01-04 DIAGNOSIS — R29.6 FALLING: ICD-10-CM

## 2022-01-04 PROCEDURE — 99999 PR PBB SHADOW E&M-EST. PATIENT-LVL IV: ICD-10-PCS | Mod: PBBFAC,HCNC,, | Performed by: PSYCHIATRY & NEUROLOGY

## 2022-01-04 PROCEDURE — 1159F PR MEDICATION LIST DOCUMENTED IN MEDICAL RECORD: ICD-10-PCS | Mod: HCNC,CPTII,S$GLB, | Performed by: PSYCHIATRY & NEUROLOGY

## 2022-01-04 PROCEDURE — 3288F FALL RISK ASSESSMENT DOCD: CPT | Mod: HCNC,CPTII,S$GLB, | Performed by: PSYCHIATRY & NEUROLOGY

## 2022-01-04 PROCEDURE — 3078F DIAST BP <80 MM HG: CPT | Mod: HCNC,CPTII,S$GLB, | Performed by: PSYCHIATRY & NEUROLOGY

## 2022-01-04 PROCEDURE — 99999 PR PBB SHADOW E&M-EST. PATIENT-LVL IV: CPT | Mod: PBBFAC,HCNC,, | Performed by: PSYCHIATRY & NEUROLOGY

## 2022-01-04 PROCEDURE — 1126F AMNT PAIN NOTED NONE PRSNT: CPT | Mod: HCNC,CPTII,S$GLB, | Performed by: PSYCHIATRY & NEUROLOGY

## 2022-01-04 PROCEDURE — 99215 PR OFFICE/OUTPT VISIT, EST, LEVL V, 40-54 MIN: ICD-10-PCS | Mod: HCNC,S$GLB,, | Performed by: PSYCHIATRY & NEUROLOGY

## 2022-01-04 PROCEDURE — 82652 VIT D 1 25-DIHYDROXY: CPT | Mod: HCNC | Performed by: PSYCHIATRY & NEUROLOGY

## 2022-01-04 PROCEDURE — 3288F PR FALLS RISK ASSESSMENT DOCUMENTED: ICD-10-PCS | Mod: HCNC,CPTII,S$GLB, | Performed by: PSYCHIATRY & NEUROLOGY

## 2022-01-04 PROCEDURE — 3077F SYST BP >= 140 MM HG: CPT | Mod: HCNC,CPTII,S$GLB, | Performed by: PSYCHIATRY & NEUROLOGY

## 2022-01-04 PROCEDURE — 1101F PT FALLS ASSESS-DOCD LE1/YR: CPT | Mod: HCNC,CPTII,S$GLB, | Performed by: PSYCHIATRY & NEUROLOGY

## 2022-01-04 PROCEDURE — 1159F MED LIST DOCD IN RCRD: CPT | Mod: HCNC,CPTII,S$GLB, | Performed by: PSYCHIATRY & NEUROLOGY

## 2022-01-04 PROCEDURE — 36415 COLL VENOUS BLD VENIPUNCTURE: CPT | Mod: HCNC | Performed by: PSYCHIATRY & NEUROLOGY

## 2022-01-04 PROCEDURE — 3077F PR MOST RECENT SYSTOLIC BLOOD PRESSURE >= 140 MM HG: ICD-10-PCS | Mod: HCNC,CPTII,S$GLB, | Performed by: PSYCHIATRY & NEUROLOGY

## 2022-01-04 PROCEDURE — 1101F PR PT FALLS ASSESS DOC 0-1 FALLS W/OUT INJ PAST YR: ICD-10-PCS | Mod: HCNC,CPTII,S$GLB, | Performed by: PSYCHIATRY & NEUROLOGY

## 2022-01-04 PROCEDURE — 99215 OFFICE O/P EST HI 40 MIN: CPT | Mod: HCNC,S$GLB,, | Performed by: PSYCHIATRY & NEUROLOGY

## 2022-01-04 PROCEDURE — 1126F PR PAIN SEVERITY QUANTIFIED, NO PAIN PRESENT: ICD-10-PCS | Mod: HCNC,CPTII,S$GLB, | Performed by: PSYCHIATRY & NEUROLOGY

## 2022-01-04 PROCEDURE — 3078F PR MOST RECENT DIASTOLIC BLOOD PRESSURE < 80 MM HG: ICD-10-PCS | Mod: HCNC,CPTII,S$GLB, | Performed by: PSYCHIATRY & NEUROLOGY

## 2022-01-04 RX ORDER — MEMANTINE HYDROCHLORIDE 5 MG/1
TABLET ORAL
Qty: 60 TABLET | Refills: 2 | Status: SHIPPED | OUTPATIENT
Start: 2022-01-04 | End: 2022-01-01 | Stop reason: SDUPTHER

## 2022-01-04 NOTE — PROGRESS NOTES
Subjective:       Patient ID: Paul Browning is a 81 y.o. male.    Chief Complaint: Headache      Worked in bc of headaches and dementia  Has had several falls, likely due to vision. Says PT did not help him.   Headaches--bifrontal, comes and goes, seems to be getting close to his baseline, ie had a lot of headaches for past 2 years. Fioricet helped after the fall with CHT. ESR normal.   Memory loss began after his wife passed   Vision- no change  TIA/CVA- no further events    Frustrated bc can't fish or do much.Previously very active. Feels anxious. Not agitated. Sleep mostly good.    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    Hyperlipidemia     Hypertension     Ischemic cardiomyopathy 11/5/2014    MDD (major depressive disorder), recurrent episode, mild 2/17/2016    Osteoporosis 7/20/2016    Peripheral arterial disease 3/18/2016    Sarcoid     Squamous cell carcinoma 09/2016, 5/2016    crown of scalp, left wrist      Past Surgical History:   Procedure Laterality Date    CARDIAC SURGERY      CAROTID STENT N/A 10/14/2019    Procedure: INSERTION, STENT, ARTERY, CAROTID;  Surgeon: Artie Kebede MD;  Location: [facility] CATH LAB;  Service: Cardiology;  Laterality: N/A;    CATARACT EXTRACTION W/  INTRAOCULAR LENS IMPLANT Right 07/17/2018    Dr. Talamantes    CATARACT EXTRACTION W/  INTRAOCULAR LENS IMPLANT Left 07/31/2018    Dr. Talamantes    CORONARY ARTERY BYPASS GRAFT      x2  10/2014    ESOPHAGOGASTRODUODENOSCOPY N/A 4/8/2019    Procedure: ESOPHAGOGASTRODUODENOSCOPY (EGD)/poss rfa;  Surgeon: Clifford De La Torre MD;  Location: [facility] ENDO ([address] [place] [address]);  Service: Endoscopy;  Laterality: N/A;     ESOPHAGOGASTRODUODENOSCOPY N/A 8/4/2021    Procedure: EGD (ESOPHAGOGASTRODUODENOSCOPY);  Surgeon: Clifford De La Torre MD;  Location: [facility] (2ND FLR);  Service: Endoscopy;  Laterality: N/A;  [id number]    ESOPHAGOGASTRODUODENOSCOPY (EGD) WITH RADIOFREQUENCY TREATMENT OF GASTROESOPHAGEAL JUNCTION      INJECTION OF ANESTHETIC AGENT AROUND MEDIAL BRANCH NERVES INNERVATING CERVICAL FACET JOINT Bilateral 1/9/2020    Procedure: INJECTION, MBB--Bilateral Cervical- Third Occipital nerve, C2-3--ASA okay for pt to continue taking per provider.;  Surgeon: Tip Mera Jr., MD;  Location: [facility] PAIN MGT;  Service: Pain Management;  Laterality: Bilateral;    INTRAOCULAR PROSTHESES INSERTION Right 7/17/2018    Procedure: INSERTION, INTRAOCULAR LENS PROSTHESIS;  Surgeon: León Talamantes MD;  [facility];  Service: Ophthalmology;  Laterality: Right;    INTRAOCULAR PROSTHESES INSERTION Left 7/31/2018    Procedure: INSERTION, INTRAOCULAR LENS PROSTHESIS;  Surgeon: León Talamantes MD;  [facility];  Service: Ophthalmology;  Laterality: Left;    PHACOEMULSIFICATION OF CATARACT Right 7/17/2018    Procedure: PHACOEMULSIFICATION, CATARACT;  Surgeon: León Talamantes MD;  Location: [facility] OR 1ST FLR;  Service: Ophthalmology;  Laterality: Right;    PHACOEMULSIFICATION OF CATARACT Left 7/31/2018    Procedure: PHACOEMULSIFICATION, CATARACT;  Surgeon: León Talamantes MD;  [facility];  Service: Ophthalmology;  Laterality: Left;    PLACEMENT OF SCREW IN ODONTOID N/A 10/22/2020    Procedure: INSERTION, SCREW, ODONTOID. Anterior approach.;  Surgeon: Kirsten Weaver MD;  Location: [facility] OR 2ND FLR;  Service: Neurosurgery;  Laterality: N/A;    RADIOFREQUENCY THERMOCOAGULATION Bilateral 1/30/2020    Procedure: RADIOFREQUENCY THERMAL COAGULATION--Bilateral C2-3 and Third Occipital Nerve;  Surgeon: Tip Mera Jr., MD;  Location:  [facility] PAIN MGT;  Service: Pain Management;  Laterality: Bilateral;    UMBILICAL HERNIA REPAIR          Current Outpatient Medications:     albuterol (PROAIR HFA) 90 mcg/actuation inhaler, Inhale 2 puffs into the lungs every 6 (six) hours as needed for Wheezing or Shortness of Breath. Rescue, Disp: 18 g, Rfl: 5    alendronate (FOSAMAX) 70 MG tablet, TAKE 1 TABLET BY MOUTH EVERY WEEK IN THE MORNING WITH FULL GLASS OF WATER ON EMPTY STOMACH-DONT LIE DOWN FOR AT LEAST 30 MINUTES AFTERWARDS, Disp: 12 tablet, Rfl: 3    aspirin (ECOTRIN) 81 MG EC tablet, Take 81 mg by mouth once daily., Disp: , Rfl:     atorvastatin (LIPITOR) 40 MG tablet, TAKE 1 TABLET(40 MG) BY MOUTH EVERY DAY, Disp: 90 tablet, Rfl: 3    butalbitaL-acetaminophen  mg Tab, One po q 6 hour prn moderate to severe headache, Disp: 12 tablet, Rfl: 0    furosemide (LASIX) 20 MG tablet, Take 1 tablet (20 mg total) by mouth once daily., Disp: 90 tablet, Rfl: 0    magnesium oxide (MAG-OX) 400 mg (241.3 mg magnesium) tablet, Take 1 tablet (400 mg total) by mouth once daily., Disp: 30 tablet, Rfl: 3    omega-3 fatty acids-vitamin E (FISH OIL) 1,000 mg Cap, Take 1 tablet by mouth 2 (two) times daily. (Patient taking differently: Take 1 tablet by mouth once daily.), Disp: 60 each, Rfl: 11    pantoprazole (PROTONIX) 40 MG tablet, TAKE 1 TABLET BY MOUTH TWICE DAILY, Disp: 180 tablet, Rfl: 0    sertraline (ZOLOFT) 100 MG tablet, Take 1 tablet (100 mg total) by mouth once daily., Disp: 90 tablet, Rfl: 3    sucralfate (CARAFATE) 1 gram tablet, DISSOLVE 1 TABLET IN 10 ML OF WATER AND TAKE BY MOUTH TWICE DAILY, Disp: 180 tablet, Rfl: 2    tiotropium-olodateroL (STIOLTO RESPIMAT) 2.5-2.5 mcg/actuation Mist, Inhale 2 puffs into the lungs once daily. Controller, Disp: 4 g, Rfl: 5   Review of patient's allergies indicates:   Allergen Reactions    Morphine Shortness Of Breath        Review of Systems        Objective:      Physical Exam  Constitutional:        "Appearance: He is not ill-appearing.   Neurological:      Mental Status: He is alert.      Cranial Nerves: No dysarthria.      Motor: No tremor.      Comments: Mildly anxious, repeats self, Fixate on inability to drive.   Not irritable as he was before.  Could only recall 2/5 objects immediately, none recalled delayed  Does not know the date. Season is "February"             Assessment:       1. Late onset Alzheimer's dementia with behavioral disturbance    2. Balance problem    3. Falling    4. Anxiety        Plan:         Dementia with anxiety but without severe agitation or wandering   ( amnestic DORYS and vascular pathology likely etiology) ,Case is complicated by severe hearing loss and very low vision (counting fingers bilaterally)    Had extensive discussion with patient and his daughter-in-law  regarding limited options to help improve level of function, to reduce distress, and  to help reduce falls.    Patient has agreed to the following...    Referral to Psychology for therapy specifically sadness and grief over inability to function at previous level.    Add Namenda at starting dose of 5 mg b.i.d. and once we know that he can tolerate this without any side effects we will then change the Zoloft to Remeron which might help him better with anxiety.  Very low-dose Seroquel as another consideration, but this would require monitoring gait to verify tolerates this.    Occupational therapy to help with balance given impaired vision.  Will see if Vinay Collazo has any suggestions regarding resources that her available to help with daily needs re: low vision.    Return to ENT for replacement of lost hearing aids.    Discussed the option of formal neuropsych testing, in his case primarily to see if there are suggestions co-worker rounds for the family, to help with this difficult situation of a patient with dementia and marked visual loss and hearing loss.              Penelope Stauffer MD   01/04/2022   11:39 AM     "

## 2022-01-04 NOTE — PATIENT INSTRUCTIONS
Send a portal message to let me know how he is doing with namenda in 3 weeks and at that point I will likely change the antidepressant.

## 2022-01-05 ENCOUNTER — TELEPHONE (OUTPATIENT)
Dept: NEUROLOGY | Facility: CLINIC | Age: 82
End: 2022-01-05
Payer: MEDICARE

## 2022-01-05 NOTE — TELEPHONE ENCOUNTER
Spoke with patients son, he states his father has tried [facility] and they were not helpful and did nothing for his father. He states [facility] [employer] has reached out to him and they are set up for Monday.     ----- Message from Penelope Stauffer MD sent at 1/4/2022  5:19 PM CST -----  Regarding: FW: Mr. Browning  Pls call pt's son and inform that I did ask Dr Collazo for input re: assistance for pt and he feel [facility] is best. I believe he went there before. Ask him to call them and if he needs anything from me to expedite getting help to let me know. cg  ----- Message -----  From: Jude Collazo MD  Sent: 1/4/2022   2:19 PM CST  To: Penelope Stauffer MD  Subject: Mr. Browning                                 [facility] would be an excellent choice. They  have occupational therapists with long experience in assisting patients with limited sight. Their clinic is on [facility] right next to [facility]. Unfortunately, the electroretinogram and visual evoked response testing have been put on hold because of the pandemic.   ----- Message -----  From: Penelope Stauffer MD  Sent: 1/4/2022  12:07 PM CST  To: MD Dr Vale Sims, I am seeing Mr Browning right now. Do you have any thoughts on special therapy to help with balance and confidence when walking, given his low vision? This might help reduce his falling and might also help with his mood and anxiety problems as well. I am wondering if [facility] can help? I did submit a consult for OT. Thanks for any input, Penelope

## 2022-01-06 LAB — 1,25(OH)2D3 SERPL-MCNC: 91 PG/ML (ref 20–79)

## 2022-01-07 ENCOUNTER — TELEPHONE (OUTPATIENT)
Dept: NEUROLOGY | Facility: CLINIC | Age: 82
End: 2022-01-07
Payer: MEDICARE

## 2022-01-07 NOTE — TELEPHONE ENCOUNTER
----- Message from Penelope Stauffer MD sent at 1/7/2022  2:21 PM CST -----  Pls inform pt's son or dtr in law that his vitamin D level is way too high and for now he should stop all supplementation of it and have it rechecked in  2-3 months. cg

## 2022-01-10 ENCOUNTER — CLINICAL SUPPORT (OUTPATIENT)
Dept: REHABILITATION | Facility: HOSPITAL | Age: 82
End: 2022-01-10
Attending: PSYCHIATRY & NEUROLOGY
Payer: MEDICARE

## 2022-01-10 DIAGNOSIS — R29.6 FALLING: ICD-10-CM

## 2022-01-10 DIAGNOSIS — Z74.09 IMPAIRED FUNCTIONAL MOBILITY, BALANCE, AND ENDURANCE: Primary | ICD-10-CM

## 2022-01-10 PROCEDURE — 97161 PT EVAL LOW COMPLEX 20 MIN: CPT | Mod: HCNC,PO

## 2022-01-10 PROCEDURE — 97110 THERAPEUTIC EXERCISES: CPT | Mod: HCNC,PO

## 2022-01-10 NOTE — PLAN OF CARE
[facility]  Physical Therapy Initial Evaluation    Date: 1/10/2022   Name: Paul Berrios [facility]  Clinic Number: [id number]    Therapy Diagnosis:   Encounter Diagnoses   Name Primary?    Falling     Impaired functional mobility, balance, and endurance Yes     Physician: Penelope Stauffer MD    Physician Orders: PT Eval and Treat   Medical Diagnosis from Referral: R29.6 (ICD-10-CM) - Falling  Evaluation Date: 1/10/2022  Authorization Period Expiration: 1/4/2023  Plan of Care Expiration: 3/10/2022  Visit # / Visits authorized: 1/ 1    Time In: 1005a  Time Out: 1045a  Total Appointment Time (timed & untimed codes): 40 minutes    Precautions: Standard and Osteoporosis; Alzheimer's; poor vision     Subjective   Date of onset: years   History of current condition - Paul reports: with his son to PT. Pt's son states he had a fall 2 weeks ago trying to sit in a chair and hit his head. Pt's son states they did a CT and he had nothing wrong with his head. Pt's son states Mr. Miller likes to walk but has very bad balance; hearing, and vision. Pt's son states he went to the doctor for his vision a few weeks ago and he has very poor vision even with his glasses and there's nothing they can do to improve it. Pt states he wants to get back to fishing but cannot see well. Pt repeats this phrase throughout evaluation. Pt's son states they tried using a walker in the past but he does not use a device currently.       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    Hyperlipidemia     Hypertension     Ischemic cardiomyopathy 11/5/2014    MDD (major depressive disorder), recurrent episode, mild 2/17/2016     Osteoporosis 7/20/2016    Peripheral arterial disease 3/18/2016    Sarcoid     Squamous cell carcinoma 09/2016, 5/2016    crown of scalp, left wrist       Surgical History:   Paul Browning  has a past surgical history that includes Umbilical hernia repair; Coronary artery bypass graft; Cardiac surgery; Phacoemulsification of cataract (Right, 7/17/2018); Intraocular prosthesis insertion (Right, 7/17/2018); Phacoemulsification of cataract (Left, 7/31/2018); Intraocular prosthesis insertion (Left, 7/31/2018); Cataract extraction w/  intraocular lens implant (Right, 07/17/2018); Cataract extraction w/  intraocular lens implant (Left, 07/31/2018); Esophagogastroduodenoscopy (EGD) with radiofrequency treatment of gastroesophageal junction; Esophagogastroduodenoscopy (N/A, 4/8/2019); Carotid stent (N/A, 10/14/2019); Injection of anesthetic agent around medial branch nerves innervating cervical facet joint (Bilateral, 1/9/2020); Radiofrequency thermocoagulation (Bilateral, 1/30/2020); Placement of screw in odontoid (N/A, 10/22/2020); and Esophagogastroduodenoscopy (N/A, 8/4/2021).    Medications:   Paul has a current medication list which includes the following prescription(s): albuterol, alendronate, aspirin, atorvastatin, butalbital-acetaminophen, furosemide, magnesium oxide, memantine, omega-3 fatty acids-vitamin e, pantoprazole, sertraline, sucralfate, and stiolto respimat.    Allergies:   Review of patient's allergies indicates:   Allergen Reactions    Morphine Shortness Of Breath        Imaging, CT scan films: 12/23/2021: Impression: Chronic changes are noted, there is no evidence for acute intracranial process.       Prior Therapy: Y, less than a year ago   Social History: lives with their family  Occupation: n/a  Prior Level of Function: requires assist  Current Level of Function: requires assist    Pain:  Current 0/10, worst 0/10, best 0/10     Patients goals: stop falling    Objective      Functional Testing:     TUG - 25s without AD      Table: Population Norms for TUG    Age  Average TUG    60 - 69 years  8.1 seconds    70 - 79 years  9.2 seconds    80 - 99 years  11.3 seconds      Balance Assessment:       Evaluation   Single Limb Stance R LE 2s  (<10 sec = HIGH FALL RISK)   Single Limb Stance L LE 2s  (<10 sec = HIGH FALL RISK)           Evaluation   30 second Chair Rise  (adults > 59 y/o) 13 completed with no arms      Feet Together - eyes closed: 30s   Feet Together - eyes open: 30s  Split Stance (RFF): 30s  Split Stance (LFF): 30s  Foam balance - not attempt; pt states he could not see the floor.     Lower Extremity Strength  Right LE  Left LE    Knee extension: 4/5 Knee extension: 4/5   Knee flexion: 4/5 Knee flexion: 4/5   Hip flexion: 4/5 Hip flexion: 4/5   Hip extension:  4-/5 Hip extension: 4-/5   Hip abduction: 4-/5 Hip abduction: 4-/5   Hip adduction: 4+/5 Hip adduction 4+/5   Ankle dorsiflexion: 4+/5 Ankle dorsiflexion: 4+/5   Ankle plantarflexion: 4/5 Ankle plantarflexion: 4/5         Limitation/Restriction for FOTO Survey    Therapist reviewed FOTO scores for Paul Browning on 1/10/2022.   FOTO documents entered into [employer] - see Media section.    n/a         TREATMENT   Treatment Time In: 1035a  Treatment Time Out: 1045a  Total Treatment time (time-based codes) separate from Evaluation: 10 minutes    Paul received therapeutic exercises to develop strength and endurance for 10 minutes including:     Seated LAQ with 3# ankle weight 4minutes   Sit<>stands 3 x 10    Education - HEP/POC    Home Exercises and Patient Education Provided    Education provided:   - HEP, Pt's son educated on HEP    Written Home Exercises Provided: yes.  Exercises were reviewed and Paul was able to demonstrate them prior to the end of the session.  Paul demonstrated poor understanding of the education provided.     See EMR under Patient Instructions for exercises provided  1/10/2022.    Assessment   Paul is a 81 y.o. male referred to outpatient Physical Therapy with a medical diagnosis of falling. Pt presents with decreased LE strength bilaterally and poor balance which limits his ability to function and ambulate safely. Pt also has extremely poor vision and was unable to accurately state how many fingers I was holding up 2 feet in front of his face. Pt is a fall risk with a TUG of 25s and would benefit from skilled PT to improve balance and strength. Pt is very hard of hearing, forgetful, and requires frequent cueing and reminders to perform the task at hand. Will attempt to transfer his POC to our Neuro clinic as they are more suitable for this condition.     Patient prognosis is Guarded.   Patientt will benefit from skilled outpatient Physical Therapy to address the deficits stated above and in the chart below, provide patient /family education, and to maximize patientt's level of independence.     Plan of care discussed with patient: Yes  Patient's spiritual, cultural and educational needs considered and patient is agreeable to the plan of care and goals as stated below:     Anticipated Barriers for therapy: hearing and vision deficit; Alzheimer's     Medical Necessity is demonstrated by the following  History  Co-morbidities and personal factors that may impact the plan of care Co-morbidities:   advanced age, anxiety, depression, HTN and Osteoporosis; Alzheimer's    Personal Factors:   age     high   Examination  Body Structures and Functions, activity limitations and participation restrictions that may impact the plan of care Body Regions:   lower extremities  trunk    Body Systems:    gross symmetry  ROM  strength  balance  gait  transfers    Participation Restrictions:   Unable to fish on his boat    Activity limitations:   Learning and applying knowledge  no deficits    General Tasks and Commands  undertaking a single task  undertaking multiple tasks    Communication  no  deficits    Mobility  lifting and carrying objects  walking  driving (bike, car, motorcycle)    Self care  looking after one's health    Domestic Life  assisting others    Interactions/Relationships  basic interpersonal interactions  formal relationships    Life Areas  no deficits    Community and Social Life  community life  recreation and leisure         moderate   Clinical Presentation stable and uncomplicated low   Decision Making/ Complexity Score: low     Goals:  Short Term Goals: 4 weeks   1. Pt will improve SLB to 10s bilaterally to demonstrate improved balance and increased safety.   2. Pt will improve TUG to 20s without AD to demonstrate a decrease in fall risk.  3. Pt will improve 30s chair rise to 15 reps to demonstrate increase in LE strength and improved functional endurance.      Long Term Goals: 8 weeks   1. Pt will improve SLB to 13s bilaterally to demonstrate improved balance and increase safety.   2. Pt will improve TUG to 18s without AD to further demonstrate a decrease in fall risk.  3. Pt will improve 30s chair rise to 17 reps to further demonstrate improvement in functional endurance   Plan   Plan of care Certification: 1/10/2022 to 3/10/2022.    Will coordinate with our neuro clinic team to see if we can transfer his POC upstairs.     Outpatient Physical Therapy 2 times weekly for 8 weeks to include the following interventions: Electrical Stimulation ;, Gait Training, Manual Therapy, Moist Heat/ Ice, Neuromuscular Re-ed, Patient Education, Therapeutic Activities and Therapeutic Exercise.     Willy Rosales, PT

## 2022-01-20 ENCOUNTER — TELEPHONE (OUTPATIENT)
Dept: ENDOSCOPY | Facility: HOSPITAL | Age: 82
End: 2022-01-20
Payer: MEDICARE

## 2022-01-22 ENCOUNTER — HOSPITAL ENCOUNTER (INPATIENT)
Facility: HOSPITAL | Age: 82
LOS: 3 days | Discharge: HOME HEALTH | DRG: 190 | End: 2022-01-28
Attending: EMERGENCY MEDICINE | Admitting: STUDENT IN AN ORGANIZED HEALTH CARE EDUCATION/TRAINING PROGRAM
Payer: MEDICARE
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
## 2022-01-22 DIAGNOSIS — J44.1 CHRONIC OBSTRUCTIVE PULMONARY DISEASE WITH ACUTE EXACERBATION: ICD-10-CM

## 2022-01-22 DIAGNOSIS — S32.82XA MULTIPLE CLOSED STABLE LATERAL COMPRESSION FRACTURES OF PELVIS, INITIAL ENCOUNTER: ICD-10-CM

## 2022-01-22 DIAGNOSIS — R06.02 SOB (SHORTNESS OF BREATH): ICD-10-CM

## 2022-01-22 DIAGNOSIS — S32.509A CLOSED FRACTURE OF PUBIS, UNSPECIFIED PORTION OF PUBIS, UNSPECIFIED LATERALITY, INITIAL ENCOUNTER: ICD-10-CM

## 2022-01-22 DIAGNOSIS — M25.552 LEFT HIP PAIN: ICD-10-CM

## 2022-01-22 DIAGNOSIS — D86.0 PULMONARY SARCOIDOSIS: ICD-10-CM

## 2022-01-22 DIAGNOSIS — R07.81 RIB PAIN: ICD-10-CM

## 2022-01-22 DIAGNOSIS — R79.89 ELEVATED BRAIN NATRIURETIC PEPTIDE (BNP) LEVEL: ICD-10-CM

## 2022-01-22 DIAGNOSIS — S01.01XA LACERATION OF OCCIPITAL SCALP, INITIAL ENCOUNTER: Primary | ICD-10-CM

## 2022-01-22 PROCEDURE — 93010 EKG 12-LEAD: ICD-10-PCS | Mod: HCNC,,, | Performed by: INTERNAL MEDICINE

## 2022-01-22 PROCEDURE — 12002 RPR S/N/AX/GEN/TRNK2.6-7.5CM: CPT | Mod: ,,, | Performed by: EMERGENCY MEDICINE

## 2022-01-22 PROCEDURE — 94640 AIRWAY INHALATION TREATMENT: CPT | Mod: HCNC

## 2022-01-22 PROCEDURE — 93010 ELECTROCARDIOGRAM REPORT: CPT | Mod: HCNC,,, | Performed by: INTERNAL MEDICINE

## 2022-01-22 PROCEDURE — 25000003 PHARM REV CODE 250: Mod: HCNC | Performed by: EMERGENCY MEDICINE

## 2022-01-22 PROCEDURE — 12002 PR RESUP NPTERF WND BODY 2.6-7.5 CM: ICD-10-PCS | Mod: ,,, | Performed by: EMERGENCY MEDICINE

## 2022-01-22 PROCEDURE — 96375 TX/PRO/DX INJ NEW DRUG ADDON: CPT | Mod: HCNC

## 2022-01-22 PROCEDURE — 94761 N-INVAS EAR/PLS OXIMETRY MLT: CPT | Mod: HCNC

## 2022-01-22 PROCEDURE — 99284 PR EMERGENCY DEPT VISIT,LEVEL IV: ICD-10-PCS | Mod: 25,HCNC,CS, | Performed by: EMERGENCY MEDICINE

## 2022-01-22 PROCEDURE — 25000242 PHARM REV CODE 250 ALT 637 W/ HCPCS: Mod: HCNC | Performed by: EMERGENCY MEDICINE

## 2022-01-22 PROCEDURE — 99284 EMERGENCY DEPT VISIT MOD MDM: CPT | Mod: 25,HCNC,CS, | Performed by: EMERGENCY MEDICINE

## 2022-01-22 PROCEDURE — 99285 EMERGENCY DEPT VISIT HI MDM: CPT | Mod: 25,HCNC

## 2022-01-22 PROCEDURE — 96372 THER/PROPH/DIAG INJ SC/IM: CPT | Mod: 59,HCNC

## 2022-01-22 PROCEDURE — 96374 THER/PROPH/DIAG INJ IV PUSH: CPT | Mod: HCNC

## 2022-01-22 PROCEDURE — 93005 ELECTROCARDIOGRAM TRACING: CPT | Mod: HCNC

## 2022-01-22 RX ORDER — OXYCODONE AND ACETAMINOPHEN 5; 325 MG/1; MG/1
1 TABLET ORAL
Status: COMPLETED | OUTPATIENT
Start: 2022-01-22 | End: 2022-01-22

## 2022-01-22 RX ORDER — IPRATROPIUM BROMIDE AND ALBUTEROL SULFATE 2.5; .5 MG/3ML; MG/3ML
3 SOLUTION RESPIRATORY (INHALATION)
Status: COMPLETED | OUTPATIENT
Start: 2022-01-22 | End: 2022-01-22

## 2022-01-22 RX ORDER — ONDANSETRON 4 MG/1
4 TABLET, ORALLY DISINTEGRATING ORAL
Status: COMPLETED | OUTPATIENT
Start: 2022-01-22 | End: 2022-01-22

## 2022-01-22 RX ORDER — LIDOCAINE HYDROCHLORIDE 10 MG/ML
5 INJECTION, SOLUTION EPIDURAL; INFILTRATION; INTRACAUDAL; PERINEURAL
Status: COMPLETED | OUTPATIENT
Start: 2022-01-22 | End: 2022-01-22

## 2022-01-22 RX ORDER — ACETAMINOPHEN 325 MG/1
650 TABLET ORAL
Status: COMPLETED | OUTPATIENT
Start: 2022-01-22 | End: 2022-01-22

## 2022-01-22 RX ADMIN — IPRATROPIUM BROMIDE AND ALBUTEROL SULFATE 3 ML: .5; 2.5 SOLUTION RESPIRATORY (INHALATION) at 11:01

## 2022-01-22 RX ADMIN — ACETAMINOPHEN 650 MG: 325 TABLET ORAL at 10:01

## 2022-01-22 RX ADMIN — OXYCODONE HYDROCHLORIDE AND ACETAMINOPHEN 1 TABLET: 5; 325 TABLET ORAL at 11:01

## 2022-01-22 RX ADMIN — LIDOCAINE HYDROCHLORIDE 50 MG: 10 INJECTION, SOLUTION EPIDURAL; INFILTRATION; INTRACAUDAL at 11:01

## 2022-01-22 RX ADMIN — ONDANSETRON 4 MG: 4 TABLET, ORALLY DISINTEGRATING ORAL at 09:01

## 2022-01-23 PROBLEM — R79.89 ELEVATED BRAIN NATRIURETIC PEPTIDE (BNP) LEVEL: Status: ACTIVE | Noted: 2022-01-01

## 2022-01-23 PROBLEM — J96.01 ACUTE HYPOXEMIC RESPIRATORY FAILURE: Status: ACTIVE | Noted: 2022-01-01

## 2022-01-23 PROBLEM — S01.01XA SCALP LACERATION: Status: ACTIVE | Noted: 2022-01-01

## 2022-01-23 PROBLEM — I50.22 CHRONIC SYSTOLIC HEART FAILURE: Status: ACTIVE | Noted: 2022-01-01

## 2022-01-23 PROBLEM — R29.6 FREQUENT FALLS: Status: ACTIVE | Noted: 2022-01-01

## 2022-01-23 PROBLEM — F03.90 DEMENTIA: Status: ACTIVE | Noted: 2022-01-01

## 2022-01-23 NOTE — HPI
"Paul Browning (Joe) is an 82 yo male with COPD, sarcoidosis, CAD, HTN, GERD with Stevens's esophagus, BPH, squamous cell carcinoma, and anxiety/depression who initially presented to the ED for a fall with head trauma and posterior scalp lacteration.  CTH was negative for acute hemorrhage, hip and rib xrays negative for fracture, and CT C/L-spine negative for acute traumatic injury.  Scalp laceration was repaired. Patient was stable and discharged from ED. However, patient became significantly SOB in the parking lot of the hospital and returned to the ED. SpO2 was 80%, and patient was admitted to  observation for acute hypoxia. Patient unable to provide history as he has dementia, son at bedside to assist. Son reports the patient was in significant back pain when attempting to leave the hospital, which exacerbated his SOB. The pain is controlled at rest, but becomes severe with minimal movement. The pain is most prominent in the lumbar region. The son also reports the patient's wheezing is chronic and says he is at his baseline respiratory function.     In the ED, patient is afebrile without leukocytosis. He is maintaining oxygen sats on 2L NC. P 75, RR 22, BP 94/51. Labs significant for T bili 1.2. , Tn negative. CXR c/w chronic and stable sarcoidosis changes. Given IV methylprednisolone. Duonebs x6. IV fentanyl 50mcg x1.   "

## 2022-01-23 NOTE — H&P
"[facility] - Emergency Dept  [facility] Medicine  History & Physical    Patient Name: Paul Browning  MRN: [id number]  Patient Class: OP- Observation  Admission Date: 1/22/2022  Attending Physician: Emily Guevara MD   Primary Care Provider: Grey Forbes DO         Patient information was obtained from patient, relative(s) and ER records.     Subjective:     Principal Problem:Acute hypoxemic respiratory failure    Chief Complaint:   Chief Complaint   Patient presents with    Fall     Fell backwards and hit back of head on wooden table. Unknown LOC. No blood thinner use. Hx of dementia.         HPI: Paul Browning (Joe) is an 80 yo male with COPD, sarcoidosis, CAD, HTN, GERD with Stevens's esophagus, BPH, squamous cell carcinoma, and anxiety/depression who initially presented to the ED for a fall with head trauma and posterior scalp lacteration.  CTH was negative for acute hemorrhage, hip and rib xrays negative for fracture, and CT C/L-spine negative for acute traumatic injury.  Scalp laceration was repaired. Patient was stable and discharged from ED. However, patient became significantly SOB in the parking lot of the hospital and returned to the ED. SpO2 was 80%, and patient was admitted to  observation for acute hypoxia. Patient unable to provide history as he has dementia, son at bedside to assist. Son reports the patient was in significant back pain when attempting to leave the hospital, which exacerbated his SOB. The pain is controlled at rest, but becomes severe with minimal movement. The pain is most prominent in the lumbar region. The son also reports the patient's wheezing is chronic and says he is at his baseline respiratory function.     In the ED, patient is afebrile without leukocytosis. He is maintaining oxygen sats on 2L NC. P 75, RR 22, BP 94/51. Labs significant for T bili 1.2. , Tn negative. CXR c/w chronic and stable sarcoidosis changes. Given IV methylprednisolone. " Duonebs x6. IV fentanyl 50mcg x1.       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    Hyperlipidemia     Hypertension     Ischemic cardiomyopathy 11/5/2014    MDD (major depressive disorder), recurrent episode, mild 2/17/2016    Osteoporosis 7/20/2016    Peripheral arterial disease 3/18/2016    Sarcoid     Squamous cell carcinoma 09/2016, 5/2016    crown of scalp, left wrist       Past Surgical History:   Procedure Laterality Date    CARDIAC SURGERY      CAROTID STENT N/A 10/14/2019    Procedure: INSERTION, STENT, ARTERY, CAROTID;  Surgeon: Artie Kebede MD;  Location: [facility] CATH LAB;  Service: Cardiology;  Laterality: N/A;    CATARACT EXTRACTION W/  INTRAOCULAR LENS IMPLANT Right 07/17/2018    Dr. Talamantes    CATARACT EXTRACTION W/  INTRAOCULAR LENS IMPLANT Left 07/31/2018    Dr. Talamantes    CORONARY ARTERY BYPASS GRAFT      x2  10/2014    ESOPHAGOGASTRODUODENOSCOPY N/A 4/8/2019    Procedure: ESOPHAGOGASTRODUODENOSCOPY (EGD)/poss rfa;  Surgeon: Clifford De La Torre MD;  Location: [facility] (2ND FLR);  Service: Endoscopy;  Laterality: N/A;    ESOPHAGOGASTRODUODENOSCOPY N/A 8/4/2021    Procedure: EGD (ESOPHAGOGASTRODUODENOSCOPY);  Surgeon: Clifford De La Torre MD;  [facility]  Service: Endoscopy;  Laterality: N/A;  8/2-[facility] [id number] appts on 8/3    ESOPHAGOGASTRODUODENOSCOPY (EGD) WITH RADIOFREQUENCY TREATMENT OF GASTROESOPHAGEAL JUNCTION      INJECTION OF ANESTHETIC AGENT AROUND MEDIAL BRANCH NERVES INNERVATING CERVICAL FACET JOINT Bilateral 1/9/2020    Procedure:  INJECTION, MBB--Bilateral Cervical- Third Occipital nerve, C2-3--ASA okay for pt to continue taking per provider.;  Surgeon: Tip Mera Jr., MD;  Location: [facility] PAIN MGT;  Service: Pain Management;  Laterality: Bilateral;    INTRAOCULAR PROSTHESES INSERTION Right 7/17/2018    Procedure: INSERTION, INTRAOCULAR LENS PROSTHESIS;  Surgeon: León Talamantes MD;  [facility];  Service: Ophthalmology;  Laterality: Right;    INTRAOCULAR PROSTHESES INSERTION Left 7/31/2018    Procedure: INSERTION, INTRAOCULAR LENS PROSTHESIS;  Surgeon: León Talamantes MD;  [facility];  Service: Ophthalmology;  Laterality: Left;    PHACOEMULSIFICATION OF CATARACT Right 7/17/2018    Procedure: PHACOEMULSIFICATION, CATARACT;  Surgeon: León Talamantes MD;  [facility];  Service: Ophthalmology;  Laterality: Right;    PHACOEMULSIFICATION OF CATARACT Left 7/31/2018    Procedure: PHACOEMULSIFICATION, CATARACT;  Surgeon: León Talamantes MD;  [facility];  Service: Ophthalmology;  Laterality: Left;    PLACEMENT OF SCREW IN ODONTOID N/A 10/22/2020    Procedure: INSERTION, SCREW, ODONTOID. Anterior approach.;  Surgeon: Kirsten Weaver MD;  Location: [facility] OR 2ND FLR;  Service: Neurosurgery;  Laterality: N/A;    RADIOFREQUENCY THERMOCOAGULATION Bilateral 1/30/2020    Procedure: RADIOFREQUENCY THERMAL COAGULATION--Bilateral C2-3 and Third Occipital Nerve;  Surgeon: Tip Mera Jr., MD;  Location: [facility] PAIN MGT;  Service: Pain Management;  Laterality: Bilateral;    UMBILICAL HERNIA REPAIR         Review of patient's allergies indicates:   Allergen Reactions    Morphine Shortness Of Breath       No current facility-administered medications on file prior to encounter.     Current Outpatient Medications on File Prior to Encounter   Medication Sig    albuterol (PROAIR HFA) 90 mcg/actuation inhaler Inhale 2 puffs into the lungs every 6 (six) hours as needed  for Wheezing or Shortness of Breath. Rescue    alendronate (FOSAMAX) 70 MG tablet TAKE 1 TABLET BY MOUTH EVERY WEEK IN THE MORNING WITH FULL GLASS OF WATER ON EMPTY STOMACH-DONT LIE DOWN FOR AT LEAST 30 MINUTES AFTERWARDS    aspirin (ECOTRIN) 81 MG EC tablet Take 81 mg by mouth once daily.    atorvastatin (LIPITOR) 40 MG tablet TAKE 1 TABLET(40 MG) BY MOUTH EVERY DAY    butalbitaL-acetaminophen  mg Tab One po q 6 hour prn moderate to severe headache    furosemide (LASIX) 20 MG tablet Take 1 tablet (20 mg total) by mouth once daily.    magnesium oxide (MAG-OX) 400 mg (241.3 mg magnesium) tablet Take 1 tablet (400 mg total) by mouth once daily.    memantine (NAMENDA) 5 MG Tab One po qd daily for one week then BID    omega-3 fatty acids-vitamin E (FISH OIL) 1,000 mg Cap Take 1 tablet by mouth 2 (two) times daily. (Patient taking differently: Take 1 tablet by mouth once daily.)    pantoprazole (PROTONIX) 40 MG tablet TAKE 1 TABLET BY MOUTH TWICE DAILY    sertraline (ZOLOFT) 100 MG tablet Take 1 tablet (100 mg total) by mouth once daily.    sucralfate (CARAFATE) 1 gram tablet DISSOLVE 1 TABLET IN 10 ML OF WATER AND TAKE BY MOUTH TWICE DAILY    tiotropium-olodateroL (STIOLTO RESPIMAT) 2.5-2.5 mcg/actuation Mist Inhale 2 puffs into the lungs once daily. Controller     Family History     Problem Relation (Age of Onset)    Arrhythmia Mother    Heart disease Mother    Heart failure Brother    No Known Problems Daughter, Son        Tobacco Use    Smoking status: Former Smoker     Packs/day: 2.00     Years: 20.00     Pack years: 40.00     Types: Cigarettes     Quit date: 1988     Years since quittin.9    Smokeless tobacco: Never Used   Substance and Sexual Activity    Alcohol use: No     Comment: quit drinking beer     Drug use: No    Sexual activity: Not Currently     Partners: Female     Review of Systems   Constitutional: Positive for activity change. Negative for chills, diaphoresis,  fatigue and fever.   HENT: Negative for congestion, facial swelling, rhinorrhea and sore throat.    Eyes: Negative for photophobia, itching and visual disturbance.   Respiratory: Positive for cough, shortness of breath and wheezing. Negative for chest tightness.    Cardiovascular: Negative for chest pain, palpitations and leg swelling.   Gastrointestinal: Positive for constipation. Negative for abdominal distention, abdominal pain, blood in stool, diarrhea, nausea and vomiting.   Genitourinary: Negative for difficulty urinating, dysuria, frequency, hematuria and urgency.   Musculoskeletal: Positive for back pain, neck pain and neck stiffness. Negative for arthralgias.   Skin: Positive for wound (posterior scalp laceration).   Neurological: Negative for dizziness, tremors, seizures, syncope, weakness, light-headedness, numbness and headaches.        Baseline dementia    Psychiatric/Behavioral: Negative for agitation, confusion and hallucinations.     Objective:     Vital Signs (Most Recent):  Temp: 98.1 °F (36.7 °C) (01/22/22 2222)  Pulse: 83 (01/23/22 0718)  Resp: (!) 24 (01/23/22 0718)  BP: (!) 102/55 (01/23/22 0632)  SpO2: 97 % (01/23/22 0718) Vital Signs (24h Range):  Temp:  [98 °F (36.7 °C)-98.1 °F (36.7 °C)] 98.1 °F (36.7 °C)  Pulse:  [] 83  Resp:  [16-24] 24  SpO2:  [88 %-98 %] 97 %  BP: ()/(51-70) 102/55     Weight: 70.3 kg (155 lb)  Body mass index is 25.79 kg/m².    Physical Exam  Vitals and nursing note reviewed.   Constitutional:       General: He is not in acute distress.     Appearance: He is well-developed. He is not diaphoretic.      Comments: Patient appears comfortable at rest, but is in significant pain with minimal movement.    HENT:      Head: Normocephalic and atraumatic.      Right Ear: External ear normal.      Left Ear: External ear normal.      Nose: Nose normal. No congestion.      Mouth/Throat:      Pharynx: Oropharynx is clear.   Eyes:      General: No scleral icterus.      Extraocular Movements: Extraocular movements intact.   Cardiovascular:      Rate and Rhythm: Normal rate and regular rhythm.      Pulses: Normal pulses.      Heart sounds: Murmur heard.        Comments: No JVD  Pulmonary:      Effort: No respiratory distress.      Breath sounds: Wheezing present. No rhonchi or rales.      Comments: Appears SOB, mildly increased WOB, audible wheezing. Significant expiratory wheezing in bilateral lung fields.   Abdominal:      General: Bowel sounds are normal. There is no distension.      Palpations: Abdomen is soft.      Tenderness: There is no abdominal tenderness. There is no guarding or rebound.   Musculoskeletal:         General: Tenderness present.      Cervical back: Normal range of motion. Tenderness present.      Right lower leg: No edema.      Left lower leg: No edema.      Comments: Limited ROM 2/2 pain in neck and back. Severe pain when attempting to roll patient onto side, primarily lumbar spine. Sensation grossly intact.   Skin:     General: Skin is warm and dry.      Capillary Refill: Capillary refill takes less than 2 seconds.   Neurological:      General: No focal deficit present.      Mental Status: He is alert. Mental status is at baseline.      Comments: Oriented to person and place    Psychiatric:         Mood and Affect: Mood normal.         Behavior: Behavior normal.         Thought Content: Thought content normal.             Significant Labs:   All pertinent labs within the past 24 hours have been reviewed.  CBC:   Recent Labs   Lab 01/23/22 0159   WBC 11.70   HGB 14.0   HCT 41.3        CMP:   Recent Labs   Lab 01/23/22 0159      K 3.8      CO2 23   *   BUN 20   CREATININE 1.1   CALCIUM 9.1   PROT 6.3   ALBUMIN 3.2*   BILITOT 1.2*   ALKPHOS 67   AST 26   ALT 25   ANIONGAP 11   EGFRNONAA >60.0     Cardiac Markers:   Recent Labs   Lab 01/23/22  0159   *       Significant Imaging: I have reviewed all pertinent imaging  results/findings within the past 24 hours.    Assessment/Plan:     * Acute hypoxemic respiratory failure  COPD exacerbation  Pulmonary sarcoidosis   Elevated BNP     · 2/4 SIRS criteria:  HR >90, RR >20. Afebrile without leukocytosis.   · Satting 80% on room air, placed on 4L -> 2L NC and maintaining sats >90%, wean as tolerated   · High suspicion for COPD exacerbation, placed on pathway   · Rule out acute heart failure exacerbation as . Echo ordered. Stress echo from 7/2021 with EF 50%.   · Covid negative 1/22  · Solumedrol 125mg IV x 1 with Prednisone 40mg PO daily to complete a 5 day course  · Home Stiolto non-formulary, will continue duo-nebs for now  · Duonebs q6h while awake and prn  · Incentive spirometry  · Guaifenesin and tessalon pearls prn cough, monitor sputum production     Chronic systolic heart failure  Elevated BNP     - patient does not appear fluid overloaded on exam, no JVD, but is here for acute hypoxia requiring 2L NC to maintain O2 sats >90%  - CXR c/w chronic/stable sarcoidosis changes, no evidence of pulmonary edema   - rule out acute HF exacerbation as   - Stress echo 7/2021 with low normal systolic LV function, indeterminate diastolic LV function, EF 50% -> 2D echo ordered   - son reports patient ran out of home lasix 3 months ago and refill was not approved, hold off on resuming while awaiting echo results       Frequent falls  Impaired functional mobility, balance, gait, and endurance  Scalp laceration    Patient initially presenting to ED s/p unwitnessed fall with head trauma. Recent frequent falls due to gait instability and weakness. Laceration to posterior scalp. Patient c/o significant low back pain and neck pain.     - Posterior scalp laceration repaired in ED, nursing dressing changes as needed  - CTH negative for intracranial hemorrhage  - hip, rib xrays without fracture  - CT C/L-spine negative for acute traumatic injury -> consider thoracic spine CT if pain not  improved   - Neuro checks q shift   - fall precautions   - pain control   - PT/OT consult     Dementia  - family at bedside to assist with history, patient is at baseline  - continue memantine   - continue magnesium   - delirium precautions   - seroquel 12.5 mg every evening to prevent sundowning       Osteoporosis  - fosamax prescribed weekly, resume upon discharge   - xrays negative for fracture s/p fall       Peripheral arterial disease  - continue ASA, statin      MDD (major depressive disorder), recurrent episode, mild  Anxiety     - chronic and stable   - continue zoloft daily        Chronic obstructive pulmonary disease with acute exacerbation        Hx of gastric ulcer        Stevens's esophagus with low grade dysplasia  Hx of gastric ulcer   GERD    - continue PO protonix BID  - continue carafate BID      Hyperlipidemia  - continue statin       Hypertension  - Currently controlled without pharmacologics. Will monitor.       CAD (coronary artery disease)  S/p CABG x2    - continue ASA 81 daily   - continue statin  - continue omega 3         VTE Risk Mitigation (From admission, onward)         Ordered     enoxaparin injection 40 mg  Daily         01/23/22 0654     IP VTE HIGH RISK PATIENT  Once         01/23/22 0524     Place sequential compression device  Until discontinued         01/23/22 0524                   EMIL BenavidezC  Department of Hospital Medicine   [facility] - Emergency Dept

## 2022-01-23 NOTE — ED NOTES
"Patient presenting to ED following a fall at home. Patient is unsure of how exactly he fell and whether or not there was LOC. Per the family, the patient hit his head on the back of a wooden table. Hx of falls r/t gait instabilities, dementia + poor historian. C/o headache and nausea. Small lac to the behind L ear, bleeding controlled    Per the family-- the patient has been diagnosed with a "lung disease", but unsure of exact term. Pt wheezes at baseline, uses home nebs 1-2x/week  "

## 2022-01-23 NOTE — ASSESSMENT & PLAN NOTE
COPD exacerbation  Pulmonary sarcoidosis   Elevated BNP     · 2/4 SIRS criteria:  HR >90, RR >20. Afebrile without leukocytosis.   · Satting 80% on room air, placed on 4L -> 2L NC and maintaining sats >90%, wean as tolerated   · High suspicion for COPD exacerbation, placed on pathway   · Rule out acute heart failure exacerbation as . Echo ordered. Stress echo from 7/2021 with EF 50%.   · Covid negative 1/22  · Solumedrol 125mg IV x 1 with Prednisone 40mg PO daily to complete a 5 day course  · Home Stiolto non-formulary, will continue duo-nebs for now  · Duonebs q6h while awake and prn  · Incentive spirometry  · Guaifenesin and tessalon pearls prn cough, monitor sputum production

## 2022-01-23 NOTE — ASSESSMENT & PLAN NOTE
- family at bedside to assist with history, patient is at baseline  - continue memantine   - continue magnesium   - delirium precautions   - seroquel 12.5 mg every evening to prevent sundowning

## 2022-01-23 NOTE — ASSESSMENT & PLAN NOTE
Elevated BNP     - patient does not appear fluid overloaded on exam, no JVD, but is here for acute hypoxia requiring 2L NC to maintain O2 sats >90%  - CXR c/w chronic/stable sarcoidosis changes, no evidence of pulmonary edema   - rule out acute HF exacerbation as   - Stress echo 7/2021 with low normal systolic LV function, indeterminate diastolic LV function, EF 50% -> 2D echo ordered   - son reports patient ran out of home lasix 3 months ago and refill was not approved, hold off on resuming while awaiting echo results

## 2022-01-23 NOTE — ED NOTES
Received pt from [facility].  Pt with resp even/non-labored with some wheezing which pt's son states is normal breathing pattern due to chronic lung problems.  Pt oriented to self only with hx of dementia.  Pt and son oriented to unit.  Siderails up x 2/will continue to monitor.

## 2022-01-23 NOTE — ED NOTES
Assumed care for this patient, patient currently resting on stretcher able to voice all needs, patient made aware of current status and voiced understanding, patient on cardiac monitoring, remains on 2L o2 via NC, no acute distress noted, will continue to monitor

## 2022-01-23 NOTE — SUBJECTIVE & OBJECTIVE
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    Hyperlipidemia     Hypertension     Ischemic cardiomyopathy 11/5/2014    MDD (major depressive disorder), recurrent episode, mild 2/17/2016    Osteoporosis 7/20/2016    Peripheral arterial disease 3/18/2016    Sarcoid     Squamous cell carcinoma 09/2016, 5/2016    crown of scalp, left wrist       Past Surgical History:   Procedure Laterality Date    CARDIAC SURGERY      CAROTID STENT N/A 10/14/2019    Procedure: INSERTION, STENT, ARTERY, CAROTID;  Surgeon: Artie Kebede MD;  Location: [facility] CATH LAB;  Service: Cardiology;  Laterality: N/A;    CATARACT EXTRACTION W/  INTRAOCULAR LENS IMPLANT Right 07/17/2018    Dr. Talamantes    CATARACT EXTRACTION W/  INTRAOCULAR LENS IMPLANT Left 07/31/2018    Dr. Talamantes    CORONARY ARTERY BYPASS GRAFT      x2  10/2014    ESOPHAGOGASTRODUODENOSCOPY N/A 4/8/2019    Procedure: ESOPHAGOGASTRODUODENOSCOPY (EGD)/poss rfa;  Surgeon: Clifford De La Torre MD;  Location: [facility] ENDO (2ND FLR);  Service: Endoscopy;  Laterality: N/A;    ESOPHAGOGASTRODUODENOSCOPY N/A 8/4/2021    Procedure: EGD (ESOPHAGOGASTRODUODENOSCOPY);  Surgeon: Clifford De La Torre MD;  Location: [facility] ENDO (2ND FLR);  Service: Endoscopy;  Laterality: N/A;  8/2-covid [facility] dweshe-sp-um appts on 8/3    ESOPHAGOGASTRODUODENOSCOPY (EGD) WITH RADIOFREQUENCY TREATMENT OF GASTROESOPHAGEAL JUNCTION      INJECTION OF ANESTHETIC AGENT AROUND MEDIAL BRANCH NERVES INNERVATING CERVICAL FACET JOINT Bilateral 1/9/2020    Procedure: INJECTION, MBB--Bilateral Cervical- Third  Occipital nerve, C2-3--ASA okay for pt to continue taking per provider.;  Surgeon: Tip Mera Jr., MD;  Location: [facility] PAIN MGT;  Service: Pain Management;  Laterality: Bilateral;    INTRAOCULAR PROSTHESES INSERTION Right 7/17/2018    Procedure: INSERTION, INTRAOCULAR LENS PROSTHESIS;  Surgeon: León Talamantes MD;  Location: [facility] OR 1ST FLR;  Service: Ophthalmology;  Laterality: Right;    INTRAOCULAR PROSTHESES INSERTION Left 7/31/2018    Procedure: INSERTION, INTRAOCULAR LENS PROSTHESIS;  Surgeon: León Talamantes MD;  Location: [facility] OR 1ST FLR;  Service: Ophthalmology;  Laterality: Left;    PHACOEMULSIFICATION OF CATARACT Right 7/17/2018    Procedure: PHACOEMULSIFICATION, CATARACT;  Surgeon: León Talmaantes MD;  Location: [facility] OR 1ST FLR;  Service: Ophthalmology;  Laterality: Right;    PHACOEMULSIFICATION OF CATARACT Left 7/31/2018    Procedure: PHACOEMULSIFICATION, CATARACT;  Surgeon: León Talamantes MD;  Location: [facility] OR 1ST FLR;  Service: Ophthalmology;  Laterality: Left;    PLACEMENT OF SCREW IN ODONTOID N/A 10/22/2020    Procedure: INSERTION, SCREW, ODONTOID. Anterior approach.;  Surgeon: Kirsten Weaver MD;  Location: [facility] OR 2ND FLR;  Service: Neurosurgery;  Laterality: N/A;    RADIOFREQUENCY THERMOCOAGULATION Bilateral 1/30/2020    Procedure: RADIOFREQUENCY THERMAL COAGULATION--Bilateral C2-3 and Third Occipital Nerve;  Surgeon: Tip Mera Jr., MD;  Location: [facility] PAIN MGT;  Service: Pain Management;  Laterality: Bilateral;    UMBILICAL HERNIA REPAIR         Review of patient's allergies indicates:   Allergen Reactions    Morphine Shortness Of Breath       No current facility-administered medications on file prior to encounter.     Current Outpatient Medications on File Prior to Encounter   Medication Sig    albuterol (PROAIR HFA) 90 mcg/actuation inhaler Inhale 2 puffs into the lungs every 6 (six) hours as needed for Wheezing or Shortness of Breath. Rescue     alendronate (FOSAMAX) 70 MG tablet TAKE 1 TABLET BY MOUTH EVERY WEEK IN THE MORNING WITH FULL GLASS OF WATER ON EMPTY STOMACH-DONT LIE DOWN FOR AT LEAST 30 MINUTES AFTERWARDS    aspirin (ECOTRIN) 81 MG EC tablet Take 81 mg by mouth once daily.    atorvastatin (LIPITOR) 40 MG tablet TAKE 1 TABLET(40 MG) BY MOUTH EVERY DAY    butalbitaL-acetaminophen  mg Tab One po q 6 hour prn moderate to severe headache    furosemide (LASIX) 20 MG tablet Take 1 tablet (20 mg total) by mouth once daily.    magnesium oxide (MAG-OX) 400 mg (241.3 mg magnesium) tablet Take 1 tablet (400 mg total) by mouth once daily.    memantine (NAMENDA) 5 MG Tab One po qd daily for one week then BID    omega-3 fatty acids-vitamin E (FISH OIL) 1,000 mg Cap Take 1 tablet by mouth 2 (two) times daily. (Patient taking differently: Take 1 tablet by mouth once daily.)    pantoprazole (PROTONIX) 40 MG tablet TAKE 1 TABLET BY MOUTH TWICE DAILY    sertraline (ZOLOFT) 100 MG tablet Take 1 tablet (100 mg total) by mouth once daily.    sucralfate (CARAFATE) 1 gram tablet DISSOLVE 1 TABLET IN 10 ML OF WATER AND TAKE BY MOUTH TWICE DAILY    tiotropium-olodateroL (STIOLTO RESPIMAT) 2.5-2.5 mcg/actuation Mist Inhale 2 puffs into the lungs once daily. Controller     Family History     Problem Relation (Age of Onset)    Arrhythmia Mother    Heart disease Mother    Heart failure Brother    No Known Problems Daughter, Son        Tobacco Use    Smoking status: Former Smoker     Packs/day: 2.00     Years: 20.00     Pack years: 40.00     Types: Cigarettes     Quit date: 1988     Years since quittin.9    Smokeless tobacco: Never Used   Substance and Sexual Activity    Alcohol use: No     Comment: quit drinking 2012    Drug use: No    Sexual activity: Not Currently     Partners: Female     Review of Systems   Constitutional: Positive for activity change. Negative for chills, diaphoresis, fatigue and fever.   HENT: Negative for  congestion, facial swelling, rhinorrhea and sore throat.    Eyes: Negative for photophobia, itching and visual disturbance.   Respiratory: Positive for cough, shortness of breath and wheezing. Negative for chest tightness.    Cardiovascular: Negative for chest pain, palpitations and leg swelling.   Gastrointestinal: Positive for constipation. Negative for abdominal distention, abdominal pain, blood in stool, diarrhea, nausea and vomiting.   Genitourinary: Negative for difficulty urinating, dysuria, frequency, hematuria and urgency.   Musculoskeletal: Positive for back pain, neck pain and neck stiffness. Negative for arthralgias.   Skin: Positive for wound (posterior scalp laceration).   Neurological: Negative for dizziness, tremors, seizures, syncope, weakness, light-headedness, numbness and headaches.        Baseline dementia    Psychiatric/Behavioral: Negative for agitation, confusion and hallucinations.     Objective:     Vital Signs (Most Recent):  Temp: 98.1 °F (36.7 °C) (01/22/22 2222)  Pulse: 83 (01/23/22 0718)  Resp: (!) 24 (01/23/22 0718)  BP: (!) 102/55 (01/23/22 0632)  SpO2: 97 % (01/23/22 0718) Vital Signs (24h Range):  Temp:  [98 °F (36.7 °C)-98.1 °F (36.7 °C)] 98.1 °F (36.7 °C)  Pulse:  [] 83  Resp:  [16-24] 24  SpO2:  [88 %-98 %] 97 %  BP: ()/(51-70) 102/55     Weight: 70.3 kg (155 lb)  Body mass index is 25.79 kg/m².    Physical Exam  Vitals and nursing note reviewed.   Constitutional:       General: He is not in acute distress.     Appearance: He is well-developed. He is not diaphoretic.      Comments: Patient appears comfortable at rest, but is in significant pain with minimal movement.    HENT:      Head: Normocephalic and atraumatic.      Right Ear: External ear normal.      Left Ear: External ear normal.      Nose: Nose normal. No congestion.      Mouth/Throat:      Pharynx: Oropharynx is clear.   Eyes:      General: No scleral icterus.     Extraocular Movements: Extraocular  movements intact.   Cardiovascular:      Rate and Rhythm: Normal rate and regular rhythm.      Pulses: Normal pulses.      Heart sounds: Murmur heard.        Comments: No JVD  Pulmonary:      Effort: No respiratory distress.      Breath sounds: Wheezing present. No rhonchi or rales.      Comments: Appears SOB, mildly increased WOB, audible wheezing. Significant expiratory wheezing in bilateral lung fields.   Abdominal:      General: Bowel sounds are normal. There is no distension.      Palpations: Abdomen is soft.      Tenderness: There is no abdominal tenderness. There is no guarding or rebound.   Musculoskeletal:         General: Tenderness present.      Cervical back: Normal range of motion. Tenderness present.      Right lower leg: No edema.      Left lower leg: No edema.      Comments: Limited ROM 2/2 pain in neck and back. Severe pain when attempting to roll patient onto side, primarily lumbar spine. Sensation grossly intact.   Skin:     General: Skin is warm and dry.      Capillary Refill: Capillary refill takes less than 2 seconds.   Neurological:      General: No focal deficit present.      Mental Status: He is alert. Mental status is at baseline.      Comments: Oriented to person and place    Psychiatric:         Mood and Affect: Mood normal.         Behavior: Behavior normal.         Thought Content: Thought content normal.             Significant Labs:   All pertinent labs within the past 24 hours have been reviewed.  CBC:   Recent Labs   Lab 01/23/22 0159   WBC 11.70   HGB 14.0   HCT 41.3        CMP:   Recent Labs   Lab 01/23/22 0159      K 3.8      CO2 23   *   BUN 20   CREATININE 1.1   CALCIUM 9.1   PROT 6.3   ALBUMIN 3.2*   BILITOT 1.2*   ALKPHOS 67   AST 26   ALT 25   ANIONGAP 11   EGFRNONAA >60.0     Cardiac Markers:   Recent Labs   Lab 01/23/22 0159   *       Significant Imaging: I have reviewed all pertinent imaging results/findings within the past 24 hours.

## 2022-01-23 NOTE — ASSESSMENT & PLAN NOTE
Impaired functional mobility, balance, gait, and endurance  Scalp laceration    Patient initially presenting to ED s/p unwitnessed fall with head trauma. Recent frequent falls due to gait instability and weakness. Laceration to posterior scalp. Patient c/o significant low back pain and neck pain.     - Posterior scalp laceration repaired in ED, nursing dressing changes as needed  - CTH negative for intracranial hemorrhage  - hip, rib xrays without fracture  - CT C/L-spine negative for acute traumatic injury -> consider thoracic spine CT if pain not improved   - Neuro checks q shift   - fall precautions   - pain control   - PT/OT consult

## 2022-01-23 NOTE — ED PROVIDER NOTES
Encounter Date: 1/22/2022       History     Chief Complaint   Patient presents with    Fall     Fell backwards and hit back of head on wooden table. Unknown LOC. No blood thinner use. Hx of dementia.      81-year-old past medical history of Stevens's esophagus, BPH, CAD, diaphragmatic hernia, GERD, hypertension, sarcoidosis, squamous cell carcinoma, anxiety presenting with fall.  As per patient is son patient has balance problems and frequent falls seen in the emergency room recently with fall negative CT.  Today patient was with his daughter and unwitnessed fall.  Since laceration back of head.  On arrival in ED patient complaining of headache with nausea.  Denies any chest pain, abdominal pain, lower extremity weakness numbness or tingling.        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    Hyperlipidemia     Hypertension     Ischemic cardiomyopathy 11/5/2014    MDD (major depressive disorder), recurrent episode, mild 2/17/2016    Osteoporosis 7/20/2016    Peripheral arterial disease 3/18/2016    Sarcoid     Squamous cell carcinoma 09/2016, 5/2016    crown of scalp, left wrist     Past Surgical History:   Procedure Laterality Date    CARDIAC SURGERY      CAROTID STENT N/A 10/14/2019    Procedure: INSERTION, STENT, ARTERY, CAROTID;  Surgeon: Artie Kebede MD;  Location: [facility] CATH LAB;  Service: Cardiology;  Laterality: N/A;    CATARACT EXTRACTION W/  INTRAOCULAR LENS IMPLANT Right 07/17/2018    Dr. Talamantes    CATARACT EXTRACTION W/  INTRAOCULAR LENS IMPLANT Left 07/31/2018    Dr. Talamantes    CORONARY ARTERY BYPASS  GRAFT      x2  10/2014    ESOPHAGOGASTRODUODENOSCOPY N/A 4/8/2019    Procedure: ESOPHAGOGASTRODUODENOSCOPY (EGD)/poss rfa;  Surgeon: Clifford De La Torre MD;  Location: [facility] ENDO (2ND FLR);  Service: Endoscopy;  Laterality: N/A;    ESOPHAGOGASTRODUODENOSCOPY N/A 8/4/2021    Procedure: EGD (ESOPHAGOGASTRODUODENOSCOPY);  Surgeon: Clifford De La Torre MD;  Location: [facility] ENDO (2ND FLR);  Service: Endoscopy;  Laterality: N/A;  8/2-covid [place]-inst xcczwe-ap-ab appts on 8/3    ESOPHAGOGASTRODUODENOSCOPY (EGD) WITH RADIOFREQUENCY TREATMENT OF GASTROESOPHAGEAL JUNCTION      INJECTION OF ANESTHETIC AGENT AROUND MEDIAL BRANCH NERVES INNERVATING CERVICAL FACET JOINT Bilateral 1/9/2020    Procedure: INJECTION, MBB--Bilateral Cervical- Third Occipital nerve, C2-3--ASA okay for pt to continue taking per provider.;  Surgeon: Tip Mera Jr., MD;  Location: [facility] PAIN MGT;  Service: Pain Management;  Laterality: Bilateral;    INTRAOCULAR PROSTHESES INSERTION Right 7/17/2018    Procedure: INSERTION, INTRAOCULAR LENS PROSTHESIS;  Surgeon: León Talamantes MD;  Location: [facility] OR [address] [place] [address];  Service: Ophthalmology;  Laterality: Right;    INTRAOCULAR PROSTHESES INSERTION Left 7/31/2018    Procedure: INSERTION, INTRAOCULAR LENS PROSTHESIS;  Surgeon: León Talamantes MD;  [facility];  Service: Ophthalmology;  Laterality: Left;    PHACOEMULSIFICATION OF CATARACT Right 7/17/2018    Procedure: PHACOEMULSIFICATION, CATARACT;  Surgeon: León Talamantes MD;  Location: [facility] OR [address] [place] [address];  Service: Ophthalmology;  Laterality: Right;    PHACOEMULSIFICATION OF CATARACT Left 7/31/2018    Procedure: PHACOEMULSIFICATION, CATARACT;  Surgeon: León Talamantes MD;  Location: [facility] OR [address] [place] [address];  Service: Ophthalmology;  Laterality: Left;    PLACEMENT OF SCREW IN ODONTOID N/A 10/22/2020    Procedure: INSERTION, SCREW, ODONTOID. Anterior approach.;  Surgeon: Kirsten Weaver MD;  Location: [facility] OR 2ND FLR;   Service: Neurosurgery;  Laterality: N/A;    RADIOFREQUENCY THERMOCOAGULATION Bilateral 2020    Procedure: RADIOFREQUENCY THERMAL COAGULATION--Bilateral C2-3 and Third Occipital Nerve;  Surgeon: Tip Mera Jr., MD;  Location: [facility];  Service: Pain Management;  Laterality: Bilateral;    UMBILICAL HERNIA REPAIR       Family History   Problem Relation Age of Onset    Arrhythmia Mother     Heart disease Mother     Heart failure Brother     No Known Problems Daughter     No Known Problems Son     Colon cancer Neg Hx     Inflammatory bowel disease Neg Hx     Celiac disease Neg Hx     Melanoma Neg Hx     Amblyopia Neg Hx     Blindness Neg Hx     Cataracts Neg Hx     Glaucoma Neg Hx     Macular degeneration Neg Hx     Retinal detachment Neg Hx     Strabismus Neg Hx      Social History     Tobacco Use    Smoking status: Former Smoker     Packs/day: 2.00     Years: 20.00     Pack years: 40.00     Types: Cigarettes     Quit date: 1988     Years since quittin.9    Smokeless tobacco: Never Used   Substance Use Topics    Alcohol use: No     Comment: quit drinking beer     Drug use: No     Review of Systems   Constitutional: Negative for fever.   HENT: Negative for sore throat.    Respiratory: Negative for shortness of breath.    Cardiovascular: Negative for chest pain.   Gastrointestinal: Negative for nausea.   Genitourinary: Negative for dysuria.   Musculoskeletal: Negative for back pain.   Skin: Negative for rash.   Neurological: Positive for headaches. Negative for weakness.   Hematological: Does not bruise/bleed easily.       Physical Exam     Initial Vitals [22]   BP Pulse Resp Temp SpO2   (!) 160/70 80 20 98 °F (36.7 °C) (!) 93 %      MAP       --         Physical Exam    Nursing note and vitals reviewed.  Constitutional: He appears well-developed and well-nourished. He is not diaphoretic. No distress.   HENT:   Head: Normocephalic and atraumatic.   Nose:  Nose normal.   3 cm laceration to left occiput   Eyes: Conjunctivae and EOM are normal. Pupils are equal, round, and reactive to light. No scleral icterus.   Neck: Neck supple.   Normal range of motion.  Cardiovascular: Normal rate and regular rhythm. Exam reveals no gallop and no friction rub.    No murmur heard.  Pulmonary/Chest: Breath sounds normal. No respiratory distress. He has no wheezes. He has no rhonchi. He has no rales.   Abdominal: Abdomen is soft. Bowel sounds are normal. There is no abdominal tenderness. There is no rebound and no guarding.   Musculoskeletal:         General: Tenderness present. No edema. Normal range of motion.      Cervical back: Normal range of motion and neck supple.      Comments: Tenderness to palpation of lumbar spine.     Neurological: He is alert. He has normal strength. No cranial nerve deficit or sensory deficit. GCS score is 15. GCS eye subscore is 4. GCS verbal subscore is 5. GCS motor subscore is 6.   A&O x2 -1 for year   Skin: Skin is warm and dry. No rash noted. No erythema. No pallor.   Psychiatric: He has a normal mood and affect. His behavior is normal. Judgment and thought content normal.         ED Course   Lac Repair    Date/Time: 1/23/2022 12:08 AM  Performed by: Julio Cabrera Jr., MD  Authorized by: Julio Cabrera Jr., MD     Consent:     Consent obtained:  Verbal    Consent given by:  Patient, guardian and healthcare agent  Universal protocol:     Patient identity confirmed:  Verbally with patient  Anesthesia:     Anesthesia method:  None  Laceration details:     Location:  Scalp    Scalp location:  Occipital    Length (cm):  4  Treatment:     Area cleansed with:  Chlorhexidine    Amount of cleaning:  Standard    Visualized foreign bodies/material removed: no      Debridement:  None  Skin repair:     Repair method:  Staples    Number of staples:  3  Approximation:     Approximation:  Close  Repair type:     Repair type:  Simple  Post-procedure details:      Dressing:  Antibiotic ointment      Labs Reviewed   CBC W/ AUTO DIFFERENTIAL - Abnormal; Notable for the following components:       Result Value    RBC 4.47 (*)     MCH 31.3 (*)     Immature Granulocytes 0.7 (*)     Gran # (ANC) 10.5 (*)     Immature Grans (Abs) 0.08 (*)     Lymph # 0.4 (*)     Gran % 89.8 (*)     Lymph % 3.8 (*)     All other components within normal limits   COMPREHENSIVE METABOLIC PANEL - Abnormal; Notable for the following components:    Glucose 158 (*)     Albumin 3.2 (*)     Total Bilirubin 1.2 (*)     All other components within normal limits   B-TYPE NATRIURETIC PEPTIDE - Abnormal; Notable for the following components:     (*)     All other components within normal limits   TROPONIN I   SARS-COV-2 RDRP GENE          Imaging Results          X-Ray Hips Bilateral 2 View Inc AP Pelvis (Final result)  Result time 01/23/22 04:37:38   Procedure changed from X-Ray Hip 2 or 3 views Left (with Pelvis when performed)     Final result by Neto Javier MD (01/23/22 04:37:38)                 Impression:      No apparent evidence of an acute fracture injury involving the proximal femurs.    Osteoarthritic changes are noted throughout.      Electronically signed by: Neto Javier  Date:    01/23/2022  Time:    04:37             Narrative:    EXAMINATION:  XR HIPS BILATERAL 2 VIEW INCL AP PELVIS    CLINICAL HISTORY:  pain in both hips;  Pain in left hip    TECHNIQUE:  AP view of the pelvis and frogleg lateral views of both hips were performed.    COMPARISON:  March 8, 2021    FINDINGS:  Multiple views of the right left hip indicates no apparent evidence of an acute fracture injury.  There is osteopenia appreciated in both proximal femurs especially in the inter trochanteric region.  The superior and inferior pubic rami on the right left appear to be intact.  Joint spacing of the right left hip is equal.  No apparent disruption of the sacroiliac joints or the pubic symphysis.                                X-Ray Ribs 2 View Left (Final result)  Result time 01/23/22 03:30:15    Final result by Neto Javier MD (01/23/22 03:30:15)                 Impression:      No obvious evidence of an acute injury involving the right or left ribs.    Findings indicating a developing pneumonia in the upper right lung.      Electronically signed by: Neto Javier  Date:    01/23/2022  Time:    03:30             Narrative:    EXAMINATION:  XR RIBS 2 VIEW LEFT    CLINICAL HISTORY:  Pleurodynia    TECHNIQUE:  Two views of the left ribs were performed.    COMPARISON:  January 23, 2022 01:42    FINDINGS:  Limiting of the thorax and upper abdomen was obtained.    There does not appear to be evidence of an acute fracture injury of the ribs.    The lung parenchyma shows a ground-glass appearance especially in the superior aspect of the right side suggestive of a organizing consolidative process.  No pleural effusion.  No pulmonary nodules.  There is calcification of the arch of the aorta.  Sternal wires are present.                               X-Ray Chest AP Portable (Final result)  Result time 01/23/22 01:55:24    Final result by Kayden Jhaveri MD (01/23/22 01:55:24)                 Impression:      Abnormal but stable appearance of the chest with no significant change in the nodular and interstitial lung opacities in patient with known sarcoid.    No evidence of new lung opacity.      Electronically signed by: Kayden Jhaveri  Date:    01/23/2022  Time:    01:55             Narrative:    EXAMINATION:  XR CHEST AP PORTABLE    CLINICAL HISTORY:  Shortness of breath    TECHNIQUE:  Single frontal view of the chest was performed.    COMPARISON:  03/11/2012    FINDINGS:  The parenchymal opacities do not appear significantly changed.  No new consolidation or effusion is evident on apical lordotic view.  The heart mediastinal contours with changes of median sternotomy are again noted.                               CT  Cervical Spine Without Contrast (Final result)  Result time 01/23/22 00:16:32    Final result by Neto Javier MD (01/23/22 00:16:32)                 Impression:      Status post previous stabilization of the dens which was documented on a previous CT scan performed in December 2021.  The orthopedic intervention appears to be stable.    No apparent evidence of an acute injury involving the cervical vertebral column currently.    There are diffuse osteoarthritic changes noted however the most significant findings involve C4-C5 and C6.      Electronically signed by: Neto Javier  Date:    01/23/2022  Time:    00:16             Narrative:    EXAMINATION:  CT CERVICAL SPINE WITHOUT CONTRAST    CLINICAL HISTORY:  Neck trauma (Age >= 65y);    TECHNIQUE:  Low dose axial images, sagittal and coronal reformations were performed though the cervical spine.  Contrast was not administered.    COMPARISON:  December 23, 2021    FINDINGS:  The patient has undergone fusion of the odontoid utilizing orthopedic device.  This was seen previously on the examination obtained in December 2021.  There does not appear to be disruption of the fixation.  There is straightening of the lordotic curvature.  There is loss of intervertebral disc spacing between C4-C5 C5-C6.  Also C5 is posteriorly subluxed in relation to C4 and C6.  This also represents no significant changes compared to the previous examination.  The facet joints align normally.  The spinous processes also align normally.    In the coronal plane the lateral masses align normally.  All of the articular surfaces appear sclerotic.    In the axial plane there is no disruption of the lamina or pedicle of any of the cervical vertebral bodies.                               CT Lumbar Spine Without Contrast (Final result)  Result time 01/23/22 00:12:41    Final result by Neto Javier MD (01/23/22 00:12:41)                 Impression:      Diffuse osteoarthritic changes.    No  indication of an acute traumatic injury involving any portion the lumbar spine.      Electronically signed by: Neto Javier  Date:    01/23/2022  Time:    00:12             Narrative:    EXAMINATION:  CT LUMBAR SPINE WITHOUT CONTRAST    CLINICAL HISTORY:  Low back pain, trauma;    TECHNIQUE:  Low-dose axial, sagittal and coronal reformations are obtained through the lumbar spine.  Contrast was not administered.    COMPARISON:  None.    FINDINGS:  Multiple axial images of the lumbar vertebral column were obtained.    The coronal plane reveals that there are five non-rib-bearing lumbar vertebral bodies counting from the bottom upward.  No indication of a scoliotic curvature.  No hemivertebra or rotatory component.  The lateral projection reveals a normal lordotic curvature.  There is normal alignment of the facet joints.  There is loss of intervertebral disc spacing between T12-L1, L1-2 and L5-S1.    No indication of an acute traumatic fracture injury involving the vertebral bodies.  The facet joints also are unremarkable.  Limited imaging of the sacrum does not show any apparent evidence of an acute injury.    Limited imaging of the pelvis reveals no apparent disruption of the sacroiliac joints.                               CT Head Without Contrast (Final result)  Result time 01/22/22 21:38:47    Final result by Imelda Paulino MD (01/22/22 21:38:47)                 Impression:      1. No CT evidence of acute intracranial abnormality. Clinical correlation and further evaluation as warranted.  2. Generalized cerebral volume loss and findings suggestive of chronic microvascular ischemic change.  3. Right ethmoid sinus disease.      Electronically signed by: Imelda Paulino MD  Date:    01/22/2022  Time:    21:38             Narrative:    EXAMINATION:  CT HEAD WITHOUT CONTRAST    CLINICAL HISTORY:  Head trauma, minor (Age >= 65y);    TECHNIQUE:  Low dose axial images were obtained through the head.  Coronal and sagittal  reformations were also performed. Contrast was not administered.    COMPARISON:  Head CT 12/23/2021    FINDINGS:  There is no acute intracranial hemorrhage, hydrocephalus, midline shift or mass effect. There is generalized cerebral volume loss with compensatory prominence of cerebral sulci and the ventricular system.  There is hypoattenuation throughout the supratentorial white matter which appears similar to prior examination.  While nonspecific, findings likely reflect sequela of chronic microvascular ischemic change.  Gray-white matter differentiation is otherwise maintained.  The basal cisterns are patent. The mastoid air cells are clear.  There is opacification of posterior right ethmoid air cells.  The visualized bones of the calvarium demonstrate no acute osseous abnormality.                                 Medications   ondansetron disintegrating tablet 4 mg (4 mg Oral Given 1/22/22 2143)   LIDOcaine (PF) 10 mg/ml (1%) injection 50 mg (50 mg Infiltration Given by Other 1/22/22 2351)   acetaminophen tablet 650 mg (650 mg Oral Given 1/22/22 2227)   albuterol-ipratropium 2.5 mg-0.5 mg/3 mL nebulizer solution 3 mL (3 mLs Nebulization Given 1/22/22 2357)   oxyCODONE-acetaminophen 5-325 mg per tablet 1 tablet (1 tablet Oral Given 1/22/22 2308)   fentaNYL 50 mcg/mL injection 50 mcg (50 mcg Intravenous Given 1/23/22 0224)   albuterol-ipratropium 2.5 mg-0.5 mg/3 mL nebulizer solution 3 mL (3 mLs Nebulization Given 1/23/22 0402)   methylPREDNISolone sodium succinate injection 125 mg (125 mg Intravenous Given 1/23/22 0447)     Medical Decision Making:   History:   Old Medical Records: I decided to obtain old medical records.  Initial Assessment:   81-year-old presenting with fall.  Differential Diagnosis:   DDx includes but not limited to:   Fracture, dislocation, contusion, muscular strain, muscular sprain.     ACS, CHF, sarcoidosis exacerbation, bronchitis  Clinical Tests:   Lab Tests: Ordered and  Reviewed  Radiological Study: Ordered and Reviewed  Medical Tests: Ordered and Reviewed  ED Management:  Plan:  Obtain CT head neck lumbar spine reassess.    Patient re-evaluated after returning back to the emergency room obtain labs noted for mild elevation BNP.  Patient complaining of left-sided rib and hip pain x-rays unremarkable.  Patient noted still have some wheezing hypoxic on room air to high 80s.  Will arrange for hospital admission for further evaluation.  4:12 AM               ED Course as of 01/23/22 0523   Sat Jan 22, 2022 2214 CT head unremarkable , awaiiting CT neck , will continue to reassess.  [DC]   Sun Jan 23, 2022   0023 CT head and neck unremarkable.  Patient well-appearing at his baseline as per patient's son at bedside.  Will DC home with follow-up instructions and return precautions.  Safe for plan discharge home at this time. [DC]   0134 Upon discharge patient with out to parking lot with family noted to be diaphoretic complaining of worsening shortness of breath O2 sat in 80s brought back to emergency room for further evaluation. [DC]      ED Course User Index  [DC] Julio Cabrera Jr., MD             Clinical Impression:   Final diagnoses:  [W19.XXXA] Fall  [S01.01XA] Laceration of occipital scalp, initial encounter (Primary)  [R06.02] SOB (shortness of breath)  [R07.81] Rib pain  [M25.552] Left hip pain          ED Disposition Condition    Observation               Julio Cabrera Jr., MD  01/23/22 0523

## 2022-01-23 NOTE — ED NOTES
Patient resting in stretcher and is in NAD at this time. Pt is awake and alert, VSS, respirations even and unlabored with some wheezing which is the pts normal. Pt updated on plan of care. Bed low and locked with side rails up x2, call bell in pt reach. Pt voices no needs at this time.  Will continue to monitor.

## 2022-01-24 NOTE — PT/OT/SLP EVAL
Physical Therapy Evaluation    Patient Name:  Paul Browning   MRN:  [id number]    Recommendations:     Discharge Recommendations:  nursing facility, skilled   Discharge Equipment Recommendations: bedside commode,wheelchair   Barriers to discharge: Decreased caregiver support (due to current LOF)    Assessment:     Paul Browning is a 81 y.o. male admitted with a medical diagnosis of Acute hypoxemic respiratory failure.  He presents with the following impairments/functional limitations:  weakness,impaired endurance,impaired self care skills,impaired functional mobilty,gait instability,impaired balance,decreased upper extremity function,decreased lower extremity function,pain .     Pt german session well w/ good participation. PTA he was IND w/ mobility/ADLs but he is currently limited by the above listed deficits requiring ModA for transfers and short distance gait. He was also limited by inc'd anxiety and fear of falling. He requires cues for breathing t/o session. Pt is appropriate for acute PT services and will begin PT POC.    Rehab Prognosis: Good; patient would benefit from acute skilled PT services to address these deficits and reach maximum level of function.    Recent Surgery: * No surgery found *      Plan:     During this hospitalization, patient to be seen 3 x/week to address the identified rehab impairments via gait training,therapeutic activities,therapeutic exercises,neuromuscular re-education and progress toward the following goals:    · Plan of Care Expires:  02/22/22    Subjective     Chief Complaint: fear of falling and pain  Patient/Family Comments/goals: return to PLOF  Pain/Comfort:  · Pain Rating 1:  (did not rate but reported back pain w/ mvmt)  · Location - Orientation 1: generalized  · Location 1: back  · Pain Addressed 1: Pre-medicate for activity,Reposition,Cessation of Activity  · Pain Rating Post-Intervention 1: 0/10 (pt reports no pain at rest)    Patients cultural,  spiritual, Jew conflicts given the current situation: no    Living Environment:  Pt lives w/ his daughter in a 1SH w/ 1 ALMAS and a walk-in shower.  Prior to admission, patients level of function was IND w/ mobility w/o an AD although his family reports that he was supposed to use his RW since he has had multiple falls. He was [place] w/ ADLs including using a shower chair for showers.  Equipment used at home: walker, rolling,shower chair.  DME owned (not currently used): none.  Upon discharge, patient will have assistance from his family. Pt's family reports that he very rarely home alone.    Objective:     Co-treatment performed due to patient's multiple deficits requiring two skilled therapists to appropriately and safely assess patient's strength and endurance while facilitating functional tasks in addition to accommodating for patient's activity tolerance.     Communicated with nursing prior to session.  Patient found supine with telemetry,pulse ox (continuous),oxygen  upon PT entry to room. Pt's dtr and gdtr present in room at start of session.     General Precautions: Standard, fall,respiratory   Orthopedic Precautions:N/A   Braces: N/A  Respiratory Status: Nasal cannula, flow 2 L/min    Exams:  · Cognitive Exam:  Patient is oriented to Person and Place  · Gross Motor Coordination:  WFL  · Postural Exam:  Patient presented with the following abnormalities:    · -       Rounded shoulders  · -       Forward head  · Sensation:    · -       Intact  light/touch hands/feet  · Skin Integrity/Edema:      · -       Skin integrity: Visible skin intact  · RLE ROM: WFL  · RLE Strength: grossly 4/5  · LLE ROM: WFL  · LLE Strength: grossly 4/5    Functional Mobility:  · Bed Mobility:   · Supine>sit on bed w/ MaxA for trunk and BLE  inc'd time requires along w/ cues  · Limited by pain w/ mvmt  · Transfers:  · Sit<>stand to/from EOB w/ RW and ModA to rise  · Stand pivot EOB>w/c w/ RW and ModA to rise and pivot  · Cues for  hand/foot placement and for forward lean  · Pt rises ~50% then stops due to fear of falling  · Gait:   · ~5 side steps along EOB w/ RW and ModA for stability and RW management  · Cues for posture and sequencing  · Limited by pain   · Balance:   · Static sit at EOB w/ CGA  Static stand w/ RW and Patricia for stability    Therapeutic Activities and Exercises:   Pt and granddaughter were educated on PT role and POC. Both verb understanding.     AM-PAC 6 CLICK MOBILITY  Total Score:11     Patient left up in chair with all lines intact, call button in reach, nurse notified and gdtr present.    GOALS:   Multidisciplinary Problems     Physical Therapy Goals        Problem: Physical Therapy Goal    Goal Priority Disciplines Outcome Goal Variances Interventions   Physical Therapy Goal     PT, PT/OT Ongoing, Progressing     Description: Goals to be met by: 2022     Patient will increase functional independence with mobility by performin. Supine to sit with MInimal Assistance  2. Sit to supine with MInimal Assistance  3. Sit to stand transfer with CGA  4. Bed to chair transfer with CGA using Rolling Walker  5. Gait  x 50 feet with Contact Guard Assistance using Rolling Walker.                      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    Hyperlipidemia     Hypertension     Ischemic cardiomyopathy 2014    MDD (major depressive disorder), recurrent episode, mild 2016    Osteoporosis 2016    Peripheral arterial disease 3/18/2016    Sarcoid     Squamous cell carcinoma 2016, 2016    crown  of scalp, left wrist       Past Surgical History:   Procedure Laterality Date    CARDIAC SURGERY      CAROTID STENT N/A 10/14/2019    Procedure: INSERTION, STENT, ARTERY, CAROTID;  Surgeon: Artie Kebede MD;  Location: [facility] CATH LAB;  Service: Cardiology;  Laterality: N/A;    CATARACT EXTRACTION W/  INTRAOCULAR LENS IMPLANT Right 07/17/2018    Dr. Talamantes    CATARACT EXTRACTION W/  INTRAOCULAR LENS IMPLANT Left 07/31/2018    Dr. Talamantes    CORONARY ARTERY BYPASS GRAFT      x2  10/2014    ESOPHAGOGASTRODUODENOSCOPY N/A 4/8/2019    Procedure: ESOPHAGOGASTRODUODENOSCOPY (EGD)/poss rfa;  Surgeon: Clifford De La Torre MD;  Location: [facility] ENDO (2ND FLR);  Service: Endoscopy;  Laterality: N/A;    ESOPHAGOGASTRODUODENOSCOPY N/A 8/4/2021    Procedure: EGD (ESOPHAGOGASTRODUODENOSCOPY);  Surgeon: Clifford De La Torre MD;  Location: [facility] ENDO (2ND FLR);  Service: Endoscopy;  Laterality: N/A;  8/2-covid [place]-inst [id number]-pm-ja appts on 8/3    ESOPHAGOGASTRODUODENOSCOPY (EGD) WITH RADIOFREQUENCY TREATMENT OF GASTROESOPHAGEAL JUNCTION      INJECTION OF ANESTHETIC AGENT AROUND MEDIAL BRANCH NERVES INNERVATING CERVICAL FACET JOINT Bilateral 1/9/2020    Procedure: INJECTION, MBB--Bilateral Cervical- Third Occipital nerve, C2-3--ASA okay for pt to continue taking per provider.;  Surgeon: Tip Mera Jr., MD;  Location: [facility] PAIN MGT;  Service: Pain Management;  Laterality: Bilateral;    INTRAOCULAR PROSTHESES INSERTION Right 7/17/2018    Procedure: INSERTION, INTRAOCULAR LENS PROSTHESIS;  Surgeon: León Talamantes MD;  Location: [facility] OR [address] [place] [address];  Service: Ophthalmology;  Laterality: Right;    INTRAOCULAR PROSTHESES INSERTION Left 7/31/2018    Procedure: INSERTION, INTRAOCULAR LENS PROSTHESIS;  Surgeon: León Talamantes MD;  Location: [facility] OR [address] [place] [address];  Service: Ophthalmology;  Laterality: Left;    PHACOEMULSIFICATION OF CATARACT Right 7/17/2018    Procedure: PHACOEMULSIFICATION, CATARACT;  Surgeon:  León Talamantes MD;  Location: [facility] OR [address] [place] [address];  Service: Ophthalmology;  Laterality: Right;    PHACOEMULSIFICATION OF CATARACT Left 7/31/2018    Procedure: PHACOEMULSIFICATION, CATARACT;  Surgeon: León Talamantes MD;  Location: [facility] OR [address] [place] [address];  Service: Ophthalmology;  Laterality: Left;    PLACEMENT OF SCREW IN ODONTOID N/A 10/22/2020    Procedure: INSERTION, SCREW, ODONTOID. Anterior approach.;  Surgeon: Kirsten Weaver MD;  Location: [facility] OR [address] [place] [address];  Service: Neurosurgery;  Laterality: N/A;    RADIOFREQUENCY THERMOCOAGULATION Bilateral 1/30/2020    Procedure: RADIOFREQUENCY THERMAL COAGULATION--Bilateral C2-3 and Third Occipital Nerve;  Surgeon: Tip Mera Jr., MD;  Location: [facility];  Service: Pain Management;  Laterality: Bilateral;    UMBILICAL HERNIA REPAIR         Time Tracking:     PT Received On: 01/24/22  PT Start Time: 1317     PT Stop Time: 1352  PT Total Time (min): 35 min     Billable Minutes: Evaluation 15, Gait Training 10, Therapeutic Activity 10 and Total Time 35      01/24/2022

## 2022-01-24 NOTE — PLAN OF CARE
[facility] - Telemetry Stepdown ([place]-7)  Discharge Assessment    Primary Care Provider: Grey Forbes DO     Discharge Assessment (most recent)       BRIEF DISCHARGE ASSESSMENT - 01/24/22 1145          Discharge Planning    Assessment Type Discharge Planning Brief Assessment (P)      Support Systems Children;Family members (P)      Equipment Currently Used at Home none (P)      Current Living Arrangements home/apartment/condo (P)      Discharge Plan A Home with family;Home Health (P)      Discharge Plan B Home with family (P)                        SW completed Discharge Planning Assessment with patient's son, Paul Salazar via telephone. Discharge planning booklet given to patient/family and whiteboard updated with VIJAY and phone #. All questions answered.    Paul Salazar reported that patient will have transportation upon discharge.     Paul Olivo. reported that patient lives at home with his daughter and prior to hospitalization patient needed some assistance with his ADL's. Paul Olivo reported that patient has a walker, wheelchair, BSC, and shower chair; however, he is not currently using any of them. Patient is not on dialysis, and he does not go to a Coumadin clinic.     Patient lives in a single story home with one step to enter.       Isi Louie LMSW  [facility]  Ext. [phone]

## 2022-01-24 NOTE — PT/OT/SLP EVAL
Occupational Therapy   Co-Evaluation/Treatment    Name: Paul Browning  MRN: [id number]  Admitting Diagnosis:  Acute hypoxemic respiratory failure  Recent Surgery: * No surgery found *      Recommendations:     Discharge Recommendations: nursing facility, skilled  Discharge Equipment Recommendations:  bedside commode,wheelchair  Barriers to discharge:  Decreased caregiver support (at current level of function- patient needs an increased level of assistance at this time.)    Assessment:     Paul Browning is a 81 y.o. male with a medical diagnosis of Acute hypoxemic respiratory failure. Performance deficits affecting function: weakness,impaired endurance,impaired self care skills,impaired functional mobilty,gait instability,impaired balance,decreased lower extremity function,pain. Patient would benefit from continued skilled acute OT 3x/wk to improve functional mobility, increase independence with ADLs, and address established goals. Recommending SNF once medically appropriate for discharge to increase maximal independence, reduce burden of care, and ensure safety.     Rehab Prognosis: Good; patient would benefit from acute skilled OT services to address these deficits and reach maximum level of function.       Plan:     Patient to be seen 3 x/week to address the above listed problems via self-care/home management,therapeutic activities,therapeutic exercises  · Plan of Care Expires: 02/24/22  · Plan of Care Reviewed with:      Subjective     Chief Complaint: fear of falling  Patient/Family Comments/goals: to get better    Occupational Profile:  Living Environment: Patient lives with his daughter in a [facility] with 1 ALMAS to enter. Patient has a walk in shower with a shower chair and no grab bar. Patient has a regular toilet with no grab bar. Patient was independent with functional mobility and modified independent with ADLs PTA. Patient has had multiple falls and family reports that he was supposed to  use his RW, but does not. Family also reports that sometimes he his rarely left home alone. If he is its about 20-30 min at the most.     Pain/Comfort:  · Pain Rating 1:  (patient did not rate, but reported pain with movement)  · Location - Side 1: Bilateral  · Location - Orientation 1: generalized  · Location 1: back  · Pain Addressed 1: Reposition,Distraction,Cessation of Activity,Pre-medicate for activity  · Pain Rating Post-Intervention 1:  (patient did not rate at rest when up in the chair at end of session)    Patients cultural, spiritual, Confucianism conflicts given the current situation: no    Objective:     Communicated with: NSG prior to session.  Patient found HOB elevated with telemetry,pulse ox (continuous),oxygen upon OT entry to room.    General Precautions: Standard, fall,respiratory,vision impaired,hearing impaired   Orthopedic Precautions:N/A   Braces: N/A  Respiratory Status: Nasal cannula, flow 2 L/min    Occupational Performance:    Bed Mobility:    · Patient completed Supine to Sit with maximal assistance (increased pain noted with movement with HOB elevated and using handrail)    Functional Mobility/Transfers:  · Patient completed Sit <> Stand Transfer with moderate assistance  with  rolling walker (2 trials)  · Patient completed Bed > Chair Transfer using Stand Pivot technique with moderate assistance with rolling walker (second sit>stand patient then transferred to chair)  · Functional Mobility: on first trial of sit<>stand from EOB, patient was mod A using RW and sidestepped to HOB     Activities of Daily Living:  · Feeding:  contact guard assistance Daughter gave patient tray at end of end of session  · Grooming: minimum assistance oral care, washing face, and combing hair sitting EOB. Patient had difficulty placing toothpaste on toothbrush and originally put wrong end of toothbrush in mouth (due to impaired vision)  · Lower Body Dressing: total assistance Donning socks  EOB    Cognitive/Visual Perceptual:  Cognitive/Psychosocial Skills:     -       Oriented to: Person and Place   -       Follows Commands/attention:Follows multistep  commands  -       Communication: clear/fluent, but hard of hearing  -       Memory: No Deficits noted  -       Safety awareness/insight to disability: intact   -       Mood/Affect/Coping skills/emotional control: Appropriate to situation  Visual/Perceptual:      -Impaired (family reports his vision is better long distance than shorter distance and then sees blurry things)    Physical Exam:  Upper Extremity Range of Motion:     -       Right Upper Extremity: WFL  -       Left Upper Extremity: WFL  Upper Extremity Strength:    -       Right Upper Extremity: WFL  -       Left Upper Extremity: WFL   Strength:    -       Right Upper Extremity: WFL  -       Left Upper Extremity: WFL  Fine Motor Coordination:    -       Intact  Gross motor coordination:   WFL    AMPAC 6 Click ADL:  AMPAC Total Score: 15    Treatment & Education:  Role of OT and POC  ADL retraining  Functional mobility training  Safety  Discharge planning  Importance of OOB activity    Co-treatment performed due to patient's multiple deficits requiring two skilled therapists to appropriately and safely assess patient's strength and endurance while facilitating functional tasks in addition to accommodating for patient's activity tolerance.     Education:  Patient left up in chair with call button in reach and all needs met (family present). Nurse aware.    GOALS:   Multidisciplinary Problems     Occupational Therapy Goals        Problem: Occupational Therapy Goal    Goal Priority Disciplines Outcome Interventions   Occupational Therapy Goal     OT, PT/OT Ongoing, Progressing    Description: Goals to be met by: 2/18/2022     Patient will increase functional independence with ADLs by performing:    UE Dressing with Stand-by Assistance.  LE Dressing with Stand-by Assistance.  Grooming while  standing at sink with Contact Guard Assistance.  Toileting from toilet with Contact Guard Assistance for hygiene and clothing management.   Supine to sit with Stand-by Assistance.  Stand pivot transfers with Contact Guard Assistance.  Toilet transfer to toilet with Contact Guard Assistance.                     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    Hyperlipidemia     Hypertension     Ischemic cardiomyopathy 11/5/2014    MDD (major depressive disorder), recurrent episode, mild 2/17/2016    Osteoporosis 7/20/2016    Peripheral arterial disease 3/18/2016    Sarcoid     Squamous cell carcinoma 09/2016, 5/2016    crown of scalp, left wrist       Past Surgical History:   Procedure Laterality Date    CARDIAC SURGERY      CAROTID STENT N/A 10/14/2019    Procedure: INSERTION, STENT, ARTERY, CAROTID;  Surgeon: Artie Kebede MD;  Location: [facility] CATH LAB;  Service: Cardiology;  Laterality: N/A;    CATARACT EXTRACTION W/  INTRAOCULAR LENS IMPLANT Right 07/17/2018    Dr. Talamantes    CATARACT EXTRACTION W/  INTRAOCULAR LENS IMPLANT Left 07/31/2018    Dr. Talamantes    CORONARY ARTERY BYPASS GRAFT      x2  10/2014    ESOPHAGOGASTRODUODENOSCOPY N/A 4/8/2019    Procedure: ESOPHAGOGASTRODUODENOSCOPY (EGD)/poss rfa;  Surgeon: Clifford De La Torre MD;  Location: [address]);  Service: Endoscopy;  Laterality: N/A;    ESOPHAGOGASTRODUODENOSCOPY N/A 8/4/2021    Procedure: EGD (ESOPHAGOGASTRODUODENOSCOPY);  Surgeon: Clifford De La Torre MD;  Location: [facility] ([address] [place] [address]);  Service: Endoscopy;  Laterality:  N/A;  [id number]    ESOPHAGOGASTRODUODENOSCOPY (EGD) WITH RADIOFREQUENCY TREATMENT OF GASTROESOPHAGEAL JUNCTION      INJECTION OF ANESTHETIC AGENT AROUND MEDIAL BRANCH NERVES INNERVATING CERVICAL FACET JOINT Bilateral 1/9/2020    Procedure: INJECTION, MBB--Bilateral Cervical- Third Occipital nerve, C2-3--ASA okay for pt to continue taking per provider.;  Surgeon: Tip Mrea Jr., MD;  Location: [facility] PAIN MGT;  Service: Pain Management;  Laterality: Bilateral;    INTRAOCULAR PROSTHESES INSERTION Right 7/17/2018    Procedure: INSERTION, INTRAOCULAR LENS PROSTHESIS;  Surgeon: León Talamantes MD;  [facility];  Service: Ophthalmology;  Laterality: Right;    INTRAOCULAR PROSTHESES INSERTION Left 7/31/2018    Procedure: INSERTION, INTRAOCULAR LENS PROSTHESIS;  Surgeon: León Talamantes MD;  [facility];  Service: Ophthalmology;  Laterality: Left;    PHACOEMULSIFICATION OF CATARACT Right 7/17/2018    Procedure: PHACOEMULSIFICATION, CATARACT;  Surgeon: León Talamantes MD;  [facility];  Service: Ophthalmology;  Laterality: Right;    PHACOEMULSIFICATION OF CATARACT Left 7/31/2018    Procedure: PHACOEMULSIFICATION, CATARACT;  Surgeon: León Talamantes MD;  Location: [facility] OR 1ST FLR;  Service: Ophthalmology;  Laterality: Left;    PLACEMENT OF SCREW IN ODONTOID N/A 10/22/2020    Procedure: INSERTION, SCREW, ODONTOID. Anterior approach.;  Surgeon: Kirsten Weaver MD;  Location: [facility] OR [address] [place] [address];  Service: Neurosurgery;  Laterality: N/A;    RADIOFREQUENCY THERMOCOAGULATION Bilateral 1/30/2020    Procedure: RADIOFREQUENCY THERMAL COAGULATION--Bilateral C2-3 and Third Occipital Nerve;  Surgeon: Tip Mera Jr., MD;  Location: [facility] PAIN T;  Service: Pain Management;  Laterality: Bilateral;    UMBILICAL HERNIA REPAIR         Time Tracking:     OT Date of Treatment: 01/24/22  OT Start Time: 1318  OT Stop Time:  1351  OT Total Time (min): 33 min    Billable Minutes:Evaluation 10  Self Care/Home Management 23    1/24/2022

## 2022-01-24 NOTE — PLAN OF CARE
PT kavita completed. Pt will begin PT POC.  Chloe Acosta, PT  2022    Problem: Physical Therapy Goal  Goal: Physical Therapy Goal  Description: Goals to be met by: 2022     Patient will increase functional independence with mobility by performin. Supine to sit with MInimal Assistance  2. Sit to supine with MInimal Assistance  3. Sit to stand transfer with CGA  4. Bed to chair transfer with CGA using Rolling Walker  5. Gait  x 50 feet with Contact Guard Assistance using Rolling Walker.     Outcome: Ongoing, Progressing

## 2022-01-24 NOTE — ASSESSMENT & PLAN NOTE
COPD exacerbation  RUL PNA  Pulmonary sarcoidosis   Elevated BNP     · 2/4 SIRS criteria:  HR >90, RR >20. Afebrile without leukocytosis.   · Satting 80% on room air, placed on 4L -> 2L NC and maintaining sats >90%, wean as tolerated   · RUL PNA on Xray of L Ribs  · Rule out acute heart failure exacerbation as . 2D Echo with EF-40%    · Covid negative 1/22  · Solumedrol 125mg IV x 1 with Prednisone 40mg PO daily to complete a 5 day course  · 5 day course of Augmentin for PNA  · Home Stiolto non-formulary, will continue duo-nebs for now  · Duonebs q6h while awake and prn  · Incentive spirometry  · Guaifenesin and tessalon pearls prn cough, monitor sputum production

## 2022-01-24 NOTE — PROGRESS NOTES
"[facility] - Telemetry Stepdown ([place] [address])  [facility] Medicine  Progress Note    Patient Name: Paul Browning  MRN: [id number]  Patient Class: OP- Observation   Admission Date: 1/22/2022  Length of Stay: 0 days  Attending Physician: Emily Guevara MD  Primary Care Provider: Grey Forbes DO        Subjective:     Principal Problem:Acute hypoxemic respiratory failure        HPI:  Paul Browning (Joe) is an 82 yo male with COPD, sarcoidosis, CAD, HTN, GERD with Stevens's esophagus, BPH, squamous cell carcinoma, and anxiety/depression who initially presented to the ED for a fall with head trauma and posterior scalp lacteration.  CTH was negative for acute hemorrhage, hip and rib xrays negative for fracture, and CT C/L-spine negative for acute traumatic injury.  Scalp laceration was repaired. Patient was stable and discharged from ED. However, patient became significantly SOB in the parking lot of the hospital and returned to the ED. SpO2 was 80%, and patient was admitted to  observation for acute hypoxia. Patient unable to provide history as he has dementia, son at bedside to assist. Son reports the patient was in significant back pain when attempting to leave the hospital, which exacerbated his SOB. The pain is controlled at rest, but becomes severe with minimal movement. The pain is most prominent in the lumbar region. The son also reports the patient's wheezing is chronic and says he is at his baseline respiratory function.     In the ED, patient is afebrile without leukocytosis. He is maintaining oxygen sats on 2L NC. P 75, RR 22, BP 94/51. Labs significant for T bili 1.2. , Tn negative. CXR c/w chronic and stable sarcoidosis changes. Given IV methylprednisolone. Duonebs x6. IV fentanyl 50mcg x1.       Overview/Hospital Course:  No notes on file    Interval History: Patient is still wheezing pretty significantly, remains on 1L NC. Also complaining of leg pain. [facility] at " bedside      Objective:     Vital Signs (Most Recent):  Temp: 96.7 °F (35.9 °C) (01/24/22 1212)  Pulse: 71 (01/24/22 1235)  Resp: 20 (01/24/22 1235)  BP: (!) 148/70 (01/24/22 1212)  SpO2: 98 % (01/24/22 1235) Vital Signs (24h Range):  Temp:  [96.7 °F (35.9 °C)-98.7 °F (37.1 °C)] 96.7 °F (35.9 °C)  Pulse:  [] 71  Resp:  [18-21] 20  SpO2:  [92 %-100 %] 98 %  BP: (121-163)/(56-73) 148/70     Weight: 67.1 kg (148 lb)  Body mass index is 21.86 kg/m².  No intake or output data in the 24 hours ending 01/24/22 1509   Physical Exam  GENERAL:  No apparent distress. Alert and oriented times three  EYES:  PERRLA, EOMI. Conjunctivae intact  ENT:  Posterior pharynx clear  NECK:  Supple with no lymphadenopathy or thyromegaly  LUNGS:  No respiratory distress. Diffuse wheezes.   CARDIAC:  RRR without murmur, rub or gallop  ABDOMEN:  Positive bowel sounds. Nontender and nondistended. No organomegaly      Significant Labs: All pertinent labs within the past 24 hours have been reviewed.    Significant Imaging: I have reviewed all pertinent imaging results/findings within the past 24 hours.      Assessment/Plan:      * Acute hypoxemic respiratory failure  COPD exacerbation  RUL PNA  Pulmonary sarcoidosis   Elevated BNP     · 2/4 SIRS criteria:  HR >90, RR >20. Afebrile without leukocytosis.   · Satting 80% on room air, placed on 4L -> 2L NC and maintaining sats >90%, wean as tolerated   · RUL PNA on Xray of L Ribs  · Rule out acute heart failure exacerbation as . 2D Echo with EF-40%    · Covid negative 1/22  · Solumedrol 125mg IV x 1 with Prednisone 40mg PO daily to complete a 5 day course  · 5 day course of Augmentin for PNA  · Home Stiolto non-formulary, will continue duo-nebs for now  · Duonebs q6h while awake and prn  · Incentive spirometry  · Guaifenesin and tessalon pearls prn cough, monitor sputum production     Chronic systolic heart failure  Elevated BNP     - patient does not appear fluid overloaded on exam, no  JVD, but is here for acute hypoxia requiring 2L NC to maintain O2 sats >90%  - CXR c/w chronic/stable sarcoidosis changes, no evidence of pulmonary edema   - rule out acute HF exacerbation as   - Stress echo 7/2021 with low normal systolic LV function, indeterminate diastolic LV function, EF 50% -> 2D echo ordered   - son reports patient ran out of home lasix 3 months ago and refill was not approved  2D Echo with EF showing 40%      Scalp laceration        Frequent falls  Impaired functional mobility, balance, gait, and endurance  Scalp laceration    Patient initially presenting to ED s/p unwitnessed fall with head trauma. Recent frequent falls due to gait instability and weakness. Laceration to posterior scalp. Patient c/o significant low back pain and neck pain.     - Posterior scalp laceration repaired in ED, nursing dressing changes as needed  - CTH negative for intracranial hemorrhage  - hip, rib xrays without fracture  - CT C/L-spine negative for acute traumatic injury -> consider thoracic spine CT if pain not improved   - Neuro checks q shift   - fall precautions   - pain control   - PT/OT consult     Elevated brain natriuretic peptide (BNP) level        Dementia  - family at bedside to assist with history, patient is at baseline  - continue memantine   - continue magnesium   - delirium precautions   - seroquel 12.5 mg every evening to prevent sundowning       Osteoporosis  - fosamax prescribed weekly, resume upon discharge   - xrays negative for fracture s/p fall       Peripheral arterial disease  - continue ASA, statin      MDD (major depressive disorder), recurrent episode, mild  Anxiety     - chronic and stable   - continue zoloft daily        Chronic obstructive pulmonary disease with acute exacerbation        Hx of gastric ulcer        Stevens's esophagus with low grade dysplasia  Hx of gastric ulcer   GERD    - continue PO protonix BID  - continue carafate BID      Hyperlipidemia  - continue  statin       Hypertension  - Currently controlled without pharmacologics. Will monitor.       CAD (coronary artery disease)  S/p CABG x2    - continue ASA 81 daily   - continue statin  - continue omega 3         VTE Risk Mitigation (From admission, onward)         Ordered     enoxaparin injection 40 mg  Daily         01/23/22 0654     IP VTE HIGH RISK PATIENT  Once         01/23/22 0524     Place sequential compression device  Until discontinued         01/23/22 0524                Discharge Planning   VIJAY:      Code Status: Full Code   Is the patient medically ready for discharge?:     Reason for patient still in hospital (select all that apply): Treatment  Discharge Plan A: Home with family,Home Health                  Emily Guevara MD  Department of Hospital Medicine   [facility] - Telemetry Stepdown ([place]-[facility])

## 2022-01-24 NOTE — ASSESSMENT & PLAN NOTE
Elevated BNP     - patient does not appear fluid overloaded on exam, no JVD, but is here for acute hypoxia requiring 2L NC to maintain O2 sats >90%  - CXR c/w chronic/stable sarcoidosis changes, no evidence of pulmonary edema   - rule out acute HF exacerbation as   - Stress echo 7/2021 with low normal systolic LV function, indeterminate diastolic LV function, EF 50% -> 2D echo ordered   - son reports patient ran out of home lasix 3 months ago and refill was not approved  2D Echo with EF showing 40%

## 2022-01-24 NOTE — SUBJECTIVE & OBJECTIVE
Interval History: Patient is still wheezing pretty significantly, remains on 1L NC. Also complaining of leg pain. Grandaughter at bedside      Objective:     Vital Signs (Most Recent):  Temp: 96.7 °F (35.9 °C) (01/24/22 1212)  Pulse: 71 (01/24/22 1235)  Resp: 20 (01/24/22 1235)  BP: (!) 148/70 (01/24/22 1212)  SpO2: 98 % (01/24/22 1235) Vital Signs (24h Range):  Temp:  [96.7 °F (35.9 °C)-98.7 °F (37.1 °C)] 96.7 °F (35.9 °C)  Pulse:  [] 71  Resp:  [18-21] 20  SpO2:  [92 %-100 %] 98 %  BP: (121-163)/(56-73) 148/70     Weight: 67.1 kg (148 lb)  Body mass index is 21.86 kg/m².  No intake or output data in the 24 hours ending 01/24/22 1509   Physical Exam  GENERAL:  No apparent distress. Alert and oriented times three  EYES:  PERRLA, EOMI. Conjunctivae intact  ENT:  Posterior pharynx clear  NECK:  Supple with no lymphadenopathy or thyromegaly  LUNGS:  No respiratory distress. Diffuse wheezes.   CARDIAC:  RRR without murmur, rub or gallop  ABDOMEN:  Positive bowel sounds. Nontender and nondistended. No organomegaly      Significant Labs: All pertinent labs within the past 24 hours have been reviewed.    Significant Imaging: I have reviewed all pertinent imaging results/findings within the past 24 hours.

## 2022-01-24 NOTE — PLAN OF CARE
Problem: Occupational Therapy Goal  Goal: Occupational Therapy Goal  Description: Goals to be met by: 2/18/2022     Patient will increase functional independence with ADLs by performing:    UE Dressing with Stand-by Assistance.  LE Dressing with Stand-by Assistance.  Grooming while standing at sink with Contact Guard Assistance.  Toileting from toilet with Contact Guard Assistance for hygiene and clothing management.   Supine to sit with Stand-by Assistance.  Stand pivot transfers with Contact Guard Assistance.  Toilet transfer to toilet with Contact Guard Assistance.    Outcome: Ongoing, Progressing   Patient's goals are set.

## 2022-01-25 NOTE — PLAN OF CARE
01/25/22 1148   Post-Acute Status   Post-Acute Authorization Placement   Post-Acute Placement Status Referrals Sent     Discharge recommendations for pt are SNF. [facility] and is [facility] for an accepting [facility].     YA spoke with pt's son, Paul Olivo regarding pt's discharge disposition. Paul Salazar reported that he will speak with his siblings about it and speak with pt; however, he is unsure if pt will be willing to go to a SNF. Paul Olivo. reported pt is currently receiving outpatient PT/OT and he may be more willing to do home health; however, he will update YA after he speaks with pt and family regarding the decision. SW informed Paul Salazar that just in case he does agree to go to a SNF, referrals will be sent out. Paul Salazar is in agreement with that.    YA will continue to follow up.    Isi Louie LMSW  [facility]  Ext. [phone]

## 2022-01-25 NOTE — PLAN OF CARE
Paul Salazar contacted YA to report that he has spoken with the family and at this time they do not feel pt going to a SNF would be in his best interest. Paul OlivoDanni reported that they are interested in pt going home with home health when he is medically stable to discharge. YA informed Paul OlivoDanni that she will let the medical team know the family's plan.    Isi Louie LMSW  [facility]  Ext. [phone]

## 2022-01-25 NOTE — SUBJECTIVE & OBJECTIVE
Interval History: Patients wheezing has improved however unable to wean supplemental O2. Attempted to do home O2 walk test and patient could barely walk two steps (during which he did desaturate to 89%) due to severe back pain. His family wishes to take him home with home health as opposed to SNF as per PT/OT recs      Objective:     Vital Signs (Most Recent):  Temp: 96.7 °F (35.9 °C) (01/25/22 1128)  Pulse: 73 (01/25/22 1128)  Resp: 20 (01/25/22 1128)  BP: 132/65 (01/25/22 1128)  SpO2: 95 % (01/25/22 1128) Vital Signs (24h Range):  Temp:  [96.7 °F (35.9 °C)-98.4 °F (36.9 °C)] 96.7 °F (35.9 °C)  Pulse:  [65-89] 73  Resp:  [16-22] 20  SpO2:  [88 %-100 %] 95 %  BP: (128-172)/(63-88) 132/65     Weight: 67.1 kg (148 lb)  Body mass index is 21.86 kg/m².    Intake/Output Summary (Last 24 hours) at 1/25/2022 1504  Last data filed at 1/25/2022 1000  Gross per 24 hour   Intake --   Output 500 ml   Net -500 ml      Physical Exam    GENERAL:  No apparent distress. Alert and oriented times three  EYES:  PERRLA, EOMI. Conjunctivae intact  ENT:  Posterior pharynx clear  NECK:  Supple with no lymphadenopathy or thyromegaly  LUNGS:  No respiratory distress. Diffuse wheezes.   CARDIAC:  RRR without murmur, rub or gallop  ABDOMEN:  Positive bowel sounds. Nontender and nondistended. No organomegaly      Significant Labs: All pertinent labs within the past 24 hours have been reviewed.    Significant Imaging: I have reviewed all pertinent imaging results/findings within the past 24 hours.

## 2022-01-25 NOTE — PROGRESS NOTES
"[facility]one[facility] - Telemetry Stepdown ([place] [address])  [facility] Medicine  Progress Note    Patient Name: Paul Browning  MRN: [id number]  Patient Class: IP- Inpatient   Admission Date: 1/22/2022  Length of Stay: 0 days  Attending Physician: Emily Guevara MD  Primary Care Provider: Grey Forbes DO        Subjective:     Principal Problem:Acute hypoxemic respiratory failure        HPI:  Paul Browning (Joe) is an 80 yo male with COPD, sarcoidosis, CAD, HTN, GERD with Stevens's esophagus, BPH, squamous cell carcinoma, and anxiety/depression who initially presented to the ED for a fall with head trauma and posterior scalp lacteration.  CTH was negative for acute hemorrhage, hip and rib xrays negative for fracture, and CT C/L-spine negative for acute traumatic injury.  Scalp laceration was repaired. Patient was stable and discharged from ED. However, patient became significantly SOB in the parking lot of the hospital and returned to the ED. SpO2 was 80%, and patient was admitted to  observation for acute hypoxia. Patient unable to provide history as he has dementia, son at bedside to assist. Son reports the patient was in significant back pain when attempting to leave the hospital, which exacerbated his SOB. The pain is controlled at rest, but becomes severe with minimal movement. The pain is most prominent in the lumbar region. The son also reports the patient's wheezing is chronic and says he is at his baseline respiratory function.     In the ED, patient is afebrile without leukocytosis. He is maintaining oxygen sats on 2L NC. P 75, RR 22, BP 94/51. Labs significant for T bili 1.2. , Tn negative. CXR c/w chronic and stable sarcoidosis changes. Given IV methylprednisolone. Duonebs x6. IV fentanyl 50mcg x1.       Overview/Hospital Course:  No notes on file    Interval History: Patients wheezing has improved however unable to wean supplemental O2. Attempted to do home O2 walk test and patient could " barely walk two steps (during which he did desaturate to 89%) due to severe back pain. His family wishes to take him home with home health as opposed to SNF as per PT/OT recs      Objective:     Vital Signs (Most Recent):  Temp: 96.7 °F (35.9 °C) (01/25/22 1128)  Pulse: 73 (01/25/22 1128)  Resp: 20 (01/25/22 1128)  BP: 132/65 (01/25/22 1128)  SpO2: 95 % (01/25/22 1128) Vital Signs (24h Range):  Temp:  [96.7 °F (35.9 °C)-98.4 °F (36.9 °C)] 96.7 °F (35.9 °C)  Pulse:  [65-89] 73  Resp:  [16-22] 20  SpO2:  [88 %-100 %] 95 %  BP: (128-172)/(63-88) 132/65     Weight: 67.1 kg (148 lb)  Body mass index is 21.86 kg/m².    Intake/Output Summary (Last 24 hours) at 1/25/2022 1504  Last data filed at 1/25/2022 1000  Gross per 24 hour   Intake --   Output 500 ml   Net -500 ml      Physical Exam    GENERAL:  No apparent distress. Alert and oriented times three  EYES:  PERRLA, EOMI. Conjunctivae intact  ENT:  Posterior pharynx clear  NECK:  Supple with no lymphadenopathy or thyromegaly  LUNGS:  No respiratory distress. Diffuse wheezes.   CARDIAC:  RRR without murmur, rub or gallop  ABDOMEN:  Positive bowel sounds. Nontender and nondistended. No organomegaly      Significant Labs: All pertinent labs within the past 24 hours have been reviewed.    Significant Imaging: I have reviewed all pertinent imaging results/findings within the past 24 hours.          Assessment/Plan:      * Acute hypoxemic respiratory failure  COPD exacerbation  RUL PNA  Pulmonary sarcoidosis   Elevated BNP     · 2/4 SIRS criteria:  HR >90, RR >20. Afebrile without leukocytosis.   · Satting 80% on room air, placed on 4L -> 2L NC and maintaining sats >90%, wean as tolerated (will likely require home O2)  · RUL PNA on Xray of L Ribs  · Rule out acute heart failure exacerbation as . 2D Echo with EF-40%    · Covid negative 1/22  · Solumedrol 125mg IV x 1 with Prednisone 40mg PO daily to complete a 5 day course  · 5 day course of Augmentin for PNA  · Home  Stiolto non-formulary, will continue duo-nebs for now  · Duonebs q6h while awake and prn  · Incentive spirometry  · Guaifenesin and tessalon pearls prn cough, monitor sputum production     Chronic systolic heart failure  Elevated BNP     - patient does not appear fluid overloaded on exam, no JVD, but is here for acute hypoxia requiring 2L NC to maintain O2 sats >90%  - CXR c/w chronic/stable sarcoidosis changes, no evidence of pulmonary edema   - rule out acute HF exacerbation as   - Stress echo 7/2021 with low normal systolic LV function, indeterminate diastolic LV function, EF 50% -> 2D echo ordered   - son reports patient ran out of home lasix 3 months ago and refill was not approved  2D Echo with EF showing 40%      Scalp laceration        Frequent falls  Impaired functional mobility, balance, gait, and endurance  Scalp laceration    Patient initially presenting to ED s/p unwitnessed fall with head trauma. Recent frequent falls due to gait instability and weakness. Laceration to posterior scalp. Patient c/o significant low back pain and neck pain.     - Posterior scalp laceration repaired in ED, nursing dressing changes as needed  - CTH negative for intracranial hemorrhage  - hip, rib xrays without fracture  - CT C/L-spine negative for acute traumatic injury -> back pain not improving thus will CT thoracic spine, patient can barely walk two steps even with assistance.  - Neuro checks q shift   - fall precautions   - pain control   - PT/OT consult     Elevated brain natriuretic peptide (BNP) level        Dementia  - family at bedside to assist with history, patient is at baseline  - continue memantine   - continue magnesium   - delirium precautions   - seroquel 12.5 mg every evening to prevent sundowning       Osteoporosis  - fosamax prescribed weekly, resume upon discharge   - xrays negative for fracture s/p fall       Peripheral arterial disease  - continue ASA, statin      MDD (major depressive  disorder), recurrent episode, mild  Anxiety     - chronic and stable   - continue zoloft daily        Chronic obstructive pulmonary disease with acute exacerbation        Hx of gastric ulcer        Stevens's esophagus with low grade dysplasia  Hx of gastric ulcer   GERD    - continue PO protonix BID  - continue carafate BID      Hyperlipidemia  - continue statin       Hypertension  - Currently controlled without pharmacologics. Will monitor.       CAD (coronary artery disease)  S/p CABG x2    - continue ASA 81 daily   - continue statin  - continue omega 3         VTE Risk Mitigation (From admission, onward)         Ordered     enoxaparin injection 40 mg  Daily         01/23/22 0654     IP VTE HIGH RISK PATIENT  Once         01/23/22 0524     Place sequential compression device  Until discontinued         01/23/22 0524                Discharge Planning   VIJAY: 1/26/2022     Code Status: Full Code   Is the patient medically ready for discharge?: No    Reason for patient still in hospital (select all that apply): Treatment  Discharge Plan A: Home with family,Home Health            Emily Guevara MD  Department of Hospital Medicine   [facility] - Telemetry Stepdown ([place]

## 2022-01-25 NOTE — PLAN OF CARE
Problem: Respiratory Compromise COPD (Chronic Obstructive Pulmonary Disease)  Goal: Effective Oxygenation and Ventilation  Outcome: Ongoing, Progressing     Problem: Oral Intake Inadequate COPD (Chronic Obstructive Pulmonary Disease)  Goal: Improved Nutrition Intake  Outcome: Ongoing, Progressing     Problem: Adult Inpatient Plan of Care  Goal: Plan of Care Review  Outcome: Ongoing, Progressing

## 2022-01-25 NOTE — NURSING
Patient is awake and oriented to self.  He had complaints of back pain and was given oxycodone for pain.  He was able to use the urinal and his daughter is at the bedside.

## 2022-01-25 NOTE — ASSESSMENT & PLAN NOTE
COPD exacerbation  RUL PNA  Pulmonary sarcoidosis   Elevated BNP     · 2/4 SIRS criteria:  HR >90, RR >20. Afebrile without leukocytosis.   · Satting 80% on room air, placed on 4L -> 2L NC and maintaining sats >90%, wean as tolerated (will likely require home O2)  · RUL PNA on Xray of L Ribs  · Rule out acute heart failure exacerbation as . 2D Echo with EF-40%    · Covid negative 1/22  · Solumedrol 125mg IV x 1 with Prednisone 40mg PO daily to complete a 5 day course  · 5 day course of Augmentin for PNA  · Home Stiolto non-formulary, will continue duo-nebs for now  · Duonebs q6h while awake and prn  · Incentive spirometry  · Guaifenesin and tessalon pearls prn cough, monitor sputum production

## 2022-01-25 NOTE — PT/OT/SLP PROGRESS
Physical Therapy Treatment    Patient Name:  Paul Browning   MRN:  [id number]    Recommendations:     Discharge Recommendations:  nursing facility, skilled   Discharge Equipment Recommendations: bedside commode,wheelchair   Barriers to discharge: None    Assessment:     Paul Browning is a 81 y.o. male admitted with a medical diagnosis of Acute hypoxemic respiratory failure.  He presents with the following impairments/functional limitations:  weakness,impaired endurance,impaired self care skills,impaired functional mobilty,gait instability,impaired balance,visual deficits,decreased upper extremity function,decreased lower extremity function,pain,impaired cardiopulmonary response to activity . Pt's main barrier preventing him from walking today was his severe low back pain and SOB when trialed on RA. Pt's O2 sats dropped from resting O2 sats on RA=96% to 89/90% post attempted GT. Pt returned to 2LO2. Pt however has a good family support system and they are weighing the options of d/c to a SNF rehab versus HHPT. Pt will benefit from continued acute rehab until he is medically stable to d/c from the unit.    Rehab Prognosis: Good; patient would benefit from acute skilled PT services to address these deficits and reach maximum level of function.    Recent Surgery: * No surgery found *      Plan:     During this hospitalization, patient to be seen 3 x/week to address the identified rehab impairments via gait training,therapeutic activities,therapeutic exercises,neuromuscular re-education and progress toward the following goals:    · Plan of Care Expires:  02/22/22    Subjective     Chief Complaint: pain  Patient/Family Comments/goals: to d/c home  Pain/Comfort:  · Pain Rating 1:  (pt not able to rate pain)  · Location - Side 1: Bilateral  · Location - Orientation 1: lower  · Location 1: back  · Pain Addressed 1: Pre-medicate for activity,Reposition,Distraction,Cessation of Activity,Nurse  notified  · Pain Rating Post-Intervention 1:  (pt did not rate his pain level)      Objective:     Communicated with pt and pt's nurse prior to session.  Patient found up in chair with telemetry,oxygen,pulse ox (continuous) upon PT entry to room.     General Precautions: Standard, fall,hearing impaired,vision impaired,respiratory   Orthopedic Precautions:N/A   Braces: N/A  Respiratory Status: Nasal cannula, flow 2 L/min     Functional Mobility:  · Transfers:     · Sit to Stand from BSChair:  moderate assistance with rolling walker  · Gait: 4 steps forward and 2 steps back with RW and Mod A. RN following closely with BSC 2* to pt with decrease endurance and generalized weakness.  · Balance: Mod A for static standing with RW.      AM-PAC 6 CLICK MOBILITY  Turning over in bed (including adjusting bedclothes, sheets and blankets)?: 2  Sitting down on and standing up from a chair with arms (e.g., wheelchair, bedside commode, etc.): 2  Moving from lying on back to sitting on the side of the bed?: 2  Moving to and from a bed to a chair (including a wheelchair)?: 2  Need to walk in hospital room?: 1  Climbing 3-5 steps with a railing?: 1  Basic Mobility Total Score: 10       Education:   Patient and Family member provided with daily orientation and goals of this PT session.   Patient educated to transfer to bedside chair/bedside commode/bathroom with RN/PCT present.    Patient and Family member educated on Fall risk and plan of care by explanation.    Patient and Family member Verbalized Understanding.   Time provided for therapeutic counseling and discussion of current health disposition. All questions answered to satisfaction, within scope of PT practice; voiced no other concerns. White board updated in patient's room, RN notified of session.      Patient left up in chair with all lines intact, call button in reach and family present..    GOALS:   Multidisciplinary Problems     Physical Therapy Goals        Problem:  Physical Therapy Goal    Goal Priority Disciplines Outcome Goal Variances Interventions   Physical Therapy Goal     PT, PT/OT Ongoing, Progressing     Description: Goals to be met by: 2022     Patient will increase functional independence with mobility by performin. Supine to sit with MInimal Assistance  2. Sit to supine with MInimal Assistance  3. Sit to stand transfer with CGA  4. Bed to chair transfer with CGA using Rolling Walker  5. Gait  x 50 feet with Contact Guard Assistance using Rolling Walker.                      Time Tracking:     PT Received On: 22  PT Start Time: 1442     PT Stop Time: 1455  PT Total Time (min): 13 min     Billable Minutes: Therapeutic Activity 13mins    Treatment Type: Treatment  PT/PTA: PT     PTA Visit Number: 0     2022

## 2022-01-25 NOTE — NURSING
No distress overnight, slept at intervals, no c/o sob.  Remains on oxygen at 2L with saturation of 95-98%.  Prn nebs needed for expiratory wheezing x1. No c/o pain currently.  Call bell within reach. Daughter at bedside.

## 2022-01-25 NOTE — ASSESSMENT & PLAN NOTE
Impaired functional mobility, balance, gait, and endurance  Scalp laceration    Patient initially presenting to ED s/p unwitnessed fall with head trauma. Recent frequent falls due to gait instability and weakness. Laceration to posterior scalp. Patient c/o significant low back pain and neck pain.     - Posterior scalp laceration repaired in ED, nursing dressing changes as needed  - CTH negative for intracranial hemorrhage  - hip, rib xrays without fracture  - CT C/L-spine negative for acute traumatic injury -> back pain not improving thus will CT thoracic spine, patient can barely walk two steps even with assistance.  - Neuro checks q shift   - fall precautions   - pain control   - PT/OT consult

## 2022-01-26 PROBLEM — S32.509A CLOSED FRACTURE OF PUBIS: Status: ACTIVE | Noted: 2022-01-01

## 2022-01-26 NOTE — PT/OT/SLP PROGRESS
Occupational Therapy   Treatment    Name: Paul Browning  MRN: [id number]  Admitting Diagnosis:  Acute hypoxemic respiratory failure       Recommendations:     Discharge Recommendations: nursing facility, skilled  Discharge Equipment Recommendations:  bedside commode,wheelchair  Barriers to discharge:  Decreased caregiver support (patient needs an increased level of assistance at this time at current LOF)    Assessment:     Paul Browning is a 81 y.o. male with a medical diagnosis of Acute hypoxemic respiratory failure.  He presents with  . Performance deficits affecting function are weakness,impaired endurance,impaired self care skills,impaired functional mobilty,gait instability,impaired balance,decreased lower extremity function,pain. Patient would benefit from continued skilled acute OT 3x/wk to improve functional mobility, increase independence with ADLs, and address established goals. Recommending SNF once medically appropriate for discharge to increase maximal independence, reduce burden of care, and ensure safety.     Rehab Prognosis:  Good; patient would benefit from acute skilled OT services to address these deficits and reach maximum level of function.       Plan:     Patient to be seen 3 x/week to address the above listed problems via self-care/home management,therapeutic activities,therapeutic exercises  · Plan of Care Expires: 02/24/22  · Plan of Care Reviewed with: daughter,patient    Subjective     Pain/Comfort:  · Pain Rating 1:  (Patient did not rate)  · Location - Side 1: Bilateral  · Location - Orientation 1: generalized  · Location 1: back  · Pain Addressed 1: Reposition,Distraction,Cessation of Activity  · Pain Rating Post-Intervention 1:  (Patient did not rate, but c/o a lot pain with movement)    Objective:     Communicated with: NSG prior to session.  Patient found up in chair with telemetry,pulse ox (continuous) upon OT entry to room.    General Precautions: Standard,  fall,hearing impaired,vision impaired,respiratory   Orthopedic Precautions:N/A   Braces: N/A  Respiratory Status: Nasal cannula, flow 2 L/min     Occupational Performance:     Bed Mobility:    · Patient completed Sit to Supine with maximal assistance     Functional Mobility/Transfers:  · Patient completed Sit <> Stand Transfer with moderate assistance  with  rolling walker   · Patient completed Toilet Transfer bedside chair>BSC sidestepping technique with moderate assistance with  rolling walker (extra time needed with verbal and tactile cues to advance LEs and RW to the right side) - limited by pain); BSC>bed stand pivot with RW with mod A with extra time needed and verbal and tactile cues to advance LEs and RW as well while turning and sidestepping to get to HOB into the correct position to sit down. Patient also needs verbal and tactile cues for hand placement for sit<>stands (patient is hard of hearing and has a tendency to not hear therapists directions in reaching back to sit down)     Activities of Daily Living:  · Toileting:  moderate assistance Patient urinated on BSC, but would need assistance for clothing management    AMPA 6 Click ADL: 14    Treatment & Education:  Role of OT and POC  ADL retraining  Functional mobility training  Safety    Patient left HOB elevated with call button in reach and all needs met (daughter present)Education:      GOALS:   Multidisciplinary Problems     Occupational Therapy Goals        Problem: Occupational Therapy Goal    Goal Priority Disciplines Outcome Interventions   Occupational Therapy Goal     OT, PT/OT Ongoing, Progressing    Description: Goals to be met by: 2/18/2022     Patient will increase functional independence with ADLs by performing:    UE Dressing with Stand-by Assistance.  LE Dressing with Stand-by Assistance.  Grooming while standing at sink with Contact Guard Assistance.  Toileting from toilet with Contact Guard Assistance for hygiene and clothing  management.   Supine to sit with Stand-by Assistance.  Stand pivot transfers with Contact Guard Assistance.  Toilet transfer to toilet with Contact Guard Assistance.                     Time Tracking:     OT Date of Treatment: 01/26/22  OT Start Time: 1258  OT Stop Time: 1311  OT Total Time (min): 13 min    Billable Minutes:Self Care/Home Management 13               1/26/2022

## 2022-01-26 NOTE — NURSING
Patient complained of lower back pain and shortness of breath.  Called respiratory and he was given a treatment which gave him some relief.

## 2022-01-27 PROBLEM — S32.82XA MULTIPLE CLOSED STABLE LATERAL COMPRESSION FRACTURES OF PELVIS: Status: ACTIVE | Noted: 2022-01-01

## 2022-01-27 NOTE — PROGRESS NOTES
"[facility] - Telemetry Stepdown ([place] [address])  [facility] Medicine  Progress Note    Patient Name: Paul Browning  MRN: [id number]  Patient Class: IP- Inpatient   Admission Date: 1/22/2022  Length of Stay: 1 days  Attending Physician: Emily Guevara MD  Primary Care Provider: Grey Forbes DO        Subjective:     Principal Problem:Closed fracture of pubis        HPI:  Paul Browning (Joe) is an 82 yo male with COPD, sarcoidosis, CAD, HTN, GERD with Stevens's esophagus, BPH, squamous cell carcinoma, and anxiety/depression who initially presented to the ED for a fall with head trauma and posterior scalp lacteration.  CTH was negative for acute hemorrhage, hip and rib xrays negative for fracture, and CT C/L-spine negative for acute traumatic injury.  Scalp laceration was repaired. Patient was stable and discharged from ED. However, patient became significantly SOB in the parking lot of the hospital and returned to the ED. SpO2 was 80%, and patient was admitted to  observation for acute hypoxia. Patient unable to provide history as he has dementia, son at bedside to assist. Son reports the patient was in significant back pain when attempting to leave the hospital, which exacerbated his SOB. The pain is controlled at rest, but becomes severe with minimal movement. The pain is most prominent in the lumbar region. The son also reports the patient's wheezing is chronic and says he is at his baseline respiratory function.     In the ED, patient is afebrile without leukocytosis. He is maintaining oxygen sats on 2L NC. P 75, RR 22, BP 94/51. Labs significant for T bili 1.2. , Tn negative. CXR c/w chronic and stable sarcoidosis changes. Given IV methylprednisolone. Duonebs x6. IV fentanyl 50mcg x1.       Overview/Hospital Course:  No notes on file    Interval History:  Wheezing improved.  However patient's family at bedside and states that his pain is not primarily in his lower back is actually in " his left hip.  Indicated that we will proceed with a CT of the left hip the x-ray was negative for fracture      Objective:     Vital Signs (Most Recent):  Temp: 98 °F (36.7 °C) (01/26/22 1535)  Pulse: 69 (01/26/22 1535)  Resp: 18 (01/26/22 1716)  BP: (!) 153/68 (01/26/22 1535)  SpO2: 98 % (01/26/22 1535) Vital Signs (24h Range):  Temp:  [96.8 °F (36 °C)-98.1 °F (36.7 °C)] 98 °F (36.7 °C)  Pulse:  [60-82] 69  Resp:  [17-20] 18  SpO2:  [93 %-99 %] 98 %  BP: (137-153)/(65-73) 153/68     Weight: 67.1 kg (148 lb)  Body mass index is 21.86 kg/m².  No intake or output data in the 24 hours ending 01/26/22 1812   Physical Exam    GENERAL:  No apparent distress. Alert and oriented times three  EYES:  PERRLA, EOMI. Conjunctivae intact  ENT:  Posterior pharynx clear  NECK:  Supple with no lymphadenopathy or thyromegaly  LUNGS:  No respiratory distress. Diffuse wheezes.   CARDIAC:  RRR without murmur, rub or gallop  ABDOMEN:  Positive bowel sounds. Nontender and nondistended. No organomegaly      Significant Labs: All pertinent labs within the past 24 hours have been reviewed.    Significant Imaging: I have reviewed all pertinent imaging results/findings within the past 24 hours.              Assessment/Plan:      * Closed fracture of pubis  On admission.  X-ray of the left hip negative for fracture however patient complained of ongoing pain in left hip he is unable to even ambulate while previously he is very functional.  CT left hip obtained today which showed multiple nondisplaced fractures of the superior and inferior pubic rami.  Patient has previously fractured his left hip in the past.      CT L HIP:  Comminuted nondisplaced fractures of the superior and inferior pubic rami.     No displaced fracture of the proximal femur.  If concerned for occult femoral fracture, can obtain MRI pelvis for additional evaluation.    -consult orthopedics in a.m.    Chronic systolic heart failure  Elevated BNP     - patient does not appear  fluid overloaded on exam, no JVD, but is here for acute hypoxia requiring 2L NC to maintain O2 sats >90%  - CXR c/w chronic/stable sarcoidosis changes, no evidence of pulmonary edema   - rule out acute HF exacerbation as   - Stress echo 7/2021 with low normal systolic LV function, indeterminate diastolic LV function, EF 50% -> 2D echo ordered   - son reports patient ran out of home lasix 3 months ago and refill was not approved  2D Echo with EF showing 40%      Scalp laceration        Frequent falls  Impaired functional mobility, balance, gait, and endurance  Scalp laceration    Patient initially presenting to ED s/p unwitnessed fall with head trauma. Recent frequent falls due to gait instability and weakness. Laceration to posterior scalp. Patient c/o significant low back pain and neck pain.     - Posterior scalp laceration repaired in ED, nursing dressing changes as needed  - CTH negative for intracranial hemorrhage  - hip, rib xrays without fracture  - CT C/L-spine negative for acute traumatic injury -> back pain not improving thus will CT thoracic spine, patient can barely walk two steps even with assistance.  - Neuro checks q shift   - fall precautions   - pain control   - PT/OT consult     Elevated brain natriuretic peptide (BNP) level        Acute hypoxemic respiratory failure  COPD exacerbation  RUL PNA  Pulmonary sarcoidosis   Elevated BNP     · 2/4 SIRS criteria:  HR >90, RR >20. Afebrile without leukocytosis.   · Satting 80% on room air, placed on 4L -> 2L NC and maintaining sats >90%, wean as tolerated (will likely require home O2)  · RUL PNA on Xray of L Ribs  · Rule out acute heart failure exacerbation as . 2D Echo with EF-40%    · Covid negative 1/22  · Solumedrol 125mg IV x 1 with Prednisone 40mg PO daily to complete a 5 day course  · 5 day course of Augmentin for PNA  · Home Stiolto non-formulary, will continue duo-nebs for now  · Duonebs q6h while awake and prn  · Incentive  spirometry  · Guaifenesin and tessalon pearls prn cough, monitor sputum production   · Attempted O2 walk test however patient unable to ambulate    Dementia  - family at bedside to assist with history, patient is at baseline  - continue memantine   - continue magnesium   - delirium precautions   - seroquel 12.5 mg every evening to prevent sundowning       Osteoporosis  - fosamax prescribed weekly, resume upon discharge   - xrays negative for fracture s/p fall       Peripheral arterial disease  - continue ASA, statin      MDD (major depressive disorder), recurrent episode, mild  Anxiety     - chronic and stable   - continue zoloft daily        Chronic obstructive pulmonary disease with acute exacerbation        Hx of gastric ulcer        Stevens's esophagus with low grade dysplasia  Hx of gastric ulcer   GERD    - continue PO protonix BID  - continue carafate BID      Hyperlipidemia  - continue statin       Hypertension  - Currently controlled without pharmacologics. Will monitor.       CAD (coronary artery disease)  S/p CABG x2    - continue ASA 81 daily   - continue statin  - continue omega 3         VTE Risk Mitigation (From admission, onward)         Ordered     enoxaparin injection 40 mg  Daily         01/23/22 0654     IP VTE HIGH RISK PATIENT  Once         01/23/22 0524     Place sequential compression device  Until discontinued         01/23/22 0524                Discharge Planning   VIJAY: 1/28/2022     Code Status: Full Code   Is the patient medically ready for discharge?: No    Reason for patient still in hospital (select all that apply): Treatment  Discharge Plan A: Home with family,Home Health          Emily Guevara MD  Department of Hospital Medicine   [facility] - Tele[facility] Stepdown ([place])

## 2022-01-27 NOTE — HPI
Paul Browning is a 81 y.o. male with PMH  COPD, sarcoidosis, CAD, HTN, GERD with Stevens's esophagus, BPH, squamous cell carcinoma, and anxiety/depression who presented to the ED on 1/23/22 for a fall with head trauma and posterior scalp laceration. Laceration was stapled in ED and CT head was negative. Patient was discharged until he experienced profound dyspnea in the parking lot and was readmitted for acute hypoxia. Patient continued to endorse left hip pain. Hip, pelvis, and CT L lumbar spine were obtained in the ED and patient was admitted to medicine for observation and PT/OT. Given ongoing complaints of left hip pain, CT hip ordered by primary team 1/26 which showed superior and inferior pubic rami fractures. Ortho consulted for management recommendations. At baseline patient lives with his daughter and does not use assistive devices at baseline. He has a history of C-spine and non-displaced left hip fracture per son at bedside that was treated 1 year prior. He has been recommended in the past to use a walker. He remains alert and oriented on exam this morning.

## 2022-01-27 NOTE — ASSESSMENT & PLAN NOTE
- family at bedside to assist with history, patient is at baseline  - continue memantine   - continue magnesium   - delirium precautions   - stop seroquel given sedation

## 2022-01-27 NOTE — PLAN OF CARE
01/27/22 1446   Post-Acute Status   Post-Acute Authorization Home Health   Home Health Status Set-up Complete/Auth obtained     SW received notification that pt has been accepted with [facility] and they will start services tomorrow.    Isi Louie LMSW  [facility]  Ext. [phone]

## 2022-01-27 NOTE — CONSULTS
[facility] - Tele[facility] Stepdown ([place] [address])  Orthopedics  Consult Note    Patient Name: Paul Browning  MRN: [id number]  Admission Date: 1/22/2022  Hospital Length of Stay: 2 days  Attending Provider: Alicia Ramires MD  Primary Care Provider: Grey Forbes DO      Inpatient consult to Orthopedics  Consult performed by: Su Mendoza MD  Consult ordered by: Alicia Ramires MD        Subjective:     Principal Problem:Multiple closed stable lateral compression fractures of pelvis    Chief Complaint:   Chief Complaint   Patient presents with    Fall     Fell backwards and hit back of head on wooden table. Unknown LOC. No blood thinner use. Hx of dementia.         HPI: Paul Browning is a 81 y.o. male with PMH  COPD, sarcoidosis, CAD, HTN, GERD with Stevens's esophagus, BPH, squamous cell carcinoma, and anxiety/depression who presented to the ED on 1/23/22 for a fall with head trauma and posterior scalp laceration. Laceration was stapled in ED and CT head was negative. Patient was discharged until he experienced profound dyspnea in the parking lot and was readmitted for acute hypoxia. Patient continued to endorse left hip pain. Hip, pelvis, and CT L lumbar spine were obtained in the ED and patient was admitted to medicine for observation and PT/OT. Given ongoing complaints of left hip pain, CT hip ordered by primary team 1/26 which showed superior and inferior pubic rami fractures. Ortho consulted for management recommendations. At baseline patient lives with his daughter and does not use assistive devices at baseline. He has a history of C-spine and non-displaced left hip fracture per son at bedside that was treated 1 year prior. He has been recommended in the past to use a walker. He remains alert and oriented on exam this morning.       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    Hyperlipidemia     Hypertension     Ischemic cardiomyopathy 11/5/2014    MDD (major depressive disorder), recurrent episode, mild 2/17/2016    Osteoporosis 7/20/2016    Peripheral arterial disease 3/18/2016    Sarcoid     Squamous cell carcinoma 09/2016, 5/2016    crown of scalp, left wrist       Past Surgical History:   Procedure Laterality Date    CARDIAC SURGERY      CAROTID STENT N/A 10/14/2019    Procedure: INSERTION, STENT, ARTERY, CAROTID;  Surgeon: Artie Kebede MD;  Location[facility] CATH LAB;  Service: Cardiology;  Laterality: N/A;    CATARACT EXTRACTION W/  INTRAOCULAR LENS IMPLANT Right 07/17/2018    Dr. Talamantes    CATARACT EXTRACTION W/  INTRAOCULAR LENS IMPLANT Left 07/31/2018    Dr. Talamantes    CORONARY ARTERY BYPASS GRAFT      x2  10/2014    ESOPHAGOGASTRODUODENOSCOPY N/A 4/8/2019    Procedure: ESOPHAGOGASTRODUODENOSCOPY (EGD)/poss rfa;  Surgeon: Clifford De La Torre MD;  Location: [facility] ([address] [place] [address]);  Service: Endoscopy;  Laterality: N/A;    ESOPHAGOGASTRODUODENOSCOPY N/A 8/4/2021    Procedure: EGD (ESOPHAGOGASTRODUODENOSCOPY);  Surgeon: Clifford De La Torre MD;  Location: [address]);  Service: Endoscopy;  Laterality: N/A;  8/2-covid [facility] [id number]-uv-[id number] appts on 8/3    ESOPHAGOGASTRODUODENOSCOPY (EGD) WITH RADIOFREQUENCY TREATMENT OF GASTROESOPHAGEAL JUNCTION      INJECTION OF ANESTHETIC AGENT AROUND MEDIAL BRANCH NERVES INNERVATING CERVICAL FACET JOINT Bilateral 1/9/2020    Procedure: INJECTION, MBB--Bilateral Cervical- Third Occipital nerve, C2-3--ASA okay for pt to continue taking per provider.;  Surgeon: Tip Mera Jr., MD;  Location: [facility] PAIN MGT;  Service: Pain  Management;  Laterality: Bilateral;    INTRAOCULAR PROSTHESES INSERTION Right 7/17/2018    Procedure: INSERTION, INTRAOCULAR LENS PROSTHESIS;  Surgeon: León Talamantes MD;  Location: [facility] OR 1ST FLR;  Service: Ophthalmology;  Laterality: Right;    INTRAOCULAR PROSTHESES INSERTION Left 7/31/2018    Procedure: INSERTION, INTRAOCULAR LENS PROSTHESIS;  Surgeon: León Talamantes MD;  [facility];  Service: Ophthalmology;  Laterality: Left;    PHACOEMULSIFICATION OF CATARACT Right 7/17/2018    Procedure: PHACOEMULSIFICATION, CATARACT;  Surgeon: León Talamantes MD;  Location: [facility] OR [address] [place] [address];  Service: Ophthalmology;  Laterality: Right;    PHACOEMULSIFICATION OF CATARACT Left 7/31/2018    Procedure: PHACOEMULSIFICATION, CATARACT;  Surgeon: León Talamantes MD;  [facility];  Service: Ophthalmology;  Laterality: Left;    PLACEMENT OF SCREW IN ODONTOID N/A 10/22/2020    Procedure: INSERTION, SCREW, ODONTOID. Anterior approach.;  Surgeon: Kirsten Weaver MD;  Location: [facility] OR 2[facility];  Service: Neurosurgery;  Laterality: N/A;    RADIOFREQUENCY THERMOCOAGULATION Bilateral 1/30/2020    Procedure: RADIOFREQUENCY THERMAL COAGULATION--Bilateral C2-3 and Third Occipital Nerve;  Surgeon: Tip Mera Jr., MD;  Location: [facility];  Service: Pain Management;  Laterality: Bilateral;    UMBILICAL HERNIA REPAIR         Review of patient's allergies indicates:   Allergen Reactions    Morphine Shortness Of Breath       Current Facility-Administered Medications   Medication    acetaminophen tablet 1,000 mg    acetaminophen tablet 650 mg    albuterol-ipratropium 2.5 mg-0.5 mg/3 mL nebulizer solution 3 mL    albuterol-ipratropium 2.5 mg-0.5 mg/3 mL nebulizer solution 3 mL    amoxicillin-clavulanate 875-125mg per tablet 1 tablet    aspirin EC tablet 81 mg    atorvastatin tablet 40 mg    benzonatate capsule 100 mg    butalbital-acetaminophen-caffeine -40 mg  per tablet 1 tablet    enoxaparin injection 40 mg    guaiFENesin 12 hr tablet 600 mg    magnesium oxide tablet 400 mg    melatonin tablet 6 mg    memantine tablet 5 mg    omega-3 fatty acids-fish oil 340-1,000 mg capsule 1 capsule    ondansetron disintegrating tablet 8 mg    oxyCODONE immediate release tablet 5 mg    pantoprazole EC tablet 40 mg    polyethylene glycol packet 17 g    predniSONE tablet 40 mg    pregabalin capsule 50 mg    quetiapine split tablet 12.5 mg    sertraline tablet 100 mg    simethicone chewable tablet 80 mg    sodium chloride 0.9% flush 3 mL    sucralfate tablet 1 g    traMADoL tablet 50 mg     Family History     Problem Relation (Age of Onset)    Arrhythmia Mother    Heart disease Mother    Heart failure Brother    No Known Problems Daughter, Son        Tobacco Use    Smoking status: Former Smoker     Packs/day: 2.00     Years: 20.00     Pack years: 40.00     Types: Cigarettes     Quit date: 1988     Years since quittin.9    Smokeless tobacco: Never Used   Substance and Sexual Activity    Alcohol use: No     Comment: quit drinking 2012    Drug use: No    Sexual activity: Not Currently     Partners: Female     ROS   Constitutional: negative for fevers  Eyes: negative visual changes  ENT: positive for sensorineural hearing loss  Respiratory: negative for dyspnea  Cardiovascular: positive for dyspnea  Gastrointestinal: negative for abdominal pain  Genitourinary: negative for dysuria  Neurological: negative for headaches  Behavioral/Psych: negative for hallucinations  Endocrine: negative for temperature intolerance      Objective:     Vital Signs (Most Recent):  Temp: 96.3 °F (35.7 °C) (22 0011)  Pulse: 76 (22)  Resp: 16 (22)  BP: (!) 191/82 (22 0458)  SpO2: 95 % (22 045) Vital Signs (24h Range):  Temp:  [96.3 °F (35.7 °C)-98 °F (36.7 °C)] 96.3 °F (35.7 °C)  Pulse:  [63-77] 76  Resp:  [12-20] 16  SpO2:  [95 %-100 %]  "95 %  BP: (146-191)/(65-82) 191/82     Weight: 67.1 kg (148 lb)  Height: 5' 9" (175.3 cm)  Body mass index is 21.86 kg/m².    Ortho/SPM Exam     LLE  Skin intact, no deformity  No TTP over lateral aspect of hip  Pain with palpation over SI joint   Compartments soft  Full ROM throughout lower extremity  Pain with hip flexion and log roll   Pain with axial pressure through hip   SILT Sa/Trinh/DP/SP/T  Motor intact TA/SP/DP  2+ DP/PT     RLE:  Skin intact, no deformity  No TTP  Compartments soft  Full painless ROM throughout lower extremity  SILT Sa/Trinh/DP/SP/T  Motor intact TA/SP/DP  2+ DP/PT     BUE:  Skin intact, no deformity noted  No open wounds/abrasions/crepitus  No bony TTP  FROM shoulder, elbow and wrist  SILT M/U/R  Motor intact AIN/PIN/M/U/R   Cap refill < 2s  2+ RP      Spine: No TTP along midline of spine, no step offs palpated. No sacral decubitus ulcers        Significant Labs:   CBC:   Recent Labs   Lab 01/26/22  0000   WBC 6.49   HGB 11.9*   HCT 35.8*        CMP:   Recent Labs   Lab 01/26/22  0341 01/27/22  0413    137   K 4.2 5.1    100   CO2 28 32*   GLU 97 114*   BUN 29* 28*   CREATININE 1.1 0.9   CALCIUM 9.2 8.9   ANIONGAP 8 5*   EGFRNONAA >60.0 >60.0     All pertinent labs within the past 24 hours have been reviewed.    Significant Imaging: I have reviewed all pertinent imaging results/findings.   CT lumbar spine reviewed which extends down into pelvis. Multiplane cuts suggest left sided stable LC1 pelvis injury. I discussed with CT about reformatting the imaging for the pelvis with addition of 3D recons however data no longer stored. CT hip obtained only which does help to rule out U-type injury. CT pelvis shows left sided LC1 pelvis involving superior and inferior pubic rami fractures with left anterior non displaced zone 1 sacrum cortical irregularity and mild SI joint compression. No stepoffs on sagittal cuts and no U type component seen.     Assessment/Plan:     * Multiple " closed stable lateral compression fractures of pelvis  Paul Browning is a 81 y.o. male admitted for monitoring of acute hypoxia after he suffered a fall on 1/23 in which he suffered a scalp laceration and injury to his left hip. Left hip CT ordered 1/26 by medicine primary which showed superior and inferior pubic ramus fractures. Ortho consulted 1/27 - CT pelvis shows stable LC1 left sided pelvis injury.     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 Discussed with son at bedside about calcium supplementation and nutrition optimization   - Will plan for ortho trauma clinic follow up in 2 weeks   - Discussed DVT ppx on discharge with medicine primary             Su Mendoza MD  Orthopedics  [facility] - Tele[facility] Stepdown ([place])

## 2022-01-27 NOTE — HOSPITAL COURSE
Mr. Browning was admitted to Hospital Medicine for management of a fall and a COPD exacerbation.  He was started on breathing treatments, Augmentin, and Prednisone.  He was noted to have difficulty ambulating with PT and endorsed back pain.  CT scan was ordered that showed a pubic ramus fracture.  Ortho was consulted.  He was started on Robaxin and low dose Eliquis for 6 weeks for DVT prophylaxis.  Informed of increased bleeding risk with Eliquis, but also on Aspirin for CAD.  Fall precautions reinforced.  He was evaluated by PT/OT who said SNF, but family felt like he would be better served going home with HH.  Bedside commode and wheelchair were ordered for home.  Atarax was started for anxiety.

## 2022-01-27 NOTE — SUBJECTIVE & OBJECTIVE
Interval History: CT scan overnight with pubic ramus fracture.  Ortho consulted.  Started on Robaxin and Eliquis.  Family again wants to go home with HH.  DME ordered.    Review of Systems   Constitutional: Negative for chills, fatigue and fever.   Respiratory: Negative for cough and shortness of breath.    Cardiovascular: Negative for chest pain, palpitations and leg swelling.   Gastrointestinal: Negative for abdominal pain, diarrhea, nausea and vomiting.   Genitourinary: Negative for dysuria and urgency.   Musculoskeletal: Positive for back pain and gait problem.   Neurological: Negative for dizziness and headaches.   All other systems reviewed and are negative.    Objective:     Vital Signs (Most Recent):  Temp: 97.5 °F (36.4 °C) (01/27/22 1142)  Pulse: 70 (01/27/22 1226)  Resp: 16 (01/27/22 1226)  BP: (!) 124/58 (01/27/22 1142)  SpO2: 95 % (01/27/22 1236) Vital Signs (24h Range):  Temp:  [96.3 °F (35.7 °C)-98 °F (36.7 °C)] 97.5 °F (36.4 °C)  Pulse:  [64-77] 70  Resp:  [12-21] 16  SpO2:  [95 %-100 %] 95 %  BP: (124-191)/(58-82) 124/58     Weight: 67.1 kg (148 lb)  Body mass index is 21.86 kg/m².  No intake or output data in the 24 hours ending 01/27/22 1335   Physical Exam  Constitutional:       Appearance: He is well-developed and well-nourished. He is not diaphoretic.   HENT:      Head: Normocephalic and atraumatic.   Cardiovascular:      Rate and Rhythm: Normal rate and regular rhythm.      Heart sounds: No murmur heard.      Pulmonary:      Effort: Pulmonary effort is normal. No respiratory distress.      Breath sounds: Normal breath sounds. No wheezing or rales.   Abdominal:      General: There is no distension.      Palpations: Abdomen is soft.      Tenderness: There is no abdominal tenderness.   Musculoskeletal:         General: No deformity or edema.   Skin:     General: Skin is warm and dry.   Neurological:      Mental Status: He is alert and oriented to person, place, and time.         Significant Labs:  All pertinent labs within the past 24 hours have been reviewed.    Significant Imaging: I have reviewed all pertinent imaging results/findings within the past 24 hours.

## 2022-01-27 NOTE — ASSESSMENT & PLAN NOTE
· 2/4 SIRS criteria:  HR >90, RR >20. Afebrile without leukocytosis.   · Satting 80% on room air, placed on 4L -> 2L NC and maintaining sats >90%, wean as tolerated  · RUL PNA on Xray of L Ribs  · Rule out acute heart failure exacerbation as . 2D Echo with EF-40%    · Covid negative 1/22  · Solumedrol 125mg IV x 1 with Prednisone 40mg PO daily to complete a 5 day course  · 5 day course of Augmentin for PNA  · Home Stiolto non-formulary, will continue duo-nebs for now  · Duonebs q6h while awake and prn  · Incentive spirometry  · Guaifenesin and tessalon pearls prn cough, monitor sputum production

## 2022-01-27 NOTE — ASSESSMENT & PLAN NOTE
· CT scan with pelvic compression fractures and pubic ramus fractures  · Ortho consulted  · Start Robaxin   · Start Eliquis for 6 weeks for DVT prophylaxis  · Continue Tylenol TID  · PT/OT say SNF, but family wants HH

## 2022-01-27 NOTE — SUBJECTIVE & OBJECTIVE
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    Hyperlipidemia     Hypertension     Ischemic cardiomyopathy 11/5/2014    MDD (major depressive disorder), recurrent episode, mild 2/17/2016    Osteoporosis 7/20/2016    Peripheral arterial disease 3/18/2016    Sarcoid     Squamous cell carcinoma 09/2016, 5/2016    crown of scalp, left wrist       Past Surgical History:   Procedure Laterality Date    CARDIAC SURGERY      CAROTID STENT N/A 10/14/2019    Procedure: INSERTION, STENT, ARTERY, CAROTID;  Surgeon: Artie Kebede MD;  Location: [facility] CATH LAB;  Service: Cardiology;  Laterality: N/A;    CATARACT EXTRACTION W/  INTRAOCULAR LENS IMPLANT Right 07/17/2018    Dr. Talamantes    CATARACT EXTRACTION W/  INTRAOCULAR LENS IMPLANT Left 07/31/2018    Dr. Talamantes    CORONARY ARTERY BYPASS GRAFT      x2  10/2014    ESOPHAGOGASTRODUODENOSCOPY N/A 4/8/2019    Procedure: ESOPHAGOGASTRODUODENOSCOPY (EGD)/poss rfa;  Surgeon: Clifford De La Torre MD;  Location: [facility] ENDO (2ND FLR);  Service: Endoscopy;  Laterality: N/A;    ESOPHAGOGASTRODUODENOSCOPY N/A 8/4/2021    Procedure: EGD (ESOPHAGOGASTRODUODENOSCOPY);  Surgeon: Clifford De La Torre MD;  Location: [facility] ENDO (2ND FLR);  Service: Endoscopy;  Laterality: N/A;  8/2-covid [facility] myrxdn-xq-hb appts on 8/3    ESOPHAGOGASTRODUODENOSCOPY (EGD) WITH RADIOFREQUENCY TREATMENT OF GASTROESOPHAGEAL JUNCTION      INJECTION OF ANESTHETIC AGENT AROUND MEDIAL BRANCH NERVES INNERVATING CERVICAL FACET JOINT Bilateral 1/9/2020    Procedure: INJECTION, MBB--Bilateral Cervical- Third  Occipital nerve, C2-3--ASA okay for pt to continue taking per provider.;  Surgeon: Tip Mera Jr., MD;  Location: [facility] PAIN MGT;  Service: Pain Management;  Laterality: Bilateral;    INTRAOCULAR PROSTHESES INSERTION Right 7/17/2018    Procedure: INSERTION, INTRAOCULAR LENS PROSTHESIS;  Surgeon: Lenó Talamantes MD;  Location: [facility] OR 1ST FLR;  Service: Ophthalmology;  Laterality: Right;    INTRAOCULAR PROSTHESES INSERTION Left 7/31/2018    Procedure: INSERTION, INTRAOCULAR LENS PROSTHESIS;  Surgeon: León Talamantes MD;  Location: [facility] OR 1ST FLR;  Service: Ophthalmology;  Laterality: Left;    PHACOEMULSIFICATION OF CATARACT Right 7/17/2018    Procedure: PHACOEMULSIFICATION, CATARACT;  Surgeon: León Talamantes MD;  Location: [facility] OR 1ST FLR;  Service: Ophthalmology;  Laterality: Right;    PHACOEMULSIFICATION OF CATARACT Left 7/31/2018    Procedure: PHACOEMULSIFICATION, CATARACT;  Surgeon: León Talamantes MD;  Location: [facility] OR 1ST FLR;  Service: Ophthalmology;  Laterality: Left;    PLACEMENT OF SCREW IN ODONTOID N/A 10/22/2020    Procedure: INSERTION, SCREW, ODONTOID. Anterior approach.;  Surgeon: Kirsten Weaver MD;  Location: [facility] OR 2ND FLR;  Service: Neurosurgery;  Laterality: N/A;    RADIOFREQUENCY THERMOCOAGULATION Bilateral 1/30/2020    Procedure: RADIOFREQUENCY THERMAL COAGULATION--Bilateral C2-3 and Third Occipital Nerve;  Surgeon: Tip Mera Jr., MD;  Location: [facility] PAIN MGT;  Service: Pain Management;  Laterality: Bilateral;    UMBILICAL HERNIA REPAIR         Review of patient's allergies indicates:   Allergen Reactions    Morphine Shortness Of Breath       Current Facility-Administered Medications   Medication    acetaminophen tablet 1,000 mg    acetaminophen tablet 650 mg    albuterol-ipratropium 2.5 mg-0.5 mg/3 mL nebulizer solution 3 mL    albuterol-ipratropium 2.5 mg-0.5 mg/3 mL nebulizer solution 3 mL    amoxicillin-clavulanate 875-125mg per tablet  1 tablet    aspirin EC tablet 81 mg    atorvastatin tablet 40 mg    benzonatate capsule 100 mg    butalbital-acetaminophen-caffeine -40 mg per tablet 1 tablet    enoxaparin injection 40 mg    guaiFENesin 12 hr tablet 600 mg    magnesium oxide tablet 400 mg    melatonin tablet 6 mg    memantine tablet 5 mg    omega-3 fatty acids-fish oil 340-1,000 mg capsule 1 capsule    ondansetron disintegrating tablet 8 mg    oxyCODONE immediate release tablet 5 mg    pantoprazole EC tablet 40 mg    polyethylene glycol packet 17 g    predniSONE tablet 40 mg    pregabalin capsule 50 mg    quetiapine split tablet 12.5 mg    sertraline tablet 100 mg    simethicone chewable tablet 80 mg    sodium chloride 0.9% flush 3 mL    sucralfate tablet 1 g    traMADoL tablet 50 mg     Family History     Problem Relation (Age of Onset)    Arrhythmia Mother    Heart disease Mother    Heart failure Brother    No Known Problems Daughter, Son        Tobacco Use    Smoking status: Former Smoker     Packs/day: 2.00     Years: 20.00     Pack years: 40.00     Types: Cigarettes     Quit date: 1988     Years since quittin.9    Smokeless tobacco: Never Used   Substance and Sexual Activity    Alcohol use: No     Comment: quit drinking 2012    Drug use: No    Sexual activity: Not Currently     Partners: Female     ROS   Constitutional: negative for fevers  Eyes: negative visual changes  ENT: positive for sensorineural hearing loss  Respiratory: negative for dyspnea  Cardiovascular: positive for dyspnea  Gastrointestinal: negative for abdominal pain  Genitourinary: negative for dysuria  Neurological: negative for headaches  Behavioral/Psych: negative for hallucinations  Endocrine: negative for temperature intolerance      Objective:     Vital Signs (Most Recent):  Temp: 96.3 °F (35.7 °C) (22 0011)  Pulse: 76 (22)  Resp: 16 (22)  BP: (!) 191/82 (22)  SpO2: 95 % (22  "0456) Vital Signs (24h Range):  Temp:  [96.3 °F (35.7 °C)-98 °F (36.7 °C)] 96.3 °F (35.7 °C)  Pulse:  [63-77] 76  Resp:  [12-20] 16  SpO2:  [95 %-100 %] 95 %  BP: (146-191)/(65-82) 191/82     Weight: 67.1 kg (148 lb)  Height: 5' 9" (175.3 cm)  Body mass index is 21.86 kg/m².    Ortho/SPM Exam     LLE  Skin intact, no deformity  No TTP over lateral aspect of hip  Pain with palpation over SI joint   Compartments soft  Full ROM throughout lower extremity  Pain with hip flexion and log roll   Pain with axial pressure through hip   SILT Sa/Trinh/DP/SP/T  Motor intact TA/SP/DP  2+ DP/PT     RLE:  Skin intact, no deformity  No TTP  Compartments soft  Full painless ROM throughout lower extremity  SILT Sa/Trinh/DP/SP/T  Motor intact TA/SP/DP  2+ DP/PT     BUE:  Skin intact, no deformity noted  No open wounds/abrasions/crepitus  No bony TTP  FROM shoulder, elbow and wrist  SILT M/U/R  Motor intact AIN/PIN/M/U/R   Cap refill < 2s  2+ RP      Spine: No TTP along midline of spine, no step offs palpated. No sacral decubitus ulcers        Significant Labs:   CBC:   Recent Labs   Lab 01/26/22  0000   WBC 6.49   HGB 11.9*   HCT 35.8*        CMP:   Recent Labs   Lab 01/26/22  0341 01/27/22  0413    137   K 4.2 5.1    100   CO2 28 32*   GLU 97 114*   BUN 29* 28*   CREATININE 1.1 0.9   CALCIUM 9.2 8.9   ANIONGAP 8 5*   EGFRNONAA >60.0 >60.0     All pertinent labs within the past 24 hours have been reviewed.    Significant Imaging: I have reviewed all pertinent imaging results/findings.   CT lumbar spine reviewed which extends down into pelvis. Multiplane cuts suggest left sided stable LC1 pelvis injury. I discussed with CT about reformatting the imaging for the pelvis with addition of 3D recons however data no longer stored. CT hip obtained only which does help to rule out U-type injury. CT pelvis shows left sided LC1 pelvis involving superior and inferior pubic rami fractures with left anterior non displaced zone 1 sacrum " cortical irregularity and mild SI joint compression. No stepoffs on sagittal cuts and no U type component seen.

## 2022-01-27 NOTE — ASSESSMENT & PLAN NOTE
-Patient does not appear fluid overloaded on exam, no JVD, but is here for acute hypoxia requiring 2L NC to maintain O2 sats >90%  - CXR c/w chronic/stable sarcoidosis changes, no evidence of pulmonary edema   -Rule out acute HF exacerbation as   - Stress echo 7/2021 with low normal systolic LV function, indeterminate diastolic LV function, EF 50% -> 2D echo ordered   -Son reports patient ran out of home lasix 3 months ago and refill was not approved - refilled on DC  2D Echo with EF showing 40%

## 2022-01-27 NOTE — DISCHARGE SUMMARY
"[address] - Telemetry Stepdown ([place] [address])  [facility] Medicine  Discharge Summary      Patient Name: Paul Browning  MRN: [id number]  Patient Class: IP- Inpatient  Admission Date: 1/22/2022  Hospital Length of Stay: 3 days  Discharge Date and Time:  01/28/2022 1:45 PM  Attending Physician: Alicia Ramires MD   Discharging Provider: Alicia Ramires MD  Primary Care Provider: Grey Forbes DO  [facility] Medicine Team: [facility] HOSP MED B Alicia Ramires MD    HPI:   Paul Browning (Joe) is an 82 yo male with COPD, sarcoidosis, CAD, HTN, GERD with Stevens's esophagus, BPH, squamous cell carcinoma, and anxiety/depression who initially presented to the ED for a fall with head trauma and posterior scalp lacteration.  CTH was negative for acute hemorrhage, hip and rib xrays negative for fracture, and CT C/L-spine negative for acute traumatic injury.  Scalp laceration was repaired. Patient was stable and discharged from ED. However, patient became significantly SOB in the parking lot of the hospital and returned to the ED. SpO2 was 80%, and patient was admitted to  observation for acute hypoxia. Patient unable to provide history as he has dementia, son at bedside to assist. Son reports the patient was in significant back pain when attempting to leave the hospital, which exacerbated his SOB. The pain is controlled at rest, but becomes severe with minimal movement. The pain is most prominent in the lumbar region. The son also reports the patient's wheezing is chronic and says he is at his baseline respiratory function.     In the ED, patient is afebrile without leukocytosis. He is maintaining oxygen sats on 2L NC. P 75, RR 22, BP 94/51. Labs significant for T bili 1.2. , Tn negative. CXR c/w chronic and stable sarcoidosis changes. Given IV methylprednisolone. Duonebs x6. IV fentanyl 50mcg x1.       * No surgery found *      Hospital Course:   Mr. Browning was admitted to Hospital Medicine for " management of a fall and a COPD exacerbation.  He was started on breathing treatments, Augmentin, and Prednisone.  He was noted to have difficulty ambulating with PT and endorsed back pain.  CT scan was ordered that showed a pubic ramus fracture.  Ortho was consulted.  He was started on Robaxin and low dose Eliquis for 6 weeks for DVT prophylaxis.  Informed of increased bleeding risk with Eliquis, but also on Aspirin for CAD.  Fall precautions reinforced.  He was evaluated by PT/OT who said SNF, but family felt like he would be better served going home with HH.  Bedside commode and wheelchair were ordered for home.  He did qualify for home oxygen with an oxygen sat of 87% at rest.  Atarax was started for anxiety.       Physical Exam  Constitutional:       Appearance: He is well-developed and well-nourished. He is not diaphoretic.   HENT:      Head: Normocephalic and atraumatic.   Cardiovascular:      Rate and Rhythm: Normal rate and regular rhythm.      Heart sounds: No murmur heard.       Pulmonary:      Effort: Pulmonary effort is normal. No respiratory distress.      Breath sounds: Normal breath sounds. No wheezing or rales.   Abdominal:      General: There is no distension.      Palpations: Abdomen is soft.      Tenderness: There is no abdominal tenderness.   Musculoskeletal:         General: No deformity or edema.   Skin:     General: Skin is warm and dry.   Neurological:      Mental Status: He is alert and oriented to person, place, and time.       Goals of Care Treatment Preferences:  Code Status: Full Code      Consults:   Consults (From admission, onward)        Status Ordering Provider     Inpatient consult to Orthopedics  Once        Provider:  (Not yet assigned)    Completed FRANCISCO VEGA     COPD PharmD consult  Once        Provider:  (Not yet assigned)    Acknowledged FELICIANO FORD          Final Active Diagnoses:    Diagnosis Date Noted POA    PRINCIPAL PROBLEM:  Multiple closed stable  "lateral compression fractures of pelvis [S32.82XA] 01/27/2022 Yes    Closed fracture of pubis [S32.509A] 01/26/2022 Yes    Osteoporosis [M81.0] 07/20/2016 Yes    Acute hypoxemic respiratory failure [J96.01] 01/23/2022 Yes    Frequent falls [R29.6] 01/23/2022 Not Applicable    Pulmonary sarcoidosis [D86.0] 12/09/2015 Yes    Chronic obstructive pulmonary disease with acute exacerbation [J44.1] 10/13/2015 Yes    Elevated brain natriuretic peptide (BNP) level [R79.89] 01/23/2022 Yes    Chronic systolic heart failure [I50.22] 01/23/2022 Yes    Dementia [F03.90] 01/23/2022 Yes    Impaired functional mobility, balance, gait, and endurance [Z74.09] 04/19/2021 Yes    Anxiety [F41.9]  Yes    Gastroesophageal reflux disease [K21.9]  Yes    Peripheral arterial disease [I73.9] 03/18/2016 Yes    MDD (major depressive disorder), recurrent episode, 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] 02/17/2016 Yes     Chronic    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] 10/21/2014 Not Applicable    Stevens's esophagus with low grade dysplasia [K22.710]  Yes     Chronic    CAD (coronary artery disease) [I25.10] 01/04/2013 Yes     Chronic    Hyperlipidemia [E78.5] 01/04/2013 Yes     Chronic    Hypertension [I10] 01/04/2013 Yes      Problems Resolved During this Admission:    Diagnosis Date Noted Date Resolved POA    History of third degree heart block [Z86.79] 12/09/2015 01/23/2022 Not Applicable    Insomnia [G47.00] 10/13/2015 01/23/2022 Yes     Chronic       Discharged Condition: fair    Disposition: Home-Health Care Svc - PT/OT said SNF but family refused    Follow Up:   Follow-up Information     [facility] - Emergency Dept.    Specialty: Emergency Medicine  Contact information:  [address]  [facility] [address] [phone]                     Patient Instructions:      COMMODE FOR HOME USE     Order Specific Question Answer Comments   Type: Standard    Height: 5' 9" (1.753 m)    Weight: 67.1 kg (148 lb)    Does patient have medical equipment at " "home? walker, rolling    Does patient have medical equipment at home? shower chair    Length of need (1-99 months): 99      WHEELCHAIR FOR HOME USE     Order Specific Question Answer Comments   Hours in W/C per day: 8    Type of Wheelchair: Standard    Size(Width): 18"(STD adult)    Leg Support: STD footrests    Lap Belt: Velcro    Accessories: None    Cushion: Basic    Height: 5' 9" (1.753 m)    Weight: 67.1 kg (148 lb)    Does patient have medical equipment at home? walker, rolling    Does patient have medical equipment at home? shower chair    Length of need (1-99 months): 99    Please check all that apply: Caregiver is capable and willing to operate wheelchair safely.      HOSPITAL BED FOR HOME USE     Order Specific Question Answer Comments   Type: Semi-electric    Length of need (1-99 months): 99    Does patient have medical equipment at home? walker, rolling    Does patient have medical equipment at home? shower chair    Height: 5' 9" (1.753 m)    Weight: 67.1 kg (148 lb)    Please check all that apply: Patient requires positioning of the body in ways not feasible in an ordinary bed due to a medical condition which is expected to last at least one month.      OXYGEN FOR HOME USE     Order Specific Question Answer Comments   Liter Flow 2    Duration Continuous    Qualifying Test Performed at: Rest    Oxygen saturation: 87    Portable mode: continuous    Route nasal cannula    Device: home concentrator with portable tanks    Length of need (in months): 99 mos    Patient condition with qualifying saturation Other - List qualifying diagnosis and code    Select a diagnosis & list the code in the comments Pulmonary sarcoidosis [508918]    Height: 5' 9" (1.753 m)    Weight: 67.1 kg (148 lb)    Alternative treatment measures have been tried or considered and deemed clinically ineffective. Yes      Ambulatory referral/consult to Outpatient Case Management   Referral Priority: Routine Referral Type: Consultation "   Referral Reason: Specialty Services Required   Number of Visits Requested: 1        Medications:  Reconciled Home Medications:      Medication List      START taking these medications    acetaminophen 500 MG tablet  Commonly known as: TYLENOL  Take 2 tablets (1,000 mg total) by mouth every 8 (eight) hours. for 10 days     amoxicillin-clavulanate 875-125mg 875-125 mg per tablet  Commonly known as: AUGMENTIN  Take 1 tablet by mouth every 12 (twelve) hours. for 3 doses     ELIQUIS 2.5 mg Tab  Generic drug: apixaban  Take 1 tablet (2.5 mg total) by mouth 2 (two) times daily.     hydrOXYzine HCL 25 MG tablet  Commonly known as: ATARAX  Take 1 tablet (25 mg total) by mouth 3 (three) times daily as needed for Anxiety.     methocarbamoL 500 MG Tab  Commonly known as: ROBAXIN  Take 1 tablet (500 mg total) by mouth 3 (three) times daily. for 10 days        CHANGE how you take these medications    omega-3 fatty acids-vitamin E 1,000 mg Cap  Commonly known as: Fish OiL  Take 1 tablet by mouth 2 (two) times daily.  What changed: when to take this        CONTINUE taking these medications    albuterol 90 mcg/actuation inhaler  Commonly known as: PROAIR HFA  Inhale 2 puffs into the lungs every 6 (six) hours as needed for Wheezing or Shortness of Breath. Rescue     alendronate 70 MG tablet  Commonly known as: FOSAMAX  TAKE 1 TABLET BY MOUTH EVERY WEEK IN THE MORNING WITH FULL GLASS OF WATER ON EMPTY STOMACH-DONT LIE DOWN FOR AT LEAST 30 MINUTES AFTERWARDS     aspirin 81 MG EC tablet  Commonly known as: ECOTRIN  Take 81 mg by mouth once daily.     atorvastatin 40 MG tablet  Commonly known as: LIPITOR  TAKE 1 TABLET(40 MG) BY MOUTH EVERY DAY     furosemide 20 MG tablet  Commonly known as: LASIX  Take 1 tablet (20 mg total) by mouth once daily.     magnesium oxide 400 mg (241.3 mg magnesium) tablet  Commonly known as: MAG-OX  Take 1 tablet (400 mg total) by mouth once daily.     memantine 5 MG Tab  Commonly known as: NAMENDA  One po  qd daily for one week then BID     pantoprazole 40 MG tablet  Commonly known as: PROTONIX  TAKE 1 TABLET BY MOUTH TWICE DAILY     sertraline 100 MG tablet  Commonly known as: ZOLOFT  Take 1 tablet (100 mg total) by mouth once daily.     STIOLTO RESPIMAT 2.5-2.5 mcg/actuation Mist  Generic drug: tiotropium-olodateroL  Inhale 2 puffs into the lungs once daily. Controller     sucralfate 1 gram tablet  Commonly known as: CARAFATE  DISSOLVE 1 TABLET IN 10 ML OF WATER AND TAKE BY MOUTH TWICE DAILY        STOP taking these medications    butalbitaL-acetaminophen  mg Tab              Time spent on the discharge of patient: 35 minutes         Alicia Ramires MD  Department of Hospital Medicine  [facility] - Telemetry Stepdown ([place])

## 2022-01-27 NOTE — ASSESSMENT & PLAN NOTE
On admission.  X-ray of the left hip negative for fracture however patient complained of ongoing pain in left hip he is unable to even ambulate while previously he is very functional.  CT left hip obtained today which showed multiple nondisplaced fractures of the superior and inferior pubic rami.  Patient has previously fractured his left hip in the past.      CT L HIP:  Comminuted nondisplaced fractures of the superior and inferior pubic rami.     No displaced fracture of the proximal femur.  If concerned for occult femoral fracture, can obtain MRI pelvis for additional evaluation.    Management as above

## 2022-01-27 NOTE — PROGRESS NOTES
"[facility] [address] - Telemetry Stepdown ([place] [address])  [facility] Medicine  Progress Note    Patient Name: Paul Browning  MRN: [id number]  Patient Class: IP- Inpatient   Admission Date: 1/22/2022  Length of Stay: 2 days  Attending Physician: Alicia Ramires MD  Primary Care Provider: Grey Forbes DO        Subjective:     Principal Problem:Multiple closed stable lateral compression fractures of pelvis        HPI:  Paul Browning (Joe) is an 80 yo male with COPD, sarcoidosis, CAD, HTN, GERD with Stevens's esophagus, BPH, squamous cell carcinoma, and anxiety/depression who initially presented to the ED for a fall with head trauma and posterior scalp lacteration.  CTH was negative for acute hemorrhage, hip and rib xrays negative for fracture, and CT C/L-spine negative for acute traumatic injury.  Scalp laceration was repaired. Patient was stable and discharged from ED. However, patient became significantly SOB in the parking lot of the hospital and returned to the ED. SpO2 was 80%, and patient was admitted to  observation for acute hypoxia. Patient unable to provide history as he has dementia, son at bedside to assist. Son reports the patient was in significant back pain when attempting to leave the hospital, which exacerbated his SOB. The pain is controlled at rest, but becomes severe with minimal movement. The pain is most prominent in the lumbar region. The son also reports the patient's wheezing is chronic and says he is at his baseline respiratory function.     In the ED, patient is afebrile without leukocytosis. He is maintaining oxygen sats on 2L NC. P 75, RR 22, BP 94/51. Labs significant for T bili 1.2. , Tn negative. CXR c/w chronic and stable sarcoidosis changes. Given IV methylprednisolone. Duonebs x6. IV fentanyl 50mcg x1.       Overview/Hospital Course:  Mr. Browning was admitted to Hospital Medicine for management of a fall and a COPD exacerbation.  He was started on breathing " treatments, Augmentin, and Prednisone.  He was noted to have difficulty ambulating with PT and endorsed back pain.  CT scan was ordered that showed a pubic ramus fracture.  Ortho was consulted.  He was started on Robaxin and Eliquis for 6 weeks for DVT prophylaxis.  He was evaluated by PT/OT who said SNF, but family felt like he would be better served going home with HH.  Bedside commode and wheelchair were ordered for home.  Atarax was started for anxiety.      Interval History: CT scan overnight with pubic ramus fracture.  Ortho consulted.  Started on Robaxin and Eliquis.  Family again wants to go home with HH.  DME ordered.    Review of Systems   Constitutional: Negative for chills, fatigue and fever.   Respiratory: Negative for cough and shortness of breath.    Cardiovascular: Negative for chest pain, palpitations and leg swelling.   Gastrointestinal: Negative for abdominal pain, diarrhea, nausea and vomiting.   Genitourinary: Negative for dysuria and urgency.   Musculoskeletal: Positive for back pain and gait problem.   Neurological: Negative for dizziness and headaches.   All other systems reviewed and are negative.    Objective:     Vital Signs (Most Recent):  Temp: 97.5 °F (36.4 °C) (01/27/22 1142)  Pulse: 70 (01/27/22 1226)  Resp: 16 (01/27/22 1226)  BP: (!) 124/58 (01/27/22 1142)  SpO2: 95 % (01/27/22 1236) Vital Signs (24h Range):  Temp:  [96.3 °F (35.7 °C)-98 °F (36.7 °C)] 97.5 °F (36.4 °C)  Pulse:  [64-77] 70  Resp:  [12-21] 16  SpO2:  [95 %-100 %] 95 %  BP: (124-191)/(58-82) 124/58     Weight: 67.1 kg (148 lb)  Body mass index is 21.86 kg/m².  No intake or output data in the 24 hours ending 01/27/22 1335   Physical Exam  Constitutional:       Appearance: He is well-developed and well-nourished. He is not diaphoretic.   HENT:      Head: Normocephalic and atraumatic.   Cardiovascular:      Rate and Rhythm: Normal rate and regular rhythm.      Heart sounds: No murmur heard.      Pulmonary:      Effort:  Pulmonary effort is normal. No respiratory distress.      Breath sounds: Normal breath sounds. No wheezing or rales.   Abdominal:      General: There is no distension.      Palpations: Abdomen is soft.      Tenderness: There is no abdominal tenderness.   Musculoskeletal:         General: No deformity or edema.   Skin:     General: Skin is warm and dry.   Neurological:      Mental Status: He is alert and oriented to person, place, and time.         Significant Labs: All pertinent labs within the past 24 hours have been reviewed.    Significant Imaging: I have reviewed all pertinent imaging results/findings within the past 24 hours.      Assessment/Plan:      * Multiple closed stable lateral compression fractures of pelvis  · CT scan with pelvic compression fractures and pubic ramus fractures  · Ortho consulted  · Start Robaxin   · Start Eliquis for 6 weeks for DVT prophylaxis  · Continue Tylenol TID  · PT/OT say SNF, but family wants HH      Closed fracture of pubis  On admission.  X-ray of the left hip negative for fracture however patient complained of ongoing pain in left hip he is unable to even ambulate while previously he is very functional.  CT left hip obtained today which showed multiple nondisplaced fractures of the superior and inferior pubic rami.  Patient has previously fractured his left hip in the past.      CT L HIP:  Comminuted nondisplaced fractures of the superior and inferior pubic rami.     No displaced fracture of the proximal femur.  If concerned for occult femoral fracture, can obtain MRI pelvis for additional evaluation.    Management as above    Osteoporosis  · Fosamax prescribed weekly, resume upon discharge     Frequent falls  Impaired functional mobility, balance, gait, and endurance  Scalp laceration    Patient initially presenting to ED s/p unwitnessed fall with head trauma. Recent frequent falls due to gait instability and weakness. Laceration to posterior scalp. Patient c/o  significant low back pain and neck pain.     - Posterior scalp laceration repaired in ED, nursing dressing changes as needed  - CTH negative for intracranial hemorrhage  - hip, rib xrays without fracture  - CT C/L-spine negative for acute traumatic injury  - CT pelvis with pelvic and pubic ramus fractures  - Neuro checks q shift   - Fall precautions   - Pain control   - PT/OT consult and say SNF, but family wants HH    Acute hypoxemic respiratory failure  · As above    Pulmonary sarcoidosis  Steroids as above      Chronic obstructive pulmonary disease with acute exacerbation  · 2/4 SIRS criteria:  HR >90, RR >20. Afebrile without leukocytosis.   · Satting 80% on room air, placed on 4L -> 2L NC and maintaining sats >90%, wean as tolerated  · RUL PNA on Xray of L Ribs  · Rule out acute heart failure exacerbation as . 2D Echo with EF-40%    · Covid negative 1/22  · Solumedrol 125mg IV x 1 with Prednisone 40mg PO daily to complete a 5 day course  · 5 day course of Augmentin for PNA  · Home Stiolto non-formulary, will continue duo-nebs for now  · Duonebs q6h while awake and prn  · Incentive spirometry  · Guaifenesin and tessalon pearls prn cough, monitor sputum production         Chronic systolic heart failure  -Patient does not appear fluid overloaded on exam, no JVD, but is here for acute hypoxia requiring 2L NC to maintain O2 sats >90%  - CXR c/w chronic/stable sarcoidosis changes, no evidence of pulmonary edema   -Rule out acute HF exacerbation as   - Stress echo 7/2021 with low normal systolic LV function, indeterminate diastolic LV function, EF 50% -> 2D echo ordered   -Son reports patient ran out of home lasix 3 months ago and refill was not approved - refilled on DC  2D Echo with EF showing 40%      Elevated brain natriuretic peptide (BNP) level        Scalp laceration        Dementia  - family at bedside to assist with history, patient is at baseline  - continue memantine   - continue magnesium   -  delirium precautions   - stop seroquel given sedation    Impaired functional mobility, balance, gait, and endurance  PT/OT say SNF - family wants   DME ordered      Anxiety  · As above      Gastroesophageal reflux disease  As above      Peripheral arterial disease  - continue ASA, statin      MDD (major depressive disorder), recurrent episode, mild  Anxiety     - chronic issue but maybe worsening with fractures  - continue zoloft daily  - start atarax        S/P CABG x 2  As above      Hx of gastric ulcer        Stevens's esophagus with low grade dysplasia  Hx of gastric ulcer   GERD    - continue PO protonix BID  - continue carafate BID      Hyperlipidemia  - continue statin       Hypertension  - Currently controlled without pharmacologics. Will monitor.       CAD (coronary artery disease)  S/p CABG x2    - continue ASA 81 daily   - continue statin  - continue omega 3         VTE Risk Mitigation (From admission, onward)         Ordered     apixaban tablet 2.5 mg  2 times daily         01/27/22 0941     IP VTE HIGH RISK PATIENT  Once         01/23/22 0524     Place sequential compression device  Until discontinued         01/23/22 0524                Discharge Planning   VIJAY: 1/27/2022     Code Status: Full Code   Is the patient medically ready for discharge?: No    Reason for patient still in hospital (select all that apply): Pending disposition  Discharge Plan A: Home with family,Home Health                  Alicia Ramires MD  Department of Hospital Medicine   [facility] - Telemetry Stepdown ([place])

## 2022-01-27 NOTE — ASSESSMENT & PLAN NOTE
Paul Browning is a 81 y.o. male admitted for monitoring of acute hypoxia after he suffered a fall on 1/23 in which he suffered a scalp laceration and injury to his left hip. Left hip CT ordered 1/26 by medicine primary which showed superior and inferior pubic ramus fractures. Ortho consulted 1/27 - CT pelvis shows stable LC1 left sided pelvis injury.     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 Discussed with son at bedside about calcium supplementation and nutrition optimization   - Will plan for ortho trauma clinic follow up in 2 weeks   - Discussed DVT ppx on discharge with medicine primary

## 2022-01-27 NOTE — ASSESSMENT & PLAN NOTE
COPD exacerbation  RUL PNA  Pulmonary sarcoidosis   Elevated BNP     · 2/4 SIRS criteria:  HR >90, RR >20. Afebrile without leukocytosis.   · Satting 80% on room air, placed on 4L -> 2L NC and maintaining sats >90%, wean as tolerated (will likely require home O2)  · RUL PNA on Xray of L Ribs  · Rule out acute heart failure exacerbation as . 2D Echo with EF-40%    · Covid negative 1/22  · Solumedrol 125mg IV x 1 with Prednisone 40mg PO daily to complete a 5 day course  · 5 day course of Augmentin for PNA  · Home Stiolto non-formulary, will continue duo-nebs for now  · Duonebs q6h while awake and prn  · Incentive spirometry  · Guaifenesin and tessalon pearls prn cough, monitor sputum production   · Attempted O2 walk test however patient unable to ambulate

## 2022-01-27 NOTE — ASSESSMENT & PLAN NOTE
Impaired functional mobility, balance, gait, and endurance  Scalp laceration    Patient initially presenting to ED s/p unwitnessed fall with head trauma. Recent frequent falls due to gait instability and weakness. Laceration to posterior scalp. Patient c/o significant low back pain and neck pain.     - Posterior scalp laceration repaired in ED, nursing dressing changes as needed  - CTH negative for intracranial hemorrhage  - hip, rib xrays without fracture  - CT C/L-spine negative for acute traumatic injury  - CT pelvis with pelvic and pubic ramus fractures  - Neuro checks q shift   - Fall precautions   - Pain control   - PT/OT consult and say SNF, but family wants HH

## 2022-01-27 NOTE — PLAN OF CARE
[facility] - Telemetry Stepdown ([place] [address])  Home Health Orders  Face to Face Encounter    Patient Name: Paul Browning  YOB: 1940    PCP: Grey Forbes DO   PCP Address: [address] [facility] / [place] [address]  PCP Phone Number: [phone]  PCP Fax: [phone]    Encounter Date: 01/27/2022    Admit To Home Health    Diagnoses:  Active Hospital Problems    Diagnosis  POA    *Multiple closed stable lateral compression fractures of pelvis [S32.82XA]  Yes    Closed fracture of pubis [S32.509A]  Yes    Dementia [F03.90]  Yes    Acute hypoxemic respiratory failure [J96.01]  Yes    Elevated brain natriuretic peptide (BNP) level [R79.89]  Yes    Frequent falls [R29.6]  Not Applicable    Chronic systolic heart failure [I50.22]  Yes    Impaired functional mobility, balance, gait, and endurance [Z74.09]  Yes    Anxiety [F41.9]  Yes    Gastroesophageal reflux disease [K21.9]  Yes    Osteoporosis [M81.0]  Yes    Peripheral arterial disease [I73.9]  Yes    MDD (major depressive disorder), recurrent episode, 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 EF was 30%, but has since recovered      Hyperlipidemia [E78.5]  Yes     Chronic    Hypertension [I10]  Yes      Resolved Hospital Problems    Diagnosis Date Resolved POA    History of third degree heart block [Z86.79] 01/23/2022 Not Applicable     --related to STEMI incident.      Insomnia [G47.00] 01/23/2022 Yes     Chronic       Future Appointments   Date Time Provider Department Center   1/28/2022 12:15 PM XOCHILT Arteaga [facility]   1/31/2022   2:30 PM Willy Engolia, PT [facility]   2/2/2022 11:30 AM Willy Engolia, PT [facility]   2/9/2022 10:00 AM Willy Engolia, PT [facility]   2/11/2022 10:00 AM Tari Jaz, PTA [facility]   2/14/2022 11:15 AM Tari Jaz, PTA [facility]   2/16/2022 10:00 AM Willy Engolia, PT [facility]   2/21/2022 10:00 AM Willy Engolia, PT [facility]   2/23/2022 10:00 AM Willy Engolia, PT [facility]   2/28/2022 10:00 AM Willy Engolia, PT [facility]   3/2/2022 10:30 AM Tari Jaz, PTA [facility]   3/7/2022 10:00 AM Willy Engolia, PT [facility]   3/9/2022 10:30 AM Tari Jaz, PTA [facility]   3/14/2022  9:00 AM [facility], [facility] [place]   3/14/2022  9:15 AM SPE[facility]IM[facility], [facility] [place]   3/16/2022 11:30 AM Willy Engolia, PT [facility]   3/18/2022 10:45 AM Tari Jaz, PTA [facility]   3/21/2022 11:30 AM Willy Engolia, PT [facility]   3/21/2022  2:00 PM Grey Forbes DO [facility] [place]   4/14/2022  1:40 PM Penelope Stauffer MD [facility]   5/19/2022 10:40 AM Penelope Stauffer MD [facility]      Follow-up Information     Grey Forbes DO In 1 week.    Specialty: Internal Medicine  Contact information:  [address] [place] [address]  [phone]             [address] - Emergency Dept.    Specialty: Emergency Medicine  Contact information:  [address]  [facility] [address] [phone]                         I have seen and examined this patient face to face today. My clinical findings that support the need for the home health skilled services and home bound status are the following:  Weakness/numbness causing balance and gait disturbance due to Fracture and Weakness/Debility making it taxing to leave  home.      Allergies:  Review of patient's allergies indicates:   Allergen Reactions    Morphine Shortness Of Breath         Diet: cardiac diet      Activities: activity as tolerated      Nursing:   SN to complete comprehensive assessment including routine vital signs. Instruct on disease process and s/s of complications to report to MD. Review/verify medication list sent home with the patient at time of discharge  and instruct patient/caregiver as needed. Frequency may be adjusted depending on start of care date.    Notify MD if SBP > 160 or < 90; DBP > 90 or < 50; HR > 120 or < 50; Temp > 101;       Consults:      Physical Therapy to evaluate and treat. Evaluate for home safety and equipment needs; Establish/upgrade home exercise program. Perform / instruct on therapeutic exercises, gait training, transfer training, and Range of Motion.  Occupational Therapy to evaluate and treat. Evaluate home environment for safety and equipment needs. Perform/Instruct on transfers, ADL training, ROM, and therapeutic exercises.  Speech Therapy  to evaluate and treat for  Swallowing.      Miscellaneous Care:  N/A      Wound Care:  n/a      Medications: Review discharge medications with patient and family and provide education.      Current Discharge Medication List      START taking these medications    Details   acetaminophen (TYLENOL) 500 MG tablet Take 2 tablets (1,000 mg total) by mouth every 8 (eight) hours. for 10 days  Qty: 60 tablet, Refills: 0      amoxicillin-clavulanate 875-125mg (AUGMENTIN) 875-125 mg per tablet Take 1 tablet by mouth every 12 (twelve) hours. for 3 doses  Qty: 3 tablet, Refills: 0      apixaban (ELIQUIS) 2.5 mg Tab Take 1 tablet (2.5 mg total) by mouth 2 (two) times daily.  Qty: 84 tablet, Refills: 0      hydrOXYzine HCL (ATARAX) 25 MG tablet Take 1 tablet (25 mg total) by mouth 3 (three) times daily as needed for Anxiety.  Qty: 30 tablet, Refills: 0      methocarbamoL (ROBAXIN) 500 MG Tab Take 1  tablet (500 mg total) by mouth 3 (three) times daily. for 10 days  Qty: 30 tablet, Refills: 0         CONTINUE these medications which have NOT CHANGED    Details   albuterol (PROAIR HFA) 90 mcg/actuation inhaler Inhale 2 puffs into the lungs every 6 (six) hours as needed for Wheezing or Shortness of Breath. Rescue  Qty: 18 g, Refills: 5    Associated Diagnoses: Chronic obstructive pulmonary disease, unspecified COPD type      alendronate (FOSAMAX) 70 MG tablet TAKE 1 TABLET BY MOUTH EVERY WEEK IN THE MORNING WITH FULL GLASS OF WATER ON EMPTY STOMACH-DONT LIE DOWN FOR AT LEAST 30 MINUTES AFTERWARDS  Qty: 12 tablet, Refills: 3      aspirin (ECOTRIN) 81 MG EC tablet Take 81 mg by mouth once daily.      atorvastatin (LIPITOR) 40 MG tablet TAKE 1 TABLET(40 MG) BY MOUTH EVERY DAY  Qty: 90 tablet, Refills: 3      furosemide (LASIX) 20 MG tablet Take 1 tablet (20 mg total) by mouth once daily.  Qty: 90 tablet, Refills: 0      magnesium oxide (MAG-OX) 400 mg (241.3 mg magnesium) tablet Take 1 tablet (400 mg total) by mouth once daily.  Qty: 30 tablet, Refills: 3    Associated Diagnoses: Chronic nonintractable headache, unspecified headache type      memantine (NAMENDA) 5 MG Tab One po qd daily for one week then BID  Qty: 60 tablet, Refills: 2      omega-3 fatty acids-vitamin E (FISH OIL) 1,000 mg Cap Take 1 tablet by mouth 2 (two) times daily.  Qty: 60 each, Refills: 11    Associated Diagnoses: Essential hypertension; Carotid artery disease without cerebral infarction; Coronary artery disease due to lipid rich plaque; Atherosclerosis of aorta; Pulmonary sarcoidosis; Chronic obstructive pulmonary disease, unspecified COPD type; Hyperlipidemia; Cramps of lower extremity, unspecified laterality; Peripheral arterial disease      pantoprazole (PROTONIX) 40 MG tablet TAKE 1 TABLET BY MOUTH TWICE DAILY  Qty: 180 tablet, Refills: 0      sertraline (ZOLOFT) 100 MG tablet Take 1 tablet (100 mg total) by mouth once daily.  Qty: 90  tablet, Refills: 3      sucralfate (CARAFATE) 1 gram tablet DISSOLVE 1 TABLET IN 10 ML OF WATER AND TAKE BY MOUTH TWICE DAILY  Qty: 180 tablet, Refills: 2    Comments: **Patient requests 90 days supply**      tiotropium-olodateroL (STIOLTO RESPIMAT) 2.5-2.5 mcg/actuation Mist Inhale 2 puffs into the lungs once daily. Controller  Qty: 4 g, Refills: 5    Comments: Please load and prime the device.  Associated Diagnoses: Chronic obstructive pulmonary disease, unspecified COPD type         STOP taking these medications       butalbitaL-acetaminophen  mg Tab Comments:   Reason for Stopping:               Future Appointments   Date Time Provider Department Center   1/28/2022 12:15 PM Tari Sebastian, PTA [facility]   1/31/2022  2:30 PM Willy Rosales, PT [facility]   2/2/2022 11:30 AM Willy Broderickolia, PT [facility]   2/9/2022 10:00 AM Willy Engolia, PT [facility]   2/11/2022 10:00 AM Tari Jaz, PTA [facility]   2/14/2022 11:15 AM Tari Morae, PTA [facility]   2/16/2022 10:00 AM Willy Engolia, PT [facility]   2/21/2022 10:00 AM Willy Engolia, PT [facility]   2/23/2022 10:00 AM Willy Engolia, PT [facility]   2/28/2022 10:00 AM Willy Engolia, PT [facility]   3/2/2022 10:30 AM Tari Jaz, PTA [facility]   3/7/2022 10:00 AM Willy Engolia, PT [facility]   3/9/2022 10:30 AM Tari Jaz, PTA [facility]   3/14/2022  9:00 AM [facility], [facility] [place]   3/14/2022  9:15 AM SPE[facility], [facility] [place]   3/16/2022 11:30 AM Willy Engolia, PT [facility]   3/18/2022 10:45 AM Tari Sebastian, PTA [facility]   3/21/2022 11:30 AM Willy Rosales, PT [facility]   3/21/2022  2:00 PM Grey Forbes DO [facility] [place]   4/14/2022  1:40 PM Penelope Stauffer MD [facility]  [address] [facility]   5/19/2022 10:40 AM Penelope Stauffer MD [facility]       I certify that this patient is confined to his home and needs intermittent skilled nursing care, physical therapy and occupational therapy.    Alicia Ramires MD  Senior Physician  [facility]

## 2022-01-27 NOTE — ASSESSMENT & PLAN NOTE
Anxiety     - chronic issue but maybe worsening with fractures  - continue zoloft daily  - start atarax

## 2022-01-27 NOTE — PLAN OF CARE
01/27/22 1311   Post-Acute Status   Post-Acute Authorization Home Health   Home Health Status Referrals Sent     Pt will be discharging home with home health. [facility] and is waiting on an accepting agency.    YA will continue to follow up.    Isi Louie LMSW  [facility]  Ext. [phone]

## 2022-01-27 NOTE — SUBJECTIVE & OBJECTIVE
Interval History:  Wheezing improved.  However patient's family at bedside and states that his pain is not primarily in his lower back is actually in his left hip.  Indicated that we will proceed with a CT of the left hip the x-ray was negative for fracture      Objective:     Vital Signs (Most Recent):  Temp: 98 °F (36.7 °C) (01/26/22 1535)  Pulse: 69 (01/26/22 1535)  Resp: 18 (01/26/22 1716)  BP: (!) 153/68 (01/26/22 1535)  SpO2: 98 % (01/26/22 1535) Vital Signs (24h Range):  Temp:  [96.8 °F (36 °C)-98.1 °F (36.7 °C)] 98 °F (36.7 °C)  Pulse:  [60-82] 69  Resp:  [17-20] 18  SpO2:  [93 %-99 %] 98 %  BP: (137-153)/(65-73) 153/68     Weight: 67.1 kg (148 lb)  Body mass index is 21.86 kg/m².  No intake or output data in the 24 hours ending 01/26/22 1812   Physical Exam    GENERAL:  No apparent distress. Alert and oriented times three  EYES:  PERRLA, EOMI. Conjunctivae intact  ENT:  Posterior pharynx clear  NECK:  Supple with no lymphadenopathy or thyromegaly  LUNGS:  No respiratory distress. Diffuse wheezes.   CARDIAC:  RRR without murmur, rub or gallop  ABDOMEN:  Positive bowel sounds. Nontender and nondistended. No organomegaly      Significant Labs: All pertinent labs within the past 24 hours have been reviewed.    Significant Imaging: I have reviewed all pertinent imaging results/findings within the past 24 hours.

## 2022-01-27 NOTE — ASSESSMENT & PLAN NOTE
On admission.  X-ray of the left hip negative for fracture however patient complained of ongoing pain in left hip he is unable to even ambulate while previously he is very functional.  CT left hip obtained today which showed multiple nondisplaced fractures of the superior and inferior pubic rami.  Patient has previously fractured his left hip in the past.      CT L HIP:  Comminuted nondisplaced fractures of the superior and inferior pubic rami.     No displaced fracture of the proximal femur.  If concerned for occult femoral fracture, can obtain MRI pelvis for additional evaluation.    -consult orthopedics in a.m.

## 2022-01-28 NOTE — PLAN OF CARE
Problem: Adult Inpatient Plan of Care  Goal: Plan of Care Review  Outcome: Ongoing, Progressing  Goal: Patient-Specific Goal (Individualized)  Outcome: Ongoing, Progressing  Goal: Absence of Hospital-Acquired Illness or Injury  Outcome: Ongoing, Progressing  Goal: Optimal Comfort and Wellbeing  Outcome: Ongoing, Progressing  Goal: Readiness for Transition of Care  Outcome: Ongoing, Progressing     Problem: Adjustment to Illness COPD (Chronic Obstructive Pulmonary Disease)  Goal: Optimal Chronic Illness Coping  Outcome: Ongoing, Progressing     Problem: Functional Ability Impaired COPD (Chronic Obstructive Pulmonary Disease)  Goal: Optimal Level of Functional [place]  Outcome: Ongoing, Progressing     Problem: Infection COPD (Chronic Obstructive Pulmonary Disease)  Goal: Absence of Infection Signs and Symptoms  Outcome: Ongoing, Progressing     Problem: Oral Intake Inadequate COPD (Chronic Obstructive Pulmonary Disease)  Goal: Improved Nutrition Intake  Outcome: Ongoing, Progressing     Problem: Respiratory Compromise COPD (Chronic Obstructive Pulmonary Disease)  Goal: Effective Oxygenation and Ventilation  Outcome: Ongoing, Progressing     Problem: Skin Injury Risk Increased  Goal: Skin Health and Integrity  Outcome: Ongoing, Progressing     Problem: Fall Injury Risk  Goal: Absence of Fall and Fall-Related Injury  Outcome: Ongoing, Progressing

## 2022-01-28 NOTE — RESPIRATORY THERAPY
pt spO2 ranging from 87% to 95% while he is eating.  Pt says he is not feeling SOB at this time.  O2 at bedside for PRN use.

## 2022-01-28 NOTE — NURSING
Patient Rested quietly through out night. Respiration even and unlabored. Patient was on room air during the night. o2sats 93 to 95%. Schedule neb tx given. Daughter at bedside. No acute distress noted. Call light in reach.

## 2022-01-28 NOTE — PT/OT/SLP PROGRESS
Occupational Therapy   Co-Treatment    Name: Paul Browning  MRN: [id number]  Admitting Diagnosis:  Multiple closed stable lateral compression fractures of pelvis       Recommendations:     Discharge Recommendations: nursing facility, skilled  Discharge Equipment Recommendations:  bedside commode,wheelchair  Barriers to discharge:  Decreased caregiver support (patient needs an increased level of assistance at this time at current LOF)    Assessment:     Paul Browning is a 81 y.o. male with a medical diagnosis of Multiple closed stable lateral compression fractures of pelvis. Performance deficits affecting function are weakness,impaired endurance,impaired self care skills,impaired functional mobilty,gait instability,impaired balance,decreased lower extremity function,pain. Patient  Participated well despite having pain on this date. Patient would benefit from continued skilled acute OT 3x/wk to improve functional mobility, increase independence with ADLs, and address established goals. Recommending SNF once medically appropriate for discharge to increase maximal independence, reduce burden of care, and ensure safety.     Rehab Prognosis:  Good; patient would benefit from acute skilled OT services to address these deficits and reach maximum level of function.       Plan:     Patient to be seen 3 x/week to address the above listed problems via self-care/home management,therapeutic activities,therapeutic exercises  · Plan of Care Expires: 02/24/22  · Plan of Care Reviewed with: daughter,patient    Subjective     Pain/Comfort:  · Pain Rating 1:  (patient did not rate)  · Location - Side 1: Bilateral  · Location - Orientation 1: generalized  · Location 1: back  · Pain Addressed 1: Reposition,Distraction,Cessation of Activity,Pre-medicate for activity  · Pain Rating Post-Intervention 1:  (patient did not rate)    Objective:     Communicated with: DENNIS prior to session.  Patient found up in chair with  pulse ox (continuous) upon OT entry to room.    General Precautions: Standard, fall,hearing impaired,vision impaired   Orthopedic Precautions:N/A   Braces: N/A  Respiratory Status: Room air     Occupational Performance:     Functional Mobility/Transfers:  · Patient completed Sit <> Stand Transfer with minimum assistance  with  rolling walker and grab bar by toilet (cues needed for hand placement each time)  · Patient completed Toilet Transfer bed<>toilet with functional ambulation technique with minimum assistance with  rolling walker and grab bar by toilet. Extra time needed for ambulation due to pain limitation    Activities of Daily Living:  · Toileting: maximal assistance        [facility] 6 Click ADL: 14    Treatment & Education:  Role of OT and POC  ADL retraining  Functional mobility training  Safety  Discharge planning    Co-treatment performed due to patient's multiple deficits requiring two skilled therapists to appropriately and safely assess patient's strength and endurance while facilitating functional tasks in addition to accommodating for patient's activity tolerance.     Patient left up in chair with call button in reach and all needs met. Education:      GOALS:   Multidisciplinary Problems     Occupational Therapy Goals        Problem: Occupational Therapy Goal    Goal Priority Disciplines Outcome Interventions   Occupational Therapy Goal     OT, PT/OT Ongoing, Progressing    Description: Goals to be met by: 2/18/2022     Patient will increase functional independence with ADLs by performing:    UE Dressing with Stand-by Assistance.  LE Dressing with Stand-by Assistance.  Grooming while standing at sink with Contact Guard Assistance.  Toileting from toilet with Contact Guard Assistance for hygiene and clothing management.   Supine to sit with Stand-by Assistance.  Stand pivot transfers with Contact Guard Assistance.  Toilet transfer to toilet with Contact Guard Assistance.                     Time  Tracking:     OT Date of Treatment: 01/28/22  OT Start Time: 1119  OT Stop Time: 1142  OT Total Time (min): 23 min    Billable Minutes:Self Care/Home Management 23 1/28/2022

## 2022-01-28 NOTE — NURSING
Home Oxygen Evaluation    Date Performed: 1/28/2022    1) Patient's Home O2 Sat on room air, while at rest: 87%        If O2 sats on room air at rest are 88% or below, patient qualifies. No additional testing needed. Document N/A in steps 2 and 3. If 89% or above, complete steps 2.      2) Patient's O2 Sat on room air while exercising: n/a        If O2 sats on room air while exercising remain 89% or above patient does not qualify, no further testing needed Document N/A in step 3. If O2 sats on room air while exercising are 88% or below, continue to step 3.      3) Patient's O2 Sat while exercising on O2:n/a         (Must show improvement from #2 for patients to qualify)    If O2 sats improve on oxygen, patient qualifies for portable oxygen. If not, the patient does not qualify.

## 2022-01-28 NOTE — PT/OT/SLP PROGRESS
Physical Therapy Treatment    Patient Name:  Paul Browning   MRN:  [id number]    Recommendations:     Discharge Recommendations:  nursing facility, skilled   Discharge Equipment Recommendations: bedside commode,wheelchair   Barriers to discharge: Decreased caregiver support (due to current LOF)    Assessment:     Paul Browning is a 81 y.o. male admitted with a medical diagnosis of Multiple closed stable lateral compression fractures of pelvis.  He presents with the following impairments/functional limitations:  weakness,impaired endurance,impaired self care skills,impaired functional mobilty,gait instability,impaired balance,decreased lower extremity function,pain .     Pt german session well w/ good participation despite pain. He was able to inc his gait distance using a RW. He is progressing well and will continue PT POC.    Rehab Prognosis: Good; patient would benefit from acute skilled PT services to address these deficits and reach maximum level of function.    Recent Surgery: * No surgery found *      Plan:     During this hospitalization, patient to be seen 3 x/week to address the identified rehab impairments via gait training,therapeutic activities,therapeutic exercises,neuromuscular re-education and progress toward the following goals:    · Plan of Care Expires:  02/22/22    Subjective     Chief Complaint: pain  Patient/Family Comments/goals: return to PLOF  Pain/Comfort:  · Pain Rating 1:  (did not rate)  · Location - Side 1: Bilateral  · Location - Orientation 1: generalized  · Location 1: back  · Pain Addressed 1: Pre-medicate for activity,Reposition,Cessation of Activity  · Pain Rating Post-Intervention 1:  (did not rate)      Objective:     Co-treatment performed due to patient's multiple deficits requiring two skilled therapists to appropriately and safely assess patient's strength and endurance while facilitating functional tasks in addition to accommodating for patient's activity  tolerance.     Communicated with nursing prior to session.  Patient found up in chair with pulse ox (continuous) upon PT entry to room.     General Precautions: Standard, fall,hearing impaired,vision impaired   Orthopedic Precautions:N/A   Braces: N/A  Respiratory Status: Room air     Functional Mobility:  · Transfers:    · Sit<>stand to/from bedside chair w/ RW and Patricia to rise  · Stand pivot to/from toilet w/ RW and Patricia to rise and pivot  · Cues for hand placement  · inc'd time required to rise  · Gait:   · 15ft (X2 trials) w/ RW and Min/CCGA for stability  · Cues for posture and to remain inside RW for stability  · Cues also to widen Kamran for inc'd stability  · Limited by pain  · Balance:   · Static stand w/ RW and Min/CGA      AM-PAC 6 CLICK MOBILITY  Turning over in bed (including adjusting bedclothes, sheets and blankets)?: 2  Sitting down on and standing up from a chair with arms (e.g., wheelchair, bedside commode, etc.): 3  Moving from lying on back to sitting on the side of the bed?: 2  Moving to and from a bed to a chair (including a wheelchair)?: 3  Need to walk in hospital room?: 3  Climbing 3-5 steps with a railing?: 1  Basic Mobility Total Score: 14       Therapeutic Activities and Exercises:   Pt and son were educated on his current LOF and assistance requirements since they have decided to take pt home and pursure home health PT/OT. Pt and son verb understanding.     Pt was also instructed to complete therex including HF, LAQ, AP x10 reps each and sit<>stands x3-5 reps consecutively. Pt and son verb understanding.     Patient left up in chair with all lines intact, call button in reach and son present..    GOALS:   Multidisciplinary Problems     Physical Therapy Goals        Problem: Physical Therapy Goal    Goal Priority Disciplines Outcome Goal Variances Interventions   Physical Therapy Goal     PT, PT/OT Ongoing, Progressing     Description: Goals to be met by: 2/7/2022     Patient will increase  functional independence with mobility by performin. Supine to sit with MInimal Assistance  2. Sit to supine with MInimal Assistance  3. Sit to stand transfer with CGA  4. Bed to chair transfer with CGA using Rolling Walker  5. Gait  x 50 feet with Contact Guard Assistance using Rolling Walker.                      Time Tracking:     PT Received On: 22  PT Start Time: 1118     PT Stop Time: 1141  PT Total Time (min): 23 min     Billable Minutes: Gait Training 10, Therapeutic Activity 13 and Total Time 23    Treatment Type: Treatment  PT/PTA: PT     PTA Visit Number: 0     2022

## 2022-01-28 NOTE — NURSING
Patient being discharged in stable condition.  Son is at the bedside with him. All home health supplies have been delivered and he is ready for discharge.

## 2022-01-28 NOTE — PLAN OF CARE
Problem: Occupational Therapy Goal  Goal: Occupational Therapy Goal  Description: Goals to be met by: 2/18/2022     Patient will increase functional independence with ADLs by performing:    UE Dressing with Stand-by Assistance.  LE Dressing with Stand-by Assistance.  Grooming while standing at sink with Contact Guard Assistance.  Toileting from toilet with Contact Guard Assistance for hygiene and clothing management.   Supine to sit with Stand-by Assistance.  Stand pivot transfers with Contact Guard Assistance.  Toilet transfer to toilet with Contact Guard Assistance.    Outcome: Ongoing, Progressing   Patient's goals are appropriate.

## 2022-01-28 NOTE — NURSING
Home Oxygen Evaluation    Date Performed: 2022    1) Patient's Home O2 Sat on room air, while at rest: 98%        If O2 sats on room air at rest are 88% or below, patient qualifies. No additional testing needed. Document N/A in steps 2 and 3. If 89% or above, complete steps 2.      2) Patient's O2 Sat on room air while exercisin%        If O2 sats on room air while exercising remain 89% or above patient does not qualify, no further testing needed Document N/A in step 3. If O2 sats on room air while exercising are 88% or below, continue to step 3.      3) Patient's O2 Sat while exercising on O2:   N/A       (Must show improvement from #2 for patients to qualify)    If O2 sats improve on oxygen, patient qualifies for portable oxygen. If not, the patient does not qualify.

## 2022-01-31 NOTE — PLAN OF CARE
[facility] - Telemetry Stepdown ([facility])  Discharge Final Note    Primary Care Provider: Grey Forbes DO    Expected Discharge Date: 1/28/2022    Patient discharged to home via personal transportation.     Patient's bedside nurse and patient/family notified of the above.      Final Discharge Note (most recent)       Final Note - 01/31/22 0915          Final Note    Assessment Type Final Discharge Note (P)      Anticipated Discharge Disposition Home-Health Care Svc (P)         Post-Acute Status    Post-Acute Authorization Home Health (P)      Home Health Status Set-up Complete/Auth obtained (P)                      Important Message from Medicare  Important Message from Medicare regarding Discharge Appeal Rights: Explained to patient/caregiver,Signed/date by patient/caregiver,Given to patient/caregiver,Other (comments) (son signed)     Date IMM was signed: 01/28/22  Time IMM was signed: 0936    Contact Info       [facility] - Emergency Dept   Specialty: Emergency Medicine    [address]  [place] [address]   Phone: [phone]       Next Steps: Follow up            Future Appointments   Date Time Provider Department Center   2/9/2022 10:00 AM Willy Rosales, PT [facility]   2/10/2022 10:00 AM Grey Forbes DO [facility] [place]   2/10/2022  1:00 PM Kris Multani, [facility]   2/11/2022 10:00 AM Tarikishor Sebastian, PTA [facility]   2/14/2022 11:15 AM Tarirachel Morae, PTA [facility]   2/16/2022 10:00 AM Willy Engolia, PT [facility]   2/21/2022 10:00 AM Willy Engolia, PT [facility]   2/23/2022 10:00 AM Willy Engolia, PT [facility]   2/28/2022 10:00 AM Willy Engolia, PT [facility]   3/2/2022 10:30 AM Tari Jaz, PTA [facility]   3/7/2022 10:00 AM Willy Engolia, PT [facility]   3/9/2022 10:30 AM Tari Jaz, PTA [facility]    3/14/2022  9:00 AM [facility] [place]   3/14/2022  9:15 AM SPE[facility]IMEN, [facility] [place]   3/16/2022 11:30 AM Willy Rosales, PT [facility]   3/18/2022 10:45 AM Tari Sebastian, PTA [facility]   3/21/2022 11:30 AM Willy Rosales, PT [facility]   3/21/2022  2:00 PM Grey Forbes, DO [facility] IM [place]   4/14/2022  1:40 PM Penelope Stauffer MD [facility]   5/19/2022 10:40 AM Penelope Stauffer MD [facility]        scheduled post-discharge follow-up appointment and information added to AVS.     Patient was accepted with [facility] for home health services.    Isi Louie LMSW  [facility]  Ext. [phone]

## 2022-02-01 NOTE — TELEPHONE ENCOUNTER
----- Message from Mane Rincon sent at 2/1/2022  8:06 AM CST -----  Contact: Van [phone]  Van from Home Health  needs verbal order for Urinalysis and culture for the pt. Please call Van at [phone].  Thank you

## 2022-02-01 NOTE — TELEPHONE ENCOUNTER
Spoke to son she he stated his dad cant walk and the  nurse Van will be calling to get the OK to do the urine test at home today

## 2022-02-03 NOTE — PROGRESS NOTES
Outpatient Care Management   - Low Risk Patient Assessment    Patient: Paul Browning  MRN:  [id number]  Date of Service:  2/2/2022  Completed by:  Brandy Nicholson LCSW  Referral Date: 01/24/2022  Program: OPCM Low Risk    Reason for Visit   Patient presents with    Social Work Assessment - Low/Mod Risk    Case Closure       Brief Summary: Sw received referral for patient from [facility]. Sw completed assessment with Pt's son (IIC on file). Pt lives with daughter and is mostly independent with ADLs. Pt's son reported that Pt has memory loss and all his children are involved in his care. Pt does not have difficulty affording food, shelter, or medical care. Pt's family provide transportation to medical appointments. Pt's son confirmed Pt has hospital f/u visit scheduled with PCP. Caregiver stated Pt has adequate support from family and does not need additional support or community resources. He was advised to reach out to PCP if needs arise in future and he voiced understanding. Low/mod risk case closed.      Patient Summary     OPCM Social Work Assessment (Low/Moderate Risk)    General  Level of Caregiver support: Member independent and does not need caregiver assistance  Have you had to make a decision between paying for any of the following in the last 2 months?: None  Transportation means: Family  Employment status: Retired and not working  Current symptoms: Memory problems, Confusion  Assessments  Was the PHQ Depression Screening completed this visit?: No  Was the OLIMPIA-7 Screening completed this visit?: No         Complex Care Plan     Care plan was discussed and completed today with input from patient and/or caregiver.    Patient Instructions     No follow-ups on file.    Todays OPCM Self-Management Care Plan was developed with the patients/caregivers input and was based on identified barriers from todays assessment.  Goals were written today with the patient/caregiver and the  patient has agreed to work towards these goals to improve his/her overall well-being. Patient verbalized understanding of the care plan, goals, and all of today's instructions. Encouraged patient/caregiver to communicate with his/her physician and health care team about health conditions and the treatment plan.  Provided my contact information today and encouraged patient/caregiver to call me with any questions as needed.

## 2022-02-04 NOTE — TELEPHONE ENCOUNTER
I spoke with patient's son in regards to reschedule EGD. He stated that Mr Schlumbracht fell and broke his Pelvis. He will call when he is able to schedule.

## 2022-02-10 NOTE — PROGRESS NOTES
"Subjective:       Patient ID: Paul Browning is a 81 y.o. male.    Chief Complaint: Follow-up (Hospital Follow up) and Discuss Fit Kit  (Pt son wants to know if his Dad still needs Fit kit. Pt son  says seems like he gotten better .)    HPI   81 y.o. Male with CAD, systolic heart failure, CAD, Carotid disease/PAD, HLD, HTN, COPD/sarcoidosis, Insomnia, Aortic atherosclerosis, MDD, senile purpura, Hx of SCC is here for hospital f/u. Per records, "presented to the ED for a fall with head trauma and posterior scalp lacteration.  CTH was negative for acute hemorrhage, hip and rib xrays negative for fracture, and CT C/L-spine negative for acute traumatic injury.  Scalp laceration was repaired. Patient was stable and discharged from ED. However, patient became significantly SOB in the parking lot of the hospital and returned to the ED. SpO2 was 80%, and patient was admitted to  observation for acute hypoxia. Patient unable to provide history as he has dementia, son at bedside to assist. Son reports the patient was in significant back pain when attempting to leave the hospital, which exacerbated his SOB. The pain is controlled at rest, but becomes severe with minimal movement. The pain is most prominent in the lumbar region. The son also reports the patient's wheezing is chronic and says he is at his baseline respiratory function.      In the ED, patient is afebrile without leukocytosis. He is maintaining oxygen sats on 2L NC. P 75, RR 22, BP 94/51. Labs significant for T bili 1.2. , Tn negative. CXR c/w chronic and stable sarcoidosis changes. Given IV methylprednisolone. Duonebs x6. IV fentanyl 50mcg x1.     Hospital Course:   Mr. Browning was admitted to Hospital Medicine for management of a fall and a COPD exacerbation.  He was started on breathing treatments, Augmentin, and Prednisone.  He was noted to have difficulty ambulating with PT and endorsed back pain.  CT scan was ordered that showed a " "pubic ramus fracture.  Ortho was consulted.  He was started on Robaxin and low dose Eliquis for 6 weeks for DVT prophylaxis.  Informed of increased bleeding risk with Eliquis, but also on Aspirin for CAD.  Fall precautions reinforced.  He was evaluated by PT/OT who said SNF, but family felt like he would be better served going home with HH.  Bedside commode and wheelchair were ordered for home.  He did qualify for home oxygen with an oxygen sat of 87% at rest.  Atarax was started for anxiety."    Since discharge per son pt has been doing well overall. He is now on O2 for use mainly with ambulation and at night. Os sat's in upper 90s on RA.   Review of Systems   Constitutional: Positive for fatigue. Negative for activity change, appetite change, chills, diaphoresis, fever and unexpected weight change.   HENT: Negative for nasal congestion, mouth sores, postnasal drip, rhinorrhea, sinus pressure/congestion, sneezing, sore throat, trouble swallowing and voice change.    Eyes: Negative for discharge, itching and visual disturbance.   Respiratory: Negative for cough, chest tightness, shortness of breath and wheezing.    Cardiovascular: Negative for chest pain, palpitations and leg swelling.   Gastrointestinal: Negative for abdominal pain, blood in stool, constipation, diarrhea, nausea and vomiting.   Endocrine: Negative for cold intolerance and heat intolerance.   Genitourinary: Negative for difficulty urinating, dysuria, flank pain, hematuria and urgency.   Musculoskeletal: Positive for arthralgias, back pain, neck pain and neck stiffness. Negative for myalgias.   Integumentary:  Negative for rash and wound.   Allergic/Immunologic: Negative for environmental allergies and food allergies.   Neurological: Positive for weakness (legs). Negative for dizziness, tremors, seizures, syncope and headaches.   Hematological: Negative for adenopathy. Does not bruise/bleed easily.   Psychiatric/Behavioral: Positive for dysphoric " mood and sleep disturbance. Negative for agitation, behavioral problems, confusion, self-injury and suicidal ideas. The patient is nervous/anxious.          Objective:      Physical Exam  Constitutional:       General: He is not in acute distress.     Appearance: He is well-developed and well-nourished. He is not diaphoretic.   HENT:      Head: Normocephalic and atraumatic.        Right Ear: Tympanic membrane, ear canal and external ear normal.      Left Ear: Tympanic membrane, ear canal and external ear normal.      Nose: Nose normal.      Mouth/Throat:      Mouth: Oropharynx is clear and moist.      Pharynx: No oropharyngeal exudate.   Eyes:      General: No scleral icterus.        Right eye: No discharge.         Left eye: No discharge.      Extraocular Movements: EOM normal.      Conjunctiva/sclera: Conjunctivae normal.      Pupils: Pupils are equal, round, and reactive to light.   Neck:      Thyroid: No thyromegaly.      Vascular: No JVD.   Cardiovascular:      Rate and Rhythm: Normal rate and regular rhythm.      Pulses: Intact distal pulses.      Heart sounds: Normal heart sounds. No murmur heard.      Pulmonary:      Effort: Pulmonary effort is normal. No respiratory distress.      Breath sounds: Normal breath sounds. No wheezing or rales.   Abdominal:      General: Bowel sounds are normal. There is no distension.      Palpations: Abdomen is soft.      Tenderness: There is no abdominal tenderness. There is no guarding.   Musculoskeletal:         General: No edema.      Cervical back: Normal range of motion and neck supple.   Lymphadenopathy:      Cervical: No cervical adenopathy.   Skin:     General: Skin is warm and dry.      Coloration: Skin is not pale.      Findings: No rash.   Neurological:      Mental Status: He is alert and oriented to person, place, and time.   Psychiatric:         Mood and Affect: Mood and affect normal.         Judgment: Judgment normal.         Assessment:       Problem List Items  Addressed This Visit        Neuro    Dementia    Cervicogenic headache    Cervical spondylosis       Psychiatric    MDD (major depressive disorder), recurrent episode, mild (Chronic)    Anxiety       Pulmonary    Chronic obstructive pulmonary disease with acute exacerbation    Acute hypoxemic respiratory failure       Cardiac/Vascular    S/P CABG x 2    Peripheral arterial disease    Hypertension    Hyperlipidemia (Chronic)    Chronic systolic heart failure    CAD (coronary artery disease) (Chronic)    Atherosclerosis of aorta       Immunology/Multi System    Pulmonary sarcoidosis       Hematology    Senile purpura       GI    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 None at rest/sitting   -f/u with Ortho today and continue Eliquis for DVT prop     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- stable on inhaler      Left scalp laceration from fall- 3 staples removed, wound healing well and approximated     CAD s/p PCI and CABG with systolic CHF- stable no CP currently, followed by Cardiology       Continue ASA/Statin/BB      Carotid artery disease/PAD- stable, CPT      Stevens's esophagus, Hx of gastric ulcer- Last EGD(8/21)- low grade dysplasia and intestinal metaplasia       Continue Protonix 40 mg daily       Followed by GI       - no abdominal pain currently      HTN- stable, CPT     Insomnia- stable        Aortic atherosclerosis- stable, continue to monitor      MDD/Anxiety- stable Zoloft daily and Atarax PRN      HLD- controlled on Lipitor 40 mg daily     Osteoporosis- continue Fosamax, Calcium/Vitamin D     Senile purpura- stable, continue to monitor     Cervical arthritis with  headaches- followed by Pain Management      Hx of SCC- followed by Derm      Dementia- stable      Over 1/2 of 40 minute visit spent reviewing pt's medical records, education/discussion of pt's medical conditions and medical management

## 2022-02-10 NOTE — PROGRESS NOTES
Subjective:      Patient ID: Paul Browning is a 81 y.o. male.    Chief Complaint: Pain of the Pelvis    Paul Browning is a 81 y.o. male with PMH  COPD, sarcoidosis, CAD, HTN, GERD with Stevens's esophagus, BPH, squamous cell carcinoma, and anxiety/depression who presented to the ED on 1/23/22 for a fall with head trauma and posterior scalp laceration. Laceration was stapled in ED and CT head was negative. Patient was discharged until he experienced profound dyspnea in the parking lot and was readmitted for acute hypoxia. Patient continued to endorse left hip pain. Hip, pelvis, and CT Thoracic and Lumbar spine were obtained and patient was admitted to medicine for observation and PT/OT. Given ongoing complaints of left hip pain, CT hips were ordered by primary team on 1/26 which showed superior and inferior pubic rami fractures. Ortho was consulted for management recommendations.  Recommendations were to treat non-operative and recommended use of a walker for gait aide and follow up in trauma clinic.  Additionally, it was recommended he take Robaxin and Tylenol for pain management and Eliquis 2.5 mg bid x 6 weeks for DVT prevention.  At baseline patient lives with his daughter and does not use assistive devices at baseline. He has a history of C-spine and non-displaced left hip fracture due to fall 1 year ago.      Currently, son reports his pain and mobility has improved since his discharge.  Patient is walking with his walker and home health PT/OT.  He is using Robaxin and Tylenol as recommended.  He is taking his Eliquis.  No falls since his hospital discharge.  He endorses pain to his left lower back and hip rising from sitting position and with the first step but reports this is improving.  He is on supplemental oxygen due to his sarcoidosis.  CT of his Thoracic mentions of a new nodule in his left lower lobe when compared to prior dated 10/14/2020 at 1.9 cm.  Advised son to discuss  with pulmonologist about follow up CT if needed.  Additionally, he is on Bactrim for UTI currently.      Review of Systems   Musculoskeletal: Positive for falls.        Left pubic rami fracture   All other systems reviewed and are negative.        Objective:            General    Vitals reviewed.  Constitutional: He is oriented to person, place, and time. He appears well-developed and well-nourished. No distress.   HENT:   Head: Normocephalic and atraumatic.   Eyes: Conjunctivae are normal. Pupils are equal, round, and reactive to light.   Neck: Neck supple.   Cardiovascular: Intact distal pulses.    Pulmonary/Chest: Effort normal.   Neurological: He is alert and oriented to person, place, and time. He has normal reflexes.   Psychiatric: He has a normal mood and affect. His behavior is normal. Judgment and thought content normal.         Left Hip Exam     Other   Sensation: normal    Comments:  Tenderness to posterior hip.  No groin pain.  Decrease muscle strength 3/5 to LLE.          Vascular Exam       Left Pulses  Dorsalis Pedis:      2+  Posterior Tibial:      2+        RADS:  Pelvis, inlet/outlet view x-rays were obtained and personally reviewed by me.  He has a comminuted left superior and inferior pubic rami fractures that appear stable.  No other fractures seen.            Assessment:       Encounter Diagnosis   Name Primary?    Closed fracture of multiple pubic rami, left, initial encounter Yes          Plan:       Paul was seen today for pain.    Diagnoses and all orders for this visit:    Closed fracture of multiple pubic rami, left, initial encounter    Recommend continue working with home health PT and OT.  He may continue using the Robaxin and Tylenol as prescribed.  Continue his Eliquis 2.5 mg twice a day for a total of 6 weeks.  Patient may also try Salonpas patches to the left hip to help minimize his pain.    Weight bear as tolerated, use walker when ambulating at all times.    Will see the  patient back in 2 weeks at that time will repeat x-ray of his pelvis inlet and outlet views please.    Future Appointments   Date Time Provider Department Center   2/14/2022 11:15 AM Tari Jaz, PTA [facility]   2/16/2022 10:00 AM Willy Engolia, PT [facility]   2/21/2022 10:00 AM Willy Engolia, PT [facility]   2/23/2022 10:00 AM Willy Engolia, PT [facility]   2/23/2022  1:00 PM [facility] [address]   2/28/2022 10:00 AM Willy Engolia, PT [facility]   3/2/2022 10:30 AM Tari Jaz, PTA [facility]   3/7/2022 10:00 AM Willy Engolia, PT [facility]   3/9/2022 10:30 AM Tari Jaz, PTA [facility]   3/14/2022  9:00 AM [facility], [facility] [place]   3/14/2022  9:15 AM [facility], [facility] [place]   3/16/2022 11:30 AM Willy Engolia, PT [facility]   3/18/2022 10:45 AM Tari Jaz, PTA [facility]   3/21/2022 11:30 AM Willy Engolia, PT [facility]   3/21/2022  2:00 PM Grey Forbes,  [facility] IM [place]   4/14/2022  1:40 PM Penelope Stauffer MD [facility]   5/19/2022 10:40 AM Penelope Stauffer MD [facility]

## 2022-02-23 NOTE — TELEPHONE ENCOUNTER
Spoke with pt care giver, that NIDHI Multani NP will not be in the clinic today 2/23/22. Pt appointment will be cancel 2/23/22. Pt new appointment will be 2/24/22 @ 9:00 am. Patient care giver states verbal understanding and has no further questions

## 2022-02-24 NOTE — TELEPHONE ENCOUNTER
I spoke Mr. Miller's son and informed him that his father would be getting a new referral for his hip fracture. Mr. Miller verbalized understanding.

## 2022-02-24 NOTE — PROGRESS NOTES
Subjective:      Patient ID: Paul Browning is a 81 y.o. male.    Chief Complaint: Pain of the Pelvis    Paul Browning is a 81 y.o. male with PMH  COPD, sarcoidosis, CAD, HTN, GERD with Stevens's esophagus, BPH, squamous cell carcinoma, and anxiety/depression who presented to the ED on 1/23/22 for a fall with head trauma and posterior scalp laceration. Laceration was stapled in ED and CT head was negative. Patient was discharged until he experienced profound dyspnea in the parking lot and was readmitted for acute hypoxia. Patient continued to endorse left hip pain. Hip, pelvis, and CT Thoracic and Lumbar spine were obtained and patient was admitted to medicine for observation and PT/OT. Given ongoing complaints of left hip pain, CT hips were ordered by primary team on 1/26 which showed superior and inferior pubic rami fractures. Ortho was consulted for management recommendations.  Recommendations were to treat non-operative and recommended use of a walker for gait aide and follow up in trauma clinic.  Additionally, it was recommended he take Robaxin and Tylenol for pain management and Eliquis 2.5 mg bid x 6 weeks for DVT prevention.  At baseline patient lives with his daughter and does not use assistive devices at baseline. He has a history of C-spine and non-displaced left hip fracture due to fall 1 year ago.      Patient returns today with his son, patient currently in a wheelchair.  Reports no pain, has been working with home health and doing really well.  Son would like to transition him to outpatient therapy.  Son reports he is trying to get his lasix dosing adjusted due to excessive urination, they are discussing with his PCP.      Per son, patient is walking with his walker and home health PT/OT.  He is using Robaxin and Tylenol as recommended but would like to back down since his is not having that much pain.  He is taking his Eliquis.  No falls since his hospital discharge.       Pain        Review of Systems   Musculoskeletal: Positive for falls.        Left pubic rami fracture   All other systems reviewed and are negative.        Objective:        General    Vitals reviewed.  Constitutional: He is oriented to person, place, and time. He appears well-developed and well-nourished. No distress.   HENT:   Head: Normocephalic and atraumatic.   Eyes: Conjunctivae are normal. Pupils are equal, round, and reactive to light.   Neck: Neck supple.   Cardiovascular: Intact distal pulses.    Pulmonary/Chest: Effort normal.   Neurological: He is alert and oriented to person, place, and time. He has normal reflexes.   Psychiatric: He has a normal mood and affect. His behavior is normal. Judgment and thought content normal.         Left Hip Exam     Other   Sensation: normal    Comments:  No tenderness to left hip.  No groin pain.  Muscle strength 5/5 to LLE.          Vascular Exam       Left Pulses  Dorsalis Pedis:      2+  Posterior Tibial:      2+        RADS:  Pelvis, inlet/outlet view x-rays were obtained and personally reviewed by me.  He has a comminuted left superior and inferior pubic rami fractures that appear stable and healing.  No other fractures seen.      Assessment:       Encounter Diagnosis   Name Primary?    Closed fracture of trochanter of left femur with routine healing, subsequent encounter Yes          Plan:       Paul was seen today for pain.    Diagnoses and all orders for this visit:    Closed fracture of trochanter of left femur with routine healing, subsequent encounter  -     Ambulatory referral/consult to Physical/Occupational Therapy; Future    I will transition him to Outpatient PT with [facility].    Recommend weight bear as tolerated,  use walker when ambulating at all times.    Ok to change Tylenol and Robaxin to PRN dosing, if no longer needs can stop.    Continue his Eliquis 2.5 mg twice a day for a total of 6 weeks.      Will see the patient back in 6 weeks at that  time will repeat x-ray of his pelvis inlet and outlet views please.    Future Appointments   Date Time Provider Department Center   2/28/2022 10:00 AM Willy Rosales, PT [facility]   3/2/2022 10:30 AM Tari Sebastian, PTA [facility]   3/7/2022 10:00 AM Willy Rosales, PT [facility]   3/9/2022 10:30 AM Tari Sebastian, PTA [facility]   3/14/2022  9:00 AM [facility], [facility] [place]   3/14/2022  9:15 AM SPECIMEN, [facility] [place]   3/16/2022 11:30 AM Claudia Walsh, PT [facility]   3/18/2022 10:45 AM Tari Sebastian, PTA [facility]   3/21/2022 11:30 AM Claudia Walsh, PT [facility]   3/21/2022  2:00 PM Grey Forbes, DO [facility] [place]   4/6/2022  1:30 PM [facility] [address]   4/14/2022  1:40 PM Penelope Stauffer MD [facility]   5/19/2022 10:40 AM Penelope Stauffer MD [facility]

## 2022-02-24 NOTE — PLAN OF CARE
[facility]  Physical Therapy Initial Evaluation    [facility]  Clinic Number: [id number]    Therapy Diagnosis:   Encounter Diagnosis   Name Primary?    Closed fracture of trochanter of left femur with routine healing, subsequent encounter      Physician: Kris Multani, NP    Physician Orders: PT Eval and Treat   Medical Diagnosis from Referral: S72.102D (ICD-10-CM) - Closed fracture of trochanter of left femur with routine healing, subsequent encounter   Evaluation Date: 2/24/2022  Authorization Period Expiration: 2/24/2023  Plan of Care Expiration: 4/24/2022  Visit # / Visits authorized: 1/ 1  FOTO: 1/10      Time In: 1:45  Time Out: 2:30  Total Billable Time: 45 minutes     Precautions: Standard, Standard    Subjective   Date of onset: 1/25/2022  History of current condition - SHABNAM reports: Patient is with his son.  Reports no pain, has been working with home health and doing really well.  Son would like to transition him to outpatient therapy. Per son, patient is walking with his walker and home health PT/OT.  He is using Robaxin and Tylenol as recommended but would like to back down since his is not having that much pain.  No falls since his hospital discharge. Pt states that he is ready to begin fishing again.       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    Hyperlipidemia     Hypertension     Ischemic cardiomyopathy 11/5/2014    MDD (major depressive disorder), recurrent episode, mild 2/17/2016    Osteoporosis 7/20/2016    Peripheral  arterial disease 3/18/2016    Sarcoid     Squamous cell carcinoma 09/2016, 5/2016    crown of scalp, left wrist       Surgical History:   Paul Browning  has a past surgical history that includes Umbilical hernia repair; Coronary artery bypass graft; Cardiac surgery; Phacoemulsification of cataract (Right, 7/17/2018); Intraocular prosthesis insertion (Right, 7/17/2018); Phacoemulsification of cataract (Left, 7/31/2018); Intraocular prosthesis insertion (Left, 7/31/2018); Cataract extraction w/  intraocular lens implant (Right, 07/17/2018); Cataract extraction w/  intraocular lens implant (Left, 07/31/2018); Esophagogastroduodenoscopy (EGD) with radiofrequency treatment of gastroesophageal junction; Esophagogastroduodenoscopy (N/A, 4/8/2019); Carotid stent (N/A, 10/14/2019); Injection of anesthetic agent around medial branch nerves innervating cervical facet joint (Bilateral, 1/9/2020); Radiofrequency thermocoagulation (Bilateral, 1/30/2020); Placement of screw in odontoid (N/A, 10/22/2020); and Esophagogastroduodenoscopy (N/A, 8/4/2021).    Medications:   Paul has a current medication list which includes the following prescription(s): albuterol, alendronate, apixaban, aspirin, atorvastatin, calcium carbonate, furosemide, gadavist, hydroxyzine hcl, magnesium oxide, memantine, omega-3 fatty acids-vitamin e, pantoprazole, sertraline, sucralfate, and stiolto respimat.    Allergies:   Review of patient's allergies indicates:   Allergen Reactions    Morphine Shortness Of Breath        Imaging, Yes    Prior Therapy: Yes  Social History:  lives with their daughter  Occupation: Retired  Prior Level of Function: Requires assistance with ADL's  Current Level of Function: Requires assistance with ADL's    Pain:   Current 0/10  Pts goals: To become more independent and resume fishing.    Objective     Posture: Forward Head, Rounded Shoulders  Range of Motion/Strength:   Hip Right Right Right Left Left Left  "Pain/Dysfunction with Movement    AROM PROM MMT AROM PROM MMT    Flexion WFL WFL 4/5 WFL WFL 4/5    Extension WFL WFL  WFL WFL  Pt performed good bridge against gravity   Abduction WFL WNL 4/5 WFL WNL 4/5    External rotation WFL WNL 4/5 WFL WNL 4/5       Knee Right Right Right Left Left Left Pain/Dysfunction with Movement    AROM PROM MMT AROM PROM MMT    Flexion WNL WNL 4/5 WNL WNL 4/5    Extension WNL WNL 4/5 WNL WNL 4/5      Ankle Right Right Left Left Pain/Dysfunction with Movement    AROM MMT AROM MMT    Plantarflexion WNL 5/5 WNL 5/5    Dorsiflexion WNL 5/5 WNL 5/5          Flexibility: Decreased Hamstring Flexibility    Special Tests: None    Gait Analysis:With AD.  Device Used -  Rolling walker Assistance CGA Deviations: Decreased Visual Awareness, Increased Trunk Leans    TU seconds   TUG Cutoff Scores:        30 second sit-to-stand test (without U/E support):  (10 w/ UE support)  30" sit to stand Cutoff Scores:        CMS Impairment/Limitation/Restriction for FOTO  Survey    Therapist reviewed FOTO scores for Paul Browning on 2022.   FOTO documents entered into [employer] - see Media section.    Limitation Score: 79%  Category: Mobility       TREATMENT   Treatment Time In: 2:20  Treatment Time Out: 2:30  Total Treatment time separate from Evaluation: 20 minutes    SHABNAM received therapeutic exercises to develop strength, endurance, ROM, flexibility, posture and core stabilization for 20 minutes including:  Seated Hip Abduction w/ various TB  Seated Hamstring Stretch        Home Exercises Provided and Patient Education Provided     Education provided:   - HEP    Written Home Exercises Provided: Patient instructed to cont prior HEP.  Exercises were reviewed and SHABNAM was able to demonstrate them prior to the end of the session.  SHABNAM demonstrated good  understanding of the education provided.     See EMR under Patient Instructions for exercises provided 2022.      Assessment   Paul is a " 81 y.o. male referred to outpatient Physical Therapy with a medical diagnosis of closed fracture of trochanter of left femur . Pt presents with decreased LE strength, decreased LE ROM, decreased functional mobility, abnormal posture, abnormal gait, decreased visual awareness, and decreased safety awareness. Pt and son were educated on compliance and cont of HEP from home health PT.     Pt prognosis is Good.   Pt will benefit from skilled outpatient Physical Therapy to address the deficits stated above and in the chart below, provide pt/family education, and to maximize pt's level of independence.     Plan of care discussed with patient: Yes  Pt's spiritual, cultural and educational needs considered and patient is agreeable to the plan of care and goals as stated below:     Anticipated Barriers for therapy: Chronicity of symptoms, co-morbidities, visual impairments    Medical Necessity is demonstrated by the following  History  Co-morbidities and personal factors that may impact the plan of care Co-morbidities:   See Medical Hx    Personal Factors:   age     moderate   Examination  Body Structures and Functions, activity limitations and participation restrictions that may impact the plan of care Body Regions:   lower extremities  trunk    Body Systems:    gross symmetry  ROM  strength  balance  gait  transfers    Participation Restrictions:   None    Activity limitations:   Learning and applying knowledge  no deficits    General Tasks and Commands  undertaking multiple tasks    Communication  no deficits    Mobility  walking    Self care  no deficits    Domestic Life  assisting others    Interactions/Relationships  no deficits    Life Areas  no deficits    Community and Social Life  community life  recreation and leisure         moderate   Clinical Presentation stable and uncomplicated low   Decision Making/ Complexity Score: low       Goals:  Long Term Goals (8 Weeks):  1. Pt will improve FOTO score to </= 59%  limited to decrease perceived limitation with mobility  2. Pt will improve TUG test to </= 20 sec to improve walking speed and LE strength for functional community ambulation  3. Pt will improve impaired LE MMTs by 1 grade to improve strength for functional tasks.  4. Pt improve impaired hip ROM to WNL in all planes to improve mobility for normal movement patterns.  5. Pt will demonstrate safe gait with LRD to improve QOL.  6. Pt will resume ADL (fishing) safely with supervision/independence to improve QOL     Plan   Plan of care Certification: 2/24/2022 to 4/24/2022.    Outpatient Physical Therapy 2 times weekly for 8 weeks to include the following interventions: Cervical/Lumbar Traction, Gait Training, Manual Therapy, Moist Heat/ Ice, Neuromuscular Re-ed, Patient Education, Self Care, Therapeutic Activities, Therapeutic Exercise and Ultrasound, ASTYM, Kinesiotaping PRN, Functional Dry Needling    Neto Veliz, PT, DPT

## 2022-02-24 NOTE — TELEPHONE ENCOUNTER
Continue his Eliquis 2.5 mg twice a day for a total of 6 weeks ot treatment and then may stop  Can taper down on the pain meds(Tylenol/Robaxin)  I would continue the Furosemide for now as he has mild congestive heart failure

## 2022-02-24 NOTE — PROGRESS NOTES
Outpatient Therapy Discharge Summary     Name: Paul Berrios [facility]  Clinic Number: [id number]    Therapy Diagnosis:        Encounter Diagnoses   Name Primary?    Falling      Impaired functional mobility, balance, and endurance Yes      Physician: Penelope Stauffer MD    Physician Orders: PT Eval and Treat   Medical Diagnosis from Referral: R29.6 (ICD-10-CM) - Falling  Evaluation Date: 1/10/2022      Date of Last visit: 1/10/22  Total Visits Received: 0      Assessment    I received communication from Yarelis Whaley via email that Mr. Miller has suffered a closed femur fracture. I reported to her that I would d/c this POC and she would send a new referral in for him for his hip. No goals met. Pt did not have any f/u visits for this POC. I will reach out to Mr. Miller to let him know he will be getting a new referral.         Goals:  Short Term Goals: 4 weeks   1. Pt will improve SLB to 10s bilaterally to demonstrate improved balance and increased safety.   2. Pt will improve TUG to 20s without AD to demonstrate a decrease in fall risk.  3. Pt will improve 30s chair rise to 15 reps to demonstrate increase in LE strength and improved functional endurance.      Long Term Goals: 8 weeks   1. Pt will improve SLB to 13s bilaterally to demonstrate improved balance and increase safety.   2. Pt will improve TUG to 18s without AD to further demonstrate a decrease in fall risk.  3. Pt will improve 30s chair rise to 17 reps to further demonstrate improvement in functional endurance     Discharge FOTO Score: n/a    Discharge reason: Patient has been hospitalized.     Plan   This patient is discharged from Physical Therapy

## 2022-03-04 NOTE — TELEPHONE ENCOUNTER
LOV 2/10/22  LRF 1/27/22    Pt has been on RX since discharge from hospital 1/28 - wants to know if he should continue RX if so they need refills

## 2022-03-09 PROBLEM — S72.102D CLOSED FRACTURE OF TROCHANTER OF LEFT FEMUR WITH ROUTINE HEALING: Status: ACTIVE | Noted: 2022-01-01

## 2022-03-09 NOTE — PROGRESS NOTES
[facility]   Physical Therapy Treatment Note     [facility]  Clinic Number: [id number]    Therapy Diagnosis:   Encounter Diagnosis   Name Primary?    Closed fracture of trochanter of left femur with routine healing, subsequent encounter Yes     Physician: Kris Multani NP    Visit Date: 3/9/2022    Physician Orders: PT Eval and Treat   Medical Diagnosis from Referral: S72.102D (ICD-10-CM) - Closed fracture of trochanter of left femur with routine healing, subsequent encounter   Evaluation Date: 2/24/2022  Authorization Period Expiration: 2/24/2023  Plan of Care Expiration: 4/24/2022  Visit # / Visits authorized: 1/ 1, 1/20  FOTO: 1/10  PTA Visit #: 1/5     Precautions: Standard, Fall and [place], visally impaired    Time In: 3:15 pm  Time Out: 4:00   Total Billable Time: 45 minutes    Subjective     Pt reports: that he is doing okay today and wants to do some exercises for his legs.  He was compliant with home exercise program.  Response to previous treatment: last session was initial eval  Functional change: none at this time     Pain: 0/10  Location: n/a    Objective     SHABNAM received therapeutic exercises to develop strength, endurance, ROM and flexibility for 45 minutes including:  Nu-step 5 min (for endurance/supervised)   LAQ 1lb 2x10 BLE   Seated march 1lb 2x10 BLE  Sit to stand 2x10 (from elevated mat)   Seated adductor squeeze 2x20  SLR 2x10 BLE   Supine clams RTB 2x10   Leg press isometric c /SB 2x20 BLE  Standing @ elevated mat 20x BLE     - marches     - hip abduction    - hip extension       Seated Hip Abduction w/ various TB NP  Seated Hamstring Stretch NP        Home Exercises Provided and Patient Education Provided     Education provided:   - HEP    Written Home Exercises Provided: yes.  Exercises were reviewed and SHABNAM was able to demonstrate them prior to the end of the session.  SHABNAM demonstrated good  understanding of the education provided.     See  EMR under Patient Instructions for exercises provided 3/9/2022.    Assessment     Initiated therex program following pt's initial evaluation. Pt able to tolerate all therex prescribed to him, however required moderate verbal and tactile cues due to visual impairments. Cues given throughout reps for proper form. Pt also given rest breaks due to SOB and fatigue. Will continue to monitor pt's response to therapy and progress per his tolerance.     SHABNAM Is progressing well towards his goals.   Pt prognosis is Good.     Pt will continue to benefit from skilled outpatient physical therapy to address the deficits listed in the problem list box on initial evaluation, provide pt/family education and to maximize pt's level of independence in the home and community environment.     Pt's spiritual, cultural and educational needs considered and pt agreeable to plan of care and goals.     Anticipated barriers to physical therapy: Chronicity of symptoms, co-morbidities, visual impairments       Goals:   Long Term Goals (8 Weeks):  1. Pt will improve FOTO score to </= 59% limited to decrease perceived limitation with mobility  2. Pt will improve TUG test to </= 20 sec to improve walking speed and LE strength for functional community ambulation  3. Pt will improve impaired LE MMTs by 1 grade to improve strength for functional tasks.  4. Pt improve impaired hip ROM to WNL in all planes to improve mobility for normal movement patterns.  5. Pt will demonstrate safe gait with LRD to improve QOL.  6. Pt will resume ADL (fishing) safely with supervision/independence to improve QOL       Plan   Plan of care Certification: 2/24/2022 to 4/24/2022.       Continue with current POC.     Amy Carnes PTA

## 2022-03-10 NOTE — PROGRESS NOTES
PT/PTA met face to face to discuss pt's treatment plan and progress towards established goals. Pt will be seen by a physical therapist minimally every 6th visit or every 30 days.    Amy Carnes PTA

## 2022-03-11 NOTE — PROGRESS NOTES
[facility]   Physical Therapy Treatment Note     [facility]  Clinic Number: [id number]    Therapy Diagnosis:   Encounter Diagnoses   Name Primary?    Gait instability Yes    Closed fracture of trochanter of left femur with routine healing      Physician: Kris Multani NP    Visit Date: 3/11/2022    Physician Orders: PT Eval and Treat   Medical Diagnosis from Referral: S72.102D (ICD-10-CM) - Closed fracture of trochanter of left femur with routine healing, subsequent encounter   Evaluation Date: 2/24/2022  Authorization Period Expiration: 2/24/2023  Plan of Care Expiration: 4/24/2022  Visit # / Visits authorized: 1/ 1, 2/20  FOTO: 2/10  PTA Visit #: 2/5     Precautions: Standard, Fall and [place], visally impaired    Time In: 1:13 pm  Time Out: 2:03 pm  Total Billable Time: 50 minutes    Subjective     Pt reports: that he is feeling fine today, but is ready to go fishing.   He was compliant with home exercise program.  Response to previous treatment: no adverse effects   Functional change: none at this time     Pain: 0/10  Location: n/a    Objective     SHABNAM received therapeutic exercises to develop strength, endurance, ROM and flexibility for 50 minutes including:  Nu-step 2 1/2 min x 3 trials (for endurance/supervised)   LAQ 2lb 2x10 BLE   Seated march 2lb 2x10 BLE  Sit to stand 2x10 (from elevated mat)   Seated adductor squeeze 2x20  Standing @ elevated mat 20x BLE     - marches     - hip abduction    - hip extension   Supine clams Green TB 2x10   SLR 2x10 BLE   Leg press isometric c /SB 2x20 BLE  Bridges 3x10         Seated Hip Abduction w/ various TB NP  Seated Hamstring Stretch NP        Home Exercises Provided and Patient Education Provided     Education provided:   - HEP    Written Home Exercises Provided: yes.  Exercises were reviewed and SHABNAM was able to demonstrate them prior to the end of the session.  SHABNAM demonstrated good  understanding of the education  provided.     See EMR under Patient Instructions for exercises provided 3/9/2022.    Assessment     Continued with established therex this session, however increased weight and resistance during several exercises. Pt continues with the need for moderate verbal and tactile cues due to hearing and visual impairments. Pt required multiple rest breaks throughout session due to fatigue and SOB. Will continue to progress pt per POC and tolerance and begin to incorporate additional standing therex.     SHABNAM Is progressing well towards his goals.   Pt prognosis is Good.     Pt will continue to benefit from skilled outpatient physical therapy to address the deficits listed in the problem list box on initial evaluation, provide pt/family education and to maximize pt's level of independence in the home and community environment.     Pt's spiritual, cultural and educational needs considered and pt agreeable to plan of care and goals.     Anticipated barriers to physical therapy: Chronicity of symptoms, co-morbidities, visual impairments       Goals:   Long Term Goals (8 Weeks):  1. Pt will improve FOTO score to </= 59% limited to decrease perceived limitation with mobility  2. Pt will improve TUG test to </= 20 sec to improve walking speed and LE strength for functional community ambulation  3. Pt will improve impaired LE MMTs by 1 grade to improve strength for functional tasks.  4. Pt improve impaired hip ROM to WNL in all planes to improve mobility for normal movement patterns.  5. Pt will demonstrate safe gait with LRD to improve QOL.  6. Pt will resume ADL (fishing) safely with supervision/independence to improve QOL       Plan   Plan of care Certification: 2/24/2022 to 4/24/2022.       Continue with current POC.     Amy Carnes PTA

## 2022-03-15 NOTE — TELEPHONE ENCOUNTER
Use the proair as needed and continue maintenance inhaler as prescribed   Any cough, fevers/chills or confusion ?

## 2022-03-16 NOTE — PROGRESS NOTES
[facility]   Physical Therapy Treatment Note     [facility]  Clinic Number: [id number]    Therapy Diagnosis:   Encounter Diagnosis   Name Primary?    Closed fracture of trochanter of left femur with routine healing Yes     Physician: Kris Multani NP    Visit Date: 3/16/2022    Physician Orders: PT Eval and Treat   Medical Diagnosis from Referral: S72.102D (ICD-10-CM) - Closed fracture of trochanter of left femur with routine healing, subsequent encounter   Evaluation Date: 2/24/2022  Authorization Period Expiration: 2/24/2023  Plan of Care Expiration: 4/24/2022  Visit # / Visits authorized: 1/ 1, 2/20  FOTO: 2/10  PTA Visit #: 2/5     Precautions: Standard, Fall and [place], visally impaired    Time In: 2:30 pm  Time Out: 3:15 pm  Total Billable Time: 45 minutes    Subjective     Pt reports: that he is feeling fine today, but is ready to go fishing.   He was compliant with home exercise program.  Response to previous treatment: no adverse effects   Functional change: none at this time     Pain: 0/10  Location: n/a    Objective     SHABNAM received therapeutic exercises to develop strength, endurance, ROM and flexibility for 45 minutes including:  Nu-step 2 1/2 min x 3 trials (for endurance/supervised)   LAQ 2lb 2x10 BLE   Seated march 2lb 2x10 BLE  Sit to stand 2x10 (from elevated mat)   Seated adductor squeeze 2x20  Standing @ elevated mat 20x BLE     - marches     - hip abduction    - hip extension   Supine clams Green TB 2x10   SLR 2x10 BLE   Leg press isometric c /SB 2x20 BLE  Bridges 3x10         Seated Hip Abduction w/ various TB NP  Seated Hamstring Stretch NP        Home Exercises Provided and Patient Education Provided     Education provided:   - HEP    Written Home Exercises Provided: yes.  Exercises were reviewed and SHABNAM was able to demonstrate them prior to the end of the session.  SHABNAM demonstrated good  understanding of the education provided.     See EMR  under Patient Instructions for exercises provided 3/9/2022.    Assessment     Pt continues with the need for moderate verbal and tactile cues due to hearing and visual impairments. Pt required multiple rest breaks throughout session due to fatigue and SOB. Will continue to progress pt per POC and tolerance and begin to incorporate additional standing therex.     SHABNAM Is progressing well towards his goals.   Pt prognosis is Good.     Pt will continue to benefit from skilled outpatient physical therapy to address the deficits listed in the problem list box on initial evaluation, provide pt/family education and to maximize pt's level of independence in the home and community environment.     Pt's spiritual, cultural and educational needs considered and pt agreeable to plan of care and goals.     Anticipated barriers to physical therapy: Chronicity of symptoms, co-morbidities, visual impairments       Goals:   Long Term Goals (8 Weeks):  1. Pt will improve FOTO score to </= 59% limited to decrease perceived limitation with mobility  2. Pt will improve TUG test to </= 20 sec to improve walking speed and LE strength for functional community ambulation  3. Pt will improve impaired LE MMTs by 1 grade to improve strength for functional tasks.  4. Pt improve impaired hip ROM to WNL in all planes to improve mobility for normal movement patterns.  5. Pt will demonstrate safe gait with LRD to improve QOL.  6. Pt will resume ADL (fishing) safely with supervision/independence to improve QOL       Plan   Plan of care Certification: 2/24/2022 to 4/24/2022.       Continue with current POC.     Neto Veliz, PT

## 2022-03-18 NOTE — PROGRESS NOTES
[facility]   Physical Therapy Treatment Note     [facility]  Clinic Number: [id number]    Therapy Diagnosis:   Encounter Diagnosis   Name Primary?    Closed fracture of trochanter of left femur with routine healing Yes     Physician: Kris Multani NP    Visit Date: 3/18/2022    Physician Orders: PT Eval and Treat   Medical Diagnosis from Referral: S72.102D (ICD-10-CM) - Closed fracture of trochanter of left femur with routine healing, subsequent encounter   Evaluation Date: 2/24/2022  Authorization Period Expiration: 2/24/2023  Plan of Care Expiration: 4/24/2022  Visit # / Visits authorized: 1/ 1, 3/20  FOTO: 2/10  PTA Visit #: 2/5     Precautions: Standard, Fall and [place], visally impaired    Time In: 1:15 pm  Time Out: 2:00 pm  Total Billable Time: 45 minutes    Subjective     Pt reports: that he is feeling fine today, but is ready to go fishing.   He was compliant with home exercise program.  Response to previous treatment: no adverse effects   Functional change: none at this time     Pain: 0/10  Location: n/a    Objective     SHABNAM received therapeutic exercises to develop strength, endurance, ROM and flexibility for 45 minutes including:  Nu-step 2 1/2 min x 3 trials (for endurance/supervised)   LAQ 2lb 2x10 BLE   Seated march 2lb 2x10 BLE  Sit to stand 2x10 (from elevated mat)   Seated adductor squeeze 2x20  Standing @ elevated mat 20x BLE     - marches     - hip abduction    - hip extension   Supine clams Green TB 2x10   SLR 2x10 BLE   Leg press isometric c /SB 2x20 BLE  Bridges 3x10         Seated Hip Abduction w/ various TB NP  Seated Hamstring Stretch NP        Home Exercises Provided and Patient Education Provided     Education provided:   - HEP    Written Home Exercises Provided: yes.  Exercises were reviewed and SHABNAM was able to demonstrate them prior to the end of the session.  SHABNAM demonstrated good  understanding of the education provided.     See EMR  under Patient Instructions for exercises provided 3/9/2022.    Assessment     Pt continues with the need for moderate verbal and tactile cues due to hearing and visual impairments. Pt required multiple rest breaks throughout session due to fatigue and SOB. Will continue to progress pt per POC and tolerance and begin to incorporate additional standing therex.     SHABNAM Is progressing well towards his goals.   Pt prognosis is Good.     Pt will continue to benefit from skilled outpatient physical therapy to address the deficits listed in the problem list box on initial evaluation, provide pt/family education and to maximize pt's level of independence in the home and community environment.     Pt's spiritual, cultural and educational needs considered and pt agreeable to plan of care and goals.     Anticipated barriers to physical therapy: Chronicity of symptoms, co-morbidities, visual impairments       Goals:   Long Term Goals (8 Weeks):  1. Pt will improve FOTO score to </= 59% limited to decrease perceived limitation with mobility  2. Pt will improve TUG test to </= 20 sec to improve walking speed and LE strength for functional community ambulation  3. Pt will improve impaired LE MMTs by 1 grade to improve strength for functional tasks.  4. Pt improve impaired hip ROM to WNL in all planes to improve mobility for normal movement patterns.  5. Pt will demonstrate safe gait with LRD to improve QOL.  6. Pt will resume ADL (fishing) safely with supervision/independence to improve QOL       Plan   Plan of care Certification: 2/24/2022 to 4/24/2022.       Continue with current POC.     Neto Veliz, PT

## 2022-03-21 NOTE — PROGRESS NOTES
Subjective:       Patient ID: Paul Browning is a 81 y.o. male.    Chief Complaint: Annual Exam    HPI   81 y.o. Male with CAD, systolic heart failure, CAD, Carotid disease/PAD, HLD, HTN, COPD/sarcoidosis, Insomnia, Aortic atherosclerosis, MDD, senile purpura, hx of pelvic fracture from fall, Hx of SCC is here for annual exam    Vaccines: Influenza (2021); Tetanus (2016); Pneumococcal 23 (2022); Prevnar (2014); Shingrix (will get)  Sexual Screening: declined  Eye exam: 2016  Colonoscopy: declined  DEXA: 10/19     Mobility: normal  Falls: no     Exercise: no  Diet: low cholesterol    Past Medical History:  1/23/2022: Acute hypoxemic respiratory failure  No date: Anxiety  No date: Stevens's esophagus with low grade dysplasia  No date: BPH (benign prostatic hyperplasia)  No date: CAD (coronary artery disease)  No date: Cataract  1/3/2020: Cervical spondylosis  No date: Chronic obstructive pulmonary disease with acute exacerbation  No date: Diaphragmatic hernia  No date: GERD (gastroesophageal reflux disease)  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  No date: Hyperlipidemia  No date: Hypertension  11/5/2014: Ischemic cardiomyopathy  2/17/2016: MDD (major depressive disorder), recurrent episode, mild  7/20/2016: Osteoporosis  3/18/2016: Peripheral arterial disease  No date: Sarcoid  09/2016, 5/2016: Squamous cell carcinoma      Comment:  crown of scalp, left wrist  Past Surgical History:  No date: CARDIAC SURGERY  10/14/2019: CAROTID STENT; N/A      Comment:  Procedure: INSERTION, STENT, ARTERY, CAROTID;  Surgeon:                Artie Kebede MD;  Location: [facility] CATH LAB;  Service:               Cardiology;  Laterality: N/A;  07/17/2018: CATARACT EXTRACTION W/  INTRAOCULAR LENS IMPLANT; Right      Comment:  Dr. Talamantes  07/31/2018: CATARACT EXTRACTION W/  INTRAOCULAR LENS IMPLANT; Left      Comment:  Dr. Talamantes  No date: CORONARY ARTERY BYPASS GRAFT      Comment:  x2  10/2014  4/8/2019:  ESOPHAGOGASTRODUODENOSCOPY; N/A      Comment:  Procedure: ESOPHAGOGASTRODUODENOSCOPY (EGD)/poss rfa;                 Surgeon: Clifford De La Torre MD;  Location: [facility] ENDO (2ND               FLR);  Service: Endoscopy;  Laterality: N/A;  8/4/2021: ESOPHAGOGASTRODUODENOSCOPY; N/A      Comment:  Procedure: EGD (ESOPHAGOGASTRODUODENOSCOPY);  Surgeon:                Clifford De La Torre MD;  Location: [facility] ENDO (2ND FLR);                 Service: Endoscopy;  Laterality: N/A;  [id number]  No date: ESOPHAGOGASTRODUODENOSCOPY (EGD) WITH RADIOFREQUENCY   TREATMENT OF GASTROESOPHAGEAL JUNCTION  1/9/2020: INJECTION OF ANESTHETIC AGENT AROUND MEDIAL BRANCH NERVES   INNERVATING CERVICAL FACET JOINT; Bilateral      Comment:  Procedure: INJECTION, MBB--Bilateral Cervical- Third                Occipital nerve, C2-3--ASA okay for pt to continue taking               per provider.;  Surgeon: Tip Mera Jr., MD;                 Location: [facility] PAIN MGT;  Service: Pain Management;                 Laterality: Bilateral;  7/17/2018: INTRAOCULAR PROSTHESES INSERTION; Right      Comment:  Procedure: INSERTION, INTRAOCULAR LENS PROSTHESIS;                 Surgeon: León Talamantes MD;  [facility];  Service: Ophthalmology;  Laterality: Right;  7/31/2018: INTRAOCULAR PROSTHESES INSERTION; Left      Comment:  Procedure: INSERTION, INTRAOCULAR LENS PROSTHESIS;                 Surgeon: León Talamantes MD;  [facility];  Service: Ophthalmology;  Laterality: Left;  7/17/2018: PHACOEMULSIFICATION OF CATARACT; Right      Comment:  Procedure: PHACOEMULSIFICATION, CATARACT;  Surgeon:                León Talamantes MD;  [facility];                 Service: Ophthalmology;  Laterality: Right;  7/31/2018: PHACOEMULSIFICATION OF CATARACT; Left      Comment:  Procedure: PHACOEMULSIFICATION, CATARACT;  Surgeon:                 León Talamantes MD;  Location: [facility] OR 1ST FLR;                 Service: Ophthalmology;  Laterality: Left;  10/22/2020: PLACEMENT OF SCREW IN ODONTOID; N/A      Comment:  Procedure: INSERTION, SCREW, ODONTOID. Anterior                approach.;  Surgeon: Kirsten Weaver MD;  Location: [facility] OR               2ND FLR;  Service: Neurosurgery;  Laterality: N/A;  2020: RADIOFREQUENCY THERMOCOAGULATION; Bilateral      Comment:  Procedure: RADIOFREQUENCY THERMAL COAGULATION--Bilateral               C2-3 and Third Occipital Nerve;  Surgeon: Tip Mera Jr., MD;  Location: [facility] PAIN MGT;  Service:                Pain Management;  Laterality: Bilateral;  No date: UMBILICAL HERNIA REPAIR  Social History    Socioeconomic History      Marital status:       Number of children: 4    Occupational History      Occupation:      Tobacco Use      Smoking status: Former Smoker        Packs/day: 2.00        Years: 20.00        Pack years: 40        Types: Cigarettes        Quit date: 1988        Years since quittin.0      Smokeless tobacco: Never Used    Substance and Sexual Activity      Alcohol use: No        Comment: quit drinking 2012      Drug use: No      Sexual activity: Not Currently        Partners: Female    Review of patient's allergies indicates:   -- Morphine -- Shortness Of Breath  SHABNAM SULEMAN Browning had no medications administered during this visit.  Review of Systems   Constitutional: Negative for activity change, appetite change, chills, diaphoresis, fatigue, fever and unexpected weight change.   HENT: Negative for nasal congestion, mouth sores, postnasal drip, rhinorrhea, sinus pressure/congestion, sneezing, sore throat, trouble swallowing and voice change.    Eyes: Negative for discharge, itching and visual disturbance.   Respiratory: Negative for cough, chest tightness, shortness of breath and wheezing.    Cardiovascular: Negative for  chest pain, palpitations and leg swelling.   Gastrointestinal: Negative for abdominal pain, blood in stool, constipation, diarrhea, nausea and vomiting.   Endocrine: Negative for cold intolerance and heat intolerance.   Genitourinary: Negative for difficulty urinating, dysuria, flank pain, hematuria and urgency.   Musculoskeletal: Positive for neck pain. Negative for arthralgias, back pain and myalgias.   Integumentary:  Negative for rash and wound.   Allergic/Immunologic: Negative for environmental allergies and food allergies.   Neurological: Positive for headaches. Negative for dizziness, tremors, seizures, syncope and weakness.   Hematological: Negative for adenopathy. Does not bruise/bleed easily.   Psychiatric/Behavioral: Positive for dysphoric mood and sleep disturbance. Negative for confusion, self-injury and suicidal ideas. The patient is nervous/anxious.          Objective:      Physical Exam  Constitutional:       General: He is not in acute distress.     Appearance: He is well-developed. He is not diaphoretic.   HENT:      Head: Normocephalic and atraumatic.      Right Ear: External ear normal.      Left Ear: External ear normal.      Nose: Nose normal.      Mouth/Throat:      Pharynx: No oropharyngeal exudate.   Eyes:      General: No scleral icterus.        Right eye: No discharge.         Left eye: No discharge.      Conjunctiva/sclera: Conjunctivae normal.      Pupils: Pupils are equal, round, and reactive to light.   Neck:      Thyroid: No thyromegaly.      Vascular: No JVD.   Cardiovascular:      Rate and Rhythm: Normal rate and regular rhythm.      Heart sounds: Normal heart sounds. No murmur heard.  Pulmonary:      Effort: Pulmonary effort is normal. No respiratory distress.      Breath sounds: Normal breath sounds. No wheezing or rales.   Abdominal:      General: Bowel sounds are normal. There is no distension.      Palpations: Abdomen is soft.      Tenderness: There is no abdominal tenderness.  There is no guarding.   Musculoskeletal:      Cervical back: Normal range of motion and neck supple.   Lymphadenopathy:      Cervical: No cervical adenopathy.   Skin:     General: Skin is warm and dry.      Coloration: Skin is not pale.      Findings: No rash.   Neurological:      Mental Status: He is alert and oriented to person, place, and time.   Psychiatric:         Judgment: Judgment normal.         Assessment:       Problem List Items Addressed This Visit        Neuro    Dementia    DDD (degenerative disc disease), cervical    Cervicogenic headache       Psychiatric    MDD (major depressive disorder), recurrent episode, mild (Chronic)    Anxiety       Pulmonary    Chronic obstructive pulmonary disease       Cardiac/Vascular    Hyperlipidemia (Chronic)    CAD (coronary artery disease) (Chronic)    S/P CABG x 2    Peripheral arterial disease    Hypertension    Chronic systolic heart failure       Immunology/Multi System    Pulmonary sarcoidosis       Hematology    Senile purpura       GI    Hx of gastric ulcer    Gastroesophageal reflux disease    Stevens's esophagus with dysplasia       Orthopedic    Osteoporosis       Other    Gait instability      Other Visit Diagnoses     Elevated PSA measurement    -  Primary    Relevant Orders    Ambulatory referral/consult to Urology    Need for Streptococcus pneumoniae vaccination        Relevant Orders    (In Office Administered) Pneumococcal Polysaccharide Vaccine (23 Valent) (SQ/IM) (Completed)    Annual physical exam              Plan:    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- stable on inhalers/O2      CAD s/p PCI and CABG with systolic CHF- stable no CP currently, followed by Cardiology       Continue ASA/Statin      Carotid artery disease/PAD- stable, CPT      Stevens's esophagus, Hx of gastric ulcer- Last EGD(8/21)- low grade dysplasia and intestinal metaplasia       Continue Protonix 40 mg daily       Followed by GI       - no abdominal pain currently       HTN- stable, CPT      Insomnia- stable        Aortic atherosclerosis- stable, continue to monitor      MDD/Anxiety- stable Zoloft daily and Atarax PRN      HLD- controlled on Lipitor 40 mg daily      Osteoporosis- continue Fosamax, Calcium/Vitamin D     Hx of (superior/inferior) nondisplaced pubic ramus fx 2/2 fall- stable, only mild pain with ambulation. None at rest/sitting      Senile purpura- stable, continue to monitor      Cervical arthritis with headaches- followed by Pain Management       Hx of SCC- followed by Derm       Dementia- stable     Elevated PSA- referral to Urology

## 2022-03-22 NOTE — PROGRESS NOTES
[facility]   Physical Therapy Treatment Note     [facility]  Clinic Number: [id number]    Therapy Diagnosis:   Encounter Diagnosis   Name Primary?    Closed fracture of trochanter of left femur with routine healing Yes     Physician: Kris Multani NP    Visit Date: 3/23/2022    Physician Orders: PT Eval and Treat   Medical Diagnosis from Referral: S72.102D (ICD-10-CM) - Closed fracture of trochanter of left femur with routine healing, subsequent encounter   Evaluation Date: 2/24/2022  Authorization Period Expiration: 2/24/2023  Plan of Care Expiration: 4/24/2022  Visit # / Visits authorized: 1/ 1, 5/20  FOTO: 5/10  PTA Visit #: 1/5     Precautions: Standard, Fall and [place], visally impaired    Time In: 2:20 pm  Time Out: 3:06 pm  Total Billable Time: 46 minutes    Subjective     Pt reports: that he is feeling really good today.   He was compliant with home exercise program.  Response to previous treatment: no adverse effects   Functional change: none at this time     Pain: 0/10  Location: n/a    Objective     SHABNAM received therapeutic exercises to develop strength, endurance, ROM and flexibility for 36 minutes including:  Nu-step 5 min no rests (for endurance/supervised)   LAQ 2lb 2x10 BLE   Seated march 2lb 2x10 BLE  Sit to stand 2x10 (from elevated mat) NP  Seated adductor squeeze 2x20  Standing @ elevated mat 20x BLE     - marches     - hip abduction NP    - hip extension NP  Supine clams Green TB 2x10   SLR 3x10 BLE   Leg press isometric c /SB 2x20 BLE  Bridges 3x10   Supine chest press 5lb 3x10   Seated HS isometrics 30x       Seated Hip Abduction w/ various TB NP  Seated Hamstring Stretch NP      neuromuscular re-education activities to improve: Balance, Coordination, Kinesthetic, Proprioception and Posture for 10 minutes. The following activities were included:  Lateral walks 2 laps (~15 ft each way)   Fwd Marches 2 laps (~15 ft each way)    Standing rows  (unsupported) 30x GTB       Home Exercises Provided and Patient Education Provided     Education provided:   - HEP    Written Home Exercises Provided: yes.  Exercises were reviewed and SHABNAM was able to demonstrate them prior to the end of the session.  SHABNAM demonstrated good  understanding of the education provided.     See EMR under Patient Instructions for exercises provided 3/9/2022.    Assessment     Pt exhibited tolerance to all therex performed today, however required cuing for deep breathing during NMR due to labored breathing. PTA used gait belt and technician to maintain patient safety throughout NMR and standing therex. Will continue to work towards increasing standing tolerance and endurance.    SHABNAM Is progressing well towards his goals.   Pt prognosis is Good.     Pt will continue to benefit from skilled outpatient physical therapy to address the deficits listed in the problem list box on initial evaluation, provide pt/family education and to maximize pt's level of independence in the home and community environment.     Pt's spiritual, cultural and educational needs considered and pt agreeable to plan of care and goals.     Anticipated barriers to physical therapy: Chronicity of symptoms, co-morbidities, visual impairments       Goals:   Long Term Goals (8 Weeks):  1. Pt will improve FOTO score to </= 59% limited to decrease perceived limitation with mobility  2. Pt will improve TUG test to </= 20 sec to improve walking speed and LE strength for functional community ambulation  3. Pt will improve impaired LE MMTs by 1 grade to improve strength for functional tasks.  4. Pt improve impaired hip ROM to WNL in all planes to improve mobility for normal movement patterns.  5. Pt will demonstrate safe gait with LRD to improve QOL.  6. Pt will resume ADL (fishing) safely with supervision/independence to improve QOL       Plan   Plan of care Certification: 2/24/2022 to 4/24/2022.       Continue with current POC.      Amy Carnes, PTA

## 2022-03-24 NOTE — PROGRESS NOTES
[facility]   Physical Therapy Treatment Note     [facility]  Clinic Number: [id number]    Therapy Diagnosis:   Encounter Diagnosis   Name Primary?    Closed fracture of trochanter of left femur with routine healing Yes     Physician: Kris Multani NP    Visit Date: 3/25/2022    Physician Orders: PT Eval and Treat   Medical Diagnosis from Referral: S72.102D (ICD-10-CM) - Closed fracture of trochanter of left femur with routine healing, subsequent encounter   Evaluation Date: 2/24/2022  Authorization Period Expiration: 2/24/2023  Plan of Care Expiration: 4/24/2022  Visit # / Visits authorized: 1/ 1, 6/20  FOTO: 6/10  PTA Visit #: 2/5     Precautions: Standard, Fall and [place], visally impaired    Time In: 1:02 pm  Time Out: 1:52 pm  Total Billable Time: 50 minutes    Subjective     Pt reports: that he is feeling great today and ready to work hard.   He was compliant with home exercise program.  Response to previous treatment: no adverse effects   Functional change: none at this time     Pain: 0/10  Location: n/a    Objective     SHABNAM received therapeutic exercises to develop strength, endurance, ROM and flexibility for 24 minutes including:  Nu-step 6 min no rests (for endurance/supervised)   Bridges 3x10 + GTB   Supine clams Green TB 2x10   Seated adductor squeeze 2x20  Sit to stand 3x10 (from elevated mat)   Leg press isometric c /SB 2x20 BLE  Supine chest press 5lb 3x10   Supine HSA GTB 3x10       Not performed:   LAQ 2lb 2x10 BLE   Seated march 2lb 2x10 BLE  Standing @ elevated mat 20x BLE     - marches NP    - hip abduction NP    - hip extension NP  SLR 3x10 BLE   Seated HS isometrics 30x   Seated Hip Abduction w/ various TB NP  Seated Hamstring Stretch NP      neuromuscular re-education activities to improve: Balance, Coordination, Kinesthetic, Proprioception and Posture for 28 minutes. The following activities were included:  Static standing on airex 3 min    Lateral walks 3 laps (~15 ft each way)   Fwd Marches 1 laps (~15 ft each way)    Standing rows (unsupported) 30x GTB   Modified ball toss 30x       Home Exercises Provided and Patient Education Provided     Education provided:   - HEP    Written Home Exercises Provided: yes.  Exercises were reviewed and SHABNAM was able to demonstrate them prior to the end of the session.  SHABNAM demonstrated good  understanding of the education provided.     See EMR under Patient Instructions for exercises provided 3/9/2022.    Assessment     Continued to progress pt with additional standing therex this session. Pt given multiple rest breaks throughout session due to fatigue and SOB, and again encouraged deep, slow breaths. Pt exhibited the need for varied grades of assistance from PTA during standing therex and NMR, however most assistance required was moderate during static standing on airex due to posterior lean. Plan to continue to progress pt's program within his tolerance.     SHABNAM Is progressing well towards his goals.   Pt prognosis is Good.     Pt will continue to benefit from skilled outpatient physical therapy to address the deficits listed in the problem list box on initial evaluation, provide pt/family education and to maximize pt's level of independence in the home and community environment.     Pt's spiritual, cultural and educational needs considered and pt agreeable to plan of care and goals.     Anticipated barriers to physical therapy: Chronicity of symptoms, co-morbidities, visual impairments       Goals:   Long Term Goals (8 Weeks):  1. Pt will improve FOTO score to </= 59% limited to decrease perceived limitation with mobility  2. Pt will improve TUG test to </= 20 sec to improve walking speed and LE strength for functional community ambulation  3. Pt will improve impaired LE MMTs by 1 grade to improve strength for functional tasks.  4. Pt improve impaired hip ROM to WNL in all planes to improve mobility for normal  movement patterns.  5. Pt will demonstrate safe gait with LRD to improve QOL.  6. Pt will resume ADL (fishing) safely with supervision/independence to improve QOL       Plan   Plan of care Certification: 2/24/2022 to 4/24/2022.       Continue with current POC.     Amy Carnes, PTA

## 2022-03-26 PROBLEM — J44.9 CHRONIC OBSTRUCTIVE PULMONARY DISEASE: Status: ACTIVE | Noted: 2022-01-01

## 2022-03-27 PROBLEM — R79.89 ELEVATED BRAIN NATRIURETIC PEPTIDE (BNP) LEVEL: Status: RESOLVED | Noted: 2022-01-01 | Resolved: 2022-01-01

## 2022-03-27 PROBLEM — K22.719 BARRETT'S ESOPHAGUS WITH DYSPLASIA: Status: RESOLVED | Noted: 2021-08-04 | Resolved: 2022-01-01

## 2022-03-28 NOTE — PATIENT INSTRUCTIONS
Stop the daily furosemide.  Salt restriction of 1,500mg a day. Weigh yourself every morning after you go to the restroom.  Take a dose of Lasix (furosemide) for 1-2 days if weight increases 2 or more pounds in a 24 hr period, or 5 pounds over a 7 day period. Contact our office if weight does not return to baseline within 1-2 days.    Start the entresto 24-26 mg twice a day

## 2022-03-28 NOTE — PROGRESS NOTES
"Mr. Browning is a patient of Dr. Kelly and was last seen in [facility] Visit 11/24/21.      Subjective:   Patient ID:  Paul Browning is a 81 y.o. male who presents for follow-up of Follow-up (PCP req) and Shortness of Breath    Problems:  carotid artery disease s/p PCI to proximal R ICA 10/14/19  HTN  HLD  CAD s/p MI/PCI/CABG (STEMI in 8/19/2014 with PCI to RCA; CABG with LIMA-LAD, SVG-ramus 10/21/14; ischemic symptoms were "fogginess and feeling weird, no chest pain or dyspnea")  Ischemic cardimopathy in 2014 prior to CABG  40 pack year h/o smoking, quit in 1988    HPI  Mr. Browning is in clinic today with his son for hospital follow up.  In 2014 he had an ischemic cardiomyopathy with an EF of 48% that recovered after his CABG.  He was admitted from 1/22-1/28/22 following a fall in which he broke his pelvis.  He was found to be in decompensated systolic HF and was diuresed.  His EF on 1/24/22, dropped to 40% and he has new WMA.  His wt is stable.  Reports following a low salt diet    Review of Systems   Constitutional: Positive for malaise/fatigue. Negative for decreased appetite, diaphoresis, weight gain and weight loss.   Eyes: Negative for visual disturbance.   Cardiovascular: Negative for chest pain, claudication, dyspnea on exertion, irregular heartbeat, leg swelling, near-syncope, orthopnea, palpitations, paroxysmal nocturnal dyspnea and syncope.        Denies chest pressure   Respiratory: Negative for cough, hemoptysis, shortness of breath, sleep disturbances due to breathing and snoring.    Endocrine: Negative for cold intolerance and heat intolerance.   Hematologic/Lymphatic: Negative for bleeding problem. Does not bruise/bleed easily.   Musculoskeletal: Negative for myalgias.   Gastrointestinal: Negative for bloating, abdominal pain, anorexia, change in bowel habit, constipation, diarrhea, nausea and vomiting.   Neurological: Negative for difficulty with concentration, " disturbances in coordination, excessive daytime sleepiness, dizziness, headaches, light-headedness, loss of balance, numbness and weakness.   Psychiatric/Behavioral: The patient does not have insomnia.        Allergies and current medications updated and reviewed:  Review of patient's allergies indicates:   Allergen Reactions    Morphine Shortness Of Breath     Current Outpatient Medications   Medication Sig    albuterol (PROAIR HFA) 90 mcg/actuation inhaler Inhale 2 puffs into the lungs every 6 (six) hours as needed for Wheezing or Shortness of Breath. Rescue    alendronate (FOSAMAX) 70 MG tablet TAKE 1 TABLET BY MOUTH EVERY WEEK IN THE MORNING WITH FULL GLASS OF WATER ON EMPTY STOMACH-DONT LIE DOWN FOR AT LEAST 30 MINUTES AFTERWARDS    aspirin (ECOTRIN) 81 MG EC tablet Take 81 mg by mouth once daily.    atorvastatin (LIPITOR) 40 MG tablet TAKE 1 TABLET(40 MG) BY MOUTH EVERY DAY    calcium carbonate (CALCIUM 600 ORAL) Take by mouth.    hydrOXYzine HCL (ATARAX) 10 MG Tab 1-2 tabs PO TID PRN anxiety    magnesium oxide (MAG-OX) 400 mg (241.3 mg magnesium) tablet Take 1 tablet (400 mg total) by mouth once daily.    memantine (NAMENDA) 5 MG Tab One po qd daily for one week then BID    methocarbamoL (ROBAXIN) 500 MG Tab     omega-3 fatty acids-vitamin E (FISH OIL) 1,000 mg Cap Take 1 tablet by mouth 2 (two) times daily. (Patient taking differently: Take 1 tablet by mouth once daily.)    pantoprazole (PROTONIX) 40 MG tablet TAKE 1 TABLET BY MOUTH TWICE DAILY    sertraline (ZOLOFT) 100 MG tablet Take 1 tablet (100 mg total) by mouth once daily.    tiotropium-olodateroL (STIOLTO RESPIMAT) 2.5-2.5 mcg/actuation Mist Inhale 2 puffs into the lungs once daily. Controller    furosemide (LASIX) 20 MG tablet Take 1 tablet (20 mg total) by mouth daily as needed (Wt gain >2 pounds/day or >5 pounds/week).     No current facility-administered medications for this visit.       Objective:     Right Arm BP - Sitting:  "146/72 (22 1034)  Left Arm BP - Sittin/69 (22 1034)    BP (!) 144/69 (BP Location: Left arm, Patient Position: Sitting, BP Method: Medium (Automatic))   Pulse 65   Ht 5' 9" (1.753 m)   Wt 66.5 kg (146 lb 9.7 oz)   SpO2 (!) 94%   BMI 21.65 kg/m²       Physical Exam  Vitals and nursing note reviewed.   Constitutional:       General: He is not in acute distress.     Appearance: Normal appearance. He is well-developed. He is not diaphoretic.   HENT:      Head: Normocephalic and atraumatic.   Eyes:      General: Lids are normal. No scleral icterus.     Conjunctiva/sclera: Conjunctivae normal.   Neck:      Vascular: Normal carotid pulses. No carotid bruit, hepatojugular reflux or JVD.   Cardiovascular:      Rate and Rhythm: Normal rate and regular rhythm.      Chest Wall: PMI is not displaced.      Pulses: Intact distal pulses.           Carotid pulses are 2+ on the right side and 2+ on the left side.       Radial pulses are 2+ on the right side and 2+ on the left side.        Dorsalis pedis pulses are 1+ on the right side and 1+ on the left side.        Posterior tibial pulses are 0 on the right side and 0 on the left side.      Heart sounds: S1 normal and S2 normal. No murmur heard.    No friction rub. No gallop.   Pulmonary:      Effort: Pulmonary effort is normal. No respiratory distress.      Breath sounds: Examination of the right-lower field reveals wheezing. Examination of the left-lower field reveals wheezing. Wheezing present. No decreased breath sounds, rhonchi or rales.   Chest:      Chest wall: No tenderness.   Abdominal:      General: Bowel sounds are normal. There is no distension or abdominal bruit.      Palpations: Abdomen is soft. There is no fluid wave or pulsatile mass.      Tenderness: There is no abdominal tenderness.   Musculoskeletal:      Cervical back: Neck supple.   Skin:     General: Skin is warm and dry.      Findings: No rash.      Nails: There is no clubbing. "   Neurological:      Mental Status: He is alert and oriented to person, place, and time.      Gait: Gait normal.   Psychiatric:         Speech: Speech normal.         Behavior: Behavior normal.         Thought Content: Thought content normal.         Judgment: Judgment normal.         Chemistry        Component Value Date/Time     03/18/2022 0856    K 4.3 03/18/2022 0856     03/18/2022 0856    CO2 28 03/18/2022 0856    BUN 13 03/18/2022 0856    CREATININE 1.0 03/18/2022 0856    GLU 93 03/18/2022 0856        Component Value Date/Time    CALCIUM 9.3 03/18/2022 0856    ALKPHOS 95 03/18/2022 0856    AST 14 03/18/2022 0856    ALT 10 03/18/2022 0856    BILITOT 0.9 03/18/2022 0856    ESTGFRAFRICA >60.0 03/18/2022 0856    EGFRNONAA >60.0 03/18/2022 0856        Lab Results   Component Value Date    HGBA1C 5.2 10/15/2014     Recent Labs   Lab 07/20/20  2221 10/20/20  0351 01/23/22  0159 01/23/22  2331 03/18/22  0856   WBC 5.19   < > 11.70   < > 5.10   Hemoglobin 13.6 L   < > 14.0   < > 13.0 L   Hematocrit 40.0   < > 41.3   < > 40.4   MCV 90   < > 92   < > 96   Platelets 240   < > 243   < > 334   BNP 76  --  129 H  --   --    TSH  --    < >  --   --  2.537   Cholesterol  --    < >  --   --  111 L   HDL  --    < >  --   --  26 L   LDL Cholesterol  --    < >  --   --  60.4 L   Triglycerides  --    < >  --   --  123   HDL/Cholesterol Ratio  --    < >  --   --  23.4    < > = values in this interval not displayed.       Recent Labs   Lab 10/14/19  0705 10/19/20  1915 01/06/21  1717   INR 1.0 1.0 1.0        Test(s) Reviewed  I have reviewed the following in detail:  [x] Stress test   [] Angiography   [x] Echocardiogram   [x] Labs   [] Other:         Assessment/Plan:   1. Chronic systolic heart failure  Well compensated on exam.  Previous drop in EF secondary to ischemia in 2014 prior to CABG with recovery of LV systolic function.  Will get PET stress to evaluate new cardiomyopathy and WMA for ischemic source.  He has  complex coronary anatomy given h/o CABG.   Not currently on GDMT.  Stop daily furosemide and use it PRN wt gain >2 pounds/day or >5 pounds/week.  Start entresto 24-26 mg BID. BMP in 2 weeks with NT proBNP.  Daily weights and 1500 mg sodium diet.      - Cardiac PET Scan Stress; Future  - Basic Metabolic Panel; Future  - NT-Pro Natriuretic Peptide; Future  - sacubitriL-valsartan (ENTRESTO) 24-26 mg per tablet; Take 1 tablet by mouth 2 (two) times daily.  Dispense: 60 tablet; Refill: 11  - furosemide (LASIX) 20 MG tablet; Take 1 tablet (20 mg total) by mouth daily as needed (Wt gain >2 pounds/day or >5 pounds/week).  Dispense: 90 tablet; Refill: 0    2. Cardiomyopathy, unspecified type  See #1    3. Coronary artery disease involving native coronary artery of native heart without angina pectoris  Asymptomatic. Can not exclude ischemia as etiology for new onset CM, WMA, and HF. Will get PET stress to evaluate.     - Cardiac PET Scan Stress; Future    4. S/P CABG x 2      5. Stenosis of right carotid artery  Asymptomatic. No changes.     6. Atherosclerosis of aorta      7. Peripheral arterial disease  Asymptomatic. Monitor    8. Pure hypercholesterolemia  LDL at goal <70. No changes    9. Primary hypertension  BP at goal <140/90. See #1 for changes.      10. Chronic obstructive pulmonary disease, unspecified COPD type      11. Dementia without behavioral disturbance, unspecified dementia type  Mild. Appropriately answers questions       Patient was discussed with but not examined by Dr. Kelly    A copy of this note will be forwarded to Dr. Kelly and Dr. Forbes.     Follow up in about 4 weeks (around 4/25/2022).

## 2022-04-04 NOTE — PROGRESS NOTES
[facility]   Physical Therapy Treatment Note     [facility]  Clinic Number: [id number]    Therapy Diagnosis:   Encounter Diagnosis   Name Primary?    Closed fracture of trochanter of left femur with routine healing Yes     Physician: Kris Multani NP    Visit Date: 4/1/2022    Physician Orders: PT Eval and Treat   Medical Diagnosis from Referral: S72.102D (ICD-10-CM) - Closed fracture of trochanter of left femur with routine healing, subsequent encounter   Evaluation Date: 2/24/2022  Authorization Period Expiration: 2/24/2023  Plan of Care Expiration: 4/24/2022  Visit # / Visits authorized: 1/ 1, 7/20  FOTO: 6/10  PTA Visit #: 2/5     Precautions: Standard, Fall and [place], visally impaired    Time In: 1:15 pm  Time Out: 2;05 pm  Total Billable Time: 50 minutes    Subjective     Pt reports: that he is feeling great today and ready to work hard.   He was compliant with home exercise program.  Response to previous treatment: no adverse effects   Functional change: none at this time     Pain: 0/10  Location: n/a    Objective     SHABNAM received therapeutic exercises to develop strength, endurance, ROM and flexibility for 25 minutes including:  Nu-step 6 min no rests (for endurance/supervised)   Bridges 3x10 + GTB   Supine clams Green TB 2x10   Seated adductor squeeze 2x20  Sit to stand 3x10 (from elevated mat)   Leg press isometric c /SB 2x20 BLE  Supine chest press 5lb 3x10   Supine HSA GTB 3x10       Not performed:   LAQ 2lb 2x10 BLE   Seated march 2lb 2x10 BLE  Standing @ elevated mat 20x BLE     - marches NP    - hip abduction NP    - hip extension NP  SLR 3x10 BLE   Seated HS isometrics 30x   Seated Hip Abduction w/ various TB NP  Seated Hamstring Stretch NP      neuromuscular re-education activities to improve: Balance, Coordination, Kinesthetic, Proprioception and Posture for 25 minutes. The following activities were included:  Static standing on airex 3 min    Lateral walks 3 laps (~15 ft each way)   Fwd Marches 1 laps (~15 ft each way)    Standing rows (unsupported) 30x GTB   Modified ball toss 30x   Fishing Casts with CGA 3 x 19      Home Exercises Provided and Patient Education Provided     Education provided:   - HEP    Written Home Exercises Provided: yes.  Exercises were reviewed and SHABNAM was able to demonstrate them prior to the end of the session.  SHABNAM demonstrated good  understanding of the education provided.     See EMR under Patient Instructions for exercises provided 3/9/2022.    Assessment     Continued to progress pt with additional standing therex this session. Pt exhibited the need for varied grades of assistance from PTA during standing therex and NMR, however most assistance required was moderate during static standing on airex due to posterior lean. Plan to continue to progress pt's program within his tolerance.     SHABNAM Is progressing well towards his goals.   Pt prognosis is Good.     Pt will continue to benefit from skilled outpatient physical therapy to address the deficits listed in the problem list box on initial evaluation, provide pt/family education and to maximize pt's level of independence in the home and community environment.     Pt's spiritual, cultural and educational needs considered and pt agreeable to plan of care and goals.     Anticipated barriers to physical therapy: Chronicity of symptoms, co-morbidities, visual impairments       Goals:   Long Term Goals (8 Weeks):  1. Pt will improve FOTO score to </= 59% limited to decrease perceived limitation with mobility  2. Pt will improve TUG test to </= 20 sec to improve walking speed and LE strength for functional community ambulation  3. Pt will improve impaired LE MMTs by 1 grade to improve strength for functional tasks.  4. Pt improve impaired hip ROM to WNL in all planes to improve mobility for normal movement patterns.  5. Pt will demonstrate safe gait with LRD to improve QOL.  6.  Pt will resume ADL (fishing) safely with supervision/independence to improve QOL       Plan   Plan of care Certification: 2/24/2022 to 4/24/2022.       Continue with current POC.     Neto Veliz, PT

## 2022-04-06 NOTE — TELEPHONE ENCOUNTER
Pt denies changes in weight. Labs rescheduled for Friday and pt agreed to date/time of appointment(s).

## 2022-04-06 NOTE — PROGRESS NOTES
Subjective:      Patient ID: Paul Browning is a 81 y.o. male.    Chief Complaint: Pain of the Pelvis    Paul Browning is a 81 y.o. male with PMH  COPD, sarcoidosis, CAD, HTN, GERD with Stevens's esophagus, BPH, squamous cell carcinoma, and anxiety/depression who had presented to the ED on 1/23/22 for a fall with head trauma and posterior scalp laceration. Laceration was stapled in ED and CT head was negative. Patient was then discharged and experienced profound dyspnea in the parking lot and was readmitted for acute hypoxia. Patient continued to endorse left hip pain. Hip, pelvis, and CT Thoracic and Lumbar spine were obtained and patient was admitted to medicine for observation and PT/OT. Given ongoing complaints of left hip pain, CT hips were ordered by primary team on 1/26 which showed superior and inferior pubic rami fractures. Ortho was consulted for management recommendations.  Recommendations were to treat non-operative and recommended use of a walker for gait aide and follow up in trauma clinic.  Additionally, it was recommended he take Robaxin and Tylenol for pain management and Eliquis 2.5 mg bid x 6 weeks for DVT prevention.  At baseline patient lives with his daughter and does not use assistive devices at baseline. He has a history of C-spine and non-displaced left hip fracture due to fall 1 year ago.      Patient returns today with his son, patient currently in a wheelchair.  Reports no pain, has been working with outpatient therapy and doing well.  Denies falls or injuries since his last visit.  He is taking his Eliquis.  No falls since his hospital discharge.      Pain        Review of Systems   Musculoskeletal: Positive for falls.        Left pubic rami fracture   All other systems reviewed and are negative.        Objective:        General    Vitals reviewed.  Constitutional: He is oriented to person, place, and time. He appears well-developed and well-nourished. No  distress.   HENT:   Head: Normocephalic and atraumatic.   Eyes: Conjunctivae are normal. Pupils are equal, round, and reactive to light.   Neck: Neck supple.   Cardiovascular: Intact distal pulses.    Pulmonary/Chest: Effort normal.   Neurological: He is alert and oriented to person, place, and time. He has normal reflexes.   Psychiatric: He has a normal mood and affect. His behavior is normal. Judgment and thought content normal.         Left Hip Exam   Left hip exam is normal.    Other   Sensation: normal    Comments:  No tenderness to left hip.  No groin pain.  Muscle strength 5/5 to LLE.          Vascular Exam       Left Pulses  Dorsalis Pedis:      2+  Posterior Tibial:      2+        RADS:  Pelvis, inlet/outlet view x-rays were obtained and personally reviewed by me.  He has a comminuted left superior and inferior pubic rami fractures that appear stable with progressives callus formation.  No other fractures seen.      Assessment:       Encounter Diagnosis   Name Primary?    Closed bilateral fracture of pubic rami with routine healing Yes          Plan:       Paul was seen today for pain.    Diagnoses and all orders for this visit:    Closed bilateral fracture of pubic rami with routine healing  -     X-Ray Pelvis AP Inlet And Outlet; Future    Continue Outpatient PT with [facility].    Weight bear as tolerated,  use walker when ambulating at all times.    Will see the patient back in 12 weeks at that time will repeat x-ray of his pelvis inlet and outlet views please.    Future Appointments   Date Time Provider Department Center   4/8/2022 10:15 AM LAB, APPOINTMENT [facility] LAB VNP [facility]   4/8/2022 11:30 AM Maribel Chang NP [facility]   4/8/2022  1:15 PM [facility]iz[facility] [place]   4/11/2022  1:45 PM [facility] [place]   4/13/2022  1:45 PM Neto Veliz, PT [facility] EFC [facility] [place]   4/14/2022  1:40 PM Penelope Stauffer MD [facility]  [facility] Raymundoi    4/26/2022  9:30 AM CARDIAC, PET IMAGING [facility] [place] [facility]   4/27/2022  1:40 PM Joanne Wharton NP [facility] CARDIO [place]   5/2/2022  3:00 PM Jarrod Renteria MD [facility] UROLOGY [place]   5/19/2022 10:40 AM Penelope Stauffer MD [facility]  [facility]   6/27/2022  1:30 PM Yash Kelly III, MD [facility] CARDIO [place]   7/20/2022  1:00 PM Grey Forbes DO [facility] IM [place]

## 2022-04-08 NOTE — PROGRESS NOTES
[facility]   Physical Therapy Treatment Note     [facility]  Clinic Number: [id number]    Therapy Diagnosis:   Encounter Diagnosis   Name Primary?    Closed fracture of trochanter of left femur with routine healing Yes     Physician: Kris Multani NP    Visit Date: 4/8/2022    Physician Orders: PT Eval and Treat   Medical Diagnosis from Referral: S72.102D (ICD-10-CM) - Closed fracture of trochanter of left femur with routine healing, subsequent encounter   Evaluation Date: 2/24/2022  Authorization Period Expiration: 2/24/2023  Plan of Care Expiration: 4/24/2022  Visit # / Visits authorized: 1/ 1, 9/20  FOTO: 8/10  PTA Visit #: 1/5     Precautions: Standard, Fall and [place], visally impaired    Time In: 1:15 pm  Time Out: 2:00 pm  Total Billable Time: 45 minutes    Subjective     Pt reports: that he is feeling good and that has been busy all day today with doctor's appointments.   He was compliant with home exercise program.  Response to previous treatment: no adverse effects   Functional change: none at this time     Pain: 0/10  Location: n/a    Objective     SHABNAM received therapeutic exercises to develop strength, endurance, ROM and flexibility for 45 minutes including:  Nu-step 6 min no rests (for endurance/supervised)   Standing bicep curls 5lb 3x10   Standing overhead press 5lb 3x10   LAQ 3lb 2x10 BLE   Standing @ elevated mat 20x BLE     - marches 3lb     - hip abduction NP    - hip extension 3lb   Bridges 3x10 + GTB   Supine clams Green TB 2x10  Seated adductor squeeze 2x20   Supine chest press 5lb 3x10   Supine HSA GTB 3x10       Not performed:   Sit to stand 3x10 (from elevated mat)   Leg press isometric c /SB 2x20 BLE  Seated march 2lb 2x10 BLE  SLR 3x10 BLE   Seated HS isometrics 30x         neuromuscular re-education activities to improve: Balance, Coordination, Kinesthetic, Proprioception and Posture for 0 minutes. The following activities were  included:  Static standing on airex 2x1 min   Standing rows (unsupported) 30x GTB    Lateral walks 2x 4 laps (~10 ft each way)       Fwd Marches 1 laps (~15 ft each way)  NP  Modified ball toss 30x NP  Fishing Casts with CGA 3 x 19 NP      Home Exercises Provided and Patient Education Provided     Education provided:   - HEP    Written Home Exercises Provided: yes.  Exercises were reviewed and SHABNAM was able to demonstrate them prior to the end of the session.  SHABNAM demonstrated good  understanding of the education provided.     See EMR under Patient Instructions for exercises provided 3/9/2022.    Assessment     Pt continues to require CGA to min A for pt safety and recovery of LOB during standing activities. Increased weight resistance during appropriate therex this session. Plan to progress pt per his tolerance during upcoming sessions and tolerated.     SHABNAM Is progressing well towards his goals.   Pt prognosis is Good.     Pt will continue to benefit from skilled outpatient physical therapy to address the deficits listed in the problem list box on initial evaluation, provide pt/family education and to maximize pt's level of independence in the home and community environment.     Pt's spiritual, cultural and educational needs considered and pt agreeable to plan of care and goals.     Anticipated barriers to physical therapy: Chronicity of symptoms, co-morbidities, visual impairments       Goals:   Long Term Goals (8 Weeks):  1. Pt will improve FOTO score to </= 59% limited to decrease perceived limitation with mobility  2. Pt will improve TUG test to </= 20 sec to improve walking speed and LE strength for functional community ambulation  3. Pt will improve impaired LE MMTs by 1 grade to improve strength for functional tasks.  4. Pt improve impaired hip ROM to WNL in all planes to improve mobility for normal movement patterns.  5. Pt will demonstrate safe gait with LRD to improve QOL.  6. Pt will resume ADL (fishing)  safely with supervision/independence to improve QOL       Plan   Plan of care Certification: 2/24/2022 to 4/24/2022.       Continue with current POC.     Neto Veliz, PT

## 2022-04-09 PROBLEM — R91.1 LUNG NODULE: Status: ACTIVE | Noted: 2022-01-01

## 2022-04-09 NOTE — PROGRESS NOTES
Subjective:       Patient ID: Paul Browning is a 81 y.o. male.    Chief Complaint: COPD    COPD  Pertinent negatives include no chest pain, chills, congestion, coughing, fever, joint swelling or rash.      Mr. Browning is 80-year-old male with past medical history of status post CABG times to, ischemic cardiomyopathy, acute kidney injury, COPD presenting to [facility]monary Clinic for COPD.  Accompanied by his son.  Reports smoking hx of 2 packs a day times 20 years and quit 1988.  Last followed with pulmonology in 2015. Pulmonary function test was suggestive of mild obstruction no restriction and mild to moderate diffusion impairment.  Patient was started on Symbicort.  Patient's son unaware of PF a started Symbicort.  Review last year, reports having a gradual increase in shortness of breath.  States he used to exercise but secondary to the COVID pandemic has decreased.  Also, reports vision changes including cataracts which has decreased his ability to ambulate safely on his own.  He is following physical therapy 1 to 2 times a week.  Was prescribed Breo-she finds this device very difficult to use.    Interval HX 4/8/2022-     Mr. Browning presents to Pulmonary Clinic for COPD.  Patient is accompanied with his son.  Most of this interview was conducted with patient's son.  In Jan, patient had fall and obtained a pelvic fracture.  She is currently following with orthopedic.  He did require a hospitalizations secondary to his pelvic fracture.  Had a CT thoracic spine-was noted to have known sarcoidosis on imaging.   Patient is unaware of sarcoidosis.  Son is unaware of patient having sarcoidosis.  Additionally reviewed CT indicating a 1.9 cm solid nodule left lower lobe.  Patient with large hiatal hernia and Patulous fluid filled esophagus.   Patient denies fever, chills, hemoptysis, night sweats or weight loss.    Social History     Tobacco Use   Smoking Status Former Smoker    Packs/day: 2.00     "Years: 20.00    Pack years: 40.00    Types: Cigarettes    Quit date: 1988    Years since quittin.1   Smokeless Tobacco Never Used     Reviewed allergies and medications.  Reviewed medical and surgical history.  Reviewed social and family history.    Review of Systems   Constitutional: Negative for fever, chills, weight loss and night sweats.   HENT: Negative for postnasal drip, rhinorrhea, trouble swallowing and congestion.         Reports visual disturbance and having cataracts.    Respiratory: Positive for dyspnea on extertion. Negative for cough, hemoptysis, sputum production, choking, chest tightness, shortness of breath, wheezing (Reports improved with use of Breo. ), asthma nighttime symptoms and use of rescue inhaler.    Cardiovascular: Negative for chest pain and leg swelling.   Musculoskeletal: Positive for gait problem. Negative for joint swelling.   Skin: Negative for rash.   Gastrointestinal: Negative for acid reflux.   Neurological: Negative for dizziness and light-headedness.   Hematological: Negative for adenopathy.   Psychiatric/Behavioral: Negative for sleep disturbance. The patient is not nervous/anxious.          Objective:      Vitals:    22 1125   BP: 136/76   BP Location: Left arm   Patient Position: Sitting   Pulse: 70   SpO2: 99%   Weight: 63.3 kg (139 lb 8.8 oz)   Height: 5' 9" (1.753 m)      Physical Exam   Constitutional: He is oriented to person, place, and time. He appears well-developed and well-nourished. No distress.   HENT:   Head: Normocephalic.   Cardiovascular: Normal rate, regular rhythm and normal heart sounds.   Pulmonary/Chest: Normal expansion, effort normal and breath sounds normal. No respiratory distress. He has no wheezes. He has no rhonchi.   Abdominal: Soft. Bowel sounds are normal. There is no abdominal tenderness.   Musculoskeletal:         General: No edema. Normal range of motion.      Cervical back: Normal range of motion and neck supple. "   Lymphadenopathy: No supraclavicular adenopathy is present.     He has no cervical adenopathy.   Neurological: He is alert and oriented to person, place, and time. Gait abnormal.   Using Walker.    Skin: Skin is warm and dry. Nails show no clubbing.   Psychiatric: He has a normal mood and affect. His behavior is normal.   Vitals reviewed.    Personal Diagnostic Review    Lab Results   Component Value Date    PREFEV1 1.94 02/11/2015    PREFVC 3.13 02/11/2015    WKRYMZ4CSU 62 02/11/2015      X-Ray Pelvis AP Inlet And Outlet  Narrative: EXAMINATION:  XR PELVIS AP INLET AND OUTLET    CLINICAL HISTORY:  Pelvic and perineal pain    TECHNIQUE:  AP, inlet, and outlet views of the pelvis were obtained.    COMPARISON:  02/24/2022 and CT pelvis 01/27/2022    FINDINGS:  Progressive callus formation and sclerosis along fractures of the left superior and inferior pubic rami consistent with healing.  Fractures remain near anatomic.  No new fracture seen.  Impression: Healing left pubic rami fractures.    Electronically signed by: Liss Acosta  Date:    04/07/2022  Time:    08:26     CT of thoracic spine 1/25/2021-  Impression:     1. No acute fractures or traumatic malalignment in the thoracic spine.  2. Bulky calcified mediastinal and bilateral hilar lymphadenopathy.  Innumerable solid and calcified pulmonary nodules, most confluent in the upper lobes bilaterally.  Constellation of findings is compatible with known sarcoidosis.  3. Some pulmonary nodules are new from 10/14/2020, including a 1.9 cm solid nodule in the left lower lobe.  This could be inflammatory, although infection or neoplasm cannot be excluded.  Clinical considerations will determine the need for follow-up imaging.  4. Small right and trace left pleural effusions.  5. Large sliding hiatal hernia.  Patulous fluid-filled esophagus, which could represent dysmotility or gastroesophageal reflux and predisposes the patient for aspiration.  This report was flagged  in Epic as abnormal.      Assessment:       1. Pulmonary nodule    2. Chronic obstructive pulmonary disease, unspecified COPD type    3. Lung nodule        Outpatient Encounter Medications as of 4/8/2022   Medication Sig Dispense Refill    albuterol (PROAIR HFA) 90 mcg/actuation inhaler Inhale 2 puffs into the lungs every 6 (six) hours as needed for Wheezing or Shortness of Breath. Rescue 18 g 5    alendronate (FOSAMAX) 70 MG tablet TAKE 1 TABLET BY MOUTH EVERY WEEK IN THE MORNING WITH FULL GLASS OF WATER ON EMPTY STOMACH-DONT LIE DOWN FOR AT LEAST 30 MINUTES AFTERWARDS 12 tablet 3    aspirin (ECOTRIN) 81 MG EC tablet Take 81 mg by mouth once daily.      atorvastatin (LIPITOR) 40 MG tablet TAKE 1 TABLET(40 MG) BY MOUTH EVERY DAY 90 tablet 3    calcium carbonate (CALCIUM 600 ORAL) Take by mouth.      FLUAD QUAD 2021-22,65Y UP,,PF, 60 mcg (15 mcg x 4)/0.5 mL Syrg       furosemide (LASIX) 20 MG tablet Take 1 tablet (20 mg total) by mouth daily as needed (Wt gain >2 pounds/day or >5 pounds/week). 90 tablet 0    hydrOXYzine HCL (ATARAX) 10 MG Tab 1-2 tabs PO TID PRN anxiety 60 tablet 1    magnesium oxide (MAG-OX) 400 mg (241.3 mg magnesium) tablet Take 1 tablet (400 mg total) by mouth once daily. 30 tablet 3    memantine (NAMENDA) 5 MG Tab One po qd daily for one week then BID 60 tablet 2    methocarbamoL (ROBAXIN) 500 MG Tab       omega-3 fatty acids-vitamin E (FISH OIL) 1,000 mg Cap Take 1 tablet by mouth 2 (two) times daily. (Patient taking differently: Take 1 tablet by mouth once daily.) 60 each 11    pantoprazole (PROTONIX) 40 MG tablet TAKE 1 TABLET BY MOUTH TWICE DAILY 180 tablet 0    sacubitriL-valsartan (ENTRESTO) 24-26 mg per tablet Take 1 tablet by mouth 2 (two) times daily. 60 tablet 11    sertraline (ZOLOFT) 100 MG tablet Take 1 tablet (100 mg total) by mouth once daily. 90 tablet 3    [DISCONTINUED] tiotropium-olodateroL (STIOLTO RESPIMAT) 2.5-2.5 mcg/actuation Mist Inhale 2 puffs into the  lungs once daily. Controller 4 g 5    tiotropium-olodateroL (STIOLTO RESPIMAT) 2.5-2.5 mcg/actuation Mist Inhale 2 puffs into the lungs once daily. Controller 4 g 5     No facility-administered encounter medications on file as of 4/8/2022.     Orders Placed This Encounter   Procedures    CT Chest Without Contrast     Standing Status:   Future     Standing Expiration Date:   4/8/2023     Order Specific Question:   May the Radiologist modify the order per protocol to meet the clinical needs of the patient?     Answer:   Yes       Plan:          Problem List Items Addressed This Visit        Pulmonary    Chronic obstructive pulmonary disease    Current Assessment & Plan     Patient's pulmonary function test showed mild obstruction in 2015.  He does have a smoking history.  Reports doing well with Stiolto.   His wheezing has improved.  Offered pulmonary function test.  However we opted not to perform pulmonary function test secondary to patient unable to follow commands and hard of hearing.             Relevant Medications    tiotropium-olodateroL (STIOLTO RESPIMAT) 2.5-2.5 mcg/actuation Mist    Lung nodule    Overview     In JAN, Patient had fall requiring hospitalization.  Had a CT of thoracic spine.  CT showed findings compatible with sarcoidosis and 1.9 cm pulmonary nodule.  Personally reviewed patient's chart was unable to find a biopsy confirming sarcoidosis of lung.   Lung discussion held with both patient and son indicating further follow-up for the 1.9 cm left lower lobe pulmonary nodule.  At this time, both son and patient would like to obtain follow-up CT of chest.  We did discuss further evaluation of pulmonary nodule if it shows to have grown in size.  Both patient and son agree he is likely a poor surgical candidate secondary to his impaired functional status.  Additionally, unlikely that patient would pursue chemotherapy or radiation.  The just would like to reassess the lung nodule at this time.   Patient denies constitutional symptoms              Other Visit Diagnoses     Pulmonary nodule    -  Primary    Relevant Orders    CT Chest Without Contrast         Follow up with pulmonary MD after CT of chest.     This note is dictated on M*Modal word recognition program.  There are word recognition mistakes that are occasionally missed on review.

## 2022-04-09 NOTE — ASSESSMENT & PLAN NOTE
Patient's pulmonary function test showed mild obstruction in 2015.  He does have a smoking history.  Reports doing well with Stiolto.   His wheezing has improved.  Offered pulmonary function test.  However we opted not to perform pulmonary function test secondary to patient unable to follow commands and hard of hearing.

## 2022-04-09 NOTE — PATIENT INSTRUCTIONS
Continue Stiolto  Obtain CT of chest in relation to pulmonary nodule and history of sarcoidosis.  Follow-up with Pulmonary MD after imaging

## 2022-04-11 NOTE — PROGRESS NOTES
[facility]   Physical Therapy Treatment Note     [facility]  Clinic Number: [id number]    Therapy Diagnosis:   Encounter Diagnosis   Name Primary?    Closed fracture of trochanter of left femur with routine healing Yes     Physician: Kris Multani NP    Visit Date: 4/11/2022    Physician Orders: PT Eval and Treat   Medical Diagnosis from Referral: S72.102D (ICD-10-CM) - Closed fracture of trochanter of left femur with routine healing, subsequent encounter   Evaluation Date: 2/24/2022  Authorization Period Expiration: 2/24/2023  Plan of Care Expiration: 4/24/2022  Visit # / Visits authorized: 1/ 1, 9/20  FOTO: 8/10  PTA Visit #: 1/5     Precautions: Standard, Fall and [place], visally impaired    Time In: 1:15 pm  Time Out: 2:00 pm  Total Billable Time: 45 minutes    Subjective     Pt reports: that he is feeling good and that has been busy all day today with doctor's appointments.   He was compliant with home exercise program.  Response to previous treatment: no adverse effects   Functional change: none at this time     Pain: 0/10  Location: n/a    Objective     SHABNAM received therapeutic exercises to develop strength, endurance, ROM and flexibility for 30 minutes including:  Nu-step 6 min no rests (for endurance/supervised)   Standing bicep curls 5lb 3x10   Standing overhead press 5lb 3x10   LAQ 3lb 2x10 BLE   Standing @ elevated mat 20x BLE     - marches 3lb     - hip abduction NP    - hip extension 3lb   Bridges 3x10 + GTB   Supine clams Green TB 2x10  Seated adductor squeeze 2x20   Supine chest press 5lb 3x10   Supine HSA GTB 3x10       Not performed:   Sit to stand 3x10 (from elevated mat)   Leg press isometric c /SB 2x20 BLE  Seated march 2lb 2x10 BLE  SLR 3x10 BLE   Seated HS isometrics 30x         neuromuscular re-education activities to improve: Balance, Coordination, Kinesthetic, Proprioception and Posture for 15 minutes. The following activities were  included:  Static standing on airex 2x1 min   Standing Marching on Airex 30x  Standing rows (unsupported) 30x GTB    Lateral walks 2x 4 laps (~10 ft each way)       Fwd Marches 1 laps (~15 ft each way)  NP  Modified ball toss 30x NP  Fishing Casts with CGA 3 x 19 NP      Home Exercises Provided and Patient Education Provided     Education provided:   - HEP    Written Home Exercises Provided: yes.  Exercises were reviewed and SHABNAM was able to demonstrate them prior to the end of the session.  SHABNAM demonstrated good  understanding of the education provided.     See EMR under Patient Instructions for exercises provided 3/9/2022.    Assessment     Pt was SBA for during standing activities. Increased weight resistance during appropriate therex this session. Plan to progress pt per his tolerance during upcoming sessions and tolerated.     SHABNAM Is progressing well towards his goals.   Pt prognosis is Good.     Pt will continue to benefit from skilled outpatient physical therapy to address the deficits listed in the problem list box on initial evaluation, provide pt/family education and to maximize pt's level of independence in the home and community environment.     Pt's spiritual, cultural and educational needs considered and pt agreeable to plan of care and goals.     Anticipated barriers to physical therapy: Chronicity of symptoms, co-morbidities, visual impairments       Goals:   Long Term Goals (8 Weeks):  1. Pt will improve FOTO score to </= 59% limited to decrease perceived limitation with mobility  2. Pt will improve TUG test to </= 20 sec to improve walking speed and LE strength for functional community ambulation  3. Pt will improve impaired LE MMTs by 1 grade to improve strength for functional tasks.  4. Pt improve impaired hip ROM to WNL in all planes to improve mobility for normal movement patterns.  5. Pt will demonstrate safe gait with LRD to improve QOL.  6. Pt will resume ADL (fishing) safely with  supervision/independence to improve QOL       Plan   Plan of care Certification: 2/24/2022 to 4/24/2022.       Continue with current POC.     Neto Veliz, PT

## 2022-04-14 NOTE — PROGRESS NOTES
Subjective:       Patient ID: Paul Browning is a 81 y.o. male.    Chief Complaint: Memory Loss and Headache      CT Head Without Contrast      Reading Physician Reading Date Result Priority  Imelda Paulino MD  [phone]  [phone] 1/22/2022 STAT    Narrative & Impression  EXAMINATION:  CT HEAD WITHOUT CONTRAST     CLINICAL HISTORY:  Head trauma, minor (Age >= 65y);     TECHNIQUE:  Low dose axial images were obtained through the head.  Coronal and sagittal reformations were also performed. Contrast was not administered.     COMPARISON:  Head CT 12/23/2021     FINDINGS:  There is no acute intracranial hemorrhage, hydrocephalus, midline shift or mass effect. There is generalized cerebral volume loss with compensatory prominence of cerebral sulci and the ventricular system.  There is hypoattenuation throughout the supratentorial white matter which appears similar to prior examination.  While nonspecific, findings likely reflect sequela of chronic microvascular ischemic change.  Gray-white matter differentiation is otherwise maintained.  The basal cisterns are patent. The mastoid air cells are clear.  There is opacification of posterior right ethmoid air cells.  The visualized bones of the calvarium demonstrate no acute osseous abnormality.     Impression:     1. No CT evidence of acute intracranial abnormality. Clinical correlation and further evaluation as warranted.  2. Generalized cerebral volume loss and findings suggestive of chronic microvascular ischemic change.  3. Right ethmoid sinus disease.        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    Hyperlipidemia     Hypertension     Ischemic cardiomyopathy 11/5/2014    MDD (major depressive disorder), recurrent episode, mild 2/17/2016    Osteoporosis 7/20/2016    Peripheral arterial disease 3/18/2016    Sarcoid     Squamous cell carcinoma 09/2016, 5/2016    crown of scalp, left wrist      Past Surgical History:   Procedure Laterality Date    CARDIAC SURGERY      CAROTID STENT N/A 10/14/2019    Procedure: INSERTION, STENT, ARTERY, CAROTID;  Surgeon: Artie Kebede MD;  Location: [facility] CATH LAB;  Service: Cardiology;  Laterality: N/A;    CATARACT EXTRACTION W/  INTRAOCULAR LENS IMPLANT Right 07/17/2018    Dr. Talamantes    CATARACT EXTRACTION W/  INTRAOCULAR LENS IMPLANT Left 07/31/2018    Dr. Talamantes    CORONARY ARTERY BYPASS GRAFT      x2  10/2014    ESOPHAGOGASTRODUODENOSCOPY N/A 4/8/2019    Procedure: ESOPHAGOGASTRODUODENOSCOPY (EGD)/poss rfa;  Surgeon: Clifford De La Torre MD;  Location: [facility] ENDO (2ND FLR);  Service: Endoscopy;  Laterality: N/A;    ESOPHAGOGASTRODUODENOSCOPY N/A 8/4/2021    Procedure: EGD (ESOPHAGOGASTRODUODENOSCOPY);  Surgeon: Clifford De La Torre MD;  Location: [facility] ENDO (2ND FLR);  Service: Endoscopy;  Laterality: N/A;  8/2-covid [place]-[facility] [id number]-rn appts on 8/3    ESOPHAGOGASTRODUODENOSCOPY (EGD) WITH RADIOFREQUENCY TREATMENT OF GASTROESOPHAGEAL JUNCTION      INJECTION OF ANESTHETIC AGENT AROUND MEDIAL BRANCH NERVES INNERVATING CERVICAL FACET JOINT Bilateral 1/9/2020    Procedure: INJECTION, MBB--Bilateral Cervical- Third Occipital nerve, C2-3--ASA okay for pt to continue taking per provider.;  Surgeon: Tip Mera Jr., MD;  Location: [facility] PAIN MGT;  Service: Pain Management;  Laterality: Bilateral;    INTRAOCULAR PROSTHESES INSERTION Right  7/17/2018    Procedure: INSERTION, INTRAOCULAR LENS PROSTHESIS;  Surgeon: León Talamantes MD;  Location: [facility] OR 1ST FLR;  Service: Ophthalmology;  Laterality: Right;    INTRAOCULAR PROSTHESES INSERTION Left 7/31/2018    Procedure: INSERTION, INTRAOCULAR LENS PROSTHESIS;  Surgeon: León Talamantes MD;  Location: [facility] OR 1ST FLR;  Service: Ophthalmology;  Laterality: Left;    PHACOEMULSIFICATION OF CATARACT Right 7/17/2018    Procedure: PHACOEMULSIFICATION, CATARACT;  Surgeon: León Talamantes MD;  Location: [facility] OR 1ST FLR;  Service: Ophthalmology;  Laterality: Right;    PHACOEMULSIFICATION OF CATARACT Left 7/31/2018    Procedure: PHACOEMULSIFICATION, CATARACT;  Surgeon: León Talamantes MD;  Location: [facility] OR 1ST FLR;  Service: Ophthalmology;  Laterality: Left;    PLACEMENT OF SCREW IN ODONTOID N/A 10/22/2020    Procedure: INSERTION, SCREW, ODONTOID. Anterior approach.;  Surgeon: Kirsten Weaver MD;  Location: [facility] OR 2ND FLR;  Service: Neurosurgery;  Laterality: N/A;    RADIOFREQUENCY THERMOCOAGULATION Bilateral 1/30/2020    Procedure: RADIOFREQUENCY THERMAL COAGULATION--Bilateral C2-3 and Third Occipital Nerve;  Surgeon: Tip Mera Jr., MD;  Location: [facility];  Service: Pain Management;  Laterality: Bilateral;    UMBILICAL HERNIA REPAIR          Current Outpatient Medications:     albuterol (PROAIR HFA) 90 mcg/actuation inhaler, Inhale 2 puffs into the lungs every 6 (six) hours as needed for Wheezing or Shortness of Breath. Rescue, Disp: 18 g, Rfl: 5    alendronate (FOSAMAX) 70 MG tablet, TAKE 1 TABLET BY MOUTH EVERY WEEK IN THE MORNING WITH FULL GLASS OF WATER ON EMPTY STOMACH-DONT LIE DOWN FOR AT LEAST 30 MINUTES AFTERWARDS, Disp: 12 tablet, Rfl: 3    aspirin (ECOTRIN) 81 MG EC tablet, Take 81 mg by mouth once daily., Disp: , Rfl:     atorvastatin (LIPITOR) 40 MG tablet, TAKE 1 TABLET(40 MG) BY MOUTH EVERY DAY, Disp: 90 tablet, Rfl: 3    calcium carbonate (CALCIUM  600 ORAL), Take by mouth., Disp: , Rfl:     FLUAD QUAD 2021-22,65Y UP,,PF, 60 mcg (15 mcg x 4)/0.5 mL Syrg, , Disp: , Rfl:     furosemide (LASIX) 20 MG tablet, Take 1 tablet (20 mg total) by mouth daily as needed (Wt gain >2 pounds/day or >5 pounds/week)., Disp: 90 tablet, Rfl: 0    hydrOXYzine HCL (ATARAX) 10 MG Tab, 1-2 tabs PO TID PRN anxiety, Disp: 60 tablet, Rfl: 1    magnesium oxide (MAG-OX) 400 mg (241.3 mg magnesium) tablet, Take 1 tablet (400 mg total) by mouth once daily., Disp: 30 tablet, Rfl: 3    methocarbamoL (ROBAXIN) 500 MG Tab, , Disp: , Rfl:     omega-3 fatty acids-vitamin E (FISH OIL) 1,000 mg Cap, Take 1 tablet by mouth 2 (two) times daily. (Patient taking differently: Take 1 tablet by mouth once daily.), Disp: 60 each, Rfl: 11    pantoprazole (PROTONIX) 40 MG tablet, TAKE 1 TABLET BY MOUTH TWICE DAILY, Disp: 180 tablet, Rfl: 0    sacubitriL-valsartan (ENTRESTO) 24-26 mg per tablet, Take 1 tablet by mouth 2 (two) times daily., Disp: 60 tablet, Rfl: 11    sertraline (ZOLOFT) 100 MG tablet, Take 1 tablet (100 mg total) by mouth once daily., Disp: 90 tablet, Rfl: 3    tiotropium-olodateroL (STIOLTO RESPIMAT) 2.5-2.5 mcg/actuation Mist, Inhale 2 puffs into the lungs once daily. Controller, Disp: 4 g, Rfl: 5    memantine (NAMENDA) 5 MG Tab, Take 1 tablet (5 mg total) by mouth 2 (two) times daily. One po qd daily for one week then BID, Disp: 60 tablet, Rfl: 5    ubrogepant (UBROGEPANT) 100 mg tablet, One po at onset of migraine. May repeat after 2 hours if needed., Disp: 10 tablet, Rfl: 1   Review of patient's allergies indicates:   Allergen Reactions    Morphine Shortness Of Breath        Review of Systems   Neurological: Positive for headaches and memory loss. Negative for syncope, speech difficulty and numbness.           Objective:      Physical Exam  Neurological:      Mental Status: He is alert. Mental status is at baseline.   Psychiatric:         Thought Content: Thought content  normal.           Assessment:       1. Intractable migraine without aura and without status migrainosus    2. Intractable headache, unspecified chronicity pattern, unspecified headache type    3. Late onset Alzheimer's dementia with behavioral disturbance        Plan:          Here for routine visit and by coincidence had return of headaches 3 days ago. They had resolved after last visit.   Bifrontal pressure, severe, and with nausea. Denies light or noise sensitivity. Admits that he had headaches as a younger man, which he self treated with alcohol. Has strong FH of migraines. never dx w/or tx/for migraines.   No syncope or focal sz and no cognitive change( baseline dementia)  He fell 2 more times since last visit, fractured pubic bone. Still walks and I PT, and family is very careful with him to avoid falls.  Headaches might be worse when he worries  Could be migraines, not entirely clear.Ubrelvy samples 100mg ( #2). Renal function normal.Call if has any focal symptoms or syncope.  If no help then [facility]. If not covered then Nurtec.                   Penelope Stauffer MD   04/14/2022   2:36 PM

## 2022-04-14 NOTE — PROGRESS NOTES
[facility]   Physical Therapy Treatment Note     [facility]  [facility] Number: [id number]    Therapy Diagnosis:   Encounter Diagnosis   Name Primary?    Closed fracture of trochanter of left femur with routine healing Yes     Physician: Kris Multani NP    Visit Date: 4/13/2022    Physician Orders: PT Eval and Treat   Medical Diagnosis from Referral: S72.102D (ICD-10-CM) - Closed fracture of trochanter of left femur with routine healing, subsequent encounter   Evaluation Date: 2/24/2022  Authorization Period Expiration: 2/24/2023  Plan of Care Expiration: 4/24/2022  Visit # / Visits authorized: 1/ 1, 10/20  FOTO: 8/10  PTA Visit #: 1/5     Precautions: Standard, Fall and [place], visally impaired    Time In: 1:45 pm  Time Out: 2:30 pm  Total Billable Time: 45 minutes    Subjective     Pt reports: that he is feeling good today  He was compliant with home exercise program.  Response to previous treatment: no adverse effects   Functional change: none at this time     Pain: 0/10  Location: n/a    Objective     SHABNAM received therapeutic exercises to develop strength, endurance, ROM and flexibility for 30 minutes including:  Nu-step 6 min no rests (for endurance/supervised)   Standing bicep curls 5lb 3x10   Standing overhead press 5lb 3x10   LAQ 3lb 2x10 BLE   Standing @ elevated mat 20x BLE     - marches 3lb     - hip abduction NP    - hip extension 3lb   Bridges 3x10 + GTB   Supine clams Green TB 2x10  Seated adductor squeeze 2x20   Supine chest press 5lb 3x10   Supine HSA GTB 3x10       Not performed:   Sit to stand 3x10 (from elevated mat)   Leg press isometric c /SB 2x20 BLE  Seated march 2lb 2x10 BLE  SLR 3x10 BLE   Seated HS isometrics 30x         neuromuscular re-education activities to improve: Balance, Coordination, Kinesthetic, Proprioception and Posture for 15 minutes. The following activities were included:  Static standing on airex 2x1 min   Standing Marching on  Airex 30x  Standing rows (unsupported) 30x GTB    Lateral walks 2x 4 laps (~10 ft each way)       Fwd Marches 1 laps (~15 ft each way)  NP  Modified ball toss 30x NP  Fishing Casts with CGA 3 x 19 NP      Home Exercises Provided and Patient Education Provided     Education provided:   - HEP    Written Home Exercises Provided: yes.  Exercises were reviewed and SHABNAM was able to demonstrate them prior to the end of the session.  SHABNAM demonstrated good  understanding of the education provided.     See EMR under Patient Instructions for exercises provided 3/9/2022.    Assessment     Pt was SBA for during standing activities. Increased weight resistance during appropriate therex this session. Plan to progress pt per his tolerance during upcoming sessions and tolerated.     SHABNAM Is progressing well towards his goals.   Pt prognosis is Good.     Pt will continue to benefit from skilled outpatient physical therapy to address the deficits listed in the problem list box on initial evaluation, provide pt/family education and to maximize pt's level of independence in the home and community environment.     Pt's spiritual, cultural and educational needs considered and pt agreeable to plan of care and goals.     Anticipated barriers to physical therapy: Chronicity of symptoms, co-morbidities, visual impairments       Goals:   Long Term Goals (8 Weeks):  1. Pt will improve FOTO score to </= 59% limited to decrease perceived limitation with mobility  2. Pt will improve TUG test to </= 20 sec to improve walking speed and LE strength for functional community ambulation  3. Pt will improve impaired LE MMTs by 1 grade to improve strength for functional tasks.  4. Pt improve impaired hip ROM to WNL in all planes to improve mobility for normal movement patterns.  5. Pt will demonstrate safe gait with LRD to improve QOL.  6. Pt will resume ADL (fishing) safely with supervision/independence to improve QOL       Plan   Plan of care  Certification: 2/24/2022 to 4/24/2022.       Continue with current POC.     Neto Veliz PT

## 2022-04-26 NOTE — TELEPHONE ENCOUNTER
Spoke to patient son about scheduling interventional appointment. Patient son verbalized understanding.

## 2022-04-26 NOTE — TELEPHONE ENCOUNTER
ICM previously reversed with intervention in 2014.  He had a new drop in EF so a PET stress was done. The reversible defect correlates with the WMA noted on the TTE.  I spoke with his son and reviewed the plan as he's the first call and the patient does not hear well on the telephone.

## 2022-04-28 NOTE — PROGRESS NOTES
[facility]   Physical Therapy Treatment Note/Updated POC    Name: Paul Browning  Clinic Number: [id number]    Therapy Diagnosis:   Encounter Diagnosis   Name Primary?    Closed fracture of trochanter of left femur with routine healing Yes     Physician: Kris Multani NP    Visit Date: 4/27/2022    Physician Orders: PT Eval and Treat   Medical Diagnosis from Referral: S72.102D (ICD-10-CM) - Closed fracture of trochanter of left femur with routine healing, subsequent encounter   Evaluation Date: 2/24/2022  Authorization Period Expiration: 2/24/2023  Plan of Care Expiration: 4/24/2022  Visit # / Visits authorized: 1/ 1, 12/20  FOTO: 8/10  PTA Visit #: 1/5     Precautions: Standard, Fall and [place], visally impaired    Time In: 3:15 pm  Time Out: 4:00 pm  Total Billable Time: 45 minutes    Subjective     Pt reports: that he is feeling good today  He was compliant with home exercise program.  Response to previous treatment: no adverse effects   Functional change: none at this time     Pain: 0/10  Location: n/a    Objective     SHABNAM received therapeutic exercises to develop strength, endurance, ROM and flexibility for 30 minutes including:  Nu-step 6 min no rests (for endurance/supervised)   Standing bicep curls 5lb 3x10   Standing overhead press 5lb 3x10   LAQ 3lb 2x10 BLE   Standing @ elevated mat 20x BLE     - marches 3lb     - hip abduction NP    - hip extension 3lb   Bridges 3x10 + GTB   Supine clams Green TB 2x10  Seated adductor squeeze 2x20   Supine chest press 5lb 3x10   Supine HSA GTB 3x10       Not performed:   Sit to stand 3x10 (from elevated mat)   Leg press isometric c /SB 2x20 BLE  Seated march 2lb 2x10 BLE  SLR 3x10 BLE   Seated HS isometrics 30x         neuromuscular re-education activities to improve: Balance, Coordination, Kinesthetic, Proprioception and Posture for 0 minutes. The following activities were included:  Static standing on airex 2x1 min   Standing  Marching on Airex 30x  Standing rows (unsupported) 30x GTB    Lateral walks 2x 4 laps (~10 ft each way)       Fwd Marches 1 laps (~15 ft each way)  NP  Modified ball toss 30x NP  Fishing Casts with CGA 3 x 19 NP    Gait training with Quad Cane for 15 mins.    Home Exercises Provided and Patient Education Provided     Education provided:   - HEP    Written Home Exercises Provided: yes.  Exercises were reviewed and SHABNAM was able to demonstrate them prior to the end of the session.  SHABNAM demonstrated good  understanding of the education provided.     See EMR under Patient Instructions for exercises provided 3/9/2022.    Assessment     Pt was SBA for during standing activities. Pt was CGA for gait training with quad cane. Maintained previous weight resistance during appropriate therex this session. Plan to progress pt per his tolerance during upcoming sessions and tolerated.     SHABNAM Is progressing well towards his goals.   Pt prognosis is Good.     Pt will continue to benefit from skilled outpatient physical therapy to address the deficits listed in the problem list box on initial evaluation, provide pt/family education and to maximize pt's level of independence in the home and community environment.     Pt's spiritual, cultural and educational needs considered and pt agreeable to plan of care and goals.     Anticipated barriers to physical therapy: Chronicity of symptoms, co-morbidities, visual impairments       Goals:   Long Term Goals (8 Weeks):  1. Pt will improve FOTO score to </= 59% limited to decrease perceived limitation with mobility PROGRESSING, NOT MET  2. Pt will improve TUG test to </= 20 sec to improve walking speed and LE strength for functional community ambulation PROGRESSING, NOT MET  3. Pt will improve impaired LE MMTs by 1 grade to improve strength for functional tasks. MET  4. Pt improve impaired hip ROM to WNL in all planes to improve mobility for normal movement patterns. MET  5. Pt will  demonstrate safe gait with LRD to improve QOL. PROGRESSING, NOT MET  6. Pt will resume ADL (fishing) safely with supervision/independence to improve QOL PROGRESSING, MET      Plan   Updated Plan of care Certification: 4/28/2022 to 4/24/2022.       Continue with current POC.     Neto Veliz, PT

## 2022-05-02 NOTE — PROGRESS NOTES
CC: elevated PSA 6.5, s/p UTI in Feb. 2022    Paul Browning is a 81 y.o. man who is here for the evaluation of Elevated PSA (Referred by Dr. Bennett. PSA done 3/18/2022.)    A new pt referred by his PCP, Grey Forbes, DO   He broke his pelvis in Feb and was hospitalized after a fall.  Mr. Browning was admitted to Hospital Medicine for management of a fall and a COPD exacerbation.  He was started on breathing treatments, Augmentin, and Prednisone.  He was noted to have difficulty ambulating with PT and endorsed back pain.  CT scan was ordered that showed a pubic ramus fracture.  Ortho was consulted.  He was started on Robaxin and low dose Eliquis for 6 weeks for DVT prophylaxis.  He noted some blood in urine and noted to have UTI.    He lives with his daughter.  Today he is here with his son.  He is on a wheel chair but is able to ambulate slowly.    Voices no problem with urination or hematuria.  No family hx of prostate cancer.    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    Hyperlipidemia     Hypertension     Ischemic cardiomyopathy 11/5/2014    MDD (major depressive disorder), recurrent episode, mild 2/17/2016    Osteoporosis 7/20/2016    Peripheral arterial disease 3/18/2016    Sarcoid     Squamous cell carcinoma 09/2016, 5/2016    crown of scalp, left wrist     Past Surgical History:   Procedure Laterality Date    CARDIAC SURGERY      CAROTID STENT N/A 10/14/2019    Procedure: INSERTION, STENT, ARTERY, CAROTID;  Surgeon: Artie Kebede MD;  Location: [facility] CATH LAB;  Service:  Cardiology;  Laterality: N/A;    CATARACT EXTRACTION W/  INTRAOCULAR LENS IMPLANT Right 07/17/2018    Dr. Talamantes    CATARACT EXTRACTION W/  INTRAOCULAR LENS IMPLANT Left 07/31/2018    Dr. Talamantes    CORONARY ARTERY BYPASS GRAFT      x2  10/2014    ESOPHAGOGASTRODUODENOSCOPY N/A 4/8/2019    Procedure: ESOPHAGOGASTRODUODENOSCOPY (EGD)/poss rfa;  Surgeon: Clifford De La Torre MD;  Location: [facility] ENDO (2ND FLR);  Service: Endoscopy;  Laterality: N/A;    ESOPHAGOGASTRODUODENOSCOPY N/A 8/4/2021    Procedure: EGD (ESOPHAGOGASTRODUODENOSCOPY);  Surgeon: Clifford De La Torre MD;  Location: [facility] ENDO (2ND FLR);  Service: Endoscopy;  Laterality: N/A;  8/2-covid [place]-inst ybosmn-wa-om appts on 8/3    ESOPHAGOGASTRODUODENOSCOPY (EGD) WITH RADIOFREQUENCY TREATMENT OF GASTROESOPHAGEAL JUNCTION      INJECTION OF ANESTHETIC AGENT AROUND MEDIAL BRANCH NERVES INNERVATING CERVICAL FACET JOINT Bilateral 1/9/2020    Procedure: INJECTION, MBB--Bilateral Cervical- Third Occipital nerve, C2-3--ASA okay for pt to continue taking per provider.;  Surgeon: Tip Mera Jr., MD;  Location: [facility] PAIN MGT;  Service: Pain Management;  Laterality: Bilateral;    INTRAOCULAR PROSTHESES INSERTION Right 7/17/2018    Procedure: INSERTION, INTRAOCULAR LENS PROSTHESIS;  Surgeon: León Talamantes MD;  Location: [address];  Service: Ophthalmology;  Laterality: Right;    INTRAOCULAR PROSTHESES INSERTION Left 7/31/2018    Procedure: INSERTION, INTRAOCULAR LENS PROSTHESIS;  Surgeon: León Talamantes MD;  Location: [address];  Service: Ophthalmology;  Laterality: Left;    PHACOEMULSIFICATION OF CATARACT Right 7/17/2018    Procedure: PHACOEMULSIFICATION, CATARACT;  Surgeon: León Talamantes MD;  Location: [facility] OR [address] [place] [address];  Service: Ophthalmology;  Laterality: Right;    PHACOEMULSIFICATION OF CATARACT Left 7/31/2018    Procedure: PHACOEMULSIFICATION, CATARACT;  Surgeon: León Talamantes MD;  Location: [facility]  OR 1ST FLR;  Service: Ophthalmology;  Laterality: Left;    PLACEMENT OF SCREW IN ODONTOID N/A 10/22/2020    Procedure: INSERTION, SCREW, ODONTOID. Anterior approach.;  Surgeon: Kirsten Weaver MD;  Location: [facility] OR 2ND FLR;  Service: Neurosurgery;  Laterality: N/A;    RADIOFREQUENCY THERMOCOAGULATION Bilateral 2020    Procedure: RADIOFREQUENCY THERMAL COAGULATION--Bilateral C2-3 and Third Occipital Nerve;  Surgeon: Tip Mera Jr., MD;  Location: [facility] PAIN MGT;  Service: Pain Management;  Laterality: Bilateral;    UMBILICAL HERNIA REPAIR       Social History     Tobacco Use    Smoking status: Former Smoker     Packs/day: 2.00     Years: 20.00     Pack years: 40.00     Types: Cigarettes     Quit date: 1988     Years since quittin.2    Smokeless tobacco: Never Used   Substance Use Topics    Alcohol use: No     Comment: quit drinking beer     Drug use: No     Family History   Problem Relation Age of Onset    Arrhythmia Mother     Heart disease Mother     Heart failure Brother     No Known Problems Daughter     No Known Problems Son     Colon cancer Neg Hx     Inflammatory bowel disease Neg Hx     Celiac disease Neg Hx     Melanoma Neg Hx     Amblyopia Neg Hx     Blindness Neg Hx     Cataracts Neg Hx     Glaucoma Neg Hx     Macular degeneration Neg Hx     Retinal detachment Neg Hx     Strabismus Neg Hx      Allergy:  Review of patient's allergies indicates:   Allergen Reactions    Morphine Shortness Of Breath     Outpatient Encounter Medications as of 2022   Medication Sig Dispense Refill    albuterol (PROAIR HFA) 90 mcg/actuation inhaler Inhale 2 puffs into the lungs every 6 (six) hours as needed for Wheezing or Shortness of Breath. Rescue 18 g 5    alendronate (FOSAMAX) 70 MG tablet TAKE 1 TABLET BY MOUTH EVERY WEEK IN THE MORNING WITH FULL GLASS OF WATER ON EMPTY STOMACH-DONT LIE DOWN FOR AT LEAST 30 MINUTES AFTERWARDS 12 tablet 3    aspirin (ECOTRIN) 81 MG EC  tablet Take 81 mg by mouth once daily.      atorvastatin (LIPITOR) 40 MG tablet TAKE 1 TABLET(40 MG) BY MOUTH EVERY DAY 90 tablet 3    calcium carbonate (CALCIUM 600 ORAL) Take by mouth.      FLUAD QUAD 2021-22,65Y UP,,PF, 60 mcg (15 mcg x 4)/0.5 mL Syrg       furosemide (LASIX) 20 MG tablet Take 1 tablet (20 mg total) by mouth daily as needed (Wt gain >2 pounds/day or >5 pounds/week). 90 tablet 0    hydrOXYzine HCL (ATARAX) 10 MG Tab 1-2 tabs PO TID PRN anxiety 60 tablet 1    magnesium oxide (MAG-OX) 400 mg (241.3 mg magnesium) tablet Take 1 tablet (400 mg total) by mouth once daily. 30 tablet 3    memantine (NAMENDA) 5 MG Tab Take 1 tablet (5 mg total) by mouth 2 (two) times daily. One po qd daily for one week then BID 60 tablet 5    methocarbamoL (ROBAXIN) 500 MG Tab       omega-3 fatty acids-vitamin E (FISH OIL) 1,000 mg Cap Take 1 tablet by mouth 2 (two) times daily. (Patient taking differently: Take 1 tablet by mouth once daily.) 60 each 11    pantoprazole (PROTONIX) 40 MG tablet TAKE 1 TABLET(40 MG) BY MOUTH TWICE DAILY 180 tablet 0    sacubitriL-valsartan (ENTRESTO) 24-26 mg per tablet Take 1 tablet by mouth 2 (two) times daily. 60 tablet 11    sertraline (ZOLOFT) 100 MG tablet Take 1 tablet (100 mg total) by mouth once daily. 90 tablet 3    tiotropium-olodateroL (STIOLTO RESPIMAT) 2.5-2.5 mcg/actuation Mist Inhale 2 puffs into the lungs once daily. Controller 4 g 5    ubrogepant (UBROGEPANT) 100 mg tablet One po at onset of migraine. May repeat after 2 hours if needed. 10 tablet 1     No facility-administered encounter medications on file as of 5/2/2022.     Review of Systems   ROS  Physical Exam     Vitals:    05/02/22 1448   BP: 131/73   Pulse: (!) 59     Physical Exam  Constitutional:       General: He is not in acute distress.     Appearance: He is well-developed. He is not diaphoretic.   HENT:      Head: Normocephalic and atraumatic.      Right Ear: External ear normal.      Left Ear:  External ear normal.      Nose: Nose normal.   Eyes:      Conjunctiva/sclera: Conjunctivae normal.      Pupils: Pupils are equal, round, and reactive to light.   Neck:      Thyroid: No thyromegaly.      Vascular: No JVD.      Trachea: No tracheal deviation.   Cardiovascular:      Rate and Rhythm: Normal rate and regular rhythm.      Heart sounds: Normal heart sounds. No murmur heard.    No friction rub. No gallop.   Pulmonary:      Effort: Pulmonary effort is normal. No respiratory distress.      Breath sounds: Normal breath sounds. No wheezing.   Chest:      Chest wall: No tenderness.   Abdominal:      General: Bowel sounds are normal. There is no distension.      Palpations: Abdomen is soft. There is no mass.      Tenderness: There is no abdominal tenderness. There is no guarding or rebound.   Genitourinary:     Penis: Normal. No tenderness.       Rectum: Normal.      Comments: Prostate 30 grams with negative nodule or negative tenderness    Musculoskeletal:         General: No tenderness or deformity. Normal range of motion.      Cervical back: Normal range of motion and neck supple.   Lymphadenopathy:      Cervical: No cervical adenopathy.   Skin:     General: Skin is warm and dry.   Neurological:      Mental Status: He is alert and oriented to person, place, and time.   Psychiatric:         Behavior: Behavior normal.         Thought Content: Thought content normal.                 LABS:  Lab Results   Component Value Date    PSA 3.6 02/17/2016    PSADIAG 6.5 (H) 03/18/2022     Results for orders placed or performed in visit on 03/18/22   Prostate Specific Antigen, Diagnostic   Result Value Ref Range    PSA Diagnostic 6.5 (H) 0.00 - 4.00 ng/mL     *Note: Due to a large number of results and/or encounters for the requested time period, some results have not been displayed. A complete set of results can be found in Results Review.     Lab Results   Component Value Date    CREATININE 0.9 04/08/2022    CREATININE  1.0 03/18/2022    CREATININE 0.9 01/28/2022     No results found. However, due to the size of the patient record, not all encounters were searched. Please check Results Review for a complete set of results.  Urine Culture, Routine   Date Value Ref Range Status   02/01/2022 MORGANELLA MORGANII  10,000 - 49,999 cfu/ml   (A)  Final   02/01/2022 (A)  Final    ESCHERICHIA COLI  10,000 - 49,999 cfu/ml  No other significant isolate       Hemoglobin A1C   Date Value Ref Range Status   10/15/2014 5.2 4.5 - 6.2 % Final     Assessment and Plan:  Paul was seen today for elevated psa.    Diagnoses and all orders for this visit:    Acute UTI  -     US Retroperitoneal Complete (Kidney and; Future    Elevated PSA measurement  -     Ambulatory referral/consult to Urology  -     Prostate Specific Antigen, Diagnostic; Future    I explained the nature and possible causes of rising PSA other than possible prostate cancer.  These including age, BPH, UTI, instrumentation affecting prostate, even ejaculation discussed in details.   Pt is not concerned about possible prostate cancer and is not interested in undergoing prostate biopsy at this time.   I recommend that he should continue to follow up with serial PSA.   Will see him in 6 months with PSA.    Regarding UTI, he may have been related to his fall.  He denies any problem with urination.  So will check his baseline with renal and bladder US.    Answered all his and his son's questions.  They understood and agreed with the plan.    Follow-up:  Follow up in about 6 months (around 11/2/2022) for PSA.

## 2022-05-04 NOTE — PROGRESS NOTES
[facility]   Physical Therapy Treatment Note/Updated POC    Name: Paul Browning  Clinic Number: [id number]    Therapy Diagnosis:   Encounter Diagnoses   Name Primary?    Closed fracture of trochanter of left femur with routine healing Yes    Gait instability     Impaired functional mobility, balance, gait, and endurance     Frequent falls      Physician: Kris Multani NP    Visit Date: 5/4/2022    Physician Orders: PT Eval and Treat   Medical Diagnosis from Referral: S72.102D (ICD-10-CM) - Closed fracture of trochanter of left femur with routine healing, subsequent encounter   Evaluation Date: 2/24/2022  Authorization Period Expiration: 2/24/2023  Plan of Care Expiration: 4/24/2022  Visit # / Visits authorized: 1/ 1, 13/20  FOTO: 8/10  PTA Visit #: 1/5     Precautions: Standard, Fall and [place], visally impaired    Time In: 12:15 pm  Time Out: 1:00 pm  Total Billable Time: 45 minutes    Subjective     Pt reports: that he is feeling good today  He was compliant with home exercise program.  Response to previous treatment: no adverse effects   Functional change: none at this time     Pain: 0/10  Location: n/a    Objective     SHABNAM received therapeutic exercises to develop strength, endurance, ROM and flexibility for 15 minutes including:  Nu-step 6 min no rests (for endurance/supervised)   Standing bicep curls 5lb 3x10   Standing overhead press 5lb 3x10   LAQ 3lb 2x10 BLE   Standing @ elevated mat 20x BLE     - marches 3lb     - hip abduction NP    - hip extension 3lb   Bridges 3x10 + GTB   Supine clams Green TB 2x10  Seated adductor squeeze 2x20   Supine chest press 5lb 3x10   Supine HSA GTB 3x10       Not performed:   Sit to stand 3x10 (from elevated mat)   Leg press isometric c /SB 2x20 BLE  Seated march 2lb 2x10 BLE  SLR 3x10 BLE   Seated HS isometrics 30x         neuromuscular re-education activities to improve: Balance, Coordination, Kinesthetic, Proprioception and  Posture for 0 minutes. The following activities were included:  Static standing on airex 2x1 min   Standing Marching on Airex 30x  Standing rows (unsupported) 30x GTB    Lateral walks 2x 4 laps (~10 ft each way)       Fwd Marches 1 laps (~15 ft each way)  NP  Modified ball toss 30x NP  Fishing Casts with CGA 3 x 19 NP    Gait training with Quad Cane for 30 mins.    Home Exercises Provided and Patient Education Provided     Education provided:   - HEP    Written Home Exercises Provided: yes.  Exercises were reviewed and SHABNAM was able to demonstrate them prior to the end of the session.  SHABNAM demonstrated good  understanding of the education provided.     See EMR under Patient Instructions for exercises provided 3/9/2022.    Assessment     Pt was SBA for during standing activities. Pt was CGA for gait training with quad cane. Maintained previous weight resistance during appropriate therex this session. Plan to progress pt per his tolerance during upcoming sessions and tolerated.     SHABNAM Is progressing well towards his goals.   Pt prognosis is Good.     Pt will continue to benefit from skilled outpatient physical therapy to address the deficits listed in the problem list box on initial evaluation, provide pt/family education and to maximize pt's level of independence in the home and community environment.     Pt's spiritual, cultural and educational needs considered and pt agreeable to plan of care and goals.     Anticipated barriers to physical therapy: Chronicity of symptoms, co-morbidities, visual impairments       Goals:   Long Term Goals (8 Weeks):  1. Pt will improve FOTO score to </= 59% limited to decrease perceived limitation with mobility PROGRESSING, NOT MET  2. Pt will improve TUG test to </= 20 sec to improve walking speed and LE strength for functional community ambulation PROGRESSING, NOT MET  3. Pt will improve impaired LE MMTs by 1 grade to improve strength for functional tasks. MET  4. Pt improve  impaired hip ROM to WNL in all planes to improve mobility for normal movement patterns. MET  5. Pt will demonstrate safe gait with LRD to improve QOL. PROGRESSING, NOT MET  6. Pt will resume ADL (fishing) safely with supervision/independence to improve QOL PROGRESSING, MET      Plan   Updated Plan of care Certification: 4/28/2022 to 4/24/2022.       Continue with current POC.     Neto Veliz, PT

## 2022-05-10 NOTE — PROGRESS NOTES
Interventional Cardiology Clinic Note  Reason for Visit: Abnormal stress test.  Referring Physician: Joanne Wharton    HPI:   Pt is a 81 year old gentleman who presented for an abnormal stress test.     He was recently seen in general cardiology clinic by Joanne Wharton and mentioned that he has shortness of breath from time to time. A PET stress was done and had ischemia in the OM and septal territory. He is mentioning shortness of breath but denies chest pain, syncope.     Review of Systems   All other systems reviewed and are negative.      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 CATH LAB;  Service: Cardiology;  Laterality: N/A;    CATARACT EXTRACTION W/  INTRAOCULAR LENS IMPLANT Right 07/17/2018    Dr. Talamantes    CATARACT EXTRACTION W/  INTRAOCULAR LENS IMPLANT Left 07/31/2018    Dr. Talamantes    CORONARY ARTERY BYPASS GRAFT      x2  10/2014    ESOPHAGOGASTRODUODENOSCOPY N/A 4/8/2019    Procedure: ESOPHAGOGASTRODUODENOSCOPY (EGD)/poss rfa;  Surgeon: Clifford De La Torre MD;  Location: [facility] ENDO ([facility]  FLR);  Service: Endoscopy;  Laterality: N/A;    ESOPHAGOGASTRODUODENOSCOPY N/A 8/4/2021    Procedure: EGD (ESOPHAGOGASTRODUODENOSCOPY);  Surgeon: Clifford De La Torre MD;  Location: [facility] (2ND FLR);  Service: Endoscopy;  Laterality: N/A;  [facility]    ESOPHAGOGASTRODUODENOSCOPY (EGD) WITH RADIOFREQUENCY TREATMENT OF GASTROESOPHAGEAL JUNCTION      INJECTION OF ANESTHETIC AGENT AROUND MEDIAL BRANCH NERVES INNERVATING CERVICAL FACET JOINT Bilateral 1/9/2020    Procedure: INJECTION, MBB--Bilateral Cervical- Third Occipital nerve, C2-3--ASA okay for pt to continue taking per provider.;  Surgeon: Tip Mera Jr., MD;  Location: [facility] PAIN MGT;  Service: Pain Management;  Laterality: Bilateral;    INTRAOCULAR PROSTHESES INSERTION Right 7/17/2018    Procedure: INSERTION, INTRAOCULAR LENS PROSTHESIS;  Surgeon: León Talamantes MD;  [address];  Service: Ophthalmology;  Laterality: Right;    INTRAOCULAR PROSTHESES INSERTION Left 7/31/2018    Procedure: INSERTION, INTRAOCULAR LENS PROSTHESIS;  Surgeon: León Talamantes MD;  Location: [facility] OR [address] [place] [address];  Service: Ophthalmology;  Laterality: Left;    PHACOEMULSIFICATION OF CATARACT Right 7/17/2018    Procedure: PHACOEMULSIFICATION, CATARACT;  Surgeon: León Talamantes MD;  Location: [facility] OR [address] [place] [address];  Service: Ophthalmology;  Laterality: Right;    PHACOEMULSIFICATION OF CATARACT Left 7/31/2018    Procedure: PHACOEMULSIFICATION, CATARACT;  Surgeon: León Talamantes MD;  Location: [facility] OR [address] [place] [address];  Service: Ophthalmology;  Laterality: Left;    PLACEMENT OF SCREW IN ODONTOID N/A 10/22/2020    Procedure: INSERTION, SCREW, ODONTOID. Anterior approach.;  Surgeon: Kirsten Weaver MD;  Location: [facility] OR 2ND FLR;  Service: Neurosurgery;  Laterality: N/A;    RADIOFREQUENCY THERMOCOAGULATION Bilateral 1/30/2020    Procedure: RADIOFREQUENCY THERMAL COAGULATION--Bilateral C2-3 and Third Occipital Nerve;   Surgeon: Tip Mera Jr., MD;  [facility];  Service: Pain Management;  Laterality: Bilateral;    UMBILICAL HERNIA REPAIR       Allergies:     Review of patient's allergies indicates:   Allergen Reactions    Morphine Shortness Of Breath     Medications:     Current Outpatient Medications on File Prior to Visit   Medication Sig Dispense Refill    albuterol (PROAIR HFA) 90 mcg/actuation inhaler Inhale 2 puffs into the lungs every 6 (six) hours as needed for Wheezing or Shortness of Breath. Rescue 18 g 5    alendronate (FOSAMAX) 70 MG tablet TAKE 1 TABLET BY MOUTH EVERY WEEK IN THE MORNING WITH FULL GLASS OF WATER ON EMPTY STOMACH-DONT LIE DOWN FOR AT LEAST 30 MINUTES AFTERWARDS 12 tablet 3    aspirin (ECOTRIN) 81 MG EC tablet Take 81 mg by mouth once daily.      atorvastatin (LIPITOR) 40 MG tablet TAKE 1 TABLET(40 MG) BY MOUTH EVERY DAY 90 tablet 3    calcium carbonate (CALCIUM 600 ORAL) Take by mouth.      magnesium oxide (MAG-OX) 400 mg (241.3 mg magnesium) tablet Take 1 tablet (400 mg total) by mouth once daily. 30 tablet 3    memantine (NAMENDA) 5 MG Tab Take 1 tablet (5 mg total) by mouth 2 (two) times daily. One po qd daily for one week then BID 60 tablet 5    omega-3 fatty acids-vitamin E (FISH OIL) 1,000 mg Cap Take 1 tablet by mouth 2 (two) times daily. (Patient taking differently: Take 1 tablet by mouth once daily.) 60 each 11    pantoprazole (PROTONIX) 40 MG tablet TAKE 1 TABLET(40 MG) BY MOUTH TWICE DAILY 180 tablet 0    sacubitriL-valsartan (ENTRESTO) 24-26 mg per tablet Take 1 tablet by mouth 2 (two) times daily. 60 tablet 11    sertraline (ZOLOFT) 100 MG tablet Take 1 tablet (100 mg total) by mouth once daily. 90 tablet 3    FLUAD QUAD 2021-22,65Y UP,,PF, 60 mcg (15 mcg x 4)/0.5 mL Syrg       furosemide (LASIX) 20 MG tablet Take 1 tablet (20 mg total) by mouth daily as needed (Wt gain >2 pounds/day or >5 pounds/week). (Patient not taking: Reported on 5/10/2022) 90 tablet  "0    hydrOXYzine HCL (ATARAX) 10 MG Tab 1-2 tabs PO TID PRN anxiety (Patient not taking: Reported on 5/10/2022) 60 tablet 1    methocarbamoL (ROBAXIN) 500 MG Tab       tiotropium-olodateroL (STIOLTO RESPIMAT) 2.5-2.5 mcg/actuation Mist Inhale 2 puffs into the lungs once daily. Controller (Patient not taking: Reported on 5/10/2022) 4 g 5    ubrogepant (UBROGEPANT) 100 mg tablet One po at onset of migraine. May repeat after 2 hours if needed. (Patient not taking: Reported on 5/10/2022) 10 tablet 1     No current facility-administered medications on file prior to visit.     Social History:     Social History     Tobacco Use    Smoking status: Former Smoker     Packs/day: 2.00     Years: 20.00     Pack years: 40.00     Types: Cigarettes     Quit date: 1988     Years since quittin.2    Smokeless tobacco: Never Used   Substance Use Topics    Alcohol use: No     Comment: quit drinking beer      Family History:     Family History   Problem Relation Age of Onset    Arrhythmia Mother     Heart disease Mother     Heart failure Brother     No Known Problems Daughter     No Known Problems Son     Colon cancer Neg Hx     Inflammatory bowel disease Neg Hx     Celiac disease Neg Hx     Melanoma Neg Hx     Amblyopia Neg Hx     Blindness Neg Hx     Cataracts Neg Hx     Glaucoma Neg Hx     Macular degeneration Neg Hx     Retinal detachment Neg Hx     Strabismus Neg Hx        Physical Exam  /60 (BP Location: Right arm, Patient Position: Sitting, BP Method: Large (Automatic))   Pulse 66   Ht 5' 9" (1.753 m)   Wt 66.7 kg (147 lb 0.8 oz)   SpO2 98%   BMI 21.72 kg/m²    GEN: Alert and oriented in NAD  NECK: no JVD appreciated  CVS: RRR, s1/s2, no MRG  PULM: CTAB no rales  ABD: NT/ND BS +  Extremities: warm and dry, palpable pulses, no edema  NEURO: Alert and oriented x 3  PSYCH: appropriate affect.         Labs:     Lab Results   Component Value Date     2022    K 4.8 2022 "     04/08/2022    CO2 30 (H) 04/08/2022    BUN 15 04/08/2022    CREATININE 0.9 04/08/2022    ANIONGAP 9 04/08/2022     Lab Results   Component Value Date    HGBA1C 5.2 10/15/2014     Lab Results   Component Value Date     (H) 01/23/2022    BNP 76 07/20/2020    BNP 86 01/16/2015    Lab Results   Component Value Date    WBC 5.10 03/18/2022    HGB 13.0 (L) 03/18/2022    HCT 40.4 03/18/2022    HCT 22 (L) 10/21/2014     03/18/2022    GRAN 3.5 03/18/2022    GRAN 68.2 03/18/2022     Lab Results   Component Value Date    CHOL 111 (L) 03/18/2022    HDL 26 (L) 03/18/2022    LDLCALC 60.4 (L) 03/18/2022    TRIG 123 03/18/2022          EF   Date Value Ref Range Status   01/24/2022 40 %    07/26/2021 50 % Final     Nuc Stress EF   Date Value Ref Range Status   04/26/2022 60 % Final   01/07/2019 84 % Final     Nuc Rest EF   Date Value Ref Range Status   04/26/2022 45  Final   01/07/2019 62  Final         Assessment and Plan  Paul Berrios Nallely is a 81 y.o. [unfilled]      ICD-10-CM ICD-9-CM    1. Positive cardiac stress test   Pt is an 81 year old gentleman who presents with shortness of breath an abnormal stress test in OM2 and septal territory. He has a hx of CABG in 2014 with LIMA to LAD and SVG to ramus. Will recommend angiogram.     1. Cardiac catheterization with probable PCI.   2. Antiplatelets: ASA and Plavix.   3. Access: Left Radial   4. Catheters: JL 4 and JR 4 and ISRAEL  5. Pt is a JONNA candidate and understands the importance of taking plavix for at least one year, understands that in case of receiving a drug coated stent the failure to comply with dual anti-platelet therapy is likely to result in stent clothing, heart attack and death.   6. The risks, benefits, and alternatives of coronary vascular angiography and possible intervention were discussed with the patient. All questions were answered and informed consent was obtained. I had a detailed discussion with the patient regarding risk of  stroke, MI, bleeding access site complications including limb loss, allergy, kidney failure including dialysis and death.  7. The patient understands the risks and benefits and wishes to go ahead with the procedure.  8. All patient's questions were answered       R94.39 794.39 Ambulatory referral/consult to Interventional Cardiology   2. Chronic systolic heart failure   Continue current meds.  I50.22 428.22 Ambulatory referral/consult to Interventional Cardiology   3. Coronary artery disease involving native coronary artery of native heart without angina pectoris  As above.   I25.10 414.01    4. S/P CABG x 2   As above.  Z95.1 V45.81    5. Atherosclerosis of aorta   Increases complexity of procedure.  I70.0 440.0         Signed:        Nery Sage MD  Interventional Cardiology Fellow  Pager [phone]        Interventional Cardiology Staff  I have personally taken the history and examined this patient. I have discussed and agree with the resident's findings and plan as documented in the resident's note.    Artie Kebede

## 2022-05-10 NOTE — ASSESSMENT & PLAN NOTE
Abnormal stress test referred for coronary angiography.  He has previously been bypassed.  His symptoms are shortness of breath.    The risk, benefits, and alternatives of angiography and possible intervention of the scheduled procedure have been discussed in detail with the patient. All questions have been answered, the patient understands, and informed consent has been obtained and signed.

## 2022-05-10 NOTE — PROGRESS NOTES
OUTPATIENT CATHETERIZATION INSTRUCTIONS    You have been scheduled for a procedure in the catheterization lab on Monday, May 25, 2022.     Please report to the Cardiology Waiting Area on the Third floor of the hospital and check in at 8 AM.   You will then be taken to the SSCU (Short Stay Cardiac Unit) and prepared for your procedure. Please be aware that this is not the time of your procedure but the time you are to arrive. The procedures are scheduled on an hourly basis; however, emergency cases take precedence over all other cases.       1. No solid foods 8 hours prior to your procedure.  You may have clear liquids until the time of your admission which should be 2 hours prior to your procedure.  You are encouraged to drink at least 8 ounces of clear liquids prior to your admission to SSCU.  Patients with gastric emptying issues should be fasting for 6- 8 hours prior to the procedure.  Clear liquids include water, black coffee, clear juices, and performance drinks - no pulp or milk.    2. Heart failure or dialysis patients will be limited to 8 ounces (1 cup) of clear liquids up until 2 hours of the procedure.    3.   You may take your regular morning medications with water. If there are any medications that you should not take you will be instructed to hold them that morning. If you         are diabetic and on Metformin (Glucophage) do not take it the day before, the day of, and for 2 days after your procedure.  4.   If you are on Pradaxa, Eliquis or Coumadin , you can hold them 3 days prior to your procedure.      The procedure will take 1-2 hours to perform. After the procedure, you will return to SSCU on the third floor of the hospital. You will need to lie still (or keep your arm still) for the next 2 to 4 hours to minimize bleeding from the puncture site.  This time is determined by your physician.  Your family may remain in the room with you during this time.       You may be able to be discharged home that  "same afternoon if there is someone to drive you home and there are no complications.  Your doctor will determine, based on your progress, the date and time of your discharge. The results of your procedure will be discussed with you before you are discharged. Any further testing or procedures will be scheduled for you either before you leave or after your discharge..       If you should have any questions, concerns, or need to change the date of your procedure, please call "NIA Grayson @ [phone]    Special Instructions:    For your loading dose of Plavix take 4 tablets on tonight, then one tablet everyday after.    For the morning of your procedure, take your Aspirin and Plavix medication.    Continue to take your other medications.        THE ABOVE INSTRUCTIONS WERE GIVEN TO THE PATIENT VERBALLY AND THEY VERBALIZED UNDERSTANDING.  THEY DO NOT REQUIRE ANY SPECIAL NEEDS AND DO NOT HAVE ANY LEARNING BARRIERS.          Directions for Reporting to Cardiology Waiting Area in the Hospital  If you park in the Parking Garage:  Take elevators to the1st floor of the parking garage.  Continue past the gift shop, coffee shop, and piano.  Take a right and go to the gold elevators. (Elevator B)  Take the elevator to the 3rd floor.  Follow the arrow on the sign on the wall that says Cath Lab Registration/EP Lab Registration.  Follow the long hallway all the way around until you come to a big open area.  This is the registration area.  Check in at Reception Desk.    OR    If family is dropping you off:  Have them drop you off at the front of the Hospital under the green overhang.  Enter through the doors and take a right.  Take the E elevators to the 3rd floor Cardiology Waiting Area.  Check in at the Reception Desk in the waiting room.                "

## 2022-05-11 NOTE — PROGRESS NOTES
"Mr. Browning is a patient of Dr. Kelly and was last seen in [facility] 11/24/2021.      Subjective:   Patient ID:  Paul Browning is a 81 y.o. male who presents for follow-up of Congestive Heart Failure    Problems:  carotid artery disease s/p PCI to proximal R ICA 10/14/19  HTN  HLD  CAD s/p MI/PCI/CABG (STEMI in 8/19/2014 with PCI to RCA; CABG with LIMA-LAD, SVG-ramus 10/21/14; ischemic symptoms were "fogginess and feeling weird, no chest pain or dyspnea")  Ischemic cardimopathy in 2014 prior to CABG  40 pack year h/o smoking, quit in 1988    HPI  Mr. Browning is in clinic today with his son for HF follow up.  Patient denies chest pain with exertion or at rest, palpitations, dizziness, syncope, edema, orthopnea, PND, or claudication.  Reports working with PT to help with his conditioning.  He gets SOB walking longer distances.  His EF dropped to 40% and a stress test was done to evaluate for ischemic etiology of the drop.  He was seen by Dr. Kebede yesterday to decide if he should have PCI.  He is currently taking entresto 24-26 mg BID and PRN furosemide.  He is not on BB d/t HR in the 60s.  Wt is stable.     Review of Systems   Constitutional: Positive for malaise/fatigue. Negative for decreased appetite, diaphoresis, weight gain and weight loss.   Eyes: Negative for visual disturbance.   Cardiovascular: Positive for dyspnea on exertion. Negative for chest pain, claudication, irregular heartbeat, leg swelling, near-syncope, orthopnea, palpitations, paroxysmal nocturnal dyspnea and syncope.        Denies chest pressure   Respiratory: Positive for shortness of breath. Negative for cough, hemoptysis, sleep disturbances due to breathing and snoring.    Endocrine: Negative for cold intolerance and heat intolerance.   Hematologic/Lymphatic: Negative for bleeding problem. Does not bruise/bleed easily.   Musculoskeletal: Negative for myalgias.   Gastrointestinal: Negative for bloating, " abdominal pain, anorexia, change in bowel habit, constipation, diarrhea, nausea and vomiting.   Neurological: Negative for difficulty with concentration, disturbances in coordination, excessive daytime sleepiness, dizziness, headaches, light-headedness, loss of balance, numbness and weakness.   Psychiatric/Behavioral: The patient does not have insomnia.        Allergies and current medications updated and reviewed:  Review of patient's allergies indicates:   Allergen Reactions    Morphine Shortness Of Breath     Current Outpatient Medications   Medication Sig    albuterol (PROAIR HFA) 90 mcg/actuation inhaler Inhale 2 puffs into the lungs every 6 (six) hours as needed for Wheezing or Shortness of Breath. Rescue    alendronate (FOSAMAX) 70 MG tablet TAKE 1 TABLET BY MOUTH EVERY WEEK IN THE MORNING WITH FULL GLASS OF WATER ON EMPTY STOMACH-DONT LIE DOWN FOR AT LEAST 30 MINUTES AFTERWARDS    aspirin (ECOTRIN) 81 MG EC tablet Take 81 mg by mouth once daily.    atorvastatin (LIPITOR) 40 MG tablet TAKE 1 TABLET(40 MG) BY MOUTH EVERY DAY    calcium carbonate (CALCIUM 600 ORAL) Take by mouth.    FLUAD QUAD 2021-22,65Y UP,,PF, 60 mcg (15 mcg x 4)/0.5 mL Syrg     furosemide (LASIX) 20 MG tablet Take 1 tablet (20 mg total) by mouth daily as needed (Wt gain >2 pounds/day or >5 pounds/week).    hydrOXYzine HCL (ATARAX) 10 MG Tab 1-2 tabs PO TID PRN anxiety    magnesium oxide (MAG-OX) 400 mg (241.3 mg magnesium) tablet Take 1 tablet (400 mg total) by mouth once daily.    memantine (NAMENDA) 5 MG Tab Take 1 tablet (5 mg total) by mouth 2 (two) times daily. One po qd daily for one week then BID    omega-3 fatty acids-vitamin E (FISH OIL) 1,000 mg Cap Take 1 tablet by mouth 2 (two) times daily. (Patient taking differently: Take 1 tablet by mouth once daily.)    pantoprazole (PROTONIX) 40 MG tablet TAKE 1 TABLET(40 MG) BY MOUTH TWICE DAILY    sacubitriL-valsartan (ENTRESTO) 24-26 mg per tablet Take 1 tablet by mouth 2  "(two) times daily.    sertraline (ZOLOFT) 100 MG tablet Take 1 tablet (100 mg total) by mouth once daily.    tiotropium-olodateroL (STIOLTO RESPIMAT) 2.5-2.5 mcg/actuation Mist Inhale 2 puffs into the lungs once daily. Controller    ubrogepant (UBROGEPANT) 100 mg tablet One po at onset of migraine. May repeat after 2 hours if needed.    clopidogreL (PLAVIX) 75 mg tablet Take 4 tablets on day 1 and then take 1 tablet daily there after. (Patient not taking: Reported on 5/11/2022)     No current facility-administered medications for this visit.       Objective:        BP (!) 100/54   Pulse 64   Ht 5' 9" (1.753 m)   Wt 67.4 kg (148 lb 9.4 oz)   BMI 21.94 kg/m²       Physical Exam  Vitals and nursing note reviewed.   Constitutional:       General: He is not in acute distress.     Appearance: Normal appearance. He is well-developed. He is not diaphoretic.   HENT:      Head: Normocephalic and atraumatic.   Eyes:      General: Lids are normal. No scleral icterus.     Conjunctiva/sclera: Conjunctivae normal.   Neck:      Vascular: Normal carotid pulses. No carotid bruit, hepatojugular reflux or JVD.   Cardiovascular:      Rate and Rhythm: Normal rate and regular rhythm.      Chest Wall: PMI is not displaced.      Pulses: Intact distal pulses.           Carotid pulses are 2+ on the right side and 2+ on the left side.       Radial pulses are 2+ on the right side and 2+ on the left side.        Dorsalis pedis pulses are 2+ on the right side and 2+ on the left side.        Posterior tibial pulses are 1+ on the right side and 1+ on the left side.      Heart sounds: S1 normal and S2 normal. No murmur heard.    No friction rub. No gallop.   Pulmonary:      Effort: Pulmonary effort is normal. No respiratory distress.      Breath sounds: Normal breath sounds. No decreased breath sounds, wheezing, rhonchi or rales.   Chest:      Chest wall: No tenderness.   Abdominal:      General: Bowel sounds are normal. There is no " distension or abdominal bruit.      Palpations: Abdomen is soft. There is no fluid wave or pulsatile mass.      Tenderness: There is no abdominal tenderness.   Musculoskeletal:      Cervical back: Neck supple.   Skin:     General: Skin is warm and dry.      Findings: No rash.      Nails: There is no clubbing.   Neurological:      Mental Status: He is alert and oriented to person, place, and time.      Gait: Gait normal.   Psychiatric:         Speech: Speech normal.         Behavior: Behavior normal.         Thought Content: Thought content normal.         Judgment: Judgment normal.         Chemistry        Component Value Date/Time     04/08/2022 1000    K 4.8 04/08/2022 1000     04/08/2022 1000    CO2 30 (H) 04/08/2022 1000    BUN 15 04/08/2022 1000    CREATININE 0.9 04/08/2022 1000    GLU 80 04/08/2022 1000        Component Value Date/Time    CALCIUM 8.9 04/08/2022 1000    ALKPHOS 95 03/18/2022 0856    AST 14 03/18/2022 0856    ALT 10 03/18/2022 0856    BILITOT 0.9 03/18/2022 0856    ESTGFRAFRICA >60.0 04/08/2022 1000    EGFRNONAA >60.0 04/08/2022 1000        Lab Results   Component Value Date    HGBA1C 5.2 10/15/2014     Recent Labs   Lab 07/20/20  2221 10/20/20  0351 01/23/22  0159 01/23/22  2331 03/18/22  0856   WBC 5.19   < > 11.70   < > 5.10   Hemoglobin 13.6 L   < > 14.0   < > 13.0 L   Hematocrit 40.0   < > 41.3   < > 40.4   MCV 90   < > 92   < > 96   Platelets 240   < > 243   < > 334   BNP 76  --  129 H  --   --    TSH  --    < >  --   --  2.537   Cholesterol  --    < >  --   --  111 L   HDL  --    < >  --   --  26 L   LDL Cholesterol  --    < >  --   --  60.4 L   Triglycerides  --    < >  --   --  123   HDL/Cholesterol Ratio  --    < >  --   --  23.4    < > = values in this interval not displayed.       Recent Labs   Lab 10/14/19  0705 10/19/20  1915 01/06/21  1717   INR 1.0 1.0 1.0        Test(s) Reviewed  I have reviewed the following in detail:  [] Stress test   [] Angiography   [x]  Echocardiogram   [x] Labs   [] Other:         Assessment/Plan:   1. Chronic systolic heart failure  Well compensated. NYHA class II sx. LANGLEY could also be an anginal equivalent for him given recent positive stress test following drop in his EF.  Continue entresto at current dose and PRN furosemide.  No HR room to initiate BB therapy.  Will start SGLT2 inhibitor and get bmp in 2-4 weeks.     - empagliflozin (JARDIANCE) 10 mg tablet; Take 1 tablet (10 mg total) by mouth once daily.  Dispense: 30 tablet; Refill: 11  - Ambulatory referral/consult to Pharmacy Assistance; Future  - Basic Metabolic Panel; Future    2. Coronary artery disease involving native coronary artery of native heart without angina pectoris  LANGLEY persists.  He was seen by IC. Defer to Dr. Kebede.  Will medically manage for now.     3. S/P CABG x 2      4. Peripheral arterial disease      5. Atherosclerosis of aorta      6. Pure hypercholesterolemia  LDL at goal <70. No changes      7. Primary hypertension  BP at goal <130/80. Continue current regimen.       A copy of this note will be forwarded to Dr. Kelly and Dr. Forbes.     Follow up in about 7 weeks (around 6/27/2022). already scheduled with Dr. Kelly

## 2022-05-11 NOTE — PROGRESS NOTES
[facility]   Physical Therapy Treatment Note/Updated POC    Name: Paul Browning  Clinic Number: [id number]    Therapy Diagnosis:   No diagnosis found.  Physician: Kris Multani NP    Visit Date: 5/11/2022    Physician Orders: PT Eval and Treat   Medical Diagnosis from Referral: S72.102D (ICD-10-CM) - Closed fracture of trochanter of left femur with routine healing, subsequent encounter   Evaluation Date: 2/24/2022  Authorization Period Expiration: 2/24/2023  Plan of Care Expiration: 4/24/2022  Visit # / Visits authorized: 1/ 1, 13/20  FOTO: 8/10  PTA Visit #: 1/5     Precautions: Standard, Fall and [place], visally impaired    Time In: 12:08 pm  Time Out: 12:48 pm  Total Billable Time: 40 minutes    Subjective     Pt reports: that he is feeling good today and ready to exercise today.  He was compliant with home exercise program.  Response to previous treatment: no adverse effects   Functional change: none at this time     Pain: 0/10  Location: n/a    Objective     SHABNAM received therapeutic exercises to develop strength, endurance, ROM and flexibility for 30 minutes including:  Nu-step 8 min no rests (for endurance/supervised)   LAQ 3lb 2x10 BLE   Bridges 3x10 + GTB   Supine clams Green TB 2x10  Seated adductor squeeze 2x20  Supine chest press 5lb 3x10   Supine HSA GTB 3x10       Not performed:   Standing @ elevated mat 20x BLE     - marches 3lb     - hip abduction NP    - hip extension 3lb  Standing bicep curls 5lb 3x10   Standing overhead press 5lb 3x10 Sit to stand 3x10 (from elevated mat)   Leg press isometric c /SB 2x20 BLE  Seated march 2lb 2x10 BLE  SLR 3x10 BLE   Seated HS isometrics 30x         neuromuscular re-education activities to improve: Balance, Coordination, Kinesthetic, Proprioception and Posture for 10 minutes. The following activities were included:    Standing ball toss x30  Standing ball toss on airex x30    Not performed:  Static standing on airex 2x1 min    Standing Marching on Airex 30x  Standing rows (unsupported) 30x GTB    Lateral walks 2x 4 laps (~10 ft each way)   Fwd Marches 1 laps (~15 ft each way)  NP  Fishing Casts with CGA 3 x 19 NP    Gait training with Quad Cane for 30 mins.    Home Exercises Provided and Patient Education Provided     Education provided:   - HEP    Written Home Exercises Provided: yes.  Exercises were reviewed and SHABNAM was able to demonstrate them prior to the end of the session.  SHABNAM demonstrated good  understanding of the education provided.     See EMR under Patient Instructions for exercises provided 3/9/2022.    Assessment     Nustep activity progressed to 8 minutes today with no breaks and pt tolerated without adverse effects. Maintained previous weight resistance during appropriate therex this session. Pt was CGA to SBA for during standing balance activities. Plan to progress pt per his tolerance during upcoming sessions and tolerated. Verbal cues and tactile cues required throughout session.     SHABNAM Is progressing well towards his goals.   Pt prognosis is Good.     Pt will continue to benefit from skilled outpatient physical therapy to address the deficits listed in the problem list box on initial evaluation, provide pt/family education and to maximize pt's level of independence in the home and community environment.     Pt's spiritual, cultural and educational needs considered and pt agreeable to plan of care and goals.     Anticipated barriers to physical therapy: Chronicity of symptoms, co-morbidities, visual impairments       Goals:   Long Term Goals (8 Weeks):  1. Pt will improve FOTO score to </= 59% limited to decrease perceived limitation with mobility PROGRESSING, NOT MET  2. Pt will improve TUG test to </= 20 sec to improve walking speed and LE strength for functional community ambulation PROGRESSING, NOT MET  3. Pt will improve impaired LE MMTs by 1 grade to improve strength for functional tasks. MET  4. Pt improve  impaired hip ROM to WNL in all planes to improve mobility for normal movement patterns. MET  5. Pt will demonstrate safe gait with LRD to improve QOL. PROGRESSING, NOT MET  6. Pt will resume ADL (fishing) safely with supervision/independence to improve QOL PROGRESSING, MET      Plan   Updated Plan of care Certification: 4/28/2022 to 5/24/2022.       Continue with current POC.     Neto Veliz, PT

## 2022-05-18 NOTE — PROGRESS NOTES
[facility]   Physical Therapy Treatment Note/Updated POC    Name: Paul Browning  Clinic Number: [id number]    Therapy Diagnosis:   Encounter Diagnosis   Name Primary?    Closed fracture of trochanter of left femur with routine healing Yes     Physician: Kris Multani NP    Visit Date: 5/18/2022    Physician Orders: PT Eval and Treat   Medical Diagnosis from Referral: S72.102D (ICD-10-CM) - Closed fracture of trochanter of left femur with routine healing, subsequent encounter   Evaluation Date: 2/24/2022  Authorization Period Expiration: 2/24/2023  Plan of Care Expiration: 4/24/2022  Visit # / Visits authorized: 1/ 1, 13/20  FOTO: 8/10  PTA Visit #: 1/5     Precautions: Standard, Fall and [place], visally impaired    Time In: 12:10 pm  Time Out: 12:56 pm  Total Billable Time: 46 minutes    Subjective     Pt reports: that he is feeling good today and ready to exercise today.  He was compliant with home exercise program.  Response to previous treatment: no adverse effects   Functional change: none at this time     Pain: 0/10  Location: n/a    Objective     SHABNAM received therapeutic exercises to develop strength, endurance, ROM and flexibility for 35 minutes including:  Nu-step 6 min no rests (for endurance/supervised)   LAQ 3lb 2x10 BLE   Bridges 3x10 + GTB   Supine clams Green TB 2x10  Seated adductor squeeze 2x20  Supine chest press 5lb 3x10   Supine HSA GTB 3x10   Standing @ elevated mat 20x BLE     - marches 3lb     - hip abduction NP    - hip extension 3lb  SLR 3x10 BLE   Seated march 2lb 2x10 BLE    Not performed:   Standing bicep curls 5lb 3x10   Standing overhead press 5lb 3x10 Sit to stand 3x10 (from elevated mat)   Leg press isometric c /SB 2x20 BLE  Seated HS isometrics 30x         neuromuscular re-education activities to improve: Balance, Coordination, Kinesthetic, Proprioception and Posture for 11 minutes. The following activities were included:    Standing ball  toss x30  Standing ball toss on airex x30  Static standing on airex 2x1 min     Not performed:  Standing Marching on Airex 30x  Standing rows (unsupported) 30x GTB    Lateral walks 2x 4 laps (~10 ft each way)   Fwd Marches 1 laps (~15 ft each way)  NP  Fishing Casts with CGA 3 x 19 NP    Gait training with Quad Cane for 30 mins.    Home Exercises Provided and Patient Education Provided     Education provided:   - HEP    Written Home Exercises Provided: yes.  Exercises were reviewed and SHABNAM was able to demonstrate them prior to the end of the session.  SHABNAM demonstrated good  understanding of the education provided.     See EMR under Patient Instructions for exercises provided 3/9/2022.    Assessment     Pt experienced SOB from nustep, but did not need a rest break. Maintained previous weight resistance during appropriate therex this session with no adverse effects. Pt was CGA to SBA for during standing balance activities. Plan to progress pt per his tolerance during upcoming sessions and tolerated. Verbal cues and tactile cues required throughout session due to hearing loss and visual impairments.    SHABNAM Is progressing well towards his goals.   Pt prognosis is Good.     Pt will continue to benefit from skilled outpatient physical therapy to address the deficits listed in the problem list box on initial evaluation, provide pt/family education and to maximize pt's level of independence in the home and community environment.     Pt's spiritual, cultural and educational needs considered and pt agreeable to plan of care and goals.     Anticipated barriers to physical therapy: Chronicity of symptoms, co-morbidities, visual impairments       Goals:   Long Term Goals (8 Weeks):  1. Pt will improve FOTO score to </= 59% limited to decrease perceived limitation with mobility PROGRESSING, NOT MET  2. Pt will improve TUG test to </= 20 sec to improve walking speed and LE strength for functional community ambulation PROGRESSING,  NOT MET  3. Pt will improve impaired LE MMTs by 1 grade to improve strength for functional tasks. MET  4. Pt improve impaired hip ROM to WNL in all planes to improve mobility for normal movement patterns. MET  5. Pt will demonstrate safe gait with LRD to improve QOL. PROGRESSING, NOT MET  6. Pt will resume ADL (fishing) safely with supervision/independence to improve QOL PROGRESSING, MET      Plan   Updated Plan of care Certification: 4/28/2022 to 5/24/2022.       Continue with current POC.     Neto Veliz, PT

## 2022-05-27 NOTE — PROGRESS NOTES
[facility]   Physical Therapy Treatment Note/Discharge Note    [facility]  [facility] [id number]    Therapy Diagnosis:   Encounter Diagnosis   Name Primary?    Closed fracture of trochanter of left femur with routine healing Yes     Physician: Kris Multani NP    Visit Date: 5/25/2022    Physician Orders: PT Eval and Treat   Medical Diagnosis from Referral: S72.102D (ICD-10-CM) - Closed fracture of trochanter of left femur with routine healing, subsequent encounter   Evaluation Date: 2/24/2022  Authorization Period Expiration: 2/24/2023  Plan of Care Expiration: 4/24/2022  Visit # / Visits authorized: 1/ 1, 17/20  FOTO: 8/10  PTA Visit #: 1/5     Precautions: Standard, Fall and [place], visally impaired    Time In: 12:15 pm  Time Out: 12:38 pm  Total Billable Time: 23 minutes    Subjective     Pt reports: that he is feeling good today and ready to exercise today.  He was compliant with home exercise program.  Response to previous treatment: no adverse effects   Functional change: none at this time     Pain: 0/10  Location: n/a    Objective     SHABNAM received therapeutic exercises to develop strength, endurance, ROM and flexibility for 23 minutes including:  Nu-step 6 min no rests (for endurance/supervised)   LAQ 3lb 2x10 BLE   Bridges 3x10 + GTB   Supine clams Green TB 2x10  Seated adductor squeeze 2x20  Supine chest press 5lb 3x10   Supine HSA GTB 3x10   Standing @ elevated mat 20x BLE     - marches 3lb     - hip abduction NP    - hip extension 3lb  SLR 3x10 BLE   Seated march 2lb 2x10 BLE    Not performed:   Standing bicep curls 5lb 3x10   Standing overhead press 5lb 3x10 Sit to stand 3x10 (from elevated mat)   Leg press isometric c /SB 2x20 BLE  Seated HS isometrics 30x         neuromuscular re-education activities to improve: Balance, Coordination, Kinesthetic, Proprioception and Posture for 11 minutes. The following activities were included:    Standing ball  toss x30  Standing ball toss on airex x30  Static standing on airex 2x1 min     Not performed:  Standing Marching on Airex 30x  Standing rows (unsupported) 30x GTB    Lateral walks 2x 4 laps (~10 ft each way)   Fwd Marches 1 laps (~15 ft each way)  NP  Fishing Casts with CGA 3 x 19 NP    Gait training with Quad Cane for 0 mins.    Home Exercises Provided and Patient Education Provided     Education provided:   - HEP    Written Home Exercises Provided: yes.  Exercises were reviewed and SHABNAM was able to demonstrate them prior to the end of the session.  SHABNAM demonstrated good  understanding of the education provided.     See EMR under Patient Instructions for exercises provided 3/9/2022.    Assessment     Pt tolerated therex and education well and had no complaints of pain with therex. Pt will be discharged from PT as he has reached maximum rehab potential. Rehab prognosis is good.    SHABNAM Is progressing well towards his goals.   Pt prognosis is Good.     Pt will continue to benefit from skilled outpatient physical therapy to address the deficits listed in the problem list box on initial evaluation, provide pt/family education and to maximize pt's level of independence in the home and community environment.     Pt's spiritual, cultural and educational needs considered and pt agreeable to plan of care and goals.     Anticipated barriers to physical therapy: Chronicity of symptoms, co-morbidities, visual impairments       Goals:   Long Term Goals (8 Weeks):  1. Pt will improve FOTO score to </= 59% limited to decrease perceived limitation with mobility MET  2. Pt will improve TUG test to </= 20 sec to improve walking speed and LE strength for functional community ambulationMET  3. Pt will improve impaired LE MMTs by 1 grade to improve strength for functional tasks. MET  4. Pt improve impaired hip ROM to WNL in all planes to improve mobility for normal movement patterns. MET  5. Pt will demonstrate safe gait with LRD to  improve QOL.  MET  6. Pt will resume ADL (fishing) safely with supervision/independence to improve QOL PROGRESSING, NOT  MET      Plan   This patient has been discharged from physical therapy      Continue with current POC.     Neto Veliz, PT

## 2022-06-06 NOTE — TELEPHONE ENCOUNTER
[facility] incoming call - Pt c/o vomiting, diarrhea, very little urine, unable to drink or eat. Diarrhea protocol followed and pt advised to go to the ER to seek iv fluid therapy. Pt urinating very little and having trouble keeping down food and liquids per family member. Pt also takes medication that can cause dehydration. Education provided.  Encounter routed to PCP.    Reason for Disposition   [1] Drinking very little AND [2] dehydration suspected (e.g., no urine > 12 hours, very dry mouth, very lightheaded)    Additional Information   Negative: Shock suspected (e.g., cold/pale/clammy skin, too weak to stand, low BP, rapid pulse)   Negative: Difficult to awaken or acting confused (e.g., disoriented, slurred speech)   Negative: Sounds like a life-threatening emergency to the triager   Negative: Vomiting also present and worse than the diarrhea   Negative: [1] Blood in stool AND [2] without diarrhea   Negative: Diarrhea in a cancer patient who is currently (or recently) receiving chemotherapy or radiation therapy, or cancer patient who has metastatic or end-stage cancer and is receiving palliative care   Negative: [1] SEVERE abdominal pain (e.g., excruciating) AND [2] present > 1 hour   Negative: [1] SEVERE abdominal pain AND [2] age > 60 years   Negative: [1] Blood in the stool AND [2] moderate or large amount of blood   Negative: Black or tarry bowel movements (Exception: chronic-unchanged black-grey bowel movements AND is taking iron pills or Pepto-bismol)    Protocols used: DIARRHEA-A-

## 2022-06-06 NOTE — ED PROVIDER NOTES
Source of History   Daughter and patient    Chief Complaint   Diarrhea and Vomiting (Dr thinks i'm dehydrated, strong cardiac hx)      History Of Present Illness   Paul Browning is a 81 y.o. male presenting with diarrhea that has been present since yesterday.  His daughter states that he has had 3 stools yesterday and for today.  He vomited once yesterday and has had only nausea today.  No fever or chills.  He complains of headache today that his daughter states is very common for him.  He always says they are the worst he has ever had.  She gave him a Tylenol a couple of hours ago.  The diarrhea is brown and watery with no blood or mucus.  His PCP referred him to the ED out of concern for possible dehydration.  He feels both legs are weak.  Daughter states no recent antibiotic use.     Review Of Systems   As per HPI and below:  General: No fever.  No chills.  Eyes: No visual changes.  Head: Notes headache.    Integument: No rashes or lesions.  Chest: No shortness of breath.  Cardiovascular: No chest pain.  Abdomen: No abdominal pain.  Notes nausea and vomiting.  Urinary: No abnormal urination.  Neurologic: No focal weakness.  No numbness.  Hematologic: No easy bruising.  Endocrine: No excessive thirst or urination.      Review of patient's allergies indicates:   Allergen Reactions    Morphine Shortness Of Breath       No current facility-administered medications on file prior to encounter.     Current Outpatient Medications on File Prior to Encounter   Medication Sig Dispense Refill    albuterol (PROAIR HFA) 90 mcg/actuation inhaler Inhale 2 puffs into the lungs every 6 (six) hours as needed for Wheezing or Shortness of Breath. Rescue 18 g 5    alendronate (FOSAMAX) 70 MG tablet TAKE 1 TABLET BY MOUTH EVERY WEEK IN THE MORNING WITH FULL GLASS OF WATER ON EMPTY STOMACH-DONT LIE DOWN FOR AT LEAST 30 MINUTES AFTERWARDS 12 tablet 3    aspirin (ECOTRIN) 81 MG EC tablet Take 81 mg by mouth once daily.       atorvastatin (LIPITOR) 40 MG tablet TAKE 1 TABLET(40 MG) BY MOUTH EVERY DAY 90 tablet 3    calcium carbonate (CALCIUM 600 ORAL) Take by mouth.      clopidogreL (PLAVIX) 75 mg tablet Take 4 tablets on day 1 and then take 1 tablet daily there after. (Patient not taking: Reported on 5/11/2022) 30 tablet 11    empagliflozin (JARDIANCE) 10 mg tablet Take 1 tablet (10 mg total) by mouth once daily. 30 tablet 11    FLUAD QUAD 2021-22,65Y UP,,PF, 60 mcg (15 mcg x 4)/0.5 mL Syrg       furosemide (LASIX) 20 MG tablet Take 1 tablet (20 mg total) by mouth daily as needed (Wt gain >2 pounds/day or >5 pounds/week). 90 tablet 0    hydrOXYzine HCL (ATARAX) 10 MG Tab 1-2 tabs PO TID PRN anxiety 60 tablet 1    magnesium oxide (MAG-OX) 400 mg (241.3 mg magnesium) tablet Take 1 tablet (400 mg total) by mouth once daily. 30 tablet 3    memantine (NAMENDA) 5 MG Tab Take 1 tablet (5 mg total) by mouth 2 (two) times daily. One po qd daily for one week then BID 60 tablet 5    omega-3 fatty acids-vitamin E (FISH OIL) 1,000 mg Cap Take 1 tablet by mouth 2 (two) times daily. (Patient taking differently: Take 1 tablet by mouth once daily.) 60 each 11    ondansetron (ZOFRAN) 8 MG tablet Take 1 tablet (8 mg total) by mouth every 8 (eight) hours as needed for Nausea. 30 tablet 1    pantoprazole (PROTONIX) 40 MG tablet TAKE 1 TABLET(40 MG) BY MOUTH TWICE DAILY 180 tablet 0    sacubitriL-valsartan (ENTRESTO) 24-26 mg per tablet Take 1 tablet by mouth 2 (two) times daily. 60 tablet 11    sertraline (ZOLOFT) 100 MG tablet Take 1 tablet (100 mg total) by mouth once daily. 90 tablet 3    tiotropium-olodateroL (STIOLTO RESPIMAT) 2.5-2.5 mcg/actuation Mist Inhale 2 puffs into the lungs once daily. Controller 4 g 5    ubrogepant (UBROGEPANT) 100 mg tablet One po at onset of migraine. May repeat after 2 hours if needed. 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    Hyperlipidemia     Hypertension     Ischemic cardiomyopathy 11/5/2014    MDD (major depressive disorder), recurrent episode, mild 2/17/2016    Osteoporosis 7/20/2016    Peripheral arterial disease 3/18/2016    Sarcoid     Squamous cell carcinoma 09/2016, 5/2016    crown of scalp, left wrist     Past Surgical History:   Procedure Laterality Date    CARDIAC SURGERY      CAROTID STENT N/A 10/14/2019    Procedure: INSERTION, STENT, ARTERY, CAROTID;  Surgeon: Artie Kebede MD;  Location: [facility] CATH LAB;  Service: Cardiology;  Laterality: N/A;    CATARACT EXTRACTION W/  INTRAOCULAR LENS IMPLANT Right 07/17/2018    Dr. Talamantes    CATARACT EXTRACTION W/  INTRAOCULAR LENS IMPLANT Left 07/31/2018    Dr. Talamantes    CORONARY ARTERY BYPASS GRAFT      x2  10/2014    ESOPHAGOGASTRODUODENOSCOPY N/A 4/8/2019    Procedure: ESOPHAGOGASTRODUODENOSCOPY (EGD)/poss rfa;  Surgeon: Clifford De La Torre MD;  Location: [facility] ([address] [place] [address]);  Service: Endoscopy;  Laterality: N/A;    ESOPHAGOGASTRODUODENOSCOPY N/A 8/4/2021    Procedure: EGD (ESOPHAGOGASTRODUODENOSCOPY);  Surgeon: Clifford De La Torre MD;  Location: [facility] ENDO [address]);  Service: Endoscopy;  Laterality: N/A;  8/2-covid [facility] [id number] appts on 8/3    ESOPHAGOGASTRODUODENOSCOPY (EGD) WITH RADIOFREQUENCY TREATMENT OF GASTROESOPHAGEAL JUNCTION      INJECTION OF ANESTHETIC AGENT AROUND MEDIAL BRANCH NERVES INNERVATING CERVICAL FACET JOINT Bilateral 1/9/2020    Procedure: INJECTION, MBB--Bilateral Cervical- Third Occipital nerve, C2-3--ASA okay for pt to continue taking per  provider.;  Surgeon: Tip Mera Jr., MD;  [place] [facility] [place] [facility] [place]  Service: [place] Management;  Laterality: Bilateral;    INTRAOCULAR PROSTHESES INSERTION Right 2018    Procedure: INSERTION, INTRAOCULAR LENS PROSTHESIS;  Surgeon: León Talamantes MD;  Location: [facility] OR 1ST FLR;  Service: Ophthalmology;  Laterality: Right;    INTRAOCULAR PROSTHESES INSERTION Left 2018    Procedure: INSERTION, INTRAOCULAR LENS PROSTHESIS;  Surgeon: León Talamantes MD;  Location: [facility] OR 1ST FLR;  Service: Ophthalmology;  Laterality: Left;    PHACOEMULSIFICATION OF CATARACT Right 2018    Procedure: PHACOEMULSIFICATION, CATARACT;  Surgeon: León Talamantes MD;  Location: [facility] OR 1ST FLR;  Service: Ophthalmology;  Laterality: Right;    PHACOEMULSIFICATION OF CATARACT Left 2018    Procedure: PHACOEMULSIFICATION, CATARACT;  Surgeon: León Talamantes MD;  Location: [facility] OR 1ST FLR;  Service: Ophthalmology;  Laterality: Left;    PLACEMENT OF SCREW IN ODONTOID N/A 10/22/2020    Procedure: INSERTION, SCREW, ODONTOID. Anterior approach.;  Surgeon: Kirsten Weaver MD;  Location: [facility] OR 2ND FLR;  Service: Neurosurgery;  Laterality: N/A;    RADIOFREQUENCY THERMOCOAGULATION Bilateral 2020    Procedure: RADIOFREQUENCY THERMAL COAGULATION--Bilateral C2-3 and Third Occipital Nerve;  Surgeon: Tip Mera Jr., MD;  [place] [facility] [place] [facility] [place]  Service: Pain Management;  Laterality: Bilateral;    UMBILICAL HERNIA REPAIR         Social History     Socioeconomic History    Marital status:     Number of children: 4   Occupational History    Occupation:     Tobacco Use    Smoking status: Former Smoker     Packs/day: 2.00     Years: 20.00     Pack years: 40.00     Types: Cigarettes     Quit date: 1988     Years since quittin.3    Smokeless tobacco: Never Used   Substance and Sexual Activity    Alcohol use: No     Comment: quit drinking beer     Drug use:  "No    Sexual activity: Not Currently     Partners: Female       Family History   Problem Relation Age of Onset    Arrhythmia Mother     Heart disease Mother     Heart failure Brother     No Known Problems Daughter     No Known Problems Son     Colon cancer Neg Hx     Inflammatory bowel disease Neg Hx     Celiac disease Neg Hx     Melanoma Neg Hx     Amblyopia Neg Hx     Blindness Neg Hx     Cataracts Neg Hx     Glaucoma Neg Hx     Macular degeneration Neg Hx     Retinal detachment Neg Hx     Strabismus Neg Hx        Physical Exam     Vitals:    06/06/22 1202 06/06/22 1331 06/06/22 1431   BP: 126/61 137/69 124/61   BP Location:  Left arm Left arm   Pulse: 63 66 73   Resp: 18 15 15   Temp: 97.5 °F (36.4 °C) 98 °F (36.7 °C) 97.5 °F (36.4 °C)   TempSrc: Oral Oral Oral   SpO2: 99% 100% 99%   Weight: 64.9 kg (143 lb)     Height: 5' 9" (1.753 m)       Appearance: No acute distress.   Skin: No rashes seen.  Good turgor.  No abrasions.  No ecchymoses.  Eyes: No conjunctival injection.  ENT: Oropharynx clear.    Chest: Clear to auscultation bilaterally.  Good air movement.  No wheezes.  No rhonchi.  Cardiovascular: Regular rate and rhythm.  No murmurs. No gallops. No rubs.  Abdomen: Soft.  Not distended.  Nontender.  No guarding.  No rebound.  Musculoskeletal: Good range of motion all joints.  No deformities.  Neck supple.  No meningismus.  Neurologic: Motor intact.  Sensation intact.   Cranial nerves intact.  Mental Status:  Baseline per daughter.  Alert.      Initial MDM   Diarrhea with vomiting yesterday.  Patient feels his legs are weak.  Abdomen is soft and nontender.  Doubt serious intraperitoneal pathology.  No indication for imaging at this point.  Will give fluids, check labs and reassess.  Daughter states headaches are chronic and similar to countless previous headaches he has had.  At this time, given benign neuro exam, I would not image him.    I decided to obtain the patient's medical " records.    Medications   sodium chloride 0.9% bolus 1,000 mL (0 mLs Intravenous Stopped 6/6/22 1433)       Results and Course     Labs Reviewed   CBC W/ AUTO DIFFERENTIAL - Abnormal; Notable for the following components:       Result Value    WBC 3.81 (*)     RBC 4.10 (*)     Hemoglobin 12.7 (*)     Hematocrit 36.9 (*)     All other components within normal limits   COMPREHENSIVE METABOLIC PANEL - Abnormal; Notable for the following components:    Albumin 3.3 (*)     Total Bilirubin 1.1 (*)     ALT 8 (*)     Anion Gap 7 (*)     All other components within normal limits   MAGNESIUM   PHOSPHORUS   LIPASE   LACTIC ACID, PLASMA       Imaging Results    None         ED Course as of 06/07/22 1100   Mon Jun 06, 2022   1402 Lipase: 16 [DC]   1402 Phosphorus: 2.9 [DC]   1402 Magnesium: 2.2 [DC]   1402 Lactate, Cristi: 1.0 [DC]   1402 WBC(!): 3.81 [DC]   1402 Hemoglobin(!): 12.7 [DC]   1402 Platelets: 199 [DC]   1402 Phosphorus: 2.9 [DC]   1402 Creatinine: 1.1 [DC]      ED Course User Index  [DC] Kayden Rodríguez MD           MDM, Impression and Plan   81 y.o. male with diarrhea yesterday and today.  No recent antibiotic use per daughter.  Tolerating PO in ED, labs benign, fluids given.  No stools in ED.  Benign exam.  Safe for discharge, f/u PCP.        Final diagnoses:  [R68.89] Multiple complaints  [R19.7] Diarrhea, unspecified type (Primary)          ED Disposition Condition    Discharge Stable        ED Prescriptions     None        Follow-up Information     Follow up With Specialties Details Why Contact Info    Grey Forbes, DO Internal Medicine In 2 days  [address] [facility] [place] [address]  [phone]      [facility] - Emergency Dept Emergency Medicine  As needed, chest pain, shortness of breath, passing out, or any other concerning symptoms [address] [facility] [address] [phone]           Kayden Rodríguez MD  06/07/22 7500

## 2022-06-06 NOTE — PROVIDER PROGRESS NOTES - EMERGENCY DEPT.
Encounter Date: 6/6/2022    ED Physician Progress Notes         EKG - STEMI Decision  Initial Reading: No STEMI present.

## 2022-06-06 NOTE — TELEPHONE ENCOUNTER
Spoke with patient's son. He stated pt is no longer vomiting but still having diarrhea. They have been giving him Pedialyte since yesterday. Refusing need for ED at this time

## 2022-06-17 NOTE — TELEPHONE ENCOUNTER
Let's see what the dentist thinks   How long has this been going on for and were any new meds started right before symptoms started ?

## 2022-06-22 NOTE — TELEPHONE ENCOUNTER
Prescription mouthwash sent to take as needed and I want him to start low dose gabapentin 3x/daily(rx sent). Let me know how he is doing by Friday    Labs ordered; vitamin B12, iron, folate, zinc, vitamin B6, HSV, Vit C  Did the dentist see any oral ulcers/abnormalities ?

## 2022-06-22 NOTE — TELEPHONE ENCOUNTER
I would fast for labs  Recommend trying the mouthwash also besides the gabapentin and can take as needed. Can stop the mouthwash if no help with his symptoms

## 2022-06-24 NOTE — PROGRESS NOTES
"       Cardiology Clinic Note  Reason for Visit: f/u CAD    HPI:     Paul Browning is a 81 y.o. M, who presents for follow up CAD.    He reports doing relatively well.  He has stable dyspnea on exertion. No active wheezing.  No angina. No orthopnea, PND, edema.  Has occasional orthostasis.  Poor vision is his main limitation. Overall, he is stable, but limited.    Medical: HFmrEF, carotid artery disease s/p R ICA stent, HTN, HLD, CAD s/p MI/PCI/CABG (STEMI in 2014 with PCI to RCA; CABG with LIMA-LAD, SVG-ramus; ischemic symptoms were "fogginess and feeling weird, no chest pain or dyspnea"), BPH, pulmonary and shayy sarcoidosis (CXR)  Surgical: CABG (10/2014), cataracts, hernia  Family: heart failure, arrhythmia  Social: former smoker of 2 PPD x20y (quit 1988), quit drinking in 2012    ROS:    Constitution: Negative for fever or chills.  HENT: Negative for  headaches.  Eyes: Negative for blurred vision.   Cardiovascular: See above  Pulmonary: Positive for SOB. Negative for cough.   Gastrointestinal: Negative for nausea/vomiting.   : Negative for dysuria.   Skin: Negative for rashes.  Neurological: Negative for focal weakness.  Psychological: Negative for depression.  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    Hyperlipidemia     Hypertension     Ischemic cardiomyopathy 11/5/2014    MDD (major depressive disorder), recurrent episode, mild 2/17/2016    Osteoporosis 7/20/2016    Peripheral arterial disease 3/18/2016    Sarcoid     Squamous cell carcinoma 09/2016, 5/2016    crown of scalp, left " wrist     Past Surgical History:   Procedure Laterality Date    CARDIAC SURGERY      CAROTID STENT N/A 10/14/2019    Procedure: INSERTION, STENT, ARTERY, CAROTID;  Surgeon: Artie Kebede MD;  Location: [facility] CATH LAB;  Service: Cardiology;  Laterality: N/A;    CATARACT EXTRACTION W/  INTRAOCULAR LENS IMPLANT Right 07/17/2018    Dr. Talamantes    CATARACT EXTRACTION W/  INTRAOCULAR LENS IMPLANT Left 07/31/2018    Dr. Talamantes    CORONARY ARTERY BYPASS GRAFT      x2  10/2014    ESOPHAGOGASTRODUODENOSCOPY N/A 4/8/2019    Procedure: ESOPHAGOGASTRODUODENOSCOPY (EGD)/poss rfa;  Surgeon: Clifford De La Torre MD;  Location: [facility] ENDO (2ND FLR);  Service: Endoscopy;  Laterality: N/A;    ESOPHAGOGASTRODUODENOSCOPY N/A 8/4/2021    Procedure: EGD (ESOPHAGOGASTRODUODENOSCOPY);  Surgeon: Clifford De La Torre MD;  Location: [facility] ENDO (2ND FLR);  Service: Endoscopy;  Laterality: N/A;  8/2-covid [place]-inst [id number] appts on 8/3    ESOPHAGOGASTRODUODENOSCOPY (EGD) WITH RADIOFREQUENCY TREATMENT OF GASTROESOPHAGEAL JUNCTION      INJECTION OF ANESTHETIC AGENT AROUND MEDIAL BRANCH NERVES INNERVATING CERVICAL FACET JOINT Bilateral 1/9/2020    Procedure: INJECTION, MBB--Bilateral Cervical- Third Occipital nerve, C2-3--ASA okay for pt to continue taking per provider.;  Surgeon: Tip Mera Jr., MD;  Location: [facility] PAIN MGT;  Service: Pain Management;  Laterality: Bilateral;    INTRAOCULAR PROSTHESES INSERTION Right 7/17/2018    Procedure: INSERTION, INTRAOCULAR LENS PROSTHESIS;  Surgeon: León Talamantes MD;  Location: [address];  Service: Ophthalmology;  Laterality: Right;    INTRAOCULAR PROSTHESES INSERTION Left 7/31/2018    Procedure: INSERTION, INTRAOCULAR LENS PROSTHESIS;  Surgeon: León Talamantes MD;  Location: [address];  Service: Ophthalmology;  Laterality: Left;    PHACOEMULSIFICATION OF CATARACT Right 7/17/2018    Procedure: PHACOEMULSIFICATION, CATARACT;  Surgeon: León DE LEON  MD [place];  Location: [facility] OR [address] [place] [address];  Service: Ophthalmology;  Laterality: Right;    PHACOEMULSIFICATION OF CATARACT Left 7/31/2018    Procedure: PHACOEMULSIFICATION, CATARACT;  Surgeon: León Talamantes MD;  Location: [facility] OR 1ST FLR;  Service: Ophthalmology;  Laterality: Left;    PLACEMENT OF SCREW IN ODONTOID N/A 10/22/2020    Procedure: INSERTION, SCREW, ODONTOID. Anterior approach.;  Surgeon: Kirsten Weaver MD;  Location: [facility] OR 2ND FLR;  Service: Neurosurgery;  Laterality: N/A;    RADIOFREQUENCY THERMOCOAGULATION Bilateral 1/30/2020    Procedure: RADIOFREQUENCY THERMAL COAGULATION--Bilateral C2-3 and Third Occipital Nerve;  Surgeon: Tip Mera Jr., MD;  Location: [facility];  Service: Pain Management;  Laterality: Bilateral;    UMBILICAL HERNIA REPAIR       Allergies:     Review of patient's allergies indicates:   Allergen Reactions    Morphine Shortness Of Breath     Medications:     Current Outpatient Medications on File Prior to Visit   Medication Sig Dispense Refill    (Magic mouthwash) 1:1:1 diphenhydramine(Benadryl) 12.5mg/5ml liq, aluminum & magnesium hydroxide-simethicone (Maalox), LIDOcaine viscous 2% Swish and spit 10 mLs every 4 (four) hours as needed (mouth pain). 360 mL 0    albuterol (PROAIR HFA) 90 mcg/actuation inhaler Inhale 2 puffs into the lungs every 6 (six) hours as needed for Wheezing or Shortness of Breath. Rescue 18 g 5    alendronate (FOSAMAX) 70 MG tablet TAKE 1 TABLET BY MOUTH EVERY WEEK IN THE MORNING WITH FULL GLASS OF WATER ON EMPTY STOMACH-DONT LIE DOWN FOR AT LEAST 30 MINUTES AFTERWARDS 12 tablet 3    aspirin (ECOTRIN) 81 MG EC tablet Take 81 mg by mouth once daily.      atorvastatin (LIPITOR) 40 MG tablet TAKE 1 TABLET(40 MG) BY MOUTH EVERY DAY 90 tablet 3    calcium carbonate (CALCIUM 600 ORAL) Take by mouth.      clopidogreL (PLAVIX) 75 mg tablet Take 4 tablets on day 1 and then take 1 tablet daily there after. (Patient not taking: Reported  on 5/11/2022) 30 tablet 11    empagliflozin (JARDIANCE) 10 mg tablet Take 1 tablet (10 mg total) by mouth once daily. 30 tablet 11    FLUAD QUAD 2021-22,65Y UP,,PF, 60 mcg (15 mcg x 4)/0.5 mL Syrg       furosemide (LASIX) 20 MG tablet Take 1 tablet (20 mg total) by mouth daily as needed (Wt gain >2 pounds/day or >5 pounds/week). 90 tablet 0    gabapentin (NEURONTIN) 100 MG capsule Take 1 capsule (100 mg total) by mouth 3 (three) times daily. 90 capsule 11    hydrOXYzine HCL (ATARAX) 10 MG Tab 1-2 tabs PO TID PRN anxiety 60 tablet 1    magnesium oxide (MAG-OX) 400 mg (241.3 mg magnesium) tablet Take 1 tablet (400 mg total) by mouth once daily. 30 tablet 3    memantine (NAMENDA) 5 MG Tab Take 1 tablet (5 mg total) by mouth 2 (two) times daily. One po qd daily for one week then BID 60 tablet 5    omega-3 fatty acids-vitamin E (FISH OIL) 1,000 mg Cap Take 1 tablet by mouth 2 (two) times daily. (Patient taking differently: Take 1 tablet by mouth once daily.) 60 each 11    ondansetron (ZOFRAN) 8 MG tablet Take 1 tablet (8 mg total) by mouth every 8 (eight) hours as needed for Nausea. 30 tablet 1    pantoprazole (PROTONIX) 40 MG tablet TAKE 1 TABLET(40 MG) BY MOUTH TWICE DAILY 180 tablet 0    sacubitriL-valsartan (ENTRESTO) 24-26 mg per tablet Take 1 tablet by mouth 2 (two) times daily. 60 tablet 11    sertraline (ZOLOFT) 100 MG tablet Take 1 tablet (100 mg total) by mouth once daily. 90 tablet 3    tiotropium-olodateroL (STIOLTO RESPIMAT) 2.5-2.5 mcg/actuation Mist Inhale 2 puffs into the lungs once daily. Controller 4 g 5    ubrogepant (UBROGEPANT) 100 mg tablet One po at onset of migraine. May repeat after 2 hours if needed. 10 tablet 1     No current facility-administered medications on file prior to visit.     Social History:     Social History     Tobacco Use    Smoking status: Former Smoker     Packs/day: 2.00     Years: 20.00     Pack years: 40.00     Types: Cigarettes     Quit date: 2/26/1988      "Years since quittin.3    Smokeless tobacco: Never Used   Substance Use Topics    Alcohol use: No     Comment: quit drinking beer      Family History:     Family History   Problem Relation Age of Onset    Arrhythmia Mother     Heart disease Mother     Heart failure Brother     No Known Problems Daughter     No Known Problems Son     Colon cancer Neg Hx     Inflammatory bowel disease Neg Hx     Celiac disease Neg Hx     Melanoma Neg Hx     Amblyopia Neg Hx     Blindness Neg Hx     Cataracts Neg Hx     Glaucoma Neg Hx     Macular degeneration Neg Hx     Retinal detachment Neg Hx     Strabismus Neg Hx      Physical Exam:   BP (!) 109/53   Pulse 62   Ht 5' 9" (1.753 m)   Wt 65.5 kg (144 lb 6.4 oz)   BMI 21.32 kg/m²      Constitutional: No apparent distress, conversant  HEENT: Sclera anicteric, extraocular movements intact  Neck: No jugular venous distension, no carotid bruits  Chest: Central surgical scar  Pulm: Clear to auscultation bilaterally  CV: Regular rate and rhythm, no murmurs appreciated  GI: Abdomen soft, no palpable masses  Extremities: No lower extremity edema, warm with palpable pulses  Skin: No ecchymosis, erythema, or ulcers  Psych: Alert and oriented to person place location, appropriate affect  Neuro: No focal deficits    Labs:     Blood Tests:  Lab Results   Component Value Date     (H) 2022     2022    K 4.4 2022     2022    CO2 27 2022    BUN 11 2022    CREATININE 1.1 2022    GLU 85 2022    HGBA1C 5.2 10/15/2014    MG 2.2 2022    AST 10 2022    ALT 8 (L) 2022    ALBUMIN 3.3 (L) 2022    PROT 6.3 2022    BILITOT 1.1 (H) 2022    WBC 3.81 (L) 2022    HGB 12.7 (L) 2022    HCT 36.9 (L) 2022    HCT 22 (L) 10/21/2014    MCV 90 2022     2022    INR 1.0 2021    INR 2.7 (H) 10/16/2006    PSA 3.6 2016    TSH 2.537 2022 "       Lab Results   Component Value Date    CHOL 111 (L) 03/18/2022    HDL 26 (L) 03/18/2022    TRIG 123 03/18/2022       Lab Results   Component Value Date    LDLCALC 60.4 (L) 03/18/2022       Urine Tests:  Lab Results   Component Value Date    COLORU Yellow 03/18/2022    APPEARANCEUA Clear 03/18/2022    PHUR 6.0 03/18/2022    SPECGRAV 1.025 03/18/2022    PROTEINUA Negative 03/18/2022    GLUCUA Negative 03/18/2022    KETONESU Negative 03/18/2022    BILIRUBINUA Negative 03/18/2022    OCCULTUA Negative 03/18/2022    NITRITE Negative 03/18/2022    UROBILINOGEN Negative 10/17/2018    LEUKOCYTESUR Negative 03/18/2022       Imaging:     Echocardiogram  TTE 1/24/22  · The left ventricle is normal in size with mildly decreased systolic function.  · The estimated ejection fraction is 40%.  · There are segmental left ventricular wall motion abnormalities: inferolateral wall and basal inferior are akinetic.  · Grade I left ventricular diastolic dysfunction.  · Normal right ventricular size with low normal right ventricular systolic function.  · Mild mitral regurgitation.  · Indeterminate central venous pressure. Estimated PA systolic pressure is at least 30 mmHg.    TTE 7/20/20  · Normal left ventricular systolic function. The estimated ejection fraction is 60%.  · Local segmental wall motion abnormalities.  · Grade I (mild) left ventricular diastolic dysfunction consistent with impaired relaxation.  · Mild tricuspid regurgitation. TR gradient of 27 mmHg.  · Normal right ventricular systolic function.  · Mild mitral regurgitation.  · Aortic valve sclerosis without significant stenosis    TTE 1/26/15  CONCLUSIONS     1 - Normal left ventricular systolic function (EF 60-65%).     2 - Right ventricular enlargement with normal systolic function.     3 - Normal left ventricular diastolic function.     4 - Biatrial enlargement.     5 - Aortic sclerosis with normal leaflet mobility    Stress testing  PET 4/26/22    There are two  significant perfusion abnormalities.    Perfusion Abnormality #1 - There is a small sized, moderate to severe intensity inferolateral fixed perfusion abnormality involving 10% of LV myocardium in the distribution of the OM2.    Perfusion Abnormality #2 - There is a small to moderate sized, severe intensity basal to mid septal reversible perfusion abnormality involving 10% of LV myocardium in the distribution of the 1st septal territory.    The whole heart absolute myocardial perfusion values averaged 0.67 cc/min/g at rest, which is normal; 1.39 cc/min/g at stress, which is mildly reduced; and CFR is 2.08 , which is mildly reduced.    CT attenuation images demonstrate severe diffuse coronary calcifications in the LAD, LCX and RCA territory and severe diffuse aortic calcifications in the ascending aorta, descending aorta and aortic arch. Diffuse mediastinal calcifications consistent with prior diagnosis of sarcoidosis.    The gated perfusion images showed an ejection fraction of 45% at rest and 60% during stress. A normal ejection fraction is greater than 53%.    There is inferolateral wall akinesis at rest and during stress.    There is basal to mid septal wall moderate hypokinesis during stress.    The LV cavity size is normal at rest and stress.    The EKG portion of this study is negative for ischemia, however sensitivity decreased due to baseline ST changes.    There were no arrhythmias during stress.    The patient reported no chest pain during the stress test.    When compared to the previous study from 1/7/2019, inferolateral perfusion abnormality unchanged.  Septal perfusion abnormality new.    PET 1/7/19  · There is a small to moderate sized, moderate to severe intensity basal inferolateral fixed perfusion abnormality involving 10% of the LV myocardium in the OM2 territory consistent with non-transmural infarct. On past angiogram, the OM2 was subtotally occluded.  · Whole heart absolute  myocardial perfusion (cc/min/g) averaged 1.06 rest (which is elevated), 1.47 at stress (which is mildly reduced), and 1.37 CFR (which is moderately reduced impart due to elevated resting flow).  · Within the defect absolute myocardial perfusion (cc/min/gm) averaged 0.51 at rest, 0.82 at stress and CFR was 1.78, which equates to severely reduced coronary flow capacity in 10% of the myocardium (within the OM2 territory).  · Gated perfusion images showed an ejection fraction of 62 % at rest and 84 % during stress.  · There is basal inferolateral wall hypokinesis and hypokinesis at rest and stress.  · LV cavity size is normal at rest and normal at stress.  · The EKG portion of this study is negative for myocardial ischemia.  · Arrhythmias during stress: rare PVCs.  · The patient reported no symptoms during the stress test.  · Compared to the previous study from 8-, there are no significant changes.    PET 8/25/14  CONCLUSIONS: ABNORMAL MYOCARDIAL PERFUSION PET STRESS TEST  1. There is a large sized moderate to severe intensity fixed defect consistent with non-transmural infarct with rhonda-infarct ischemia in the base to mid lateral and inferolateral walls in the usual distribution of the Left Circumflex Artery and Posterior Lateral Branch. This defect comprises 20 % of the left ventricular myocardium.   2. There is abnormal wall motion at rest showing hypokinesis of the inferolateral and inferobasilar walls of the left ventricle. There is abnormal wall motion at stress showing hypokinesis of the inferolateral and inferobasilar walls of the left   ventricle.   3. Visually estimated LV function is normal at rest and normal at stress.   4. The ventricular volumes are normal at rest and stress.   5. The extracardiac distribution of radioactivity is normal.   6. When compared to the previous study from 2011, the previous infarct in the OM territory is still present however there is now a confluent infarct in the PL  territory.  There is no evidence of ischemia in the LAD or D1 territory.    Cath lab  Carotid stent 10/14/19  · A STENT PRECISE PRO 8.0 X 40 stent was successfully placed.  · Estimated blood loss: between 50 mL and 150 mL  · Successful cartotid stent with proximal emboli protection.  · Follow-up in 1 month with carotid ultrasound.  · DAPT x1 month.    [facility] 8/19/14  DIAGNOSTIC:     Patient has a right dominant coronary artery.      - Left Main Coronary Artery:           The distal LM has a 30% stenosis. There is JOVANNA 3 flow.     - Left Anterior Descending Artery:           The ostial LAD has a 80% stenosis. There is JOVANNA 3 flow.     - Left Circumflex Artery:           The LCX has luminal irregularities. There is JOVANNA 3 flow.     - Right Coronary Artery:           The proximal RCA is occluded. There is JOVANNA 0 flow. Proximal stented with 3 mm stent and distal with 2.25 stent.     - Right Ventricle:           The right ventricle. There is JOVANNA 3 flow.     - Common Femoral Artery:           The right CFA has luminal irregularities. There is JOVANNA 3 flow.     - Femoral Artery:           The femoral artery is normal. There is JOVANNA 3 flow.     - D1:           The proximal D1 has a 90% stenosis. There is JOVANNA 3 flow.     - OM2:           The proximal OM2 has subtotal. There is JOVANNA 3 flow.  INTERVENTION:       Proximal RCA:            The lesion was successfully intervened. Post-stenosis of 0%, post-JOVANNA 3 flow and TMP grade 2. The vessel was accessed natively.  The following items were used: Cath Pronto Lp, Stent Xience Prime Rx 3.0x33 (JONNA) and Stent Xience Rx 2.25x12 (JONNA).    Other  Carotid US 1/4/21  · There is 0-19% right Internal Carotid Stenosis. Widely patent R ICA stent.  · There is 0-19% left Internal Carotid Stenosis.    Carotid US 11/14/19  · There is 0-19% right Internal Carotid Stenosis.  · There is 0-19% left Internal Carotid Stenosis.    Carotid US 10/30/15  CONCLUSIONS   There is 40 - 49% right Internal  Carotid stenosis.  There is 0 - 19% left Internal Carotid stenosis.    Carotid US 14  CONCLUSIONS   There is 40 - 49% right Internal Carotid stenosis.  There is 20 - 39% left Internal Carotid stenosis.    EK/6/22 - NSR, ST/T wave changes of ischemia (personally reviewed)    Assessment:     1. Coronary artery disease involving native coronary artery of native heart without angina pectoris    2. Pure hypercholesterolemia    3. Primary hypertension    4. S/P CABG x 2    5. Peripheral arterial disease    6. Atherosclerosis of aorta    7. Carotid artery disease, unspecified laterality, unspecified type    8. Intraventricular conduction delay    9. Chronic diastolic heart failure    10. Pulmonary sarcoidosis    11. Chronic obstructive pulmonary disease, unspecified COPD type    12. Personal history of skin cancer    13. Impaired functional mobility, balance, gait, and endurance    14. Gait instability    15. Frequent falls        Plan:     CAD (coronary artery disease)  S/p PCI to RCA  S/p CABG x2v (LIMA-LAD, SVG-Ramus)  - recent PET with OM infarct and ischemia in base to mid septum  - continue ASA, high intensity statin    HFmrEF, chronic  - likely ischemic cardiomyopathy with RWMAs in OM territory  - EF 40% in 2022    - stable, euvolemic  - continue entresto, jardiance   - with prior CHB and resting mary, will not start BB   - with orthostasis, will not start spironolactone at this time  - continue lasix 20mg prn (not requiring)      Essential hypertension  - stable, at goal  - on GDMT, as above     Hyperlipidemia  - LDL at goal  - continue statin    Carotid artery disease  - s/p PCI to R ICA on 10/14/19  - stable  - continue ASA, statin (LDL at goal)     History of third degree heart block  - related to STEMI incident.  - no recurrence since that time.    Pulmonary and shayy sarcoidosis   COPD  - stable symptoms  - followed by pulm    Gait instability  Frequent falls  Impaired functional  mobility    Signed:  Torin Kelly MD  Cardiology     6/27/2022 2:05 PM    Follow-up:     Future Appointments   Date Time Provider Department Center   6/27/2022  1:30 PM Yash Kelly III, MD [facility] CARDIO [place]   7/6/2022 11:00 AM [facility] US5 [facility] [place] [facility]   7/6/2022  1:30 PM Kris Multani NP [facility] ORTHO [facility]   7/20/2022  1:00 PM Grey Forbes DO [facility] IM [place]   10/18/2022  9:40 AM Penelope Stauffer MD [facility]  [facility]

## 2022-06-27 PROBLEM — I50.32 CHRONIC DIASTOLIC HEART FAILURE: Status: ACTIVE | Noted: 2022-01-01

## 2022-06-27 PROBLEM — I77.9 CAROTID ARTERY DISEASE: Status: ACTIVE | Noted: 2019-09-24

## 2022-06-27 PROBLEM — I25.5 ISCHEMIC CARDIOMYOPATHY: Status: ACTIVE | Noted: 2022-01-01

## 2022-06-28 NOTE — TELEPHONE ENCOUNTER
Shantelle Benites MA  Gm, I will contact him             Previous Messages       ----- Message -----   From: Vanessa Benites MA   Sent: 6/27/2022   1:32 PM CDT   To: Shantelle Masterson     Hi,   Pt's son was at pt's appointment today and stated that he tried to contact you to follow up on pharmacy assistance for pt. Please contact Paul Olivo [phone].   Thanks,   Vanessa Benites MA

## 2022-07-01 NOTE — TELEPHONE ENCOUNTER
Good, hope he caught a fish !!  The gabapentin can be taken as needed for the mouth pain. If no pain, can hold the dose.

## 2022-07-20 NOTE — PROGRESS NOTES
Subjective:       Patient ID: Paul Browning is a 81 y.o. male.    Chief Complaint: Follow-up (4 month ) and Immunizations (Pt son wants to know if its ok for Pt to get get Shingles Vaccine.)    HPI   81 y.o. Male with CAD, systolic heart failure, CAD, Carotid disease/PAD, HLD, HTN, COPD/sarcoidosis, Insomnia, Aortic atherosclerosis, MDD, senile purpura, hx of pelvic fracture from fall, Hx of SCC is here for 4 month f/u.   Review of Systems   Constitutional: Negative for activity change, appetite change, chills, diaphoresis, fatigue, fever and unexpected weight change.   HENT: Negative for postnasal drip, rhinorrhea, sinus pressure/congestion, sneezing, sore throat, trouble swallowing and voice change.    Respiratory: Negative for cough, shortness of breath and wheezing.    Cardiovascular: Negative for chest pain, palpitations and leg swelling.   Gastrointestinal: Negative for abdominal pain, blood in stool, constipation, diarrhea, nausea and vomiting.   Genitourinary: Negative for dysuria.   Musculoskeletal: Negative for arthralgias and myalgias.   Integumentary:  Negative for rash and wound.   Allergic/Immunologic: Negative for environmental allergies and food allergies.   Neurological: Positive for memory loss.   Hematological: Negative for adenopathy. Does not bruise/bleed easily.         Objective:      Physical Exam  Constitutional:       General: He is not in acute distress.     Appearance: He is well-developed. He is not diaphoretic.   HENT:      Head: Normocephalic and atraumatic.      Right Ear: External ear normal.      Left Ear: External ear normal.      Nose: Nose normal.      Mouth/Throat:      Pharynx: No oropharyngeal exudate.   Eyes:      General: No scleral icterus.        Right eye: No discharge.         Left eye: No discharge.      Conjunctiva/sclera: Conjunctivae normal.      Pupils: Pupils are equal, round, and reactive to light.   Neck:      Thyroid: No thyromegaly.      Vascular:  No JVD.   Cardiovascular:      Rate and Rhythm: Normal rate and regular rhythm.      Heart sounds: Normal heart sounds. No murmur heard.  Pulmonary:      Effort: Pulmonary effort is normal. No respiratory distress.      Breath sounds: Normal breath sounds. No wheezing or rales.   Abdominal:      General: Bowel sounds are normal. There is no distension.      Palpations: Abdomen is soft.      Tenderness: There is no abdominal tenderness. There is no guarding.   Musculoskeletal:      Cervical back: Normal range of motion and neck supple.      Right lower leg: No edema.      Left lower leg: No edema.   Lymphadenopathy:      Cervical: No cervical adenopathy.   Skin:     General: Skin is warm and dry.      Coloration: Skin is not pale.      Findings: No rash.   Neurological:      Mental Status: He is alert and oriented to person, place, and time.   Psychiatric:         Judgment: Judgment normal.         Assessment:       Problem List Items Addressed This Visit        Neuro    Dementia    Cervicogenic headache - Primary       Psychiatric    MDD (major depressive disorder), recurrent episode, mild (Chronic)    Anxiety       Pulmonary    Pulmonary sarcoidosis    Relevant Orders    Ambulatory referral/consult to Pulmonology    Chronic obstructive pulmonary disease    Relevant Orders    Ambulatory referral/consult to Pulmonology       Cardiac/Vascular    Hyperlipidemia (Chronic)    CAD (coronary artery disease) (Chronic)    S/P CABG x 2    Peripheral arterial disease    Ischemic cardiomyopathy    Hypertension    Chronic diastolic heart failure    Carotid artery disease    Atherosclerosis of aorta       Hematology    Senile purpura       GI    Stevens's esophagus with low grade dysplasia (Chronic)    Gastroesophageal reflux disease       Orthopedic    Osteoporosis       Other    Impaired functional mobility, balance, gait, and endurance          Plan:    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- stable on inhalers/O2      CAD  s/p PCI and CABG with systolic CHF- stable no CP currently, followed by Cardiology       Continue ASA/Statin      Carotid artery disease/PAD- stable, CPT      Stevens's esophagus, Hx of gastric ulcer- Last EGD(8/21)- low grade dysplasia and intestinal metaplasia       Continue Protonix 40 mg daily       Followed by GI       - no abdominal pain currently      HTN- stable, CPT      Insomnia- stable        Aortic atherosclerosis- stable, continue to monitor      MDD/Anxiety- stable Zoloft daily and Atarax PRN      HLD- controlled on Lipitor 40 mg daily      Osteoporosis- continue Fosamax, Calcium/Vitamin D      Hx of (superior/inferior) nondisplaced pubic ramus fx 2/2 fall- stable, only mild pain with ambulation. None at rest/sitting      Senile purpura- stable, continue to monitor      Cervical arthritis with headaches- followed by Pain Management       Hx of SCC- followed by Derm       Dementia- stable      Elevated PSA- has seen Urology      F/u in 4 months

## 2022-09-06 NOTE — PROGRESS NOTES
Subjective:       Patient ID: Paul Browning is a 81 y.o. male.    Chief Complaint: Migraine      No falls or hospitalizations    Past Medical History:   Diagnosis Date    Acute hypoxemic respiratory failure 1/23/2022    Anxiety     Stevens's esophagus with low grade dysplasia     BPH (benign prostatic hyperplasia)     CAD (coronary artery disease)     Cataract     Cervical spondylosis 1/3/2020    Chronic obstructive pulmonary disease with acute exacerbation     Diaphragmatic hernia     GERD (gastroesophageal reflux disease)     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    Hyperlipidemia     Hypertension     Ischemic cardiomyopathy 11/5/2014    MDD (major depressive disorder), recurrent episode, mild 2/17/2016    Osteoporosis 7/20/2016    Peripheral arterial disease 3/18/2016    Sarcoid     Squamous cell carcinoma 09/2016, 5/2016    crown of scalp, left wrist      Past Surgical History:   Procedure Laterality Date    CARDIAC SURGERY      CAROTID STENT N/A 10/14/2019    Procedure: INSERTION, STENT, ARTERY, CAROTID;  Surgeon: Artie Kebede MD;  Location: [facility] CATH LAB;  Service: Cardiology;  Laterality: N/A;    CATARACT EXTRACTION W/  INTRAOCULAR LENS IMPLANT Right 07/17/2018    Dr. Talamantes    CATARACT EXTRACTION W/  INTRAOCULAR LENS IMPLANT Left 07/31/2018    Dr. Talamantes    CORONARY ARTERY BYPASS GRAFT      x2  10/2014    ESOPHAGOGASTRODUODENOSCOPY N/A 4/8/2019    Procedure: ESOPHAGOGASTRODUODENOSCOPY (EGD)/poss rfa;  Surgeon: Clifford De La Torre MD;  Location: [facility] ([address] [place] [address]);  Service: Endoscopy;  Laterality: N/A;    ESOPHAGOGASTRODUODENOSCOPY N/A 8/4/2021    Procedure: EGD (ESOPHAGOGASTRODUODENOSCOPY);  Surgeon: Clifford De La Torre MD;  Location: [address]);  Service: Endoscopy;  Laterality: N/A;  8/2-covid [place]-inst [id number] appts on 8/3    ESOPHAGOGASTRODUODENOSCOPY (EGD) WITH RADIOFREQUENCY TREATMENT OF GASTROESOPHAGEAL JUNCTION      INJECTION OF ANESTHETIC AGENT AROUND MEDIAL BRANCH  NERVES INNERVATING CERVICAL FACET JOINT Bilateral 1/9/2020    Procedure: INJECTION, MBB--Bilateral Cervical- Third Occipital nerve, C2-3--ASA okay for pt to continue taking per provider.;  Surgeon: Tip Mera Jr., MD;  Location: [facility];  Service: Pain Management;  Laterality: Bilateral;    INTRAOCULAR PROSTHESES INSERTION Right 7/17/2018    Procedure: INSERTION, INTRAOCULAR LENS PROSTHESIS;  Surgeon: León Talamantes MD;  Location: [facility] OR 1ST FLR;  Service: Ophthalmology;  Laterality: Right;    INTRAOCULAR PROSTHESES INSERTION Left 7/31/2018    Procedure: INSERTION, INTRAOCULAR LENS PROSTHESIS;  Surgeon: León Talamantes MD;  Location: [facility] OR 1ST FLR;  Service: Ophthalmology;  Laterality: Left;    PHACOEMULSIFICATION OF CATARACT Right 7/17/2018    Procedure: PHACOEMULSIFICATION, CATARACT;  Surgeon: León Talamantes MD;  [facility];  Service: Ophthalmology;  Laterality: Right;    PHACOEMULSIFICATION OF CATARACT Left 7/31/2018    Procedure: PHACOEMULSIFICATION, CATARACT;  Surgeon: León Talamantes MD;  Location: [facility] OR 1ST FLR;  Service: Ophthalmology;  Laterality: Left;    PLACEMENT OF SCREW IN ODONTOID N/A 10/22/2020    Procedure: INSERTION, SCREW, ODONTOID. Anterior approach.;  Surgeon: Kirsten Weaver MD;  Location: [facility] OR 2ND FLR;  Service: Neurosurgery;  Laterality: N/A;    RADIOFREQUENCY THERMOCOAGULATION Bilateral 1/30/2020    Procedure: RADIOFREQUENCY THERMAL COAGULATION--Bilateral C2-3 and Third Occipital Nerve;  Surgeon: Tip Mera Jr., MD;  [place] [facility] [place] [facility]  Service: Pain Management;  Laterality: Bilateral;    UMBILICAL HERNIA REPAIR          Current Outpatient Medications:     albuterol (PROAIR HFA) 90 mcg/actuation inhaler, Inhale 2 puffs into the lungs every 6 (six) hours as needed for Wheezing or Shortness of Breath. Rescue, Disp: 18 g, Rfl: 5    alendronate (FOSAMAX) 70 MG tablet, TAKE 1 TABLET BY MOUTH EVERY WEEK IN THE MORNING  WITH FULL GLASS OF WATER ON EMPTY STOMACH-DONT LIE DOWN FOR AT LEAST 30 MINUTES AFTERWARDS, Disp: 12 tablet, Rfl: 3    aspirin (ECOTRIN) 81 MG EC tablet, Take 81 mg by mouth once daily., Disp: , Rfl:     atorvastatin (LIPITOR) 40 MG tablet, TAKE 1 TABLET(40 MG) BY MOUTH EVERY DAY, Disp: 90 tablet, Rfl: 3    calcium carbonate (CALCIUM 600 ORAL), Take by mouth., Disp: , Rfl:     empagliflozin (JARDIANCE) 10 mg tablet, Take 1 tablet (10 mg total) by mouth once daily., Disp: 30 tablet, Rfl: 11    hydrOXYzine HCL (ATARAX) 10 MG Tab, 1-2 tabs PO TID PRN anxiety, Disp: 60 tablet, Rfl: 1    magnesium oxide (MAG-OX) 400 mg (241.3 mg magnesium) tablet, Take 1 tablet (400 mg total) by mouth once daily., Disp: 30 tablet, Rfl: 3    memantine (NAMENDA) 5 MG Tab, Take 1 tablet (5 mg total) by mouth 2 (two) times daily. One po qd daily for one week then BID, Disp: 60 tablet, Rfl: 5    omega-3 fatty acids-vitamin E (FISH OIL) 1,000 mg Cap, Take 1 tablet by mouth 2 (two) times daily. (Patient taking differently: Take 1 tablet by mouth once daily.), Disp: 60 each, Rfl: 11    ondansetron (ZOFRAN) 8 MG tablet, Take 1 tablet (8 mg total) by mouth every 8 (eight) hours as needed for Nausea., Disp: 30 tablet, Rfl: 1    pantoprazole (PROTONIX) 40 MG tablet, TAKE 1 TABLET(40 MG) BY MOUTH TWICE DAILY, Disp: 180 tablet, Rfl: 0    sacubitriL-valsartan (ENTRESTO) 24-26 mg per tablet, Take 1 tablet by mouth 2 (two) times daily., Disp: 60 tablet, Rfl: 11    sertraline (ZOLOFT) 100 MG tablet, Take 1 tablet (100 mg total) by mouth once daily., Disp: 90 tablet, Rfl: 3    tiotropium-olodateroL (STIOLTO RESPIMAT) 2.5-2.5 mcg/actuation Mist, Inhale 2 puffs into the lungs once daily. Controller, Disp: 4 g, Rfl: 5    ubrogepant (UBROGEPANT) 100 mg tablet, One po at onset of migraine. May repeat after 2 hours if needed., Disp: 10 tablet, Rfl: 1    furosemide (LASIX) 20 MG tablet, Take 1 tablet (20 mg total) by mouth daily as needed (Wt gain >2 pounds/day  or >5 pounds/week)., Disp: 90 tablet, Rfl: 0   Review of patient's allergies indicates:   Allergen Reactions    Morphine Shortness Of Breath        Review of Systems        Objective:      Physical Exam  Neurological:      Mental Status: Mental status is at baseline.      Comments: Quieter than usual         Assessment:       1. Late onset Alzheimer's dementia with behavioral disturbance    2. Sundowning        Plan:              Amnestic dementia with sundowning, manifested by prominent anxiety and moderate headaches when this occurs. By report he has severe short term memory loss.   Complains a lot about pain, often says the headache is 'the worst it's ever been'.  If doesn't tolerate consider seroquel. Note family is unsure what went wrong with zoloft. Consider adding SSRI. Note today's increase of memantine should help.     No longer c/o burning mouth. Gabapentin dc'd bc of SE of EDS    Increase memantine to 10mg BID ( first double the bedtime dosage only, from 5 to 10mg and then double both to 10mg after 2 weeks. )    B12 --add a daily B12, one mg/day        Penelope Stauffer MD   09/06/2022   3:21 PM

## 2022-09-28 NOTE — DISCHARGE INSTRUCTIONS
It was a pleasure taking care of you today!     Diagnosis: Cough   -Take steroid prednisone 40 mg a day for the next 5 days  - return the ED for severe shortness of breath or worsening cough    Follow-Up Plan:  - Follow-up with primary care doctor within 3 - 5 days to discuss your recent ER visit and any additional concerns that you may have.  - Additional testing and/or evaluation as directed by your primary doctor    Return to the Emergency Department for symptoms including but not limited to: persistence or worsening of symptoms, shortness of breath or chest pain, inability to drink without vomiting, passing out/fainting/ loss of consciousness, or if you have other concerns.

## 2022-09-28 NOTE — ED PROVIDER NOTES
Source of History:  Patient, patient's daughter, and Medical Record, without language barrier or      Chief complaint:  Cough (Cough and vomiting. Pt can't keep anything down. Pt wears 2 L of oxygen at night. )      HPI:  Paul Browning is a 81 y.o. male with history of  COPD on 2 L O2 at night, CAD status post CABG, sarcoidosis presenting with chief complaint of  cough.  Patient's daughter at bedside states that he developed a cough this afternoon.  He has also had associated post-tussive emesis about 5-6 times which has been all nonbloody and mostly mucus.  Patient was coughing so hard that he needs to be put on his supplemental oxygen.  Patient history is limited by dementia    This is the extent to the patients complaints today here in the emergency department.    ROS: As per HPI and below:  Review of Systems   Constitutional:  Negative for chills and fever.   HENT:  Negative for congestion.    Eyes:  Negative for double vision.   Respiratory:  Positive for cough and shortness of breath.    Cardiovascular:  Negative for chest pain.   Gastrointestinal:  Positive for nausea and vomiting. Negative for abdominal pain.   Genitourinary:  Negative for dysuria.   Musculoskeletal:  Negative for myalgias.   Skin:  Negative for rash.   Neurological:  Negative for dizziness.     Review of patient's allergies indicates:   Allergen Reactions    Morphine Shortness Of Breath       PMH:  As per HPI and below:  Past Medical History:   Diagnosis Date    Acute hypoxemic respiratory failure 1/23/2022    Anxiety     Stevens's esophagus with low grade dysplasia     BPH (benign prostatic hyperplasia)     CAD (coronary artery disease)     Cataract     Cervical spondylosis 1/3/2020    Chronic obstructive pulmonary disease with acute exacerbation     Diaphragmatic hernia     GERD (gastroesophageal reflux disease)     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    Hyperlipidemia     Hypertension     Ischemic cardiomyopathy 11/5/2014     MDD (major depressive disorder), recurrent episode, mild 2/17/2016    Osteoporosis 7/20/2016    Peripheral arterial disease 3/18/2016    Sarcoid     Squamous cell carcinoma 09/2016, 5/2016    crown of scalp, left wrist     Past Surgical History:   Procedure Laterality Date    CARDIAC SURGERY      CAROTID STENT N/A 10/14/2019    Procedure: INSERTION, STENT, ARTERY, CAROTID;  Surgeon: Artie Kebede MD;  Location: [facility] CATH LAB;  Service: Cardiology;  Laterality: N/A;    CATARACT EXTRACTION W/  INTRAOCULAR LENS IMPLANT Right 07/17/2018    Dr. Talamantes    CATARACT EXTRACTION W/  INTRAOCULAR LENS IMPLANT Left 07/31/2018    Dr. Talamantes    CORONARY ARTERY BYPASS GRAFT      x2  10/2014    ESOPHAGOGASTRODUODENOSCOPY N/A 4/8/2019    Procedure: ESOPHAGOGASTRODUODENOSCOPY (EGD)/poss rfa;  Surgeon: Clifford De La Torre MD;  Location: [facility] ENDO (2ND FLR);  Service: Endoscopy;  Laterality: N/A;    ESOPHAGOGASTRODUODENOSCOPY N/A 8/4/2021    Procedure: EGD (ESOPHAGOGASTRODUODENOSCOPY);  Surgeon: Clifford De La Torre MD;  Location: [facility] (2ND FLR);  Service: Endoscopy;  Laterality: N/A;  8/2-covid [place]-inst bdalbj-ef-wd appts on 8/3    ESOPHAGOGASTRODUODENOSCOPY (EGD) WITH RADIOFREQUENCY TREATMENT OF GASTROESOPHAGEAL JUNCTION      INJECTION OF ANESTHETIC AGENT AROUND MEDIAL BRANCH NERVES INNERVATING CERVICAL FACET JOINT Bilateral 1/9/2020    Procedure: INJECTION, MBB--Bilateral Cervical- Third Occipital nerve, C2-3--ASA okay for pt to continue taking per provider.;  Surgeon: Tip Mera Jr., MD;  Location: [facility] PAIN MGT;  Service: Pain Management;  Laterality: Bilateral;    INTRAOCULAR PROSTHESES INSERTION Right 7/17/2018    Procedure: INSERTION, INTRAOCULAR LENS PROSTHESIS;  Surgeon: León Talamantes MD;  Location: [facility] OR [facility] FLR;  Service: Ophthalmology;  Laterality: Right;    INTRAOCULAR PROSTHESES INSERTION Left 7/31/2018    Procedure: INSERTION, INTRAOCULAR LENS PROSTHESIS;  Surgeon: León DE LEON  MD [place];  Location: [address];  Service: Ophthalmology;  Laterality: Left;    PHACOEMULSIFICATION OF CATARACT Right 2018    Procedure: PHACOEMULSIFICATION, CATARACT;  Surgeon: León Talamantes MD;  Location: [facility] OR [address] [place] [address];  Service: Ophthalmology;  Laterality: Right;    PHACOEMULSIFICATION OF CATARACT Left 2018    Procedure: PHACOEMULSIFICATION, CATARACT;  Surgeon: León Talamantes MD;  Location: [facility] OR [address] [place] [address];  Service: Ophthalmology;  Laterality: Left;    PLACEMENT OF SCREW IN ODONTOID N/A 10/22/2020    Procedure: INSERTION, SCREW, ODONTOID. Anterior approach.;  Surgeon: Kirsten Weaver MD;  Location: [facility] OR [address] [place] [address];  Service: Neurosurgery;  Laterality: N/A;    RADIOFREQUENCY THERMOCOAGULATION Bilateral 2020    Procedure: RADIOFREQUENCY THERMAL COAGULATION--Bilateral C2-3 and Third Occipital Nerve;  Surgeon: Tip Mera Jr., MD;  Location: [facility];  Service: Pain Management;  Laterality: Bilateral;    UMBILICAL HERNIA REPAIR         Social History     Tobacco Use    Smoking status: Former     Packs/day: 2.00     Years: 20.00     Pack years: 40.00     Types: Cigarettes     Quit date: 1988     Years since quittin.6    Smokeless tobacco: Never   Substance Use Topics    Alcohol use: No     Comment: quit drinking beer     Drug use: No       Physical Exam:    BP (!) 111/55   Pulse 99   Temp 98.4 °F (36.9 °C) (Oral)   Resp 20   SpO2 96%   Physical Exam  Vitals and nursing note reviewed.   Constitutional:       General: He is not in acute distress.     Appearance: He is ill-appearing. He is not toxic-appearing.   HENT:      Head: Normocephalic and atraumatic.      Nose: Nose normal.      Mouth/Throat:      Mouth: Mucous membranes are moist.   Eyes:      General: No scleral icterus.     Conjunctiva/sclera: Conjunctivae normal.   Cardiovascular:      Rate and Rhythm: Normal rate and regular rhythm.      Pulses: Normal pulses.      Heart sounds: Normal  heart sounds. No murmur heard.    No friction rub. No gallop.   Pulmonary:      Effort: Pulmonary effort is normal. No respiratory distress.      Breath sounds: No stridor. Wheezing present. No rhonchi or rales.      Comments: Diminished lung sounds in all lung fields  Abdominal:      General: Abdomen is flat. There is no distension.      Palpations: Abdomen is soft.      Tenderness: There is no abdominal tenderness. There is no guarding.   Musculoskeletal:         General: No swelling or deformity.      Cervical back: Normal range of motion and neck supple.   Skin:     General: Skin is warm and dry.      Capillary Refill: Capillary refill takes less than 2 seconds.      Coloration: Skin is not jaundiced.      Findings: No bruising or rash.   Neurological:      Mental Status: He is alert. Mental status is at baseline.       Procedures    Laboratory Studies:  Labs that have been ordered have been independently reviewed and interpreted by myself.  Labs Reviewed   CBC W/ AUTO DIFFERENTIAL - Abnormal; Notable for the following components:       Result Value    RBC 4.13 (*)     Hemoglobin 13.4 (*)     Hematocrit 38.2 (*)     MCH 32.4 (*)     Lymph # 0.5 (*)     Gran % 80.3 (*)     Lymph % 11.0 (*)     All other components within normal limits   COMPREHENSIVE METABOLIC PANEL - Abnormal; Notable for the following components:    CO2 21 (*)     Albumin 3.4 (*)     Total Bilirubin 1.2 (*)     All other components within normal limits   ISTAT PROCEDURE - Abnormal; Notable for the following components:    POC HCO3 28.7 (*)     POC SATURATED O2 85 (*)     POC TCO2 30 (*)     All other components within normal limits   INFLUENZA A & B BY MOLECULAR   SARS-COV-2 RNA AMPLIFICATION, QUAL   TROPONIN I   B-TYPE NATRIURETIC PEPTIDE       Imaging Results              X-Ray Chest 1 View (Final result)  Result time 09/28/22 00:11:50      Final result by Ildefonso Burroughs MD (09/28/22 00:11:50)                   Impression:      Stable  examination.      Electronically signed by: Ildefonso Burroughs MD  Date:    09/28/2022  Time:    00:11               Narrative:    EXAMINATION:  XR CHEST 1 VIEW    CLINICAL HISTORY:  shortness of breath;    TECHNIQUE:  Single frontal view of the chest was performed.    COMPARISON:  01/23/2022.    FINDINGS:  There are postop changes of median sternotomy.  The sternal wires are intact.  The trachea is unremarkable.  The cardiomediastinal silhouette is within normal limits.  There is no evidence of free air beneath the hemidiaphragms.  There is probable trace left-sided pleural effusion.  There is no evidence of a pneumothorax.  There is no evidence of pneumomediastinum.  There is stable appearance of bilateral airspace opacities.  There are degenerative changes in the osseous structures.                                      EKG (independently interpreted by me):   rate of 77, normal sinus rhythm, left axis, right bundle branch block, no ST elevations or depressions    X-rays (independently interpreted by me):   Bilateral airspace opacities stable compared to previous    I decided to obtain the patient's medical records.      Medications   albuterol-ipratropium 2.5 mg-0.5 mg/3 mL nebulizer solution 3 mL (3 mLs Nebulization Given 9/28/22 0055)   methylPREDNISolone sodium succinate injection 125 mg (125 mg Intravenous Given 9/28/22 0104)       MDM:    81 y.o. male with COPD here with cough and emesis    Differential Dx:  Differential includes but is not limited to  COPD exacerbation, pneumonia, CHF, less likely ACS. Exacerbation may be due to infectious or environmental cause.     ED Management:  Shortness of breath workup started. Will treat with 3 times DuoNebs and Solu-Medrol.  CBC benign.  VBG showing no acidosis or CO2 retention.  Troponin and BNP within normal limits.  Viral studies negative.  Patient states he is symptomatically feeling much better and is requesting to go home.  Will discharge patient home with course  of prednisone and azithromycin for treatment of his COPD exacerbation.  Discussed results, diagnosis, and treatment plan with patientt; advised close follow-up with PCP. Reviewed strict return precautions. Patient confirms understanding and ability to comply. Patient was given the opportunity to ask questions prior to discharge and all questions answered.         ED Course as of 09/28/22 0506   Wed Sep 28, 2022   0122 Hemoglobin(!): 13.4 [AW]   0122 WBC: 4.55 [AW]   0122 POC PH: 7.420 [AW]   0122 POC PCO2: 44.1 [AW]   0228 BNP: 86 [AW]   0229 Troponin I: <0.006 [AW]   0317 SARS-CoV-2 RNA, Amplification, Qual: Negative [AW]   0317 Influenza A, Molecular: Negative [AW]   0317 Influenza B, Molecular: Negative [AW]      ED Course User Index  [AW] Abdi Barragan MD                  Attending Attestation:   Physician Attestation Statement for Resident:  As the supervising MD   Physician Attestation Statement: I have personally seen and examined this patient.   I agree with the above history.  -:   As the supervising MD I agree with the above PE.     As the supervising MD I agree with the above treatment, course, plan, and disposition.                Diagnostic Impression:    1. COPD exacerbation    2. Shortness of breath         ED Disposition Condition    Discharge Stable            ED Prescriptions       Medication Sig Dispense Start Date End Date Auth. Provider    predniSONE (DELTASONE) 20 MG tablet Take 2 tablets (40 mg total) by mouth once daily. for 5 days 10 tablet 9/28/2022 10/3/2022 Abdi Barragan MD    azithromycin (ZITHROMAX) 500 MG tablet Take 1 tablet (500 mg total) by mouth once daily. for 3 days 3 tablet 9/28/2022 10/1/2022 Abdi Barragan MD          Follow-up Information       Follow up With Specialties Details Why Contact Info    Grey Forbes, DO Internal Medicine   [address] [facility] [address]  [phone]      [facility] - Emergency Dept Emergency Medicine Go to   As needed, If symptoms worsen [address]  [facility] [address]  [phone]             Abdi Barragan MD  Resident  09/28/22 7040       Pat Benavidez MD  09/28/22 1699

## 2022-09-28 NOTE — ED TRIAGE NOTES
Paul Browning, a 81 y.o. male presents to the ED w/ complaint of coughing and vomitting this evening, cough is chronic but worsened this evening.    Triage note:  Chief Complaint   Patient presents with    Cough     Cough and vomiting. Pt can't keep anything down. Pt wears 2 L of oxygen at night.      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

## 2022-09-30 NOTE — TELEPHONE ENCOUNTER
No new care gaps identified.  [facility] Embedded Care Gaps. Reference number: [id number]. 9/30/2022   12:39:36 PM CDT

## 2022-10-06 NOTE — PATIENT INSTRUCTIONS
Duo neb nebulizer treatment twice a day    Pulmocort nebulizer treatment twice a day    Stiolto 2 puffs once a day

## 2022-10-07 NOTE — PROGRESS NOTES
Subjective:       Patient ID: Paul Browning is a 81 y.o. male.    Chief Complaint: Pulmonary Nodules    Pulmonary Nodules    Paul Browning 81 y.o. male    has a past medical history of Acute hypoxemic respiratory failure (1/23/2022), Anxiety, Stevens's esophagus with low grade dysplasia, BPH (benign prostatic hyperplasia), CAD (coronary artery disease), Cataract, Cervical spondylosis (1/3/2020), Chronic obstructive pulmonary disease with acute exacerbation, Diaphragmatic hernia, GERD (gastroesophageal reflux disease), Heterophoria (10/17/2018), Hyperlipidemia, Hypertension, Ischemic cardiomyopathy (11/5/2014), MDD (major depressive disorder), recurrent episode, mild (2/17/2016), Osteoporosis (7/20/2016), Peripheral arterial disease (3/18/2016), Sarcoid, and Squamous cell carcinoma (09/2016, 5/2016).    has a past surgical history that includes Umbilical hernia repair; Coronary artery bypass graft; Cardiac surgery; Phacoemulsification of cataract (Right, 7/17/2018); Intraocular prosthesis insertion (Right, 7/17/2018); Phacoemulsification of cataract (Left, 7/31/2018); Intraocular prosthesis insertion (Left, 7/31/2018); Cataract extraction w/  intraocular lens implant (Right, 07/17/2018); Cataract extraction w/  intraocular lens implant (Left, 07/31/2018); Esophagogastroduodenoscopy (EGD) with radiofrequency treatment of gastroesophageal junction; Esophagogastroduodenoscopy (N/A, 4/8/2019); Carotid stent (N/A, 10/14/2019); Injection of anesthetic agent around medial branch nerves innervating cervical facet joint (Bilateral, 1/9/2020); Radiofrequency thermocoagulation (Bilateral, 1/30/2020); Placement of screw in odontoid (N/A, 10/22/2020); and Esophagogastroduodenoscopy (N/A, 8/4/2021).   reports that he quit smoking about 34 years ago. His smoking use included cigarettes. He has a 40.00 pack-year smoking history. He has been exposed to tobacco smoke. He has never used smokeless tobacco. He  reports that he does not drink alcohol and does not use drugs.  Referred by: No ref. provider found  Who had concerns including Pulmonary Nodules.  The patient's last visit with me was on 2/11/2015.  LAST VISIT    Interval History    81-year-old man with multiple medical problems including COPD who presents for follow up evaluation. Patient himself states that he's doing Well however son states that patient has been coughing for some time now.  He has recently seen in the emergency room and treated with antibiotics because of significant sputum production.  No fevers chills night nausea vomiting diarrhea constipation.  He was prescribed a Stiolto inhaler however the price was somewhat prohibitive.  No fever chills, ns, wt changes, nausea, vomiting, diarrhea, constipation, chest pain, tightness, pressure. Remainder of review of systems is negative.  Otherwise asymptomatic from pulmonary standpoint.    Review of Systems    Objective:      Physical Exam   Constitutional: He is oriented to person, place, and time. He appears well-developed and well-nourished. He appears not cachectic. No distress.   HENT:   Head: Normocephalic.   Nose: Nose normal. No mucosal edema.   Mouth/Throat: Oropharynx is clear and moist. Normal dentition. No oropharyngeal exudate.   Cardiovascular: Normal rate, regular rhythm, normal heart sounds and intact distal pulses. Exam reveals no gallop and no friction rub.   No murmur heard.  Pulmonary/Chest: Effort normal and breath sounds normal. No stridor.   Abdominal: Soft. Bowel sounds are normal. He exhibits no distension.   Musculoskeletal:         General: No tenderness or edema. Normal range of motion.      Cervical back: Normal range of motion and neck supple.   Lymphadenopathy: No supraclavicular adenopathy is present.     He has no cervical adenopathy.   Neurological: He is alert and oriented to person, place, and time. Gait normal.   Skin: Skin is warm and dry. No rash noted. He is not  diaphoretic. No cyanosis or erythema. No pallor. Nails show no clubbing.   Psychiatric: He has a normal mood and affect. His behavior is normal. Judgment and thought content normal.   Nursing note and vitals reviewed.  Personal Diagnostic Review    No flowsheet data found.      Assessment:       1. Chronic obstructive pulmonary disease, unspecified COPD type    2. Lung nodule    3. SOB (shortness of breath)          Outpatient Encounter Medications as of 10/6/2022   Medication Sig Dispense Refill    albuterol (PROVENTIL/VENTOLIN HFA) 90 mcg/actuation inhaler INHALE 2 PUFFS INTO THE LUNGS EVERY 6 HOURS AS NEEDED WHEEZING OR SHORTNESS OF BREATH 8.5 g 11    alendronate (FOSAMAX) 70 MG tablet TAKE 1 TABLET BY MOUTH EVERY WEEK IN THE MORNING WITH FULL GLASS OF WATER ON EMPTY STOMACH-DONT LIE DOWN FOR AT LEAST 30 MINUTES AFTERWARDS 12 tablet 3    ALPRAZolam (XANAX) 0.25 MG tablet Take 1 tablet (0.25 mg total) by mouth nightly as needed for Anxiety (PRN sundowning). Between 4-5 pm 30 tablet 2    aspirin (ECOTRIN) 81 MG EC tablet Take 81 mg by mouth once daily.      atorvastatin (LIPITOR) 40 MG tablet TAKE 1 TABLET(40 MG) BY MOUTH EVERY DAY 90 tablet 3    calcium carbonate (CALCIUM 600 ORAL) Take by mouth.      empagliflozin (JARDIANCE) 10 mg tablet Take 1 tablet (10 mg total) by mouth once daily. 30 tablet 11    hydrOXYzine HCL (ATARAX) 10 MG Tab 1-2 tabs PO TID PRN anxiety 60 tablet 1    magnesium oxide (MAG-OX) 400 mg (241.3 mg magnesium) tablet Take 1 tablet (400 mg total) by mouth once daily. 30 tablet 3    memantine (NAMENDA) 5 MG Tab Take 1 tablet (5 mg total) by mouth 2 (two) times daily. One po qd daily for one week then BID 60 tablet 5    omega-3 fatty acids-vitamin E (FISH OIL) 1,000 mg Cap Take 1 tablet by mouth 2 (two) times daily. (Patient taking differently: Take 1 tablet by mouth once daily.) 60 each 11    ondansetron (ZOFRAN) 8 MG tablet Take 1 tablet (8 mg total) by mouth every 8 (eight) hours as needed  "for Nausea. 30 tablet 1    pantoprazole (PROTONIX) 40 MG tablet TAKE 1 TABLET(40 MG) BY MOUTH TWICE DAILY 180 tablet 0    sacubitriL-valsartan (ENTRESTO) 24-26 mg per tablet Take 1 tablet by mouth 2 (two) times daily. 60 tablet 11    sertraline (ZOLOFT) 100 MG tablet Take 1 tablet (100 mg total) by mouth once daily. 90 tablet 3    tiotropium-olodateroL (STIOLTO RESPIMAT) 2.5-2.5 mcg/actuation Mist Inhale 2 puffs into the lungs once daily. Controller 4 g 5    ubrogepant (UBROGEPANT) 100 mg tablet One po at onset of migraine. May repeat after 2 hours if needed. 10 tablet 1    albuterol-ipratropium (DUO-NEB) 2.5 mg-0.5 mg/3 mL nebulizer solution Take 3 mLs by nebulization every 12 (twelve) hours. Rescue 540 mL 3    budesonide (PULMICORT) 0.5 mg/2 mL nebulizer solution Take 2 mLs (0.5 mg total) by nebulization 2 (two) times daily. Controller 120 mL 5    furosemide (LASIX) 20 MG tablet Take 1 tablet (20 mg total) by mouth daily as needed (Wt gain >2 pounds/day or >5 pounds/week). 90 tablet 0     No facility-administered encounter medications on file as of 10/6/2022.     Orders Placed This Encounter   Procedures    NEBULIZER FOR HOME USE     Order Specific Question:   Height:     Answer:   5' 9" (1.753 m)     Order Specific Question:   Weight:     Answer:   67.1 kg (147 lb 14.9 oz)     Order Specific Question:   Length of need (1-99 months):     Answer:   99    Ambulatory referral/consult to Pharmacy Assistance     Standing Status:   Future     Standing Expiration Date:   11/6/2023     Referral Priority:   Routine     Referral Type:   Consultation     Referral Reason:   Specialty Services Required     Number of Visits Requested:   1       Plan:             Assessment:  Paul was seen today for pulmonary nodules.    Diagnoses and all orders for this visit:    Chronic obstructive pulmonary disease, unspecified COPD type  -     Ambulatory referral/consult to Pharmacy Assistance; Future  -     NEBULIZER FOR HOME USE    Lung " nodule    SOB (shortness of breath)    Other orders  -     albuterol-ipratropium (DUO-NEB) 2.5 mg-0.5 mg/3 mL nebulizer solution; Take 3 mLs by nebulization every 12 (twelve) hours. Rescue  -     budesonide (PULMICORT) 0.5 mg/2 mL nebulizer solution; Take 2 mLs (0.5 mg total) by nebulization 2 (two) times daily. Controller      Plan:  Problem List Items Addressed This Visit       Chronic obstructive pulmonary disease - Primary    Overview     Patient has a history of COPD and has not been using a controller because of cost.  We will reorder and ask for the pharmacy assistance program to assist.  In addition will start DuoNeb twice a day and Pulmicort b.i.d..         Relevant Orders    Ambulatory referral/consult to Pharmacy Assistance    NEBULIZER FOR HOME USE    Lung nodule    Overview     In JAN, Patient had fall requiring hospitalization.  Had a CT of thoracic spine.  CT showed findings compatible with sarcoidosis and 1.9 cm pulmonary nodule.  Personally reviewed patient's chart was unable to find a biopsy confirming sarcoidosis of lung.   Lung discussion held with both patient and son indicating further follow-up for the 1.9 cm left lower lobe pulmonary nodule.  At this time, both son and patient would like to obtain follow-up CT of chest.  We did discuss further evaluation of pulmonary nodule if it shows to have grown in size.  Both patient and son agree he is likely a poor surgical candidate secondary to his impaired functional status.  Additionally, unlikely that patient would pursue chemotherapy or radiation.  The just would like to reassess the lung nodule at this time.  Patient denies constitutional symptoms          SOB (shortness of breath)           No follow-ups on file.    Patient Instructions   Duo neb nebulizer treatment twice a day    Pulmocort nebulizer treatment twice a day    Stiolto 2 puffs once a day      Immunization History   Administered Date(s) Administered    COVID-19, MRNA, LN-S, PF  (Pfizer) (Purple Cap) 01/10/2021, 01/31/2021    Influenza (FLUAD) - Quadrivalent - Adjuvanted - PF *Preferred* (65+) 12/07/2020, 02/10/2022    Influenza - High Dose - PF (65 years and older) 01/17/2014, 10/13/2015, 11/03/2017, 09/26/2018, 09/19/2019    Pneumococcal Conjugate - 13 Valent 08/20/2014    Pneumococcal Polysaccharide - 23 Valent 03/03/2016, 03/21/2022    Td (Adult), Unspecified Formulation 04/20/2017    Tdap 03/03/2016

## 2022-10-12 NOTE — TELEPHONE ENCOUNTER
Called pt and spoke to son. Would like to come in to discuss increased resp needs and home health.  Son reports apt with Pulmonolgy last week to adjust meds. A few days later, pt requried increase in med freq and continuous o2. O2 sats 94-95 with 2 LO2. Denies fever, SOB, edema, CP.  Has call into Pulmonary - awaiting response.   Has apt at 1pm today.

## 2022-10-12 NOTE — PROGRESS NOTES
Subjective:       Patient ID: Paul Browning is a 81 y.o. male.    Chief Complaint: Increased resp needs    HPI  81 y.o. Male with CAD, systolic heart failure, CAD, Carotid disease/PAD, HLD, HTN, COPD/sarcoidosis on home O2 PRN, Insomnia, Aortic atherosclerosis, MDD, senile purpura, hx of pelvic fracture from fall, Hx of SCC is here for f/u from ED visit on 9/27/22 for AE COPD.    Was seen by Pulm last week and was started on Budesonide nebs and was also taking Duonebs  Starting on Monday his O2 sats dropped to 89 % on RA so EMS was called. He was put back on O2(2L) and sats went up to the mid 90s. Yesterday O2 sats decreased again so O2 was started again. He has had a mild dry cough at times. No fevers/chills, wheezing. Currently 98 % on 2L.  Review of Systems   Constitutional:  Negative for activity change, appetite change, chills, diaphoresis, fatigue, fever and unexpected weight change.   HENT:  Negative for postnasal drip, rhinorrhea, sinus pressure/congestion, sneezing, sore throat, trouble swallowing and voice change.    Respiratory:  Positive for cough and shortness of breath. Negative for wheezing.    Cardiovascular:  Negative for chest pain, palpitations and leg swelling.   Gastrointestinal:  Negative for abdominal pain, blood in stool, constipation, diarrhea, nausea and vomiting.   Genitourinary:  Negative for dysuria.   Musculoskeletal:  Negative for arthralgias and myalgias.   Integumentary:  Negative for rash and wound.   Allergic/Immunologic: Negative for environmental allergies and food allergies.   Hematological:  Negative for adenopathy. Does not bruise/bleed easily.       Objective:      Physical Exam  Constitutional:       General: He is not in acute distress.     Appearance: Normal appearance. He is well-developed. He is not diaphoretic.   HENT:      Head: Normocephalic and atraumatic.      Right Ear: External ear normal.      Left Ear: External ear normal.      Nose: Nose normal.       Mouth/Throat:      Pharynx: No oropharyngeal exudate.   Eyes:      General: No scleral icterus.        Right eye: No discharge.         Left eye: No discharge.      Conjunctiva/sclera: Conjunctivae normal.      Pupils: Pupils are equal, round, and reactive to light.   Neck:      Vascular: No JVD.   Cardiovascular:      Rate and Rhythm: Normal rate and regular rhythm.      Pulses: Normal pulses.      Heart sounds: Normal heart sounds. No murmur heard.  Pulmonary:      Effort: Pulmonary effort is normal. No accessory muscle usage or respiratory distress.      Breath sounds: Decreased breath sounds (LLL) and rhonchi (trace) present. No wheezing or rales.   Abdominal:      General: Bowel sounds are normal.      Tenderness: There is no abdominal tenderness. There is no guarding or rebound.   Musculoskeletal:      Cervical back: Normal range of motion and neck supple.      Right lower leg: No edema.      Left lower leg: No edema.   Lymphadenopathy:      Cervical: No cervical adenopathy.   Skin:     General: Skin is warm and dry.      Capillary Refill: Capillary refill takes less than 2 seconds.      Coloration: Skin is not pale.      Findings: No rash.   Neurological:      Mental Status: He is alert and oriented to person, place, and time. Mental status is at baseline.      Cranial Nerves: No cranial nerve deficit.   Psychiatric:         Mood and Affect: Mood normal.         Behavior: Behavior normal.       Assessment:       Problem List Items Addressed This Visit    None  Visit Diagnoses       Acute exacerbation of chronic obstructive pulmonary disease (COPD)    -  Primary    Relevant Medications    methylPREDNISolone (MEDROL, RENAN,) 4 mg tablet    Other Relevant Orders    X-Ray Chest PA And Lateral            Plan:    CXR today- showed increase in left sided pleural effusion but no consolidations. Will increase Lasix to 40 mg qam x 4 days and then decrease back to 20 mg qam. Repeat CXR next week   Rx medrol pack/Avelox  to cover for suspected AECOPD

## 2022-10-13 NOTE — TELEPHONE ENCOUNTER
Called and spoke to son. He reports they went up on his Lasix to 40mg as per Dr. Gonzalez instructions and will decrease back to 20 mg on Monday.  Apt scheduled for CXR in [place] next Wed. Son verbalized understanding to proceed to ED if pt experiences SOB, CP, and any changes in respiratory status.

## 2022-10-14 NOTE — TELEPHONE ENCOUNTER
Recommend taking lasix 40 mg in the am daily thru the weekend and then decrease to 20 mg daily after. Will repeat Chest X-ray next Wed  Take the antibiotic and steroids as directed

## 2022-10-19 NOTE — TELEPHONE ENCOUNTER
----- Message from Hesham Camacho MD sent at 10/19/2022  1:05 PM CDT -----  The patient's chest x-ray is unimproved status post treatment with Lasix and continues to show a large left pleural effusion.  If the patient is still having symptoms of shortness of breath, I recommend that he proceed to the emergency room for further treatment.  Please inform the patient.  Thank you.

## 2022-10-19 NOTE — TELEPHONE ENCOUNTER
Spoke to patient's son.  Pt is still taking lasix, 1 tablet daily    Pt is much more alert.  Not complaining of any shortness of breath.    Pt took shower today with no oxygen.    Most of the day on 2liters, with 98-99 O2 levels  Sometimes today on 1-1.5 liters and still high 90's    If pt starts to have SOB, will take him to ED    Please advise on next step.

## 2022-10-20 NOTE — DISCHARGE INSTRUCTIONS
Please follow-up with pulmonology to discuss your pleural effusion particularly if symptoms persist.  If his symptoms worsen, shortness of breath, increased oxygen requirements at home please return to the emergency room for admission.    Future Appointments   Date Time Provider Department Center   11/7/2022  1:00 PM Rukhsana Hoskins PA-C [facility] CARDIO [address]   11/9/2022  1:00 PM Grey Forbes DO Health system IM [place]   11/16/2022  1:15 PM [facility] US5 [facility] US[facility]O [facility]   12/6/2022  2:00 PM Penelope Stauffer MD [facility]  [facility]   1/27/2023  1:00 PM Didi Carlos MD Health system DERM [place]

## 2022-10-20 NOTE — TELEPHONE ENCOUNTER
Continue treatment with Lasix and oxygen as needed.  If symptoms worsen, recommend return to emergency room for further evaluation.  Thank you.

## 2022-10-20 NOTE — ED TRIAGE NOTES
83 y/o M presents to ER with c/c SOB secondary to pleural effusion. Pt was referred to ER by PCP due to one week long pleural effusion that has not got better. Per family, pt has not declined in mentation or physical symptoms. Pt has h/x COPD and CHF. Pt endorses SOB and LUQ abd pain last night. Per family, pt denies N/V/D, vision changes, confusion and dysuria.

## 2022-10-20 NOTE — ED NOTES
Patient identifiers for Paul Browning 82 y.o. male checked and correct.  Chief Complaint   Patient presents with    doctor's referral     Pt sent from PCP for pleural effusion. Pt arrives on 2 L O2 per NC for COPD/CHF. Pt endorses increased SOB, -chest pain.      Past Medical History:   Diagnosis Date    Acute hypoxemic respiratory failure 1/23/2022    Anxiety     Stevens's esophagus with low grade dysplasia     BPH (benign prostatic hyperplasia)     CAD (coronary artery disease)     Cataract     Cervical spondylosis 1/3/2020    Chronic obstructive pulmonary disease with acute exacerbation     Diaphragmatic hernia     GERD (gastroesophageal reflux disease)     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    Hyperlipidemia     Hypertension     Ischemic cardiomyopathy 11/5/2014    MDD (major depressive disorder), recurrent episode, mild 2/17/2016    Osteoporosis 7/20/2016    Peripheral arterial disease 3/18/2016    Sarcoid     Squamous cell carcinoma 09/2016, 5/2016    crown of scalp, left wrist     Allergies reported:   Review of patient's allergies indicates:   Allergen Reactions    Morphine Shortness Of Breath         LOC: Patient is awake, alert, and aware of environment. Pt is oriented to self and situation only, which is normal baseline per family secondary to dementia.  APPEARANCE: Patient resting comfortably and in no acute distress. Patient is clean and well groomed, patient's clothing is properly fastened.  HEENT: - JVD, + midline trach.  SKIN: The skin is warm and dry. Patient has normal skin turgor and moist mucus membranes.   MUSKULOSKELETAL: Patient is moving all extremities well, no obvious deformities noted. Pulses intact. PMS x 4  RESPIRATORY: Airway is open and patent. Respirations are spontaneous and non-labored with normal effort and rate. Pt was brought on 2 L O2 via NC, and pt has been on home O2. Per family, pt denies increased SOB in past week.  CARDIAC: Patient has a normal rate and rhythm. 67 on  cardiac monitor. No peripheral edema noted.   ABDOMEN: No distention noted. Soft and non-tender upon palpation. Per family, pt endorses LUQ pain last night.  NEUROLOGICAL: pupils 4 mm, PERRL. Facial expression is symmetrical. Hand grasps are equal bilaterally. Normal sensation in all extremities when touched with finger.

## 2022-10-20 NOTE — ED PROVIDER NOTES
Encounter Date: 10/19/2022       History     Chief Complaint   Patient presents with    doctor's referral     Pt sent from PCP for pleural effusion. Pt arrives on 2 L O2 per NC for COPD/CHF. Pt endorses increased SOB, -chest pain.      HPI  82-year-old male history of COPD on 2 L oxygen at night, CAD status post CABG, sarcoidosis, Stevens's esophagus, prior smoker quit 34 years ago is presenting to the ED by recommendation of the nurse at his PCP office after doing a chest x-ray today showing a moderate pleural effusion.  Patient has had a cough and shortness of breath since late September.  Patient was initially seen in the ED September 28 and was discharged on steroids and albuterol.  Patient then was evaluated on 10/06 by Dr. Perry in pulmonary clinic.  Patient had a CT showing sarcoidosis and a 2 cm pulmonary nodule.  Patient was re-evaluated on 10/12 by his primary care doctor and told to increase his Lasix dose.  Patient was not taking Lasix except for once in a while but now has been taking 20 mg daily.  Patient has had an improvement in his breathing per her son.  Patient had a repeat chest x-ray done today that again showed a pleural effusion.  The nurse to call to inform them of the x-ray results told him to go to the hospital the patient still had any symptoms.  Per his son the patient has been doing substantially better in a been able to wean off his oxygen at home.  Patient is now on his baseline O2.  Patient complained of chest pain once last night and some pain to the left side of his chest wall.  Patient has not complained of any chest pain, nausea vomiting, abdominal pain, fevers or chills today.   Review of patient's allergies indicates:   Allergen Reactions    Morphine Shortness Of Breath     Past Medical History:   Diagnosis Date    Acute hypoxemic respiratory failure 1/23/2022    Anxiety     Stevens's esophagus with low grade dysplasia     BPH (benign prostatic hyperplasia)     CAD (coronary  artery disease)     Cataract     Cervical spondylosis 1/3/2020    Chronic obstructive pulmonary disease with acute exacerbation     Diaphragmatic hernia     GERD (gastroesophageal reflux disease)     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    Hyperlipidemia     Hypertension     Ischemic cardiomyopathy 11/5/2014    MDD (major depressive disorder), recurrent episode, mild 2/17/2016    Osteoporosis 7/20/2016    Peripheral arterial disease 3/18/2016    Sarcoid     Squamous cell carcinoma 09/2016, 5/2016    crown of scalp, left wrist     Past Surgical History:   Procedure Laterality Date    CARDIAC SURGERY      CAROTID STENT N/A 10/14/2019    Procedure: INSERTION, STENT, ARTERY, CAROTID;  Surgeon: Artie Kebede MD;  Location: [facility] CATH LAB;  Service: Cardiology;  Laterality: N/A;    CATARACT EXTRACTION W/  INTRAOCULAR LENS IMPLANT Right 07/17/2018    Dr. Talamantes    CATARACT EXTRACTION W/  INTRAOCULAR LENS IMPLANT Left 07/31/2018    Dr. Talamantes    CORONARY ARTERY BYPASS GRAFT      x2  10/2014    ESOPHAGOGASTRODUODENOSCOPY N/A 4/8/2019    Procedure: ESOPHAGOGASTRODUODENOSCOPY (EGD)/poss rfa;  Surgeon: Clifford De La Torre MD;  Location: [facility] (2ND FLR);  Service: Endoscopy;  Laterality: N/A;    ESOPHAGOGASTRODUODENOSCOPY N/A 8/4/2021    Procedure: EGD (ESOPHAGOGASTRODUODENOSCOPY);  Surgeon: Clifford De La Torre MD;  Location: [facility] ([address] [place] [address]);  Service: Endoscopy;  Laterality: N/A;  8/2-covid [id number] appts on 8/3    ESOPHAGOGASTRODUODENOSCOPY (EGD) WITH RADIOFREQUENCY TREATMENT OF GASTROESOPHAGEAL JUNCTION      INJECTION OF ANESTHETIC AGENT AROUND MEDIAL BRANCH NERVES INNERVATING CERVICAL FACET JOINT Bilateral 1/9/2020    Procedure: INJECTION, MBB--Bilateral Cervical- Third Occipital nerve, C2-3--ASA okay for pt to continue taking per provider.;  Surgeon: Tip Mera Jr., MD;  Location: [facility] PAIN MGT;  Service: Pain Management;  Laterality: Bilateral;    INTRAOCULAR PROSTHESES INSERTION Right 7/17/2018     Procedure: INSERTION, INTRAOCULAR LENS PROSTHESIS;  Surgeon: León Talamantes MD;  Location: [facility] OR [address] [place] [address];  Service: Ophthalmology;  Laterality: Right;    INTRAOCULAR PROSTHESES INSERTION Left 2018    Procedure: INSERTION, INTRAOCULAR LENS PROSTHESIS;  Surgeon: León Talamantes MD;  [facility];  Service: Ophthalmology;  Laterality: Left;    PHACOEMULSIFICATION OF CATARACT Right 2018    Procedure: PHACOEMULSIFICATION, CATARACT;  Surgeon: León Talamantes MD;  Location: [facility] OR [address] [place] [address];  Service: Ophthalmology;  Laterality: Right;    PHACOEMULSIFICATION OF CATARACT Left 2018    Procedure: PHACOEMULSIFICATION, CATARACT;  Surgeon: León Talamantes MD;  Location: [facility] OR [address] [place] [address];  Service: Ophthalmology;  Laterality: Left;    PLACEMENT OF SCREW IN ODONTOID N/A 10/22/2020    Procedure: INSERTION, SCREW, ODONTOID. Anterior approach.;  Surgeon: Kirsten Weaver MD;  Location: [facility] OR [address] [place] [address];  Service: Neurosurgery;  Laterality: N/A;    RADIOFREQUENCY THERMOCOAGULATION Bilateral 2020    Procedure: RADIOFREQUENCY THERMAL COAGULATION--Bilateral C2-3 and Third Occipital Nerve;  Surgeon: Tip Mera Jr., MD;  Location: [facility];  Service: Pain Management;  Laterality: Bilateral;    UMBILICAL HERNIA REPAIR       Family History   Problem Relation Age of Onset    Arrhythmia Mother     Heart disease Mother     Heart failure Brother     No Known Problems Daughter     No Known Problems Son     Colon cancer Neg Hx     Inflammatory bowel disease Neg Hx     Celiac disease Neg Hx     Melanoma Neg Hx     Amblyopia Neg Hx     Blindness Neg Hx     Cataracts Neg Hx     Glaucoma Neg Hx     Macular degeneration Neg Hx     Retinal detachment Neg Hx     Strabismus Neg Hx      Social History     Tobacco Use    Smoking status: Former     Packs/day: 2.00     Years: 20.00     Pack years: 40.00     Types: Cigarettes     Quit date: 1988     Years since quittin.6      Passive exposure: Past    Smokeless tobacco: Never   Substance Use Topics    Alcohol use: No     Comment: quit drinking beer 2012    Drug use: No     Review of Systems   Constitutional:  Negative for chills and fever.   HENT:  Negative for sore throat.    Respiratory:  Positive for cough. Negative for choking, shortness of breath and wheezing.    Cardiovascular:  Negative for chest pain, palpitations and leg swelling.   Gastrointestinal:  Negative for abdominal pain, diarrhea, nausea and vomiting.   Genitourinary:  Negative for dysuria.   Musculoskeletal:  Negative for back pain.   Skin:  Negative for rash.   Neurological:  Negative for seizures, weakness, light-headedness, numbness and headaches.   Hematological:  Does not bruise/bleed easily.     Physical Exam     Initial Vitals [10/19/22 1858]   BP Pulse Resp Temp SpO2   128/60 73 20 96.9 °F (36.1 °C) 98 %      MAP       --         Physical Exam    Nursing note and vitals reviewed.  Constitutional: He is not diaphoretic.   Elderly.   HENT:   Head: Normocephalic and atraumatic.   Mildly dry mucous membranes   Eyes: EOM are normal. Pupils are equal, round, and reactive to light.   Neck: Neck supple.   Normal range of motion.  Cardiovascular:  Normal rate and regular rhythm.           No murmur heard.  Pulmonary/Chest: No respiratory distress.   Faint wheezing appreciated bilaterally.  No crackles appreciated.  Patient is breathing 18 times a minute.  Not using accessory muscles.  Not in respiratory distress.  Can speak in full sentences.   Abdominal: Abdomen is soft. He exhibits no distension. There is no rebound and no guarding.   Musculoskeletal:         General: No tenderness or edema. Normal range of motion.      Cervical back: Normal range of motion and neck supple.     Neurological: He is alert. He has normal strength. No cranial nerve deficit or sensory deficit. GCS score is 15. GCS eye subscore is 4. GCS verbal subscore is 5. GCS motor subscore is 6.    Skin: Skin is warm and dry. Capillary refill takes less than 2 seconds.     Bedside lung ultrasound shows a small to moderate pleural effusion.  Effusion appears simple.    I reviewed x-rays from September and multiple x-rays in October.  The pleural effusion appears mildly improved from October 12 compared to today.  CT imaging does show a lung nodule which is evident on chest x-ray as well.    ED Course   Procedures  Labs Reviewed   CBC W/ AUTO DIFFERENTIAL - Abnormal; Notable for the following components:       Result Value    RBC 4.31 (*)      (*)     MCH 32.5 (*)     Immature Granulocytes 1.2 (*)     Immature Grans (Abs) 0.05 (*)     Lymph # 0.9 (*)     All other components within normal limits   ISTAT PROCEDURE - Abnormal; Notable for the following components:    POC TCO2 (MEASURED) 30 (*)     All other components within normal limits   TROPONIN I   B-TYPE NATRIURETIC PEPTIDE   ISTAT CHEM8          Imaging Results    None          Medications - No data to display  Medical Decision Making:   Initial Assessment:   This is an 82-year-old male with many chronic medical problems including COPD on home oxygen presenting to the ED for a new left right-sided lung nodule.  Patient has a history of CHF and is taking Lasix with improvement in his shortness of ultrasound shows a simple pleural effusion without adhesions.  Given patient has a history of sarcoidosis this could be related effusion.  Patient also has a lung nodule on the right lung that has not been formally diagnosed.  Infusion could be related to malignancy.  Fusion appears simple and given patient's improvement in symptoms and lack of infectious symptoms this is unlikely to be an empyema.  Patient has already completed antibiotics and steroids.  Patient is overall improving and is not in respiratory distress here in the ED. after discussing with patient and his son they would prefer to follow-up outpatient for a thoracentesis to evaluate the  pleural fluid if needed.  Given the patient did have chest pain yesterday will evaluate with single troponin and BNP.  Will send i-STAT to evaluate potassium and creatinine as I am recommending that they continue to give higher dose of Lasix at home as this seems to be helping with shortness of breath and oxygen usage.  I will send follow-up message to Dr. Perry with pulmonology regarding our discussion.     Flower Gurrola MD  Emergency Medicine Staff   Critical Care Fellow  8:18 PM             ED Course as of 10/19/22 2115   Wed Oct 19, 2022   2111 ISTAT PROCEDURE(!) [AE]   2111 Brain natriuretic peptide [AE]   2111 Troponin I [AE]   2112 Labs are wnl. K is 3.7 with normal Cr. BNP normal. Trop negative. Discussed thoracentesis here in the hospital versus in clinic. The son is comfortable with going home and following up in pulmonary clinic given his symptoms have improved clinically. He is now on RA at rest and 2L (baseline) at night. We discussed continuing lasix but following up this week with PCP. Discussed strict return precautions for any worsening or changing symptoms.     Stable for discharge home with follow up.     Flower Gurrola MD  Emergency Medicine Staff   Critical Care Fellow  9:14 PM   [AE]      ED Course User Index  [AE] Flower Gurrola MD                 Clinical Impression:   Final diagnoses:  [R06.02] SOB (shortness of breath)  [J90] Pleural effusion (Primary)        ED Disposition Condition    Discharge Stable          ED Prescriptions    None       Follow-up Information       Follow up With Specialties Details Why Contact Info    Jany Perry MD Pulmonary Disease Schedule an appointment as soon as possible for a visit in 2 days to discuss pleural effusion and lung nodule [address]  [facility] [id number]  [phone]      [facility] - Emergency Dept Emergency Medicine  As needed, If symptoms worsen, chest pain, shortness of breath, passing out [address]  [place]  [place] [address]  [phone]             Flower Gurrola MD  10/19/22 2110

## 2022-10-20 NOTE — PROVIDER PROGRESS NOTES - EMERGENCY DEPT.
Encounter Date: 10/19/2022    ED Physician Progress Notes         EKG - STEMI Decision  Initial Reading: No STEMI present.    Heart rate 71, known right bundle-branch block, left axis deviation, no criteria for STEMI.    Ramón Houston DO, AMAURY  Emergency Staff Physician   Dept of Emergency Medicine   [facility]  Spectralink: [id number]        Disclaimer: This note has been generated using voice-recognition software. There may be typographical errors that have been missed during proof-reading.

## 2022-11-02 NOTE — PROGRESS NOTES
Eliu amos - Pulmonary [facility] 9th Fl  Thoracentesis  Procedure Note    SUMMARY     Date of Procedure: 11/02/2022     Procedure: Thoracentesis    Indications: Diagnostic    Pre-Operative Diagnosis: pleural effusion     Post-Operative Diagnosis: same    Anesthesia: topical    Technical Procedures Used: ultrasound guidance, sterile technique     Description of the Findings of the Procedure: 800 cc serosanguinous fluid drained    Consent: Informed consent was obtained. Risks of the procedure were discussed including: infection, bleeding, pain, pneumothorax.    Under sterile conditions the patient was positioned. Chlorhexadine solution and sterile drapes were utilized. 10 cc 1% plain lidocaine was used to anesthetize between the rib space after localized under ultrasound. Fluid was obtained after catheter inserted without  difficulty and suction applied with minimal blood loss.  A dressing was applied to the wound and wound care instructions were provided.     800 ml of bloody pleural fluid was obtained. A sample was sent to Pathology for cytogenetics, flow, and cell counts, as well as for infection analysis.    Plan:    A follow up chest x-ray was ordered. Yes  Tylenol 650 mg. for pain.    Significant Surgical Tasks Conducted by the Assistant(s), if Applicable:    Complications: None; patient tolerated the procedure well.    Estimated Blood Loss (EBL): None    Attestation: Dr. Perry present and scrubbed for entire procedure    Tiana Valentin MD  [facility]       82-year-old man past medical history of pulmonary sarcoidosis obstructive lung disease who presents for evaluation of a pleural effusion.  Patient has a history of having trace to very small bilateral pleural effusions but now has had a large left pleural effusion.  Seen in the ER and was referred back to Pulmonary Clinic.  Thoracentesis done today and will be sent for cytology and microbiology.  I discussed with son the fact  that fluid is concerning for possible malignancy.  I also discussed that he would likely not be a candidate for any type of surgical procedure.  We will wait and see what the fluid shows and then reassess options.

## 2022-11-07 NOTE — PROGRESS NOTES
"    General Cardiology Clinic Note  Reason for Visit: Follow up   Last Clinic Visit: 6/27/2022  General Cardiologist: Dr. Kelly    HPI:   Paul Browning is a 82 y.o. male who presents for follow up     Problems:  carotid artery disease s/p stent to BRANDY 10/14/19  HTN  HLD  CAD s/p MI/PCI/CABG (STEMI in 8/19/2014 with PCI to RCA; CABG with LIMA-LAD, SVG-ramus 10/21/14; ischemic symptoms were "fogginess and feeling weird, no chest pain or dyspnea")  Heart failure with mildly reduced EF  40 pack year h/o smoking, quit in 1988  Pulmonary sarcoidosis  COPD     HPI  Pt seen in ED 10/19/2022 after PCP obtained CXR for cough and SOB and he was found to have a moderate pleural effusion. He was stable and fairly asymptomatic so thoracentesis was not performed in ED. He underwent outpatient thoracentesis on 11/2/2022 with 800 cc of fluid drained. Cytology and microbiology in process.     Pt presents with his son who assists with the history. His breathing seems nearly back to baseline. No longer having orthopnea, PND, or coughing. Pt denies feeling short of breath. He is down to 0.5 L of continuous O2 with sats >90%. He has minimal pedal edema occasionally. Weight has been stable. Denies chest pain. He does have occasional dizziness.     6/27/2022 HPI (Dr. Kelly)  He reports doing relatively well.  He has stable dyspnea on exertion. No active wheezing.  No angina. No orthopnea, PND, edema.  Has occasional orthostasis.  Poor vision is his main limitation. Overall, he is stable, but limited.    ROS:      Review of Systems   Constitutional: Negative for diaphoresis, malaise/fatigue, weight gain and weight loss.   HENT:  Negative for nosebleeds.    Eyes:  Positive for vision loss in left eye and vision loss in right eye. Negative for visual disturbance.   Cardiovascular:  Negative for chest pain, claudication, dyspnea on exertion, irregular heartbeat, leg swelling, near-syncope, orthopnea, palpitations, paroxysmal " nocturnal dyspnea and syncope.   Respiratory:  Positive for shortness of breath. Negative for cough, sleep disturbances due to breathing, snoring and wheezing.    Hematologic/Lymphatic: Negative for bleeding problem. Does not bruise/bleed easily.   Skin:  Negative for poor wound healing and rash.   Musculoskeletal:  Negative for muscle cramps and myalgias.   Gastrointestinal:  Negative for bloating, abdominal pain, diarrhea, heartburn, melena, nausea and vomiting.   Genitourinary:  Negative for hematuria and nocturia.   Neurological:  Positive for dizziness. Negative for brief paralysis, headaches, light-headedness, numbness and weakness.   Psychiatric/Behavioral:  Negative for depression.    Allergic/Immunologic: Negative for hives.     PMH:     Past Medical History:   Diagnosis Date    Acute hypoxemic respiratory failure 1/23/2022    Anxiety     Stevens's esophagus with low grade dysplasia     BPH (benign prostatic hyperplasia)     CAD (coronary artery disease)     Cataract     Cervical spondylosis 1/3/2020    Chronic obstructive pulmonary disease with acute exacerbation     Diaphragmatic hernia     GERD (gastroesophageal reflux disease)     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    Hyperlipidemia     Hypertension     Ischemic cardiomyopathy 11/5/2014    MDD (major depressive disorder), recurrent episode, mild 2/17/2016    Osteoporosis 7/20/2016    Peripheral arterial disease 3/18/2016    Sarcoid     Squamous cell carcinoma 09/2016, 5/2016    crown of scalp, left wrist     Past Surgical History:   Procedure Laterality Date    CARDIAC SURGERY      CAROTID STENT N/A 10/14/2019    Procedure: INSERTION, STENT, ARTERY, CAROTID;  Surgeon: Artie Kebede MD;  Location: [facility] CATH LAB;  Service: Cardiology;  Laterality: N/A;    CATARACT EXTRACTION W/  INTRAOCULAR LENS IMPLANT Right 07/17/2018    Dr. Talamantes    CATARACT EXTRACTION W/  INTRAOCULAR LENS IMPLANT Left 07/31/2018    Dr. Talamantes    CORONARY ARTERY BYPASS GRAFT      x2   10/2014    ESOPHAGOGASTRODUODENOSCOPY N/A 4/8/2019    Procedure: ESOPHAGOGASTRODUODENOSCOPY (EGD)/poss rfa;  Surgeon: Clifford De La Torre MD;  Location: [facility] ENDO (2ND FLR);  Service: Endoscopy;  Laterality: N/A;    ESOPHAGOGASTRODUODENOSCOPY N/A 8/4/2021    Procedure: EGD (ESOPHAGOGASTRODUODENOSCOPY);  Surgeon: Clifford De La Torre MD;  Location: [facility] ENDO (2ND FLR);  Service: Endoscopy;  Laterality: N/A;  8/2-covid [place]-inst nuoheq-xp-my appts on 8/3    ESOPHAGOGASTRODUODENOSCOPY (EGD) WITH RADIOFREQUENCY TREATMENT OF GASTROESOPHAGEAL JUNCTION      INJECTION OF ANESTHETIC AGENT AROUND MEDIAL BRANCH NERVES INNERVATING CERVICAL FACET JOINT Bilateral 1/9/2020    Procedure: INJECTION, MBB--Bilateral Cervical- Third Occipital nerve, C2-3--ASA okay for pt to continue taking per provider.;  Surgeon: Tip Mera Jr., MD;  Location: [facility] PAIN MGT;  Service: Pain Management;  Laterality: Bilateral;    INTRAOCULAR PROSTHESES INSERTION Right 7/17/2018    Procedure: INSERTION, INTRAOCULAR LENS PROSTHESIS;  Surgeon: León Talamantes MD;  Location: [facility] OR [address] [place] [address];  Service: Ophthalmology;  Laterality: Right;    INTRAOCULAR PROSTHESES INSERTION Left 7/31/2018    Procedure: INSERTION, INTRAOCULAR LENS PROSTHESIS;  Surgeon: León Talamantes MD;  Location: [facility] OR [address] [place] [address];  Service: Ophthalmology;  Laterality: Left;    PHACOEMULSIFICATION OF CATARACT Right 7/17/2018    Procedure: PHACOEMULSIFICATION, CATARACT;  Surgeon: León Talamantes MD;  Location: [facility] OR [address] [place] [address];  Service: Ophthalmology;  Laterality: Right;    PHACOEMULSIFICATION OF CATARACT Left 7/31/2018    Procedure: PHACOEMULSIFICATION, CATARACT;  Surgeon: León Talamantes MD;  Location: [facility] OR [address] [place] [address];  Service: Ophthalmology;  Laterality: Left;    PLACEMENT OF SCREW IN ODONTOID N/A 10/22/2020    Procedure: INSERTION, SCREW, ODONTOID. Anterior approach.;  Surgeon: Kirsten Weaver MD;  Location: [facility] OR 2ND FLR;  Service: Neurosurgery;   Laterality: N/A;    RADIOFREQUENCY THERMOCOAGULATION Bilateral 1/30/2020    Procedure: RADIOFREQUENCY THERMAL COAGULATION--Bilateral C2-3 and Third Occipital Nerve;  Surgeon: Tip Mera Jr., MD;  Location: [facility];  Service: Pain Management;  Laterality: Bilateral;    UMBILICAL HERNIA REPAIR       Allergies:     Review of patient's allergies indicates:   Allergen Reactions    Morphine Shortness Of Breath     Medications:     Current Outpatient Medications on File Prior to Visit   Medication Sig Dispense Refill    albuterol (PROVENTIL/VENTOLIN HFA) 90 mcg/actuation inhaler INHALE 2 PUFFS INTO THE LUNGS EVERY 6 HOURS AS NEEDED WHEEZING OR SHORTNESS OF BREATH 8.5 g 11    albuterol-ipratropium (DUO-NEB) 2.5 mg-0.5 mg/3 mL nebulizer solution Take 3 mLs by nebulization every 12 (twelve) hours. Rescue 540 mL 3    alendronate (FOSAMAX) 70 MG tablet TAKE 1 TABLET BY MOUTH EVERY WEEK IN THE MORNING WITH FULL GLASS OF WATER ON EMPTY STOMACH-DONT LIE DOWN FOR AT LEAST 30 MINUTES AFTERWARDS 12 tablet 3    ALPRAZolam (XANAX) 0.25 MG tablet Take 1 tablet (0.25 mg total) by mouth nightly as needed for Anxiety (PRN sundowning). Between 4-5 pm 30 tablet 2    aspirin (ECOTRIN) 81 MG EC tablet Take 81 mg by mouth once daily.      atorvastatin (LIPITOR) 40 MG tablet TAKE 1 TABLET(40 MG) BY MOUTH EVERY DAY 90 tablet 3    budesonide (PULMICORT) 0.5 mg/2 mL nebulizer solution Take 2 mLs (0.5 mg total) by nebulization 2 (two) times daily. Controller 120 mL 5    calcium carbonate (CALCIUM 600 ORAL) Take by mouth.      empagliflozin (JARDIANCE) 10 mg tablet Take 1 tablet (10 mg total) by mouth once daily. 30 tablet 11    furosemide (LASIX) 20 MG tablet Take 1 tablet (20 mg total) by mouth daily as needed (Wt gain >2 pounds/day or >5 pounds/week). 90 tablet 0    hydrOXYzine HCL (ATARAX) 10 MG Tab 1-2 tabs PO TID PRN anxiety 60 tablet 1    magnesium oxide (MAG-OX) 400 mg (241.3 mg magnesium) tablet Take 1 tablet (400 mg total) by  mouth once daily. 30 tablet 3    memantine (NAMENDA) 10 MG Tab Take 1 tablet (10 mg total) by mouth 2 (two) times daily. 180 tablet 1    methylPREDNISolone (MEDROL, RENAN,) 4 mg tablet use as directed 1 each 0    omega-3 fatty acids-vitamin E (FISH OIL) 1,000 mg Cap Take 1 tablet by mouth 2 (two) times daily. (Patient taking differently: Take 1 tablet by mouth once daily.) 60 each 11    ondansetron (ZOFRAN) 8 MG tablet Take 1 tablet (8 mg total) by mouth every 8 (eight) hours as needed for Nausea. 30 tablet 1    pantoprazole (PROTONIX) 40 MG tablet TAKE 1 TABLET(40 MG) BY MOUTH TWICE DAILY 180 tablet 0    sacubitriL-valsartan (ENTRESTO) 24-26 mg per tablet Take 1 tablet by mouth 2 (two) times daily. 60 tablet 11    sertraline (ZOLOFT) 100 MG tablet Take 1 tablet (100 mg total) by mouth once daily. 90 tablet 3    tiotropium-olodateroL (STIOLTO RESPIMAT) 2.5-2.5 mcg/actuation Mist Inhale 2 puffs into the lungs once daily. Controller 4 g 5    ubrogepant (UBROGEPANT) 100 mg tablet One po at onset of migraine. May repeat after 2 hours if needed. 10 tablet 1     No current facility-administered medications on file prior to visit.     Social History:     Social History     Tobacco Use    Smoking status: Former     Packs/day: 2.00     Years: 20.00     Pack years: 40.00     Types: Cigarettes     Quit date: 1988     Years since quittin.7     Passive exposure: Past    Smokeless tobacco: Never   Substance Use Topics    Alcohol use: No     Comment: quit drinking beer      Family History:     Family History   Problem Relation Age of Onset    Arrhythmia Mother     Heart disease Mother     Heart failure Brother     No Known Problems Daughter     No Known Problems Son     Colon cancer Neg Hx     Inflammatory bowel disease Neg Hx     Celiac disease Neg Hx     Melanoma Neg Hx     Amblyopia Neg Hx     Blindness Neg Hx     Cataracts Neg Hx     Glaucoma Neg Hx     Macular degeneration Neg Hx     Retinal detachment Neg Hx      "Strabismus Neg Hx      Physical Exam:   BP (!) 108/55 (BP Location: Left arm, Patient Position: Sitting, BP Method: Medium (Automatic))   Pulse 78   Ht 5' 9" (1.753 m)   Wt 64.4 kg (141 lb 15.6 oz)   SpO2 98%   BMI 20.97 kg/m²        Physical Exam  Vitals and nursing note reviewed.   Constitutional:       General: He is not in acute distress.     Appearance: Normal appearance.      Comments: In wheelchair    HENT:      Head: Normocephalic and atraumatic.      Right Ear: Decreased hearing noted.      Left Ear: Decreased hearing noted.      Nose: Nose normal.   Eyes:      General: No scleral icterus.     Extraocular Movements: Extraocular movements intact.      Conjunctiva/sclera: Conjunctivae normal.   Neck:      Thyroid: No thyromegaly.      Vascular: No carotid bruit or JVD.   Cardiovascular:      Rate and Rhythm: Normal rate and regular rhythm.      Pulses: Normal pulses.      Heart sounds: Normal heart sounds. No murmur heard.    No friction rub. No gallop.   Pulmonary:      Effort: Pulmonary effort is normal.      Breath sounds: Examination of the left-lower field reveals decreased breath sounds. Decreased breath sounds present. No wheezing, rhonchi or rales.   Chest:      Chest wall: No tenderness.   Abdominal:      General: Bowel sounds are normal. There is no distension.      Palpations: Abdomen is soft.      Tenderness: There is no abdominal tenderness.   Musculoskeletal:      Cervical back: Neck supple.      Right lower leg: No edema.      Left lower leg: No edema.   Skin:     General: Skin is warm and dry.      Coloration: Skin is not pale.      Findings: No erythema or rash.      Nails: There is no clubbing.   Neurological:      Mental Status: He is alert and oriented to person, place, and time.   Psychiatric:         Mood and Affect: Mood and affect normal.         Behavior: Behavior normal.        Labs:     Lab Results   Component Value Date     09/28/2022    K 4.1 09/28/2022     " 09/28/2022    CO2 21 (L) 09/28/2022    BUN 13 09/28/2022    CREATININE 1.0 09/28/2022    ANIONGAP 12 09/28/2022     Lab Results   Component Value Date    HGBA1C 5.2 10/15/2014     Lab Results   Component Value Date    BNP 58 10/19/2022    BNP 86 09/28/2022     (H) 01/23/2022    Lab Results   Component Value Date    WBC 4.30 10/19/2022    HGB 14.0 10/19/2022    HCT 40 10/19/2022    HCT 43.1 10/19/2022     10/19/2022    GRAN 3.0 10/19/2022    GRAN 69.7 10/19/2022     Lab Results   Component Value Date    CHOL 111 (L) 03/18/2022    HDL 26 (L) 03/18/2022    LDLCALC 60.4 (L) 03/18/2022    TRIG 123 03/18/2022          Imaging:   Echocardiogram  TTE 1/24/22  The left ventricle is normal in size with mildly decreased systolic function.  The estimated ejection fraction is 40%.  There are segmental left ventricular wall motion abnormalities: inferolateral wall and basal inferior are akinetic.  Grade I left ventricular diastolic dysfunction.  Normal right ventricular size with low normal right ventricular systolic function.  Mild mitral regurgitation.  Indeterminate central venous pressure. Estimated PA systolic pressure is at least 30 mmHg.     TTE 7/20/20  Normal left ventricular systolic function. The estimated ejection fraction is 60%.  Local segmental wall motion abnormalities.  Grade I (mild) left ventricular diastolic dysfunction consistent with impaired relaxation.  Mild tricuspid regurgitation. TR gradient of 27 mmHg.  Normal right ventricular systolic function.  Mild mitral regurgitation.  Aortic valve sclerosis without significant stenosis     TTE 1/26/15  CONCLUSIONS     1 - Normal left ventricular systolic function (EF 60-65%).     2 - Right ventricular enlargement with normal systolic function.     3 - Normal left ventricular diastolic function.     4 - Biatrial enlargement.     5 - Aortic sclerosis with normal leaflet mobility     Stress testing  PET 4/26/22    There are two significant perfusion  abnormalities.    Perfusion Abnormality #1 - There is a small sized, moderate to severe intensity inferolateral fixed perfusion abnormality involving 10% of LV myocardium in the distribution of the OM2.    Perfusion Abnormality #2 - There is a small to moderate sized, severe intensity basal to mid septal reversible perfusion abnormality involving 10% of LV myocardium in the distribution of the 1st septal territory.    The whole heart absolute myocardial perfusion values averaged 0.67 cc/min/g at rest, which is normal; 1.39 cc/min/g at stress, which is mildly reduced; and CFR is 2.08 , which is mildly reduced.    CT attenuation images demonstrate severe diffuse coronary calcifications in the LAD, LCX and RCA territory and severe diffuse aortic calcifications in the ascending aorta, descending aorta and aortic arch. Diffuse mediastinal calcifications consistent with prior diagnosis of sarcoidosis.    The gated perfusion images showed an ejection fraction of 45% at rest and 60% during stress. A normal ejection fraction is greater than 53%.    There is inferolateral wall akinesis at rest and during stress.    There is basal to mid septal wall moderate hypokinesis during stress.    The LV cavity size is normal at rest and stress.    The EKG portion of this study is negative for ischemia, however sensitivity decreased due to baseline ST changes.    There were no arrhythmias during stress.    The patient reported no chest pain during the stress test.    When compared to the previous study from 1/7/2019, inferolateral perfusion abnormality unchanged.  Septal perfusion abnormality new.     PET 1/7/19  There is a small to moderate sized, moderate to severe intensity basal inferolateral fixed perfusion abnormality involving 10% of the LV myocardium in the OM2 territory consistent with non-transmural infarct. On past angiogram, the OM2 was subtotally occluded.  Whole heart absolute myocardial perfusion (cc/min/g) averaged 1.06  rest (which is elevated), 1.47 at stress (which is mildly reduced), and 1.37 CFR (which is moderately reduced impart due to elevated resting flow).  Within the defect absolute myocardial perfusion (cc/min/gm) averaged 0.51 at rest, 0.82 at stress and CFR was 1.78, which equates to severely reduced coronary flow capacity in 10% of the myocardium (within the OM2 territory).  Gated perfusion images showed an ejection fraction of 62 % at rest and 84 % during stress.  There is basal inferolateral wall hypokinesis and hypokinesis at rest and stress.  LV cavity size is normal at rest and normal at stress.  The EKG portion of this study is negative for myocardial ischemia.  Arrhythmias during stress: rare PVCs.  The patient reported no symptoms during the stress test.  Compared to the previous study from 8-, there are no significant changes.     PET 8/25/14  CONCLUSIONS: ABNORMAL MYOCARDIAL PERFUSION PET STRESS TEST  1. There is a large sized moderate to severe intensity fixed defect consistent with non-transmural infarct with rhonda-infarct ischemia in the base to mid lateral and inferolateral walls in the usual distribution of the Left Circumflex Artery and Posterior Lateral Branch. This defect comprises 20 % of the left ventricular myocardium.   2. There is abnormal wall motion at rest showing hypokinesis of the inferolateral and inferobasilar walls of the left ventricle. There is abnormal wall motion at stress showing hypokinesis of the inferolateral and inferobasilar walls of the left   ventricle.   3. Visually estimated LV function is normal at rest and normal at stress.   4. The ventricular volumes are normal at rest and stress.   5. The extracardiac distribution of radioactivity is normal.   6. When compared to the previous study from 2011, the previous infarct in the OM territory is still present however there is now a confluent infarct in the PL territory.  There is no evidence of ischemia in the LAD or D1  territory.     Cath lab  Carotid stent 10/14/19  A STENT PRECISE PRO 8.0 X 40 stent was successfully placed.  Estimated blood loss: between 50 mL and 150 mL  Successful cartotid stent with proximal emboli protection.  Follow-up in 1 month with carotid ultrasound.  DAPT x1 month.     [facility] 8/19/14  DIAGNOSTIC:     Patient has a right dominant coronary artery.      - Left Main Coronary Artery:           The distal LM has a 30% stenosis. There is JOVANNA 3 flow.     - Left Anterior Descending Artery:           The ostial LAD has a 80% stenosis. There is JOVANNA 3 flow.     - Left Circumflex Artery:           The LCX has luminal irregularities. There is JOVANNA 3 flow.     - Right Coronary Artery:           The proximal RCA is occluded. There is JOVANNA 0 flow. Proximal stented with 3 mm stent and distal with 2.25 stent.     - Right Ventricle:           The right ventricle. There is JOVANNA 3 flow.     - Common Femoral Artery:           The right CFA has luminal irregularities. There is JOVANNA 3 flow.     - Femoral Artery:           The femoral artery is normal. There is JOVANNA 3 flow.     - D1:           The proximal D1 has a 90% stenosis. There is JOVANNA 3 flow.     - OM2:           The proximal OM2 has subtotal. There is JOVANNA 3 flow.  INTERVENTION:       Proximal RCA:            The lesion was successfully intervened. Post-stenosis of 0%, post-JOVANNA 3 flow and TMP grade 2. The vessel was accessed natively.  The following items were used: Cath Pronto Lp, Stent Xience Prime Rx 3.0x33 (JONNA) and Stent Xience Rx 2.25x12 (JONNA).     Other  Carotid US 1/4/21  There is 0-19% right Internal Carotid Stenosis. Widely patent R ICA stent.  There is 0-19% left Internal Carotid Stenosis.     Carotid US 11/14/19  There is 0-19% right Internal Carotid Stenosis.  There is 0-19% left Internal Carotid Stenosis.     Carotid US 10/30/15  CONCLUSIONS   There is 40 - 49% right Internal Carotid stenosis.  There is 0 - 19% left Internal Carotid stenosis.     Carotid  US 14  CONCLUSIONS   There is 40 - 49% right Internal Carotid stenosis.  There is 20 - 39% left Internal Carotid stenosis.     EK/6/22 - NSR, ST/T wave changes of ischemia (personally reviewed)    Assessment:     1. Coronary artery disease involving native coronary artery of native heart without angina pectoris    2. S/P CABG x 2    3. Ischemic cardiomyopathy    4. Chronic diastolic heart failure    5. Primary hypertension    6. Pure hypercholesterolemia    7. Atherosclerosis of aorta    8. Peripheral arterial disease    9. Bilateral carotid artery stenosis    10. Pulmonary sarcoidosis      Plan:     CAD (coronary artery disease)  S/p PCI to RCA  S/p CABG x2v (LIMA-LAD, SVG-Ramus)  - recent PET with OM infarct and ischemia in base to mid septum. Denies angina   - continue ASA, high intensity statin     HFmrEF, chronic  - likely ischemic cardiomyopathy with RWMAs in OM territory  - EF 40% in 2022     - stable, euvolemic  - continue entresto, jardiance              - with prior CHB and resting mary, will not start BB              - with orthostasis, will not start spironolactone at this time  - continue lasix 20mg daily. Check BMP       Essential hypertension  - stable, at goal  - on GDMT, as above     Hyperlipidemia  - LDL at goal  - continue statin     Carotid artery disease  - s/p PCI to R ICA on 10/14/19  - stable  - continue ASA, statin (LDL at goal)     History of third degree heart block  - related to STEMI incident.  - no recurrence since that time.     Pulmonary and shayy sarcoidosis   COPD on O2  Pleural effusion s/p thoracentesis 22  - SOB improving since thoracentesis   - followed by pulm     Gait instability  Frequent falls  Impaired functional mobility      Follow up with Dr. Kelly next year     Signed:  Rukhsana Hoskins PA-C  Cardiology   [phone] - General  2022 8:32 AM

## 2022-11-08 NOTE — PROGRESS NOTES
Spoke to patient's son about cytology results, micro results  Patient is doing well without any symptoms.  They will monitor his symptoms and patient's son will discuss with his siblings but are leaning towards symptom management over lung biopsy etc.

## 2022-11-09 NOTE — PROGRESS NOTES
Subjective:       Patient ID: Paul Browning is a 82 y.o. male.    Chief Complaint: Follow-up      HPI  82 y.o. Male with CAD, systolic heart failure, CAD, Carotid disease/PAD, HLD, HTN, COPD/sarcoidosis, Insomnia, Aortic atherosclerosis, MDD, senile purpura, hx of pelvic fracture from fall, Hx of SCC is here for 4 month f/u. Pt recently had a thoracentesis 2/2 left sided pleural effusion. Breathing has improved significantly. Still using O2 sporadically.   Review of Systems   Constitutional:  Positive for fatigue. Negative for activity change, appetite change, chills, diaphoresis, fever and unexpected weight change.   HENT:  Negative for nasal congestion, mouth sores, postnasal drip, rhinorrhea, sinus pressure/congestion, sneezing, sore throat, trouble swallowing and voice change.    Eyes:  Negative for discharge, itching and visual disturbance.   Respiratory:  Negative for cough, chest tightness, shortness of breath and wheezing.    Cardiovascular:  Negative for chest pain, palpitations and leg swelling.   Gastrointestinal:  Negative for abdominal pain, blood in stool, constipation, diarrhea, nausea and vomiting.   Endocrine: Negative for cold intolerance and heat intolerance.   Genitourinary:  Negative for difficulty urinating, dysuria, flank pain, hematuria and urgency.   Musculoskeletal:  Positive for arthralgias and neck pain. Negative for back pain and myalgias.   Integumentary:  Negative for rash and wound.   Allergic/Immunologic: Negative for environmental allergies and food allergies.   Neurological:  Positive for memory loss. Negative for dizziness, tremors, seizures, syncope, weakness and headaches.   Hematological:  Negative for adenopathy. Does not bruise/bleed easily.   Psychiatric/Behavioral:  Positive for decreased concentration, dysphoric mood and sleep disturbance. Negative for confusion, self-injury and suicidal ideas. The patient is nervous/anxious.        Objective:      Physical  Exam  Constitutional:       General: He is not in acute distress.     Appearance: Normal appearance. He is well-developed. He is not ill-appearing, toxic-appearing or diaphoretic.   HENT:      Head: Normocephalic and atraumatic.      Right Ear: External ear normal.      Left Ear: External ear normal.      Nose: Nose normal.      Mouth/Throat:      Pharynx: No oropharyngeal exudate.   Eyes:      General: No scleral icterus.        Right eye: No discharge.         Left eye: No discharge.      Extraocular Movements: Extraocular movements intact.      Conjunctiva/sclera: Conjunctivae normal.      Pupils: Pupils are equal, round, and reactive to light.   Neck:      Thyroid: No thyromegaly.      Vascular: No JVD.   Cardiovascular:      Rate and Rhythm: Normal rate and regular rhythm.      Pulses: Normal pulses.      Heart sounds: Normal heart sounds. No murmur heard.  Pulmonary:      Effort: Pulmonary effort is normal. No respiratory distress.      Breath sounds: Normal breath sounds. No wheezing or rales.   Abdominal:      General: Bowel sounds are normal. There is no distension.      Palpations: Abdomen is soft.      Tenderness: There is no abdominal tenderness. There is no right CVA tenderness, left CVA tenderness, guarding or rebound.   Musculoskeletal:      Cervical back: Normal range of motion and neck supple. No rigidity.      Right lower leg: No edema.      Left lower leg: No edema.   Lymphadenopathy:      Cervical: No cervical adenopathy.   Skin:     General: Skin is warm and dry.      Capillary Refill: Capillary refill takes less than 2 seconds.      Coloration: Skin is not pale.      Findings: No rash.   Neurological:      General: No focal deficit present.      Mental Status: He is alert and oriented to person, place, and time. Mental status is at baseline.      Cranial Nerves: No cranial nerve deficit.      Sensory: No sensory deficit.      Motor: No weakness.      Coordination: Coordination normal.       Gait: Gait normal.      Deep Tendon Reflexes: Reflexes normal.   Psychiatric:         Mood and Affect: Mood normal.         Behavior: Behavior normal.         Thought Content: Thought content normal.         Judgment: Judgment normal.       Assessment:       Problem List Items Addressed This Visit          Neuro    Dementia    Cervical spondylosis       Psychiatric    MDD (major depressive disorder), recurrent episode, mild (Chronic)    Anxiety       Pulmonary    Pulmonary sarcoidosis    Chronic obstructive pulmonary disease - Primary    Relevant Orders    (In Office Administered) Pneumococcal Conjugate Vaccine (20 Valent) (IM) (Completed)    Acute hypoxemic respiratory failure       Cardiac/Vascular    Hyperlipidemia (Chronic)    CAD (coronary artery disease) (Chronic)    S/P CABG x 2    Peripheral arterial disease    Ischemic cardiomyopathy    Hypertension    Chronic diastolic heart failure    Carotid artery disease    Atherosclerosis of aorta       Hematology    Senile purpura       GI    Stevens's esophagus with low grade dysplasia (Chronic)    Hx of gastric ulcer       Orthopedic    Osteoporosis       Other    Impaired functional mobility, balance, gait, and endurance    Gait instability     Other Visit Diagnoses       Lung mass                Plan:    Left pleural effusion- s/p thoracentesis       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- stable on inhalers/O2      CAD s/p PCI and CABG with systolic CHF- stable no CP currently, followed by Cardiology       Continue ASA/Statin      Carotid artery disease/PAD- stable, CPT      Stevens's esophagus, Hx of gastric ulcer- Last EGD(8/21)- low grade dysplasia and intestinal metaplasia       Continue Protonix 40 mg daily       Followed by GI       - no abdominal pain currently      HTN- stable, CPT      Insomnia- stable        Aortic atherosclerosis- stable, continue to monitor      MDD/Anxiety- stable Zoloft daily and Atarax PRN      HLD- controlled on Lipitor 40 mg  daily      Osteoporosis- continue Fosamax, Calcium/Vitamin D      Hx of (superior/inferior) nondisplaced pubic ramus fx 2/2 fall- stable, only mild pain with ambulation. None at rest/sitting      Senile purpura- stable, continue to monitor      Cervical arthritis with headaches- followed by Pain Management       Hx of SCC- followed by Derm       Dementia- stable      Elevated PSA- has seen Urology      Lung mass- followed by Pulm, pt is a poor candidate for tx if malignant, family is in agreement

## 2022-11-15 NOTE — TELEPHONE ENCOUNTER
I have reached out to Paul Browning to inform him of the  application process and whats required to apply.  Paul Browning did not answer. I left a voicemail and mailed a letter introducing him to the pharmacy patient assistance program. I will follow up in 5 business days.

## 2022-11-20 NOTE — TELEPHONE ENCOUNTER
No new care gaps identified.  [facility] Embedded Care Gaps. Reference number: [id number]. 11/19/2022   6:26:10 PM CST

## 2022-11-20 NOTE — TELEPHONE ENCOUNTER
Refill Routing Note   Medication(s) are not appropriate for processing by [facility] for the following reason(s):      - Medication not previously prescribed by PCP    [facility] action(s):  Defer          Medication reconciliation completed: No     Appointments  past 12m or future 3m with PCP    Date Provider   Last Visit   11/9/2022 Grey Forbes, DO   Next Visit   3/8/2023 Grey Forbes,    ED visits in past 90 days: 2        Note composed:11:46 PM 11/19/2022

## 2022-12-06 NOTE — PROGRESS NOTES
Subjective:       Patient ID: Paul Browning is a 82 y.o. male.    Chief Complaint: Memory Loss      HPI  Past Medical History:   Diagnosis Date    Acute hypoxemic respiratory failure 1/23/2022    Anxiety     Stevens's esophagus with low grade dysplasia     BPH (benign prostatic hyperplasia)     CAD (coronary artery disease)     Cataract     Cervical spondylosis 1/3/2020    Chronic obstructive pulmonary disease with acute exacerbation     Diaphragmatic hernia     GERD (gastroesophageal reflux disease)     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    Hyperlipidemia     Hypertension     Ischemic cardiomyopathy 11/5/2014    MDD (major depressive disorder), recurrent episode, mild 2/17/2016    Osteoporosis 7/20/2016    Peripheral arterial disease 3/18/2016    Sarcoid     Squamous cell carcinoma 09/2016, 5/2016    crown of scalp, left wrist      Past Surgical History:   Procedure Laterality Date    CARDIAC SURGERY      CAROTID STENT N/A 10/14/2019    Procedure: INSERTION, STENT, ARTERY, CAROTID;  Surgeon: Artie Kebede MD;  Location: [facility] CATH LAB;  Service: Cardiology;  Laterality: N/A;    CATARACT EXTRACTION W/  INTRAOCULAR LENS IMPLANT Right 07/17/2018    Dr. Talamantes    CATARACT EXTRACTION W/  INTRAOCULAR LENS IMPLANT Left 07/31/2018    Dr. Talamantes    CORONARY ARTERY BYPASS GRAFT      x2  10/2014    ESOPHAGOGASTRODUODENOSCOPY N/A 4/8/2019    Procedure: ESOPHAGOGASTRODUODENOSCOPY (EGD)/poss rfa;  Surgeon: Clifford De La Torre MD;  Location: [facility] ([address] [place] [address]);  Service: Endoscopy;  Laterality: N/A;    ESOPHAGOGASTRODUODENOSCOPY N/A 8/4/2021    Procedure: EGD (ESOPHAGOGASTRODUODENOSCOPY);  Surgeon: Clifford De La Torre MD;  Location: [address]);  Service: Endoscopy;  Laterality: N/A;  8/2-covid [facility] xiauic-su-nu appts on 8/3    ESOPHAGOGASTRODUODENOSCOPY (EGD) WITH RADIOFREQUENCY TREATMENT OF GASTROESOPHAGEAL JUNCTION      INJECTION OF ANESTHETIC AGENT AROUND MEDIAL BRANCH NERVES INNERVATING CERVICAL  FACET JOINT Bilateral 1/9/2020    Procedure: INJECTION, MBB--Bilateral Cervical- Third Occipital nerve, C2-3--ASA okay for pt to continue taking per provider.;  Surgeon: Tip Mera Jr., MD;  Location: [facility];  Service: Pain Management;  Laterality: Bilateral;    INTRAOCULAR PROSTHESES INSERTION Right 7/17/2018    Procedure: INSERTION, INTRAOCULAR LENS PROSTHESIS;  Surgeon: León Talamantes MD;  Location: [facility] OR 1ST FLR;  Service: Ophthalmology;  Laterality: Right;    INTRAOCULAR PROSTHESES INSERTION Left 7/31/2018    Procedure: INSERTION, INTRAOCULAR LENS PROSTHESIS;  Surgeon: León Talamantes MD;  Location: [facility] OR 1ST FLR;  Service: Ophthalmology;  Laterality: Left;    PHACOEMULSIFICATION OF CATARACT Right 7/17/2018    Procedure: PHACOEMULSIFICATION, CATARACT;  Surgeon: León Talamantes MD;  Location: [facility] OR 1ST FLR;  Service: Ophthalmology;  Laterality: Right;    PHACOEMULSIFICATION OF CATARACT Left 7/31/2018    Procedure: PHACOEMULSIFICATION, CATARACT;  Surgeon: León Talamantes MD;  Location: [facility] OR 1ST FLR;  Service: Ophthalmology;  Laterality: Left;    PLACEMENT OF SCREW IN ODONTOID N/A 10/22/2020    Procedure: INSERTION, SCREW, ODONTOID. Anterior approach.;  Surgeon: Kirsten Weaver MD;  Location: [facility] OR 2ND FLR;  Service: Neurosurgery;  Laterality: N/A;    RADIOFREQUENCY THERMOCOAGULATION Bilateral 1/30/2020    Procedure: RADIOFREQUENCY THERMAL COAGULATION--Bilateral C2-3 and Third Occipital Nerve;  Surgeon: Tip Mera Jr., MD;  Location: [facility];  Service: Pain Management;  Laterality: Bilateral;    UMBILICAL HERNIA REPAIR          Current Outpatient Medications:     albuterol (PROVENTIL/VENTOLIN HFA) 90 mcg/actuation inhaler, INHALE 2 PUFFS INTO THE LUNGS EVERY 6 HOURS AS NEEDED WHEEZING OR SHORTNESS OF BREATH, Disp: 8.5 g, Rfl: 11    albuterol-ipratropium (DUO-NEB) 2.5 mg-0.5 mg/3 mL nebulizer solution, Take 3 mLs by nebulization every 12 (twelve)  hours. Rescue, Disp: 540 mL, Rfl: 3    alendronate (FOSAMAX) 70 MG tablet, TAKE 1 TABLET BY MOUTH EVERY WEEK IN THE MORNING WITH FULL GLASS OF WATER ON EMPTY STOMACH-DONT LIE DOWN FOR AT LEAST 30 MINUTES AFTERWARDS, Disp: 12 tablet, Rfl: 3    aspirin (ECOTRIN) 81 MG EC tablet, Take 81 mg by mouth once daily., Disp: , Rfl:     atorvastatin (LIPITOR) 40 MG tablet, TAKE 1 TABLET(40 MG) BY MOUTH EVERY DAY, Disp: 90 tablet, Rfl: 3    budesonide (PULMICORT) 0.5 mg/2 mL nebulizer solution, Take 2 mLs (0.5 mg total) by nebulization 2 (two) times daily. Controller, Disp: 120 mL, Rfl: 5    calcium carbonate (CALCIUM 600 ORAL), Take by mouth., Disp: , Rfl:     empagliflozin (JARDIANCE) 10 mg tablet, Take 1 tablet (10 mg total) by mouth once daily., Disp: 30 tablet, Rfl: 11    furosemide (LASIX) 20 MG tablet, Take 1 tablet (20 mg total) by mouth daily as needed (Wt gain >2 pounds/day or >5 pounds/week)., Disp: 90 tablet, Rfl: 3    magnesium oxide (MAG-OX) 400 mg (241.3 mg magnesium) tablet, Take 1 tablet (400 mg total) by mouth once daily., Disp: 30 tablet, Rfl: 3    methylPREDNISolone (MEDROL, RENNA,) 4 mg tablet, use as directed, Disp: 1 each, Rfl: 0    omega-3 fatty acids-vitamin E (FISH OIL) 1,000 mg Cap, Take 1 tablet by mouth 2 (two) times daily. (Patient taking differently: Take 1 tablet by mouth once daily.), Disp: 60 each, Rfl: 11    ondansetron (ZOFRAN) 8 MG tablet, Take 1 tablet (8 mg total) by mouth every 8 (eight) hours as needed for Nausea., Disp: 30 tablet, Rfl: 1    pantoprazole (PROTONIX) 40 MG tablet, TAKE 1 TABLET(40 MG) BY MOUTH TWICE DAILY, Disp: 180 tablet, Rfl: 0    sacubitriL-valsartan (ENTRESTO) 24-26 mg per tablet, Take 1 tablet by mouth 2 (two) times daily., Disp: 60 tablet, Rfl: 11    sertraline (ZOLOFT) 100 MG tablet, Take 1 tablet (100 mg total) by mouth once daily., Disp: 90 tablet, Rfl: 3    tiotropium-olodateroL (STIOLTO RESPIMAT) 2.5-2.5 mcg/actuation Mist, Inhale 2 puffs into the lungs once  daily. Controller, Disp: 4 g, Rfl: 5    ubrogepant (UBROGEPANT) 100 mg tablet, One po at onset of migraine. May repeat after 2 hours if needed., Disp: 10 tablet, Rfl: 1    doxepin (SILENOR) 3 mg Tab, One po qhs, Disp: 30 tablet, Rfl: 6    memantine (NAMENDA) 10 MG Tab, Take 1 tablet (10 mg total) by mouth 2 (two) times daily., Disp: 180 tablet, Rfl: 1   Review of patient's allergies indicates:   Allergen Reactions    Morphine Shortness Of Breath        Review of Systems   Psychiatric/Behavioral:  Positive for sleep disturbance.          Objective:      Physical Exam  Constitutional:       Appearance: He is not ill-appearing.      Comments: In WC   Neurological:      Mental Status: He is alert.      Motor: Tremor and abnormal muscle tone (increased tone all 4 ext.) present.      Gait: Gait abnormal.      Comments: Quiet but answers questions  Resting tremor exacerbated with action  Short shuffling stride         Assessment:       1. Sundowning    2. Late onset Alzheimer's dementia with behavioral disturbance    3. Insomnia, unspecified type    4. Parkinsonism, unspecified Parkinsonism type        Plan:         DORYS, sundowning, and insomnia in chronically ill elderly pt with multiple medical problems. No neurologic setbacks.  Xanax did not help. I had considered seroqeul but there are multiple risks   ( hypotensive and parkinsonian at baseline). Will start with silenor reena as sleep seems to be more of a concern to his daughter who is his caretaker ( according to his son who is with him today). If not covered and not affordable through [facility] then instead consider change his zoloft to remeron for sleep benefit.     Parkinsonism, tremor is atypical   His son notes that at home, using his walker, he is able to walk with a much more normal stride when prompted and does not fall. Gait dysfunction likely exacerbated due to low vision ( fear of falling). Not considering sinemet or dopamine agonist at this time given  baseline hypotension.   Note had home PT and OT recently.    Dx with a pleural effusion mid October when presented to the ER with dyspnea, had an outpt thoracentesis and is stable. Using 02 at night.            Penelope Stauffer MD   12/06/2022   2:05 PM

## 2022-12-10 NOTE — NURSING TRANSFER
Nursing Transfer Note      10/22/2020     Transfer To: [id number]    Transfer via bed    Transfer with cardiac monitoring    Transported by pct x2    Medicines sent: n/a    Chart send with patient: Yes    Notified: family via text service    Patient reassessed at: 10/22/20 @ 2030    Debby Rubio)  Obstetrics and Gynecology  [address], [address]  [place] [address]  Phone: [phone]  Fax: [phone]  Follow Up Time:

## 2022-12-15 NOTE — TELEPHONE ENCOUNTER
----- Message from Vidhi Graves sent at 12/15/2022 12:09 PM CST -----  Contact: nurse Scarlet from [facility]   Scarlet  from [facility]. Paul had a fall this morning & has a brush burn on his back. She is calling to see if he will need wound care for this.

## 2022-12-15 NOTE — TELEPHONE ENCOUNTER
Called trey with HH and she said pt had a fall in the middle of the night. He climbed over the bed rails and fell between his bed and recliner and has a tiny skin tear and a brush burn. She is asking if he will need wound care. I told her we need to see the skin or have a nurse call us for wound care orders if he requires wound care.  She said she was going to call the dtr and ask her to send us a picture of the pt's wound

## 2022-12-16 NOTE — TELEPHONE ENCOUNTER
Call [facility]. Give them a verbal order for general wound care and then can send it over to sign

## 2022-12-16 NOTE — TELEPHONE ENCOUNTER
Spoke to patient's son in regards to the wound care referral that was placed by his PCP. Patient's son would rather have [facility] come out to do wound care, instead of having to come to an outpatient wound care clinic.Patient is current with [facility]. Told patient's son that I will send a message to his PCP in regards to request.

## 2022-12-28 NOTE — TELEPHONE ENCOUNTER
I spoke with patient's son. He has had a lot of medical issues, including a neck fracture.  He also has dementia. Son does not feel that he is not able to do any procedures.

## 2023-01-01 ENCOUNTER — PATIENT MESSAGE (OUTPATIENT)
Dept: PULMONOLOGY | Facility: CLINIC | Age: 83
End: 2023-01-01
Payer: MEDICARE

## 2023-01-01 ENCOUNTER — ANESTHESIA EVENT (OUTPATIENT)
Dept: INTENSIVE CARE | Facility: HOSPITAL | Age: 83
DRG: 199 | End: 2023-01-01
Payer: MEDICARE

## 2023-01-01 ENCOUNTER — ANESTHESIA (OUTPATIENT)
Dept: INTENSIVE CARE | Facility: HOSPITAL | Age: 83
DRG: 199 | End: 2023-01-01
Payer: MEDICARE

## 2023-01-01 ENCOUNTER — EXTERNAL HOME HEALTH (OUTPATIENT)
Dept: HOME HEALTH SERVICES | Facility: HOSPITAL | Age: 83
End: 2023-01-01
Payer: MEDICARE

## 2023-01-01 ENCOUNTER — HOSPITAL ENCOUNTER (INPATIENT)
Facility: HOSPITAL | Age: 83
LOS: 5 days | DRG: 199 | End: 2023-01-23
Attending: EMERGENCY MEDICINE | Admitting: STUDENT IN AN ORGANIZED HEALTH CARE EDUCATION/TRAINING PROGRAM
Payer: MEDICARE

## 2023-01-01 VITALS
WEIGHT: 150 LBS | BODY MASS INDEX: 22.22 KG/M2 | SYSTOLIC BLOOD PRESSURE: 80 MMHG | DIASTOLIC BLOOD PRESSURE: 45 MMHG | OXYGEN SATURATION: 41 % | TEMPERATURE: 98 F | HEIGHT: 69 IN

## 2023-01-01 DIAGNOSIS — S22.49XA MULTIPLE RIB FRACTURES INVOLVING FOUR OR MORE RIBS: ICD-10-CM

## 2023-01-01 DIAGNOSIS — R45.1 AGITATION REQUIRING SEDATION PROTOCOL: ICD-10-CM
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
## 2023-01-01 DIAGNOSIS — J43.8 OTHER EMPHYSEMA: ICD-10-CM

## 2023-01-01 DIAGNOSIS — Z51.5 ENCOUNTER FOR PALLIATIVE CARE: ICD-10-CM

## 2023-01-01 DIAGNOSIS — J44.9 CHRONIC OBSTRUCTIVE PULMONARY DISEASE, UNSPECIFIED COPD TYPE: ICD-10-CM

## 2023-01-01 DIAGNOSIS — S27.0XXA TRAUMATIC PNEUMOTHORAX, INITIAL ENCOUNTER: ICD-10-CM

## 2023-01-01 DIAGNOSIS — N17.9 AKI (ACUTE KIDNEY INJURY): ICD-10-CM
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
## 2023-01-01 DIAGNOSIS — I48.91 AFIB: ICD-10-CM

## 2023-01-01 DIAGNOSIS — S22.41XA CLOSED FRACTURE OF MULTIPLE RIBS OF RIGHT SIDE, INITIAL ENCOUNTER: ICD-10-CM

## 2023-01-01 DIAGNOSIS — S22.39XA RIB FRACTURE: ICD-10-CM

## 2023-01-01 DIAGNOSIS — S22.31XA CLOSED FRACTURE OF ONE RIB OF RIGHT SIDE, INITIAL ENCOUNTER: ICD-10-CM

## 2023-01-01 LAB
ABO + RH BLD: NORMAL
ABO + RH BLD: NORMAL
ALBUMIN SERPL BCP-MCNC: 2.6 G/DL (ref 3.5–5.2)
ALBUMIN SERPL BCP-MCNC: 2.6 G/DL (ref 3.5–5.2)
ALBUMIN SERPL BCP-MCNC: 3 G/DL (ref 3.5–5.2)
ALBUMIN SERPL BCP-MCNC: 3 G/DL (ref 3.5–5.2)
ALBUMIN SERPL BCP-MCNC: 3.2 G/DL (ref 3.5–5.2)
ALBUMIN SERPL BCP-MCNC: 3.2 G/DL (ref 3.5–5.2)
ALBUMIN SERPL BCP-MCNC: 3.4 G/DL (ref 3.5–5.2)
ALBUMIN SERPL BCP-MCNC: 3.5 G/DL (ref 3.5–5.2)
ALLENS TEST: ABNORMAL
ALP SERPL-CCNC: 137 U/L (ref 55–135)
ALP SERPL-CCNC: 158 U/L (ref 55–135)
ALP SERPL-CCNC: 181 U/L (ref 55–135)
ALP SERPL-CCNC: 44 U/L (ref 55–135)
ALP SERPL-CCNC: 56 U/L (ref 55–135)
ALP SERPL-CCNC: 57 U/L (ref 55–135)
ALP SERPL-CCNC: 71 U/L (ref 55–135)
ALP SERPL-CCNC: 82 U/L (ref 55–135)
ALT SERPL W/O P-5'-P-CCNC: 15 U/L (ref 10–44)
ALT SERPL W/O P-5'-P-CCNC: 15 U/L (ref 10–44)
ALT SERPL W/O P-5'-P-CCNC: 16 U/L (ref 10–44)
ALT SERPL W/O P-5'-P-CCNC: 17 U/L (ref 10–44)
ALT SERPL W/O P-5'-P-CCNC: 25 U/L (ref 10–44)
ALT SERPL W/O P-5'-P-CCNC: 26 U/L (ref 10–44)
ALT SERPL W/O P-5'-P-CCNC: 29 U/L (ref 10–44)
ALT SERPL W/O P-5'-P-CCNC: 34 U/L (ref 10–44)
ANION GAP SERPL CALC-SCNC: 10 MMOL/L (ref 8–16)
ANION GAP SERPL CALC-SCNC: 10 MMOL/L (ref 8–16)
ANION GAP SERPL CALC-SCNC: 11 MMOL/L (ref 8–16)
ANION GAP SERPL CALC-SCNC: 7 MMOL/L (ref 8–16)
ANION GAP SERPL CALC-SCNC: 8 MMOL/L (ref 8–16)
ANION GAP SERPL CALC-SCNC: 8 MMOL/L (ref 8–16)
ANION GAP SERPL CALC-SCNC: 9 MMOL/L (ref 8–16)
APTT BLDCRRT: 25.7 SEC (ref 21–32)
AST SERPL-CCNC: 18 U/L (ref 10–40)
AST SERPL-CCNC: 19 U/L (ref 10–40)
AST SERPL-CCNC: 24 U/L (ref 10–40)
AST SERPL-CCNC: 26 U/L (ref 10–40)
AST SERPL-CCNC: 33 U/L (ref 10–40)
AST SERPL-CCNC: 33 U/L (ref 10–40)
AST SERPL-CCNC: 43 U/L (ref 10–40)
AST SERPL-CCNC: 47 U/L (ref 10–40)
BACTERIA #/AREA URNS AUTO: ABNORMAL /HPF
BASOPHILS # BLD AUTO: 0 K/UL (ref 0–0.2)
BASOPHILS # BLD AUTO: 0.01 K/UL (ref 0–0.2)
BASOPHILS # BLD AUTO: 0.02 K/UL (ref 0–0.2)
BASOPHILS # BLD AUTO: 0.03 K/UL (ref 0–0.2)
BASOPHILS NFR BLD: 0 % (ref 0–1.9)
BASOPHILS NFR BLD: 0.1 % (ref 0–1.9)
BASOPHILS NFR BLD: 0.2 % (ref 0–1.9)
BASOPHILS NFR BLD: 0.3 % (ref 0–1.9)
BILIRUB SERPL-MCNC: 0.9 MG/DL (ref 0.1–1)
BILIRUB SERPL-MCNC: 1 MG/DL (ref 0.1–1)
BILIRUB SERPL-MCNC: 1 MG/DL (ref 0.1–1)
BILIRUB SERPL-MCNC: 1.1 MG/DL (ref 0.1–1)
BILIRUB SERPL-MCNC: 1.1 MG/DL (ref 0.1–1)
BILIRUB SERPL-MCNC: 1.2 MG/DL (ref 0.1–1)
BILIRUB SERPL-MCNC: 1.2 MG/DL (ref 0.1–1)
BILIRUB SERPL-MCNC: 1.4 MG/DL (ref 0.1–1)
BILIRUB UR QL STRIP: NEGATIVE
BLD GP AB SCN CELLS X3 SERPL QL: NORMAL
BLD GP AB SCN CELLS X3 SERPL QL: NORMAL
BUN SERPL-MCNC: 17 MG/DL (ref 8–23)
BUN SERPL-MCNC: 18 MG/DL (ref 8–23)
BUN SERPL-MCNC: 26 MG/DL (ref 8–23)
BUN SERPL-MCNC: 32 MG/DL (ref 8–23)
BUN SERPL-MCNC: 34 MG/DL (ref 8–23)
BUN SERPL-MCNC: 35 MG/DL (ref 8–23)
BUN SERPL-MCNC: 35 MG/DL (ref 8–23)
BUN SERPL-MCNC: 37 MG/DL (ref 8–23)
BUN SERPL-MCNC: 45 MG/DL (ref 8–23)
CALCIUM SERPL-MCNC: 8.1 MG/DL (ref 8.7–10.5)
CALCIUM SERPL-MCNC: 8.3 MG/DL (ref 8.7–10.5)
CALCIUM SERPL-MCNC: 8.3 MG/DL (ref 8.7–10.5)
CALCIUM SERPL-MCNC: 8.4 MG/DL (ref 8.7–10.5)
CALCIUM SERPL-MCNC: 8.7 MG/DL (ref 8.7–10.5)
CALCIUM SERPL-MCNC: 9 MG/DL (ref 8.7–10.5)
CALCIUM SERPL-MCNC: 9.2 MG/DL (ref 8.7–10.5)
CHLORIDE SERPL-SCNC: 103 MMOL/L (ref 95–110)
CHLORIDE SERPL-SCNC: 105 MMOL/L (ref 95–110)
CHLORIDE SERPL-SCNC: 106 MMOL/L (ref 95–110)
CHLORIDE SERPL-SCNC: 106 MMOL/L (ref 95–110)
CHLORIDE SERPL-SCNC: 108 MMOL/L (ref 95–110)
CHLORIDE SERPL-SCNC: 109 MMOL/L (ref 95–110)
CHLORIDE SERPL-SCNC: 110 MMOL/L (ref 95–110)
CLARITY UR REFRACT.AUTO: CLEAR
CO2 SERPL-SCNC: 20 MMOL/L (ref 23–29)
CO2 SERPL-SCNC: 21 MMOL/L (ref 23–29)
CO2 SERPL-SCNC: 22 MMOL/L (ref 23–29)
CO2 SERPL-SCNC: 25 MMOL/L (ref 23–29)
CO2 SERPL-SCNC: 26 MMOL/L (ref 23–29)
CO2 SERPL-SCNC: 26 MMOL/L (ref 23–29)
COLOR UR AUTO: YELLOW
CREAT SERPL-MCNC: 0.8 MG/DL (ref 0.5–1.4)
CREAT SERPL-MCNC: 0.9 MG/DL (ref 0.5–1.4)
CREAT SERPL-MCNC: 1 MG/DL (ref 0.5–1.4)
CREAT SERPL-MCNC: 1 MG/DL (ref 0.5–1.4)
CREAT SERPL-MCNC: 1.2 MG/DL (ref 0.5–1.4)
CREAT SERPL-MCNC: 1.6 MG/DL (ref 0.5–1.4)
CREAT SERPL-MCNC: 1.6 MG/DL (ref 0.5–1.4)
CREAT SERPL-MCNC: 2.1 MG/DL (ref 0.5–1.4)
CREAT SERPL-MCNC: 2.1 MG/DL (ref 0.5–1.4)
DELSYS: ABNORMAL
DIFFERENTIAL METHOD: ABNORMAL
EOSINOPHIL # BLD AUTO: 0 K/UL (ref 0–0.5)
EOSINOPHIL NFR BLD: 0 % (ref 0–8)
EOSINOPHIL NFR BLD: 0.1 % (ref 0–8)
EOSINOPHIL NFR BLD: 0.2 % (ref 0–8)
EOSINOPHIL NFR BLD: 0.3 % (ref 0–8)
ERYTHROCYTE [DISTWIDTH] IN BLOOD BY AUTOMATED COUNT: 13.7 % (ref 11.5–14.5)
ERYTHROCYTE [DISTWIDTH] IN BLOOD BY AUTOMATED COUNT: 13.7 % (ref 11.5–14.5)
ERYTHROCYTE [DISTWIDTH] IN BLOOD BY AUTOMATED COUNT: 14 % (ref 11.5–14.5)
ERYTHROCYTE [DISTWIDTH] IN BLOOD BY AUTOMATED COUNT: 14.1 % (ref 11.5–14.5)
ERYTHROCYTE [DISTWIDTH] IN BLOOD BY AUTOMATED COUNT: 14.3 % (ref 11.5–14.5)
ERYTHROCYTE [DISTWIDTH] IN BLOOD BY AUTOMATED COUNT: 14.4 % (ref 11.5–14.5)
ERYTHROCYTE [DISTWIDTH] IN BLOOD BY AUTOMATED COUNT: 14.5 % (ref 11.5–14.5)
ERYTHROCYTE [DISTWIDTH] IN BLOOD BY AUTOMATED COUNT: 14.6 % (ref 11.5–14.5)
ERYTHROCYTE [DISTWIDTH] IN BLOOD BY AUTOMATED COUNT: 14.7 % (ref 11.5–14.5)
EST. GFR  (NO RACE VARIABLE): 30.5 ML/MIN/1.73 M^2
EST. GFR  (NO RACE VARIABLE): 30.5 ML/MIN/1.73 M^2
EST. GFR  (NO RACE VARIABLE): 42.2 ML/MIN/1.73 M^2
EST. GFR  (NO RACE VARIABLE): 42.2 ML/MIN/1.73 M^2
EST. GFR  (NO RACE VARIABLE): 59.6 ML/MIN/1.73 M^2
EST. GFR  (NO RACE VARIABLE): >60 ML/MIN/1.73 M^2
FIO2: 28
FLOW: 2
GLUCOSE SERPL-MCNC: 109 MG/DL (ref 70–110)
GLUCOSE SERPL-MCNC: 116 MG/DL (ref 70–110)
GLUCOSE SERPL-MCNC: 116 MG/DL (ref 70–110)
GLUCOSE SERPL-MCNC: 118 MG/DL (ref 70–110)
GLUCOSE SERPL-MCNC: 124 MG/DL (ref 70–110)
GLUCOSE SERPL-MCNC: 125 MG/DL (ref 70–110)
GLUCOSE SERPL-MCNC: 133 MG/DL (ref 70–110)
GLUCOSE SERPL-MCNC: 139 MG/DL (ref 70–110)
GLUCOSE SERPL-MCNC: 150 MG/DL (ref 70–110)
GLUCOSE UR QL STRIP: ABNORMAL
HCO3 UR-SCNC: 25.2 MMOL/L (ref 24–28)
HCT VFR BLD AUTO: 25.6 % (ref 40–54)
HCT VFR BLD AUTO: 25.7 % (ref 40–54)
HCT VFR BLD AUTO: 26.2 % (ref 40–54)
HCT VFR BLD AUTO: 26.5 % (ref 40–54)
HCT VFR BLD AUTO: 26.8 % (ref 40–54)
HCT VFR BLD AUTO: 27.4 % (ref 40–54)
HCT VFR BLD AUTO: 30.2 % (ref 40–54)
HCT VFR BLD AUTO: 30.4 % (ref 40–54)
HCT VFR BLD AUTO: 35.2 % (ref 40–54)
HCT VFR BLD AUTO: 36.2 % (ref 40–54)
HCT VFR BLD AUTO: 39.7 % (ref 40–54)
HCV AB SERPL QL IA: NORMAL
HGB BLD-MCNC: 11.4 G/DL (ref 14–18)
HGB BLD-MCNC: 11.5 G/DL (ref 14–18)
HGB BLD-MCNC: 13.1 G/DL (ref 14–18)
HGB BLD-MCNC: 8.5 G/DL (ref 14–18)
HGB BLD-MCNC: 8.5 G/DL (ref 14–18)
HGB BLD-MCNC: 8.6 G/DL (ref 14–18)
HGB BLD-MCNC: 8.6 G/DL (ref 14–18)
HGB BLD-MCNC: 8.7 G/DL (ref 14–18)
HGB BLD-MCNC: 8.7 G/DL (ref 14–18)
HGB BLD-MCNC: 9.2 G/DL (ref 14–18)
HGB BLD-MCNC: 9.8 G/DL (ref 14–18)
HGB UR QL STRIP: ABNORMAL
HIV 1+2 AB+HIV1 P24 AG SERPL QL IA: NORMAL
HYALINE CASTS UR QL AUTO: 4 /LPF
IMM GRANULOCYTES # BLD AUTO: 0.03 K/UL (ref 0–0.04)
IMM GRANULOCYTES # BLD AUTO: 0.03 K/UL (ref 0–0.04)
IMM GRANULOCYTES # BLD AUTO: 0.04 K/UL (ref 0–0.04)
IMM GRANULOCYTES # BLD AUTO: 0.05 K/UL (ref 0–0.04)
IMM GRANULOCYTES # BLD AUTO: 0.07 K/UL (ref 0–0.04)
IMM GRANULOCYTES # BLD AUTO: 0.08 K/UL (ref 0–0.04)
IMM GRANULOCYTES NFR BLD AUTO: 0.4 % (ref 0–0.5)
IMM GRANULOCYTES NFR BLD AUTO: 0.5 % (ref 0–0.5)
IMM GRANULOCYTES NFR BLD AUTO: 0.6 % (ref 0–0.5)
IMM GRANULOCYTES NFR BLD AUTO: 0.6 % (ref 0–0.5)
IMM GRANULOCYTES NFR BLD AUTO: 0.7 % (ref 0–0.5)
IMM GRANULOCYTES NFR BLD AUTO: 0.8 % (ref 0–0.5)
IMM GRANULOCYTES NFR BLD AUTO: 1.2 % (ref 0–0.5)
INR PPP: 1.2 (ref 0.8–1.2)
KETONES UR QL STRIP: NEGATIVE
LEUKOCYTE ESTERASE UR QL STRIP: NEGATIVE
LYMPHOCYTES # BLD AUTO: 0.3 K/UL (ref 1–4.8)
LYMPHOCYTES # BLD AUTO: 0.3 K/UL (ref 1–4.8)
LYMPHOCYTES # BLD AUTO: 0.4 K/UL (ref 1–4.8)
LYMPHOCYTES # BLD AUTO: 0.5 K/UL (ref 1–4.8)
LYMPHOCYTES # BLD AUTO: 0.8 K/UL (ref 1–4.8)
LYMPHOCYTES NFR BLD: 4.5 % (ref 18–48)
LYMPHOCYTES NFR BLD: 4.7 % (ref 18–48)
LYMPHOCYTES NFR BLD: 5.1 % (ref 18–48)
LYMPHOCYTES NFR BLD: 5.2 % (ref 18–48)
LYMPHOCYTES NFR BLD: 5.3 % (ref 18–48)
LYMPHOCYTES NFR BLD: 5.8 % (ref 18–48)
LYMPHOCYTES NFR BLD: 6.3 % (ref 18–48)
LYMPHOCYTES NFR BLD: 6.6 % (ref 18–48)
LYMPHOCYTES NFR BLD: 7.3 % (ref 18–48)
LYMPHOCYTES NFR BLD: 7.6 % (ref 18–48)
LYMPHOCYTES NFR BLD: 8.1 % (ref 18–48)
MAGNESIUM SERPL-MCNC: 1.9 MG/DL (ref 1.6–2.6)
MAGNESIUM SERPL-MCNC: 2 MG/DL (ref 1.6–2.6)
MAGNESIUM SERPL-MCNC: 2 MG/DL (ref 1.6–2.6)
MAGNESIUM SERPL-MCNC: 2.1 MG/DL (ref 1.6–2.6)
MAGNESIUM SERPL-MCNC: 2.1 MG/DL (ref 1.6–2.6)
MAGNESIUM SERPL-MCNC: 2.3 MG/DL (ref 1.6–2.6)
MAGNESIUM SERPL-MCNC: 2.5 MG/DL (ref 1.6–2.6)
MAGNESIUM SERPL-MCNC: 2.6 MG/DL (ref 1.6–2.6)
MCH RBC QN AUTO: 31.2 PG (ref 27–31)
MCH RBC QN AUTO: 31.3 PG (ref 27–31)
MCH RBC QN AUTO: 31.6 PG (ref 27–31)
MCH RBC QN AUTO: 31.6 PG (ref 27–31)
MCH RBC QN AUTO: 31.8 PG (ref 27–31)
MCH RBC QN AUTO: 32 PG (ref 27–31)
MCH RBC QN AUTO: 32.1 PG (ref 27–31)
MCH RBC QN AUTO: 32.1 PG (ref 27–31)
MCH RBC QN AUTO: 32.2 PG (ref 27–31)
MCH RBC QN AUTO: 32.2 PG (ref 27–31)
MCH RBC QN AUTO: 32.3 PG (ref 27–31)
MCHC RBC AUTO-ENTMCNC: 30.3 G/DL (ref 32–36)
MCHC RBC AUTO-ENTMCNC: 31.4 G/DL (ref 32–36)
MCHC RBC AUTO-ENTMCNC: 31.8 G/DL (ref 32–36)
MCHC RBC AUTO-ENTMCNC: 32.4 G/DL (ref 32–36)
MCHC RBC AUTO-ENTMCNC: 32.5 G/DL (ref 32–36)
MCHC RBC AUTO-ENTMCNC: 32.5 G/DL (ref 32–36)
MCHC RBC AUTO-ENTMCNC: 32.8 G/DL (ref 32–36)
MCHC RBC AUTO-ENTMCNC: 32.8 G/DL (ref 32–36)
MCHC RBC AUTO-ENTMCNC: 33 G/DL (ref 32–36)
MCHC RBC AUTO-ENTMCNC: 33.1 G/DL (ref 32–36)
MCHC RBC AUTO-ENTMCNC: 33.2 G/DL (ref 32–36)
MCV RBC AUTO: 102 FL (ref 82–98)
MCV RBC AUTO: 103 FL (ref 82–98)
MCV RBC AUTO: 97 FL (ref 82–98)
MCV RBC AUTO: 98 FL (ref 82–98)
MCV RBC AUTO: 99 FL (ref 82–98)
MICROSCOPIC COMMENT: ABNORMAL
MODE: ABNORMAL
MONOCYTES # BLD AUTO: 0.3 K/UL (ref 0.3–1)
MONOCYTES # BLD AUTO: 0.4 K/UL (ref 0.3–1)
MONOCYTES # BLD AUTO: 0.5 K/UL (ref 0.3–1)
MONOCYTES NFR BLD: 4 % (ref 4–15)
MONOCYTES NFR BLD: 4.3 % (ref 4–15)
MONOCYTES NFR BLD: 4.5 % (ref 4–15)
MONOCYTES NFR BLD: 5 % (ref 4–15)
MONOCYTES NFR BLD: 5.2 % (ref 4–15)
MONOCYTES NFR BLD: 5.5 % (ref 4–15)
MONOCYTES NFR BLD: 5.8 % (ref 4–15)
MONOCYTES NFR BLD: 6.3 % (ref 4–15)
MONOCYTES NFR BLD: 6.8 % (ref 4–15)
MONOCYTES NFR BLD: 7.2 % (ref 4–15)
MONOCYTES NFR BLD: 8.1 % (ref 4–15)
NEUTROPHILS # BLD AUTO: 4.4 K/UL (ref 1.8–7.7)
NEUTROPHILS # BLD AUTO: 5 K/UL (ref 1.8–7.7)
NEUTROPHILS # BLD AUTO: 5 K/UL (ref 1.8–7.7)
NEUTROPHILS # BLD AUTO: 5.6 K/UL (ref 1.8–7.7)
NEUTROPHILS # BLD AUTO: 5.9 K/UL (ref 1.8–7.7)
NEUTROPHILS # BLD AUTO: 6.3 K/UL (ref 1.8–7.7)
NEUTROPHILS # BLD AUTO: 6.4 K/UL (ref 1.8–7.7)
NEUTROPHILS # BLD AUTO: 6.9 K/UL (ref 1.8–7.7)
NEUTROPHILS # BLD AUTO: 7.3 K/UL (ref 1.8–7.7)
NEUTROPHILS # BLD AUTO: 8.8 K/UL (ref 1.8–7.7)
NEUTROPHILS # BLD AUTO: 9.6 K/UL (ref 1.8–7.7)
NEUTROPHILS NFR BLD: 82.5 % (ref 38–73)
NEUTROPHILS NFR BLD: 84.9 % (ref 38–73)
NEUTROPHILS NFR BLD: 86.5 % (ref 38–73)
NEUTROPHILS NFR BLD: 87.1 % (ref 38–73)
NEUTROPHILS NFR BLD: 87.6 % (ref 38–73)
NEUTROPHILS NFR BLD: 87.6 % (ref 38–73)
NEUTROPHILS NFR BLD: 88.2 % (ref 38–73)
NEUTROPHILS NFR BLD: 88.4 % (ref 38–73)
NEUTROPHILS NFR BLD: 88.4 % (ref 38–73)
NEUTROPHILS NFR BLD: 88.5 % (ref 38–73)
NEUTROPHILS NFR BLD: 90.2 % (ref 38–73)
NITRITE UR QL STRIP: NEGATIVE
NRBC BLD-RTO: 0 /100 WBC
NRBC BLD-RTO: 1 /100 WBC
PCO2 BLDA: 48.8 MMHG (ref 35–45)
PH SMN: 7.32 [PH] (ref 7.35–7.45)
PH UR STRIP: 6 [PH] (ref 5–8)
PHOSPHATE SERPL-MCNC: 2.5 MG/DL (ref 2.7–4.5)
PHOSPHATE SERPL-MCNC: 2.6 MG/DL (ref 2.7–4.5)
PHOSPHATE SERPL-MCNC: 3.1 MG/DL (ref 2.7–4.5)
PHOSPHATE SERPL-MCNC: 3.2 MG/DL (ref 2.7–4.5)
PHOSPHATE SERPL-MCNC: 3.3 MG/DL (ref 2.7–4.5)
PHOSPHATE SERPL-MCNC: 3.6 MG/DL (ref 2.7–4.5)
PHOSPHATE SERPL-MCNC: 3.8 MG/DL (ref 2.7–4.5)
PHOSPHATE SERPL-MCNC: 4.6 MG/DL (ref 2.7–4.5)
PLATELET # BLD AUTO: 166 K/UL (ref 150–450)
PLATELET # BLD AUTO: 181 K/UL (ref 150–450)
PLATELET # BLD AUTO: 187 K/UL (ref 150–450)
PLATELET # BLD AUTO: 195 K/UL (ref 150–450)
PLATELET # BLD AUTO: 199 K/UL (ref 150–450)
PLATELET # BLD AUTO: 200 K/UL (ref 150–450)
PLATELET # BLD AUTO: 211 K/UL (ref 150–450)
PLATELET # BLD AUTO: 213 K/UL (ref 150–450)
PLATELET # BLD AUTO: 229 K/UL (ref 150–450)
PLATELET # BLD AUTO: 271 K/UL (ref 150–450)
PLATELET # BLD AUTO: 275 K/UL (ref 150–450)
PMV BLD AUTO: 10.1 FL (ref 9.2–12.9)
PMV BLD AUTO: 10.2 FL (ref 9.2–12.9)
PMV BLD AUTO: 10.2 FL (ref 9.2–12.9)
PMV BLD AUTO: 10.4 FL (ref 9.2–12.9)
PMV BLD AUTO: 10.5 FL (ref 9.2–12.9)
PMV BLD AUTO: 10.6 FL (ref 9.2–12.9)
PMV BLD AUTO: 10.8 FL (ref 9.2–12.9)
PMV BLD AUTO: 11.1 FL (ref 9.2–12.9)
PO2 BLDA: 87 MMHG (ref 80–100)
POC BE: -1 MMOL/L
POC SATURATED O2: 96 % (ref 95–100)
POC TCO2: 27 MMOL/L (ref 23–27)
POCT GLUCOSE: 101 MG/DL (ref 70–110)
POCT GLUCOSE: 108 MG/DL (ref 70–110)
POCT GLUCOSE: 109 MG/DL (ref 70–110)
POCT GLUCOSE: 113 MG/DL (ref 70–110)
POCT GLUCOSE: 118 MG/DL (ref 70–110)
POCT GLUCOSE: 119 MG/DL (ref 70–110)
POCT GLUCOSE: 119 MG/DL (ref 70–110)
POCT GLUCOSE: 125 MG/DL (ref 70–110)
POCT GLUCOSE: 125 MG/DL (ref 70–110)
POCT GLUCOSE: 128 MG/DL (ref 70–110)
POCT GLUCOSE: 131 MG/DL (ref 70–110)
POCT GLUCOSE: 133 MG/DL (ref 70–110)
POCT GLUCOSE: 134 MG/DL (ref 70–110)
POCT GLUCOSE: 159 MG/DL (ref 70–110)
POCT GLUCOSE: 161 MG/DL (ref 70–110)
POCT GLUCOSE: 174 MG/DL (ref 70–110)
POCT GLUCOSE: 99 MG/DL (ref 70–110)
POTASSIUM SERPL-SCNC: 3.5 MMOL/L (ref 3.5–5.1)
POTASSIUM SERPL-SCNC: 4.2 MMOL/L (ref 3.5–5.1)
POTASSIUM SERPL-SCNC: 4.5 MMOL/L (ref 3.5–5.1)
POTASSIUM SERPL-SCNC: 4.5 MMOL/L (ref 3.5–5.1)
POTASSIUM SERPL-SCNC: 4.6 MMOL/L (ref 3.5–5.1)
PROT SERPL-MCNC: 5.2 G/DL (ref 6–8.4)
PROT SERPL-MCNC: 5.4 G/DL (ref 6–8.4)
PROT SERPL-MCNC: 5.9 G/DL (ref 6–8.4)
PROT SERPL-MCNC: 5.9 G/DL (ref 6–8.4)
PROT SERPL-MCNC: 6 G/DL (ref 6–8.4)
PROT SERPL-MCNC: 6.1 G/DL (ref 6–8.4)
PROT SERPL-MCNC: 6.1 G/DL (ref 6–8.4)
PROT SERPL-MCNC: 6.9 G/DL (ref 6–8.4)
PROT UR QL STRIP: ABNORMAL
PROTHROMBIN TIME: 12.2 SEC (ref 9–12.5)
RBC # BLD AUTO: 2.64 M/UL (ref 4.6–6.2)
RBC # BLD AUTO: 2.65 M/UL (ref 4.6–6.2)
RBC # BLD AUTO: 2.68 M/UL (ref 4.6–6.2)
RBC # BLD AUTO: 2.69 M/UL (ref 4.6–6.2)
RBC # BLD AUTO: 2.69 M/UL (ref 4.6–6.2)
RBC # BLD AUTO: 2.75 M/UL (ref 4.6–6.2)
RBC # BLD AUTO: 2.95 M/UL (ref 4.6–6.2)
RBC # BLD AUTO: 3.08 M/UL (ref 4.6–6.2)
RBC # BLD AUTO: 3.61 M/UL (ref 4.6–6.2)
RBC # BLD AUTO: 3.68 M/UL (ref 4.6–6.2)
RBC # BLD AUTO: 4.07 M/UL (ref 4.6–6.2)
RBC #/AREA URNS AUTO: 64 /HPF (ref 0–4)
SAMPLE: ABNORMAL
SITE: ABNORMAL
SODIUM SERPL-SCNC: 134 MMOL/L (ref 136–145)
SODIUM SERPL-SCNC: 136 MMOL/L (ref 136–145)
SODIUM SERPL-SCNC: 139 MMOL/L (ref 136–145)
SODIUM SERPL-SCNC: 140 MMOL/L (ref 136–145)
SODIUM SERPL-SCNC: 141 MMOL/L (ref 136–145)
SODIUM SERPL-SCNC: 143 MMOL/L (ref 136–145)
SODIUM SERPL-SCNC: 143 MMOL/L (ref 136–145)
SP GR UR STRIP: 1.02 (ref 1–1.03)
TROPONIN I SERPL DL<=0.01 NG/ML-MCNC: 0.01 NG/ML (ref 0–0.03)
URN SPEC COLLECT METH UR: ABNORMAL
WBC # BLD AUTO: 10.1 K/UL (ref 3.9–12.7)
WBC # BLD AUTO: 10.66 K/UL (ref 3.9–12.7)
WBC # BLD AUTO: 5.13 K/UL (ref 3.9–12.7)
WBC # BLD AUTO: 5.72 K/UL (ref 3.9–12.7)
WBC # BLD AUTO: 6.04 K/UL (ref 3.9–12.7)
WBC # BLD AUTO: 6.58 K/UL (ref 3.9–12.7)
WBC # BLD AUTO: 6.71 K/UL (ref 3.9–12.7)
WBC # BLD AUTO: 7.13 K/UL (ref 3.9–12.7)
WBC # BLD AUTO: 7.27 K/UL (ref 3.9–12.7)
WBC # BLD AUTO: 7.79 K/UL (ref 3.9–12.7)
WBC # BLD AUTO: 8.21 K/UL (ref 3.9–12.7)
WBC #/AREA URNS AUTO: 4 /HPF (ref 0–5)
YEAST UR QL AUTO: ABNORMAL

## 2023-01-01 PROCEDURE — 99233 PR SUBSEQUENT HOSPITAL CARE,LEVL III: ICD-10-PCS | Mod: HCNC,,, | Performed by: STUDENT IN AN ORGANIZED HEALTH CARE EDUCATION/TRAINING PROGRAM

## 2023-01-01 PROCEDURE — 25000003 PHARM REV CODE 250: Mod: HCNC

## 2023-01-01 PROCEDURE — 31720 CLEARANCE OF AIRWAYS: CPT | Mod: HCNC

## 2023-01-01 PROCEDURE — 63600175 PHARM REV CODE 636 W HCPCS: Mod: HCNC | Performed by: STUDENT IN AN ORGANIZED HEALTH CARE EDUCATION/TRAINING PROGRAM

## 2023-01-01 PROCEDURE — 83735 ASSAY OF MAGNESIUM: CPT | Mod: HCNC

## 2023-01-01 PROCEDURE — 94640 AIRWAY INHALATION TREATMENT: CPT | Mod: HCNC

## 2023-01-01 PROCEDURE — 99900035 HC TECH TIME PER 15 MIN (STAT): Mod: HCNC

## 2023-01-01 PROCEDURE — 99233 SBSQ HOSP IP/OBS HIGH 50: CPT | Mod: HCNC,,, | Performed by: STUDENT IN AN ORGANIZED HEALTH CARE EDUCATION/TRAINING PROGRAM

## 2023-01-01 PROCEDURE — 63600175 PHARM REV CODE 636 W HCPCS: Mod: HCNC

## 2023-01-01 PROCEDURE — 27000221 HC OXYGEN, UP TO 24 HOURS: Mod: HCNC

## 2023-01-01 PROCEDURE — 80053 COMPREHEN METABOLIC PANEL: CPT | Mod: HCNC

## 2023-01-01 PROCEDURE — 84100 ASSAY OF PHOSPHORUS: CPT | Mod: 91,HCNC

## 2023-01-01 PROCEDURE — 97535 SELF CARE MNGMENT TRAINING: CPT | Mod: HCNC

## 2023-01-01 PROCEDURE — 99497 ADVNCD CARE PLAN 30 MIN: CPT | Mod: HCNC,25,, | Performed by: STUDENT IN AN ORGANIZED HEALTH CARE EDUCATION/TRAINING PROGRAM

## 2023-01-01 PROCEDURE — 84100 ASSAY OF PHOSPHORUS: CPT | Mod: HCNC

## 2023-01-01 PROCEDURE — 80053 COMPREHEN METABOLIC PANEL: CPT | Mod: HCNC | Performed by: STUDENT IN AN ORGANIZED HEALTH CARE EDUCATION/TRAINING PROGRAM

## 2023-01-01 PROCEDURE — 99291 PR CRITICAL CARE, E/M 30-74 MINUTES: ICD-10-PCS | Mod: 24,25,HCNC,GC | Performed by: SURGERY

## 2023-01-01 PROCEDURE — C9113 INJ PANTOPRAZOLE SODIUM, VIA: HCPCS | Mod: HCNC

## 2023-01-01 PROCEDURE — 99497 ADVNCD CARE PLAN 30 MIN: CPT | Mod: HCNC,,, | Performed by: EMERGENCY MEDICINE

## 2023-01-01 PROCEDURE — 64463 PVB THORACIC CONT INFUSION: CPT | Mod: HCNC | Performed by: STUDENT IN AN ORGANIZED HEALTH CARE EDUCATION/TRAINING PROGRAM

## 2023-01-01 PROCEDURE — 99498 PR ADVNCD CARE PLAN ADDL 30 MIN: ICD-10-PCS | Mod: HCNC,,, | Performed by: EMERGENCY MEDICINE

## 2023-01-01 PROCEDURE — 93010 EKG 12-LEAD: ICD-10-PCS | Mod: HCNC,,, | Performed by: INTERNAL MEDICINE

## 2023-01-01 PROCEDURE — 97162 PT EVAL MOD COMPLEX 30 MIN: CPT | Mod: HCNC

## 2023-01-01 PROCEDURE — 84484 ASSAY OF TROPONIN QUANT: CPT | Mod: HCNC | Performed by: EMERGENCY MEDICINE

## 2023-01-01 PROCEDURE — 97165 OT EVAL LOW COMPLEX 30 MIN: CPT | Mod: HCNC

## 2023-01-01 PROCEDURE — 25000242 PHARM REV CODE 250 ALT 637 W/ HCPCS: Mod: HCNC

## 2023-01-01 PROCEDURE — P9045 ALBUMIN (HUMAN), 5%, 250 ML: HCPCS | Mod: JG,HCNC

## 2023-01-01 PROCEDURE — 25000003 PHARM REV CODE 250: Mod: HCNC | Performed by: STUDENT IN AN ORGANIZED HEALTH CARE EDUCATION/TRAINING PROGRAM

## 2023-01-01 PROCEDURE — 20000000 HC ICU ROOM: Mod: HCNC

## 2023-01-01 PROCEDURE — 94761 N-INVAS EAR/PLS OXIMETRY MLT: CPT | Mod: HCNC

## 2023-01-01 PROCEDURE — 93005 ELECTROCARDIOGRAM TRACING: CPT | Mod: HCNC

## 2023-01-01 PROCEDURE — 99223 1ST HOSP IP/OBS HIGH 75: CPT | Mod: AI,HCNC,, | Performed by: STUDENT IN AN ORGANIZED HEALTH CARE EDUCATION/TRAINING PROGRAM

## 2023-01-01 PROCEDURE — 96374 THER/PROPH/DIAG INJ IV PUSH: CPT | Mod: HCNC

## 2023-01-01 PROCEDURE — 94668 MNPJ CHEST WALL SBSQ: CPT | Mod: HCNC

## 2023-01-01 PROCEDURE — 92526 ORAL FUNCTION THERAPY: CPT | Mod: HCNC

## 2023-01-01 PROCEDURE — S5010 5% DEXTROSE AND 0.45% SALINE: HCPCS | Mod: HCNC | Performed by: STUDENT IN AN ORGANIZED HEALTH CARE EDUCATION/TRAINING PROGRAM

## 2023-01-01 PROCEDURE — 86900 BLOOD TYPING SEROLOGIC ABO: CPT | Mod: HCNC

## 2023-01-01 PROCEDURE — 99900031 HC PATIENT EDUCATION (STAT): Mod: HCNC

## 2023-01-01 PROCEDURE — 63600175 PHARM REV CODE 636 W HCPCS: Mod: HCNC | Performed by: EMERGENCY MEDICINE

## 2023-01-01 PROCEDURE — G0378 HOSPITAL OBSERVATION PER HR: HCPCS | Mod: HCNC

## 2023-01-01 PROCEDURE — 27000646 HC AEROBIKA DEVICE: Mod: HCNC

## 2023-01-01 PROCEDURE — 80048 BASIC METABOLIC PNL TOTAL CA: CPT | Mod: HCNC,XB

## 2023-01-01 PROCEDURE — 85025 COMPLETE CBC W/AUTO DIFF WBC: CPT | Mod: HCNC

## 2023-01-01 PROCEDURE — 25000242 PHARM REV CODE 250 ALT 637 W/ HCPCS: Mod: HCNC | Performed by: STUDENT IN AN ORGANIZED HEALTH CARE EDUCATION/TRAINING PROGRAM

## 2023-01-01 PROCEDURE — 99498 ADVNCD CARE PLAN ADDL 30 MIN: CPT | Mod: HCNC,,, | Performed by: EMERGENCY MEDICINE

## 2023-01-01 PROCEDURE — 84100 ASSAY OF PHOSPHORUS: CPT | Mod: HCNC | Performed by: STUDENT IN AN ORGANIZED HEALTH CARE EDUCATION/TRAINING PROGRAM

## 2023-01-01 PROCEDURE — 81001 URINALYSIS AUTO W/SCOPE: CPT | Mod: HCNC | Performed by: STUDENT IN AN ORGANIZED HEALTH CARE EDUCATION/TRAINING PROGRAM

## 2023-01-01 PROCEDURE — 97530 THERAPEUTIC ACTIVITIES: CPT | Mod: HCNC

## 2023-01-01 PROCEDURE — 99231 SBSQ HOSP IP/OBS SF/LOW 25: CPT | Mod: HCNC,,, | Performed by: ANESTHESIOLOGY

## 2023-01-01 PROCEDURE — 99497 PR ADVNCD CARE PLAN 30 MIN: ICD-10-PCS | Mod: HCNC,25,, | Performed by: STUDENT IN AN ORGANIZED HEALTH CARE EDUCATION/TRAINING PROGRAM

## 2023-01-01 PROCEDURE — 94640 AIRWAY INHALATION TREATMENT: CPT | Mod: HCNC,XB

## 2023-01-01 PROCEDURE — 85730 THROMBOPLASTIN TIME PARTIAL: CPT | Mod: HCNC | Performed by: STUDENT IN AN ORGANIZED HEALTH CARE EDUCATION/TRAINING PROGRAM

## 2023-01-01 PROCEDURE — 80053 COMPREHEN METABOLIC PANEL: CPT | Mod: HCNC | Performed by: EMERGENCY MEDICINE

## 2023-01-01 PROCEDURE — 82803 BLOOD GASES ANY COMBINATION: CPT | Mod: HCNC

## 2023-01-01 PROCEDURE — 83735 ASSAY OF MAGNESIUM: CPT | Mod: 91,HCNC

## 2023-01-01 PROCEDURE — 87389 HIV-1 AG W/HIV-1&-2 AB AG IA: CPT | Mod: HCNC | Performed by: PHYSICIAN ASSISTANT

## 2023-01-01 PROCEDURE — 99285 EMERGENCY DEPT VISIT HI MDM: CPT | Mod: 25,HCNC

## 2023-01-01 PROCEDURE — 85025 COMPLETE CBC W/AUTO DIFF WBC: CPT | Mod: HCNC | Performed by: EMERGENCY MEDICINE

## 2023-01-01 PROCEDURE — 99223 PR INITIAL HOSPITAL CARE,LEVL III: ICD-10-PCS | Mod: AI,HCNC,, | Performed by: STUDENT IN AN ORGANIZED HEALTH CARE EDUCATION/TRAINING PROGRAM

## 2023-01-01 PROCEDURE — 86803 HEPATITIS C AB TEST: CPT | Mod: HCNC | Performed by: PHYSICIAN ASSISTANT

## 2023-01-01 PROCEDURE — 85025 COMPLETE CBC W/AUTO DIFF WBC: CPT | Mod: 91,HCNC

## 2023-01-01 PROCEDURE — 99231 PR SUBSEQUENT HOSPITAL CARE,LEVL I: ICD-10-PCS | Mod: HCNC,,, | Performed by: ANESTHESIOLOGY

## 2023-01-01 PROCEDURE — 99291 CRITICAL CARE FIRST HOUR: CPT | Mod: 24,25,HCNC,GC | Performed by: SURGERY

## 2023-01-01 PROCEDURE — 99233 SBSQ HOSP IP/OBS HIGH 50: CPT | Mod: HCNC,,, | Performed by: EMERGENCY MEDICINE

## 2023-01-01 PROCEDURE — 99223 PR INITIAL HOSPITAL CARE,LEVL III: ICD-10-PCS | Mod: HCNC,,, | Performed by: STUDENT IN AN ORGANIZED HEALTH CARE EDUCATION/TRAINING PROGRAM

## 2023-01-01 PROCEDURE — 64999 PR BLOCK, ERECTOR SPINAE PLANE, CONTINUOUS: ICD-10-PCS | Mod: ,,, | Performed by: SURGERY

## 2023-01-01 PROCEDURE — 27100171 HC OXYGEN HIGH FLOW UP TO 24 HOURS: Mod: HCNC

## 2023-01-01 PROCEDURE — 99223 1ST HOSP IP/OBS HIGH 75: CPT | Mod: HCNC,,, | Performed by: STUDENT IN AN ORGANIZED HEALTH CARE EDUCATION/TRAINING PROGRAM

## 2023-01-01 PROCEDURE — 92610 EVALUATE SWALLOWING FUNCTION: CPT | Mod: HCNC

## 2023-01-01 PROCEDURE — 85610 PROTHROMBIN TIME: CPT | Mod: HCNC | Performed by: STUDENT IN AN ORGANIZED HEALTH CARE EDUCATION/TRAINING PROGRAM

## 2023-01-01 PROCEDURE — 63600175 PHARM REV CODE 636 W HCPCS: Mod: JG,HCNC

## 2023-01-01 PROCEDURE — 51702 INSERT TEMP BLADDER CATH: CPT | Mod: HCNC

## 2023-01-01 PROCEDURE — 94760 N-INVAS EAR/PLS OXIMETRY 1: CPT | Mod: HCNC

## 2023-01-01 PROCEDURE — 36600 WITHDRAWAL OF ARTERIAL BLOOD: CPT | Mod: HCNC

## 2023-01-01 PROCEDURE — 51798 US URINE CAPACITY MEASURE: CPT | Mod: HCNC

## 2023-01-01 PROCEDURE — S5010 5% DEXTROSE AND 0.45% SALINE: HCPCS | Mod: HCNC

## 2023-01-01 PROCEDURE — 99285 EMERGENCY DEPT VISIT HI MDM: CPT | Mod: HCNC,GC,, | Performed by: EMERGENCY MEDICINE

## 2023-01-01 PROCEDURE — 99233 SBSQ HOSP IP/OBS HIGH 50: CPT | Mod: 24,HCNC,, | Performed by: STUDENT IN AN ORGANIZED HEALTH CARE EDUCATION/TRAINING PROGRAM

## 2023-01-01 PROCEDURE — 99285 PR EMERGENCY DEPT VISIT,LEVEL V: ICD-10-PCS | Mod: HCNC,GC,, | Performed by: EMERGENCY MEDICINE

## 2023-01-01 PROCEDURE — 25000003 PHARM REV CODE 250: Mod: HCNC | Performed by: SURGERY

## 2023-01-01 PROCEDURE — 83735 ASSAY OF MAGNESIUM: CPT | Mod: HCNC | Performed by: STUDENT IN AN ORGANIZED HEALTH CARE EDUCATION/TRAINING PROGRAM

## 2023-01-01 PROCEDURE — 36620 INSERTION CATHETER ARTERY: CPT | Mod: HCNC

## 2023-01-01 PROCEDURE — 64999 UNLISTED PX NERVOUS SYSTEM: CPT | Mod: ,,, | Performed by: SURGERY

## 2023-01-01 PROCEDURE — 93010 ELECTROCARDIOGRAM REPORT: CPT | Mod: HCNC,,, | Performed by: INTERNAL MEDICINE

## 2023-01-01 PROCEDURE — 99232 PR SUBSEQUENT HOSPITAL CARE,LEVL II: ICD-10-PCS | Mod: HCNC,,, | Performed by: ANESTHESIOLOGY

## 2023-01-01 PROCEDURE — 94664 DEMO&/EVAL PT USE INHALER: CPT | Mod: HCNC

## 2023-01-01 PROCEDURE — 94664 DEMO&/EVAL PT USE INHALER: CPT | Mod: HCNC,XB

## 2023-01-01 PROCEDURE — 99497 PR ADVNCD CARE PLAN 30 MIN: ICD-10-PCS | Mod: HCNC,,, | Performed by: EMERGENCY MEDICINE

## 2023-01-01 PROCEDURE — 96372 THER/PROPH/DIAG INJ SC/IM: CPT

## 2023-01-01 PROCEDURE — 25500020 PHARM REV CODE 255: Mod: HCNC | Performed by: STUDENT IN AN ORGANIZED HEALTH CARE EDUCATION/TRAINING PROGRAM

## 2023-01-01 PROCEDURE — 99233 PR SUBSEQUENT HOSPITAL CARE,LEVL III: ICD-10-PCS | Mod: HCNC,,, | Performed by: EMERGENCY MEDICINE

## 2023-01-01 PROCEDURE — 99232 SBSQ HOSP IP/OBS MODERATE 35: CPT | Mod: HCNC,,, | Performed by: ANESTHESIOLOGY

## 2023-01-01 PROCEDURE — S0166 INJ OLANZAPINE 2.5MG: HCPCS | Mod: HCNC

## 2023-01-01 PROCEDURE — 99233 PR SUBSEQUENT HOSPITAL CARE,LEVL III: ICD-10-PCS | Mod: 24,HCNC,, | Performed by: STUDENT IN AN ORGANIZED HEALTH CARE EDUCATION/TRAINING PROGRAM

## 2023-01-01 RX ORDER — LEVALBUTEROL INHALATION SOLUTION 0.63 MG/3ML
0.63 SOLUTION RESPIRATORY (INHALATION) EVERY 6 HOURS PRN
Status: DISCONTINUED | OUTPATIENT
Start: 2023-01-01 | End: 2023-01-01

## 2023-01-01 RX ORDER — METHOCARBAMOL 500 MG/1
500 TABLET, FILM COATED ORAL 3 TIMES DAILY
Status: DISCONTINUED | OUTPATIENT
Start: 2023-01-01 | End: 2023-01-01

## 2023-01-01 RX ORDER — GABAPENTIN 250 MG/5ML
250 SOLUTION ORAL EVERY 12 HOURS
Status: DISCONTINUED | OUTPATIENT
Start: 2023-01-01 | End: 2023-01-01 | Stop reason: HOSPADM

## 2023-01-01 RX ORDER — LIDOCAINE HYDROCHLORIDE 10 MG/ML
INJECTION, SOLUTION EPIDURAL; INFILTRATION; INTRACAUDAL; PERINEURAL
Status: COMPLETED
Start: 2023-01-01 | End: 2023-01-01

## 2023-01-01 RX ORDER — DIPHENHYDRAMINE HYDROCHLORIDE 50 MG/ML
25 INJECTION INTRAMUSCULAR; INTRAVENOUS ONCE
Status: DISCONTINUED | OUTPATIENT
Start: 2023-01-01 | End: 2023-01-01

## 2023-01-01 RX ORDER — HALOPERIDOL 5 MG/ML
1 INJECTION INTRAMUSCULAR EVERY 6 HOURS PRN
Status: DISCONTINUED | OUTPATIENT
Start: 2023-01-01 | End: 2023-01-01 | Stop reason: HOSPADM

## 2023-01-01 RX ORDER — ACETAMINOPHEN 500 MG
1000 TABLET ORAL 3 TIMES DAILY
Status: DISCONTINUED | OUTPATIENT
Start: 2023-01-01 | End: 2023-01-01

## 2023-01-01 RX ORDER — LIDOCAINE 50 MG/G
1 PATCH TOPICAL
Status: DISCONTINUED | OUTPATIENT
Start: 2023-01-01 | End: 2023-01-01

## 2023-01-01 RX ORDER — FENTANYL CITRATE 50 UG/ML
25-200 INJECTION, SOLUTION INTRAMUSCULAR; INTRAVENOUS
Status: DISCONTINUED | OUTPATIENT
Start: 2023-01-01 | End: 2023-01-01

## 2023-01-01 RX ORDER — GABAPENTIN 250 MG/5ML
250 SOLUTION ORAL DAILY
Status: DISCONTINUED | OUTPATIENT
Start: 2023-01-01 | End: 2023-01-01

## 2023-01-01 RX ORDER — POLYETHYLENE GLYCOL 3350 17 G/17G
17 POWDER, FOR SOLUTION ORAL DAILY
Status: DISCONTINUED | OUTPATIENT
Start: 2023-01-01 | End: 2023-01-01

## 2023-01-01 RX ORDER — FENTANYL CITRATE 50 UG/ML
25 INJECTION, SOLUTION INTRAMUSCULAR; INTRAVENOUS
Status: DISCONTINUED | OUTPATIENT
Start: 2023-01-01 | End: 2023-01-01

## 2023-01-01 RX ORDER — DRONABINOL 2.5 MG/1
2.5 CAPSULE ORAL 2 TIMES DAILY
Status: DISCONTINUED | OUTPATIENT
Start: 2023-01-01 | End: 2023-01-01 | Stop reason: HOSPADM

## 2023-01-01 RX ORDER — ROPIVACAINE HYDROCHLORIDE 2 MG/ML
0.1 INJECTION, SOLUTION EPIDURAL; INFILTRATION CONTINUOUS
Status: DISCONTINUED | OUTPATIENT
Start: 2023-01-01 | End: 2023-01-01

## 2023-01-01 RX ORDER — SCOLOPAMINE TRANSDERMAL SYSTEM 1 MG/1
1 PATCH, EXTENDED RELEASE TRANSDERMAL
Status: DISCONTINUED | OUTPATIENT
Start: 2023-01-01 | End: 2023-01-01

## 2023-01-01 RX ORDER — PANTOPRAZOLE SODIUM 40 MG/1
40 FOR SUSPENSION ORAL DAILY
Status: DISCONTINUED | OUTPATIENT
Start: 2023-01-01 | End: 2023-01-01 | Stop reason: HOSPADM

## 2023-01-01 RX ORDER — IPRATROPIUM BROMIDE AND ALBUTEROL SULFATE 2.5; .5 MG/3ML; MG/3ML
3 SOLUTION RESPIRATORY (INHALATION)
Status: DISCONTINUED | OUTPATIENT
Start: 2023-01-01 | End: 2023-01-01

## 2023-01-01 RX ORDER — NOREPINEPHRINE BITARTRATE/D5W 4MG/250ML
0-1 PLASTIC BAG, INJECTION (ML) INTRAVENOUS CONTINUOUS
Status: DISCONTINUED | OUTPATIENT
Start: 2023-01-01 | End: 2023-01-01

## 2023-01-01 RX ORDER — ENOXAPARIN SODIUM 100 MG/ML
30 INJECTION SUBCUTANEOUS EVERY 12 HOURS
Status: DISCONTINUED | OUTPATIENT
Start: 2023-01-01 | End: 2023-01-01

## 2023-01-01 RX ORDER — TALC
6 POWDER (GRAM) TOPICAL NIGHTLY
Status: DISCONTINUED | OUTPATIENT
Start: 2023-01-01 | End: 2023-01-01

## 2023-01-01 RX ORDER — SENNOSIDES 8.6 MG/1
8.6 TABLET ORAL 2 TIMES DAILY
Status: DISCONTINUED | OUTPATIENT
Start: 2023-01-01 | End: 2023-01-01

## 2023-01-01 RX ORDER — ALBUMIN HUMAN 50 G/1000ML
SOLUTION INTRAVENOUS
Status: DISPENSED
Start: 2023-01-01 | End: 2023-01-01

## 2023-01-01 RX ORDER — GABAPENTIN 300 MG/1
300 CAPSULE ORAL DAILY
Status: DISCONTINUED | OUTPATIENT
Start: 2023-01-01 | End: 2023-01-01

## 2023-01-01 RX ORDER — LEVALBUTEROL INHALATION SOLUTION 0.63 MG/3ML
0.63 SOLUTION RESPIRATORY (INHALATION) EVERY 4 HOURS
Status: DISCONTINUED | OUTPATIENT
Start: 2023-01-01 | End: 2023-01-01

## 2023-01-01 RX ORDER — MORPHINE SULFATE 2 MG/ML
1 INJECTION, SOLUTION INTRAMUSCULAR; INTRAVENOUS EVERY 4 HOURS PRN
Status: DISCONTINUED | OUTPATIENT
Start: 2023-01-01 | End: 2023-01-01

## 2023-01-01 RX ORDER — ACETAMINOPHEN 325 MG/1
650 TABLET ORAL EVERY 6 HOURS
Status: DISCONTINUED | OUTPATIENT
Start: 2023-01-01 | End: 2023-01-01

## 2023-01-01 RX ORDER — LEVALBUTEROL INHALATION SOLUTION 0.63 MG/3ML
0.63 SOLUTION RESPIRATORY (INHALATION)
Status: DISCONTINUED | OUTPATIENT
Start: 2023-01-01 | End: 2023-01-01

## 2023-01-01 RX ORDER — ACETAMINOPHEN 500 MG
1000 TABLET ORAL EVERY 8 HOURS
Status: DISCONTINUED | OUTPATIENT
Start: 2023-01-01 | End: 2023-01-01

## 2023-01-01 RX ORDER — MIDODRINE HYDROCHLORIDE 2.5 MG/1
2.5 TABLET ORAL EVERY 12 HOURS
Status: DISCONTINUED | OUTPATIENT
Start: 2023-01-01 | End: 2023-01-01

## 2023-01-01 RX ORDER — MUPIROCIN 20 MG/G
OINTMENT TOPICAL 2 TIMES DAILY
Status: COMPLETED | OUTPATIENT
Start: 2023-01-01 | End: 2023-01-01

## 2023-01-01 RX ORDER — HYDROMORPHONE HYDROCHLORIDE 1 MG/ML
0.5 INJECTION, SOLUTION INTRAMUSCULAR; INTRAVENOUS; SUBCUTANEOUS
Status: DISCONTINUED | OUTPATIENT
Start: 2023-01-01 | End: 2023-01-01

## 2023-01-01 RX ORDER — ATORVASTATIN CALCIUM 20 MG/1
40 TABLET, FILM COATED ORAL DAILY
Status: DISCONTINUED | OUTPATIENT
Start: 2023-01-01 | End: 2023-01-01

## 2023-01-01 RX ORDER — IBUPROFEN 200 MG
16 TABLET ORAL
Status: DISCONTINUED | OUTPATIENT
Start: 2023-01-01 | End: 2023-01-01

## 2023-01-01 RX ORDER — MIDODRINE HYDROCHLORIDE 5 MG/1
5 TABLET ORAL EVERY 8 HOURS
Status: DISCONTINUED | OUTPATIENT
Start: 2023-01-01 | End: 2023-01-01

## 2023-01-01 RX ORDER — ALBUMIN HUMAN 50 G/1000ML
25 SOLUTION INTRAVENOUS ONCE
Status: COMPLETED | OUTPATIENT
Start: 2023-01-01 | End: 2023-01-01

## 2023-01-01 RX ORDER — PANTOPRAZOLE SODIUM 40 MG/1
40 TABLET, DELAYED RELEASE ORAL DAILY
Status: DISCONTINUED | OUTPATIENT
Start: 2023-01-01 | End: 2023-01-01

## 2023-01-01 RX ORDER — DIPHENHYDRAMINE HYDROCHLORIDE 50 MG/ML
6.25 INJECTION INTRAMUSCULAR; INTRAVENOUS ONCE AS NEEDED
Status: COMPLETED | OUTPATIENT
Start: 2023-01-01 | End: 2023-01-01

## 2023-01-01 RX ORDER — DEXTROSE MONOHYDRATE AND SODIUM CHLORIDE 5; .45 G/100ML; G/100ML
INJECTION, SOLUTION INTRAVENOUS CONTINUOUS
Status: DISCONTINUED | OUTPATIENT
Start: 2023-01-01 | End: 2023-01-01

## 2023-01-01 RX ORDER — ACETAMINOPHEN 325 MG/1
650 TABLET ORAL
Status: DISCONTINUED | OUTPATIENT
Start: 2023-01-01 | End: 2023-01-01

## 2023-01-01 RX ORDER — MIDODRINE HYDROCHLORIDE 2.5 MG/1
2.5 TABLET ORAL EVERY 8 HOURS
Status: DISCONTINUED | OUTPATIENT
Start: 2023-01-01 | End: 2023-01-01

## 2023-01-01 RX ORDER — INSULIN ASPART 100 [IU]/ML
0-5 INJECTION, SOLUTION INTRAVENOUS; SUBCUTANEOUS
Status: DISCONTINUED | OUTPATIENT
Start: 2023-01-01 | End: 2023-01-01

## 2023-01-01 RX ORDER — FENTANYL CITRATE 50 UG/ML
50 INJECTION, SOLUTION INTRAMUSCULAR; INTRAVENOUS
Status: COMPLETED | OUTPATIENT
Start: 2023-01-01 | End: 2023-01-01

## 2023-01-01 RX ORDER — ALBUTEROL SULFATE 2.5 MG/.5ML
2.5 SOLUTION RESPIRATORY (INHALATION) EVERY 4 HOURS PRN
Status: DISCONTINUED | OUTPATIENT
Start: 2023-01-01 | End: 2023-01-01

## 2023-01-01 RX ORDER — PANTOPRAZOLE SODIUM 40 MG/10ML
40 INJECTION, POWDER, LYOPHILIZED, FOR SOLUTION INTRAVENOUS DAILY
Status: DISCONTINUED | OUTPATIENT
Start: 2023-01-01 | End: 2023-01-01

## 2023-01-01 RX ORDER — HYDROMORPHONE HYDROCHLORIDE 1 MG/ML
0.2 INJECTION, SOLUTION INTRAMUSCULAR; INTRAVENOUS; SUBCUTANEOUS
Status: DISCONTINUED | OUTPATIENT
Start: 2023-01-01 | End: 2023-01-01

## 2023-01-01 RX ORDER — HYDROMORPHONE HYDROCHLORIDE 1 MG/ML
0.5 INJECTION, SOLUTION INTRAMUSCULAR; INTRAVENOUS; SUBCUTANEOUS ONCE
Status: DISCONTINUED | OUTPATIENT
Start: 2023-01-01 | End: 2023-01-01

## 2023-01-01 RX ORDER — MEMANTINE HYDROCHLORIDE 10 MG/1
10 TABLET ORAL 2 TIMES DAILY
Status: DISCONTINUED | OUTPATIENT
Start: 2023-01-01 | End: 2023-01-01

## 2023-01-01 RX ORDER — PHENYLEPHRINE HCL IN 0.9% NACL 1 MG/10 ML
SYRINGE (ML) INTRAVENOUS
Status: DISPENSED
Start: 2023-01-01 | End: 2023-01-01

## 2023-01-01 RX ORDER — FUROSEMIDE 20 MG/1
20 TABLET ORAL ONCE
Status: COMPLETED | OUTPATIENT
Start: 2023-01-01 | End: 2023-01-01

## 2023-01-01 RX ORDER — HYDROMORPHONE HYDROCHLORIDE 1 MG/ML
0.5 INJECTION, SOLUTION INTRAMUSCULAR; INTRAVENOUS; SUBCUTANEOUS EVERY 6 HOURS PRN
Status: DISCONTINUED | OUTPATIENT
Start: 2023-01-01 | End: 2023-01-01

## 2023-01-01 RX ORDER — KETOROLAC TROMETHAMINE 15 MG/ML
15 INJECTION, SOLUTION INTRAMUSCULAR; INTRAVENOUS ONCE
Status: COMPLETED | OUTPATIENT
Start: 2023-01-01 | End: 2023-01-01

## 2023-01-01 RX ORDER — OXYCODONE HCL 5 MG/5 ML
5 SOLUTION, ORAL ORAL EVERY 4 HOURS PRN
Status: DISCONTINUED | OUTPATIENT
Start: 2023-01-01 | End: 2023-01-01

## 2023-01-01 RX ORDER — GABAPENTIN 250 MG/5ML
500 SOLUTION ORAL DAILY
Status: DISCONTINUED | OUTPATIENT
Start: 2023-01-01 | End: 2023-01-01

## 2023-01-01 RX ORDER — LIDOCAINE 50 MG/G
1 PATCH TOPICAL
Status: DISCONTINUED | OUTPATIENT
Start: 2023-01-01 | End: 2023-01-01 | Stop reason: HOSPADM

## 2023-01-01 RX ORDER — OXYCODONE HYDROCHLORIDE 5 MG/1
5 TABLET ORAL EVERY 4 HOURS PRN
Status: DISCONTINUED | OUTPATIENT
Start: 2023-01-01 | End: 2023-01-01

## 2023-01-01 RX ORDER — ENOXAPARIN SODIUM 100 MG/ML
40 INJECTION SUBCUTANEOUS EVERY 24 HOURS
Status: DISCONTINUED | OUTPATIENT
Start: 2023-01-01 | End: 2023-01-01

## 2023-01-01 RX ORDER — ONDANSETRON 2 MG/ML
4 INJECTION INTRAMUSCULAR; INTRAVENOUS EVERY 8 HOURS PRN
Status: DISCONTINUED | OUTPATIENT
Start: 2023-01-01 | End: 2023-01-01

## 2023-01-01 RX ORDER — PANTOPRAZOLE SODIUM 40 MG/1
40 FOR SUSPENSION ORAL DAILY
Status: DISCONTINUED | OUTPATIENT
Start: 2023-01-01 | End: 2023-01-01

## 2023-01-01 RX ORDER — DOCUSATE SODIUM 50 MG/5ML
100 LIQUID ORAL 2 TIMES DAILY
Status: DISCONTINUED | OUTPATIENT
Start: 2023-01-01 | End: 2023-01-01

## 2023-01-01 RX ORDER — BUPIVACAINE HYDROCHLORIDE 7.5 MG/ML
INJECTION, SOLUTION EPIDURAL; RETROBULBAR
Status: COMPLETED | OUTPATIENT
Start: 2023-01-01 | End: 2023-01-01

## 2023-01-01 RX ORDER — ALBUTEROL SULFATE 5 MG/ML
2.5 SOLUTION RESPIRATORY (INHALATION) EVERY 4 HOURS PRN
Status: DISCONTINUED | OUTPATIENT
Start: 2023-01-01 | End: 2023-01-01

## 2023-01-01 RX ORDER — ACETAMINOPHEN 500 MG
1000 TABLET ORAL
Status: COMPLETED | OUTPATIENT
Start: 2023-01-01 | End: 2023-01-01

## 2023-01-01 RX ORDER — NOREPINEPHRINE BITARTRATE/D5W 4MG/250ML
0-.2 PLASTIC BAG, INJECTION (ML) INTRAVENOUS CONTINUOUS
Status: DISCONTINUED | OUTPATIENT
Start: 2023-01-01 | End: 2023-01-01

## 2023-01-01 RX ORDER — MIDAZOLAM HYDROCHLORIDE 1 MG/ML
1 INJECTION INTRAMUSCULAR; INTRAVENOUS
Status: DISCONTINUED | OUTPATIENT
Start: 2023-01-01 | End: 2023-01-01 | Stop reason: HOSPADM

## 2023-01-01 RX ORDER — LIDOCAINE HYDROCHLORIDE 10 MG/ML
10 INJECTION INFILTRATION; PERINEURAL ONCE
Status: DISCONTINUED | OUTPATIENT
Start: 2023-01-01 | End: 2023-01-01

## 2023-01-01 RX ORDER — TALC
6 POWDER (GRAM) TOPICAL NIGHTLY PRN
Status: DISCONTINUED | OUTPATIENT
Start: 2023-01-01 | End: 2023-01-01

## 2023-01-01 RX ORDER — LIDOCAINE HYDROCHLORIDE 10 MG/ML
10 INJECTION, SOLUTION EPIDURAL; INFILTRATION; INTRACAUDAL; PERINEURAL ONCE
Status: COMPLETED | OUTPATIENT
Start: 2023-01-01 | End: 2023-01-01

## 2023-01-01 RX ORDER — ONDANSETRON 2 MG/ML
4 INJECTION INTRAMUSCULAR; INTRAVENOUS EVERY 6 HOURS PRN
Status: DISCONTINUED | OUTPATIENT
Start: 2023-01-01 | End: 2023-01-01 | Stop reason: HOSPADM

## 2023-01-01 RX ORDER — ONDANSETRON 2 MG/ML
INJECTION INTRAMUSCULAR; INTRAVENOUS
Status: COMPLETED
Start: 2023-01-01 | End: 2023-01-01

## 2023-01-01 RX ORDER — BUDESONIDE 0.5 MG/2ML
0.5 INHALANT ORAL EVERY 12 HOURS
Status: DISCONTINUED | OUTPATIENT
Start: 2023-01-01 | End: 2023-01-01 | Stop reason: HOSPADM

## 2023-01-01 RX ORDER — METHOCARBAMOL 500 MG/1
500 TABLET, FILM COATED ORAL 4 TIMES DAILY
Status: DISCONTINUED | OUTPATIENT
Start: 2023-01-01 | End: 2023-01-01

## 2023-01-01 RX ORDER — ACETAMINOPHEN 10 MG/ML
1000 INJECTION, SOLUTION INTRAVENOUS ONCE
Status: COMPLETED | OUTPATIENT
Start: 2023-01-01 | End: 2023-01-01

## 2023-01-01 RX ORDER — ACETAMINOPHEN 500 MG
1000 TABLET ORAL EVERY 8 HOURS PRN
Status: DISCONTINUED | OUTPATIENT
Start: 2023-01-01 | End: 2023-01-01

## 2023-01-01 RX ORDER — POLYETHYLENE GLYCOL 3350 17 G/17G
17 POWDER, FOR SOLUTION ORAL 2 TIMES DAILY
Status: DISCONTINUED | OUTPATIENT
Start: 2023-01-01 | End: 2023-01-01 | Stop reason: HOSPADM

## 2023-01-01 RX ORDER — GABAPENTIN 300 MG/1
300 CAPSULE ORAL 3 TIMES DAILY
Status: DISCONTINUED | OUTPATIENT
Start: 2023-01-01 | End: 2023-01-01

## 2023-01-01 RX ORDER — IBUPROFEN 200 MG
24 TABLET ORAL
Status: DISCONTINUED | OUTPATIENT
Start: 2023-01-01 | End: 2023-01-01

## 2023-01-01 RX ORDER — SERTRALINE HYDROCHLORIDE 50 MG/1
100 TABLET, FILM COATED ORAL DAILY
Status: DISCONTINUED | OUTPATIENT
Start: 2023-01-01 | End: 2023-01-01 | Stop reason: HOSPADM

## 2023-01-01 RX ORDER — NALOXONE HCL 0.4 MG/ML
0.02 VIAL (ML) INJECTION
Status: DISCONTINUED | OUTPATIENT
Start: 2023-01-01 | End: 2023-01-01

## 2023-01-01 RX ORDER — POLYETHYLENE GLYCOL 3350, SODIUM SULFATE ANHYDROUS, SODIUM BICARBONATE, SODIUM CHLORIDE, POTASSIUM CHLORIDE 236; 22.74; 6.74; 5.86; 2.97 G/4L; G/4L; G/4L; G/4L; G/4L
1000 POWDER, FOR SOLUTION ORAL ONCE
Status: COMPLETED | OUTPATIENT
Start: 2023-01-01 | End: 2023-01-01

## 2023-01-01 RX ORDER — BISACODYL 10 MG
10 SUPPOSITORY, RECTAL RECTAL ONCE
Status: COMPLETED | OUTPATIENT
Start: 2023-01-01 | End: 2023-01-01

## 2023-01-01 RX ORDER — LEVALBUTEROL INHALATION SOLUTION 0.63 MG/3ML
0.63 SOLUTION RESPIRATORY (INHALATION) EVERY 4 HOURS
Status: DISCONTINUED | OUTPATIENT
Start: 2023-01-01 | End: 2023-01-01 | Stop reason: HOSPADM

## 2023-01-01 RX ORDER — IPRATROPIUM BROMIDE AND ALBUTEROL SULFATE 2.5; .5 MG/3ML; MG/3ML
3 SOLUTION RESPIRATORY (INHALATION) EVERY 4 HOURS PRN
Status: DISCONTINUED | OUTPATIENT
Start: 2023-01-01 | End: 2023-01-01 | Stop reason: HOSPADM

## 2023-01-01 RX ORDER — HYDROMORPHONE HYDROCHLORIDE 1 MG/ML
0.5 INJECTION, SOLUTION INTRAMUSCULAR; INTRAVENOUS; SUBCUTANEOUS ONCE
Status: COMPLETED | OUTPATIENT
Start: 2023-01-01 | End: 2023-01-01

## 2023-01-01 RX ORDER — DEXMEDETOMIDINE HYDROCHLORIDE 4 UG/ML
0-1.4 INJECTION, SOLUTION INTRAVENOUS CONTINUOUS
Status: DISCONTINUED | OUTPATIENT
Start: 2023-01-01 | End: 2023-01-01 | Stop reason: HOSPADM

## 2023-01-01 RX ORDER — OXYCODONE HYDROCHLORIDE 10 MG/1
10 TABLET ORAL EVERY 4 HOURS PRN
Status: DISCONTINUED | OUTPATIENT
Start: 2023-01-01 | End: 2023-01-01

## 2023-01-01 RX ORDER — HEPARIN SODIUM 5000 [USP'U]/ML
5000 INJECTION, SOLUTION INTRAVENOUS; SUBCUTANEOUS EVERY 8 HOURS
Status: DISCONTINUED | OUTPATIENT
Start: 2023-01-01 | End: 2023-01-01

## 2023-01-01 RX ORDER — DOXEPIN HYDROCHLORIDE 10 MG/1
10 CAPSULE ORAL NIGHTLY
Status: DISCONTINUED | OUTPATIENT
Start: 2023-01-01 | End: 2023-01-01

## 2023-01-01 RX ORDER — HYDROMORPHONE HYDROCHLORIDE 1 MG/ML
0.5 INJECTION, SOLUTION INTRAMUSCULAR; INTRAVENOUS; SUBCUTANEOUS
Status: DISCONTINUED | OUTPATIENT
Start: 2023-01-01 | End: 2023-01-01 | Stop reason: HOSPADM

## 2023-01-01 RX ORDER — NOREPINEPHRINE BITARTRATE/D5W 4MG/250ML
PLASTIC BAG, INJECTION (ML) INTRAVENOUS
Status: DISPENSED
Start: 2023-01-01 | End: 2023-01-01

## 2023-01-01 RX ORDER — PANTOPRAZOLE SODIUM 40 MG/1
40 TABLET, DELAYED RELEASE ORAL 2 TIMES DAILY
Status: DISCONTINUED | OUTPATIENT
Start: 2023-01-01 | End: 2023-01-01

## 2023-01-01 RX ORDER — HYDROMORPHONE HYDROCHLORIDE 1 MG/ML
0.5 INJECTION, SOLUTION INTRAMUSCULAR; INTRAVENOUS; SUBCUTANEOUS
Status: CANCELLED | OUTPATIENT
Start: 2023-01-01

## 2023-01-01 RX ORDER — OXYCODONE HYDROCHLORIDE 5 MG/1
5 TABLET ORAL
Status: DISCONTINUED | OUTPATIENT
Start: 2023-01-01 | End: 2023-01-01

## 2023-01-01 RX ORDER — GLUCAGON 1 MG
1 KIT INJECTION
Status: DISCONTINUED | OUTPATIENT
Start: 2023-01-01 | End: 2023-01-01

## 2023-01-01 RX ORDER — FUROSEMIDE 20 MG/1
20 TABLET ORAL DAILY
Status: DISCONTINUED | OUTPATIENT
Start: 2023-01-01 | End: 2023-01-01 | Stop reason: HOSPADM

## 2023-01-01 RX ORDER — OXYCODONE HCL 5 MG/5 ML
5 SOLUTION, ORAL ORAL EVERY 6 HOURS PRN
Status: DISCONTINUED | OUTPATIENT
Start: 2023-01-01 | End: 2023-01-01

## 2023-01-01 RX ORDER — OXYCODONE HCL 5 MG/5 ML
10 SOLUTION, ORAL ORAL EVERY 4 HOURS PRN
Status: DISCONTINUED | OUTPATIENT
Start: 2023-01-01 | End: 2023-01-01 | Stop reason: HOSPADM

## 2023-01-01 RX ORDER — HYDROMORPHONE HYDROCHLORIDE 1 MG/ML
1 INJECTION, SOLUTION INTRAMUSCULAR; INTRAVENOUS; SUBCUTANEOUS
Status: DISCONTINUED | OUTPATIENT
Start: 2023-01-01 | End: 2023-01-01

## 2023-01-01 RX ORDER — SERTRALINE HYDROCHLORIDE 50 MG/1
100 TABLET, FILM COATED ORAL DAILY
Status: DISCONTINUED | OUTPATIENT
Start: 2023-01-01 | End: 2023-01-01

## 2023-01-01 RX ORDER — MORPHINE SULFATE 2 MG/ML
2 INJECTION, SOLUTION INTRAMUSCULAR; INTRAVENOUS ONCE
Status: DISCONTINUED | OUTPATIENT
Start: 2023-01-01 | End: 2023-01-01

## 2023-01-01 RX ORDER — FUROSEMIDE 20 MG/1
20 TABLET ORAL DAILY
Status: DISCONTINUED | OUTPATIENT
Start: 2023-01-01 | End: 2023-01-01

## 2023-01-01 RX ORDER — HYDROMORPHONE HCL IN 0.9% NACL 6 MG/30 ML
PATIENT CONTROLLED ANALGESIA SYRINGE INTRAVENOUS CONTINUOUS
Status: DISCONTINUED | OUTPATIENT
Start: 2023-01-01 | End: 2023-01-01

## 2023-01-01 RX ORDER — OLANZAPINE 10 MG/2ML
2.5 INJECTION, POWDER, FOR SOLUTION INTRAMUSCULAR ONCE AS NEEDED
Status: COMPLETED | OUTPATIENT
Start: 2023-01-01 | End: 2023-01-01

## 2023-01-01 RX ORDER — SODIUM CHLORIDE 9 MG/ML
INJECTION, SOLUTION INTRAVENOUS
Status: DISCONTINUED | OUTPATIENT
Start: 2023-01-01 | End: 2023-01-01 | Stop reason: HOSPADM

## 2023-01-01 RX ADMIN — ONDANSETRON 4 MG: 2 INJECTION INTRAMUSCULAR; INTRAVENOUS at 02:01

## 2023-01-01 RX ADMIN — GABAPENTIN 300 MG: 300 CAPSULE ORAL at 03:01

## 2023-01-01 RX ADMIN — MUPIROCIN: 20 OINTMENT TOPICAL at 08:01

## 2023-01-01 RX ADMIN — ACETAMINOPHEN 1000 MG: 10 INJECTION INTRAVENOUS at 12:01

## 2023-01-01 RX ADMIN — IPRATROPIUM BROMIDE AND ALBUTEROL SULFATE 3 ML: .5; 3 SOLUTION RESPIRATORY (INHALATION) at 08:01

## 2023-01-01 RX ADMIN — SERTRALINE HYDROCHLORIDE 100 MG: 50 TABLET ORAL at 08:01

## 2023-01-01 RX ADMIN — POLYETHYLENE GLYCOL 3350 17 G: 17 POWDER, FOR SOLUTION ORAL at 09:01

## 2023-01-01 RX ADMIN — SENNOSIDES 8.6 MG: 8.6 TABLET, FILM COATED ORAL at 09:01

## 2023-01-01 RX ADMIN — HEPARIN SODIUM 5000 UNITS: 5000 INJECTION INTRAVENOUS; SUBCUTANEOUS at 03:01

## 2023-01-01 RX ADMIN — LIDOCAINE 1 PATCH: 50 PATCH CUTANEOUS at 10:01

## 2023-01-01 RX ADMIN — HEPARIN SODIUM 5000 UNITS: 5000 INJECTION INTRAVENOUS; SUBCUTANEOUS at 05:01

## 2023-01-01 RX ADMIN — BUPIVACAINE HYDROCHLORIDE 15 ML: 7.5 INJECTION, SOLUTION EPIDURAL; RETROBULBAR at 03:01

## 2023-01-01 RX ADMIN — HYDROMORPHONE HYDROCHLORIDE 0.5 MG: 1 INJECTION, SOLUTION INTRAMUSCULAR; INTRAVENOUS; SUBCUTANEOUS at 06:01

## 2023-01-01 RX ADMIN — POLYETHYLENE GLYCOL 3350 17 G: 17 POWDER, FOR SOLUTION ORAL at 08:01

## 2023-01-01 RX ADMIN — MUPIROCIN: 20 OINTMENT TOPICAL at 09:01

## 2023-01-01 RX ADMIN — FENTANYL CITRATE 25 MCG: 50 INJECTION INTRAMUSCULAR; INTRAVENOUS at 04:01

## 2023-01-01 RX ADMIN — HYDROMORPHONE HYDROCHLORIDE 0.5 MG: 1 INJECTION, SOLUTION INTRAMUSCULAR; INTRAVENOUS; SUBCUTANEOUS at 07:01

## 2023-01-01 RX ADMIN — PANTOPRAZOLE SODIUM 40 MG: 40 TABLET, DELAYED RELEASE ORAL at 09:01

## 2023-01-01 RX ADMIN — IPRATROPIUM BROMIDE AND ALBUTEROL SULFATE 3 ML: .5; 3 SOLUTION RESPIRATORY (INHALATION) at 11:01

## 2023-01-01 RX ADMIN — HYDROMORPHONE HYDROCHLORIDE 0.5 MG: 1 INJECTION, SOLUTION INTRAMUSCULAR; INTRAVENOUS; SUBCUTANEOUS at 10:01

## 2023-01-01 RX ADMIN — FENTANYL CITRATE 25 MCG: 50 INJECTION INTRAMUSCULAR; INTRAVENOUS at 03:01

## 2023-01-01 RX ADMIN — GABAPENTIN 250 MG: 250 SOLUTION ORAL at 08:01

## 2023-01-01 RX ADMIN — DEXMEDETOMIDINE HYDROCHLORIDE 1.4 MCG/KG/HR: 4 INJECTION, SOLUTION INTRAVENOUS at 03:01

## 2023-01-01 RX ADMIN — ACETAMINOPHEN 1000 MG: 325 TABLET ORAL at 08:01

## 2023-01-01 RX ADMIN — HEPARIN SODIUM 5000 UNITS: 5000 INJECTION INTRAVENOUS; SUBCUTANEOUS at 06:01

## 2023-01-01 RX ADMIN — ATORVASTATIN CALCIUM 40 MG: 20 TABLET, FILM COATED ORAL at 08:01

## 2023-01-01 RX ADMIN — SODIUM CHLORIDE, POTASSIUM CHLORIDE, SODIUM LACTATE AND CALCIUM CHLORIDE 1000 ML: 600; 310; 30; 20 INJECTION, SOLUTION INTRAVENOUS at 06:01

## 2023-01-01 RX ADMIN — MIDODRINE HYDROCHLORIDE 5 MG: 5 TABLET ORAL at 09:01

## 2023-01-01 RX ADMIN — OXYCODONE HYDROCHLORIDE 5 MG: 5 SOLUTION ORAL at 09:01

## 2023-01-01 RX ADMIN — LIDOCAINE 1 PATCH: 50 PATCH CUTANEOUS at 09:01

## 2023-01-01 RX ADMIN — ACETAMINOPHEN 1000 MG: 325 TABLET ORAL at 03:01

## 2023-01-01 RX ADMIN — DOCUSATE SODIUM 100 MG: 50 LIQUID ORAL at 11:01

## 2023-01-01 RX ADMIN — BUDESONIDE INHALATION 0.5 MG: 0.5 SUSPENSION RESPIRATORY (INHALATION) at 08:01

## 2023-01-01 RX ADMIN — BISACODYL 10 MG: 10 SUPPOSITORY RECTAL at 07:01

## 2023-01-01 RX ADMIN — DIPHENHYDRAMINE HYDROCHLORIDE 6.25 MG: 50 INJECTION, SOLUTION INTRAMUSCULAR; INTRAVENOUS at 02:01

## 2023-01-01 RX ADMIN — METHOCARBAMOL 500 MG: 100 INJECTION, SOLUTION INTRAMUSCULAR; INTRAVENOUS at 09:01

## 2023-01-01 RX ADMIN — BUDESONIDE INHALATION 0.5 MG: 0.5 SUSPENSION RESPIRATORY (INHALATION) at 07:01

## 2023-01-01 RX ADMIN — DIPHENHYDRAMINE HYDROCHLORIDE 6.25 MG: 50 INJECTION, SOLUTION INTRAMUSCULAR; INTRAVENOUS at 04:01

## 2023-01-01 RX ADMIN — LEVALBUTEROL HYDROCHLORIDE 0.63 MG: 0.63 SOLUTION RESPIRATORY (INHALATION) at 12:01

## 2023-01-01 RX ADMIN — ROPIVACAINE HYDROCHLORIDE 0.1 ML/HR: 2 INJECTION, SOLUTION EPIDURAL; INFILTRATION at 06:01

## 2023-01-01 RX ADMIN — DRONABINOL 2.5 MG: 2.5 CAPSULE ORAL at 09:01

## 2023-01-01 RX ADMIN — GABAPENTIN 300 MG: 300 CAPSULE ORAL at 08:01

## 2023-01-01 RX ADMIN — HYDROMORPHONE HYDROCHLORIDE 0.5 MG: 1 INJECTION, SOLUTION INTRAMUSCULAR; INTRAVENOUS; SUBCUTANEOUS at 05:01

## 2023-01-01 RX ADMIN — LEVALBUTEROL HYDROCHLORIDE 0.63 MG: 0.63 SOLUTION RESPIRATORY (INHALATION) at 11:01

## 2023-01-01 RX ADMIN — DEXMEDETOMIDINE HYDROCHLORIDE 1.4 MCG/KG/HR: 4 INJECTION, SOLUTION INTRAVENOUS at 06:01

## 2023-01-01 RX ADMIN — HEPARIN SODIUM 5000 UNITS: 5000 INJECTION INTRAVENOUS; SUBCUTANEOUS at 09:01

## 2023-01-01 RX ADMIN — GABAPENTIN 500 MG: 250 SOLUTION ORAL at 08:01

## 2023-01-01 RX ADMIN — FUROSEMIDE 20 MG: 20 TABLET ORAL at 11:01

## 2023-01-01 RX ADMIN — MIDODRINE HYDROCHLORIDE 2.5 MG: 2.5 TABLET ORAL at 07:01

## 2023-01-01 RX ADMIN — HEPARIN SODIUM 5000 UNITS: 5000 INJECTION INTRAVENOUS; SUBCUTANEOUS at 10:01

## 2023-01-01 RX ADMIN — OXYCODONE HYDROCHLORIDE 5 MG: 5 TABLET ORAL at 04:01

## 2023-01-01 RX ADMIN — LEVALBUTEROL HYDROCHLORIDE 0.63 MG: 0.63 SOLUTION RESPIRATORY (INHALATION) at 09:01

## 2023-01-01 RX ADMIN — MIDODRINE HYDROCHLORIDE 2.5 MG: 2.5 TABLET ORAL at 02:01

## 2023-01-01 RX ADMIN — GABAPENTIN 250 MG: 250 SOLUTION ORAL at 04:01

## 2023-01-01 RX ADMIN — IPRATROPIUM BROMIDE AND ALBUTEROL SULFATE 3 ML: .5; 3 SOLUTION RESPIRATORY (INHALATION) at 03:01

## 2023-01-01 RX ADMIN — MEMANTINE 10 MG: 10 TABLET ORAL at 09:01

## 2023-01-01 RX ADMIN — HYDROMORPHONE HYDROCHLORIDE 0.5 MG: 1 INJECTION, SOLUTION INTRAMUSCULAR; INTRAVENOUS; SUBCUTANEOUS at 08:01

## 2023-01-01 RX ADMIN — SENNOSIDES 8.6 MG: 8.6 TABLET, FILM COATED ORAL at 08:01

## 2023-01-01 RX ADMIN — METHOCARBAMOL 500 MG: 100 INJECTION, SOLUTION INTRAMUSCULAR; INTRAVENOUS at 10:01

## 2023-01-01 RX ADMIN — OXYCODONE HYDROCHLORIDE 10 MG: 10 TABLET ORAL at 10:01

## 2023-01-01 RX ADMIN — LIDOCAINE HYDROCHLORIDE 100 MG: 10 INJECTION, SOLUTION EPIDURAL; INFILTRATION; INTRACAUDAL at 09:01

## 2023-01-01 RX ADMIN — HYDROMORPHONE HYDROCHLORIDE 0.5 MG: 1 INJECTION, SOLUTION INTRAMUSCULAR; INTRAVENOUS; SUBCUTANEOUS at 02:01

## 2023-01-01 RX ADMIN — MIDODRINE HYDROCHLORIDE 2.5 MG: 2.5 TABLET ORAL at 09:01

## 2023-01-01 RX ADMIN — IOHEXOL 75 ML: 350 INJECTION, SOLUTION INTRAVENOUS at 11:01

## 2023-01-01 RX ADMIN — IPRATROPIUM BROMIDE AND ALBUTEROL SULFATE 3 ML: .5; 3 SOLUTION RESPIRATORY (INHALATION) at 09:01

## 2023-01-01 RX ADMIN — METHOCARBAMOL 500 MG: 500 TABLET ORAL at 08:01

## 2023-01-01 RX ADMIN — BUDESONIDE INHALATION 0.5 MG: 0.5 SUSPENSION RESPIRATORY (INHALATION) at 09:01

## 2023-01-01 RX ADMIN — MUPIROCIN: 20 OINTMENT TOPICAL at 12:01

## 2023-01-01 RX ADMIN — LEVALBUTEROL HYDROCHLORIDE 0.63 MG: 0.63 SOLUTION RESPIRATORY (INHALATION) at 08:01

## 2023-01-01 RX ADMIN — FENTANYL CITRATE 25 MCG: 50 INJECTION INTRAMUSCULAR; INTRAVENOUS at 02:01

## 2023-01-01 RX ADMIN — SODIUM CHLORIDE, POTASSIUM CHLORIDE, SODIUM LACTATE AND CALCIUM CHLORIDE 250 ML: 600; 310; 30; 20 INJECTION, SOLUTION INTRAVENOUS at 12:01

## 2023-01-01 RX ADMIN — Medication 6 MG: at 09:01

## 2023-01-01 RX ADMIN — PANTOPRAZOLE SODIUM 40 MG: 40 TABLET, DELAYED RELEASE ORAL at 08:01

## 2023-01-01 RX ADMIN — SERTRALINE HYDROCHLORIDE 100 MG: 50 TABLET ORAL at 09:01

## 2023-01-01 RX ADMIN — FUROSEMIDE 20 MG: 20 TABLET ORAL at 10:01

## 2023-01-01 RX ADMIN — DEXMEDETOMIDINE HYDROCHLORIDE 1.4 MCG/KG/HR: 4 INJECTION, SOLUTION INTRAVENOUS at 09:01

## 2023-01-01 RX ADMIN — LIDOCAINE 1 PATCH: 50 PATCH CUTANEOUS at 01:01

## 2023-01-01 RX ADMIN — HYDROMORPHONE HYDROCHLORIDE 0.2 MG: 1 INJECTION, SOLUTION INTRAMUSCULAR; INTRAVENOUS; SUBCUTANEOUS at 07:01

## 2023-01-01 RX ADMIN — IPRATROPIUM BROMIDE AND ALBUTEROL SULFATE 3 ML: .5; 3 SOLUTION RESPIRATORY (INHALATION) at 07:01

## 2023-01-01 RX ADMIN — LIDOCAINE 1 PATCH: 50 PATCH CUTANEOUS at 08:01

## 2023-01-01 RX ADMIN — ROPIVACAINE HYDROCHLORIDE 0.1 ML/HR: 2 INJECTION, SOLUTION EPIDURAL; INFILTRATION at 03:01

## 2023-01-01 RX ADMIN — HYDROMORPHONE HYDROCHLORIDE 0.5 MG: 1 INJECTION, SOLUTION INTRAMUSCULAR; INTRAVENOUS; SUBCUTANEOUS at 01:01

## 2023-01-01 RX ADMIN — ACETAMINOPHEN 1000 MG: 500 TABLET ORAL at 03:01

## 2023-01-01 RX ADMIN — OXYCODONE HYDROCHLORIDE 10 MG: 10 TABLET ORAL at 11:01

## 2023-01-01 RX ADMIN — GABAPENTIN 300 MG: 300 CAPSULE ORAL at 02:01

## 2023-01-01 RX ADMIN — MEMANTINE 10 MG: 10 TABLET ORAL at 08:01

## 2023-01-01 RX ADMIN — METHOCARBAMOL 500 MG: 100 INJECTION, SOLUTION INTRAMUSCULAR; INTRAVENOUS at 05:01

## 2023-01-01 RX ADMIN — ACETAMINOPHEN 1000 MG: 500 TABLET ORAL at 04:01

## 2023-01-01 RX ADMIN — ACETAMINOPHEN 1000 MG: 325 TABLET ORAL at 10:01

## 2023-01-01 RX ADMIN — ALBUTEROL SULFATE 2.5 MG: 2.5 SOLUTION RESPIRATORY (INHALATION) at 01:01

## 2023-01-01 RX ADMIN — ACETAMINOPHEN 1000 MG: 500 TABLET ORAL at 08:01

## 2023-01-01 RX ADMIN — ENOXAPARIN SODIUM 30 MG: 30 INJECTION SUBCUTANEOUS at 10:01

## 2023-01-01 RX ADMIN — MIDAZOLAM 1 MG: 1 INJECTION INTRAMUSCULAR; INTRAVENOUS at 02:01

## 2023-01-01 RX ADMIN — ACETAMINOPHEN 1000 MG: 500 TABLET ORAL at 09:01

## 2023-01-01 RX ADMIN — HYDROMORPHONE HYDROCHLORIDE 0.5 MG: 1 INJECTION, SOLUTION INTRAMUSCULAR; INTRAVENOUS; SUBCUTANEOUS at 12:01

## 2023-01-01 RX ADMIN — SODIUM CHLORIDE, POTASSIUM CHLORIDE, SODIUM LACTATE AND CALCIUM CHLORIDE 500 ML: 600; 310; 30; 20 INJECTION, SOLUTION INTRAVENOUS at 06:01

## 2023-01-01 RX ADMIN — HYDROMORPHONE HYDROCHLORIDE 0.2 MG: 1 INJECTION, SOLUTION INTRAMUSCULAR; INTRAVENOUS; SUBCUTANEOUS at 02:01

## 2023-01-01 RX ADMIN — NOREPINEPHRINE BITARTRATE 0.02 MCG/KG/MIN: 4 INJECTION, SOLUTION INTRAVENOUS at 01:01

## 2023-01-01 RX ADMIN — ONDANSETRON 4 MG: 2 INJECTION INTRAMUSCULAR; INTRAVENOUS at 05:01

## 2023-01-01 RX ADMIN — ACETAMINOPHEN 1000 MG: 500 TABLET ORAL at 05:01

## 2023-01-01 RX ADMIN — HYDROMORPHONE HYDROCHLORIDE 0.2 MG: 1 INJECTION, SOLUTION INTRAMUSCULAR; INTRAVENOUS; SUBCUTANEOUS at 11:01

## 2023-01-01 RX ADMIN — HEPARIN SODIUM 5000 UNITS: 5000 INJECTION INTRAVENOUS; SUBCUTANEOUS at 02:01

## 2023-01-01 RX ADMIN — LEVALBUTEROL HYDROCHLORIDE 0.63 MG: 0.63 SOLUTION RESPIRATORY (INHALATION) at 01:01

## 2023-01-01 RX ADMIN — LEVALBUTEROL HYDROCHLORIDE 0.63 MG: 0.63 SOLUTION RESPIRATORY (INHALATION) at 07:01

## 2023-01-01 RX ADMIN — ACETAMINOPHEN 1000 MG: 500 TABLET ORAL at 02:01

## 2023-01-01 RX ADMIN — SODIUM CHLORIDE, POTASSIUM CHLORIDE, SODIUM LACTATE AND CALCIUM CHLORIDE 500 ML: 600; 310; 30; 20 INJECTION, SOLUTION INTRAVENOUS at 02:01

## 2023-01-01 RX ADMIN — FENTANYL CITRATE 50 MCG: 50 INJECTION INTRAMUSCULAR; INTRAVENOUS at 11:01

## 2023-01-01 RX ADMIN — OLANZAPINE 2.5 MG: 10 INJECTION, POWDER, FOR SOLUTION INTRAMUSCULAR at 03:01

## 2023-01-01 RX ADMIN — ALBUMIN (HUMAN) 25 G: 12.5 SOLUTION INTRAVENOUS at 08:01

## 2023-01-01 RX ADMIN — DEXTROSE AND SODIUM CHLORIDE: 5; 450 INJECTION, SOLUTION INTRAVENOUS at 12:01

## 2023-01-01 RX ADMIN — ATORVASTATIN CALCIUM 40 MG: 20 TABLET, FILM COATED ORAL at 09:01

## 2023-01-01 RX ADMIN — DRONABINOL 2.5 MG: 2.5 CAPSULE ORAL at 11:01

## 2023-01-01 RX ADMIN — OXYCODONE HYDROCHLORIDE 10 MG: 10 TABLET ORAL at 04:01

## 2023-01-01 RX ADMIN — LEVALBUTEROL HYDROCHLORIDE 0.63 MG: 0.63 SOLUTION RESPIRATORY (INHALATION) at 03:01

## 2023-01-01 RX ADMIN — POLYETHYLENE GLYCOL 3350, SODIUM SULFATE ANHYDROUS, SODIUM BICARBONATE, SODIUM CHLORIDE, POTASSIUM CHLORIDE 1000 ML: 236; 22.74; 6.74; 5.86; 2.97 POWDER, FOR SOLUTION ORAL at 11:01

## 2023-01-01 RX ADMIN — ALBUMIN (HUMAN) 25 G: 12.5 SOLUTION INTRAVENOUS at 01:01

## 2023-01-01 RX ADMIN — HYDROMORPHONE HYDROCHLORIDE 0.2 MG: 1 INJECTION, SOLUTION INTRAMUSCULAR; INTRAVENOUS; SUBCUTANEOUS at 12:01

## 2023-01-01 RX ADMIN — PANTOPRAZOLE SODIUM 40 MG: 40 INJECTION, POWDER, FOR SOLUTION INTRAVENOUS at 08:01

## 2023-01-01 RX ADMIN — GABAPENTIN 250 MG: 250 SOLUTION ORAL at 09:01

## 2023-01-01 RX ADMIN — DEXMEDETOMIDINE HYDROCHLORIDE 1.2 MCG/KG/HR: 4 INJECTION, SOLUTION INTRAVENOUS at 11:01

## 2023-01-01 RX ADMIN — DEXTROSE AND SODIUM CHLORIDE: 5; 450 INJECTION, SOLUTION INTRAVENOUS at 10:01

## 2023-01-01 RX ADMIN — KETOROLAC TROMETHAMINE 15 MG: 15 INJECTION, SOLUTION INTRAMUSCULAR; INTRAVENOUS at 01:01

## 2023-01-01 RX ADMIN — DEXMEDETOMIDINE HYDROCHLORIDE 1.3 MCG/KG/HR: 4 INJECTION, SOLUTION INTRAVENOUS at 10:01

## 2023-01-01 RX ADMIN — METHOCARBAMOL 500 MG: 500 TABLET ORAL at 04:01

## 2023-01-01 RX ADMIN — LEVALBUTEROL HYDROCHLORIDE 0.63 MG: 0.63 SOLUTION RESPIRATORY (INHALATION) at 05:01

## 2023-01-01 RX ADMIN — DOCUSATE SODIUM 100 MG: 50 LIQUID ORAL at 09:01

## 2023-01-01 RX ADMIN — SODIUM CHLORIDE, POTASSIUM CHLORIDE, SODIUM LACTATE AND CALCIUM CHLORIDE 500 ML: 600; 310; 30; 20 INJECTION, SOLUTION INTRAVENOUS at 12:01

## 2023-01-01 RX ADMIN — LIDOCAINE HYDROCHLORIDE 100 MG: 10 INJECTION, SOLUTION EPIDURAL; INFILTRATION; INTRACAUDAL; PERINEURAL at 09:01

## 2023-01-01 RX ADMIN — ROPIVACAINE HYDROCHLORIDE 0.1 ML/HR: 2 INJECTION, SOLUTION EPIDURAL; INFILTRATION at 12:01

## 2023-01-01 RX ADMIN — DEXTROSE AND SODIUM CHLORIDE: 5; 450 INJECTION, SOLUTION INTRAVENOUS at 02:01

## 2023-01-01 RX ADMIN — DEXMEDETOMIDINE HYDROCHLORIDE 1.4 MCG/KG/HR: 4 INJECTION, SOLUTION INTRAVENOUS at 05:01

## 2023-01-01 RX ADMIN — HYDROMORPHONE HYDROCHLORIDE 0.2 MG: 1 INJECTION, SOLUTION INTRAMUSCULAR; INTRAVENOUS; SUBCUTANEOUS at 05:01

## 2023-01-01 RX ADMIN — OXYCODONE HYDROCHLORIDE 10 MG: 10 TABLET ORAL at 05:01

## 2023-01-01 RX ADMIN — MIDODRINE HYDROCHLORIDE 2.5 MG: 2.5 TABLET ORAL at 08:01

## 2023-01-01 RX ADMIN — SODIUM CHLORIDE, POTASSIUM CHLORIDE, SODIUM LACTATE AND CALCIUM CHLORIDE 500 ML: 600; 310; 30; 20 INJECTION, SOLUTION INTRAVENOUS at 01:01

## 2023-01-01 RX ADMIN — SENNOSIDES 8.6 MG: 8.6 TABLET, FILM COATED ORAL at 03:01

## 2023-01-01 RX ADMIN — SCOPALAMINE 1 PATCH: 1 PATCH, EXTENDED RELEASE TRANSDERMAL at 01:01

## 2023-01-01 RX ADMIN — HYDROMORPHONE HYDROCHLORIDE 0.5 MG: 1 INJECTION, SOLUTION INTRAMUSCULAR; INTRAVENOUS; SUBCUTANEOUS at 11:01

## 2023-01-01 RX ADMIN — DEXMEDETOMIDINE HYDROCHLORIDE 0.3 MCG/KG/HR: 4 INJECTION, SOLUTION INTRAVENOUS at 08:01

## 2023-01-01 RX ADMIN — MIDODRINE HYDROCHLORIDE 2.5 MG: 2.5 TABLET ORAL at 03:01

## 2023-01-01 RX ADMIN — FUROSEMIDE 20 MG: 20 TABLET ORAL at 08:01

## 2023-01-01 RX ADMIN — PANTOPRAZOLE SODIUM 40 MG: 40 INJECTION, POWDER, FOR SOLUTION INTRAVENOUS at 12:01

## 2023-01-01 RX ADMIN — ACETAMINOPHEN 650 MG: 325 TABLET ORAL at 02:01

## 2023-01-01 RX ADMIN — ENOXAPARIN SODIUM 30 MG: 30 INJECTION SUBCUTANEOUS at 08:01

## 2023-01-01 RX ADMIN — MUPIROCIN: 20 OINTMENT TOPICAL at 10:01

## 2023-01-17 PROBLEM — Z51.5 ENCOUNTER FOR PALLIATIVE CARE: Status: ACTIVE | Noted: 2023-01-01

## 2023-01-17 PROBLEM — S22.41XA CLOSED FRACTURE OF MULTIPLE RIBS OF RIGHT SIDE: Status: ACTIVE | Noted: 2023-01-01

## 2023-01-17 NOTE — PROGRESS NOTES
Patient admitted from ED to SICU [id number]. MD, charge RN and RT at bedside. Patient connected to bedside monitor. All vitals stable. Patient transitioned to 4L HFNC. Multiple emesis occurrences on admit. PRN antiemetics given per order. Orders placed for PRN pain meds. Labs to be drawn around 0400. Daughter at bedside and updated on plan of care.

## 2023-01-17 NOTE — CONSULTS
"[facility] - [facility]  Palliative Medicine  Consult Note    Patient Name: Paul Browning  MRN: [id number]  Admission Date: 1/16/2023  Hospital Length of Stay: 0 days  Code Status: Full Code   Attending Provider: Santo Shankar MD  Consulting Provider: Marie Blanco MD  Primary Care Physician: Grey Forbes DO  Principal Problem:<principal problem not specified>    Patient information was obtained from patient, relative(s) and primary team.      Inpatient consult to Palliative Care  Consult performed by: Marie Blanco MD  Consult ordered by: Filippo Anderson MD  Reason for consult: goals of care, symptom management        Assessment/Plan:     Encounter for palliative care  Paul Browning (Joe) is an 82-year-old man with a history of dementia (FAST score 6D), CAD s/p STEMI/PCI/2v-CABG (2014), ICM with reduced EF (40%), COPD on home oxygen (2 L/min), sarcoidosis, possible Parkinson's, DM2, CKD3, multiple falls who was admitted after sustaining a fall with multiple rib fractures and small apical pneumothorax. Palliative and Supportive Care was consulted to explore goals of care and symptom management recommendations.    Advance Care Planning   Goals of Care:  - Code status: Full code  - Next of kin: 4 adult children (3 sons, 1 daughter)  - Patient is not decisional  - ACP documents: none  - Prognosis: poor  - Family's understanding of prognosis: continue education  - Goals: control pain/symptoms, optimize quality of life, continue full supportive care  - Recommendations/Plans: continue full supportive care - will revisit this afternoon to revisit code status, understanding of prognosis and goals/plans    Goals of Care Conversation  - 1/17/2023: Met Mr. Browning and his daughter Mesha and son Paul at his bedside. Mr. Browning is unable to fully participate in our discussion due to advanced dementia and immediate short term memory loss. Mesha shares her understanding that he " "broke 4 ribs in this fall and punctured his lungs. She was told that he might need a chest tube and he was high risk for developing pneumonia. She recalls that her mother and father discussed with each other that they would not want to be artificially sustained on life-support. When her mother was intubated and told that she would be ventilator-dependent, family honored her wishes and withdrew ventilator support. She thankfully lived 3 years after compassionate extubation and  3 years ago, which was the beginning of Mr. Browning's decline. They had been  when he was 15-years-old and she was 14-years-old. Now he lives with his daughter Mesha and is also supported by his other sons who help with other important aspects of his life. Mesha shares that he has sustained multiple falls, most recently sustained 3 falls in the last 2 weeks. Despite constant reorientation, he forgets to stay in bed and will try to get out and ambulate. He had significant sundowning from 4pm to 8pm, but now it is all day. Mesha and Paul recognize that he is progressively declining and acknowledge that this will continue. They've been told previously when he sustained his prior falls that "this was it" but despite this, he continues to live so they hope to continue doing what they can to help him live. When Mesha discussed code status in the ER, she was under the impression that the ER staff was saying that intubation and CPR will help him. We discussed that knowing that the fall was enough to cause multiple rib fractures, forceful chest compressions will surely cause more harm and more breakage, and the ventilator will worsen his small pneumothorax. I recommended against such interventions, explaining that this will sure cause his demise and not help him the way we hope. Mesha asks if we don't intubate/ventilate when he develops a pneumonia, what can be done alternatively. I shared that at that point there are no curative " interventions, and that medical professionals can support him with symptom management and offer him strategies to alleviate shortness of breath. They asked about chest tube, and I shared that as Neto (their brother) had a chest tube before, that Mr. Browning will also have significant pain if a chest tube is inserted, and this would prolong his current state without a guarantee that it will fix the pneumothorax as his body will have a harder time to heal. They share their understanding and would like to discuss this with their other siblings. Their sister-in-law who is an [employer] RN came to visit, and also voiced similar concerns about CPR/intubation as well. Mesha states that she is worried that he is miserable, forgetting immediately that he already went out to visit family/loved ones and stating repetitively that he wishes to go out to fish. Validated her concerns, recognizing that they wish that everything helpful be continued and offered. Encouraged them to consider that in addition to this, it is also our responsibility to protect him from harmful interventions that will not benefit him, as they had done so for their mother.      Acute Pain 2/2 Rib Fractures, Falls  - Discussed allergy to morphine. According to daughter, he will get angry and hallucinate when he is on morphine. Tolerated fentanyl and hydromorphone.  - Agree with pain management consult  - Speech therapy evaluated Mr. Browning - can swallow food/medications with aspiration precautions  - Schedule acetaminophen 1000 mg PO TID for chronic pain/headache  - Start oxycodone 5 mg PO q4h PRN moderate pain (ordered)  - Start oxycodone 10 mg PO q4h PRN severe pain (ordered)  - Increase interval of hydromorphone 0.5 mg IV from q6h to q1h PRN breakthrough pain (order adjusted)  - Change methocarbamol from IV to  mg q6h (order adjusted)  - Continue gabapentin 300 mg PO TID  - Continue lidocaine patches    At Risk for Opioid Induced  "Constipation  - Start senna 8.6 mg PO BID  - If no bowel movement by tomorrow, consider rectal exam and administer suppository/enema with addition of polyethylene glycol 17 g PO daily    Anxiety/Agitation/[place]/Delirium  Dementia with Behavioral Disturbances  Possible Parkinson's  - Continue memantine 10 mg PO BID if able to swallow  - Continue home sertraline 100 mg PO daily  - Avoid antipsychotics in setting of possible Parkinson's  - Had paradoxical reaction to Xanax  - Continue frequent reorientation and optimization of environment by keeping room bright during the day, dark and quiet at night. Discontinue vital signs at night time to allow him to sleep undisturbed  - Optimize pain management as above  - Would trial midazolam 1 mg IV q4h PRN non-redirectable agitation, anxiety              Thank you for your consult. I will follow-up with patient. Please contact us if you have any additional questions.    Subjective:     HPI:   Paul Browning (Joe) is an 82-year-old man with a history of dementia (FAST score 6D), CAD s/p STEMI/PCI/2v-CABG (2014), ICM with reduced EF (40%), COPD on home oxygen (2 L/min), sarcoidosis, possible Parkinson's, DM2, CKD3, multiple falls who was admitted after sustaining a fall with multiple rib fractures and small apical pneumothorax. Palliative and Supportive Care was consulted to explore goals of care and symptom management recommendations.    I met Mr. Browning, who has both chronic visual and auditory deficits. He asks when will he be able to go home. He is immediately forgetting what we just discussed during our conversation. I met with his daughter Mesha at his bedside. Please see assessment/recommendations for details of conversation.      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 CATH LAB;  Service: Cardiology;  Laterality: N/A;    CATARACT EXTRACTION W/  INTRAOCULAR LENS IMPLANT Right 07/17/2018    Dr. Talamantes    CATARACT EXTRACTION W/  INTRAOCULAR LENS IMPLANT Left 07/31/2018    Dr. Talamantes    CORONARY ARTERY BYPASS GRAFT      x2  10/2014    ESOPHAGOGASTRODUODENOSCOPY N/A 4/8/2019    Procedure: ESOPHAGOGASTRODUODENOSCOPY (EGD)/poss rfa;  Surgeon: Clifford De La Torre MD;  Location: [facility] ENDO (2ND FLR);  Service: Endoscopy;  Laterality: N/A;    ESOPHAGOGASTRODUODENOSCOPY N/A 8/4/2021    Procedure: EGD (ESOPHAGOGASTRODUODENOSCOPY);  Surgeon: Clifford De La Torre MD;  Location: [facility] ENDO (2ND FLR);  Service: Endoscopy;  Laterality: N/A;  8/2-covid [facility] [id number] appts on 8/3    ESOPHAGOGASTRODUODENOSCOPY (EGD) WITH RADIOFREQUENCY TREATMENT OF GASTROESOPHAGEAL JUNCTION      INJECTION OF ANESTHETIC AGENT AROUND MEDIAL BRANCH NERVES INNERVATING CERVICAL FACET JOINT Bilateral 1/9/2020    Procedure: INJECTION, MBB--Bilateral Cervical- Third Occipital nerve, C2-3--ASA okay for pt to continue taking per provider.;  Surgeon: Tip FRANCOIS  Samaria Salazar MD;  Location: [facility];  Service: Pain Management;  Laterality: Bilateral;    INTRAOCULAR PROSTHESES INSERTION Right 7/17/2018    Procedure: INSERTION, INTRAOCULAR LENS PROSTHESIS;  Surgeon: León Talamantes MD;  Location: [facility] OR 1ST FLR;  Service: Ophthalmology;  Laterality: Right;    INTRAOCULAR PROSTHESES INSERTION Left 7/31/2018    Procedure: INSERTION, INTRAOCULAR LENS PROSTHESIS;  Surgeon: León Talamantes MD;  Location: [facility] OR 1ST FLR;  Service: Ophthalmology;  Laterality: Left;    PHACOEMULSIFICATION OF CATARACT Right 7/17/2018    Procedure: PHACOEMULSIFICATION, CATARACT;  Surgeon: León Talamantes MD;  Location: [facility] OR 1ST FLR;  Service: Ophthalmology;  Laterality: Right;    PHACOEMULSIFICATION OF CATARACT Left 7/31/2018    Procedure: PHACOEMULSIFICATION, CATARACT;  Surgeon: León Talamantes MD;  Location: [facility] OR 1ST FLR;  Service: Ophthalmology;  Laterality: Left;    PLACEMENT OF SCREW IN ODONTOID N/A 10/22/2020    Procedure: INSERTION, SCREW, ODONTOID. Anterior approach.;  Surgeon: Kirsten Weaver MD;  Location: [facility] OR 2ND FLR;  Service: Neurosurgery;  Laterality: N/A;    RADIOFREQUENCY THERMOCOAGULATION Bilateral 1/30/2020    Procedure: RADIOFREQUENCY THERMAL COAGULATION--Bilateral C2-3 and Third Occipital Nerve;  Surgeon: Tip Mera Jr., MD;  Location: [facility];  Service: Pain Management;  Laterality: Bilateral;    UMBILICAL HERNIA REPAIR         Review of patient's allergies indicates:   Allergen Reactions    Morphine Shortness Of Breath       Medications:  Continuous Infusions:  Scheduled Meds:   albuterol-ipratropium  3 mL Nebulization Q4H WAKE    atorvastatin  40 mg Oral Daily    budesonide  0.5 mg Nebulization Q12H    enoxaparin  30 mg Subcutaneous Q12H    gabapentin  300 mg Oral TID    [START ON 1/18/2023] LIDOcaine  1 patch Transdermal Q24H    memantine  10 mg Oral BID    methocarbamol (ROBAXIN) IVPB  500 mg Intravenous  Q6H    pantoprazole  40 mg Oral BID    sertraline  100 mg Oral Daily     PRN Meds:sodium chloride 0.9%, dextrose 10%, dextrose 10%, glucagon (human recombinant), glucose, glucose, HYDROmorphone, insulin aspart U-100, ondansetron    Family History       Problem Relation (Age of Onset)    Arrhythmia Mother    Heart disease Mother    Heart failure Brother    No Known Problems Daughter, Son          Tobacco Use    Smoking status: Former     Packs/day: 2.00     Years: 20.00     Pack years: 40.00     Types: Cigarettes     Quit date: 1988     Years since quittin.9     Passive exposure: Past    Smokeless tobacco: Never   Substance and Sexual Activity    Alcohol use: No     Comment: quit drinking beer     Drug use: No    Sexual activity: Not Currently     Partners: Female       Review of Systems   Unable to perform ROS: Dementia   Objective:     Vital Signs (Most Recent):  Temp: 97.8 °F (36.6 °C) (23 0700)  Pulse: 74 (23 0821)  Resp: (!) 22 (23 0821)  BP: (!) 121/56 (23 0800)  SpO2: 100 % (23 0821)   Vital Signs (24h Range):  Temp:  [97.8 °F (36.6 °C)-98 °F (36.7 °C)] 97.8 °F (36.6 °C)  Pulse:  [73-93] 74  Resp:  [2-38] 22  SpO2:  [94 %-100 %] 100 %  BP: (104-141)/(55-73) 121/56     Weight:  (bed scale broken)  Body mass index is 22.15 kg/m².    Physical Exam  Constitutional:       Appearance: He is ill-appearing.   HENT:      Head: Normocephalic and atraumatic.      Right Ear: External ear normal.      Left Ear: External ear normal.      Nose: Nose normal.      Mouth/Throat:      Mouth: Mucous membranes are dry.   Eyes:      Extraocular Movements: Extraocular movements intact.      Conjunctiva/sclera: Conjunctivae normal.   Cardiovascular:      Rate and Rhythm: Normal rate.   Pulmonary:      Effort: Pulmonary effort is normal.      Breath sounds: Normal breath sounds.   Abdominal:      General: Bowel sounds are normal.      Palpations: Abdomen is soft.   Musculoskeletal:       Right lower leg: No edema.      Left lower leg: No edema.   Lymphadenopathy:      Cervical: No cervical adenopathy.   Skin:     General: Skin is dry.      Coloration: Skin is pale.      Findings: Bruising present.   Neurological:      Mental Status: He is alert. He is disoriented.      Motor: Weakness present.       Review of Symptoms      Symptom Assessment (ESAS 0-10 Scale)  Unable to complete assessment due to Dementia         Pain Assessment in Advanced Demential Scale (PAINAD)   Breathing - Independent of vocalization:  0  Negative vocalization:  0  Facial expression:  0  Body language:  0  Consolability:  0  Total:  0    Living Arrangements:  Lives with family    Psychosocial/Cultural:   See Palliative Psychosocial Note: No  Social Issues Identified: Coping deficit pt/family  Bereavement Risk: Yes: Close or dependent relationship to the  person  Caregiver Needs Discussed. Caregiver Distress: Yes: Intensity of family caregiving  Cultural: Wife passed away 3 years ago, was needing ventilator support which was withdrawn in setting of end of life and comfort measures. Lives with daughter Mesha and also provided care by 3 other sons.  **Primary  to Follow**  Palliative Care  Consult: No      Advance Care Planning   Advance Directives:   Living Will: No    LaPOST: No    Do Not Resuscitate Status: No    Medical Power of : No      Decision Making:  Family answered questions and Patient unable to communicate due to disease severity/cognitive impairment  Goals of Care: The family endorses that what is most important right now is to focus on symptom/pain control and quality of life, even if it means sacrificing a little time    Accordingly, we have decided that the best plan to meet the patient's goals includes continuing with treatment       Significant Labs: CBC:   Recent Labs   Lab 23  2145 23  0413   WBC 10.10 7.27   HGB 13.1* 11.4*   HCT 39.7* 35.2*     213     CMP:   Recent Labs   Lab 01/16/23  2145 01/17/23  0413    143   K 3.5 4.2    108   CO2 26 25   * 118*   BUN 17 18   CREATININE 1.2 1.0   CALCIUM 9.2 8.7   PROT 6.9 6.1   ALBUMIN 3.5 3.2*   BILITOT 1.1* 1.0   ALKPHOS 71 56   AST 19 18   ALT 15 15   ANIONGAP 11 10     CBC:   Recent Labs   Lab 01/17/23 0413   WBC 7.27   HGB 11.4*   HCT 35.2*   MCV 98        BMP:  Recent Labs   Lab 01/17/23  0413   *      K 4.2      CO2 25   BUN 18   CREATININE 1.0   CALCIUM 8.7   MG 2.1     LFT:  Lab Results   Component Value Date    AST 18 01/17/2023    ALKPHOS 56 01/17/2023    BILITOT 1.0 01/17/2023     Albumin:   Albumin   Date Value Ref Range Status   01/17/2023 3.2 (L) 3.5 - 5.2 g/dL Final     Protein:   Total Protein   Date Value Ref Range Status   01/17/2023 6.1 6.0 - 8.4 g/dL Final     Lactic acid:   Lab Results   Component Value Date    LACTATE 1.0 06/06/2022    LACTATE 0.8 11/17/2014       Significant Imaging: I have reviewed all pertinent imaging results/findings within the past 24 hours.      I spent 75 minutes in the care of this patient, >50% in chart review, face to face discussion of goals of care, symptom assessment, counseling, coordination of care and emotional support. I spent a total of 16 minutes engaging the patient in this advance care planning discussion.      Marie Blanco MD  Palliative Medicine  [facility] - Surgical Intensive Care

## 2023-01-17 NOTE — SUBJECTIVE & OBJECTIVE
Subjective:  On 2L nasal canula this morning.  HDS.  Afebrile.    Objective:     Vital Signs (Most Recent):  Temp: 97.8 °F (36.6 °C) (01/17/23 0300)  Pulse: 78 (01/17/23 0700)  Resp: (!) 22 (01/17/23 0700)  BP: (!) 113/55 (01/17/23 0700)  SpO2: 100 % (01/17/23 0700)   Vital Signs (24h Range):  Temp:  [97.8 °F (36.6 °C)-98 °F (36.7 °C)] 97.8 °F (36.6 °C)  Pulse:  [73-93] 78  Resp:  [2-38] 22  SpO2:  [94 %-100 %] 100 %  BP: (104-141)/(55-73) 113/55     Weight:  (bed scale broken)  Body mass index is 22.15 kg/m².    Physical Exam  Vitals reviewed.   Constitutional:       Appearance: Normal appearance.   HENT:      Head: Normocephalic.   Cardiovascular:      Rate and Rhythm: Normal rate.   Pulmonary:      Effort: Respiratory distress present.      Comments: Right posterior back pain at site of fractures  Crepitus along the right back  Abdominal:      General: Abdomen is flat. There is no distension.      Tenderness: There is no abdominal tenderness.      Hernia: No hernia is present.   Skin:     General: Skin is warm and dry.      Capillary Refill: Capillary refill takes less than 2 seconds.   Neurological:      General: No focal deficit present.      Mental Status: He is alert.   Psychiatric:         Mood and Affect: Mood normal.       Significant Labs:  I have reviewed all pertinent lab results within the past 24 hours.  CBC:   Recent Labs   Lab 01/17/23 0413   WBC 7.27   RBC 3.61*   HGB 11.4*   HCT 35.2*      MCV 98   MCH 31.6*   MCHC 32.4       CMP:   Recent Labs   Lab 01/17/23 0413   *   CALCIUM 8.7   ALBUMIN 3.2*   PROT 6.1      K 4.2   CO2 25      BUN 18   CREATININE 1.0   ALKPHOS 56   ALT 15   AST 18   BILITOT 1.0         Significant Diagnostics:  I have reviewed all pertinent imaging results/findings within the past 24 hours.    CT with at least four communited, displaced rib fractures on the right side

## 2023-01-17 NOTE — ED NOTES
Paul Browning, a 82 y.o. male presents to the ED w/ complaint of mechanical fall to R side while walking to bathroom. Pt c/o generalized back pain, neck pain, and head pain. Denies CP. Son states pt fell yesterday too and has had many recent falls. Son states he is unsure if pt hit head or not. -LOC. Pt confused and does not remember the event, son states this is his norm. Reports taking ASA daily. Bruising noted to R lower back from yesterdays fall. Hx of dementia. Oriented to self, son states this is pt's norm    Triage note:  Chief Complaint   Patient presents with    Fall     Mechanical fall to R side while walking to bathroom. Fell yesterday also onto same side. Hx of dementia. Fall witnessed by pt son and reports that pt hit head tonight. -blood thinners. No deformity, bruising noted to R lower back from yesterdays fall per son.      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

## 2023-01-17 NOTE — ASSESSMENT & PLAN NOTE
Paul Browning is a frail 82M h/o CAD (EF 40% with previous MI with enduring defect seen on cardiac PET), COPD on 0.5L NC at home and inhalers, DM2, CKD3, HTN, sarcoid, CAD who fell multiple times at home. Imaging shows small apical PTX and subQ air on R chest wall with CT with several comminuted posterior rib fractures.      Neuro/Psych:   -- Sedation: none  -- Pain: ifeoma tylenol, robaxin, gabapentin; dilaudid PCA             Cards:   -- hx of HTN, CAD s/p stents  -- HDS  -- continue Entresto; holding home ASA      Pulm:   -- hx of COPD, baseline 0.5L NC at home  -- now has right sided rib fractures and small right apical PTX  -- Goal O2 sat > 90%  -- Wean as able  -- duonebs, IS, Acapella      Renal:  -- hx of CKD3, baseline Cr 1.0  -- BUN/Cr 17/1.2      FEN / GI:    -- Replace lytes as needed  -- Nutrition: diabetic diet      ID:   -- Tm: afebrile; WBC 10.10  -- Abx none      Heme/Onc:   -- H/H 13.1/39.7  -- Daily CBC      Endo:   -- hx of DM2, on Jardiance   -- Gluc goal 140-180  -- AISS      PPx:   Feeding: diabetic   Analgesia/Sedation: ifeoma tylenol, gabapentin, robaxin, lido patch; dilaudid PCA  Thromboembolic prevention: lovenox  HOB >30: yes  Stress Ulcer ppx: protonix  Glucose control: Critical care goal 140-180 g/dl, ISS    Lines/Drains/Airway: Carlos ROMERO       Dispo/Code Status/Palliative:   -- SICU / Full Code

## 2023-01-17 NOTE — SUBJECTIVE & OBJECTIVE
No current facility-administered medications on file prior to encounter.     Current Outpatient Medications on File Prior to Encounter   Medication Sig    albuterol (PROVENTIL/VENTOLIN HFA) 90 mcg/actuation inhaler INHALE 2 PUFFS INTO THE LUNGS EVERY 6 HOURS AS NEEDED WHEEZING OR SHORTNESS OF BREATH    albuterol-ipratropium (DUO-NEB) 2.5 mg-0.5 mg/3 mL nebulizer solution Take 3 mLs by nebulization every 12 (twelve) hours. Rescue    alendronate (FOSAMAX) 70 MG tablet TAKE 1 TABLET BY MOUTH EVERY WEEK IN THE MORNING WITH FULL GLASS OF WATER ON EMPTY STOMACH-DONT LIE DOWN FOR AT LEAST 30 MINUTES AFTERWARDS    aspirin (ECOTRIN) 81 MG EC tablet Take 81 mg by mouth once daily.    atorvastatin (LIPITOR) 40 MG tablet TAKE 1 TABLET(40 MG) BY MOUTH EVERY DAY    budesonide (PULMICORT) 0.5 mg/2 mL nebulizer solution Take 2 mLs (0.5 mg total) by nebulization 2 (two) times daily. Controller    calcium carbonate (CALCIUM 600 ORAL) Take by mouth.    doxepin (SILENOR) 3 mg Tab One po qhs    empagliflozin (JARDIANCE) 10 mg tablet Take 1 tablet (10 mg total) by mouth once daily.    furosemide (LASIX) 20 MG tablet Take 1 tablet (20 mg total) by mouth daily as needed (Wt gain >2 pounds/day or >5 pounds/week).    magnesium oxide (MAG-OX) 400 mg (241.3 mg magnesium) tablet Take 1 tablet (400 mg total) by mouth once daily.    memantine (NAMENDA) 10 MG Tab Take 1 tablet (10 mg total) by mouth 2 (two) times daily.    methylPREDNISolone (MEDROL, RENAN,) 4 mg tablet use as directed    omega-3 fatty acids-vitamin E (FISH OIL) 1,000 mg Cap Take 1 tablet by mouth 2 (two) times daily. (Patient taking differently: Take 1 tablet by mouth once daily.)    ondansetron (ZOFRAN) 8 MG tablet Take 1 tablet (8 mg total) by mouth every 8 (eight) hours as needed for Nausea.    pantoprazole (PROTONIX) 40 MG tablet TAKE 1 TABLET(40 MG) BY MOUTH TWICE DAILY    sacubitriL-valsartan (ENTRESTO) 24-26 mg per tablet Take 1 tablet by mouth 2 (two) times daily.     sertraline (ZOLOFT) 100 MG tablet Take 1 tablet (100 mg total) by mouth once daily.    tiotropium-olodateroL (STIOLTO RESPIMAT) 2.5-2.5 mcg/actuation Mist Inhale 2 puffs into the lungs once daily. Controller    ubrogepant (UBROGEPANT) 100 mg tablet One po at onset of migraine. May repeat after 2 hours if needed.       Review of patient's allergies indicates:   Allergen Reactions    Morphine Shortness Of Breath       Past Medical History:   Diagnosis Date    Acute hypoxemic respiratory failure 1/23/2022    Anxiety     Stevens's esophagus with low grade dysplasia     BPH (benign prostatic hyperplasia)     CAD (coronary artery disease)     Cataract     Cervical spondylosis 1/3/2020    Chronic obstructive pulmonary disease with acute exacerbation     Diaphragmatic hernia     GERD (gastroesophageal reflux disease)     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    Hyperlipidemia     Hypertension     Ischemic cardiomyopathy 11/5/2014    MDD (major depressive disorder), recurrent episode, mild 2/17/2016    Osteoporosis 7/20/2016    Peripheral arterial disease 3/18/2016    Sarcoid     Squamous cell carcinoma 09/2016, 5/2016    crown of scalp, left wrist     Past Surgical History:   Procedure Laterality Date    CARDIAC SURGERY      CAROTID STENT N/A 10/14/2019    Procedure: INSERTION, STENT, ARTERY, CAROTID;  Surgeon: Artie Kebede MD;  Location: [facility] CATH LAB;  Service: Cardiology;  Laterality: N/A;    CATARACT EXTRACTION W/  INTRAOCULAR LENS IMPLANT Right 07/17/2018    Dr. Talamantes    CATARACT EXTRACTION W/  INTRAOCULAR LENS IMPLANT Left 07/31/2018    Dr. Talamantes    CORONARY ARTERY BYPASS GRAFT      x2  10/2014    ESOPHAGOGASTRODUODENOSCOPY N/A 4/8/2019    Procedure: ESOPHAGOGASTRODUODENOSCOPY (EGD)/poss rfa;  Surgeon: Clifford De La Torre MD;  Location: [facility] ENDO ([address] [place] [address]);  Service: Endoscopy;  Laterality: N/A;    ESOPHAGOGASTRODUODENOSCOPY N/A 8/4/2021    Procedure: EGD (ESOPHAGOGASTRODUODENOSCOPY);  Surgeon: Clifford De La Torre MD;   Location: [facility] ENDO (2ND FLR);  Service: Endoscopy;  Laterality: N/A;  [id number]    ESOPHAGOGASTRODUODENOSCOPY (EGD) WITH RADIOFREQUENCY TREATMENT OF GASTROESOPHAGEAL JUNCTION      INJECTION OF ANESTHETIC AGENT AROUND MEDIAL BRANCH NERVES INNERVATING CERVICAL FACET JOINT Bilateral 1/9/2020    Procedure: INJECTION, MBB--Bilateral Cervical- Third Occipital nerve, C2-3--ASA okay for pt to continue taking per provider.;  Surgeon: Tip Mera Jr., MD;  Location: [facility] PAIN [facility] [place];  Service: Pain Management;  Laterality: Bilateral;    INTRAOCULAR PROSTHESES INSERTION Right 7/17/2018    Procedure: INSERTION, INTRAOCULAR LENS PROSTHESIS;  Surgeon: León Talamantes MD;  Location: [facility] OR 1ST FLR;  Service: Ophthalmology;  Laterality: Right;    INTRAOCULAR PROSTHESES INSERTION Left 7/31/2018    Procedure: INSERTION, INTRAOCULAR LENS PROSTHESIS;  Surgeon: León Talamantes MD;  Location: [facility] OR 1ST FLR;  Service: Ophthalmology;  Laterality: Left;    PHACOEMULSIFICATION OF CATARACT Right 7/17/2018    Procedure: PHACOEMULSIFICATION, CATARACT;  Surgeon: León Talamantes MD;  Location: [facility] OR 1ST FLR;  Service: Ophthalmology;  Laterality: Right;    PHACOEMULSIFICATION OF CATARACT Left 7/31/2018    Procedure: PHACOEMULSIFICATION, CATARACT;  Surgeon: León Talamantes MD;  Location: [facility] OR 1ST FLR;  Service: Ophthalmology;  Laterality: Left;    PLACEMENT OF SCREW IN ODONTOID N/A 10/22/2020    Procedure: INSERTION, SCREW, ODONTOID. Anterior approach.;  Surgeon: Kirsten Weaver MD;  Location: [facility] OR 2ND FLR;  Service: Neurosurgery;  Laterality: N/A;    RADIOFREQUENCY THERMOCOAGULATION Bilateral 1/30/2020    Procedure: RADIOFREQUENCY THERMAL COAGULATION--Bilateral C2-3 and Third Occipital Nerve;  Surgeon: Tip Mera Jr., MD;  Location: [facility] PAIN [facility] [place];  Service: Pain Management;  Laterality: Bilateral;    UMBILICAL HERNIA REPAIR       Family History       Problem  Relation (Age of Onset)    Arrhythmia Mother    Heart disease Mother    Heart failure Brother    No Known Problems Daughter, Son          Tobacco Use    Smoking status: Former     Packs/day: 2.00     Years: 20.00     Pack years: 40.00     Types: Cigarettes     Quit date: 1988     Years since quittin.9     Passive exposure: Past    Smokeless tobacco: Never   Substance and Sexual Activity    Alcohol use: No     Comment: quit drinking beer     Drug use: No    Sexual activity: Not Currently     Partners: Female     Review of Systems   Unable to perform ROS: Acuity of condition   Objective:     Vital Signs (Most Recent):  Temp: 98 °F (36.7 °C) (23)  Pulse: 86 (23)  Resp: (!) 2 (23)  BP: (!) 128/56 (23)  SpO2: 95 % (23)   Vital Signs (24h Range):  Temp:  [98 °F (36.7 °C)] 98 °F (36.7 °C)  Pulse:  [73-91] 86  Resp:  [2-23] 2  SpO2:  [95 %-96 %] 95 %  BP: (108-138)/(56-72) 128/56     Weight: 68 kg (150 lb)  Body mass index is 22.15 kg/m².    Physical Exam  Vitals reviewed.   Constitutional:       Appearance: Normal appearance.   HENT:      Head: Normocephalic.   Cardiovascular:      Rate and Rhythm: Normal rate.   Pulmonary:      Effort: Respiratory distress present.      Comments: Right posterior back pain at site of fractures  Crepitus along the right back  Abdominal:      General: Abdomen is flat. There is no distension.      Tenderness: There is no abdominal tenderness.      Hernia: No hernia is present.   Skin:     General: Skin is warm and dry.      Capillary Refill: Capillary refill takes less than 2 seconds.   Neurological:      General: No focal deficit present.      Mental Status: He is alert.   Psychiatric:         Mood and Affect: Mood normal.       Significant Labs:  I have reviewed all pertinent lab results within the past 24 hours.  CBC:   Recent Labs   Lab 23  2145   WBC 10.10   RBC 4.07*   HGB 13.1*   HCT 39.7*      MCV 98    MCH 32.2*   MCHC 33.0     CMP:   Recent Labs   Lab 01/16/23  2145   *   CALCIUM 9.2   ALBUMIN 3.5   PROT 6.9      K 3.5   CO2 26      BUN 17   CREATININE 1.2   ALKPHOS 71   ALT 15   AST 19   BILITOT 1.1*       Significant Diagnostics:  I have reviewed all pertinent imaging results/findings within the past 24 hours.    CT with at least four communited, displaced rib fractures on the right side

## 2023-01-17 NOTE — PROGRESS NOTES
[facility]  Critical Care - Surgery  Progress Note    Patient Name: Paul Browning  MRN: [id number]  Admission Date: 1/16/2023  Hospital Length of Stay: 0 days  Code Status: Prior  Attending Provider: Santo Shankar MD  Primary Care Provider: Grey Forbes DO   Principal Problem: <principal problem not specified>    Subjective:     Hospital/ICU Course:  1/17: admitted to SICU for close observation and pain control. No surgery at this time. Palliative care and acute pain medicine consulted. CXR this AM with increased R sided patchy infiltrates      Interval History/Significant Events: 1/17: admitted to SICU for close observation and pain control. No surgery at this time. Palliative care and acute pain medicine consulted. CXR this AM with increased R sided patchy infiltrates        Objective:     Vital Signs (Most Recent):  Temp: 97.8 °F (36.6 °C) (01/17/23 0300)  Pulse: 78 (01/17/23 0700)  Resp: (!) 22 (01/17/23 0700)  BP: (!) 113/55 (01/17/23 0700)  SpO2: 100 % (01/17/23 0700)   Vital Signs (24h Range):  Temp:  [97.8 °F (36.6 °C)-98 °F (36.7 °C)] 97.8 °F (36.6 °C)  Pulse:  [73-93] 78  Resp:  [2-38] 22  SpO2:  [94 %-100 %] 100 %  BP: (104-141)/(55-73) 113/55     Weight:  (bed scale broken)  Body mass index is 22.15 kg/m².      Intake/Output Summary (Last 24 hours) at 1/17/2023 0730  Last data filed at 1/17/2023 0200  Gross per 24 hour   Intake --   Output 250 ml   Net -250 ml       Physical Exam     General: He is not in acute distress.     Appearance: Normal appearance.      In acute distress  HENT:      Head: Normocephalic and atraumatic.   Eyes:      General: No scleral icterus.     Conjunctiva/sclera: Conjunctivae normal.      Pupils: Pupils are equal, round, and reactive to light.   Neck:      Trachea: No tracheal deviation.   Cardiovascular:      Rate and Rhythm: Normal rate and regular rhythm.   Pulmonary:      Effort: Pulmonary effort is normal. No respiratory distress.       Breath sounds: No stridor.      Gross deformity to right back; tender to palpation  Abdominal:      General: Abdomen is flat. There is no distension.      Palpations: Abdomen is soft.      Tenderness: There is no abdominal tenderness. There is no guarding.   Musculoskeletal:         General: No deformity or signs of injury. Normal range of motion.      Cervical back: No edema.   Skin:     General: Skin is warm and dry.      Coloration: Skin is not jaundiced.   Neurological:      General: No focal deficit present.      Mental Status: He is alert and oriented to person, place, and time.   Psychiatric:         Mood and Affect: Mood normal.         Behavior: Behavior normal  Vents:  Oxygen Concentration (%): 28 (01/16/23 2240)    Lines/Drains/Airways       Peripheral Intravenous Line  Duration                  Peripheral IV - Single Lumen 01/16/23 2147 20 G Posterior;Right Forearm <1 day         Peripheral IV - Single Lumen 01/17/23 0022 20 G Anterior;Proximal;Right Forearm <1 day                    Significant Labs:    CBC/Anemia Profile:  Recent Labs   Lab 01/16/23 2145 01/17/23  0413   WBC 10.10 7.27   HGB 13.1* 11.4*   HCT 39.7* 35.2*    213   MCV 98 98   RDW 13.7 13.7        Chemistries:  Recent Labs   Lab 01/16/23 2145 01/17/23  0413    143   K 3.5 4.2    108   CO2 26 25   BUN 17 18   CREATININE 1.2 1.0   CALCIUM 9.2 8.7   ALBUMIN 3.5 3.2*   PROT 6.9 6.1   BILITOT 1.1* 1.0   ALKPHOS 71 56   ALT 15 15   AST 19 18   MG  --  2.1   PHOS  --  3.8       All pertinent labs within the past 24 hours have been reviewed.    Significant Imaging:  I have reviewed all pertinent imaging results/findings within the past 24 hours.  X-Ray Chest 1 View    Result Date: 1/16/2023  Metal objects project over the mid right hemithorax, new from prior study. Correlate clinically for external foreign bodies on the patient's skin or clothing versus potential internal foreign bodies. Sternotomy wires, similar to  prior. Stable cardiomegaly. Chronic changes in the upper lung fields/apices, similar to prior, with superimposed right lung edema possibly representing pulmonary hemorrhage in the setting of blunt trauma. Air in the right chest wall adjacent to the lateral rib cage.  Small right apical pneumothorax.  Suspect right-sided rib fracture This report was flagged in Epic as abnormal. The critical information above was relayed directly by South Diaz MD by [facility] secure chat to Artie Horne MD on 1/16/2023 at 22:07. Electronically signed by: South Diaz MD Date:    01/16/2023 Time:    22:09    CT Head Without Contrast    Result Date: 1/16/2023  No CT evidence of acute intracranial abnormality. Clinical correlation and further evaluation as warranted. Chronic senescent and microvascular ischemic changes. Electronically signed by: Kanu Paulino MD Date:    01/16/2023 Time:    23:23    CT Chest Without Contrast    Result Date: 1/17/2023  1. Acute comminuted displaced fractures of the right 6th through 9th posterior ribs.  This may result in potential flail chest.  Resultant small right pneumothorax and right posterolateral chest wall subcutaneous emphysema.  Small bilateral pleural effusions, with right hemothorax to be considered. 2. Subtle hypoenhancement of the inferior aspect of the spleen.  Finding favored to reflect contrast bolus timing, however splenic trauma resulting in a small laceration of the inferior margin may appear similar.  No perisplenic hematoma identified. 3. Overall similar partially calcified right upper lobe solid mass and innumerable bilateral nodules.  Calcified mediastinal hilar nodes.  Findings remain suggestive of sarcoidosis. 4. 1.1 cm right nonobstructing nephrolith. 5. Large hiatal hernia. 6. Prostatomegaly. 7.  Additional findings as above. This report was flagged in Epic as abnormal. Electronically signed by resident: Maynor Horner Date:    01/16/2023 Time:    23:28  Electronically signed by: South Diaz MD Date:    01/17/2023 Time:    00:24    CT Cervical Spine Without Contrast    Result Date: 1/16/2023  1.  No CT evidence of acute displaced fracture or traumatic subluxation of the cervical spine. 2.  Remote postsurgical change of the dens.  Multilevel degenerative changes, most pronounced at C5-C6, similar to prior examination. 3.  Right apical pneumothorax with associated subcutaneous emphysema throughout the right chest wall extending into the base of the neck.  Findings to be further discussed in separate dictated chest CT report. 4.  Previously demonstrated bilateral calcified upper lobe lesions and multiple calcified thoracic lymph nodes. Electronically signed by: Kanu Paulino MD Date:    01/16/2023 Time:    23:35    CT Abdomen Pelvis With Contrast    Result Date: 1/17/2023  1. Acute comminuted displaced fractures of the right 6th through 9th posterior ribs.  This may result in potential flail chest.  Resultant small right pneumothorax and right posterolateral chest wall subcutaneous emphysema.  Small bilateral pleural effusions, with right hemothorax to be considered. 2. Subtle hypoenhancement of the inferior aspect of the spleen.  Finding favored to reflect contrast bolus timing, however splenic trauma resulting in a small laceration of the inferior margin may appear similar.  No perisplenic hematoma identified. 3. Overall similar partially calcified right upper lobe solid mass and innumerable bilateral nodules.  Calcified mediastinal hilar nodes.  Findings remain suggestive of sarcoidosis. 4. 1.1 cm right nonobstructing nephrolith. 5. Large hiatal hernia. 6. Prostatomegaly. 7.  Additional findings as above. This report was flagged in Epic as abnormal. Electronically signed by resident: Maynor Horner Date:    01/16/2023 Time:    23:28 Electronically signed by: South Diaz MD Date:    01/17/2023 Time:    00:24       Assessment/Plan:     Closed fracture of  multiple ribs of right side  Paul Browning is a frail 82M h/o CAD (EF 40% with previous MI with enduring defect seen on cardiac PET), COPD on 0.5L NC at home and inhalers, DM2, CKD3, HTN, sarcoid, CAD who fell multiple times at home. Imaging shows small apical PTX and subQ air on R chest wall with CT with several comminuted posterior rib fractures.      Neuro/Psych:   -- Sedation: none  -- Pain: ifeoma robaxin, gabapentin, lidocaine patch; dilaudid Q2 push prn (allergy to morphine)  -- Acute pain consulted  -- Palliative care consulted             Cards:   -- hx of HTN, CAD s/p stents  -- HDS  -- continue Entresto; holding home ASA      Pulm:   -- hx of COPD, baseline 0.5L NC at home  -- now has right sided rib fractures and small right apical PTX  -- Goal O2 sat > 90%  -- Wean as able  -- clyde, IS, Acapella      Renal:  -- hx of CKD3, baseline Cr 1.0  -- BUN/Cr 18/1.0      FEN / GI:    -- Replace lytes as needed  -- Nutrition: NPO      ID:   -- Tm: afebrile; WBC 7.3  -- Abx none      Heme/Onc:   -- H/H 11.4/35.2  -- Daily CBC      Endo:   -- hx of DM2, on Jardiance   -- Gluc goal 140-180  -- AISS      PPx:   Feeding: NPO  Analgesia/Sedation: [facility] gabapentin, robaxin, lido patch; dilaudid Q2 pushes PRN  Thromboembolic prevention: lovenox  HOB >30: yes  Stress Ulcer ppx: protonix  Glucose control: Critical care goal 140-180 g/dl, ISS    Lines/Drains/Airway: PIV      Dispo/Code Status/Palliative:   -- SICU / Full Code            Critical care was time spent personally by me on the following activities: development of treatment plan with patient or surrogate and bedside caregivers, discussions with consultants, evaluation of patient's response to treatment, examination of patient, ordering and performing treatments and interventions, ordering and review of laboratory studies, ordering and review of radiographic studies, pulse oximetry, re-evaluation of patient's condition.  This critical care time did  not overlap with that of any other provider or involve time for any procedures.     Bennett Capellan MD  Critical Care - Surgery  [facility]

## 2023-01-17 NOTE — PROGRESS NOTES
Vitals stable since admission. Afebrile. Follows all commands and moves all extremities. Oriented to self only (knows name not birthday). On 2L NC with SATs >95%. MAPs >65. No continuous infusions. Pain medication administered per order. NPO at this time. Voids using urinal. Daughter at bedside. All questions and concerns addressed and answered. No skin breakdown noted to heels or sacrum. Bed plugged in and mattress inflated.

## 2023-01-17 NOTE — ASSESSMENT & PLAN NOTE
"Paul Berrios"Slim Browning is an 82-year-old man with a history of dementia (FAST score 6D), CAD s/p STEMI/PCI/2v-CABG (), ICM with reduced EF (40%), COPD on home oxygen (2 L/min), sarcoidosis, possible Parkinson's, DM2, CKD3, multiple falls who was admitted after sustaining a fall with multiple rib fractures and small apical pneumothorax. Palliative and Supportive Care was consulted to explore goals of care and symptom management recommendations.    Advance Care Planning   Goals of Care:  - Code status: Full code  - Next of kin: 4 adult children (3 sons, 1 daughter)  - Patient is not decisional  - ACP documents: none  - Prognosis: poor  - Family's understanding of prognosis: continue education  - Goals: control pain/symptoms, optimize quality of life, continue full supportive care  - Recommendations/Plans: continue full supportive care - will revisit this afternoon to revisit code status, understanding of prognosis and goals/plans    Goals of Care Conversation  - 2023: Met Mr. Browning and his daughter Mesha and son Paul at his bedside. Mr. Browning is unable to fully participate in our discussion due to advanced dementia and immediate short term memory loss. Mesha shares her understanding that he broke 4 ribs in this fall and punctured his lungs. She was told that he might need a chest tube and he was high risk for developing pneumonia. She recalls that her mother and father discussed with each other that they would not want to be artificially sustained on life-support. When her mother was intubated and told that she would be ventilator-dependent, family honored her wishes and withdrew ventilator support. She thankfully lived 3 years after compassionate extubation and  3 years ago, which was the beginning of Mr. Browning's decline. They had been  when he was 15-years-old and she was 14-years-old. Now he lives with his daughter Mesha and is also supported by his other sons " "who help with other important aspects of his life. Mesha shares that he has sustained multiple falls, most recently sustained 3 falls in the last 2 weeks. Despite constant reorientation, he forgets to stay in bed and will try to get out and ambulate. He had significant sundowning from 4pm to 8pm, but now it is all day. Mesha and Paul recognize that he is progressively declining and acknowledge that this will continue. They've been told previously when he sustained his prior falls that "this was it" but despite this, he continues to live so they hope to continue doing what they can to help him live. When Mesha discussed code status in the ER, she was under the impression that the ER staff was saying that intubation and CPR will help him. We discussed that knowing that the fall was enough to cause multiple rib fractures, forceful chest compressions will surely cause more harm and more breakage, and the ventilator will worsen his small pneumothorax. I recommended against such interventions, explaining that this will sure cause his demise and not help him the way we hope. Mesha asks if we don't intubate/ventilate when he develops a pneumonia, what can be done alternatively. I shared that at that point there are no curative interventions, and that medical professionals can support him with symptom management and offer him strategies to alleviate shortness of breath. They asked about chest tube, and I shared that as Neto (their brother) had a chest tube before, that Mr. Browning will also have significant pain if a chest tube is inserted, and this would prolong his current state without a guarantee that it will fix the pneumothorax as his body will have a harder time to heal. They share their understanding and would like to discuss this with their other siblings. Their sister-in-law who is an [facility] RN came to visit, and also voiced similar concerns about CPR/intubation as well. Mesha states that she " is worried that he is miserable, forgetting immediately that he already went out to visit family/loved ones and stating repetitively that he wishes to go out to fish. Validated her concerns, recognizing that they wish that everything helpful be continued and offered. Encouraged them to consider that in addition to this, it is also our responsibility to protect him from harmful interventions that will not benefit him, as they had done so for their mother.       Acute Pain 2/2 Rib Fractures, Falls  - Discussed allergy to morphine. According to daughter, he will get angry and hallucinate when he is on morphine. Tolerated fentanyl and hydromorphone.  - Agree with pain management consult  - Speech therapy evaluated Mr. Browning - can swallow food/medications with aspiration precautions  - Schedule acetaminophen 1000 mg PO TID for chronic pain/headache  - Start oxycodone 5 mg PO q4h PRN moderate pain (ordered)  - Start oxycodone 10 mg PO q4h PRN severe pain (ordered)  - Increase interval of hydromorphone 0.5 mg IV from q6h to q1h PRN breakthrough pain (order adjusted)  - Change methocarbamol from IV to  mg q6h (order adjusted)  - Continue gabapentin 300 mg PO TID  - Continue lidocaine patches    At Risk for Opioid Induced Constipation  - Start senna 8.6 mg PO BID  - If no bowel movement by tomorrow, consider rectal exam and administer suppository/enema with addition of polyethylene glycol 17 g PO daily    Anxiety/Agitation/[place]/Delirium  Dementia with Behavioral Disturbances  Possible Parkinson's  - Continue memantine 10 mg PO BID if able to swallow  - Continue home sertraline 100 mg PO daily  - Avoid antipsychotics in setting of possible Parkinson's  - Had paradoxical reaction to Xanax  - Continue frequent reorientation and optimization of environment by keeping room bright during the day, dark and quiet at night. Discontinue vital signs at night time to allow him to sleep undisturbed  - Optimize pain  management as above  - Would trial midazolam 1 mg IV q4h PRN non-redirectable agitation, anxiety

## 2023-01-17 NOTE — ASSESSMENT & PLAN NOTE
81yo frail and comorbid gentleman with multiple posterior right rib fractures that are comminuted with significant displacement following a fall.  He has small pneumothorax that does not need intervention at this time, but may eventually  This is a major setback.  Very high risk for pneumonia.    Admit to SICU for observation and pain control  Follow up discussion about code status DNI/DNR with family in the morning (conversation initiated in the ED)  Consult palliative care  Needs PCA and multimodals  Consider consult to anesthesia pain  Home meds  Daily XR  DVT ppx

## 2023-01-17 NOTE — CHAPLAIN
"Palliative Care     Patient: Paul Browning       MRN: [id number]  : 1940  Age: 82 y.o.  Hospital Length of Stay: 0 days  Code Status: Full Code   Attending Provider: Santo Shankar MD  Principal Problem: Closed fracture of multiple ribs of right side    Present during Interview:  Pt requested  and  for anointment through the [facility] doc. I visited pt with son and daughter bedside. Fr. Chase is off on  but a referral request is in for him to visit tomorrow.  Pt goes by "Mr. Nieto"    Non-clinical observations:  Pt was resting/sleeping, seemed comfortable; son and daughter at the sofa, welcomed me in.     Facts (Spiritual Perception of Illness):   Pt had a fall recently; [place] and sight impaired.     Feelings/family  (Emotions/Fears/Experiences/Coping):      There are four adult children of pt, Mesha and Jr Paul. Between all the kids they care for pt; pt has declined significantly over the past 3 yrs since his wife/their mother . Family frustrated because pt is stubborn, think he can do more than he's able, been working since he was 11-- family says, "it's that generation". Family said pt misses being about to do things-- walk distances, drive, see, hear, etc. We talked about how that is a form of loss and grief for him. Pt slept most the visit.     Family is familiar with [facility]. NP, Caroline, took care of their mother for a while.     Deja (Beliefs/Meaning/Philosophy):  Pt and family are devout Catholics and told them Fr. Chase should be by tomorrow. family asked me to pray over pt and did so; they were very appreciative of the visit and the prayer, gave them my card  reminding them I'm here for them as well as their dad.     Future (Spiritual Care Plan of Action):  Palliative  will continue to follow. , in your mercy.    [phone]  In Peace,  Rev. Melissa Chilel MBA, MDiv   "

## 2023-01-17 NOTE — ED PROVIDER NOTES
Encounter Date: 1/16/2023       History     Chief Complaint   Patient presents with    Fall     Mechanical fall to R side while walking to bathroom. Fell yesterday also onto same side. Hx of dementia. Fall witnessed by pt son and reports that pt hit head tonight. -blood thinners. No deformity, bruising noted to R lower back from yesterdays fall per son.      Patient is a 82 year old male pmh of CAD, PAD, HTN, chronic back pain, and dementia who presents after fall at home. Family states that he was sitting at the table when he stood up, became unstable, and fell down backwards. He denies hitting his head. Family states he has frequent falls, usually that occur when he gets up while they aren't watching him. After this fall he started complaining of severe back pain so they called EMS. He had a fall a few days ago and hit the left side of his back. Also had a fall a couple of weeks ago out of bed and he hit his head on his oxygen machine. Patient has been complaining of dizziness for the past couple of weeks. Denies decreased oral intake. No fevers or chills. Has shortness of breath and uses oxygen at home. Usually only wears it at night.     The history is provided by the patient and a relative.   Review of patient's allergies indicates:   Allergen Reactions    Morphine Shortness Of Breath     Past Medical History:   Diagnosis Date    Acute hypoxemic respiratory failure 1/23/2022    Anxiety     Stevens's esophagus with low grade dysplasia     BPH (benign prostatic hyperplasia)     CAD (coronary artery disease)     Cataract     Cervical spondylosis 1/3/2020    Chronic obstructive pulmonary disease with acute exacerbation     Diaphragmatic hernia     GERD (gastroesophageal reflux disease)     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    Hyperlipidemia     Hypertension     Ischemic cardiomyopathy 11/5/2014    MDD (major depressive disorder), recurrent episode, mild 2/17/2016    Osteoporosis 7/20/2016    Peripheral arterial disease  3/18/2016    Sarcoid     Squamous cell carcinoma 09/2016, 5/2016    crown of scalp, left wrist     Past Surgical History:   Procedure Laterality Date    CARDIAC SURGERY      CAROTID STENT N/A 10/14/2019    Procedure: INSERTION, STENT, ARTERY, CAROTID;  Surgeon: Artie Kebede MD;  Location: [facility] CATH LAB;  Service: Cardiology;  Laterality: N/A;    CATARACT EXTRACTION W/  INTRAOCULAR LENS IMPLANT Right 07/17/2018    Dr. Talamantes    CATARACT EXTRACTION W/  INTRAOCULAR LENS IMPLANT Left 07/31/2018    Dr. Talamantes    CORONARY ARTERY BYPASS GRAFT      x2  10/2014    ESOPHAGOGASTRODUODENOSCOPY N/A 4/8/2019    Procedure: ESOPHAGOGASTRODUODENOSCOPY (EGD)/poss rfa;  Surgeon: Clifford De La Torre MD;  Location: [facility] ENDO (2ND FLR);  Service: Endoscopy;  Laterality: N/A;    ESOPHAGOGASTRODUODENOSCOPY N/A 8/4/2021    Procedure: EGD (ESOPHAGOGASTRODUODENOSCOPY);  Surgeon: Clifford De La Torre MD;  Location: [facility] ENDO (2ND FLR);  Service: Endoscopy;  Laterality: N/A;  8/2-covid [place]-inst rpfddp-yv-pe appts on 8/3    ESOPHAGOGASTRODUODENOSCOPY (EGD) WITH RADIOFREQUENCY TREATMENT OF GASTROESOPHAGEAL JUNCTION      INJECTION OF ANESTHETIC AGENT AROUND MEDIAL BRANCH NERVES INNERVATING CERVICAL FACET JOINT Bilateral 1/9/2020    Procedure: INJECTION, MBB--Bilateral Cervical- Third Occipital nerve, C2-3--ASA okay for pt to continue taking per provider.;  Surgeon: Tip Mera Jr., MD;  Location: [facility] PAIN MGT;  Service: Pain Management;  Laterality: Bilateral;    INTRAOCULAR PROSTHESES INSERTION Right 7/17/2018    Procedure: INSERTION, INTRAOCULAR LENS PROSTHESIS;  Surgeon: León Talamantes MD;  [facility];  Service: Ophthalmology;  Laterality: Right;    INTRAOCULAR PROSTHESES INSERTION Left 7/31/2018    Procedure: INSERTION, INTRAOCULAR LENS PROSTHESIS;  Surgeon: León Talamantes MD;  [facility];  Service: Ophthalmology;  Laterality: Left;    PHACOEMULSIFICATION OF CATARACT Right  2018    Procedure: PHACOEMULSIFICATION, CATARACT;  Surgeon: León Talamantes MD;  Location: [facility] OR 1ST FLR;  Service: Ophthalmology;  Laterality: Right;    PHACOEMULSIFICATION OF CATARACT Left 2018    Procedure: PHACOEMULSIFICATION, CATARACT;  Surgeon: León Talamantes MD;  Location: [facility] OR 1ST FLR;  Service: Ophthalmology;  Laterality: Left;    PLACEMENT OF SCREW IN ODONTOID N/A 10/22/2020    Procedure: INSERTION, SCREW, ODONTOID. Anterior approach.;  Surgeon: Kirsten Weaver MD;  Location: [facility] OR 2ND FLR;  Service: Neurosurgery;  Laterality: N/A;    RADIOFREQUENCY THERMOCOAGULATION Bilateral 2020    Procedure: RADIOFREQUENCY THERMAL COAGULATION--Bilateral C2-3 and Third Occipital Nerve;  Surgeon: Tip Mera Jr., MD;  Location: [facility] PAIN MGT;  Service: Pain Management;  Laterality: Bilateral;    UMBILICAL HERNIA REPAIR       Family History   Problem Relation Age of Onset    Arrhythmia Mother     Heart disease Mother     Heart failure Brother     No Known Problems Daughter     No Known Problems Son     Colon cancer Neg Hx     Inflammatory bowel disease Neg Hx     Celiac disease Neg Hx     Melanoma Neg Hx     Amblyopia Neg Hx     Blindness Neg Hx     Cataracts Neg Hx     Glaucoma Neg Hx     Macular degeneration Neg Hx     Retinal detachment Neg Hx     Strabismus Neg Hx      Social History     Tobacco Use    Smoking status: Former     Packs/day: 2.00     Years: 20.00     Pack years: 40.00     Types: Cigarettes     Quit date: 1988     Years since quittin.9     Passive exposure: Past    Smokeless tobacco: Never   Substance Use Topics    Alcohol use: No     Comment: quit drinking 2012    Drug use: No     Review of Systems   Unable to perform ROS: Dementia   Constitutional:  Negative for appetite change, chills, fatigue and fever.   HENT:  Negative for sore throat and trouble swallowing.    Respiratory:  Positive for shortness of breath. Negative for cough.     Cardiovascular:  Negative for chest pain and leg swelling.   Gastrointestinal:  Positive for abdominal pain. Negative for nausea and vomiting.   Musculoskeletal:  Positive for back pain, gait problem and neck pain.   Skin:  Negative for color change and rash.   Neurological:  Positive for dizziness and light-headedness.     Physical Exam     Initial Vitals [01/16/23 2025]   BP Pulse Resp Temp SpO2   138/72 73 (!) 22 98 °F (36.7 °C) 96 %      MAP       --         Physical Exam    Nursing note and vitals reviewed.  Constitutional:  Non-toxic appearance. He appears distressed.   HENT:   Head: Normocephalic and atraumatic.   Eyes: EOM are normal. Pupils are equal, round, and reactive to light.   Cardiovascular:  Normal rate, regular rhythm and normal heart sounds.           No murmur heard.  Pulmonary/Chest: Breath sounds normal. No respiratory distress. He has no wheezes.   Abdominal: Abdomen is soft. Bowel sounds are normal. He exhibits no distension. There is no abdominal tenderness.   Musculoskeletal:         General: Tenderness (Bilateral flank area tender to palpation; no specific point tenderness along spine but complains of back pain) present.     Neurological: He is alert. He is disoriented.   Skin: Skin is warm and dry.   Psychiatric: He has a normal mood and affect.       ED Course   Procedures  Labs Reviewed   COMPREHENSIVE METABOLIC PANEL - Abnormal; Notable for the following components:       Result Value    Glucose 116 (*)     Total Bilirubin 1.1 (*)     eGFR 59.6 (*)     All other components within normal limits   CBC W/ AUTO DIFFERENTIAL - Abnormal; Notable for the following components:    RBC 4.07 (*)     Hemoglobin 13.1 (*)     Hematocrit 39.7 (*)     MCH 32.2 (*)     Gran # (ANC) 8.8 (*)     Immature Grans (Abs) 0.05 (*)     Lymph # 0.8 (*)     Gran % 87.1 (*)     Lymph % 7.6 (*)     All other components within normal limits   HIV 1 / 2 ANTIBODY    Narrative:     Release to patient->Immediate    HEPATITIS C ANTIBODY    Narrative:     Release to patient->Immediate   TROPONIN I   APTT   PROTIME-INR   URINALYSIS, REFLEX TO URINE CULTURE          Imaging Results              CT Abdomen Pelvis With Contrast (In process)                      CT Chest Without Contrast (In process)  Result time 01/17/23 00:05:47                     CT Cervical Spine Without Contrast (Final result)  Result time 01/16/23 23:35:11      Final result by Kanu Paulino MD (01/16/23 23:35:11)                   Impression:      1.  No CT evidence of acute displaced fracture or traumatic subluxation of the cervical spine.    2.  Remote postsurgical change of the dens.  Multilevel degenerative changes, most pronounced at C5-C6, similar to prior examination.    3.  Right apical pneumothorax with associated subcutaneous emphysema throughout the right chest wall extending into the base of the neck.  Findings to be further discussed in separate dictated chest CT report.    4.  Previously demonstrated bilateral calcified upper lobe lesions and multiple calcified thoracic lymph nodes.      Electronically signed by: Kanu Paulino MD  Date:    01/16/2023  Time:    23:35               Narrative:    EXAMINATION:  CT CERVICAL SPINE WITHOUT CONTRAST    CLINICAL HISTORY:  Neck pain, chronic;    TECHNIQUE:  Low dose axial images, sagittal and coronal reformations were performed though the cervical spine.  Contrast was not administered.    COMPARISON:  CT cervical spine 01/22/2022    FINDINGS:  There is a single fixation screws spanning the dens, stable in position and appearance from prior examination.  There is straightening of the normal cervical lordosis.  Cervical vertebral body heights appear adequately maintained.  No definite acute displaced fracture identified.  There is intervertebral disc space height loss at C4-C5 and C5-C6 with associated endplate degenerative changes.  There is osseous fusion of the right-sided posterior elements at  C4-C5.  There are degenerative changes of the cervical spine, which remain most pronounced at C5-C6, similar to prior examination.  There is a broad-based posterior disc osteophyte, asymmetric to the left, bilateral uncovertebral spurring, and bilateral facet arthropathy.  This results in at least moderate spinal canal stenosis and severe bilateral neural foraminal narrowing.    Prevertebral soft tissues are not significantly thickened.  There is atherosclerotic calcification of the carotid vasculature.  There is a right ICA stent.    There is a right apical pneumothorax.  There is associated subcutaneous emphysema throughout the right posterior chest wall extending into the base of the neck.  There are calcified mediastinal lymph nodes and at the base of the neck.  There are previously demonstrated calcified bilateral apical lesions.  The esophagus is distended.                                       CT Head Without Contrast (Final result)  Result time 01/16/23 23:23:06      Final result by Kanu Paulino MD (01/16/23 23:23:06)                   Impression:      No CT evidence of acute intracranial abnormality. Clinical correlation and further evaluation as warranted.    Chronic senescent and microvascular ischemic changes.      Electronically signed by: Kanu Paulino MD  Date:    01/16/2023  Time:    23:23               Narrative:    EXAMINATION:  CT HEAD WITHOUT CONTRAST    CLINICAL HISTORY:  Syncope, recurrent;    TECHNIQUE:  Low dose axial images were obtained through the head.  Coronal and sagittal reformations were also performed. Contrast was not administered.    COMPARISON:  CT head 01/22/2022    FINDINGS:  There is generalized cerebral volume loss with compensatory sulcal widening and ventricular enlargement.  There is patchy and confluent periventricular and supratentorial white matter hypoattenuation suggesting sequelae of chronic microvascular ischemic change.  There is no evidence of acute  intracranial hemorrhage or midline shift.  No definite extra-axial collections identified.  The basal cisterns are patent. There is partial opacification of the right anterior ethmoid air cells.  The remaining paranasal sinuses appear relatively well aerated.  There is trace opacification of the right mastoid air cells.  No acute displaced calvarial fracture identified.  There are partially visualized postsurgical changes of the dens.                                        X-Ray Chest 1 View (Final result)  Result time 01/16/23 22:09:31   Procedure changed from X-Ray Chest PA And Lateral     Final result by South Diaz MD (01/16/23 22:09:31)                   Impression:      Metal objects project over the mid right hemithorax, new from prior study. Correlate clinically for external foreign bodies on the patient's skin or clothing versus potential internal foreign bodies.    Sternotomy wires, similar to prior. Stable cardiomegaly.    Chronic changes in the upper lung fields/apices, similar to prior, with superimposed right lung edema possibly representing pulmonary hemorrhage in the setting of blunt trauma.    Air in the right chest wall adjacent to the lateral rib cage.  Small right apical pneumothorax.  Suspect right-sided rib fracture    This report was flagged in Epic as abnormal.    The critical information above was relayed directly by South Diaz MD by [employer] secure chat to Artie Horne MD on 1/16/2023 at 22:07.      Electronically signed by: South Diaz MD  Date:    01/16/2023  Time:    22:09               Narrative:    EXAMINATION:  XR CHEST 1 VIEW    CLINICAL HISTORY:  fall; Unspecified fall, initial encounter    TECHNIQUE:  Single frontal view of the chest was performed.    COMPARISON:  11/02/2022.    FINDINGS:  Metal objects project over the mid right hemithorax, new from prior study.  Correlate clinically for external foreign bodies on the patient's skin or clothing versus potential  internal foreign bodies.    Sternotomy wires, similar to prior.  Stable cardiomegaly.  Chronic changes in the upper lung fields/apices, similar to prior, with superimposed right lung edema.    Air in the right chest wall adjacent to the lateral rib cage.  Small right apical pneumothorax.    Heart and left lung otherwise appear unchanged when allowing for differences in technique and positioning.                                       Medications   LIDOcaine 5 % patch 1 patch (1 patch Transdermal Patch Applied 1/16/23 2132)   acetaminophen tablet 1,000 mg (1,000 mg Oral Given 1/16/23 2132)   fentaNYL 50 mcg/mL injection 50 mcg (50 mcg Intravenous Given 1/16/23 2336)   iohexoL (OMNIPAQUE 350) injection 75 mL (75 mLs Intravenous Given 1/16/23 2307)     Medical Decision Making:   History:   I obtained history from: someone other than patient.  Initial Assessment:   Patient is an 82 year old male who presents after multiple falls at home and recent diziness with complaints severe back pain.  Differential Diagnosis:   Orthostatic hypotension  Vertebral fracture  Back sprain  Clinical Tests:   Lab Tests: Ordered  Radiological Study: Ordered  Medical Tests: Ordered          Attending Attestation:   Physician Attestation Statement for Resident:  As the supervising MD   Physician Attestation Statement: I have personally seen and examined this patient.   I agree with the above history.  -:   As the supervising MD I agree with the above PE.     As the supervising MD I agree with the above treatment, course, plan, and disposition.   -: 81 yo M presents s/p fall. Hx of dementia, multiple falls in the last few weeks, has a left flank ecchymosis from prior falls  Today his daughter left him alone for a minute, went to the kitchen, heard a thump and found her dad sitting on the floor with the back against the bed. Unsure if head injury. Patient unable to provide hx, reports severe lower back pain    Discussed w/ his daughter, he is  DNR/DNI, also family would like to continue taking care of him at home    Aaox1, face symmetric, looks chronically ill, frail  Extremities: no deformity, no ttp, moves all joints  Points to his lower back both sides as the source of pain, + left lower back ecchymosis from prior fall  Ctab  Rrr  Unable to clear C spine due to severe pain in the lower back and hx of dementia  NC/AT    Tylenol, lidoderm patch  Labs, UA r/o UTI, ekg/trop r/o cardiac event leading to the fall, CT a/p, CT h, CT c spine, CXR r/o traumatic injuries    Dr. Diaz contacted us with CXR result with small PTX (CXR also individually reviewed with apical PTX), likely rib fx, will obtain CT chest, Gen surg consult, NRB to prevent PTX enlargement    Patient was signed-out to Dr. Pires at the change of shift with plan for: CTs pending, gen surg consult pending, likely admit         I have reviewed and agree with the residents interpretation of the following: lab data, x-rays, CT scans and EKG.               ED Course as of 01/17/23 0008   Mon Jan 16, 2023   2244 X-Ray Chest 1 View(!)  Air in the right chest wall adjacent to the lateral rib cage.  Small right apical pneumothorax.  Suspect right-sided rib fracture [DAYDAY]   2244 General surgery made aware of xray findings [DAYDAY]   2329 CT Head Without Contrast  No acute intracranial abnormalities [DAYDAY]   2342 CT Cervical Spine Without Contrast  No CT evidence of acute displaced fracture or traumatic subluxation of the cervical spine [DAYDAY]   Tue Jan 17, 2023   0006 Resp(!): 2  Documented in error [NN]   0007 Patient signed out to ED provider, pending general surgery eval and read of CT scans [DAYDAY]      ED Course User Index  [DAYDAY] Artie Horne MD  [NN] Rima Piers MD                   Clinical Impression:   Final diagnoses:  [W19.XXXA] Fall (Primary)  [S22.31XA] Closed fracture of one rib of right side, initial encounter        ED Disposition Condition    Observation                Artie Green  MD Coleen  Resident  01/17/23 0008    Marina Flood MD  01/17/23 1699

## 2023-01-17 NOTE — H&P
[facility] Emergency Dept  Critical Care - Surgery  History & Physical    Patient Name: Paul Browning  MRN: [id number]  Admission Date: 1/16/2023  Code Status: Prior  Attending Physician: Santo Shankar MD   Primary Care Provider: Grey Forbes DO   Principal Problem: <principal problem not specified>    Subjective:     HPI:  Paul Browning is a frail 82M h/o CAD (EF 40% with previous MI with enduring defect seen on cardiac PET), COPD on 0.5L NC at home and inhalers, DM2, CKD3, HTN, sarcoid, CAD who fell multiple times at home. Imaging shows small apical PTX and subQ air on R chest wall with CT with several comminuted posterior rib fractures.    On admission, he is satting in the mid-90s on 6L facemask. Back pain is not controlled, and he is complaining of abdominal pain as well. He is being admitted to the SICU for observation and pain control.      Hospital/ICU Course:  No notes on file         Past Medical History:   Diagnosis Date    Acute hypoxemic respiratory failure 1/23/2022    Anxiety     Stevens's esophagus with low grade dysplasia     BPH (benign prostatic hyperplasia)     CAD (coronary artery disease)     Cataract     Cervical spondylosis 1/3/2020    Chronic obstructive pulmonary disease with acute exacerbation     Diaphragmatic hernia     GERD (gastroesophageal reflux disease)     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    Hyperlipidemia     Hypertension     Ischemic cardiomyopathy 11/5/2014    MDD (major depressive disorder), recurrent episode, mild 2/17/2016    Osteoporosis 7/20/2016    Peripheral arterial disease 3/18/2016    Sarcoid     Squamous cell carcinoma 09/2016, 5/2016    crown of scalp, left wrist       Past Surgical History:   Procedure Laterality Date    CARDIAC SURGERY      CAROTID STENT N/A 10/14/2019    Procedure: INSERTION, STENT, ARTERY, CAROTID;  Surgeon: Artie Kebede MD;  Location: [facility] CATH LAB;  Service: Cardiology;  Laterality: N/A;    CATARACT EXTRACTION W/  INTRAOCULAR  LENS IMPLANT Right 07/17/2018    Dr. Talamantes    CATARACT EXTRACTION W/  INTRAOCULAR LENS IMPLANT Left 07/31/2018    Dr. Talamantes    CORONARY ARTERY BYPASS GRAFT      x2  10/2014    ESOPHAGOGASTRODUODENOSCOPY N/A 4/8/2019    Procedure: ESOPHAGOGASTRODUODENOSCOPY (EGD)/poss rfa;  Surgeon: Clifford De La Torre MD;  Location: [facility] ENDO (2ND FLR);  Service: Endoscopy;  Laterality: N/A;    ESOPHAGOGASTRODUODENOSCOPY N/A 8/4/2021    Procedure: EGD (ESOPHAGOGASTRODUODENOSCOPY);  Surgeon: Clifford De La Torre MD;  Location: [facility] ENDO (2ND FLR);  Service: Endoscopy;  Laterality: N/A;  [id number]    ESOPHAGOGASTRODUODENOSCOPY (EGD) WITH RADIOFREQUENCY TREATMENT OF GASTROESOPHAGEAL JUNCTION      INJECTION OF ANESTHETIC AGENT AROUND MEDIAL BRANCH NERVES INNERVATING CERVICAL FACET JOINT Bilateral 1/9/2020    Procedure: INJECTION, MBB--Bilateral Cervical- Third Occipital nerve, C2-3--ASA okay for pt to continue taking per provider.;  Surgeon: Tip Mera Jr., MD;  Location: [facility] PAIN MGT;  Service: Pain Management;  Laterality: Bilateral;    INTRAOCULAR PROSTHESES INSERTION Right 7/17/2018    Procedure: INSERTION, INTRAOCULAR LENS PROSTHESIS;  Surgeon: León Talamantes MD;  Location: [address];  Service: Ophthalmology;  Laterality: Right;    INTRAOCULAR PROSTHESES INSERTION Left 7/31/2018    Procedure: INSERTION, INTRAOCULAR LENS PROSTHESIS;  Surgeon: León Talamantes MD;  Location: [facility] OR [address] [place] [address];  Service: Ophthalmology;  Laterality: Left;    PHACOEMULSIFICATION OF CATARACT Right 7/17/2018    Procedure: PHACOEMULSIFICATION, CATARACT;  Surgeon: León Talamantes MD;  [facility];  Service: Ophthalmology;  Laterality: Right;    PHACOEMULSIFICATION OF CATARACT Left 7/31/2018    Procedure: PHACOEMULSIFICATION, CATARACT;  Surgeon: León Talamantes MD;  Location: [facility] OR [address] [place] [address];  Service: Ophthalmology;  Laterality: Left;    PLACEMENT OF SCREW IN  ODONTOID N/A 10/22/2020    Procedure: INSERTION, SCREW, ODONTOID. Anterior approach.;  Surgeon: Kirsten Weaver MD;  Location: [facility] OR [address] [place] [address];  Service: Neurosurgery;  Laterality: N/A;    RADIOFREQUENCY THERMOCOAGULATION Bilateral 2020    Procedure: RADIOFREQUENCY THERMAL COAGULATION--Bilateral C2-3 and Third Occipital Nerve;  Surgeon: Tip Mera Jr., MD;  [place] [facility] [place] [facility]  Service: Pain Management;  Laterality: Bilateral;    UMBILICAL HERNIA REPAIR         Review of patient's allergies indicates:   Allergen Reactions    Morphine Shortness Of Breath       Family History       Problem Relation (Age of Onset)    Arrhythmia Mother    Heart disease Mother    Heart failure Brother    No Known Problems Daughter, Son          Tobacco Use    Smoking status: Former     Packs/day: 2.00     Years: 20.00     Pack years: 40.00     Types: Cigarettes     Quit date: 1988     Years since quittin.9     Passive exposure: Past    Smokeless tobacco: Never   Substance and Sexual Activity    Alcohol use: No     Comment: quit drinking beer     Drug use: No    Sexual activity: Not Currently     Partners: Female      Review of Systems   Constitutional:  Negative for chills and fever.   Eyes:  Negative for visual disturbance.   Respiratory:  Negative for shortness of breath.    Cardiovascular:  Negative for chest pain.   Gastrointestinal:  Positive for abdominal pain. Negative for blood in stool, constipation, diarrhea, nausea and vomiting.   Genitourinary:  Negative for dysuria and hematuria.   Musculoskeletal:  Positive for back pain.   Allergic/Immunologic: Negative for environmental allergies.   Neurological:  Negative for dizziness, weakness and light-headedness.   Objective:     Vital Signs (Most Recent):  Temp: 98 °F (36.7 °C) (23)  Pulse: 87 (23 0000)  Resp: (!) 22 (23 0000)  BP: 131/66 (23 0000)  SpO2: 99 % (23 0000)   Vital Signs (24h Range):  Temp:  [98 °F (36.7  °C)] 98 °F (36.7 °C)  Pulse:  [73-91] 87  Resp:  [2-23] 22  SpO2:  [95 %-99 %] 99 %  BP: (108-138)/(56-72) 131/66     Weight: 68 kg (150 lb)  Body mass index is 22.15 kg/m².    No intake or output data in the 24 hours ending 01/17/23 0019    Physical Exam  Constitutional:       General: He is not in acute distress.     Appearance: Normal appearance.      In acute distress  HENT:      Head: Normocephalic and atraumatic.   Eyes:      General: No scleral icterus.     Conjunctiva/sclera: Conjunctivae normal.      Pupils: Pupils are equal, round, and reactive to light.   Neck:      Trachea: No tracheal deviation.   Cardiovascular:      Rate and Rhythm: Normal rate and regular rhythm.   Pulmonary:      Effort: Pulmonary effort is normal. No respiratory distress.      Breath sounds: No stridor.      Gross deformity to right back; tender to palpation  Abdominal:      General: Abdomen is flat. There is no distension.      Palpations: Abdomen is soft.      Tenderness: There is no abdominal tenderness. There is no guarding.   Musculoskeletal:         General: No deformity or signs of injury. Normal range of motion.      Cervical back: No edema.   Skin:     General: Skin is warm and dry.      Coloration: Skin is not jaundiced.   Neurological:      General: No focal deficit present.      Mental Status: He is alert and oriented to person, place, and time.   Psychiatric:         Mood and Affect: Mood normal.         Behavior: Behavior normal.       Vents:  Oxygen Concentration (%): 28 (01/16/23 2240)    Lines/Drains/Airways       Peripheral Intravenous Line  Duration                  Peripheral IV - Single Lumen 01/16/23 2147 20 G Posterior;Right Forearm <1 day                    Significant Labs:    CBC/Anemia Profile:  Recent Labs   Lab 01/16/23 2145   WBC 10.10   HGB 13.1*   HCT 39.7*      MCV 98   RDW 13.7        Chemistries:  Recent Labs   Lab 01/16/23 2145      K 3.5      CO2 26   BUN 17   CREATININE 1.2    CALCIUM 9.2   ALBUMIN 3.5   PROT 6.9   BILITOT 1.1*   ALKPHOS 71   ALT 15   AST 19       All pertinent labs within the past 24 hours have been reviewed.    Significant Imaging: I have reviewed all pertinent imaging results/findings within the past 24 hours.    Assessment/Plan:     Closed fracture of multiple ribs of right side  Paul Browning is a frail 82M h/o CAD (EF 40% with previous MI with enduring defect seen on cardiac PET), COPD on 0.5L NC at home and inhalers, DM2, CKD3, HTN, sarcoid, CAD who fell multiple times at home. Imaging shows small apical PTX and subQ air on R chest wall with CT with several comminuted posterior rib fractures.      Neuro/Psych:   -- Sedation: none  -- Pain: ifeoma tylenol, robaxin, gabapentin; morphine PRN             Cards:   -- hx of HTN, CAD s/p stents  -- HDS  -- continue Entresto; holding home ASA      Pulm:   -- hx of COPD, baseline 0.5L NC at home  -- now has right sided rib fractures and small right apical PTX  -- Goal O2 sat > 90%  -- Wean as able  -- duonebs, IS, Acapella      Renal:  -- hx of CKD3, baseline Cr 1.0  -- BUN/Cr 17/1.2      FEN / GI:    -- Replace lytes as needed  -- Nutrition: NPO      ID:   -- Tm: afebrile; WBC 10.10  -- Abx none      Heme/Onc:   -- H/H 13.1/39.7  -- Daily CBC      Endo:   -- hx of DM2, on Jardiance   -- Gluc goal 140-180  -- AISS      PPx:   Feeding: NPO  Analgesia/Sedation: ifeoma tylenol, gabapentin, robaxin, lido patch; morphine PRN  Thromboembolic prevention: lovenox  HOB >30: yes  Stress Ulcer ppx: protonix  Glucose control: Critical care goal 140-180 g/dl, ISS    Lines/Drains/Airway: PIVx2       Dispo/Code Status/Palliative:   -- SICU / Full Code          Critical care was time spent personally by me on the following activities: development of treatment plan with patient or surrogate and bedside caregivers, discussions with consultants, evaluation of patient's response to treatment, examination of patient, ordering and performing  treatments and interventions, ordering and review of laboratory studies, ordering and review of radiographic studies, pulse oximetry, re-evaluation of patient's condition.  This critical care time did not overlap with that of any other provider or involve time for any procedures.     Filippo Anderson MD  Critical Care - Surgery  [facility]

## 2023-01-17 NOTE — SUBJECTIVE & OBJECTIVE
Prior PRN: Fentanyl 50 mcg IV x1, fentanyl 25 mcg IV x2, ondansetron 4 mg IV x1    Past Medical History:   Diagnosis Date    Acute hypoxemic respiratory failure 1/23/2022    Anxiety     Stevens's esophagus with low grade dysplasia     BPH (benign prostatic hyperplasia)     CAD (coronary artery disease)     Cataract     Cervical spondylosis 1/3/2020    Chronic obstructive pulmonary disease with acute exacerbation     Diaphragmatic hernia     GERD (gastroesophageal reflux disease)     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    Hyperlipidemia     Hypertension     Ischemic cardiomyopathy 11/5/2014    MDD (major depressive disorder), recurrent episode, mild 2/17/2016    Osteoporosis 7/20/2016    Peripheral arterial disease 3/18/2016    Sarcoid     Squamous cell carcinoma 09/2016, 5/2016    crown of scalp, left wrist       Past Surgical History:   Procedure Laterality Date    CARDIAC SURGERY      CAROTID STENT N/A 10/14/2019    Procedure: INSERTION, STENT, ARTERY, CAROTID;  Surgeon: Artie Kebede MD;  Location: [facility] CATH LAB;  Service: Cardiology;  Laterality: N/A;    CATARACT EXTRACTION W/  INTRAOCULAR LENS IMPLANT Right 07/17/2018    Dr. Talamantes    CATARACT EXTRACTION W/  INTRAOCULAR LENS IMPLANT Left 07/31/2018    Dr. Talamantes    CORONARY ARTERY BYPASS GRAFT      x2  10/2014    ESOPHAGOGASTRODUODENOSCOPY N/A 4/8/2019    Procedure: ESOPHAGOGASTRODUODENOSCOPY (EGD)/poss rfa;  Surgeon: Clifford De La Torre MD;  [facility]  Service: Endoscopy;  Laterality: N/A;    ESOPHAGOGASTRODUODENOSCOPY N/A 8/4/2021    Procedure: EGD (ESOPHAGOGASTRODUODENOSCOPY);  Surgeon: Clifford De La Torre MD;  [facility]  Service: Endoscopy;  Laterality: N/A;  8/2-covid [facility]ei appts on 8/3    ESOPHAGOGASTRODUODENOSCOPY (EGD) WITH RADIOFREQUENCY TREATMENT OF GASTROESOPHAGEAL JUNCTION      INJECTION OF ANESTHETIC AGENT AROUND MEDIAL BRANCH NERVES INNERVATING CERVICAL FACET JOINT Bilateral 1/9/2020     Procedure: INJECTION, MBB--Bilateral Cervical- Third Occipital nerve, C2-3--ASA okay for pt to continue taking per provider.;  Surgeon: Tip Mera Jr., MD;  Location: [facility] PAIN T;  Service: Pain Management;  Laterality: Bilateral;    INTRAOCULAR PROSTHESES INSERTION Right 7/17/2018    Procedure: INSERTION, INTRAOCULAR LENS PROSTHESIS;  Surgeon: León Talamantes MD;  [facility];  Service: Ophthalmology;  Laterality: Right;    INTRAOCULAR PROSTHESES INSERTION Left 7/31/2018    Procedure: INSERTION, INTRAOCULAR LENS PROSTHESIS;  Surgeon: León Talamantes MD;  [facility];  Service: Ophthalmology;  Laterality: Left;    PHACOEMULSIFICATION OF CATARACT Right 7/17/2018    Procedure: PHACOEMULSIFICATION, CATARACT;  Surgeon: León Talamantes MD;  Location: [facility] OR 1ST FLR;  Service: Ophthalmology;  Laterality: Right;    PHACOEMULSIFICATION OF CATARACT Left 7/31/2018    Procedure: PHACOEMULSIFICATION, CATARACT;  Surgeon: León Talamantes MD;  [facility];  Service: Ophthalmology;  Laterality: Left;    PLACEMENT OF SCREW IN ODONTOID N/A 10/22/2020    Procedure: INSERTION, SCREW, ODONTOID. Anterior approach.;  Surgeon: Kirsten Weaver MD;  Location: [facility] OR 2ND FLR;  Service: Neurosurgery;  Laterality: N/A;    RADIOFREQUENCY THERMOCOAGULATION Bilateral 1/30/2020    Procedure: RADIOFREQUENCY THERMAL COAGULATION--Bilateral C2-3 and Third Occipital Nerve;  Surgeon: Tip Mera Jr., MD;  Location: [facility] PAIN T;  Service: Pain Management;  Laterality: Bilateral;    UMBILICAL HERNIA REPAIR         Review of patient's allergies indicates:   Allergen Reactions    Morphine Shortness Of Breath       Medications:  Continuous Infusions:  Scheduled Meds:   albuterol-ipratropium  3 mL Nebulization Q4H WAKE    atorvastatin  40 mg Oral Daily    budesonide  0.5 mg Nebulization Q12H    enoxaparin  30 mg Subcutaneous Q12H    gabapentin  300 mg Oral TID    [START ON  2023] LIDOcaine  1 patch Transdermal Q24H    memantine  10 mg Oral BID    methocarbamol (ROBAXIN) IVPB  500 mg Intravenous Q6H    pantoprazole  40 mg Oral BID    sertraline  100 mg Oral Daily     PRN Meds:sodium chloride 0.9%, dextrose 10%, dextrose 10%, glucagon (human recombinant), glucose, glucose, HYDROmorphone, insulin aspart U-100, ondansetron    Family History       Problem Relation (Age of Onset)    Arrhythmia Mother    Heart disease Mother    Heart failure Brother    No Known Problems Daughter, Son          Tobacco Use    Smoking status: Former     Packs/day: 2.00     Years: 20.00     Pack years: 40.00     Types: Cigarettes     Quit date: 1988     Years since quittin.9     Passive exposure: Past    Smokeless tobacco: Never   Substance and Sexual Activity    Alcohol use: No     Comment: quit drinking beer     Drug use: No    Sexual activity: Not Currently     Partners: Female       Review of Systems   Unable to perform ROS: Dementia   Objective:     Vital Signs (Most Recent):  Temp: 97.8 °F (36.6 °C) (23 0700)  Pulse: 74 (23 0821)  Resp: (!) 22 (23 0821)  BP: (!) 121/56 (23 0800)  SpO2: 100 % (23 0821)   Vital Signs (24h Range):  Temp:  [97.8 °F (36.6 °C)-98 °F (36.7 °C)] 97.8 °F (36.6 °C)  Pulse:  [73-93] 74  Resp:  [2-38] 22  SpO2:  [94 %-100 %] 100 %  BP: (104-141)/(55-73) 121/56     Weight:  (bed scale broken)  Body mass index is 22.15 kg/m².    Physical Exam  Constitutional:       Appearance: He is ill-appearing.   HENT:      Head: Normocephalic and atraumatic.      Right Ear: External ear normal.      Left Ear: External ear normal.      Nose: Nose normal.      Mouth/Throat:      Mouth: Mucous membranes are dry.   Eyes:      Extraocular Movements: Extraocular movements intact.      Conjunctiva/sclera: Conjunctivae normal.   Cardiovascular:      Rate and Rhythm: Normal rate.   Pulmonary:      Effort: Pulmonary effort is normal.      Breath sounds: Normal  breath sounds.   Abdominal:      General: Bowel sounds are normal.      Palpations: Abdomen is soft.   Musculoskeletal:      Right lower leg: No edema.      Left lower leg: No edema.   Lymphadenopathy:      Cervical: No cervical adenopathy.   Skin:     General: Skin is dry.      Coloration: Skin is pale.      Findings: Bruising present.   Neurological:      Mental Status: He is alert. He is disoriented.      Motor: Weakness present.       Review of Symptoms      Symptom Assessment (ESAS 0-10 Scale)  Unable to complete assessment due to Dementia         Pain Assessment in Advanced Demential Scale (PAINAD)   Breathing - Independent of vocalization:  0  Negative vocalization:  0  Facial expression:  0  Body language:  0  Consolability:  0  Total:  0    Living Arrangements:  Lives with family    Psychosocial/Cultural:   See Palliative Psychosocial Note: No  Social Issues Identified: Coping deficit pt/family  Bereavement Risk: Yes: Close or dependent relationship to the  person  Caregiver Needs Discussed. Caregiver Distress: Yes: Intensity of family caregiving  Cultural: Wife passed away 3 years ago, was needing ventilator support which was withdrawn in setting of end of life and comfort measures. Lives with daughter Mesha and also provided care by 3 other sons.  **Primary  to Follow**  Palliative Care  Consult: No      Advance Care Planning   Advance Directives:   Living Will: No    LaPOST: No    Do Not Resuscitate Status: No    Medical Power of : No      Decision Making:  Family answered questions and Patient unable to communicate due to disease severity/cognitive impairment  Goals of Care: The family endorses that what is most important right now is to focus on symptom/pain control and quality of life, even if it means sacrificing a little time    Accordingly, we have decided that the best plan to meet the patient's goals includes continuing with treatment       Significant  Labs: CBC:   Recent Labs   Lab 01/16/23 2145 01/17/23 0413   WBC 10.10 7.27   HGB 13.1* 11.4*   HCT 39.7* 35.2*    213     CMP:   Recent Labs   Lab 01/16/23 2145 01/17/23 0413    143   K 3.5 4.2    108   CO2 26 25   * 118*   BUN 17 18   CREATININE 1.2 1.0   CALCIUM 9.2 8.7   PROT 6.9 6.1   ALBUMIN 3.5 3.2*   BILITOT 1.1* 1.0   ALKPHOS 71 56   AST 19 18   ALT 15 15   ANIONGAP 11 10     CBC:   Recent Labs   Lab 01/17/23 0413   WBC 7.27   HGB 11.4*   HCT 35.2*   MCV 98        BMP:  Recent Labs   Lab 01/17/23 0413   *      K 4.2      CO2 25   BUN 18   CREATININE 1.0   CALCIUM 8.7   MG 2.1     LFT:  Lab Results   Component Value Date    AST 18 01/17/2023    ALKPHOS 56 01/17/2023    BILITOT 1.0 01/17/2023     Albumin:   Albumin   Date Value Ref Range Status   01/17/2023 3.2 (L) 3.5 - 5.2 g/dL Final     Protein:   Total Protein   Date Value Ref Range Status   01/17/2023 6.1 6.0 - 8.4 g/dL Final     Lactic acid:   Lab Results   Component Value Date    LACTATE 1.0 06/06/2022    LACTATE 0.8 11/17/2014       Significant Imaging: I have reviewed all pertinent imaging results/findings within the past 24 hours.

## 2023-01-17 NOTE — HPI
82M frail fell multiple times at home.  H/o CAD (EF 40% with previous MI with enduring defect seen on cardiac PET), COPD on 0.5L NC at home and inhalers, DM2, CKD3, HTN, sarcoid, CAD.  CXR shows small apical pneumo and subcutaneous air on the right chest wall.  CT chest shows at least four comminuted posterior rib fractures on my count. These fractures are severe.  He is sating in the mid-90s on 6L facemask.  Back pain is not controlled  He is complaining of abdominal pain as well    Lives with daughter who assists with ADLs.  I shared with the daughter that this is a major setback for him and that with his medical history, he is at very high risk for pneumonia and intubation.  She has not explicitly discussed with her brothers whether he would want to be intubated were it indicated.    He has had cardiac bypass and cervical spine surgery. He has cardiac stents.    Remote 40 pack year smoking history.

## 2023-01-17 NOTE — PROGRESS NOTES
Nurses Note -- 4 Eyes      1/17/2023   4:47 AM      Skin assessed during: Admit      [x] No Pressure Injuries Present    [x]Prevention Measures Documented      [] Yes- Altered Skin Integrity Present or Discovered   [] LDA Added if Not in Epic (Describe Wound)   [] New Altered Skin Integrity was Present on Admit and Documented in LDA   [] Wound Image Taken    Wound Care Consulted? No    Attending Nurse:  Merline Toth RN     Second RN/Staff Member:  Gloria Cunningham RN

## 2023-01-17 NOTE — PROGRESS NOTES
[facility] - Surgical Intensive Care  General Surgery  Progress Note    Subjective:     History of Present Illness:  82M frail fell multiple times at home.  H/o CAD (EF 40% with previous MI with enduring defect seen on cardiac PET), COPD on 0.5L NC at home and inhalers, DM2, CKD3, HTN, sarcoid, CAD.  CXR shows small apical pneumo and subcutaneous air on the right chest wall.  CT chest shows at least four comminuted posterior rib fractures on my count. These fractures are severe.  He is sating in the mid-90s on 6L facemask.  Back pain is not controlled  He is complaining of abdominal pain as well    Lives with daughter who assists with ADLs.  I shared with the daughter that this is a major setback for him and that with his medical history, he is at very high risk for pneumonia and intubation.  She has not explicitly discussed with her brothers whether he would want to be intubated were it indicated.    He has had cardiac bypass and cervical spine surgery. He has cardiac stents.    Remote 40 pack year smoking history.      Post-Op Info:  * No surgery found *         Subjective:  On 2L nasal canula this morning.  HDS.  Afebrile.    Objective:     Vital Signs (Most Recent):  Temp: 97.8 °F (36.6 °C) (01/17/23 0300)  Pulse: 78 (01/17/23 0700)  Resp: (!) 22 (01/17/23 0700)  BP: (!) 113/55 (01/17/23 0700)  SpO2: 100 % (01/17/23 0700)   Vital Signs (24h Range):  Temp:  [97.8 °F (36.6 °C)-98 °F (36.7 °C)] 97.8 °F (36.6 °C)  Pulse:  [73-93] 78  Resp:  [2-38] 22  SpO2:  [94 %-100 %] 100 %  BP: (104-141)/(55-73) 113/55     Weight:  (bed scale broken)  Body mass index is 22.15 kg/m².    Physical Exam  Vitals reviewed.   Constitutional:       Appearance: Normal appearance.   HENT:      Head: Normocephalic.   Cardiovascular:      Rate and Rhythm: Normal rate.   Pulmonary:      Effort: Respiratory distress present.      Comments: Right posterior back pain at site of fractures  Crepitus along the right back  Abdominal:      General:  Abdomen is flat. There is no distension.      Tenderness: There is no abdominal tenderness.      Hernia: No hernia is present.   Skin:     General: Skin is warm and dry.      Capillary Refill: Capillary refill takes less than 2 seconds.   Neurological:      General: No focal deficit present.      Mental Status: He is alert.   Psychiatric:         Mood and Affect: Mood normal.       Significant Labs:  I have reviewed all pertinent lab results within the past 24 hours.  CBC:   Recent Labs   Lab 01/17/23  0413   WBC 7.27   RBC 3.61*   HGB 11.4*   HCT 35.2*      MCV 98   MCH 31.6*   MCHC 32.4       CMP:   Recent Labs   Lab 01/17/23  0413   *   CALCIUM 8.7   ALBUMIN 3.2*   PROT 6.1      K 4.2   CO2 25      BUN 18   CREATININE 1.0   ALKPHOS 56   ALT 15   AST 18   BILITOT 1.0         Significant Diagnostics:  I have reviewed all pertinent imaging results/findings within the past 24 hours.    CT with at least four communited, displaced rib fractures on the right side      Assessment/Plan:     Closed fracture of multiple ribs of right side  83yo frail and comorbid gentleman with multiple posterior right rib fractures that are comminuted with significant displacement following a fall.  He has small pneumothorax that does not need intervention at this time, but may eventually  This is a major setback.  Very high risk for pneumonia.    Follow up discussion about code status DNI/DNR with family in the morning (conversation initiated in the ED)  Consult palliative care  Needs PCA and multimodals  Consider consult to anesthesia pain, consider block  Home meds  Daily XR  DVT ppx        Steven Olvera MD  General Surgery  [facility] - Surgical Intensive Care

## 2023-01-17 NOTE — ASSESSMENT & PLAN NOTE
Paul Browning is a frail 82M h/o CAD (EF 40% with previous MI with enduring defect seen on cardiac PET), COPD on 0.5L NC at home and inhalers, DM2, CKD3, HTN, sarcoid, CAD who fell multiple times at home. Imaging shows small apical PTX and subQ air on R chest wall with CT with several comminuted posterior rib fractures.      Neuro/Psych:   -- Sedation: none  -- Pain: ifeoma robaxin, gabapentin, lidocaine patch; dilaudid Q2 push prn (allergy to morphine)  -- Acute pain consulted  -- Palliative care consulted             Cards:   -- hx of HTN, CAD s/p stents  -- HDS  -- continue Entresto; holding home ASA      Pulm:   -- hx of COPD, baseline 0.5L NC at home  -- now has right sided rib fractures and small right apical PTX  -- Goal O2 sat > 90%  -- Wean as able  -- duonebs, IS, Acapella      Renal:  -- hx of CKD3, baseline Cr 1.0  -- BUN/Cr 18/1.0      FEN / GI:    -- Replace lytes as needed  -- Nutrition: NPO      ID:   -- Tm: afebrile; WBC 7.3  -- Abx none      Heme/Onc:   -- H/H 11.4/35.2  -- Daily CBC      Endo:   -- hx of DM2, on Jardiance   -- Gluc goal 140-180  -- AISS      PPx:   Feeding: NPO  Analgesia/Sedation: [facility] gabapentin, robaxin, lido patch; dilaudid Q2 pushes PRN  Thromboembolic prevention: lovenox  HOB >30: yes  Stress Ulcer ppx: protonix  Glucose control: Critical care goal 140-180 g/dl, ISS    Lines/Drains/Airway: PIV      Dispo/Code Status/Palliative:   -- SICU / Full Code

## 2023-01-17 NOTE — HPI
Paul Browning is a frail 82M h/o CAD (EF 40% with previous MI with enduring defect seen on cardiac PET), COPD on 0.5L NC at home and inhalers, DM2, CKD3, HTN, sarcoid, CAD who fell multiple times at home. Imaging shows small apical PTX and subQ air on R chest wall with CT with several comminuted posterior rib fractures.    On admission, he is satting in the mid-90s on 6L facemask. Back pain is not controlled, and he is complaining of abdominal pain as well. He is being admitted to the SICU for observation and pain control.

## 2023-01-17 NOTE — SUBJECTIVE & OBJECTIVE
Interval History/Significant Events: 1/17: admitted to SICU for close observation and pain control. No surgery at this time. Palliative care and acute pain medicine consulted. CXR this AM with increased R sided patchy infiltrates        Objective:     Vital Signs (Most Recent):  Temp: 97.8 °F (36.6 °C) (01/17/23 0300)  Pulse: 78 (01/17/23 0700)  Resp: (!) 22 (01/17/23 0700)  BP: (!) 113/55 (01/17/23 0700)  SpO2: 100 % (01/17/23 0700)   Vital Signs (24h Range):  Temp:  [97.8 °F (36.6 °C)-98 °F (36.7 °C)] 97.8 °F (36.6 °C)  Pulse:  [73-93] 78  Resp:  [2-38] 22  SpO2:  [94 %-100 %] 100 %  BP: (104-141)/(55-73) 113/55     Weight:  (bed scale broken)  Body mass index is 22.15 kg/m².      Intake/Output Summary (Last 24 hours) at 1/17/2023 0730  Last data filed at 1/17/2023 0200  Gross per 24 hour   Intake --   Output 250 ml   Net -250 ml       Physical Exam     General: He is not in acute distress.     Appearance: Normal appearance.      In acute distress  HENT:      Head: Normocephalic and atraumatic.   Eyes:      General: No scleral icterus.     Conjunctiva/sclera: Conjunctivae normal.      Pupils: Pupils are equal, round, and reactive to light.   Neck:      Trachea: No tracheal deviation.   Cardiovascular:      Rate and Rhythm: Normal rate and regular rhythm.   Pulmonary:      Effort: Pulmonary effort is normal. No respiratory distress.      Breath sounds: No stridor.      Gross deformity to right back; tender to palpation  Abdominal:      General: Abdomen is flat. There is no distension.      Palpations: Abdomen is soft.      Tenderness: There is no abdominal tenderness. There is no guarding.   Musculoskeletal:         General: No deformity or signs of injury. Normal range of motion.      Cervical back: No edema.   Skin:     General: Skin is warm and dry.      Coloration: Skin is not jaundiced.   Neurological:      General: No focal deficit present.      Mental Status: He is alert and oriented to person, place, and  time.   Psychiatric:         Mood and Affect: Mood normal.         Behavior: Behavior normal  Vents:  Oxygen Concentration (%): 28 (01/16/23 2240)    Lines/Drains/Airways       Peripheral Intravenous Line  Duration                  Peripheral IV - Single Lumen 01/16/23 2147 20 G Posterior;Right Forearm <1 day         Peripheral IV - Single Lumen 01/17/23 0022 20 G Anterior;Proximal;Right Forearm <1 day                    Significant Labs:    CBC/Anemia Profile:  Recent Labs   Lab 01/16/23 2145 01/17/23 0413   WBC 10.10 7.27   HGB 13.1* 11.4*   HCT 39.7* 35.2*    213   MCV 98 98   RDW 13.7 13.7        Chemistries:  Recent Labs   Lab 01/16/23 2145 01/17/23 0413    143   K 3.5 4.2    108   CO2 26 25   BUN 17 18   CREATININE 1.2 1.0   CALCIUM 9.2 8.7   ALBUMIN 3.5 3.2*   PROT 6.9 6.1   BILITOT 1.1* 1.0   ALKPHOS 71 56   ALT 15 15   AST 19 18   MG  --  2.1   PHOS  --  3.8       All pertinent labs within the past 24 hours have been reviewed.    Significant Imaging:  I have reviewed all pertinent imaging results/findings within the past 24 hours.  X-Ray Chest 1 View    Result Date: 1/16/2023  Metal objects project over the mid right hemithorax, new from prior study. Correlate clinically for external foreign bodies on the patient's skin or clothing versus potential internal foreign bodies. Sternotomy wires, similar to prior. Stable cardiomegaly. Chronic changes in the upper lung fields/apices, similar to prior, with superimposed right lung edema possibly representing pulmonary hemorrhage in the setting of blunt trauma. Air in the right chest wall adjacent to the lateral rib cage.  Small right apical pneumothorax.  Suspect right-sided rib fracture This report was flagged in Epic as abnormal. The critical information above was relayed directly by South Diaz MD by [employer] secure chat to Artie Horne MD on 1/16/2023 at 22:07. Electronically signed by: South Diaz MD Date:    01/16/2023  Time:    22:09    CT Head Without Contrast    Result Date: 1/16/2023  No CT evidence of acute intracranial abnormality. Clinical correlation and further evaluation as warranted. Chronic senescent and microvascular ischemic changes. Electronically signed by: Kanu Paulino MD Date:    01/16/2023 Time:    23:23    CT Chest Without Contrast    Result Date: 1/17/2023  1. Acute comminuted displaced fractures of the right 6th through 9th posterior ribs.  This may result in potential flail chest.  Resultant small right pneumothorax and right posterolateral chest wall subcutaneous emphysema.  Small bilateral pleural effusions, with right hemothorax to be considered. 2. Subtle hypoenhancement of the inferior aspect of the spleen.  Finding favored to reflect contrast bolus timing, however splenic trauma resulting in a small laceration of the inferior margin may appear similar.  No perisplenic hematoma identified. 3. Overall similar partially calcified right upper lobe solid mass and innumerable bilateral nodules.  Calcified mediastinal hilar nodes.  Findings remain suggestive of sarcoidosis. 4. 1.1 cm right nonobstructing nephrolith. 5. Large hiatal hernia. 6. Prostatomegaly. 7.  Additional findings as above. This report was flagged in Epic as abnormal. Electronically signed by resident: Maynor Horner Date:    01/16/2023 Time:    23:28 Electronically signed by: South Diaz MD Date:    01/17/2023 Time:    00:24    CT Cervical Spine Without Contrast    Result Date: 1/16/2023  1.  No CT evidence of acute displaced fracture or traumatic subluxation of the cervical spine. 2.  Remote postsurgical change of the dens.  Multilevel degenerative changes, most pronounced at C5-C6, similar to prior examination. 3.  Right apical pneumothorax with associated subcutaneous emphysema throughout the right chest wall extending into the base of the neck.  Findings to be further discussed in separate dictated chest CT report. 4.  Previously  demonstrated bilateral calcified upper lobe lesions and multiple calcified thoracic lymph nodes. Electronically signed by: Kanu Paulino MD Date:    01/16/2023 Time:    23:35    CT Abdomen Pelvis With Contrast    Result Date: 1/17/2023  1. Acute comminuted displaced fractures of the right 6th through 9th posterior ribs.  This may result in potential flail chest.  Resultant small right pneumothorax and right posterolateral chest wall subcutaneous emphysema.  Small bilateral pleural effusions, with right hemothorax to be considered. 2. Subtle hypoenhancement of the inferior aspect of the spleen.  Finding favored to reflect contrast bolus timing, however splenic trauma resulting in a small laceration of the inferior margin may appear similar.  No perisplenic hematoma identified. 3. Overall similar partially calcified right upper lobe solid mass and innumerable bilateral nodules.  Calcified mediastinal hilar nodes.  Findings remain suggestive of sarcoidosis. 4. 1.1 cm right nonobstructing nephrolith. 5. Large hiatal hernia. 6. Prostatomegaly. 7.  Additional findings as above. This report was flagged in Epic as abnormal. Electronically signed by resident: Maynor oHrner Date:    01/16/2023 Time:    23:28 Electronically signed by: South Diaz MD Date:    01/17/2023 Time:    00:24

## 2023-01-17 NOTE — ASSESSMENT & PLAN NOTE
83yo frail and comorbid gentleman with multiple posterior right rib fractures that are comminuted with significant displacement following a fall.  He has small pneumothorax that does not need intervention at this time, but may eventually  This is a major setback.  Very high risk for pneumonia.    Follow up discussion about code status DNI/DNR with family in the morning (conversation initiated in the ED)  Consult palliative care  Needs PCA and multimodals  Consider consult to anesthesia pain, consider block  Home meds  Daily XR  DVT ppx

## 2023-01-17 NOTE — PT/OT/SLP EVAL
Speech Language Pathology Evaluation  Bedside Swallow  Discharge Summary    Patient Name:  Paul Browning   MRN:  [id number]  Admitting Diagnosis: Closed fracture of multiple ribs of right side    Recommendations:                 General Recommendations:  Follow-up not indicated  Diet recommendations:  Mechanical soft, Thin   Aspiration Precautions: 1 bite/sip at a time, Alternating bites/sips, Assistance with meals, Meds whole 1 at a time, Small bites/sips, and Standard aspiration precautions   General Precautions: Standard,    Communication strategies:  go to room if call light pushed    History:     Past Medical History:   Diagnosis Date    Acute hypoxemic respiratory failure 1/23/2022    Anxiety     Stevens's esophagus with low grade dysplasia     BPH (benign prostatic hyperplasia)     CAD (coronary artery disease)     Cataract     Cervical spondylosis 1/3/2020    Chronic obstructive pulmonary disease with acute exacerbation     Diaphragmatic hernia     GERD (gastroesophageal reflux disease)     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    Hyperlipidemia     Hypertension     Ischemic cardiomyopathy 11/5/2014    MDD (major depressive disorder), recurrent episode, mild 2/17/2016    Osteoporosis 7/20/2016    Peripheral arterial disease 3/18/2016    Sarcoid     Squamous cell carcinoma 09/2016, 5/2016    crown of scalp, left wrist       Past Surgical History:   Procedure Laterality Date    CARDIAC SURGERY      CAROTID STENT N/A 10/14/2019    Procedure: INSERTION, STENT, ARTERY, CAROTID;  Surgeon: Artie Kebede MD;  Location: [facility] CATH LAB;  Service: Cardiology;  Laterality: N/A;    CATARACT EXTRACTION W/  INTRAOCULAR LENS IMPLANT Right 07/17/2018    Dr. Talamantes    CATARACT EXTRACTION W/  INTRAOCULAR LENS IMPLANT Left 07/31/2018    Dr. Talamantes    CORONARY ARTERY BYPASS GRAFT      x2  10/2014    ESOPHAGOGASTRODUODENOSCOPY N/A 4/8/2019    Procedure: ESOPHAGOGASTRODUODENOSCOPY (EGD)/poss rfa;  Surgeon: Clifford De La Torre  MD;  Location: [facility] ENDO (2ND FLR);  Service: Endoscopy;  Laterality: N/A;    ESOPHAGOGASTRODUODENOSCOPY N/A 8/4/2021    Procedure: EGD (ESOPHAGOGASTRODUODENOSCOPY);  Surgeon: Clifford De La Torre MD;  Location: [facility] ENDO (2ND FLR);  Service: Endoscopy;  Laterality: N/A;  8/2-covid [place]-inst [id number] appts on 8/3    ESOPHAGOGASTRODUODENOSCOPY (EGD) WITH RADIOFREQUENCY TREATMENT OF GASTROESOPHAGEAL JUNCTION      INJECTION OF ANESTHETIC AGENT AROUND MEDIAL BRANCH NERVES INNERVATING CERVICAL FACET JOINT Bilateral 1/9/2020    Procedure: INJECTION, MBB--Bilateral Cervical- Third Occipital nerve, C2-3--ASA okay for pt to continue taking per provider.;  Surgeon: Tip Mera Jr., MD;  Location: [facility] PAIN MGT;  Service: Pain Management;  Laterality: Bilateral;    INTRAOCULAR PROSTHESES INSERTION Right 7/17/2018    Procedure: INSERTION, INTRAOCULAR LENS PROSTHESIS;  Surgeon: León Talamantes MD;  Location: [address];  Service: Ophthalmology;  Laterality: Right;    INTRAOCULAR PROSTHESES INSERTION Left 7/31/2018    Procedure: INSERTION, INTRAOCULAR LENS PROSTHESIS;  Surgeon: León Talamantes MD;  Location: [facility] OR [address] [place] [address];  Service: Ophthalmology;  Laterality: Left;    PHACOEMULSIFICATION OF CATARACT Right 7/17/2018    Procedure: PHACOEMULSIFICATION, CATARACT;  Surgeon: León Talamantes MD;  Location: [facility] OR [address] [place] [address];  Service: Ophthalmology;  Laterality: Right;    PHACOEMULSIFICATION OF CATARACT Left 7/31/2018    Procedure: PHACOEMULSIFICATION, CATARACT;  Surgeon: León Talamantes MD;  Location: [address];  Service: Ophthalmology;  Laterality: Left;    PLACEMENT OF SCREW IN ODONTOID N/A 10/22/2020    Procedure: INSERTION, SCREW, ODONTOID. Anterior approach.;  Surgeon: Kirsten Weaver MD;  Location: [facility] OR 2ND FLR;  Service: Neurosurgery;  Laterality: N/A;    RADIOFREQUENCY THERMOCOAGULATION Bilateral 1/30/2020    Procedure: RADIOFREQUENCY THERMAL COAGULATION--Bilateral C2-3 and  "Third Occipital Nerve;  Surgeon: Tip Mera Jr., MD;  Location: [facility];  Service: Pain Management;  Laterality: Bilateral;    UMBILICAL HERNIA REPAIR         Social History: Patient lives with family.    Prior diet: family reports soft items "peanut butter sandwich, grits, oatmeal".    Subjective     Patient awake and alert. Family present.     Pain/Comfort:   C/o pain, RN recently administered pain medication    Objective:     Oral Musculature Evaluation  Oral Musculature: WFL  Dentition: edentulous  Secretion Management: adequate  Mucosal Quality: good  Mandibular Strength and Mobility: WFL  Oral Labial Strength and Mobility: WFL  Voice Prior to PO Intake: hoarse, though appears baseline    Bedside Swallow Eval:   Consistencies Assessed:  Thin liquids via tsp cup and straw  Puree x2  Solids x2      Oral Phase:   Prolonged mastication with regular solids    Pharyngeal Phase:   no overt clinical signs/symptoms of aspiration  no overt clinical signs/symptoms of pharyngeal dysphagia    Compensatory Strategies  None    Assessment:     Paul Browning is a 82 y.o. male with an SLP diagnosis of  baseline dysphagia, appearing at baseline. .  He presents with no further acute speech therapy needs at this time.      Plan:     Patient to be seen:      Plan of Care expires:     Plan of Care reviewed with:  patient, daughter   SLP Follow-Up:  No       Discharge recommendations:  other (see comments)   Barriers to Discharge:  None    Time Tracking:     SLP Treatment Date:   01/17/23  Speech Start Time:  0941  Speech Stop Time:  0955     Speech Total Time (min):  14 min    Billable Minutes: Eval Swallow and Oral Function 6 and Self Care/Home Management Training 8    01/17/2023         "

## 2023-01-17 NOTE — SUBJECTIVE & OBJECTIVE
Past Medical History:   Diagnosis Date    Acute hypoxemic respiratory failure 1/23/2022    Anxiety     Stevens's esophagus with low grade dysplasia     BPH (benign prostatic hyperplasia)     CAD (coronary artery disease)     Cataract     Cervical spondylosis 1/3/2020    Chronic obstructive pulmonary disease with acute exacerbation     Diaphragmatic hernia     GERD (gastroesophageal reflux disease)     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    Hyperlipidemia     Hypertension     Ischemic cardiomyopathy 11/5/2014    MDD (major depressive disorder), recurrent episode, mild 2/17/2016    Osteoporosis 7/20/2016    Peripheral arterial disease 3/18/2016    Sarcoid     Squamous cell carcinoma 09/2016, 5/2016    crown of scalp, left wrist       Past Surgical History:   Procedure Laterality Date    CARDIAC SURGERY      CAROTID STENT N/A 10/14/2019    Procedure: INSERTION, STENT, ARTERY, CAROTID;  Surgeon: Artie Kebede MD;  Location: [facility] CATH LAB;  Service: Cardiology;  Laterality: N/A;    CATARACT EXTRACTION W/  INTRAOCULAR LENS IMPLANT Right 07/17/2018    Dr. Talamantes    CATARACT EXTRACTION W/  INTRAOCULAR LENS IMPLANT Left 07/31/2018    Dr. Talamantes    CORONARY ARTERY BYPASS GRAFT      x2  10/2014    ESOPHAGOGASTRODUODENOSCOPY N/A 4/8/2019    Procedure: ESOPHAGOGASTRODUODENOSCOPY (EGD)/poss rfa;  Surgeon: Clifford De La Torre MD;  Location: [facility] ENDO (2ND FLR);  Service: Endoscopy;  Laterality: N/A;    ESOPHAGOGASTRODUODENOSCOPY N/A 8/4/2021    Procedure: EGD (ESOPHAGOGASTRODUODENOSCOPY);  Surgeon: Clifford De La Torre MD;  Location: [facility] ENDO (2ND FLR);  Service: Endoscopy;  Laterality: N/A;  8/2-covid [place]-inst odjbau-nl-px appts on 8/3    ESOPHAGOGASTRODUODENOSCOPY (EGD) WITH RADIOFREQUENCY TREATMENT OF GASTROESOPHAGEAL JUNCTION      INJECTION OF ANESTHETIC AGENT AROUND MEDIAL BRANCH NERVES INNERVATING CERVICAL FACET JOINT Bilateral 1/9/2020    Procedure: INJECTION, MBB--Bilateral Cervical- Third Occipital nerve, C2-3--ASA  okay for pt to continue taking per provider.;  Surgeon: Tip Mera Jr., MD;  Location: [facility] PAIN MGT;  Service: Pain Management;  Laterality: Bilateral;    INTRAOCULAR PROSTHESES INSERTION Right 2018    Procedure: INSERTION, INTRAOCULAR LENS PROSTHESIS;  Surgeon: León Talamantes MD;  Location: [facility] OR 1ST FLR;  Service: Ophthalmology;  Laterality: Right;    INTRAOCULAR PROSTHESES INSERTION Left 2018    Procedure: INSERTION, INTRAOCULAR LENS PROSTHESIS;  Surgeon: León Talamantes MD;  Location: [facility] OR 1ST FLR;  Service: Ophthalmology;  Laterality: Left;    PHACOEMULSIFICATION OF CATARACT Right 2018    Procedure: PHACOEMULSIFICATION, CATARACT;  Surgeon: León Talamantes MD;  Location: [facility] OR 1ST FLR;  Service: Ophthalmology;  Laterality: Right;    PHACOEMULSIFICATION OF CATARACT Left 2018    Procedure: PHACOEMULSIFICATION, CATARACT;  Surgeon: León Talamantes MD;  Location: [facility] OR 1ST FLR;  Service: Ophthalmology;  Laterality: Left;    PLACEMENT OF SCREW IN ODONTOID N/A 10/22/2020    Procedure: INSERTION, SCREW, ODONTOID. Anterior approach.;  Surgeon: Kisrten Weaver MD;  Location: [facility] OR 2ND FLR;  Service: Neurosurgery;  Laterality: N/A;    RADIOFREQUENCY THERMOCOAGULATION Bilateral 2020    Procedure: RADIOFREQUENCY THERMAL COAGULATION--Bilateral C2-3 and Third Occipital Nerve;  Surgeon: Tip Mera Jr., MD;  Location: [facility] PAIN MGT;  Service: Pain Management;  Laterality: Bilateral;    UMBILICAL HERNIA REPAIR         Review of patient's allergies indicates:   Allergen Reactions    Morphine Shortness Of Breath       Family History       Problem Relation (Age of Onset)    Arrhythmia Mother    Heart disease Mother    Heart failure Brother    No Known Problems Daughter, Son          Tobacco Use    Smoking status: Former     Packs/day: 2.00     Years: 20.00     Pack years: 40.00     Types: Cigarettes     Quit date: 1988     Years since quittin.9      Passive exposure: Past    Smokeless tobacco: Never   Substance and Sexual Activity    Alcohol use: No     Comment: quit drinking beer 2012    Drug use: No    Sexual activity: Not Currently     Partners: Female      Review of Systems   Constitutional:  Negative for chills and fever.   Eyes:  Negative for visual disturbance.   Respiratory:  Negative for shortness of breath.    Cardiovascular:  Negative for chest pain.   Gastrointestinal:  Positive for abdominal pain. Negative for blood in stool, constipation, diarrhea, nausea and vomiting.   Genitourinary:  Negative for dysuria and hematuria.   Musculoskeletal:  Positive for back pain.   Allergic/Immunologic: Negative for environmental allergies.   Neurological:  Negative for dizziness, weakness and light-headedness.   Objective:     Vital Signs (Most Recent):  Temp: 98 °F (36.7 °C) (01/16/23 2025)  Pulse: 87 (01/17/23 0000)  Resp: (!) 22 (01/17/23 0000)  BP: 131/66 (01/17/23 0000)  SpO2: 99 % (01/17/23 0000)   Vital Signs (24h Range):  Temp:  [98 °F (36.7 °C)] 98 °F (36.7 °C)  Pulse:  [73-91] 87  Resp:  [2-23] 22  SpO2:  [95 %-99 %] 99 %  BP: (108-138)/(56-72) 131/66     Weight: 68 kg (150 lb)  Body mass index is 22.15 kg/m².    No intake or output data in the 24 hours ending 01/17/23 0019    Physical Exam  Constitutional:       General: He is not in acute distress.     Appearance: Normal appearance.   HENT:      Head: Normocephalic and atraumatic.   Eyes:      General: No scleral icterus.     Conjunctiva/sclera: Conjunctivae normal.      Pupils: Pupils are equal, round, and reactive to light.   Neck:      Trachea: No tracheal deviation.   Cardiovascular:      Rate and Rhythm: Normal rate and regular rhythm.   Pulmonary:      Effort: Pulmonary effort is normal. No respiratory distress.      Breath sounds: No stridor.   Abdominal:      General: Abdomen is flat. There is no distension.      Palpations: Abdomen is soft.      Tenderness: There is no abdominal  tenderness. There is no guarding.   Musculoskeletal:         General: No deformity or signs of injury. Normal range of motion.      Cervical back: No edema.   Skin:     General: Skin is warm and dry.      Coloration: Skin is not jaundiced.   Neurological:      General: No focal deficit present.      Mental Status: He is alert and oriented to person, place, and time.   Psychiatric:         Mood and Affect: Mood normal.         Behavior: Behavior normal.       Vents:  Oxygen Concentration (%): 28 (01/16/23 2240)    Lines/Drains/Airways       Peripheral Intravenous Line  Duration                  Peripheral IV - Single Lumen 01/16/23 2147 20 G Posterior;Right Forearm <1 day                    Significant Labs:    CBC/Anemia Profile:  Recent Labs   Lab 01/16/23 2145   WBC 10.10   HGB 13.1*   HCT 39.7*      MCV 98   RDW 13.7        Chemistries:  Recent Labs   Lab 01/16/23  2145      K 3.5      CO2 26   BUN 17   CREATININE 1.2   CALCIUM 9.2   ALBUMIN 3.5   PROT 6.9   BILITOT 1.1*   ALKPHOS 71   ALT 15   AST 19       All pertinent labs within the past 24 hours have been reviewed.    Significant Imaging: I have reviewed all pertinent imaging results/findings within the past 24 hours.

## 2023-01-17 NOTE — HPI
"Paul Silvanancy Browning is an 82-year-old man with a history of dementia (FAST score 6D), CAD s/p STEMI/PCI/2v-CABG (2014), ICM with reduced EF (40%), COPD on home oxygen (2 L/min), sarcoidosis, possible Parkinson's, DM2, CKD3, multiple falls who was admitted after sustaining a fall with multiple rib fractures and small apical pneumothorax. Palliative and Supportive Care was consulted to explore goals of care and symptom management recommendations.    I met Mr. Browning, who has both chronic visual and auditory deficits. He asks when will he be able to go home. He is immediately forgetting what we just discussed during our conversation. I met with his daughter Mesha at his bedside. Please see assessment/recommendations for details of conversation.  "

## 2023-01-17 NOTE — CONSULTS
[facility] - Emergency Dept  General Surgery  Consult Note    Patient Name: Paul Browning  MRN: [id number]  Code Status: Prior  Admission Date: 1/16/2023  Hospital Length of Stay: 0 days  Attending Physician: Rima Pires MD  Primary Care Provider: Grey Forbes DO    Patient information was obtained from patient, past medical records and ER records.     Inpatient consult to General Surgery  Consult performed by: RENNY Bee MD  Consult ordered by: Artie Horne MD  Reason for consult: multiple rib fractures and ptx        Subjective:     Principal Problem: <principal problem not specified>    History of Present Illness: 82M frail fell multiple times at home.  H/o CAD (EF 40% with previous MI with enduring defect seen on cardiac PET), COPD on 0.5L NC at home and inhalers, DM2, CKD3, HTN, sarcoid, CAD.  CXR shows small apical pneumo and subcutaneous air on the right chest wall.  CT chest shows at least four comminuted posterior rib fractures on my count. These fractures are severe.  He is sating in the mid-90s on 6L facemask.  Back pain is not controlled  He is complaining of abdominal pain as well    Lives with daughter who assists with ADLs.  I shared with the daughter that this is a major setback for him and that with his medical history, he is at very high risk for pneumonia and intubation.  She has not explicitly discussed with her brothers whether he would want to be intubated were it indicated.    He has had cardiac bypass and cervical spine surgery. He has cardiac stents.    Remote 40 pack year smoking history.      No current facility-administered medications on file prior to encounter.     Current Outpatient Medications on File Prior to Encounter   Medication Sig    albuterol (PROVENTIL/VENTOLIN HFA) 90 mcg/actuation inhaler INHALE 2 PUFFS INTO THE LUNGS EVERY 6 HOURS AS NEEDED WHEEZING OR SHORTNESS OF BREATH    albuterol-ipratropium (DUO-NEB) 2.5 mg-0.5 mg/3 mL nebulizer  solution Take 3 mLs by nebulization every 12 (twelve) hours. Rescue    alendronate (FOSAMAX) 70 MG tablet TAKE 1 TABLET BY MOUTH EVERY WEEK IN THE MORNING WITH FULL GLASS OF WATER ON EMPTY STOMACH-DONT LIE DOWN FOR AT LEAST 30 MINUTES AFTERWARDS    aspirin (ECOTRIN) 81 MG EC tablet Take 81 mg by mouth once daily.    atorvastatin (LIPITOR) 40 MG tablet TAKE 1 TABLET(40 MG) BY MOUTH EVERY DAY    budesonide (PULMICORT) 0.5 mg/2 mL nebulizer solution Take 2 mLs (0.5 mg total) by nebulization 2 (two) times daily. Controller    calcium carbonate (CALCIUM 600 ORAL) Take by mouth.    doxepin (SILENOR) 3 mg Tab One po qhs    empagliflozin (JARDIANCE) 10 mg tablet Take 1 tablet (10 mg total) by mouth once daily.    furosemide (LASIX) 20 MG tablet Take 1 tablet (20 mg total) by mouth daily as needed (Wt gain >2 pounds/day or >5 pounds/week).    magnesium oxide (MAG-OX) 400 mg (241.3 mg magnesium) tablet Take 1 tablet (400 mg total) by mouth once daily.    memantine (NAMENDA) 10 MG Tab Take 1 tablet (10 mg total) by mouth 2 (two) times daily.    methylPREDNISolone (MEDROL, RENAN,) 4 mg tablet use as directed    omega-3 fatty acids-vitamin E (FISH OIL) 1,000 mg Cap Take 1 tablet by mouth 2 (two) times daily. (Patient taking differently: Take 1 tablet by mouth once daily.)    ondansetron (ZOFRAN) 8 MG tablet Take 1 tablet (8 mg total) by mouth every 8 (eight) hours as needed for Nausea.    pantoprazole (PROTONIX) 40 MG tablet TAKE 1 TABLET(40 MG) BY MOUTH TWICE DAILY    sacubitriL-valsartan (ENTRESTO) 24-26 mg per tablet Take 1 tablet by mouth 2 (two) times daily.    sertraline (ZOLOFT) 100 MG tablet Take 1 tablet (100 mg total) by mouth once daily.    tiotropium-olodateroL (STIOLTO RESPIMAT) 2.5-2.5 mcg/actuation Mist Inhale 2 puffs into the lungs once daily. Controller    ubrogepant (UBROGEPANT) 100 mg tablet One po at onset of migraine. May repeat after 2 hours if needed.       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    Hyperlipidemia     Hypertension     Ischemic cardiomyopathy 11/5/2014    MDD (major depressive disorder), recurrent episode, mild 2/17/2016    Osteoporosis 7/20/2016    Peripheral arterial disease 3/18/2016    Sarcoid     Squamous cell carcinoma 09/2016, 5/2016    crown of scalp, left wrist     Past Surgical History:   Procedure Laterality Date    CARDIAC SURGERY      CAROTID STENT N/A 10/14/2019    Procedure: INSERTION, STENT, ARTERY, CAROTID;  Surgeon: Artie Kebede MD;  Location: [facility] CATH LAB;  Service: Cardiology;  Laterality: N/A;    CATARACT EXTRACTION W/  INTRAOCULAR LENS IMPLANT Right 07/17/2018    Dr. Talamantes    CATARACT EXTRACTION W/  INTRAOCULAR LENS IMPLANT Left 07/31/2018    Dr. Talamantes    CORONARY ARTERY BYPASS GRAFT      x2  10/2014    ESOPHAGOGASTRODUODENOSCOPY N/A 4/8/2019    Procedure: ESOPHAGOGASTRODUODENOSCOPY (EGD)/poss rfa;  Surgeon: Clifford De La Torre MD;  Location: [facility] ([address] [place] [address]);  Service: Endoscopy;  Laterality: N/A;    ESOPHAGOGASTRODUODENOSCOPY N/A 8/4/2021    Procedure: EGD (ESOPHAGOGASTRODUODENOSCOPY);  Surgeon: Clifford De La Torre MD;  Location: [facility] ([address] [place] [address]);  Service: Endoscopy;  Laterality: N/A;  [facility]    ESOPHAGOGASTRODUODENOSCOPY (EGD) WITH RADIOFREQUENCY TREATMENT OF GASTROESOPHAGEAL JUNCTION      INJECTION OF ANESTHETIC AGENT AROUND MEDIAL BRANCH NERVES INNERVATING CERVICAL FACET JOINT Bilateral  2020    Procedure: INJECTION, MBB--Bilateral Cervical- Third Occipital nerve, C2-3--ASA okay for pt to continue taking per provider.;  Surgeon: Tip Mera Jr., MD;  [place] [facility] [place] [facility]  Service: Pain Management;  Laterality: Bilateral;    INTRAOCULAR PROSTHESES INSERTION Right 2018    Procedure: INSERTION, INTRAOCULAR LENS PROSTHESIS;  Surgeon: León Talamantes MD;  Location: [facility] OR [address] [place] [address];  Service: Ophthalmology;  Laterality: Right;    INTRAOCULAR PROSTHESES INSERTION Left 2018    Procedure: INSERTION, INTRAOCULAR LENS PROSTHESIS;  Surgeon: León Talamantes MD;  [facility];  Service: Ophthalmology;  Laterality: Left;    PHACOEMULSIFICATION OF CATARACT Right 2018    Procedure: PHACOEMULSIFICATION, CATARACT;  Surgeon: León Talamantes MD;  Location: [facility] OR [address] [place] [address];  Service: Ophthalmology;  Laterality: Right;    PHACOEMULSIFICATION OF CATARACT Left 2018    Procedure: PHACOEMULSIFICATION, CATARACT;  Surgeon: León Talamantes MD;  Location: [facility] OR [address] [place] [address];  Service: Ophthalmology;  Laterality: Left;    PLACEMENT OF SCREW IN ODONTOID N/A 10/22/2020    Procedure: INSERTION, SCREW, ODONTOID. Anterior approach.;  Surgeon: Kirsten Weaver MD;  Location: [facility] OR 2ND [facility];  Service: Neurosurgery;  Laterality: N/A;    RADIOFREQUENCY THERMOCOAGULATION Bilateral 2020    Procedure: RADIOFREQUENCY THERMAL COAGULATION--Bilateral C2-3 and Third Occipital Nerve;  Surgeon: Tip Mera Jr., MD;  [place] [facility] [place];  Service: Pain Management;  Laterality: Bilateral;    UMBILICAL HERNIA REPAIR       Family History       Problem Relation (Age of Onset)    Arrhythmia Mother    Heart disease Mother    Heart failure Brother    No Known Problems Daughter, Son          Tobacco Use    Smoking status: Former     Packs/day: 2.00     Years: 20.00     Pack years: 40.00     Types: Cigarettes     Quit date: 1988     Years since quittin.9      Passive exposure: Past    Smokeless tobacco: Never   Substance and Sexual Activity    Alcohol use: No     Comment: quit drinking beer 2012    Drug use: No    Sexual activity: Not Currently     Partners: Female     Review of Systems   Unable to perform ROS: Acuity of condition   Objective:     Vital Signs (Most Recent):  Temp: 98 °F (36.7 °C) (01/16/23 2025)  Pulse: 86 (01/16/23 2240)  Resp: (!) 2 (01/16/23 2336)  BP: (!) 128/56 (01/16/23 2220)  SpO2: 95 % (01/16/23 2240)   Vital Signs (24h Range):  Temp:  [98 °F (36.7 °C)] 98 °F (36.7 °C)  Pulse:  [73-91] 86  Resp:  [2-23] 2  SpO2:  [95 %-96 %] 95 %  BP: (108-138)/(56-72) 128/56     Weight: 68 kg (150 lb)  Body mass index is 22.15 kg/m².    Physical Exam  Vitals reviewed.   Constitutional:       Appearance: Normal appearance.   HENT:      Head: Normocephalic.   Cardiovascular:      Rate and Rhythm: Normal rate.   Pulmonary:      Effort: Respiratory distress present.      Comments: Right posterior back pain at site of fractures  Crepitus along the right back  Abdominal:      General: Abdomen is flat. There is no distension.      Tenderness: There is no abdominal tenderness.      Hernia: No hernia is present.   Skin:     General: Skin is warm and dry.      Capillary Refill: Capillary refill takes less than 2 seconds.   Neurological:      General: No focal deficit present.      Mental Status: He is alert.   Psychiatric:         Mood and Affect: Mood normal.       Significant Labs:  I have reviewed all pertinent lab results within the past 24 hours.  CBC:   Recent Labs   Lab 01/16/23 2145   WBC 10.10   RBC 4.07*   HGB 13.1*   HCT 39.7*      MCV 98   MCH 32.2*   MCHC 33.0     CMP:   Recent Labs   Lab 01/16/23 2145   *   CALCIUM 9.2   ALBUMIN 3.5   PROT 6.9      K 3.5   CO2 26      BUN 17   CREATININE 1.2   ALKPHOS 71   ALT 15   AST 19   BILITOT 1.1*       Significant Diagnostics:  I have reviewed all pertinent imaging results/findings  within the past 24 hours.    CT with at least four communited, displaced rib fractures on the right side      Assessment/Plan:     Closed fracture of multiple ribs of right side  83yo frail and comorbid gentleman with multiple posterior right rib fractures that are comminuted with significant displacement following a fall.  He has small pneumothorax that does not need intervention at this time, but may eventually  This is a major setback.  Very high risk for pneumonia.    Admit to SICU for observation and pain control  Follow up CT CAP reads  Follow up discussion about code status DNI/DNR with family in the morning (conversation initiated in the ED)  Consult palliative care  Needs PCA and multimodals  Consider consult to anesthesia pain  Home meds  Daily XR  DVT ppx      VTE Risk Mitigation (From admission, onward)    None          Thank you for your consult. I will follow-up with patient. Please contact us if you have any additional questions.    TREY Bee MD  General Surgery  [facility] - [facility] Dept

## 2023-01-17 NOTE — HOSPITAL COURSE
Patient was admitted to SICU for closer observation and pain control for posterior fractures of right ribs 6-9. Palliative care and acute pain medicine were consulted. APS placed an EPS catheter for pain control that and family elected to make the patient DNR/DNI. CXR on admission showed a small apical right pneumothorax. Midodrine was added for BP support. Patient throughout his stay remained tachpneic. On 1/23, patient's oxygen saturations began declining to 50s and 60%, eventually requiring 70L on high flow oxygen. Family at that time elected to pursue comfort care measures. Nurse obtained EKG showing asystole and MD informed. Patient passed away at around 1450 with his family members present at bedside.

## 2023-01-18 PROBLEM — S27.0XXA TRAUMATIC PNEUMOTHORAX: Status: ACTIVE | Noted: 2023-01-01

## 2023-01-18 NOTE — ASSESSMENT & PLAN NOTE
Paul Browning is a frail 82M h/o CAD (EF 40% with previous MI with enduring defect seen on cardiac PET), COPD on 0.5L NC at home and inhalers, DM2, CKD3, HTN, sarcoid, CAD who fell multiple times at home. Imaging shows small apical PTX and subQ air on R chest wall with CT with several comminuted posterior rib fractures.      Neuro/Psych:   -- Sedation: none  -- Pain: ifeoma robaxin, gabapentin, lidocaine patch; dilaudid Q2 push prn (allergy to morphine)  -- Acute pain consulted             Cards:   -- hx of HTN, CAD s/p stents  -- HDS  --holding home ASA and Entresto      Pulm:   -- hx of COPD, baseline 0.5L NC at home  -- now has right sided rib fractures and small right apical PTX  -- Goal O2 sat > 90%  -- Wean as able  -- duonebs, IS, Acapella      Renal:  -- hx of CKD3, baseline Cr 1.0  -- BUN/Cr 26/1.6      FEN / GI:    -- Replace lytes as needed  -- Nutrition: soft diet      ID:   -- Tm: afebrile; WBC 6.58  -- Abx none      Heme/Onc:   -- H/H 11.5/36.2  -- Daily CBC      Endo:   -- hx of DM2, on Jardiance   -- Gluc goal 140-180  -- AISS      PPx:   Feeding: soft diet  Analgesia/Sedation: [facility] gabapentin, robaxin, lido patch; dilaudid Q2 pushes PRN  Thromboembolic prevention: lovenox  HOB >30: yes  Stress Ulcer ppx: protonix  Glucose control: Critical care goal 140-180 g/dl, ISS    Lines/Drains/Airway: PIV x2      Dispo/Code Status/Palliative:   -- SICU / Full Code

## 2023-01-18 NOTE — PT/OT/SLP PROGRESS
Physical Therapy      Patient Name:  Paul Browning   MRN:  [id number]    Patient not seen today secondary to  (AM and PM pt not seen due to waiting for pain service to place nerve block.). Will follow-up at a later date.    1/18/2023  .

## 2023-01-18 NOTE — PT/OT/SLP PROGRESS
Occupational Therapy      Patient Name:  Paul Browning   MRN:  [id number]    Pt not seen this date. Nsg recommended HOLD due to awaiting nerve block. OT to check status later date.     1/18/2023

## 2023-01-18 NOTE — NURSING
BP 70s/40s. Dr. Trujillo notified.  Orders received and implemented for 500cc LR bolus.  BP 90s/40s with LR bolus infusing at this time. Will continue to monitor.

## 2023-01-18 NOTE — SUBJECTIVE & OBJECTIVE
Interval History: Pain controlled until 4:30 AM this morning according to son. Met with Paul (son) at bedside, had discussion about code status again, which he will discuss with his siblings.    Medications:  Continuous Infusions:  Scheduled Meds:   acetaminophen  1,000 mg Oral TID    albuterol-ipratropium  3 mL Nebulization Q4H WAKE    atorvastatin  40 mg Oral Daily    budesonide  0.5 mg Nebulization Q12H    gabapentin  300 mg Oral TID    heparin (porcine)  5,000 Units Subcutaneous Q8H    LIDOcaine  1 patch Transdermal Q24H    memantine  10 mg Oral BID    mupirocin   Nasal BID    pantoprazole  40 mg Oral BID    senna  8.6 mg Oral BID    sertraline  100 mg Oral Daily     PRN Meds:sodium chloride 0.9%, dextrose 10%, dextrose 10%, glucagon (human recombinant), glucose, glucose, HYDROmorphone, insulin aspart U-100, melatonin, ondansetron, oxyCODONE, oxyCODONE    Objective:     Vital Signs (Most Recent):  Temp: 98.6 °F (37 °C) (01/18/23 0730)  Pulse: 110 (01/18/23 1000)  Resp: (!) 26 (01/18/23 1000)  BP: (!) 113/56 (01/18/23 1000)  SpO2: 98 % (01/18/23 1000)   Vital Signs (24h Range):  Temp:  [97.8 °F (36.6 °C)-98.6 °F (37 °C)] 98.6 °F (37 °C)  Pulse:  [] 110  Resp:  [12-32] 26  SpO2:  [88 %-100 %] 98 %  BP: ()/(51-63) 113/56     Weight:  (bed scale broken)  Body mass index is 22.15 kg/m².    Physical Exam  Constitutional:       Appearance: He is ill-appearing.      Comments: Sleeping, brow furrowing   HENT:      Head: Normocephalic and atraumatic.      Right Ear: External ear normal.      Left Ear: External ear normal.      Nose: Nose normal.      Mouth/Throat:      Mouth: Mucous membranes are dry.   Eyes:      Conjunctiva/sclera: Conjunctivae normal.   Cardiovascular:      Rate and Rhythm: Tachycardia present.   Pulmonary:      Breath sounds: Rhonchi present.   Abdominal:      General: Bowel sounds are normal. There is no distension.      Palpations: Abdomen is soft.   Musculoskeletal:      Right lower  leg: No edema.      Left lower leg: No edema.   Lymphadenopathy:      Cervical: No cervical adenopathy.   Skin:     Findings: Bruising present.   Neurological:      Mental Status: He is disoriented.      Motor: Weakness present.       Review of Symptoms      Symptom Assessment (ESAS 0-10 Scale)  Pain:  0  Dyspnea:  0  Anxiety:  0  Nausea:  0  Depression:  0  Anorexia:  0  Fatigue:  0  Insomnia:  0  Restlessness:  0  Agitation:  0  Unable to complete assessment due to Dementia         Pain Assessment in Advanced Demential Scale (PAINAD)   Breathing - Independent of vocalization:  0  Negative vocalization:  0  Facial expression:  1  Body language:  0  Consolability:  0  Total:  1    Psychosocial/Cultural:   See Palliative Psychosocial Note: No  Social Issues Identified: Coping deficit pt/family  Bereavement Risk: Yes: Identified: work/home, financial, intimacy, and caregiver concerns   Caregiver Needs Discussed. Caregiver Distress: Yes: Intensity of family caregiving  Cultural: Close with family - has four adult children who all help with his care. Lives with daughter Mesha  **Primary  to Follow**  Palliative Care  Consult: No    Spiritual:  F - Deja and Belief:  Worship  I - Importance:  Very important  C - Community:  Involved  A - Address in Care:  Father Chase consulted to perform annointing of sick per family request      Advance Care Planning   Advance Directives:   Living Will: No    LaPOST: No    Do Not Resuscitate Status: No    Medical Power of : No      Decision Making:  Family answered questions and Patient unable to communicate due to disease severity/cognitive impairment  Goals of Care: What is most important right now is to focus on symptom/pain control, quality of life, even if it means sacrificing a little time. Accordingly, we have decided that the best plan to meet the patient's goals includes continuing with treatment.       Significant Labs: CBC:   Recent Labs    Lab 01/16/23  2145 01/17/23  0413 01/18/23  0421   WBC 10.10 7.27 6.58   HGB 13.1* 11.4* 11.5*   HCT 39.7* 35.2* 36.2*    213 229     CMP:   Recent Labs   Lab 01/16/23  2145 01/17/23  0413 01/18/23  0421    143 139   K 3.5 4.2 4.2    108 105   CO2 26 25 26   * 118* 109   BUN 17 18 26*   CREATININE 1.2 1.0 1.6*   CALCIUM 9.2 8.7 8.4*   PROT 6.9 6.1 6.0   ALBUMIN 3.5 3.2* 3.2*   BILITOT 1.1* 1.0 1.2*   ALKPHOS 71 56 57   AST 19 18 26   ALT 15 15 16   ANIONGAP 11 10 8     CBC:   Recent Labs   Lab 01/18/23  0421   WBC 6.58   HGB 11.5*   HCT 36.2*   MCV 98        BMP:  Recent Labs   Lab 01/18/23  0421         K 4.2      CO2 26   BUN 26*   CREATININE 1.6*   CALCIUM 8.4*   MG 2.1     LFT:  Lab Results   Component Value Date    AST 26 01/18/2023    ALKPHOS 57 01/18/2023    BILITOT 1.2 (H) 01/18/2023     Albumin:   Albumin   Date Value Ref Range Status   01/18/2023 3.2 (L) 3.5 - 5.2 g/dL Final     Protein:   Total Protein   Date Value Ref Range Status   01/18/2023 6.0 6.0 - 8.4 g/dL Final     Lactic acid:   Lab Results   Component Value Date    LACTATE 1.0 06/06/2022    LACTATE 0.8 11/17/2014       Significant Imaging: I have reviewed all pertinent imaging results/findings within the past 24 hours.

## 2023-01-18 NOTE — ANESTHESIA PROCEDURE NOTES
Right BELEM Catheter    Patient location during procedure: ICU   Block not for primary anesthetic.  Reason for block: at surgeon's request and post-op pain management   Post-op Pain Location: R thoracic pain   Start time: 1/18/2023 2:45 PM  Timeout: 1/18/2023 2:45 PM   End time: 1/18/2023 3:00 PM    Staffing  Authorizing Provider: Neto Guaman MD  Performing Provider: Jude Logan MD    Preanesthetic Checklist  Completed: patient identified, IV checked, site marked, risks and benefits discussed, surgical consent, monitors and equipment checked, pre-op evaluation and timeout performed  Peripheral Block  Patient position: left lateral decubitus  Prep: ChloraPrep  Patient monitoring: heart rate, cardiac monitor, continuous pulse ox and frequent blood pressure checks  Block type: erector spinae plane (Erector Spinae Plane)  Laterality: right  Injection technique: continuous  Interspace: T5-6    Needle  Needle type: Tuohy   Needle gauge: 17 G  Needle length: 3.5 in  Needle localization: anatomical landmarks and ultrasound guidance  Catheter type: spring wound  Catheter size: 19 G  Test dose: lidocaine 1.5% with Epi 1-to-200,000 and negative   -ultrasound image captured on disc.  Assessment  Injection assessment: negative aspiration, negative parasthesia and local visualized surrounding nerve  Paresthesia pain: none  Heart rate change: no  Slow fractionated injection: yes  Pain Tolerance: comfortable throughout block and no complaints  Medications:    Medications: bupivacaine (pf) (MARCAINE) injection 0.75% - Perineural   15 mL - 1/18/2023 3:00:00 PM    Additional Notes  R BELEM block performed and perineural catheter placed under ultrasound guidance, while maintaining sterile technique throughout procedure.  Administered 30cc of 0.375%  with epi.   5 cc of lido/epi test dose administered following catheter placement.  Negative aspiration and visualization of local spread confirmed with ultrasound.   VSS. Patient  tolerated well. No immediate complications.  ICU RN present and monitored throughout procedure. See RN flowsheet.

## 2023-01-18 NOTE — PROGRESS NOTES
"[facility]  Palliative Medicine  Progress Note    Patient Name: Paul Bronwing  MRN: [id number]  Admission Date: 1/16/2023  Hospital Length of Stay: 0 days  Code Status: Full Code   Attending Provider: Santo Shankar MD  Consulting Provider: Marie Blanco MD  Primary Care Physician: Grey Forbes DO  Principal Problem:Closed fracture of multiple ribs of right side    Patient information was obtained from relative(s) and primary team.      Assessment/Plan:     ADDENDUM: Revisited Mr. Browning and his son Paul at bedside. He has discussed with his siblings about code status and everyone is in consensus about no CPR, but does not want to avoid intubation/ventilation. Discussed my worries that with his low blood pressures that do not seem to be responding to the 2 L LR boluses, I worry he is aspirating (as to be expected in advanced dementia) and that he is developing an infection. Paul understands, hopes that this is not the case. He is open to further discussion and to my follow up tomorrow, 1/19/23.    Encounter for palliative care  Paul Browning (Joe) is an 82-year-old man with a history of dementia (FAST score 6D), CAD s/p STEMI/PCI/2v-CABG (2014), ICM with reduced EF (40%), COPD on home oxygen (intermittently), sarcoidosis, possible Parkinson's, DM2, CKD3, multiple falls who was admitted after sustaining a fall with multiple rib fractures and small apical pneumothorax. Palliative and Supportive Care was consulted to explore goals of care and symptom management recommendations.    Advance Care Planning   Goals of Care:  - Code status: Full code  - Next of kin: 4 adult children (3 sons, 1 daughter)  - Patient is not decisional  - ACP documents: none  - Prognosis: poor  - Family's understanding of prognosis: continue education  - Goals: control pain/symptoms, optimize quality of life, continue full supportive care  - Recommendations/Plans: continue full " supportive care. Appreciate primary team for helping  family about code status in order to communicate recommendations that would avoid harm. Will revisit code status once son Paul discusses with remainder of his siblings    Goals of Care Conversation  - 2023: Met Mr. Browning and his son Paul at his bedside, who was having a discussion related to code status with a surgeon on the team. Mr. Browning agrees that CPR will cause more harm and will text and discuss with his siblings about this code status. At this time he remains hesitant about changing code status related to intubation, recalling that his mother survived compassionate extubation 3 years after physicians told them that she was dying. Had supportive conversation with Paul, who remains hopeful that pain management will be able to perform nerve block as he worries that the opioids are causing too much sedation and would like to minimize medications if possible. Discussed interval development of VIKTOR, and will be watchful of this problem and adjust medications to dose renally and protect kidneys.  - 2023: Met Mr. Browning and his daughter Mesha and son Paul at his bedside. Mr. Browning is unable to fully participate in our discussion due to advanced dementia and immediate short term memory loss. Mesha shares her understanding that he broke 4 ribs in this fall and punctured his lungs. She was told that he might need a chest tube and he was high risk for developing pneumonia. She recalls that her mother and father discussed with each other that they would not want to be artificially sustained on life-support. When her mother was intubated and told that she would be ventilator-dependent, family honored her wishes and withdrew ventilator support. She thankfully lived 3 years after compassionate extubation and  3 years ago, which was the beginning of Mr. Browning's decline. They had been  when he was  "15-years-old and she was 16-years-old. Now he lives with his daughter Mesha and is also supported by his other sons who help with other important aspects of his life. Mesha shares that he has sustained multiple falls, most recently sustained 3 falls in the last 2 weeks. Despite constant reorientation, he forgets to stay in bed and will try to get out and ambulate. He had significant sundowning from 4pm to 8pm, but now it is all day. Mesha and Paul recognize that he is progressively declining and acknowledge that this will continue. They've been told previously when he sustained his prior falls that "this was it" but despite this, he continues to live so they hope to continue doing what they can to help him live. When Mesha discussed code status in the ER, she was under the impression that the ER staff was saying that intubation and CPR will help him. We discussed that knowing that the fall was enough to cause multiple rib fractures, forceful chest compressions will surely cause more harm and more breakage, and the ventilator will worsen his small pneumothorax. I recommended against such interventions, explaining that this will sure cause his demise and not help him the way we hope. Mesha asks if we don't intubate/ventilate when he develops a pneumonia, what can be done alternatively. I shared that at that point there are no curative interventions, and that medical professionals can support him with symptom management and offer him strategies to alleviate shortness of breath. They asked about chest tube, and I shared that as Neto (their brother) had a chest tube before, that Mr. Browning will also have significant pain if a chest tube is inserted, and this would prolong his current state without a guarantee that it will fix the pneumothorax as his body will have a harder time to heal. They share their understanding and would like to discuss this with their other siblings. Their sister-in-law who is an " [employer] RN came to visit, and also voiced similar concerns about CPR/intubation as well. Mesha states that she is worried that he is miserable, forgetting immediately that he already went out to visit family/loved ones and stating repetitively that he wishes to go out to fish. Validated her concerns, recognizing that they wish that everything helpful be continued and offered. Encouraged them to consider that in addition to this, it is also our responsibility to protect him from harmful interventions that will not benefit him, as they had done so for their mother.      Acute Pain 2/2 Rib Fractures, Falls  - Discussed allergy to morphine. According to daughter, he will get angry and hallucinate when he is on morphine. Tolerated fentanyl and hydromorphone.  - Agree with pain management consult  - Speech therapy evaluated Mr. Browning - can swallow food/medications with aspiration precautions  - Schedule acetaminophen 1000 mg PO TID for chronic pain/headache  - Continue oxycodone 5 mg PO q4h PRN moderate pain (not using)  - Continue oxycodone 10 mg PO q4h PRN severe pain  - Agree with increased dose of hydromorphone 1mg IV q1h PRN breakthrough pain (adjusted on 1/18 morning)  - Continue methocarbamol 500 mg PO q6h (may need adjust if becomes somnolent)  - In setting of VIKTOR, change gabapentin 300 mg PO TID to daily (order adjusted)  - Continue lidocaine patches    At Risk for Opioid Induced Constipation  - Continue senna 8.6 mg PO BID  - Add polyethylene glycol 17 g PO daily  - If no bowel movement by end of today 1/18, consider rectal exam and administer suppository/enema     Anxiety/Agitation/Sun-Arley/Delirium  Dementia with Behavioral Disturbances  Possible Parkinson's  - Continue memantine 10 mg PO BID if able to swallow  - Continue home sertraline 100 mg PO daily  - Avoid antipsychotics in setting of possible Parkinson's  - Had paradoxical reaction to Xanax  - Continue frequent reorientation and  "optimization of environment by keeping room bright during the day, dark and quiet at night. Discontinue vital signs at night time to allow him to sleep undisturbed  - Optimize pain management as above  - Would trial midazolam 1 mg IV q4h PRN non-redirectable agitation, anxiety              I will follow-up with patient. Please contact us if you have any additional questions.    Subjective:     Chief Complaint:   Chief Complaint   Patient presents with    Fall     Mechanical fall to R side while walking to bathroom. Fell yesterday also onto same side. Hx of dementia. Fall witnessed by pt son and reports that pt hit head tonight. -blood thinners. No deformity, bruising noted to R lower back from yesterdays fall per son.        HPI:   Paul Browning (Joe) is an 82-year-old man with a history of dementia (FAST score 6D), CAD s/p STEMI/PCI/2v-CABG (2014), ICM with reduced EF (40%), COPD on home oxygen (2 L/min), sarcoidosis, possible Parkinson's, DM2, CKD3, multiple falls who was admitted after sustaining a fall with multiple rib fractures and small apical pneumothorax. Palliative and Supportive Care was consulted to explore goals of care and symptom management recommendations.    I met Mr. Browning, who has both chronic visual and auditory deficits. He asks when will he be able to go home. He is immediately forgetting what we just discussed during our conversation. I met with his daughter Mesha at his bedside. Please see assessment/recommendations for details of conversation.      Hospital Course:  No notes on file    Interval History: Pain controlled until 4:30 AM this morning according to son. Met with Paul (son) at bedside, had discussion about code status again, which he will discuss with his siblings.    Medications:  Continuous Infusions:  Scheduled Meds:   acetaminophen  1,000 mg Oral TID    albuterol-ipratropium  3 mL Nebulization Q4H WAKE    atorvastatin  40 mg Oral Daily    budesonide  0.5 " mg Nebulization Q12H    gabapentin  300 mg Oral TID    heparin (porcine)  5,000 Units Subcutaneous Q8H    LIDOcaine  1 patch Transdermal Q24H    memantine  10 mg Oral BID    mupirocin   Nasal BID    pantoprazole  40 mg Oral BID    senna  8.6 mg Oral BID    sertraline  100 mg Oral Daily     PRN Meds:sodium chloride 0.9%, dextrose 10%, dextrose 10%, glucagon (human recombinant), glucose, glucose, HYDROmorphone, insulin aspart U-100, melatonin, ondansetron, oxyCODONE, oxyCODONE    Objective:     Vital Signs (Most Recent):  Temp: 98.6 °F (37 °C) (01/18/23 0730)  Pulse: 110 (01/18/23 1000)  Resp: (!) 26 (01/18/23 1000)  BP: (!) 113/56 (01/18/23 1000)  SpO2: 98 % (01/18/23 1000)   Vital Signs (24h Range):  Temp:  [97.8 °F (36.6 °C)-98.6 °F (37 °C)] 98.6 °F (37 °C)  Pulse:  [] 110  Resp:  [12-32] 26  SpO2:  [88 %-100 %] 98 %  BP: ()/(51-63) 113/56     Weight:  (bed scale broken)  Body mass index is 22.15 kg/m².    Physical Exam  Constitutional:       Appearance: He is ill-appearing.      Comments: Sleeping, brow furrowing   HENT:      Head: Normocephalic and atraumatic.      Right Ear: External ear normal.      Left Ear: External ear normal.      Nose: Nose normal.      Mouth/Throat:      Mouth: Mucous membranes are dry.   Eyes:      Conjunctiva/sclera: Conjunctivae normal.   Cardiovascular:      Rate and Rhythm: Tachycardia present.   Pulmonary:      Breath sounds: Rhonchi present.   Abdominal:      General: Bowel sounds are normal. There is no distension.      Palpations: Abdomen is soft.   Musculoskeletal:      Right lower leg: No edema.      Left lower leg: No edema.   Lymphadenopathy:      Cervical: No cervical adenopathy.   Skin:     Findings: Bruising present.   Neurological:      Mental Status: He is disoriented.      Motor: Weakness present.       Review of Symptoms      Symptom Assessment (ESAS 0-10 Scale)  Pain:  0  Dyspnea:  0  Anxiety:  0  Nausea:  0  Depression:  0  Anorexia:  0  Fatigue:   0  Insomnia:  0  Restlessness:  0  Agitation:  0  Unable to complete assessment due to Dementia         Pain Assessment in Advanced Demential Scale (PAINAD)   Breathing - Independent of vocalization:  0  Negative vocalization:  0  Facial expression:  1  Body language:  0  Consolability:  0  Total:  1    Psychosocial/Cultural:   See Palliative Psychosocial Note: No  Social Issues Identified: Coping deficit pt/family  Bereavement Risk: Yes: Identified: work/home, financial, intimacy, and caregiver concerns   Caregiver Needs Discussed. Caregiver Distress: Yes: Intensity of family caregiving  Cultural: Close with family - has four adult children who all help with his care. Lives with daughter Mesha  **Primary  to Follow**  Palliative Care  Consult: No    Spiritual:  F - Deja and Belief:  Hoahaoism  I - Importance:  Very important  C - Community:  Involved  A - Address in Care:  Father Chase consulted to perform annointing of sick per family request      Advance Care Planning   Advance Directives:   Living Will: No    LaPOST: No    Do Not Resuscitate Status: No    Medical Power of : No      Decision Making:  Family answered questions and Patient unable to communicate due to disease severity/cognitive impairment  Goals of Care: What is most important right now is to focus on symptom/pain control, quality of life, even if it means sacrificing a little time. Accordingly, we have decided that the best plan to meet the patient's goals includes continuing with treatment.       Significant Labs: CBC:   Recent Labs   Lab 01/16/23  2145 01/17/23  0413 01/18/23  0421   WBC 10.10 7.27 6.58   HGB 13.1* 11.4* 11.5*   HCT 39.7* 35.2* 36.2*    213 229     CMP:   Recent Labs   Lab 01/16/23  2145 01/17/23  0413 01/18/23  0421    143 139   K 3.5 4.2 4.2    108 105   CO2 26 25 26   * 118* 109   BUN 17 18 26*   CREATININE 1.2 1.0 1.6*   CALCIUM 9.2 8.7 8.4*   PROT 6.9 6.1 6.0    ALBUMIN 3.5 3.2* 3.2*   BILITOT 1.1* 1.0 1.2*   ALKPHOS 71 56 57   AST 19 18 26   ALT 15 15 16   ANIONGAP 11 10 8     CBC:   Recent Labs   Lab 01/18/23 0421   WBC 6.58   HGB 11.5*   HCT 36.2*   MCV 98        BMP:  Recent Labs   Lab 01/18/23 0421         K 4.2      CO2 26   BUN 26*   CREATININE 1.6*   CALCIUM 8.4*   MG 2.1     LFT:  Lab Results   Component Value Date    AST 26 01/18/2023    ALKPHOS 57 01/18/2023    BILITOT 1.2 (H) 01/18/2023     Albumin:   Albumin   Date Value Ref Range Status   01/18/2023 3.2 (L) 3.5 - 5.2 g/dL Final     Protein:   Total Protein   Date Value Ref Range Status   01/18/2023 6.0 6.0 - 8.4 g/dL Final     Lactic acid:   Lab Results   Component Value Date    LACTATE 1.0 06/06/2022    LACTATE 0.8 11/17/2014       Significant Imaging: I have reviewed all pertinent imaging results/findings within the past 24 hours.    I spent a total of 50 minutes on the day of the visit.This includes face to face time in discussion of goals of care, symptom assessment, coordination of care and emotional support.  This also includes non-face to face time preparing to see the patient (eg, review of tests/imaging), obtaining and/or reviewing separately obtained history, documenting clinical information in the electronic or other health record, independently interpreting results and communicating results to the patient/family/caregiver, or care coordinator.      Marie Blanco MD  Palliative Medicine  [facility] - Surgical Intensive Care

## 2023-01-18 NOTE — PROGRESS NOTES
[facility]  Critical Care - Surgery  Progress Note    Patient Name: Paul Browning  MRN: [id number]  Admission Date: 1/16/2023  Hospital Length of Stay: 0 days  Code Status: Full Code  Attending Provider: Santo Shankar MD  Primary Care Provider: Grey Forbes DO   Principal Problem: Closed fracture of multiple ribs of right side    Subjective:     Hospital/ICU Course:  1/17: admitted to SICU for close observation and pain control. No surgery at this time. Palliative care and acute pain medicine consulted. CXR this AM with increased R sided patchy infiltrates      Interval History/Significant Events: NAEON. Afebrile, HDS.        Objective:     Vital Signs (Most Recent):  Temp: 98.5 °F (36.9 °C) (01/17/23 2300)  Pulse: 98 (01/18/23 0532)  Resp: (!) 21 (01/18/23 0532)  BP: (!) 97/55 (01/18/23 0500)  SpO2: 99 % (01/18/23 0532)   Vital Signs (24h Range):  Temp:  [97.8 °F (36.6 °C)-98.5 °F (36.9 °C)] 98.5 °F (36.9 °C)  Pulse:  [] 98  Resp:  [12-32] 21  SpO2:  [88 %-100 %] 99 %  BP: ()/(51-73) 97/55     Weight:  (bed scale broken)  Body mass index is 22.15 kg/m².      Intake/Output Summary (Last 24 hours) at 1/18/2023 0552  Last data filed at 1/17/2023 2321  Gross per 24 hour   Intake 595.58 ml   Output 450 ml   Net 145.58 ml       Physical Exam     General: He is not in acute distress.     Appearance: Normal appearance.      In acute distress  HENT:      Head: Normocephalic and atraumatic.   Eyes:      General: No scleral icterus.     Conjunctiva/sclera: Conjunctivae normal.      Pupils: Pupils are equal, round, and reactive to light.   Neck:      Trachea: No tracheal deviation.   Cardiovascular:      Rate and Rhythm: Normal rate and regular rhythm.   Pulmonary:      Effort: Pulmonary effort is normal. No respiratory distress.      Breath sounds: No stridor.      Gross deformity to right back; tender to palpation  Abdominal:      General: Abdomen is flat. There is no  distension.      Palpations: Abdomen is soft.      Tenderness: There is no abdominal tenderness. There is no guarding.   Musculoskeletal:         General: No deformity or signs of injury. Normal range of motion.      Cervical back: No edema.   Skin:     General: Skin is warm and dry.      Coloration: Skin is not jaundiced.   Neurological:      General: No focal deficit present.      Mental Status: He is alert and oriented to person, place, and time.   Psychiatric:         Mood and Affect: Mood normal.         Behavior: Behavior normal    Vents:  Oxygen Concentration (%): 24 (01/17/23 2338)    Lines/Drains/Airways       Peripheral Intravenous Line  Duration                  Peripheral IV - Single Lumen 01/16/23 2147 20 G Posterior;Right Forearm 1 day         Peripheral IV - Single Lumen 01/17/23 0022 20 G Anterior;Proximal;Right Forearm 1 day                    Significant Labs:    CBC/Anemia Profile:  Recent Labs   Lab 01/16/23 2145 01/17/23 0413 01/18/23  0421   WBC 10.10 7.27 6.58   HGB 13.1* 11.4* 11.5*   HCT 39.7* 35.2* 36.2*    213 229   MCV 98 98 98   RDW 13.7 13.7 14.0        Chemistries:  Recent Labs   Lab 01/16/23 2145 01/17/23  0413 01/18/23  0421    143 139   K 3.5 4.2 4.2    108 105   CO2 26 25 26   BUN 17 18 26*   CREATININE 1.2 1.0 1.6*   CALCIUM 9.2 8.7 8.4*   ALBUMIN 3.5 3.2* 3.2*   PROT 6.9 6.1 6.0   BILITOT 1.1* 1.0 1.2*   ALKPHOS 71 56 57   ALT 15 15 16   AST 19 18 26   MG  --  2.1 2.1   PHOS  --  3.8 4.6*       All pertinent labs within the past 24 hours have been reviewed.    Significant Imaging:  I have reviewed all pertinent imaging results/findings within the past 24 hours.    Assessment/Plan:     * Closed fracture of multiple ribs of right side  Paul Browning is a frail 82M h/o CAD (EF 40% with previous MI with enduring defect seen on cardiac PET), COPD on 0.5L NC at home and inhalers, DM2, CKD3, HTN, sarcoid, CAD who fell multiple times at home. Imaging shows  small apical PTX and subQ air on R chest wall with CT with several comminuted posterior rib fractures.      Neuro/Psych:   -- Sedation: none  -- Pain: ifeoma robaxin, gabapentin, lidocaine patch; dilaudid Q2 push prn (allergy to morphine)  -- Acute pain consulted             Cards:   -- hx of HTN, CAD s/p stents  -- HDS  --holding home ASA and Entresto      Pulm:   -- hx of COPD, baseline 0.5L NC at home  -- now has right sided rib fractures and small right apical PTX  -- Goal O2 sat > 90%  -- Wean as able  -- duonebs, IS, Acapella      Renal:  -- hx of CKD3, baseline Cr 1.0  -- BUN/Cr 26/1.6      FEN / GI:    -- Replace lytes as needed  -- Nutrition: soft diet      ID:   -- Tm: afebrile; WBC 6.58  -- Abx none      Heme/Onc:   -- H/H 11.5/36.2  -- Daily CBC      Endo:   -- hx of DM2, on Jardiance   -- Gluc goal 140-180  -- AISS      PPx:   Feeding: soft diet  Analgesia/Sedation: ifeoma gabapentin, robaxin, lido patch; dilaudid Q2 pushes PRN  Thromboembolic prevention: lovenox  HOB >30: yes  Stress Ulcer ppx: protonix  Glucose control: Critical care goal 140-180 g/dl, ISS    Lines/Drains/Airway: PIV x2      Dispo/Code Status/Palliative:   -- SICU / Full Code             Critical care was time spent personally by me on the following activities: development of treatment plan with patient or surrogate and bedside caregivers, discussions with consultants, evaluation of patient's response to treatment, examination of patient, ordering and performing treatments and interventions, ordering and review of laboratory studies, ordering and review of radiographic studies, pulse oximetry, re-evaluation of patient's condition.  This critical care time did not overlap with that of any other provider or involve time for any procedures.     Filippo Anderson MD  Critical Care - Surgery  [facility]

## 2023-01-18 NOTE — ANESTHESIA POST-OP PAIN MANAGEMENT
Consult  Anesthesiology Acute Pain Service      SUBJECTIVE:     Chief Complaint/Reason for Consult: Request anesthesia assistance with continuous pain management due to high complexity/refractory pain.    Surgical Procedure: N/a    Post Op Day: N/a    History of Present Illness:  Patient is a 82 y.o. male with a PMH of CAD (EF 40% with previous MI with enduring defect seen on cardiac PET), COPD on 0.5L NC at home and inhalers, DM2, CKD3, HTN, sarcoid, CAD who fell multiple times at home. Imaging shows small apical PTX and subQ air on R chest wall with CT with several comminuted posterior rib fractures.     On admission, he is satting in the mid-90s on 6L facemask. Back pain is not controlled, and he is complaining of abdominal pain as well. He is being admitted to the SICU for observation and pain control.       Anesthesiology is consulted for refractory pain control.     The current inpatient regimen is:  Acetaminophen 1,000mg q8h  Gabapentin 300mg daily  Sertraline 100mg oral  Hydromorphone 0.5mg IV q2h prn  Oxycodone 5mg q4h prn moderate pain  Oxycodone 10mg q4h prn severe pain        Patient Active Problem List   Diagnosis    CAD (coronary artery disease)    Hypertension    Hyperlipidemia    Stevens's esophagus with low grade dysplasia    Hx of gastric ulcer    S/P CABG x 2    SOB (shortness of breath)    Atherosclerosis of aorta    Pulmonary sarcoidosis    MDD (major depressive disorder), recurrent episode, mild    Peripheral arterial disease    Osteoporosis    Senile purpura    Refractive error    Dry eye syndrome of both eyes    Heterophoria    Right homonymous hemianopsia    Carotid artery disease    Cervicogenic headache    Cervical spondylosis    DDD (degenerative disc disease), cervical    Chronic neck pain    Neck stiffness    Abnormal increased muscle tightness    Awakens from sleep due to pain    Cervical segment dysfunction    Tightness of neck    Dysfunctional  movements    Odontoid fracture    Gastroesophageal reflux disease    Anxiety    Decreased ROM of neck    Gait instability    Degenerative disease of nervous system, unspecified    Impaired functional mobility, balance, gait, and endurance    Vision loss    Intraventricular conduction delay    Dementia    Acute hypoxemic respiratory failure    Frequent falls    Scalp laceration    Chronic diastolic heart failure    Closed fracture of pubis    Multiple closed stable lateral compression fractures of pelvis    Closed fracture of trochanter of left femur with routine healing    Chronic obstructive pulmonary disease    Lung nodule    Ischemic cardiomyopathy    Closed fracture of multiple ribs of right side    Encounter for palliative care    Traumatic pneumothorax       Review of patient's allergies indicates:   Allergen Reactions    Morphine Shortness Of Breath       Outpatient Medications:   No current facility-administered medications on file prior to encounter.     Current Outpatient Medications on File Prior to Encounter   Medication Sig Dispense Refill    albuterol (PROVENTIL/VENTOLIN HFA) 90 mcg/actuation inhaler INHALE 2 PUFFS INTO THE LUNGS EVERY 6 HOURS AS NEEDED WHEEZING OR SHORTNESS OF BREATH 8.5 g 11    albuterol-ipratropium (DUO-NEB) 2.5 mg-0.5 mg/3 mL nebulizer solution Take 3 mLs by nebulization every 12 (twelve) hours. Rescue 540 mL 3    alendronate (FOSAMAX) 70 MG tablet TAKE 1 TABLET BY MOUTH EVERY WEEK IN THE MORNING WITH FULL GLASS OF WATER ON EMPTY STOMACH-DONT LIE DOWN FOR AT LEAST 30 MINUTES AFTERWARDS 12 tablet 3    aspirin (ECOTRIN) 81 MG EC tablet Take 81 mg by mouth once daily.      atorvastatin (LIPITOR) 40 MG tablet TAKE 1 TABLET(40 MG) BY MOUTH EVERY DAY 90 tablet 0    budesonide (PULMICORT) 0.5 mg/2 mL nebulizer solution Take 2 mLs (0.5 mg total) by nebulization 2 (two) times daily. Controller 120 mL 5    calcium carbonate (CALCIUM 600 ORAL) Take by mouth.       doxepin (SILENOR) 3 mg Tab One po qhs 30 tablet 6    empagliflozin (JARDIANCE) 10 mg tablet Take 1 tablet (10 mg total) by mouth once daily. 30 tablet 11    furosemide (LASIX) 20 MG tablet Take 1 tablet (20 mg total) by mouth daily as needed (Wt gain >2 pounds/day or >5 pounds/week). 90 tablet 3    magnesium oxide (MAG-OX) 400 mg (241.3 mg magnesium) tablet Take 1 tablet (400 mg total) by mouth once daily. 30 tablet 3    memantine (NAMENDA) 10 MG Tab Take 1 tablet (10 mg total) by mouth 2 (two) times daily. 180 tablet 1    methylPREDNISolone (MEDROL, RENAN,) 4 mg tablet use as directed 1 each 0    omega-3 fatty acids-vitamin E (FISH OIL) 1,000 mg Cap Take 1 tablet by mouth 2 (two) times daily. (Patient taking differently: Take 1 tablet by mouth once daily.) 60 each 11    ondansetron (ZOFRAN) 8 MG tablet Take 1 tablet (8 mg total) by mouth every 8 (eight) hours as needed for Nausea. 30 tablet 1    pantoprazole (PROTONIX) 40 MG tablet TAKE 1 TABLET(40 MG) BY MOUTH TWICE DAILY 180 tablet 0    sacubitriL-valsartan (ENTRESTO) 24-26 mg per tablet Take 1 tablet by mouth 2 (two) times daily. 60 tablet 11    sertraline (ZOLOFT) 100 MG tablet Take 1 tablet (100 mg total) by mouth once daily. 90 tablet 3    tiotropium-olodateroL (STIOLTO RESPIMAT) 2.5-2.5 mcg/actuation Mist Inhale 2 puffs into the lungs once daily. Controller 4 g 5    ubrogepant (UBROGEPANT) 100 mg tablet One po at onset of migraine. May repeat after 2 hours if needed. 10 tablet 1        Current Inpatient Medications:   acetaminophen  1,000 mg Intravenous Once    acetaminophen  1,000 mg Oral TID    atorvastatin  40 mg Oral Daily    budesonide  0.5 mg Nebulization Q12H    [START ON 1/19/2023] gabapentin  300 mg Oral Daily    heparin (porcine)  5,000 Units Subcutaneous Q8H    levalbuterol  0.63 mg Nebulization Q8H WA    LIDOcaine  1 patch Transdermal Q24H    memantine  10 mg Oral BID    mupirocin   Nasal BID    pantoprazole  40 mg  Intravenous Daily    polyethylene glycol  17 g Oral Daily    senna  8.6 mg Oral BID    sertraline  100 mg Oral Daily       PRN:  sodium chloride 0.9%, dextrose 10%, dextrose 10%, glucagon (human recombinant), glucose, glucose, HYDROmorphone, insulin aspart U-100, melatonin, ondansetron, oxyCODONE, oxyCODONE    Past Surgical History:   Procedure Laterality Date    CARDIAC SURGERY      CAROTID STENT N/A 10/14/2019    Procedure: INSERTION, STENT, ARTERY, CAROTID;  Surgeon: Artie Kebede MD;  Location: [facility] CATH LAB;  Service: Cardiology;  Laterality: N/A;    CATARACT EXTRACTION W/  INTRAOCULAR LENS IMPLANT Right 07/17/2018    Dr. Talamantes    CATARACT EXTRACTION W/  INTRAOCULAR LENS IMPLANT Left 07/31/2018    Dr. Talamantes    CORONARY ARTERY BYPASS GRAFT      x2  10/2014    ESOPHAGOGASTRODUODENOSCOPY N/A 4/8/2019    Procedure: ESOPHAGOGASTRODUODENOSCOPY (EGD)/poss rfa;  Surgeon: Clifford De La Torre MD;  Location: [facility] (2ND FLR);  Service: Endoscopy;  Laterality: N/A;    ESOPHAGOGASTRODUODENOSCOPY N/A 8/4/2021    Procedure: EGD (ESOPHAGOGASTRODUODENOSCOPY);  Surgeon: Clifford De La Torre MD;  Location: [facility] (2ND FLR);  Service: Endoscopy;  Laterality: N/A;  8/2-covid [facility] [id number] appts on 8/3    ESOPHAGOGASTRODUODENOSCOPY (EGD) WITH RADIOFREQUENCY TREATMENT OF GASTROESOPHAGEAL JUNCTION      INJECTION OF ANESTHETIC AGENT AROUND MEDIAL BRANCH NERVES INNERVATING CERVICAL FACET JOINT Bilateral 1/9/2020    Procedure: INJECTION, MBB--Bilateral Cervical- Third Occipital nerve, C2-3--ASA okay for pt to continue taking per provider.;  Surgeon: Tip Mera Jr., MD;  Location: [facility] PAIN MGT;  Service: Pain Management;  Laterality: Bilateral;    INTRAOCULAR PROSTHESES INSERTION Right 7/17/2018    Procedure: INSERTION, INTRAOCULAR LENS PROSTHESIS;  Surgeon: León Talamantes MD;  Location: [address] FLR;  Service: Ophthalmology;  Laterality: Right;    INTRAOCULAR PROSTHESES  INSERTION Left 2018    Procedure: INSERTION, INTRAOCULAR LENS PROSTHESIS;  Surgeon: León Talamantes MD;  Location: [facility] OR 1ST FLR;  Service: Ophthalmology;  Laterality: Left;    PHACOEMULSIFICATION OF CATARACT Right 2018    Procedure: PHACOEMULSIFICATION, CATARACT;  Surgeon: León Talamantes MD;  Location: [facility] OR 1ST FLR;  Service: Ophthalmology;  Laterality: Right;    PHACOEMULSIFICATION OF CATARACT Left 2018    Procedure: PHACOEMULSIFICATION, CATARACT;  Surgeon: León Talamantes MD;  Location: [facility] OR 1ST FLR;  Service: Ophthalmology;  Laterality: Left;    PLACEMENT OF SCREW IN ODONTOID N/A 10/22/2020    Procedure: INSERTION, SCREW, ODONTOID. Anterior approach.;  Surgeon: Kirsten Weaver MD;  Location: [facility] OR 2ND FLR;  Service: Neurosurgery;  Laterality: N/A;    RADIOFREQUENCY THERMOCOAGULATION Bilateral 2020    Procedure: RADIOFREQUENCY THERMAL COAGULATION--Bilateral C2-3 and Third Occipital Nerve;  Surgeon: Tip Mera Jr., MD;  Location: [facility] PAIN T;  Service: Pain Management;  Laterality: Bilateral;    UMBILICAL HERNIA REPAIR         Social History     Socioeconomic History    Marital status:     Number of children: 4   Occupational History    Occupation:     Tobacco Use    Smoking status: Former     Packs/day: 2.00     Years: 20.00     Pack years: 40.00     Types: Cigarettes     Quit date: 1988     Years since quittin.9     Passive exposure: Past    Smokeless tobacco: Never   Substance and Sexual Activity    Alcohol use: No     Comment: quit drinking 2012    Drug use: No    Sexual activity: Not Currently     Partners: Female       Review of Systems:  As above. Additionally:     Constitutional: activity change  HENT: Negative for congestion, postnasal drip, rhinorrhea, sinus pressure, sore throat, trouble swallowing and voice change.    Eyes: Negative for photophobia, pain and visual disturbance.   Respiratory:  cough, shortness of breath, negative for wheezing.    Cardiovascular: Negative for palpitations and leg swelling.   Gastrointestinal: Negative for nausea, vomiting, abdominal pain, diarrhea, constipation and abdominal distention.   Endocrine: Negative for polydipsia, polyphagia and polyuria.   Genitourinary: Negative for dysuria, frequency, hematuria and difficulty urinating.   Musculoskeletal: arthralgias  Skin: Negative for color change, pallor, rash and wound.   Neurological: Negative for dizziness, seizures, syncope, weakness and headaches.   Hematological: Negative for adenopathy. Does not bruise/bleed easily.   Psychiatric/Behavioral: Negative for sleep disturbance and dysphoric mood. The patient is not nervous/anxious.      OBJECTIVE:     Current Vital Signs  Temp: 37 °C (98.6 °F) (01/18/23 0730)  Pulse: (!) 114 (01/18/23 1100)  Resp: (!) 36 (01/18/23 1100)  BP: 117/62 (01/18/23 1100)  SpO2: 99 % (01/18/23 1100)    Vital Signs Range (Last 24H):  Temp:  [36.6 °C (97.8 °F)-37 °C (98.6 °F)]   Pulse:  []   Resp:  [12-36]   BP: ()/(51-63)   SpO2:  [88 %-100 %]     I & O (Last 24H):    Intake/Output Summary (Last 24 hours) at 1/18/2023 1124  Last data filed at 1/17/2023 2321  Gross per 24 hour   Intake 500 ml   Output 250 ml   Net 250 ml       Physical Exam:  Constitutional: Patient is oriented to person, place, and time. Patient appears well-developed and well-nourished. No distress.   HENT: Head: Normocephalic and atraumatic. Right Ear: External ear normal. Left Ear: External ear normal. Nose: Nose normal.   Mouth/Throat: Oropharynx is clear and moist. No oropharyngeal exudate.   Eyes: Conjunctivae and EOM are normal. Pupils are equal, round, and reactive to light. Right eye exhibits no discharge. Left eye exhibits no discharge. No scleral icterus.   Neck: Normal range of motion. Neck supple. No JVD present. No tracheal deviation present. No thyromegaly present.   Cardiovascular: Normal rate, regular  rhythm, normal heart sounds and intact distal pulses.  Exam reveals no gallop and no friction rub.  No murmur heard.  Pulmonary/Chest: Effort normal and breath sounds normal. No respiratory distress. Patient has no wheezes. Patient has no rales.   Abdominal: Soft. Bowel sounds are normal. Patient exhibits no distension and no mass. There is no tenderness.   Musculoskeletal: Normal range of motion. Patient exhibits no edema or tenderness.   Lymphadenopathy:   Patient has no cervical adenopathy.   Neurological: Patient is alert and oriented to person, place, and time. Patient has normal reflexes. Patient exhibits normal muscle tone. Coordination normal.   Skin: Skin is warm and dry. No rash noted. Patient is not diaphoretic. No erythema. No pallor.   Psychiatric: Patient has a normal mood and affect. Behavior is normal. Judgment and thought content normal.     Laboratory:  CBC:   Recent Labs     01/17/23 0413 01/18/23 0421   WBC 7.27 6.58   RBC 3.61* 3.68*   HGB 11.4* 11.5*   HCT 35.2* 36.2*    229   MCV 98 98   MCH 31.6* 31.3*   MCHC 32.4 31.8*       CMP:   Recent Labs     01/17/23 0413 01/18/23 0421    139   K 4.2 4.2   CO2 25 26    105   BUN 18 26*   CREATININE 1.0 1.6*   * 109   MG 2.1 2.1   PHOS 3.8 4.6*   CALCIUM 8.7 8.4*   ALBUMIN 3.2* 3.2*   PROT 6.1 6.0   ALKPHOS 56 57   ALT 15 16   AST 18 26   BILITOT 1.0 1.2*       Diagnostic Radiology:  Air in the right chest wall adjacent to the lateral rib cage.  Small right apical pneumothorax.  Suspect right-sided rib fracture    ASSESSMENT/PLAN:     Active Hospital Problems    Diagnosis  POA    *Closed fracture of multiple ribs of right side [S22.41XA]  Yes    Traumatic pneumothorax [S27.0XXA]  Yes    Encounter for palliative care [Z51.5]  Not Applicable    Chronic obstructive pulmonary disease [J44.9]  Yes     Patient has a history of COPD and has not been using a controller because of cost.  We will reorder and ask for the pharmacy  assistance program to assist.  In addition will start DuoNeb twice a day and Pulmicort b.i.d..        Resolved Hospital Problems   No resolved problems to display.     Mr. Browning is an 81yo M PMH CAD (EF 40% with previous MI with enduring defect seen on cardiac PET), COPD on 0.5L NC at home and inhalers, DM2, CKD3, HTN, sarcoid, admitted with multiple R sided rib fractures 2/2 fall with apical pneumothorax, not a surgical candidate. Given the patient is tachycardic, reporting pain, taking shallow rapid breaths, requiring 3L NC, his pain is inadequately controlled on current regimen with PO oxy and IV dilaudid for breakthrough. Could benefit from BELEM block    Plan:    BELEM block   --Multimodals: Acetaminophen 1,000mg 3x daily, Robaxin 500 3xdaily, Gabapentin 300 3xdaily, lidocaine patch  --Home sertraline 100mg daily  --Oxycodone 5mg q4h prn moderate pain, oxycodone 10mg q4h severe pain, hydromorphone 0.5mg IV q2h prn for breakthrough    Thank you for this consult.    Will continue to follow with you.    Sherie Vasquez MD  Department of Anesthesiology  [facility]  01/18/2023 11:24 AM

## 2023-01-18 NOTE — ASSESSMENT & PLAN NOTE
Patient is an 82 year old frail and comorbid gentleman with h/o COPD (on home O2), pulmonary sarcoidosis, CABG, HTN, HLD, depression, GERD, dementia, HF who presents after a fall with multiple posterior right rib fractures that are comminuted with significant displacement, also with small pneumothorax that does not need intervention at this time, but may eventually. Clinically stable on a few liters of O2 now but is very high risk for pneumonia.    - 1L bolus today  - Dysphagia soft diet with aspiration precautions  - Palliative care consulted, appreciate assistance   - Will follow up discussions. Currently full code  - Multimodal pain control  - PRN IV pain   - APS consulted, appreciate assistance  - Aggressive pulmonary toilet, acapella, IS, duonebs  - Home meds  - Remainder of care per SICU, appreciate assistance    Dispo: remain in SICU

## 2023-01-18 NOTE — NURSING
MIVF started per order.  BP 80s/40s following administration of LR bolus.  Dr. Trujillo contacted.  Orders received and implemented for additional 500cc LR bolus.  Son at bedside updated on plan of care.  Will continue to monitor.

## 2023-01-18 NOTE — PROGRESS NOTES
VSS overnight. Afebrile. Follows all commands and moves all extremities. Oriented to self only. On 1L NC with SATs >95%. MAPs >65. No continuous infusions. Pain medication administered per order. Mechanical soft diet. Voids using urinal. Son at bedside. All questions and concerns addressed and answered. No skin breakdown noted to heels or sacrum. Bed plugged in and mattress inflated.

## 2023-01-18 NOTE — SUBJECTIVE & OBJECTIVE
Interval History/Significant Events: Made DNR/DNI.         Objective:     Vital Signs (Most Recent):  Temp: 98.5 °F (36.9 °C) (01/17/23 2300)  Pulse: 98 (01/18/23 0532)  Resp: (!) 21 (01/18/23 0532)  BP: (!) 97/55 (01/18/23 0500)  SpO2: 99 % (01/18/23 0532)   Vital Signs (24h Range):  Temp:  [97.8 °F (36.6 °C)-98.5 °F (36.9 °C)] 98.5 °F (36.9 °C)  Pulse:  [] 98  Resp:  [12-32] 21  SpO2:  [88 %-100 %] 99 %  BP: ()/(51-73) 97/55     Weight:  (bed scale broken)  Body mass index is 22.15 kg/m².      Intake/Output Summary (Last 24 hours) at 1/18/2023 0552  Last data filed at 1/17/2023 2321  Gross per 24 hour   Intake 595.58 ml   Output 450 ml   Net 145.58 ml       Physical Exam     General: He is not in acute distress.     Appearance: Normal appearance.      In acute distress  HENT:      Head: Normocephalic and atraumatic.   Eyes:      General: No scleral icterus.     Conjunctiva/sclera: Conjunctivae normal.      Pupils: Pupils are equal, round, and reactive to light.   Neck:      Trachea: No tracheal deviation.   Cardiovascular:      Rate and Rhythm: Normal rate and regular rhythm.   Pulmonary:      Effort: Pulmonary effort is normal. No respiratory distress.      Breath sounds: No stridor.      Gross deformity to right back; tender to palpation  Abdominal:      General: Abdomen is flat. There is no distension.      Palpations: Abdomen is soft.      Tenderness: There is no abdominal tenderness. There is no guarding.   Musculoskeletal:         General: No deformity or signs of injury. Normal range of motion.      Cervical back: No edema.   Skin:     General: Skin is warm and dry.      Coloration: Skin is not jaundiced.   Neurological:      General: No focal deficit present.      Mental Status: He is alert and oriented to person, place, and time.   Psychiatric:         Mood and Affect: Mood normal.         Behavior: Behavior normal    Vents:  Oxygen Concentration (%): 24 (01/17/23 2338)    Lines/Drains/Airways        Peripheral Intravenous Line  Duration                  Peripheral IV - Single Lumen 01/16/23 2147 20 G Posterior;Right Forearm 1 day         Peripheral IV - Single Lumen 01/17/23 0022 20 G Anterior;Proximal;Right Forearm 1 day                    Significant Labs:    CBC/Anemia Profile:  Recent Labs   Lab 01/16/23 2145 01/17/23 0413 01/18/23  0421   WBC 10.10 7.27 6.58   HGB 13.1* 11.4* 11.5*   HCT 39.7* 35.2* 36.2*    213 229   MCV 98 98 98   RDW 13.7 13.7 14.0        Chemistries:  Recent Labs   Lab 01/16/23 2145 01/17/23 0413 01/18/23  0421    143 139   K 3.5 4.2 4.2    108 105   CO2 26 25 26   BUN 17 18 26*   CREATININE 1.2 1.0 1.6*   CALCIUM 9.2 8.7 8.4*   ALBUMIN 3.5 3.2* 3.2*   PROT 6.9 6.1 6.0   BILITOT 1.1* 1.0 1.2*   ALKPHOS 71 56 57   ALT 15 15 16   AST 19 18 26   MG  --  2.1 2.1   PHOS  --  3.8 4.6*       All pertinent labs within the past 24 hours have been reviewed.    Significant Imaging:  I have reviewed all pertinent imaging results/findings within the past 24 hours.

## 2023-01-18 NOTE — ASSESSMENT & PLAN NOTE
"Paul Berrios "Slim Browning is an 82-year-old man with a history of dementia (FAST score 6D), CAD s/p STEMI/PCI/2v-CABG (2014), ICM with reduced EF (40%), COPD on home oxygen (intermittently), sarcoidosis, possible Parkinson's, DM2, CKD3, multiple falls who was admitted after sustaining a fall with multiple rib fractures and small apical pneumothorax. Palliative and Supportive Care was consulted to explore goals of care and symptom management recommendations.    Advance Care Planning   Goals of Care:  - Code status: Full code  - Next of kin: 4 adult children (3 sons, 1 daughter)  - Patient is not decisional  - ACP documents: none  - Prognosis: poor  - Family's understanding of prognosis: continue education  - Goals: control pain/symptoms, optimize quality of life, continue full supportive care  - Recommendations/Plans: continue full supportive care. Appreciate primary team for helping  family about code status in order to communicate recommendations that would avoid harm. Will revisit code status once son Paul discusses with remainder of his siblings    Goals of Care Conversation  - 1/18/2023: Met Mr. Browning and his son Paul at his bedside, who was having a discussion related to code status with a surgeon on the team. Mr. Browning agrees that CPR will cause more harm and will text and discuss with his siblings about this code status. At this time he remains hesitant about changing code status related to intubation, recalling that his mother survived compassionate extubation 3 years after physicians told them that she was dying. Had supportive conversation with Paul, who remains hopeful that pain management will be able to perform nerve block as he worries that the opioids are causing too much sedation and would like to minimize medications if possible. Discussed interval development of VIKTOR, and will be watchful of this problem and adjust medications to dose renally and protect kidneys.  - " "2023: Met Mr. Browning and his daughter Mesha and son Paul at his bedside. Mr. Browning is unable to fully participate in our discussion due to advanced dementia and immediate short term memory loss. Mesha shares her understanding that he broke 4 ribs in this fall and punctured his lungs. She was told that he might need a chest tube and he was high risk for developing pneumonia. She recalls that her mother and father discussed with each other that they would not want to be artificially sustained on life-support. When her mother was intubated and told that she would be ventilator-dependent, family honored her wishes and withdrew ventilator support. She thankfully lived 3 years after compassionate extubation and  3 years ago, which was the beginning of Mr. Browning's decline. They had been  when he was 15-years-old and she was 16-years-old. Now he lives with his daughter Mesha and is also supported by his other sons who help with other important aspects of his life. Mesha shares that he has sustained multiple falls, most recently sustained 3 falls in the last 2 weeks. Despite constant reorientation, he forgets to stay in bed and will try to get out and ambulate. He had significant sundowning from 4pm to 8pm, but now it is all day. Mesha and Paul recognize that he is progressively declining and acknowledge that this will continue. They've been told previously when he sustained his prior falls that "this was it" but despite this, he continues to live so they hope to continue doing what they can to help him live. When Mesha discussed code status in the ER, she was under the impression that the ER staff was saying that intubation and CPR will help him. We discussed that knowing that the fall was enough to cause multiple rib fractures, forceful chest compressions will surely cause more harm and more breakage, and the ventilator will worsen his small pneumothorax. I recommended against " such interventions, explaining that this will sure cause his demise and not help him the way we hope. Mesha asks if we don't intubate/ventilate when he develops a pneumonia, what can be done alternatively. I shared that at that point there are no curative interventions, and that medical professionals can support him with symptom management and offer him strategies to alleviate shortness of breath. They asked about chest tube, and I shared that as Neto (their brother) had a chest tube before, that Mr. Browning will also have significant pain if a chest tube is inserted, and this would prolong his current state without a guarantee that it will fix the pneumothorax as his body will have a harder time to heal. They share their understanding and would like to discuss this with their other siblings. Their sister-in-law who is an [facility] [employer] [facility] [employer] came to visit, and also voiced similar concerns about CPR/intubation as well. Mesha states that she is worried that he is miserable, forgetting immediately that he already went out to visit family/loved ones and stating repetitively that he wishes to go out to fish. Validated her concerns, recognizing that they wish that everything helpful be continued and offered. Encouraged them to consider that in addition to this, it is also our responsibility to protect him from harmful interventions that will not benefit him, as they had done so for their mother.       Acute Pain 2/2 Rib Fractures, Falls  - Discussed allergy to morphine. According to daughter, he will get angry and hallucinate when he is on morphine. Tolerated fentanyl and hydromorphone.  - Agree with pain management consult  - Speech therapy evaluated Mr. Browning - can swallow food/medications with aspiration precautions  - Schedule acetaminophen 1000 mg PO TID for chronic pain/headache  - Continue oxycodone 5 mg PO q4h PRN moderate pain (not using)  - Continue oxycodone 10 mg PO q4h PRN severe pain  - Agree  with increased dose of hydromorphone 1mg IV q1h PRN breakthrough pain (adjusted on 1/18 morning)  - Continue methocarbamol 500 mg PO q6h (may need adjust if becomes somnolent)  - In setting of VIKTOR, change gabapentin 300 mg PO TID to daily (order adjusted)  - Continue lidocaine patches    At Risk for Opioid Induced Constipation  - Continue senna 8.6 mg PO BID  - Add polyethylene glycol 17 g PO daily  - If no bowel movement by end of today 1/18, consider rectal exam and administer suppository/enema     Anxiety/Agitation/[place]/Delirium  Dementia with Behavioral Disturbances  Possible Parkinson's  - Continue memantine 10 mg PO BID if able to swallow  - Continue home sertraline 100 mg PO daily  - Avoid antipsychotics in setting of possible Parkinson's  - Had paradoxical reaction to Xanax  - Continue frequent reorientation and optimization of environment by keeping room bright during the day, dark and quiet at night. Discontinue vital signs at night time to allow him to sleep undisturbed  - Optimize pain management as above  - Would trial midazolam 1 mg IV q4h PRN non-redirectable agitation, anxiety

## 2023-01-18 NOTE — SUBJECTIVE & OBJECTIVE
Interval History: NAEON. Afebrile. Placed on pressors overnight. On 2L NC. Reports chest wall pain, improved after blocks placed. Cr worsening to 2.1  Ongoing GOC discussions     Medications:  Continuous Infusions:  Scheduled Meds:   acetaminophen  1,000 mg Oral TID    albuterol-ipratropium  3 mL Nebulization Q4H WAKE    atorvastatin  40 mg Oral Daily    budesonide  0.5 mg Nebulization Q12H    enoxaparin  30 mg Subcutaneous Q12H    gabapentin  300 mg Oral TID    LIDOcaine  1 patch Transdermal Q24H    memantine  10 mg Oral BID    methocarbamoL  500 mg Oral QID    pantoprazole  40 mg Oral BID    senna  8.6 mg Oral BID    sertraline  100 mg Oral Daily     PRN Meds:sodium chloride 0.9%, dextrose 10%, dextrose 10%, glucagon (human recombinant), glucose, glucose, HYDROmorphone, insulin aspart U-100, melatonin, ondansetron, oxyCODONE, oxyCODONE     Review of patient's allergies indicates:   Allergen Reactions    Morphine Shortness Of Breath     Objective:     Vital Signs (Most Recent):  Temp: 98.6 °F (37 °C) (01/18/23 0730)  Pulse: (!) 111 (01/18/23 0753)  Resp: 18 (01/18/23 0753)  BP: (!) 97/55 (01/18/23 0500)  SpO2: (!) 88 % (01/18/23 0753)   Vital Signs (24h Range):  Temp:  [97.8 °F (36.6 °C)-98.6 °F (37 °C)] 98.6 °F (37 °C)  Pulse:  [] 111  Resp:  [12-32] 18  SpO2:  [88 %-100 %] 88 %  BP: ()/(51-64) 97/55     Weight:  (bed scale broken)  Body mass index is 22.15 kg/m².    Intake/Output - Last 3 Shifts         01/16 0700 01/17 0659 01/17 0700 01/18 0659 01/18 0700 01/19 0659    P.O.  500     IV Piggyback  95.6     Total Intake(mL/kg)  595.6 (8.8)     Urine (mL/kg/hr) 250 450 (0.3)     Total Output 250 450     Net -250 +145.6                    Physical Exam  Vitals and nursing note reviewed.   Constitutional:       General: He is not in acute distress.  HENT:      Head: Normocephalic.      Nose: Nose normal.   Eyes:      General: No scleral icterus.     Extraocular Movements: Extraocular movements intact.    Cardiovascular:      Rate and Rhythm: Normal rate.   Pulmonary:      Effort: Pulmonary effort is normal. No respiratory distress.   Chest:      Comments: Right posterior back pain at site of fractures  Crepitus along the right back  Abdominal:      General: There is no distension.      Palpations: Abdomen is soft.      Tenderness: There is no abdominal tenderness. There is no guarding or rebound.   Musculoskeletal:         General: No deformity.   Skin:     General: Skin is warm and dry.   Neurological:      Mental Status: He is alert. He is disoriented.       Significant Labs:  I have reviewed all pertinent lab results within the past 24 hours.    Significant Diagnostics:  I have reviewed all pertinent imaging results/findings within the past 24 hours.

## 2023-01-19 NOTE — PROGRESS NOTES
Patient hypotensive since 0030. Dr. Trujillo at bedside. Orders for 250ml LR. Patient remained hypotensive. Orders for 500ml albumin. Patient persistently hypotensive. Started on levo gtt. Dr. Rojas updated.

## 2023-01-19 NOTE — PROGRESS NOTES
VSS overnight. Afebrile. Follows all commands and moves all extremities. Oriented to self only. Constantly fidgeting and mumbling to self. Requiring frequent redirection. Patient agitated and attempting to get out of bed around 0300; one time dose of zyprexa administered. On 2L NC with SATs >95%. Patient with significant wheezing/crackles. Adjustments made to breathing treatments. SBP >100. Arterial line placed. 1L albumin and 250ml LR administered. Levo gtt overnight. Gonzalez placed; see flowsheet for UOP. Ropivacaine perineural catheter in use. No additional PRN pain meds given. Mechanical soft diet. Daughter at bedside. All questions and concerns addressed and answered. No skin breakdown noted to heels or sacrum. Bed plugged in and mattress inflated.

## 2023-01-19 NOTE — SUBJECTIVE & OBJECTIVE
Interval History/Significant Events:   - Did not sleep all night, zyprexa 1 dose given  - Per daughter at bedside, patient was talking to his wife who  years back  - Wheezing O/N  - SBP lowest to 80s  - Received 2l IVF,1l albumin  - Iqra placed o/n and started on low dose levo peripheral      Objective:     Vital Signs (Most Recent):  Temp: 98 °F (36.7 °C) (23 0300)  Pulse: 87 (23)  Resp: (!) 32 (23)  BP: (!) 91/50 (23)  SpO2: 100 % (23)   Vital Signs (24h Range):  Temp:  [98 °F (36.7 °C)-99.7 °F (37.6 °C)] 98 °F (36.7 °C)  Pulse:  [] 87  Resp:  [18-44] 32  SpO2:  [88 %-100 %] 100 %  BP: ()/(41-94) 91/50  Arterial Line BP: ()/(33-52) 90/52     Weight:  (bed scale broken)  Body mass index is 22.15 kg/m².      Intake/Output Summary (Last 24 hours) at 2023  Last data filed at 2023 07  Gross per 24 hour   Intake 6099.04 ml   Output 685 ml   Net 5414.04 ml       Physical Exam   General:     In acute distress, mumbling and fidgeting in bed  HENT:      Head: Normocephalic and atraumatic.   Eyes:      General: No scleral icterus.     Conjunctiva/sclera: Conjunctivae normal.      Pupils: Pupils are equal, round, and reactive to light.   Neck:      Trachea: No tracheal deviation.   Cardiovascular:      Rate and Rhythm: Normal rate and regular rhythm.   Pulmonary:      Effort: Wheezing preesent      Gross deformity to right back; tender to palpation  Abdominal:      General: Abdomen is flat. There is no distension.      Palpations: Abdomen is soft.      Tenderness: There is no abdominal tenderness. There is no guarding.   Musculoskeletal:         General: No deformity or signs of injury. Normal range of motion.      Cervical back: No edema.   Skin:     General: Skin is warm and dry.      Coloration: Skin is not jaundiced.   Neurological:      General: No focal deficit present.      Mental Status: He is alert and oriented to person, place,  and time.   Psychiatric:         Mood and Affect: Mood normal.         Behavior: Behavior normal    Vents:  Oxygen Concentration (%): 28 (01/19/23 0404)    Lines/Drains/Airways       Drain  Duration                  Urethral Catheter 01/18/23 2336 Non-latex <1 day              Epidural Line  Duration                  Perineural Analgesia/Anesthesia Assessment (using dermatomes) 01/18/23 1445 <1 day              Arterial Line  Duration             Arterial Line 01/18/23 2200 Right Radial <1 day              Peripheral Intravenous Line  Duration                  Peripheral IV - Single Lumen 01/16/23 2147 20 G Posterior;Right Forearm 2 days         Peripheral IV - Single Lumen 01/17/23 0022 20 G Anterior;Proximal;Right Forearm 2 days                    Significant Labs:    CBC/Anemia Profile:  Recent Labs   Lab 01/19/23  0138 01/19/23  0219 01/19/23  0415   WBC 6.71 8.21 7.79   HGB 8.5* 8.6* 8.5*   HCT 25.6* 26.2* 25.7*    181 195   MCV 97 97 97   RDW 14.3 14.4 14.1        Chemistries:  Recent Labs   Lab 01/18/23  0421 01/19/23  0138 01/19/23  0415    136 134*   K 4.2 4.2 4.2    106 105   CO2 26 22* 22*   BUN 26* 34* 35*   CREATININE 1.6* 2.1* 2.1*   CALCIUM 8.4* 8.3* 8.4*   ALBUMIN 3.2* 3.4*  --    PROT 6.0 5.9*  --    BILITOT 1.2* 1.4*  --    ALKPHOS 57 44*  --    ALT 16 17  --    AST 26 33  --    MG 2.1 1.9 2.0   PHOS 4.6* 3.6 3.1       All pertinent labs within the past 24 hours have been reviewed.    Significant Imaging:  I have reviewed all pertinent imaging results/findings within the past 24 hours.

## 2023-01-19 NOTE — PT/OT/SLP EVAL
Speech Language Pathology Evaluation  Bedside Swallow      Patient Name:  Paul Browning   MRN:  [id number]  Admitting Diagnosis: Closed fracture of multiple ribs of right side    Recommendations:                 General Recommendations:  Follow-up not indicated  Diet recommendations:  Puree, Nectar Thick   Aspiration Precautions: 1 bite/sip at a time, Alternating bites/sips, Assistance with meals and Assistance with thickening liquids, Meds crushed in puree, No straws, Remain upright 30 minutes post meal, Small bites/sips, and Strict aspiration precautions   General Precautions: Standard,    Communication strategies:  go to room if call light pushed    History:     Past Medical History:   Diagnosis Date    Acute hypoxemic respiratory failure 1/23/2022    Anxiety     Stevens's esophagus with low grade dysplasia     BPH (benign prostatic hyperplasia)     CAD (coronary artery disease)     Cataract     Cervical spondylosis 1/3/2020    Chronic obstructive pulmonary disease with acute exacerbation     Diaphragmatic hernia     GERD (gastroesophageal reflux disease)     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    Hyperlipidemia     Hypertension     Ischemic cardiomyopathy 11/5/2014    MDD (major depressive disorder), recurrent episode, mild 2/17/2016    Osteoporosis 7/20/2016    Peripheral arterial disease 3/18/2016    Sarcoid     Squamous cell carcinoma 09/2016, 5/2016    crown of scalp, left wrist       Past Surgical History:   Procedure Laterality Date    CARDIAC SURGERY      CAROTID STENT N/A 10/14/2019    Procedure: INSERTION, STENT, ARTERY, CAROTID;  Surgeon: Artie Kebede MD;  Location: [facility] CATH LAB;  Service: Cardiology;  Laterality: N/A;    CATARACT EXTRACTION W/  INTRAOCULAR LENS IMPLANT Right 07/17/2018    Dr. Talamantes    CATARACT EXTRACTION W/  INTRAOCULAR LENS IMPLANT Left 07/31/2018    Dr. Talamantes    CORONARY ARTERY BYPASS GRAFT      x2  10/2014    ESOPHAGOGASTRODUODENOSCOPY N/A 4/8/2019    Procedure:  ESOPHAGOGASTRODUODENOSCOPY (EGD)/poss rfa;  Surgeon: Clifford De La Torre MD;  Location: [facility] ENDO (2ND FLR);  Service: Endoscopy;  Laterality: N/A;    ESOPHAGOGASTRODUODENOSCOPY N/A 8/4/2021    Procedure: EGD (ESOPHAGOGASTRODUODENOSCOPY);  Surgeon: Clifford De La Torre MD;  Location: [facility] ENDO (2ND FLR);  Service: Endoscopy;  Laterality: N/A;  8[place]ts on 8/3    ESOPHAGOGASTRODUODENOSCOPY (EGD) WITH RADIOFREQUENCY TREATMENT OF GASTROESOPHAGEAL JUNCTION      INJECTION OF ANESTHETIC AGENT AROUND MEDIAL BRANCH NERVES INNERVATING CERVICAL FACET JOINT Bilateral 1/9/2020    Procedure: INJECTION, MBB--Bilateral Cervical- Third Occipital nerve, C2-3--ASA okay for pt to continue taking per provider.;  Surgeon: Tip Mera Jr., MD;  Location: [facility] PAIN MGT;  Service: Pain Management;  Laterality: Bilateral;    INTRAOCULAR PROSTHESES INSERTION Right 7/17/2018    Procedure: INSERTION, INTRAOCULAR LENS PROSTHESIS;  Surgeon: León Talamantes MD;  Location: [facility] OR 1ST FLR;  Service: Ophthalmology;  Laterality: Right;    INTRAOCULAR PROSTHESES INSERTION Left 7/31/2018    Procedure: INSERTION, INTRAOCULAR LENS PROSTHESIS;  Surgeon: León Talamantes MD;  Location: [facility] OR 1ST FLR;  Service: Ophthalmology;  Laterality: Left;    PHACOEMULSIFICATION OF CATARACT Right 7/17/2018    Procedure: PHACOEMULSIFICATION, CATARACT;  Surgeon: León Talamantes MD;  [facility];  Service: Ophthalmology;  Laterality: Right;    PHACOEMULSIFICATION OF CATARACT Left 7/31/2018    Procedure: PHACOEMULSIFICATION, CATARACT;  Surgeon: León Talamantes MD;  [facility];  Service: Ophthalmology;  Laterality: Left;    PLACEMENT OF SCREW IN ODONTOID N/A 10/22/2020    Procedure: INSERTION, SCREW, ODONTOID. Anterior approach.;  Surgeon: Kirsten Weaver MD;  Location: [facility] OR 2ND FLR;  Service: Neurosurgery;  Laterality: N/A;    RADIOFREQUENCY THERMOCOAGULATION Bilateral 1/30/2020     Procedure: RADIOFREQUENCY THERMAL COAGULATION--Bilateral C2-3 and Third Occipital Nerve;  Surgeon: Tip Mera Jr., MD;  Location: [facility];  Service: Pain Management;  Laterality: Bilateral;    UMBILICAL HERNIA REPAIR         Social History: Patient lives with family.    Pt was previously evaluated by speech service 1/17 and recommended a diet of mechanical soft solids and thin liquids.  Pt has continued to experience progressive decline and speech service re -consulted to help determine ongoing least restrictive,safest and most comfortable PO diet.     Subjective     Patient awake and alert. Family present.   Palliative care MD also at the bedside   RN in agreement with SLP seeing at this time     Pain/Comfort:  Pain Rating 1: 0/10  Pain Rating Post-Intervention 1: 0/10C/o pain, RN recently administered pain medication    Objective:       Oral Musculature Evaluation  Oral Musculature: unable to assess due to poor participation/comprehension  Dentition: edentulous  Secretion Management: adequate  Mucosal Quality: good  Mandibular Strength and Mobility: WFL  Oral Labial Strength and Mobility: WFL  Voice Prior to PO Intake: raspy hoarse, decreased intensity    Bedside Swallow Eval:   Consistencies Assessed:  Thin liquids x6 via teaspoon   Nectar thick liquids x8 via teaspoon   Puree x4    Solids deferred secondary to drowsy state    Oral Phase:   Anterior loss  Decreased closure around utensil  Dry mouth  Pt appeared to best orally control and contain thickened liquids to nectar and purees     Pharyngeal Phase:   coughing/choking  decreased hyolaryngeal excursion to palpation  delayed swallow initation  wet vocal quality after swallow  Pt presents with aspiration risk with all PO intake, no Desat events appreciated pt with labored breathing and RR 35-40s +   Given age and underlying diagnoses for ongoing quality of life pt and family would like for pt to continue with most comfortable PO diet      Treatment:    Education provided to Pt re: SLP role in acute care setting, overall impressions and therapeutic goals. Whiteboard updated.  Pt significantly drowsy throughout assessment warranting multiple tactile and auditory cues to maintain alertness for PO trials this date. SLP discussed with family and palliative medicine at length recommendations for least restrictive most comfortable diet at this time appears to be puree and nectar thickened liquids. Pt controls nectar thick liquids better than thin liquids given ongoing drowsy state. SLP discussed modified diet does not eliminate the occurrence or risk of aspiration. Pt remains high risk for aspiration and complications from aspiration with all PO intake.     Assessment:     Paul Browning is a 82 y.o. male with an SLP diagnosis of  baseline dysphagia, appearing at baseline. .  He presents with no further acute speech therapy needs at this time.      Plan:     Patient to be seen:  3 x/week   Plan of Care expires:     Plan of Care reviewed with:  patient, daughter   SLP Follow-Up:  Yes       Discharge recommendations:   (TBD)   Barriers to Discharge:  None    Time Tracking:     SLP Treatment Date:   01/19/23  Speech Start Time:  0812  Speech Stop Time:  0829     Speech Total Time (min):  17 min    Billable Minutes: Eval Swallow and Oral Function 8 and Self Care/Home Management Training 9    01/19/2023

## 2023-01-19 NOTE — PROCEDURES
"Paul Browning is a 82 y.o. male patient.    Temp: 98.1 °F (36.7 °C) (23)  Pulse: 97 (23)  Resp: (!) 26 (23)  BP: 93/64 (23)  SpO2: 95 % (23)  Weight:  (bed scale broken) (23)  Height: 5' 9" (175.3 cm) (23)       Arterial Line    Date/Time: 2023 9:33 PM  Location procedure was performed: [facility] CRITICAL CARE SURGERY  Performed by: Adán Trujillo DO  Authorized by: Adán Trujillo DO   Pre-op Diagnosis: Closed fracture of multiple ribs  Post-operative diagnosis: Closed fracture of multiple ribs  Consent Done: Yes  Consent: Written consent obtained.  Risks and benefits: risks, benefits and alternatives were discussed  Consent given by: Son.  Patient understanding: patient states understanding of the procedure being performed  Patient consent: the patient's understanding of the procedure matches consent given  Procedure consent: procedure consent matches procedure scheduled  Relevant documents: relevant documents present and verified  Test results: test results available and properly labeled  Site marked: the operative site was marked  Patient identity confirmed: , name, provided demographic data, verbally with patient and MRN  Time out: Immediately prior to procedure a "time out" was called to verify the correct patient, procedure, equipment, support staff and site/side marked as required.  Preparation: Patient was prepped and draped in the usual sterile fashion.  Indications: hemodynamic monitoring  Location: right radial  Anesthesia: local infiltration    Anesthesia:  Local Anesthetic: lidocaine 1% without epinephrine    Patient sedated: no  Needle gauge: 20  Seldinger technique: Seldinger technique used  Number of attempts: 1  Complications: No  Estimated blood loss (mL): 1  Specimens: No  Implants: No  Post-procedure: dressing applied  Post-procedure CMS: normal  Patient tolerance: Patient tolerated the procedure well " with no immediate complications        1/18/2023

## 2023-01-19 NOTE — PROGRESS NOTES
[facility] - [facility]  Critical Care - Surgery  Progress Note    Patient Name: Paul Browning  MRN: [id number]  Admission Date: 2023  Hospital Length of Stay: 1 days  Code Status: Partial Code  Attending Provider: Santo Shankar MD  Primary Care Provider: Grey Forbes DO   Principal Problem: Closed fracture of multiple ribs of right side    Subjective:     Hospital/ICU Course:  : admitted to SICU for close observation and pain control. No surgery at this time. Palliative care and acute pain medicine consulted. CXR this AM with increased R sided patchy infiltrates      Interval History/Significant Events:   - Did not sleep all night, zyprexa 1 dose given  - Per daughter at bedside, patient was talking to his wife who  years back  - Wheezing O/N  - SBP lowest to 80s  - Received 2l IVF,1l albumin  - [place] placed o/n and started on low dose levo peripheral      Objective:     Vital Signs (Most Recent):  Temp: 98 °F (36.7 °C) (23 0300)  Pulse: 87 (23)  Resp: (!) 32 (23)  BP: (!) 91/50 (23)  SpO2: 100 % (23)   Vital Signs (24h Range):  Temp:  [98 °F (36.7 °C)-99.7 °F (37.6 °C)] 98 °F (36.7 °C)  Pulse:  [] 87  Resp:  [18-44] 32  SpO2:  [88 %-100 %] 100 %  BP: ()/(41-94) 91/50  Arterial Line BP: ()/(33-52) 90/52     Weight:  (bed scale broken)  Body mass index is 22.15 kg/m².      Intake/Output Summary (Last 24 hours) at 2023 0733  Last data filed at 2023 0705  Gross per 24 hour   Intake 6099.04 ml   Output 685 ml   Net 5414.04 ml       Physical Exam   General:     In acute distress, mumbling and fidgeting in bed  HENT:      Head: Normocephalic and atraumatic.   Eyes:      General: No scleral icterus.     Conjunctiva/sclera: Conjunctivae normal.      Pupils: Pupils are equal, round, and reactive to light.   Neck:      Trachea: No tracheal deviation.   Cardiovascular:      Rate and Rhythm: Normal rate and  regular rhythm.   Pulmonary:      Effort: Wheezing preesent      Gross deformity to right back; tender to palpation  Abdominal:      General: Abdomen is flat. There is no distension.      Palpations: Abdomen is soft.      Tenderness: There is no abdominal tenderness. There is no guarding.   Musculoskeletal:         General: No deformity or signs of injury. Normal range of motion.      Cervical back: No edema.   Skin:     General: Skin is warm and dry.      Coloration: Skin is not jaundiced.   Neurological:      General: No focal deficit present.      Mental Status: He is alert and oriented to person, place, and time.   Psychiatric:         Mood and Affect: Mood normal.         Behavior: Behavior normal    Vents:  Oxygen Concentration (%): 28 (01/19/23 0404)    Lines/Drains/Airways       Drain  Duration                  Urethral Catheter 01/18/23 2336 Non-latex <1 day              Epidural Line  Duration                  Perineural Analgesia/Anesthesia Assessment (using dermatomes) 01/18/23 1445 <1 day              Arterial Line  Duration             Arterial Line 01/18/23 2200 Right Radial <1 day              Peripheral Intravenous Line  Duration                  Peripheral IV - Single Lumen 01/16/23 2147 20 G Posterior;Right Forearm 2 days         Peripheral IV - Single Lumen 01/17/23 0022 20 G Anterior;Proximal;Right Forearm 2 days                    Significant Labs:    CBC/Anemia Profile:  Recent Labs   Lab 01/19/23  0138 01/19/23  0219 01/19/23  0415   WBC 6.71 8.21 7.79   HGB 8.5* 8.6* 8.5*   HCT 25.6* 26.2* 25.7*    181 195   MCV 97 97 97   RDW 14.3 14.4 14.1        Chemistries:  Recent Labs   Lab 01/18/23  0421 01/19/23  0138 01/19/23  0415    136 134*   K 4.2 4.2 4.2    106 105   CO2 26 22* 22*   BUN 26* 34* 35*   CREATININE 1.6* 2.1* 2.1*   CALCIUM 8.4* 8.3* 8.4*   ALBUMIN 3.2* 3.4*  --    PROT 6.0 5.9*  --    BILITOT 1.2* 1.4*  --    ALKPHOS 57 44*  --    ALT 16 17  --    AST 26 33  --     MG 2.1 1.9 2.0   PHOS 4.6* 3.6 3.1       All pertinent labs within the past 24 hours have been reviewed.    Significant Imaging:  I have reviewed all pertinent imaging results/findings within the past 24 hours.    Assessment/Plan:     * Closed fracture of multiple ribs of right side  Paul Browning is a frail 82M h/o CAD (EF 40% with previous MI with enduring defect seen on cardiac PET), COPD on 0.5L NC at home and inhalers, DM2, CKD3, HTN, sarcoid, CAD who fell multiple times at home. Imaging shows small apical PTX and subQ air on R chest wall with CT with several comminuted posterior rib fractures.      Neuro/Psych:   -- Sedation: none  -- Pain: ifeoma robaxin, gabapentin, lidocaine patch; dilaudid Q2 push prn (allergy to morphine),oxy prn  -- Right BELEM catheter placed by APS 1/18             Cards:   -- hx of HTN, CAD s/p stents  -- Hypotensive with SBP lowest to 70s  -- S/p 2L ivf, 1L albumin  -- Started on levo peripheral at 0.04  -- holding home ASA and Entresto      Pulm:   -- hx of COPD, baseline 0.5L NC at home  -- now has right sided rib fractures and small right apical PTX  -- Goal O2 sat > 90%  -- Wean as able  -- duonebs, IS, Acapella  -- Increasing respiratory effort with  wheezing         Renal:  -- hx of CKD3, baseline Cr 1.0  -- BUN/Cr 35/2.1      FEN / GI:    -- Replace lytes as needed  -- Nutrition: soft diet      ID:   -- Tm: afebrile; WBC 7.79  -- Abx none      Heme/Onc:   -- H/H 8.5/25.7  -- Daily CBC      Endo:   -- hx of DM2, on Jardiance   -- Gluc goal 140-180  -- AISS      PPx:   Feeding: soft diet  Analgesia/Sedation: [facility] gabapentin, robaxin, lido patch; dilaudid Q2 pushes PRN, BELEM catheter  Thromboembolic prevention: heparin  HOB >30: yes  Stress Ulcer ppx: protonix  Glucose control: Critical care goal 140-180 g/dl, ISS    Lines/Drains/Airway: PIV x2, Gonzalez, A line      Dispo/Code Status/Palliative:   -- SICU / No chest compressions, ok to intubate/palliative following for  ongoing [facility] discussion              Critical care was time spent personally by me on the following activities: development of treatment plan with patient or surrogate and bedside caregivers, discussions with consultants, evaluation of patient's response to treatment, examination of patient, ordering and performing treatments and interventions, ordering and review of laboratory studies, ordering and review of radiographic studies, pulse oximetry, re-evaluation of patient's condition.  This critical care time did not overlap with that of any other provider or involve time for any procedures.     Merlene Skelton MD  Critical Care - Surgery  [facility] - [facility]

## 2023-01-19 NOTE — ASSESSMENT & PLAN NOTE
Paul Browning is a frail 82M h/o CAD (EF 40% with previous MI with enduring defect seen on cardiac PET), COPD on 0.5L NC at home and inhalers, DM2, CKD3, HTN, sarcoid, CAD who fell multiple times at home. Imaging shows small apical PTX and subQ air on R chest wall with CT with several comminuted posterior rib fractures.      Neuro/Psych:   -- Sedation: none  -- Pain: [facility] robaxin, gabapentin, lidocaine patch; dilaudid Q2 push prn (allergy to morphine),oxy prn  -- Right BELEM catheter placed by APS 1/18             Cards:   -- hx of HTN, CAD s/p stents  -- Hypotensive with SBP lowest to 70s  -- S/p 2L ivf, 1L albumin  -- Started on levo peripheral at 0.04  -- holding home ASA and Entresto      Pulm:   -- hx of COPD, baseline 0.5L NC at home  -- now has right sided rib fractures and small right apical PTX  -- Goal O2 sat > 90%  -- Wean as able  -- duonebs, IS, Acapella  -- Increasing respiratory effort with  wheezing         Renal:  -- hx of CKD3, baseline Cr 1.0  -- BUN/Cr 35/2.1      FEN / GI:    -- Replace lytes as needed  -- Nutrition: soft diet      ID:   -- Tm: afebrile; WBC 7.79  -- Abx none      Heme/Onc:   -- H/H 8.5/25.7  -- Daily CBC      Endo:   -- hx of DM2, on Jardiance   -- Gluc goal 140-180  -- AISS      PPx:   Feeding: soft diet  Analgesia/Sedation: [facility] gabapentin, robaxin, lido patch; dilaudid Q2 pushes PRN, BELEM catheter  Thromboembolic prevention: heparin  HOB >30: yes  Stress Ulcer ppx: protonix  Glucose control: Critical care goal 140-180 g/dl, ISS    Lines/Drains/Airway: PIV jermaine, Carlos, A line      Dispo/Code Status/Palliative:   -- SICU / No chest compressions, ok to intubate/palliative following for ongoing GOC discussion

## 2023-01-19 NOTE — PROGRESS NOTES
Patient agitated, pulling at lines, and trying to climb out of bed. Orders for 2.5 mg xyprexa per Dr. Trujillo

## 2023-01-19 NOTE — PROGRESS NOTES
[facility]  General Surgery  Progress Note    Subjective:     History of Present Illness:  82M frail fell multiple times at home.  H/o CAD (EF 40% with previous MI with enduring defect seen on cardiac PET), COPD on 0.5L NC at home and inhalers, DM2, CKD3, HTN, sarcoid, CAD.  CXR shows small apical pneumo and subcutaneous air on the right chest wall.  CT chest shows at least four comminuted posterior rib fractures on my count. These fractures are severe.  He is sating in the mid-90s on 6L facemask.  Back pain is not controlled  He is complaining of abdominal pain as well    Lives with daughter who assists with ADLs.  I shared with the daughter that this is a major setback for him and that with his medical history, he is at very high risk for pneumonia and intubation.  She has not explicitly discussed with her brothers whether he would want to be intubated were it indicated.    He has had cardiac bypass and cervical spine surgery. He has cardiac stents.    Remote 40 pack year smoking history.      Post-Op Info:  * No surgery found *         Interval History: NAEON. Afebrile. Placed on pressors overnight. On 2L NC. Reports chest wall pain, improved after blocks placed. Cr worsening to 2.1  Ongoing GOC discussions     Medications:  Continuous Infusions:  Scheduled Meds:   acetaminophen  1,000 mg Oral TID    albuterol-ipratropium  3 mL Nebulization Q4H WAKE    atorvastatin  40 mg Oral Daily    budesonide  0.5 mg Nebulization Q12H    enoxaparin  30 mg Subcutaneous Q12H    gabapentin  300 mg Oral TID    LIDOcaine  1 patch Transdermal Q24H    memantine  10 mg Oral BID    methocarbamoL  500 mg Oral QID    pantoprazole  40 mg Oral BID    senna  8.6 mg Oral BID    sertraline  100 mg Oral Daily     PRN Meds:sodium chloride 0.9%, dextrose 10%, dextrose 10%, glucagon (human recombinant), glucose, glucose, HYDROmorphone, insulin aspart U-100, melatonin, ondansetron, oxyCODONE, oxyCODONE      Review of patient's allergies indicates:   Allergen Reactions    Morphine Shortness Of Breath     Objective:     Vital Signs (Most Recent):  Temp: 98.6 °F (37 °C) (01/18/23 0730)  Pulse: (!) 111 (01/18/23 0753)  Resp: 18 (01/18/23 0753)  BP: (!) 97/55 (01/18/23 0500)  SpO2: (!) 88 % (01/18/23 0753)   Vital Signs (24h Range):  Temp:  [97.8 °F (36.6 °C)-98.6 °F (37 °C)] 98.6 °F (37 °C)  Pulse:  [] 111  Resp:  [12-32] 18  SpO2:  [88 %-100 %] 88 %  BP: ()/(51-64) 97/55     Weight:  (bed scale broken)  Body mass index is 22.15 kg/m².    Intake/Output - Last 3 Shifts         01/16 0700  01/17 0659 01/17 0700  01/18 0659 01/18 0700  01/19 0659    P.O.  500     IV Piggyback  95.6     Total Intake(mL/kg)  595.6 (8.8)     Urine (mL/kg/hr) 250 450 (0.3)     Total Output 250 450     Net -250 +145.6                    Physical Exam  Vitals and nursing note reviewed.   Constitutional:       General: He is not in acute distress.  HENT:      Head: Normocephalic.      Nose: Nose normal.   Eyes:      General: No scleral icterus.     Extraocular Movements: Extraocular movements intact.   Cardiovascular:      Rate and Rhythm: Normal rate.   Pulmonary:      Effort: Pulmonary effort is normal. No respiratory distress.   Chest:      Comments: Right posterior back pain at site of fractures  Crepitus along the right back  Abdominal:      General: There is no distension.      Palpations: Abdomen is soft.      Tenderness: There is no abdominal tenderness. There is no guarding or rebound.   Musculoskeletal:         General: No deformity.   Skin:     General: Skin is warm and dry.   Neurological:      Mental Status: He is alert. He is disoriented.       Significant Labs:  I have reviewed all pertinent lab results within the past 24 hours.    Significant Diagnostics:  I have reviewed all pertinent imaging results/findings within the past 24 hours.    Assessment/Plan:     * Closed fracture of multiple ribs of right side  Patient  is an 82 year old frail and comorbid gentleman with h/o COPD (on home O2), pulmonary sarcoidosis, CABG, HTN, HLD, depression, GERD, dementia, HF who presents after a fall with multiple posterior right rib fractures that are comminuted with significant displacement, also with small pneumothorax that does not need intervention at this time, but may eventually. Clinically stable on a few liters of O2 now but is very high risk for pneumonia.    - 1L bolus today  - Dysphagia soft diet with aspiration precautions  - Palliative care consulted, appreciate assistance   - Will follow up discussions. Currently full code  - Multimodal pain control  - PRN IV pain   - APS consulted, appreciate assistance  - Aggressive pulmonary toilet, acapella, IS, duonebs  - Home meds  - Remainder of care per SICU, appreciate assistance    Dispo: remain in SICU       Traumatic pneumothorax  - see principle problem    Chronic obstructive pulmonary disease  - see principle problem         Darius Parada MD  General Surgery  [facility]

## 2023-01-19 NOTE — PLAN OF CARE
Problem: Occupational Therapy  Goal: Occupational Therapy Goal  Description: Goals to be met by: 2/2/23     Patient will increase functional independence with ADLs by performing:    Feeding with Set-up Assistance.  UE Dressing with Stand-by Assistance.  Grooming while standing at sink with Contact Guard Assistance.  Toilet transfer to bedside commode with Stand-by Assistance.    Outcome: Ongoing, Progressing

## 2023-01-19 NOTE — PROGRESS NOTES
"[facility]  Palliative Medicine  Progress Note    Patient Name: Paul Browning  MRN: [id number]  Admission Date: 1/16/2023  Hospital Length of Stay: 1 days  Code Status: Partial Code   Attending Provider: Santo Shankar MD  Consulting Provider: Marie Blanco MD  Primary Care Physician: Grey Forbes DO  Principal Problem:Closed fracture of multiple ribs of right side    Patient information was obtained from patient, relative(s) and primary team.      Assessment/Plan:     Encounter for palliative care  Paul Browning (Joe) is an 82-year-old man with a history of dementia (FAST score 6D), CAD s/p STEMI/PCI/2v-CABG (2014), ICM with reduced EF (40%), COPD on home oxygen (intermittently), sarcoidosis, possible Parkinson's, DM2, CKD3, multiple falls who was admitted after sustaining a fall with multiple rib fractures and small apical pneumothorax. Hospital course notable for concerns of aspiration, development of VIKTOR and hypotension, requiring multiple fluid boluses. Palliative and Supportive Care was consulted to explore goals of care and symptom management recommendations.    Advance Care Planning   Goals of Care:  - Code status: No CPR, OK for intubation  - Next of kin: 4 adult children (3 sons Paul, Neto, Dennys, 1 daughter Mesha)  - Patient is not decisional  - ACP documents: none  - Prognosis: poor  - Family's understanding of prognosis: continue education  - Goals: control pain/symptoms, treat acute, reversible issues, continue full supportive care  - Recommendations/Plans: continue full supportive care, including initiation of antibiotics for aspiration pneumonia. Appreciate primary team for helping  family about code status in order to communicate recommendations that would avoid harm. Will continue to provide support for family who maintains hope that he will recover.    Goals of Care Conversation  - 1/19/2023: Met Mr. Browning with his daughter " "Mesha and son Dennys at bedside. Mesha described overnight events, noting that while pain is better, he was up all night, confused and required dose of Zyprexa. She confirms that he does not have Parkinson's and that tremor is due to chronic nerve damage, for which he takes gabapentin. Noted that despite being in the hospital and having better control of pain, he appears to be getting worse. Mesha agrees that he looks worse, but shares that her brother Paul checked MyChart and saw that there were no drastic changes in his imaging and labs. I shared that I worried about him aspirating, noting that there is an increasing right lower lobe infiltrate demonstrated in his morning's chest x-ray. She wants to know what it is, whether it is a pneumonia and would like antibiotics started sooner than later to treat it. Emphasized our last conversation that it was not a matter of if he'd aspirate, but when he would, and that I worried time is short. She acknowledges that when he fell and was in the ER, that there was a chance he would "not make it" but wants to give him a chance of surviving this. Shared that I will revisit this afternoon to see how he and they are doing. Offered that if they'd like us to engage other family members (such as Dennys's wife who is an RN) that we'd be happy to do so per their preference. Mesha asks if Father Chase can come, shared that he did not come at all yesterday according to Paul. I will reach out and request for Father Chase to come to Mr. Browning today.  - 1/18/2023: Met Mr. Browning and his son Paul at his bedside, who was having a discussion related to code status with a surgeon on the team. Mr. Browning agrees that CPR will cause more harm and will text and discuss with his siblings about this code status. At this time he remains hesitant about changing code status related to intubation, recalling that his mother survived compassionate extubation 3 years after " "physicians told them that she was dying. Had supportive conversation with Paul, who remains hopeful that pain management will be able to perform nerve block as he worries that the opioids are causing too much sedation and would like to minimize medications if possible. Discussed interval development of VIKTOR, and will be watchful of this problem and adjust medications to dose renally and protect kidneys.  - 2023: Met Mr. Browning and his daughter Mesha and son Paul at his bedside. Mr. Browning is unable to fully participate in our discussion due to advanced dementia and immediate short term memory loss. Mesha shares her understanding that he broke 4 ribs in this fall and punctured his lungs. She was told that he might need a chest tube and he was high risk for developing pneumonia. She recalls that her mother and father discussed with each other that they would not want to be artificially sustained on life-support. When her mother was intubated and told that she would be ventilator-dependent, family honored her wishes and withdrew ventilator support. She thankfully lived 3 years after compassionate extubation and  3 years ago, which was the beginning of Mr. Browning's decline. They had been  when he was 15-years-old and she was 14-years-old. Now he lives with his daughter Mesha and is also supported by his other sons who help with other important aspects of his life. Mesha shares that he has sustained multiple falls, most recently sustained 3 falls in the last 2 weeks. Despite constant reorientation, he forgets to stay in bed and will try to get out and ambulate. He had significant sundowning from 4pm to 8pm, but now it is all day. Mesha and Paul recognize that he is progressively declining and acknowledge that this will continue. They've been told previously when he sustained his prior falls that "this was it" but despite this, he continues to live so they hope to continue doing " what they can to help him live. When Mesha discussed code status in the ER, she was under the impression that the ER staff was saying that intubation and CPR will help him. We discussed that knowing that the fall was enough to cause multiple rib fractures, forceful chest compressions will surely cause more harm and more breakage, and the ventilator will worsen his small pneumothorax. I recommended against such interventions, explaining that this will sure cause his demise and not help him the way we hope. Mesha asks if we don't intubate/ventilate when he develops a pneumonia, what can be done alternatively. I shared that at that point there are no curative interventions, and that medical professionals can support him with symptom management and offer him strategies to alleviate shortness of breath. They asked about chest tube, and I shared that as Neto (their brother) had a chest tube before, that Mr. Browning will also have significant pain if a chest tube is inserted, and this would prolong his current state without a guarantee that it will fix the pneumothorax as his body will have a harder time to heal. They share their understanding and would like to discuss this with their other siblings. Their sister-in-law who is an [employer] RN came to visit, and also voiced similar concerns about CPR/intubation as well. Mesha states that she is worried that he is miserable, forgetting immediately that he already went out to visit family/loved ones and stating repetitively that he wishes to go out to fish. Validated her concerns, recognizing that they wish that everything helpful be continued and offered. Encouraged them to consider that in addition to this, it is also our responsibility to protect him from harmful interventions that will not benefit him, as they had done so for their mother.      Acute Pain 2/2 Rib Fractures, Falls  - Discussed allergy to morphine. According to daughter, he will get angry and  hallucinate when he is on morphine. Tolerated fentanyl and hydromorphone.  - Agree with pain management consult  - Speech therapy re-evaluated Mr. Browning - modified diet to allow food/medications with aspiration precautions  - S/p nerve block by pain management, performed on 1/18/23  - Schedule acetaminophen 1000 mg PO TID for chronic pain/headache  - Continue oxycodone 5 mg PO q4h PRN moderate pain (not using)  - Continue oxycodone 10 mg PO q4h PRN severe pain  - Continue hydromorphone 0.2 mg IV q1h PRN breakthrough pain (decreased due to hypotensiojn)  - Gabapentin 300 mg daily (adjusted from home TID schedule for VIKTOR)  - Continue lidocaine patches    At Risk for Opioid Induced Constipation  - Continue senna 8.6 mg PO BID  - Will discontinue polyethylene glycol 17 g PO daily and replace with lactulose 10 g PO TID  - Ideally would avoid suppository/enema as turning causes more pain, but may need to do so if no bowel movement by today    Anxiety/Agitation/Sun-Tama/Delirium  Dementia with Behavioral Disturbances  - Continue memantine 10 mg PO BID if able to swallow  - Continue home sertraline 100 mg PO daily  - Clarified that he does not have Parkinson's  - Had paradoxical reaction to Xanax  - Continue frequent reorientation and optimization of environment by keeping room bright during the day, dark and quiet at night. Discontinue vital signs at night time to allow him to sleep undisturbed  - Optimize pain management as above  - QTc on admission is 497. Agree that low dose antipsychotics will be helpful in setting of non-redirectable agitation.  - Start olanzapine ODT 5 mg PO qHS              I will follow-up with patient. Please contact us if you have any additional questions.    Subjective:     Chief Complaint:   Chief Complaint   Patient presents with    Fall     Mechanical fall to R side while walking to bathroom. Fell yesterday also onto same side. Hx of dementia. Fall witnessed by pt son and reports  "that pt hit head tonight. -blood thinners. No deformity, bruising noted to R lower back from yesterdays fall per son.        HPI:   Paul Browning (Joe) is an 82-year-old man with a history of dementia (FAST score 6D), CAD s/p STEMI/PCI/2v-CABG (2014), ICM with reduced EF (40%), COPD on home oxygen (2 L/min), sarcoidosis, possible Parkinson's, DM2, CKD3, multiple falls who was admitted after sustaining a fall with multiple rib fractures and small apical pneumothorax. Palliative and Supportive Care was consulted to explore goals of care and symptom management recommendations.    I met Mr. Browning, who has both chronic visual and auditory deficits. He asks when will he be able to go home. He is immediately forgetting what we just discussed during our conversation. I met with his daughter Mesha at his bedside. Please see assessment/recommendations for details of conversation.      Hospital Course:  No notes on file    Interval History: Nerve block performed, resulting in better pain management. However, became hypotensive and received multiple fluid boluses, very transient use of pressors. Required Zyprexa last night for persistent, non-redirectable agitation with effect. Daughter Mesha and son Dennys at bedside.    Medications:  Continuous Infusions:   dextrose 5 % and 0.45 % NaCl 75 mL/hr at 01/19/23 0905    NORepinephrine bitartrate-D5W Stopped (01/19/23 0704)    ROPIvacaine (PF) 2 mg/ml (0.2%) 0.1 mL/hr (01/19/23 0905)     Scheduled Meds:   acetaminophen  1,000 mg Oral TID    albuterol-ipratropium  3 mL Nebulization Q4H WAKE    atorvastatin  40 mg Oral Daily    budesonide  0.5 mg Nebulization Q12H    gabapentin  250 mg Oral Daily    heparin (porcine)  5,000 Units Subcutaneous Q8H    LIDOcaine  1 patch Transdermal Q24H    memantine  10 mg Oral BID    midodrine  2.5 mg Oral Q8H    mupirocin   Nasal BID    pantoprazole  40 mg Intravenous Daily    polyethylene glycol  17 g Oral Daily "    senna  8.6 mg Oral BID    sertraline  100 mg Oral Daily     PRN Meds:sodium chloride 0.9%, albuterol sulfate, dextrose 10%, dextrose 10%, glucagon (human recombinant), glucose, glucose, insulin aspart U-100, melatonin, ondansetron    Objective:     Vital Signs (Most Recent):  Temp: 98 °F (36.7 °C) (01/19/23 0300)  Pulse: 93 (01/19/23 0905)  Resp: (!) 34 (01/19/23 0905)  BP: (!) 116/57 (01/19/23 0905)  SpO2: 98 % (01/19/23 0905)   Vital Signs (24h Range):  Temp:  [98 °F (36.7 °C)-99.7 °F (37.6 °C)] 98 °F (36.7 °C)  Pulse:  [] 93  Resp:  [19-44] 34  SpO2:  [91 %-100 %] 98 %  BP: ()/(41-94) 116/57  Arterial Line BP: ()/(33-52) 112/41     Weight:  (bed scale broken)  Body mass index is 22.15 kg/m².    Physical Exam  Constitutional:       Appearance: He is ill-appearing.   HENT:      Head: Normocephalic and atraumatic.      Right Ear: External ear normal.      Left Ear: External ear normal.      Nose: Nose normal.      Mouth/Throat:      Mouth: Mucous membranes are dry.   Eyes:      Extraocular Movements: Extraocular movements intact.      Conjunctiva/sclera: Conjunctivae normal.   Cardiovascular:      Rate and Rhythm: Tachycardia present.   Pulmonary:      Breath sounds: Wheezing and rales present.      Comments: tachypneic  Abdominal:      General: Bowel sounds are normal.      Palpations: Abdomen is soft.   Musculoskeletal:         General: No swelling.      Right lower leg: No edema.      Left lower leg: No edema.   Lymphadenopathy:      Cervical: No cervical adenopathy.   Skin:     General: Skin is dry.      Findings: Bruising present.   Neurological:      Mental Status: He is alert. Mental status is at baseline. He is disoriented.       Review of Symptoms      Symptom Assessment (ESAS 0-10 Scale)  Unable to complete assessment due to Dementia         Pain Assessment in Advanced Demential Scale (PAINAD)   Breathing - Independent of vocalization:  2  Negative vocalization:  1  Facial  expression:  1  Body language:  1  Consolability:  1  Total:  6    Psychosocial/Cultural:   See Palliative Psychosocial Note: No  Social Issues Identified: Coping deficit pt/family  Bereavement Risk: Yes: Close or dependent relationship to the  person  Caregiver Needs Discussed. Caregiver Distress: Yes: Intensity of family caregiving  **Primary  to Follow**  Palliative Care  Consult: No      Advance Care Planning   Advance Directives:   Living Will: No    LaPOST: No    Do Not Resuscitate Status: No    Medical Power of : No      Decision Making:  Family answered questions and Patient unable to communicate due to disease severity/cognitive impairment  Goals of Care: What is most important right now is to focus on symptom/pain control, quality of life, even if it means sacrificing a little time. Accordingly, we have decided that the best plan to meet the patient's goals includes continuing with treatment.       Significant Labs: CBC:   Recent Labs   Lab 23  0138 23  0219 23   WBC 6.71 8.21 7.79   HGB 8.5* 8.6* 8.5*   HCT 25.6* 26.2* 25.7*    181 195     CMP:   Recent Labs   Lab 23  0421 23    136 134*   K 4.2 4.2 4.2    106 105   CO2 26 22* 22*    133* 150*   BUN 26* 34* 35*   CREATININE 1.6* 2.1* 2.1*   CALCIUM 8.4* 8.3* 8.4*   PROT 6.0 5.9*  --    ALBUMIN 3.2* 3.4*  --    BILITOT 1.2* 1.4*  --    ALKPHOS 57 44*  --    AST 26 33  --    ALT 16 17  --    ANIONGAP 8 8 7*     CBC:   Recent Labs   Lab 23   WBC 7.79   HGB 8.5*   HCT 25.7*   MCV 97        BMP:  Recent Labs   Lab 23   *   *   K 4.2      CO2 22*   BUN 35*   CREATININE 2.1*   CALCIUM 8.4*   MG 2.0     LFT:  Lab Results   Component Value Date    AST 33 2023    ALKPHOS 44 (L) 2023    BILITOT 1.4 (H) 2023     Albumin:   Albumin   Date Value Ref Range Status   2023 3.4  (L) 3.5 - 5.2 g/dL Final     Protein:   Total Protein   Date Value Ref Range Status   01/19/2023 5.9 (L) 6.0 - 8.4 g/dL Final     Lactic acid:   Lab Results   Component Value Date    LACTATE 1.0 06/06/2022    LACTATE 0.8 11/17/2014       Significant Imaging: I have reviewed all pertinent imaging results/findings within the past 24 hours.    I spent a total of 50 minutes on the day of the visit.This includes face to face time in discussion of goals of care, symptom assessment, coordination of care and emotional support.  This also includes non-face to face time preparing to see the patient (eg, review of tests/imaging), obtaining and/or reviewing separately obtained history, documenting clinical information in the electronic or other health record, independently interpreting results and communicating results to the patient/family/caregiver, or care coordinator.      Marie Blanco MD  Palliative Medicine  [facility] - Surgical Intensive Care

## 2023-01-19 NOTE — SUBJECTIVE & OBJECTIVE
Interval History: Nerve block performed, resulting in better pain management. However, became hypotensive and received multiple fluid boluses, very transient use of pressors. Required Zyprexa last night for persistent, non-redirectable agitation with effect. Daughter Mesha and son Dennys at bedside.    Medications:  Continuous Infusions:   dextrose 5 % and 0.45 % NaCl 75 mL/hr at 01/19/23 0905    NORepinephrine bitartrate-D5W Stopped (01/19/23 0704)    ROPIvacaine (PF) 2 mg/ml (0.2%) 0.1 mL/hr (01/19/23 0905)     Scheduled Meds:   acetaminophen  1,000 mg Oral TID    albuterol-ipratropium  3 mL Nebulization Q4H WAKE    atorvastatin  40 mg Oral Daily    budesonide  0.5 mg Nebulization Q12H    gabapentin  250 mg Oral Daily    heparin (porcine)  5,000 Units Subcutaneous Q8H    LIDOcaine  1 patch Transdermal Q24H    memantine  10 mg Oral BID    midodrine  2.5 mg Oral Q8H    mupirocin   Nasal BID    pantoprazole  40 mg Intravenous Daily    polyethylene glycol  17 g Oral Daily    senna  8.6 mg Oral BID    sertraline  100 mg Oral Daily     PRN Meds:sodium chloride 0.9%, albuterol sulfate, dextrose 10%, dextrose 10%, glucagon (human recombinant), glucose, glucose, insulin aspart U-100, melatonin, ondansetron    Objective:     Vital Signs (Most Recent):  Temp: 98 °F (36.7 °C) (01/19/23 0300)  Pulse: 93 (01/19/23 0905)  Resp: (!) 34 (01/19/23 0905)  BP: (!) 116/57 (01/19/23 0905)  SpO2: 98 % (01/19/23 0905)   Vital Signs (24h Range):  Temp:  [98 °F (36.7 °C)-99.7 °F (37.6 °C)] 98 °F (36.7 °C)  Pulse:  [] 93  Resp:  [19-44] 34  SpO2:  [91 %-100 %] 98 %  BP: ()/(41-94) 116/57  Arterial Line BP: ()/(33-52) 112/41     Weight:  (bed scale broken)  Body mass index is 22.15 kg/m².    Physical Exam  Constitutional:       Appearance: He is ill-appearing.   HENT:      Head: Normocephalic and atraumatic.      Right Ear: External ear normal.      Left Ear: External ear normal.      Nose: Nose normal.      Mouth/Throat:       Mouth: Mucous membranes are dry.   Eyes:      Extraocular Movements: Extraocular movements intact.      Conjunctiva/sclera: Conjunctivae normal.   Cardiovascular:      Rate and Rhythm: Tachycardia present.   Pulmonary:      Breath sounds: Wheezing and rales present.      Comments: tachypneic  Abdominal:      General: Bowel sounds are normal.      Palpations: Abdomen is soft.   Musculoskeletal:         General: No swelling.      Right lower leg: No edema.      Left lower leg: No edema.   Lymphadenopathy:      Cervical: No cervical adenopathy.   Skin:     General: Skin is dry.      Findings: Bruising present.   Neurological:      Mental Status: He is alert. Mental status is at baseline. He is disoriented.       Review of Symptoms      Symptom Assessment (ESAS 0-10 Scale)  Unable to complete assessment due to Dementia         Pain Assessment in Advanced Demential Scale (PAINAD)   Breathing - Independent of vocalization:  2  Negative vocalization:  1  Facial expression:  1  Body language:  1  Consolability:  1  Total:  6    Psychosocial/Cultural:   See Palliative Psychosocial Note: No  Social Issues Identified: Coping deficit pt/family  Bereavement Risk: Yes: Close or dependent relationship to the  person  Caregiver Needs Discussed. Caregiver Distress: Yes: Intensity of family caregiving  **Primary  to Follow**  Palliative Care  Consult: No      Advance Care Planning   Advance Directives:   Living Will: No    LaPOST: No    Do Not Resuscitate Status: No    Medical Power of : No      Decision Making:  Family answered questions and Patient unable to communicate due to disease severity/cognitive impairment  Goals of Care: What is most important right now is to focus on symptom/pain control, quality of life, even if it means sacrificing a little time. Accordingly, we have decided that the best plan to meet the patient's goals includes continuing with treatment.       Significant  Labs: CBC:   Recent Labs   Lab 01/19/23  0138 01/19/23  0219 01/19/23  0415   WBC 6.71 8.21 7.79   HGB 8.5* 8.6* 8.5*   HCT 25.6* 26.2* 25.7*    181 195     CMP:   Recent Labs   Lab 01/18/23  0421 01/19/23  0138 01/19/23  0415    136 134*   K 4.2 4.2 4.2    106 105   CO2 26 22* 22*    133* 150*   BUN 26* 34* 35*   CREATININE 1.6* 2.1* 2.1*   CALCIUM 8.4* 8.3* 8.4*   PROT 6.0 5.9*  --    ALBUMIN 3.2* 3.4*  --    BILITOT 1.2* 1.4*  --    ALKPHOS 57 44*  --    AST 26 33  --    ALT 16 17  --    ANIONGAP 8 8 7*     CBC:   Recent Labs   Lab 01/19/23  0415   WBC 7.79   HGB 8.5*   HCT 25.7*   MCV 97        BMP:  Recent Labs   Lab 01/19/23  0415   *   *   K 4.2      CO2 22*   BUN 35*   CREATININE 2.1*   CALCIUM 8.4*   MG 2.0     LFT:  Lab Results   Component Value Date    AST 33 01/19/2023    ALKPHOS 44 (L) 01/19/2023    BILITOT 1.4 (H) 01/19/2023     Albumin:   Albumin   Date Value Ref Range Status   01/19/2023 3.4 (L) 3.5 - 5.2 g/dL Final     Protein:   Total Protein   Date Value Ref Range Status   01/19/2023 5.9 (L) 6.0 - 8.4 g/dL Final     Lactic acid:   Lab Results   Component Value Date    LACTATE 1.0 06/06/2022    LACTATE 0.8 11/17/2014       Significant Imaging: I have reviewed all pertinent imaging results/findings within the past 24 hours.

## 2023-01-19 NOTE — ASSESSMENT & PLAN NOTE
"Paul Berrios "Slim Browning is an 82-year-old man with a history of dementia (FAST score 6D), CAD s/p STEMI/PCI/2v-CABG (2014), ICM with reduced EF (40%), COPD on home oxygen (intermittently), sarcoidosis, possible Parkinson's, DM2, CKD3, multiple falls who was admitted after sustaining a fall with multiple rib fractures and small apical pneumothorax. Hospital course notable for concerns of aspiration, development of VIKTOR and hypotension, requiring multiple fluid boluses. Palliative and Supportive Care was consulted to explore goals of care and symptom management recommendations.    Advance Care Planning   Goals of Care:  - Code status: No CPR, OK for intubation  - Next of kin: 4 adult children (3 sons Paul, Neto, Dennys, 1 daughter Mesha)  - Patient is not decisional  - ACP documents: none  - Prognosis: poor  - Family's understanding of prognosis: continue education  - Goals: control pain/symptoms, treat acute, reversible issues, continue full supportive care  - Recommendations/Plans: continue full supportive care, including initiation of antibiotics for aspiration pneumonia. Appreciate primary team for helping  family about code status in order to communicate recommendations that would avoid harm. Will continue to provide support for family who maintains hope that he will recover.    Goals of Care Conversation  - 1/19/2023: Met Mr. Browning with his daughter Mesha and son Dennys at bedside. Mesha described overnight events, noting that while pain is better, he was up all night, confused and required dose of Zyprexa. She confirms that he does not have Parkinson's and that tremor is due to chronic nerve damage, for which he takes gabapentin. Noted that despite being in the hospital and having better control of pain, he appears to be getting worse. Mesha agrees that he looks worse, but shares that her brother Paul checked MyChart and saw that there were no drastic changes in his imaging and " "labs. I shared that I worried about him aspirating, noting that there is an increasing right lower lobe infiltrate demonstrated in his morning's chest x-ray. She wants to know what it is, whether it is a pneumonia and would like antibiotics started sooner than later to treat it. Emphasized our last conversation that it was not a matter of if he'd aspirate, but when he would, and that I worried time is short. She acknowledges that when he fell and was in the ER, that there was a chance he would "not make it" but wants to give him a chance of surviving this. Shared that I will revisit this afternoon to see how he and they are doing. Offered that if they'd like us to engage other family members (such as Dennys's wife who is an RN) that we'd be happy to do so per their preference. Mesha asks if Father Chase can come, shared that he did not come at all yesterday according to Paul. I will reach out and request for Father Chase to come to Mr. Browning today.  - 1/18/2023: Met Mr. Browning and his son Paul at his bedside, who was having a discussion related to code status with a surgeon on the team. Mr. Browning agrees that CPR will cause more harm and will text and discuss with his siblings about this code status. At this time he remains hesitant about changing code status related to intubation, recalling that his mother survived compassionate extubation 3 years after physicians told them that she was dying. Had supportive conversation with Paul, who remains hopeful that pain management will be able to perform nerve block as he worries that the opioids are causing too much sedation and would like to minimize medications if possible. Discussed interval development of VIKTOR, and will be watchful of this problem and adjust medications to dose renally and protect kidneys.  - 1/17/2023: Met Mr. Browning and his daughter Mesha and son Paul at his bedside. Mr. Browning is unable to fully participate in our " "discussion due to advanced dementia and immediate short term memory loss. Mesha shares her understanding that he broke 4 ribs in this fall and punctured his lungs. She was told that he might need a chest tube and he was high risk for developing pneumonia. She recalls that her mother and father discussed with each other that they would not want to be artificially sustained on life-support. When her mother was intubated and told that she would be ventilator-dependent, family honored her wishes and withdrew ventilator support. She thankfully lived 3 years after compassionate extubation and  3 years ago, which was the beginning of Mr. Browning's decline. They had been  when he was 15-years-old and she was 14-years-old. Now he lives with his daughter Mesha and is also supported by his other sons who help with other important aspects of his life. Mesha shares that he has sustained multiple falls, most recently sustained 3 falls in the last 2 weeks. Despite constant reorientation, he forgets to stay in bed and will try to get out and ambulate. He had significant sundowning from 4pm to 8pm, but now it is all day. Mesha and Paul recognize that he is progressively declining and acknowledge that this will continue. They've been told previously when he sustained his prior falls that "this was it" but despite this, he continues to live so they hope to continue doing what they can to help him live. When Mesha discussed code status in the ER, she was under the impression that the ER staff was saying that intubation and CPR will help him. We discussed that knowing that the fall was enough to cause multiple rib fractures, forceful chest compressions will surely cause more harm and more breakage, and the ventilator will worsen his small pneumothorax. I recommended against such interventions, explaining that this will sure cause his demise and not help him the way we hope. Mesha asks if we don't " intubate/ventilate when he develops a pneumonia, what can be done alternatively. I shared that at that point there are no curative interventions, and that medical professionals can support him with symptom management and offer him strategies to alleviate shortness of breath. They asked about chest tube, and I shared that as Neto (their brother) had a chest tube before, that Mr. Browning will also have significant pain if a chest tube is inserted, and this would prolong his current state without a guarantee that it will fix the pneumothorax as his body will have a harder time to heal. They share their understanding and would like to discuss this with their other siblings. Their sister-in-law who is an [facility] RN came to visit, and also voiced similar concerns about CPR/intubation as well. Mesha states that she is worried that he is miserable, forgetting immediately that he already went out to visit family/loved ones and stating repetitively that he wishes to go out to fish. Validated her concerns, recognizing that they wish that everything helpful be continued and offered. Encouraged them to consider that in addition to this, it is also our responsibility to protect him from harmful interventions that will not benefit him, as they had done so for their mother.       Acute Pain 2/2 Rib Fractures, Falls  - Discussed allergy to morphine. According to daughter, he will get angry and hallucinate when he is on morphine. Tolerated fentanyl and hydromorphone.  - Agree with pain management consult  - Speech therapy re-evaluated Mr. Browning - modified diet to allow food/medications with aspiration precautions  - S/p nerve block by pain management, performed on 1/18/23  - Schedule acetaminophen 1000 mg PO TID for chronic pain/headache  - Continue oxycodone 5 mg PO q4h PRN moderate pain (not using)  - Continue oxycodone 10 mg PO q4h PRN severe pain  - Continue hydromorphone 0.2 mg IV q1h PRN breakthrough pain  (decreased due to hypotensiojn)  - Gabapentin 300 mg daily (adjusted from home TID schedule for VIKTOR)  - Continue lidocaine patches    At Risk for Opioid Induced Constipation  - Continue senna 8.6 mg PO BID  - Will discontinue polyethylene glycol 17 g PO daily and replace with lactulose 10 g PO TID  - Ideally would avoid suppository/enema as turning causes more pain, but may need to do so if no bowel movement by today    Anxiety/Agitation/[place]/Delirium  Dementia with Behavioral Disturbances  - Continue memantine 10 mg PO BID if able to swallow  - Continue home sertraline 100 mg PO daily  - Clarified that he does not have Parkinson's  - Had paradoxical reaction to Xanax  - Continue frequent reorientation and optimization of environment by keeping room bright during the day, dark and quiet at night. Discontinue vital signs at night time to allow him to sleep undisturbed  - Optimize pain management as above  - QTc on admission is 497. Agree that low dose antipsychotics will be helpful in setting of non-redirectable agitation.  - Start olanzapine ODT 5 mg PO qHS

## 2023-01-19 NOTE — PLAN OF CARE
Problem: Physical Therapy  Goal: Physical Therapy Goal  Description: Goals to be met by: 23     Patient will increase functional independence with mobility by performin. Supine to sit with Moderate Assistance  2. Sit to stand transfer with Moderate Assistance with AD if needed.   3. Bed to chair transfer with Moderate Assistance using AD if needed.   4. Sitting at edge of bed x10 minutes with Oliver and Contact Guard Assistance to increase tolerance to activities.     Outcome: Ongoing, Progressing   Evaluation completed and goals appropriate. 2023

## 2023-01-19 NOTE — PT/OT/SLP EVAL
Physical Therapy  Co-Evaluation and co-treatment with OT    Patient Name:  Paul Browning   MRN:  [id number]    Recommendations:     Discharge Recommendations: other   Discharge Equipment Recommendations: none   Barriers to discharge: Inaccessible home and Decreased caregiver support pt has 2 steps to enter and family may not be able to assist at current functional level.     Assessment:     Paul Browning is a 82 y.o. male admitted with a medical diagnosis of Closed fracture of multiple ribs of right side.  He presents with the following impairments/functional limitations: impaired endurance, impaired functional mobility, gait instability, impaired balance, decreased safety awareness, decreased lower extremity function  pt tolerated treatment fair with increased respiratory rate decreasing functional mobility. Pt was assist for all activities prior to admit due to advance dementia and decreased vision. Pt will benefit from skilled PT 3x/wk to progress physically. Pt will be able to discharge home with further PT when medically stable. Pt came to ED after fall on R side.     Rehab Prognosis: Poor; patient would benefit from acute skilled PT services to address these deficits and reach maximum level of function.    Recent Surgery: * No surgery found *      Plan:     During this hospitalization, patient to be seen 3 x/week to address the identified rehab impairments via therapeutic activities, neuromuscular re-education and progress toward the following goals:    Plan of Care Expires:  02/16/23    Subjective     Chief Complaint: pt was confused during treatment only knowing his name.   Patient/Family Comments/goals: family goals :pt to return to previous functional level.   Pain/Comfort:  Pain Rating 1:  (pt did not rate pain during treatment. pt was confused.)  Pain Rating Post-Intervention 1:  (see above)    Patients cultural, spiritual, Pentec[employer]alism conflicts given the current situation:  no    Living Environment:  Pt lives with his daughter and other children assist. He lives in 1 story with 2 steps  to entrance.   Prior to admission, patients level of function was assist for all activities due to decreased eye sight and advanced dementia Family would hold BUE of pt to assist for gait. .  Equipment used at home: hospital bed, shower chair, bedside commode, wheelchair, rollator.  DME owned (not currently used): none.  Upon discharge, patient will have assistance from family .    Objective:     Communicated with nurse prior to session.  Patient found supine with telemetry, arterial line, pulse ox (continuous), blood pressure cuff, segal catheter, oxygen, SCD, perineural catheter (B Willy Mague boots)  upon PT entry to room.    General Precautions: Standard, fall  Orthopedic Precautions:    Braces:    Respiratory Status: Nasal cannula, flow 2 L/min    Exams:  Cognitive Exam:  Patient is oriented to name only    Functional Mobility:  Bed Mobility:   pt needed verbal cues for hand placement and sequencing for functional mobility but was not able to follow commands.   Rolling Left:  total assistance and of 2 persons  Supine to Sit: total assistance and of 2 persons  Sit to Supine: total assistance and of 2 persons    Balance: pt sat on EOB x 4 min with significant increased respiratory rate to 40s and decreased pulse ox to 79%. Pt was immediately returned to supine in bed and RN notified.     Due to pt complex medical condition, the skill of 2 licensed therapists is needed to maximize treatment session and progression towards goals    Pt white board updated with current therapists name and level of mobility assistance needed.         AM-PAC 6 CLICK MOBILITY  Total Score:8       Treatment & Education:  Pt 2 sisters, daughter and pt son received verbal instructions in role of PT and POC. All verbally expressed understanding of such.     Patient left supine with all lines intact, call button in reach, RN  notified, and 2 sisters, daughter and pt son  present.    GOALS:   Multidisciplinary Problems       Physical Therapy Goals          Problem: Physical Therapy    Goal Priority Disciplines Outcome Goal Variances Interventions   Physical Therapy Goal     PT, PT/OT Ongoing, Progressing     Description: Goals to be met by: 23     Patient will increase functional independence with mobility by performin. Supine to sit with Moderate Assistance  2. Sit to stand transfer with Moderate Assistance with AD if needed.   3. Bed to chair transfer with Moderate Assistance using AD if needed.   4. Sitting at edge of bed x10 minutes with Floyd and Contact Guard Assistance to increase tolerance to activities.                          History:     Past Medical History:   Diagnosis Date    Acute hypoxemic respiratory failure 2022    Anxiety     Stevens's esophagus with low grade dysplasia     BPH (benign prostatic hyperplasia)     CAD (coronary artery disease)     Cataract     Cervical spondylosis 1/3/2020    Chronic obstructive pulmonary disease with acute exacerbation     Diaphragmatic hernia     GERD (gastroesophageal reflux disease)     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    Hyperlipidemia     Hypertension     Ischemic cardiomyopathy 2014    MDD (major depressive disorder), recurrent episode, mild 2016    Osteoporosis 2016    Peripheral arterial disease 3/18/2016    Sarcoid     Squamous cell carcinoma 2016, 2016    crown of scalp, left wrist       Past Surgical History:   Procedure Laterality Date    CARDIAC SURGERY      CAROTID STENT N/A 10/14/2019    Procedure: INSERTION, STENT, ARTERY, CAROTID;  Surgeon: Artie Kebede MD;  Location: [facility] CATH LAB;  Service: Cardiology;  Laterality: N/A;    CATARACT EXTRACTION W/  INTRAOCULAR LENS IMPLANT Right 2018    Dr. Talamantes    CATARACT EXTRACTION W/  INTRAOCULAR LENS IMPLANT Left 2018    Dr. Talamantes    CORONARY ARTERY BYPASS GRAFT      x2   10/2014    ESOPHAGOGASTRODUODENOSCOPY N/A 4/8/2019    Procedure: ESOPHAGOGASTRODUODENOSCOPY (EGD)/poss rfa;  Surgeon: Clifford De La Torre MD;  Location: [facility] ENDO (2ND FLR);  Service: Endoscopy;  Laterality: N/A;    ESOPHAGOGASTRODUODENOSCOPY N/A 8/4/2021    Procedure: EGD (ESOPHAGOGASTRODUODENOSCOPY);  Surgeon: Clifford De La Torre MD;  Location: [facility] ENDO (2ND FLR);  Service: Endoscopy;  Laterality: N/A;  8/2-covid [place]-inst uevtry-my-qd appts on 8/3    ESOPHAGOGASTRODUODENOSCOPY (EGD) WITH RADIOFREQUENCY TREATMENT OF GASTROESOPHAGEAL JUNCTION      INJECTION OF ANESTHETIC AGENT AROUND MEDIAL BRANCH NERVES INNERVATING CERVICAL FACET JOINT Bilateral 1/9/2020    Procedure: INJECTION, MBB--Bilateral Cervical- Third Occipital nerve, C2-3--ASA okay for pt to continue taking per provider.;  Surgeon: Tip Mera Jr., MD;  Location[facility] PAIN MGT;  Service: Pain Management;  Laterality: Bilateral;    INTRAOCULAR PROSTHESES INSERTION Right 7/17/2018    Procedure: INSERTION, INTRAOCULAR LENS PROSTHESIS;  Surgeon: León Talamantes MD;  Location: [facility] OR [address] [place] [address];  Service: Ophthalmology;  Laterality: Right;    INTRAOCULAR PROSTHESES INSERTION Left 7/31/2018    Procedure: INSERTION, INTRAOCULAR LENS PROSTHESIS;  Surgeon: León Talamantes MD;  Location: [facility] OR [address] [place] [address];  Service: Ophthalmology;  Laterality: Left;    PHACOEMULSIFICATION OF CATARACT Right 7/17/2018    Procedure: PHACOEMULSIFICATION, CATARACT;  Surgeon: León Talamantes MD;  Location: [facility] OR [address] [place] [address];  Service: Ophthalmology;  Laterality: Right;    PHACOEMULSIFICATION OF CATARACT Left 7/31/2018    Procedure: PHACOEMULSIFICATION, CATARACT;  Surgeon: León Talamantes MD;  Location: [facility] OR [address] [place] [address];  Service: Ophthalmology;  Laterality: Left;    PLACEMENT OF SCREW IN ODONTOID N/A 10/22/2020    Procedure: INSERTION, SCREW, ODONTOID. Anterior approach.;  Surgeon: Kirsten Weaver MD;  Location: [facility] OR 2ND FLR;  Service: Neurosurgery;   Laterality: N/A;    RADIOFREQUENCY THERMOCOAGULATION Bilateral 1/30/2020    Procedure: RADIOFREQUENCY THERMAL COAGULATION--Bilateral C2-3 and Third Occipital Nerve;  Surgeon: Tip Mera Jr., MD;  Location: [facility];  Service: Pain Management;  Laterality: Bilateral;    UMBILICAL HERNIA REPAIR         Time Tracking:     PT Received On: 01/19/23  PT Start Time: 1058     PT Stop Time: 1116  PT Total Time (min): 18 min     Billable Minutes: Evaluation 8 min  and Therapeutic Activity 10 min       01/19/2023

## 2023-01-19 NOTE — ANESTHESIA POST-OP PAIN MANAGEMENT
Consult  Anesthesiology Acute Pain Service      SUBJECTIVE:     Chief Complaint/Reason for Consult: Request anesthesia assistance with continuous pain management due to high complexity/refractory pain.    Surgical Procedure: N/a    Post Op Day: N/a    History of Present Illness:  Patient is a 82 y.o. male with a PMH of CAD (EF 40% with previous MI with enduring defect seen on cardiac PET), COPD on 0.5L NC at home and inhalers, DM2, CKD3, HTN, sarcoid, CAD who fell multiple times at home. Imaging shows small apical PTX and subQ air on R chest wall with CT with several comminuted posterior rib fractures.     On admission, he is satting in the mid-90s on 6L facemask. Back pain is not controlled, and he is complaining of abdominal pain as well. He is admitted to the SICU for observation and pain control.        Anesthesiology is consulted for refractory pain control.     The current inpatient regimen is:  Acetaminophen 1,000mg q8h  Gabapentin 300mg daily  Sertraline 100mg oral  Hydromorphone 0.5mg IV q2h prn  Oxycodone 5mg q4h prn moderate pain  Oxycodone 10mg q4h prn severe pain     dextrose 5 % and 0.45 % NaCl 75 mL/hr at 01/19/23 0805    NORepinephrine bitartrate-D5W Stopped (01/19/23 0704)    ROPIvacaine (PF) 2 mg/ml (0.2%) 0.1 mL/hr (01/19/23 0805)       Patient Active Problem List   Diagnosis    CAD (coronary artery disease)    Hypertension    Hyperlipidemia    Stevens's esophagus with low grade dysplasia    Hx of gastric ulcer    S/P CABG x 2    SOB (shortness of breath)    Atherosclerosis of aorta    Pulmonary sarcoidosis    MDD (major depressive disorder), recurrent episode, mild    Peripheral arterial disease    Osteoporosis    Senile purpura    Refractive error    Dry eye syndrome of both eyes    Heterophoria    Right homonymous hemianopsia    Carotid artery disease    Cervicogenic headache    Cervical spondylosis    DDD (degenerative disc disease), cervical    Chronic neck pain     Neck stiffness    Abnormal increased muscle tightness    Awakens from sleep due to pain    Cervical segment dysfunction    Tightness of neck    Dysfunctional movements    Odontoid fracture    Gastroesophageal reflux disease    Anxiety    Decreased ROM of neck    Gait instability    Degenerative disease of nervous system, unspecified    Impaired functional mobility, balance, gait, and endurance    Vision loss    Intraventricular conduction delay    Dementia    Acute hypoxemic respiratory failure    Frequent falls    Scalp laceration    Chronic diastolic heart failure    Closed fracture of pubis    Multiple closed stable lateral compression fractures of pelvis    Closed fracture of trochanter of left femur with routine healing    Chronic obstructive pulmonary disease    Lung nodule    Ischemic cardiomyopathy    Closed fracture of multiple ribs of right side    Encounter for palliative care    Traumatic pneumothorax       Review of patient's allergies indicates:   Allergen Reactions    Morphine Shortness Of Breath       Outpatient Medications:   No current facility-administered medications on file prior to encounter.     Current Outpatient Medications on File Prior to Encounter   Medication Sig Dispense Refill    albuterol (PROVENTIL/VENTOLIN HFA) 90 mcg/actuation inhaler INHALE 2 PUFFS INTO THE LUNGS EVERY 6 HOURS AS NEEDED WHEEZING OR SHORTNESS OF BREATH 8.5 g 11    albuterol-ipratropium (DUO-NEB) 2.5 mg-0.5 mg/3 mL nebulizer solution Take 3 mLs by nebulization every 12 (twelve) hours. Rescue 540 mL 3    alendronate (FOSAMAX) 70 MG tablet TAKE 1 TABLET BY MOUTH EVERY WEEK IN THE MORNING WITH FULL GLASS OF WATER ON EMPTY STOMACH-DONT LIE DOWN FOR AT LEAST 30 MINUTES AFTERWARDS 12 tablet 3    aspirin (ECOTRIN) 81 MG EC tablet Take 81 mg by mouth once daily.      atorvastatin (LIPITOR) 40 MG tablet TAKE 1 TABLET(40 MG) BY MOUTH EVERY DAY 90 tablet 0    budesonide (PULMICORT) 0.5  mg/2 mL nebulizer solution Take 2 mLs (0.5 mg total) by nebulization 2 (two) times daily. Controller 120 mL 5    calcium carbonate (CALCIUM 600 ORAL) Take by mouth.      doxepin (SILENOR) 3 mg Tab One po qhs 30 tablet 6    empagliflozin (JARDIANCE) 10 mg tablet Take 1 tablet (10 mg total) by mouth once daily. 30 tablet 11    furosemide (LASIX) 20 MG tablet Take 1 tablet (20 mg total) by mouth daily as needed (Wt gain >2 pounds/day or >5 pounds/week). 90 tablet 3    magnesium oxide (MAG-OX) 400 mg (241.3 mg magnesium) tablet Take 1 tablet (400 mg total) by mouth once daily. 30 tablet 3    memantine (NAMENDA) 10 MG Tab Take 1 tablet (10 mg total) by mouth 2 (two) times daily. 180 tablet 1    methylPREDNISolone (MEDROL, RENAN,) 4 mg tablet use as directed 1 each 0    omega-3 fatty acids-vitamin E (FISH OIL) 1,000 mg Cap Take 1 tablet by mouth 2 (two) times daily. (Patient taking differently: Take 1 tablet by mouth once daily.) 60 each 11    ondansetron (ZOFRAN) 8 MG tablet Take 1 tablet (8 mg total) by mouth every 8 (eight) hours as needed for Nausea. 30 tablet 1    pantoprazole (PROTONIX) 40 MG tablet TAKE 1 TABLET(40 MG) BY MOUTH TWICE DAILY 180 tablet 0    sacubitriL-valsartan (ENTRESTO) 24-26 mg per tablet Take 1 tablet by mouth 2 (two) times daily. 60 tablet 11    sertraline (ZOLOFT) 100 MG tablet Take 1 tablet (100 mg total) by mouth once daily. 90 tablet 3    tiotropium-olodateroL (STIOLTO RESPIMAT) 2.5-2.5 mcg/actuation Mist Inhale 2 puffs into the lungs once daily. Controller 4 g 5    ubrogepant (UBROGEPANT) 100 mg tablet One po at onset of migraine. May repeat after 2 hours if needed. 10 tablet 1        Current Inpatient Medications:   acetaminophen  1,000 mg Oral TID    albuterol-ipratropium  3 mL Nebulization Q4H WAKE    atorvastatin  40 mg Oral Daily    budesonide  0.5 mg Nebulization Q12H    gabapentin  250 mg Oral Daily    heparin (porcine)  5,000 Units Subcutaneous Q8H    LIDOcaine  1  patch Transdermal Q24H    memantine  10 mg Oral BID    midodrine  2.5 mg Oral Q8H    mupirocin   Nasal BID    pantoprazole  40 mg Intravenous Daily    polyethylene glycol  17 g Oral Daily    senna  8.6 mg Oral BID    sertraline  100 mg Oral Daily       PRN:  sodium chloride 0.9%, albuterol sulfate, dextrose 10%, dextrose 10%, glucagon (human recombinant), glucose, glucose, insulin aspart U-100, melatonin, ondansetron    Past Surgical History:   Procedure Laterality Date    CARDIAC SURGERY      CAROTID STENT N/A 10/14/2019    Procedure: INSERTION, STENT, ARTERY, CAROTID;  Surgeon: Artie Kebede MD;  Location: [facility] CATH LAB;  Service: Cardiology;  Laterality: N/A;    CATARACT EXTRACTION W/  INTRAOCULAR LENS IMPLANT Right 07/17/2018    Dr. Talamantes    CATARACT EXTRACTION W/  INTRAOCULAR LENS IMPLANT Left 07/31/2018    Dr. Talamantes    CORONARY ARTERY BYPASS GRAFT      x2  10/2014    ESOPHAGOGASTRODUODENOSCOPY N/A 4/8/2019    Procedure: ESOPHAGOGASTRODUODENOSCOPY (EGD)/poss rfa;  Surgeon: Clifford De La Torre MD;  Location: [facility] ([address] [place] [address]);  Service: Endoscopy;  Laterality: N/A;    ESOPHAGOGASTRODUODENOSCOPY N/A 8/4/2021    Procedure: EGD (ESOPHAGOGASTRODUODENOSCOPY);  Surgeon: Clifford De La Torre MD;  Location: [address]);  Service: Endoscopy;  Laterality: N/A;  8/2-covid [facility] [id number] appts on 8/3    ESOPHAGOGASTRODUODENOSCOPY (EGD) WITH RADIOFREQUENCY TREATMENT OF GASTROESOPHAGEAL JUNCTION      INJECTION OF ANESTHETIC AGENT AROUND MEDIAL BRANCH NERVES INNERVATING CERVICAL FACET JOINT Bilateral 1/9/2020    Procedure: INJECTION, MBB--Bilateral Cervical- Third Occipital nerve, C2-3--ASA okay for pt to continue taking per provider.;  Surgeon: Tip Mera Jr., MD;  Location: [facility] PAIN MGT;  Service: Pain Management;  Laterality: Bilateral;    INTRAOCULAR PROSTHESES INSERTION Right 7/17/2018    Procedure: INSERTION, INTRAOCULAR LENS PROSTHESIS;  Surgeon: León DE LEON  MD [place];  Location: [facility] OR 1ST FLR;  Service: Ophthalmology;  Laterality: Right;    INTRAOCULAR PROSTHESES INSERTION Left 2018    Procedure: INSERTION, INTRAOCULAR LENS PROSTHESIS;  Surgeon: León Talamantes MD;  Location: [facility] OR 1ST FLR;  Service: Ophthalmology;  Laterality: Left;    PHACOEMULSIFICATION OF CATARACT Right 2018    Procedure: PHACOEMULSIFICATION, CATARACT;  Surgeon: León Talamantes MD;  [facility];  Service: Ophthalmology;  Laterality: Right;    PHACOEMULSIFICATION OF CATARACT Left 2018    Procedure: PHACOEMULSIFICATION, CATARACT;  Surgeon: León Talamantes MD;  [facility];  Service: Ophthalmology;  Laterality: Left;    PLACEMENT OF SCREW IN ODONTOID N/A 10/22/2020    Procedure: INSERTION, SCREW, ODONTOID. Anterior approach.;  Surgeon: Kirsten Weaver MD;  Location: [facility] OR 2ND FLR;  Service: Neurosurgery;  Laterality: N/A;    RADIOFREQUENCY THERMOCOAGULATION Bilateral 2020    Procedure: RADIOFREQUENCY THERMAL COAGULATION--Bilateral C2-3 and Third Occipital Nerve;  Surgeon: Tip Mera Jr., MD;  Location: [facility] PAIN MGT;  Service: Pain Management;  Laterality: Bilateral;    UMBILICAL HERNIA REPAIR         Social History     Socioeconomic History    Marital status:     Number of children: 4   Occupational History    Occupation:     Tobacco Use    Smoking status: Former     Packs/day: 2.00     Years: 20.00     Pack years: 40.00     Types: Cigarettes     Quit date: 1988     Years since quittin.9     Passive exposure: Past    Smokeless tobacco: Never   Substance and Sexual Activity    Alcohol use: No     Comment: quit drinking beer     Drug use: No    Sexual activity: Not Currently     Partners: Female       Review of Systems:  As above. Additionally:     Constitutional: activity change  HENT: Negative for congestion, postnasal drip, rhinorrhea, sinus pressure, sore throat,  trouble swallowing and voice change.    Eyes: Negative for photophobia, pain and visual disturbance.   Respiratory: cough, shortness of breath, negative for wheezing.    Cardiovascular: Negative for palpitations and leg swelling.   Gastrointestinal: Negative for nausea, vomiting, abdominal pain, diarrhea, constipation and abdominal distention.   Endocrine: Negative for polydipsia, polyphagia and polyuria.   Genitourinary: Negative for dysuria, frequency, hematuria and difficulty urinating.   Musculoskeletal: arthralgias  Skin: Negative for color change, pallor, rash and wound.   Neurological: Negative for dizziness, seizures, syncope, weakness and headaches.   Hematological: Negative for adenopathy. Does not bruise/bleed easily.   Psychiatric/Behavioral: Negative for sleep disturbance and dysphoric mood. The patient is not nervous/anxious.      OBJECTIVE:     Current Vital Signs  Temp: 36.7 °C (98 °F) (01/19/23 0300)  Pulse: 89 (01/19/23 0830)  Resp: (!) 31 (01/19/23 0830)  BP: (!) 108/53 (01/19/23 0805)  SpO2: 99 % (01/19/23 0830)    Vital Signs Range (Last 24H):  Temp:  [36.7 °C (98 °F)-37.6 °C (99.7 °F)]   Pulse:  []   Resp:  [19-44]   BP: ()/(41-94)   SpO2:  [91 %-100 %]   Arterial Line BP: ()/(33-52)     I & O (Last 24H):    Intake/Output Summary (Last 24 hours) at 1/19/2023 0916  Last data filed at 1/19/2023 0830  Gross per 24 hour   Intake 6322.6 ml   Output 705 ml   Net 5617.6 ml       Physical Exam:  Constitutional: Patient is oriented to person, place, and time. Patient appears well-developed and well-nourished. No distress.   HENT: Head: Normocephalic and atraumatic. Right Ear: External ear normal. Left Ear: External ear normal. Nose: Nose normal.   Mouth/Throat: Oropharynx is clear and moist. No oropharyngeal exudate.   Eyes: Conjunctivae and EOM are normal. Pupils are equal, round, and reactive to light. Right eye exhibits no discharge. Left eye exhibits no discharge. No scleral  icterus.   Neck: Normal range of motion. Neck supple. No JVD present. No tracheal deviation present. No thyromegaly present.   Cardiovascular: Normal rate, regular rhythm, normal heart sounds and intact distal pulses.  Exam reveals no gallop and no friction rub.  No murmur heard.  Pulmonary/Chest: Effort normal and breath sounds normal. No respiratory distress. Patient has no wheezes. Patient has no rales.   Abdominal: Soft. Bowel sounds are normal. Patient exhibits no distension and no mass. There is no tenderness.   Musculoskeletal: Normal range of motion. Patient exhibits no edema or tenderness.   Lymphadenopathy:   Patient has no cervical adenopathy.   Neurological: Patient is alert and oriented to person, place, and time. Patient has normal reflexes. Patient exhibits normal muscle tone. Coordination normal.   Skin: Skin is warm and dry. No rash noted. Patient is not diaphoretic. No erythema. No pallor.   Psychiatric: Patient has a normal mood and affect. Behavior is normal. Judgment and thought content normal.     Laboratory:  CBC:   Recent Labs     01/19/23  0219 01/19/23  0415   WBC 8.21 7.79   RBC 2.69* 2.64*   HGB 8.6* 8.5*   HCT 26.2* 25.7*    195   MCV 97 97   MCH 32.0* 32.2*   MCHC 32.8 33.1       CMP:   Recent Labs     01/18/23  0421 01/19/23  0138 01/19/23  0415    136 134*   K 4.2 4.2 4.2   CO2 26 22* 22*    106 105   BUN 26* 34* 35*   CREATININE 1.6* 2.1* 2.1*    133* 150*   MG 2.1 1.9 2.0   PHOS 4.6* 3.6 3.1   CALCIUM 8.4* 8.3* 8.4*   ALBUMIN 3.2* 3.4*  --    PROT 6.0 5.9*  --    ALKPHOS 57 44*  --    ALT 16 17  --    AST 26 33  --    BILITOT 1.2* 1.4*  --        Diagnostic Radiology:  Air in the right chest wall adjacent to the lateral rib cage.  Small right apical pneumothorax.  Suspect right-sided rib fracture    ASSESSMENT/PLAN:     Active Hospital Problems    Diagnosis  POA    *Closed fracture of multiple ribs of right side [S22.41XA]  Yes    Traumatic pneumothorax  [S27.0XXA]  Yes    Encounter for palliative care [Z51.5]  Not Applicable    Chronic obstructive pulmonary disease [J44.9]  Yes     Patient has a history of COPD and has not been using a controller because of cost.  We will reorder and ask for the pharmacy assistance program to assist.  In addition will start DuoNeb twice a day and Pulmicort b.i.d..        Resolved Hospital Problems   No resolved problems to display.     Mr. Browning is an 81yo M PMH CAD (EF 40% with previous MI with enduring defect seen on cardiac PET), COPD on 0.5L NC at home and inhalers, DM2, CKD3, HTN, sarcoid, admitted with multiple R sided rib fractures 2/2 fall with apical pneumothorax, not a surgical candidate. Given the patient is tachycardic, reporting pain, taking shallow rapid breaths, requiring 3L NC, his pain was initially inadequately controlled on current regimen with PO oxy and IV dilaudid for breakthrough. Pain improved with BELEM, heart rate within normal limits. Delirious 1/18 overnight.     Plan:    -- BELEM block ropivacaine 2mg/ml 0.1ml/hr  --Multimodals: Acetaminophen 1,000mg 3x daily, Robaxin 500 3xdaily, Gabapentin 300 3xdaily, lidocaine patch  --Home sertraline 100mg daily  --Discontinue Oxycodone 5mg q4h prn moderate pain, oxycodone 10mg q4h severe pain, hydromorphone 0.5mg IV q2h prn for breakthrough    Thank you for this consult.    Will continue to follow with you.    Sherie Vasquez MD  Department of Anesthesiology  [facility]  01/19/2023 11:24 AM

## 2023-01-19 NOTE — PT/OT/SLP EVAL
Occupational Therapy   Co-Evaluation    Name: Paul Browning  MRN: [id number]  Admitting Diagnosis: Closed fracture of multiple ribs of right side  Recent Surgery: * No surgery found *    Recommendations:     Discharge Recommendations:  (Home with family and HH)  Discharge Equipment Recommendations:   (TBD pending progress)  Barriers to discharge:  None    Assessment:     Paul Browning is a 82 y.o. male with a medical diagnosis of Closed fracture of multiple ribs of right side.  He presents with rib fx s/p fall. Pt has dementia and with labile respiratory status during session. Pt with increased RR sitting EOB x 4 min with desatting to low 80s, thus returned supine. SpO2 quickly recovered, however RR remained in 30s.  Rehab prognosis is fair, but family wants therapy involved to assist in trying to get pt back to PLOF. Performance deficits affecting function: weakness, impaired functional mobility, impaired cognition, decreased safety awareness, impaired cardiopulmonary response to activity, impaired endurance, gait instability, impaired self care skills, impaired balance, decreased coordination.  Pt benefits from co-treatment with PT to accommodate pt's activity tolerance and progression with therapy.      Rehab Prognosis: Fair; patient would benefit from acute skilled OT services to address these deficits and reach maximum level of function.       Plan:     Patient to be seen 2 x/week to address the above listed problems via self-care/home management, therapeutic activities, therapeutic exercises  Plan of Care Expires: 02/18/23  Plan of Care Reviewed with: patient, sibling, son    Subjective     Chief Complaint: pt with confabulated speech  Patient/Family Comments/goals: to get pt back to PLOF    Occupational Profile:  Living Environment: lives with dtr in [facility] with no ALM[facility]  Previous level of function: pt requires set-up to minimal A for ADL and has HHA for ambulation; pt has had multiple  falls  Roles and Routines:  mostly home dweller; pt enjoys going on outings with family  Equipment Used at Home:  hospital bed, shower chair, BSC, rollator, w/c  Assistance upon Discharge: None    Pain/Comfort:  Pain Rating 1: 0/10  Pain Rating Post-Intervention 1: 0/10    Patients cultural, spiritual, Taoism conflicts given the current situation: no    Objective:     Communicated with: RN prior to session.  Patient found supine with blood pressure cuff, pulse ox (continuous), telemetry, arterial line, segal catheter, oxygen upon OT entry to room.    General Precautions: Standard, fall, respiratory  Orthopedic Precautions:    Braces:    Respiratory Status: Nasal cannula, flow 2 L/min    Occupational Performance:    Bed Mobility:    Patient completed Scooting/Bridging with total assistance and 2 persons  Patient completed Supine to Sit with total assistance and 2 persons  Patient completed Sit to Supine with total assistance and 2 persons    Functional Mobility/Transfers:  Deferred  2* impaired cardiopulmonary response seated EOB  Functional Mobility: NT    Activities of Daily Living:  Grooming: minimum assistance    Upper Body Dressing: maximal assistance    Lower Body Dressing: total assistance    Toileting: total assistance      Cognitive/Visual Perceptual:  Pt is alert and following basic commands inconsistently  Pt is oriented to person only  Confabulated speech throughout session    Physical Exam:  Rounded shoulders; PPT  B UE AROM and MMT  WFL noted through observation    AMPAC 6 Click ADL:  AMPAC Total Score: 9    Treatment & Education:  Pt ed on OT POC  Daily orientation provided  Discussed family goals for patient    Patient left HOB elevated with all lines intact, call button in reach, RN notified, and family present    GOALS:   Multidisciplinary Problems       Occupational Therapy Goals          Problem: Occupational Therapy    Goal Priority Disciplines Outcome Interventions   Occupational Therapy  Goal     OT, PT/OT Ongoing, Progressing    Description: Goals to be met by: 2/2/23     Patient will increase functional independence with ADLs by performing:    Feeding with Set-up Assistance.  UE Dressing with Stand-by Assistance.  Grooming while standing at sink with Contact Guard Assistance.  Toilet transfer to bedside commode with Stand-by Assistance.                         History:     Past Medical History:   Diagnosis Date    Acute hypoxemic respiratory failure 1/23/2022    Anxiety     Stevens's esophagus with low grade dysplasia     BPH (benign prostatic hyperplasia)     CAD (coronary artery disease)     Cataract     Cervical spondylosis 1/3/2020    Chronic obstructive pulmonary disease with acute exacerbation     Diaphragmatic hernia     GERD (gastroesophageal reflux disease)     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    Hyperlipidemia     Hypertension     Ischemic cardiomyopathy 11/5/2014    MDD (major depressive disorder), recurrent episode, mild 2/17/2016    Osteoporosis 7/20/2016    Peripheral arterial disease 3/18/2016    Sarcoid     Squamous cell carcinoma 09/2016, 5/2016    crown of scalp, left wrist         Past Surgical History:   Procedure Laterality Date    CARDIAC SURGERY      CAROTID STENT N/A 10/14/2019    Procedure: INSERTION, STENT, ARTERY, CAROTID;  Surgeon: Artie Kebede MD;  Location: [facility] CATH LAB;  Service: Cardiology;  Laterality: N/A;    CATARACT EXTRACTION W/  INTRAOCULAR LENS IMPLANT Right 07/17/2018    Dr. Talamantes    CATARACT EXTRACTION W/  INTRAOCULAR LENS IMPLANT Left 07/31/2018    Dr. Talamantes    CORONARY ARTERY BYPASS GRAFT      x2  10/2014    ESOPHAGOGASTRODUODENOSCOPY N/A 4/8/2019    Procedure: ESOPHAGOGASTRODUODENOSCOPY (EGD)/poss rfa;  Surgeon: Clifford De La Torre MD;  Location: [facility] ([address] [place] [address]);  Service: Endoscopy;  Laterality: N/A;    ESOPHAGOGASTRODUODENOSCOPY N/A 8/4/2021    Procedure: EGD (ESOPHAGOGASTRODUODENOSCOPY);  Surgeon: Clifford De La Torre MD;  Location: [facility] COLLEEN  (2ND FLR);  Service: Endoscopy;  Laterality: N/A;  [id number]    ESOPHAGOGASTRODUODENOSCOPY (EGD) WITH RADIOFREQUENCY TREATMENT OF GASTROESOPHAGEAL JUNCTION      INJECTION OF ANESTHETIC AGENT AROUND MEDIAL BRANCH NERVES INNERVATING CERVICAL FACET JOINT Bilateral 1/9/2020    Procedure: INJECTION, MBB--Bilateral Cervical- Third Occipital nerve, C2-3--ASA okay for pt to continue taking per provider.;  Surgeon: Tip Mera Jr., MD;  Location: [facility];  Service: Pain Management;  Laterality: Bilateral;    INTRAOCULAR PROSTHESES INSERTION Right 7/17/2018    Procedure: INSERTION, INTRAOCULAR LENS PROSTHESIS;  Surgeon: León Talamantes MD;  Location: [facility] OR 1ST FLR;  Service: Ophthalmology;  Laterality: Right;    INTRAOCULAR PROSTHESES INSERTION Left 7/31/2018    Procedure: INSERTION, INTRAOCULAR LENS PROSTHESIS;  Surgeon: León Talamantes MD;  Location: [facility] OR 1ST FLR;  Service: Ophthalmology;  Laterality: Left;    PHACOEMULSIFICATION OF CATARACT Right 7/17/2018    Procedure: PHACOEMULSIFICATION, CATARACT;  Surgeon: León Talamantes MD;  Location: [facility] OR 1ST FLR;  Service: Ophthalmology;  Laterality: Right;    PHACOEMULSIFICATION OF CATARACT Left 7/31/2018    Procedure: PHACOEMULSIFICATION, CATARACT;  Surgeon: León Talamantes MD;  [facility];  Service: Ophthalmology;  Laterality: Left;    PLACEMENT OF SCREW IN ODONTOID N/A 10/22/2020    Procedure: INSERTION, SCREW, ODONTOID. Anterior approach.;  Surgeon: Kirsten Weaver MD;  Location: [facility] OR 2ND FLR;  Service: Neurosurgery;  Laterality: N/A;    RADIOFREQUENCY THERMOCOAGULATION Bilateral 1/30/2020    Procedure: RADIOFREQUENCY THERMAL COAGULATION--Bilateral C2-3 and Third Occipital Nerve;  Surgeon: Tip Mera Jr., MD;  Location: [facility];  Service: Pain Management;  Laterality: Bilateral;    UMBILICAL HERNIA REPAIR         Time Tracking:     OT Date of Treatment:  01/19/23  OT Start Time: 1058  OT Stop Time: 1117  OT Total Time (min): 19 min    Billable Minutes:Evaluation 10  Self Care/Home Management 9    1/19/2023

## 2023-01-19 NOTE — PLAN OF CARE
[facility]  Discharge Assessment    Primary Care Provider: Grey Forbes DO      01/19/23 1058   Discharge Assessment   Assessment Type Discharge Planning Assessment   Confirmed/corrected address, phone number and insurance Yes   Confirmed Demographics Correct on Facesheet   Source of Information family   If unable to respond/provide information was family/caregiver contacted? Yes   Contact Name/Number Mesha Morinash   When was your last doctors appointment? 01/12/23   Does patient/caregiver understand observation status Yes   Communicated VIJAY with patient/caregiver Date not available/Unable to determine   People in Home child(josephine), adult   Do you expect to return to your current living situation? Yes   Do you have help at home or someone to help you manage your care at home? Yes   Who are your caregiver(s) and their phone number(s)? Mesha Nallely [phone]   Prior to hospitilization cognitive status: No Deficits   Current cognitive status: No Deficits   Equipment Currently Used at Home walker, standard;shower chair;grab bar;hospital bed   Readmission within 30 days? No   Do you take prescription medications? Yes   Do you have prescription coverage? Yes   Do you have any problems affording any of your prescribed medications? No   Is the patient taking medications as prescribed? yes   Who is going to help you get home at discharge? Family   How do you get to doctors appointments? family or friend will provide   Are you on dialysis? No   Do you take coumadin? No   Discharge Plan A   (TBD)   Discharge Plan B   (TBD)   DME Needed Upon Discharge  none   Discharge Plan discussed with: Adult children   Discharge Barriers Identified None     SW spoke to patient's adult daughter, Mesha.  Patient lives with adult daughter. Post hospital stay Mesha will be support person and transportation at discharge.  There have been no hospitalizations within the last 30 days. Verified  patient's PCP and preferred Pharmacy.  Patient is not on Coumadin and is not receiving dialysis.  All questions answered regarding Case Management Discharge Planning, patient's daughter verbalized understanding.

## 2023-01-19 NOTE — PROGRESS NOTES
Patient with urinary frequency. Voided x3 with ~20 cc per occurrence. Bladder scanned with >400 cc. OK to place segal per Dr. Trujillo. Segal placed with 450cc returned.

## 2023-01-20 PROBLEM — S22.49XA MULTIPLE RIB FRACTURES INVOLVING FOUR OR MORE RIBS: Status: ACTIVE | Noted: 2023-01-01

## 2023-01-20 NOTE — ASSESSMENT & PLAN NOTE
Patient is an 82 year old frail and comorbid gentleman with h/o COPD (on home O2), pulmonary sarcoidosis, CABG, HTN, HLD, depression, GERD, dementia, HF who presents after a fall with multiple posterior right rib fractures that are comminuted with significant displacement, also with small pneumothorax that does not need intervention at this time, but may eventually. Clinically stable on a few liters of O2 now but is very high risk for pneumonia.    - Dysphagia soft diet with aspiration precautions  - Daily CXR  - Palliative care consulted, appreciate assistance   - Will follow up discussions. Currently partial code. No chest compressions but okay with intubation   - Multimodal pain control  - Gentle IVF   - PRN IV pain   - Marinol   - APS consulted, appreciate assistance. Block 1/18  - Aggressive pulmonary toilet, acapella, IS, duonebs, inhalation treatment  - Home meds  - DVT ppx (SCDs and heparin)  - Remainder of care per SICU, appreciate assistance    Dispo: remain in SICU

## 2023-01-20 NOTE — SUBJECTIVE & OBJECTIVE
Interval History: NAEON. Afebrile. Pressors off. 4L O2 this AM, RR increasing. WBC remains stable at 7. Cr coming down, 1.6 this AM.   CXR stable     Medications:  Continuous Infusions:   dextrose 5 % and 0.45 % NaCl 50 mL/hr at 01/20/23 0800    ROPIvacaine (PF) 2 mg/ml (0.2%) 0.1 mL/hr (01/20/23 0800)     Scheduled Meds:   acetaminophen  1,000 mg Oral TID    albuterol-ipratropium  3 mL Nebulization Q4H WAKE    atorvastatin  40 mg Oral Daily    budesonide  0.5 mg Nebulization Q12H    diphenhydrAMINE  25 mg Intravenous Once    dronabinoL  2.5 mg Oral BID    gabapentin  250 mg Oral Daily    heparin (porcine)  5,000 Units Subcutaneous Q8H    LIDOcaine  1 patch Transdermal Q24H    melatonin  6 mg Oral Nightly    memantine  10 mg Oral BID    midodrine  5 mg Oral Q8H    mupirocin   Nasal BID    pantoprazole  40 mg Oral Daily    polyethylene glycol  17 g Oral Daily    senna  8.6 mg Oral BID    sertraline  100 mg Oral Daily     PRN Meds:sodium chloride 0.9%, dextrose 10%, dextrose 10%, glucagon (human recombinant), glucose, glucose, insulin aspart U-100, levalbuterol, ondansetron     Review of patient's allergies indicates:   Allergen Reactions    Morphine Shortness Of Breath     Objective:     Vital Signs (Most Recent):  Temp: 98.2 °F (36.8 °C) (01/20/23 0300)  Pulse: 90 (01/20/23 0800)  Resp: (!) 37 (01/20/23 0800)  BP: (!) 143/64 (01/20/23 0800)  SpO2: 100 % (01/20/23 0800)   Vital Signs (24h Range):  Temp:  [97.8 °F (36.6 °C)-98.4 °F (36.9 °C)] 98.2 °F (36.8 °C)  Pulse:  [] 90  Resp:  [30-41] 37  SpO2:  [88 %-100 %] 100 %  BP: ()/(50-80) 143/64  Arterial Line BP: ()/(32-71) 140/52     Weight:  (bed scale broken)  Body mass index is 22.15 kg/m².    Intake/Output - Last 3 Shifts         01/18 0700 01/19 0659 01/19 0700 01/20 0659 01/20 0700 01/21 0659    P.O.  90     I.V. (mL/kg) 2358.4 (34.7) 1309.1 (19.3) 100.1 (1.5)    IV Piggyback 3350.1 394.8     Total Intake(mL/kg) 5708.5 (83.9) 1794 (26.4)  100.1 (1.5)    Urine (mL/kg/hr) 685 (0.4) 360 (0.2) 10 (0.1)    Total Output 685 360 10    Net +5023.5 +1434 +90.1                   Physical Exam  Vitals and nursing note reviewed.   Constitutional:       General: He is not in acute distress.     Comments: 4L O2 via NC   HENT:      Head: Normocephalic.      Nose: Nose normal.   Eyes:      General: No scleral icterus.     Extraocular Movements: Extraocular movements intact.   Cardiovascular:      Rate and Rhythm: Normal rate.   Pulmonary:      Effort: Pulmonary effort is normal. No respiratory distress.   Chest:      Comments: Right posterior back pain at site of fractures  Crepitus along the right back  Abdominal:      General: There is no distension.      Palpations: Abdomen is soft.      Tenderness: There is no abdominal tenderness. There is no guarding or rebound.   Musculoskeletal:         General: No deformity.   Skin:     General: Skin is warm and dry.   Neurological:      Mental Status: He is alert. He is disoriented.       Significant Labs:  I have reviewed all pertinent lab results within the past 24 hours.    Significant Diagnostics:  I have reviewed all pertinent imaging results/findings within the past 24 hours.

## 2023-01-20 NOTE — PROGRESS NOTES
"[facility]  Palliative Medicine  Progress Note    Patient Name: Paul Browning  MRN: [id number]  Admission Date: 1/16/2023  Hospital Length of Stay: 2 days  Code Status: Partial Code   Attending Provider: Santo Shankar MD  Consulting Provider: Marie Blanco MD  Primary Care Physician: Grey Forbes DO  Principal Problem:Closed fracture of multiple ribs of right side    Patient information was obtained from relative(s) and primary team.      Assessment/Plan:     Encounter for palliative care  Paul Browning (Joe) is an 82-year-old man with a history of dementia (FAST score 6D), CAD s/p STEMI/PCI/2v-CABG (2014), ICM with reduced EF (40%), COPD on home oxygen (intermittently), sarcoidosis, possible Parkinson's, DM2, CKD3, multiple falls who was admitted after sustaining a fall with multiple rib fractures and small apical pneumothorax. Hospital course notable for delirium/encephalopathy, concerns of aspiration, development of VIKTOR and hypotension. Palliative and Supportive Care was consulted to explore goals of care and symptom management recommendations.    Advance Care Planning   Goals of Care:  - Code status: No CPR, OK for intubation  - Next of kin: 4 adult children (3 sons Paul, Neto, Dennys, 1 daughter Mesha)  - Patient is not decisional  - ACP documents: none  - Prognosis: poor  - Family's understanding of prognosis: continue education  - Goals: control pain/symptoms, treat acute, reversible issues, continue full supportive care  - Recommendations/Plans: continue full supportive care, including initiation of antibiotics if warranted. Appreciate primary team for helping  family about prognosis in order to communicate recommendations that would avoid harm. Will continue to provide support for family who maintains hope that he will recover.    Goals of Care Conversation  - 1/20/2023: Paul at bedside, who had spent the night with Mr. Browning. " "Remains concerns of worsening encephalopathy and raised question to the physicians about potential UTI exacerbating mental status. Remains hopeful that his numbers are looking stable/OK - his blood pressure, oxygen saturation, labs, imaging. Acknowledged that he is still very tachypneic and looks worse than he did on admission. His sister Mesha will come today and family will always remain at bedside throughout Mr. Browning's hospitalization. Remains open to palliative presence during Mr. Browning's hospitalization for continued support.  - 1/19/2023: Met Mr. Browning with his daughter Mesha and son Dennys at bedside. Mesha described overnight events, noting that while pain is better, he was up all night, confused and required dose of Zyprexa. She confirms that he does not have Parkinson's and that tremor is due to chronic nerve damage, for which he takes gabapentin. Noted that despite being in the hospital and having better control of pain, he appears to be getting worse. Mesha agrees that he looks worse, but shares that her brother Paul checked MyChart and saw that there were no drastic changes in his imaging and labs. I shared that I worried about him aspirating, noting that there is an increasing right lower lobe infiltrate demonstrated in his morning's chest x-ray. She wants to know what it is, whether it is a pneumonia and would like antibiotics started sooner than later to treat it. Emphasized our last conversation that it was not a matter of if he'd aspirate, but when he would, and that I worried time is short. She acknowledges that when he fell and was in the ER, that there was a chance he would "not make it" but wants to give him a chance of surviving this. Shared that I will revisit this afternoon to see how he and they are doing. Offered that if they'd like us to engage other family members (such as Dennys's wife who is an RN) that we'd be happy to do so per their preference. Mesha " asks if Father Chase can come, shared that he did not come at all yesterday according to Paul. I will reach out and request for Father Chase to come to Mr. Browning today.  - 2023: Met Mr. Browning and his son Paul at his bedside, who was having a discussion related to code status with a surgeon on the team. Mr. Browning agrees that CPR will cause more harm and will text and discuss with his siblings about this code status. At this time he remains hesitant about changing code status related to intubation, recalling that his mother survived compassionate extubation 3 years after physicians told them that she was dying. Had supportive conversation with Paul, who remains hopeful that pain management will be able to perform nerve block as he worries that the opioids are causing too much sedation and would like to minimize medications if possible. Discussed interval development of VIKTOR, and will be watchful of this problem and adjust medications to dose renally and protect kidneys.  - 2023: Met Mr. Browning and his daughter Mesha and son Paul at his bedside. Mr. Browning is unable to fully participate in our discussion due to advanced dementia and immediate short term memory loss. Mesha shares her understanding that he broke 4 ribs in this fall and punctured his lungs. She was told that he might need a chest tube and he was high risk for developing pneumonia. She recalls that her mother and father discussed with each other that they would not want to be artificially sustained on life-support. When her mother was intubated and told that she would be ventilator-dependent, family honored her wishes and withdrew ventilator support. She thankfully lived 3 years after compassionate extubation and  3 years ago, which was the beginning of Mr. Browning's decline. They had been  when he was 15-years-old and she was 14-years-old. Now he lives with his daughter Mesha and is also  "supported by his other sons who help with other important aspects of his life. Mesha shares that he has sustained multiple falls, most recently sustained 3 falls in the last 2 weeks. Despite constant reorientation, he forgets to stay in bed and will try to get out and ambulate. He had significant sundowning from 4pm to 8pm, but now it is all day. Mesha and Paul recognize that he is progressively declining and acknowledge that this will continue. They've been told previously when he sustained his prior falls that "this was it" but despite this, he continues to live so they hope to continue doing what they can to help him live. When Mesha discussed code status in the ER, she was under the impression that the ER staff was saying that intubation and CPR will help him. We discussed that knowing that the fall was enough to cause multiple rib fractures, forceful chest compressions will surely cause more harm and more breakage, and the ventilator will worsen his small pneumothorax. I recommended against such interventions, explaining that this will sure cause his demise and not help him the way we hope. Mesha asks if we don't intubate/ventilate when he develops a pneumonia, what can be done alternatively. I shared that at that point there are no curative interventions, and that medical professionals can support him with symptom management and offer him strategies to alleviate shortness of breath. They asked about chest tube, and I shared that as Neto (their brother) had a chest tube before, that Mr. Browning will also have significant pain if a chest tube is inserted, and this would prolong his current state without a guarantee that it will fix the pneumothorax as his body will have a harder time to heal. They share their understanding and would like to discuss this with their other siblings. Their sister-in-law who is an [employer] RN came to visit, and also voiced similar concerns about CPR/intubation as " well. Mesha states that she is worried that he is miserable, forgetting immediately that he already went out to visit family/loved ones and stating repetitively that he wishes to go out to fish. Validated her concerns, recognizing that they wish that everything helpful be continued and offered. Encouraged them to consider that in addition to this, it is also our responsibility to protect him from harmful interventions that will not benefit him, as they had done so for their mother.      Acute Pain 2/2 Rib Fractures, Falls  - Discussed allergy to morphine. According to daughter, he will get angry and hallucinate when he is on morphine. Tolerated fentanyl and hydromorphone.  - Agree with pain management consult  - Speech therapy re-evaluated Mr. Browning on 1/19 - modified diet to allow food/medications with aspiration precautions  - S/p nerve block by pain management, performed on 1/18/23  - Scheduled acetaminophen 1000 mg PO TID for chronic pain/headache  - No longer on opioids  - Gabapentin 300 mg daily (adjusted from home TID schedule for VIKTOR)  - Continue lidocaine patches    Constipation  - Last bowel movement 1/15  - Continue senna 8.6 mg PO BID  - Polyethylene glycol 17 g PO daily  - Ideally would avoid suppository/enema as turning causes more pain, but may need to do so if no bowel movement    Anxiety/Agitation/[place]/Delirium  Dementia with Behavioral Disturbances  - Continue memantine 10 mg PO BID if able to swallow  - Continue home sertraline 100 mg PO daily  - Clarified that he does not have Parkinson's  - Had paradoxical reaction to Xanax  - Continue frequent reorientation and optimization of environment by keeping room bright during the day, dark and quiet at night. Discontinue vital signs at night time to allow him to sleep undisturbed  - Optimize pain management as above  - QTc on admission is 497. Agree that low dose antipsychotics will be helpful in setting of non-redirectable  "agitation.          Will not be present over the weekend and will return on Monday, 1/23/22. Spoke with primary team, please do not hesitate to reach out.    Subjective:     Chief Complaint:   Chief Complaint   Patient presents with    Fall     Mechanical fall to R side while walking to bathroom. Fell yesterday also onto same side. Hx of dementia. Fall witnessed by pt son and reports that pt hit head tonight. -blood thinners. No deformity, bruising noted to R lower back from yesterdays fall per son.        HPI:   Paul Browning (Joe) is an 82-year-old man with a history of dementia (FAST score 6D), CAD s/p STEMI/PCI/2v-CABG (2014), ICM with reduced EF (40%), COPD on home oxygen (2 L/min), sarcoidosis, possible Parkinson's, DM2, CKD3, multiple falls who was admitted after sustaining a fall with multiple rib fractures and small apical pneumothorax. Palliative and Supportive Care was consulted to explore goals of care and symptom management recommendations.    I met Mr. Browning, who has both chronic visual and auditory deficits. He asks when will he be able to go home. He is immediately forgetting what we just discussed during our conversation. I met with his daughter Mesha at his bedside. Please see assessment/recommendations for details of conversation.      Hospital Course:  No notes on file    Interval History: Remains altered, awake for most of the night, trying to remove shoes that he is not wearing, etc. Remains very tachypneic. Dick Miller at bedside who was here overnight, expresses concern of altered mental status being possibly contributed by UTI and hoping that team will test for UTI and treat if present.    Medications:  Continuous Infusions:   dextrose 5 % and 0.45 % NaCl 50 mL/hr at 01/20/23 0800    ROPIvacaine (PF) 2 mg/ml (0.2%) 0.1 mL/hr (01/20/23 0800)     Scheduled Meds:   acetaminophen  1,000 mg Oral TID    albuterol-ipratropium  3 mL Nebulization Q4H WAKE    atorvastatin  40 " mg Oral Daily    budesonide  0.5 mg Nebulization Q12H    dronabinoL  2.5 mg Oral BID    furosemide  20 mg Oral Daily    gabapentin  250 mg Oral Daily    heparin (porcine)  5,000 Units Subcutaneous Q8H    LIDOcaine  1 patch Transdermal Q24H    melatonin  6 mg Oral Nightly    memantine  10 mg Oral BID    midodrine  2.5 mg Oral Q8H    mupirocin   Nasal BID    pantoprazole  40 mg Oral Daily    polyethylene glycol  17 g Oral Daily    senna  8.6 mg Oral BID    sertraline  100 mg Oral Daily     PRN Meds:sodium chloride 0.9%, dextrose 10%, dextrose 10%, glucagon (human recombinant), glucose, glucose, insulin aspart U-100, levalbuterol, ondansetron    Objective:     Vital Signs (Most Recent):  Temp: 98.2 °F (36.8 °C) (01/20/23 0300)  Pulse: 90 (01/20/23 0800)  Resp: (!) 37 (01/20/23 0800)  BP: (!) 143/64 (01/20/23 0800)  SpO2: 100 % (01/20/23 0800)   Vital Signs (24h Range):  Temp:  [97.8 °F (36.6 °C)-98.4 °F (36.9 °C)] 98.2 °F (36.8 °C)  Pulse:  [] 90  Resp:  [30-41] 37  SpO2:  [88 %-100 %] 100 %  BP: ()/(50-80) 143/64  Arterial Line BP: ()/(32-71) 140/52     Weight:  (bed scale broken)  Body mass index is 22.15 kg/m².    Physical Exam  Constitutional:       General: He is in acute distress.      Appearance: He is ill-appearing.   HENT:      Head: Normocephalic and atraumatic.      Right Ear: External ear normal.      Left Ear: External ear normal.      Nose: Nose normal.      Mouth/Throat:      Mouth: Mucous membranes are dry.   Eyes:      Extraocular Movements: Extraocular movements intact.      Conjunctiva/sclera: Conjunctivae normal.   Cardiovascular:      Rate and Rhythm: Normal rate.   Pulmonary:      Breath sounds: Wheezing and rales present.      Comments: tachypneic  Abdominal:      General: Bowel sounds are normal.      Palpations: Abdomen is soft.   Musculoskeletal:      Right lower leg: No edema.      Left lower leg: No edema.   Lymphadenopathy:      Cervical: No cervical  adenopathy.   Skin:     General: Skin is dry.      Findings: Bruising present.   Neurological:      Mental Status: He is disoriented.       Review of Symptoms      Symptom Assessment (ESAS 0-10 Scale)  Unable to complete assessment due to Dementia         Pain Assessment in Advanced Demential Scale (PAINAD)   Breathing - Independent of vocalization:  2  Negative vocalization:  1  Facial expression:  1  Body language:  1  Consolability:  1  Total:  6    Psychosocial/Cultural:   See Palliative Psychosocial Note: No  Social Issues Identified: Coping deficit pt/family  Bereavement Risk: Yes: Identified: work/home, financial, intimacy, and caregiver concerns   Caregiver Needs Discussed. Caregiver Distress: Yes: Intensity of family caregiving  **Primary  to Follow**  Palliative Care  Consult: No      Advance Care Planning   Advance Directives:   Goals of Care: What is most important right now is to focus on symptom/pain control, quality of life, even if it means sacrificing a little time. Accordingly, we have decided that the best plan to meet the patient's goals includes continuing with treatment.       Significant Labs: CBC:   Recent Labs   Lab 01/19/23  0219 01/19/23  0415 01/20/23 0257   WBC 8.21 7.79 7.13   HGB 8.6* 8.5* 8.7*   HCT 26.2* 25.7* 26.8*    195 187     CMP:   Recent Labs   Lab 01/19/23  0138 01/19/23  0415 01/20/23 0257    134* 136   K 4.2 4.2 4.2    105 106   CO2 22* 22* 20*   * 150* 116*   BUN 34* 35* 45*   CREATININE 2.1* 2.1* 1.6*   CALCIUM 8.3* 8.4* 8.1*   PROT 5.9*  --  5.4*   ALBUMIN 3.4*  --  3.0*   BILITOT 1.4*  --  1.2*   ALKPHOS 44*  --  82   AST 33  --  43*   ALT 17  --  25   ANIONGAP 8 7* 10     CBC:   Recent Labs   Lab 01/20/23 0257   WBC 7.13   HGB 8.7*   HCT 26.8*   MCV 98        BMP:  Recent Labs   Lab 01/20/23 0257   *      K 4.2      CO2 20*   BUN 45*   CREATININE 1.6*   CALCIUM 8.1*   MG 2.0     LFT:  Lab  Results   Component Value Date    AST 43 (H) 01/20/2023    ALKPHOS 82 01/20/2023    BILITOT 1.2 (H) 01/20/2023     Albumin:   Albumin   Date Value Ref Range Status   01/20/2023 3.0 (L) 3.5 - 5.2 g/dL Final     Protein:   Total Protein   Date Value Ref Range Status   01/20/2023 5.4 (L) 6.0 - 8.4 g/dL Final     Lactic acid:   Lab Results   Component Value Date    LACTATE 1.0 06/06/2022    LACTATE 0.8 11/17/2014       Significant Imaging: I have reviewed all pertinent imaging results/findings within the past 24 hours.    I spent a total of 50 minutes on the day of the visit.This includes face to face time in discussion of goals of care, symptom assessment, coordination of care and emotional support.  This also includes non-face to face time preparing to see the patient (eg, review of tests/imaging), obtaining and/or reviewing separately obtained history, documenting clinical information in the electronic or other health record, independently interpreting results and communicating results to the patient/family/caregiver, or care coordinator.      Marie Blanco MD  Palliative Medicine  [facility] - Surgical Intensive Care

## 2023-01-20 NOTE — PROGRESS NOTES
[facility]  Critical Care - Surgery  Progress Note    Patient Name: Paul Browning  MRN: [id number]  Admission Date: 1/16/2023  Hospital Length of Stay: 2 days  Code Status: Partial Code  Attending Provider: Santo Shankar MD  Primary Care Provider: Grey Forbes DO   Principal Problem: Closed fracture of multiple ribs of right side    Subjective:     Hospital/ICU Course:  1/17: admitted to SICU for close observation and pain control. No surgery at this time. Palliative care and acute pain medicine consulted. CXR this AM with increased R sided patchy infiltrates      Interval History/Significant Events: Tachypneic, vitals stable.Famiy concerned about penile discharge and possible UTI contributing to change in mental status.         Objective:     Vital Signs (Most Recent):  Temp: 98.4 °F (36.9 °C) (01/19/23 2300)  Pulse: 82 (01/20/23 0400)  Resp: (!) 35 (01/20/23 0400)  BP: 107/62 (01/20/23 0400)  SpO2: 99 % (01/20/23 0400)   Vital Signs (24h Range):  Temp:  [97.8 °F (36.6 °C)-98.4 °F (36.9 °C)] 98.4 °F (36.9 °C)  Pulse:  [] 82  Resp:  [30-41] 35  SpO2:  [88 %-100 %] 99 %  BP: ()/(50-80) 107/62  Arterial Line BP: ()/(32-71) 110/37     Weight:  (bed scale broken)  Body mass index is 22.15 kg/m².      Intake/Output Summary (Last 24 hours) at 1/20/2023 0644  Last data filed at 1/20/2023 0400  Gross per 24 hour   Intake 1693.82 ml   Output 330 ml   Net 1363.82 ml       Physical Exam  Vitals and nursing note reviewed.   Constitutional:       General: He is not in acute distress.     Appearance: He is ill-appearing.   HENT:      Head: Normocephalic.      Nose: Nose normal.   Eyes:      General: No scleral icterus.     Extraocular Movements: Extraocular movements intact.   Cardiovascular:      Rate and Rhythm: Normal rate.   Pulmonary:      Effort: No respiratory distress.      Comments: Tachypneic  Right lung, coarse breath sounds  Chest:      Comments: Right  posterior back pain at site of fractures  BELEM catheter in place  Abdominal:      General: There is no distension.      Palpations: Abdomen is soft.      Tenderness: There is no abdominal tenderness. There is no guarding or rebound.   Genitourinary:     Comments: Mild erythema at tip of penis, no discharge noted.   Musculoskeletal:         General: No deformity.   Skin:     General: Skin is warm and dry.   Neurological:      Mental Status: He is alert. He is disoriented.       Vents:  Oxygen Concentration (%): 28 (01/20/23 0328)    Lines/Drains/Airways       Drain  Duration                  Urethral Catheter 01/18/23 2336 Non-latex 1 day              Epidural Line  Duration                  Perineural Analgesia/Anesthesia Assessment (using dermatomes) 01/18/23 1445 1 day              Arterial Line  Duration             Arterial Line 01/18/23 2200 Right Radial 1 day              Peripheral Intravenous Line  Duration                  Peripheral IV - Single Lumen 01/16/23 2147 20 G Posterior;Right Forearm 3 days         Peripheral IV - Single Lumen 01/17/23 0022 20 G Anterior;Proximal;Right Forearm 3 days                    Significant Labs:    CBC/Anemia Profile:  Recent Labs   Lab 01/19/23  0219 01/19/23  0415 01/20/23  0257   WBC 8.21 7.79 7.13   HGB 8.6* 8.5* 8.7*   HCT 26.2* 25.7* 26.8*    195 187   MCV 97 97 98   RDW 14.4 14.1 14.5        Chemistries:  Recent Labs   Lab 01/19/23  0138 01/19/23  0415 01/20/23  0257    134* 136   K 4.2 4.2 4.2    105 106   CO2 22* 22* 20*   BUN 34* 35* 45*   CREATININE 2.1* 2.1* 1.6*   CALCIUM 8.3* 8.4* 8.1*   ALBUMIN 3.4*  --  3.0*   PROT 5.9*  --  5.4*   BILITOT 1.4*  --  1.2*   ALKPHOS 44*  --  82   ALT 17  --  25   AST 33  --  43*   MG 1.9 2.0 2.0   PHOS 3.6 3.1 2.5*       All pertinent labs within the past 24 hours have been reviewed.    Significant Imaging:  I have reviewed all pertinent imaging results/findings within the past 24 hours.  Assessment/Plan:      * Closed fracture of multiple ribs of right side  Paul Browning is a frail 82M h/o CAD (EF 40% with previous MI with enduring defect seen on cardiac PET), COPD on 0.5L NC at home and inhalers, DM2, CKD3, HTN, sarcoid, CAD who fell multiple times at home. Imaging shows small apical PTX and subQ air on R chest wall with CT with several comminuted posterior rib fractures.      Neuro/Psych:   -- Sedation: none  -- Pain: ifeoma robaxin, gabapentin, lidocaine patch; dilaudid Q2 push prn (allergy to morphine),oxy prn  -- Right BELEM catheter placed by APS 1/18             Cards:   -- hx of HTN, CAD s/p stents  -- Hypotensive with SBP lowest to 70s  -- Started on levo peripheral at 0.04, since discontinued and started on midodrine.  Decreasing midodrine dose 1/20/23  -- holding home ASA and Entresto      Pulm:   -- hx of COPD, baseline 0.5L NC at home  -- now has right sided rib fractures and small right apical PTX  -- Goal O2 sat > 90%  -- Wean as able  -- duonebs, IS, Acapella  -- Increasing respiratory effort with  wheezing         Renal:  -- hx of CKD3, baseline Cr 1.0  -- BUN/Cr 45/1.6 (Cr decreasing)  -- Lasix x 1  -- Nursing to confirm correct sized segal for patient, monitor for drainage      FEN / GI:    -- Replace lytes as needed  -- Nutrition: soft diet      ID:   -- Tm: afebrile; WBC 7  -- Abx none      Heme/Onc:   -- H/H 8.7/26.8  -- Daily CBC      Endo:   -- hx of DM2, on Jardiance   -- Gluc goal 140-180  -- AISS      PPx:   Feeding: soft diet  Analgesia/Sedation: ifeoma gabapentin, robaxin, lido patch; dilaudid Q2 pushes PRN, BELEM catheter  Thromboembolic prevention: heparin  HOB >30: yes  Stress Ulcer ppx: protonix  Glucose control: Critical care goal 140-180 g/dl, ISS    Lines/Drains/Airway: PIV x2, Segal      Dispo/Code Status/Palliative:   -- SICU / No chest compressions, ok to intubate/palliative following for ongoing GOC discussion          Critical secondary to Patient has a condition that poses  threat to life and bodily function:      Critical care was time spent personally by me on the following activities: development of treatment plan with patient or surrogate and bedside caregivers, discussions with consultants, evaluation of patient's response to treatment, examination of patient, ordering and performing treatments and interventions, ordering and review of laboratory studies, ordering and review of radiographic studies, pulse oximetry, re-evaluation of patient's condition.  This critical care time did not overlap with that of any other provider or involve time for any procedures.     Adán Trujillo,   Critical Care - Surgery  [facility]

## 2023-01-20 NOTE — PT/OT/SLP PROGRESS
Speech Language Pathology Treatment/ Discharge Summary     Patient Name:  Paul Browning   MRN:  [id number]  Admitting Diagnosis: Closed fracture of multiple ribs of right side    Recommendations:              General Recommendations:  Follow-up not indicated  Diet recommendations:  Puree, Nectar Thick   Aspiration Precautions: 1 bite/sip at a time, Alternating bites/sips, Assistance with meals and Assistance with thickening liquids, Meds crushed in puree, No straws, Remain upright 30 minutes post meal, Small bites/sips, and Strict aspiration precautions   General Precautions: Standard,    Communication strategies:  go to room if call light pushed    Subjective     Pt awake   Family at the bedside   RN in agreement with SLP seeing at this time     Pain/Comfort:   No pain     Respiratory Status: Nasal cannula, flow 2 L/min    Objective:     Has the patient been evaluated by SLP for swallowing?   Yes  Keep patient NPO? No        Pt awake yet remains confused appearing consistent with baseline. Per discussion with RN pt continues to exhibit coughing and increased RR 40-50s. RN reports pt continues to manage thickened liquids and purees. SLP discussed with family at length recommendations for least restrictive most comfortable diet at this time appears to be puree and nectar thickened liquids. Pt controls nectar thick liquids better than thin liquids given waxing and waning alertness. SLP discussed modified diet does not eliminate the occurrence or risk of aspiration. Pt remains high risk for aspiration and complications from aspiration with all PO intake. Defer to medical team re: ongoing PO intake for quality of life. No further skilled speech therapy services warranted at this time.     Assessment:     Paul Browning is a 82 y.o. male with an SLP diagnosis of  baseline dysphagia, appearing at baseline. .  He presents with no further acute speech therapy needs at this time.    Plan:     Patient to  be seen:  3 x/week   Plan of Care expires:     Plan of Care reviewed with:  patient, daughter   SLP Follow-Up:  Yes       Discharge recommendations:  outpatient speech therapy   Barriers to Discharge:  None    Time Tracking:     SLP Treatment Date:   01/20/23  Speech Start Time:  1120  Speech Stop Time:  1129     Speech Total Time (min):  9 min    Billable Minutes: Treatment Swallowing Dysfunction 9    01/20/2023

## 2023-01-20 NOTE — ANESTHESIA POST-OP PAIN MANAGEMENT
Consult  Anesthesiology Acute Pain Service      SUBJECTIVE:     Chief Complaint/Reason for Consult: Request anesthesia assistance with continuous pain management due to high complexity/refractory pain.    Surgical Procedure: N/a    Post Op Day: N/a    History of Present Illness:  Patient is a 82 y.o. male with a PMH of CAD (EF 40% with previous MI with enduring defect seen on cardiac PET), COPD on 0.5L NC at home and inhalers, DM2, CKD3, HTN, sarcoid, CAD who fell multiple times at home. Imaging shows small apical PTX and subQ air on R chest wall with CT with several comminuted posterior rib fractures.     On admission, he is satting in the mid-90s on 6L facemask. Back pain is not controlled, and he is complaining of abdominal pain as well. He is admitted to the SICU for observation and pain control.        Anesthesiology is consulted for refractory pain control.     The current inpatient regimen is:  Acetaminophen 1,000mg q8h  Gabapentin 300mg daily  Sertraline 100mg oral  BELEM      dextrose 5 % and 0.45 % NaCl 50 mL/hr at 01/20/23 1035    ROPIvacaine (PF) 2 mg/ml (0.2%) 0.1 mL/hr (01/20/23 0800)       Patient Active Problem List   Diagnosis    CAD (coronary artery disease)    Hypertension    Hyperlipidemia    Stevens's esophagus with low grade dysplasia    Hx of gastric ulcer    S/P CABG x 2    VIKTOR (acute kidney injury)    SOB (shortness of breath)    Atherosclerosis of aorta    Pulmonary sarcoidosis    MDD (major depressive disorder), recurrent episode, mild    Peripheral arterial disease    Osteoporosis    Senile purpura    Refractive error    Dry eye syndrome of both eyes    Heterophoria    Right homonymous hemianopsia    Carotid artery disease    Cervicogenic headache    Cervical spondylosis    DDD (degenerative disc disease), cervical    Chronic neck pain    Neck stiffness    Abnormal increased muscle tightness    Awakens from sleep due to pain    Cervical segment dysfunction     Tightness of neck    Dysfunctional movements    Odontoid fracture    Gastroesophageal reflux disease    Anxiety    Decreased ROM of neck    Gait instability    Degenerative disease of nervous system, unspecified    Impaired functional mobility, balance, gait, and endurance    Vision loss    Intraventricular conduction delay    Dementia    Acute hypoxemic respiratory failure    Frequent falls    Scalp laceration    Chronic diastolic heart failure    Closed fracture of pubis    Multiple closed stable lateral compression fractures of pelvis    Closed fracture of trochanter of left femur with routine healing    Chronic obstructive pulmonary disease    Lung nodule    Ischemic cardiomyopathy    Closed fracture of multiple ribs of right side    Encounter for palliative care    Traumatic pneumothorax    Multiple rib fractures involving four or more ribs       Review of patient's allergies indicates:   Allergen Reactions    Morphine Shortness Of Breath       Outpatient Medications:   No current facility-administered medications on file prior to encounter.     Current Outpatient Medications on File Prior to Encounter   Medication Sig Dispense Refill    albuterol (PROVENTIL/VENTOLIN HFA) 90 mcg/actuation inhaler INHALE 2 PUFFS INTO THE LUNGS EVERY 6 HOURS AS NEEDED WHEEZING OR SHORTNESS OF BREATH 8.5 g 11    albuterol-ipratropium (DUO-NEB) 2.5 mg-0.5 mg/3 mL nebulizer solution Take 3 mLs by nebulization every 12 (twelve) hours. Rescue 540 mL 3    alendronate (FOSAMAX) 70 MG tablet TAKE 1 TABLET BY MOUTH EVERY WEEK IN THE MORNING WITH FULL GLASS OF WATER ON EMPTY STOMACH-DONT LIE DOWN FOR AT LEAST 30 MINUTES AFTERWARDS 12 tablet 3    aspirin (ECOTRIN) 81 MG EC tablet Take 81 mg by mouth once daily.      atorvastatin (LIPITOR) 40 MG tablet TAKE 1 TABLET(40 MG) BY MOUTH EVERY DAY 90 tablet 0    budesonide (PULMICORT) 0.5 mg/2 mL nebulizer solution Take 2 mLs (0.5 mg total) by nebulization 2  (two) times daily. Controller 120 mL 5    calcium carbonate (CALCIUM 600 ORAL) Take by mouth.      doxepin (SILENOR) 3 mg Tab One po qhs 30 tablet 6    empagliflozin (JARDIANCE) 10 mg tablet Take 1 tablet (10 mg total) by mouth once daily. 30 tablet 11    furosemide (LASIX) 20 MG tablet Take 1 tablet (20 mg total) by mouth daily as needed (Wt gain >2 pounds/day or >5 pounds/week). 90 tablet 3    magnesium oxide (MAG-OX) 400 mg (241.3 mg magnesium) tablet Take 1 tablet (400 mg total) by mouth once daily. 30 tablet 3    memantine (NAMENDA) 10 MG Tab Take 1 tablet (10 mg total) by mouth 2 (two) times daily. 180 tablet 1    methylPREDNISolone (MEDROL, RENAN,) 4 mg tablet use as directed 1 each 0    omega-3 fatty acids-vitamin E (FISH OIL) 1,000 mg Cap Take 1 tablet by mouth 2 (two) times daily. (Patient taking differently: Take 1 tablet by mouth once daily.) 60 each 11    ondansetron (ZOFRAN) 8 MG tablet Take 1 tablet (8 mg total) by mouth every 8 (eight) hours as needed for Nausea. 30 tablet 1    pantoprazole (PROTONIX) 40 MG tablet TAKE 1 TABLET(40 MG) BY MOUTH TWICE DAILY 180 tablet 0    sacubitriL-valsartan (ENTRESTO) 24-26 mg per tablet Take 1 tablet by mouth 2 (two) times daily. 60 tablet 11    sertraline (ZOLOFT) 100 MG tablet Take 1 tablet (100 mg total) by mouth once daily. 90 tablet 3    tiotropium-olodateroL (STIOLTO RESPIMAT) 2.5-2.5 mcg/actuation Mist Inhale 2 puffs into the lungs once daily. Controller 4 g 5    ubrogepant (UBROGEPANT) 100 mg tablet One po at onset of migraine. May repeat after 2 hours if needed. 10 tablet 1        Current Inpatient Medications:   acetaminophen  1,000 mg Oral TID    albuterol-ipratropium  3 mL Nebulization Q4H WAKE    atorvastatin  40 mg Oral Daily    budesonide  0.5 mg Nebulization Q12H    dronabinoL  2.5 mg Oral BID    furosemide  20 mg Oral Daily    gabapentin  250 mg Oral Daily    heparin (porcine)  5,000 Units Subcutaneous Q8H    LIDOcaine  1 patch  Transdermal Q24H    melatonin  6 mg Oral Nightly    memantine  10 mg Oral BID    midodrine  2.5 mg Oral Q8H    mupirocin   Nasal BID    pantoprazole  40 mg Oral Daily    polyethylene glycol  17 g Oral Daily    senna  8.6 mg Oral BID    sertraline  100 mg Oral Daily       PRN:  sodium chloride 0.9%, dextrose 10%, dextrose 10%, glucagon (human recombinant), glucose, glucose, insulin aspart U-100, levalbuterol, ondansetron    Past Surgical History:   Procedure Laterality Date    CARDIAC SURGERY      CAROTID STENT N/A 10/14/2019    Procedure: INSERTION, STENT, ARTERY, CAROTID;  Surgeon: Artie Kebede MD;  Location: [facility] CATH LAB;  Service: Cardiology;  Laterality: N/A;    CATARACT EXTRACTION W/  INTRAOCULAR LENS IMPLANT Right 07/17/2018    Dr. Talamantes    CATARACT EXTRACTION W/  INTRAOCULAR LENS IMPLANT Left 07/31/2018    Dr. Talamantes    CORONARY ARTERY BYPASS GRAFT      x2  10/2014    ESOPHAGOGASTRODUODENOSCOPY N/A 4/8/2019    Procedure: ESOPHAGOGASTRODUODENOSCOPY (EGD)/poss rfa;  Surgeon: Clifford De La Torre MD;  [facility]  Service: Endoscopy;  Laterality: N/A;    ESOPHAGOGASTRODUODENOSCOPY N/A 8/4/2021    Procedure: EGD (ESOPHAGOGASTRODUODENOSCOPY);  Surgeon: Clifford De La Torre MD;  Location: [address]);  Service: Endoscopy;  Laterality: N/A;  8/2-covid [facility] appts on 8/3    ESOPHAGOGASTRODUODENOSCOPY (EGD) WITH RADIOFREQUENCY TREATMENT OF GASTROESOPHAGEAL JUNCTION      INJECTION OF ANESTHETIC AGENT AROUND MEDIAL BRANCH NERVES INNERVATING CERVICAL FACET JOINT Bilateral 1/9/2020    Procedure: INJECTION, MBB--Bilateral Cervical- Third Occipital nerve, C2-3--ASA okay for pt to continue taking per provider.;  Surgeon: Tip Mera Jr., MD;  Location: [facility] PAIN MGT;  Service: Pain Management;  Laterality: Bilateral;    INTRAOCULAR PROSTHESES INSERTION Right 7/17/2018    Procedure: INSERTION, INTRAOCULAR LENS PROSTHESIS;  Surgeon: León DE LEON  MD [place];  Location: [facility] OR 1ST FLR;  Service: Ophthalmology;  Laterality: Right;    INTRAOCULAR PROSTHESES INSERTION Left 2018    Procedure: INSERTION, INTRAOCULAR LENS PROSTHESIS;  Surgeon: León Talamantes MD;  Location: [facility] OR 1ST FLR;  Service: Ophthalmology;  Laterality: Left;    PHACOEMULSIFICATION OF CATARACT Right 2018    Procedure: PHACOEMULSIFICATION, CATARACT;  Surgeon: León Talamantes MD;  [facility];  Service: Ophthalmology;  Laterality: Right;    PHACOEMULSIFICATION OF CATARACT Left 2018    Procedure: PHACOEMULSIFICATION, CATARACT;  Surgeon: León Talamantes MD;  [facility];  Service: Ophthalmology;  Laterality: Left;    PLACEMENT OF SCREW IN ODONTOID N/A 10/22/2020    Procedure: INSERTION, SCREW, ODONTOID. Anterior approach.;  Surgeon: Kirsten Weaver MD;  Location: [facility] OR 2ND FLR;  Service: Neurosurgery;  Laterality: N/A;    RADIOFREQUENCY THERMOCOAGULATION Bilateral 2020    Procedure: RADIOFREQUENCY THERMAL COAGULATION--Bilateral C2-3 and Third Occipital Nerve;  Surgeon: Tip Mera Jr., MD;  Location: [facility] PAIN MGT;  Service: Pain Management;  Laterality: Bilateral;    UMBILICAL HERNIA REPAIR         Social History     Socioeconomic History    Marital status:     Number of children: 4   Occupational History    Occupation:     Tobacco Use    Smoking status: Former     Packs/day: 2.00     Years: 20.00     Pack years: 40.00     Types: Cigarettes     Quit date: 1988     Years since quittin.9     Passive exposure: Past    Smokeless tobacco: Never   Substance and Sexual Activity    Alcohol use: No     Comment: quit drinking beer     Drug use: No    Sexual activity: Not Currently     Partners: Female       Review of Systems:  As above. Additionally:     Constitutional: activity change  HENT: Negative for congestion, postnasal drip, rhinorrhea, sinus pressure, sore throat,  trouble swallowing and voice change.    Eyes: Negative for photophobia, pain and visual disturbance.   Respiratory: cough, shortness of breath, negative for wheezing.    Cardiovascular: Negative for palpitations and leg swelling.   Gastrointestinal: Negative for nausea, vomiting, abdominal pain, diarrhea, constipation and abdominal distention.   Endocrine: Negative for polydipsia, polyphagia and polyuria.   Genitourinary: Negative for dysuria, frequency, hematuria and difficulty urinating.   Musculoskeletal: arthralgias  Skin: Negative for color change, pallor, rash and wound.   Neurological: Negative for dizziness, seizures, syncope, weakness and headaches.   Hematological: Negative for adenopathy. Does not bruise/bleed easily.   Psychiatric/Behavioral: Negative for sleep disturbance and dysphoric mood. The patient is not nervous/anxious.      OBJECTIVE:     Current Vital Signs  Temp: 36.8 °C (98.2 °F) (01/20/23 0300)  Pulse: 98 (01/20/23 1121)  Resp: (!) 30 (01/20/23 1121)  BP: (!) 143/64 (01/20/23 0800)  SpO2: 97 % (01/20/23 1121)    Vital Signs Range (Last 24H):  Temp:  [36.6 °C (97.8 °F)-36.9 °C (98.4 °F)]   Pulse:  []   Resp:  [30-41]   BP: ()/(50-80)   SpO2:  [88 %-100 %]   Arterial Line BP: ()/(32-71)     I & O (Last 24H):    Intake/Output Summary (Last 24 hours) at 1/20/2023 1122  Last data filed at 1/20/2023 0800  Gross per 24 hour   Intake 1077.21 ml   Output 260 ml   Net 817.21 ml       Physical Exam:  Constitutional: Patient is oriented to person, place, and time. Patient appears well-developed and well-nourished. No distress.   HENT: Head: Normocephalic and atraumatic. Right Ear: External ear normal. Left Ear: External ear normal. Nose: Nose normal.   Mouth/Throat: Oropharynx is clear and moist. No oropharyngeal exudate.   Eyes: Conjunctivae and EOM are normal. Pupils are equal, round, and reactive to light. Right eye exhibits no discharge. Left eye exhibits no discharge. No scleral  icterus.   Neck: Normal range of motion. Neck supple. No JVD present. No tracheal deviation present. No thyromegaly present.   Cardiovascular: Normal rate, regular rhythm, normal heart sounds and intact distal pulses.  Exam reveals no gallop and no friction rub.  No murmur heard.  Pulmonary/Chest: Effort normal and breath sounds normal. No respiratory distress. Patient has no wheezes. Patient has no rales.   Abdominal: Soft. Bowel sounds are normal. Patient exhibits no distension and no mass. There is no tenderness.   Musculoskeletal: Normal range of motion. Patient exhibits no edema or tenderness.   Lymphadenopathy:   Patient has no cervical adenopathy.   Neurological: Patient is alert and oriented to person, place, and time. Patient has normal reflexes. Patient exhibits normal muscle tone. Coordination normal.   Skin: Skin is warm and dry. No rash noted. Patient is not diaphoretic. No erythema. No pallor.   Psychiatric: Patient has a normal mood and affect. Behavior is normal. Judgment and thought content normal.     Laboratory:  CBC:   Recent Labs     01/19/23  0415 01/20/23 0257   WBC 7.79 7.13   RBC 2.64* 2.75*   HGB 8.5* 8.7*   HCT 25.7* 26.8*    187   MCV 97 98   MCH 32.2* 31.6*   MCHC 33.1 32.5       CMP:   Recent Labs     01/19/23  0138 01/19/23  0415 01/20/23 0257    134* 136   K 4.2 4.2 4.2   CO2 22* 22* 20*    105 106   BUN 34* 35* 45*   CREATININE 2.1* 2.1* 1.6*   * 150* 116*   MG 1.9 2.0 2.0   PHOS 3.6 3.1 2.5*   CALCIUM 8.3* 8.4* 8.1*   ALBUMIN 3.4*  --  3.0*   PROT 5.9*  --  5.4*   ALKPHOS 44*  --  82   ALT 17  --  25   AST 33  --  43*   BILITOT 1.4*  --  1.2*       Diagnostic Radiology:  Air in the right chest wall adjacent to the lateral rib cage.  Small right apical pneumothorax.  Suspect right-sided rib fracture    ASSESSMENT/PLAN:     Active Hospital Problems    Diagnosis  POA    *Closed fracture of multiple ribs of right side [S22.41XA]  Yes    Multiple rib  fractures involving four or more ribs [S22.49XA]  Unknown    Traumatic pneumothorax [S27.0XXA]  Yes    Encounter for palliative care [Z51.5]  Not Applicable    Chronic obstructive pulmonary disease [J44.9]  Yes     Patient has a history of COPD and has not been using a controller because of cost.  We will reorder and ask for the pharmacy assistance program to assist.  In addition will start DuoNeb twice a day and Pulmicort b.i.d..      VIKTOR (acute kidney injury) [N17.9]  Yes      Resolved Hospital Problems   No resolved problems to display.     Mr. Browning is an 81yo M PMH CAD (EF 40% with previous MI with enduring defect seen on cardiac PET), COPD on 0.5L NC at home and inhalers, DM2, CKD3, HTN, sarcoid, admitted with multiple R sided rib fractures 2/2 fall with apical pneumothorax, not a surgical candidate. Given the patient is tachycardic, reporting pain, taking shallow rapid breaths, requiring 3L NC, his pain was initially inadequately controlled on current regimen with PO oxy and IV dilaudid for breakthrough. Pain improved with BELEM, heart rate within normal limits. Delirious 1/18 overnight. Pain well controlled without use of narcotics.     Plan:    -- BELEM block ropivacaine 2mg/ml 0.1ml/hr  --Multimodals: Acetaminophen 1,000mg 3x daily, Gabapentin 300 3xdaily, lidocaine patch  --Home sertraline 100mg daily  -- Encourage open windows and interactions with family/friends at bedside for delirium     Thank you for this consult.    Will continue to follow with you.    Sherie Vasquez MD  Department of Anesthesiology  [facility]  01/20/2023 11:24 AM

## 2023-01-20 NOTE — SUBJECTIVE & OBJECTIVE
Interval History: Remains altered, awake for most of the night, trying to remove shoes that he is not wearing, etc. Remains very tachypneic. Son Paul at bedside who was here overnight, expresses concern of altered mental status being possibly contributed by UTI and hoping that team will test for UTI and treat if present.    Medications:  Continuous Infusions:   dextrose 5 % and 0.45 % NaCl 50 mL/hr at 01/20/23 0800    ROPIvacaine (PF) 2 mg/ml (0.2%) 0.1 mL/hr (01/20/23 0800)     Scheduled Meds:   acetaminophen  1,000 mg Oral TID    albuterol-ipratropium  3 mL Nebulization Q4H WAKE    atorvastatin  40 mg Oral Daily    budesonide  0.5 mg Nebulization Q12H    dronabinoL  2.5 mg Oral BID    furosemide  20 mg Oral Daily    gabapentin  250 mg Oral Daily    heparin (porcine)  5,000 Units Subcutaneous Q8H    LIDOcaine  1 patch Transdermal Q24H    melatonin  6 mg Oral Nightly    memantine  10 mg Oral BID    midodrine  2.5 mg Oral Q8H    mupirocin   Nasal BID    pantoprazole  40 mg Oral Daily    polyethylene glycol  17 g Oral Daily    senna  8.6 mg Oral BID    sertraline  100 mg Oral Daily     PRN Meds:sodium chloride 0.9%, dextrose 10%, dextrose 10%, glucagon (human recombinant), glucose, glucose, insulin aspart U-100, levalbuterol, ondansetron    Objective:     Vital Signs (Most Recent):  Temp: 98.2 °F (36.8 °C) (01/20/23 0300)  Pulse: 90 (01/20/23 0800)  Resp: (!) 37 (01/20/23 0800)  BP: (!) 143/64 (01/20/23 0800)  SpO2: 100 % (01/20/23 0800)   Vital Signs (24h Range):  Temp:  [97.8 °F (36.6 °C)-98.4 °F (36.9 °C)] 98.2 °F (36.8 °C)  Pulse:  [] 90  Resp:  [30-41] 37  SpO2:  [88 %-100 %] 100 %  BP: ()/(50-80) 143/64  Arterial Line BP: ()/(32-71) 140/52     Weight:  (bed scale broken)  Body mass index is 22.15 kg/m².    Physical Exam  Constitutional:       General: He is in acute distress.      Appearance: He is ill-appearing.   HENT:      Head: Normocephalic and atraumatic.      Right Ear: External ear  normal.      Left Ear: External ear normal.      Nose: Nose normal.      Mouth/Throat:      Mouth: Mucous membranes are dry.   Eyes:      Extraocular Movements: Extraocular movements intact.      Conjunctiva/sclera: Conjunctivae normal.   Cardiovascular:      Rate and Rhythm: Normal rate.   Pulmonary:      Breath sounds: Wheezing and rales present.      Comments: tachypneic  Abdominal:      General: Bowel sounds are normal.      Palpations: Abdomen is soft.   Musculoskeletal:      Right lower leg: No edema.      Left lower leg: No edema.   Lymphadenopathy:      Cervical: No cervical adenopathy.   Skin:     General: Skin is dry.      Findings: Bruising present.   Neurological:      Mental Status: He is disoriented.       Review of Symptoms      Symptom Assessment (ESAS 0-10 Scale)  Unable to complete assessment due to Dementia         Pain Assessment in Advanced Demential Scale (PAINAD)   Breathing - Independent of vocalization:  2  Negative vocalization:  1  Facial expression:  1  Body language:  1  Consolability:  1  Total:  6    Psychosocial/Cultural:   See Palliative Psychosocial Note: No  Social Issues Identified: Coping deficit pt/family  Bereavement Risk: Yes: Identified: work/home, financial, intimacy, and caregiver concerns   Caregiver Needs Discussed. Caregiver Distress: Yes: Intensity of family caregiving  **Primary  to Follow**  Palliative Care  Consult: No      Advance Care Planning   Advance Directives:   Goals of Care: What is most important right now is to focus on symptom/pain control, quality of life, even if it means sacrificing a little time. Accordingly, we have decided that the best plan to meet the patient's goals includes continuing with treatment.       Significant Labs: CBC:   Recent Labs   Lab 01/19/23  0219 01/19/23  0415 01/20/23  0257   WBC 8.21 7.79 7.13   HGB 8.6* 8.5* 8.7*   HCT 26.2* 25.7* 26.8*    195 187     CMP:   Recent Labs   Lab 01/19/23  0138  01/19/23  0415 01/20/23 0257    134* 136   K 4.2 4.2 4.2    105 106   CO2 22* 22* 20*   * 150* 116*   BUN 34* 35* 45*   CREATININE 2.1* 2.1* 1.6*   CALCIUM 8.3* 8.4* 8.1*   PROT 5.9*  --  5.4*   ALBUMIN 3.4*  --  3.0*   BILITOT 1.4*  --  1.2*   ALKPHOS 44*  --  82   AST 33  --  43*   ALT 17  --  25   ANIONGAP 8 7* 10     CBC:   Recent Labs   Lab 01/20/23 0257   WBC 7.13   HGB 8.7*   HCT 26.8*   MCV 98        BMP:  Recent Labs   Lab 01/20/23 0257   *      K 4.2      CO2 20*   BUN 45*   CREATININE 1.6*   CALCIUM 8.1*   MG 2.0     LFT:  Lab Results   Component Value Date    AST 43 (H) 01/20/2023    ALKPHOS 82 01/20/2023    BILITOT 1.2 (H) 01/20/2023     Albumin:   Albumin   Date Value Ref Range Status   01/20/2023 3.0 (L) 3.5 - 5.2 g/dL Final     Protein:   Total Protein   Date Value Ref Range Status   01/20/2023 5.4 (L) 6.0 - 8.4 g/dL Final     Lactic acid:   Lab Results   Component Value Date    LACTATE 1.0 06/06/2022    LACTATE 0.8 11/17/2014       Significant Imaging: I have reviewed all pertinent imaging results/findings within the past 24 hours.

## 2023-01-20 NOTE — SUBJECTIVE & OBJECTIVE
Interval History/Significant Events: Tachypneic, vitals stable.Famiy concerned about penile discharge and possible UTI contributing to change in mental status.         Objective:     Vital Signs (Most Recent):  Temp: 98.4 °F (36.9 °C) (01/19/23 2300)  Pulse: 82 (01/20/23 0400)  Resp: (!) 35 (01/20/23 0400)  BP: 107/62 (01/20/23 0400)  SpO2: 99 % (01/20/23 0400)   Vital Signs (24h Range):  Temp:  [97.8 °F (36.6 °C)-98.4 °F (36.9 °C)] 98.4 °F (36.9 °C)  Pulse:  [] 82  Resp:  [30-41] 35  SpO2:  [88 %-100 %] 99 %  BP: ()/(50-80) 107/62  Arterial Line BP: ()/(32-71) 110/37     Weight:  (bed scale broken)  Body mass index is 22.15 kg/m².      Intake/Output Summary (Last 24 hours) at 1/20/2023 0644  Last data filed at 1/20/2023 0400  Gross per 24 hour   Intake 1693.82 ml   Output 330 ml   Net 1363.82 ml       Physical Exam  Vitals and nursing note reviewed.   Constitutional:       General: He is not in acute distress.     Appearance: He is ill-appearing.   HENT:      Head: Normocephalic.      Nose: Nose normal.   Eyes:      General: No scleral icterus.     Extraocular Movements: Extraocular movements intact.   Cardiovascular:      Rate and Rhythm: Normal rate.   Pulmonary:      Effort: No respiratory distress.      Comments: Tachypneic  Right lung, coarse breath sounds  Chest:      Comments: Right posterior back pain at site of fractures  BELEM catheter in place  Abdominal:      General: There is no distension.      Palpations: Abdomen is soft.      Tenderness: There is no abdominal tenderness. There is no guarding or rebound.   Genitourinary:     Comments: Mild erythema at tip of penis, no discharge noted.   Musculoskeletal:         General: No deformity.   Skin:     General: Skin is warm and dry.   Neurological:      Mental Status: He is alert. He is disoriented.       Vents:  Oxygen Concentration (%): 28 (01/20/23 0328)    Lines/Drains/Airways       Drain  Duration                  Urethral Catheter  To Whom It May Concern, Yanna Escalona is an established patient with our practice. She may return to work as of 9/7/18 without restrictions. If you have any questions or concerns, please contact our office at [phone]. Thank you, Luciano Sumner CNM 01/18/23 2336 Non-latex 1 day              Epidural Line  Duration                  Perineural Analgesia/Anesthesia Assessment (using dermatomes) 01/18/23 1445 1 day              Arterial Line  Duration             Arterial Line 01/18/23 2200 Right Radial 1 day              Peripheral Intravenous Line  Duration                  Peripheral IV - Single Lumen 01/16/23 2147 20 G Posterior;Right Forearm 3 days         Peripheral IV - Single Lumen 01/17/23 0022 20 G Anterior;Proximal;Right Forearm 3 days                    Significant Labs:    CBC/Anemia Profile:  Recent Labs   Lab 01/19/23  0219 01/19/23  0415 01/20/23  0257   WBC 8.21 7.79 7.13   HGB 8.6* 8.5* 8.7*   HCT 26.2* 25.7* 26.8*    195 187   MCV 97 97 98   RDW 14.4 14.1 14.5        Chemistries:  Recent Labs   Lab 01/19/23  0138 01/19/23  0415 01/20/23  0257    134* 136   K 4.2 4.2 4.2    105 106   CO2 22* 22* 20*   BUN 34* 35* 45*   CREATININE 2.1* 2.1* 1.6*   CALCIUM 8.3* 8.4* 8.1*   ALBUMIN 3.4*  --  3.0*   PROT 5.9*  --  5.4*   BILITOT 1.4*  --  1.2*   ALKPHOS 44*  --  82   ALT 17  --  25   AST 33  --  43*   MG 1.9 2.0 2.0   PHOS 3.6 3.1 2.5*       All pertinent labs within the past 24 hours have been reviewed.    Significant Imaging:  I have reviewed all pertinent imaging results/findings within the past 24 hours.

## 2023-01-20 NOTE — ASSESSMENT & PLAN NOTE
"Paul Berrios "Slim Browning is an 82-year-old man with a history of dementia (FAST score 6D), CAD s/p STEMI/PCI/2v-CABG (2014), ICM with reduced EF (40%), COPD on home oxygen (intermittently), sarcoidosis, possible Parkinson's, DM2, CKD3, multiple falls who was admitted after sustaining a fall with multiple rib fractures and small apical pneumothorax. Hospital course notable for delirium/encephalopathy, concerns of aspiration, development of VIKTOR and hypotension. Palliative and Supportive Care was consulted to explore goals of care and symptom management recommendations.    Advance Care Planning   Goals of Care:  - Code status: No CPR, OK for intubation  - Next of kin: 4 adult children (3 sons Paul, Neto, Dennys, 1 daughter Mesha)  - Patient is not decisional  - ACP documents: none  - Prognosis: poor  - Family's understanding of prognosis: continue education  - Goals: control pain/symptoms, treat acute, reversible issues, continue full supportive care  - Recommendations/Plans: continue full supportive care, including initiation of antibiotics if warranted. Appreciate primary team for helping  family about prognosis in order to communicate recommendations that would avoid harm. Will continue to provide support for family who maintains hope that he will recover.    Goals of Care Conversation  - 1/20/2023: Paul at bedside, who had spent the night with Mr. Browning. Remains concerns of worsening encephalopathy and raised question to the physicians about potential UTI exacerbating mental status. Remains hopeful that his numbers are looking stable/OK - his blood pressure, oxygen saturation, labs, imaging. Acknowledged that he is still very tachypneic and looks worse than he did on admission. His sister Mesha will come today and family will always remain at bedside throughout Mr. Browning's hospitalization. Remains open to palliative presence during Mr. Browning's hospitalization for " "continued support.  - 1/19/2023: Met Mr. Browning with his daughter Mesha and son Dennys at bedside. Mesha described overnight events, noting that while pain is better, he was up all night, confused and required dose of Zyprexa. She confirms that he does not have Parkinson's and that tremor is due to chronic nerve damage, for which he takes gabapentin. Noted that despite being in the hospital and having better control of pain, he appears to be getting worse. Mesha agrees that he looks worse, but shares that her brother Paul checked MyChart and saw that there were no drastic changes in his imaging and labs. I shared that I worried about him aspirating, noting that there is an increasing right lower lobe infiltrate demonstrated in his morning's chest x-ray. She wants to know what it is, whether it is a pneumonia and would like antibiotics started sooner than later to treat it. Emphasized our last conversation that it was not a matter of if he'd aspirate, but when he would, and that I worried time is short. She acknowledges that when he fell and was in the ER, that there was a chance he would "not make it" but wants to give him a chance of surviving this. Shared that I will revisit this afternoon to see how he and they are doing. Offered that if they'd like us to engage other family members (such as Dennys's wife who is an RN) that we'd be happy to do so per their preference. Mesha asks if Father Chase can come, shared that he did not come at all yesterday according to Paul. I will reach out and request for Father Chase to come to Mr. Browning today.  - 1/18/2023: Met Mr. Browning and his son Paul at his bedside, who was having a discussion related to code status with a surgeon on the team. Mr. Browning agrees that CPR will cause more harm and will text and discuss with his siblings about this code status. At this time he remains hesitant about changing code status related to intubation, recalling " "that his mother survived compassionate extubation 3 years after physicians told them that she was dying. Had supportive conversation with Paul, who remains hopeful that pain management will be able to perform nerve block as he worries that the opioids are causing too much sedation and would like to minimize medications if possible. Discussed interval development of VIKTOR, and will be watchful of this problem and adjust medications to dose renally and protect kidneys.  - 2023: Met Mr. Browning and his daughter Mesha and son Paul at his bedside. Mr. Browning is unable to fully participate in our discussion due to advanced dementia and immediate short term memory loss. Mesha shares her understanding that he broke 4 ribs in this fall and punctured his lungs. She was told that he might need a chest tube and he was high risk for developing pneumonia. She recalls that her mother and father discussed with each other that they would not want to be artificially sustained on life-support. When her mother was intubated and told that she would be ventilator-dependent, family honored her wishes and withdrew ventilator support. She thankfully lived 3 years after compassionate extubation and  3 years ago, which was the beginning of Mr. Browning's decline. They had been  when he was 15-years-old and she was 14-years-old. Now he lives with his daughter Mesha and is also supported by his other sons who help with other important aspects of his life. Mesha shares that he has sustained multiple falls, most recently sustained 3 falls in the last 2 weeks. Despite constant reorientation, he forgets to stay in bed and will try to get out and ambulate. He had significant sundowning from 4pm to 8pm, but now it is all day. Mesha and Paul recognize that he is progressively declining and acknowledge that this will continue. They've been told previously when he sustained his prior falls that "this was it" but " despite this, he continues to live so they hope to continue doing what they can to help him live. When Mesha discussed code status in the ER, she was under the impression that the ER staff was saying that intubation and CPR will help him. We discussed that knowing that the fall was enough to cause multiple rib fractures, forceful chest compressions will surely cause more harm and more breakage, and the ventilator will worsen his small pneumothorax. I recommended against such interventions, explaining that this will sure cause his demise and not help him the way we hope. Mesha asks if we don't intubate/ventilate when he develops a pneumonia, what can be done alternatively. I shared that at that point there are no curative interventions, and that medical professionals can support him with symptom management and offer him strategies to alleviate shortness of breath. They asked about chest tube, and I shared that as Neto (their brother) had a chest tube before, that Mr. Browning will also have significant pain if a chest tube is inserted, and this would prolong his current state without a guarantee that it will fix the pneumothorax as his body will have a harder time to heal. They share their understanding and would like to discuss this with their other siblings. Their sister-in-law who is an [employer] RN came to visit, and also voiced similar concerns about CPR/intubation as well. Mesha states that she is worried that he is miserable, forgetting immediately that he already went out to visit family/loved ones and stating repetitively that he wishes to go out to fish. Validated her concerns, recognizing that they wish that everything helpful be continued and offered. Encouraged them to consider that in addition to this, it is also our responsibility to protect him from harmful interventions that will not benefit him, as they had done so for their mother.       Acute Pain 2/2 Rib Fractures, Falls  - Discussed  allergy to morphine. According to daughter, he will get angry and hallucinate when he is on morphine. Tolerated fentanyl and hydromorphone.  - Agree with pain management consult  - Speech therapy re-evaluated Mr. Browning on 1/19 - modified diet to allow food/medications with aspiration precautions  - S/p nerve block by pain management, performed on 1/18/23  - Scheduled acetaminophen 1000 mg PO TID for chronic pain/headache  - No longer on opioids  - Gabapentin 300 mg daily (adjusted from home TID schedule for VIKTOR)  - Continue lidocaine patches    Constipation  - Last bowel movement 1/15  - Continue senna 8.6 mg PO BID  - Polyethylene glycol 17 g PO daily  - Ideally would avoid suppository/enema as turning causes more pain, but may need to do so if no bowel movement    Anxiety/Agitation/[place]/Delirium  Dementia with Behavioral Disturbances  - Continue memantine 10 mg PO BID if able to swallow  - Continue home sertraline 100 mg PO daily  - Clarified that he does not have Parkinson's  - Had paradoxical reaction to Xanax  - Continue frequent reorientation and optimization of environment by keeping room bright during the day, dark and quiet at night. Discontinue vital signs at night time to allow him to sleep undisturbed  - Optimize pain management as above  - QTc on admission is 497. Agree that low dose antipsychotics will be helpful in setting of non-redirectable agitation.

## 2023-01-20 NOTE — PROGRESS NOTES
[facility] - [facility]  General Surgery  Progress Note    Subjective:     History of Present Illness:  82M frail fell multiple times at home.  H/o CAD (EF 40% with previous MI with enduring defect seen on cardiac PET), COPD on 0.5L NC at home and inhalers, DM2, CKD3, HTN, sarcoid, CAD.  CXR shows small apical pneumo and subcutaneous air on the right chest wall.  CT chest shows at least four comminuted posterior rib fractures on my count. These fractures are severe.  He is sating in the mid-90s on 6L facemask.  Back pain is not controlled  He is complaining of abdominal pain as well    Lives with daughter who assists with ADLs.  I shared with the daughter that this is a major setback for him and that with his medical history, he is at very high risk for pneumonia and intubation.  She has not explicitly discussed with her brothers whether he would want to be intubated were it indicated.    He has had cardiac bypass and cervical spine surgery. He has cardiac stents.    Remote 40 pack year smoking history.      Post-Op Info:  * No surgery found *         Interval History: NAEON. Afebrile. Pressors off. 4L O2 this AM, RR increasing. WBC remains stable at 7. Cr coming down, 1.6 this AM.   CXR stable     Medications:  Continuous Infusions:   dextrose 5 % and 0.45 % NaCl 50 mL/hr at 01/20/23 0800    ROPIvacaine (PF) 2 mg/ml (0.2%) 0.1 mL/hr (01/20/23 0800)     Scheduled Meds:   acetaminophen  1,000 mg Oral TID    albuterol-ipratropium  3 mL Nebulization Q4H WAKE    atorvastatin  40 mg Oral Daily    budesonide  0.5 mg Nebulization Q12H    diphenhydrAMINE  25 mg Intravenous Once    dronabinoL  2.5 mg Oral BID    gabapentin  250 mg Oral Daily    heparin (porcine)  5,000 Units Subcutaneous Q8H    LIDOcaine  1 patch Transdermal Q24H    melatonin  6 mg Oral Nightly    memantine  10 mg Oral BID    midodrine  5 mg Oral Q8H    mupirocin   Nasal BID    pantoprazole  40 mg Oral Daily    polyethylene glycol   17 g Oral Daily    senna  8.6 mg Oral BID    sertraline  100 mg Oral Daily     PRN Meds:sodium chloride 0.9%, dextrose 10%, dextrose 10%, glucagon (human recombinant), glucose, glucose, insulin aspart U-100, levalbuterol, ondansetron     Review of patient's allergies indicates:   Allergen Reactions    Morphine Shortness Of Breath     Objective:     Vital Signs (Most Recent):  Temp: 98.2 °F (36.8 °C) (01/20/23 0300)  Pulse: 90 (01/20/23 0800)  Resp: (!) 37 (01/20/23 0800)  BP: (!) 143/64 (01/20/23 0800)  SpO2: 100 % (01/20/23 0800)   Vital Signs (24h Range):  Temp:  [97.8 °F (36.6 °C)-98.4 °F (36.9 °C)] 98.2 °F (36.8 °C)  Pulse:  [] 90  Resp:  [30-41] 37  SpO2:  [88 %-100 %] 100 %  BP: ()/(50-80) 143/64  Arterial Line BP: ()/(32-71) 140/52     Weight:  (bed scale broken)  Body mass index is 22.15 kg/m².    Intake/Output - Last 3 Shifts         01/18 0700  01/19 0659 01/19 0700  01/20 0659 01/20 0700  01/21 0659    P.O.  90     I.V. (mL/kg) 2358.4 (34.7) 1309.1 (19.3) 100.1 (1.5)    IV Piggyback 3350.1 394.8     Total Intake(mL/kg) 5708.5 (83.9) 1794 (26.4) 100.1 (1.5)    Urine (mL/kg/hr) 685 (0.4) 360 (0.2) 10 (0.1)    Total Output 685 360 10    Net +5023.5 +1434 +90.1                   Physical Exam  Vitals and nursing note reviewed.   Constitutional:       General: He is not in acute distress.     Comments: 4L O2 via NC   HENT:      Head: Normocephalic.      Nose: Nose normal.   Eyes:      General: No scleral icterus.     Extraocular Movements: Extraocular movements intact.   Cardiovascular:      Rate and Rhythm: Normal rate.   Pulmonary:      Effort: Pulmonary effort is normal. No respiratory distress.   Chest:      Comments: Right posterior back pain at site of fractures  Crepitus along the right back  Abdominal:      General: There is no distension.      Palpations: Abdomen is soft.      Tenderness: There is no abdominal tenderness. There is no guarding or rebound.   Musculoskeletal:          General: No deformity.   Skin:     General: Skin is warm and dry.   Neurological:      Mental Status: He is alert. He is disoriented.       Significant Labs:  I have reviewed all pertinent lab results within the past 24 hours.    Significant Diagnostics:  I have reviewed all pertinent imaging results/findings within the past 24 hours.    Assessment/Plan:     * Closed fracture of multiple ribs of right side  Patient is an 82 year old frail and comorbid gentleman with h/o COPD (on home O2), pulmonary sarcoidosis, CABG, HTN, HLD, depression, GERD, dementia, HF who presents after a fall with multiple posterior right rib fractures that are comminuted with significant displacement, also with small pneumothorax that does not need intervention at this time, but may eventually. Clinically stable on a few liters of O2 now but is very high risk for pneumonia.    - Dysphagia soft diet with aspiration precautions  - Daily CXR  - Palliative care consulted, appreciate assistance   - Will follow up discussions. Currently partial code. No chest compressions but okay with intubation   - Multimodal pain control  - Gentle IVF   - PRN IV pain   - Marinol   - APS consulted, appreciate assistance. Block 1/18  - Aggressive pulmonary toilet, acapella, IS, duonebs, inhalation treatment  - Home meds  - DVT ppx (SCDs and heparin)  - Remainder of care per SICU, appreciate assistance    Dispo: remain in SICU       Traumatic pneumothorax  - see principle problem    Chronic obstructive pulmonary disease  - see principle problem       Case discussed with Dr. Shankar.    EMIL De DiosC  General Surgery  [facility] - [facility]

## 2023-01-20 NOTE — ASSESSMENT & PLAN NOTE
Paul Browning is a frail 82M h/o CAD (EF 40% with previous MI with enduring defect seen on cardiac PET), COPD on 0.5L NC at home and inhalers, DM2, CKD3, HTN, sarcoid, CAD who fell multiple times at home. Imaging shows small apical PTX and subQ air on R chest wall with CT with several comminuted posterior rib fractures.      Neuro/Psych:   -- Sedation: none  -- Pain: [facility] robaxin, gabapentin, lidocaine patch; dilaudid Q2 push prn (allergy to morphine),oxy prn  -- Right BELEM catheter placed by APS 1/18             Cards:   -- hx of HTN, CAD s/p stents  -- Hypotensive with SBP lowest to 70s  -- Started on levo peripheral at 0.04, since discontinued and started on midodrine.  Decreasing midodrine dose 1/20/23  -- holding home ASA and Entresto      Pulm:   -- hx of COPD, baseline 0.5L NC at home  -- now has right sided rib fractures and small right apical PTX  -- Goal O2 sat > 90%  -- Wean as able  -- duonebs, IS, Acapella  -- Increasing respiratory effort with  wheezing         Renal:  -- hx of CKD3, baseline Cr 1.0  -- BUN/Cr 45/1.6 (Cr decreasing)  -- Lasix x 1  -- Nursing to confirm correct sized segal for patient, monitor for drainage      FEN / GI:    -- Replace lytes as needed  -- Nutrition: soft diet      ID:   -- Tm: afebrile; WBC 7  -- Abx none      Heme/Onc:   -- H/H 8.7/26.8  -- Daily CBC      Endo:   -- hx of DM2, on Jardiance   -- Gluc goal 140-180  -- AISS      PPx:   Feeding: soft diet  Analgesia/Sedation: [facility] gabapentin, robaxin, lido patch; dilaudid Q2 pushes PRN, BELEM catheter  Thromboembolic prevention: heparin  HOB >30: yes  Stress Ulcer ppx: protonix  Glucose control: Critical care goal 140-180 g/dl, ISS    Lines/Drains/Airway: PIV x2, Segal      Dispo/Code Status/Palliative:   -- SICU / No chest compressions, ok to intubate/palliative following for ongoing GOC discussion

## 2023-01-21 NOTE — PLAN OF CARE
I had a long discussion with patient's daughter, Ms. Mesha Browning, about the patient's overall progress. It is unlikely that he will recover from this. Intubation for him is unlikely to help him heal his rib fractures, given his dementia, deconditioning, medical co-morbidities and overall state. Surgery would've been the best option for him in terms of fixing his broken ribs but he is not a surgical candidate. Intubating him will surely mean that he is highly unlikely to be extubated which also means that he will get a pneumonia. His breathing is becoming increasingly labored and he will eventually tire out. In the event that happens, we would make him comfortable or intubate if the family prefers. The daughter will discuss with her brothers and let us know. As of now, he remains a partial code.      Lorenzo Rojas MD  General Surgery and Surgical Critical Care  [facility]

## 2023-01-21 NOTE — SUBJECTIVE & OBJECTIVE
Interval History/Significant Events: 1/21: Midodrine added for BP. Diaphoretic, tachypneic, hypertensive throughout night. On 6L NC this AM with labored respirations. Patient denying to be in pain. Son in room states that patient already takes rapid, shallow breaths at baseline. CXR this morning grossly stable compared to prior. Avoiding antipsychotics 2/2 high qTC on EKG         Objective:     Vital Signs (Most Recent):  Temp: 98.7 °F (37.1 °C) (01/21/23 0300)  Pulse: 110 (01/21/23 0515)  Resp: (!) 50 (01/21/23 0550)  BP: (!) 144/90 (01/21/23 0502)  SpO2: 97 % (01/21/23 0515)   Vital Signs (24h Range):  Temp:  [98.2 °F (36.8 °C)-99.4 °F (37.4 °C)] 98.7 °F (37.1 °C)  Pulse:  [] 110  Resp:  [30-51] 50  SpO2:  [85 %-100 %] 97 %  BP: (116-207)/(60-95) 144/90  Arterial Line BP: (-9-155)/(-12-57) -9/-12     Weight:  (bed scale broken)  Body mass index is 22.15 kg/m².      Intake/Output Summary (Last 24 hours) at 1/21/2023 0629  Last data filed at 1/21/2023 0500  Gross per 24 hour   Intake 1211.7 ml   Output 227 ml   Net 984.7 ml       Physical Exam  Constitutional:       General: He is not in acute distress.     Appearance: He is ill-appearing.   HENT:      Head: Normocephalic.      Nose: Nose normal.   Eyes:      General: No scleral icterus.     Extraocular Movements: Extraocular movements intact.   Cardiovascular:      Rate and Rhythm: Normal rate.   Pulmonary:      Effort: No respiratory distress.      Comments: Tachypneic  Right lung, coarse breath sounds  Chest:      Comments: Right posterior back pain at site of fractures  BELEM catheter in place  Abdominal:      General: There is no distension.      Palpations: Abdomen is soft.      Tenderness: There is no abdominal tenderness. There is no guarding or rebound.   Genitourinary:     Comments: Mild erythema at tip of penis, no discharge noted.   Musculoskeletal:         General: No deformity.   Skin:     General: Skin is warm and dry.   Neurological:      Mental  Status: He is alert. He is disoriented.  Vents:  Oxygen Concentration (%): 28 (01/20/23 0328)    Lines/Drains/Airways       Drain  Duration                  Urethral Catheter 01/18/23 2336 Non-latex 2 days              Epidural Line  Duration                  Perineural Analgesia/Anesthesia Assessment (using dermatomes) 01/18/23 1445 2 days              Peripheral Intravenous Line  Duration                  Peripheral IV - Single Lumen 01/16/23 2147 20 G Posterior;Right Forearm 4 days         Peripheral IV - Single Lumen 01/17/23 0022 20 G Anterior;Proximal;Right Forearm 4 days                    Significant Labs:    CBC/Anemia Profile:  Recent Labs   Lab 01/20/23  0257 01/21/23  0303   WBC 7.13 10.66   HGB 8.7* 9.8*   HCT 26.8* 30.2*    271   MCV 98 98   RDW 14.5 14.5        Chemistries:  Recent Labs   Lab 01/20/23  0257      K 4.2      CO2 20*   BUN 45*   CREATININE 1.6*   CALCIUM 8.1*   ALBUMIN 3.0*   PROT 5.4*   BILITOT 1.2*   ALKPHOS 82   ALT 25   AST 43*   MG 2.0   PHOS 2.5*       All pertinent labs within the past 24 hours have been reviewed.    Significant Imaging:  I have reviewed all pertinent imaging results/findings within the past 24 hours.  X-Ray Abdomen AP 1 View    Result Date: 1/21/2023  Prominent waste material suggesting constipation and bowel gas suggesting ileus. Rectum not imaged to evaluate for fecal impaction. Electronically signed by: Kayden Jhaveri Date:    01/21/2023 Time:    01:58    X-Ray Chest AP Portable    Result Date: 1/21/2023  Stable lung and pleural opacities right greater than left. Electronically signed by: Kayden Jhaveri Date:    01/21/2023 Time:    01:54

## 2023-01-21 NOTE — PROGRESS NOTES
MD called for episode of tachypea ans tachycardia. O2 sat 86 on 4L NC. O2 increased to 6L, O2 90-91%. MD to place new orders. Will continue to monitor closely.

## 2023-01-21 NOTE — PROGRESS NOTES
Rec'd call from SICU team and RN Dinora regarding worsening of pts condition.  Discussed separately with Dr. Blanco who has met with family previously.      Pt exhibiting increased wob, grunting, and appears uncomfortable.      Planning for family discussion at 5pm by phone.      In the meanwhile, strongly recommend treating increased WOB and discomfort related to rib fractures and increased work of breathing due to ongoing aspiration, resp shunting due to above and progressive resp failure.      Pt's expected death will not be hastened by symptom focused therapies, including low dose prn opids, but death is expected to occur from the above irreversible conditions.      Further documentation to occur after family discussion at 5pm.     Ankit Amador MD  Palliative Medicine   [facility]  [phone] (cell)

## 2023-01-21 NOTE — ANESTHESIA POST-OP PAIN MANAGEMENT
Consult  Anesthesiology Acute Pain Service      SUBJECTIVE:     Chief Complaint/Reason for Consult: Request anesthesia assistance with continuous pain management due to high complexity/refractory pain.    Surgical Procedure: N/a    Post Op Day: N/a    History of Present Illness:  Patient is a 82 y.o. male with a PMH of CAD (EF 40% with previous MI with enduring defect seen on cardiac PET), COPD on 0.5L NC at home and inhalers, DM2, CKD3, HTN, sarcoid, CAD who fell multiple times at home. Imaging shows small apical PTX and subQ air on R chest wall with CT with several comminuted posterior rib fractures.     On admission, he is satting in the mid-90s on 6L facemask. Back pain is not controlled, and he is complaining of abdominal pain as well. He is admitted to the SICU for observation and pain control.        Anesthesiology is consulted for refractory pain control.     The current inpatient regimen is:  Acetaminophen 1,000mg q8h  Gabapentin 300mg daily  Sertraline 100mg oral  BELEM         Patient Active Problem List   Diagnosis    CAD (coronary artery disease)    Hypertension    Hyperlipidemia    Stevens's esophagus with low grade dysplasia    Hx of gastric ulcer    S/P CABG x 2    VIKTOR (acute kidney injury)    SOB (shortness of breath)    Atherosclerosis of aorta    Pulmonary sarcoidosis    MDD (major depressive disorder), recurrent episode, mild    Peripheral arterial disease    Osteoporosis    Senile purpura    Refractive error    Dry eye syndrome of both eyes    Heterophoria    Right homonymous hemianopsia    Carotid artery disease    Cervicogenic headache    Cervical spondylosis    DDD (degenerative disc disease), cervical    Chronic neck pain    Neck stiffness    Abnormal increased muscle tightness    Awakens from sleep due to pain    Cervical segment dysfunction    Tightness of neck    Dysfunctional movements    Odontoid fracture    Gastroesophageal reflux disease    Anxiety     Decreased ROM of neck    Gait instability    Degenerative disease of nervous system, unspecified    Impaired functional mobility, balance, gait, and endurance    Vision loss    Intraventricular conduction delay    Dementia    Acute hypoxemic respiratory failure    Frequent falls    Scalp laceration    Chronic diastolic heart failure    Closed fracture of pubis    Multiple closed stable lateral compression fractures of pelvis    Closed fracture of trochanter of left femur with routine healing    Chronic obstructive pulmonary disease    Lung nodule    Ischemic cardiomyopathy    Closed fracture of multiple ribs of right side    Encounter for palliative care    Traumatic pneumothorax    Multiple rib fractures involving four or more ribs       Review of patient's allergies indicates:   Allergen Reactions    Morphine Shortness Of Breath       Outpatient Medications:   No current facility-administered medications on file prior to encounter.     Current Outpatient Medications on File Prior to Encounter   Medication Sig Dispense Refill    albuterol (PROVENTIL/VENTOLIN HFA) 90 mcg/actuation inhaler INHALE 2 PUFFS INTO THE LUNGS EVERY 6 HOURS AS NEEDED WHEEZING OR SHORTNESS OF BREATH 8.5 g 11    albuterol-ipratropium (DUO-NEB) 2.5 mg-0.5 mg/3 mL nebulizer solution Take 3 mLs by nebulization every 12 (twelve) hours. Rescue 540 mL 3    alendronate (FOSAMAX) 70 MG tablet TAKE 1 TABLET BY MOUTH EVERY WEEK IN THE MORNING WITH FULL GLASS OF WATER ON EMPTY STOMACH-DONT LIE DOWN FOR AT LEAST 30 MINUTES AFTERWARDS 12 tablet 3    aspirin (ECOTRIN) 81 MG EC tablet Take 81 mg by mouth once daily.      atorvastatin (LIPITOR) 40 MG tablet TAKE 1 TABLET(40 MG) BY MOUTH EVERY DAY 90 tablet 0    budesonide (PULMICORT) 0.5 mg/2 mL nebulizer solution Take 2 mLs (0.5 mg total) by nebulization 2 (two) times daily. Controller 120 mL 5    calcium carbonate (CALCIUM 600 ORAL) Take by mouth.      doxepin (SILENOR) 3 mg  Tab One po qhs 30 tablet 6    empagliflozin (JARDIANCE) 10 mg tablet Take 1 tablet (10 mg total) by mouth once daily. 30 tablet 11    furosemide (LASIX) 20 MG tablet Take 1 tablet (20 mg total) by mouth daily as needed (Wt gain >2 pounds/day or >5 pounds/week). 90 tablet 3    magnesium oxide (MAG-OX) 400 mg (241.3 mg magnesium) tablet Take 1 tablet (400 mg total) by mouth once daily. 30 tablet 3    memantine (NAMENDA) 10 MG Tab Take 1 tablet (10 mg total) by mouth 2 (two) times daily. 180 tablet 1    methylPREDNISolone (MEDROL, RENAN,) 4 mg tablet use as directed 1 each 0    omega-3 fatty acids-vitamin E (FISH OIL) 1,000 mg Cap Take 1 tablet by mouth 2 (two) times daily. (Patient taking differently: Take 1 tablet by mouth once daily.) 60 each 11    ondansetron (ZOFRAN) 8 MG tablet Take 1 tablet (8 mg total) by mouth every 8 (eight) hours as needed for Nausea. 30 tablet 1    pantoprazole (PROTONIX) 40 MG tablet TAKE 1 TABLET(40 MG) BY MOUTH TWICE DAILY 180 tablet 0    sacubitriL-valsartan (ENTRESTO) 24-26 mg per tablet Take 1 tablet by mouth 2 (two) times daily. 60 tablet 11    sertraline (ZOLOFT) 100 MG tablet Take 1 tablet (100 mg total) by mouth once daily. 90 tablet 3    tiotropium-olodateroL (STIOLTO RESPIMAT) 2.5-2.5 mcg/actuation Mist Inhale 2 puffs into the lungs once daily. Controller 4 g 5    ubrogepant (UBROGEPANT) 100 mg tablet One po at onset of migraine. May repeat after 2 hours if needed. 10 tablet 1        Current Inpatient Medications:      PRN:      Past Surgical History:   Procedure Laterality Date    CARDIAC SURGERY      CAROTID STENT N/A 10/14/2019    Procedure: INSERTION, STENT, ARTERY, CAROTID;  Surgeon: Artie Kebede MD;  Location: [facility] CATH LAB;  Service: Cardiology;  Laterality: N/A;    CATARACT EXTRACTION W/  INTRAOCULAR LENS IMPLANT Right 07/17/2018    Dr. Talamantes    CATARACT EXTRACTION W/  INTRAOCULAR LENS IMPLANT Left 07/31/2018    Dr. Talamantes    CORONARY ARTERY  BYPASS GRAFT      x2  10/2014    ESOPHAGOGASTRODUODENOSCOPY N/A 4/8/2019    Procedure: ESOPHAGOGASTRODUODENOSCOPY (EGD)/poss rfa;  Surgeon: Clifford De La Torre MD;  Location: [facility] ENDO (2ND FLR);  Service: Endoscopy;  Laterality: N/A;    ESOPHAGOGASTRODUODENOSCOPY N/A 8/4/2021    Procedure: EGD (ESOPHAGOGASTRODUODENOSCOPY);  Surgeon: Clifford De La Torre MD;  Location: [facility] ENDO (2ND FLR);  Service: Endoscopy;  Laterality: N/A;  8/2-covid [place]-inst fmpbdg-qb-th appts on 8/3    ESOPHAGOGASTRODUODENOSCOPY (EGD) WITH RADIOFREQUENCY TREATMENT OF GASTROESOPHAGEAL JUNCTION      INJECTION OF ANESTHETIC AGENT AROUND MEDIAL BRANCH NERVES INNERVATING CERVICAL FACET JOINT Bilateral 1/9/2020    Procedure: INJECTION, MBB--Bilateral Cervical- Third Occipital nerve, C2-3--ASA okay for pt to continue taking per provider.;  Surgeon: Tip Mera Jr., MD;  Location: [facility] PAIN MGT;  Service: Pain Management;  Laterality: Bilateral;    INTRAOCULAR PROSTHESES INSERTION Right 7/17/2018    Procedure: INSERTION, INTRAOCULAR LENS PROSTHESIS;  Surgeon: León Talamantes MD;  Location: [address];  Service: Ophthalmology;  Laterality: Right;    INTRAOCULAR PROSTHESES INSERTION Left 7/31/2018    Procedure: INSERTION, INTRAOCULAR LENS PROSTHESIS;  Surgeon: León Talamantes MD;  Location: [facility] OR [address] [place] [address];  Service: Ophthalmology;  Laterality: Left;    PHACOEMULSIFICATION OF CATARACT Right 7/17/2018    Procedure: PHACOEMULSIFICATION, CATARACT;  Surgeon: León Talamantes MD;  Location: [facility] OR [address] [place] [address];  Service: Ophthalmology;  Laterality: Right;    PHACOEMULSIFICATION OF CATARACT Left 7/31/2018    Procedure: PHACOEMULSIFICATION, CATARACT;  Surgeon: León Talamantes MD;  Location: [facility] OR [address] [place] [address];  Service: Ophthalmology;  Laterality: Left;    PLACEMENT OF SCREW IN ODONTOID N/A 10/22/2020    Procedure: INSERTION, SCREW, ODONTOID. Anterior approach.;  Surgeon: Kirsten Weaver MD;  Location: [facility] OR 2ND FLR;   Service: Neurosurgery;  Laterality: N/A;    RADIOFREQUENCY THERMOCOAGULATION Bilateral 2020    Procedure: RADIOFREQUENCY THERMAL COAGULATION--Bilateral C2-3 and Third Occipital Nerve;  Surgeon: Tip Mera Jr., MD;  Location: [facility];  Service: Pain Management;  Laterality: Bilateral;    UMBILICAL HERNIA REPAIR         Social History     Socioeconomic History    Marital status:     Number of children: 4   Occupational History    Occupation:     Tobacco Use    Smoking status: Former     Packs/day: 2.00     Years: 20.00     Pack years: 40.00     Types: Cigarettes     Quit date: 1988     Years since quittin.9     Passive exposure: Past    Smokeless tobacco: Never   Substance and Sexual Activity    Alcohol use: No     Comment: quit drinking beer     Drug use: No    Sexual activity: Not Currently     Partners: Female       Review of Systems:  As above. Additionally:     Constitutional: activity change  HENT: Negative for congestion, postnasal drip, rhinorrhea, sinus pressure, sore throat, trouble swallowing and voice change.    Eyes: Negative for photophobia, pain and visual disturbance.   Respiratory: cough, shortness of breath, negative for wheezing.    Cardiovascular: Negative for palpitations and leg swelling.   Gastrointestinal: Negative for nausea, vomiting, abdominal pain, diarrhea, constipation and abdominal distention.   Endocrine: Negative for polydipsia, polyphagia and polyuria.   Genitourinary: Negative for dysuria, frequency, hematuria and difficulty urinating.   Musculoskeletal: arthralgias  Skin: Negative for color change, pallor, rash and wound.   Neurological: Negative for dizziness, seizures, syncope, weakness and headaches.   Hematological: Negative for adenopathy. Does not bruise/bleed easily.   Psychiatric/Behavioral: Negative for sleep disturbance and dysphoric mood. The patient is not nervous/anxious.      OBJECTIVE:     Current Vital  Signs  Temp: 36.8 °C (98.3 °F) (01/21/23 1100)  Pulse: 87 (01/21/23 1130)  Resp: (!) 43 (01/21/23 1130)  BP: (!) 166/84 (01/21/23 1100)  SpO2: (!) 94 % (01/21/23 1130)    Vital Signs Range (Last 24H):  Temp:  [36.8 °C (98.2 °F)-37.4 °C (99.4 °F)]   Pulse:  []   Resp:  [30-51]   BP: (124-207)/(60-95)   SpO2:  [85 %-100 %]     I & O (Last 24H):    Intake/Output Summary (Last 24 hours) at 1/21/2023 1150  Last data filed at 1/21/2023 0500  Gross per 24 hour   Intake 901.32 ml   Output 197 ml   Net 704.32 ml       Physical Exam:  Constitutional: Patient is oriented to person, place, and time. Patient appears well-developed and well-nourished. No distress.   HENT: Head: Normocephalic and atraumatic. Right Ear: External ear normal. Left Ear: External ear normal. Nose: Nose normal.   Mouth/Throat: Oropharynx is clear and moist. No oropharyngeal exudate.   Eyes: Conjunctivae and EOM are normal. Pupils are equal, round, and reactive to light. Right eye exhibits no discharge. Left eye exhibits no discharge. No scleral icterus.   Neck: Normal range of motion. Neck supple. No JVD present. No tracheal deviation present. No thyromegaly present.   Cardiovascular: Normal rate, regular rhythm, normal heart sounds and intact distal pulses.  Exam reveals no gallop and no friction rub.  No murmur heard.  Pulmonary/Chest: Effort normal and breath sounds normal. No respiratory distress. Patient has no wheezes. Patient has no rales.   Abdominal: Soft. Bowel sounds are normal. Patient exhibits no distension and no mass. There is no tenderness.   Musculoskeletal: Normal range of motion. Patient exhibits no edema or tenderness.   Lymphadenopathy:   Patient has no cervical adenopathy.   Neurological: Patient is alert and oriented to person, place, and time. Patient has normal reflexes. Patient exhibits normal muscle tone. Coordination normal.   Skin: Skin is warm and dry. No rash noted. Patient is not diaphoretic. No erythema. No pallor.    Psychiatric: Patient has a normal mood and affect. Behavior is normal. Judgment and thought content normal.     Laboratory:  CBC:   Recent Labs     01/20/23  0257 01/21/23  0303   WBC 7.13 10.66   RBC 2.75* 3.08*   HGB 8.7* 9.8*   HCT 26.8* 30.2*    271   MCV 98 98   MCH 31.6* 31.8*   MCHC 32.5 32.5       CMP:   Recent Labs     01/20/23 0257 01/21/23  0622    136   K 4.2 4.5   CO2 20* 22*    105   BUN 45* 37*   CREATININE 1.6* 1.0   * 125*   MG 2.0 2.3   PHOS 2.5* 3.2   CALCIUM 8.1* 8.3*   ALBUMIN 3.0* 3.0*   PROT 5.4* 5.9*   ALKPHOS 82 158*   ALT 25 34   AST 43* 47*   BILITOT 1.2* 1.1*       Diagnostic Radiology:  Air in the right chest wall adjacent to the lateral rib cage.  Small right apical pneumothorax.  Suspect right-sided rib fracture    ASSESSMENT/PLAN:     Active Hospital Problems    Diagnosis  POA    *Closed fracture of multiple ribs of right side [S22.41XA]  Yes    Multiple rib fractures involving four or more ribs [S22.49XA]  Unknown    Traumatic pneumothorax [S27.0XXA]  Yes    Encounter for palliative care [Z51.5]  Not Applicable    Chronic obstructive pulmonary disease [J44.9]  Yes     Patient has a history of COPD and has not been using a controller because of cost.  We will reorder and ask for the pharmacy assistance program to assist.  In addition will start DuoNeb twice a day and Pulmicort b.i.d..      VIKTOR (acute kidney injury) [N17.9]  Yes      Resolved Hospital Problems   No resolved problems to display.     Mr. Browning is an 83yo M PMH CAD (EF 40% with previous MI with enduring defect seen on cardiac PET), COPD on 0.5L NC at home and inhalers, DM2, CKD3, HTN, sarcoid, admitted with multiple R sided rib fractures 2/2 fall with apical pneumothorax, not a surgical candidate. Given the patient is tachycardic, reporting pain, taking shallow rapid breaths, requiring 3L NC, his pain was initially inadequately controlled on current regimen with PO oxy and IV  dilaudid for breakthrough. Pain improved with BELEM, heart rate within normal limits. Delirious 1/18 overnight. Pain well controlled without use of narcotics.     Plan:    -- BELEM block ropivacaine 2mg/ml 0.1ml/hr  -- Multimodals: Acetaminophen 1,000mg 3x daily, Gabapentin 300 3xdaily, lidocaine patch  -- Home sertraline 100mg daily  -- Encourage open windows and interactions with family/friends at bedside for delirium     Thank you for this consult. Will continue to follow with you.    Howard Harden MD  Department of Anesthesiology  [facility]  01/21/2023 11:24 AM

## 2023-01-21 NOTE — PROGRESS NOTES
[facility] - [facility] Intensive Care  Critical Care - Surgery  Progress Note    Patient Name: Paul Browning  MRN: [id number]  Admission Date: 1/16/2023  Hospital Length of Stay: 3 days  Code Status: Partial Code  Attending Provider: Santo Shankar MD  Primary Care Provider: Grey Forbes DO   Principal Problem: Closed fracture of multiple ribs of right side    Subjective:     Hospital/ICU Course:  1/17: admitted to SICU for close observation and pain control. No surgery at this time. Palliative care and acute pain medicine consulted. CXR this AM with increased R sided patchy infiltrates  1/21: Midodrine added for BP. Diaphoretic, tachypneic, hypertensive throughout night. On 6L NC this AM with labored respirations. Patient denying to be in pain. Son in room states that patient already takes rapid, shallow breaths at baseline. CXR this morning grossly stable compared to prior. Avoiding antipsychotics 2/2 high qTC on EKG       Interval History/Significant Events: 1/21: Midodrine added for BP. Diaphoretic, tachypneic, hypertensive throughout night. On 6L NC this AM with labored respirations. Patient denying to be in pain. Son in room states that patient already takes rapid, shallow breaths at baseline. CXR this morning grossly stable compared to prior. Avoiding antipsychotics 2/2 high qTC on EKG         Objective:     Vital Signs (Most Recent):  Temp: 98.7 °F (37.1 °C) (01/21/23 0300)  Pulse: 110 (01/21/23 0515)  Resp: (!) 50 (01/21/23 0550)  BP: (!) 144/90 (01/21/23 0502)  SpO2: 97 % (01/21/23 0515)   Vital Signs (24h Range):  Temp:  [98.2 °F (36.8 °C)-99.4 °F (37.4 °C)] 98.7 °F (37.1 °C)  Pulse:  [] 110  Resp:  [30-51] 50  SpO2:  [85 %-100 %] 97 %  BP: (116-207)/(60-95) 144/90  Arterial Line BP: (-9-155)/(-12-57) -9/-12     Weight:  (bed scale broken)  Body mass index is 22.15 kg/m².      Intake/Output Summary (Last 24 hours) at 1/21/2023 0612  Last data filed at 1/21/2023 0500  Gross per 24 hour    Intake 1211.7 ml   Output 227 ml   Net 984.7 ml       Physical Exam  Constitutional:       General: He is not in acute distress.     Appearance: He is ill-appearing.   HENT:      Head: Normocephalic.      Nose: Nose normal.   Eyes:      General: No scleral icterus.     Extraocular Movements: Extraocular movements intact.   Cardiovascular:      Rate and Rhythm: Normal rate.   Pulmonary:      Effort: No respiratory distress.      Comments: Tachypneic  Right lung, coarse breath sounds  Chest:      Comments: Right posterior back pain at site of fractures  BELEM catheter in place  Abdominal:      General: There is no distension.      Palpations: Abdomen is soft.      Tenderness: There is no abdominal tenderness. There is no guarding or rebound.   Genitourinary:     Comments: Mild erythema at tip of penis, no discharge noted.   Musculoskeletal:         General: No deformity.   Skin:     General: Skin is warm and dry.   Neurological:      Mental Status: He is alert. He is disoriented.  Vents:  Oxygen Concentration (%): 28 (01/20/23 0328)    Lines/Drains/Airways       Drain  Duration                  Urethral Catheter 01/18/23 2336 Non-latex 2 days              Epidural Line  Duration                  Perineural Analgesia/Anesthesia Assessment (using dermatomes) 01/18/23 1445 2 days              Peripheral Intravenous Line  Duration                  Peripheral IV - Single Lumen 01/16/23 2147 20 G Posterior;Right Forearm 4 days         Peripheral IV - Single Lumen 01/17/23 0022 20 G Anterior;Proximal;Right Forearm 4 days                    Significant Labs:    CBC/Anemia Profile:  Recent Labs   Lab 01/20/23  0257 01/21/23  0303   WBC 7.13 10.66   HGB 8.7* 9.8*   HCT 26.8* 30.2*    271   MCV 98 98   RDW 14.5 14.5        Chemistries:  Recent Labs   Lab 01/20/23  0257      K 4.2      CO2 20*   BUN 45*   CREATININE 1.6*   CALCIUM 8.1*   ALBUMIN 3.0*   PROT 5.4*   BILITOT 1.2*   ALKPHOS 82   ALT 25   AST 43*    MG 2.0   PHOS 2.5*       All pertinent labs within the past 24 hours have been reviewed.    Significant Imaging:  I have reviewed all pertinent imaging results/findings within the past 24 hours.  X-Ray Abdomen AP 1 View    Result Date: 1/21/2023  Prominent waste material suggesting constipation and bowel gas suggesting ileus. Rectum not imaged to evaluate for fecal impaction. Electronically signed by: Kayden Jhaveri Date:    01/21/2023 Time:    01:58    X-Ray Chest AP Portable    Result Date: 1/21/2023  Stable lung and pleural opacities right greater than left. Electronically signed by: Kayden Jhaveri Date:    01/21/2023 Time:    01:54       Assessment/Plan:     * Closed fracture of multiple ribs of right side  Paul Browning is a frail 82M h/o CAD (EF 40% with previous MI with enduring defect seen on cardiac PET), COPD on 0.5L NC at home and inhalers, DM2, CKD3, HTN, sarcoid, CAD who fell multiple times at home. Imaging shows small apical PTX and subQ air on R chest wall with CT with several comminuted posterior rib fractures.      Neuro/Psych:   -- Sedation: none  -- Pain: ifeoma robaxin, tylenol, gabapentin, lidocaine patch, ropivacaine  -- Right BELEM catheter placed by APS 1/18             Cards:   -- hx of HTN, CAD s/p stents  -- Hypotensive with SBP lowest to 70s initially, now hypertensive highest to 200s  -- Started on levo peripheral at 0.04, since discontinued and started on midodrine, currently at 2.  -- holding home ASA and Entresto      Pulm:   -- hx of COPD, baseline 0.5L NC at home  -- now has right sided rib fractures and small right apical PTX  -- Goal O2 sat > 90%  -- On 6L NC. Wean as able. Family okay with intubating if needed, will continue to have GoC discussion  -- PEGGY tang Acapella  -- Increasing respiratory effort with  wheezing         Renal:  -- hx of CKD3, baseline Cr 1.0  -- BUN/Cr 45/1.6 (Cr decreasing), pending AM labs    -- Nursing to confirm correct sized segal for patient,  monitor for drainage      FEN / GI:    -- Replace lytes as needed  -- Nutrition: soft diet      ID:   -- Tm: afebrile; WBC 10.7  -- Abx none      Heme/Onc:   -- H/H 9.8/30  -- Daily CBC      Endo:   -- hx of DM2, on Jardiance   -- Gluc goal 140-180  -- AISS      PPx:   Feeding: soft diet  Analgesia/Sedation: ifeoma gabapentin, robaxin, tylenol, lido patch; BELEM catheter  Thromboembolic prevention: heparin  HOB >30: yes  Stress Ulcer ppx: protonix  Glucose control: Critical care goal 140-180 g/dl, ISS    Lines/Drains/Airway: PIV x2, Gonzalez, Epidural line      Dispo/Code Status/Palliative:   -- SICU / No chest compressions, ok to intubate/palliative following for ongoing GOC discussion          Critical care was time spent personally by me on the following activities: development of treatment plan with patient or surrogate and bedside caregivers, discussions with consultants, evaluation of patient's response to treatment, examination of patient, ordering and performing treatments and interventions, ordering and review of laboratory studies, ordering and review of radiographic studies, pulse oximetry, re-evaluation of patient's condition.  This critical care time did not overlap with that of any other provider or involve time for any procedures.     Bennett Capellan MD  Critical Care - Surgery  [facility]

## 2023-01-21 NOTE — PROGRESS NOTES
Pt noted to tachypneic and diaphoretic with increased work of breathing. Pt respirations in the 50's, O2 sat % on 2L NC. MD called to the bedside, breathing treatment given, cxr and abg ordered and obtained. No further orders at this time. Will continue to monitor closely.

## 2023-01-21 NOTE — SUBJECTIVE & OBJECTIVE
Interval History: NAEON. Afebrile. 2L O2 this AM, RR increasing. CXR worsening. Beginning to have respiratory acidosis     Medications:  Continuous Infusions:   dexmedetomidine (PRECEDEX) infusion 0.3 mcg/kg/hr (01/21/23 0808)    dextrose 5 % and 0.45 % NaCl 50 mL/hr at 01/21/23 0500    ROPIvacaine (PF) 2 mg/ml (0.2%) 0.1 mL/hr (01/21/23 0500)     Scheduled Meds:   acetaminophen  1,000 mg Oral TID    atorvastatin  40 mg Oral Daily    budesonide  0.5 mg Nebulization Q12H    dronabinoL  2.5 mg Oral BID    furosemide  20 mg Oral Daily    [START ON 1/22/2023] gabapentin  500 mg Oral Daily    heparin (porcine)  5,000 Units Subcutaneous Q8H    HYDROmorphone  0.5 mg Intravenous Once    levalbuterol  0.63 mg Nebulization Q4H    LIDOcaine  1 patch Transdermal Q24H    melatonin  6 mg Oral Nightly    memantine  10 mg Oral BID    midodrine  2.5 mg Oral Q8H    mupirocin   Nasal BID    pantoprazole  40 mg Oral Daily    polyethylene glycol  17 g Oral Daily    senna  8.6 mg Oral BID    sertraline  100 mg Oral Daily     PRN Meds:sodium chloride 0.9%, albuterol-ipratropium, dextrose 10%, dextrose 10%, glucagon (human recombinant), glucose, glucose, insulin aspart U-100, ondansetron     Review of patient's allergies indicates:   Allergen Reactions    Morphine Shortness Of Breath     Objective:     Vital Signs (Most Recent):  Temp: 98.7 °F (37.1 °C) (01/21/23 0300)  Pulse: 96 (01/21/23 0759)  Resp: (!) 31 (01/21/23 0759)  BP: (!) 145/74 (01/21/23 0810)  SpO2: 100 % (01/21/23 0759)   Vital Signs (24h Range):  Temp:  [98.2 °F (36.8 °C)-99.4 °F (37.4 °C)] 98.7 °F (37.1 °C)  Pulse:  [] 96  Resp:  [30-51] 31  SpO2:  [85 %-100 %] 100 %  BP: (124-207)/(60-95) 145/74  Arterial Line BP: (-9-155)/(-12-57) -9/-12     Weight:  (bed scale broken)  Body mass index is 22.15 kg/m².    Intake/Output - Last 3 Shifts         01/19 0700  01/20 0659 01/20 0700 01/21 0659 01/21 0700  01/22 0659    P.O. 90 60     I.V. (mL/kg) 1309.1 (19.3) 1151.7  (16.9)     IV Piggyback 394.8      Total Intake(mL/kg) 1794 (26.4) 1211.7 (17.8)     Urine (mL/kg/hr) 360 (0.2) 227 (0.1)     Total Output 360 227     Net +1434 +984.7            Urine Occurrence  1 x             Physical Exam  Vitals and nursing note reviewed.   Constitutional:       General: He is not in acute distress.     Comments: 4L O2 via NC   HENT:      Head: Normocephalic.      Nose: Nose normal.   Eyes:      General: No scleral icterus.     Extraocular Movements: Extraocular movements intact.   Cardiovascular:      Rate and Rhythm: Normal rate.   Pulmonary:      Effort: Pulmonary effort is normal. No respiratory distress.   Chest:      Comments: Right posterior back pain at site of fractures  Crepitus along the right back  Abdominal:      General: There is no distension.      Palpations: Abdomen is soft.      Tenderness: There is no abdominal tenderness. There is no guarding or rebound.   Musculoskeletal:         General: No deformity.   Skin:     General: Skin is warm and dry.   Neurological:      Mental Status: He is alert. He is disoriented.       Significant Labs:  I have reviewed all pertinent lab results within the past 24 hours.    Significant Diagnostics:  I have reviewed all pertinent imaging results/findings within the past 24 hours.

## 2023-01-21 NOTE — ASSESSMENT & PLAN NOTE
Patient is an 82 year old frail and comorbid gentleman with h/o COPD (on home O2), pulmonary sarcoidosis, CABG, HTN, HLD, depression, GERD, dementia, HF who presents after a fall with multiple posterior right rib fractures that are comminuted with significant displacement, also with small pneumothorax that does not need intervention at this time, but may eventually. Clinically stable on a few liters of O2 now but is very high risk for pneumonia.    - Dysphagia soft diet with aspiration precautions  - Daily CXR  - Palliative care consulted, appreciate assistance   - Will follow up discussions. Currently partial code. No chest compressions but okay with intubation   - Multimodal pain control  - PRN IV pain   - Marinol   - APS consulted, appreciate assistance. Block 1/18  - Aggressive pulmonary toilet, acapella, IS, duonebs, inhalation treatment  - Home meds  - DVT ppx (SCDs and heparin)  - Remainder of care per SICU, appreciate assistance    Dispo: remain in SICU

## 2023-01-21 NOTE — NURSING
RN noticed pillow cases were saturated. RN and pt family sat pt up and noticed epidural catheter in back was out.     Pt son was very angry and expressed his concern and frustration to rn. MD notified.

## 2023-01-21 NOTE — NURSING
Family  requesting more information regarding comfort care vs. Treating pt fully. Palliative medicine was contacted.Palliative medicine stated they would be in contact with family.

## 2023-01-21 NOTE — PROGRESS NOTES
Plan of care update:     Along with Dinora SICU RN, we discussed with the adult children of the pt.      They all are able to voice understanding about his condition, maintain hope that he will improve, but also accept that this may be the end of his life.      To that end, they wish to avoid invasive life prolonging measures, hope to alleviate physical suffering by more aggressive symptom focused therapies including opioids as needed, and continue the best supportive care plan possible.      They understand his condition may worsen and if such a case, they woul dbe accepting of full comfort focused care plan.  They ask for continued open and honest communication to best be prepared.     Plan:   -DNR placed on chart  -prn IV hydromorphone q 2 hours prn increased work of breathing or perceived pain  -consider APS to replace epidural cath or alternative  -continue best supportive care with plan to transition to comfort as he continues to decline    Communicated with SICU team, Dr. Trujillo.     A total of 55 min was spent on advance care planning, goals of care discussion, emotional support, formulating and communicating prognosis and goals of care, exploring burden/benefit of various approaches of treatment. This discussion occurred on a fully voluntary basis with the verbal consent of the patient and/or family.     Ankit Amador MD  Palliative Medicine   [facility]  [phone] (cell)

## 2023-01-21 NOTE — PROGRESS NOTES
[facility]  General Surgery  Progress Note    Subjective:     History of Present Illness:  82M frail fell multiple times at home.  H/o CAD (EF 40% with previous MI with enduring defect seen on cardiac PET), COPD on 0.5L NC at home and inhalers, DM2, CKD3, HTN, sarcoid, CAD.  CXR shows small apical pneumo and subcutaneous air on the right chest wall.  CT chest shows at least four comminuted posterior rib fractures on my count. These fractures are severe.  He is sating in the mid-90s on 6L facemask.  Back pain is not controlled  He is complaining of abdominal pain as well    Lives with daughter who assists with ADLs.  I shared with the daughter that this is a major setback for him and that with his medical history, he is at very high risk for pneumonia and intubation.  She has not explicitly discussed with her brothers whether he would want to be intubated were it indicated.    He has had cardiac bypass and cervical spine surgery. He has cardiac stents.    Remote 40 pack year smoking history.      Post-Op Info:  * No surgery found *         Interval History: NAEON. Afebrile. 2L O2 this AM, RR increasing. CXR worsening. Beginning to have respiratory acidosis     Medications:  Continuous Infusions:   dexmedetomidine (PRECEDEX) infusion 0.3 mcg/kg/hr (01/21/23 0808)    dextrose 5 % and 0.45 % NaCl 50 mL/hr at 01/21/23 0500    ROPIvacaine (PF) 2 mg/ml (0.2%) 0.1 mL/hr (01/21/23 0500)     Scheduled Meds:   acetaminophen  1,000 mg Oral TID    atorvastatin  40 mg Oral Daily    budesonide  0.5 mg Nebulization Q12H    dronabinoL  2.5 mg Oral BID    furosemide  20 mg Oral Daily    [START ON 1/22/2023] gabapentin  500 mg Oral Daily    heparin (porcine)  5,000 Units Subcutaneous Q8H    HYDROmorphone  0.5 mg Intravenous Once    levalbuterol  0.63 mg Nebulization Q4H    LIDOcaine  1 patch Transdermal Q24H    melatonin  6 mg Oral Nightly    memantine  10 mg Oral BID    midodrine  2.5 mg Oral  Q8H    mupirocin   Nasal BID    pantoprazole  40 mg Oral Daily    polyethylene glycol  17 g Oral Daily    senna  8.6 mg Oral BID    sertraline  100 mg Oral Daily     PRN Meds:sodium chloride 0.9%, albuterol-ipratropium, dextrose 10%, dextrose 10%, glucagon (human recombinant), glucose, glucose, insulin aspart U-100, ondansetron     Review of patient's allergies indicates:   Allergen Reactions    Morphine Shortness Of Breath     Objective:     Vital Signs (Most Recent):  Temp: 98.7 °F (37.1 °C) (01/21/23 0300)  Pulse: 96 (01/21/23 0759)  Resp: (!) 31 (01/21/23 0759)  BP: (!) 145/74 (01/21/23 0810)  SpO2: 100 % (01/21/23 0759)   Vital Signs (24h Range):  Temp:  [98.2 °F (36.8 °C)-99.4 °F (37.4 °C)] 98.7 °F (37.1 °C)  Pulse:  [] 96  Resp:  [30-51] 31  SpO2:  [85 %-100 %] 100 %  BP: (124-207)/(60-95) 145/74  Arterial Line BP: (-9-155)/(-12-57) -9/-12     Weight:  (bed scale broken)  Body mass index is 22.15 kg/m².    Intake/Output - Last 3 Shifts         01/19 0700  01/20 0659 01/20 0700 01/21 0659 01/21 0700 01/22 0659    P.O. 90 60     I.V. (mL/kg) 1309.1 (19.3) 1151.7 (16.9)     IV Piggyback 394.8      Total Intake(mL/kg) 1794 (26.4) 1211.7 (17.8)     Urine (mL/kg/hr) 360 (0.2) 227 (0.1)     Total Output 360 227     Net +1434 +984.7            Urine Occurrence  1 x             Physical Exam  Vitals and nursing note reviewed.   Constitutional:       General: He is not in acute distress.     Comments: 4L O2 via NC   HENT:      Head: Normocephalic.      Nose: Nose normal.   Eyes:      General: No scleral icterus.     Extraocular Movements: Extraocular movements intact.   Cardiovascular:      Rate and Rhythm: Normal rate.   Pulmonary:      Effort: Pulmonary effort is normal. No respiratory distress.   Chest:      Comments: Right posterior back pain at site of fractures  Crepitus along the right back  Abdominal:      General: There is no distension.      Palpations: Abdomen is soft.      Tenderness: There is  no abdominal tenderness. There is no guarding or rebound.   Musculoskeletal:         General: No deformity.   Skin:     General: Skin is warm and dry.   Neurological:      Mental Status: He is alert. He is disoriented.       Significant Labs:  I have reviewed all pertinent lab results within the past 24 hours.    Significant Diagnostics:  I have reviewed all pertinent imaging results/findings within the past 24 hours.    Assessment/Plan:     * Closed fracture of multiple ribs of right side  Patient is an 82 year old frail and comorbid gentleman with h/o COPD (on home O2), pulmonary sarcoidosis, CABG, HTN, HLD, depression, GERD, dementia, HF who presents after a fall with multiple posterior right rib fractures that are comminuted with significant displacement, also with small pneumothorax that does not need intervention at this time, but may eventually. Clinically stable on a few liters of O2 now but is very high risk for pneumonia.    - Dysphagia soft diet with aspiration precautions  - Daily CXR  - Palliative care consulted, appreciate assistance   - Will follow up discussions. Currently partial code. No chest compressions but okay with intubation   - Multimodal pain control  - PRN IV pain   - Marinol   - APS consulted, appreciate assistance. Block 1/18  - Aggressive pulmonary toilet, acapella, IS, duonebs, inhalation treatment  - Home meds  - DVT ppx (SCDs and heparin)  - Remainder of care per SICU, appreciate assistance    Dispo: remain in SICU       Traumatic pneumothorax  - see principle problem    Chronic obstructive pulmonary disease  - see principle problem         Darius Parada MD  General Surgery  [facility]

## 2023-01-21 NOTE — ASSESSMENT & PLAN NOTE
Paul Browning is a frail 82M h/o CAD (EF 40% with previous MI with enduring defect seen on cardiac PET), COPD on 0.5L NC at home and inhalers, DM2, CKD3, HTN, sarcoid, CAD who fell multiple times at home. Imaging shows small apical PTX and subQ air on R chest wall with CT with several comminuted posterior rib fractures.      Neuro/Psych:   -- Sedation: none  -- Pain: [facility] robaxin, tylenol, gabapentin, lidocaine patch, ropivacaine  -- Right BELEM catheter placed by APS 1/18             Cards:   -- hx of HTN, CAD s/p stents  -- Hypotensive with SBP lowest to 70s initially, now hypertensive highest to 200s  -- Started on levo peripheral at 0.04, since discontinued and started on midodrine, currently at 2.  -- holding home ASA and Entresto      Pulm:   -- hx of COPD, baseline 0.5L NC at home  -- now has right sided rib fractures and small right apical PTX  -- Goal O2 sat > 90%  -- Wean as able  -- duonebs, IS, Acapella  -- Increasing respiratory effort with  wheezing         Renal:  -- hx of CKD3, baseline Cr 1.0  -- BUN/Cr 45/1.6 (Cr decreasing), pending AM labs    -- Nursing to confirm correct sized segal for patient, monitor for drainage      FEN / GI:    -- Replace lytes as needed  -- Nutrition: soft diet      ID:   -- Tm: afebrile; WBC 10.7  -- Abx none      Heme/Onc:   -- H/H 9.8/30  -- Daily CBC      Endo:   -- hx of DM2, on Jardiance   -- Gluc goal 140-180  -- AISS      PPx:   Feeding: soft diet  Analgesia/Sedation: [facility] gabapentin, robaxin, tylenol, lido patch; BELEM catheter  Thromboembolic prevention: heparin  HOB >30: yes  Stress Ulcer ppx: protonix  Glucose control: Critical care goal 140-180 g/dl, ISS    Lines/Drains/Airway: PIV x2, Segal, Epidural line      Dispo/Code Status/Palliative:   -- SICU / No chest compressions, ok to intubate/palliative following for ongoing GOC discussion

## 2023-01-22 NOTE — ASSESSMENT & PLAN NOTE
Patient is an 82 year old frail and comorbid gentleman with h/o COPD (on home O2), pulmonary sarcoidosis, CABG, HTN, HLD, depression, GERD, dementia, HF who presents after a fall with multiple posterior right rib fractures that are comminuted with significant displacement, also with small pneumothorax that does not need intervention at this time, but may eventually. Clinically stable on a few liters of O2 now but is very high risk for pneumonia.    - Dysphagia soft diet with aspiration precautions  - Daily CXR  - Palliative care consulted, appreciate assistance   - Will follow up discussions. Currently DNR.   - Multimodal pain control  - PRN IV pain   - Marinol   - APS consulted, appreciate assistance. Block 1/18, removed inadvertently 1/21. Eval for possible replacement  - Aggressive pulmonary toilet, acapella, IS, duonebs, inhalation treatment  - Home meds  - DVT ppx (SCDs and heparin)  - Remainder of care per SICU, appreciate assistance    Dispo: remain in SICU

## 2023-01-22 NOTE — PLAN OF CARE
"      SICU PLAN OF CARE NOTE    Dx: Closed fracture of multiple ribs of right side    Shift Events: PT had increased work of breathing throughout whole shift. PT stating " Help me, I am tired" throughout the entire shift. Breathing treatments were continued and did not improve symptoms. PT agitated and was started on precedex with minimal relief. Palliative MD was notified for goals of care conversation d/t pt family having different opinions and wants for pt. PT epidural cath came out of pt. MD was notified for possible replacement cath. Pt now has PRN dilaudid pushes for agitation and pain/discomfort.     Goals of Care: PT was made a DNR. Continue care as normal otherwise.     Neuro: Pt confused.Pt yelling throughout shift    Vital Signs: BP (!) 101/52   Pulse 68   Temp 98.3 °F (36.8 °C) (Oral)   Resp (!) 36   Ht 5' 9" (1.753 m)   Wt 68 kg (150 lb)   SpO2 100%   BMI 22.15 kg/m²     Respiratory: Nasal Cannula 4 L. Pt has increased work of breathing throughout shift.     Diet: Dental Soft. Will place NG tube for possible tube feeds    Gtts: MIVF    Urine Output: Voids Spontaneously 250 cc/shift    Drains:      Labs/Accuchecks: .    Skin: Noted bruising on pt. Bruising did not spread past outline with marker. Lines and devices free from skin contact. Pressure points protected. Pt repositioned q2 hrs.       "

## 2023-01-22 NOTE — ASSESSMENT & PLAN NOTE
Paul Browning is a frail 82M h/o CAD (EF 40% with previous MI with enduring defect seen on cardiac PET), COPD on 0.5L NC at home and inhalers, DM2, CKD3, HTN, sarcoid, CAD who fell multiple times at home. Imaging shows small apical PTX and subQ air on R chest wall with CT with several comminuted posterior rib fractures.      Neuro/Psych:   -- Sedation: none  -- Pain: [facility] robaxin, tylenol, gabapentin, lidocaine patch, ropivacaine  -- Right BELEM catheter placed by APS 1/18, pt pulled yesterday             Cards:   -- hx of HTN, CAD s/p stents  -- normotensive  -- Started on levo peripheral at 0.04, since discontinued and started on midodrine 2.5 Q12  -- holding home ASA and Entresto      Pulm:   -- hx of COPD, baseline 0.5L NC at home  -- now has right sided rib fractures and small right apical PTX  -- Goal O2 sat > 90%  -- Wean as able  -- duonebs, IS, Acapella  -- Increasing respiratory effort with  wheezing         Renal:  -- hx of CKD3, baseline Cr 1.0  -- BUN/Cr 32/0.9  -- Nursing to confirm correct sized segal for patient, monitor for drainage      FEN / GI:    -- Replace lytes as needed  -- Nutrition: soft diet  -- NGT placed      ID:   -- Tm: afebrile; WBC 5.72  -- Abx none      Heme/Onc:   -- H/H 9.8/30  -- Daily CBC      Endo:   -- hx of DM2, on Jardiance   -- Gluc goal 140-180  -- AISS      PPx:   Feeding: soft diet  Analgesia/Sedation: [facility] gabapentin, robaxin, tylenol, lido patch  Thromboembolic prevention: heparin  HOB >30: yes  Stress Ulcer ppx: protonix  Glucose control: Critical care goal 140-180 g/dl, ISS    Lines/Drains/Airway: PIV x2, Segal, NGT      Dispo/Code Status/Palliative:   -- SICU / No chest compressions, ok to intubate/palliative following for ongoing GOC discussion

## 2023-01-22 NOTE — SUBJECTIVE & OBJECTIVE
Interval History/Significant Events: Pulled BELEM catheter yesterday, receiving dilaudid pushes. APS may replace EPS. Afebrile, RR 40s. NGT placed.        Objective:     Vital Signs (Most Recent):  Temp: 97.9 °F (36.6 °C) (01/22/23 0300)  Pulse: 71 (01/22/23 0545)  Resp: (!) 46 (01/22/23 0545)  BP: (!) 122/59 (01/22/23 0500)  SpO2: 100 % (01/22/23 0545)   Vital Signs (24h Range):  Temp:  [97.7 °F (36.5 °C)-98.3 °F (36.8 °C)] 97.9 °F (36.6 °C)  Pulse:  [] 71  Resp:  [14-66] 46  SpO2:  [83 %-100 %] 100 %  BP: ()/(52-84) 122/59     Weight:  (bed scale broken)  Body mass index is 22.15 kg/m².      Intake/Output Summary (Last 24 hours) at 1/22/2023 0610  Last data filed at 1/22/2023 0500  Gross per 24 hour   Intake 1473.81 ml   Output 920 ml   Net 553.81 ml       Physical Exam  Constitutional:       General: He is not in acute distress.     Appearance: He is ill-appearing.   HENT:      Head: Normocephalic.      Nose: Nose normal.   Eyes:      General: No scleral icterus.     Extraocular Movements: Extraocular movements intact.   Cardiovascular:      Rate and Rhythm: Normal rate.   Pulmonary:      Effort: No respiratory distress.      Comments: Tachypneic  Right lung, coarse breath sounds  Chest:      Comments: Right posterior back pain at site of fractures  Abdominal:      General: There is no distension.      Palpations: Abdomen is soft.      Tenderness: There is no abdominal tenderness. There is no guarding or rebound.   Genitourinary:     Comments: Mild erythema at tip of penis, no discharge noted.   Musculoskeletal:         General: No deformity.   Skin:     General: Skin is warm and dry.   Neurological:      Mental Status: He is alert. He is disoriented.    Vents:  Oxygen Concentration (%): 4 (01/21/23 1555)    Lines/Drains/Airways       Drain  Duration                  NG/OG Tube 01/21/23 2321 nasogastric 18 Fr. Left nostril <1 day    Male External Urinary Catheter 01/21/23 1200 <1 day               Peripheral Intravenous Line  Duration                  Peripheral IV - Single Lumen 01/16/23 2147 20 G Posterior;Right Forearm 5 days         Peripheral IV - Single Lumen 01/17/23 0022 20 G Anterior;Proximal;Right Forearm 5 days         Peripheral IV - Single Lumen 01/21/23 2213 20 G Left Wrist <1 day                    Significant Labs:    CBC/Anemia Profile:  Recent Labs   Lab 01/21/23  0303 01/22/23  0255   WBC 10.66 5.72   HGB 9.8* 8.7*   HCT 30.2* 26.5*    199   MCV 98 99*   RDW 14.5 14.7*        Chemistries:  Recent Labs   Lab 01/21/23  0622 01/22/23  0255    141   K 4.5 4.6    109   CO2 22* 21*   BUN 37* 32*   CREATININE 1.0 0.9   CALCIUM 8.3* 8.4*   ALBUMIN 3.0* 2.6*   PROT 5.9* 5.2*   BILITOT 1.1* 0.9   ALKPHOS 158* 137*   ALT 34 26   AST 47* 24   MG 2.3 2.5   PHOS 3.2 2.6*       All pertinent labs within the past 24 hours have been reviewed.    Significant Imaging:  I have reviewed all pertinent imaging results/findings within the past 24 hours.

## 2023-01-22 NOTE — ASSESSMENT & PLAN NOTE
"Paul Berrios "Slim Browning is an 82-year-old man with a history of dementia (FAST score 6D), CAD s/p STEMI/PCI/2v-CABG (2014), ICM with reduced EF (40%), COPD on home oxygen (intermittently), sarcoidosis, possible Parkinson's, DM2, CKD3, multiple falls who was admitted after sustaining a fall with multiple rib fractures and small apical pneumothorax. Hospital course notable for delirium/encephalopathy, concerns of aspiration, development of VIKTOR and hypotension. Palliative and Supportive Care was consulted to explore goals of care and symptom management recommendations.    Advance Care Planning   Goals of Care:  - Code status: No CPR, OK for intubation  - Next of kin: 4 adult children (3 sons Paul, Neto, Dennys, 1 daughter Mesha)  - Patient is not decisional  - ACP documents: none  - Prognosis: poor  - Family's understanding of prognosis: good; continue education if EOL approaches  - Goals: control pain/symptoms, treat acute, reversible issues, continue supportive care; minimize unneccesary suffering   - Recommendations/Plans: continue full supportive care, including initiation of antibiotics if warranted. Appreciate primary team for helping  family about prognosis in order to communicate recommendations that would avoid harm. Will continue to provide support for family who maintains hope that he will recover.     Goals of Care Conversation  1/21  Please see separate note for GOC/ACP discussion.  Pt DNR/DNI in event of continued decline and respiratory/cardiac arrest.  Family wants to avoid invasive and non beneficial life prolonging therapies.  Cont best supportive and minimally invasive care for now.  If declines further, family would want focus to be entirely on comfort focused therapies.     - 1/20/2023: Paul at bedside, who had spent the night with Mr. Browning. Remains concerns of worsening encephalopathy and raised question to the physicians about potential UTI exacerbating mental " "status. Remains hopeful that his numbers are looking stable/OK - his blood pressure, oxygen saturation, labs, imaging. Acknowledged that he is still very tachypneic and looks worse than he did on admission. His sister Mesha will come today and family will always remain at bedside throughout Mr. Browning's hospitalization. Remains open to palliative presence during Mr. Browning's hospitalization for continued support.  - 1/19/2023: Met Mr. Browning with his daughter Mesha and son Dennys at bedside. Mesha described overnight events, noting that while pain is better, he was up all night, confused and required dose of Zyprexa. She confirms that he does not have Parkinson's and that tremor is due to chronic nerve damage, for which he takes gabapentin. Noted that despite being in the hospital and having better control of pain, he appears to be getting worse. Mesha agrees that he looks worse, but shares that her brother Paul checked MyChart and saw that there were no drastic changes in his imaging and labs. I shared that I worried about him aspirating, noting that there is an increasing right lower lobe infiltrate demonstrated in his morning's chest x-ray. She wants to know what it is, whether it is a pneumonia and would like antibiotics started sooner than later to treat it. Emphasized our last conversation that it was not a matter of if he'd aspirate, but when he would, and that I worried time is short. She acknowledges that when he fell and was in the ER, that there was a chance he would "not make it" but wants to give him a chance of surviving this. Shared that I will revisit this afternoon to see how he and they are doing. Offered that if they'd like us to engage other family members (such as Dennys's wife who is an RN) that we'd be happy to do so per their preference. Mesha asks if Father Chase can come, shared that he did not come at all yesterday according to Paul. I will reach out and request " for Father Chase to come to Mr. Browning today.  - 2023: Met Mr. Browning and his son Paul at his bedside, who was having a discussion related to code status with a surgeon on the team. Mr. Browning agrees that CPR will cause more harm and will text and discuss with his siblings about this code status. At this time he remains hesitant about changing code status related to intubation, recalling that his mother survived compassionate extubation 3 years after physicians told them that she was dying. Had supportive conversation with Paul, who remains hopeful that pain management will be able to perform nerve block as he worries that the opioids are causing too much sedation and would like to minimize medications if possible. Discussed interval development of VIKTOR, and will be watchful of this problem and adjust medications to dose renally and protect kidneys.  - 2023: Met Mr. Browning and his daughter Mesha and son Paul at his bedside. Mr. Browning is unable to fully participate in our discussion due to advanced dementia and immediate short term memory loss. Mesha shares her understanding that he broke 4 ribs in this fall and punctured his lungs. She was told that he might need a chest tube and he was high risk for developing pneumonia. She recalls that her mother and father discussed with each other that they would not want to be artificially sustained on life-support. When her mother was intubated and told that she would be ventilator-dependent, family honored her wishes and withdrew ventilator support. She thankfully lived 3 years after compassionate extubation and  3 years ago, which was the beginning of Mr. Browning's decline. They had been  when he was 15-years-old and she was 16-years-old. Now he lives with his daughter Mesha and is also supported by his other sons who help with other important aspects of his life. Mesha shares that he has sustained multiple  "falls, most recently sustained 3 falls in the last 2 weeks. Despite constant reorientation, he forgets to stay in bed and will try to get out and ambulate. He had significant sundowning from 4pm to 8pm, but now it is all day. Mesha and Paul recognize that he is progressively declining and acknowledge that this will continue. They've been told previously when he sustained his prior falls that "this was it" but despite this, he continues to live so they hope to continue doing what they can to help him live. When Mesha discussed code status in the ER, she was under the impression that the ER staff was saying that intubation and CPR will help him. We discussed that knowing that the fall was enough to cause multiple rib fractures, forceful chest compressions will surely cause more harm and more breakage, and the ventilator will worsen his small pneumothorax. I recommended against such interventions, explaining that this will sure cause his demise and not help him the way we hope. eMsha asks if we don't intubate/ventilate when he develops a pneumonia, what can be done alternatively. I shared that at that point there are no curative interventions, and that medical professionals can support him with symptom management and offer him strategies to alleviate shortness of breath. They asked about chest tube, and I shared that as Neto (their brother) had a chest tube before, that Mr. Browning will also have significant pain if a chest tube is inserted, and this would prolong his current state without a guarantee that it will fix the pneumothorax as his body will have a harder time to heal. They share their understanding and would like to discuss this with their other siblings. Their sister-in-law who is an [facility] [employer] RN came to visit, and also voiced similar concerns about CPR/intubation as well. Mesha states that she is worried that he is miserable, forgetting immediately that he already went out to visit " family/loved ones and stating repetitively that he wishes to go out to fish. Validated her concerns, recognizing that they wish that everything helpful be continued and offered. Encouraged them to consider that in addition to this, it is also our responsibility to protect him from harmful interventions that will not benefit him, as they had done so for their mother.       Acute Pain 2/2 Rib Fractures, Falls  - attempting to replace epidural cath on 1/23  -in mean time would consider scheduling opioids at a minimum of q 6 hours: hydromorphone 0.5 mg q 6 hours and prn along with maximizing adjuvants  - NGT placed on 1/21 for oral meds  - S/p nerve block by pain management, performed on 1/18/23  - Scheduled acetaminophen 1000 mg PO TID for chronic pain/headache  - No longer on opioids  - Gabapentin 300 mg daily (adjusted from home TID schedule for VIKTOR)  - Continue lidocaine patches    Constipation  - cont bowel regimen  - Continue senna 8.6 mg PO BID  - Polyethylene glycol 17 g PO   - Ideally would avoid suppository/enema as turning causes more pain, but may need to do so if no bowel movement    Anxiety/Agitation/[place]/Delirium  Dementia with Behavioral Disturbances  - Continue memantine 10 mg PO BID per NG  - Continue home sertraline 100 mg PO per NG  - Clarified that he does not have Parkinson's  - Had paradoxical reaction to Xanax  - Continue frequent reorientation and optimization of environment by keeping room bright during the day, dark and quiet at night. Discontinue vital signs at night time to allow him to sleep undisturbed  - Optimize pain management as above  - QTc on admission is 497. Agree that low dose antipsychotics will be helpful in setting of non-redirectable agitation.

## 2023-01-22 NOTE — ANESTHESIA POST-OP PAIN MANAGEMENT
Consult  Anesthesiology Acute Pain Service      SUBJECTIVE:     Chief Complaint/Reason for Consult: Request anesthesia assistance with continuous pain management due to high complexity/refractory pain.    Surgical Procedure: N/a    Post Op Day: N/a    History of Present Illness:  Patient is a 82 y.o. male with a PMH of CAD (EF 40% with previous MI with enduring defect seen on cardiac PET), COPD on 0.5L NC at home and inhalers, DM2, CKD3, HTN, sarcoid, CAD who fell multiple times at home. Imaging shows small apical PTX and subQ air on R chest wall with CT with several comminuted posterior rib fractures.     On admission, he is satting in the mid-90s on 6L facemask. Back pain is not controlled, and he is complaining of abdominal pain as well. He is admitted to the SICU for observation and pain control.        Anesthesiology is consulted for refractory pain control.     The current inpatient regimen is:  Acetaminophen 1,000mg q8h  Gabapentin 300mg daily  Sertraline 100mg oral  BELEM         Patient Active Problem List   Diagnosis    CAD (coronary artery disease)    Hypertension    Hyperlipidemia    Stevens's esophagus with low grade dysplasia    Hx of gastric ulcer    S/P CABG x 2    VIKTOR (acute kidney injury)    SOB (shortness of breath)    Atherosclerosis of aorta    Pulmonary sarcoidosis    MDD (major depressive disorder), recurrent episode, mild    Peripheral arterial disease    Osteoporosis    Senile purpura    Refractive error    Dry eye syndrome of both eyes    Heterophoria    Right homonymous hemianopsia    Carotid artery disease    Cervicogenic headache    Cervical spondylosis    DDD (degenerative disc disease), cervical    Chronic neck pain    Neck stiffness    Abnormal increased muscle tightness    Awakens from sleep due to pain    Cervical segment dysfunction    Tightness of neck    Dysfunctional movements    Odontoid fracture    Gastroesophageal reflux disease    Anxiety     Decreased ROM of neck    Gait instability    Degenerative disease of nervous system, unspecified    Impaired functional mobility, balance, gait, and endurance    Vision loss    Intraventricular conduction delay    Dementia    Acute hypoxemic respiratory failure    Frequent falls    Scalp laceration    Chronic diastolic heart failure    Closed fracture of pubis    Multiple closed stable lateral compression fractures of pelvis    Closed fracture of trochanter of left femur with routine healing    Chronic obstructive pulmonary disease    Lung nodule    Ischemic cardiomyopathy    Closed fracture of multiple ribs of right side    Encounter for palliative care    Traumatic pneumothorax    Multiple rib fractures involving four or more ribs       Review of patient's allergies indicates:   Allergen Reactions    Morphine Shortness Of Breath       Outpatient Medications:   No current facility-administered medications on file prior to encounter.     Current Outpatient Medications on File Prior to Encounter   Medication Sig Dispense Refill    albuterol (PROVENTIL/VENTOLIN HFA) 90 mcg/actuation inhaler INHALE 2 PUFFS INTO THE LUNGS EVERY 6 HOURS AS NEEDED WHEEZING OR SHORTNESS OF BREATH 8.5 g 11    albuterol-ipratropium (DUO-NEB) 2.5 mg-0.5 mg/3 mL nebulizer solution Take 3 mLs by nebulization every 12 (twelve) hours. Rescue 540 mL 3    alendronate (FOSAMAX) 70 MG tablet TAKE 1 TABLET BY MOUTH EVERY WEEK IN THE MORNING WITH FULL GLASS OF WATER ON EMPTY STOMACH-DONT LIE DOWN FOR AT LEAST 30 MINUTES AFTERWARDS 12 tablet 3    aspirin (ECOTRIN) 81 MG EC tablet Take 81 mg by mouth once daily.      atorvastatin (LIPITOR) 40 MG tablet TAKE 1 TABLET(40 MG) BY MOUTH EVERY DAY 90 tablet 0    budesonide (PULMICORT) 0.5 mg/2 mL nebulizer solution Take 2 mLs (0.5 mg total) by nebulization 2 (two) times daily. Controller 120 mL 5    calcium carbonate (CALCIUM 600 ORAL) Take by mouth.      doxepin (SILENOR) 3 mg  Tab One po qhs 30 tablet 6    empagliflozin (JARDIANCE) 10 mg tablet Take 1 tablet (10 mg total) by mouth once daily. 30 tablet 11    furosemide (LASIX) 20 MG tablet Take 1 tablet (20 mg total) by mouth daily as needed (Wt gain >2 pounds/day or >5 pounds/week). 90 tablet 3    magnesium oxide (MAG-OX) 400 mg (241.3 mg magnesium) tablet Take 1 tablet (400 mg total) by mouth once daily. 30 tablet 3    memantine (NAMENDA) 10 MG Tab Take 1 tablet (10 mg total) by mouth 2 (two) times daily. 180 tablet 1    methylPREDNISolone (MEDROL, RENAN,) 4 mg tablet use as directed 1 each 0    omega-3 fatty acids-vitamin E (FISH OIL) 1,000 mg Cap Take 1 tablet by mouth 2 (two) times daily. (Patient taking differently: Take 1 tablet by mouth once daily.) 60 each 11    ondansetron (ZOFRAN) 8 MG tablet Take 1 tablet (8 mg total) by mouth every 8 (eight) hours as needed for Nausea. 30 tablet 1    pantoprazole (PROTONIX) 40 MG tablet TAKE 1 TABLET(40 MG) BY MOUTH TWICE DAILY 180 tablet 0    sacubitriL-valsartan (ENTRESTO) 24-26 mg per tablet Take 1 tablet by mouth 2 (two) times daily. 60 tablet 11    sertraline (ZOLOFT) 100 MG tablet Take 1 tablet (100 mg total) by mouth once daily. 90 tablet 3    tiotropium-olodateroL (STIOLTO RESPIMAT) 2.5-2.5 mcg/actuation Mist Inhale 2 puffs into the lungs once daily. Controller 4 g 5    ubrogepant (UBROGEPANT) 100 mg tablet One po at onset of migraine. May repeat after 2 hours if needed. 10 tablet 1        Current Inpatient Medications:      PRN:      Past Surgical History:   Procedure Laterality Date    CARDIAC SURGERY      CAROTID STENT N/A 10/14/2019    Procedure: INSERTION, STENT, ARTERY, CAROTID;  Surgeon: Artie Kebede MD;  Location: [facility] CATH LAB;  Service: Cardiology;  Laterality: N/A;    CATARACT EXTRACTION W/  INTRAOCULAR LENS IMPLANT Right 07/17/2018    Dr. Talamantes    CATARACT EXTRACTION W/  INTRAOCULAR LENS IMPLANT Left 07/31/2018    Dr. Talamantes    CORONARY ARTERY  BYPASS GRAFT      x2  10/2014    ESOPHAGOGASTRODUODENOSCOPY N/A 4/8/2019    Procedure: ESOPHAGOGASTRODUODENOSCOPY (EGD)/poss rfa;  Surgeon: Clifford De La Torre MD;  Location: [facility] ENDO (2ND FLR);  Service: Endoscopy;  Laterality: N/A;    ESOPHAGOGASTRODUODENOSCOPY N/A 8/4/2021    Procedure: EGD (ESOPHAGOGASTRODUODENOSCOPY);  Surgeon: Clifford De La Torre MD;  Location: [facility] ENDO (2ND FLR);  Service: Endoscopy;  Laterality: N/A;  8/2-covid [place]-inst shjtga-oz-mx appts on 8/3    ESOPHAGOGASTRODUODENOSCOPY (EGD) WITH RADIOFREQUENCY TREATMENT OF GASTROESOPHAGEAL JUNCTION      INJECTION OF ANESTHETIC AGENT AROUND MEDIAL BRANCH NERVES INNERVATING CERVICAL FACET JOINT Bilateral 1/9/2020    Procedure: INJECTION, MBB--Bilateral Cervical- Third Occipital nerve, C2-3--ASA okay for pt to continue taking per provider.;  Surgeon: Tip Mera Jr., MD;  Location: [facility] PAIN MGT;  Service: Pain Management;  Laterality: Bilateral;    INTRAOCULAR PROSTHESES INSERTION Right 7/17/2018    Procedure: INSERTION, INTRAOCULAR LENS PROSTHESIS;  Surgeon: León Talamantes MD;  Location: [address];  Service: Ophthalmology;  Laterality: Right;    INTRAOCULAR PROSTHESES INSERTION Left 7/31/2018    Procedure: INSERTION, INTRAOCULAR LENS PROSTHESIS;  Surgeon: León Talamantes MD;  Location: [facility] OR [address] [place] [address];  Service: Ophthalmology;  Laterality: Left;    PHACOEMULSIFICATION OF CATARACT Right 7/17/2018    Procedure: PHACOEMULSIFICATION, CATARACT;  Surgeon: León Talamantes MD;  Location: [facility] OR [address] [place] [address];  Service: Ophthalmology;  Laterality: Right;    PHACOEMULSIFICATION OF CATARACT Left 7/31/2018    Procedure: PHACOEMULSIFICATION, CATARACT;  Surgeon: León Talamantes MD;  Location: [facility] OR [address] [place] [address];  Service: Ophthalmology;  Laterality: Left;    PLACEMENT OF SCREW IN ODONTOID N/A 10/22/2020    Procedure: INSERTION, SCREW, ODONTOID. Anterior approach.;  Surgeon: Kirsten Weaver MD;  Location: [facility] OR 2ND FLR;   Service: Neurosurgery;  Laterality: N/A;    RADIOFREQUENCY THERMOCOAGULATION Bilateral 2020    Procedure: RADIOFREQUENCY THERMAL COAGULATION--Bilateral C2-3 and Third Occipital Nerve;  Surgeon: Tip Mera Jr., MD;  Location: [facility];  Service: Pain Management;  Laterality: Bilateral;    UMBILICAL HERNIA REPAIR         Social History     Socioeconomic History    Marital status:     Number of children: 4   Occupational History    Occupation:     Tobacco Use    Smoking status: Former     Packs/day: 2.00     Years: 20.00     Pack years: 40.00     Types: Cigarettes     Quit date: 1988     Years since quittin.9     Passive exposure: Past    Smokeless tobacco: Never   Substance and Sexual Activity    Alcohol use: No     Comment: quit drinking beer     Drug use: No    Sexual activity: Not Currently     Partners: Female       Review of Systems:  As above. Additionally:     Constitutional: activity change  HENT: Negative for congestion, postnasal drip, rhinorrhea, sinus pressure, sore throat, trouble swallowing and voice change.    Eyes: Negative for photophobia, pain and visual disturbance.   Respiratory: cough, shortness of breath, negative for wheezing.    Cardiovascular: Negative for palpitations and leg swelling.   Gastrointestinal: Negative for nausea, vomiting, abdominal pain, diarrhea, constipation and abdominal distention.   Endocrine: Negative for polydipsia, polyphagia and polyuria.   Genitourinary: Negative for dysuria, frequency, hematuria and difficulty urinating.   Musculoskeletal: arthralgias  Skin: Negative for color change, pallor, rash and wound.   Neurological: Negative for dizziness, seizures, syncope, weakness and headaches.   Hematological: Negative for adenopathy. Does not bruise/bleed easily.   Psychiatric/Behavioral: Negative for sleep disturbance and dysphoric mood. The patient is not nervous/anxious.      OBJECTIVE:     Current Vital  Signs  Temp: 36.4 °C (97.6 °F) (01/22/23 0715)  Pulse: 72 (01/22/23 0900)  Resp: (!) 41 (01/22/23 0900)  BP: (!) 150/106 (01/22/23 0900)  SpO2: 100 % (01/22/23 0900)    Vital Signs Range (Last 24H):  Temp:  [36.4 °C (97.6 °F)-36.8 °C (98.3 °F)]   Pulse:  []   Resp:  [14-66]   BP: ()/()   SpO2:  [83 %-100 %]     I & O (Last 24H):    Intake/Output Summary (Last 24 hours) at 1/22/2023 0956  Last data filed at 1/22/2023 0900  Gross per 24 hour   Intake 1551.95 ml   Output 920 ml   Net 631.95 ml       Physical Exam:  Constitutional: Patient is oriented to person, place, and time. Patient appears well-developed and well-nourished. No distress.   HENT: Head: Normocephalic and atraumatic. Right Ear: External ear normal. Left Ear: External ear normal. Nose: Nose normal.   Mouth/Throat: Oropharynx is clear and moist. No oropharyngeal exudate.   Eyes: Conjunctivae and EOM are normal. Pupils are equal, round, and reactive to light. Right eye exhibits no discharge. Left eye exhibits no discharge. No scleral icterus.   Neck: Normal range of motion. Neck supple. No JVD present. No tracheal deviation present. No thyromegaly present.   Cardiovascular: Normal rate, regular rhythm, normal heart sounds and intact distal pulses.  Exam reveals no gallop and no friction rub.  No murmur heard.  Pulmonary/Chest: Effort normal and breath sounds normal. No respiratory distress. Patient has no wheezes. Patient has no rales.   Abdominal: Soft. Bowel sounds are normal. Patient exhibits no distension and no mass. There is no tenderness.   Musculoskeletal: Normal range of motion. Patient exhibits no edema or tenderness.   Lymphadenopathy:   Patient has no cervical adenopathy.   Neurological: Patient is alert and oriented to person, place, and time. Patient has normal reflexes. Patient exhibits normal muscle tone. Coordination normal.   Skin: Skin is warm and dry. No rash noted. Patient is not diaphoretic. No erythema. No pallor.    Psychiatric: Patient has a normal mood and affect. Behavior is normal. Judgment and thought content normal.     Laboratory:  CBC:   Recent Labs     01/21/23  0303 01/22/23  0255   WBC 10.66 5.72   RBC 3.08* 2.69*   HGB 9.8* 8.7*   HCT 30.2* 26.5*    199   MCV 98 99*   MCH 31.8* 32.3*   MCHC 32.5 32.8       CMP:   Recent Labs     01/21/23  0622 01/22/23  0255    141   K 4.5 4.6   CO2 22* 21*    109   BUN 37* 32*   CREATININE 1.0 0.9   * 124*   MG 2.3 2.5   PHOS 3.2 2.6*   CALCIUM 8.3* 8.4*   ALBUMIN 3.0* 2.6*   PROT 5.9* 5.2*   ALKPHOS 158* 137*   ALT 34 26   AST 47* 24   BILITOT 1.1* 0.9       Diagnostic Radiology:  Air in the right chest wall adjacent to the lateral rib cage.  Small right apical pneumothorax.  Suspect right-sided rib fracture    ASSESSMENT/PLAN:     Active Hospital Problems    Diagnosis  POA    *Closed fracture of multiple ribs of right side [S22.41XA]  Yes    Multiple rib fractures involving four or more ribs [S22.49XA]  Unknown    Traumatic pneumothorax [S27.0XXA]  Yes    Encounter for palliative care [Z51.5]  Not Applicable    Chronic obstructive pulmonary disease [J44.9]  Yes     Patient has a history of COPD and has not been using a controller because of cost.  We will reorder and ask for the pharmacy assistance program to assist.  In addition will start DuoNeb twice a day and Pulmicort b.i.d..      VIKTOR (acute kidney injury) [N17.9]  Yes      Resolved Hospital Problems   No resolved problems to display.     Mr. Browning is an 81yo M PMH CAD (EF 40% with previous MI with enduring defect seen on cardiac PET), COPD on 0.5L NC at home and inhalers, DM2, CKD3, HTN, sarcoid, admitted with multiple R sided rib fractures 2/2 fall with apical pneumothorax, not a surgical candidate. Given the patient is tachycardic, reporting pain, taking shallow rapid breaths, requiring 3L NC, his pain was initially inadequately controlled on current regimen with PO oxy and IV  dilaudid for breakthrough. Pain improved with BELEM, heart rate within normal limits. Delirious 1/18 overnight. Pain well controlled without use of narcotics.     Plan:    -- BELEM block inadvertently pulled out 1/21/23  -- Will consider replacement of BELEM catheter likely Monday 1/23  -- Dilaudid PRN in the meantime  -- Multimodals: Acetaminophen 1,000mg 3x daily, Gabapentin 300 3xdaily, lidocaine patch  -- Home sertraline 100mg daily  -- Encourage open windows and interactions with family/friends at bedside for delirium     Thank you for this consult. Will continue to follow with you.    Howard Harden MD  Department of Anesthesiology  [facility]

## 2023-01-22 NOTE — PROGRESS NOTES
"[facility] - [facility]  Palliative Medicine  Progress Note    Patient Name: Paul Browning  MRN: [id number]  Admission Date: 1/16/2023  Hospital Length of Stay: 4 days  Code Status: DNR   Attending Provider: Santo Shankar MD  Consulting Provider: Ankit Amador MD  Primary Care Physician: Grey Forbes DO  Principal Problem:Closed fracture of multiple ribs of right side    Patient information was obtained from patient, relative(s) and primary team.      Assessment/Plan:     Encounter for palliative care  Paul Browning (Joe) is an 82-year-old man with a history of dementia (FAST score 6D), CAD s/p STEMI/PCI/2v-CABG (2014), ICM with reduced EF (40%), COPD on home oxygen (intermittently), sarcoidosis, possible Parkinson's, DM2, CKD3, multiple falls who was admitted after sustaining a fall with multiple rib fractures and small apical pneumothorax. Hospital course notable for delirium/encephalopathy, concerns of aspiration, development of VIKTOR and hypotension. Palliative and Supportive Care was consulted to explore goals of care and symptom management recommendations.    Advance Care Planning   Goals of Care:  - Code status: No CPR, OK for intubation  - Next of kin: 4 adult children (3 sons Paul, Neto, Dennys, 1 daughter Mesha)  - Patient is not decisional  - ACP documents: none  - Prognosis: poor  - Family's understanding of prognosis: good; continue education if EOL approaches  - Goals: control pain/symptoms, treat acute, reversible issues, continue supportive care; minimize unneccesary suffering   - Recommendations/Plans: continue full supportive care, including initiation of antibiotics if warranted. Appreciate primary team for helping  family about prognosis in order to communicate recommendations that would avoid harm. Will continue to provide support for family who maintains hope that he will recover.     Goals of Care Conversation  1/21  Please see separate " note for GOC/ACP discussion.  Pt DNR/DNI in event of continued decline and respiratory/cardiac arrest.  Family wants to avoid invasive and non beneficial life prolonging therapies.  Cont best supportive and minimally invasive care for now.  If declines further, family would want focus to be entirely on comfort focused therapies.     - 1/20/2023: Paul at bedside, who had spent the night with Mr. Browning. Remains concerns of worsening encephalopathy and raised question to the physicians about potential UTI exacerbating mental status. Remains hopeful that his numbers are looking stable/OK - his blood pressure, oxygen saturation, labs, imaging. Acknowledged that he is still very tachypneic and looks worse than he did on admission. His sister Mesha will come today and family will always remain at bedside throughout Mr. Browning's hospitalization. Remains open to palliative presence during Mr. Browning's hospitalization for continued support.  - 1/19/2023: Met Mr. Browning with his daughter Mesha and son Dennys at bedside. Mesha described overnight events, noting that while pain is better, he was up all night, confused and required dose of Zyprexa. She confirms that he does not have Parkinson's and that tremor is due to chronic nerve damage, for which he takes gabapentin. Noted that despite being in the hospital and having better control of pain, he appears to be getting worse. Mesha agrees that he looks worse, but shares that her brother Paul checked MyChart and saw that there were no drastic changes in his imaging and labs. I shared that I worried about him aspirating, noting that there is an increasing right lower lobe infiltrate demonstrated in his morning's chest x-ray. She wants to know what it is, whether it is a pneumonia and would like antibiotics started sooner than later to treat it. Emphasized our last conversation that it was not a matter of if he'd aspirate, but when he would, and  "that I worried time is short. She acknowledges that when he fell and was in the ER, that there was a chance he would "not make it" but wants to give him a chance of surviving this. Shared that I will revisit this afternoon to see how he and they are doing. Offered that if they'd like us to engage other family members (such as Dennys's wife who is an RN) that we'd be happy to do so per their preference. Mesha asks if Father Chase can come, shared that he did not come at all yesterday according to Paul. I will reach out and request for Father Chase to come to Mr. Browning today.  - 1/18/2023: Met Mr. Browning and his son Paul at his bedside, who was having a discussion related to code status with a surgeon on the team. Mr. Browning agrees that CPR will cause more harm and will text and discuss with his siblings about this code status. At this time he remains hesitant about changing code status related to intubation, recalling that his mother survived compassionate extubation 3 years after physicians told them that she was dying. Had supportive conversation with Paul, who remains hopeful that pain management will be able to perform nerve block as he worries that the opioids are causing too much sedation and would like to minimize medications if possible. Discussed interval development of VIKTOR, and will be watchful of this problem and adjust medications to dose renally and protect kidneys.  - 1/17/2023: Met Mr. Browning and his daughter Mesha and son Paul at his bedside. Mr. Browning is unable to fully participate in our discussion due to advanced dementia and immediate short term memory loss. Mesha shares her understanding that he broke 4 ribs in this fall and punctured his lungs. She was told that he might need a chest tube and he was high risk for developing pneumonia. She recalls that her mother and father discussed with each other that they would not want to be artificially sustained on " "life-support. When her mother was intubated and told that she would be ventilator-dependent, family honored her wishes and withdrew ventilator support. She thankfully lived 3 years after compassionate extubation and  3 years ago, which was the beginning of Mr. Browning's decline. They had been  when he was 15-years-old and she was 14-years-old. Now he lives with his daughter Mesha and is also supported by his other sons who help with other important aspects of his life. Mesha shares that he has sustained multiple falls, most recently sustained 3 falls in the last 2 weeks. Despite constant reorientation, he forgets to stay in bed and will try to get out and ambulate. He had significant sundowning from 4pm to 8pm, but now it is all day. Mesha and Paul recognize that he is progressively declining and acknowledge that this will continue. They've been told previously when he sustained his prior falls that "this was it" but despite this, he continues to live so they hope to continue doing what they can to help him live. When Mesha discussed code status in the ER, she was under the impression that the ER staff was saying that intubation and CPR will help him. We discussed that knowing that the fall was enough to cause multiple rib fractures, forceful chest compressions will surely cause more harm and more breakage, and the ventilator will worsen his small pneumothorax. I recommended against such interventions, explaining that this will sure cause his demise and not help him the way we hope. Mesha asks if we don't intubate/ventilate when he develops a pneumonia, what can be done alternatively. I shared that at that point there are no curative interventions, and that medical professionals can support him with symptom management and offer him strategies to alleviate shortness of breath. They asked about chest tube, and I shared that as Neto (their brother) had a chest tube before, that  " Nallely will also have significant pain if a chest tube is inserted, and this would prolong his current state without a guarantee that it will fix the pneumothorax as his body will have a harder time to heal. They share their understanding and would like to discuss this with their other siblings. Their sister-in-law who is an [employer] RN came to visit, and also voiced similar concerns about CPR/intubation as well. Mesha states that she is worried that he is miserable, forgetting immediately that he already went out to visit family/loved ones and stating repetitively that he wishes to go out to fish. Validated her concerns, recognizing that they wish that everything helpful be continued and offered. Encouraged them to consider that in addition to this, it is also our responsibility to protect him from harmful interventions that will not benefit him, as they had done so for their mother.      Acute Pain 2/2 Rib Fractures, Falls  - attempting to replace epidural cath on 1/23  -in mean time would consider scheduling opioids at a minimum of q 6 hours: hydromorphone 0.5 mg q 6 hours and prn along with maximizing adjuvants  - NGT placed on 1/21 for oral meds  - S/p nerve block by pain management, performed on 1/18/23  - Scheduled acetaminophen 1000 mg PO TID for chronic pain/headache  - No longer on opioids  - Gabapentin 300 mg daily (adjusted from home TID schedule for VIKTOR)  - Continue lidocaine patches    Constipation  - cont bowel regimen  - Continue senna 8.6 mg PO BID  - Polyethylene glycol 17 g PO   - Ideally would avoid suppository/enema as turning causes more pain, but may need to do so if no bowel movement    Anxiety/Agitation/[place]/Delirium  Dementia with Behavioral Disturbances  - Continue memantine 10 mg PO BID per NG  - Continue home sertraline 100 mg PO per NG  - Clarified that he does not have Parkinson's  - Had paradoxical reaction to Xanax  - Continue frequent reorientation and optimization  "of environment by keeping room bright during the day, dark and quiet at night. Discontinue vital signs at night time to allow him to sleep undisturbed  - Optimize pain management as above  - QTc on admission is 497. Agree that low dose antipsychotics will be helpful in setting of non-redirectable agitation.              I will follow-up with patient. Please contact us if you have any additional questions.    Subjective:     Chief Complaint:   Chief Complaint   Patient presents with    Fall     Mechanical fall to R side while walking to bathroom. Fell yesterday also onto same side. Hx of dementia. Fall witnessed by pt son and reports that pt hit head tonight. -blood thinners. No deformity, bruising noted to R lower back from yesterdays fall per son.        HPI:   Paul Browning (Joe) is an 82-year-old man with a history of dementia (FAST score 6D), CAD s/p STEMI/PCI/2v-CABG (2014), ICM with reduced EF (40%), COPD on home oxygen (2 L/min), sarcoidosis, possible Parkinson's, DM2, CKD3, multiple falls who was admitted after sustaining a fall with multiple rib fractures and small apical pneumothorax. Palliative and Supportive Care was consulted to explore goals of care and symptom management recommendations.    I met Mr. Browning, who has both chronic visual and auditory deficits. He asks when will he be able to go home. He is immediately forgetting what we just discussed during our conversation. I met with his daughter Mesha at his bedside. Please see assessment/recommendations for details of conversation.      Hospital Course:  No notes on file    Interval History: pt appears to be less agitated however still tachpyneic with obv upper airway secretion    Tolerated HM IV 0.5 mg well with 5 doses over 24 hours; less agitation and screaming out per family;     Decreased to 0.2 mg IV prn this AM and only rec'd once since 0700    Discussed with son at bedside who feels pt isn't suffering and awakens to " acknowledge family members    Medications:  Continuous Infusions:   dexmedetomidine (PRECEDEX) infusion 1.4 mcg/kg/hr (01/22/23 1100)    dextrose 5 % and 0.45 % NaCl 50 mL/hr at 01/22/23 1100    ROPIvacaine (PF) 2 mg/ml (0.2%) Stopped (01/21/23 1900)     Scheduled Meds:   acetaminophen  1,000 mg Per NG tube Q8H    [START ON 1/23/2023] atorvastatin  40 mg Per NG tube Daily    budesonide  0.5 mg Nebulization Q12H    docusate  100 mg Per NG tube BID    dronabinoL  2.5 mg Oral BID    [START ON 1/23/2023] furosemide  20 mg Per NG tube Daily    [START ON 1/23/2023] gabapentin  500 mg Per NG tube Daily    heparin (porcine)  5,000 Units Subcutaneous Q8H    levalbuterol  0.63 mg Nebulization Q4H    LIDOcaine  1 patch Transdermal Q24H    melatonin  6 mg Per NG tube Nightly    memantine  10 mg Per NG tube BID    methocarbamol (ROBAXIN) IVPB  500 mg Intravenous Q8H    midodrine  2.5 mg Per NG tube Q12H    mupirocin   Nasal BID    [START ON 1/23/2023] pantoprazole  40 mg Per NG tube Daily    polyethylene glycol  17 g Per NG tube BID    [START ON 1/23/2023] sertraline  100 mg Per NG tube Daily     PRN Meds:sodium chloride 0.9%, albuterol-ipratropium, dextrose 10%, dextrose 10%, glucagon (human recombinant), glucose, glucose, HYDROmorphone, insulin aspart U-100, ondansetron    Objective:     Vital Signs (Most Recent):  Temp: 97.8 °F (36.6 °C) (01/22/23 1100)  Pulse: 67 (01/22/23 1143)  Resp: (!) 35 (01/22/23 1143)  BP: 122/63 (01/22/23 1100)  SpO2: 100 % (01/22/23 1143)   Vital Signs (24h Range):  Temp:  [97.6 °F (36.4 °C)-98.2 °F (36.8 °C)] 97.8 °F (36.6 °C)  Pulse:  [] 67  Resp:  [14-66] 35  SpO2:  [92 %-100 %] 100 %  BP: ()/() 122/63     Weight:  (bed scale broken)  Body mass index is 22.15 kg/m².    Physical Exam  Constitutional:       General: He is in acute distress.      Appearance: He is ill-appearing.   HENT:      Head: Normocephalic and atraumatic.      Right Ear: External ear  normal.      Left Ear: External ear normal.      Nose: Nose normal.      Mouth/Throat:      Mouth: Mucous membranes are dry.   Eyes:      Extraocular Movements: Extraocular movements intact.      Conjunctiva/sclera: Conjunctivae normal.   Cardiovascular:      Rate and Rhythm: Normal rate.   Pulmonary:      Breath sounds: Wheezing and rales present.      Comments: tachypneic  Abdominal:      General: Bowel sounds are normal.      Palpations: Abdomen is soft.   Musculoskeletal:      Right lower leg: No edema.      Left lower leg: No edema.   Lymphadenopathy:      Cervical: No cervical adenopathy.   Skin:     General: Skin is dry.      Findings: Bruising present.   Neurological:      Mental Status: He is disoriented.       Review of Symptoms      Symptom Assessment (ESAS 0-10 Scale)  Unable to complete assessment due to Dementia     Constipation:  Positive      Bowel Management Plan (BMP):  Yes      Pain Assessment in Advanced Demential Scale (PAINAD)   Breathing - Independent of vocalization:  2  Negative vocalization:  1  Facial expression:  1  Body language:  0  Consolability:  0  Total:  4    Performance Status:  10    Psychosocial/Cultural:   See Palliative Psychosocial Note: No  Social Issues Identified: Coping deficit pt/family  Bereavement Risk: Yes: Identified: work/home, financial, intimacy, and caregiver concerns   Caregiver Needs Discussed. Caregiver Distress: Yes: Intensity of family caregiving  **Primary  to Follow**  Palliative Care  Consult: No      Advance Care Planning   Advance Directives:   Goals of Care: What is most important right now is to focus on symptom/pain control, quality of life, even if it means sacrificing a little time. Accordingly, we have decided that the best plan to meet the patient's goals includes continuing with treatment.       Significant Labs: CBC:   Recent Labs   Lab 01/21/23  0303 01/22/23  0255   WBC 10.66 5.72   HGB 9.8* 8.7*   HCT 30.2* 26.5*     199       CMP:   Recent Labs   Lab 01/21/23  0622 01/22/23  0255    141   K 4.5 4.6    109   CO2 22* 21*   * 124*   BUN 37* 32*   CREATININE 1.0 0.9   CALCIUM 8.3* 8.4*   PROT 5.9* 5.2*   ALBUMIN 3.0* 2.6*   BILITOT 1.1* 0.9   ALKPHOS 158* 137*   AST 47* 24   ALT 34 26   ANIONGAP 9 11       CBC:   Recent Labs   Lab 01/22/23  0255   WBC 5.72   HGB 8.7*   HCT 26.5*   MCV 99*          BMP:  Recent Labs   Lab 01/22/23  0255   *      K 4.6      CO2 21*   BUN 32*   CREATININE 0.9   CALCIUM 8.4*   MG 2.5       LFT:  Lab Results   Component Value Date    AST 24 01/22/2023    ALKPHOS 137 (H) 01/22/2023    BILITOT 0.9 01/22/2023     Albumin:   Albumin   Date Value Ref Range Status   01/22/2023 2.6 (L) 3.5 - 5.2 g/dL Final     Protein:   Total Protein   Date Value Ref Range Status   01/22/2023 5.2 (L) 6.0 - 8.4 g/dL Final     Lactic acid:   Lab Results   Component Value Date    LACTATE 1.0 06/06/2022    LACTATE 0.8 11/17/2014       Significant Imaging: I have reviewed all pertinent imaging results/findings within the past 24 hours.    I spent a total of 45 minutes on the day of the visit.This includes face to face time in discussion of goals of care, symptom assessment, coordination of care and emotional support.  This also includes non-face to face time preparing to see the patient (eg, review of tests/imaging), obtaining and/or reviewing separately obtained history, documenting clinical information in the electronic or other health record, independently interpreting results and communicating results to the patient/family/caregiver, or care coordinator.          Ankit Amador MD  Palliative Medicine  [facility] - Surgical Intensive Care

## 2023-01-22 NOTE — PLAN OF CARE
Informed by nursing that there was an unwitnessed disconnect/removal of patient's PNC. Per nursing staff, blue tip noted to be intact.     Nish Rincon MD, PGY2  Department of Anesthesiology  [facility]

## 2023-01-22 NOTE — NURSING
MD notified that pt still has not had a bowel movement from golytely. KUB order was placed and obtained. NG tube still in correct location. WCTM.

## 2023-01-22 NOTE — SUBJECTIVE & OBJECTIVE
Interval History: pt appears to be less agitated however still tachpyneic with obv upper airway secretion    Tolerated HM IV 0.5 mg well with 5 doses over 24 hours; less agitation and screaming out per family;     Decreased to 0.2 mg IV prn this AM and only rec'd once since 0700    Discussed with son at bedside who feels pt isn't suffering and awakens to acknowledge family members    Medications:  Continuous Infusions:   dexmedetomidine (PRECEDEX) infusion 1.4 mcg/kg/hr (01/22/23 1100)    dextrose 5 % and 0.45 % NaCl 50 mL/hr at 01/22/23 1100    ROPIvacaine (PF) 2 mg/ml (0.2%) Stopped (01/21/23 1900)     Scheduled Meds:   acetaminophen  1,000 mg Per NG tube Q8H    [START ON 1/23/2023] atorvastatin  40 mg Per NG tube Daily    budesonide  0.5 mg Nebulization Q12H    docusate  100 mg Per NG tube BID    dronabinoL  2.5 mg Oral BID    [START ON 1/23/2023] furosemide  20 mg Per NG tube Daily    [START ON 1/23/2023] gabapentin  500 mg Per NG tube Daily    heparin (porcine)  5,000 Units Subcutaneous Q8H    levalbuterol  0.63 mg Nebulization Q4H    LIDOcaine  1 patch Transdermal Q24H    melatonin  6 mg Per NG tube Nightly    memantine  10 mg Per NG tube BID    methocarbamol (ROBAXIN) IVPB  500 mg Intravenous Q8H    midodrine  2.5 mg Per NG tube Q12H    mupirocin   Nasal BID    [START ON 1/23/2023] pantoprazole  40 mg Per NG tube Daily    polyethylene glycol  17 g Per NG tube BID    [START ON 1/23/2023] sertraline  100 mg Per NG tube Daily     PRN Meds:sodium chloride 0.9%, albuterol-ipratropium, dextrose 10%, dextrose 10%, glucagon (human recombinant), glucose, glucose, HYDROmorphone, insulin aspart U-100, ondansetron    Objective:     Vital Signs (Most Recent):  Temp: 97.8 °F (36.6 °C) (01/22/23 1100)  Pulse: 67 (01/22/23 1143)  Resp: (!) 35 (01/22/23 1143)  BP: 122/63 (01/22/23 1100)  SpO2: 100 % (01/22/23 1143)   Vital Signs (24h Range):  Temp:  [97.6 °F (36.4 °C)-98.2 °F (36.8 °C)] 97.8 °F (36.6 °C)  Pulse:  []  67  Resp:  [14-66] 35  SpO2:  [92 %-100 %] 100 %  BP: ()/() 122/63     Weight:  (bed scale broken)  Body mass index is 22.15 kg/m².    Physical Exam  Constitutional:       General: He is in acute distress.      Appearance: He is ill-appearing.   HENT:      Head: Normocephalic and atraumatic.      Right Ear: External ear normal.      Left Ear: External ear normal.      Nose: Nose normal.      Mouth/Throat:      Mouth: Mucous membranes are dry.   Eyes:      Extraocular Movements: Extraocular movements intact.      Conjunctiva/sclera: Conjunctivae normal.   Cardiovascular:      Rate and Rhythm: Normal rate.   Pulmonary:      Breath sounds: Wheezing and rales present.      Comments: tachypneic  Abdominal:      General: Bowel sounds are normal.      Palpations: Abdomen is soft.   Musculoskeletal:      Right lower leg: No edema.      Left lower leg: No edema.   Lymphadenopathy:      Cervical: No cervical adenopathy.   Skin:     General: Skin is dry.      Findings: Bruising present.   Neurological:      Mental Status: He is disoriented.       Review of Symptoms      Symptom Assessment (ESAS 0-10 Scale)  Unable to complete assessment due to Dementia     Constipation:  Positive      Bowel Management Plan (BMP):  Yes      Pain Assessment in Advanced Demential Scale (PAINAD)   Breathing - Independent of vocalization:  2  Negative vocalization:  1  Facial expression:  1  Body language:  0  Consolability:  0  Total:  4    Performance Status:  10    Psychosocial/Cultural:   See Palliative Psychosocial Note: No  Social Issues Identified: Coping deficit pt/family  Bereavement Risk: Yes: Identified: work/home, financial, intimacy, and caregiver concerns   Caregiver Needs Discussed. Caregiver Distress: Yes: Intensity of family caregiving  **Primary  to Follow**  Palliative Care  Consult: No      Advance Care Planning   Advance Directives:   Goals of Care: What is most important right now is to  focus on symptom/pain control, quality of life, even if it means sacrificing a little time. Accordingly, we have decided that the best plan to meet the patient's goals includes continuing with treatment.       Significant Labs: CBC:   Recent Labs   Lab 01/21/23  0303 01/22/23  0255   WBC 10.66 5.72   HGB 9.8* 8.7*   HCT 30.2* 26.5*    199       CMP:   Recent Labs   Lab 01/21/23  0622 01/22/23 0255    141   K 4.5 4.6    109   CO2 22* 21*   * 124*   BUN 37* 32*   CREATININE 1.0 0.9   CALCIUM 8.3* 8.4*   PROT 5.9* 5.2*   ALBUMIN 3.0* 2.6*   BILITOT 1.1* 0.9   ALKPHOS 158* 137*   AST 47* 24   ALT 34 26   ANIONGAP 9 11       CBC:   Recent Labs   Lab 01/22/23 0255   WBC 5.72   HGB 8.7*   HCT 26.5*   MCV 99*          BMP:  Recent Labs   Lab 01/22/23  0255   *      K 4.6      CO2 21*   BUN 32*   CREATININE 0.9   CALCIUM 8.4*   MG 2.5       LFT:  Lab Results   Component Value Date    AST 24 01/22/2023    ALKPHOS 137 (H) 01/22/2023    BILITOT 0.9 01/22/2023     Albumin:   Albumin   Date Value Ref Range Status   01/22/2023 2.6 (L) 3.5 - 5.2 g/dL Final     Protein:   Total Protein   Date Value Ref Range Status   01/22/2023 5.2 (L) 6.0 - 8.4 g/dL Final     Lactic acid:   Lab Results   Component Value Date    LACTATE 1.0 06/06/2022    LACTATE 0.8 11/17/2014       Significant Imaging: I have reviewed all pertinent imaging results/findings within the past 24 hours.

## 2023-01-22 NOTE — PROGRESS NOTES
[facility]  General Surgery  Progress Note    Subjective:     History of Present Illness:  82M frail fell multiple times at home.  H/o CAD (EF 40% with previous MI with enduring defect seen on cardiac PET), COPD on 0.5L NC at home and inhalers, DM2, CKD3, HTN, sarcoid, CAD.  CXR shows small apical pneumo and subcutaneous air on the right chest wall.  CT chest shows at least four comminuted posterior rib fractures on my count. These fractures are severe.  He is sating in the mid-90s on 6L facemask.  Back pain is not controlled  He is complaining of abdominal pain as well    Lives with daughter who assists with ADLs.  I shared with the daughter that this is a major setback for him and that with his medical history, he is at very high risk for pneumonia and intubation.  She has not explicitly discussed with her brothers whether he would want to be intubated were it indicated.    He has had cardiac bypass and cervical spine surgery. He has cardiac stents.    Remote 40 pack year smoking history.      Post-Op Info:  * No surgery found *         Interval History: Perineural catheters pulled out. Remains on 3L NC. Palliative conversations ongoing. Able to get some rest last night. Remains tachypnic    Medications:  Continuous Infusions:   dexmedetomidine (PRECEDEX) infusion 1.4 mcg/kg/hr (01/22/23 0700)    dextrose 5 % and 0.45 % NaCl 50 mL/hr at 01/22/23 0700    ROPIvacaine (PF) 2 mg/ml (0.2%) Stopped (01/21/23 1900)     Scheduled Meds:   acetaminophen  1,000 mg Oral Q8H    atorvastatin  40 mg Oral Daily    budesonide  0.5 mg Nebulization Q12H    dronabinoL  2.5 mg Oral BID    furosemide  20 mg Oral Daily    gabapentin  500 mg Oral Daily    heparin (porcine)  5,000 Units Subcutaneous Q8H    levalbuterol  0.63 mg Nebulization Q4H    LIDOcaine  1 patch Transdermal Q24H    melatonin  6 mg Oral Nightly    memantine  10 mg Oral BID    midodrine  2.5 mg Oral Q12H    mupirocin   Nasal BID     pantoprazole  40 mg Oral Daily    polyethylene glycol  17 g Oral Daily    senna  8.6 mg Oral BID    sertraline  100 mg Oral Daily     PRN Meds:sodium chloride 0.9%, albuterol-ipratropium, dextrose 10%, dextrose 10%, glucagon (human recombinant), glucose, glucose, HYDROmorphone, insulin aspart U-100, ondansetron     Review of patient's allergies indicates:   Allergen Reactions    Morphine Shortness Of Breath     Objective:     Vital Signs (Most Recent):  Temp: 97.6 °F (36.4 °C) (01/22/23 0715)  Pulse: 66 (01/22/23 0753)  Resp: (!) 39 (01/22/23 0753)  BP: 137/60 (01/22/23 0700)  SpO2: 97 % (01/22/23 0753)   Vital Signs (24h Range):  Temp:  [97.6 °F (36.4 °C)-98.3 °F (36.8 °C)] 97.6 °F (36.4 °C)  Pulse:  [] 66  Resp:  [14-66] 39  SpO2:  [83 %-100 %] 97 %  BP: ()/(52-84) 137/60     Weight:  (bed scale broken)  Body mass index is 22.15 kg/m².    Intake/Output - Last 3 Shifts         01/20 0700  01/21 0659 01/21 0700 01/22 0659 01/22 0700 01/23 0659    P.O. 60      I.V. (mL/kg) 1151.7 (16.9) 1473.8 (21.7) 147.7 (2.2)    IV Piggyback       Total Intake(mL/kg) 1211.7 (17.8) 1473.8 (21.7) 147.7 (2.2)    Urine (mL/kg/hr) 227 (0.1) 820 (0.5)     Drains  100     Total Output 227 920     Net +984.7 +553.8 +147.7           Urine Occurrence 1 x              Physical Exam  Vitals and nursing note reviewed.   Constitutional:       General: He is not in acute distress.     Comments: 3L O2 via NC   HENT:      Head: Normocephalic.      Nose: Nose normal.   Eyes:      General: No scleral icterus.     Extraocular Movements: Extraocular movements intact.   Cardiovascular:      Rate and Rhythm: Normal rate.   Pulmonary:      Effort: Pulmonary effort is normal. No respiratory distress.   Chest:      Comments: Right posterior back pain at site of fractures  Crepitus along the right back  Abdominal:      General: There is no distension.      Palpations: Abdomen is soft.      Tenderness: There is no abdominal tenderness.  There is no guarding or rebound.   Musculoskeletal:         General: No deformity.   Skin:     General: Skin is warm and dry.   Neurological:      Mental Status: He is alert. He is disoriented.       Significant Labs:  I have reviewed all pertinent lab results within the past 24 hours.    Significant Diagnostics:  I have reviewed all pertinent imaging results/findings within the past 24 hours.    Assessment/Plan:     * Closed fracture of multiple ribs of right side  Patient is an 82 year old frail and comorbid gentleman with h/o COPD (on home O2), pulmonary sarcoidosis, CABG, HTN, HLD, depression, GERD, dementia, HF who presents after a fall with multiple posterior right rib fractures that are comminuted with significant displacement, also with small pneumothorax that does not need intervention at this time, but may eventually. Clinically stable on a few liters of O2 now but is very high risk for pneumonia.    - Dysphagia soft diet with aspiration precautions  - Daily CXR  - Palliative care consulted, appreciate assistance   - Will follow up discussions. Currently DNR.   - Multimodal pain control  - PRN IV pain   - Marinol   - APS consulted, appreciate assistance. Block 1/18, removed inadvertently 1/21. Eval for possible replacement  - Aggressive pulmonary toilet, acapella, IS, duonebs, inhalation treatment  - Home meds  - DVT ppx (SCDs and heparin)  - Remainder of care per SICU, appreciate assistance    Dispo: remain in SICU       Traumatic pneumothorax  - see principle problem    Chronic obstructive pulmonary disease  - see principle problem         Darius Parada MD  General Surgery  [facility]

## 2023-01-22 NOTE — PROGRESS NOTES
[facility]  Critical Care - Surgery  Progress Note    Patient Name: Paul Browning  MRN: [id number]  Admission Date: 1/16/2023  Hospital Length of Stay: 4 days  Code Status: DNR  Attending Provider: Santo Shankar MD  Primary Care Provider: Grey Forbes DO   Principal Problem: Closed fracture of multiple ribs of right side    Subjective:     Hospital/ICU Course:  1/17: admitted to SICU for close observation and pain control. No surgery at this time. Palliative care and acute pain medicine consulted. CXR this AM with increased R sided patchy infiltrates  1/21: Midodrine added for BP. Diaphoretic, tachypneic, hypertensive throughout night. On 6L NC this AM with labored respirations. Patient denying to be in pain. Son in room states that patient already takes rapid, shallow breaths at baseline. CXR this morning grossly stable compared to prior. Avoiding antipsychotics 2/2 high qTC on EKG       Interval History/Significant Events: Pulled EPS catheter yesterday, receiving dilaudid pushes. APS may replace EPS. Afebrile, RR 40s. NGT placed.        Objective:     Vital Signs (Most Recent):  Temp: 97.9 °F (36.6 °C) (01/22/23 0300)  Pulse: 71 (01/22/23 0545)  Resp: (!) 46 (01/22/23 0545)  BP: (!) 122/59 (01/22/23 0500)  SpO2: 100 % (01/22/23 0545)   Vital Signs (24h Range):  Temp:  [97.7 °F (36.5 °C)-98.3 °F (36.8 °C)] 97.9 °F (36.6 °C)  Pulse:  [] 71  Resp:  [14-66] 46  SpO2:  [83 %-100 %] 100 %  BP: ()/(52-84) 122/59     Weight:  (bed scale broken)  Body mass index is 22.15 kg/m².      Intake/Output Summary (Last 24 hours) at 1/22/2023 0610  Last data filed at 1/22/2023 0500  Gross per 24 hour   Intake 1473.81 ml   Output 920 ml   Net 553.81 ml       Physical Exam  Constitutional:       General: He is not in acute distress.     Appearance: He is ill-appearing.   HENT:      Head: Normocephalic.      Nose: Nose normal.   Eyes:      General: No scleral icterus.     Extraocular  Movements: Extraocular movements intact.   Cardiovascular:      Rate and Rhythm: Normal rate.   Pulmonary:      Effort: No respiratory distress.      Comments: Tachypneic  Right lung, coarse breath sounds  Chest:      Comments: Right posterior back pain at site of fractures  Abdominal:      General: There is no distension.      Palpations: Abdomen is soft.      Tenderness: There is no abdominal tenderness. There is no guarding or rebound.   Genitourinary:     Comments: Mild erythema at tip of penis, no discharge noted.   Musculoskeletal:         General: No deformity.   Skin:     General: Skin is warm and dry.   Neurological:      Mental Status: He is alert. He is disoriented.    Vents:  Oxygen Concentration (%): 4 (01/21/23 1555)    Lines/Drains/Airways       Drain  Duration                  NG/OG Tube 01/21/23 2321 nasogastric 18 Fr. Left nostril <1 day    Male External Urinary Catheter 01/21/23 1200 <1 day              Peripheral Intravenous Line  Duration                  Peripheral IV - Single Lumen 01/16/23 2147 20 G Posterior;Right Forearm 5 days         Peripheral IV - Single Lumen 01/17/23 0022 20 G Anterior;Proximal;Right Forearm 5 days         Peripheral IV - Single Lumen 01/21/23 2213 20 G Left Wrist <1 day                    Significant Labs:    CBC/Anemia Profile:  Recent Labs   Lab 01/21/23  0303 01/22/23  0255   WBC 10.66 5.72   HGB 9.8* 8.7*   HCT 30.2* 26.5*    199   MCV 98 99*   RDW 14.5 14.7*        Chemistries:  Recent Labs   Lab 01/21/23  0622 01/22/23  0255    141   K 4.5 4.6    109   CO2 22* 21*   BUN 37* 32*   CREATININE 1.0 0.9   CALCIUM 8.3* 8.4*   ALBUMIN 3.0* 2.6*   PROT 5.9* 5.2*   BILITOT 1.1* 0.9   ALKPHOS 158* 137*   ALT 34 26   AST 47* 24   MG 2.3 2.5   PHOS 3.2 2.6*       All pertinent labs within the past 24 hours have been reviewed.    Significant Imaging:  I have reviewed all pertinent imaging results/findings within the past 24 hours.    Assessment/Plan:      * Closed fracture of multiple ribs of right side  Paul Browning is a frail 82M h/o CAD (EF 40% with previous MI with enduring defect seen on cardiac PET), COPD on 0.5L NC at home and inhalers, DM2, CKD3, HTN, sarcoid, CAD who fell multiple times at home. Imaging shows small apical PTX and subQ air on R chest wall with CT with several comminuted posterior rib fractures.      Neuro/Psych:   -- Sedation: none  -- Pain: ifeoma robaxin, tylenol, gabapentin, lidocaine patch, ropivacaine  -- Right BELEM catheter placed by APS 1/18, pt pulled yesterday             Cards:   -- hx of HTN, CAD s/p stents  -- normotensive  -- Started on levo peripheral at 0.04, since discontinued and started on midodrine 2.5 Q12  -- holding home ASA and Entresto      Pulm:   -- hx of COPD, baseline 0.5L NC at home  -- now has right sided rib fractures and small right apical PTX  -- Goal O2 sat > 90%  -- Wean as able  -- duonebs, IS, Acapella  -- Increasing respiratory effort with  wheezing         Renal:  -- hx of CKD3, baseline Cr 1.0  -- BUN/Cr 32/0.9  -- Nursing to confirm correct sized segal for patient, monitor for drainage      FEN / GI:    -- Replace lytes as needed  -- Nutrition: soft diet  -- NGT placed      ID:   -- Tm: afebrile; WBC 5.72  -- Abx none      Heme/Onc:   -- H/H 9.8/30  -- Daily CBC      Endo:   -- hx of DM2, on Jardiance   -- Gluc goal 140-180  -- AISS      PPx:   Feeding: soft diet  Analgesia/Sedation: ifeoma gabapentin, robaxin, tylenol, lido patch  Thromboembolic prevention: heparin  HOB >30: yes  Stress Ulcer ppx: protonix  Glucose control: Critical care goal 140-180 g/dl, ISS    Lines/Drains/Airway: PIV x2, Segal, NGT      Dispo/Code Status/Palliative:   -- SICU / No chest compressions, ok to intubate/palliative following for ongoing GOC discussion           Critical care was time spent personally by me on the following activities: development of treatment plan with patient or surrogate and bedside caregivers,  discussions with consultants, evaluation of patient's response to treatment, examination of patient, ordering and performing treatments and interventions, ordering and review of laboratory studies, ordering and review of radiographic studies, pulse oximetry, re-evaluation of patient's condition.  This critical care time did not overlap with that of any other provider or involve time for any procedures.     Filippo Anderson MD  Critical Care - Surgery  [facility]

## 2023-01-22 NOTE — PLAN OF CARE
"      SICU PLAN OF CARE NOTE    Dx: Closed fracture of multiple ribs of right side    Shift Events: NO acute shift events throughout shift. PT still has not had bowel movement despite receiving golytely and a suppository on this shift.     Goals of Care:     Neuro: Arouses to Voice, Follows Commands, and Confused    Vital Signs: BP (!) 147/71   Pulse 75   Temp 97.8 °F (36.6 °C)   Resp (!) 37   Ht 5' 9" (1.753 m)   Wt 68 kg (150 lb)   SpO2 99%   BMI 22.15 kg/m²     Respiratory: Nasal Cannula 3-4 L    Diet: NPO    Gtts: Precedex and MIVF    Urine Output: Voids Spontaneously 600 cc/shift    Drains:     Labs/Accuchecks: .    Skin: Ecchymotic skin noted around abdomen and back from broken ribs. Lines and devices free from skin contact. Pressure points protected. Pt repositioned q2 hrs.       "

## 2023-01-22 NOTE — SUBJECTIVE & OBJECTIVE
Interval History: Perineural catheters pulled out. Remains on 3L NC. Palliative conversations ongoing. Able to get some rest last night. Remains tachypnic    Medications:  Continuous Infusions:   dexmedetomidine (PRECEDEX) infusion 1.4 mcg/kg/hr (01/22/23 0700)    dextrose 5 % and 0.45 % NaCl 50 mL/hr at 01/22/23 0700    ROPIvacaine (PF) 2 mg/ml (0.2%) Stopped (01/21/23 1900)     Scheduled Meds:   acetaminophen  1,000 mg Oral Q8H    atorvastatin  40 mg Oral Daily    budesonide  0.5 mg Nebulization Q12H    dronabinoL  2.5 mg Oral BID    furosemide  20 mg Oral Daily    gabapentin  500 mg Oral Daily    heparin (porcine)  5,000 Units Subcutaneous Q8H    levalbuterol  0.63 mg Nebulization Q4H    LIDOcaine  1 patch Transdermal Q24H    melatonin  6 mg Oral Nightly    memantine  10 mg Oral BID    midodrine  2.5 mg Oral Q12H    mupirocin   Nasal BID    pantoprazole  40 mg Oral Daily    polyethylene glycol  17 g Oral Daily    senna  8.6 mg Oral BID    sertraline  100 mg Oral Daily     PRN Meds:sodium chloride 0.9%, albuterol-ipratropium, dextrose 10%, dextrose 10%, glucagon (human recombinant), glucose, glucose, HYDROmorphone, insulin aspart U-100, ondansetron     Review of patient's allergies indicates:   Allergen Reactions    Morphine Shortness Of Breath     Objective:     Vital Signs (Most Recent):  Temp: 97.6 °F (36.4 °C) (01/22/23 0715)  Pulse: 66 (01/22/23 0753)  Resp: (!) 39 (01/22/23 0753)  BP: 137/60 (01/22/23 0700)  SpO2: 97 % (01/22/23 0753)   Vital Signs (24h Range):  Temp:  [97.6 °F (36.4 °C)-98.3 °F (36.8 °C)] 97.6 °F (36.4 °C)  Pulse:  [] 66  Resp:  [14-66] 39  SpO2:  [83 %-100 %] 97 %  BP: ()/(52-84) 137/60     Weight:  (bed scale broken)  Body mass index is 22.15 kg/m².    Intake/Output - Last 3 Shifts         01/20 0700 01/21 0659 01/21 0700 01/22 0659 01/22 0700 01/23 0659    P.O. 60      I.V. (mL/kg) 1151.7 (16.9) 1473.8 (21.7) 147.7 (2.2)    IV Piggyback       Total Intake(mL/kg) 1211.7  (17.8) 1473.8 (21.7) 147.7 (2.2)    Urine (mL/kg/hr) 227 (0.1) 820 (0.5)     Drains  100     Total Output 227 920     Net +984.7 +553.8 +147.7           Urine Occurrence 1 x              Physical Exam  Vitals and nursing note reviewed.   Constitutional:       General: He is not in acute distress.     Comments: 3L O2 via NC   HENT:      Head: Normocephalic.      Nose: Nose normal.   Eyes:      General: No scleral icterus.     Extraocular Movements: Extraocular movements intact.   Cardiovascular:      Rate and Rhythm: Normal rate.   Pulmonary:      Effort: Pulmonary effort is normal. No respiratory distress.   Chest:      Comments: Right posterior back pain at site of fractures  Crepitus along the right back  Abdominal:      General: There is no distension.      Palpations: Abdomen is soft.      Tenderness: There is no abdominal tenderness. There is no guarding or rebound.   Musculoskeletal:         General: No deformity.   Skin:     General: Skin is warm and dry.   Neurological:      Mental Status: He is alert. He is disoriented.       Significant Labs:  I have reviewed all pertinent lab results within the past 24 hours.    Significant Diagnostics:  I have reviewed all pertinent imaging results/findings within the past 24 hours.

## 2023-01-23 PROBLEM — Z51.5 COMFORT MEASURES ONLY STATUS: Status: ACTIVE | Noted: 2023-01-01
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
## 2023-01-23 NOTE — PLAN OF CARE
[facility]  Discharge Final Note    Primary Care Provider: Grey Forbes DO    Expected Discharge Date: 2023    Final Discharge Note (most recent)       Final Note - 23 1530          Final Note    Assessment Type Final Discharge Note     Anticipated Discharge Disposition                      Important Message from Medicare         Time of my exam: 1450               Preliminary cause of death: cardiopulmonary arrest       Mckenzie Dawson LMSW  Case Management [facility]  Ext: [phone]

## 2023-01-23 NOTE — ANESTHESIA POST-OP PAIN MANAGEMENT
Consult  Anesthesiology Acute Pain Service      SUBJECTIVE:     Chief Complaint/Reason for Consult: Request anesthesia assistance with continuous pain management due to high complexity/refractory pain.    Surgical Procedure: N/a    Post Op Day: N/a    History of Present Illness:  Patient is a 82 y.o. male with a PMH of CAD (EF 40% with previous MI with enduring defect seen on cardiac PET), COPD on 0.5L NC at home and inhalers, DM2, CKD3, HTN, sarcoid, CAD who fell multiple times at home. Imaging shows small apical PTX and subQ air on R chest wall with CT with several comminuted posterior rib fractures.     On admission, he is satting in the mid-90s on 6L facemask. Back pain is not controlled, and he is complaining of abdominal pain as well. He is admitted to the SICU for observation and pain control.        Anesthesiology is consulted for refractory pain control.           Patient Active Problem List   Diagnosis    CAD (coronary artery disease)    Hypertension    Hyperlipidemia    Stevens's esophagus with low grade dysplasia    Hx of gastric ulcer    S/P CABG x 2    VIKTOR (acute kidney injury)    SOB (shortness of breath)    Atherosclerosis of aorta    Pulmonary sarcoidosis    MDD (major depressive disorder), recurrent episode, mild    Peripheral arterial disease    Osteoporosis    Senile purpura    Refractive error    Dry eye syndrome of both eyes    Heterophoria    Right homonymous hemianopsia    Carotid artery disease    Cervicogenic headache    Cervical spondylosis    DDD (degenerative disc disease), cervical    Chronic neck pain    Neck stiffness    Abnormal increased muscle tightness    Awakens from sleep due to pain    Cervical segment dysfunction    Tightness of neck    Dysfunctional movements    Odontoid fracture    Gastroesophageal reflux disease    Anxiety    Decreased ROM of neck    Gait instability    Degenerative disease of nervous system, unspecified    Impaired  functional mobility, balance, gait, and endurance    Vision loss    Intraventricular conduction delay    Dementia    Acute hypoxemic respiratory failure    Frequent falls    Scalp laceration    Chronic diastolic heart failure    Closed fracture of pubis    Multiple closed stable lateral compression fractures of pelvis    Closed fracture of trochanter of left femur with routine healing    Chronic obstructive pulmonary disease    Lung nodule    Ischemic cardiomyopathy    Closed fracture of multiple ribs of right side    Encounter for palliative care    Traumatic pneumothorax    Multiple rib fractures involving four or more ribs       Review of patient's allergies indicates:   Allergen Reactions    Morphine Shortness Of Breath       Outpatient Medications:   No current facility-administered medications on file prior to encounter.     Current Outpatient Medications on File Prior to Encounter   Medication Sig Dispense Refill    albuterol (PROVENTIL/VENTOLIN HFA) 90 mcg/actuation inhaler INHALE 2 PUFFS INTO THE LUNGS EVERY 6 HOURS AS NEEDED WHEEZING OR SHORTNESS OF BREATH 8.5 g 11    albuterol-ipratropium (DUO-NEB) 2.5 mg-0.5 mg/3 mL nebulizer solution Take 3 mLs by nebulization every 12 (twelve) hours. Rescue 540 mL 3    alendronate (FOSAMAX) 70 MG tablet TAKE 1 TABLET BY MOUTH EVERY WEEK IN THE MORNING WITH FULL GLASS OF WATER ON EMPTY STOMACH-DONT LIE DOWN FOR AT LEAST 30 MINUTES AFTERWARDS 12 tablet 3    aspirin (ECOTRIN) 81 MG EC tablet Take 81 mg by mouth once daily.      atorvastatin (LIPITOR) 40 MG tablet TAKE 1 TABLET(40 MG) BY MOUTH EVERY DAY 90 tablet 0    budesonide (PULMICORT) 0.5 mg/2 mL nebulizer solution Take 2 mLs (0.5 mg total) by nebulization 2 (two) times daily. Controller 120 mL 5    calcium carbonate (CALCIUM 600 ORAL) Take by mouth.      doxepin (SILENOR) 3 mg Tab One po qhs 30 tablet 6    empagliflozin (JARDIANCE) 10 mg tablet Take 1 tablet (10 mg total) by mouth once  daily. 30 tablet 11    furosemide (LASIX) 20 MG tablet Take 1 tablet (20 mg total) by mouth daily as needed (Wt gain >2 pounds/day or >5 pounds/week). 90 tablet 3    magnesium oxide (MAG-OX) 400 mg (241.3 mg magnesium) tablet Take 1 tablet (400 mg total) by mouth once daily. 30 tablet 3    memantine (NAMENDA) 10 MG Tab Take 1 tablet (10 mg total) by mouth 2 (two) times daily. 180 tablet 1    methylPREDNISolone (MEDROL, RENAN,) 4 mg tablet use as directed 1 each 0    omega-3 fatty acids-vitamin E (FISH OIL) 1,000 mg Cap Take 1 tablet by mouth 2 (two) times daily. (Patient taking differently: Take 1 tablet by mouth once daily.) 60 each 11    ondansetron (ZOFRAN) 8 MG tablet Take 1 tablet (8 mg total) by mouth every 8 (eight) hours as needed for Nausea. 30 tablet 1    pantoprazole (PROTONIX) 40 MG tablet TAKE 1 TABLET(40 MG) BY MOUTH TWICE DAILY 180 tablet 0    sacubitriL-valsartan (ENTRESTO) 24-26 mg per tablet Take 1 tablet by mouth 2 (two) times daily. 60 tablet 11    sertraline (ZOLOFT) 100 MG tablet Take 1 tablet (100 mg total) by mouth once daily. 90 tablet 3    tiotropium-olodateroL (STIOLTO RESPIMAT) 2.5-2.5 mcg/actuation Mist Inhale 2 puffs into the lungs once daily. Controller 4 g 5    ubrogepant (UBROGEPANT) 100 mg tablet One po at onset of migraine. May repeat after 2 hours if needed. 10 tablet 1        Current Inpatient Medications:      PRN:      Past Surgical History:   Procedure Laterality Date    CARDIAC SURGERY      CAROTID STENT N/A 10/14/2019    Procedure: INSERTION, STENT, ARTERY, CAROTID;  Surgeon: Artie Kebede MD;  Location: [facility] CATH LAB;  Service: Cardiology;  Laterality: N/A;    CATARACT EXTRACTION W/  INTRAOCULAR LENS IMPLANT Right 07/17/2018    Dr. Talamantes    CATARACT EXTRACTION W/  INTRAOCULAR LENS IMPLANT Left 07/31/2018    Dr. Talamantes    CORONARY ARTERY BYPASS GRAFT      x2  10/2014    ESOPHAGOGASTRODUODENOSCOPY N/A 4/8/2019    Procedure:  ESOPHAGOGASTRODUODENOSCOPY (EGD)/poss rfa;  Surgeon: Clifford De La Torre MD;  Location: [facility] ENDO (2ND FLR);  Service: Endoscopy;  Laterality: N/A;    ESOPHAGOGASTRODUODENOSCOPY N/A 8/4/2021    Procedure: EGD (ESOPHAGOGASTRODUODENOSCOPY);  Surgeon: Clifford De La Torre MD;  Location: [facility] ENDO (2ND FLR);  Service: Endoscopy;  Laterality: N/A;  8/2-covid [facility]-inst fsfivp-by-cj appts on 8/3    ESOPHAGOGASTRODUODENOSCOPY (EGD) WITH RADIOFREQUENCY TREATMENT OF GASTROESOPHAGEAL JUNCTION      INJECTION OF ANESTHETIC AGENT AROUND MEDIAL BRANCH NERVES INNERVATING CERVICAL FACET JOINT Bilateral 1/9/2020    Procedure: INJECTION, MBB--Bilateral Cervical- Third Occipital nerve, C2-3--ASA okay for pt to continue taking per provider.;  Surgeon: Tip Mera Jr., MD;  Location: [facility] PAIN MGT;  Service: Pain Management;  Laterality: Bilateral;    INTRAOCULAR PROSTHESES INSERTION Right 7/17/2018    Procedure: INSERTION, INTRAOCULAR LENS PROSTHESIS;  Surgeon: León Talamantes MD;  Location: [facility] OR 1ST FLR;  Service: Ophthalmology;  Laterality: Right;    INTRAOCULAR PROSTHESES INSERTION Left 7/31/2018    Procedure: INSERTION, INTRAOCULAR LENS PROSTHESIS;  Surgeon: León Talamantes MD;  Location: [facility] OR 1ST FLR;  Service: Ophthalmology;  Laterality: Left;    PHACOEMULSIFICATION OF CATARACT Right 7/17/2018    Procedure: PHACOEMULSIFICATION, CATARACT;  Surgeon: León Talamantes MD;  [facility];  Service: Ophthalmology;  Laterality: Right;    PHACOEMULSIFICATION OF CATARACT Left 7/31/2018    Procedure: PHACOEMULSIFICATION, CATARACT;  Surgeon: León Talamantes MD;  [facility];  Service: Ophthalmology;  Laterality: Left;    PLACEMENT OF SCREW IN ODONTOID N/A 10/22/2020    Procedure: INSERTION, SCREW, ODONTOID. Anterior approach.;  Surgeon: Kirsten Weaver MD;  Location: [facility] OR 2ND FLR;  Service: Neurosurgery;  Laterality: N/A;    RADIOFREQUENCY THERMOCOAGULATION Bilateral  2020    Procedure: RADIOFREQUENCY THERMAL COAGULATION--Bilateral C2-3 and Third Occipital Nerve;  Surgeon: Tip Mera Jr., MD;  Location: [facility];  Service: Pain Management;  Laterality: Bilateral;    UMBILICAL HERNIA REPAIR         Social History     Socioeconomic History    Marital status:     Number of children: 4   Occupational History    Occupation:     Tobacco Use    Smoking status: Former     Packs/day: 2.00     Years: 20.00     Pack years: 40.00     Types: Cigarettes     Quit date: 1988     Years since quittin.9     Passive exposure: Past    Smokeless tobacco: Never   Substance and Sexual Activity    Alcohol use: No     Comment: quit drinking beer     Drug use: No    Sexual activity: Not Currently     Partners: Female       Review of Systems:  As above. Additionally:     Constitutional: activity change  HENT: Negative for congestion, postnasal drip, rhinorrhea, sinus pressure, sore throat, trouble swallowing and voice change.    Eyes: Negative for photophobia, pain and visual disturbance.   Respiratory: cough, shortness of breath, negative for wheezing.    Cardiovascular: Negative for palpitations and leg swelling.   Gastrointestinal: Negative for nausea, vomiting, abdominal pain, diarrhea, constipation and abdominal distention.   Endocrine: Negative for polydipsia, polyphagia and polyuria.   Genitourinary: Negative for dysuria, frequency, hematuria and difficulty urinating.   Musculoskeletal: arthralgias  Skin: Negative for color change, pallor, rash and wound.   Neurological: Negative for dizziness, seizures, syncope, weakness and headaches.   Hematological: Negative for adenopathy. Does not bruise/bleed easily.   Psychiatric/Behavioral: Negative for sleep disturbance and dysphoric mood. The patient is not nervous/anxious.      OBJECTIVE:     Current Vital Signs  Temp: 36.7 °C (98 °F) (23 0700)  Pulse: 73 (23 1000)  Resp: (!) 33  (01/23/23 1000)  BP: (!) 95/51 (01/23/23 1000)  SpO2: 97 % (01/23/23 1000)    Vital Signs Range (Last 24H):  Temp:  [36.6 °C (97.8 °F)-37.4 °C (99.3 °F)]   Pulse:  []   Resp:  [28-49]   BP: ()/(51-71)   SpO2:  [60 %-100 %]     I & O (Last 24H):    Intake/Output Summary (Last 24 hours) at 1/23/2023 1020  Last data filed at 1/23/2023 0900  Gross per 24 hour   Intake 1927.68 ml   Output 1950 ml   Net -22.32 ml       Physical Exam:  Constitutional: Patient is oriented to person, place, and time. Patient appears well-developed and well-nourished. No distress.   HENT: Head: Normocephalic and atraumatic. Right Ear: External ear normal. Left Ear: External ear normal. Nose: Nose normal.   Mouth/Throat: Oropharynx is clear and moist. No oropharyngeal exudate.   Eyes: Conjunctivae and EOM are normal. Pupils are equal, round, and reactive to light. Right eye exhibits no discharge. Left eye exhibits no discharge. No scleral icterus.   Neck: Normal range of motion. Neck supple. No JVD present. No tracheal deviation present. No thyromegaly present.   Cardiovascular: Normal rate, regular rhythm, normal heart sounds and intact distal pulses.  Exam reveals no gallop and no friction rub.  No murmur heard.  Pulmonary/Chest: Effort normal and breath sounds normal. No respiratory distress. Patient has no wheezes. Patient has no rales.   Abdominal: Soft. Bowel sounds are normal. Patient exhibits no distension and no mass. There is no tenderness.   Musculoskeletal: Normal range of motion. Patient exhibits no edema or tenderness.   Lymphadenopathy:   Patient has no cervical adenopathy.   Neurological: Patient is alert and oriented to person, place, and time. Patient has normal reflexes. Patient exhibits normal muscle tone. Coordination normal.   Skin: Skin is warm and dry. No rash noted. Patient is not diaphoretic. No erythema. No pallor.   Psychiatric: Patient has a normal mood and affect. Behavior is normal. Judgment and  thought content normal.     Laboratory:  CBC:   Recent Labs     01/22/23 2118 01/23/23  0245   WBC 5.13 6.04   RBC 2.68* 2.95*   HGB 8.6* 9.2*   HCT 27.4* 30.4*    211   * 103*   MCH 32.1* 31.2*   MCHC 31.4* 30.3*       CMP:   Recent Labs     01/22/23  0255 01/23/23  0245    143   K 4.6 4.5   CO2 21* 22*    110   BUN 32* 35*   CREATININE 0.9 0.8   * 139*   MG 2.5 2.6   PHOS 2.6* 3.3   CALCIUM 8.4* 9.0   ALBUMIN 2.6* 2.6*   PROT 5.2* 6.1   ALKPHOS 137* 181*   ALT 26 29   AST 24 33   BILITOT 0.9 1.0       Diagnostic Radiology:  Air in the right chest wall adjacent to the lateral rib cage.  Small right apical pneumothorax.  Suspect right-sided rib fracture    ASSESSMENT/PLAN:     Active Hospital Problems    Diagnosis  POA    *Closed fracture of multiple ribs of right side [S22.41XA]  Yes    Multiple rib fractures involving four or more ribs [S22.49XA]  Unknown    Traumatic pneumothorax [S27.0XXA]  Yes    Encounter for palliative care [Z51.5]  Not Applicable    Chronic obstructive pulmonary disease [J44.9]  Yes     Patient has a history of COPD and has not been using a controller because of cost.  We will reorder and ask for the pharmacy assistance program to assist.  In addition will start DuoNeb twice a day and Pulmicort b.i.d..      VIKTOR (acute kidney injury) [N17.9]  Yes      Resolved Hospital Problems   No resolved problems to display.     Mr. Browning is an 81yo M PMH CAD (EF 40% with previous MI with enduring defect seen on cardiac PET), COPD on 0.5L NC at home and inhalers, DM2, CKD3, HTN, sarcoid, admitted with multiple R sided rib fractures 2/2 fall with apical pneumothorax, not a surgical candidate. Given the patient was tachycardic, reporting pain, taking shallow rapid breaths, requiring 3L NC, his pain was initially inadequately controlled on regimen with PO oxy and IV dilaudid for breakthrough. Pain improved with BELEM, heart rate within normal limits. Delirious  1/18 overnight. Pain well controlled without use of narcotics. PNC inadvertently rmoved 1/21/23. Patient O2 desatted to 60% morning of 1/23/22 now requiring 60L high flow NC. NG tube as not tolerating PO and concern for aspiration. Family moving toward comfort care.    Plan:    -- BELEM block inadvertently pulled out 1/21/23  -- Given patient's family is moving toward comfort care will hold off on BELEM for now  -- Dilaudid 0.2 q2h PRN  -- Multimodals: Acetaminophen 1,000mg 3x daily, Gabapentin 300 3xdaily, methocarbamol 500mg q8h, lidocaine patch  -- Home sertraline 100mg daily      Thank you for this consult. Will continue to follow with you.    Sherie Vasquez MD  Department of Anesthesiology  [facility]

## 2023-01-23 NOTE — PROGRESS NOTES
[facility] - Surgical Intensive Care  General Surgery  Progress Note    Subjective:     History of Present Illness:  82M frail fell multiple times at home.  H/o CAD (EF 40% with previous MI with enduring defect seen on cardiac PET), COPD on 0.5L NC at home and inhalers, DM2, CKD3, HTN, sarcoid, CAD.  CXR shows small apical pneumo and subcutaneous air on the right chest wall.  CT chest shows at least four comminuted posterior rib fractures on my count. These fractures are severe.  He is sating in the mid-90s on 6L facemask.  Back pain is not controlled  He is complaining of abdominal pain as well    Lives with daughter who assists with ADLs.  I shared with the daughter that this is a major setback for him and that with his medical history, he is at very high risk for pneumonia and intubation.  She has not explicitly discussed with her brothers whether he would want to be intubated were it indicated.    He has had cardiac bypass and cervical spine surgery. He has cardiac stents.    Remote 40 pack year smoking history.      Post-Op Info:  * No surgery found *         Interval History: Remains on 3-4L NC. Palliative conversations ongoing. Remains tachypnic    Medications:  Continuous Infusions:   dexmedetomidine (PRECEDEX) infusion 1.4 mcg/kg/hr (01/23/23 0601)    dextrose 5 % and 0.45 % NaCl 50 mL/hr at 01/23/23 0600    ROPIvacaine (PF) 2 mg/ml (0.2%) Stopped (01/21/23 1900)     Scheduled Meds:   acetaminophen  1,000 mg Per NG tube Q8H    atorvastatin  40 mg Per NG tube Daily    budesonide  0.5 mg Nebulization Q12H    docusate  100 mg Per NG tube BID    dronabinoL  2.5 mg Oral BID    furosemide  20 mg Per NG tube Daily    gabapentin  500 mg Per NG tube Daily    heparin (porcine)  5,000 Units Subcutaneous Q8H    levalbuterol  0.63 mg Nebulization Q4H    LIDOcaine  1 patch Transdermal Q24H    melatonin  6 mg Per NG tube Nightly    memantine  10 mg Per NG tube BID    methocarbamol (ROBAXIN) IVPB  500 mg  Intravenous Q8H    midodrine  2.5 mg Per NG tube Q12H    pantoprazole  40 mg Per NG tube Daily    polyethylene glycol  17 g Per NG tube BID    scopolamine  1 patch Transdermal Q3 Days    sertraline  100 mg Per NG tube Daily     PRN Meds:sodium chloride 0.9%, albuterol-ipratropium, dextrose 10%, dextrose 10%, glucagon (human recombinant), glucose, glucose, HYDROmorphone, insulin aspart U-100, ondansetron     Review of patient's allergies indicates:   Allergen Reactions    Morphine Shortness Of Breath     Objective:     Vital Signs (Most Recent):  Temp: 99.3 °F (37.4 °C) (01/23/23 0300)  Pulse: 60 (01/23/23 0615)  Resp: (!) 29 (01/23/23 0615)  BP: (!) 97/52 (01/23/23 0600)  SpO2: 100 % (01/23/23 0615)   Vital Signs (24h Range):  Temp:  [97.8 °F (36.6 °C)-99.3 °F (37.4 °C)] 99.3 °F (37.4 °C)  Pulse:  [60-99] 60  Resp:  [28-51] 29  SpO2:  [92 %-100 %] 100 %  BP: ()/() 97/52     Weight:  (bed scale broken)  Body mass index is 22.15 kg/m².    Intake/Output - Last 3 Shifts         01/21 0700  01/22 0659 01/22 0700 01/23 0659 01/23 0700 01/24 0659    P.O.       I.V. (mL/kg) 1473.8 (21.7) 1763.9 (25.9)     IV Piggyback  292.5     Total Intake(mL/kg) 1473.8 (21.7) 2056.3 (30.2)     Urine (mL/kg/hr) 820 (0.5) 900 (0.6)     Drains 100 1050     Total Output 920 1950     Net +553.8 +106.3                    Physical Exam  Vitals and nursing note reviewed.   Constitutional:       General: He is not in acute distress.     Comments: 3L O2 via NC   HENT:      Head: Normocephalic.      Nose: Nose normal.   Eyes:      General: No scleral icterus.     Extraocular Movements: Extraocular movements intact.   Cardiovascular:      Rate and Rhythm: Normal rate.   Pulmonary:      Effort: Pulmonary effort is normal. No respiratory distress.   Chest:      Comments: Right posterior back pain at site of fractures  Crepitus along the right back  Abdominal:      General: There is no distension.      Palpations: Abdomen is soft.       Tenderness: There is no abdominal tenderness. There is no guarding or rebound.   Musculoskeletal:         General: No deformity.   Skin:     General: Skin is warm and dry.   Neurological:      Mental Status: He is alert. He is disoriented.       Significant Labs:  I have reviewed all pertinent lab results within the past 24 hours.    Significant Diagnostics:  I have reviewed all pertinent imaging results/findings within the past 24 hours.    Assessment/Plan:     * Closed fracture of multiple ribs of right side  Patient is an 82 year old frail and comorbid gentleman with h/o COPD (on home O2), pulmonary sarcoidosis, CABG, HTN, HLD, depression, GERD, dementia, HF who presents after a fall with multiple posterior right rib fractures that are comminuted with significant displacement, also with small pneumothorax that does not need intervention at this time, but may eventually. In respiratory distress, approaching failure on a few liters of O2. Also very high risk for pneumonia.    - Dysphagia soft diet with aspiration precautions  - Daily CXR  - Palliative care consulted, appreciate assistance   - Will follow up discussions. Currently DNR.   - Multimodal pain control  - PRN IV pain   - Marinol   - APS consulted, appreciate assistance. Block 1/18, removed inadvertently 1/21. Eval for possible replacement  - Aggressive pulmonary toilet, acapella, IS, duonebs, inhalation treatment  - Home meds  - DVT ppx (SCDs and heparin)  - Remainder of care per SICU, appreciate assistance    Dispo: remain in SICU       Traumatic pneumothorax  - see principle problem    Chronic obstructive pulmonary disease  - see principle problem         Judy Granda MD  General Surgery  [facility] - [facility]

## 2023-01-23 NOTE — SUBJECTIVE & OBJECTIVE
Interval History: Requiring much more oxygen support, on HFNC and non-rebreather. Family came together and agreed to focus on his comfort, requesting for transition to happen as soon as possible.    Medications:  Continuous Infusions:   dexmedetomidine (PRECEDEX) infusion 1.3 mcg/kg/hr (01/23/23 1100)    dextrose 5 % and 0.45 % NaCl 50 mL/hr at 01/23/23 1100     Scheduled Meds:   budesonide  0.5 mg Nebulization Q12H    dronabinoL  2.5 mg Oral BID    furosemide  20 mg Per NG tube Daily    gabapentin  250 mg Per NG tube Q12H    levalbuterol  0.63 mg Nebulization Q4H    LIDOcaine  1 patch Transdermal Q24H    methocarbamol (ROBAXIN) IVPB  500 mg Intravenous Q8H    pantoprazole  40 mg Per NG tube Daily    polyethylene glycol  17 g Per NG tube BID    sertraline  100 mg Per NG tube Daily     PRN Meds:sodium chloride 0.9%, albuterol-ipratropium, haloperidol lactate, HYDROmorphone, midazolam, ondansetron, oxyCODONE    Objective:     Vital Signs (Most Recent):  Temp: 98 °F (36.7 °C) (01/23/23 0700)  Pulse: 70 (01/23/23 1152)  Resp: (!) 33 (01/23/23 1152)  BP: (!) 90/52 (01/23/23 1100)  SpO2: 97 % (01/23/23 1152)   Vital Signs (24h Range):  Temp:  [97.8 °F (36.6 °C)-99.3 °F (37.4 °C)] 98 °F (36.7 °C)  Pulse:  [] 70  Resp:  [28-49] 33  SpO2:  [60 %-100 %] 97 %  BP: ()/(51-71) 90/52     Weight:  (bed scale broken)  Body mass index is 22.15 kg/m².    Physical Exam  Constitutional:       General: He is in acute distress.      Appearance: He is ill-appearing.   HENT:      Head: Normocephalic and atraumatic.      Right Ear: External ear normal.      Left Ear: External ear normal.      Nose: Nose normal.      Mouth/Throat:      Mouth: Mucous membranes are dry.   Eyes:      Conjunctiva/sclera: Conjunctivae normal.   Cardiovascular:      Rate and Rhythm: Normal rate.   Pulmonary:      Effort: Respiratory distress present.      Breath sounds: Wheezing and rales present.   Abdominal:      General: Bowel sounds are normal.  There is no distension.   Musculoskeletal:      Right lower leg: No edema.      Left lower leg: No edema.   Lymphadenopathy:      Cervical: No cervical adenopathy.   Skin:     General: Skin is dry.      Coloration: Skin is pale.      Findings: Bruising present.   Neurological:      Mental Status: He is disoriented.      Motor: Weakness present.       Review of Symptoms      Symptom Assessment (ESAS 0-10 Scale)  Unable to complete assessment due to Mental status change         Pain Assessment in Advanced Demential Scale (PAINAD)   Breathing - Independent of vocalization:  2  Negative vocalization:  1  Facial expression:  1  Body language:  1  Consolability:  1  Total:  6    Psychosocial/Cultural:   See Palliative Psychosocial Note: No  Social Issues Identified: Coping deficit pt/family  Bereavement Risk: Yes: Close or dependent relationship to the  person  Caregiver Needs Discussed. Caregiver Distress: Yes: Intensity of family caregiving  **Primary  to Follow**  Palliative Care  Consult: No    Spiritual:  F - Deja and Belief:  Moravian  I - Importance:  Very important  C - Community:  Involved  A - Address in Care:  Father Chase came to pray      Advance Care Planning   Advance Directives:   Living Will: No    LaPOST: No    Do Not Resuscitate Status: Yes    Medical Power of : No      Decision Making:  Family answered questions and Patient unable to communicate due to disease severity/cognitive impairment  Goals of Care: The family endorses that what is most important right now is to focus on comfort and QOL     Accordingly, we have decided that the best plan to meet the patient's goals includes enrolling in hospice care         Significant Labs: CBC:   Recent Labs   Lab 23  0255 238 23  0245   WBC 5.72 5.13 6.04   HGB 8.7* 8.6* 9.2*   HCT 26.5* 27.4* 30.4*    200 211     CMP:   Recent Labs   Lab 23  0255 23  0245    143   K 4.6  4.5    110   CO2 21* 22*   * 139*   BUN 32* 35*   CREATININE 0.9 0.8   CALCIUM 8.4* 9.0   PROT 5.2* 6.1   ALBUMIN 2.6* 2.6*   BILITOT 0.9 1.0   ALKPHOS 137* 181*   AST 24 33   ALT 26 29   ANIONGAP 11 11     CBC:   Recent Labs   Lab 01/23/23  0245   WBC 6.04   HGB 9.2*   HCT 30.4*   *        BMP:  Recent Labs   Lab 01/23/23  0245   *      K 4.5      CO2 22*   BUN 35*   CREATININE 0.8   CALCIUM 9.0   MG 2.6     LFT:  Lab Results   Component Value Date    AST 33 01/23/2023    ALKPHOS 181 (H) 01/23/2023    BILITOT 1.0 01/23/2023     Albumin:   Albumin   Date Value Ref Range Status   01/23/2023 2.6 (L) 3.5 - 5.2 g/dL Final     Protein:   Total Protein   Date Value Ref Range Status   01/23/2023 6.1 6.0 - 8.4 g/dL Final     Lactic acid:   Lab Results   Component Value Date    LACTATE 1.0 06/06/2022    LACTATE 0.8 11/17/2014       Significant Imaging: I have reviewed all pertinent imaging results/findings within the past 24 hours.

## 2023-01-23 NOTE — PROGRESS NOTES
[facility]  Critical Care - Surgery  Progress Note    Patient Name: Paul Browning  MRN: [id number]  Admission Date: 1/16/2023  Hospital Length of Stay: 5 days  Code Status: DNR  Attending Provider: Sanot Shankar MD  Primary Care Provider: Grey Forbes DO   Principal Problem: Closed fracture of multiple ribs of right side    Subjective:     Hospital/ICU Course:  1/17: admitted to SICU for close observation and pain control. No surgery at this time. Palliative care and acute pain medicine consulted. CXR this AM with increased R sided patchy infiltrates  1/21: Midodrine added for BP. Diaphoretic, tachypneic, hypertensive throughout night. On 6L NC this AM with labored respirations. Patient denying to be in pain. Son in room states that patient already takes rapid, shallow breaths at baseline. CXR this morning grossly stable compared to prior. Avoiding antipsychotics 2/2 high qTC on EKG       Interval History/Significant Events:   - Patient tachypneic to 30-40s, not sleeping at night  - No BM since admission, on bowel regime        Objective:     Vital Signs (Most Recent):  Temp: 98 °F (36.7 °C) (01/23/23 0700)  Pulse: 63 (01/23/23 0816)  Resp: (!) 33 (01/23/23 0816)  BP: (!) 104/51 (01/23/23 0800)  SpO2: 100 % (01/23/23 0816)   Vital Signs (24h Range):  Temp:  [97.8 °F (36.6 °C)-99.3 °F (37.4 °C)] 98 °F (36.7 °C)  Pulse:  [59-99] 63  Resp:  [28-51] 33  SpO2:  [92 %-100 %] 100 %  BP: ()/(51-90) 104/51     Weight:  (bed scale broken)  Body mass index is 22.15 kg/m².      Intake/Output Summary (Last 24 hours) at 1/23/2023 0903  Last data filed at 1/23/2023 0800  Gross per 24 hour   Intake 1923.99 ml   Output 1950 ml   Net -26.01 ml       Physical Exam  Constitutional:       General: He is in acute distress.     Appearance: He is ill-appearing.   HENT:      Head: Normocephalic.      Nose: Nose normal.   Eyes:      General: No scleral icterus.     Extraocular Movements:  Extraocular movements intact.   Cardiovascular:      Rate and Rhythm: Normal rate.   Pulmonary:      Effort: No respiratory distress.      Comments: Tachypneic  Right lung, coarse breath sounds  Chest:      Comments: Right posterior back pain at site of fractures  Abdominal:      General: There is no distension.      Palpations: Abdomen is soft.      Tenderness: There is no abdominal tenderness. There is no guarding or rebound.   Genitourinary:     Comments: Mild erythema at tip of penis, no discharge noted.   Musculoskeletal:         General: No deformity.   Skin:     General: Skin is warm and dry.   Neurological:      Mental Status: He is alert. He is not oriented to time, place , person    Vents:  Oxygen Concentration (%): 4 (01/21/23 1555)    Lines/Drains/Airways       Drain  Duration                  NG/OG Tube 01/21/23 2321 nasogastric 18 Fr. Left nostril 1 day    Male External Urinary Catheter 01/21/23 1200 1 day              Peripheral Intravenous Line  Duration                  Peripheral IV - Single Lumen 01/21/23 2213 20 G Left Wrist 1 day         Peripheral IV - Single Lumen 01/23/23 0002 20 G Posterior;Right Forearm <1 day                    Significant Labs:    CBC/Anemia Profile:  Recent Labs   Lab 01/22/23  0255 01/22/23  2118 01/23/23  0245   WBC 5.72 5.13 6.04   HGB 8.7* 8.6* 9.2*   HCT 26.5* 27.4* 30.4*    200 211   MCV 99* 102* 103*   RDW 14.7* 14.6* 14.5        Chemistries:  Recent Labs   Lab 01/22/23  0255 01/23/23  0245    143   K 4.6 4.5    110   CO2 21* 22*   BUN 32* 35*   CREATININE 0.9 0.8   CALCIUM 8.4* 9.0   ALBUMIN 2.6* 2.6*   PROT 5.2* 6.1   BILITOT 0.9 1.0   ALKPHOS 137* 181*   ALT 26 29   AST 24 33   MG 2.5 2.6   PHOS 2.6* 3.3       All pertinent labs within the past 24 hours have been reviewed.    Significant Imaging:  I have reviewed all pertinent imaging results/findings within the past 24 hours.    Assessment/Plan:     * Closed fracture of multiple ribs of  right side  Paul Browning is a frail 82M h/o CAD (EF 40% with previous MI with enduring defect seen on cardiac PET), COPD on 0.5L NC at home and inhalers, DM2, CKD3, HTN, sarcoid, CAD who fell multiple times at home. Imaging shows small apical PTX and subQ air on R chest wall with CT with several comminuted posterior rib fractures.      Neuro/Psych:   -- Sedation: none  -- Pain: ifeoma robaxin, tylenol, gabapentin, lidocaine patch, ropivacaine  -- Right BELEM catheter placed by APS 1/18, pt pulled 1/21             Cards:   -- hx of HTN, CAD s/p stents  -- normotensive  -- Started on levo peripheral at 0.04, since discontinued and started on midodrine 2.5 Q12  -- holding home ASA and Entresto      Pulm:   -- hx of COPD, baseline 0.5L NC at home  -- now has right sided rib fractures and small right apical PTX  -- Goal O2 sat > 90%  -- Wean as able  -- duonebs, IS, Acapella  -- Increasing respiratory effort with  wheezing         Renal:  -- hx of CKD3, baseline Cr 1.0  -- BUN/Cr 35/0.8        FEN / GI:    -- Replace lytes as needed  -- Nutrition: soft diet  -- NGT placed      ID:   -- Tm: afebrile; WBC 5.72  -- Abx none      Heme/Onc:   -- H/H 9.2/30.4  -- Daily CBC      Endo:   -- hx of DM2, on Jardiance   -- Gluc goal 140-180  -- AISS      PPx:   Feeding: soft diet  Analgesia/Sedation: ifeoma gabapentin, robaxin, tylenol, lido patch  Thromboembolic prevention: heparin  HOB >30: yes  Stress Ulcer ppx: protonix  Glucose control: Critical care goal 140-180 g/dl, ISS    Lines/Drains/Airway: Carlos Kaur NGT      Dispo/Code Status/Palliative:   -- SICU / DNR                 Critical care was time spent personally by me on the following activities: development of treatment plan with patient or surrogate and bedside caregivers, discussions with consultants, evaluation of patient's response to treatment, examination of patient, ordering and performing treatments and interventions, ordering and review of laboratory  studies, ordering and review of radiographic studies, pulse oximetry, re-evaluation of patient's condition.  This critical care time did not overlap with that of any other provider or involve time for any procedures.     Merlene Skelton MD  Critical Care - Surgery  [facility]

## 2023-01-23 NOTE — DISCHARGE SUMMARY
[facility]  Critical Care - Surgery  Discharge Summary      Patient Name: Paul Browning  MRN: [id number]  Admission Date: 1/16/2023  Hospital Length of Stay: 5 days  Discharge Date and Time: 1/24/2023     Attending Physician: Santo Shankar MD   Discharging Provider: Filippo Anderson MD  Primary Care Provider: Grey Forbes DO    HPI:  Paul Browning is a frail 82M h/o CAD (EF 40% with previous MI with enduring defect seen on cardiac PET), COPD on 0.5L NC at home and inhalers, DM2, CKD3, HTN, sarcoid, CAD who fell multiple times at home. Imaging shows small apical PTX and subQ air on R chest wall with CT with several comminuted posterior rib fractures.    On admission, he is satting in the mid-90s on 6L facemask. Back pain is not controlled, and he is complaining of abdominal pain as well. He is being admitted to the SICU for observation and pain control.      * No surgery found *    Indwelling Lines/Drains at Time of Discharge:   Lines/Drains/Airways     Drain  Duration           Male External Urinary Catheter 01/21/23 1200 2 days         NG/OG Tube 01/21/23 2321 nasogastric 18 Fr. Left nostril 1 day                Hospital Course:  Patient was admitted to SICU for closer observation and pain control for posterior fractures of right ribs 6-9. Palliative care and acute pain medicine were consulted. APS placed an EPS catheter for pain control that and family elected to make the patient DNR/DNI. CXR on admission showed a small apical right pneumothorax. Midodrine was added for BP support. Patient throughout his stay remained tachpneic. On 1/23, patient's oxygen saturations began declining to 50s and 60%, eventually requiring 70L on high flow oxygen. Family at that time elected to pursue comfort care measures. Nurse obtained EKG showing asystole and MD informed. Patient passed away at around 1450 with his family members present at bedside.      Goals of Care Treatment  Preferences:  Code Status: DNR          What is most important right now is to focus on comfort and QOL .  Accordingly, we have decided that the best plan to meet the patient's goals includes enrolling in hospice care.      Consults (From admission, onward)        Status Ordering Provider     Inpatient consult to Registered Dietitian/Nutritionist  Once        Provider:  (Not yet assigned)    Completed RUEL ALEXANDRE     Inpatient consult to Pain Management  Once        Provider:  (Not yet assigned)    Acknowledged FILIPPO ANDERSON     Inpatient consult to Palliative Care  Once        Provider:  (Not yet assigned)    Completed FILIPPO ANDERSON     Inpatient consult to General Surgery  Once        Provider:  (Not yet assigned)    Completed ARIANA MORENO          Significant Labs:  All pertinent labs within the past 24 hours have been reviewed.    Significant Imaging:  I have reviewed all pertinent imaging results/findings within the past 24 hours.    Pending Diagnostic Studies:     None        Final Active Diagnoses:    Diagnosis Date Noted POA    PRINCIPAL PROBLEM:  Closed fracture of multiple ribs of right side [S22.41XA] 2023 Yes    Fall [W19.XXXA] 2023 Yes    Comfort measures only status [Z51.5] 2023 Not Applicable    Multiple rib fractures involving four or more ribs [S22.49XA] 2023 Unknown    Traumatic pneumothorax [S27.0XXA] 2023 Yes    Encounter for palliative care [Z51.5] 2023 Not Applicable    Chronic obstructive pulmonary disease [J44.9] 2022 Yes    VIKTOR (acute kidney injury) [N17.9] 2014 Yes      Problems Resolved During this Admission:       Discharged Condition:       Patient Instructions:   No discharge procedures on file.  Medications:  None    Filippo Anderson MD  Critical Care - Surgery  [facility] - Surgical Intensive Care

## 2023-01-23 NOTE — PLAN OF CARE
"      SICU PLAN OF CARE NOTE    Dx: Closed fracture of multiple ribs of right side    Shift Events: Multiple respiratory interventions done. NT suctioned x2. Breathing treatments done. Precedex on. Pain medication administered. CBC sent off due to bloody secretions in NG tube.     Neuro: Incomprehensible Speech. Moves All Extremities.    Vital Signs: BP (!) 97/52 (BP Location: Right arm, Patient Position: Lying)   Pulse 60   Temp 99.3 °F (37.4 °C) (Axillary)   Resp (!) 29   Ht 5' 9" (1.753 m)   Wt 68 kg (150 lb)   SpO2 100%   BMI 22.15 kg/m²     Respiratory: Nasal Cannula 4L    Diet: NPO    Gtts: Precedex and MIVF    Urine Output: External Urinary Catheter 400 cc/shift of yellow urine.    Drains: NG/OG Tube 400 cc /  shift of bile/red-tinged drainage.    Restraints soft mitts maintained due to patient trying to pull out NG tube. No injuries noted.    Labs/Accuchecks:   - WBC: 6.04  - Hgb: 9.2  - Hct: 30.4  - Platelets: 211  - Na: 143  - K: 4.5  - BUN: 35  - Creatinine: 0.8  - Glucose: 139  - Ca: 9.0  - Phos: 3.3  - Ma.6  - T. Bili: 1.0  - AST: 33  - ALT: 29       "

## 2023-01-23 NOTE — ASSESSMENT & PLAN NOTE
Paul Browning is a frail 82M h/o CAD (EF 40% with previous MI with enduring defect seen on cardiac PET), COPD on 0.5L NC at home and inhalers, DM2, CKD3, HTN, sarcoid, CAD who fell multiple times at home. Imaging shows small apical PTX and subQ air on R chest wall with CT with several comminuted posterior rib fractures.      Neuro/Psych:   -- Sedation: none  -- Pain: ifeoma robaxin, tylenol, gabapentin, lidocaine patch, ropivacaine  -- Right BELEM catheter placed by APS 1/18, pt pulled 1/21             Cards:   -- hx of HTN, CAD s/p stents  -- normotensive  -- Started on levo peripheral at 0.04, since discontinued and started on midodrine 2.5 Q12  -- holding home ASA and Entresto      Pulm:   -- hx of COPD, baseline 0.5L NC at home  -- now has right sided rib fractures and small right apical PTX  -- Goal O2 sat > 90%  -- Wean as able  -- duonebs, IS, Acapella  -- Increasing respiratory effort with  wheezing         Renal:  -- hx of CKD3, baseline Cr 1.0  -- BUN/Cr 35/0.8        FEN / GI:    -- Replace lytes as needed  -- Nutrition: soft diet  -- NGT placed      ID:   -- Tm: afebrile; WBC 5.72  -- Abx none      Heme/Onc:   -- H/H 9.2/30.4  -- Daily CBC      Endo:   -- hx of DM2, on Jardiance   -- Gluc goal 140-180  -- AISS      PPx:   Feeding: soft diet  Analgesia/Sedation: [facility] gabapentin, robaxin, tylenol, lido patch  Thromboembolic prevention: heparin  HOB >30: yes  Stress Ulcer ppx: protonix  Glucose control: Critical care goal 140-180 g/dl, ISS    Lines/Drains/Airway: Carlos Kaur NGT      Dispo/Code Status/Palliative:   -- SICU / DNR

## 2023-01-23 NOTE — SUBJECTIVE & OBJECTIVE
Interval History: Remains on 3-4L NC. Palliative conversations ongoing. Remains tachypnic    Medications:  Continuous Infusions:   dexmedetomidine (PRECEDEX) infusion 1.4 mcg/kg/hr (01/23/23 0601)    dextrose 5 % and 0.45 % NaCl 50 mL/hr at 01/23/23 0600    ROPIvacaine (PF) 2 mg/ml (0.2%) Stopped (01/21/23 1900)     Scheduled Meds:   acetaminophen  1,000 mg Per NG tube Q8H    atorvastatin  40 mg Per NG tube Daily    budesonide  0.5 mg Nebulization Q12H    docusate  100 mg Per NG tube BID    dronabinoL  2.5 mg Oral BID    furosemide  20 mg Per NG tube Daily    gabapentin  500 mg Per NG tube Daily    heparin (porcine)  5,000 Units Subcutaneous Q8H    levalbuterol  0.63 mg Nebulization Q4H    LIDOcaine  1 patch Transdermal Q24H    melatonin  6 mg Per NG tube Nightly    memantine  10 mg Per NG tube BID    methocarbamol (ROBAXIN) IVPB  500 mg Intravenous Q8H    midodrine  2.5 mg Per NG tube Q12H    pantoprazole  40 mg Per NG tube Daily    polyethylene glycol  17 g Per NG tube BID    scopolamine  1 patch Transdermal Q3 Days    sertraline  100 mg Per NG tube Daily     PRN Meds:sodium chloride 0.9%, albuterol-ipratropium, dextrose 10%, dextrose 10%, glucagon (human recombinant), glucose, glucose, HYDROmorphone, insulin aspart U-100, ondansetron     Review of patient's allergies indicates:   Allergen Reactions    Morphine Shortness Of Breath     Objective:     Vital Signs (Most Recent):  Temp: 99.3 °F (37.4 °C) (01/23/23 0300)  Pulse: 60 (01/23/23 0615)  Resp: (!) 29 (01/23/23 0615)  BP: (!) 97/52 (01/23/23 0600)  SpO2: 100 % (01/23/23 0615)   Vital Signs (24h Range):  Temp:  [97.8 °F (36.6 °C)-99.3 °F (37.4 °C)] 99.3 °F (37.4 °C)  Pulse:  [60-99] 60  Resp:  [28-51] 29  SpO2:  [92 %-100 %] 100 %  BP: ()/() 97/52     Weight:  (bed scale broken)  Body mass index is 22.15 kg/m².    Intake/Output - Last 3 Shifts         01/21 0700 01/22 0659 01/22 0700 01/23 0659 01/23 0700 01/24 0659    P.O.       I.V. (mL/kg)  1473.8 (21.7) 1763.9 (25.9)     IV Piggyback  292.5     Total Intake(mL/kg) 1473.8 (21.7) 2056.3 (30.2)     Urine (mL/kg/hr) 820 (0.5) 900 (0.6)     Drains 100 1050     Total Output 920 1950     Net +553.8 +106.3                    Physical Exam  Vitals and nursing note reviewed.   Constitutional:       General: He is not in acute distress.     Comments: 3L O2 via NC   HENT:      Head: Normocephalic.      Nose: Nose normal.   Eyes:      General: No scleral icterus.     Extraocular Movements: Extraocular movements intact.   Cardiovascular:      Rate and Rhythm: Normal rate.   Pulmonary:      Effort: Pulmonary effort is normal. No respiratory distress.   Chest:      Comments: Right posterior back pain at site of fractures  Crepitus along the right back  Abdominal:      General: There is no distension.      Palpations: Abdomen is soft.      Tenderness: There is no abdominal tenderness. There is no guarding or rebound.   Musculoskeletal:         General: No deformity.   Skin:     General: Skin is warm and dry.   Neurological:      Mental Status: He is alert. He is disoriented.       Significant Labs:  I have reviewed all pertinent lab results within the past 24 hours.    Significant Diagnostics:  I have reviewed all pertinent imaging results/findings within the past 24 hours.

## 2023-01-23 NOTE — PLAN OF CARE
Recommendations     1.) Recommend continuing with comfort care measures, providing small sips of fluid and pleasure feeds per MD.   2.) RD to monitor.     Goals: Pt's comfort  Nutrition Goal Status: new  Communication of RD Recs:  (POC)

## 2023-01-23 NOTE — ASSESSMENT & PLAN NOTE
Patient is an 82 year old frail and comorbid gentleman with h/o COPD (on home O2), pulmonary sarcoidosis, CABG, HTN, HLD, depression, GERD, dementia, HF who presents after a fall with multiple posterior right rib fractures that are comminuted with significant displacement, also with small pneumothorax that does not need intervention at this time, but may eventually. In respiratory distress, approaching failure on a few liters of O2. Also very high risk for pneumonia.    - Dysphagia soft diet with aspiration precautions  - Daily CXR  - Palliative care consulted, appreciate assistance   - Will follow up discussions. Currently DNR.   - Multimodal pain control  - PRN IV pain   - Marinol   - APS consulted, appreciate assistance. Block 1/18, removed inadvertently 1/21. Eval for possible replacement  - Aggressive pulmonary toilet, acapella, IS, duonebs, inhalation treatment  - Home meds  - DVT ppx (SCDs and heparin)  - Remainder of care per SICU, appreciate assistance    Dispo: remain in SICU

## 2023-01-23 NOTE — SIGNIFICANT EVENT
Death Note                                                                        Called to bedside by patient's nurse at 1448. Nursing supervisor notified. Family at bedside.  has been called and will soon be at bedside.    During my exam the patient does not respond to verbal or tactile stimuli. They do not have intact pupillary, corneal, oculovestibular, or gag reflexes. Their pupils are fixed and dilated. There are no heart or breath sounds on auscultation. There are no visible respirations. There are no palpable carotid, femoral or peripheral pulses. This exam is consistent with death.     Time of my exam: 1450     Preliminary cause of death: cardiopulmonary arrest    Filippo Anderson M.D.  General Surgery PGY-I  [facility]

## 2023-01-23 NOTE — CARE UPDATE
MD notified of bloody secretions coming from NG tube. MD at bedside assessing secretions. CBC ordered. Vitals remain stable. KUB from earlier re-checked by MD. Labs reviewed. No other orders given at this time. Will continue to monitor.

## 2023-01-23 NOTE — SUBJECTIVE & OBJECTIVE
Interval History/Significant Events:   - Patient tachypneic to 30-40s, not sleeping at night  - No BM since admission, on bowel regime        Objective:     Vital Signs (Most Recent):  Temp: 98 °F (36.7 °C) (01/23/23 0700)  Pulse: 63 (01/23/23 0816)  Resp: (!) 33 (01/23/23 0816)  BP: (!) 104/51 (01/23/23 0800)  SpO2: 100 % (01/23/23 0816)   Vital Signs (24h Range):  Temp:  [97.8 °F (36.6 °C)-99.3 °F (37.4 °C)] 98 °F (36.7 °C)  Pulse:  [59-99] 63  Resp:  [28-51] 33  SpO2:  [92 %-100 %] 100 %  BP: ()/(51-90) 104/51     Weight:  (bed scale broken)  Body mass index is 22.15 kg/m².      Intake/Output Summary (Last 24 hours) at 1/23/2023 0903  Last data filed at 1/23/2023 0800  Gross per 24 hour   Intake 1923.99 ml   Output 1950 ml   Net -26.01 ml       Physical Exam  Constitutional:       General: He is in acute distress.     Appearance: He is ill-appearing.   HENT:      Head: Normocephalic.      Nose: Nose normal.   Eyes:      General: No scleral icterus.     Extraocular Movements: Extraocular movements intact.   Cardiovascular:      Rate and Rhythm: Normal rate.   Pulmonary:      Effort: No respiratory distress.      Comments: Tachypneic  Right lung, coarse breath sounds  Chest:      Comments: Right posterior back pain at site of fractures  Abdominal:      General: There is no distension.      Palpations: Abdomen is soft.      Tenderness: There is no abdominal tenderness. There is no guarding or rebound.   Genitourinary:     Comments: Mild erythema at tip of penis, no discharge noted.   Musculoskeletal:         General: No deformity.   Skin:     General: Skin is warm and dry.   Neurological:      Mental Status: He is alert. He is not oriented to time, place , person    Vents:  Oxygen Concentration (%): 4 (01/21/23 1555)    Lines/Drains/Airways       Drain  Duration                  NG/OG Tube 01/21/23 2321 nasogastric 18 Fr. Left nostril 1 day    Male External Urinary Catheter 01/21/23 1200 1 day               Peripheral Intravenous Line  Duration                  Peripheral IV - Single Lumen 01/21/23 2213 20 G Left Wrist 1 day         Peripheral IV - Single Lumen 01/23/23 0002 20 G Posterior;Right Forearm <1 day                    Significant Labs:    CBC/Anemia Profile:  Recent Labs   Lab 01/22/23 0255 01/22/23 2118 01/23/23  0245   WBC 5.72 5.13 6.04   HGB 8.7* 8.6* 9.2*   HCT 26.5* 27.4* 30.4*    200 211   MCV 99* 102* 103*   RDW 14.7* 14.6* 14.5        Chemistries:  Recent Labs   Lab 01/22/23 0255 01/23/23  0245    143   K 4.6 4.5    110   CO2 21* 22*   BUN 32* 35*   CREATININE 0.9 0.8   CALCIUM 8.4* 9.0   ALBUMIN 2.6* 2.6*   PROT 5.2* 6.1   BILITOT 0.9 1.0   ALKPHOS 137* 181*   ALT 26 29   AST 24 33   MG 2.5 2.6   PHOS 2.6* 3.3       All pertinent labs within the past 24 hours have been reviewed.    Significant Imaging:  I have reviewed all pertinent imaging results/findings within the past 24 hours.

## 2023-01-23 NOTE — CONSULTS
"[facility] - [facility]  Adult Nutrition  Consult Note    SUMMARY     Recommendations    1.) Recommend continuing with comfort care measures, providing small sips of fluid and pleasure feeds per MD.   2.) RD to monitor.    Goals: Pt's comfort  Nutrition Goal Status: new  Communication of RD Recs:  (POC)    Assessment and Plan    No new Assessment & Plan notes have been filed under this hospital service since the last note was generated.  Service: Nutrition     Reason for Assessment    Reason For Assessment: consult  Diagnosis:  (s/p fall, multi rib fx)  Relevant Medical History: CKD3, HTN, DM2, CAD with previous MI  Interdisciplinary Rounds: did not attend  General Information Comments: RD consult: Pt was seen for RD TF consult. RD spoke with with pt's RN, RN stated that pt was just put onto comfort care. Family in the room. No questions or comments at this time. Pt was sedated. Noted that pt has generalized edema. Pt is at risk for wt flux d/t pearl, comfort care and diuretic usage. With comfort care, general decline is inevitable.   Nutrition Discharge Planning: comfort care    Nutrition Risk Screen    Nutrition Risk Screen: tube feeding or parenteral nutrition    Nutrition/Diet History    Spiritual, Cultural Beliefs, Jehovah's witness Practices, Values that Affect Care: no    Anthropometrics    Temp: 98 °F (36.7 °C)  Height Method: Estimated  Height: 5' 9" (175.3 cm)  Height (inches): 69 in  Weight Method: Bed Scale  Weight:  (bed scale broken)  Weight (lb): 150 lb  Ideal Body Weight (IBW), Male: 160 lb  % Ideal Body Weight, Male (lb): 93.75 %  BMI (Calculated): 22.1  BMI Grade: 18.5-24.9 - normal     Lab/Procedures/Meds    Pertinent Labs Reviewed: reviewed  Pertinent Labs Comments: 1/23: h/h: 9.2/30.4; glucose: 139; alb:2.6; alk phos: 181; chol: 111  Pertinent Medications Reviewed: reviewed  Pertinent Medications Comments: lasix, atorvastatin, heparin,pantoprazole, polyethylene glycol    Estimated/Assessed " Needs    Weight Used For Calorie Calculations: 68 kg (149 lb 14.6 oz)  Energy Calorie Requirements (kcal): 1700- 2040kcal (25-30kcal/kg)  Energy Need Method: Kcal/kg  Protein Requirements: 82- 95g (1.2-1.4g/kg)  Weight Used For Protein Calculations: 68 kg (149 lb 14.6 oz)  Fluid Requirements (mL): 1ml/1kcal or per MD  Estimated Fluid Requirement Method: RDA Method  RDA Method (mL): 1700     Nutrition Prescription Ordered    Current Diet Order: NPO    Evaluation of Received Nutrient/Fluid Intake    I/O: +8,013ml since admit  Energy Calories Required: not meeting needs  Protein Required: not meeting needs  Comments: LBM: 1/15  % Intake of Estimated Energy Needs: 0 - 25 %  % Meal Intake: NPO    Nutrition Risk    Level of Risk/Frequency of Follow-up:  (RD f/u x1/week)     Monitor and Evaluation    Food and Nutrient Intake: food and beverage intake  Food and Nutrient Adminstration: other (specify) (Comfort Care)  Physical Activity and Function: nutrition-related ADLs and IADLs  Anthropometric Measurements: other (specify) (Comfort Care: wt loss can occur and general decline is inevitable.)  Biochemical Data, Medical Tests and Procedures: electrolyte and renal panel, gastrointestinal profile, glucose/endocrine profile, inflammatory profile, lipid profile  Nutrition-Focused Physical Findings: overall appearance     Nutrition Follow-Up    RD Follow-up?: Yes

## 2023-01-23 NOTE — CARE UPDATE
MD notified of an increase in patient's respiratory secretions that are unable to be cleared despite NT and oral suctioning and multiple breathing treatments. An increase in work of breathing also reported. Patient satting 100% on 4L NC and continues to be tachypneic with a RR in the 30s. Pain medication being administered for comfort. Precedex on. Orders for a scopolamine patch placed. Will carry out and will continue to monitor.

## 2023-01-23 NOTE — NURSING
Note patient family at bedside, time spent with patient, all belongings sent with son Neto Browning. Reymundo Castañeda at bedside to obtain  home information. Post mortem care then performed on patient. Transport at bedside to transport patient to [facility].

## 2023-01-23 NOTE — PT/OT/SLP DISCHARGE
Physical Therapy Discharge Summary    Name: Paul Browning  MRN: [id number]   Principal Problem: Closed fracture of multiple ribs of right side     Patient Discharged from acute Physical Therapy on 2023.  Please refer to prior PT noted date on 2023 for functional status.     Assessment:     Patient with decline in medical status requiring comfort flow + non-rebreather to maintain oxygenation at this time. Pt is medically fragile and not appropriate for skilled PT services at this time.    Objective:     GOALS:   Multidisciplinary Problems       Physical Therapy Goals          Problem: Physical Therapy    Goal Priority Disciplines Outcome Goal Variances Interventions   Physical Therapy Goal     PT, PT/OT Ongoing, Progressing     Description: Goals to be met by: 23     Patient will increase functional independence with mobility by performin. Supine to sit with Moderate Assistance  2. Sit to stand transfer with Moderate Assistance with AD if needed.   3. Bed to chair transfer with Moderate Assistance using AD if needed.   4. Sitting at edge of bed x10 minutes with Makinen and Contact Guard Assistance to increase tolerance to activities.                          Reasons for Discontinuation of Therapy Services  Patient is unable to continue work toward goals because of medical or psychosocial complications.      Plan:     Patient Discharged to: remains in SICU.      2023

## 2023-01-23 NOTE — ASSESSMENT & PLAN NOTE
"Paul Yash Browning (Joe) is an 82-year-old man with a history of dementia (FAST score 6D), CAD s/p STEMI/PCI/2v-CABG (2014), ICM with reduced EF (40%), COPD on home oxygen (intermittently), sarcoidosis, possible Parkinson's, DM2, CKD3, multiple falls who was admitted after sustaining a fall with multiple rib fractures and small apical pneumothorax. Hospital course notable for delirium/encephalopathy, concerns of aspiration, development of VIKTOR and hypotension. Palliative and Supportive Care was consulted to explore goals of care and symptom management recommendations.    Advance Care Planning   Goals of Care:  - Code status: DNAR/DNI, comfort focused care  - Next of kin: 4 adult children (3 sons Paul, Neto, Dennys, 1 daughter Mesha)  - Patient is not decisional  - ACP documents: none  - Prognosis: poor  - Family's understanding of prognosis: good  - Goals: control pain/symptoms, treat acute, reversible issues, continue supportive care; minimize unneccesary suffering   - Recommendations/Plans: transition to comfort focused care. Will utilize hospice support. Will de-escalate oxygen supplementation as dyspnea is controlled with medications. Priority is to be able to return home with hospice support. If symptom burden is dependent on continuous infusion of medication, family would be supportive of inpatient hospice transition    Goals of Care Conversation  - 1/23/2023: All of the children arrived at bedside with their spouses and multiple other family members. I spoke with Mesha, Paul, Neto, and Dennys with their spouses, and they reached a consensus to focus on his comfort. They do not want him suffering, nor suffocating. Discussed with family about what comfort focused care would entail, with focus on alleviating pain/dyspnea/anxiety without hastening his natural dying process. Discussed that in the same spirit, we will not do anything to prolong his suffering. Discussed home hospice and inpatient " hospice, with the prominent difference is the ability to use IV medications in inpatient hospice for symptom relief refractory to oral/transdermal/rectal medications. Family expressed wishes to bring him home for his final moments on earth. Thankfully Father Chase has come visited today. Family would like to proceed with comfort focused care, understanding that with medication to bring his respiratory rate closer to 12-20, we can also de-escalate his oxygen supplementation.   - 1/21: Please see separate note for GOC/ACP discussion.  Pt DNR/DNI in event of continued decline and respiratory/cardiac arrest.  Family wants to avoid invasive and non beneficial life prolonging therapies.  Cont best supportive and minimally invasive care for now.  If declines further, family would want focus to be entirely on comfort focused therapies.   - 1/20/2023: Paul at bedside, who had spent the night with Mr. Browning. Remains concerns of worsening encephalopathy and raised question to the physicians about potential UTI exacerbating mental status. Remains hopeful that his numbers are looking stable/OK - his blood pressure, oxygen saturation, labs, imaging. Acknowledged that he is still very tachypneic and looks worse than he did on admission. His sister Mesha will come today and family will always remain at bedside throughout Mr. Browning's hospitalization. Remains open to palliative presence during Mr. Browning's hospitalization for continued support.  - 1/19/2023: Met Mr. Browning with his daughter Mesha and son Dennys at bedside. Mesha described overnight events, noting that while pain is better, he was up all night, confused and required dose of Zyprexa. She confirms that he does not have Parkinson's and that tremor is due to chronic nerve damage, for which he takes gabapentin. Noted that despite being in the hospital and having better control of pain, he appears to be getting worse. Mesha agrees that he looks  "worse, but shares that her brother Paul checked MyChart and saw that there were no drastic changes in his imaging and labs. I shared that I worried about him aspirating, noting that there is an increasing right lower lobe infiltrate demonstrated in his morning's chest x-ray. She wants to know what it is, whether it is a pneumonia and would like antibiotics started sooner than later to treat it. Emphasized our last conversation that it was not a matter of if he'd aspirate, but when he would, and that I worried time is short. She acknowledges that when he fell and was in the ER, that there was a chance he would "not make it" but wants to give him a chance of surviving this. Shared that I will revisit this afternoon to see how he and they are doing. Offered that if they'd like us to engage other family members (such as Dennys's wife who is an RN) that we'd be happy to do so per their preference. Mesha asks if Father Chase can come, shared that he did not come at all yesterday according to Paul. I will reach out and request for Father Chase to come to Mr. Browning today.  - 1/18/2023: Met Mr. Browning and his son Paul at his bedside, who was having a discussion related to code status with a surgeon on the team. Mr. Browning agrees that CPR will cause more harm and will text and discuss with his siblings about this code status. At this time he remains hesitant about changing code status related to intubation, recalling that his mother survived compassionate extubation 3 years after physicians told them that she was dying. Had supportive conversation with Paul, who remains hopeful that pain management will be able to perform nerve block as he worries that the opioids are causing too much sedation and would like to minimize medications if possible. Discussed interval development of VIKTOR, and will be watchful of this problem and adjust medications to dose renally and protect kidneys.  - 1/17/2023: Met Mr." "Nallely and his daughter Mesha and son Paul at his bedside. Mr. Browning is unable to fully participate in our discussion due to advanced dementia and immediate short term memory loss. Mesha shares her understanding that he broke 4 ribs in this fall and punctured his lungs. She was told that he might need a chest tube and he was high risk for developing pneumonia. She recalls that her mother and father discussed with each other that they would not want to be artificially sustained on life-support. When her mother was intubated and told that she would be ventilator-dependent, family honored her wishes and withdrew ventilator support. She thankfully lived 3 years after compassionate extubation and  3 years ago, which was the beginning of Mr. Browning's decline. They had been  when he was 15-years-old and she was 14-years-old. Now he lives with his daughter Mesha and is also supported by his other sons who help with other important aspects of his life. Mesha shares that he has sustained multiple falls, most recently sustained 3 falls in the last 2 weeks. Despite constant reorientation, he forgets to stay in bed and will try to get out and ambulate. He had significant sundowning from 4pm to 8pm, but now it is all day. Mesha and Paul recognize that he is progressively declining and acknowledge that this will continue. They've been told previously when he sustained his prior falls that "this was it" but despite this, he continues to live so they hope to continue doing what they can to help him live. When Mesha discussed code status in the ER, she was under the impression that the ER staff was saying that intubation and CPR will help him. We discussed that knowing that the fall was enough to cause multiple rib fractures, forceful chest compressions will surely cause more harm and more breakage, and the ventilator will worsen his small pneumothorax. I recommended against such interventions, " explaining that this will sure cause his demise and not help him the way we hope. Mesha asks if we don't intubate/ventilate when he develops a pneumonia, what can be done alternatively. I shared that at that point there are no curative interventions, and that medical professionals can support him with symptom management and offer him strategies to alleviate shortness of breath. They asked about chest tube, and I shared that as Neto (their brother) had a chest tube before, that Mr. Browning will also have significant pain if a chest tube is inserted, and this would prolong his current state without a guarantee that it will fix the pneumothorax as his body will have a harder time to heal. They share their understanding and would like to discuss this with their other siblings. Their sister-in-law who is an [employer] RN came to visit, and also voiced similar concerns about CPR/intubation as well. Mesha states that she is worried that he is miserable, forgetting immediately that he already went out to visit family/loved ones and stating repetitively that he wishes to go out to fish. Validated her concerns, recognizing that they wish that everything helpful be continued and offered. Encouraged them to consider that in addition to this, it is also our responsibility to protect him from harmful interventions that will not benefit him, as they had done so for their mother.       Acute Pain 2/2 Rib Fractures, Falls  Dyspnea  - Hydromorphone 0.5 mg IV q1h PRN pain, labored breathing  - Midazolam 1 mg IV q1h PRN dyspnea refractory to opioids, anxiety  - De-escalate oxygen as tolerated once respiratory rate improves (closer to 12-20) with opioids  - Gabapentin 500 mg PO daily (adjusted from home 300 mg TID schedule for VIKTOR) if able to tolerate PO  - Continue lidocaine patches  - Continue budesonide BID, levalbuterol q4h PRN wheezing  - Avoid IV fluids to avoid volume overload    Profuse Oropharyngeal Secretions  -  Discontinued scopolamine patch due to worsening mental status/agitation  - Turn head as needed to help shift oropharyngeal secretions out of the airway  - Yankauer suction at bedside  - Start glycopyrrolate 0.2 mg IV q4h PRN profuse oropharyngeal secretions    Constipation  - Continue bowel regimen  - Discontinued docusate  - Restart senna 8.6 mg PO BID if able to tolerate PO  - Polyethylene glycol 17 g PO BID if able to tolerate PO  - Ideally would avoid suppository/enema as turning causes more pain, but may need to do so if no bowel movement    Anxiety/Agitation/[place]/Delirium  Dementia with Behavioral Disturbances  - Discontinue memantine 10 mg PO BID   - Continue sertraline, will initiate taper at 75 mg as he may not be able to tolerate PO  - Clarified that he does not have Parkinson's  - Continue frequent reorientation and optimization of environment by keeping room bright during the day, dark and quiet at night  - Vital signs qshift  - Optimize pain relief as above  - Midazolam 1 mg IV q1h PRN anxiety, dyspnea refractory to opioids  - Haloperidol 1 mg IV q6h PRN non-redirectable agitation  - Discontinue scopolamine patch, which can further worsen agitation

## 2023-01-23 NOTE — NURSING
Note pt with decreased SPO2 60's with a great waveform, RT and SICU MD called to the bedside. Pt NT suctioned with a small amount of secretions returned pt also repositioned sitting up tall to help oxygenation. Oxygen requirements greatly increased to now High flow nasal cannula/comfort flow. Pt now with SPO2 92% with increased work of breathing and tachypnea. Pt children 2 of 4 at bedside, awaiting the arrival of remaining children. VS noted.

## 2023-01-24 ENCOUNTER — DOCUMENT SCAN (OUTPATIENT)
Dept: HOME HEALTH SERVICES | Facility: HOSPITAL | Age: 83
End: 2023-01-24
Payer: MEDICARE

## 2023-01-24 NOTE — PHYSICIAN QUERY
PT Name: Paul Browning  MR #: [id number]     DOCUMENTATION CLARIFICATION     CDS: Brandy Capley, RN  Email: [email or web address]     This form is a permanent document in the medical record.     Query Date: January 24, 2023    By submitting this query, we are merely seeking further clarification of documentation.  Please utilize your independent clinical judgment when addressing the question(s) below.  The Medical Record contains the following   Indicators   Supporting Clinical Findings Location in Medical Record   X SOB, LANGLEY, Wheezing, Productive Cough, Use of Accessory Muscles, etc. Positive for shortness of breath.    Pt exhibiting increased wob, grunting, and appears uncomfortable.     ED provider notes 1/16, filed 1/17    Palliative medicine progress notes 1/21   X RR         ABGs         O2 sat Vital Signs (24h Range):  Temp:  [98 °F (36.7 °C)] 98 °F (36.7 °C)  Pulse:  [73-91] 87  Resp:  [2-23] 22  SpO2:  [95 %-99 %] 99 %  BP: (108-138)/(56-72) 131/66    Vital Signs (24h Range):  Temp:  [97.7 °F (36.5 °C)-98.3 °F (36.8 °C)] 97.9 °F (36.6 °C)  Pulse:  [] 71  Resp:  [14-66] 46  SpO2:  [83 %-100 %] 100 %  BP: ()/(52-84) 122/59     01/21/23 01:01   Sample ARTERIAL   POC PH 7.321 (L)   POC PCO2 48.8 (H)   POC PO2 87   POC HCO3 25.2   POC TCO2 27   POC SATURATED O2 96   Allens Test Pass   POC BE -1   FiO2 28   Flow 2   DelSys Nasal Can   Site RR   Mode SPONT    H&P 1/17              Critical care medicine progress notes 1/22              ABG 1/21    Hypoxia/Hypercapnia      BiPAP/Intubation/Mechanical Ventilation     X Supplemental O2 On admission, he is satting in the mid-90s on 6L facemask.      X Home O2, Oxygen Dependence 83 yo M with a history of COPD on home oxygen 1/2L NC, CAD, DM type 2, and CKD stage 3 admitted to SICU for several comminuted right-sided rib fractures who is not a surgical candidate for rib plating.    H&P 1/17   X Respiratory distress or failure In the meanwhile, strongly  recommend treating increased WOB and discomfort related to rib fractures and increased work of breathing due to ongoing aspiration, resp shunting due to above and progressive resp failure.     Palliative medicine progress notes 1/21   X Radiology findings  Acute comminuted displaced fractures of the right 6th through 9th posterior ribs.  This may result in potential flail chest.  Resultant small right pneumothorax and right posterolateral chest wall subcutaneous emphysema.  Small bilateral pleural effusions, with right hemothorax to be considered.    Persistent moderate-sized right pleural effusion with multifocal right lung consolidation.  No pneumothorax.  Cardiac silhouette is stable.  Sternotomy wires noted.   CT chest 1/16          CXR 1/21 0525   X Acute/Chronic Illness Paul Browning is a frail 82M h/o CAD (EF 40% with previous MI with enduring defect seen on cardiac PET), COPD on 0.5L NC at home and inhalers, DM2, CKD3, HTN, sarcoid, CAD who fell multiple times at home. Imaging shows small apical PTX and subQ air on R chest wall with CT with several comminuted posterior rib fractures.   H&P 1/17   X Treatment  Pulm:   -- hx of COPD, baseline 0.5L NC at home  -- now has right sided rib fractures and small right apical PTX  -- Goal O2 sat > 90%  -- Wean as able  -- PEGGY tang Acapella   H&P 1/17    Other         The noted clinical guidelines are only system guidelines and do not replace the providers clinical judgment.    Provider, please further specify respiratory failure:    [  x  ] Acute and (on) Chronic Respiratory Failure with Hypoxia -   pO2 >10 mmHg below baseline or SpO2 < 91% on usual home O2 or O2 ? 2L/min over baseline home O2 and respiratory symptoms documented     [    ] Chronic Respiratory Failure with Hypoxia - Continuous home oxygen use     [    ] Other Respiratory (please specify): _________________         Please document in your progress notes daily for the duration of treatment  until resolved and include in your discharge summary.     Reference:    YIMI Sheehan MD. (2020, March 13). Acute respiratory distress syndrome: Clinical features, diagnosis, and complications in adults ([id number] GLENN Dalton MD & [id number] ALLIE Carmona MD, Eds.). Retrieved November 13, 2020, from [email or web address]&source=search_result&selectedTitle=1~150&usage_type=default&display_rank=1  Form No. [id number]

## 2023-01-24 NOTE — PHYSICIAN QUERY
PT Name: Paul Browning  MR #: [id number]     DOCUMENTATION CLARIFICATION     CDS: Brandy Capley, RN  Email: [email or web address]     This form is a permanent document in the medical record.    Query Date: January 24, 2023    By submitting this query, we are merely seeking further clarification of documentation.  Please utilize your independent clinical judgment when addressing the question(s) below.  The Medical Record contains the following:   Indicators   Supporting Clinical Findings Location in Medical Record   X Pneumonia documented   Recommendations/Plans:   continue full supportive care, including initiation of antibiotics for aspiration pneumonia.     Palliative care progress notes 1/19   X Chest X-Ray/CT Scan The cardiomediastinal silhouette is stable in configuration.  There is obscuration of the right costophrenic angle suggesting effusion, grossly similar to slightly enlarged since the previous examination allowing for differences in positioning..  The trachea is midline.  The lungs are symmetrically expanded bilaterally with patchy increased interstitial and parenchymal attenuation bilaterally, possibly worsened on the right although shallow inspiratory effort may account for the same..  No convincing new large focal consolidation.  There is no pneumothorax.  The osseous structures are unchanged.    Persistent moderate-sized right pleural effusion with multifocal right lung consolidation.   CXR 1/19 1617                CXR 1/21 0525   X PaO2    PaCO2     O2 sat  01/21/23 01:01   Sample ARTERIAL   POC PH 7.321 (L)   POC PCO2 48.8 (H)   POC PO2 87   POC HCO3 25.2   POC TCO2 27   POC SATURATED O2 96   Allens Test Pass   POC BE -1   FiO2 28   Flow 2   DelSys Nasal Can   Site RR   Mode SPONT      ABG 1/21   X WBC  01/19/23 01:38 01/20/23 02:57 01/21/23 03:03 01/22/23 02:55 01/23/23 02:45   WBC 6.71 7.13 10.66 5.72 6.04      Labs 1/19-1/23   X Vital Signs Vital Signs (24h Range):  Temp:  [98 °F (36.7  °C)-99.7 °F (37.6 °C)] 98 °F (36.7 °C)  Pulse:  [] 93  Resp:  [19-44] 34  SpO2:  [91 %-100 %] 98 %  BP: ()/(41-94) 116/57  Arterial Line BP: ()/(33-52) 112/41   Palliative care progress notes 1/19    Cultures      X Treatment   Pulm:   -- hx of COPD, baseline 0.5L NC at home  -- now has right sided rib fractures and small right apical PTX  -- Goal O2 sat > 90%  -- Wean as able  -- duonebs, IS, Acapella  -- Increasing respiratory effort with  wheezing    Critical care surgery progress notes 1/20   X Supplemental O2 Continues to have worsening hypoxia requiring maximum support from comfort flow.   Critical care surgery progress notes 1/23   X Dysphagia/Swallow study Paul Browning is a 82 y.o. male with an SLP diagnosis of  baseline dysphagia, appearing at baseline. .  He presents with no further acute speech therapy needs at this time.   PT/OT/SLP eval 1/17    Other       Please clarify if the ____Aspiration pneumonia___ diagnosis has been:    [  ] Ruled In   [  ] Ruled Out   [  ] Other/Clarification of findings (please specify): _______________    [ x  ] Clinically undetermined           Please document in your progress notes daily for the duration of treatment, until resolved, and include in your discharge summary.     Form No. [id number]
